# Patient Record
Sex: FEMALE | Race: WHITE | NOT HISPANIC OR LATINO | Employment: OTHER | URBAN - METROPOLITAN AREA
[De-identification: names, ages, dates, MRNs, and addresses within clinical notes are randomized per-mention and may not be internally consistent; named-entity substitution may affect disease eponyms.]

---

## 2018-08-27 PROBLEM — N20.0 NEPHROLITHIASIS: Status: ACTIVE | Noted: 2018-08-27

## 2018-08-28 ENCOUNTER — ANESTHESIA EVENT (OUTPATIENT)
Dept: SURGERY | Facility: OTHER | Age: 48
DRG: 694 | End: 2018-08-28
Payer: MEDICAID

## 2018-08-28 ENCOUNTER — ANESTHESIA (OUTPATIENT)
Dept: SURGERY | Facility: OTHER | Age: 48
DRG: 694 | End: 2018-08-28
Payer: MEDICAID

## 2018-08-28 ENCOUNTER — HOSPITAL ENCOUNTER (INPATIENT)
Facility: OTHER | Age: 48
LOS: 2 days | Discharge: HOME | DRG: 694 | End: 2018-08-30
Attending: HOSPITALIST | Admitting: HOSPITALIST
Payer: MEDICAID

## 2018-08-28 DIAGNOSIS — N20.0 NEPHROLITHIASIS: ICD-10-CM

## 2018-08-28 DIAGNOSIS — N17.9 ACUTE KIDNEY INJURY: ICD-10-CM

## 2018-08-28 DIAGNOSIS — N13.2 URETERAL STONE WITH HYDRONEPHROSIS: Primary | ICD-10-CM

## 2018-08-28 PROBLEM — R82.998 URINE WBC INCREASED: Status: ACTIVE | Noted: 2018-08-28

## 2018-08-28 LAB
ANION GAP SERPL CALC-SCNC: 9 MMOL/L
BASOPHILS # BLD AUTO: 0.02 K/UL
BASOPHILS NFR BLD: 0.1 %
BUN SERPL-MCNC: 22 MG/DL
CALCIUM SERPL-MCNC: 9 MG/DL
CHLORIDE SERPL-SCNC: 106 MMOL/L
CO2 SERPL-SCNC: 25 MMOL/L
CREAT SERPL-MCNC: 1.9 MG/DL
DIFFERENTIAL METHOD: ABNORMAL
EOSINOPHIL # BLD AUTO: 0 K/UL
EOSINOPHIL NFR BLD: 0 %
ERYTHROCYTE [DISTWIDTH] IN BLOOD BY AUTOMATED COUNT: 15.2 %
EST. GFR  (AFRICAN AMERICAN): 36 ML/MIN/1.73 M^2
EST. GFR  (NON AFRICAN AMERICAN): 31 ML/MIN/1.73 M^2
ESTIMATED AVG GLUCOSE: 180 MG/DL
GLUCOSE SERPL-MCNC: 296 MG/DL
HBA1C MFR BLD HPLC: 7.9 %
HCT VFR BLD AUTO: 33.3 %
HGB BLD-MCNC: 10.4 G/DL
LYMPHOCYTES # BLD AUTO: 1 K/UL
LYMPHOCYTES NFR BLD: 5.4 %
MCH RBC QN AUTO: 25.9 PG
MCHC RBC AUTO-ENTMCNC: 31.2 G/DL
MCV RBC AUTO: 83 FL
MONOCYTES # BLD AUTO: 0.7 K/UL
MONOCYTES NFR BLD: 3.9 %
NEUTROPHILS # BLD AUTO: 16.3 K/UL
NEUTROPHILS NFR BLD: 90.3 %
PLATELET # BLD AUTO: 277 K/UL
PMV BLD AUTO: 10.5 FL
POCT GLUCOSE: 209 MG/DL (ref 70–110)
POCT GLUCOSE: 264 MG/DL (ref 70–110)
POCT GLUCOSE: 337 MG/DL (ref 70–110)
POTASSIUM SERPL-SCNC: 3.9 MMOL/L
RBC # BLD AUTO: 4.02 M/UL
SODIUM SERPL-SCNC: 140 MMOL/L
WBC # BLD AUTO: 18.07 K/UL

## 2018-08-28 PROCEDURE — 85025 COMPLETE CBC W/AUTO DIFF WBC: CPT

## 2018-08-28 PROCEDURE — 36415 COLL VENOUS BLD VENIPUNCTURE: CPT

## 2018-08-28 PROCEDURE — 36000707: Performed by: UROLOGY

## 2018-08-28 PROCEDURE — 0T768DZ DILATION OF RIGHT URETER WITH INTRALUMINAL DEVICE, VIA NATURAL OR ARTIFICIAL OPENING ENDOSCOPIC: ICD-10-PCS | Performed by: UROLOGY

## 2018-08-28 PROCEDURE — 63600175 PHARM REV CODE 636 W HCPCS: Performed by: HOSPITALIST

## 2018-08-28 PROCEDURE — 71000033 HC RECOVERY, INTIAL HOUR: Performed by: UROLOGY

## 2018-08-28 PROCEDURE — 25000003 PHARM REV CODE 250: Performed by: NURSE PRACTITIONER

## 2018-08-28 PROCEDURE — 94799 UNLISTED PULMONARY SVC/PX: CPT

## 2018-08-28 PROCEDURE — 87040 BLOOD CULTURE FOR BACTERIA: CPT | Mod: 59

## 2018-08-28 PROCEDURE — 25000003 PHARM REV CODE 250: Performed by: ANESTHESIOLOGY

## 2018-08-28 PROCEDURE — 36000706: Performed by: UROLOGY

## 2018-08-28 PROCEDURE — 25000003 PHARM REV CODE 250: Performed by: SPECIALIST

## 2018-08-28 PROCEDURE — 11000001 HC ACUTE MED/SURG PRIVATE ROOM

## 2018-08-28 PROCEDURE — 63600175 PHARM REV CODE 636 W HCPCS: Performed by: PHYSICIAN ASSISTANT

## 2018-08-28 PROCEDURE — 63600175 PHARM REV CODE 636 W HCPCS: Performed by: NURSE ANESTHETIST, CERTIFIED REGISTERED

## 2018-08-28 PROCEDURE — 94761 N-INVAS EAR/PLS OXIMETRY MLT: CPT

## 2018-08-28 PROCEDURE — 37000009 HC ANESTHESIA EA ADD 15 MINS: Performed by: UROLOGY

## 2018-08-28 PROCEDURE — C1769 GUIDE WIRE: HCPCS | Performed by: UROLOGY

## 2018-08-28 PROCEDURE — 83036 HEMOGLOBIN GLYCOSYLATED A1C: CPT

## 2018-08-28 PROCEDURE — 52332 CYSTOSCOPY AND TREATMENT: CPT | Mod: RT,,, | Performed by: UROLOGY

## 2018-08-28 PROCEDURE — 99223 1ST HOSP IP/OBS HIGH 75: CPT | Mod: ,,, | Performed by: HOSPITALIST

## 2018-08-28 PROCEDURE — 87086 URINE CULTURE/COLONY COUNT: CPT

## 2018-08-28 PROCEDURE — 25000003 PHARM REV CODE 250: Performed by: UROLOGY

## 2018-08-28 PROCEDURE — 76000 FLUOROSCOPY <1 HR PHYS/QHP: CPT | Mod: 26,59,, | Performed by: UROLOGY

## 2018-08-28 PROCEDURE — 25000003 PHARM REV CODE 250: Performed by: PHYSICIAN ASSISTANT

## 2018-08-28 PROCEDURE — 37000008 HC ANESTHESIA 1ST 15 MINUTES: Performed by: UROLOGY

## 2018-08-28 PROCEDURE — 25000003 PHARM REV CODE 250: Performed by: NURSE ANESTHETIST, CERTIFIED REGISTERED

## 2018-08-28 PROCEDURE — 99204 OFFICE O/P NEW MOD 45 MIN: CPT | Mod: 25,,, | Performed by: NURSE PRACTITIONER

## 2018-08-28 PROCEDURE — 80048 BASIC METABOLIC PNL TOTAL CA: CPT

## 2018-08-28 PROCEDURE — 71000039 HC RECOVERY, EACH ADD'L HOUR: Performed by: UROLOGY

## 2018-08-28 PROCEDURE — 25500020 PHARM REV CODE 255: Performed by: UROLOGY

## 2018-08-28 PROCEDURE — C2617 STENT, NON-COR, TEM W/O DEL: HCPCS | Performed by: UROLOGY

## 2018-08-28 DEVICE — STENT URETERAL UNIV 6FR 26CM: Type: IMPLANTABLE DEVICE | Site: URETER | Status: FUNCTIONAL

## 2018-08-28 RX ORDER — ROCURONIUM BROMIDE 10 MG/ML
INJECTION, SOLUTION INTRAVENOUS
Status: DISCONTINUED | OUTPATIENT
Start: 2018-08-28 | End: 2018-08-28

## 2018-08-28 RX ORDER — CIPROFLOXACIN 2 MG/ML
400 INJECTION, SOLUTION INTRAVENOUS
Status: DISCONTINUED | OUTPATIENT
Start: 2018-08-28 | End: 2018-08-30

## 2018-08-28 RX ORDER — GLUCAGON 1 MG
1 KIT INJECTION
Status: DISCONTINUED | OUTPATIENT
Start: 2018-08-28 | End: 2018-08-30 | Stop reason: HOSPADM

## 2018-08-28 RX ORDER — IBUPROFEN 200 MG
24 TABLET ORAL
Status: DISCONTINUED | OUTPATIENT
Start: 2018-08-28 | End: 2018-08-30 | Stop reason: HOSPADM

## 2018-08-28 RX ORDER — PHENYLEPHRINE HYDROCHLORIDE 10 MG/ML
INJECTION INTRAVENOUS
Status: DISCONTINUED | OUTPATIENT
Start: 2018-08-28 | End: 2018-08-28

## 2018-08-28 RX ORDER — ONDANSETRON 8 MG/1
8 TABLET, ORALLY DISINTEGRATING ORAL EVERY 8 HOURS PRN
Status: DISCONTINUED | OUTPATIENT
Start: 2018-08-28 | End: 2018-08-29

## 2018-08-28 RX ORDER — LABETALOL HYDROCHLORIDE 5 MG/ML
10 INJECTION, SOLUTION INTRAVENOUS
Status: COMPLETED | OUTPATIENT
Start: 2018-08-28 | End: 2018-08-28

## 2018-08-28 RX ORDER — LORATADINE 10 MG/1
10 TABLET ORAL DAILY
Status: ON HOLD | COMMUNITY
Start: 2018-05-25 | End: 2018-08-30 | Stop reason: HOSPADM

## 2018-08-28 RX ORDER — INSULIN ASPART 100 [IU]/ML
10 INJECTION, SOLUTION INTRAVENOUS; SUBCUTANEOUS
Status: DISCONTINUED | OUTPATIENT
Start: 2018-08-29 | End: 2018-08-30 | Stop reason: HOSPADM

## 2018-08-28 RX ORDER — ONDANSETRON 2 MG/ML
4 INJECTION INTRAMUSCULAR; INTRAVENOUS DAILY PRN
Status: DISCONTINUED | OUTPATIENT
Start: 2018-08-28 | End: 2018-08-29

## 2018-08-28 RX ORDER — FENTANYL CITRATE 50 UG/ML
25 INJECTION, SOLUTION INTRAMUSCULAR; INTRAVENOUS EVERY 5 MIN PRN
Status: DISCONTINUED | OUTPATIENT
Start: 2018-08-28 | End: 2018-08-29

## 2018-08-28 RX ORDER — DEXAMETHASONE SODIUM PHOSPHATE 4 MG/ML
INJECTION, SOLUTION INTRA-ARTICULAR; INTRALESIONAL; INTRAMUSCULAR; INTRAVENOUS; SOFT TISSUE
Status: DISCONTINUED | OUTPATIENT
Start: 2018-08-28 | End: 2018-08-28

## 2018-08-28 RX ORDER — PROPOFOL 10 MG/ML
VIAL (ML) INTRAVENOUS
Status: DISCONTINUED | OUTPATIENT
Start: 2018-08-28 | End: 2018-08-28

## 2018-08-28 RX ORDER — HEPARIN SODIUM 5000 [USP'U]/ML
5000 INJECTION, SOLUTION INTRAVENOUS; SUBCUTANEOUS EVERY 8 HOURS
Status: DISCONTINUED | OUTPATIENT
Start: 2018-08-28 | End: 2018-08-30 | Stop reason: HOSPADM

## 2018-08-28 RX ORDER — SODIUM CHLORIDE 9 MG/ML
INJECTION, SOLUTION INTRAVENOUS CONTINUOUS
Status: DISCONTINUED | OUTPATIENT
Start: 2018-08-28 | End: 2018-08-30

## 2018-08-28 RX ORDER — IBUPROFEN 200 MG
16 TABLET ORAL
Status: DISCONTINUED | OUTPATIENT
Start: 2018-08-28 | End: 2018-08-30 | Stop reason: HOSPADM

## 2018-08-28 RX ORDER — ONDANSETRON 2 MG/ML
INJECTION INTRAMUSCULAR; INTRAVENOUS
Status: DISCONTINUED | OUTPATIENT
Start: 2018-08-28 | End: 2018-08-28

## 2018-08-28 RX ORDER — GLYCOPYRROLATE 0.2 MG/ML
INJECTION INTRAMUSCULAR; INTRAVENOUS
Status: DISCONTINUED | OUTPATIENT
Start: 2018-08-28 | End: 2018-08-28

## 2018-08-28 RX ORDER — SODIUM CHLORIDE, SODIUM LACTATE, POTASSIUM CHLORIDE, CALCIUM CHLORIDE 600; 310; 30; 20 MG/100ML; MG/100ML; MG/100ML; MG/100ML
INJECTION, SOLUTION INTRAVENOUS CONTINUOUS PRN
Status: DISCONTINUED | OUTPATIENT
Start: 2018-08-28 | End: 2018-08-28

## 2018-08-28 RX ORDER — FLUTICASONE PROPIONATE 50 MCG
1 SPRAY, SUSPENSION (ML) NASAL DAILY
Status: ON HOLD | COMMUNITY
Start: 2018-08-22 | End: 2018-08-30 | Stop reason: HOSPADM

## 2018-08-28 RX ORDER — GABAPENTIN 300 MG/1
300 CAPSULE ORAL 3 TIMES DAILY PRN
Status: ON HOLD | COMMUNITY
Start: 2018-08-22 | End: 2018-08-30 | Stop reason: HOSPADM

## 2018-08-28 RX ORDER — HYDROMORPHONE HYDROCHLORIDE 1 MG/ML
0.5 INJECTION, SOLUTION INTRAMUSCULAR; INTRAVENOUS; SUBCUTANEOUS EVERY 4 HOURS PRN
Status: DISCONTINUED | OUTPATIENT
Start: 2018-08-28 | End: 2018-08-30

## 2018-08-28 RX ORDER — ACETAMINOPHEN 325 MG/1
650 TABLET ORAL EVERY 4 HOURS PRN
Status: DISCONTINUED | OUTPATIENT
Start: 2018-08-28 | End: 2018-08-30

## 2018-08-28 RX ORDER — HYDROMORPHONE HYDROCHLORIDE 1 MG/ML
0.5 INJECTION, SOLUTION INTRAMUSCULAR; INTRAVENOUS; SUBCUTANEOUS ONCE
Status: COMPLETED | OUTPATIENT
Start: 2018-08-28 | End: 2018-08-28

## 2018-08-28 RX ORDER — SODIUM CHLORIDE 0.9 % (FLUSH) 0.9 %
5 SYRINGE (ML) INJECTION
Status: DISCONTINUED | OUTPATIENT
Start: 2018-08-28 | End: 2018-08-29

## 2018-08-28 RX ORDER — INSULIN GLARGINE 100 [IU]/ML
60 INJECTION, SOLUTION SUBCUTANEOUS NIGHTLY
COMMUNITY
Start: 2018-08-14 | End: 2020-11-03

## 2018-08-28 RX ORDER — SUCCINYLCHOLINE CHLORIDE 20 MG/ML
INJECTION INTRAMUSCULAR; INTRAVENOUS
Status: DISCONTINUED | OUTPATIENT
Start: 2018-08-28 | End: 2018-08-28

## 2018-08-28 RX ORDER — INSULIN LISPRO 100 [IU]/ML
10 INJECTION, SOLUTION INTRAVENOUS; SUBCUTANEOUS
Status: ON HOLD | COMMUNITY
Start: 2018-08-22 | End: 2022-05-18

## 2018-08-28 RX ORDER — ATROPA BELLADONNA AND OPIUM 16.2; 6 MG/1; MG/1
SUPPOSITORY RECTAL
Status: DISCONTINUED | OUTPATIENT
Start: 2018-08-28 | End: 2018-08-28 | Stop reason: HOSPADM

## 2018-08-28 RX ORDER — OXYCODONE HYDROCHLORIDE 5 MG/1
5 TABLET ORAL
Status: DISCONTINUED | OUTPATIENT
Start: 2018-08-28 | End: 2018-08-29 | Stop reason: HOSPADM

## 2018-08-28 RX ORDER — INSULIN ASPART 100 [IU]/ML
0-5 INJECTION, SOLUTION INTRAVENOUS; SUBCUTANEOUS
Status: DISCONTINUED | OUTPATIENT
Start: 2018-08-28 | End: 2018-08-30 | Stop reason: HOSPADM

## 2018-08-28 RX ORDER — SODIUM CHLORIDE 0.9 % (FLUSH) 0.9 %
3 SYRINGE (ML) INJECTION
Status: DISCONTINUED | OUTPATIENT
Start: 2018-08-28 | End: 2018-08-29

## 2018-08-28 RX ORDER — DIPHENHYDRAMINE HYDROCHLORIDE 50 MG/ML
25 INJECTION INTRAMUSCULAR; INTRAVENOUS EVERY 6 HOURS PRN
Status: DISCONTINUED | OUTPATIENT
Start: 2018-08-28 | End: 2018-08-30

## 2018-08-28 RX ORDER — OMEPRAZOLE 20 MG/1
20 CAPSULE, DELAYED RELEASE ORAL DAILY
Status: ON HOLD | COMMUNITY
Start: 2018-06-01 | End: 2018-08-29 | Stop reason: CLARIF

## 2018-08-28 RX ORDER — MEPERIDINE HYDROCHLORIDE 50 MG/ML
12.5 INJECTION INTRAMUSCULAR; INTRAVENOUS; SUBCUTANEOUS ONCE AS NEEDED
Status: ACTIVE | OUTPATIENT
Start: 2018-08-28 | End: 2018-08-28

## 2018-08-28 RX ORDER — LIDOCAINE HCL/PF 100 MG/5ML
SYRINGE (ML) INTRAVENOUS
Status: DISCONTINUED | OUTPATIENT
Start: 2018-08-28 | End: 2018-08-28

## 2018-08-28 RX ORDER — LOSARTAN POTASSIUM AND HYDROCHLOROTHIAZIDE 25; 100 MG/1; MG/1
1 TABLET ORAL DAILY
Status: ON HOLD | COMMUNITY
Start: 2018-05-25 | End: 2018-08-30 | Stop reason: HOSPADM

## 2018-08-28 RX ORDER — HYDROMORPHONE HYDROCHLORIDE 2 MG/ML
0.4 INJECTION, SOLUTION INTRAMUSCULAR; INTRAVENOUS; SUBCUTANEOUS EVERY 5 MIN PRN
Status: DISCONTINUED | OUTPATIENT
Start: 2018-08-28 | End: 2018-08-29 | Stop reason: HOSPADM

## 2018-08-28 RX ORDER — HYDRALAZINE HYDROCHLORIDE 20 MG/ML
10 INJECTION INTRAMUSCULAR; INTRAVENOUS EVERY 6 HOURS PRN
Status: DISCONTINUED | OUTPATIENT
Start: 2018-08-28 | End: 2018-08-29

## 2018-08-28 RX ORDER — HYDRALAZINE HYDROCHLORIDE 50 MG/1
50 TABLET, FILM COATED ORAL 3 TIMES DAILY
Status: ON HOLD | COMMUNITY
Start: 2018-05-25 | End: 2022-05-18

## 2018-08-28 RX ORDER — MIDAZOLAM HYDROCHLORIDE 1 MG/ML
INJECTION INTRAMUSCULAR; INTRAVENOUS
Status: DISCONTINUED | OUTPATIENT
Start: 2018-08-28 | End: 2018-08-28

## 2018-08-28 RX ORDER — FENTANYL CITRATE 50 UG/ML
INJECTION, SOLUTION INTRAMUSCULAR; INTRAVENOUS
Status: DISCONTINUED | OUTPATIENT
Start: 2018-08-28 | End: 2018-08-28

## 2018-08-28 RX ORDER — OXYBUTYNIN CHLORIDE 5 MG/1
5 TABLET ORAL 3 TIMES DAILY PRN
Status: DISCONTINUED | OUTPATIENT
Start: 2018-08-28 | End: 2018-08-30 | Stop reason: HOSPADM

## 2018-08-28 RX ADMIN — LABETALOL HYDROCHLORIDE 10 MG: 5 INJECTION, SOLUTION INTRAVENOUS at 03:08

## 2018-08-28 RX ADMIN — LIDOCAINE HYDROCHLORIDE 60 MG: 20 INJECTION, SOLUTION INTRAVENOUS at 01:08

## 2018-08-28 RX ADMIN — HYDRALAZINE HYDROCHLORIDE 10 MG: 20 INJECTION INTRAMUSCULAR; INTRAVENOUS at 04:08

## 2018-08-28 RX ADMIN — ROCURONIUM BROMIDE 10 MG: 10 INJECTION, SOLUTION INTRAVENOUS at 01:08

## 2018-08-28 RX ADMIN — SODIUM CHLORIDE: 0.9 INJECTION, SOLUTION INTRAVENOUS at 04:08

## 2018-08-28 RX ADMIN — PHENYLEPHRINE HYDROCHLORIDE 200 MCG: 10 INJECTION INTRAVENOUS at 02:08

## 2018-08-28 RX ADMIN — CIPROFLOXACIN 400 MG: 2 INJECTION, SOLUTION INTRAVENOUS at 12:08

## 2018-08-28 RX ADMIN — PROPOFOL 200 MG: 10 INJECTION, EMULSION INTRAVENOUS at 01:08

## 2018-08-28 RX ADMIN — INSULIN ASPART 4 UNITS: 100 INJECTION, SOLUTION INTRAVENOUS; SUBCUTANEOUS at 07:08

## 2018-08-28 RX ADMIN — HEPARIN SODIUM 5000 UNITS: 5000 INJECTION, SOLUTION INTRAVENOUS; SUBCUTANEOUS at 10:08

## 2018-08-28 RX ADMIN — MIDAZOLAM HYDROCHLORIDE 2 MG: 1 INJECTION, SOLUTION INTRAMUSCULAR; INTRAVENOUS at 01:08

## 2018-08-28 RX ADMIN — ONDANSETRON 4 MG: 2 INJECTION INTRAMUSCULAR; INTRAVENOUS at 02:08

## 2018-08-28 RX ADMIN — GLYCOPYRROLATE 0.2 MG: 0.2 INJECTION, SOLUTION INTRAMUSCULAR; INTRAVENOUS at 02:08

## 2018-08-28 RX ADMIN — CARBOXYMETHYLCELLULOSE SODIUM 2 DROP: 2.5 SOLUTION/ DROPS OPHTHALMIC at 01:08

## 2018-08-28 RX ADMIN — OXYBUTYNIN CHLORIDE 5 MG: 5 TABLET ORAL at 06:08

## 2018-08-28 RX ADMIN — HEPARIN SODIUM 5000 UNITS: 5000 INJECTION, SOLUTION INTRAVENOUS; SUBCUTANEOUS at 05:08

## 2018-08-28 RX ADMIN — DEXAMETHASONE SODIUM PHOSPHATE 8 MG: 4 INJECTION, SOLUTION INTRAMUSCULAR; INTRAVENOUS at 02:08

## 2018-08-28 RX ADMIN — HYDROMORPHONE HYDROCHLORIDE 0.5 MG: 1 INJECTION, SOLUTION INTRAMUSCULAR; INTRAVENOUS; SUBCUTANEOUS at 07:08

## 2018-08-28 RX ADMIN — SODIUM CHLORIDE, SODIUM LACTATE, POTASSIUM CHLORIDE, AND CALCIUM CHLORIDE: 600; 310; 30; 20 INJECTION, SOLUTION INTRAVENOUS at 01:08

## 2018-08-28 RX ADMIN — ONDANSETRON 8 MG: 8 TABLET, ORALLY DISINTEGRATING ORAL at 05:08

## 2018-08-28 RX ADMIN — INSULIN DETEMIR 40 UNITS: 100 INJECTION, SOLUTION SUBCUTANEOUS at 09:08

## 2018-08-28 RX ADMIN — PROMETHAZINE HYDROCHLORIDE 25 MG: 25 INJECTION INTRAMUSCULAR; INTRAVENOUS at 07:08

## 2018-08-28 RX ADMIN — FENTANYL CITRATE 100 MCG: 50 INJECTION, SOLUTION INTRAMUSCULAR; INTRAVENOUS at 01:08

## 2018-08-28 RX ADMIN — SUCCINYLCHOLINE CHLORIDE 140 MG: 20 INJECTION, SOLUTION INTRAMUSCULAR; INTRAVENOUS at 01:08

## 2018-08-28 RX ADMIN — CIPROFLOXACIN 400 MG: 2 INJECTION, SOLUTION INTRAVENOUS at 10:08

## 2018-08-28 NOTE — ANESTHESIA PREPROCEDURE EVALUATION
08/28/2018  Georgina Arias is a 47 y.o., female.    Anesthesia Evaluation    I have reviewed the Patient Summary Reports.    I have reviewed the Nursing Notes.   I have reviewed the Medications.     Review of Systems  Anesthesia Hx:  No problems with previous Anesthesia    Social:  Former Smoker, Social Alcohol Use    Hematology/Oncology:     Oncology Normal     EENT/Dental:EENT/Dental Normal   Cardiovascular:   Exercise tolerance: good Hypertension, well controlled PVD hyperlipidemia    Pulmonary:  Pulmonary Normal    Renal/:   renal calculi    Hepatic/GI:   PUD,    Musculoskeletal:  Musculoskeletal Normal    Neurological:  Neurology Normal    Endocrine:   Diabetes, poorly controlled, type 2    Dermatological:  Skin Normal    Psych:  Psychiatric Normal           Physical Exam  General:  Morbid Obesity    Airway/Jaw/Neck:  Airway Findings: Mouth Opening: Normal Tongue: Normal  General Airway Assessment: Adult, Average  Mallampati: II  TM Distance: 4 - 6 cm  Jaw/Neck Findings:  Neck ROM: Extension Decreased, Mild      Dental:  Dental Findings: upper partial dentures        Mental Status:  Mental Status Findings:  Cooperative, Alert and Oriented         Anesthesia Plan  Type of Anesthesia, risks & benefits discussed:  Anesthesia Type:  general  Patient's Preference:   Intra-op Monitoring Plan: standard ASA monitors  Intra-op Monitoring Plan Comments:   Post Op Pain Control Plan: per primary service following discharge from PACU  Post Op Pain Control Plan Comments:   Induction:    Beta Blocker:         Informed Consent: Patient understands risks and agrees with Anesthesia plan.  Questions answered. Anesthesia consent signed with patient.  ASA Score: 3  emergent   Day of Surgery Review of History & Physical:    H&P update referred to the surgeon.         Ready For Surgery From Anesthesia Perspective.

## 2018-08-28 NOTE — PLAN OF CARE
10am- LMSW attempted to met with patient at the bedside. Patient is on the phone and request LMSW return later.     LMSW will return to the bedside.

## 2018-08-28 NOTE — H&P
"[facility] Hospital Medicine  History & Physical    Patient Name: Georgina Arias  MRN: [id number]  Admission Date: 8/28/2018  Attending Physician: Braulio Heredia MD   Primary Care Provider: Primary Doctor No         Patient information was obtained from patient, past medical records and ER records.     Subjective:     Principal Problem:Nephrolithiasis    Chief Complaint: No chief complaint on file.       HPI: Ms. Georgina Arias is a 47 y.o. female, who per ED note " 47 y.o. female who presents to the ED with complaint of sharp abdominal pain onset yesterday.  Pt has epigastric pain radiating upwards and constant lower flank pain with palpation. Pt went to a restaurant yesterday and two hours after eating, started vomiting. Pt tried taking Tylenol, but still experiences intermittent vomiting, nausea, and abdominal pain. Pt has not eaten at all today and has trouble drinking water. Pt has a past medical history of Allergy, Diabetes mellitus, type 2, Hyperlipidemia, Hypertension, PAD (peripheral artery disease), and PUD (peptic ulcer disease). Pt has a past surgical history that includes Peripheral arterial stent graft (Right) and Cholecystectomy." She was evaluated at [place] ED and found to have nephrolithiasis. In addition, a UA had 7 WBC, but 20 squams. She was administered a dose of IV antibiotics, and transferred to [facility] for Urology consultation and further management.     Past Medical History:   Diagnosis Date    Allergy     Diabetes mellitus, type 2     Hyperlipidemia     Hypertension     PAD (peripheral artery disease)     PUD (peptic ulcer disease)        Past Surgical History:   Procedure Laterality Date    CHOLECYSTECTOMY      PERIPHERAL ARTERIAL STENT GRAFT Right        Review of patient's allergies indicates:   Allergen Reactions    Naproxen Anaphylaxis    Hydrocod-cpm-pe-acetaminophen      Rash, vomiting    Pcn [penicillins] Hives    Sweetleaf (stevia " "zoran) Rash     nutrasweet or any other artifical sweetners       Current Facility-Administered Medications on File Prior to Encounter   Medication    [COMPLETED] levoFLOXacin 500 mg/100 mL IVPB 500 mg    [COMPLETED] morphine injection 4 mg    [COMPLETED] morphine injection 4 mg    [COMPLETED] ondansetron injection 4 mg    [COMPLETED] ondansetron injection 4 mg    [COMPLETED] sodium chloride 0.9% bolus 1,000 mL     Current Outpatient Medications on File Prior to Encounter   Medication Sig    amlodipine (NORVASC) 10 MG tablet Take 1 tablet (10 mg total) by mouth once daily.    ascorbic acid (VITAMIN C) 500 MG tablet Take 500 mg by mouth once daily.    aspirin (ECOTRIN) 81 MG EC tablet Take 1 tablet (81 mg total) by mouth once daily.    atorvastatin (LIPITOR) 80 MG tablet Take 1 tablet (80 mg total) by mouth once daily.    atropine sulfate, PF, 1 % Drop Apply to eye.    cetirizine (ZYRTEC) 10 MG tablet Take 10 mg by mouth once daily.    citalopram (CELEXA) 20 MG tablet     clopidogrel (PLAVIX) 75 mg tablet Take 1 tablet (75 mg total) by mouth once daily.    dextromethorphan-guaifenesin  mg/5 ml (ROBITUSSIN-DM)  mg/5 mL liquid Take 10 mLs by mouth 4 (four) times daily as needed (cough).    insulin needles, disposable, (ULTRA-THIN II INS PEN NEEDLES) 29 x 1/2 " Ndle 1 each by Misc.(Non-Drug; Combo Route) route 2 (two) times daily.    insulin needles, disposable, 29 gauge Ndle 1 each by Misc.(Non-Drug; Combo Route) route once daily.    insulin NPH (NOVOLIN N) 100 unit/mL injection Inject 50 Units into the skin 2 (two) times daily before meals.    insulin regular 100 unit/mL Inj injection Inject 20 Units into the skin 3 (three) times daily before meals.    insulin syringe-needle U-100 1 mL 29 x 1/2" Syrg     lisinopril-hydrochlorothiazide (PRINZIDE,ZESTORETIC) 20-25 mg Tab Take 1 tablet by mouth once daily.    metformin (GLUCOPHAGE) 1000 MG tablet Take 1 tablet (1,000 mg total) by " mouth 2 (two) times daily with meals.    moxifloxacin (VIGAMOX) 0.5 % ophthalmic solution 1 drop 3 (three) times daily.    omeprazole (PRILOSEC) 40 MG capsule Take 40 mg by mouth once daily.    spironolactone (ALDACTONE) 50 MG tablet Take 1 tablet (50 mg total) by mouth once daily.    trazodone (DESYREL) 50 MG tablet Take 1 tablet (50 mg total) by mouth nightly as needed for Insomnia.     Family History     Problem Relation (Age of Onset)    Cancer Maternal Grandmother    Diabetes Mother, Father    Heart disease Father (37), Maternal Grandmother    Hypertension Mother, Father, Brother        Tobacco Use    Smoking status: Former Smoker     Last attempt to quit: 2002     Years since quittin.0   Substance and Sexual Activity    Alcohol use: Yes     Comment: occ    Drug use: No    Sexual activity: Not on file     Review of Systems   Constitutional: Negative for activity change, appetite change, chills, diaphoresis and fever.   Respiratory: Negative for cough, shortness of breath and wheezing.    Cardiovascular: Negative for chest pain and palpitations.   Gastrointestinal: Positive for nausea and vomiting. Negative for abdominal pain, constipation and diarrhea.   Genitourinary: Positive for flank pain. Negative for decreased urine volume, difficulty urinating, dysuria, frequency, hematuria, pelvic pain and urgency.   Musculoskeletal: Negative for back pain, myalgias, neck pain and neck stiffness.   Skin: Negative for color change, pallor, rash and wound.   Neurological: Negative for dizziness, weakness, light-headedness and headaches.   Psychiatric/Behavioral: Negative for confusion and decreased concentration.     Objective:     Vital Signs (Most Recent):  Temp: 98.7 °F (37.1 °C) (18)  Pulse: 87 (18)  Resp: 18 (18)  BP: (!) 196/80 (18 0423)  SpO2: 97 % (18) Vital Signs (24h Range):  Temp:  [98.7 °F (37.1 °C)-99.2 °F (37.3 °C)] 98.7 °F (37.1 °C)  Pulse:   [87-98] 87  Resp:  [14-18] 18  SpO2:  [81 %-100 %] 97 %  BP: (172-236)/() 196/80     Weight: 124.9 kg (275 lb 5.7 oz)  Body mass index is 43.13 kg/m².    Physical Exam   Constitutional: She is oriented to person, place, and time. She appears well-developed and well-nourished. No distress.   Obese female.    HENT:   Head: Normocephalic and atraumatic.   Eyes: Conjunctivae and EOM are normal. Pupils are equal, round, and reactive to light. No scleral icterus.   Neck: Normal range of motion. Neck supple. No tracheal deviation present.   Cardiovascular: Normal rate, regular rhythm, normal heart sounds and intact distal pulses. Exam reveals no gallop and no friction rub.   No murmur heard.  Pulmonary/Chest: Effort normal and breath sounds normal. No stridor. No respiratory distress. She has no wheezes. She has no rales.   Abdominal: Soft. Bowel sounds are normal. She exhibits no distension. There is no tenderness. There is no guarding.   + CVA tenderness R>L   Neurological: She is alert and oriented to person, place, and time.   Skin: Skin is warm and dry. No rash noted. She is not diaphoretic. No erythema.   Psychiatric: She has a normal mood and affect. Her behavior is normal. Judgment and thought content normal.   Nursing note and vitals reviewed.        CRANIAL NERVES     CN III, IV, VI   Pupils are equal, round, and reactive to light.  Extraocular motions are normal.        Significant Labs:   BMP:   Recent Labs   Lab  08/27/18 1914   GLU  232*   NA  140   K  3.4*   CL  102   CO2  29   BUN  22*   CREATININE  1.8*   CALCIUM  8.5*     CBC:   Recent Labs   Lab  08/27/18 1914   WBC  19.60*   HGB  11.2*   HCT  34.8*   PLT  280     CMP:   Recent Labs   Lab  08/27/18 1914   NA  140   K  3.4*   CL  102   CO2  29   GLU  232*   BUN  22*   CREATININE  1.8*   CALCIUM  8.5*   PROT  7.2   ALBUMIN  3.0*   BILITOT  0.5   ALKPHOS  82   AST  21   ALT  15   ANIONGAP  9   EGFRNONAA  33.0*     Urine Culture: No results for  input(s): LABURIN in the last 48 hours.  Urine Studies:   Recent Labs   Lab  08/27/18   1930   COLORU  Yellow   APPEARANCEUA  Clear   PHUR  6.0   SPECGRAV  1.020   PROTEINUA  3+*   GLUCUA  1+*   KETONESU  Negative   BILIRUBINUA  Negative   OCCULTUA  3+*   NITRITE  Negative   UROBILINOGEN  Negative   LEUKOCYTESUR  Negative   RBCUA  14*   WBCUA  7*   BACTERIA  Moderate*   SQUAMEPITHEL  20   HYALINECASTS  1     All pertinent labs within the past 24 hours have been reviewed.    Significant Imaging: I have reviewed all pertinent imaging results/findings within the past 24 hours.             Assessment/Plan:     * Nephrolithiasis    - per ED MD report, CT from [facility] noted two 5 mm prox. Ureter renal stones   - ED MD did d/w Dr. Perez (Uroliogy) who will consult on the patient   - patient to be NPO s/p midnight   - PRN pain meds   - PRN nausea meds         Urine WBC increased    - treated with single dose of levaquin at [facility] ED   - continue treatment with vancomycin   - patient is allergic to PCN   - associated with nausea & vomiting         Hypertensive urgency    - most recent blood pressure reading BP: (!) 196/80   - hydralazine PRN for SBP > 170  - reassess BP ~45 minutes after hydralazine administration  - will continue to monitor & order other anti-hypertensive meds PRN            Type 2 diabetes mellitus with hyperglycemia, with long-term current use of insulin    - last A1C:   Lab Results   Component Value Date    HGBA1C 9.9 (H) 03/24/2015    - A1C pending for AM   - hold oral antidiabetic meds   - Diabetic diet when OK for PO; NPO for now   - SSI with accuchekcs P5lbhce            Hyperlipidemia    - continue statin when OK for PO          PAD (peripheral artery disease)    - continue statin & ASA when ok for PO            VTE Risk Mitigation (From admission, onward)        Ordered     heparin (porcine) injection 5,000 Units  Every 8 hours      08/28/18 0410     IP VTE HIGH RISK PATIENT  Once      08/28/18 0410      Place sequential compression device  Until discontinued      08/28/18 0410     Place GUSTAVO hose  Until discontinued      08/28/18 0410             Chastity Leo PA-C  Department of Hospital Medicine   [facility]

## 2018-08-28 NOTE — PLAN OF CARE
"7:47 AM   MD Giovanna notified of elevated SBP, No PRN pain Med available.  Orders RCVD and Implemented.  [facility] at bedside will re-assess BP.    BP (!) 198/85   Pulse 92   Temp 98.7 °F (37.1 °C)   Resp 18   Ht 5' 7" (1.702 m)   Wt 124.9 kg (275 lb 5.7 oz)   SpO2 95%   Breastfeeding? No   BMI 43.13 kg/m²   O2 at 88% on RA, NC 2 L provided.    1:38 PM  Report called to Diana in [facility]. Patient enroute. to OR    5:17 PM  In bed   Received to room from PACU, RN. HTN on O2/2L/NC & afebrile. AOx4 / Ox4, still slightly sedated--arousable to voice. Oriented to room, POC, & all equipment. Surgical dressing CDI w/ Fraser catheter secure draining bloody urine. Bowel sounds active, clear liquid diet initiated. Fall precautions implemented & call light in reach. Resting comfortably in low bed, in NAD.     6:47 PM  CO bladder MD johanna notified/ Orders RCVD and Implemented.  ACHSc SS coverage 2000 ADA inityiated    "

## 2018-08-28 NOTE — SUBJECTIVE & OBJECTIVE
"Past Medical History:   Diagnosis Date    Allergy     Diabetes mellitus, type 2     Hyperlipidemia     Hypertension     PAD (peripheral artery disease)     PUD (peptic ulcer disease)        Past Surgical History:   Procedure Laterality Date    CHOLECYSTECTOMY      PERIPHERAL ARTERIAL STENT GRAFT Right        Review of patient's allergies indicates:   Allergen Reactions    Naproxen Anaphylaxis    Hydrocod-cpm-pe-acetaminophen      Rash, vomiting    Pcn [penicillins] Hives    Sweetleaf (stevia rebaudiana) Rash     nutrasweet or any other artifical sweetners       Current Facility-Administered Medications on File Prior to Encounter   Medication    [COMPLETED] levoFLOXacin 500 mg/100 mL IVPB 500 mg    [COMPLETED] morphine injection 4 mg    [COMPLETED] morphine injection 4 mg    [COMPLETED] ondansetron injection 4 mg    [COMPLETED] ondansetron injection 4 mg    [COMPLETED] sodium chloride 0.9% bolus 1,000 mL     Current Outpatient Medications on File Prior to Encounter   Medication Sig    amlodipine (NORVASC) 10 MG tablet Take 1 tablet (10 mg total) by mouth once daily.    ascorbic acid (VITAMIN C) 500 MG tablet Take 500 mg by mouth once daily.    aspirin (ECOTRIN) 81 MG EC tablet Take 1 tablet (81 mg total) by mouth once daily.    atorvastatin (LIPITOR) 80 MG tablet Take 1 tablet (80 mg total) by mouth once daily.    atropine sulfate, PF, 1 % Drop Apply to eye.    cetirizine (ZYRTEC) 10 MG tablet Take 10 mg by mouth once daily.    citalopram (CELEXA) 20 MG tablet     clopidogrel (PLAVIX) 75 mg tablet Take 1 tablet (75 mg total) by mouth once daily.    dextromethorphan-guaifenesin  mg/5 ml (ROBITUSSIN-DM)  mg/5 mL liquid Take 10 mLs by mouth 4 (four) times daily as needed (cough).    insulin needles, disposable, (ULTRA-THIN II INS PEN NEEDLES) 29 x 1/2 " Ndle 1 each by Misc.(Non-Drug; Combo Route) route 2 (two) times daily.    insulin needles, disposable, 29 gauge Ndle 1 each by " "Misc.(Non-Drug; Combo Route) route once daily.    insulin NPH (NOVOLIN N) 100 unit/mL injection Inject 50 Units into the skin 2 (two) times daily before meals.    insulin regular 100 unit/mL Inj injection Inject 20 Units into the skin 3 (three) times daily before meals.    insulin syringe-needle U-100 1 mL 29 x 1/2" Syrg     lisinopril-hydrochlorothiazide (PRINZIDE,ZESTORETIC) 20-25 mg Tab Take 1 tablet by mouth once daily.    metformin (GLUCOPHAGE) 1000 MG tablet Take 1 tablet (1,000 mg total) by mouth 2 (two) times daily with meals.    moxifloxacin (VIGAMOX) 0.5 % ophthalmic solution 1 drop 3 (three) times daily.    omeprazole (PRILOSEC) 40 MG capsule Take 40 mg by mouth once daily.    spironolactone (ALDACTONE) 50 MG tablet Take 1 tablet (50 mg total) by mouth once daily.    trazodone (DESYREL) 50 MG tablet Take 1 tablet (50 mg total) by mouth nightly as needed for Insomnia.     Family History     Problem Relation (Age of Onset)    Cancer Maternal Grandmother    Diabetes Mother, Father    Heart disease Father (37), Maternal Grandmother    Hypertension Mother, Father, Brother        Tobacco Use    Smoking status: Former Smoker     Last attempt to quit: 2002     Years since quittin.0   Substance and Sexual Activity    Alcohol use: Yes     Comment: occ    Drug use: No    Sexual activity: Not on file     Review of Systems   Constitutional: Negative for activity change, appetite change, chills, diaphoresis and fever.   Respiratory: Negative for cough, shortness of breath and wheezing.    Cardiovascular: Negative for chest pain and palpitations.   Gastrointestinal: Positive for nausea and vomiting. Negative for abdominal pain, constipation and diarrhea.   Genitourinary: Positive for flank pain. Negative for decreased urine volume, difficulty urinating, dysuria, frequency, hematuria, pelvic pain and urgency.   Musculoskeletal: Negative for back pain, myalgias, neck pain and neck stiffness.   Skin: " Negative for color change, pallor, rash and wound.   Neurological: Negative for dizziness, weakness, light-headedness and headaches.   Psychiatric/Behavioral: Negative for confusion and decreased concentration.     Objective:     Vital Signs (Most Recent):  Temp: 98.7 °F (37.1 °C) (08/28/18 0344)  Pulse: 87 (08/28/18 0344)  Resp: 18 (08/28/18 0344)  BP: (!) 196/80 (08/28/18 0423)  SpO2: 97 % (08/28/18 0344) Vital Signs (24h Range):  Temp:  [98.7 °F (37.1 °C)-99.2 °F (37.3 °C)] 98.7 °F (37.1 °C)  Pulse:  [87-98] 87  Resp:  [14-18] 18  SpO2:  [81 %-100 %] 97 %  BP: (172-236)/() 196/80     Weight: 124.9 kg (275 lb 5.7 oz)  Body mass index is 43.13 kg/m².    Physical Exam   Constitutional: She is oriented to person, place, and time. She appears well-developed and well-nourished. No distress.   Obese female.    HENT:   Head: Normocephalic and atraumatic.   Eyes: Conjunctivae and EOM are normal. Pupils are equal, round, and reactive to light. No scleral icterus.   Neck: Normal range of motion. Neck supple. No tracheal deviation present.   Cardiovascular: Normal rate, regular rhythm, normal heart sounds and intact distal pulses. Exam reveals no gallop and no friction rub.   No murmur heard.  Pulmonary/Chest: Effort normal and breath sounds normal. No stridor. No respiratory distress. She has no wheezes. She has no rales.   Abdominal: Soft. Bowel sounds are normal. She exhibits no distension. There is no tenderness. There is no guarding.   + CVA tenderness R>L   Neurological: She is alert and oriented to person, place, and time.   Skin: Skin is warm and dry. No rash noted. She is not diaphoretic. No erythema.   Psychiatric: She has a normal mood and affect. Her behavior is normal. Judgment and thought content normal.   Nursing note and vitals reviewed.        CRANIAL NERVES     CN III, IV, VI   Pupils are equal, round, and reactive to light.  Extraocular motions are normal.        Significant Labs:   BMP:   Recent  Labs   Lab  08/27/18 1914   GLU  232*   NA  140   K  3.4*   CL  102   CO2  29   BUN  22*   CREATININE  1.8*   CALCIUM  8.5*     CBC:   Recent Labs   Lab  08/27/18 1914   WBC  19.60*   HGB  11.2*   HCT  34.8*   PLT  280     CMP:   Recent Labs   Lab  08/27/18 1914   NA  140   K  3.4*   CL  102   CO2  29   GLU  232*   BUN  22*   CREATININE  1.8*   CALCIUM  8.5*   PROT  7.2   ALBUMIN  3.0*   BILITOT  0.5   ALKPHOS  82   AST  21   ALT  15   ANIONGAP  9   EGFRNONAA  33.0*     Urine Culture: No results for input(s): LABURIN in the last 48 hours.  Urine Studies:   Recent Labs   Lab  08/27/18 1930   COLORU  Yellow   APPEARANCEUA  Clear   PHUR  6.0   SPECGRAV  1.020   PROTEINUA  3+*   GLUCUA  1+*   KETONESU  Negative   BILIRUBINUA  Negative   OCCULTUA  3+*   NITRITE  Negative   UROBILINOGEN  Negative   LEUKOCYTESUR  Negative   RBCUA  14*   WBCUA  7*   BACTERIA  Moderate*   SQUAMEPITHEL  20   HYALINECASTS  1     All pertinent labs within the past 24 hours have been reviewed.    Significant Imaging: I have reviewed all pertinent imaging results/findings within the past 24 hours.

## 2018-08-28 NOTE — ASSESSMENT & PLAN NOTE
- per ED MD report, CT from [facility] noted two 5 mm prox. Ureter renal stones   - ED MD did d/w Dr. Perez (Uroliogy) who will consult on the patient   - patient to be NPO s/p midnight   - PRN pain meds   - PRN nausea meds

## 2018-08-28 NOTE — SUBJECTIVE & OBJECTIVE
Past Medical History:   Diagnosis Date    Allergy     Diabetes mellitus, type 2     Hyperlipidemia     Hypertension     PAD (peripheral artery disease)     PUD (peptic ulcer disease)        Past Surgical History:   Procedure Laterality Date    CHOLECYSTECTOMY      PERIPHERAL ARTERIAL STENT GRAFT Right        Review of patient's allergies indicates:   Allergen Reactions    Naproxen Anaphylaxis    Hydrocod-cpm-pe-acetaminophen      Rash, vomiting    Pcn [penicillins] Hives    Sweetleaf (stevia rebaudiana) Rash     nutrasweet or any other artifical sweetners       Family History     Problem Relation (Age of Onset)    Cancer Maternal Grandmother    Diabetes Mother, Father    Heart disease Father (37), Maternal Grandmother    Hypertension Mother, Father, Brother          Tobacco Use    Smoking status: Former Smoker     Last attempt to quit: 2002     Years since quittin.0   Substance and Sexual Activity    Alcohol use: Yes     Comment: occ    Drug use: No    Sexual activity: Not on file       Review of Systems   Constitutional: Negative for chills, diaphoresis and fever.   Respiratory: Negative for chest tightness and shortness of breath.    Cardiovascular: Negative for chest pain and palpitations.   Gastrointestinal: Positive for abdominal pain, nausea and vomiting. Negative for abdominal distention and constipation.   Genitourinary: Positive for flank pain. Negative for difficulty urinating, dysuria, frequency, hematuria and urgency.       Objective:     Temp:  [98.7 °F (37.1 °C)-99.2 °F (37.3 °C)] 98.7 °F (37.1 °C)  Pulse:  [87-98] 92  Resp:  [14-18] 18  SpO2:  [81 %-100 %] 95 %  BP: (172-236)/() 198/85     Body mass index is 43.13 kg/m².            Drains          None          Physical Exam   Constitutional: She is oriented to person, place, and time. She appears well-developed and well-nourished. She appears ill.   Cardiovascular: Normal rate, regular rhythm and normal heart sounds.     Pulmonary/Chest: Effort normal and breath sounds normal.   Abdominal: Soft. Bowel sounds are normal. There is tenderness in the right upper quadrant and right lower quadrant. There is CVA tenderness (right ).   Neurological: She is alert and oriented to person, place, and time.   Skin: Skin is warm and dry.     Psychiatric: She has a normal mood and affect. Her behavior is normal.       Significant Labs:    BMP:  Recent Labs   Lab  08/27/18 1914 08/28/18   0540   NA  140  140   K  3.4*  3.9   CL  102  106   CO2  29  25   BUN  22*  22*   CREATININE  1.8*  1.9*   CALCIUM  8.5*  9.0       CBC:  Recent Labs   Lab  08/27/18 1914 08/28/18   0540   WBC  19.60*  18.07*   HGB  11.2*  10.4*   HCT  34.8*  33.3*   PLT  280  277       CMP:   Recent Labs   Lab  08/27/18 1914 08/28/18   0540   GLU  232*  296*   NA  140  140   K  3.4*  3.9   CL  102  106   CO2  29  25   BUN  22*  22*   CREATININE  1.8*  1.9*   CALCIUM  8.5*  9.0     Urine Culture: No results for input(s): LABURIN in the last 168 hours.  Urine Studies:   Recent Labs   Lab  08/27/18   1930   COLORU  Yellow   APPEARANCEUA  Clear   PHUR  6.0   SPECGRAV  1.020   PROTEINUA  3+*   GLUCUA  1+*   KETONESU  Negative   BILIRUBINUA  Negative   OCCULTUA  3+*   NITRITE  Negative   UROBILINOGEN  Negative   LEUKOCYTESUR  Negative   RBCUA  14*   WBCUA  7*   BACTERIA  Moderate*   SQUAMEPITHEL  20   HYALINECASTS  1       Significant Imaging:  CT: I have reviewed all results within the past 24 hours and my personal findings are:  2 proximal right ureteral stones (5 mm each), with associated hydro

## 2018-08-28 NOTE — OP NOTE
Operative Note       Surgery Date: 8/28/2018     Surgeon: Mikhail Perez MD    Assistant Surgeon: None    Pre-op Diagnosis:  Nephrolithiasis [N20.0]    Post-op Diagnosis: Post-Op Diagnosis Codes:     * Nephrolithiasis [N20.0]    Procedures:  Procedure(s) (LRB):  CYSTO WITH RETROGRADE PYELOGRAM AND URETERAL STENT INSERTION (Right)    Anesthesia: General    Indications for Procedure:  The patient is a 47 y.o. year old female with obstructing right ureteral stone, intractable nausea and vomiting, elevated Cr and WBC.  She presents today for surgical treatment with ureteral stent placement.  The full risks and benefits of the procedure have been explained and detail and she wishes to proceed.    Procedure Description:  The patient was brought to the operative suite and placed under General anesthesia. She was placed in lithotomy position and prepped and draped in usual sterile fashion.  Time out was performed prior to starting the procedure.    Cystoscopy was performed using a 22 Iranian rigid cystoscope.  The right ureteral orifice was identified and cannulated with a Motion guidewire under fluoroscopic guidance.  The wire easily passed the stone and was seen to advance into the renal pelvis .      The guidewire was then exchanged for a 6x26 double-J ureteral stent under direct vision and fluoroscopic guidance.  Good coils were confirmed in both the renal pelvis and bladder.  The string was removed from the stent prior to placement.  A 16 Iranian randall catheter was placed and connected to drainage bag.  The bladder was drained and the patient was awoken and transferred to PACU in stable condition.       Complications: No    Estimated Blood Loss: 0cc         Specimens Removed:   Specimen (12h ago, onward)    None          Implants:   Implant Name Type Inv. Item Serial No.  Lot No. LRB No. Used   STENT URETERAL UNIV 6FR 26CM - [id number]  STENT URETERAL UNIV 6FR 26CM  [employer] [id number] Right 1               Disposition: PACU - hemodynamically stable.           Condition: Good    Attestation:  I performed the procedure.

## 2018-08-28 NOTE — ASSESSMENT & PLAN NOTE
- treated with single dose of levaquin at [facility] ED   - continue treatment with vancomycin   - patient is allergic to PCN   - associated with nausea & vomiting

## 2018-08-28 NOTE — HPI
"Ms. Georgina Arias is a 47 y.o. female, who per ED note " 47 y.o. female who presents to the ED with complaint of sharp abdominal pain onset yesterday.  Pt has epigastric pain radiating upwards and constant lower flank pain with palpation. Pt went to a restaurant yesterday and two hours after eating, started vomiting. Pt tried taking Tylenol, but still experiences intermittent vomiting, nausea, and abdominal pain. Pt has not eaten at all today and has trouble drinking water. Pt has a past medical history of Allergy, Diabetes mellitus, type 2, Hyperlipidemia, Hypertension, PAD (peripheral artery disease), and PUD (peptic ulcer disease). Pt has a past surgical history that includes Peripheral arterial stent graft (Right) and Cholecystectomy." She was evaluated at [facility] ED and found to have nephrolithiasis. In addition, a UA had 7 WBC, but 20 squams. She was administered a dose of IV antibiotics, and transferred to [facility] for Urology consultation and further management.   "

## 2018-08-28 NOTE — ASSESSMENT & PLAN NOTE
- last A1C:   Lab Results   Component Value Date    HGBA1C 9.9 (H) 03/24/2015    - A1C pending for AM   - hold oral antidiabetic meds   - Diabetic diet when OK for PO; NPO for now   - SSI with accuchekcs [id number]

## 2018-08-28 NOTE — ASSESSMENT & PLAN NOTE
- most recent blood pressure reading BP: (!) 196/80   - hydralazine PRN for SBP > 170  - reassess BP ~45 minutes after hydralazine administration  - will continue to monitor & order other anti-hypertensive meds PRN

## 2018-08-28 NOTE — NURSING
Contacted Ahmet JAMIL of pt. Sustained elevated blood pressure after giving IV hydralazine , awaiting response

## 2018-08-28 NOTE — CONSULTS
[facility]  Urology  Consult Note    Patient Name: Georgina Arias  MRN: [id number]  Admission Date: 8/28/2018  Hospital Length of Stay: 0   Code Status: Full Code   Attending Provider: Braulio Heredia MD   Consulting Provider: Iris Murrieta NP  Primary Care Physician: Primary Doctor No  Principal Problem:Nephrolithiasis    Inpatient consult to Urology  Consult performed by: Iris Murrieta NP  Consult ordered by: Chastity Leo PA-C  Reason for consult: ureteral stone with hydro  Assessment/Recommendations: JEROME (acute kidney injury)  --Creatinine 1.9 (0.9 baseline)  --Likely 2/2 vomiting/dehydration     Ureteral stone with hydronephrosis  --Two 5 mm stones in the proximal right ureteral with associated hydro  --Discussed treatment options  --Will proceed with cysto/stent placement with Dr. Perez this afternoon. Explained that she would require a second procedure in approximately 1-2 weeks for definitive stone treatment. Answered all questions. Consent signed.   --NPO  --Follow urine culture   --Antibiotics per primary     Urine WBC increased  --Antibiotics per primary team  --Follow culture  --Will receive cipro pre operatively               Subjective:     HPI:  Ms. Arias is a 47 y.o. female who presented to the ED at [facility] yesterday with right flank/abdominal pain. States that the pain began abruptly on Sunday. Associated symptoms included severe N/V. CT was done revealing two 5 mm, right proximal ureteral stones. WBC was elevated at 18. BUN/Creatinine elevated at 22/1.9. UA appeared contaminated but was negative for nitrites. She was transferred to [facility] for Urology consult.    She continues to report severe right flank pain with radiation to the abdomen as well as N/V. Describes the pain as aching/stabbing and severe 10/10. Unable to tolerate fluids. Denies dysuria, frequency, urgency, hematuria and fever/chills. Denies a history of nephrolithiasis and recurrent  UTIs. She received rocephin at [facility] and is currently on vanc.           Past Medical History:   Diagnosis Date    Allergy     Diabetes mellitus, type 2     Hyperlipidemia     Hypertension     PAD (peripheral artery disease)     PUD (peptic ulcer disease)        Past Surgical History:   Procedure Laterality Date    CHOLECYSTECTOMY      PERIPHERAL ARTERIAL STENT GRAFT Right        Review of patient's allergies indicates:   Allergen Reactions    Naproxen Anaphylaxis    Hydrocod-cpm-pe-acetaminophen      Rash, vomiting    Pcn [penicillins] Hives    Sweetleaf (stevia rebaudiana) Rash     nutrasweet or any other artifical sweetners       Family History     Problem Relation (Age of Onset)    Cancer Maternal Grandmother    Diabetes Mother, Father    Heart disease Father (37), Maternal Grandmother    Hypertension Mother, Father, Brother          Tobacco Use    Smoking status: Former Smoker     Last attempt to quit: 2002     Years since quittin.0   Substance and Sexual Activity    Alcohol use: Yes     Comment: occ    Drug use: No    Sexual activity: Not on file       Review of Systems   Constitutional: Negative for chills, diaphoresis and fever.   Respiratory: Negative for chest tightness and shortness of breath.    Cardiovascular: Negative for chest pain and palpitations.   Gastrointestinal: Positive for abdominal pain, nausea and vomiting. Negative for abdominal distention and constipation.   Genitourinary: Positive for flank pain. Negative for difficulty urinating, dysuria, frequency, hematuria and urgency.       Objective:     Temp:  [98.7 °F (37.1 °C)-99.2 °F (37.3 °C)] 98.7 °F (37.1 °C)  Pulse:  [87-98] 92  Resp:  [14-18] 18  SpO2:  [81 %-100 %] 95 %  BP: (172-236)/() 198/85     Body mass index is 43.13 kg/m².            Drains          None          Physical Exam   Constitutional: She is oriented to person, place, and time. She appears well-developed and well-nourished. She appears ill.    Cardiovascular: Normal rate, regular rhythm and normal heart sounds.    Pulmonary/Chest: Effort normal and breath sounds normal.   Abdominal: Soft. Bowel sounds are normal. There is tenderness in the right upper quadrant and right lower quadrant. There is CVA tenderness (right ).   Neurological: She is alert and oriented to person, place, and time.   Skin: Skin is warm and dry.     Psychiatric: She has a normal mood and affect. Her behavior is normal.       Significant Labs:    BMP:  Recent Labs   Lab  08/27/18 1914 08/28/18   0540   NA  140  140   K  3.4*  3.9   CL  102  106   CO2  29  25   BUN  22*  22*   CREATININE  1.8*  1.9*   CALCIUM  8.5*  9.0       CBC:  Recent Labs   Lab  08/27/18 1914 08/28/18   0540   WBC  19.60*  18.07*   HGB  11.2*  10.4*   HCT  34.8*  33.3*   PLT  280  277       CMP:   Recent Labs   Lab  08/27/18 1914 08/28/18   0540   GLU  232*  296*   NA  140  140   K  3.4*  3.9   CL  102  106   CO2  29  25   BUN  22*  22*   CREATININE  1.8*  1.9*   CALCIUM  8.5*  9.0     Urine Culture: No results for input(s): LABURIN in the last 168 hours.  Urine Studies:   Recent Labs   Lab  08/27/18 1930   COLORU  Yellow   APPEARANCEUA  Clear   PHUR  6.0   SPECGRAV  1.020   PROTEINUA  3+*   GLUCUA  1+*   KETONESU  Negative   BILIRUBINUA  Negative   OCCULTUA  3+*   NITRITE  Negative   UROBILINOGEN  Negative   LEUKOCYTESUR  Negative   RBCUA  14*   WBCUA  7*   BACTERIA  Moderate*   SQUAMEPITHEL  20   HYALINECASTS  1       Significant Imaging:  CT: I have reviewed all results within the past 24 hours and my personal findings are:  2 proximal right ureteral stones (5 mm each), with associated hydro                    Assessment and Plan:     JEROME (acute kidney injury)    --Creatinine 1.9 (0.9 baseline)  --Likely 2/2 vomiting/dehydration         Ureteral stone with hydronephrosis    --Two 5 mm stones in the proximal right ureteral with associated hydro  --Discussed treatment options  --Will proceed with  cysto/stent placement with Dr. Perez this afternoon. Explained that she would require a second procedure in approximately 1-2 weeks for definitive stone treatment. Answered all questions. Consent signed.   --NPO  --Follow urine culture   --Antibiotics per primary         Urine WBC increased    --Antibiotics per primary team  --Follow culture  --Will receive cipro pre operatively             VTE Risk Mitigation (From admission, onward)        Ordered     heparin (porcine) injection 5,000 Units  Every 8 hours      08/28/18 0410     IP VTE HIGH RISK PATIENT  Once      08/28/18 0410     Place sequential compression device  Until discontinued      08/28/18 0410     Place GUSTAVO hose  Until discontinued      08/28/18 0410          Thank you for your consult. I will follow-up with patient. Please contact us if you have any additional questions.    Iris Murrieta NP  Urology  [facility]

## 2018-08-28 NOTE — HPI
Ms. Arias is a 47 y.o. female who presented to the ED at [facility] yesterday with right flank/abdominal pain. States that the pain began abruptly on Sunday. Associated symptoms included severe N/V. CT was done revealing two 5 mm, right proximal ureteral stones. WBC was elevated at 18. BUN/Creatinine elevated at 22/1.9. UA appeared contaminated but was negative for nitrites. She was transferred to [facility] for Urology consult.    She continues to report severe right flank pain with radiation to the abdomen as well as N/V. Describes the pain as aching/stabbing and severe 10/10. Unable to tolerate fluids. Denies dysuria, frequency, urgency, hematuria and fever/chills. Denies a history of nephrolithiasis and recurrent UTIs. She received rocephin at [facility] and is currently on vanc.

## 2018-08-28 NOTE — ANESTHESIA POSTPROCEDURE EVALUATION
"Anesthesia Post Evaluation    Patient: Georgina Arias    Procedure(s) Performed: Procedure(s) (LRB):  CYSTO WITH RETROGRADE PYELOGRAM AND URETERAL STENT INSERTION (Right)    Final Anesthesia Type: general  Patient location during evaluation: PACU  Patient participation: Yes- Able to Participate  Level of consciousness: awake and alert  Post-procedure vital signs: reviewed and stable  Pain management: adequate  Airway patency: patent    Anesthetic complications: no      Cardiovascular status: hemodynamically stable  Respiratory status: unassisted  Hydration status: euvolemic  Follow-up not needed.        Visit Vitals  BP (!) 175/81   Pulse 84   Temp 36.6 °C (97.8 °F) (Oral)   Resp 18   Ht 5' 7" (1.702 m)   Wt 124.9 kg (275 lb 5.7 oz)   SpO2 95%   Breastfeeding? No   BMI 43.13 kg/m²       Pain/Jasmin Score: Pain Assessment Performed: Yes (8/28/2018  4:00 PM)  Presence of Pain: denies (8/28/2018  4:00 PM)  Pain Rating Prior to Med Admin: 8 (8/28/2018  7:35 AM)  Jasmin Score: 9 (8/28/2018  4:00 PM)  Modified Jasmin Score: 19 (8/28/2018  4:00 PM)        "

## 2018-08-28 NOTE — ASSESSMENT & PLAN NOTE
--Two 5 mm stones in the proximal right ureteral with associated hydro  --Discussed treatment options  --Will proceed with cysto/stent placement with Dr. Perez this afternoon. Explained that she would require a second procedure in approximately 1-2 weeks for definitive stone treatment. Answered all questions. Consent signed.   --NPO  --Follow urine culture   --Antibiotics per primary

## 2018-08-28 NOTE — TRANSFER OF CARE
"Anesthesia Transfer of Care Note    Patient: Georgina Arias    Procedure(s) Performed: Procedure(s) (LRB):  CYSTO WITH RETROGRADE PYELOGRAM AND URETERAL STENT INSERTION (Right)    Patient location: PACU    Anesthesia Type: general    Transport from OR: Transported from OR on 2-3 L/min O2 by NC with adequate spontaneous ventilation    Post pain: adequate analgesia    Post assessment: no apparent anesthetic complications    Post vital signs: stable    Level of consciousness: responds to stimulation    Nausea/Vomiting: no nausea/vomiting    Complications: none    Transfer of care protocol was followed      Last vitals:   Visit Vitals  BP (!) 193/91 (BP Location: Left arm, Patient Position: Lying)   Pulse 104   Temp 36.7 °C (98.1 °F) (Oral)   Resp 18   Ht 5' 7" (1.702 m)   Wt 124.9 kg (275 lb 5.7 oz)   SpO2 95%   Breastfeeding? No   BMI 43.13 kg/m²     "

## 2018-08-28 NOTE — BRIEF OP NOTE
[facility]  Brief Operative Note     SUMMARY     Surgery Date: 8/28/2018     Surgeon(s) and Role:     * Bubba Perez MD - Primary    Assisting Surgeon: None    Pre-op Diagnosis:  Nephrolithiasis [N20.0]    Post-op Diagnosis:  Post-Op Diagnosis Codes:     * Nephrolithiasis [N20.0]    Procedure(s) (LRB):  CYSTO WITH RETROGRADE PYELOGRAM AND URETERAL STENT INSERTION (Right)    Anesthesia: General    Description of the findings of the procedure: 6x26 stent placed on right, purulent urine noted after placement. 16 Bermudian randall placed.    Estimated Blood Loss: 0cc         Specimens:   Specimen (12h ago, onward)    None

## 2018-08-29 LAB
ANION GAP SERPL CALC-SCNC: 11 MMOL/L
BACTERIA UR CULT: NORMAL
BACTERIA UR CULT: NORMAL
BASOPHILS # BLD AUTO: 0 K/UL
BASOPHILS NFR BLD: 0 %
BUN SERPL-MCNC: 28 MG/DL
CALCIUM SERPL-MCNC: 8 MG/DL
CHLORIDE SERPL-SCNC: 104 MMOL/L
CO2 SERPL-SCNC: 24 MMOL/L
CREAT SERPL-MCNC: 2.1 MG/DL
DIFFERENTIAL METHOD: ABNORMAL
EOSINOPHIL # BLD AUTO: 0 K/UL
EOSINOPHIL NFR BLD: 0 %
ERYTHROCYTE [DISTWIDTH] IN BLOOD BY AUTOMATED COUNT: 15.4 %
EST. GFR  (AFRICAN AMERICAN): 32 ML/MIN/1.73 M^2
EST. GFR  (NON AFRICAN AMERICAN): 27 ML/MIN/1.73 M^2
GLUCOSE SERPL-MCNC: 282 MG/DL
HCT VFR BLD AUTO: 29.2 %
HGB BLD-MCNC: 8.9 G/DL
LYMPHOCYTES # BLD AUTO: 1.1 K/UL
LYMPHOCYTES NFR BLD: 8.3 %
MAGNESIUM SERPL-MCNC: 1.7 MG/DL
MCH RBC QN AUTO: 25.4 PG
MCHC RBC AUTO-ENTMCNC: 30.5 G/DL
MCV RBC AUTO: 83 FL
MONOCYTES # BLD AUTO: 0.6 K/UL
MONOCYTES NFR BLD: 5.1 %
NEUTROPHILS # BLD AUTO: 11 K/UL
NEUTROPHILS NFR BLD: 86.4 %
PHOSPHATE SERPL-MCNC: 3.6 MG/DL
PLATELET # BLD AUTO: 262 K/UL
PMV BLD AUTO: 10.4 FL
POCT GLUCOSE: 152 MG/DL (ref 70–110)
POCT GLUCOSE: 165 MG/DL (ref 70–110)
POCT GLUCOSE: 177 MG/DL (ref 70–110)
POCT GLUCOSE: 231 MG/DL (ref 70–110)
POCT GLUCOSE: 367 MG/DL (ref 70–110)
POTASSIUM SERPL-SCNC: 3.9 MMOL/L
RBC # BLD AUTO: 3.5 M/UL
SODIUM SERPL-SCNC: 139 MMOL/L
WBC # BLD AUTO: 12.67 K/UL

## 2018-08-29 PROCEDURE — 84100 ASSAY OF PHOSPHORUS: CPT

## 2018-08-29 PROCEDURE — 99232 SBSQ HOSP IP/OBS MODERATE 35: CPT | Mod: ,,, | Performed by: UROLOGY

## 2018-08-29 PROCEDURE — 85025 COMPLETE CBC W/AUTO DIFF WBC: CPT

## 2018-08-29 PROCEDURE — 63600175 PHARM REV CODE 636 W HCPCS: Performed by: HOSPITALIST

## 2018-08-29 PROCEDURE — 83735 ASSAY OF MAGNESIUM: CPT

## 2018-08-29 PROCEDURE — 63600175 PHARM REV CODE 636 W HCPCS: Performed by: PHYSICIAN ASSISTANT

## 2018-08-29 PROCEDURE — 11000001 HC ACUTE MED/SURG PRIVATE ROOM

## 2018-08-29 PROCEDURE — 99233 SBSQ HOSP IP/OBS HIGH 50: CPT | Mod: ,,, | Performed by: HOSPITALIST

## 2018-08-29 PROCEDURE — 94761 N-INVAS EAR/PLS OXIMETRY MLT: CPT

## 2018-08-29 PROCEDURE — 80048 BASIC METABOLIC PNL TOTAL CA: CPT

## 2018-08-29 PROCEDURE — 36415 COLL VENOUS BLD VENIPUNCTURE: CPT

## 2018-08-29 PROCEDURE — 94799 UNLISTED PULMONARY SVC/PX: CPT

## 2018-08-29 PROCEDURE — 25000003 PHARM REV CODE 250: Performed by: HOSPITALIST

## 2018-08-29 RX ORDER — ASPIRIN 81 MG/1
81 TABLET ORAL DAILY
Status: DISCONTINUED | OUTPATIENT
Start: 2018-08-29 | End: 2018-08-30 | Stop reason: HOSPADM

## 2018-08-29 RX ORDER — ONDANSETRON 2 MG/ML
4 INJECTION INTRAMUSCULAR; INTRAVENOUS EVERY 6 HOURS PRN
Status: DISCONTINUED | OUTPATIENT
Start: 2018-08-29 | End: 2018-08-30 | Stop reason: HOSPADM

## 2018-08-29 RX ORDER — AMLODIPINE BESYLATE 5 MG/1
10 TABLET ORAL DAILY
Status: DISCONTINUED | OUTPATIENT
Start: 2018-08-29 | End: 2018-08-30 | Stop reason: HOSPADM

## 2018-08-29 RX ORDER — HYDRALAZINE HYDROCHLORIDE 25 MG/1
25 TABLET, FILM COATED ORAL EVERY 8 HOURS
Status: DISCONTINUED | OUTPATIENT
Start: 2018-08-29 | End: 2018-08-29

## 2018-08-29 RX ORDER — HYDRALAZINE HYDROCHLORIDE 25 MG/1
50 TABLET, FILM COATED ORAL EVERY 8 HOURS
Status: DISCONTINUED | OUTPATIENT
Start: 2018-08-29 | End: 2018-08-30 | Stop reason: HOSPADM

## 2018-08-29 RX ORDER — ATORVASTATIN CALCIUM 20 MG/1
80 TABLET, FILM COATED ORAL DAILY
Status: DISCONTINUED | OUTPATIENT
Start: 2018-08-30 | End: 2018-08-30 | Stop reason: HOSPADM

## 2018-08-29 RX ADMIN — CIPROFLOXACIN 400 MG: 2 INJECTION, SOLUTION INTRAVENOUS at 09:08

## 2018-08-29 RX ADMIN — HEPARIN SODIUM 5000 UNITS: 5000 INJECTION, SOLUTION INTRAVENOUS; SUBCUTANEOUS at 09:08

## 2018-08-29 RX ADMIN — INSULIN ASPART 10 UNITS: 100 INJECTION, SOLUTION INTRAVENOUS; SUBCUTANEOUS at 05:08

## 2018-08-29 RX ADMIN — HEPARIN SODIUM 5000 UNITS: 5000 INJECTION, SOLUTION INTRAVENOUS; SUBCUTANEOUS at 03:08

## 2018-08-29 RX ADMIN — INSULIN ASPART 10 UNITS: 100 INJECTION, SOLUTION INTRAVENOUS; SUBCUTANEOUS at 07:08

## 2018-08-29 RX ADMIN — HEPARIN SODIUM 5000 UNITS: 5000 INJECTION, SOLUTION INTRAVENOUS; SUBCUTANEOUS at 05:08

## 2018-08-29 RX ADMIN — INSULIN ASPART 10 UNITS: 100 INJECTION, SOLUTION INTRAVENOUS; SUBCUTANEOUS at 12:08

## 2018-08-29 RX ADMIN — HYDRALAZINE HYDROCHLORIDE 50 MG: 25 TABLET, FILM COATED ORAL at 09:08

## 2018-08-29 RX ADMIN — AMLODIPINE BESYLATE 10 MG: 5 TABLET ORAL at 05:08

## 2018-08-29 RX ADMIN — INSULIN DETEMIR 40 UNITS: 100 INJECTION, SOLUTION SUBCUTANEOUS at 09:08

## 2018-08-29 RX ADMIN — ASPIRIN 81 MG: 81 TABLET, COATED ORAL at 05:08

## 2018-08-29 RX ADMIN — INSULIN ASPART 2 UNITS: 100 INJECTION, SOLUTION INTRAVENOUS; SUBCUTANEOUS at 07:08

## 2018-08-29 RX ADMIN — CIPROFLOXACIN 400 MG: 2 INJECTION, SOLUTION INTRAVENOUS at 11:08

## 2018-08-29 NOTE — HOSPITAL COURSE
Patient is a 47-year-old woman with a history of diabetes mellitus type 2, hypertension, dyslipidemia, peripheral artery disease who was transferred from [facility] for treatment of kidney stones in her right kidney associated hydronephrosis and acute kidney injury.  Patient underwent cystoscopy with right ureteral stent placement performed by Dr. Mikhail Lora on 8/28/2018 with marked improvement in her right sided flank pain.  Fraser catheter removed on 8/29/2018 by urology and patient has voided well after Fraser catheter removal.  Serum creatinine peaked at 2.1 mg/dL and has trended downwards.  Serum creatinine today 1.5 mg/dL.  I have recommended the patient continue hold her diuretics, lisinopril, and also metformin pending repeat laboratory testing to confirm normalization of her kidney function.    Patient also treated with antibiotics for suspected infection as she had a marked leukocytosis with a positive urinalysis.  Unfortunately patient did not have urine culture performed prior to starting antibiotics.  Urine culture obtained was not diagnostic.  Patient however had a good response with intravenous ciprofloxacin with resolution of her leukocytosis.  Patient discharged home to complete a course of oral ciprofloxacin.    Patient advised to follow up both with urology (Dr. Bubba Perez) to arrange for removal of her ureteral stent and also with her primary care provider Dr. Alonso Ling at [facility] for ongoing management of her hypertension, diabetes, and to ensure complete resolution of her acute kidney injury.

## 2018-08-29 NOTE — PHARMACY MED REC
"Admission Medication Reconciliation - Pharmacy Consult Note    The home medication history was taken by Frieda Latham CPhT.  Based on information gathered and subsequent review by the clinical pharmacist, the items below may need attention.     You may go to "Admission" then "Reconcile Home Medications" tabs to review and/or act upon these items.     NOTED: diet recently changed from NPO to diabetic diet; JEROME with increasing Scr (avoiding nephrotoxins); elevated BP    Potentially problematic discrepancies with current MAR  o Patient IS taking the following which was not ordered upon admit  o Amlodipine 10 mg PO daily  o Hydralazine 50 mg PO TID    Potential issues to be addressed PRIOR TO DISCHARGE  o Questionable adherence and compliance as antihypertensive medications have NOT been picked up since end of May with no documentation of transferred prescriptions (hypertensive urgency as noted in H&P); however, insulin glargine and Humalog have been consistently picked up.    Medications Prior to Admission   Medication Sig Dispense Refill Last Dose    amlodipine (NORVASC) 10 MG tablet Take 1 tablet (10 mg total) by mouth once daily. 90 tablet 3 8/27/2018 at 0900    ascorbic acid (VITAMIN C) 500 MG tablet Take 500 mg by mouth once daily.   Taking    aspirin (ECOTRIN) 81 MG EC tablet Take 1 tablet (81 mg total) by mouth once daily.   8/27/2018 at 0900    atorvastatin (LIPITOR) 80 MG tablet Take 1 tablet (80 mg total) by mouth once daily. 90 tablet 3 8/27/2018 at 0900    BASAGLJESUS DOMINGUEZPEN U-100 INSULIN 100 unit/mL (3 mL) InPn pen Inject 60 Units into the skin every evening.       cetirizine (ZYRTEC) 10 MG tablet Take 10 mg by mouth once daily.   Taking    citalopram (CELEXA) 20 MG tablet Take 20 mg by mouth once daily.    Taking    fluticasone (FLONASE) 50 mcg/actuation nasal spray 1 spray by Each Nare route once daily.       gabapentin (NEURONTIN) 300 MG capsule Take 300 mg by mouth 3 (three) times daily as " "needed.        HUMALOG U-100 INSULIN 100 unit/mL injection Inject 20 Units into the skin 3 (three) times daily before meals.       hydrALAZINE (APRESOLINE) 50 MG tablet Take 50 mg by mouth 3 (three) times daily.       loratadine (CLARITIN) 10 mg tablet Take 10 mg by mouth once daily.       losartan-hydrochlorothiazide 100-25 mg (HYZAAR) 100-25 mg per tablet Take 1 tablet by mouth once daily.       metformin (GLUCOPHAGE) 1000 MG tablet Take 1 tablet (1,000 mg total) by mouth 2 (two) times daily with meals. 180 tablet 3 Taking    omeprazole (PRILOSEC) 40 MG capsule Take 40 mg by mouth once daily.   Taking    spironolactone (ALDACTONE) 50 MG tablet Take 1 tablet (50 mg total) by mouth once daily. 90 tablet 11 Taking    trazodone (DESYREL) 50 MG tablet Take 1 tablet (50 mg total) by mouth nightly as needed for Insomnia. 30 tablet 3 Taking    dextromethorphan-guaifenesin  mg/5 ml (ROBITUSSIN-DM)  mg/5 mL liquid Take 10 mLs by mouth 4 (four) times daily as needed (cough). 120 mL 0     insulin needles, disposable, (ULTRA-THIN II INS PEN NEEDLES) 29 x 1/2 " Ndle 1 each by Misc.(Non-Drug; Combo Route) route 2 (two) times daily. 100 each 11 Taking    insulin needles, disposable, 29 gauge Ndle 1 each by Misc.(Non-Drug; Combo Route) route once daily. 100 each 11 Taking    insulin syringe-needle U-100 1 mL 29 x 1/2" Syrg    Taking       Please address this information as you see fit.  Feel free to contact us if you have any questions or require assistance.    Kapil Robles, Pharm.D., BCPS  [phone]                .  .          "

## 2018-08-29 NOTE — ASSESSMENT & PLAN NOTE
Patient underwent cystoscopy with right ureteral stent placement performed by Dr. Mikhail Lora on 8/28/2018 with marked improvement in her right sided flank pain.  Fraser catheter removed this morning by urology and patient has voided well after Fraser catheter removal.  Leukocytosis improving.  Blood and urine cultures pending.  Continue empiric antibiotic treatment with ciprofloxacin for now.

## 2018-08-29 NOTE — ASSESSMENT & PLAN NOTE
Likely combination of obstruction and also acute infection.  Will continue intravenous fluids and restart blood pressure medications to bring her blood pressure under better control.  Also possible patient had some progression of her chronic kidney disease over the years since we have last obtained and last baseline of 0.9 mg/dL on 7/30/2014.  We will see if we can track down more recent laboratory studies.

## 2018-08-29 NOTE — PROGRESS NOTES
"[facility]  Hospital Medicine  Progress Note    Patient Name: Georgina Arias  MRN: [id number]  Patient Class: IP- Inpatient   Admission Date: 8/28/2018  Length of Stay: 1 days  Attending Physician: Braulio Heredia MD  Primary Care Provider: Alonso Ling MD        Subjective:     Principal Problem:Ureteral stone with hydronephrosis    HPI:  Ms. Georgina Arias is a 47 y.o. female, who per ED note " 47 y.o. female who presents to the ED with complaint of sharp abdominal pain onset yesterday.  Pt has epigastric pain radiating upwards and constant lower flank pain with palpation. Pt went to a restaurant yesterday and two hours after eating, started vomiting. Pt tried taking Tylenol, but still experiences intermittent vomiting, nausea, and abdominal pain. Pt has not eaten at all today and has trouble drinking water. Pt has a past medical history of Allergy, Diabetes mellitus, type 2, Hyperlipidemia, Hypertension, PAD (peripheral artery disease), and PUD (peptic ulcer disease). Pt has a past surgical history that includes Peripheral arterial stent graft (Right) and Cholecystectomy." She was evaluated at [facility] ED and found to have nephrolithiasis. In addition, a UA had 7 WBC, but 20 squams. She was administered a dose of IV antibiotics, and transferred to [facility] for Urology consultation and further management.     Hospital Course:  Patient is a 47-year-old woman with a history of diabetes mellitus type 2, hypertension, dyslipidemia, peripheral artery disease who was transferred from [facility] for treatment of kidney stones in her right kidney with associated hydronephrosis and acute kidney injury.  Patient underwent cystoscopy with right ureteral stent placement performed by Dr. Mikhail Lora on 8/28/2018 with marked improvement in her right sided flank pain.  Fraser catheter removed this morning by urology and patient has voided well after Fraser catheter removal.  Serum " creatinine remains above baseline but stable without emergent indication for hemodialysis.    Interval History:  No acute events overnight.  Pain markedly improved.    Review of Systems   Constitutional: Negative for chills and fever.   Respiratory: Negative for shortness of breath and wheezing.    Cardiovascular: Negative for chest pain.   Gastrointestinal: Negative for abdominal distention, abdominal pain, constipation, diarrhea, nausea and vomiting.   Genitourinary: Negative for dysuria and frequency.   Musculoskeletal: Negative for arthralgias and myalgias.   Neurological: Negative for light-headedness.   Psychiatric/Behavioral: Negative for agitation and confusion.     Objective:     Vital Signs (Most Recent):  Temp: 98.4 °F (36.9 °C) (08/29/18 1505)  Pulse: 74 (08/29/18 1505)  Resp: 20 (08/29/18 1505)  BP: (!) 152/68 (08/29/18 1505)  SpO2: 98 % (08/29/18 1505) Vital Signs (24h Range):  Temp:  [97.2 °F (36.2 °C)-98.4 °F (36.9 °C)] 98.4 °F (36.9 °C)  Pulse:  [74-85] 74  Resp:  [16-20] 20  SpO2:  [92 %-98 %] 98 %  BP: (151-193)/(66-84) 152/68     Weight: 124.9 kg (275 lb 5.7 oz)  Body mass index is 43.13 kg/m².    Intake/Output Summary (Last 24 hours) at 8/29/2018 1633  Last data filed at 8/29/2018 1500  Gross per 24 hour   Intake 500 ml   Output 1200 ml   Net -700 ml      Physical Exam   Constitutional: She is oriented to person, place, and time. She appears well-developed and well-nourished. No distress.   HENT:   Head: Atraumatic.   Eyes: Conjunctivae are normal.   Neck: Neck supple.   Cardiovascular: Normal rate, regular rhythm and normal heart sounds.   No murmur heard.  Pulmonary/Chest: Effort normal and breath sounds normal. She has no wheezes.   Abdominal: Soft. Bowel sounds are normal. She exhibits no distension. There is no tenderness.   Musculoskeletal: Normal range of motion. She exhibits no edema or deformity.   Neurological: She is alert and oriented to person, place, and time.       Significant  Labs: All pertinent labs within the past 24 hours have been reviewed.    Significant Imaging: I have reviewed all pertinent imaging results/findings within the past 24 hours.    Assessment/Plan:      * Ureteral stone with hydronephrosis    Patient underwent cystoscopy with right ureteral stent placement performed by Dr. Mikhail Lora on 8/28/2018 with marked improvement in her right sided flank pain.  Fraser catheter removed this morning by urology and patient has voided well after Fraser catheter removal.  Leukocytosis improving.  Blood and urine cultures pending.  Continue empiric antibiotic treatment with ciprofloxacin for now.        Acute kidney injury    Likely combination of obstruction and also acute infection.  Will continue intravenous fluids and restart blood pressure medications to bring her blood pressure under better control.  Also possible patient had some progression of her chronic kidney disease over the years since we have last obtained and last baseline of 0.9 mg/dL on 7/30/2014.  We will see if we can track down more recent laboratory studies.        Essential hypertension    Will restart home blood pressure medication of amlodipine 10 mg oral daily and also hydralazine 50 mg oral three times per day.  Will hold diuretics and ACE inhibitor in the setting of acute kidney injury.  Will monitor blood pressure and adjust blood pressure regimen further as needed.        Type 2 diabetes mellitus with hyperglycemia, with long-term current use of insulin    Poorly controlled based on hemoglobin A1c 7.9 percent.  Patient with improving glycemic control with current regimen.  Will continue for now and adjust further as needed to maintain reasonable glycemic control.        Hyperlipidemia    Restart statin therapy.        Peripheral vascular disease    Stable.  Restart aspirin therapy.          VTE Risk Mitigation (From admission, onward)        Ordered     heparin (porcine) injection 5,000 Units  Every 8  hours      08/28/18 0410     IP VTE HIGH RISK PATIENT  Once      08/28/18 0410     Place sequential compression device  Until discontinued      08/28/18 0410     Place GUSTAVO hose  Until discontinued      08/28/18 0410              Braulio Heredia MD  Department of Hospital Medicine   [facility]

## 2018-08-29 NOTE — SUBJECTIVE & OBJECTIVE
Interval History:  No acute events overnight.  Pain markedly improved.    Review of Systems   Constitutional: Negative for chills and fever.   Respiratory: Negative for shortness of breath and wheezing.    Cardiovascular: Negative for chest pain.   Gastrointestinal: Negative for abdominal distention, abdominal pain, constipation, diarrhea, nausea and vomiting.   Genitourinary: Negative for dysuria and frequency.   Musculoskeletal: Negative for arthralgias and myalgias.   Neurological: Negative for light-headedness.   Psychiatric/Behavioral: Negative for agitation and confusion.     Objective:     Vital Signs (Most Recent):  Temp: 98.4 °F (36.9 °C) (08/29/18 1505)  Pulse: 74 (08/29/18 1505)  Resp: 20 (08/29/18 1505)  BP: (!) 152/68 (08/29/18 1505)  SpO2: 98 % (08/29/18 1505) Vital Signs (24h Range):  Temp:  [97.2 °F (36.2 °C)-98.4 °F (36.9 °C)] 98.4 °F (36.9 °C)  Pulse:  [74-85] 74  Resp:  [16-20] 20  SpO2:  [92 %-98 %] 98 %  BP: (151-193)/(66-84) 152/68     Weight: 124.9 kg (275 lb 5.7 oz)  Body mass index is 43.13 kg/m².    Intake/Output Summary (Last 24 hours) at 8/29/2018 1633  Last data filed at 8/29/2018 1500  Gross per 24 hour   Intake 500 ml   Output 1200 ml   Net -700 ml      Physical Exam   Constitutional: She is oriented to person, place, and time. She appears well-developed and well-nourished. No distress.   HENT:   Head: Atraumatic.   Eyes: Conjunctivae are normal.   Neck: Neck supple.   Cardiovascular: Normal rate, regular rhythm and normal heart sounds.   No murmur heard.  Pulmonary/Chest: Effort normal and breath sounds normal. She has no wheezes.   Abdominal: Soft. Bowel sounds are normal. She exhibits no distension. There is no tenderness.   Musculoskeletal: Normal range of motion. She exhibits no edema or deformity.   Neurological: She is alert and oriented to person, place, and time.       Significant Labs: All pertinent labs within the past 24 hours have been reviewed.    Significant Imaging: I  have reviewed all pertinent imaging results/findings within the past 24 hours.

## 2018-08-29 NOTE — NURSING
Pt voided 150 mL, 0 mL residual on bladder scan, up ad jaya, denies pain,+ BS, BG well controlled, BP elevated when pt agitated, 159/68 at 1505, pt tolerating diet, voiding adequately and spontaneously, resting comfortably with eyes closed, call light within reach, able to make needs known, no needs at this time.

## 2018-08-29 NOTE — PLAN OF CARE
LMSW met with the patient at the bedside.     Patient is alert and oriented with no communication barriers.     Prior to admission patient was independent and lives with her mother. Patient denies the use of HH or DME.      Patients PCP is correct on the face sheet. Patient choice pharmacy is [facility] in [place].      Patient denies a history of mental illness.     Patients family will transport her home at discharge.     No CM needs identified at this time.      CM team will continue to follow.      08/29/18 5720   Discharge Assessment   Assessment Type Discharge Planning Assessment   Confirmed/corrected address and phone number on facesheet? Yes   Assessment information obtained from? Patient   Communicated expected length of stay with patient/caregiver no   Prior to hospitilization cognitive status: Alert/Oriented   Prior to hospitalization functional status: Independent   Current cognitive status: Alert/Oriented   Current Functional Status: Independent   Lives With parent(s)   Able to Return to Prior Arrangements yes   Is patient able to care for self after discharge? Yes   Patient's perception of discharge disposition home or selfcare   Readmission Within The Last 30 Days no previous admission in last 30 days   Patient currently being followed by outpatient case management? No   Patient currently receives any other outside agency services? No   Equipment Currently Used at Home none   Do you have any problems affording any of your prescribed medications? No   Is the patient taking medications as prescribed? yes   Does the patient have transportation home? Yes   Transportation Available family or friend will provide   Does the patient receive services at the [facility]? No   Discharge Plan A Home   Patient/Family In Agreement With Plan yes

## 2018-08-29 NOTE — PROGRESS NOTES
[facility]  Urology  Progress Note    Patient Name: Georgina Arias  MRN: [id number]  Admission Date: 8/28/2018  Hospital Length of Stay: 1 days  Code Status: Full Code   Attending Provider: Braulio Heredia MD   Primary Care Physician: Alonso Ling MD    Subjective:     HPI:  Ms. Arias is a 47 y.o. female who presented to the ED at [facility] yesterday with right flank/abdominal pain. States that the pain began abruptly on Sunday. Associated symptoms included severe N/V. CT was done revealing two 5 mm, right proximal ureteral stones. WBC was elevated at 18. BUN/Creatinine elevated at 22/1.9. UA appeared contaminated but was negative for nitrites. She was transferred to [facility] for Urology consult.    She continues to report severe right flank pain with radiation to the abdomen as well as N/V. Describes the pain as aching/stabbing and severe 10/10. Unable to tolerate fluids. Denies dysuria, frequency, urgency, hematuria and fever/chills. Denies a history of nephrolithiasis and recurrent UTIs. She received rocephin at [facility] and is currently on vanc.           Interval History: Reports urgency/spasms related to catheter. Asking for randall to be removed.     Review of Systems   Constitutional: Negative for appetite change, chills and fever.   HENT: Negative for congestion, sore throat and trouble swallowing.    Eyes: Negative for pain and itching.   Respiratory: Negative for cough and shortness of breath.    Cardiovascular: Negative for chest pain, palpitations and leg swelling.   Gastrointestinal: Negative for abdominal distention, abdominal pain, constipation, diarrhea, nausea and vomiting.   Genitourinary: Positive for urgency. Negative for difficulty urinating, dysuria, flank pain, hematuria and nocturia.   Musculoskeletal: Negative for back pain, neck pain and neck stiffness.   Skin: Negative for rash and wound.   Neurological: Negative for dizziness and seizures.   Hematological:  Negative for adenopathy. Does not bruise/bleed easily.   Psychiatric/Behavioral: Negative for confusion. The patient is not nervous/anxious.    All other systems reviewed and are negative.    Objective:     Temp:  [97.2 °F (36.2 °C)-98.2 °F (36.8 °C)] 97.2 °F (36.2 °C)  Pulse:  [] 80  Resp:  [16-20] 18  SpO2:  [92 %-98 %] 97 %  BP: (151-193)/(66-91) 190/84     Body mass index is 43.13 kg/m².            Drains     Drain                 Urethral Catheter 08/28/18 1420 Non-latex;Straight-tip 16 Fr. less than 1 day                Physical Exam   Vitals reviewed.  Constitutional: She is oriented to person, place, and time. She appears well-developed and well-nourished. No distress.   HENT:   Head: Normocephalic and atraumatic.   Neck: Normal range of motion.   Cardiovascular: Normal rate and regular rhythm.    Pulmonary/Chest: Effort normal and breath sounds normal. No respiratory distress.   Abdominal: Soft. She exhibits no distension and no mass. There is no tenderness. There is no rebound and no guarding.   Genitourinary:   Genitourinary Comments: Clear yellow/dejan urine   Musculoskeletal: Normal range of motion.   Neurological: She is alert and oriented to person, place, and time.   Skin: Skin is warm and dry. No rash noted. She is not diaphoretic. No erythema.     Psychiatric: She has a normal mood and affect. Her behavior is normal.       Significant Labs:    BMP:  Recent Labs   Lab  08/27/18 1914 08/28/18   0540  08/29/18   0445   NA  140  140  139   K  3.4*  3.9  3.9   CL  102  106  104   CO2  29  25  24   BUN  22*  22*  28*   CREATININE  1.8*  1.9*  2.1*   CALCIUM  8.5*  9.0  8.0*       CBC:   Recent Labs   Lab  08/27/18 1914 08/28/18   0540  08/29/18   0445   WBC  19.60*  18.07*  12.67   HGB  11.2*  10.4*  8.9*   HCT  34.8*  33.3*  29.2*   PLT  280  277  262       Blood Culture:   Recent Labs   Lab  08/28/18   1034  08/28/18   1042   LABBLOO  No Growth to date  No Growth to date     Urine Culture:  No results for input(s): LABURIN in the last 168 hours.  Urine Studies:   Recent Labs   Lab  08/27/18   1930   COLORU  Yellow   APPEARANCEUA  Clear   PHUR  6.0   SPECGRAV  1.020   PROTEINUA  3+*   GLUCUA  1+*   KETONESU  Negative   BILIRUBINUA  Negative   OCCULTUA  3+*   NITRITE  Negative   UROBILINOGEN  Negative   LEUKOCYTESUR  Negative   RBCUA  14*   WBCUA  7*   BACTERIA  Moderate*   SQUAMEPITHEL  20   HYALINECASTS  1       Significant Imaging:  All pertinent imaging results/findings from the past 24 hours have been reviewed.                  Assessment/Plan:     JEROME (acute kidney injury)    --Remains elevated  --Suspect 2/2 vomiting/dehydration         Ureteral stone with hydronephrosis    --Two 5 mm stones in the proximal right ureteral with associated hydro  --Discussed treatment options  --s/p cysto/stent placement with Dr. Perez 8/28/18. She will require a second procedure in approximately 1-2 weeks for definitive stone treatment.   --Follow urine culture   --Antibiotics per primary   --Okay to remove catheter        Urine WBC increased    --Antibiotics per primary team  --Follow culture            VTE Risk Mitigation (From admission, onward)        Ordered     heparin (porcine) injection 5,000 Units  Every 8 hours      08/28/18 0410     IP VTE HIGH RISK PATIENT  Once      08/28/18 0410     Place sequential compression device  Until discontinued      08/28/18 0410     Place GUSTAVO hose  Until discontinued      08/28/18 0410          Maryann Hernandez MD  Urology  [facility]

## 2018-08-29 NOTE — SUBJECTIVE & OBJECTIVE
Interval History: Reports urgency/spasms related to catheter. Asking for randall to be removed.     Review of Systems   Constitutional: Negative for appetite change, chills and fever.   HENT: Negative for congestion, sore throat and trouble swallowing.    Eyes: Negative for pain and itching.   Respiratory: Negative for cough and shortness of breath.    Cardiovascular: Negative for chest pain, palpitations and leg swelling.   Gastrointestinal: Negative for abdominal distention, abdominal pain, constipation, diarrhea, nausea and vomiting.   Genitourinary: Positive for urgency. Negative for difficulty urinating, dysuria, flank pain, hematuria and nocturia.   Musculoskeletal: Negative for back pain, neck pain and neck stiffness.   Skin: Negative for rash and wound.   Neurological: Negative for dizziness and seizures.   Hematological: Negative for adenopathy. Does not bruise/bleed easily.   Psychiatric/Behavioral: Negative for confusion. The patient is not nervous/anxious.    All other systems reviewed and are negative.    Objective:     Temp:  [97.2 °F (36.2 °C)-98.2 °F (36.8 °C)] 97.2 °F (36.2 °C)  Pulse:  [] 80  Resp:  [16-20] 18  SpO2:  [92 %-98 %] 97 %  BP: (151-193)/(66-91) 190/84     Body mass index is 43.13 kg/m².            Drains     Drain                 Urethral Catheter 08/28/18 1420 Non-latex;Straight-tip 16 Fr. less than 1 day                Physical Exam   Vitals reviewed.  Constitutional: She is oriented to person, place, and time. She appears well-developed and well-nourished. No distress.   HENT:   Head: Normocephalic and atraumatic.   Neck: Normal range of motion.   Cardiovascular: Normal rate and regular rhythm.    Pulmonary/Chest: Effort normal and breath sounds normal. No respiratory distress.   Abdominal: Soft. She exhibits no distension and no mass. There is no tenderness. There is no rebound and no guarding.   Genitourinary:   Genitourinary Comments: Clear yellow/dejan urine    Musculoskeletal: Normal range of motion.   Neurological: She is alert and oriented to person, place, and time.   Skin: Skin is warm and dry. No rash noted. She is not diaphoretic. No erythema.     Psychiatric: She has a normal mood and affect. Her behavior is normal.       Significant Labs:    BMP:  Recent Labs   Lab  08/27/18 1914 08/28/18   0540  08/29/18   0445   NA  140  140  139   K  3.4*  3.9  3.9   CL  102  106  104   CO2  29  25  24   BUN  22*  22*  28*   CREATININE  1.8*  1.9*  2.1*   CALCIUM  8.5*  9.0  8.0*       CBC:   Recent Labs   Lab  08/27/18 1914 08/28/18   0540  08/29/18   0445   WBC  19.60*  18.07*  12.67   HGB  11.2*  10.4*  8.9*   HCT  34.8*  33.3*  29.2*   PLT  280  277  262       Blood Culture:   Recent Labs   Lab  08/28/18   1034  08/28/18   1042   LABBLOO  No Growth to date  No Growth to date     Urine Culture: No results for input(s): LABURIN in the last 168 hours.  Urine Studies:   Recent Labs   Lab  08/27/18   1930   COLORU  Yellow   APPEARANCEUA  Clear   PHUR  6.0   SPECGRAV  1.020   PROTEINUA  3+*   GLUCUA  1+*   KETONESU  Negative   BILIRUBINUA  Negative   OCCULTUA  3+*   NITRITE  Negative   UROBILINOGEN  Negative   LEUKOCYTESUR  Negative   RBCUA  14*   WBCUA  7*   BACTERIA  Moderate*   SQUAMEPITHEL  20   HYALINECASTS  1       Significant Imaging:  All pertinent imaging results/findings from the past 24 hours have been reviewed.

## 2018-08-29 NOTE — ASSESSMENT & PLAN NOTE
Poorly controlled based on hemoglobin A1c 7.9 percent.  Patient with improving glycemic control with current regimen.  Will continue for now and adjust further as needed to maintain reasonable glycemic control.

## 2018-08-29 NOTE — ASSESSMENT & PLAN NOTE
Will restart home blood pressure medication of amlodipine 10 mg oral daily and also hydralazine 50 mg oral three times per day.  Will hold diuretics and ACE inhibitor in the setting of acute kidney injury.  Will monitor blood pressure and adjust blood pressure regimen further as needed.

## 2018-08-29 NOTE — ASSESSMENT & PLAN NOTE
Will restart home blood pressure medication of amlodipine 10 mg oral daily and also hydralazine 25 mg oral three times per day.  Will hold diuretics and ACE inhibitor in the setting of acute kidney injury.  Will monitor blood pressure and adjust blood pressure regimen further as needed.

## 2018-08-30 VITALS
SYSTOLIC BLOOD PRESSURE: 177 MMHG | BODY MASS INDEX: 43.22 KG/M2 | DIASTOLIC BLOOD PRESSURE: 74 MMHG | HEIGHT: 67 IN | OXYGEN SATURATION: 94 % | TEMPERATURE: 97 F | RESPIRATION RATE: 16 BRPM | HEART RATE: 89 BPM | WEIGHT: 275.38 LBS

## 2018-08-30 LAB
ANION GAP SERPL CALC-SCNC: 11 MMOL/L
BASOPHILS # BLD AUTO: 0.02 K/UL
BASOPHILS NFR BLD: 0.2 %
BUN SERPL-MCNC: 28 MG/DL
CALCIUM SERPL-MCNC: 8.2 MG/DL
CHLORIDE SERPL-SCNC: 106 MMOL/L
CO2 SERPL-SCNC: 24 MMOL/L
CREAT SERPL-MCNC: 1.5 MG/DL
DIFFERENTIAL METHOD: ABNORMAL
EOSINOPHIL # BLD AUTO: 0.2 K/UL
EOSINOPHIL NFR BLD: 1.9 %
ERYTHROCYTE [DISTWIDTH] IN BLOOD BY AUTOMATED COUNT: 15.5 %
EST. GFR  (AFRICAN AMERICAN): 47 ML/MIN/1.73 M^2
EST. GFR  (NON AFRICAN AMERICAN): 41 ML/MIN/1.73 M^2
GLUCOSE SERPL-MCNC: 120 MG/DL
HCT VFR BLD AUTO: 28.9 %
HGB BLD-MCNC: 8.8 G/DL
LYMPHOCYTES # BLD AUTO: 2 K/UL
LYMPHOCYTES NFR BLD: 19.1 %
MAGNESIUM SERPL-MCNC: 1.6 MG/DL
MCH RBC QN AUTO: 25.6 PG
MCHC RBC AUTO-ENTMCNC: 30.4 G/DL
MCV RBC AUTO: 84 FL
MONOCYTES # BLD AUTO: 0.6 K/UL
MONOCYTES NFR BLD: 5.5 %
NEUTROPHILS # BLD AUTO: 7.7 K/UL
NEUTROPHILS NFR BLD: 73 %
PHOSPHATE SERPL-MCNC: 3.1 MG/DL
PLATELET # BLD AUTO: 266 K/UL
PMV BLD AUTO: 10.6 FL
POCT GLUCOSE: 184 MG/DL (ref 70–110)
POCT GLUCOSE: 98 MG/DL (ref 70–110)
POTASSIUM SERPL-SCNC: 3.7 MMOL/L
RBC # BLD AUTO: 3.44 M/UL
SODIUM SERPL-SCNC: 141 MMOL/L
WBC # BLD AUTO: 10.58 K/UL

## 2018-08-30 PROCEDURE — 84100 ASSAY OF PHOSPHORUS: CPT

## 2018-08-30 PROCEDURE — 85025 COMPLETE CBC W/AUTO DIFF WBC: CPT

## 2018-08-30 PROCEDURE — 25000003 PHARM REV CODE 250: Performed by: HOSPITALIST

## 2018-08-30 PROCEDURE — 99239 HOSP IP/OBS DSCHRG MGMT >30: CPT | Mod: ,,, | Performed by: HOSPITALIST

## 2018-08-30 PROCEDURE — 80048 BASIC METABOLIC PNL TOTAL CA: CPT

## 2018-08-30 PROCEDURE — 94761 N-INVAS EAR/PLS OXIMETRY MLT: CPT

## 2018-08-30 PROCEDURE — 99232 SBSQ HOSP IP/OBS MODERATE 35: CPT | Mod: ,,, | Performed by: NURSE PRACTITIONER

## 2018-08-30 PROCEDURE — 36415 COLL VENOUS BLD VENIPUNCTURE: CPT

## 2018-08-30 PROCEDURE — 63600175 PHARM REV CODE 636 W HCPCS: Performed by: PHYSICIAN ASSISTANT

## 2018-08-30 PROCEDURE — 94799 UNLISTED PULMONARY SVC/PX: CPT

## 2018-08-30 PROCEDURE — 83735 ASSAY OF MAGNESIUM: CPT

## 2018-08-30 RX ORDER — ACETAMINOPHEN 500 MG
1000 TABLET ORAL EVERY 8 HOURS PRN
Refills: 0 | COMMUNITY
Start: 2018-08-30 | End: 2019-12-20 | Stop reason: SDUPTHER

## 2018-08-30 RX ORDER — OXYCODONE HYDROCHLORIDE 5 MG/1
5 TABLET ORAL EVERY 8 HOURS PRN
Qty: 21 TABLET | Refills: 0 | Status: SHIPPED | OUTPATIENT
Start: 2018-08-30 | End: 2019-12-20 | Stop reason: CLARIF

## 2018-08-30 RX ORDER — ACETAMINOPHEN 500 MG
1000 TABLET ORAL EVERY 8 HOURS PRN
Status: DISCONTINUED | OUTPATIENT
Start: 2018-08-30 | End: 2018-08-30 | Stop reason: HOSPADM

## 2018-08-30 RX ORDER — CIPROFLOXACIN 500 MG/1
500 TABLET ORAL EVERY 12 HOURS
Status: DISCONTINUED | OUTPATIENT
Start: 2018-08-30 | End: 2018-08-30 | Stop reason: HOSPADM

## 2018-08-30 RX ORDER — CIPROFLOXACIN 500 MG/1
500 TABLET ORAL EVERY 12 HOURS
Qty: 16 TABLET | Refills: 0 | Status: SHIPPED | OUTPATIENT
Start: 2018-08-30 | End: 2018-09-07

## 2018-08-30 RX ORDER — OXYCODONE HYDROCHLORIDE 5 MG/1
5 TABLET ORAL EVERY 6 HOURS PRN
Status: DISCONTINUED | OUTPATIENT
Start: 2018-08-30 | End: 2018-08-30 | Stop reason: HOSPADM

## 2018-08-30 RX ADMIN — HYDRALAZINE HYDROCHLORIDE 50 MG: 25 TABLET, FILM COATED ORAL at 05:08

## 2018-08-30 RX ADMIN — CIPROFLOXACIN HYDROCHLORIDE 500 MG: 500 TABLET, FILM COATED ORAL at 09:08

## 2018-08-30 RX ADMIN — AMLODIPINE BESYLATE 10 MG: 5 TABLET ORAL at 08:08

## 2018-08-30 RX ADMIN — HEPARIN SODIUM 5000 UNITS: 5000 INJECTION, SOLUTION INTRAVENOUS; SUBCUTANEOUS at 05:08

## 2018-08-30 RX ADMIN — ASPIRIN 81 MG: 81 TABLET, COATED ORAL at 08:08

## 2018-08-30 RX ADMIN — ATORVASTATIN CALCIUM 80 MG: 20 TABLET, FILM COATED ORAL at 08:08

## 2018-08-30 RX ADMIN — INSULIN ASPART 10 UNITS: 100 INJECTION, SOLUTION INTRAVENOUS; SUBCUTANEOUS at 08:08

## 2018-08-30 NOTE — SUBJECTIVE & OBJECTIVE
Interval History:   Doing great, eager for D/C  Pain has resolved  Denies stent discomfort   Voiding without difficulty  Creatinine improving, now 1.5     Review of Systems   Constitutional: Negative for chills and fever.   Respiratory: Negative for chest tightness and shortness of breath.    Cardiovascular: Negative for chest pain and palpitations.   Gastrointestinal: Negative for abdominal distention, abdominal pain and nausea.   Genitourinary: Negative for difficulty urinating, dysuria, flank pain, frequency, hematuria and urgency.     Objective:     Temp:  [97.2 °F (36.2 °C)-98.8 °F (37.1 °C)] 97.2 °F (36.2 °C)  Pulse:  [74-93] 89  Resp:  [16-20] 16  SpO2:  [94 %-99 %] 94 %  BP: (152-193)/(68-84) 177/74     Body mass index is 43.13 kg/m².            Drains          None          Physical Exam   Constitutional: She is oriented to person, place, and time. She appears well-developed and well-nourished.   HENT:   Head: Normocephalic and atraumatic.   Cardiovascular: Normal rate, regular rhythm and normal heart sounds.    Pulmonary/Chest: Effort normal and breath sounds normal.   Abdominal: Soft. Bowel sounds are normal. She exhibits no distension. There is no tenderness. There is no CVA tenderness.   Musculoskeletal: Normal range of motion.   Neurological: She is alert and oriented to person, place, and time.   Skin: Skin is warm and dry.     Psychiatric: She has a normal mood and affect. Her behavior is normal.       Significant Labs:    BMP:  Recent Labs   Lab  08/28/18   0540  08/29/18   0445  08/30/18   0442   NA  140  139  141   K  3.9  3.9  3.7   CL  106  104  106   CO2  25  24  24   BUN  22*  28*  28*   CREATININE  1.9*  2.1*  1.5*   CALCIUM  9.0  8.0*  8.2*       CBC:   Recent Labs   Lab  08/28/18   0540  08/29/18   0445  08/30/18   0442   WBC  18.07*  12.67  10.58   HGB  10.4*  8.9*  8.8*   HCT  33.3*  29.2*  28.9*   PLT  277  262  266       Urine Culture:   Recent Labs   Lab  08/28/18   1202   LABURIN   Multiple organisms isolated. None in predominance.  Repeat if  clinically necessary.     Urine Studies:   Recent Labs   Lab  08/27/18   1930   COLORU  Yellow   APPEARANCEUA  Clear   PHUR  6.0   SPECGRAV  1.020   PROTEINUA  3+*   GLUCUA  1+*   KETONESU  Negative   BILIRUBINUA  Negative   OCCULTUA  3+*   NITRITE  Negative   UROBILINOGEN  Negative   LEUKOCYTESUR  Negative   RBCUA  14*   WBCUA  7*   BACTERIA  Moderate*   SQUAMEPITHEL  20   HYALINECASTS  1       Significant Imaging:  All pertinent imaging results/findings from the past 24 hours have been reviewed.

## 2018-08-30 NOTE — PHYSICIAN QUERY
PT Name: Georgina Arias  MR #: [id number]  Physician Query Form - CKD Clarification     CDS: Beverly Snyder RN     Contact information:  [email or web address]    Phone: [phone]  This form is a permanent document in the medical record.     Query Date: August 30, 2018    By submitting this query, we are merely seeking further clarification of documentation. Please utilize your independent clinical judgment when addressing the question(s) below.    The Medical record contains the following:     Indicators   Supporting Clinical Findings   Location in Medical Record    X CKD or Chronic Kidney (Renal) Failure / Disease chronic kidney disease   Hosp Med PN 8/29/18    X BUN/Creatinine                          GFR  BUN= 22 --> 28   Crt= 1.9 --> 1.5   eGFR= 31 --> 41  Lab 8/28/18 --> 8/30/18   Lab 8/28/18 --> 8/30/18   Lab 8/28/18 --> 8/30/18    Dehydration      X Nausea / Vomiting Pt went to a restaurant yesterday and two hours after eating, started vomiting. Pt tried taking Tylenol, but still experiences intermittent vomiting, nausea, and abdominal pain. Pt has not eaten at all today and has trouble drinking water      H&P 8/28/18    Dialysis / CRRT      Medication      Treatment      X Other Chronic Conditions  Diabetes mellitus, type 2, Hyperlipidemia, Hypertension, PAD (peripheral artery disease),       H&P 8/28/18    X Other per ED MD report, CT from [facility] noted two 5 mm prox. Ureter renal stones    Acute kidney injury   Likely combination of obstruction and also acute infection.  Will continue intravenous fluids and restart blood pressure medications to bring her blood pressure under better control.      Also possible patient had some progression of her chronic kidney disease over the years since we have last obtained and last baseline of 0.9 mg/dL on 7/30/2014.  We will see if we can track down more recent laboratory studies.        H&P 8/28/18        Hosp Med PN 8/29/18                 Hosp Med PN 8/29/18      Provider, please further specify the stage of CKD.      Baseline serum creatinine 0.98 mg/dL on July 2017 per primary care provider.  Therefore I suspect increase serum creatinine due to acute kidney injury and that patient without prior history of significant chronic kidney disease.    [  ] Chronic Kidney Disease (CKD) (please specify stage* below)       [employer] Definitions  Stage Description  eGFR (mL/min)   [  ]     III Moderately reduced kidney function 30-59   [  ]     IV Severely reduced kidney function 15-29     [  ] Other (please specify): ________________________  [  ] Clinically Undetermined    Please document in your progress notes daily for the duration of treatment until resolved and include in your discharge summary.

## 2018-08-30 NOTE — ASSESSMENT & PLAN NOTE
--Two 5 mm stones in the proximal right ureteral with associated hydro  --s/p cysto/stent placement with Dr. Perez 8/28/18. She will require a second procedure in approximately 1-2 weeks for definitive stone treatment.   --Urine culture with multiple organisms non in predominance   --Antibiotics per primary

## 2018-08-30 NOTE — PLAN OF CARE
Problem: Patient Care Overview  Goal: Plan of Care Review  Outcome: Ongoing (interventions implemented as appropriate)  Patient on RA. Sats 96%. IS done. Pt. in no distress, will continue to monitor.

## 2018-08-30 NOTE — NURSING
Patient is s/p cysto and right uretal stent placement.  Patients VSS other than HTN.  Patient complains of no pain.  Patient will have stone removal outpatient in 1-2 weeks.  Patient has UTI and is on ABX.  Purposeful rounding completed, bed in low position and call light in reach.

## 2018-08-30 NOTE — NURSING
VSS, voiding adequately and spontaneously, tolerating diet, BM x1, up ad jaya, denies pain, removed IV, reviewed discharge instructions; pt verbalized understanding.

## 2018-08-30 NOTE — DISCHARGE SUMMARY
"[facility]  Hospital Medicine  Discharge Summary      Patient Name: Georgina Arias  MRN: [id number]  Admission Date: 8/28/2018  Hospital Length of Stay: 2 days  Discharge Date and Time:  08/30/2018  Attending Physician: Braulio Heredia MD   Discharging Provider: Braulio Heredia MD  Primary Care Provider: Alonso Ling MD      HPI:   Ms. Georgina Arias is a 47 y.o. female, who per ED note " 47 y.o. female who presents to the ED with complaint of sharp abdominal pain onset yesterday.  Pt has epigastric pain radiating upwards and constant lower flank pain with palpation. Pt went to a restaurant yesterday and two hours after eating, started vomiting. Pt tried taking Tylenol, but still experiences intermittent vomiting, nausea, and abdominal pain. Pt has not eaten at all today and has trouble drinking water. Pt has a past medical history of Allergy, Diabetes mellitus, type 2, Hyperlipidemia, Hypertension, PAD (peripheral artery disease), and PUD (peptic ulcer disease). Pt has a past surgical history that includes Peripheral arterial stent graft (Right) and Cholecystectomy." She was evaluated at [facility] ED and found to have nephrolithiasis. In addition, a UA had 7 WBC, but 20 squams. She was administered a dose of IV antibiotics, and transferred to [facility] for Urology consultation and further management.     Procedure(s) (LRB):  CYSTOSCOPY, WITH URETERAL STENT INSERTION (ADD ON ) (Right)      Hospital Course:   Patient is a 47-year-old woman with a history of diabetes mellitus type 2, hypertension, dyslipidemia, peripheral artery disease who was transferred from [facility] for treatment of kidney stones in her right kidney associated hydronephrosis and acute kidney injury.  Patient underwent cystoscopy with right ureteral stent placement performed by Dr. Mikhail Lora on 8/28/2018 with marked improvement in her right sided flank pain.  Fraser catheter removed on 8/29/2018 by " urology and patient has voided well after Fraser catheter removal.  Serum creatinine peaked at 2.1 mg/dL and has trended downwards.  Serum creatinine today 1.5 mg/dL.  I have recommended the patient continue hold her diuretics, lisinopril, and also metformin pending repeat laboratory testing to confirm normalization of her kidney function.    Patient also treated with antibiotics for suspected infection as she had a marked leukocytosis with a positive urinalysis.  Unfortunately patient did not have urine culture performed prior to starting antibiotics.  Urine culture obtained was not diagnostic.  Patient however had a good response with intravenous ciprofloxacin with resolution of her leukocytosis.  Patient discharged home to complete a course of oral ciprofloxacin.    Patient advised to follow up both with urology (Dr. Bubba Perez) to arrange for removal of her ureteral stent and also with her primary care provider Dr. Alonso Ling at [facility] for ongoing management of her hypertension, diabetes, and to ensure complete resolution of her acute kidney injury.     Consults:   Consults (From admission, onward)        Status Ordering Provider     Inpatient consult to Urology  Once     Provider:  Bubba Perez MD    Completed BLAIRE DYER          Final Active Diagnoses:    Diagnosis Date Noted POA    PRINCIPAL PROBLEM:  Ureteral stone with hydronephrosis [N13.2] 08/28/2018 Yes    Acute kidney injury [N17.9] 08/28/2018 Yes    Essential hypertension [I10]  Yes    Type 2 diabetes mellitus with hyperglycemia, with long-term current use of insulin [E11.65, Z79.4]  Not Applicable    Hyperlipidemia [E78.5]  Yes    Peripheral vascular disease [I73.9]  Yes      Problems Resolved During this Admission:       Discharged Condition: Stable    Disposition: Home or Self Care    Follow Up:  Follow-up Information     Schedule an appointment as soon as possible for a visit with Alonso Ling MD.     Specialty:  Internal Medicine  Why:  Management of hypertension and diabetes following recent episode of infected obstructive kidney stone with acute kidney injury.  Contact information:  [address] [facility] [address]  [phone]             Bubba Perez MD. Schedule an appointment as soon as possible for a visit in 1 week.    Specialty:  Urology  Why:  Arrange for removal of ureteral stent  Contact information:  [address] [facility] [address]  [facility] [address]  [phone]                 Patient Instructions:      Diet diabetic   Order Comments: Low salt diet.     Notify your health care provider if you experience any of the following:  temperature >100.4     Notify your health care provider if you experience any of the following:  persistent nausea and vomiting or diarrhea     Notify your health care provider if you experience any of the following:  severe uncontrolled pain     Notify your health care provider if you experience any of the following:  difficulty breathing or increased cough     Notify your health care provider if you experience any of the following:  severe persistent headache     Notify your health care provider if you experience any of the following:  worsening rash     Notify your health care provider if you experience any of the following:  persistent dizziness, light-headedness, or visual disturbances     Notify your health care provider if you experience any of the following:  increased confusion or weakness     Activity as tolerated         Medications:  Reconciled Home Medications:      Medication List      START taking these medications    acetaminophen 500 MG tablet  Commonly known as:  TYLENOL  Take 2 tablets (1,000 mg total) by mouth every 8 (eight) hours as needed.     ciprofloxacin HCl 500 MG tablet  Commonly known as:  CIPRO  Take 1 tablet (500 mg total) by mouth every 12 (twelve) hours. for 8 days     ondansetron 4 MG Tbdl  Commonly known as:  ZOFRAN-ODT  Dissolve  " 1 tablet (4 mg total) by mouth every 6 to 8 hours as needed.     oxyCODONE 5 MG immediate release tablet  Commonly known as:  ROXICODONE  Take 1 tablet (5 mg total) by mouth every 8 (eight) hours as needed (Pain not controlled by acetaminophen.).        CONTINUE taking these medications    amLODIPine 10 MG tablet  Commonly known as:  NORVASC  Take 1 tablet (10 mg total) by mouth once daily.     aspirin 81 MG EC tablet  Commonly known as:  ECOTRIN  Take 1 tablet (81 mg total) by mouth once daily.     atorvastatin 80 MG tablet  Commonly known as:  LIPITOR  Take 1 tablet (80 mg total) by mouth once daily.     BASAGLAR KWIKPEN U-100 INSULIN 100 unit/mL (3 mL) Inpn pen  Generic drug:  insulin glargine  Inject 60 Units into the skin every evening.     HumaLOG U-100 Insulin 100 unit/mL injection  Generic drug:  insulin lispro  Inject 20 Units into the skin 3 (three) times daily before meals.     hydrALAZINE 50 MG tablet  Commonly known as:  APRESOLINE  Take 50 mg by mouth 3 (three) times daily.     traZODone 50 MG tablet  Commonly known as:  DESYREL  Take 1 tablet (50 mg total) by mouth nightly as needed for Insomnia.        STOP taking these medications    ascorbic acid (vitamin C) 500 MG tablet  Commonly known as:  VITAMIN C     cetirizine 10 MG tablet  Commonly known as:  ZYRTEC     citalopram 20 MG tablet  Commonly known as:  CELEXA     dextromethorphan-guaifenesin  mg/5 ml  mg/5 mL liquid  Commonly known as:  ROBITUSSIN-DM     fluticasone 50 mcg/actuation nasal spray  Commonly known as:  FLONASE     gabapentin 300 MG capsule  Commonly known as:  NEURONTIN     insulin syringe-needle U-100 1 mL 29 gauge x 1/2" Syrg     loratadine 10 mg tablet  Commonly known as:  CLARITIN     losartan-hydrochlorothiazide 100-25 mg 100-25 mg per tablet  Commonly known as:  HYZAAR     metFORMIN 1000 MG tablet  Commonly known as:  GLUCOPHAGE     omeprazole 40 MG capsule  Commonly known as:  PRILOSEC     pen needle, diabetic 29 " "gauge Ndle     pen needle, diabetic 29 gauge x 1/2" Ndle  Commonly known as:  ULTRA-THIN II INS PEN NEEDLES     spironolactone 50 MG tablet  Commonly known as:  ALDACTONE            Indwelling Lines/Drains at time of discharge:   Lines/Drains/Airways          None          Time spent on the discharge of patient: 35 minutes  Patient was seen and examined on the date of discharge and determined to be suitable for discharge.         Braulio Heredia MD  Department of Hospital Medicine  [facility]  "

## 2018-08-30 NOTE — DISCHARGE INSTRUCTIONS
Ciprofloxacin extended-release tablets  What is this medicine?  CIPROFLOXACIN (sip angie FLOX a sin) is a quinolone antibiotic. It is used to treat certain kinds of bacterial infections. It will not work for colds, flu, or other viral infections.  How should I use this medicine?  Take this medicine by mouth with a full glass of water. Follow the directions on the prescription label. Do not split, crush, or chew the tablet. Take your medicine at regular intervals. Do not take your medicine more often than directed. Take all of your medicine as directed even if you think your are better. Do not skip doses or stop your medicine early.  You can take this medicine with food or on an empty stomach. It can be taken with a meal that contains dairy or calcium, but do not take it alone with a dairy product, like milk or yogurt, or calcium-fortified juice.  A special MedGuide will be given to you by the pharmacist with each prescription and refill. Be sure to read this information carefully each time.  Talk to your pediatrician regarding the use of this medicine in children. Special care may be needed.  What side effects may I notice from receiving this medicine?  Side effects that you should report to your doctor or health care professional as soon as possible:  · allergic reactions like skin rash or hives, swelling of the face, lips, or tongue  · anxious  · confusion  · depressed mood  · diarrhea  · fast, irregular heartbeat  · hallucination, loss of contact with reality  · joint, muscle, or tendon pain or swelling  · pain, tingling, numbness in the hands or feet  · suicidal thoughts or other mood changes  · sunburn  · unusually weak or tired  Side effects that usually do not require medical attention (report to your doctor or health care professional if they continue or are bothersome):  · dry mouth  · headache  · nausea  · trouble sleeping  What may interact with this medicine?  Do not take this medicine with any of the  following medications:  · cisapride  · droperidol  · terfenadine  · tizanidine  This medicine may also interact with the following medications:  · antacids  · birth control pills  · caffeine  · cyclosporin  · didanosine (ddI) buffered tablets or powder  · medicines for diabetes  · medicines for inflammation like ibuprofen, naproxen  · methotrexate  · multivitamins  · omeprazole  · phenytoin  · probenecid  · sucralfate  · theophylline  · warfarin  What if I miss a dose?  If you miss a dose, take it as soon as you can. If it is almost time for your next dose, take only that dose. Do not take double or extra doses. Do not take more than one dose in a day.  Where should I keep my medicine?  Keep out of the reach of children.  Store at room temperature between 15 to 30 degrees C (59 to 86 degrees F). Keep container tightly closed. Throw away any unused medicine after the expiration date.  What should I tell my health care provider before I take this medicine?  They need to know if you have any of these conditions:  · bone problems  · cerebral disease  · history of low potassium levels in the blood  · irregular heartbeat  · joint problems  · kidney disease  · myasthenia gravis  · seizures  · tendon problems  · tingling of the fingers or toes, or other nerve disorder  · an unusual or allergic reaction to ciprofloxacin, other antibiotics or medicines, foods, dyes, or preservatives  · pregnant or trying to get pregnant  · breast-feeding  What should I watch for while using this medicine?  Tell your doctor or health care professional if your symptoms do not improve.  Do not treat diarrhea with over the counter products. Contact your doctor if you have diarrhea that lasts more than 2 days or if it is severe and watery.  You may get drowsy or dizzy. Do not drive, use machinery, or do anything that needs mental alertness until you know how this medicine affects you. Do not stand or sit up quickly, especially if you are an older  patient. This reduces the risk of dizzy or fainting spells.  This medicine can make you more sensitive to the sun. Keep out of the sun. If you cannot avoid being in the sun, wear protective clothing and use sunscreen. Do not use sun lamps or tanning beds/booths.  Avoid antacids, aluminum, calcium, iron, magnesium, and zinc products for 6 hours before and 2 hours after taking a dose of this medicine.  NOTE:This sheet is a summary. It may not cover all possible information. If you have questions about this medicine, talk to your doctor, pharmacist, or health care provider. Copyright© 2017 [employer]        Acetaminophen tablets or caplets  What is this medicine?  ACETAMINOPHEN (a set a TELMA cheryle fen) is a pain reliever. It is used to treat mild pain and fever.  How should I use this medicine?  Take this medicine by mouth with a glass of water. Follow the directions on the package or prescription label. Take your medicine at regular intervals. Do not take your medicine more often than directed.  Talk to your pediatrician regarding the use of this medicine in children. While this drug may be prescribed for children as young as 6 years of age for selected conditions, precautions do apply.  What side effects may I notice from receiving this medicine?  Side effects that you should report to your doctor or health care professional as soon as possible:  · allergic reactions like skin rash, itching or hives, swelling of the face, lips, or tongue  · breathing problems  · fever or sore throat  · redness, blistering, peeling or loosening of the skin, including inside the mouth  · trouble passing urine or change in the amount of urine  · unusual bleeding or bruising  · unusually weak or tired  · yellowing of the eyes or skin  Side effects that usually do not require medical attention (report to your doctor or health care professional if they continue or are bothersome):  · headache  · nausea, stomach upset  What may interact  with this medicine?  · alcohol  · imatinib  · isoniazid  · other medicines with acetaminophen  What if I miss a dose?  If you miss a dose, take it as soon as you can. If it is almost time for your next dose, take only that dose. Do not take double or extra doses.  Where should I keep my medicine?  Keep out of reach of children.  Store at room temperature between 20 and 25 degrees C (68 and 77 degrees F). Protect from moisture and heat. Throw away any unused medicine after the expiration date.  What should I tell my health care provider before I take this medicine?  They need to know if you have any of these conditions:  · if you often drink alcohol  · liver disease  · an unusual or allergic reaction to acetaminophen, other medicines, foods, dyes, or preservatives  · pregnant or trying to get pregnant  · breast-feeding  What should I watch for while using this medicine?  Tell your doctor or health care professional if the pain lasts more than 10 days (5 days for children), if it gets worse, or if there is a new or different kind of pain. Also, check with your doctor if a fever lasts for more than 3 days.  Do not take other medicines that contain acetaminophen with this medicine. Always read labels carefully. If you have questions, ask your doctor or pharmacist.  If you take too much acetaminophen get medical help right away. Too much acetaminophen can be very dangerous and cause liver damage. Even if you do not have symptoms, it is important to get help right away.  NOTE:This sheet is a summary. It may not cover all possible information. If you have questions about this medicine, talk to your doctor, pharmacist, or health care provider. Copyright© 2017 [employer]        Oxycodone tablets or capsules  What is this medicine?  OXYCODONE (ox i KOE done) is a pain reliever. It is used to treat moderate to severe pain.  How should I use this medicine?  Take this medicine by mouth with a glass of water. Follow the  directions on the prescription label. You can take it with or without food. If it upsets your stomach, take it with food. Take your medicine at regular intervals. Do not take it more often than directed. Do not stop taking except on your doctor's advice.  Some brands of this medicine, like Oxecta, have special instructions. Ask your doctor or pharmacist if these directions are for you: Do not cut, crush or chew this medicine. Swallow only one tablet at a time. Do not wet, soak, or lick the tablet before you take it.  A special MedGuide will be given to you by the pharmacist with each prescription and refill. Be sure to read this information carefully each time.  Talk to your pediatrician regarding the use of this medicine in children. Special care may be needed.  What side effects may I notice from receiving this medicine?  Side effects that you should report to your doctor or health care professional as soon as possible:  · allergic reactions like skin rash, itching or hives, swelling of the face, lips, or tongue  · breathing problems  · confusion  · signs and symptoms of low blood pressure like dizziness; feeling faint or lightheaded, falls; unusually weak or tired  · trouble passing urine or change in the amount of urine  · trouble swallowing  Side effects that usually do not require medical attention (report to your doctor or health care professional if they continue or are bothersome):  · constipation  · dry mouth  · nausea, vomiting  · tiredness  What may interact with this medicine?  This medicine may interact with the following medications:  · alcohol  · antihistamines for allergy, cough and cold  · antiviral medicines for HIV or AIDS  · atropine  · certain antibiotics like clarithromycin, erythromycin, linezolid, rifampin  · certain medicines for anxiety or sleep  · certain medicines for bladder problems like oxybutynin, tolterodine  · certain medicines for depression like amitriptyline, fluoxetine,  sertraline  · certain medicines for fungal infections like ketoconazole, itraconazole, voriconazole  · certain medicines for migraine headache like almotriptan, eletriptan, frovatriptan, naratriptan, rizatriptan, sumatriptan, zolmitriptan  · certain medicines for nausea or vomiting like dolasetron, ondansetron, palonosetron  · certain medicines for Parkinson's disease like benztropine, trihexyphenidyl  · certain medicines for seizures like phenobarbital, phenytoin, primidone  · certain medicines for stomach problems like dicyclomine, hyoscyamine  · certain medicines for travel sickness like scopolamine  · diuretics  · general anesthetics like halothane, isoflurane, methoxyflurane, propofol  · ipratropium  · local anesthetics like lidocaine, pramoxine, tetracaine  · MAOIs like Carbex, Eldepryl, Marplan, Nardil, and Parnate  · medicines that relax muscles for surgery  · methylene blue  · nilotinib  · other narcotic medicines for pain or cough  · phenothiazines like chlorpromazine, mesoridazine, prochlorperazine, thioridazine  What if I miss a dose?  If you miss a dose, take it as soon as you can. If it is almost time for your next dose, take only that dose. Do not take double or extra doses.  Where should I keep my medicine?  Keep out of the reach of children. This medicine can be abused. Keep your medicine in a safe place to protect it from theft. Do not share this medicine with anyone. Selling or giving away this medicine is dangerous and against the law.  Store at room temperature between 15 and 30 degrees C (59 and 86 degrees F). Protect from light. Keep container tightly closed.  This medicine may cause accidental overdose and death if it is taken by other adults, children, or pets. Flush any unused medicine down the toilet to reduce the chance of harm. Do not use the medicine after the expiration date.  What should I tell my health care provider before I take this medicine?  They need to know if you have any of  these conditions:  · Reggie's disease  · brain tumor  · head injury  · heart disease  · history of drug or alcohol abuse problem  · if you often drink alcohol  · kidney disease  · liver disease  · lung or breathing disease, like asthma  · mental illness  · pancreatic disease  · seizures  · thyroid disease  · an unusual or allergic reaction to oxycodone, codeine, hydrocodone, morphine, other medicines, foods, dyes, or preservatives  · pregnant or trying to get pregnant  · breast-feeding  What should I watch for while using this medicine?  Tell your doctor or health care professional if your pain does not go away, if it gets worse, or if you have new or a different type of pain. You may develop tolerance to the medicine. Tolerance means that you will need a higher dose of the medicine for pain relief. Tolerance is normal and is expected if you take this medicine for a long time.  Do not suddenly stop taking your medicine because you may develop a severe reaction. Your body becomes used to the medicine. This does NOT mean you are addicted. Addiction is a behavior related to getting and using a drug for a non-medical reason. If you have pain, you have a medical reason to take pain medicine. Your doctor will tell you how much medicine to take. If your doctor wants you to stop the medicine, the dose will be slowly lowered over time to avoid any side effects.  There are different types of narcotic medicines (opiates). If you take more than one type at the same time or if you are taking another medicine that also causes drowsiness, you may have more side effects. Give your health care provider a list of all medicines you use. Your doctor will tell you how much medicine to take. Do not take more medicine than directed. Call emergency for help if you have problems breathing or unusual sleepiness.  You may get drowsy or dizzy. Do not drive, use machinery, or do anything that needs mental alertness until you know how the  medicine affects you. Do not stand or sit up quickly, especially if you are an older patient. This reduces the risk of dizzy or fainting spells. Alcohol may interfere with the effect of this medicine. Avoid alcoholic drinks.  This medicine will cause constipation. Try to have a bowel movement at least every 2 to 3 days. If you do not have a bowel movement for 3 days, call your doctor or health care professional.  Your mouth may get dry. Chewing sugarless gum or sucking hard candy, and drinking plenty of water may help. Contact your doctor if the problem does not go away or is severe.  NOTE:This sheet is a summary. It may not cover all possible information. If you have questions about this medicine, talk to your doctor, pharmacist, or health care provider. Copyright© 2017 [employer]

## 2018-08-30 NOTE — PROGRESS NOTES
[facility]  Urology  Progress Note    Patient Name: Georgina Arias  MRN: [id number]  Admission Date: 8/28/2018  Hospital Length of Stay: 2 days  Code Status: Full Code   Attending Provider: Braulio Heredia MD   Primary Care Physician: Alonso Ling MD    Subjective:     HPI:  Ms. Arias is a 47 y.o. female who presented to the ED at [facility] yesterday with right flank/abdominal pain. States that the pain began abruptly on Sunday. Associated symptoms included severe N/V. CT was done revealing two 5 mm, right proximal ureteral stones. WBC was elevated at 18. BUN/Creatinine elevated at 22/1.9. UA appeared contaminated but was negative for nitrites. She was transferred to [facility] for Urology consult.    She continues to report severe right flank pain with radiation to the abdomen as well as N/V. Describes the pain as aching/stabbing and severe 10/10. Unable to tolerate fluids. Denies dysuria, frequency, urgency, hematuria and fever/chills. Denies a history of nephrolithiasis and recurrent UTIs. She received rocephin at [facility] and is currently on vanc.           Interval History:   Doing great, eager for D/C  Pain has resolved  Denies stent discomfort   Voiding without difficulty  Creatinine improving, now 1.5     Review of Systems   Constitutional: Negative for chills and fever.   Respiratory: Negative for chest tightness and shortness of breath.    Cardiovascular: Negative for chest pain and palpitations.   Gastrointestinal: Negative for abdominal distention, abdominal pain and nausea.   Genitourinary: Negative for difficulty urinating, dysuria, flank pain, frequency, hematuria and urgency.     Objective:     Temp:  [97.2 °F (36.2 °C)-98.8 °F (37.1 °C)] 97.2 °F (36.2 °C)  Pulse:  [74-93] 89  Resp:  [16-20] 16  SpO2:  [94 %-99 %] 94 %  BP: (152-193)/(68-84) 177/74     Body mass index is 43.13 kg/m².            Drains          None          Physical Exam   Constitutional: She is oriented  to person, place, and time. She appears well-developed and well-nourished.   HENT:   Head: Normocephalic and atraumatic.   Cardiovascular: Normal rate, regular rhythm and normal heart sounds.    Pulmonary/Chest: Effort normal and breath sounds normal.   Abdominal: Soft. Bowel sounds are normal. She exhibits no distension. There is no tenderness. There is no CVA tenderness.   Musculoskeletal: Normal range of motion.   Neurological: She is alert and oriented to person, place, and time.   Skin: Skin is warm and dry.     Psychiatric: She has a normal mood and affect. Her behavior is normal.       Significant Labs:    BMP:  Recent Labs   Lab  08/28/18   0540  08/29/18   0445  08/30/18   0442   NA  140  139  141   K  3.9  3.9  3.7   CL  106  104  106   CO2  25  24  24   BUN  22*  28*  28*   CREATININE  1.9*  2.1*  1.5*   CALCIUM  9.0  8.0*  8.2*       CBC:   Recent Labs   Lab  08/28/18   0540  08/29/18 0445  08/30/18   0442   WBC  18.07*  12.67  10.58   HGB  10.4*  8.9*  8.8*   HCT  33.3*  29.2*  28.9*   PLT  277  262  266       Urine Culture:   Recent Labs   Lab  08/28/18   1202   LABURIN  Multiple organisms isolated. None in predominance.  Repeat if  clinically necessary.     Urine Studies:   Recent Labs   Lab  08/27/18   1930   COLORU  Yellow   APPEARANCEUA  Clear   PHUR  6.0   SPECGRAV  1.020   PROTEINUA  3+*   GLUCUA  1+*   KETONESU  Negative   BILIRUBINUA  Negative   OCCULTUA  3+*   NITRITE  Negative   UROBILINOGEN  Negative   LEUKOCYTESUR  Negative   RBCUA  14*   WBCUA  7*   BACTERIA  Moderate*   SQUAMEPITHEL  20   HYALINECASTS  1       Significant Imaging:  All pertinent imaging results/findings from the past 24 hours have been reviewed.                  Assessment/Plan:     * Ureteral stone with hydronephrosis    --Two 5 mm stones in the proximal right ureteral with associated hydro  --s/p cysto/stent placement with Dr. Perez 8/28/18. She will require a second procedure in approximately 1-2 weeks for  definitive stone treatment.   --Urine culture with multiple organisms non in predominance   --Antibiotics per primary         Acute kidney injury    --Slowly improving, now 1.5   --Suspect 2/2 vomiting/dehydration             VTE Risk Mitigation (From admission, onward)        Ordered     heparin (porcine) injection 5,000 Units  Every 8 hours      08/28/18 0410     IP VTE HIGH RISK PATIENT  Once      08/28/18 0410     Place sequential compression device  Until discontinued      08/28/18 0410     Place GUSTAVO hose  Until discontinued      08/28/18 0410        I will sign off. Please call with any questions or concerns. Pt prefers to follow up at [facility], rather than [facility].     Iris Murrieta NP  Urology  [facility]

## 2018-08-31 ENCOUNTER — TELEPHONE (OUTPATIENT)
Dept: UROLOGY | Facility: CLINIC | Age: 48
End: 2018-08-31

## 2018-08-31 ENCOUNTER — PATIENT OUTREACH (OUTPATIENT)
Dept: ADMINISTRATIVE | Facility: CLINIC | Age: 48
End: 2018-08-31

## 2018-08-31 NOTE — TELEPHONE ENCOUNTER
"----- Message from Kristine Banuelos RN sent at 8/31/2018 11:35 AM CDT -----  Contact: Kristine Banuelos RN  Patient reached out for a discharge follow up call and requesting a call back from physician office. She stated," she had indigestion and feels the symptoms maybe due to the antibiotic Cipro prescribed at discharge." Patient states would like to speak with someone regarding changing antibiotic medication. Please contact patient and advise. Thanks in advance for your assistance.  "

## 2018-08-31 NOTE — TELEPHONE ENCOUNTER
Spoke with pt she stated that when taking the rx cipro it gives her heartburn. Per pt nothing is helping, and is requesting something different. Please advise.

## 2018-09-02 LAB
BACTERIA BLD CULT: NORMAL
BACTERIA BLD CULT: NORMAL

## 2018-09-04 ENCOUNTER — TELEPHONE (OUTPATIENT)
Dept: UROLOGY | Facility: CLINIC | Age: 48
End: 2018-09-04

## 2018-09-04 RX ORDER — NITROFURANTOIN 25; 75 MG/1; MG/1
100 CAPSULE ORAL 2 TIMES DAILY
Qty: 20 CAPSULE | Refills: 0 | Status: SHIPPED | OUTPATIENT
Start: 2018-09-04 | End: 2018-09-14

## 2018-09-04 NOTE — TELEPHONE ENCOUNTER
----- Message from Shyla Juarez sent at 9/4/2018  9:33 AM CDT -----  Contact: Self            Name of Who is Calling: Self      What is the request in detail: Pt was seen in the ED on 08/28 with Dr. Perez and had a stent placed. Pt was advised to follow up in the clinic. Attempt made, next available is 12/10.      Can the clinic reply by [facility]: N      What Number to Call Back if not in [facility]: [phone]

## 2018-09-04 NOTE — TELEPHONE ENCOUNTER
Attempt to contact pt both numbers provided as to 1 was not a working number. A voice message was left on the other number informing pt that another abx was sent to her pharmacy and will try to give her a call to get her schedule for a follow up visit.

## 2018-09-05 NOTE — TELEPHONE ENCOUNTER
----- Message from Mayra Eyad sent at 9/4/2018  4:53 PM CDT -----  Contact: Pt   Name of Who is Calling: HZAEL SERRATO [[id number]]      What is the request in detail: Patient states her RX nitrofurantoin, macrocrystal-monohydrate, (MACROBID) 100 MG capsule need to be called in to [facility] at [address], [place] [address] [phone]. Please contact to further discuss and advise       Can the clinic reply by [facility]: No       What [facility]: [phone]

## 2018-09-06 ENCOUNTER — OFFICE VISIT (OUTPATIENT)
Dept: UROLOGY | Facility: CLINIC | Age: 48
End: 2018-09-06
Attending: UROLOGY
Payer: MEDICAID

## 2018-09-06 VITALS
BODY MASS INDEX: 43.16 KG/M2 | WEIGHT: 275 LBS | SYSTOLIC BLOOD PRESSURE: 197 MMHG | HEIGHT: 67 IN | HEART RATE: 112 BPM | DIASTOLIC BLOOD PRESSURE: 84 MMHG

## 2018-09-06 DIAGNOSIS — N20.0 NEPHROLITHIASIS: Primary | ICD-10-CM

## 2018-09-06 PROCEDURE — 99214 OFFICE O/P EST MOD 30 MIN: CPT | Mod: S$GLB,,, | Performed by: UROLOGY

## 2018-09-06 PROCEDURE — 87086 URINE CULTURE/COLONY COUNT: CPT

## 2018-09-06 RX ORDER — CIPROFLOXACIN 2 MG/ML
400 INJECTION, SOLUTION INTRAVENOUS
Status: CANCELLED | OUTPATIENT
Start: 2018-09-06

## 2018-09-06 NOTE — PROGRESS NOTES
"Subjective:      Georgina Arias is a 47 y.o. female who returns today regarding her nephrolithiasis.    She is s/p right ureteral stent placement 8/28/18 for ureteral stone with possible infection. Discharged 8/30/18. Urine culture was negative (obtained after abx) and she completed course of cipro.    Here today to schedule URS.    The following portions of the patient's history were reviewed and updated as appropriate: allergies, current medications, past family history, past medical history, past social history, past surgical history and problem list.    Review of Systems  A comprehensive multipoint review of systems was negative except as otherwise stated in the HPI.     Objective:   Vitals: BP (!) 197/84 (BP Location: Left arm, Patient Position: Sitting, BP Method: Large (Automatic))   Pulse (!) 112   Ht 5' 7" (1.702 m)   Wt 124.7 kg (275 lb)   BMI 43.07 kg/m²     Physical Exam   General: alert and oriented, no acute distress  Respiratory: Symmetric expansion, non-labored breathing  Neuro: no gross deficits  Psych: normal judgment and insight, normal mood/affect and non-anxious    Lab Review     Lab Results   Component Value Date    WBC 10.58 08/30/2018    HGB 8.8 (L) 08/30/2018    HCT 28.9 (L) 08/30/2018    MCV 84 08/30/2018     08/30/2018     Lab Results   Component Value Date    CREATININE 1.5 (H) 08/30/2018    BUN 28 (H) 08/30/2018       Imaging (all images personally reviewed; agree with report below)  Results for orders placed during the hospital encounter of 08/27/18   CT Abdomen Pelvis  Without Contrast    Narrative EXAMINATION:  CT ABDOMEN PELVIS WITHOUT CONTRAST    CLINICAL HISTORY:  Abdominal distension;    TECHNIQUE:  Low dose axial images, sagittal and coronal reformations were obtained from the lung bases to the pubic synthesis with out IV contrast    COMPARISON:  None.    FINDINGS:  The lungs are clear.  The heart is normal in size.  Note is made of mild coronary artery " calcifications.  There are no pericardial effusions.    The adrenal glands,  spleen and pancreas are normal.  The liver is normal.  The stomach appears unremarkable.  The gallbladder has been surgically removed.    There is moderate right hydronephrosis secondary to two 5 mm  calculi seen in the proximal ureter.  Right perinephric stranding is present.  There is a punctate residual calculus seen in the hilum of the right kidney.  The left kidney demonstrates mild perinephric stranding.    The GI tract is normal.  The appendix was seen and appeared unremarkable.    The anterior abdominal wall demonstrates a small periumbilical hernia which contains fat.    The abdominal aorta is mild arteriosclerotic changes..  There are multiple slightly enlarged para-aortic lymph nodes.    PELVIS:    No pelvic mass, adenopathy, or free fluid.  The uterus and adnexa appears normal.  The urinary bladder was within normal limits.    BOWEL/MESENTERY:    No evidence of bowel obstruction or inflammation.    BONES:  No acute osseous abnormality and no suspicious lytic or blastic lesion.      Impression 1. Moderate right hydronephrosis secondary to two 5 mm calculi seen in the proximal right ureter.  There is perinephric stranding present suggesting possible pyelonephritis.  2. Calcific arteriosclerosis.  3. Prominent retroperitoneal lymph nodes which may be reactive.  The above results were immediately discussed with the emergency room physician by Dr. Jason Meigs.    [facility] concurrence.      Electronically signed by: Eusebia Griffin MD  Date:    08/28/2018  Time:    08:51        Assessment and Plan:   1. Nephrolithiasis  -- Recommend URS for stones - wishes to proceed  -- Full risks and benefits of surgery discussed in detail, including but not limited to bleeding, infection, damage to bladder or kidney, need for further procedures, and complications related to anesthesia.  Informed consent signed and placed in chart.   -- Right  URS/LL/BSE next Tuesday

## 2018-09-07 NOTE — PRE ADMISSION SCREENING
Pre-op instructions given,verbalized understanding   No pertinent family history in first degree relatives

## 2018-09-09 LAB
BACTERIA UR CULT: NORMAL
BACTERIA UR CULT: NORMAL

## 2018-09-11 ENCOUNTER — HOSPITAL ENCOUNTER (OUTPATIENT)
Facility: OTHER | Age: 48
Discharge: HOME | End: 2018-09-11
Attending: UROLOGY | Admitting: UROLOGY
Payer: MEDICAID

## 2018-09-11 ENCOUNTER — ANESTHESIA EVENT (OUTPATIENT)
Dept: SURGERY | Facility: OTHER | Age: 48
End: 2018-09-11
Payer: MEDICAID

## 2018-09-11 ENCOUNTER — ANESTHESIA (OUTPATIENT)
Dept: SURGERY | Facility: OTHER | Age: 48
End: 2018-09-11
Payer: MEDICAID

## 2018-09-11 VITALS
DIASTOLIC BLOOD PRESSURE: 81 MMHG | RESPIRATION RATE: 16 BRPM | HEART RATE: 80 BPM | SYSTOLIC BLOOD PRESSURE: 161 MMHG | OXYGEN SATURATION: 96 % | WEIGHT: 275 LBS | BODY MASS INDEX: 43.16 KG/M2 | TEMPERATURE: 98 F | HEIGHT: 67 IN

## 2018-09-11 DIAGNOSIS — N20.0 NEPHROLITHIASIS: ICD-10-CM

## 2018-09-11 LAB — POCT GLUCOSE: 173 MG/DL (ref 70–110)

## 2018-09-11 PROCEDURE — 71000015 HC POSTOP RECOV 1ST HR: Performed by: UROLOGY

## 2018-09-11 PROCEDURE — 25000003 PHARM REV CODE 250: Performed by: ANESTHESIOLOGY

## 2018-09-11 PROCEDURE — 63600175 PHARM REV CODE 636 W HCPCS: Performed by: NURSE ANESTHETIST, CERTIFIED REGISTERED

## 2018-09-11 PROCEDURE — C1769 GUIDE WIRE: HCPCS | Performed by: UROLOGY

## 2018-09-11 PROCEDURE — 52356 CYSTO/URETERO W/LITHOTRIPSY: CPT | Mod: RT,,, | Performed by: UROLOGY

## 2018-09-11 PROCEDURE — 25000003 PHARM REV CODE 250: Performed by: NURSE ANESTHETIST, CERTIFIED REGISTERED

## 2018-09-11 PROCEDURE — 36000706: Performed by: UROLOGY

## 2018-09-11 PROCEDURE — 37000009 HC ANESTHESIA EA ADD 15 MINS: Performed by: UROLOGY

## 2018-09-11 PROCEDURE — 63600175 PHARM REV CODE 636 W HCPCS: Performed by: UROLOGY

## 2018-09-11 PROCEDURE — 76000 FLUOROSCOPY <1 HR PHYS/QHP: CPT | Mod: 26,59,, | Performed by: UROLOGY

## 2018-09-11 PROCEDURE — C2617 STENT, NON-COR, TEM W/O DEL: HCPCS | Performed by: UROLOGY

## 2018-09-11 PROCEDURE — 37000008 HC ANESTHESIA 1ST 15 MINUTES: Performed by: UROLOGY

## 2018-09-11 PROCEDURE — 00862 ANES XTRPRTL LWR ABD RNL PX: CPT | Performed by: UROLOGY

## 2018-09-11 PROCEDURE — 71000033 HC RECOVERY, INTIAL HOUR: Performed by: UROLOGY

## 2018-09-11 PROCEDURE — 82962 GLUCOSE BLOOD TEST: CPT | Performed by: UROLOGY

## 2018-09-11 PROCEDURE — 36000707: Performed by: UROLOGY

## 2018-09-11 DEVICE — STENT URETERAL UNIV 6FR 26CM: Type: IMPLANTABLE DEVICE | Site: URETER | Status: FUNCTIONAL

## 2018-09-11 RX ORDER — ROCURONIUM BROMIDE 10 MG/ML
INJECTION, SOLUTION INTRAVENOUS
Status: DISCONTINUED | OUTPATIENT
Start: 2018-09-11 | End: 2018-09-11

## 2018-09-11 RX ORDER — MEPERIDINE HYDROCHLORIDE 50 MG/ML
12.5 INJECTION INTRAMUSCULAR; INTRAVENOUS; SUBCUTANEOUS ONCE AS NEEDED
Status: DISCONTINUED | OUTPATIENT
Start: 2018-09-11 | End: 2018-09-11 | Stop reason: HOSPADM

## 2018-09-11 RX ORDER — CIPROFLOXACIN 2 MG/ML
400 INJECTION, SOLUTION INTRAVENOUS
Status: COMPLETED | OUTPATIENT
Start: 2018-09-11 | End: 2018-09-11

## 2018-09-11 RX ORDER — ONDANSETRON 2 MG/ML
4 INJECTION INTRAMUSCULAR; INTRAVENOUS EVERY 12 HOURS PRN
Status: DISCONTINUED | OUTPATIENT
Start: 2018-09-11 | End: 2018-09-11 | Stop reason: HOSPADM

## 2018-09-11 RX ORDER — DEXAMETHASONE SODIUM PHOSPHATE 4 MG/ML
INJECTION, SOLUTION INTRA-ARTICULAR; INTRALESIONAL; INTRAMUSCULAR; INTRAVENOUS; SOFT TISSUE
Status: DISCONTINUED | OUTPATIENT
Start: 2018-09-11 | End: 2018-09-11

## 2018-09-11 RX ORDER — LIDOCAINE HCL/PF 100 MG/5ML
SYRINGE (ML) INTRAVENOUS
Status: DISCONTINUED | OUTPATIENT
Start: 2018-09-11 | End: 2018-09-11

## 2018-09-11 RX ORDER — FENTANYL CITRATE 50 UG/ML
25 INJECTION, SOLUTION INTRAMUSCULAR; INTRAVENOUS EVERY 5 MIN PRN
Status: DISCONTINUED | OUTPATIENT
Start: 2018-09-11 | End: 2018-09-11 | Stop reason: HOSPADM

## 2018-09-11 RX ORDER — GLYCOPYRROLATE 0.2 MG/ML
INJECTION INTRAMUSCULAR; INTRAVENOUS
Status: DISCONTINUED | OUTPATIENT
Start: 2018-09-11 | End: 2018-09-11

## 2018-09-11 RX ORDER — SCOLOPAMINE TRANSDERMAL SYSTEM 1 MG/1
PATCH, EXTENDED RELEASE TRANSDERMAL
Status: DISCONTINUED | OUTPATIENT
Start: 2018-09-11 | End: 2018-09-11

## 2018-09-11 RX ORDER — SODIUM CHLORIDE, SODIUM LACTATE, POTASSIUM CHLORIDE, CALCIUM CHLORIDE 600; 310; 30; 20 MG/100ML; MG/100ML; MG/100ML; MG/100ML
INJECTION, SOLUTION INTRAVENOUS CONTINUOUS PRN
Status: DISCONTINUED | OUTPATIENT
Start: 2018-09-11 | End: 2018-09-11

## 2018-09-11 RX ORDER — SODIUM CHLORIDE 0.9 % (FLUSH) 0.9 %
3 SYRINGE (ML) INJECTION
Status: DISCONTINUED | OUTPATIENT
Start: 2018-09-11 | End: 2018-09-11 | Stop reason: HOSPADM

## 2018-09-11 RX ORDER — ONDANSETRON 2 MG/ML
4 INJECTION INTRAMUSCULAR; INTRAVENOUS DAILY PRN
Status: DISCONTINUED | OUTPATIENT
Start: 2018-09-11 | End: 2018-09-11 | Stop reason: HOSPADM

## 2018-09-11 RX ORDER — EPHEDRINE SULFATE 50 MG/ML
INJECTION, SOLUTION INTRAVENOUS
Status: DISCONTINUED | OUTPATIENT
Start: 2018-09-11 | End: 2018-09-11

## 2018-09-11 RX ORDER — ONDANSETRON 2 MG/ML
INJECTION INTRAMUSCULAR; INTRAVENOUS
Status: DISCONTINUED | OUTPATIENT
Start: 2018-09-11 | End: 2018-09-11

## 2018-09-11 RX ORDER — OXYCODONE HYDROCHLORIDE 5 MG/1
5 TABLET ORAL
Status: DISCONTINUED | OUTPATIENT
Start: 2018-09-11 | End: 2018-09-11 | Stop reason: HOSPADM

## 2018-09-11 RX ORDER — METOPROLOL TARTRATE 25 MG/1
25 TABLET, FILM COATED ORAL 2 TIMES DAILY
COMMUNITY
End: 2018-10-16

## 2018-09-11 RX ORDER — ACETAMINOPHEN 10 MG/ML
INJECTION, SOLUTION INTRAVENOUS
Status: DISCONTINUED | OUTPATIENT
Start: 2018-09-11 | End: 2018-09-11

## 2018-09-11 RX ORDER — PROPOFOL 10 MG/ML
VIAL (ML) INTRAVENOUS
Status: DISCONTINUED | OUTPATIENT
Start: 2018-09-11 | End: 2018-09-11

## 2018-09-11 RX ORDER — FENTANYL CITRATE 50 UG/ML
INJECTION, SOLUTION INTRAMUSCULAR; INTRAVENOUS
Status: DISCONTINUED | OUTPATIENT
Start: 2018-09-11 | End: 2018-09-11

## 2018-09-11 RX ADMIN — LIDOCAINE HYDROCHLORIDE 50 MG: 20 INJECTION, SOLUTION INTRAVENOUS at 11:09

## 2018-09-11 RX ADMIN — EPHEDRINE SULFATE 15 MG: 50 INJECTION INTRAMUSCULAR; INTRAVENOUS; SUBCUTANEOUS at 11:09

## 2018-09-11 RX ADMIN — ACETAMINOPHEN 1000 MG: 10 INJECTION, SOLUTION INTRAVENOUS at 11:09

## 2018-09-11 RX ADMIN — SUGAMMADEX 499 MG: 100 INJECTION, SOLUTION INTRAVENOUS at 11:09

## 2018-09-11 RX ADMIN — PROPOFOL 200 MG: 10 INJECTION, EMULSION INTRAVENOUS at 11:09

## 2018-09-11 RX ADMIN — GLYCOPYRROLATE 0.4 MG: 0.2 INJECTION, SOLUTION INTRAMUSCULAR; INTRAVENOUS at 11:09

## 2018-09-11 RX ADMIN — SCOPALAMINE 1 PATCH: 1 PATCH, EXTENDED RELEASE TRANSDERMAL at 11:09

## 2018-09-11 RX ADMIN — CIPROFLOXACIN 400 MG: 2 INJECTION, SOLUTION INTRAVENOUS at 11:09

## 2018-09-11 RX ADMIN — CARBOXYMETHYLCELLULOSE SODIUM 2 DROP: 2.5 SOLUTION/ DROPS OPHTHALMIC at 11:09

## 2018-09-11 RX ADMIN — ROCURONIUM BROMIDE 35 MG: 10 INJECTION, SOLUTION INTRAVENOUS at 11:09

## 2018-09-11 RX ADMIN — DEXAMETHASONE SODIUM PHOSPHATE 8 MG: 4 INJECTION, SOLUTION INTRAMUSCULAR; INTRAVENOUS at 11:09

## 2018-09-11 RX ADMIN — SODIUM CHLORIDE, SODIUM LACTATE, POTASSIUM CHLORIDE, AND CALCIUM CHLORIDE: 600; 310; 30; 20 INJECTION, SOLUTION INTRAVENOUS at 10:09

## 2018-09-11 RX ADMIN — FENTANYL CITRATE 100 MCG: 50 INJECTION, SOLUTION INTRAMUSCULAR; INTRAVENOUS at 11:09

## 2018-09-11 RX ADMIN — OXYCODONE HYDROCHLORIDE 5 MG: 5 TABLET ORAL at 12:09

## 2018-09-11 RX ADMIN — ONDANSETRON 4 MG: 2 INJECTION INTRAMUSCULAR; INTRAVENOUS at 11:09

## 2018-09-11 NOTE — DISCHARGE INSTRUCTIONS
Patient may remove stent at home by pulling string on Monday.  Call office with any questions or concerns.     Treating Kidney Stones: Ureteroscopic Stone Removal       Your recovery  This is an outpatient or overnight procedure. For a few days after surgery, you may feel some pain when you urinate. Or you may need to urinate more often, or have bloody urine. You may have a ureteral stent. This is a soft tube that prevents blockage from swelling after the procedure. The stent is removed when the swelling goes down, often within days. Follow up as instructed to check for any new stones.    When to call your healthcare provider  Call your healthcare provider right away if:  · You have sudden pain or flank pain  · You have a fever over 100.4°F (38°C)  · You have nausea that lasts for days  · You have heavy bleeding when you urinate  · You have heavy bleeding through your drainage tube  · You have swelling or redness around your incision         Discharge Instructions: After Your Surgery  Youve just had surgery. During surgery, you were given medicine called anesthesia to keep you relaxed and free of pain. After surgery, you may have some pain or nausea. This is common. Here are some tips for feeling better and getting well after surgery.       Stay on schedule with your medicine.     Going home  Your healthcare provider will show you how to take care of yourself when you go home. He or she will also answer your questions. Have an adult family member or friend drive you home. For the first 24 hours after your surgery:    · Do not drive or use heavy equipment.  · Do not make important decisions or sign legal papers.  · Do not drink alcohol.  · Have someone stay with you, if needed. He or she can watch for problems and help keep you safe.    Be sure to go to all follow-up visits with your healthcare provider. And rest after your surgery for as long as your healthcare provider tells you to.    Coping with pain  If you  have pain after surgery, pain medicine will help you feel better. Take it as told, before pain becomes severe. Also, ask your healthcare provider or pharmacist about other ways to control pain. This might be with heat, ice, or relaxation. And follow any other instructions your surgeon or nurse gives you.    Tips for taking pain medicine  To get the best relief possible, remember these points:    · Pain medicines can upset your stomach. Taking them with a little food may help.  · Most pain relievers taken by mouth need at least 20 to 30 minutes to start to work.  · Taking medicine on a schedule can help you remember to take it. Try to time your medicine so that you can take it before starting an activity. This might be before you get dressed, go for a walk, or sit down for dinner.  · Constipation is a common side effect of pain medicines. Call your healthcare provider before taking any medicines such as laxatives or stool softeners to help ease constipation. Also ask if you should skip any foods. Drinking lots of fluids and eating foods such as fruits and vegetables that are high in fiber can also help. Remember, do not take laxatives unless your surgeon has prescribed them.  · Drinking alcohol and taking pain medicine can cause dizziness and slow your breathing. It can even be deadly. Do not drink alcohol while taking pain medicine.  · Pain medicine can make you react more slowly to things. Do not drive or run machinery while taking pain medicine.    Your healthcare provider may tell you to take acetaminophen to help ease your pain. Ask him or her how much you are supposed to take each day. Acetaminophen or other pain relievers may interact with your prescription medicines or other over-the-counter (OTC) medicines. Some prescription medicines have acetaminophen and other ingredients. Using both prescription and OTC acetaminophen for pain can cause you to overdose. Read the labels on your OTC medicines with care.  This will help you to clearly know the list of ingredients, how much to take, and any warnings. It may also help you not take too much acetaminophen. If you have questions or do not understand the information, ask your pharmacist or healthcare provider to explain it to you before you take the OTC medicine.    Managing nausea  Some people have an upset stomach after surgery. This is often because of anesthesia, pain, or pain medicine, or the stress of surgery. These tips will help you handle nausea and eat healthy foods as you get better. If you were on a special food plan before surgery, ask your healthcare provider if you should follow it while you get better. These tips may help:    · Do not push yourself to eat. Your body will tell you when to eat and how much.  · Start off with clear liquids and soup. They are easier to digest.  · Next try semi-solid foods, such as mashed potatoes, applesauce, and gelatin, as you feel ready.  · Slowly move to solid foods. Dont eat fatty, rich, or spicy foods at first.  · Do not force yourself to have 3 large meals a day. Instead eat smaller amounts more often.  · Take pain medicines with a small amount of solid food, such as crackers or toast, to avoid nausea.     Call your surgeon if  · You still have pain an hour after taking medicine. The medicine may not be strong enough.  · You feel too sleepy, dizzy, or groggy. The medicine may be too strong.  · You have side effects like nausea, vomiting, or skin changes, such as rash, itching, or hives.       If you have obstructive sleep apnea  You were given anesthesia medicine during surgery to keep you comfortable and free of pain. After surgery, you may have more apnea spells because of this medicine and other medicines you were given. The spells may last longer than usual.   At home:    · Keep using the continuous positive airway pressure (CPAP) device when you sleep. Unless your healthcare provider tells you not to, use it when  you sleep, day or night. CPAP is a common device used to treat obstructive sleep apnea.  · Talk with your provider before taking any pain medicine, muscle relaxants, or sedatives. Your provider will tell you about the possible dangers of taking these medicines.    Date Last Reviewed: 12/1/2016  © 5383-6172 [employer]. [address] [place] [address], [place] [address]. All rights reserved. This information is not intended as a substitute for professional medical care. Always follow your healthcare professional's instructions.    PLEASE FOLLOW ANY OTHER INSTRUCTIONS PROVIDED TO YOU BY DR. ABDALLA!

## 2018-09-11 NOTE — INTERVAL H&P NOTE
The patient has been examined and the H&P has been reviewed:    I concur with the findings and no changes have occurred since H&P was written.    Anesthesia/Surgery risks, benefits and alternative options discussed and understood by patient/family.          Active Hospital Problems    Diagnosis  POA    Nephrolithiasis [N20.0]  Yes      Resolved Hospital Problems   No resolved problems to display.

## 2018-09-11 NOTE — ANESTHESIA PREPROCEDURE EVALUATION
09/11/2018  Georgina Arias is a 47 y.o., female.    Pre-op Assessment    I have reviewed the Patient Summary Reports.     I have reviewed the Nursing Notes.   I have reviewed the Medications.     Review of Systems  Anesthesia Hx:  No problems with previous Anesthesia    Social:  Former Smoker, Social Alcohol Use    Hematology/Oncology:     Oncology Normal     EENT/Dental:EENT/Dental Normal   Cardiovascular:   Exercise tolerance: good Hypertension, well controlled PVD hyperlipidemia    Pulmonary:  Pulmonary Normal    Renal/:   renal calculi    Hepatic/GI:   PUD,    Musculoskeletal:  Musculoskeletal Normal    Neurological:  Neurology Normal    Endocrine:   Diabetes, poorly controlled, type 2    Dermatological:  Skin Normal    Psych:  Psychiatric Normal           Physical Exam  General:  Morbid Obesity    Airway/Jaw/Neck:  Airway Findings: Mouth Opening: Normal Tongue: Normal  General Airway Assessment: Adult, Average  Mallampati: II  TM Distance: 4 - 6 cm  Jaw/Neck Findings:  Neck ROM: Extension Decreased, Mild      Dental:  Dental Findings: upper partial dentures        Mental Status:  Mental Status Findings:  Cooperative, Alert and Oriented         Anesthesia Plan  Type of Anesthesia, risks & benefits discussed:  Anesthesia Type:  general  Patient's Preference:   Intra-op Monitoring Plan: standard ASA monitors  Intra-op Monitoring Plan Comments:   Post Op Pain Control Plan: per primary service following discharge from PACU  Post Op Pain Control Plan Comments:   Induction:    Beta Blocker:         Informed Consent: Patient understands risks and agrees with Anesthesia plan.  Questions answered. Anesthesia consent signed with patient.  ASA Score: 3     Day of Surgery Review of History & Physical:    H&P update referred to the surgeon.         Ready For Surgery From Anesthesia Perspective.

## 2018-09-11 NOTE — OP NOTE
Operative Note       Surgery Date: 9/11/2018     Surgeon: Mikhail Perez MD    Assistant Surgeon: None    Pre-op Diagnosis:  Nephrolithiasis [N20.0]    Post-op Diagnosis: Post-Op Diagnosis Codes:     * Nephrolithiasis [N20.0]    Procedures:  Procedure(s) (LRB):  EXTRACTION-STONE-URETEROSCOPY (Right)    Anesthesia: General    Indications for Procedure:  The patient is a 47 y.o. year old female with right ureteral stone. She presented initially with associated infection and had stent placed 2 weeks ago.  She presents today for surgical treatment with urteroscopy.  The full risks and benefits of the procedure have been explained and detail and she wishes to proceed.    Procedure Description:  The patient was brought to the operative suite and placed under General anesthesia. She was placed in lithotomy position and prepped and draped in usual sterile fashion.  Time out was performed prior to starting the procedure.     film was obtained, which confirmed the location of the stone in the right kidney.  Rigid cystoscopy was performed without abnormality noted in the urethra or bladder.  The previously placed stent in the right ureteral orifice was identified. It was grasped and the distal tip extracted through the meatus. It was then cannulated with Motion wire was advanced into the renal pelvis under fluoroscopic guidance without difficulty.  The stent was removed.    The wire was then exchanged for the flexible ureteroscope under fluoroscopic guidance.  The entire collecting system was examined and only a single stone was identified in an upper pole calyx. Laser lithotripsy was then performed using the Holmium laser and the stone was fragmented into multiple pieces. This was done is popcorn/dusting fashion. The entire renal pelvis was then inspected and all calices were noted to be void of any large stone fragments.     Under fluoroscopic guidance, the guidewire was then exchanged for a 6x26 double-J ureteral stent.  Good coils were confirmed within the renal pelvis and the bladder using fluoroscopy and cytoscopy. The string was not removed from the stent prior to placement. The bladder was then emptied and the cystoscope removed. The patient was then awakened and transferred to PACU in stable condition. She will remove the stent at home Monday and follow-up with me in 1 month.       Complications: No    Estimated Blood Loss: 0cc         Specimens Removed:   Specimen (12h ago, onward)    None          Implants:   Implant Name Type Inv. Item Serial No.  Lot No. LRB No. Used   STENT URETERAL UNIV 6FR 26CM - [id number]  STENT URETERAL UNIV 6FR 26CM  [email or web address] [id number]              Disposition: PACU - hemodynamically stable.           Condition: Good    Attestation:  I performed the procedure.

## 2018-09-11 NOTE — BRIEF OP NOTE
[facility]  Brief Operative Note     SUMMARY     Surgery Date: 9/11/2018     Surgeon(s) and Role:     * Bubba Perez MD - Primary    Assisting Surgeon: None    Pre-op Diagnosis:  Nephrolithiasis [N20.0]    Post-op Diagnosis:  Post-Op Diagnosis Codes:     * Nephrolithiasis [N20.0]    Procedure(s) (LRB):  EXTRACTION-STONE-URETEROSCOPY (Right)    Anesthesia: General    Description of the findings of the procedure: 5mm stone in upper pole calyx. Fragmented in dusting fashion with laser. 6x26 JJ stent replaced with string.    Estimated Blood Loss: 0cc         Specimens:   Specimen (12h ago, onward)    None          Discharge Note    SUMMARY     Admit Date: 9/11/2018    Discharge Date and Time: 9/11/2018    Hospital Course: Patient was admitted for elective outpatient procedure, which was uncomplicated and well tolerated.      Final Diagnosis: Post-Op Diagnosis Codes:     * Nephrolithiasis [N20.0]    Disposition: Home or Self Care    Follow Up/Patient Instructions:     Medications:  Reconciled Home Medications:   Current Discharge Medication List      CONTINUE these medications which have NOT CHANGED    Details   acetaminophen (TYLENOL) 500 MG tablet Take 2 tablets (1,000 mg total) by mouth every 8 (eight) hours as needed.  Refills: 0      amlodipine (NORVASC) 10 MG tablet Take 1 tablet (10 mg total) by mouth once daily.  Qty: 90 tablet, Refills: 3    Associated Diagnoses: Hypertension      aspirin (ECOTRIN) 81 MG EC tablet Take 1 tablet (81 mg total) by mouth once daily.    Associated Diagnoses: PAD (peripheral artery disease)      atorvastatin (LIPITOR) 80 MG tablet Take 1 tablet (80 mg total) by mouth once daily.  Qty: 90 tablet, Refills: 3    Associated Diagnoses: Diabetes mellitus, type 2; PAD (peripheral artery disease); Hyperlipidemia      BASAGLAR KWIKPEN U-100 INSULIN 100 unit/mL (3 mL) InPn pen Inject 60 Units into the skin every evening.      HUMALOG U-100 INSULIN 100 unit/mL injection Inject  20 Units into the skin 3 (three) times daily before meals.      hydrALAZINE (APRESOLINE) 50 MG tablet Take 50 mg by mouth 3 (three) times daily.      metoprolol tartrate (LOPRESSOR) 25 MG tablet Take 25 mg by mouth 2 (two) times daily.      ondansetron (ZOFRAN-ODT) 4 MG TbDL Dissolve  1 tablet (4 mg total) by mouth every 6 to 8 hours as needed.  Qty: 28 tablet, Refills: 0      oxyCODONE (ROXICODONE) 5 MG immediate release tablet Take 1 tablet (5 mg total) by mouth every 8 (eight) hours as needed (Pain not controlled by acetaminophen.).  Qty: 21 tablet, Refills: 0      trazodone (DESYREL) 50 MG tablet Take 1 tablet (50 mg total) by mouth nightly as needed for Insomnia.  Qty: 30 tablet, Refills: 3    Associated Diagnoses: Insomnia      nitrofurantoin, macrocrystal-monohydrate, (MACROBID) 100 MG capsule Take 1 capsule (100 mg total) by mouth 2 (two) times daily. for 10 days  Qty: 20 capsule, Refills: 0           Discharge Procedure Orders   Diet general

## 2018-09-11 NOTE — PLAN OF CARE
Georginareina Arias has met all discharge criteria from Phase II. Vital Signs are stable, ambulating  without difficulty. Discharge instructions given, patient verbalized understanding. Discharged from facility via wheelchair in stable condition.

## 2018-09-11 NOTE — ANESTHESIA POSTPROCEDURE EVALUATION
"Anesthesia Post Evaluation    Patient: Georgina Arias    Procedure(s) Performed: Procedure(s) (LRB):  EXTRACTION-STONE-URETEROSCOPY (Right)    Final Anesthesia Type: general  Patient location during evaluation: PACU  Patient participation: Yes- Able to Participate  Level of consciousness: awake and alert  Post-procedure vital signs: reviewed and stable  Pain management: adequate  Airway patency: patent  PONV status at discharge: No PONV  Anesthetic complications: no      Cardiovascular status: blood pressure returned to baseline  Respiratory status: unassisted, spontaneous ventilation and room air  Hydration status: euvolemic  Follow-up not needed.        Visit Vitals  BP (!) 167/80 (BP Location: Right arm, Patient Position: Sitting)   Pulse 83   Temp 36.5 °C (97.7 °F) (Oral)   Resp 16   Ht 5' 7" (1.702 m)   Wt 124.7 kg (275 lb)   LMP 08/07/2018 (Exact Date)   SpO2 95%   Breastfeeding? No   BMI 43.07 kg/m²       Pain/Jasmin Score: Pain Assessment Performed: Yes (9/11/2018  1:05 PM)  Presence of Pain: denies (9/11/2018  1:05 PM)  Pain Rating Prior to Med Admin: 5 (9/11/2018 12:12 PM)  Pain Rating Post Med Admin: 4 (9/11/2018 12:25 PM)  Jasmin Score: 10 (9/11/2018  1:05 PM)        "

## 2018-09-11 NOTE — TRANSFER OF CARE
"Anesthesia Transfer of Care Note    Patient: Georgina Arias    Procedure(s) Performed: Procedure(s) (LRB):  EXTRACTION-STONE-URETEROSCOPY (Right)    Patient location: PACU    Anesthesia Type: general    Transport from OR: Transported from OR on 6-10 L/min O2 by face mask with adequate spontaneous ventilation    Post pain: adequate analgesia    Post assessment: no apparent anesthetic complications and tolerated procedure well    Post vital signs: stable    Level of consciousness: awake and alert    Nausea/Vomiting: no nausea/vomiting    Complications: none    Transfer of care protocol was followed      Last vitals:   Visit Vitals  BP (!) 157/71 (BP Location: Left arm, Patient Position: Sitting)   Pulse 76   Temp 36.6 °C (97.9 °F) (Oral)   Resp 20   Ht 5' 7" (1.702 m)   Wt 124.7 kg (275 lb)   LMP 08/07/2018 (Exact Date)   SpO2 100%   Breastfeeding? No   BMI 43.07 kg/m²     "

## 2018-09-13 ENCOUNTER — TELEPHONE (OUTPATIENT)
Dept: UROLOGY | Facility: CLINIC | Age: 48
End: 2018-09-13

## 2018-09-13 DIAGNOSIS — N20.0 NEPHROLITHIASIS: Primary | ICD-10-CM

## 2018-09-13 NOTE — TELEPHONE ENCOUNTER
I spoke to pt and scheduled appt in one month oer your op note.  Did you want KUB and/or US? There were no orders.

## 2018-09-13 NOTE — TELEPHONE ENCOUNTER
----- Message from Mayra Eyad sent at 9/13/2018 10:51 AM CDT -----  Contact: Pt   Name of Who is Calling: HAZEL SERRATO [[id number]]      What is the request in detail: Patient would like a callback from staff to schedule her post-op appointment. Please contact to further discuss and advise      Can the clinic reply by [facility]: Yes       What Number to Call Back if not in [facility]: [phone]

## 2018-10-16 ENCOUNTER — OFFICE VISIT (OUTPATIENT)
Dept: UROLOGY | Facility: CLINIC | Age: 48
End: 2018-10-16
Attending: UROLOGY
Payer: MEDICAID

## 2018-10-16 VITALS
HEIGHT: 67 IN | DIASTOLIC BLOOD PRESSURE: 74 MMHG | HEART RATE: 95 BPM | SYSTOLIC BLOOD PRESSURE: 170 MMHG | WEIGHT: 274.94 LBS | BODY MASS INDEX: 43.15 KG/M2

## 2018-10-16 DIAGNOSIS — N20.0 NEPHROLITHIASIS: Primary | ICD-10-CM

## 2018-10-16 PROCEDURE — 81002 URINALYSIS NONAUTO W/O SCOPE: CPT | Mod: S$GLB,,, | Performed by: UROLOGY

## 2018-10-16 PROCEDURE — 99213 OFFICE O/P EST LOW 20 MIN: CPT | Mod: 25,S$GLB,, | Performed by: UROLOGY

## 2018-10-16 NOTE — PROGRESS NOTES
"Subjective:      Georgina Arias is a 47 y.o. female who returns today regarding her nephrolithiasis.    She is s/p right ureteral stent placement 8/28/18 and URS/LL on 9/11/18. Removed stent at home.     Doing well today without c/o.    The following portions of the patient's history were reviewed and updated as appropriate: allergies, current medications, past family history, past medical history, past social history, past surgical history and problem list.    Review of Systems  A comprehensive multipoint review of systems was negative except as otherwise stated in the HPI.     Objective:   Vitals: BP (!) 170/74 (BP Location: Left arm, Patient Position: Sitting, BP Method: Large (Automatic))   Pulse 95   Ht 5' 7" (1.702 m)   Wt 124.7 kg (274 lb 14.6 oz)   BMI 43.06 kg/m²     Physical Exam   General: alert and oriented, no acute distress  Respiratory: Symmetric expansion, non-labored breathing  Neuro: no gross deficits  Psych: normal judgment and insight, normal mood/affect and non-anxious    Lab Review     Lab Results   Component Value Date    WBC 10.58 08/30/2018    HGB 8.8 (L) 08/30/2018    HCT 28.9 (L) 08/30/2018    MCV 84 08/30/2018     08/30/2018     Lab Results   Component Value Date    CREATININE 1.5 (H) 08/30/2018    BUN 28 (H) 08/30/2018       Imaging (all images personally reviewed; agree with report below)  Results for orders placed during the hospital encounter of 10/12/18   US Retroperitoneal Complete (Kidney and    Narrative EXAMINATION:  US RETROPERITONEAL COMPLETE    CLINICAL HISTORY:  Calculus of kidney    TECHNIQUE:  Ultrasound of the kidneys and urinary bladder was performed including color flow and Doppler evaluation of the kidneys.    COMPARISON:  None.    FINDINGS:  Right kidney: The right kidney measures 11.1 x 4.4 x 6.0 cm. No cortical thinning. No loss of corticomedullary distinction.  No mass. No renal stone. No hydronephrosis.    Left kidney: The left kidney measures 10.9 " x 4.7 x 6.3 cm. No cortical thinning. No loss of corticomedullary distinction.  No mass. No renal stone. No hydronephrosis.    The bladder is partially distended at the time of scanning and has an unremarkable appearance.      Impression No significant abnormality.      Electronically signed by: Eusebia Griffin MD  Date:    10/12/2018  Time:    09:37         Assessment and Plan:   1. Nephrolithiasis  -- Doing well s/p URS w/ normal US  -- FU PRN

## 2018-10-18 LAB
BILIRUB SERPL-MCNC: NORMAL MG/DL
BLOOD URINE, POC: NORMAL
COLOR, POC UA: YELLOW
GLUCOSE UR QL STRIP: 100
KETONES UR QL STRIP: NORMAL
LEUKOCYTE ESTERASE URINE, POC: NORMAL
NITRITE, POC UA: NORMAL
PH, POC UA: 5
PROTEIN, POC: 100
SPECIFIC GRAVITY, POC UA: 1
UROBILINOGEN, POC UA: NORMAL

## 2019-01-25 DIAGNOSIS — M54.50 LUMBAGO: ICD-10-CM

## 2019-01-25 DIAGNOSIS — M51.36 DEGENERATION OF LUMBAR INTERVERTEBRAL DISC: Primary | ICD-10-CM

## 2020-10-26 ENCOUNTER — TELEPHONE (OUTPATIENT)
Dept: PULMONOLOGY | Facility: CLINIC | Age: 50
End: 2020-10-26

## 2020-10-26 NOTE — TELEPHONE ENCOUNTER
Spoke with patient. Discussed making an appointment for her to be seen in relation to getting pulmonary clearance for bariatric surgery. Appointment has been made with provider. No further issues discussed.

## 2020-10-26 NOTE — TELEPHONE ENCOUNTER
----- Message from Diana Mauricio MA sent at 10/26/2020  2:29 PM CDT -----  Contact: self    ----- Message -----  From: Anderson Orr  Sent: 10/26/2020   2:21 PM CDT  To: [facility] Staff    Pt returning call concerning setting up appt     Please Call    Contact  [phone]

## 2020-11-03 ENCOUNTER — OFFICE VISIT (OUTPATIENT)
Dept: PULMONOLOGY | Facility: CLINIC | Age: 50
End: 2020-11-03
Payer: MEDICAID

## 2020-11-03 VITALS
SYSTOLIC BLOOD PRESSURE: 114 MMHG | HEIGHT: 67 IN | RESPIRATION RATE: 18 BRPM | DIASTOLIC BLOOD PRESSURE: 58 MMHG | BODY MASS INDEX: 42.27 KG/M2 | WEIGHT: 269.31 LBS | TEMPERATURE: 97 F | HEART RATE: 71 BPM | OXYGEN SATURATION: 98 %

## 2020-11-03 DIAGNOSIS — R06.09 DYSPNEA ON EXERTION: Primary | ICD-10-CM

## 2020-11-03 DIAGNOSIS — Z01.818 PRE-OPERATIVE EXAMINATION: ICD-10-CM

## 2020-11-03 PROCEDURE — 99999 PR PBB SHADOW E&M-EST. PATIENT-LVL V: ICD-10-PCS | Mod: PBBFAC,,, | Performed by: NURSE PRACTITIONER

## 2020-11-03 PROCEDURE — 99204 OFFICE O/P NEW MOD 45 MIN: CPT | Mod: S$PBB,,, | Performed by: NURSE PRACTITIONER

## 2020-11-03 PROCEDURE — 99999 PR PBB SHADOW E&M-EST. PATIENT-LVL V: CPT | Mod: PBBFAC,,, | Performed by: NURSE PRACTITIONER

## 2020-11-03 PROCEDURE — 99204 PR OFFICE/OUTPT VISIT, NEW, LEVL IV, 45-59 MIN: ICD-10-PCS | Mod: S$PBB,,, | Performed by: NURSE PRACTITIONER

## 2020-11-03 PROCEDURE — 99215 OFFICE O/P EST HI 40 MIN: CPT | Mod: PBBFAC,PN | Performed by: NURSE PRACTITIONER

## 2020-11-03 RX ORDER — CALCIUM CITRATE/VITAMIN D3 200MG-6.25
TABLET ORAL
COMMUNITY
Start: 2020-10-20 | End: 2024-02-21

## 2020-11-03 RX ORDER — MEDICAL SUPPLY, MISCELLANEOUS
MISCELLANEOUS MISCELLANEOUS
COMMUNITY
Start: 2019-11-20

## 2020-11-03 RX ORDER — FLUTICASONE PROPIONATE 50 MCG
SPRAY, SUSPENSION (ML) NASAL
COMMUNITY
Start: 2020-09-30

## 2020-11-03 RX ORDER — INSULIN GLARGINE 100 [IU]/ML
50 INJECTION, SOLUTION SUBCUTANEOUS NIGHTLY
Status: ON HOLD | COMMUNITY
End: 2022-05-22 | Stop reason: SDUPTHER

## 2020-11-03 RX ORDER — GABAPENTIN 100 MG/1
300 CAPSULE ORAL
Status: ON HOLD | COMMUNITY
End: 2022-05-18 | Stop reason: DRUGHIGH

## 2020-11-03 NOTE — PROGRESS NOTES
"  Subjective:       Patient ID: Georgina Arias is a 49 y.o. female.    Chief Complaint: Clearance for bariatric surgery  HPI:   Georgina Arias is a 49 y.o. female who presents with evaluation for pulmonary "clearance" for bariatric surgery.     Was seeing someone at [facility] for cardiology and vascular issues.  She is in the process of being "cleared" for bariatric surgery  No lung problems in the past  Did come here a year or two ago feeling "miserable"  Was told she had the starting of pneumonia and was treated but did not have to stay in the hospital  Has asthmatic bronchitis as a child  Started smoking at age 17, smoked up to 2 packs and quit in 2006.  Would get "winded" with walking, specifically due to back to hip pain  Has lost 42 lbs so far  Will have the surgery at [facility], unsure of surgeon.    Taking lantus instead of basaglar  [facility] will be helping obtain a sleep study as there is some concern that she has LAUREANO  Had some hypoxia with "nodding off"  Any type of pain med or anesthesia makes her very nauseated.  Does better with scopolamine patch  No problems with intubation or anesthesia in the past in the past    Review of Systems   Constitutional: Negative for chills, activity change, fatigue and night sweats.   HENT: Negative for postnasal drip, rhinorrhea, trouble swallowing and congestion.    Eyes: Negative for itching.   Respiratory: Positive for snoring and dyspnea on extertion. Negative for cough, hemoptysis, sputum production, choking, chest tightness, shortness of breath, wheezing and use of rescue inhaler.    Cardiovascular: Negative for chest pain and palpitations.   Genitourinary: Negative for difficulty urinating.   Endocrine: Negative for cold intolerance and heat intolerance.    Musculoskeletal: Positive for arthralgias and back pain.   Skin: Negative for rash.   Gastrointestinal: Negative for nausea, vomiting and acid reflux.   Neurological: Negative for dizziness and " light-headedness.   Hematological: Negative for adenopathy.   Psychiatric/Behavioral: Positive for sleep disturbance (due to pain).         Social History     Tobacco Use    Smoking status: Former Smoker     Quit date: 2006     Years since quittin.2   Substance Use Topics    Alcohol use: Yes     Comment: occ       Review of patient's allergies indicates:   Allergen Reactions    Naproxen Anaphylaxis and Swelling     Hives (skin)^swelling    Hydrocodone Nausea And Vomiting and Rash    Penicillins Hives     Blisters (skin)^    Hydrocodone-acetaminophen Nausea And Vomiting     Rash (skin)^, Vomiting^       Past Medical History:   Diagnosis Date    Allergy     Diabetes mellitus, type 2     Hyperlipidemia     Hypertension     PAD (peripheral artery disease)     PONV (postoperative nausea and vomiting)     PUD (peptic ulcer disease)      Past Surgical History:   Procedure Laterality Date    CHOLECYSTECTOMY      CYSTOSCOPY W/ URETERAL STENT PLACEMENT Right 2018    Procedure: CYSTOSCOPY, WITH URETERAL STENT INSERTION (ADD ON );  Surgeon: Bubba Perez MD;  Location: [facility];  Service: Urology;  Laterality: Right;  (ADD ON )    PERIPHERAL ARTERIAL STENT GRAFT Right     URETEROSCOPIC REMOVAL OF URETERIC CALCULUS Right 2018    Procedure: EXTRACTION-STONE-URETEROSCOPY;  Surgeon: Bubba Perez MD;  Location: [facility];  Service: Urology;  Laterality: Right;     Current Outpatient Medications on File Prior to Visit   Medication Sig    acetaminophen (TYLENOL) 500 MG tablet Take 2 tablets (1,000 mg total) by mouth every 8 (eight) hours as needed.    amlodipine (NORVASC) 10 MG tablet Take 1 tablet (10 mg total) by mouth once daily.    aspirin (ECOTRIN) 81 MG EC tablet Take 1 tablet (81 mg total) by mouth once daily. (Patient taking differently: Take 81 mg by mouth once daily. Stopped )    atorvastatin (LIPITOR) 80 MG tablet Take 1 tablet (80 mg total) by mouth once daily.     BASAGLAR KWIKPEN U-100 INSULIN 100 unit/mL (3 mL) InPn pen Inject 60 Units into the skin every evening.    BYDUREON 2 mg/0.65 mL PnIj INJECT AS DIRECTED ONCE a WEEK    carvedilol (COREG) 12.5 MG tablet Take 12.5 mg by mouth 2 (two) times daily with meals.    fluticasone propionate (FLONASE) 50 mcg/actuation nasal spray Fluticasone Propionate 50 MCG/ACT Nasal Suspension QTY: 1 bottle Days: 30 Refills: 5  Written: 09/30/20 Patient Instructions: inhale 2 sprays in each nostril once daily    gabapentin (NEURONTIN) 100 MG capsule Take 300 mg by mouth.    gabapentin (NEURONTIN) 300 MG capsule Take 300 mg by mouth 3 (three) times daily.    HUMALOG U-100 INSULIN 100 unit/mL injection Inject 20 Units into the skin 3 (three) times daily before meals.    hydrALAZINE (APRESOLINE) 50 MG tablet Take 50 mg by mouth 3 (three) times daily.    lidocaine (LIDODERM) 5 % Place 1 patch onto the skin once daily. Remove & Discard patch within 12 hours or as directed by MD    lisinopril (PRINIVIL,ZESTRIL) 2.5 MG tablet Take 2.5 mg by mouth once daily.    loratadine (CLARITIN) 10 mg tablet Take 10 mg by mouth once daily.    miscellaneous medical supply (C-TUB) Misc Hearing amplification (BICROS preferred)    traMADoL (ULTRAM) 50 mg tablet Take 1 tablet (50 mg total) by mouth every 6 (six) hours as needed.    TRUE METRIX GLUCOSE TEST STRIP Strp USE TO test THREE TIMES DAILY    metFORMIN (GLUCOPHAGE) 1000 MG tablet Take 1,000 mg by mouth 2 (two) times daily with meals.    omeprazole (PRILOSEC) 20 MG capsule Take 20 mg by mouth once daily.    triamcinolone acetonide 0.1% (KENALOG) 0.1 % cream Apply topically 2 (two) times daily. Avoid application to face. for 10 days     No current facility-administered medications on file prior to visit.        Objective:      Vitals:    11/03/20 0915   BP: (!) 114/58   Pulse: 71   Resp: 18   Temp: 96.7 °F (35.9 °C)     Physical Exam   Constitutional: She is oriented to person, place, and time.  She appears well-developed and well-nourished. No distress. She is obese.   HENT:   Head: Normocephalic.   Neck: Normal range of motion. Neck supple.   Cardiovascular: Normal rate and regular rhythm.   No murmur heard.  Pulmonary/Chest: Normal expansion, symmetric chest wall expansion, effort normal and breath sounds normal. No respiratory distress. She has no decreased breath sounds. She has no wheezes. She has no rhonchi. She has no rales.   Abdominal: Soft. She exhibits no distension. There is no hepatosplenomegaly. There is no abdominal tenderness.   Musculoskeletal: Normal range of motion.         General: No edema.   Lymphadenopathy:     She has no cervical adenopathy.     She has no axillary adenopathy.   Neurological: She is alert and oriented to person, place, and time. Gait normal.   Skin: Skin is warm and dry. She is not diaphoretic. No cyanosis or erythema. Nails show no clubbing.   Psychiatric: She has a normal mood and affect.   Nursing note and vitals reviewed.    Personal Diagnostic Review    Pulmonary Function Tests 11/3/2020   SpO2 98   Height 67   Weight 4308.67   BMI (Calculated) 42.2   Some recent data might be hidden         Assessment:     Problem List Items Addressed This Visit        Other    Pre-operative examination    Overview     Patient requires clearance for bariatric surgery.  Will obtain PFTs, walk and chest imaging.  Explained to patient that recommendations are made for post-operative prevention of complications based on her test results.  Since this procedure is elective the decision ultimately rests with her and her surgeon. That said, losing weight would likely greatly improve her overall health.          Current Assessment & Plan     Await results. Also request that pt provide us with information about her surgeon etc.  She is unsure who will be completing her [rovcedure or of a tenetive date.            Other Visit Diagnoses     Dyspnea on exertion    -  Primary    Relevant  Orders    Complete PFT with bronchodilator    Six Minute Walk Test to qualify for Home Oxygen    X-Ray Chest PA And Lateral

## 2020-11-03 NOTE — LETTER
November 5, 2020      Alonso Ling MD  [address] [facility] [address]           [facility] - Pulmonology [facility] [address]  [address], [facility] [address]  [facility] [address]  Phone: [phone]  Fax: [phone]          Patient: Georgina Arias   MR Number: [id number]   YOB: 1970   Date of Visit: 11/3/2020       Dear Dr. Alonso Ling:    Thank you for referring Georgina Arias to me for evaluation. Attached you will find relevant portions of my assessment and plan of care.    If you have questions, please do not hesitate to call me. I look forward to following Georgina Arias along with you.    Sincerely,    Jahaira Santos, DNP    Enclosure  CC:  No Recipients    If you would like to receive this communication electronically, please contact [email or web address] or [phone] to request more information on [facility] access.    For providers and/or their staff who would like to refer a patient to [facility], please contact us through our one-stop-shop provider referral line, [facility], at [phone].    If you feel you have received this communication in error or would no longer like to receive these types of communications, please e-mail [email or web address]

## 2020-11-03 NOTE — PATIENT INSTRUCTIONS
We will get pulmonary function tests on you to assess your lung function    We will get a walking test to make sure your oxygen does not drop when you are active    We will a chest xray    Based on our findings we will make recommendations from a pulmonary perspective for your upcoming bariatric surgery

## 2020-11-05 PROBLEM — Z01.818 PRE-OPERATIVE EXAMINATION: Status: ACTIVE | Noted: 2020-11-05

## 2020-11-05 NOTE — ASSESSMENT & PLAN NOTE
Await results. Also request that pt provide us with information about her surgeon etc.  She is unsure who will be completing her [rovcedure or of a tenetive date.

## 2020-11-13 ENCOUNTER — PATIENT MESSAGE (OUTPATIENT)
Dept: PULMONOLOGY | Facility: CLINIC | Age: 50
End: 2020-11-13

## 2020-11-30 ENCOUNTER — PATIENT MESSAGE (OUTPATIENT)
Dept: PULMONOLOGY | Facility: CLINIC | Age: 50
End: 2020-11-30

## 2021-02-22 DIAGNOSIS — M25.511 RIGHT SHOULDER PAIN: Primary | ICD-10-CM

## 2021-04-26 ENCOUNTER — PATIENT MESSAGE (OUTPATIENT)
Dept: RESEARCH | Facility: HOSPITAL | Age: 51
End: 2021-04-26

## 2022-03-02 DIAGNOSIS — T82.898A PROBLEM WITH DIALYSIS ACCESS, INITIAL ENCOUNTER: Primary | ICD-10-CM

## 2022-05-18 PROBLEM — J18.9 PNEUMONIA: Status: ACTIVE | Noted: 2022-05-18

## 2022-05-18 PROBLEM — E87.20 METABOLIC ACIDOSIS: Status: ACTIVE | Noted: 2022-05-18

## 2022-05-18 PROBLEM — I16.1 HYPERTENSIVE EMERGENCY: Status: ACTIVE | Noted: 2022-05-18

## 2022-05-18 PROBLEM — R79.89 ELEVATED TROPONIN: Status: ACTIVE | Noted: 2022-05-18

## 2022-05-18 PROBLEM — U07.1 PNEUMONIA DUE TO COVID-19 VIRUS: Status: ACTIVE | Noted: 2022-05-18

## 2022-05-18 PROBLEM — N18.9 ACUTE KIDNEY INJURY SUPERIMPOSED ON CKD: Status: ACTIVE | Noted: 2018-08-28

## 2022-05-18 PROBLEM — E87.20 LACTIC ACIDOSIS: Status: ACTIVE | Noted: 2022-05-18

## 2022-05-18 PROBLEM — I50.9 CHF (CONGESTIVE HEART FAILURE): Status: ACTIVE | Noted: 2022-05-18

## 2022-05-18 PROBLEM — J96.02 ACUTE RESPIRATORY FAILURE WITH HYPOXIA AND HYPERCAPNIA: Status: ACTIVE | Noted: 2022-05-18

## 2022-05-18 PROBLEM — J96.01 ACUTE RESPIRATORY FAILURE WITH HYPOXIA AND HYPERCAPNIA: Status: ACTIVE | Noted: 2022-05-18

## 2022-05-18 PROBLEM — J12.82 PNEUMONIA DUE TO COVID-19 VIRUS: Status: ACTIVE | Noted: 2022-05-18

## 2022-05-18 PROBLEM — E87.5 HYPERKALEMIA: Status: ACTIVE | Noted: 2022-05-18

## 2022-05-22 DIAGNOSIS — U07.1 COVID-19 VIRUS DETECTED: ICD-10-CM

## 2022-05-24 ENCOUNTER — PATIENT MESSAGE (OUTPATIENT)
Dept: ADMINISTRATIVE | Facility: CLINIC | Age: 52
End: 2022-05-24
Payer: MEDICAID

## 2022-05-24 ENCOUNTER — PATIENT OUTREACH (OUTPATIENT)
Dept: ADMINISTRATIVE | Facility: CLINIC | Age: 52
End: 2022-05-24
Payer: MEDICAID

## 2022-05-24 NOTE — PROGRESS NOTES
C3 nurse spoke with Georgina Arias for a TCC post hospital discharge follow up call. The patient had a HOSFU appointment with Alonso Ling MD on 5/23/2022 - telemed.  PCP not within [place].

## 2022-08-25 ENCOUNTER — OFFICE VISIT (OUTPATIENT)
Dept: PODIATRY | Facility: CLINIC | Age: 52
End: 2022-08-25
Payer: MEDICARE

## 2022-08-25 VITALS
DIASTOLIC BLOOD PRESSURE: 61 MMHG | BODY MASS INDEX: 40.88 KG/M2 | SYSTOLIC BLOOD PRESSURE: 134 MMHG | WEIGHT: 261 LBS | HEART RATE: 90 BPM

## 2022-08-25 DIAGNOSIS — L84 CORN OR CALLUS: ICD-10-CM

## 2022-08-25 DIAGNOSIS — R25.2 FOOT CRAMPS: ICD-10-CM

## 2022-08-25 DIAGNOSIS — E11.42 DM TYPE 2 WITH DIABETIC PERIPHERAL NEUROPATHY: Primary | ICD-10-CM

## 2022-08-25 DIAGNOSIS — Z86.718 HISTORY OF DEEP VEIN THROMBOSIS (DVT) OF LOWER EXTREMITY: ICD-10-CM

## 2022-08-25 DIAGNOSIS — I73.9 PAD (PERIPHERAL ARTERY DISEASE): ICD-10-CM

## 2022-08-25 DIAGNOSIS — B35.1 ONYCHOMYCOSIS: ICD-10-CM

## 2022-08-25 DIAGNOSIS — E66.01 CLASS 3 SEVERE OBESITY DUE TO EXCESS CALORIES WITH SERIOUS COMORBIDITY AND BODY MASS INDEX (BMI) OF 40.0 TO 44.9 IN ADULT: ICD-10-CM

## 2022-08-25 DIAGNOSIS — Z76.89 ESTABLISHING CARE WITH NEW DOCTOR, ENCOUNTER FOR: ICD-10-CM

## 2022-08-25 PROCEDURE — 1159F PR MEDICATION LIST DOCUMENTED IN MEDICAL RECORD: ICD-10-PCS | Mod: CPTII,S$GLB,, | Performed by: PODIATRIST

## 2022-08-25 PROCEDURE — 99999 PR PBB SHADOW E&M-EST. PATIENT-LVL III: ICD-10-PCS | Mod: PBBFAC,,, | Performed by: PODIATRIST

## 2022-08-25 PROCEDURE — 3008F PR BODY MASS INDEX (BMI) DOCUMENTED: ICD-10-PCS | Mod: CPTII,S$GLB,, | Performed by: PODIATRIST

## 2022-08-25 PROCEDURE — 3078F DIAST BP <80 MM HG: CPT | Mod: CPTII,S$GLB,, | Performed by: PODIATRIST

## 2022-08-25 PROCEDURE — 3008F BODY MASS INDEX DOCD: CPT | Mod: CPTII,S$GLB,, | Performed by: PODIATRIST

## 2022-08-25 PROCEDURE — 1159F MED LIST DOCD IN RCRD: CPT | Mod: CPTII,S$GLB,, | Performed by: PODIATRIST

## 2022-08-25 PROCEDURE — 11721 DEBRIDE NAIL 6 OR MORE: CPT | Mod: Q9,PBBFAC,PN | Performed by: PODIATRIST

## 2022-08-25 PROCEDURE — 99213 OFFICE O/P EST LOW 20 MIN: CPT | Mod: PBBFAC,PN | Performed by: PODIATRIST

## 2022-08-25 PROCEDURE — 11056: ICD-10-PCS | Mod: Q9,S$GLB,, | Performed by: PODIATRIST

## 2022-08-25 PROCEDURE — 11721 PR DEBRIDEMENT OF NAILS, 6 OR MORE: ICD-10-PCS | Mod: 59,Q9,S$GLB, | Performed by: PODIATRIST

## 2022-08-25 PROCEDURE — 11721 DEBRIDE NAIL 6 OR MORE: CPT | Mod: 59,Q9,S$GLB, | Performed by: PODIATRIST

## 2022-08-25 PROCEDURE — 3075F PR MOST RECENT SYSTOLIC BLOOD PRESS GE 130-139MM HG: ICD-10-PCS | Mod: CPTII,S$GLB,, | Performed by: PODIATRIST

## 2022-08-25 PROCEDURE — 3075F SYST BP GE 130 - 139MM HG: CPT | Mod: CPTII,S$GLB,, | Performed by: PODIATRIST

## 2022-08-25 PROCEDURE — 99204 OFFICE O/P NEW MOD 45 MIN: CPT | Mod: 25,S$GLB,, | Performed by: PODIATRIST

## 2022-08-25 PROCEDURE — 99999 PR PBB SHADOW E&M-EST. PATIENT-LVL III: CPT | Mod: PBBFAC,,, | Performed by: PODIATRIST

## 2022-08-25 PROCEDURE — 11056 PARNG/CUTG B9 HYPRKR LES 2-4: CPT | Mod: Q9,PBBFAC,PN | Performed by: PODIATRIST

## 2022-08-25 PROCEDURE — 99204 PR OFFICE/OUTPT VISIT, NEW, LEVL IV, 45-59 MIN: ICD-10-PCS | Mod: 25,S$GLB,, | Performed by: PODIATRIST

## 2022-08-25 PROCEDURE — 3078F PR MOST RECENT DIASTOLIC BLOOD PRESSURE < 80 MM HG: ICD-10-PCS | Mod: CPTII,S$GLB,, | Performed by: PODIATRIST

## 2022-08-25 NOTE — PROGRESS NOTES
Subjective:      Patient ID: Georgina Arias is a 51 y.o. female.    Chief Complaint: Diabetic Foot Exam (Last visit Dr Ling 7/15/22)    Georgina is a 51 y.o. female who presents new  to the clinic for evaluation and treatment of high risk feet. Mother, Georgina Collado, is a patient in this clinic - referred.      Georgina has a past medical history of Hyperlipidemia, Hypertension, Peptic ulcer disease, Peripheral artery disease, Stage IV CKD, and Type II diabetes mellitus. Dx DM 1995. Neuropathy after about 10 yrs. Was seeing DPM in [facility] but has had transportation difficulties (no car) so tried to get closer to home. Last visit was before pandemic. Had PAD problem Nov 2006, DVT RLE & stent placement both time around March/April 2008. On dialysis 3 wks.ago.    The patient's chief complaint is to reestablish DM foot care & check calluses. Occasional cramps in feet (arch) but usually starts in back of knee extending down - none recently. Tried to clip own toe nails & cut toe. States has 'twin' toes.    PCP: Alonso Ling MD  [address] [facility]  Date Last Seen by PCP: 7/15/22    Current shoe gear: Athletic shoe such as New balance but has slides (not for walking); slippers in house like a Crocs    No recent known A1c  Hemoglobin A1C   Date Value Ref Range Status   08/28/2018 7.9 (H) 4.0 - 5.6 % Final     Comment:     ADA Screening Guidelines:  5.7-6.4%  Consistent with prediabetes  >or=6.5%  Consistent with diabetes  High levels of fetal hemoglobin interfere with the HbA1C  assay. Heterozygous hemoglobin variants (HbS, HgC, etc)do  not significantly interfere with this assay.   However, presence of multiple variants may affect accuracy.     03/24/2015 9.9 (H) 4.5 - 6.2 % Final   10/30/2014 8.6 (H) 4.5 - 6.2 % Final      Objective:       Review of Systems   Constitution: Negative for malaise/fatigue.   Cardiovascular: Negative for leg swelling. ?claudication  Skin: Positive for nail  changes, discoloration and dry skin.   Musculoskeletal: Positive for arthritis. Negative for joint pain, joint swelling and myalgias.   Neurological: Positive for numbness and paresthesias (tingling UE & LE, & loss of  hands x 10+ yrs;) and sensory change. Negative for loss of balance.       Physical Exam   Constitutional: She is oriented to person, place, and time. She appears well-developed and well-nourished. No distress. Morbidly obese.  Cardiovascular: Mild edema, non-pitting B/L ankle   Pulses:       Dorsalis pedis pulses are 2+ B/L  Musculoskeletal: Normal ROM. She exhibits no tenderness including along MLA B/L (area of occasional foot cramps).        Equinus B/L ankles with < 90 deg foot to leg noted with knees extended.     MS strength of extrinsics to foot and ankle B/L + 5/5  to resistance w/ no reproduction of pain in any direction.     Passive ROM of ankle and pedal joints is painless & w/out crepitation B/L.  Neuro: Epicritic sensation grossly diminished B/L including Sharp/dull sensation & Light touch absent.  Skin: Skin is warm, dry and intact. No abrasion, no bruising, no laceration, no lesion, and no rash noted. No erythema. Nails show no clubbing.   Hyperkeratotic tissue noted to R sub 1st and 3rd met.heads.    Toenails 1st, 2nd, 3rd, 4th, 5th  B/L are hypertrophic, thickened, dystrophic, discolored,  with crumbly subungual debris.  Tender to distal nail plate pressure, without periungual skin abnormality of each.  Superficial 'abrasions' where patient cut nails too close to digital tuft including B/L hallux, 3rd R & 5th L, w/ NSI    Psychiatric: She has a normal mood and affect. Her behavior is normal.    Vitals reviewed.    Assessment:      Encounter Diagnoses   Name Primary?    DM type 2 with diabetic peripheral neuropathy Yes    History of deep vein thrombosis (DVT) of lower extremity     PAD (peripheral artery disease)     Establishing care with new doctor, encounter for     [place] or  callus     Foot cramps     Class 3 severe obesity due to excess calories with serious comorbidity and body mass index (BMI) of 40.0 to 44.9 in adult     Onychomycosis        Problem List Items Addressed This Visit          Endocrine    Class 3 severe obesity due to excess calories with serious comorbidity and body mass index (BMI) of 40.0 to 44.9 in adult    Current Assessment & Plan     BMI 40.88kg/m2 - comorbidities include DM, HTN, CHF, CKD, PVD, CHF. Follow w/ PCP.          Other Visit Diagnoses       DM type 2 with diabetic peripheral neuropathy    -  Primary    Relevant Orders    Debridement of nails & calluses    History of deep vein thrombosis (DVT) of lower extremity        PAD (peripheral artery disease)        Establishing care with new doctor, encounter for        Corn or callus        Relevant Orders    Debridement of nails & calluses    Foot cramps        Onychomycosis        Relevant Orders    Debridement of nails & calluses           Plan:       Georgina was seen today for diabetic foot exam.    Diagnoses and all orders for this visit:    DM type 2 with diabetic peripheral neuropathy  -     Debridement of nails & calluses    History of deep vein thrombosis (DVT) of lower extremity    PAD (peripheral artery disease)    Establishing care with new doctor, encounter for    Corn or callus  -     Debridement of nails & calluses    Foot cramps    Class 3 severe obesity due to excess calories with serious comorbidity and body mass index (BMI) of 40.0 to 44.9 in adult    Onychomycosis  -     Debridement of nails & calluses    I counseled the patient on her conditions, their implications and medical management.    - Shoe inspection. Diabetic Foot Education. Patient reminded of the importance of good nutrition and blood sugar control to help prevent podiatric complications of diabetes. Patient instructed on proper foot hygeine. We discussed wearing proper shoe gear, daily foot inspections, never walking without  protective shoe gear, annual DM foot exam, sooner prn.      - With patient's permission, nails were aggressively reduced and debrided x 10 to their soft tissue attachment mechanically , removing all offending nail and debris. Patient relates relief following the procedure.     Gelstrap dispensed for MLA support B/L.

## 2022-09-08 PROBLEM — E66.813 CLASS 3 SEVERE OBESITY DUE TO EXCESS CALORIES WITH SERIOUS COMORBIDITY AND BODY MASS INDEX (BMI) OF 40.0 TO 44.9 IN ADULT: Status: ACTIVE | Noted: 2022-09-08

## 2022-09-08 PROBLEM — E66.01 CLASS 3 SEVERE OBESITY DUE TO EXCESS CALORIES WITH SERIOUS COMORBIDITY AND BODY MASS INDEX (BMI) OF 40.0 TO 44.9 IN ADULT: Status: ACTIVE | Noted: 2022-09-08

## 2022-09-08 NOTE — PROCEDURES
Debridement of nails & calluses    Date/Time: 8/25/2022 1:00 PM  Performed by: Aleida Flannery DPM  Authorized by: Aleida Flannery DPM     Hyperkeratotic Skin Lesions?: Yes    Number of trimmed lesions:  2  Location(s):  Right 1st Metatarsal Head and Right 3rd Metatarsal Head    Nail Care Type:  Debride  Location(s): All  (Left 1st Toe, Left 3rd Toe, Left 2nd Toe, Left 4th Toe, Left 5th Toe, Right 1st Toe, Right 2nd Toe, Right 3rd Toe, Right 4th Toe and Right 5th Toe)  Patient tolerance:  Patient tolerated the procedure well with no immediate complications

## 2023-07-13 ENCOUNTER — TELEPHONE (OUTPATIENT)
Dept: PODIATRY | Facility: CLINIC | Age: 53
End: 2023-07-13
Payer: MEDICARE

## 2023-07-13 ENCOUNTER — OFFICE VISIT (OUTPATIENT)
Dept: PODIATRY | Facility: CLINIC | Age: 53
End: 2023-07-13
Payer: MEDICARE

## 2023-07-13 VITALS
DIASTOLIC BLOOD PRESSURE: 71 MMHG | HEIGHT: 67 IN | BODY MASS INDEX: 38.3 KG/M2 | SYSTOLIC BLOOD PRESSURE: 133 MMHG | HEART RATE: 84 BPM | WEIGHT: 244.06 LBS

## 2023-07-13 DIAGNOSIS — S90.822A BLISTER OF LEFT FOOT, INITIAL ENCOUNTER: ICD-10-CM

## 2023-07-13 DIAGNOSIS — L60.2 ONYCHOGRYPOSIS OF TOENAIL: ICD-10-CM

## 2023-07-13 DIAGNOSIS — E11.42 DM TYPE 2 WITH DIABETIC PERIPHERAL NEUROPATHY: ICD-10-CM

## 2023-07-13 DIAGNOSIS — I73.9 PVD (PERIPHERAL VASCULAR DISEASE): ICD-10-CM

## 2023-07-13 DIAGNOSIS — B35.1 ONYCHOMYCOSIS: ICD-10-CM

## 2023-07-13 DIAGNOSIS — L84 CALLUS OF FOOT: Primary | ICD-10-CM

## 2023-07-13 DIAGNOSIS — L81.9 DISCOLORATION OF SKIN OF FOOT: ICD-10-CM

## 2023-07-13 PROCEDURE — 99999 PR PBB SHADOW E&M-EST. PATIENT-LVL III: CPT | Mod: PBBFAC,,, | Performed by: PODIATRIST

## 2023-07-13 PROCEDURE — 99214 PR OFFICE/OUTPT VISIT, EST, LEVL IV, 30-39 MIN: ICD-10-PCS | Mod: 25,S$GLB,, | Performed by: PODIATRIST

## 2023-07-13 PROCEDURE — 11721 DEBRIDE NAIL 6 OR MORE: CPT | Mod: 59,Q9,S$GLB, | Performed by: PODIATRIST

## 2023-07-13 PROCEDURE — 3078F DIAST BP <80 MM HG: CPT | Mod: CPTII,S$GLB,, | Performed by: PODIATRIST

## 2023-07-13 PROCEDURE — 1159F PR MEDICATION LIST DOCUMENTED IN MEDICAL RECORD: ICD-10-PCS | Mod: CPTII,S$GLB,, | Performed by: PODIATRIST

## 2023-07-13 PROCEDURE — 3078F PR MOST RECENT DIASTOLIC BLOOD PRESSURE < 80 MM HG: ICD-10-PCS | Mod: CPTII,S$GLB,, | Performed by: PODIATRIST

## 2023-07-13 PROCEDURE — 1159F MED LIST DOCD IN RCRD: CPT | Mod: CPTII,S$GLB,, | Performed by: PODIATRIST

## 2023-07-13 PROCEDURE — 10140 PR DRAINAGE OF HEMATOMA/FLUID: ICD-10-PCS | Mod: S$GLB,,, | Performed by: PODIATRIST

## 2023-07-13 PROCEDURE — 3008F PR BODY MASS INDEX (BMI) DOCUMENTED: ICD-10-PCS | Mod: CPTII,S$GLB,, | Performed by: PODIATRIST

## 2023-07-13 PROCEDURE — 99214 OFFICE O/P EST MOD 30 MIN: CPT | Mod: 25,S$GLB,, | Performed by: PODIATRIST

## 2023-07-13 PROCEDURE — 99999 PR PBB SHADOW E&M-EST. PATIENT-LVL III: ICD-10-PCS | Mod: PBBFAC,,, | Performed by: PODIATRIST

## 2023-07-13 PROCEDURE — 3008F BODY MASS INDEX DOCD: CPT | Mod: CPTII,S$GLB,, | Performed by: PODIATRIST

## 2023-07-13 PROCEDURE — 3075F SYST BP GE 130 - 139MM HG: CPT | Mod: CPTII,S$GLB,, | Performed by: PODIATRIST

## 2023-07-13 PROCEDURE — 3075F PR MOST RECENT SYSTOLIC BLOOD PRESS GE 130-139MM HG: ICD-10-PCS | Mod: CPTII,S$GLB,, | Performed by: PODIATRIST

## 2023-07-13 PROCEDURE — 11721 PR DEBRIDEMENT OF NAILS, 6 OR MORE: ICD-10-PCS | Mod: 59,Q9,S$GLB, | Performed by: PODIATRIST

## 2023-07-13 PROCEDURE — 11056 PARNG/CUTG B9 HYPRKR LES 2-4: CPT | Mod: 59,Q9,S$GLB, | Performed by: PODIATRIST

## 2023-07-13 PROCEDURE — 10140 I&D HMTMA SEROMA/FLUID COLLJ: CPT | Mod: S$GLB,,, | Performed by: PODIATRIST

## 2023-07-13 PROCEDURE — 11056 ROUTINE FOOT CARE: ICD-10-PCS | Mod: 59,Q9,S$GLB, | Performed by: PODIATRIST

## 2023-07-13 NOTE — PROGRESS NOTES
Subjective:      Patient ID: Georgina Arias is a 52 y.o. female.    Chief Complaint: No chief complaint on file.    Georgina is a 52 y.o. female who presented new to this clinic almost a year ago. [facility] is applying for SSI.  Noticed blister 2 days ago L great toe. Thinks discoloration is starting a blister R foot also. Tried to break in a pair of shoes. Has calluses always. Now on dialysis - getting ready for home HD. Has fistula in place.    PCP: Alonso Ling MD  [address] [facility]  Date Last Seen by PCP: 6/12/23    Current shoe gear: Athletic stretch shoe     Georgina has a past medical history of Hyperlipidemia, Hypertension, Peptic ulcer disease, Peripheral artery disease, Stage IV CKD, and Type II diabetes mellitus.   Dx DM 1995. Neuropathy after about 10 yrs.   PAD pNov 2006, DVT RLE & stent placement both time around March/April 2008.  On dialysis.  Waiting on bariatric preop so that can get BMI down as candidate for kidney transplant.  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, with other ophthalmic complication    Ureteral stone with hydronephrosis    Acute kidney injury superimposed on CKD    Nephrolithiasis    Pre-operative examination    Acute respiratory failure with hypoxia and hypercapnia    Elevated troponin    Pneumonia due to COVID-19 virus    Hypertensive emergency    Lactic acidosis    Hyperkalemia    Metabolic acidosis    CHF (congestive heart failure)    Class 3 severe obesity due to excess calories with serious comorbidity and body mass index (BMI) of 40.0 to 44.9 in adult     States was in clinic 7.4% this am, from 7.8%  4/2023 - 8/4%  Hemoglobin A1C   Date Value Ref Range Status   08/28/2018 7.9 (H) 4.0 - 5.6 % Final     Comment:     ADA Screening  Guidelines:  5.7-6.4%  Consistent with prediabetes  >or=6.5%  Consistent with diabetes  High levels of fetal hemoglobin interfere with the HbA1C  assay. Heterozygous hemoglobin variants (HbS, HgC, etc)do  not significantly interfere with this assay.   However, presence of multiple variants may affect accuracy.     03/24/2015 9.9 (H) 4.5 - 6.2 % Final   10/30/2014 8.6 (H) 4.5 - 6.2 % Final      Objective:       Physical Exam   Constitutional: She is oriented to person, place, and time. She appears well-developed and well-nourished. No distress.  Morbidly obese.  Cardiovascular: Mild edema, non-pitting B/L ankle        Dorsalis pedis pulses are 2+ B/L  Musculoskeletal: Normal ROM. She exhibits no tenderness including along MLA B/L (occasional foot cramps). Equinus B/L ankles w/< 90 deg foot to leg noted when knees extended. MS strength of extrinsics to foot and ankle B/L + 5/5  to resistance w/ no reproduction of pain in any direction.  Passive ROM of ankle and pedal joints is painless & w/out crepitation B/L.  Neuro: Epicritic sensation grossly diminished B/L including sharp/dull sensation; light touch absent. Numbness and paresthesias (tingling UE & LE, & loss of  hands x 10+ yrs.) and sensory change.   Skin: Skin is warm, dry and intact. No abrasion, no bruising, no laceration, no lesion, and no rash noted. No erythema.   IPK R sub 1st and 3rd met.heads. Adjacent deeper skin injury consistent w/ a blister extending distal medial 1st met.head - no active drainage & skin underlying is intact & pink. Light pink viscous fluid.  Discoloration sub 1st met.head R w/out hyperkeratosis nor blister.  Toenails 1st, 3rd, 5th  B/L R>L are thickened, dystrophic, discolored,  w/ crumbly subungual debris. Tender to distal nail plate pressure, without periungual skin abnormality of each. 2nd & 4th B/L hypertrophic  w/out mycotic appearance. Distal subungual L hallux blister w/ scant purulence.  Psychiatric: She has a normal  mood and affect. Her behavior is normal.    Assessment:      Encounter Diagnoses   Name Primary?    DM type 2 with diabetic peripheral neuropathy     Callus of foot Yes    Blister of left foot, initial encounter     Onychomycosis     Onychogryposis of toenail     PVD (peripheral vascular disease)     Discoloration of skin of foot        Problem List Items Addressed This Visit    None  Visit Diagnoses       Callus of foot    -  Primary    Relevant Orders    Routine Foot Care (Completed)    DM type 2 with diabetic peripheral neuropathy        Relevant Orders    Routine Foot Care (Completed)    Blister of left foot, initial encounter        Relevant Orders    Incision and Drainage blister L distal medial 1st met.head & subungual hallux (Completed)    Onychomycosis        Relevant Orders    Routine Foot Care (Completed)    Onychogryposis of toenail        Relevant Orders    Routine Foot Care (Completed)    PVD (peripheral vascular disease)        Discoloration of skin of foot               Plan:       Diagnoses and all orders for this visit:    Callus of foot  -     Routine Foot Care    DM type 2 with diabetic peripheral neuropathy  -     Routine Foot Care    Blister of left foot, initial encounter  -     Incision and Drainage blister L distal medial 1st met.head & subungual hallux    Onychomycosis  -     Routine Foot Care    Onychogryposis of toenail  -     Routine Foot Care    PVD (peripheral vascular disease)    Discoloration of skin of foot    I counseled the patient on her conditions, their implications and medical management.    - Shoe inspection. Diabetic Foot Education. Patient reminded of the importance of good nutrition and blood sugar control to help prevent podiatric complications of diabetes. Patient instructed on proper foot hygeine. We discussed wearing proper shoe gear, daily foot inspections, never walking without protective shoe gear, annual DM foot exam, sooner prn.      - With patient's permission,  nails were aggressively reduced and debrided x 10 to their soft tissue attachment mechanically , removing all offending nail and debris. Patient relates relief following the procedure.     IPK debrided plantar 1st & 3rd met.head L w/ a sterile disposable #10 blade.    Blisters decompressed, debrided/ deroofed w/ sterile # 15 blade & tissue nippers, L 1st met.head distal medially & distal toe.    Continue gelstrap dispensed for MLA support B/L.

## 2023-07-13 NOTE — TELEPHONE ENCOUNTER
----- Message from Shala Alvarez sent at 7/13/2023  2:34 PM CDT -----  Regarding: appt request  Contact: pt  Pt requesting call back RE: would like to schedule appt, offered pt appt for 7/26. Pt declined she stated she has a blister and callous and need to be seen much sooner    Confirmed contact below:  Contact Name:Georgina Arias  Phone Number: [phone]

## 2023-07-21 NOTE — PROCEDURES
Routine Foot Care    Date/Time: 7/13/2023 4:00 PM    Performed by: Aleida Flannery DPM  Authorized by: Aleida Flannery DPM    Consent Done?:  Yes (Verbal)  Hyperkeratotic Skin Lesions?: Yes    Number of trimmed lesions:  2  Location(s):  Left 1st Metatarsal Head and Left 3rd Metatarsal Head    Nail Care Type:  Debride  Location(s): All  (Left 1st Toe, Left 3rd Toe, Left 2nd Toe, Left 4th Toe, Left 5th Toe, Right 1st Toe, Right 2nd Toe, Right 3rd Toe, Right 4th Toe and Right 5th Toe)  Incision and Drainage blister L distal medial 1st met.head & subungual hallux    Date/Time: 7/13/2023 4:00 PM    Performed by: Aleida Flannery DPM  Authorized by: Aleida Flannery DPM    Consent Done?:  Yes (Verbal)    Type:  Bulla  Body area:  Lower extremity  Location details:  Left foot  Complexity:  Simple  Drainage:  Viscous  Drainage amount:  Scant  Patient tolerance:  Patient tolerated the procedure well with no immediate complications

## 2023-07-31 ENCOUNTER — TELEPHONE (OUTPATIENT)
Dept: PODIATRY | Facility: CLINIC | Age: 53
End: 2023-07-31
Payer: MEDICARE

## 2023-07-31 NOTE — TELEPHONE ENCOUNTER
Gave patient appt for 8/1/2023 at 2:30 pm.    ----- Message from Liliya Castano sent at 7/31/2023  1:52 PM CDT -----  Regarding: appt  Contact: Pt @ [phone]  Pt feel the need to be seen today or tomorrow. [facility] to [facility] and have a referral in system. Pt has S90.822A (ICD-10-CM) - Blister of left foot, initial encounter. Pt is asking for a call back to get appt in 1-2 days please.

## 2023-08-01 ENCOUNTER — OFFICE VISIT (OUTPATIENT)
Dept: PODIATRY | Facility: CLINIC | Age: 53
End: 2023-08-01
Payer: MEDICARE

## 2023-08-01 VITALS
BODY MASS INDEX: 38.29 KG/M2 | HEIGHT: 67 IN | SYSTOLIC BLOOD PRESSURE: 129 MMHG | WEIGHT: 243.94 LBS | DIASTOLIC BLOOD PRESSURE: 59 MMHG | HEART RATE: 78 BPM

## 2023-08-01 DIAGNOSIS — L97.524 TOE ULCER, LEFT, WITH NECROSIS OF BONE: ICD-10-CM

## 2023-08-01 DIAGNOSIS — L60.0 INGROWN TOENAIL OF LEFT FOOT WITH INFECTION: ICD-10-CM

## 2023-08-01 DIAGNOSIS — L03.116 CELLULITIS OF LEFT FOOT: Primary | ICD-10-CM

## 2023-08-01 DIAGNOSIS — B35.1 ONYCHOMYCOSIS: ICD-10-CM

## 2023-08-01 DIAGNOSIS — E11.42 DM TYPE 2 WITH DIABETIC PERIPHERAL NEUROPATHY: ICD-10-CM

## 2023-08-01 PROCEDURE — 3008F PR BODY MASS INDEX (BMI) DOCUMENTED: ICD-10-PCS | Mod: CPTII,S$GLB,, | Performed by: PODIATRIST

## 2023-08-01 PROCEDURE — 3074F PR MOST RECENT SYSTOLIC BLOOD PRESSURE < 130 MM HG: ICD-10-PCS | Mod: CPTII,S$GLB,, | Performed by: PODIATRIST

## 2023-08-01 PROCEDURE — 3078F PR MOST RECENT DIASTOLIC BLOOD PRESSURE < 80 MM HG: ICD-10-PCS | Mod: CPTII,S$GLB,, | Performed by: PODIATRIST

## 2023-08-01 PROCEDURE — 99999 PR PBB SHADOW E&M-EST. PATIENT-LVL V: ICD-10-PCS | Mod: PBBFAC,,, | Performed by: PODIATRIST

## 2023-08-01 PROCEDURE — 99214 PR OFFICE/OUTPT VISIT, EST, LEVL IV, 30-39 MIN: ICD-10-PCS | Mod: S$GLB,,, | Performed by: PODIATRIST

## 2023-08-01 PROCEDURE — 3008F BODY MASS INDEX DOCD: CPT | Mod: CPTII,S$GLB,, | Performed by: PODIATRIST

## 2023-08-01 PROCEDURE — 99999 PR PBB SHADOW E&M-EST. PATIENT-LVL V: CPT | Mod: PBBFAC,,, | Performed by: PODIATRIST

## 2023-08-01 PROCEDURE — 3074F SYST BP LT 130 MM HG: CPT | Mod: CPTII,S$GLB,, | Performed by: PODIATRIST

## 2023-08-01 PROCEDURE — 1159F MED LIST DOCD IN RCRD: CPT | Mod: CPTII,S$GLB,, | Performed by: PODIATRIST

## 2023-08-01 PROCEDURE — 1159F PR MEDICATION LIST DOCUMENTED IN MEDICAL RECORD: ICD-10-PCS | Mod: CPTII,S$GLB,, | Performed by: PODIATRIST

## 2023-08-01 PROCEDURE — 99214 OFFICE O/P EST MOD 30 MIN: CPT | Mod: S$GLB,,, | Performed by: PODIATRIST

## 2023-08-01 PROCEDURE — 3078F DIAST BP <80 MM HG: CPT | Mod: CPTII,S$GLB,, | Performed by: PODIATRIST

## 2023-08-01 RX ORDER — TIRZEPATIDE 5 MG/.5ML
INJECTION, SOLUTION SUBCUTANEOUS
COMMUNITY
Start: 2023-07-12 | End: 2023-11-22 | Stop reason: DRUGHIGH

## 2023-08-01 RX ORDER — PEN NEEDLE, DIABETIC 31 GX5/16"
NEEDLE, DISPOSABLE MISCELLANEOUS
COMMUNITY
Start: 2023-02-15

## 2023-08-01 RX ORDER — GLUCOSAM/CHON-MSM1/C/MANG/BOSW 500-416.6
TABLET ORAL
COMMUNITY
Start: 2023-05-01

## 2023-08-01 RX ORDER — SODIUM CHLORIDE 0.9 % (FLUSH) 0.9 %
10 SYRINGE (ML) INJECTION
Status: DISCONTINUED | OUTPATIENT
Start: 2023-08-03 | End: 2023-09-15 | Stop reason: SDUPTHER

## 2023-08-01 RX ORDER — LEVOCETIRIZINE DIHYDROCHLORIDE 5 MG/1
5 TABLET, FILM COATED ORAL
COMMUNITY
Start: 2023-07-12

## 2023-08-01 RX ORDER — SEVELAMER CARBONATE 800 MG/1
TABLET, FILM COATED ORAL
COMMUNITY
Start: 2023-07-11

## 2023-08-01 RX ORDER — NIFEDIPINE 30 MG/1
30 TABLET, EXTENDED RELEASE ORAL
COMMUNITY
Start: 2023-07-12 | End: 2023-12-04

## 2023-08-01 RX ORDER — CIPROFLOXACIN 2 MG/ML
400 INJECTION, SOLUTION INTRAVENOUS
Status: CANCELLED | OUTPATIENT
Start: 2023-08-03

## 2023-08-01 NOTE — PROGRESS NOTES
Subjective:      Patient ID: Georgina Arias is a 52 y.o. female.    Chief Complaint: Cellulitis (Left foot)    Patient is here for ED f/u of cellulitis 7/30/23. Had L foot & ankle pain intermittently x 4 days before presentation, as well as an ulcer distal hallux w/ purulence. Placed on Cleocin for 10 days & Bactroban. Xrays taken. Using a bandaid. Still having pain medial ankle but redness in improved. Pain level 8/10 L medial ankle > hallux since Sun.  Patient states noticed   Last in this clinic 7/13/23 w/ blister L great toe; stated was trying to break in a new pair of shoes. Also calluses plantar 1st & 3rd met.head R. Calluses were debrided along w/ adjacent 1st met.head blister. Decompressed along w/ superficial blister distal L hallux. Nails were also reduced. Gelstrap was dispensed for arch support.  7/13/23  Georgina is a 52 y.o. female who presented new to this clinic almost a year ago. States is applying for SSI.  Noticed blister 2 days ago L great toe. Thinks discoloration is starting a blister R foot also. Tried to break in a pair of shoes. Has calluses always. Now on dialysis - getting ready for home HD. Has fistula in place.    PCP: Alonso Ling MD  [address] [place] [facility]  Date Last Seen by PCP: 6/12/23    Current shoe gear: Athletic stretch shoe    Scheduled tomorrow for procedure related to her home dialysis.   Georgina has a past medical history of Hyperlipidemia, Hypertension, Peptic ulcer disease, Peripheral artery disease, Stage IV CKD, and Type II diabetes mellitus.   Dx DM 1995. Neuropathy after about 10 yrs.   PAD Nov 2006, DVT RLE & stent placement both time around March/April 2008.  On dialysis.  Waiting on bariatric preop so that can get BMI down as candidate for kidney transplant.  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, with other ophthalmic complication    Ureteral stone with hydronephrosis    Acute kidney injury superimposed on CKD    Nephrolithiasis    Pre-operative examination    Acute respiratory failure with hypoxia and hypercapnia    Elevated troponin    Pneumonia due to COVID-19 virus    Hypertensive emergency    Lactic acidosis    Hyperkalemia    Metabolic acidosis    CHF (congestive heart failure)    Class 3 severe obesity due to excess calories with serious comorbidity and body mass index (BMI) of 40.0 to 44.9 in adult      Contains abnormal data POCT glucose  Order: [id number]  Status: Final result     Visible to patient: Yes (not seen)     Next appt: None     0 Result Notes             Component Ref Range & Units 2 d ago  (7/30/23) 1 mo ago  (6/14/23) 3 mo ago  (4/26/23) 3 mo ago  (4/26/23) 3 mo ago  (4/25/23) 3 mo ago  (4/25/23) 3 mo ago  (4/25/23)   POCT Glucose 70 - 110 mg/dL 161 High   174 High  R  157 High  R  113 High  R  92 R  152 High  R  87 R    Resulting Agency  [facility]              Specimen Collected: 07/30/23 15:34 Last Resulted: 07/30/23 15:34           States in clinic 7.4% last visit, from 7.8%;  4/2023 - 8/4%  Hemoglobin A1C   Date Value Ref Range Status   08/28/2018 7.9 (H) 4.0 - 5.6 % Final     Comment:     ADA Screening Guidelines:  5.7-6.4%  Consistent with prediabetes  >or=6.5%  Consistent with diabetes  High levels of fetal hemoglobin interfere with the HbA1C  assay. Heterozygous hemoglobin variants (HbS, HgC, etc)do  not significantly interfere with this assay.   However, presence of multiple variants may affect accuracy.     03/24/2015 9.9 (H) 4.5 - 6.2 % Final   10/30/2014 8.6 (H) 4.5 - 6.2 % Final      Objective:       X-Ray Ankle Complete Left  Narrative: EXAMINATION:  XR ANKLE COMPLETE 3 VIEW LEFT    CLINICAL  HISTORY:  Pain in left ankle and joints of left foot    TECHNIQUE:  AP, lateral and oblique views of the left ankle were performed.    COMPARISON:  None    FINDINGS:  No acute fracture, subluxation or dislocation.  No mass or foreign body.  No osseous destruction.  Soft tissues are within normal limits.  Impression: No acute radiographic abnormality.    Electronically signed by: Orlando Mckenzie  Date:    07/30/2023  Time:    16:11  X-Ray Foot Complete Left  Narrative: EXAMINATION:  XR FOOT COMPLETE 3 VIEW LEFT    CLINICAL HISTORY:  .  Pain in left foot    TECHNIQUE:  AP, lateral and oblique views of the left foot were performed.    COMPARISON:  None    FINDINGS:  Four views are submitted.    No acute fracture, subluxation or dislocation.  No mass or foreign body.  No significant arthropathy.  Impression: No acute radiographic abnormality.    Electronically signed by: Orlando Mckenzie  Date:    07/30/2023  Time:    16:10   I independently reviewed the Xray images. Suspicious for distal tuft hallux lytic changes.    Physical Exam   Constitutional: She is oriented to person, place, & time. She appears well-developed & well-nourished. No distress.  Morbidly obese.  Cardiovascular:   Mild edema, non-pitting R; Edema RLE especially lower leg , ankle & foot medially.        Dorsalis pedis pulses are 2+ B/L  Musculoskeletal:   Normal ROM. She exhibits no tenderness including along MLA B/L (occasional foot cramps). Equinus B/L ankles w/< 90 deg foot to leg noted when knees extended.   MS strength of extrinsics to foot and ankle B/L + 5/5  to resistance w/ no reproduction of pain in any direction.    Passive ROM of ankle & pedal joints is painless & w/out crepitation B/L.  Neuro:   Epicritic sensation grossly diminished B/L including sharp/dull sensation; light touch absent. Numbness & paresthesias (tingling UE & LE, & loss of  hands x 10+ yrs.) & sensory change.   Skin:   Skin is warm & dry. No abrasion, no bruising, no  laceration, & no rash noted. R medial midfoot increasing to great toe; erythema.   IPK R sub 1st & 3rd met.heads. No remaining blister medial 1st met.head.  Toenail L 1st thickened, dystrophic, discolored, cryptotic w/ crumbly subungual debris. Distal digital tuft halluc w/ fibrinous ulcer; w/ debridement, dorsal aspect probes down to distal tuft of distal phalanx.   Psychiatric: She has a normal mood and affect. Her behavior is normal.    Assessment:      Encounter Diagnoses   Name Primary?    Cellulitis of left foot Yes    Toe ulcer, left, with necrosis of bone     DM type 2 with diabetic peripheral neuropathy     Ingrown toenail of left foot with infection     Onychomycosis        Problem List Items Addressed This Visit    None  Visit Diagnoses       Cellulitis of left foot    -  Primary    Toe ulcer, left, with necrosis of bone        Relevant Orders    Diet NPO    POCT glucose    Case Request Operating Room: DEBRIDEMENT, FOOT, REMOVAL, NAIL, DIGIT great toe (Completed)    DM type 2 with diabetic peripheral neuropathy        Ingrown toenail of left foot with infection        Relevant Orders    Diet NPO    POCT glucose    Case Request Operating Room: DEBRIDEMENT, FOOT, REMOVAL, NAIL, DIGIT great toe (Completed)    Onychomycosis              Plan:       Georgina was seen today for cellulitis.    Diagnoses and all orders for this visit:    Cellulitis of left foot    Toe ulcer, left, with necrosis of bone  -     Vital signs, per unit routine ; Standing  -     Diet NPO; Standing  -     POCT glucose; Standing  -     Case Request Operating Room: DEBRIDEMENT, FOOT, REMOVAL, NAIL, DIGIT great toe  -     Place in Outpatient; Standing    DM type 2 with diabetic peripheral neuropathy    Ingrown toenail of left foot with infection  -     Vital signs, per unit routine ; Standing  -     Diet NPO; Standing  -     POCT glucose; Standing  -     Case Request Operating Room: DEBRIDEMENT, FOOT, REMOVAL, NAIL, DIGIT great toe  -      Place in Outpatient; Standing    Onychomycosis    Other orders  -     sodium chloride 0.9% flush 10 mL  -     ciprofloxacin (CIPRO)400mg/200ml D5W IVPB 400 mg    I counseled the patient on her conditions, their implications and medical management.    - Shoe inspection. Diabetic Foot Education. Patient reminded of the importance of good nutrition and blood sugar control to help prevent podiatric complications of diabetes. Patient instructed on proper foot hygeine. We discussed wearing proper shoe gear, daily foot inspections, never walking without protective shoe gear, annual DM foot exam, sooner prn.      - Distal ulcer partially debrided; dressing applied - to be kept CDI until surgery Thurs.    Nature of the procedure & anesthesia were discussed, including possible risks & complications. All questions asked were answered. There are no apparent contraindications to proposed procedure pending final anesthesia clearance. Consent form will be reviewed reviewed & signed. Procedure to include avulsion of L hallux nail plate & excisional debridement of distal tuft L hallux/ ulcer under local w/ MAC.        A total of 33 mins.was spent on chart review, patient visit & documentation.

## 2023-08-03 ENCOUNTER — TELEPHONE (OUTPATIENT)
Dept: PODIATRY | Facility: CLINIC | Age: 53
End: 2023-08-03
Payer: MEDICARE

## 2023-08-03 RX ORDER — GABAPENTIN 100 MG/1
100 CAPSULE ORAL 3 TIMES DAILY
Qty: 63 CAPSULE | Refills: 0 | Status: SHIPPED | OUTPATIENT
Start: 2023-08-03 | End: 2023-08-24

## 2023-08-03 NOTE — TELEPHONE ENCOUNTER
----- Message from Yu Hearn sent at 8/3/2023  1:32 PM CDT -----  Regarding: [facility]  Contact: Kristal ext [phone]  Kristal jimenez/[facility] is requesting a call back, would like to know if pt will receive post op pain prescription, please call @ ext [phone]

## 2023-08-07 ENCOUNTER — TELEPHONE (OUTPATIENT)
Dept: PODIATRY | Facility: CLINIC | Age: 53
End: 2023-08-07
Payer: MEDICARE

## 2023-08-07 DIAGNOSIS — A49.01 MSSA (METHICILLIN SUSCEPTIBLE STAPHYLOCOCCUS AUREUS): Primary | ICD-10-CM

## 2023-08-07 RX ORDER — DOXYCYCLINE 100 MG/1
100 CAPSULE ORAL 2 TIMES DAILY
Qty: 20 CAPSULE | Refills: 0 | Status: SHIPPED | OUTPATIENT
Start: 2023-08-07 | End: 2023-08-22 | Stop reason: SDUPTHER

## 2023-08-07 NOTE — TELEPHONE ENCOUNTER
----- Message from Amie Gladys sent at 8/7/2023  1:32 PM CDT -----  Regarding: Call back requested  Contact: [phone]  Hi, pt called to ask for the nurse to give her a call back. Pt says she is having drainage issues and pain. Pls call the pt at [phone] to spk with the pt.

## 2023-08-07 NOTE — TELEPHONE ENCOUNTER
Spoke with patient. Pt advise to keep wrap on until see Dr Flannery. Pt verbalized understanding.

## 2023-08-08 ENCOUNTER — TELEPHONE (OUTPATIENT)
Dept: PODIATRY | Facility: CLINIC | Age: 53
End: 2023-08-08
Payer: MEDICARE

## 2023-08-08 NOTE — TELEPHONE ENCOUNTER
Attempted to call patient. Pt  and then she hung up. Unable to give message about new antibiotic.

## 2023-08-08 NOTE — PROGRESS NOTES
Contains abnormal data Aerobic culture  Order: [id number]  Status: Final result     Visible to patient: Yes (not seen)     Next appt: 08/16/2023 at 01:45 PM in Podiatry (Aleida Flannery DPM)     Specimen Information: Toe, Left Foot; Bone   0 Result Notes      Component 4 d ago   Aerobic Bacterial Culture  Abnormal   STAPHYLOCOCCUS AUREUS   Many     Resulting Agency [facility]        Susceptibility     Staphylococcus aureus     CULTURE, AEROBIC  (SPECIFY SOURCE)     Clindamycin <=0.5 mcg/mL Resistant     Erythromycin >4 mcg/mL Resistant     Oxacillin <=0.25 mcg/mL Sensitive     Penicillin 2 mcg/mL Resistant     Tetracycline <=4 mcg/mL Sensitive     Trimeth/Sulfa <=0.5/9.5 m... Sensitive               Linear View         Specimen Collected: 08/03/23 12:27 Last Resulted: 08/07/23 08:55

## 2023-08-08 NOTE — TELEPHONE ENCOUNTER
----- Message from Cristian Ren sent at 8/8/2023  8:39 AM CDT -----  Regarding: Missed call  Contact: [phone]  Pt states someone called but phone went out. Please call back as soon as possible

## 2023-08-11 ENCOUNTER — OFFICE VISIT (OUTPATIENT)
Dept: PODIATRY | Facility: CLINIC | Age: 53
End: 2023-08-11
Payer: MEDICARE

## 2023-08-11 VITALS
WEIGHT: 240.06 LBS | HEIGHT: 67 IN | HEART RATE: 89 BPM | BODY MASS INDEX: 37.68 KG/M2 | DIASTOLIC BLOOD PRESSURE: 66 MMHG | SYSTOLIC BLOOD PRESSURE: 173 MMHG

## 2023-08-11 DIAGNOSIS — E11.42 DM TYPE 2 WITH DIABETIC PERIPHERAL NEUROPATHY: Primary | ICD-10-CM

## 2023-08-11 DIAGNOSIS — Z09 POSTOPERATIVE FOLLOW-UP: ICD-10-CM

## 2023-08-11 DIAGNOSIS — L97.524 TOE ULCER, LEFT, WITH NECROSIS OF BONE: ICD-10-CM

## 2023-08-11 DIAGNOSIS — I73.9 PVD (PERIPHERAL VASCULAR DISEASE): ICD-10-CM

## 2023-08-11 DIAGNOSIS — L97.524: ICD-10-CM

## 2023-08-11 PROCEDURE — 3008F PR BODY MASS INDEX (BMI) DOCUMENTED: ICD-10-PCS | Mod: CPTII,S$GLB,, | Performed by: PODIATRIST

## 2023-08-11 PROCEDURE — 3077F PR MOST RECENT SYSTOLIC BLOOD PRESSURE >= 140 MM HG: ICD-10-PCS | Mod: CPTII,S$GLB,, | Performed by: PODIATRIST

## 2023-08-11 PROCEDURE — 99024 PR POST-OP FOLLOW-UP VISIT: ICD-10-PCS | Mod: S$GLB,,, | Performed by: PODIATRIST

## 2023-08-11 PROCEDURE — 99999 PR PBB SHADOW E&M-EST. PATIENT-LVL V: ICD-10-PCS | Mod: PBBFAC,,, | Performed by: PODIATRIST

## 2023-08-11 PROCEDURE — 3078F DIAST BP <80 MM HG: CPT | Mod: CPTII,S$GLB,, | Performed by: PODIATRIST

## 2023-08-11 PROCEDURE — 3077F SYST BP >= 140 MM HG: CPT | Mod: CPTII,S$GLB,, | Performed by: PODIATRIST

## 2023-08-11 PROCEDURE — 99999 PR PBB SHADOW E&M-EST. PATIENT-LVL V: CPT | Mod: PBBFAC,,, | Performed by: PODIATRIST

## 2023-08-11 PROCEDURE — 99024 POSTOP FOLLOW-UP VISIT: CPT | Mod: S$GLB,,, | Performed by: PODIATRIST

## 2023-08-11 PROCEDURE — 3078F PR MOST RECENT DIASTOLIC BLOOD PRESSURE < 80 MM HG: ICD-10-PCS | Mod: CPTII,S$GLB,, | Performed by: PODIATRIST

## 2023-08-11 PROCEDURE — 3008F BODY MASS INDEX DOCD: CPT | Mod: CPTII,S$GLB,, | Performed by: PODIATRIST

## 2023-08-11 RX ORDER — CIPROFLOXACIN 2 MG/ML
400 INJECTION, SOLUTION INTRAVENOUS
Status: CANCELLED | OUTPATIENT
Start: 2023-08-14

## 2023-08-11 RX ORDER — SODIUM CHLORIDE 0.9 % (FLUSH) 0.9 %
10 SYRINGE (ML) INJECTION
Status: DISCONTINUED | OUTPATIENT
Start: 2023-08-14 | End: 2023-09-15 | Stop reason: SDUPTHER

## 2023-08-11 NOTE — PROGRESS NOTES
Subjective:      Patient ID: Georgina Arias is a 52 y.o. female.    Chief Complaint: Follow-up    Patient is PO excision ulcer distal toe & nail avulsion L hallux. DOS 8/3/23. POD 8 days. Having significant pain despite Gabapentin 300mg tid (added to her normal dose of 100mg tid). Culture results 8/7/22 w/ MSSA - Abx sent in but patient not able to be reached by phone initially. Was finally notified 8/8/23. Also, advised to come in today as an add on d/t drainage & pain.  States pain level is 8/10 but also dealing w/ home dialysis procedure R arm.  8/1/23  Patient is here for ED f/u of cellulitis 7/30/23. Had L foot & ankle pain intermittently x 4 days before presentation, as well as an ulcer distal hallux w/ purulence. Placed on Cleocin for 10 days & Bactroban. Xrays taken. Using a bandaid. Still having pain medial ankle but redness in improved. Pain level 8/10 L medial ankle > hallux since Sun.  Last in this clinic 7/13/23 w/ blister L great toe; stated was trying to break in a new pair of shoes. Also calluses plantar 1st & 3rd met.head R. Calluses were debrided along w/ adjacent 1st met.head blister. Decompressed along w/ superficial blister distal L hallux. Nails were also reduced. Gelstrap was dispensed for arch support.  7/13/23  Georgina is a 52 y.o. female who presented new to this clinic almost a year ago. States is applying for SSI.  Noticed blister 2 days ago L great toe. Thinks discoloration is starting a blister R foot also. Tried to break in a pair of shoes. Has calluses always. Now on dialysis - getting ready for home HD. Has fistula in place.    PCP: Alonso Ling MD  [address] [facility]  Date Last Seen by PCP: 6/12/23    Started home dialysis.   Georgina has a past medical history of Hyperlipidemia, Hypertension, Peptic ulcer disease, Peripheral artery disease, PONV (postoperative nausea and vomiting), Stage IV CKD, and Type II diabetes mellitus.   Dx DM 1995. Neuropathy  after about 10 yrs.   PAD Nov 2006, DVT RLE & stent placement both times; most recent around March/ April 2008.  Waiting on bariatric preop so that can get BMI down as candidate for kidney transplant.  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, with other ophthalmic complication    Ureteral stone with hydronephrosis    Acute kidney injury superimposed on CKD    Nephrolithiasis    Pre-operative examination    Acute respiratory failure with hypoxia and hypercapnia    Elevated troponin    Pneumonia due to COVID-19 virus    Hypertensive emergency    Lactic acidosis    Hyperkalemia    Metabolic acidosis    CHF (congestive heart failure)    Class 3 severe obesity due to excess calories with serious comorbidity and body mass index (BMI) of 40.0 to 44.9 in adult      Contains abnormal data POCT glucose  Order: [id number]  Status: Final result     Visible to patient: Yes (not seen)     Next appt: None     0 Result Notes             Component Ref Range & Units 2 d ago  (7/30/23) 1 mo ago  (6/14/23) 3 mo ago  (4/26/23) 3 mo ago  (4/26/23) 3 mo ago  (4/25/23) 3 mo ago  (4/25/23) 3 mo ago  (4/25/23)   POCT Glucose 70 - 110 mg/dL 161 High   174 High  R  157 High  R  113 High  R  92 R  152 High  R  87 R    Resulting Agency  [facility]              Specimen Collected: 07/30/23 15:34 Last Resulted: 07/30/23 15:34           States in clinic 7.4% last visit, from 7.8%;  4/2023 - 8/4%  Hemoglobin A1C   Date Value Ref Range Status   08/28/2018 7.9 (H) 4.0 - 5.6 % Final     Comment:     ADA Screening Guidelines:  5.7-6.4%  Consistent with prediabetes  >or=6.5%  Consistent with diabetes  High levels of fetal hemoglobin interfere  with the HbA1C  assay. Heterozygous hemoglobin variants (HbS, HgC, etc)do  not significantly interfere with this assay.   However, presence of multiple variants may affect accuracy.     03/24/2015 9.9 (H) 4.5 - 6.2 % Final   10/30/2014 8.6 (H) 4.5 - 6.2 % Final      Objective:       X-Ray Toe 2 or More Views Left  Narrative: EXAMINATION:  XR TOE 2 OR MORE VIEWS LEFT    CLINICAL HISTORY:  postop;    TECHNIQUE:  Three views of the left toes were performed    COMPARISON:  None.    FINDINGS:  The patient is status post amputation of the distal aspect of the distal phalanx of the great toe.  Surgical changes are identified.  The remainder of the bones are intact.  There is no evidence for dislocation.  No bony erosions are identified.  No radiopaque soft tissue foreign bodies are evident.  Impression: Postsurgical changes related to resection of the distal aspect of the distal phalanx of the left great toe.    Electronically signed by: Gio Snyder MD  Date:    08/03/2023  Time:    13:20  I independently reviewed the Xray images. As expected clean resection distal tuft L hallux.     X-Ray Toe 2 or More Views Left  Narrative: EXAMINATION:  XR TOE 2 OR MORE VIEWS LEFT    CLINICAL HISTORY:  postop;    TECHNIQUE:  Three views of the left toes were performed    COMPARISON:  None.    FINDINGS:  The patient is status post amputation of the distal aspect of the distal phalanx of the great toe.  Surgical changes are identified.  The remainder of the bones are intact.  There is no evidence for dislocation.  No bony erosions are identified.  No radiopaque soft tissue foreign bodies are evident.  Impression: Postsurgical changes related to resection of the distal aspect of the distal phalanx of the left great toe.    Electronically signed by: Gio Snyder MD  Date:    08/03/2023  Time:    13:20   I independently reviewed the Xray images. Suspicious for distal tuft hallux lytic changes.    -LE arterial ultrasound 10/17/22  significant for 50-70% stenosis R CFA, atherosclerotic plaques & monophasic flow noted throughout BLE.  -prior [facility] recods document R SFA stent in 2009 & angio in 2013 w/ percutaneous transluminal angioplasty of R common iliac artery.   -previously on plavix   -followed by cardiology     Physical Exam   Constitutional: She is oriented to person, place, & time. She appears well-developed & well-nourished. Morbidly obese.  Cardiovascular:   Mild edema, non-pitting R; Edema RLE especially lower leg, ankle & foot medially.        Dorsalis pedis pulses are 2+ B/L  Musculoskeletal:   Normal ROM. She exhibits no tenderness including along MLA B/L (occasional foot cramps). Equinus B/L ankles w/< 90 deg foot to leg noted when knees extended.   MS strength of extrinsics to foot and ankle B/L + 5/5  to resistance w/ no reproduction of pain in any direction.    Passive ROM of ankle & pedal joints is painless & w/out crepitation B/L.  Neuro:  Epicritic sensation grossly diminished B/L including sharp/dull sensation; light touch absent. Numbness & paresthesias (tingling UE & LE, & loss of  hands x 10+ yrs.) & sensory change.   Skin:   Skin is warm & dry. No abrasion, no bruising, no laceration, & no rash noted. PO distal digital tuft hallux w/ disruption of closure & fibrinous, macerated wound. Exposed distal phalanx. No purulence, no exudate, no malodor. Nail bed intact & dry. Plantar digital tuft cyanotic but soft.   Psychiatric: She has a normal mood & affect. Her behavior is normal.    Assessment:      Encounter Diagnoses   Name Primary?    DM type 2 with diabetic peripheral neuropathy Yes    Postoperative follow-up     Toe ulcer, left, with necrosis of bone     Ischemic toe ulcer, left, with necrosis of bone     PVD (peripheral vascular disease)        Problem List Items Addressed This Visit    None  Visit Diagnoses       DM type 2 with diabetic peripheral neuropathy    -  Primary    Postoperative follow-up        Toe  ulcer, left, with necrosis of bone        Relevant Orders    Diet NPO    POCT glucose    Case Request Operating Room: AMPUTATION, TOE hallux IPJ (Completed)    Ischemic toe ulcer, left, with necrosis of bone        Relevant Orders    Ambulatory referral/consult to Interventional Cardiology    PVD (peripheral vascular disease)              Plan:       Georgina was seen today for follow-up.    Diagnoses and all orders for this visit:    DM type 2 with diabetic peripheral neuropathy    Postoperative follow-up    Toe ulcer, left, with necrosis of bone  -     Vital signs, per unit routine ; Standing  -     Diet NPO; Standing  -     POCT glucose; Standing  -     Case Request Operating Room: AMPUTATION, TOE hallux IPJ  -     Place in Outpatient; Standing  -     POCT glucose  -     Diet NPO    Ischemic toe ulcer, left, with necrosis of bone  -     Ambulatory referral/consult to Interventional Cardiology; Future    PVD (peripheral vascular disease)    Other orders  -     sodium chloride 0.9% flush 10 mL  -     ciprofloxacin (CIPRO)400mg/200ml D5W IVPB 400 mg    I counseled the patient on her conditions, their implications and medical management.    - Distal wound cleaned & partially debrided; dressing applied including wet-dry Betadine - to be kept CDI until surgery Mon.    Nature of the procedure & anesthesia were discussed, including possible risks & complications. All questions asked were answered. There are no apparent contraindications to proposed procedure pending final anesthesia clearance. Consent form reviewed reviewed & signed. Procedure to include amputation of hallux to IPJ, under local w/ MAC.    Patient need to return to cardiology for further intervention (previously, as above RLE stenting only).        A total of 27 mins.was spent on chart review, patient visit & documentation.

## 2023-08-14 ENCOUNTER — TELEPHONE (OUTPATIENT)
Dept: PODIATRY | Facility: CLINIC | Age: 53
End: 2023-08-14
Payer: MEDICARE

## 2023-08-14 ENCOUNTER — NURSE TRIAGE (OUTPATIENT)
Dept: ADMINISTRATIVE | Facility: CLINIC | Age: 53
End: 2023-08-14
Payer: MEDICARE

## 2023-08-14 DIAGNOSIS — Z09 POSTOPERATIVE FOLLOW-UP: Primary | ICD-10-CM

## 2023-08-14 RX ORDER — TRAMADOL HYDROCHLORIDE 50 MG/1
50 TABLET ORAL EVERY 8 HOURS PRN
Qty: 20 TABLET | Refills: 0 | Status: ON HOLD | OUTPATIENT
Start: 2023-08-14 | End: 2023-09-15

## 2023-08-14 NOTE — TELEPHONE ENCOUNTER
"Post op reports cough with blood clots today. Advised, per protocol and verbalizes understanding.    Reason for Disposition   [1] Caller has NON-URGENT question AND [2] triager unable to answer question   History of prior "blood clot" in leg or lungs (i.e., deep vein thrombosis, pulmonary embolism)    Additional Information   Negative: Sounds like a life-threatening emergency to the triager   Negative: [1] Widespread rash AND [2] bright red, sunburn-like   Negative: [1] SEVERE headache AND [2] after spinal (epidural) anesthesia   Negative: [1] Vomiting AND [2] persists > 4 hours   Negative: [1] Vomiting AND [2] abdomen looks much more swollen than usual   Negative: [1] Drinking very little AND [2] dehydration suspected (e.g., no urine > 12 hours, very dry mouth, very lightheaded)   Negative: Patient sounds very sick or weak to the triager   Negative: Sounds like a serious complication to the triager   Negative: Fever > 100.4 F (38.0 C)   Negative: [1] SEVERE post-op pain (e.g., excruciating, pain scale 8-10) AND [2] not controlled with pain medications   Negative: [1] Caller has URGENT question AND [2] triager unable to answer question   Negative: [1] Headache AND [2] after spinal (epidural) anesthesia AND [3] not severe   Negative: Fever present > 3 days (72 hours)   Negative: SEVERE difficulty breathing (e.g., struggling for each breath, speaks in single words)   Negative: [1] Chest pain AND [2] difficulty breathing   Negative: Bluish (or gray) lips or face now   Negative: Passed out (i.e., lost consciousness, collapsed and was not responding)   Negative: Shock suspected (e.g., cold/pale/clammy skin, too weak to stand, low BP, rapid pulse)   Negative: Difficult to awaken or acting confused (e.g., disoriented, slurred speech)   Negative: Recent chest injury (i.e., past 24 hours)   Negative: [1] Coughed up blood AND [2] large amount (Such as: "a half cup of blood")   Negative: Sounds like a life-threatening " emergency to the triager   Negative: Unclear to triager if the patient is coughing up blood or vomiting blood   Negative: [1] MODERATE difficulty breathing (e.g., speaks in phrases, SOB even at rest, pulse 100-120) AND [2] still present when not coughing   Negative: Chest pain    Protocols used: Post-Op Symptoms and [id number], Coughing Up Blood-A-AH

## 2023-08-14 NOTE — PROGRESS NOTES
Subjective:      Patient ID: Georgina Arias is a 52 y.o. female.    Chief Complaint: poet op    Patient is PO I&D L great toe & long flexor tendon L foot, as well as amp.HIPJ d/t ischemic ulcer w/ suspected abscess/ osteomyelitis; DOS 8/14/23. Also had requested injection for painful medial ankle/TP tendon L - while under anesthesia for surgery. On Doxycycline 100mg bid pending C&S aerobic & anaerobic from intraop culture.  Less pain than previous PO.  Is also PO excision ulcer L hallux w/ avulsion of hallux nail plate & resection distal tuft of hallux; DOS 08/03/2023.  Patient had called later DOS for c/o cough w/ 'blood clots' - was advised to call PCP or UC. Had US LE veins B/L 8/14/23 d/t leg swelling & h/o DVT. Just dryness from the O2 likely caused it as it does w/allergies that cause nosebleeds but wanted to make sure.    Specimen to Pathology, Surgery Other  Order: [id number]  Status: Final result     Visible to patient: Yes (not seen)     Next appt: 09/05/2023 at 04:00 PM in Podiatry (Aleida Flannery DPM)     0 Result Notes      Component 8 d ago   Final Pathologic Diagnosis RELIAPATH DIAGNOSIS:     LEFT BIG TOE, AMPUTATION:   - Benign skin showing epidermal necrosis and necrotic soft tissue.   - Underlying benign bone showing acute osteomyelitis.   - Skin and soft tissue margin showing focal acute inflammation.   - Bone resection margin showing acute osteomyelitis.     Aida Bond M.D.     Report attached.     Performing site:   [address] [address]     &quot;Disclaimer:  This case diagnosis was rendered completely by the outside consultation pathologist and the case is electronically signed by an [facility] pathologist listed below solely to release the report into the medical record.&quot;    Comment: Interp By Carlos Tucker MD, Signed on 08/22/2023 at 15:04   Disclaimer Unless the case is a 'gross only' or additional testing only, the final diagnosis for each  specimen is based on a microscopic examination of appropriate tissue sections.   Resulting Agency [facility]      Given results indicating possible remaining osteomyelitis, as w/ x-rays, will have patient on antibiotics for 6 weeks.    Culture, Anaerobic  Order: [id number]  Status: Final result     Visible to patient: Yes (not seen)     Next appt: 08/22/2023 at 01:00 PM in Podiatry (Aleida Flannery DPM)     Specimen Information: Toe, Left Foot; Wound   0 Result Notes      Component 7 d ago   Anaerobic Culture  Abnormal   PEPTONIPHILUS SPECIES   Moderate   Further identified as peptoniphilus coxii     Anaerobic Culture  Abnormal   PREVOTELLA BERGENSIS   Many     Anaerobic Culture  Abnormal   PREVOTELLA (B.) MELANINOGENICA   Many     Resulting Agency [facility]              Narrative  Performed by: [facility]  Big toe      Specimen Collected: 08/14/23 12:57 Last Resulted: 08/21/23 12:22           X-Ray Toe 2 or More Views Left  Narrative: EXAMINATION:  XR TOE 2 OR MORE VIEWS LEFT    CLINICAL HISTORY:  Acquired absence of left great toe    TECHNIQUE:  XR TOE 2 OR MORE VIEWS LEFT    COMPARISON:  08/03/2023    FINDINGS:  Interval amputation of the distal phalanx of the 1st digit with overlying bandage material and probable postoperative soft tissue gas and soft tissue swelling.  The oblique views demonstrate some radiolucency and attenuation of the cortex of the proximal phalanx making it difficult to exclude underlying osteomyelitis.  The remainder of the exam shows no acute findings.  Impression: See above    Electronically signed by: Jerry Banks Jr  Date:    08/21/2023  Time:    07:49   I independently reviewed the Xray images.    US Lower Extremity Veins Bilateral  Narrative: EXAMINATION:  US LOWER EXTREMITY VEINS BILATERAL    CLINICAL HISTORY:  Other specified soft tissue disorders    TECHNIQUE:  Duplex and color flow Doppler and dynamic compression was performed of the bilateral lower extremity veins was  performed.    COMPARISON:  None    FINDINGS:  Right thigh veins: The common femoral, femoral, popliteal, upper greater saphenous, and deep femoral veins are patent and free of thrombus. The veins are normally compressible and have normal phasic flow and augmentation response.    Right calf veins: The visualized calf veins are patent.    Left thigh veins: The common femoral, femoral, popliteal, upper greater saphenous, and deep femoral veins are patent and free of thrombus. The veins are normally compressible and have normal phasic flow and augmentation response.    Left calf veins: The visualized calf veins are patent.    Miscellaneous: None  Impression: No evidence of deep venous thrombosis in either lower extremity.    Electronically signed by: Jamshid Coleman  Date:    08/14/2023    Aerobic culture  Order: [id number]  Status: Final result     Visible to patient: Yes (not seen)     Next appt: 08/21/2023 at 07:30 AM in Radiology ([facility])     Specimen Information: Toe, Left Foot; Wound   0 Result Notes      Component 4 d ago   Aerobic Bacterial Culture No growth    Resulting Agency [facility]              Narrative  Performed by: [facility]  Big toe      Specimen Collected: 08/14/23 12:57 Last Resulted: 08/18/23 10:03           08/11/2023  Patient is PO excision ulcer distal toe & nail avulsion L hallux. DOS 8/3/23. POD 8 days. Having significant pain despite Gabapentin 300mg tid (added to her normal dose of 100mg tid). Culture results 8/7/22 w/ MSSA - Abx sent in but patient not able to be reached by phone initially. Was finally notified 8/8/23. Also, advised to come in today as an add on d/t drainage & pain.  States pain level is 8/10 but also dealing w/ home dialysis procedure R arm.    Placed on Doxycycline 100mg bid.   Contains abnormal data Aerobic culture  Order: [id number]  Status: Final result     Visible to patient: Yes (not seen)     Next appt: 08/21/2023 at 07:30 AM in Radiology ([facility])     Specimen Information:  Toe, Left Foot; Bone   0 Result Notes     important suggestion  Newer results are available. Click to view them now.         Component 2 wk ago   Aerobic Bacterial Culture  Abnormal   STAPHYLOCOCCUS AUREUS   Many     Resulting Agency [facility]        Susceptibility     Staphylococcus aureus     CULTURE, AEROBIC  (SPECIFY SOURCE)     Clindamycin <=0.5 mcg/mL Resistant     Erythromycin >4 mcg/mL Resistant     Oxacillin <=0.25 mcg/mL Sensitive     Penicillin 2 mcg/mL Resistant     Tetracycline <=4 mcg/mL Sensitive     Trimeth/Sulfa <=0.5/9.5 m... Sensitive               Linear View         Specimen Collected: 08/03/23 12:27 Last Resulted: 08/07/23 08:55           Contains abnormal data Culture, Anaerobe  Order: [id number]  Status: Final result     Visible to patient: Yes (seen)     Next appt: 08/21/2023 at 07:30 AM in Radiology ([facility])     Specimen Information: Toe, Left Foot; Bone   0 Result Notes     important suggestion  Newer results are available. Click to view them now.         Component 2 wk ago   Anaerobic Culture  Abnormal   PEPTONIPHILUS SPECIES   Many   further identified as Peptoniphilus coxii     Anaerobic Culture  Abnormal   PORPHYROMONAS SOMERAE   Moderate     Anaerobic Culture  Abnormal   PREVOTELLA BERGENSIS   Moderate     Resulting Agency [facility]              Specimen Collected: 08/03/23 12:27 Last Resulted: 08/07/23 12:27           8/1/23  Patient is here for ED f/u of cellulitis 7/30/23. Had L foot & ankle pain intermittently x 4 days before presentation, as well as an ulcer distal hallux w/ purulence. Placed on Cleocin for 10 days & Bactroban. Xrays taken. Using a bandaid. Still having pain medial ankle but redness in improved. Pain level 8/10 L medial ankle > hallux since Sun.  Last in this clinic 7/13/23 w/ blister L great toe; stated was trying to break in a new pair of shoes. Also calluses plantar 1st & 3rd met.head R. Calluses were debrided along w/ adjacent 1st met.head blister. Decompressed along  w/ superficial blister distal L hallux. Nails were also reduced. Gelstrap was dispensed for arch support.  7/13/23  Georgina is a 52 y.o. female who presented new to this clinic almost a year ago. [facility] is applying for SSI.  Noticed blister 2 days ago L great toe. Thinks discoloration is starting a blister R foot also. Tried to break in a pair of shoes. Has calluses always. Now on dialysis - getting ready for home HD. Has fistula in place.    PCP: Alonso Ling MD  [address] [facility]  Date Last Seen by PCP: 6/12/23    Started home dialysis - doing well w/ nursing assist through this Friday, after which she will be able to manage on her own.   Georgina has a past medical history of Hyperlipidemia, Hypertension, Peptic ulcer disease, Peripheral artery disease, PONV (postoperative nausea and vomiting), Stage IV CKD, and Type II diabetes mellitus.   Dx DM 1995. Neuropathy after about 10 yrs.   PAD Nov 2006, DVT RLE & stent placement both times; most recent around March/ April 2008.  Waiting on bariatric preop to get BMI down as candidate for kidney transplant.  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, with other ophthalmic complication    Ureteral stone with hydronephrosis    Acute kidney injury superimposed on CKD    Nephrolithiasis    Pre-operative examination    Acute respiratory failure with hypoxia and hypercapnia    Elevated troponin    Pneumonia due to COVID-19 virus    Hypertensive emergency    Lactic acidosis    Hyperkalemia    Metabolic acidosis    CHF (congestive heart failure)    Class 3 severe obesity due to excess calories with serious comorbidity and body mass index (BMI) of 40.0 to 44.9 in adult     States in clinic 7.4% last visit, from 7.8%;  4/2023 - 8.4%  Hemoglobin A1C    Date Value Ref Range Status   08/28/2018 7.9 (H) 4.0 - 5.6 % Final     Comment:     ADA Screening Guidelines:  5.7-6.4%  Consistent with prediabetes  >or=6.5%  Consistent with diabetes  High levels of fetal hemoglobin interfere with the HbA1C  assay. Heterozygous hemoglobin variants (HbS, HgC, etc)do  not significantly interfere with this assay.   However, presence of multiple variants may affect accuracy.     03/24/2015 9.9 (H) 4.5 - 6.2 % Final   10/30/2014 8.6 (H) 4.5 - 6.2 % Final      Objective:       X-Ray Toe 2 or More Views Left  Narrative: EXAMINATION:  XR TOE 2 OR MORE VIEWS LEFT    CLINICAL HISTORY:  postop;    TECHNIQUE:  Three views of the left toes were performed    COMPARISON:  None.    FINDINGS:  The patient is status post amputation of the distal aspect of the distal phalanx of the great toe.  Surgical changes are identified.  The remainder of the bones are intact.  There is no evidence for dislocation.  No bony erosions are identified.  No radiopaque soft tissue foreign bodies are evident.  Impression: Postsurgical changes related to resection of the distal aspect of the distal phalanx of the left great toe.    Electronically signed by: Gio Snyder MD  Date:    08/03/2023  Time:    13:20  I independently reviewed the Xray images. As expected clean resection distal tuft L hallux.     X-Ray Foot Complete Left  Order: [id number]  Status: Final result     Visible to patient: Yes (seen)     Next appt: 08/22/2023 at 01:00 PM in Podiatry (Aleida Flannery DPM)     Dx: Left foot pain     0 Result Notes  Details  Reading Physician Reading Date Result Priority   Orlando Lewis MD  [phone] 7/30/2023 STAT     Narrative & Impression  EXAMINATION:  XR FOOT COMPLETE 3 VIEW LEFT     CLINICAL HISTORY:  .  Pain in left foot     TECHNIQUE:  AP, lateral and oblique views of the left foot were performed.     COMPARISON:  None     FINDINGS:  Four views are submitted.     No acute fracture, subluxation or dislocation.   No mass or foreign body.  No significant arthropathy.     Impression:     No acute radiographic abnormality.       Electronically signed by: Orlando Mckenzie  Date:                                            07/30/2023  Time:                                           16:10   I independently reviewed the Xray images. Suspicious for distal tuft hallux lytic changes.    -LE arterial ultrasound 10/17/22 significant for 50-70% stenosis R CFA, atherosclerotic plaques & monophasic flow noted throughout BLE.  -prior [facility] recods document R SFA stent in 2009 & angio in 2013 w/ percutaneous transluminal angioplasty of R common iliac artery.   -previously on plavix   -followed by cardiology      Physical Exam   Constitutional: She is oriented to person, place, and time. She appears obese. She is cooperative. No distress.   Cardiovascular:   Pulses:       Dorsalis pedis pulses are 2+ on the right side and 2+ on the left side.   Mild non-pitting edema RLE especially lower leg, ankle & foot medially > L.      Musculoskeletal:      Right lower leg: Edema present.      Left lower leg: Edema present.   Neurological: She is alert and oriented to person, place, and time. She displays no weakness. A sensory deficit is present. Gait abnormal. Abnormal coordination: Surgical shoe L.  Epicritic sensation grossly diminished B/L including sharp/dull sensation; light touch absent.     Numbness & paresthesias (tingling UE & LE, & loss of  hands x 10+ yrs.) & sensory change. No clearly identified trigger or source; no hyperemia.   Skin: Skin is warm and dry. Capillary refill takes less than 2 seconds. No bruising noted. There is erythema (improved; localized to 1st MPJ distally L).   PO distal hallux L - resolution of erythema to the dorsal foot with only remaining localized to the 1st MP joint distally.  Sutures intact across the amputation site but distal necrosis of skin flap.  No active drainage nor purulence noted; no malodor. No TTP d/t  neuropathy.   Psychiatric: Her behavior is normal. Affect normal. Her mood appears anxious.   Vitals reviewed.     Assessment:      Encounter Diagnoses   Name Primary?    Postoperative follow-up     Status post amputation of great toe, left     Osteomyelitis of great toe of left foot     Cellulitis of left foot     Abscess of tendon of left foot     Posterior tibial tendinitis, left     Chronic pain of left ankle Yes    Gangrene of toe of left foot     MSSA (methicillin susceptible Staphylococcus aureus)     DM type 2 with diabetic peripheral neuropathy        Problem List Items Addressed This Visit    None  Visit Diagnoses       Chronic pain of left ankle    -  Primary    Postoperative follow-up        Status post amputation of great toe, left        Osteomyelitis of great toe of left foot        Relevant Medications    sodium chloride 0.9% flush 10 mL (Start on 8/25/2023 12:00 AM)    doxycycline (VIBRAMYCIN) 100 MG Cap    Other Relevant Orders    Diet NPO    POCT glucose    Case Request Operating Room: AMPUTATION, TOE hallux, possible 1st met.head (Completed)    Cellulitis of left foot        Abscess of tendon of left foot        Relevant Medications    sodium chloride 0.9% flush 10 mL (Start on 8/25/2023 12:00 AM)    doxycycline (VIBRAMYCIN) 100 MG Cap    Other Relevant Orders    Diet NPO    POCT glucose    Case Request Operating Room: AMPUTATION, TOE hallux, possible 1st met.head (Completed)    Posterior tibial tendinitis, left        Gangrene of toe of left foot        Relevant Orders    Diet NPO    POCT glucose    Case Request Operating Room: AMPUTATION, TOE hallux, possible 1st met.head (Completed)    MSSA (methicillin susceptible Staphylococcus aureus)        Relevant Medications    doxycycline (VIBRAMYCIN) 100 MG Cap    DM type 2 with diabetic peripheral neuropathy              Plan:       Georgina was seen today for poet op.    Diagnoses and all orders for this visit:    Chronic pain of left  ankle    Postoperative follow-up    Status post amputation of great toe, left    Osteomyelitis of great toe of left foot  -     Diet NPO; Standing  -     sodium chloride 0.9% flush 10 mL  -     POCT glucose; Standing  -     Case Request Operating Room: AMPUTATION, TOE hallux, possible 1st met.head  -     Diet NPO  -     doxycycline (VIBRAMYCIN) 100 MG Cap; Take 1 capsule (100 mg total) by mouth 2 (two) times daily.    Cellulitis of left foot    Abscess of tendon of left foot  -     Diet NPO; Standing  -     sodium chloride 0.9% flush 10 mL  -     POCT glucose; Standing  -     Case Request Operating Room: AMPUTATION, TOE hallux, possible 1st met.head  -     Diet NPO  -     doxycycline (VIBRAMYCIN) 100 MG Cap; Take 1 capsule (100 mg total) by mouth 2 (two) times daily.    Posterior tibial tendinitis, left    Gangrene of toe of left foot  -     Vital signs, per unit routine ; Standing  -     Diet NPO; Standing  -     POCT glucose; Standing  -     Case Request Operating Room: AMPUTATION, TOE hallux, possible 1st met.head  -     Place in Outpatient; Standing  -     Diet NPO    MSSA (methicillin susceptible Staphylococcus aureus)  -     doxycycline (VIBRAMYCIN) 100 MG Cap; Take 1 capsule (100 mg total) by mouth 2 (two) times daily.    DM type 2 with diabetic peripheral neuropathy    Rx referral to cardiology for further intervention LLE (previously, as above RLE stenting only) written @ last visit in this clinic 8/11/23 - scheduled [facility] w/ Sabina Hagen MD 9/11/23.    Refill doxycycline 100 mg b.i.d. for total of 6 weeks, given osteo.& suspected retained osteo.proximal phalanx L.    1st ray dressing change including ChloraPrep followed by wet-to-dry Betadine gauze dressing which patient is to keep CDI.  Continue ambulation WBAT in surgical shoe.    Discuss treatment options considering concern for remaining osteomyelitis proximal phalanx as well as gangrene distal amputation stump.    Recommend return to OR for  completion amp.@ 1st MP joint and/ or including partial 1st met. W/ repeat I & D L medial column.  Still pending possible peripheral vascular intervention to enhance healing (as above)    Once healed, Rx DM shoes w/ accommodative inserts B/L & prosthetic filler for L 1st ray.    Will incorporate reinforced arches for PT tendinitis in the above-mentioned diabetic shoe inserts.        A total of 43 mins.was spent on chart review, patient visit & documentation.

## 2023-08-14 NOTE — TELEPHONE ENCOUNTER
----- Message from Cristian Ren sent at 8/14/2023  4:05 PM CDT -----  Regarding: Out of stock medication/ alternantive  Contact: [phone]  [facility] pharmacy states they do not have rx tramadol-acetaminophen 37.5-325 mg (ULTRACET) 37.5-325 mg Tab in stock and wants to know if it can be switched to the regular tramadol. Please call to discuss    [facility] [place] [address] [id number]  Phone: [phone] Fax: [phone]

## 2023-08-14 NOTE — TELEPHONE ENCOUNTER
Spoke with patient. She was informed the pharmacy has been called and informed the Rx is being changed from the Ultracet to Ultram. Pharmacist confirmed the new order was received and the previous order has been canceled. Patient verbalized understanding. No further issue discussed.

## 2023-08-14 NOTE — TELEPHONE ENCOUNTER
----- Message from Teressa Dill sent at 8/14/2023  4:10 PM CDT -----  Regarding: Request RX Option  Patient states the Pharmacy is out of stock on the  tramadol-acetaminophen 37.5-325 mg (ULTRACET) 37.5-325 mg Tab. The pharmacy does have the regular tramadol ,  Pt wants to know if you can prescribe something else because she is in pain. Thanks

## 2023-08-22 ENCOUNTER — OFFICE VISIT (OUTPATIENT)
Dept: PODIATRY | Facility: CLINIC | Age: 53
End: 2023-08-22
Payer: MEDICARE

## 2023-08-22 VITALS
WEIGHT: 231.81 LBS | SYSTOLIC BLOOD PRESSURE: 157 MMHG | HEART RATE: 83 BPM | BODY MASS INDEX: 36.38 KG/M2 | DIASTOLIC BLOOD PRESSURE: 69 MMHG | HEIGHT: 67 IN

## 2023-08-22 DIAGNOSIS — M25.572 CHRONIC PAIN OF LEFT ANKLE: Primary | ICD-10-CM

## 2023-08-22 DIAGNOSIS — L03.116 CELLULITIS OF LEFT FOOT: ICD-10-CM

## 2023-08-22 DIAGNOSIS — M65.072: ICD-10-CM

## 2023-08-22 DIAGNOSIS — Z09 POSTOPERATIVE FOLLOW-UP: ICD-10-CM

## 2023-08-22 DIAGNOSIS — G89.29 CHRONIC PAIN OF LEFT ANKLE: Primary | ICD-10-CM

## 2023-08-22 DIAGNOSIS — M76.822 POSTERIOR TIBIAL TENDINITIS, LEFT: ICD-10-CM

## 2023-08-22 DIAGNOSIS — Z89.412 STATUS POST AMPUTATION OF GREAT TOE, LEFT: ICD-10-CM

## 2023-08-22 DIAGNOSIS — M86.9 OSTEOMYELITIS OF GREAT TOE OF LEFT FOOT: ICD-10-CM

## 2023-08-22 DIAGNOSIS — E11.42 DM TYPE 2 WITH DIABETIC PERIPHERAL NEUROPATHY: ICD-10-CM

## 2023-08-22 DIAGNOSIS — I96 GANGRENE OF TOE OF LEFT FOOT: ICD-10-CM

## 2023-08-22 DIAGNOSIS — A49.01 MSSA (METHICILLIN SUSCEPTIBLE STAPHYLOCOCCUS AUREUS): ICD-10-CM

## 2023-08-22 PROCEDURE — 99999 PR PBB SHADOW E&M-EST. PATIENT-LVL V: CPT | Mod: PBBFAC,,, | Performed by: PODIATRIST

## 2023-08-22 PROCEDURE — 3008F BODY MASS INDEX DOCD: CPT | Mod: CPTII,S$GLB,, | Performed by: PODIATRIST

## 2023-08-22 PROCEDURE — 1159F PR MEDICATION LIST DOCUMENTED IN MEDICAL RECORD: ICD-10-PCS | Mod: CPTII,S$GLB,, | Performed by: PODIATRIST

## 2023-08-22 PROCEDURE — 3078F PR MOST RECENT DIASTOLIC BLOOD PRESSURE < 80 MM HG: ICD-10-PCS | Mod: CPTII,S$GLB,, | Performed by: PODIATRIST

## 2023-08-22 PROCEDURE — 1159F MED LIST DOCD IN RCRD: CPT | Mod: CPTII,S$GLB,, | Performed by: PODIATRIST

## 2023-08-22 PROCEDURE — 99024 PR POST-OP FOLLOW-UP VISIT: ICD-10-PCS | Mod: S$GLB,,, | Performed by: PODIATRIST

## 2023-08-22 PROCEDURE — 99024 POSTOP FOLLOW-UP VISIT: CPT | Mod: S$GLB,,, | Performed by: PODIATRIST

## 2023-08-22 PROCEDURE — 99213 PR OFFICE/OUTPT VISIT, EST, LEVL III, 20-29 MIN: ICD-10-PCS | Mod: 24,S$GLB,, | Performed by: PODIATRIST

## 2023-08-22 PROCEDURE — 3077F SYST BP >= 140 MM HG: CPT | Mod: CPTII,S$GLB,, | Performed by: PODIATRIST

## 2023-08-22 PROCEDURE — 3078F DIAST BP <80 MM HG: CPT | Mod: CPTII,S$GLB,, | Performed by: PODIATRIST

## 2023-08-22 PROCEDURE — 3008F PR BODY MASS INDEX (BMI) DOCUMENTED: ICD-10-PCS | Mod: CPTII,S$GLB,, | Performed by: PODIATRIST

## 2023-08-22 PROCEDURE — 99999 PR PBB SHADOW E&M-EST. PATIENT-LVL V: ICD-10-PCS | Mod: PBBFAC,,, | Performed by: PODIATRIST

## 2023-08-22 PROCEDURE — 99213 OFFICE O/P EST LOW 20 MIN: CPT | Mod: 24,S$GLB,, | Performed by: PODIATRIST

## 2023-08-22 PROCEDURE — 3077F PR MOST RECENT SYSTOLIC BLOOD PRESSURE >= 140 MM HG: ICD-10-PCS | Mod: CPTII,S$GLB,, | Performed by: PODIATRIST

## 2023-08-22 RX ORDER — SODIUM CHLORIDE 0.9 % (FLUSH) 0.9 %
10 SYRINGE (ML) INJECTION
Status: SHIPPED | OUTPATIENT
Start: 2023-08-25

## 2023-08-22 RX ORDER — DOXYCYCLINE 100 MG/1
100 CAPSULE ORAL 2 TIMES DAILY
Qty: 84 CAPSULE | Refills: 0 | Status: SHIPPED | OUTPATIENT
Start: 2023-08-22 | End: 2023-10-03

## 2023-09-01 DIAGNOSIS — Z09 POSTOPERATIVE FOLLOW-UP: ICD-10-CM

## 2023-09-01 RX ORDER — TRAMADOL HYDROCHLORIDE 50 MG/1
50 TABLET ORAL EVERY 8 HOURS PRN
Qty: 20 TABLET | Refills: 0 | Status: CANCELLED | OUTPATIENT
Start: 2023-09-01

## 2023-09-03 NOTE — TELEPHONE ENCOUNTER
As I tell all my patients preop & have told , I do not prescribe narcotic pain meds.after 1st wk.postop. Also, she has signed pain contract.  That is why she was provided an increase to her Gabapentin dose for 3 wks. If she would like this can be refilled OR she can check w/ her PCP.  I doubt pain is postop given nature of procedure. Either neuropathic OR, more likely,  vascular & she is due to be seen 9/11. Hopefully, that will help.

## 2023-09-05 ENCOUNTER — OFFICE VISIT (OUTPATIENT)
Dept: PODIATRY | Facility: CLINIC | Age: 53
End: 2023-09-05
Payer: MEDICARE

## 2023-09-05 ENCOUNTER — PATIENT MESSAGE (OUTPATIENT)
Dept: PEDIATRICS | Facility: CLINIC | Age: 53
End: 2023-09-05
Payer: MEDICARE

## 2023-09-05 VITALS
WEIGHT: 240 LBS | SYSTOLIC BLOOD PRESSURE: 134 MMHG | HEIGHT: 67 IN | BODY MASS INDEX: 37.67 KG/M2 | HEART RATE: 92 BPM | DIASTOLIC BLOOD PRESSURE: 72 MMHG

## 2023-09-05 DIAGNOSIS — Z89.412 STATUS POST AMPUTATION OF GREAT TOE, LEFT: ICD-10-CM

## 2023-09-05 DIAGNOSIS — I73.9 PAD (PERIPHERAL ARTERY DISEASE): Primary | ICD-10-CM

## 2023-09-05 DIAGNOSIS — I73.9 PVD (PERIPHERAL VASCULAR DISEASE): ICD-10-CM

## 2023-09-05 DIAGNOSIS — T87.89 NON-HEALING AMPUTATION SITE: ICD-10-CM

## 2023-09-05 PROCEDURE — 99024 PR POST-OP FOLLOW-UP VISIT: ICD-10-PCS | Mod: S$GLB,,, | Performed by: PODIATRIST

## 2023-09-05 PROCEDURE — 3008F BODY MASS INDEX DOCD: CPT | Mod: CPTII,S$GLB,, | Performed by: PODIATRIST

## 2023-09-05 PROCEDURE — 3051F PR MOST RECENT HEMOGLOBIN A1C LEVEL 7.0 - < 8.0%: ICD-10-PCS | Mod: CPTII,S$GLB,, | Performed by: PODIATRIST

## 2023-09-05 PROCEDURE — 99024 POSTOP FOLLOW-UP VISIT: CPT | Mod: S$GLB,,, | Performed by: PODIATRIST

## 2023-09-05 PROCEDURE — 3008F PR BODY MASS INDEX (BMI) DOCUMENTED: ICD-10-PCS | Mod: CPTII,S$GLB,, | Performed by: PODIATRIST

## 2023-09-05 PROCEDURE — 3075F PR MOST RECENT SYSTOLIC BLOOD PRESS GE 130-139MM HG: ICD-10-PCS | Mod: CPTII,S$GLB,, | Performed by: PODIATRIST

## 2023-09-05 PROCEDURE — 3051F HG A1C>EQUAL 7.0%<8.0%: CPT | Mod: CPTII,S$GLB,, | Performed by: PODIATRIST

## 2023-09-05 PROCEDURE — 3078F DIAST BP <80 MM HG: CPT | Mod: CPTII,S$GLB,, | Performed by: PODIATRIST

## 2023-09-05 PROCEDURE — 3075F SYST BP GE 130 - 139MM HG: CPT | Mod: CPTII,S$GLB,, | Performed by: PODIATRIST

## 2023-09-05 PROCEDURE — 3078F PR MOST RECENT DIASTOLIC BLOOD PRESSURE < 80 MM HG: ICD-10-PCS | Mod: CPTII,S$GLB,, | Performed by: PODIATRIST

## 2023-09-05 PROCEDURE — 99999 PR PBB SHADOW E&M-EST. PATIENT-LVL IV: CPT | Mod: PBBFAC,,, | Performed by: PODIATRIST

## 2023-09-05 PROCEDURE — 99999 PR PBB SHADOW E&M-EST. PATIENT-LVL IV: ICD-10-PCS | Mod: PBBFAC,,, | Performed by: PODIATRIST

## 2023-09-06 ENCOUNTER — TELEPHONE (OUTPATIENT)
Dept: PODIATRY | Facility: CLINIC | Age: 53
End: 2023-09-06
Payer: MEDICARE

## 2023-09-06 NOTE — TELEPHONE ENCOUNTER
Spoke with patient. Stated she saw Dr Armendariz today to discuss circulation issues to her legs. She was informed by that office that she would need to be seen by a provider to potentially schedule her for stents as he did not perform that procedure. She ws informed this message will be forwarded to he provider to review and place a referral to vascular surgery if appropriate. Verbalized understanding. No further issues discussed.

## 2023-09-06 NOTE — TELEPHONE ENCOUNTER
----- Message from Shala Alvarez sent at 9/6/2023  1:05 PM CDT -----  Contact: pt  Pt requesting call back RE:  pt would like to update office on her vas visit       Confirmed contact below:  Contact Name:Georgina Arias  Phone Number: [phone]

## 2023-09-07 ENCOUNTER — TELEPHONE (OUTPATIENT)
Dept: VASCULAR SURGERY | Facility: CLINIC | Age: 53
End: 2023-09-07
Payer: MEDICARE

## 2023-09-07 DIAGNOSIS — I73.9 PERIPHERAL VASCULAR DISEASE: Primary | ICD-10-CM

## 2023-09-07 NOTE — TELEPHONE ENCOUNTER
Received call from pt to reschedule appt with Dr. Maya for a sooner date. States she has a non-healing foot amputation and was seen by Dr. Armendariz at [facility] who states pt needs vascular intervention that he cannot perform and that pt needed to be seen by vascular surgeon at [facility]. States she needs a sooner appt because she is concerned that she may lose her foot if she waits until 9/18/23 to be seen. Appt rescheduled for sooner date, pt confirmed.

## 2023-09-11 ENCOUNTER — HOSPITAL ENCOUNTER (INPATIENT)
Facility: HOSPITAL | Age: 53
LOS: 8 days | Discharge: HOME HEALTH | DRG: 907 | End: 2023-09-19
Attending: SURGERY | Admitting: SURGERY
Payer: MEDICARE

## 2023-09-11 ENCOUNTER — HOSPITAL ENCOUNTER (OUTPATIENT)
Dept: VASCULAR SURGERY | Facility: CLINIC | Age: 53
Discharge: HOME | End: 2023-09-11
Attending: SURGERY
Payer: MEDICARE

## 2023-09-11 ENCOUNTER — INITIAL CONSULT (OUTPATIENT)
Dept: VASCULAR SURGERY | Facility: CLINIC | Age: 53
End: 2023-09-11
Payer: MEDICARE

## 2023-09-11 VITALS
BODY MASS INDEX: 37.72 KG/M2 | DIASTOLIC BLOOD PRESSURE: 99 MMHG | TEMPERATURE: 98 F | HEIGHT: 67 IN | WEIGHT: 240.31 LBS | SYSTOLIC BLOOD PRESSURE: 237 MMHG | HEART RATE: 89 BPM

## 2023-09-11 DIAGNOSIS — E11.65 TYPE 2 DIABETES MELLITUS WITH HYPERGLYCEMIA, WITH LONG-TERM CURRENT USE OF INSULIN: ICD-10-CM

## 2023-09-11 DIAGNOSIS — Z95.828 S/P FEMORAL-FEMORAL BYPASS SURGERY: ICD-10-CM

## 2023-09-11 DIAGNOSIS — S91.302S OPEN WOUND OF LEFT FOOT, SEQUELA: ICD-10-CM

## 2023-09-11 DIAGNOSIS — N18.6 ESRD (END STAGE RENAL DISEASE): ICD-10-CM

## 2023-09-11 DIAGNOSIS — I73.9 PERIPHERAL VASCULAR DISEASE: ICD-10-CM

## 2023-09-11 DIAGNOSIS — E66.01 CLASS 2 SEVERE OBESITY DUE TO EXCESS CALORIES WITH SERIOUS COMORBIDITY AND BODY MASS INDEX (BMI) OF 37.0 TO 37.9 IN ADULT: ICD-10-CM

## 2023-09-11 DIAGNOSIS — S91.302D OPEN WOUND OF LEFT FOOT, SUBSEQUENT ENCOUNTER: ICD-10-CM

## 2023-09-11 DIAGNOSIS — E78.5 HYPERLIPIDEMIA, UNSPECIFIED HYPERLIPIDEMIA TYPE: ICD-10-CM

## 2023-09-11 DIAGNOSIS — S81.802A NON-HEALING WOUND OF LEFT LOWER EXTREMITY: ICD-10-CM

## 2023-09-11 DIAGNOSIS — S91.302A OPEN WOUND OF LEFT FOOT: Primary | ICD-10-CM

## 2023-09-11 DIAGNOSIS — I95.9 HYPOTENSION: ICD-10-CM

## 2023-09-11 DIAGNOSIS — Z79.4 TYPE 2 DIABETES MELLITUS WITH HYPERGLYCEMIA, WITH LONG-TERM CURRENT USE OF INSULIN: ICD-10-CM

## 2023-09-11 DIAGNOSIS — S91.302A WOUND OF LEFT FOOT: Primary | ICD-10-CM

## 2023-09-11 DIAGNOSIS — I73.9 PERIPHERAL VASCULAR DISEASE: Primary | ICD-10-CM

## 2023-09-11 DIAGNOSIS — Z01.818 PRE-OP EXAM: ICD-10-CM

## 2023-09-11 LAB
ALBUMIN SERPL BCP-MCNC: 2.8 G/DL (ref 3.5–5.2)
ALP SERPL-CCNC: 116 U/L (ref 55–135)
ALT SERPL W/O P-5'-P-CCNC: 16 U/L (ref 10–44)
ANION GAP SERPL CALC-SCNC: 18 MMOL/L (ref 8–16)
AST SERPL-CCNC: 19 U/L (ref 10–40)
BASOPHILS # BLD AUTO: 0.04 K/UL (ref 0–0.2)
BASOPHILS NFR BLD: 0.3 % (ref 0–1.9)
BILIRUB SERPL-MCNC: 0.5 MG/DL (ref 0.1–1)
BUN SERPL-MCNC: 51 MG/DL (ref 6–20)
CALCIUM SERPL-MCNC: 8.7 MG/DL (ref 8.7–10.5)
CHLORIDE SERPL-SCNC: 105 MMOL/L (ref 95–110)
CO2 SERPL-SCNC: 18 MMOL/L (ref 23–29)
CREAT SERPL-MCNC: 4.1 MG/DL (ref 0.5–1.4)
DIFFERENTIAL METHOD: ABNORMAL
EOSINOPHIL # BLD AUTO: 0.1 K/UL (ref 0–0.5)
EOSINOPHIL NFR BLD: 1 % (ref 0–8)
ERYTHROCYTE [DISTWIDTH] IN BLOOD BY AUTOMATED COUNT: 16.3 % (ref 11.5–14.5)
EST. GFR  (NO RACE VARIABLE): 12.5 ML/MIN/1.73 M^2
ESTIMATED AVG GLUCOSE: 160 MG/DL (ref 68–131)
GLUCOSE SERPL-MCNC: 213 MG/DL (ref 70–110)
HAV IGM SERPL QL IA: NORMAL
HBA1C MFR BLD: 7.2 % (ref 4–5.6)
HBV CORE IGM SERPL QL IA: NORMAL
HBV CORE IGM SERPL QL IA: NORMAL
HBV SURFACE AG SERPL QL IA: NORMAL
HBV SURFACE AG SERPL QL IA: NORMAL
HCT VFR BLD AUTO: 32.5 % (ref 37–48.5)
HCV AB SERPL QL IA: NORMAL
HGB BLD-MCNC: 10.9 G/DL (ref 12–16)
IMM GRANULOCYTES # BLD AUTO: 0.07 K/UL (ref 0–0.04)
IMM GRANULOCYTES NFR BLD AUTO: 0.5 % (ref 0–0.5)
LYMPHOCYTES # BLD AUTO: 1.2 K/UL (ref 1–4.8)
LYMPHOCYTES NFR BLD: 8.2 % (ref 18–48)
MCH RBC QN AUTO: 32.1 PG (ref 27–31)
MCHC RBC AUTO-ENTMCNC: 33.5 G/DL (ref 32–36)
MCV RBC AUTO: 96 FL (ref 82–98)
MONOCYTES # BLD AUTO: 0.8 K/UL (ref 0.3–1)
MONOCYTES NFR BLD: 5.1 % (ref 4–15)
NEUTROPHILS # BLD AUTO: 12.4 K/UL (ref 1.8–7.7)
NEUTROPHILS NFR BLD: 84.9 % (ref 38–73)
NRBC BLD-RTO: 0 /100 WBC
PLATELET # BLD AUTO: 203 K/UL (ref 150–450)
PMV BLD AUTO: 10.6 FL (ref 9.2–12.9)
POCT GLUCOSE: 187 MG/DL (ref 70–110)
POCT GLUCOSE: 287 MG/DL (ref 70–110)
POTASSIUM SERPL-SCNC: 4.6 MMOL/L (ref 3.5–5.1)
PROT SERPL-MCNC: 6.6 G/DL (ref 6–8.4)
RBC # BLD AUTO: 3.4 M/UL (ref 4–5.4)
SODIUM SERPL-SCNC: 141 MMOL/L (ref 136–145)
WBC # BLD AUTO: 14.61 K/UL (ref 3.9–12.7)

## 2023-09-11 PROCEDURE — 25000003 PHARM REV CODE 250: Performed by: SURGERY

## 2023-09-11 PROCEDURE — 83036 HEMOGLOBIN GLYCOSYLATED A1C: CPT

## 2023-09-11 PROCEDURE — 80074 ACUTE HEPATITIS PANEL: CPT | Performed by: STUDENT IN AN ORGANIZED HEALTH CARE EDUCATION/TRAINING PROGRAM

## 2023-09-11 PROCEDURE — 25000003 PHARM REV CODE 250

## 2023-09-11 PROCEDURE — 11000001 HC ACUTE MED/SURG PRIVATE ROOM

## 2023-09-11 PROCEDURE — 94761 N-INVAS EAR/PLS OXIMETRY MLT: CPT

## 2023-09-11 PROCEDURE — 97597 DBRDMT OPN WND 1ST 20 CM/<: CPT | Mod: S$GLB,,, | Performed by: SURGERY

## 2023-09-11 PROCEDURE — 99205 OFFICE O/P NEW HI 60 MIN: CPT | Mod: 25,S$GLB,, | Performed by: SURGERY

## 2023-09-11 PROCEDURE — 85025 COMPLETE CBC W/AUTO DIFF WBC: CPT

## 2023-09-11 PROCEDURE — 63600175 PHARM REV CODE 636 W HCPCS

## 2023-09-11 PROCEDURE — 63600175 PHARM REV CODE 636 W HCPCS: Performed by: SURGERY

## 2023-09-11 PROCEDURE — 99999 PR PBB SHADOW E&M-EST. PATIENT-LVL IV: ICD-10-PCS | Mod: PBBFAC,,, | Performed by: SURGERY

## 2023-09-11 PROCEDURE — 93923 UPR/LXTR ART STDY 3+ LVLS: CPT | Mod: S$GLB,,, | Performed by: SURGERY

## 2023-09-11 PROCEDURE — 80053 COMPREHEN METABOLIC PANEL: CPT

## 2023-09-11 PROCEDURE — 99205 PR OFFICE/OUTPT VISIT, NEW, LEVL V, 60-74 MIN: ICD-10-PCS | Mod: 25,S$GLB,, | Performed by: SURGERY

## 2023-09-11 PROCEDURE — 93923 PR NON-INVASIVE PHYSIOLOGIC STUDY EXTREMITY 3 LEVELS: ICD-10-PCS | Mod: S$GLB,,, | Performed by: SURGERY

## 2023-09-11 PROCEDURE — 99999 PR PBB SHADOW E&M-EST. PATIENT-LVL IV: CPT | Mod: PBBFAC,,, | Performed by: SURGERY

## 2023-09-11 PROCEDURE — 97597 PR DEBRIDEMENT OPEN WOUND 20 SQ CM<: ICD-10-PCS | Mod: S$GLB,,, | Performed by: SURGERY

## 2023-09-11 RX ORDER — GLUCAGON 1 MG
1 KIT INJECTION
Status: DISCONTINUED | OUTPATIENT
Start: 2023-09-11 | End: 2023-09-16

## 2023-09-11 RX ORDER — IBUPROFEN 200 MG
24 TABLET ORAL
Status: DISCONTINUED | OUTPATIENT
Start: 2023-09-11 | End: 2023-09-16

## 2023-09-11 RX ORDER — MUPIROCIN 20 MG/G
OINTMENT TOPICAL 2 TIMES DAILY
Status: DISPENSED | OUTPATIENT
Start: 2023-09-12 | End: 2023-09-16

## 2023-09-11 RX ORDER — LABETALOL HCL 20 MG/4 ML
20 SYRINGE (ML) INTRAVENOUS EVERY 6 HOURS PRN
Status: DISCONTINUED | OUTPATIENT
Start: 2023-09-11 | End: 2023-09-19 | Stop reason: HOSPADM

## 2023-09-11 RX ORDER — OXYCODONE HYDROCHLORIDE 10 MG/1
10 TABLET ORAL EVERY 6 HOURS PRN
Status: DISCONTINUED | OUTPATIENT
Start: 2023-09-11 | End: 2023-09-14

## 2023-09-11 RX ORDER — IBUPROFEN 200 MG
16 TABLET ORAL
Status: DISCONTINUED | OUTPATIENT
Start: 2023-09-11 | End: 2023-09-16

## 2023-09-11 RX ORDER — OXYCODONE HYDROCHLORIDE 5 MG/1
5 TABLET ORAL EVERY 6 HOURS PRN
Status: DISCONTINUED | OUTPATIENT
Start: 2023-09-11 | End: 2023-09-14

## 2023-09-11 RX ORDER — CLONIDINE HYDROCHLORIDE 0.1 MG/1
0.1 TABLET ORAL ONCE
Status: COMPLETED | OUTPATIENT
Start: 2023-09-11 | End: 2023-09-11

## 2023-09-11 RX ORDER — HYDRALAZINE HYDROCHLORIDE 20 MG/ML
10 INJECTION INTRAMUSCULAR; INTRAVENOUS EVERY 6 HOURS PRN
Status: DISCONTINUED | OUTPATIENT
Start: 2023-09-11 | End: 2023-09-19 | Stop reason: HOSPADM

## 2023-09-11 RX ORDER — DIPHENHYDRAMINE HCL 25 MG
25 CAPSULE ORAL EVERY 6 HOURS PRN
Status: DISCONTINUED | OUTPATIENT
Start: 2023-09-11 | End: 2023-09-19 | Stop reason: HOSPADM

## 2023-09-11 RX ORDER — ATORVASTATIN CALCIUM 40 MG/1
80 TABLET, FILM COATED ORAL DAILY
Status: DISCONTINUED | OUTPATIENT
Start: 2023-09-12 | End: 2023-09-19 | Stop reason: HOSPADM

## 2023-09-11 RX ORDER — SODIUM CHLORIDE 0.9 % (FLUSH) 0.9 %
10 SYRINGE (ML) INJECTION
Status: DISCONTINUED | OUTPATIENT
Start: 2023-09-11 | End: 2023-09-19 | Stop reason: HOSPADM

## 2023-09-11 RX ORDER — INSULIN ASPART 100 [IU]/ML
0-10 INJECTION, SOLUTION INTRAVENOUS; SUBCUTANEOUS
Status: DISCONTINUED | OUTPATIENT
Start: 2023-09-11 | End: 2023-09-14

## 2023-09-11 RX ORDER — TALC
6 POWDER (GRAM) TOPICAL NIGHTLY PRN
Status: DISCONTINUED | OUTPATIENT
Start: 2023-09-11 | End: 2023-09-11

## 2023-09-11 RX ORDER — SODIUM CHLORIDE 9 MG/ML
INJECTION, SOLUTION INTRAVENOUS ONCE
Status: COMPLETED | OUTPATIENT
Start: 2023-09-12 | End: 2023-09-12

## 2023-09-11 RX ORDER — HEPARIN SODIUM 1000 [USP'U]/ML
1000 INJECTION, SOLUTION INTRAVENOUS; SUBCUTANEOUS
Status: ACTIVE | OUTPATIENT
Start: 2023-09-12 | End: 2023-09-12

## 2023-09-11 RX ORDER — NIFEDIPINE 30 MG/1
30 TABLET, EXTENDED RELEASE ORAL DAILY
Status: DISCONTINUED | OUTPATIENT
Start: 2023-09-12 | End: 2023-09-15

## 2023-09-11 RX ORDER — LIDOCAINE HYDROCHLORIDE 10 MG/ML
1 INJECTION, SOLUTION EPIDURAL; INFILTRATION; INTRACAUDAL; PERINEURAL ONCE AS NEEDED
Status: DISCONTINUED | OUTPATIENT
Start: 2023-09-11 | End: 2023-09-19 | Stop reason: HOSPADM

## 2023-09-11 RX ORDER — GABAPENTIN 300 MG/1
300 CAPSULE ORAL 3 TIMES DAILY
Status: DISCONTINUED | OUTPATIENT
Start: 2023-09-11 | End: 2023-09-12

## 2023-09-11 RX ORDER — ACETAMINOPHEN 500 MG
1000 TABLET ORAL EVERY 8 HOURS
Status: DISCONTINUED | OUTPATIENT
Start: 2023-09-11 | End: 2023-09-19 | Stop reason: HOSPADM

## 2023-09-11 RX ORDER — ONDANSETRON 8 MG/1
8 TABLET, ORALLY DISINTEGRATING ORAL EVERY 8 HOURS PRN
Status: DISCONTINUED | OUTPATIENT
Start: 2023-09-11 | End: 2023-09-13

## 2023-09-11 RX ADMIN — ACETAMINOPHEN 1000 MG: 500 TABLET ORAL at 06:09

## 2023-09-11 RX ADMIN — GABAPENTIN 300 MG: 300 CAPSULE ORAL at 09:09

## 2023-09-11 RX ADMIN — VANCOMYCIN HYDROCHLORIDE 2000 MG: 500 INJECTION, POWDER, LYOPHILIZED, FOR SOLUTION INTRAVENOUS at 07:09

## 2023-09-11 RX ADMIN — PIPERACILLIN SODIUM AND TAZOBACTAM SODIUM 4.5 G: 4; .5 INJECTION, POWDER, FOR SOLUTION INTRAVENOUS at 11:09

## 2023-09-11 RX ADMIN — OXYCODONE HYDROCHLORIDE 10 MG: 10 TABLET ORAL at 06:09

## 2023-09-11 RX ADMIN — CLONIDINE HYDROCHLORIDE 0.1 MG: 0.1 TABLET ORAL at 07:09

## 2023-09-11 RX ADMIN — MUPIROCIN: 20 OINTMENT TOPICAL at 11:09

## 2023-09-11 RX ADMIN — INSULIN ASPART 3 UNITS: 100 INJECTION, SOLUTION INTRAVENOUS; SUBCUTANEOUS at 09:09

## 2023-09-11 NOTE — PLAN OF CARE
Pt arrived to room  via wheelchair Pt rates pain as tolerable . Pt denies nausea. Pt denies numbness and tinglng. . Pt dressing clean , dry and intact . Pt oriented to room  And call system . Will continue to monitor .

## 2023-09-11 NOTE — PROGRESS NOTES
VASCULAR SURGERY NOTE    Patient ID: Georgina Arias is a 52 y.o. female.    I. HISTORY     Chief Complaint: Non-healing L toe amputation    HPI: Georgina Arias is a 52 y.o. female who is here today for new patient initial appointment. She has undergone 3 surgeries on her L foot in August and most recently underwent an amputation of the L hallux at the [facility] on 8/25/23 for reported gangrene and osteomyelitis. The incision site is not healing appropriately and she was referred to vascular surgery for possible intervention.     She has reportedly had several angiograms with stents placed in her RLE in 2006, 2007 and 2015. She denies having any problems with her LLE until this foot wound. She says it started as a blister at the end of July and progressively worsened. Reports a history of claudication symptoms on occasion but usually her arthritis limits her ability to walk long distances.     She is currently on doxycycline. She does not take ASA/plavix/eliquis.   She has a history of DVTs. She is on HD T/Th/S.     Past Medical History:   Diagnosis Date    Hyperlipidemia     Hypertension     Peptic ulcer disease     Peripheral artery disease     PONV (postoperative nausea and vomiting)     Stage IV CKD     Type II diabetes mellitus         Past Surgical History:   Procedure Laterality Date    AV FISTULA PLACEMENT Right     CHOLECYSTECTOMY      CYSTOSCOPY W/ URETERAL STENT PLACEMENT Right 08/28/2018    Procedure: CYSTOSCOPY, WITH URETERAL STENT INSERTION (ADD ON );  Surgeon: Bubba Perez MD;  Location: [facility] OR;  Service: Urology;  Laterality: Right;  (ADD ON )    DEBRIDEMENT OF FOOT Left 08/03/2023    Procedure: DEBRIDEMENT, FOOT;  Surgeon: Aleida Flannery DPM;  Location: [facility] OR;  Service: Podiatry;  Laterality: Left;    Excisional debridement of ulcer distal great toe left foot w/ partial resection distal phalanx Left 08/03/2023    I&D L 1st ray w/ amputation L hallux IPJ Left 08/14/2023    Injection L PT  tendon sheath Left 2023    Nail avulsion left hallux Left 2023    PERIPHERAL ARTERIAL STENT GRAFT Right     REMOVAL OF NAIL OF DIGIT Left 2023    Procedure: REMOVAL, NAIL, DIGIT great toe;  Surgeon: Aleida Flannery DPM;  Location: [facility] OR;  Service: Podiatry;  Laterality: Left;    TOE AMPUTATION Left 2023    Procedure: AMPUTATION, TOE hallux IPJ;  Surgeon: Aleida Flannery DPM;  Location: [facility] OR;  Service: Podiatry;  Laterality: Left;    TOE AMPUTATION Left 2023    great toe    TOE AMPUTATION Left 2023    Procedure: AMPUTATION, TOE hallux, possible 1st met.head;  Surgeon: Aleida Flannery DPM;  Location: [facility] OR;  Service: Podiatry;  Laterality: Left;    URETEROSCOPIC REMOVAL OF URETERIC CALCULUS Right 2018    Procedure: EXTRACTION-STONE-URETEROSCOPY;  Surgeon: Bubba Perez MD;  Location: [facility] OR;  Service: Urology;  Laterality: Right;       Social History     Tobacco Use   Smoking Status Former    Current packs/day: 0.00    Types: Cigarettes    Quit date: 2006    Years since quittin.1   Smokeless Tobacco Never        Review of Systems   Constitutional: Negative for chills and fever.   Skin:  Positive for color change and poor wound healing.   Gastrointestinal:  Negative for nausea and vomiting.   All other systems reviewed and are negative.        II. PHYSICAL EXAM     Physical Exam  Constitutional:       Appearance: Normal appearance.   Cardiovascular:      Rate and Rhythm: Normal rate.      Comments: No palpable pedal pulses  Pulmonary:      Effort: Pulmonary effort is normal.   Skin:     Comments: Foul smelling purulent output at incision site of L foot with sutures in place   Neurological:      Mental Status: She is alert.     Left Foot Wound debridement:  Procedure note   No anesthesia required-neuropathy  Location-outpatient clinic 5 floor      - 3x5cm incision site of L foot was debrided in clinic  - 3 sutures removed   - Necrotic tissue debrided sharply ...5x3  cm total area   - Probed to bone  Wound packed w aquacel silver     III. ASSESSMENT & PLAN (MEDICAL DECISION MAKING)     Imaging Results:  CAMILA 9/11/23:  R L   0.69 0.46       Assessment/Diagnosis and Plan:  52 y.o. female who is here with a non-healing L foot wound s/p L great toe amputation.    - Admit to inpatient for IV abx  - Plan for angiogram 9/13  - Will consult podiatry for possible debridement of L foot wound

## 2023-09-11 NOTE — PROGRESS NOTES
Nurses Note -- 4 Eyes      9/11/2023   6:53 PM      Skin assessed during: Admit      [] No Altered Skin Integrity Present    []Prevention Measures Documented      [x] Yes- Altered Skin Integrity Present or Discovered   [] LDA Added if Not in Epic (Describe Wound)   [] New Altered Skin Integrity was Present on Admit and Documented in LDA   [] Wound Image Taken    **Patient here for non-healing left foot wound present on admit. Podiatry consulted to care for this wound.    Wound Care Consulted? No    Attending Nurse:   Marissa Cee LPN    Second RN/Staff Member:  Valerie Reddy RN

## 2023-09-11 NOTE — PROGRESS NOTES
Subjective:      Patient ID: Georgina Arias is a 52 y.o. female.    Chief Complaint: Post-op Evaluation    Patient is here again PO L hallux w/ amp.@[facility] as well as I&D 1st ray L, d/t infection flexor tendon. DOS 8/25/23. Previous I&D L great toe/ [facility] w/ [facility] level amp, DOS 8/14/23. Initial ulcer excisio w/ debridement distal tuft hallux, DOS 8/11/23.  Still pending vascular consult tomorrow (outside provider). Having ischemic pain. Pain level 9/10. On Gabapentin - will need to d/w nephro as to wherther she can increase doseage from 300mg tid prn.   8/22/23  Patient is PO I&D L great toe & long flexor tendon L foot, as well as amp.[facility] d/t ischemic ulcer w/ suspected abscess/ osteomyelitis; DOS 8/14/23. Also had requested injection for painful medial ankle/TP tendon L - while under anesthesia for surgery. On Doxycycline 100mg bid pending C&S aerobic & anaerobic from intraop culture.  Less pain than previous PO.  Is also PO excision ulcer L hallux w/ avulsion of hallux nail plate & resection distal tuft of hallux; DOS 08/03/2023.  Patient had called later DOS for c/o cough w/ 'blood clots' - was advised to call PCP or UC. Had US LE veins B/L 8/14/23 d/t leg swelling & h/o DVT. Just dryness from the O2 likely caused it as it does w/allergies that cause nosebleeds but wanted to make sure.    Specimen to Pathology, Surgery Other  Order: [id number]  Status: Final result     Visible to patient: Yes (not seen)     Next appt: 09/05/2023 at 04:00 PM in Podiatry (Aleida Flannery DPM)     0 Result Notes      Component 8 d ago   Final Pathologic Diagnosis RELIAPATH DIAGNOSIS:     LEFT BIG TOE, AMPUTATION:   - Benign skin showing epidermal necrosis and necrotic soft tissue.   - Underlying benign bone showing acute osteomyelitis.   - Skin and soft tissue margin showing focal acute inflammation.   - Bone resection margin showing acute osteomyelitis.     Aida Bond M.D.     Report attached.     Performing site:   [facility]    [address] [place] [address]     &quot;Disclaimer:  This case diagnosis was rendered completely by the outside consultation pathologist and the case is electronically signed by an [facility] pathologist listed below solely to release the report into the medical record.&quot;    Comment: Interp By Carlos Tucker MD, Signed on 08/22/2023 at 15:04   Disclaimer Unless the case is a 'gross only' or additional testing only, the final diagnosis for each specimen is based on a microscopic examination of appropriate tissue sections.   Resulting Agency [facility]      Given results indicating possible remaining osteomyelitis, as w/ x-rays, will have patient on antibiotics for 6 weeks.    Culture, Anaerobic  Order: [id number]  Status: Final result     Visible to patient: Yes (not seen)     Next appt: 08/22/2023 at 01:00 PM in Podiatry (Aleida Flannery DPM)     Specimen Information: Toe, Left Foot; Wound   0 Result Notes      Component 7 d ago   Anaerobic Culture  Abnormal   PEPTONIPHILUS SPECIES   Moderate   Further identified as peptoniphilus coxii     Anaerobic Culture  Abnormal   PREVOTELLA BERGENSIS   Many     Anaerobic Culture  Abnormal   PREVOTELLA (B.) MELANINOGENICA   Many     Resulting Agency [facility]              Narrative  Performed by: [facility]  Big toe      Specimen Collected: 08/14/23 12:57 Last Resulted: 08/21/23 12:22           X-Ray Foot Complete Left  Narrative: EXAMINATION:  XR FOOT COMPLETE 3 VIEW LEFT    CLINICAL HISTORY:  L foot wound;.    TECHNIQUE:  AP, lateral and oblique views of the left foot were performed.    COMPARISON:  08/25/2023.    FINDINGS:  There are postop changes of  left great toe amputation with resection at the metatarsophalangeal joint.  There is soft tissue edema, ulceration, and gas of the great toe amputation stump.  There is osteopenia and erosive change of the medial aspect of the great toe metatarsal head and neck, compatible with acute osteomyelitis.  Remaining osseous  structures are intact.  There are vascular calcifications.  Impression: Acute osteomyelitis of the great toe metatarsal head and neck.    Electronically signed by: Jamshid Coleman  Date:    09/11/2023  Time:    17:42   I independently reviewed the Xray images. Amp.@[facility] w/ no sign of bone changes to met.head.    US Lower Extremity Veins Bilateral  Narrative: EXAMINATION:  US LOWER EXTREMITY VEINS BILATERAL    CLINICAL HISTORY:  Other specified soft tissue disorders    TECHNIQUE:  Duplex and color flow Doppler and dynamic compression was performed of the bilateral lower extremity veins was performed.    COMPARISON:  None    FINDINGS:  Right thigh veins: The common femoral, femoral, popliteal, upper greater saphenous, and deep femoral veins are patent and free of thrombus. The veins are normally compressible and have normal phasic flow and augmentation response.    Right calf veins: The visualized calf veins are patent.    Left thigh veins: The common femoral, femoral, popliteal, upper greater saphenous, and deep femoral veins are patent and free of thrombus. The veins are normally compressible and have normal phasic flow and augmentation response.    Left calf veins: The visualized calf veins are patent.    Miscellaneous: None  Impression: No evidence of deep venous thrombosis in either lower extremity.    Electronically signed by: Jamshid Coleman  Date:    08/14/2023    Aerobic culture  Order: [id number]  Status: Final result     Visible to patient: Yes (not seen)     Next appt: 08/21/2023 at 07:30 AM in Radiology ([facility] XR1)     Specimen Information: Toe, Left Foot; Wound   0 Result Notes      Component 4 d ago   Aerobic Bacterial Culture No growth    Resulting Agency [facility]              Narrative  Performed by: [facility]  Big toe      Specimen Collected: 08/14/23 12:57 Last Resulted: 08/18/23 10:03           08/11/2023  Patient is PO excision ulcer distal toe & nail avulsion L hallux. DOS 8/3/23. POD 8 days. Having  significant pain despite Gabapentin 300mg tid (added to her normal dose of 100mg tid). Culture results 8/7/22 w/ MSSA - Abx sent in but patient not able to be reached by phone initially. Was finally notified 8/8/23. Also, advised to come in today as an add on d/t drainage & pain.  States pain level is 8/10 but also dealing w/ home dialysis procedure R arm.    Placed on Doxycycline 100mg bid.   Contains abnormal data Aerobic culture  Order: [id number]  Status: Final result     Visible to patient: Yes (not seen)     Next appt: 08/21/2023 at 07:30 AM in Radiology ([facility] XR1)     Specimen Information: Toe, Left Foot; Bone   0 Result Notes     important suggestion  Newer results are available. Click to view them now.         Component 2 wk ago   Aerobic Bacterial Culture  Abnormal   STAPHYLOCOCCUS AUREUS   Many     Resulting Agency [facility]        Susceptibility     Staphylococcus aureus     CULTURE, AEROBIC  (SPECIFY SOURCE)     Clindamycin <=0.5 mcg/mL Resistant     Erythromycin >4 mcg/mL Resistant     Oxacillin <=0.25 mcg/mL Sensitive     Penicillin 2 mcg/mL Resistant     Tetracycline <=4 mcg/mL Sensitive     Trimeth/Sulfa <=0.5/9.5 m... Sensitive               Linear View         Specimen Collected: 08/03/23 12:27 Last Resulted: 08/07/23 08:55           Contains abnormal data Culture, Anaerobe  Order: [id number]  Status: Final result     Visible to patient: Yes (seen)     Next appt: 08/21/2023 at 07:30 AM in Radiology (\[facility] XR1)     Specimen Information: Toe, Left Foot; Bone   0 Result Notes     important suggestion  Newer results are available. Click to view them now.         Component 2 wk ago   Anaerobic Culture  Abnormal   PEPTONIPHILUS SPECIES   Many   further identified as Peptoniphilus coxii     Anaerobic Culture  Abnormal   PORPHYROMONAS SOMERAE   Moderate     Anaerobic Culture  Abnormal   PREVOTELLA BERGENSIS   Moderate     Resulting Agency [facility]              Specimen Collected: 08/03/23 12:27 Last Resulted:  08/07/23 12:27           8/1/23  Patient is here for ED f/u of cellulitis 7/30/23. Had L foot & ankle pain intermittently x 4 days before presentation, as well as an ulcer distal hallux w/ purulence. Placed on Cleocin for 10 days & Bactroban. Xrays taken. Using a bandaid. Still having pain medial ankle but redness in improved. Pain level 8/10 L medial ankle > hallux since Sun.  Last in this clinic 7/13/23 w/ blister L great toe; stated was trying to break in a new pair of shoes. Also calluses plantar 1st & 3rd met.head R. Calluses were debrided along w/ adjacent 1st met.head blister. Decompressed along w/ superficial blister distal L hallux. Nails were also reduced. Gelstrap was dispensed for arch support.  7/13/23  Georgina is a 52 y.o. female who presented new to this clinic almost a year ago. States is applying for SSI.  Noticed blister 2 days ago L great toe. Thinks discoloration is starting a blister R foot also. Tried to break in a pair of shoes. Has calluses always. Now on dialysis - getting ready for home HD. Has fistula in place.    PCP: Alonso Ling MD  [address] [facility]  Date Last Seen by PCP: 6/12/23    Started home dialysis - doing well w/ nursing assist through this Friday, after which she will be able to manage on her own.   Georgina has a past medical history of Hyperlipidemia, Hypertension, Peptic ulcer disease, Peripheral artery disease, PONV (postoperative nausea and vomiting), Stage IV CKD, and Type II diabetes mellitus.   Dx DM 1995. Neuropathy after about 10 yrs.   PAD Nov 2006, DVT RLE & stent placement both times; most recent around March/ April 2008.  Waiting on bariatric preop to get BMI down as candidate for kidney transplant.  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, with other ophthalmic complication    Ureteral stone with hydronephrosis    Acute kidney injury superimposed on CKD    Nephrolithiasis    Pre-operative examination    Acute respiratory failure with hypoxia and hypercapnia    Elevated troponin    Pneumonia due to COVID-19 virus    Hypertensive emergency    Lactic acidosis    Hyperkalemia    Metabolic acidosis    CHF (congestive heart failure)    Class 3 severe obesity due to excess calories with serious comorbidity and body mass index (BMI) of 40.0 to 44.9 in adult     States in clinic 7.4% last visit, from 7.8%;  4/2023 - 8.4%  Hemoglobin A1C   Date Value Ref Range Status   09/11/2023 7.2 (H) 4.0 - 5.6 % Final     Comment:     ADA Screening Guidelines:  5.7-6.4%  Consistent with prediabetes  >or=6.5%  Consistent with diabetes    High levels of fetal hemoglobin interfere with the HbA1C  assay. Heterozygous hemoglobin variants (HbS, HgC, etc)do  not significantly interfere with this assay.   However, presence of multiple variants may affect accuracy.     08/28/2018 7.9 (H) 4.0 - 5.6 % Final     Comment:     ADA Screening Guidelines:  5.7-6.4%  Consistent with prediabetes  >or=6.5%  Consistent with diabetes  High levels of fetal hemoglobin interfere with the HbA1C  assay. Heterozygous hemoglobin variants (HbS, HgC, etc)do  not significantly interfere with this assay.   However, presence of multiple variants may affect accuracy.     03/24/2015 9.9 (H) 4.5 - 6.2 % Final      Objective:       X-Ray Toe 2 or More Views Left  Narrative: EXAMINATION:  XR TOE 2 OR MORE VIEWS LEFT    CLINICAL HISTORY:  postop;    TECHNIQUE:  Three views of the left toes were performed    COMPARISON:  None.    FINDINGS:  The patient is status post amputation of the distal aspect of the distal phalanx of the great toe.  Surgical changes are identified.  The remainder of the bones are intact.  There is no evidence for dislocation.  No bony erosions are identified.  No radiopaque soft  tissue foreign bodies are evident.  Impression: Postsurgical changes related to resection of the distal aspect of the distal phalanx of the left great toe.    Electronically signed by: Gio Snyder MD  Date:    08/03/2023  Time:    13:20  I independently reviewed the Xray images. As expected clean resection distal tuft L hallux.     X-Ray Foot Complete Left  Order: [id number]  Status: Final result     Visible to patient: Yes (seen)     Next appt: 08/22/2023 at 01:00 PM in Podiatry (Aleida Flannery DPM)     Dx: Left foot pain     0 Result Notes  Details  Reading Physician Reading Date Result Priority   Orlando Lewis MD  [phone] 7/30/2023 STAT     Narrative & Impression  EXAMINATION:  XR FOOT COMPLETE 3 VIEW LEFT     CLINICAL HISTORY:  .  Pain in left foot     TECHNIQUE:  AP, lateral and oblique views of the left foot were performed.     COMPARISON:  None     FINDINGS:  Four views are submitted.     No acute fracture, subluxation or dislocation.  No mass or foreign body.  No significant arthropathy.     Impression:     No acute radiographic abnormality.       Electronically signed by: Orlando Lewis  Date:                                            07/30/2023  Time:                                           16:10   I independently reviewed the Xray images. Suspicious for distal tuft hallux lytic changes.    -LE arterial ultrasound 10/17/22 significant for 50-70% stenosis R CFA, atherosclerotic plaques & monophasic flow noted throughout BLE.  -prior [facility] recods document R SFA stent in 2009 & angio in 2013 w/ percutaneous transluminal angioplasty of R common iliac artery.   -previously on plavix   -followed by cardiology    Physical Exam   Constitutional: She is oriented to person, place, and time. She appears well-developed and obese. She is cooperative.   Cardiovascular:   Pulses:       Dorsalis pedis pulses are 2+ on the right side and 1+ on the left side.   Non-pitting edema RLE > L.      Musculoskeletal:       Right lower leg: Edema present.      Left lower leg: Edema present.   Neurological: She is alert and oriented to person, place, and time. She displays no weakness. A sensory deficit is present. Gait (surgical shoe L) abnormal.   Epicritic sensation grossly diminished B/L including sharp/dull sensation; light touch absent.     Numbness & paresthesias (tingling UE & LE, & loss of  hands x 10+ yrs.) & sensory change. No clearly identified trigger or source; no hyperemia.   Skin: Skin is warm and dry. Capillary refill takes less than 2 seconds. No bruising noted. No erythema (improved distal 1st met.L).   PO distal hallux L - resolution of erythema to the dorsal foot localized to dorsal flap 1st met.heady.  Sutures intact across the amputation site w/ packing intact; removal of packing w/out evidence of purulence nor malodor.  No necrosis of distal skin flap but dusky skin edges. No active drainage noted.    Psychiatric: Her behavior is normal. Affect normal. Her mood appears anxious.   Vitals reviewed.     Assessment:      Encounter Diagnoses   Name Primary?    PAD (peripheral artery disease) Yes    Non-healing amputation site     Status post amputation of great toe, left     PVD (peripheral vascular disease)        Problem List Items Addressed This Visit    None  Visit Diagnoses       PAD (peripheral artery disease)    -  Primary    Relevant Orders    Ambulatory referral/consult to Vascular Surgery    Non-healing amputation site        Relevant Orders    Ambulatory referral/consult to Vascular Surgery    Status post amputation of great toe, left        PVD (peripheral vascular disease)              Plan:       Georgina was seen today for post-op evaluation.    Diagnoses and all orders for this visit:    PAD (peripheral artery disease)  -     Ambulatory referral/consult to Vascular Surgery; Future    Non-healing amputation site  -     Ambulatory referral/consult to Vascular Surgery; Future    Status post amputation  of great toe, left    PVD (peripheral vascular disease)    Rx referral to cardiology for further intervention LLE (previously, as above RLE stenting only) written @ last visit in this clinic 8/11/23 - scheduled [facility] w/ Sabina Hagen MD 9/11/23.    Packing pulled.   Wound cleaned w/ Chloraprep.  Steristrips applied to area of removed packing. Nitrobid paste applied proximal to flap.  Betadine gauze dressing applied which patient is to keep CDI.  Continue ambulation WBAT in surgical shoe.    Still pending peripheral vascular consult & intervention.    F/u 1 wk., sooner prn.        A total of 20 mins.was spent on chart review, patient visit & documentation.        Patient called back 9/6/23 re:update on her visit w/ cardiolovascular. Stated she saw Dr Armendariz @ [facility] today to discuss circulation issues to her legs. She was informed by that office that she would need to be seen by a provider to potentially schedule her for stents as he did not perform that procedure. Requesting referral to peripheral vascular surgery. Scheduled for 9/18/23 w/ Dr. Maya.  Patient called 9/7/23 requesting sooner appt.d/t concern for risk of further loss if postponed. Moved to 9/11/23.

## 2023-09-12 ENCOUNTER — ANESTHESIA EVENT (OUTPATIENT)
Dept: SURGERY | Facility: HOSPITAL | Age: 53
DRG: 907 | End: 2023-09-12
Payer: MEDICARE

## 2023-09-12 PROBLEM — E66.01 SEVERE OBESITY (BMI >= 40): Status: ACTIVE | Noted: 2023-09-12

## 2023-09-12 PROBLEM — N18.6 ESRD (END STAGE RENAL DISEASE): Status: ACTIVE | Noted: 2023-09-12

## 2023-09-12 LAB
ALBUMIN SERPL BCP-MCNC: 2.5 G/DL (ref 3.5–5.2)
ALP SERPL-CCNC: 99 U/L (ref 55–135)
ALT SERPL W/O P-5'-P-CCNC: 13 U/L (ref 10–44)
ANION GAP SERPL CALC-SCNC: 17 MMOL/L (ref 8–16)
AST SERPL-CCNC: 25 U/L (ref 10–40)
BASOPHILS # BLD AUTO: 0.03 K/UL (ref 0–0.2)
BASOPHILS NFR BLD: 0.3 % (ref 0–1.9)
BILIRUB SERPL-MCNC: 0.4 MG/DL (ref 0.1–1)
BUN SERPL-MCNC: 53 MG/DL (ref 6–20)
CALCIUM SERPL-MCNC: 9.1 MG/DL (ref 8.7–10.5)
CHLORIDE SERPL-SCNC: 103 MMOL/L (ref 95–110)
CO2 SERPL-SCNC: 19 MMOL/L (ref 23–29)
CREAT SERPL-MCNC: 4.2 MG/DL (ref 0.5–1.4)
DIFFERENTIAL METHOD: ABNORMAL
EOSINOPHIL # BLD AUTO: 0.2 K/UL (ref 0–0.5)
EOSINOPHIL NFR BLD: 1.7 % (ref 0–8)
ERYTHROCYTE [DISTWIDTH] IN BLOOD BY AUTOMATED COUNT: 16.5 % (ref 11.5–14.5)
EST. GFR  (NO RACE VARIABLE): 12.1 ML/MIN/1.73 M^2
GLUCOSE SERPL-MCNC: 165 MG/DL (ref 70–110)
GRAM STN SPEC: NORMAL
GRAM STN SPEC: NORMAL
HCT VFR BLD AUTO: 31.7 % (ref 37–48.5)
HGB BLD-MCNC: 9.9 G/DL (ref 12–16)
IMM GRANULOCYTES # BLD AUTO: 0.03 K/UL (ref 0–0.04)
IMM GRANULOCYTES NFR BLD AUTO: 0.3 % (ref 0–0.5)
LYMPHOCYTES # BLD AUTO: 1.6 K/UL (ref 1–4.8)
LYMPHOCYTES NFR BLD: 16.6 % (ref 18–48)
MAGNESIUM SERPL-MCNC: 2.1 MG/DL (ref 1.6–2.6)
MCH RBC QN AUTO: 31.5 PG (ref 27–31)
MCHC RBC AUTO-ENTMCNC: 31.2 G/DL (ref 32–36)
MCV RBC AUTO: 101 FL (ref 82–98)
MONOCYTES # BLD AUTO: 0.4 K/UL (ref 0.3–1)
MONOCYTES NFR BLD: 4.6 % (ref 4–15)
NEUTROPHILS # BLD AUTO: 7.2 K/UL (ref 1.8–7.7)
NEUTROPHILS NFR BLD: 76.5 % (ref 38–73)
NRBC BLD-RTO: 0 /100 WBC
PHOSPHATE SERPL-MCNC: 4.8 MG/DL (ref 2.7–4.5)
PLATELET # BLD AUTO: 196 K/UL (ref 150–450)
PMV BLD AUTO: 10.3 FL (ref 9.2–12.9)
POCT GLUCOSE: 133 MG/DL (ref 70–110)
POCT GLUCOSE: 156 MG/DL (ref 70–110)
POCT GLUCOSE: 171 MG/DL (ref 70–110)
POCT GLUCOSE: 184 MG/DL (ref 70–110)
POTASSIUM SERPL-SCNC: 4.3 MMOL/L (ref 3.5–5.1)
PROT SERPL-MCNC: 6.8 G/DL (ref 6–8.4)
RBC # BLD AUTO: 3.14 M/UL (ref 4–5.4)
SODIUM SERPL-SCNC: 139 MMOL/L (ref 136–145)
WBC # BLD AUTO: 9.41 K/UL (ref 3.9–12.7)

## 2023-09-12 PROCEDURE — 25000003 PHARM REV CODE 250: Performed by: STUDENT IN AN ORGANIZED HEALTH CARE EDUCATION/TRAINING PROGRAM

## 2023-09-12 PROCEDURE — 63600175 PHARM REV CODE 636 W HCPCS: Performed by: STUDENT IN AN ORGANIZED HEALTH CARE EDUCATION/TRAINING PROGRAM

## 2023-09-12 PROCEDURE — 80053 COMPREHEN METABOLIC PANEL: CPT

## 2023-09-12 PROCEDURE — 85025 COMPLETE CBC W/AUTO DIFF WBC: CPT

## 2023-09-12 PROCEDURE — 84100 ASSAY OF PHOSPHORUS: CPT

## 2023-09-12 PROCEDURE — 87015 SPECIMEN INFECT AGNT CONCNTJ: CPT

## 2023-09-12 PROCEDURE — 80202 ASSAY OF VANCOMYCIN: CPT | Performed by: SURGERY

## 2023-09-12 PROCEDURE — 87116 MYCOBACTERIA CULTURE: CPT

## 2023-09-12 PROCEDURE — 36415 COLL VENOUS BLD VENIPUNCTURE: CPT | Performed by: SURGERY

## 2023-09-12 PROCEDURE — 99024 PR POST-OP FOLLOW-UP VISIT: ICD-10-PCS | Mod: ,,, | Performed by: PODIATRIST

## 2023-09-12 PROCEDURE — 83735 ASSAY OF MAGNESIUM: CPT

## 2023-09-12 PROCEDURE — 87102 FUNGUS ISOLATION CULTURE: CPT

## 2023-09-12 PROCEDURE — 25000003 PHARM REV CODE 250

## 2023-09-12 PROCEDURE — 63600175 PHARM REV CODE 636 W HCPCS

## 2023-09-12 PROCEDURE — 87076 CULTURE ANAEROBE IDENT EACH: CPT

## 2023-09-12 PROCEDURE — 80100016 HC MAINTENANCE HEMODIALYSIS

## 2023-09-12 PROCEDURE — 87070 CULTURE OTHR SPECIMN AEROBIC: CPT

## 2023-09-12 PROCEDURE — 87206 SMEAR FLUORESCENT/ACID STAI: CPT

## 2023-09-12 PROCEDURE — 99024 POSTOP FOLLOW-UP VISIT: CPT | Mod: ,,, | Performed by: PODIATRIST

## 2023-09-12 PROCEDURE — 36415 COLL VENOUS BLD VENIPUNCTURE: CPT

## 2023-09-12 PROCEDURE — 11000001 HC ACUTE MED/SURG PRIVATE ROOM

## 2023-09-12 PROCEDURE — 87205 SMEAR GRAM STAIN: CPT

## 2023-09-12 PROCEDURE — 87075 CULTR BACTERIA EXCEPT BLOOD: CPT

## 2023-09-12 PROCEDURE — 25500020 PHARM REV CODE 255: Performed by: SURGERY

## 2023-09-12 PROCEDURE — 99222 PR INITIAL HOSPITAL CARE,LEVL II: ICD-10-PCS | Mod: GC,,, | Performed by: STUDENT IN AN ORGANIZED HEALTH CARE EDUCATION/TRAINING PROGRAM

## 2023-09-12 PROCEDURE — 99222 1ST HOSP IP/OBS MODERATE 55: CPT | Mod: GC,,, | Performed by: STUDENT IN AN ORGANIZED HEALTH CARE EDUCATION/TRAINING PROGRAM

## 2023-09-12 RX ORDER — HEPARIN SODIUM 1000 [USP'U]/ML
3000 INJECTION, SOLUTION INTRAVENOUS; SUBCUTANEOUS
Status: DISCONTINUED | OUTPATIENT
Start: 2023-09-12 | End: 2023-09-19 | Stop reason: HOSPADM

## 2023-09-12 RX ORDER — GABAPENTIN 300 MG/1
300 CAPSULE ORAL 2 TIMES DAILY
Status: DISCONTINUED | OUTPATIENT
Start: 2023-09-12 | End: 2023-09-15

## 2023-09-12 RX ADMIN — HEPARIN SODIUM 3000 UNITS: 1000 INJECTION, SOLUTION INTRAVENOUS; SUBCUTANEOUS at 08:09

## 2023-09-12 RX ADMIN — IOHEXOL 125 ML: 350 INJECTION, SOLUTION INTRAVENOUS at 04:09

## 2023-09-12 RX ADMIN — INSULIN ASPART 1 UNITS: 100 INJECTION, SOLUTION INTRAVENOUS; SUBCUTANEOUS at 08:09

## 2023-09-12 RX ADMIN — GABAPENTIN 300 MG: 300 CAPSULE ORAL at 08:09

## 2023-09-12 RX ADMIN — INSULIN ASPART 2 UNITS: 100 INJECTION, SOLUTION INTRAVENOUS; SUBCUTANEOUS at 09:09

## 2023-09-12 RX ADMIN — OXYCODONE HYDROCHLORIDE 10 MG: 10 TABLET ORAL at 01:09

## 2023-09-12 RX ADMIN — ACETAMINOPHEN 1000 MG: 500 TABLET ORAL at 01:09

## 2023-09-12 RX ADMIN — MUPIROCIN: 20 OINTMENT TOPICAL at 08:09

## 2023-09-12 RX ADMIN — ACETAMINOPHEN 1000 MG: 500 TABLET ORAL at 06:09

## 2023-09-12 RX ADMIN — PIPERACILLIN SODIUM AND TAZOBACTAM SODIUM 4.5 G: 4; .5 INJECTION, POWDER, FOR SOLUTION INTRAVENOUS at 08:09

## 2023-09-12 RX ADMIN — SODIUM CHLORIDE: 9 INJECTION, SOLUTION INTRAVENOUS at 08:09

## 2023-09-12 RX ADMIN — OXYCODONE HYDROCHLORIDE 10 MG: 10 TABLET ORAL at 08:09

## 2023-09-12 RX ADMIN — ACETAMINOPHEN 1000 MG: 500 TABLET ORAL at 09:09

## 2023-09-12 NOTE — SUBJECTIVE & OBJECTIVE
Medications:  Continuous Infusions:  Scheduled Meds:   sodium chloride 0.9%   Intravenous Once    acetaminophen  1,000 mg Oral Q8H    atorvastatin  80 mg Oral Daily    gabapentin  300 mg Oral TID    mupirocin   Nasal BID    NIFEdipine  30 mg Oral Daily    piperacillin-tazobactam (Zosyn) IV (PEDS and ADULTS) (extended infusion is not appropriate)  4.5 g Intravenous Q12H     PRN Meds:dextrose 10%, dextrose 10%, diphenhydrAMINE, glucagon (human recombinant), glucose, glucose, heparin (porcine), hydrALAZINE, insulin aspart U-100, labetalol, LIDOcaine (PF) 10 mg/ml (1%), ondansetron, oxyCODONE, oxyCODONE, sodium chloride 0.9%, sodium chloride 0.9%, Pharmacy to dose Vancomycin consult **AND** vancomycin - pharmacy to dose     Objective:     Vital Signs (Most Recent):  Temp: 96.3 °F (35.7 °C) (09/12/23 0437)  Pulse: 80 (09/12/23 0437)  Resp: 16 (09/12/23 0437)  BP: (!) 180/79 (09/12/23 0437)  SpO2: 98 % (09/12/23 0437) Vital Signs (24h Range):  Temp:  [96.3 °F (35.7 °C)-97.9 °F (36.6 °C)] 96.3 °F (35.7 °C)  Pulse:  [] 80  Resp:  [16-18] 16  SpO2:  [93 %-100 %] 98 %  BP: (147-237)/() 180/79          Physical Exam  Constitutional:       Appearance: Normal appearance.   HENT:      Head: Normocephalic and atraumatic.      Mouth/Throat:      Mouth: Mucous membranes are moist.   Eyes:      Extraocular Movements: Extraocular movements intact.   Cardiovascular:      Rate and Rhythm: Normal rate.      Comments: Bilateral femorals not palpable  L biphasic AT  L monophasic DP  L monophasic PT  Pulmonary:      Effort: Pulmonary effort is normal.   Abdominal:      Palpations: Abdomen is soft.   Musculoskeletal:         General: Normal range of motion.   Skin:     General: Skin is warm and dry.      Comments: Foul smelling purulent output at incision site of L foot with sutures in place    Neurological:      Mental Status: She is alert and oriented to person, place, and time.   Psychiatric:         Mood and Affect: Mood  normal.         Behavior: Behavior normal.          Significant Labs:  CBC:   Recent Labs   Lab 09/12/23  0304   WBC 9.41   RBC 3.14*   HGB 9.9*   HCT 31.7*      *   MCH 31.5*   MCHC 31.2*     CMP:   Recent Labs   Lab 09/12/23  0304   *   CALCIUM 9.1   ALBUMIN 2.5*   PROT 6.8      K 4.3   CO2 19*      BUN 53*   CREATININE 4.2*   ALKPHOS 99   ALT 13   AST 25   BILITOT 0.4       Significant Diagnostics:  I have reviewed all pertinent imaging results/findings within the past 24 hours.

## 2023-09-12 NOTE — PLAN OF CARE
Patient rested well, no complaints of pain during the night, pt remains free of injury, encouraged to call for assistance when oob, patient verbalized understanding, limited ivf infused, limb alert band placed of av fistula arm, blood sugars ranged from 156-287, correction dose given as written, will continue to monitor.      Problem: Adult Inpatient Plan of Care  Goal: Plan of Care Review  Outcome: Ongoing, Progressing     Problem: Diabetes Comorbidity  Goal: Blood Glucose Level Within Targeted Range  Outcome: Ongoing, Progressing     Problem: Fluid and Electrolyte Imbalance (Acute Kidney Injury/Impairment)  Goal: Fluid and Electrolyte Balance  Outcome: Ongoing, Progressing     Problem: Fall Injury Risk  Goal: Absence of Fall and Fall-Related Injury  Outcome: Ongoing, Progressing

## 2023-09-12 NOTE — ASSESSMENT & PLAN NOTE
Georgina Arias is a 52 y.o. female with ESRD, HTN, PAD, DM2, who is here today for non-healing L foot wound s/p L great toe amputation on 8/25/23.    - Admit to vascular surgery  - Nephrology consulted for HD. Appreciate assistance  - Podiatry consulted. Appreciate recs.  - Start IV abx  - prn pain meds  - Plan for angio later this week

## 2023-09-12 NOTE — SUBJECTIVE & OBJECTIVE
"Facility-Administered Medications Prior to Admission   Medication Dose Route Frequency Provider Last Rate Last Admin    sodium chloride 0.9% flush 10 mL  10 mL Intravenous PRN Lo, Aleida G, DPM        sodium chloride 0.9% flush 10 mL  10 mL Intravenous PRN Lo, Aleida G, DPM        sodium chloride 0.9% flush 10 mL  10 mL Intravenous PRN Lo, Aleida G, DPM         Medications Prior to Admission   Medication Sig Dispense Refill Last Dose    acetaminophen (TYLENOL) 500 MG tablet Take 2 tablets (1,000 mg total) by mouth every 8 (eight) hours as needed. 60 tablet 0     aspirin (ECOTRIN) 81 MG EC tablet Take 1 tablet (81 mg total) by mouth once daily.       atorvastatin (LIPITOR) 80 MG tablet Take 1 tablet (80 mg total) by mouth once daily. 90 tablet 3     carvediloL (COREG) 25 MG tablet Take 1 tablet (25 mg total) by mouth 2 (two) times daily with meals. 180 tablet 3     COMFORT EZ PEN NEEDLES 32 gauge x 5/32" Ndle SMARTSIG:injection Daily       doxycycline (VIBRAMYCIN) 100 MG Cap Take 1 capsule (100 mg total) by mouth 2 (two) times daily. 84 capsule 0     fluticasone propionate (FLONASE) 50 mcg/actuation nasal spray Fluticasone Propionate 50 MCG/ACT Nasal Suspension QTY: 1 bottle Days: 30 Refills: 5  Written: 20 Patient Instructions: inhale 2 sprays in each nostril once daily       gabapentin (NEURONTIN) 300 MG capsule Take 1 capsule (300 mg total) by mouth 3 (three) times daily. 63 capsule 1     insulin (LANTUS SOLOSTAR U-100 INSULIN) glargine 100 units/mL (3mL) SubQ pen Inject 40 Units into the skin every evening. (Patient taking differently: Inject 50 Units into the skin every evening.)  0     levocetirizine (XYZAL) 5 MG tablet Take 5 mg by mouth.       miscellaneous medical supply (C-TUB) Misc Hearing amplification (BICROS preferred)       MOUNJARO 5 mg/0.5 mL PnIj SMARTSI Milligram(s) SUB-Q Once a Week       NIFEdipine (PROCARDIA-XL) 30 MG (OSM) 24 hr tablet Take 30 mg by mouth.       sevelamer carbonate " (RENVELA) 800 mg Tab Take by mouth.       traMADoL (ULTRAM) 50 mg tablet Take 1 tablet (50 mg total) by mouth every 8 (eight) hours as needed for Pain. 20 tablet 0     TRUE METRIX GLUCOSE TEST STRIP Strp USE TO test THREE TIMES DAILY       TRUEPLUS LANCETS 30 gauge Misc           Review of patient's allergies indicates:   Allergen Reactions    Naproxen Anaphylaxis and Swelling     Hives (skin)^swelling  Hives (skin)^swelling  Hives (skin)^swelling    Sulfamethoxazole-trimethoprim Other (See Comments)     Caused JEROME    Hydrocodone Nausea And Vomiting and Rash    Penicillins Hives     Blisters (skin)^    Adhesive Rash    Hydrocodone-acetaminophen Nausea And Vomiting     Rash (skin)^, Vomiting^         Past Medical History:   Diagnosis Date    Hyperlipidemia     Hypertension     Peptic ulcer disease     Peripheral artery disease     PONV (postoperative nausea and vomiting)     Stage IV CKD     Type II diabetes mellitus      Past Surgical History:   Procedure Laterality Date    AV FISTULA PLACEMENT Right     CHOLECYSTECTOMY      CYSTOSCOPY W/ URETERAL STENT PLACEMENT Right 08/28/2018    Procedure: CYSTOSCOPY, WITH URETERAL STENT INSERTION (ADD ON );  Surgeon: Bubba Perez MD;  Location: [facility] OR;  Service: Urology;  Laterality: Right;  (ADD ON )    DEBRIDEMENT OF FOOT Left 08/03/2023    Procedure: DEBRIDEMENT, FOOT;  Surgeon: Aleida Flannery DPM;  Location: [facility] OR;  Service: Podiatry;  Laterality: Left;    Excisional debridement of ulcer distal great toe left foot w/ partial resection distal phalanx Left 08/03/2023    I&D L 1st ray w/ amputation L hallux IPJ Left 08/14/2023    Injection L PT tendon sheath Left 08/14/2023    Nail avulsion left hallux Left 08/03/2023    PERIPHERAL ARTERIAL STENT GRAFT Right     REMOVAL OF NAIL OF DIGIT Left 08/03/2023    Procedure: REMOVAL, NAIL, DIGIT great toe;  Surgeon: Aleida Flannery DPM;  Location: [facility] OR;  Service: Podiatry;  Laterality: Left;    TOE AMPUTATION Left 08/14/2023     Procedure: AMPUTATION, TOE hallux IPJ;  Surgeon: Aleida Flannery DPM;  Location: [facility] OR;  Service: Podiatry;  Laterality: Left;    TOE AMPUTATION Left 2023    great toe    TOE AMPUTATION Left 2023    Procedure: AMPUTATION, TOE hallux, possible 1st met.head;  Surgeon: Aleida Flannery DPM;  Location: [facility] OR;  Service: Podiatry;  Laterality: Left;    URETEROSCOPIC REMOVAL OF URETERIC CALCULUS Right 2018    Procedure: EXTRACTION-STONE-URETEROSCOPY;  Surgeon: Bubba Perez MD;  Location: [facility] OR;  Service: Urology;  Laterality: Right;     Family History       Problem Relation (Age of Onset)    Cancer Maternal Grandmother    Diabetes Mother, Father    Heart disease Father (37), Maternal Grandmother    Hypertension Mother, Father, Brother          Tobacco Use    Smoking status: Former     Current packs/day: 0.00     Types: Cigarettes     Quit date: 2006     Years since quittin.1    Smokeless tobacco: Never   Substance and Sexual Activity    Alcohol use: Not Currently     Comment: occ    Drug use: No    Sexual activity: Not Currently     Review of Systems   Constitutional:  Negative for chills, fatigue and fever.   Gastrointestinal:  Negative for nausea and vomiting.   Skin:  Positive for color change and wound.   All other systems reviewed and are negative.    Objective:     Vital Signs (Most Recent):  Temp: 97.3 °F (36.3 °C) (23)  Pulse: 106 (23)  Resp: 18 (23)  BP: (!) 151/72 (23)  SpO2: 100 % (23) Vital Signs (24h Range):  Temp:  [96.6 °F (35.9 °C)-97.9 °F (36.6 °C)] 97.3 °F (36.3 °C)  Pulse:  [] 106  Resp:  [17-18] 18  SpO2:  [99 %-100 %] 100 %  BP: (151-237)/() 151/72     Weight: 110 kg (242 lb 8.1 oz)  Body mass index is 37.98 kg/m².      Physical Exam  Constitutional:       Appearance: Normal appearance.   Eyes:      Extraocular Movements: Extraocular movements intact.   Cardiovascular:      Rate and Rhythm: Normal rate.       Comments: Pedal pulses not palpable  Pulmonary:      Effort: Pulmonary effort is normal.   Skin:     Comments: Foul smelling purulent output at incision site of L foot with sutures in place    Neurological:      Mental Status: She is alert and oriented to person, place, and time.          Significant Labs:  BMP:   Recent Labs   Lab 09/11/23  1754   *      K 4.6      CO2 18*   BUN 51*   CREATININE 4.1*   CALCIUM 8.7     CBC:   Recent Labs   Lab 09/11/23  1754   WBC 14.61*   RBC 3.40*   HGB 10.9*   HCT 32.5*      MCV 96   MCH 32.1*   MCHC 33.5     CMP:   Recent Labs   Lab 09/11/23  1754   *   CALCIUM 8.7   ALBUMIN 2.8*   PROT 6.6      K 4.6   CO2 18*      BUN 51*   CREATININE 4.1*   ALKPHOS 116   ALT 16   AST 19   BILITOT 0.5       Significant Diagnostics:  I have reviewed all pertinent imaging results/findings within the past 24 hours.

## 2023-09-12 NOTE — SUBJECTIVE & OBJECTIVE
Scheduled Meds:   acetaminophen  1,000 mg Oral Q8H    atorvastatin  80 mg Oral Daily    gabapentin  300 mg Oral TID    mupirocin   Nasal BID    NIFEdipine  30 mg Oral Daily    piperacillin-tazobactam (Zosyn) IV (PEDS and ADULTS) (extended infusion is not appropriate)  4.5 g Intravenous Q12H     Continuous Infusions:  PRN Meds:dextrose 10%, dextrose 10%, diphenhydrAMINE, glucagon (human recombinant), glucose, glucose, heparin (porcine), heparin (porcine), hydrALAZINE, insulin aspart U-100, labetalol, LIDOcaine (PF) 10 mg/ml (1%), ondansetron, oxyCODONE, oxyCODONE, sodium chloride 0.9%, sodium chloride 0.9%, Pharmacy to dose Vancomycin consult **AND** vancomycin - pharmacy to dose    Review of patient's allergies indicates:   Allergen Reactions    Naproxen Anaphylaxis and Swelling     Hives (skin)^swelling  Hives (skin)^swelling  Hives (skin)^swelling    Sulfamethoxazole-trimethoprim Other (See Comments)     Caused JEROME    Hydrocodone Nausea And Vomiting and Rash    Penicillins Hives     Blisters (skin)^    Adhesive Rash    Hydrocodone-acetaminophen Nausea And Vomiting     Rash (skin)^, Vomiting^          Past Medical History:   Diagnosis Date    Hyperlipidemia     Hypertension     Peptic ulcer disease     Peripheral artery disease     PONV (postoperative nausea and vomiting)     Stage IV CKD     Type II diabetes mellitus      Past Surgical History:   Procedure Laterality Date    AV FISTULA PLACEMENT Right     CHOLECYSTECTOMY      CYSTOSCOPY W/ URETERAL STENT PLACEMENT Right 08/28/2018    Procedure: CYSTOSCOPY, WITH URETERAL STENT INSERTION (ADD ON );  Surgeon: Bubba Perez MD;  Location: [facility] OR;  Service: Urology;  Laterality: Right;  (ADD ON )    DEBRIDEMENT OF FOOT Left 08/03/2023    Procedure: DEBRIDEMENT, FOOT;  Surgeon: Aleida Flannery DPM;  Location: [facility] OR;  Service: Podiatry;  Laterality: Left;    Excisional debridement of ulcer distal great toe left foot w/ partial resection distal phalanx Left  2023    I&D L 1st ray w/ amputation L hallux IPJ Left 2023    Injection L PT tendon sheath Left 2023    Nail avulsion left hallux Left 2023    PERIPHERAL ARTERIAL STENT GRAFT Right     REMOVAL OF NAIL OF DIGIT Left 2023    Procedure: REMOVAL, NAIL, DIGIT great toe;  Surgeon: Aleida Flannery DPM;  Location: [facility] OR;  Service: Podiatry;  Laterality: Left;    TOE AMPUTATION Left 2023    Procedure: AMPUTATION, TOE hallux IPJ;  Surgeon: Aleida Flannery DPM;  Location: [facility] OR;  Service: Podiatry;  Laterality: Left;    TOE AMPUTATION Left 2023    great toe    TOE AMPUTATION Left 2023    Procedure: AMPUTATION, TOE hallux, possible 1st met.head;  Surgeon: Aleida Flannery DPM;  Location: [facility] OR;  Service: Podiatry;  Laterality: Left;    URETEROSCOPIC REMOVAL OF URETERIC CALCULUS Right 2018    Procedure: EXTRACTION-STONE-URETEROSCOPY;  Surgeon: Bubba Perez MD;  Location: [facility] [place] OR;  Service: Urology;  Laterality: Right;       Family History       Problem Relation (Age of Onset)    Cancer Maternal Grandmother    Diabetes Mother, Father    Heart disease Father (37), Maternal Grandmother    Hypertension Mother, Father, Brother          Tobacco Use    Smoking status: Former     Current packs/day: 0.00     Types: Cigarettes     Quit date: 2006     Years since quittin.1    Smokeless tobacco: Never   Substance and Sexual Activity    Alcohol use: Not Currently     Comment: occ    Drug use: No    Sexual activity: Not Currently     Review of Systems   Constitutional:  Negative for chills and fever.   Gastrointestinal:  Negative for nausea and vomiting.   Musculoskeletal:  Positive for gait problem. Negative for arthralgias and joint swelling.   Skin:  Positive for color change and wound.   Psychiatric/Behavioral:  Negative for agitation, behavioral problems and confusion.      Objective:     Vital Signs (Most Recent):  Temp: 98.3 °F (36.8 °C) (23 0749)  Pulse: 85  "(09/12/23 1215)  Resp: 16 (09/12/23 0437)  BP: (!) 140/68 (09/12/23 1215)  SpO2: 98 % (09/12/23 0437) Vital Signs (24h Range):  Temp:  [96.3 °F (35.7 °C)-98.3 °F (36.8 °C)] 98.3 °F (36.8 °C)  Pulse:  [] 85  Resp:  [16-18] 16  SpO2:  [93 %-100 %] 98 %  BP: (140-237)/() 140/68     Weight: 110 kg (242 lb 8.1 oz)  Body mass index is 37.98 kg/m².    Foot Exam    General  Orientation: alert and oriented to person, place, and time       Right Foot/Ankle     Inspection and Palpation  Skin Exam: callus;     Neurovascular  Dorsalis pedis: absent  Posterior tibial: absent  Saphenous nerve sensation: diminished  Tibial nerve sensation: diminished  Superficial peroneal nerve sensation: diminished  Deep peroneal nerve sensation: diminished  Sural nerve sensation: diminished    Comments  Right foot:  Callus noted sub 1st MTPJ.     Left Foot/Ankle      Inspection and Palpation  Skin Exam: drainage, cellulitis, abnormal color, ulcer and erythema;     Neurovascular  Dorsalis pedis: absent  Posterior tibial: absent  Saphenous nerve sensation: diminished  Tibial nerve sensation: diminished  Superficial peroneal nerve sensation: diminished  Deep peroneal nerve sensation: diminished  Sural nerve sensation: diminished    Comments  Left foot:  Wound noted to left great toe amputation site.  Metatarsal head exposed..  Probe to bone positive.  Purulent drainage expressed.  Malodor noted.  Nontender to palpation..  No crepitus noted.  Mild fluctuance noted.  Wound has necrotic dusky tissue noted Diffuse erythema localized to left great toe amputation site.  Swelling noted to left foot.                Laboratory:  A1C:   Recent Labs   Lab 09/11/23  1754   HGBA1C 7.2*     Blood Cultures: No results for input(s): "LABBLOO" in the last 48 hours.  CBC:   Recent Labs   Lab 09/12/23  0304   WBC 9.41   RBC 3.14*   HGB 9.9*   HCT 31.7*      *   MCH 31.5*   MCHC 31.2*     CMP:   Recent Labs   Lab 09/12/23  0304   * " "  CALCIUM 9.1   ALBUMIN 2.5*   PROT 6.8      K 4.3   CO2 19*      BUN 53*   CREATININE 4.2*   ALKPHOS 99   ALT 13   AST 25   BILITOT 0.4     Coagulation: No results for input(s): "PT", "INR", "APTT" in the last 168 hours.  CRP: No results for input(s): "CRP" in the last 168 hours.  ESR: No results for input(s): "SEDRATE" in the last 168 hours.  Prealbumin: No results for input(s): "PREALBUMIN" in the last 48 hours.  Wound Cultures:   Recent Labs   Lab 08/03/23  1227 08/14/23  1257   LABAERO STAPHYLOCOCCUS AUREUS  Many  * No growth     Microbiology Results (last 7 days)       Procedure Component Value Units Date/Time    Aerobic culture [[id number]] Collected: 09/12/23 1158    Order Status: Sent Specimen: Wound from Toe, Left Foot Updated: 09/12/23 1221    Culture, Anaerobic [[id number]] Collected: 09/12/23 1158    Order Status: Sent Specimen: Wound from Toe, Left Foot Updated: 09/12/23 1220    Fungus culture [[id number]] Collected: 09/12/23 1158    Order Status: Sent Specimen: Wound from Toe, Left Foot Updated: 09/12/23 1220    AFB Culture & Smear [[id number]] Collected: 09/12/23 1158    Order Status: Sent Specimen: Wound from Toe, Left Foot Updated: 09/12/23 1220    Gram stain [[id number]] Collected: 09/12/23 1158    Order Status: Sent Specimen: Wound from Toe, Left Foot Updated: 09/12/23 1219          Specimen (24h ago, onward)      None              "

## 2023-09-12 NOTE — ASSESSMENT & PLAN NOTE
Georgina Arias is a 52 y.o. female with ESRD, HTN, PAD, DM2, who is here today for non-healing L foot wound s/p L great toe amputation on 8/25/23.    - Admit to vascular surgery  -Angio scheduled for 9/13  - Nephrology consulted for HD  - Podiatry consulted. Appreciate recs.       -Possible debridement tomorrow in OR  - Start IV abx  - prn pain meds  - CTA with runoff ordered to eval leg vessels, patient does not have palpable femorals   -boots ordered to protect feet

## 2023-09-12 NOTE — CONSULTS
[facility]  Podiatry  Consult Note    Patient Name: Georgina Arias  MRN: [id number]  Admission Date: 9/11/2023  Hospital Length of Stay: 1 days  Attending Physician: Maxx Maya MD  Primary Care Provider: Alonso Ling MD     Inpatient consult to Podiatry  Consult performed by: Ewa Kimball MD  Consult ordered by: Marta Schultz MD  Reason for consult: Left foot wound        Subjective:     History of Present Illness:  52 y.o. female with ESRD, HTN, PAD, DM2, who is admitted for vascualr workup and left great toe amputation wound. Podiatry was consulted for left great toe amputation wound. Patient had left great toe amputation performed by Dr. Flannery on 8/25/2023. Patient says her left great toe had a blister and eventually her left great toe became infected which lead to her toe amputation. Since the surgery patient's wound has not healed and has worsened. Patient admits to noticing exposed bone and drainage to amputation site. Patient admits to usually ambualting in post op shoe since surgery. Patient denies getting her left foot wet since her amputation. Patient denies any current foot pain. Patient denies any current fever chills nausea or vomiting. Patient is stable at time of this visit. Patient has no other pedal complaints at this time.       Scheduled Meds:   acetaminophen  1,000 mg Oral Q8H    atorvastatin  80 mg Oral Daily    gabapentin  300 mg Oral TID    mupirocin   Nasal BID    NIFEdipine  30 mg Oral Daily    piperacillin-tazobactam (Zosyn) IV (PEDS and ADULTS) (extended infusion is not appropriate)  4.5 g Intravenous Q12H     Continuous Infusions:  PRN Meds:dextrose 10%, dextrose 10%, diphenhydrAMINE, glucagon (human recombinant), glucose, glucose, heparin (porcine), heparin (porcine), hydrALAZINE, insulin aspart U-100, labetalol, LIDOcaine (PF) 10 mg/ml (1%), ondansetron, oxyCODONE, oxyCODONE, sodium chloride 0.9%, sodium chloride 0.9%, Pharmacy to dose Vancomycin consult  **AND** vancomycin - pharmacy to dose    Review of patient's allergies indicates:   Allergen Reactions    Naproxen Anaphylaxis and Swelling     Hives (skin)^swelling  Hives (skin)^swelling  Hives (skin)^swelling    Sulfamethoxazole-trimethoprim Other (See Comments)     Caused JEROME    Hydrocodone Nausea And Vomiting and Rash    Penicillins Hives     Blisters (skin)^    Adhesive Rash    Hydrocodone-acetaminophen Nausea And Vomiting     Rash (skin)^, Vomiting^          Past Medical History:   Diagnosis Date    Hyperlipidemia     Hypertension     Peptic ulcer disease     Peripheral artery disease     PONV (postoperative nausea and vomiting)     Stage IV CKD     Type II diabetes mellitus      Past Surgical History:   Procedure Laterality Date    AV FISTULA PLACEMENT Right     CHOLECYSTECTOMY      CYSTOSCOPY W/ URETERAL STENT PLACEMENT Right 08/28/2018    Procedure: CYSTOSCOPY, WITH URETERAL STENT INSERTION (ADD ON );  Surgeon: Bubba Perez MD;  Location: [facility] OR;  Service: Urology;  Laterality: Right;  (ADD ON )    DEBRIDEMENT OF FOOT Left 08/03/2023    Procedure: DEBRIDEMENT, FOOT;  Surgeon: Aleida Flannery DPM;  Location: [facility] OR;  Service: Podiatry;  Laterality: Left;    Excisional debridement of ulcer distal great toe left foot w/ partial resection distal phalanx Left 08/03/2023    I&D L 1st ray w/ amputation L hallux IPJ Left 08/14/2023    Injection L PT tendon sheath Left 08/14/2023    Nail avulsion left hallux Left 08/03/2023    PERIPHERAL ARTERIAL STENT GRAFT Right     REMOVAL OF NAIL OF DIGIT Left 08/03/2023    Procedure: REMOVAL, NAIL, DIGIT great toe;  Surgeon: Aleida Flannery DPM;  Location: [facility] OR;  Service: Podiatry;  Laterality: Left;    TOE AMPUTATION Left 08/14/2023    Procedure: AMPUTATION, TOE hallux IPJ;  Surgeon: Aleida Flannery DPM;  Location: [facility] OR;  Service: Podiatry;  Laterality: Left;    TOE AMPUTATION Left 08/25/2023    great toe    TOE AMPUTATION Left 8/25/2023     Procedure: AMPUTATION, TOE hallux, possible 1st met.head;  Surgeon: Aleida Flannery DPM;  Location: [facility] OR;  Service: Podiatry;  Laterality: Left;    URETEROSCOPIC REMOVAL OF URETERIC CALCULUS Right 2018    Procedure: EXTRACTION-STONE-URETEROSCOPY;  Surgeon: Bubba Perez MD;  Location: [facility] OR;  Service: Urology;  Laterality: Right;       Family History       Problem Relation (Age of Onset)    Cancer Maternal Grandmother    Diabetes Mother, Father    Heart disease Father (37), Maternal Grandmother    Hypertension Mother, Father, Brother          Tobacco Use    Smoking status: Former     Current packs/day: 0.00     Types: Cigarettes     Quit date: 2006     Years since quittin.1    Smokeless tobacco: Never   Substance and Sexual Activity    Alcohol use: Not Currently     Comment: occ    Drug use: No    Sexual activity: Not Currently     Review of Systems   Constitutional:  Negative for chills and fever.   Gastrointestinal:  Negative for nausea and vomiting.   Musculoskeletal:  Positive for gait problem. Negative for arthralgias and joint swelling.   Skin:  Positive for color change and wound.   Psychiatric/Behavioral:  Negative for agitation, behavioral problems and confusion.      Objective:     Vital Signs (Most Recent):  Temp: 98.3 °F (36.8 °C) (23 0749)  Pulse: 85 (23 1215)  Resp: 16 (23 0437)  BP: (!) 140/68 (23 1215)  SpO2: 98 % (23 0437) Vital Signs (24h Range):  Temp:  [96.3 °F (35.7 °C)-98.3 °F (36.8 °C)] 98.3 °F (36.8 °C)  Pulse:  [] 85  Resp:  [16-18] 16  SpO2:  [93 %-100 %] 98 %  BP: (140-237)/() 140/68     Weight: 110 kg (242 lb 8.1 oz)  Body mass index is 37.98 kg/m².    Foot Exam    General  Orientation: alert and oriented to person, place, and time       Right Foot/Ankle     Inspection and Palpation  Skin Exam: callus;     Neurovascular  Dorsalis pedis: absent  Posterior tibial: absent  Saphenous nerve sensation: diminished  Tibial  "nerve sensation: diminished  Superficial peroneal nerve sensation: diminished  Deep peroneal nerve sensation: diminished  Sural nerve sensation: diminished    Comments  Right foot:  Callus noted sub 1st MTPJ.     Left Foot/Ankle      Inspection and Palpation  Skin Exam: drainage, cellulitis, abnormal color, ulcer and erythema;     Neurovascular  Dorsalis pedis: absent  Posterior tibial: absent  Saphenous nerve sensation: diminished  Tibial nerve sensation: diminished  Superficial peroneal nerve sensation: diminished  Deep peroneal nerve sensation: diminished  Sural nerve sensation: diminished    Comments  Left foot:  Wound noted to left great toe amputation site.  Metatarsal head exposed..  Probe to bone positive.  Purulent drainage expressed.  Malodor noted.  Nontender to palpation..  No crepitus noted.  Mild fluctuance noted.  Wound has necrotic dusky tissue noted Diffuse erythema localized to left great toe amputation site.  Swelling noted to left foot.                Laboratory:  A1C:   Recent Labs   Lab 09/11/23  1754   HGBA1C 7.2*     Blood Cultures: No results for input(s): "LABBLOO" in the last 48 hours.  CBC:   Recent Labs   Lab 09/12/23  0304   WBC 9.41   RBC 3.14*   HGB 9.9*   HCT 31.7*      *   MCH 31.5*   MCHC 31.2*     CMP:   Recent Labs   Lab 09/12/23  0304   *   CALCIUM 9.1   ALBUMIN 2.5*   PROT 6.8      K 4.3   CO2 19*      BUN 53*   CREATININE 4.2*   ALKPHOS 99   ALT 13   AST 25   BILITOT 0.4     Coagulation: No results for input(s): "PT", "INR", "APTT" in the last 168 hours.  CRP: No results for input(s): "CRP" in the last 168 hours.  ESR: No results for input(s): "SEDRATE" in the last 168 hours.  Prealbumin: No results for input(s): "PREALBUMIN" in the last 48 hours.  Wound Cultures:   Recent Labs   Lab 08/03/23  1227 08/14/23  1257   LABAERO STAPHYLOCOCCUS AUREUS  Many  * No growth     Microbiology Results (last 7 days)       Procedure Component Value Units " Date/Time    Aerobic culture [[id number]] Collected: 09/12/23 1158    Order Status: Sent Specimen: Wound from Toe, Left Foot Updated: 09/12/23 1221    Culture, Anaerobic [[id number]] Collected: 09/12/23 1158    Order Status: Sent Specimen: Wound from Toe, Left Foot Updated: 09/12/23 1220    Fungus culture [[id number]] Collected: 09/12/23 1158    Order Status: Sent Specimen: Wound from Toe, Left Foot Updated: 09/12/23 1220    AFB Culture & Smear [[id number]] Collected: 09/12/23 1158    Order Status: Sent Specimen: Wound from Toe, Left Foot Updated: 09/12/23 1220    Gram stain [[id number]] Collected: 09/12/23 1158    Order Status: Sent Specimen: Wound from Toe, Left Foot Updated: 09/12/23 1219          Specimen (24h ago, onward)      None                Assessment/Plan:     Orthopedic  * Open wound of left foot  IDSA moderate foot infection left foot.  Status post left great toe amputation.  Patient now has left 1st metatarsal head exposed and probes to bone.  Purulent drainage expressed today. WBC downtrending from 14.61 to 9.41 today.  X-ray has OM like changes noted to the left foot 1st metatarsal head and neck  - patient will likely need left great toe metatarsal head resection and another left foot washout.  Patient verbalized understanding  - vascular surgery following.  They are planning on performing angiogram and possible debridement tomorrow.  Appreciate recommendations  - vascular studies pending  - left foot MRI pending  - wound cultures taken today, pending  - Antibiotic plan per ID  - recommend patient use heel offloading boots while lying in bed  - patient okay to weightbear as tolerated bilaterally in postop shoe to heel touch  - podiatry will follow        Thank you for your consult. I will follow-up with patient. Please contact us if you have any additional questions.    Ewa Kimball DPM PGY-2  Podiatric Medicine & Surgery  [facility]  Secure Chat Preferred  Mobile: [phone]  Pager:  [phone]

## 2023-09-12 NOTE — PROGRESS NOTES
[facility]  Vascular Surgery  Progress Note    Patient Name: Georgina Arias  MRN: [id number]  Admission Date: 9/11/2023  Primary Care Provider: Alonso Ling MD    Subjective:     Interval History: NAEON, patient scheduled for angio tomorrow.  LLE dressing changed on rounds, podiatry following.     Post-Op Info:  Procedure(s) (LRB):  AORTOGRAM, WITH SERIALOGRAPHY (Left)           Medications:  Continuous Infusions:  Scheduled Meds:   sodium chloride 0.9%   Intravenous Once    acetaminophen  1,000 mg Oral Q8H    atorvastatin  80 mg Oral Daily    gabapentin  300 mg Oral TID    mupirocin   Nasal BID    NIFEdipine  30 mg Oral Daily    piperacillin-tazobactam (Zosyn) IV (PEDS and ADULTS) (extended infusion is not appropriate)  4.5 g Intravenous Q12H     PRN Meds:dextrose 10%, dextrose 10%, diphenhydrAMINE, glucagon (human recombinant), glucose, glucose, heparin (porcine), hydrALAZINE, insulin aspart U-100, labetalol, LIDOcaine (PF) 10 mg/ml (1%), ondansetron, oxyCODONE, oxyCODONE, sodium chloride 0.9%, sodium chloride 0.9%, Pharmacy to dose Vancomycin consult **AND** vancomycin - pharmacy to dose     Objective:     Vital Signs (Most Recent):  Temp: 96.3 °F (35.7 °C) (09/12/23 0437)  Pulse: 80 (09/12/23 0437)  Resp: 16 (09/12/23 0437)  BP: (!) 180/79 (09/12/23 0437)  SpO2: 98 % (09/12/23 0437) Vital Signs (24h Range):  Temp:  [96.3 °F (35.7 °C)-97.9 °F (36.6 °C)] 96.3 °F (35.7 °C)  Pulse:  [] 80  Resp:  [16-18] 16  SpO2:  [93 %-100 %] 98 %  BP: (147-237)/() 180/79          Physical Exam  Constitutional:       Appearance: Normal appearance.   HENT:      Head: Normocephalic and atraumatic.      Mouth/Throat:      Mouth: Mucous membranes are moist.   Eyes:      Extraocular Movements: Extraocular movements intact.   Cardiovascular:      Rate and Rhythm: Normal rate.      Comments: Bilateral femorals not palpable  L biphasic AT  L monophasic DP  L monophasic PT  Pulmonary:      Effort:  Pulmonary effort is normal.   Abdominal:      Palpations: Abdomen is soft.   Musculoskeletal:         General: Normal range of motion.   Skin:     General: Skin is warm and dry.      Comments: Foul smelling purulent output at incision site of L foot with sutures in place    Neurological:      Mental Status: She is alert and oriented to person, place, and time.   Psychiatric:         Mood and Affect: Mood normal.         Behavior: Behavior normal.          Significant Labs:  CBC:   Recent Labs   Lab 09/12/23  0304   WBC 9.41   RBC 3.14*   HGB 9.9*   HCT 31.7*      *   MCH 31.5*   MCHC 31.2*     CMP:   Recent Labs   Lab 09/12/23  0304   *   CALCIUM 9.1   ALBUMIN 2.5*   PROT 6.8      K 4.3   CO2 19*      BUN 53*   CREATININE 4.2*   ALKPHOS 99   ALT 13   AST 25   BILITOT 0.4       Significant Diagnostics:  I have reviewed all pertinent imaging results/findings within the past 24 hours.    Assessment/Plan:     Open wound of left foot  Georgina Arias is a 52 y.o. female with ESRD, HTN, PAD, DM2, who is here today for non-healing L foot wound s/p L great toe amputation on 8/25/23.    - Admit to vascular surgery  -Angio scheduled for 9/13  - Nephrology consulted for HD  - Podiatry consulted. Appreciate recs.       -Possible debridement tomorrow in OR  - Start IV abx  - prn pain meds  - CTA with runoff ordered to eval leg vessels, patient does not have palpable femorals   -boots ordered to protect feet         Kellie Mauricio NP  Vascular Surgery  [facility]

## 2023-09-12 NOTE — H&P
[facility]  Vascular Surgery  History and Physical     Patient Name: Georgina Arias  MRN: [id number]  Admission Date: 9/11/2023  Code Status: Full Code   Attending Physician: Maxx Maya MD  Primary Care Physician: Alonso Ling MD    Subjective:     Chief Complaint/Reason for Admission: Non-healing L foot wound    HPI:  Georgina Arias is a 52 y.o. female with ESRD, HTN, PAD, DM2, who is here today for new patient initial appointment. She has undergone 3 surgeries on her L foot in August 2023. Most recently she underwent an amputation of the L hallux at the [facility] on 8/25/23 for reported gangrene and osteomyelitis. The incision site is not healing appropriately and she was referred to vascular surgery for possible intervention.      She has reportedly had several angiograms with stents placed in her RLE in 2006, 2007 and 2015. She denies having any problems or intervention on her LLE previously. She says she developed a small blister on her L foot at the end of July that progressively worsened and ultimately got infected requiring an I&D before eventual amputation. She does not change her wound dressing every day d/t pain and says she has not been followed by wound care.    She is currently on doxycycline. She does not take ASA/plavix/eliquis.   She has a history of DVTs. She is on HD T/Th/S.     CAMILA 9/11/23: R 0.69 L 0.46        Facility-Administered Medications Prior to Admission   Medication Dose Route Frequency Provider Last Rate Last Admin    sodium chloride 0.9% flush 10 mL  10 mL Intravenous PRN Lo, Aleida G, DPM        sodium chloride 0.9% flush 10 mL  10 mL Intravenous PRN Lo, Aleida G, DPM        sodium chloride 0.9% flush 10 mL  10 mL Intravenous PRN Lo, Aleida G, DPM         Medications Prior to Admission   Medication Sig Dispense Refill Last Dose    acetaminophen (TYLENOL) 500 MG tablet Take 2 tablets (1,000 mg total) by mouth every 8 (eight) hours as needed. 60 tablet 0      "aspirin (ECOTRIN) 81 MG EC tablet Take 1 tablet (81 mg total) by mouth once daily.       atorvastatin (LIPITOR) 80 MG tablet Take 1 tablet (80 mg total) by mouth once daily. 90 tablet 3     carvediloL (COREG) 25 MG tablet Take 1 tablet (25 mg total) by mouth 2 (two) times daily with meals. 180 tablet 3     COMFORT EZ PEN NEEDLES 32 gauge x 5/32" Ndle SMARTSIG:injection Daily       doxycycline (VIBRAMYCIN) 100 MG Cap Take 1 capsule (100 mg total) by mouth 2 (two) times daily. 84 capsule 0     fluticasone propionate (FLONASE) 50 mcg/actuation nasal spray Fluticasone Propionate 50 MCG/ACT Nasal Suspension QTY: 1 bottle Days: 30 Refills: 5  Written: 20 Patient Instructions: inhale 2 sprays in each nostril once daily       gabapentin (NEURONTIN) 300 MG capsule Take 1 capsule (300 mg total) by mouth 3 (three) times daily. 63 capsule 1     insulin (LANTUS SOLOSTAR U-100 INSULIN) glargine 100 units/mL (3mL) SubQ pen Inject 40 Units into the skin every evening. (Patient taking differently: Inject 50 Units into the skin every evening.)  0     levocetirizine (XYZAL) 5 MG tablet Take 5 mg by mouth.       miscellaneous medical supply (C-TUB) Misc Hearing amplification (BICROS preferred)       MOUNJARO 5 mg/0.5 mL PnIj SMARTSI Milligram(s) SUB-Q Once a Week       NIFEdipine (PROCARDIA-XL) 30 MG (OSM) 24 hr tablet Take 30 mg by mouth.       sevelamer carbonate (RENVELA) 800 mg Tab Take by mouth.       traMADoL (ULTRAM) 50 mg tablet Take 1 tablet (50 mg total) by mouth every 8 (eight) hours as needed for Pain. 20 tablet 0     TRUE METRIX GLUCOSE TEST STRIP Strp USE TO test THREE TIMES DAILY       TRUEPLUS LANCETS 30 gauge Misc           Review of patient's allergies indicates:   Allergen Reactions    Naproxen Anaphylaxis and Swelling     Hives (skin)^swelling  Hives (skin)^swelling  Hives (skin)^swelling    Sulfamethoxazole-trimethoprim Other (See Comments)     Caused JEROME    Hydrocodone Nausea And " Vomiting and Rash    Penicillins Hives     Blisters (skin)^    Adhesive Rash    Hydrocodone-acetaminophen Nausea And Vomiting     Rash (skin)^, Vomiting^         Past Medical History:   Diagnosis Date    Hyperlipidemia     Hypertension     Peptic ulcer disease     Peripheral artery disease     PONV (postoperative nausea and vomiting)     Stage IV CKD     Type II diabetes mellitus      Past Surgical History:   Procedure Laterality Date    AV FISTULA PLACEMENT Right     CHOLECYSTECTOMY      CYSTOSCOPY W/ URETERAL STENT PLACEMENT Right 08/28/2018    Procedure: CYSTOSCOPY, WITH URETERAL STENT INSERTION (ADD ON );  Surgeon: Bubba Perez MD;  Location: [facility];  Service: Urology;  Laterality: Right;  (ADD ON )    DEBRIDEMENT OF FOOT Left 08/03/2023    Procedure: DEBRIDEMENT, FOOT;  Surgeon: Aleida Flannery DPM;  Location: [facility] OR;  Service: Podiatry;  Laterality: Left;    Excisional debridement of ulcer distal great toe left foot w/ partial resection distal phalanx Left 08/03/2023    I&D L 1st ray w/ amputation L hallux IPJ Left 08/14/2023    Injection L PT tendon sheath Left 08/14/2023    Nail avulsion left hallux Left 08/03/2023    PERIPHERAL ARTERIAL STENT GRAFT Right     REMOVAL OF NAIL OF DIGIT Left 08/03/2023    Procedure: REMOVAL, NAIL, DIGIT great toe;  Surgeon: Aleida Flannery DPM;  Location: [facility] OR;  Service: Podiatry;  Laterality: Left;    TOE AMPUTATION Left 08/14/2023    Procedure: AMPUTATION, TOE hallux IPJ;  Surgeon: Aleida Flannery DPM;  Location: [facility] OR;  Service: Podiatry;  Laterality: Left;    TOE AMPUTATION Left 08/25/2023    great toe    TOE AMPUTATION Left 8/25/2023    Procedure: AMPUTATION, TOE hallux, possible 1st met.head;  Surgeon: Aleida Flannery DPM;  Location: [facility];  Service: Podiatry;  Laterality: Left;    URETEROSCOPIC REMOVAL OF URETERIC CALCULUS Right 09/11/2018    Procedure: EXTRACTION-STONE-URETEROSCOPY;  Surgeon: Bubba Perez MD;  Location: [facility];   Service: Urology;  Laterality: Right;     Family History       Problem Relation (Age of Onset)    Cancer Maternal Grandmother    Diabetes Mother, Father    Heart disease Father (37), Maternal Grandmother    Hypertension Mother, Father, Brother          Tobacco Use    Smoking status: Former     Current packs/day: 0.00     Types: Cigarettes     Quit date: 2006     Years since quittin.1    Smokeless tobacco: Never   Substance and Sexual Activity    Alcohol use: Not Currently     Comment: occ    Drug use: No    Sexual activity: Not Currently     Review of Systems   Constitutional:  Negative for chills, fatigue and fever.   Gastrointestinal:  Negative for nausea and vomiting.   Skin:  Positive for color change and wound.   All other systems reviewed and are negative.    Objective:     Vital Signs (Most Recent):  Temp: 97.3 °F (36.3 °C) (23)  Pulse: 106 (23)  Resp: 18 (23)  BP: (!) 151/72 (23)  SpO2: 100 % (23) Vital Signs (24h Range):  Temp:  [96.6 °F (35.9 °C)-97.9 °F (36.6 °C)] 97.3 °F (36.3 °C)  Pulse:  [] 106  Resp:  [17-18] 18  SpO2:  [99 %-100 %] 100 %  BP: (151-237)/() 151/72     Weight: 110 kg (242 lb 8.1 oz)  Body mass index is 37.98 kg/m².      Physical Exam  Constitutional:       Appearance: Normal appearance.   Eyes:      Extraocular Movements: Extraocular movements intact.   Cardiovascular:      Rate and Rhythm: Normal rate.      Comments: Pedal pulses not palpable  Pulmonary:      Effort: Pulmonary effort is normal.   Skin:     Comments: Foul smelling purulent output at incision site of L foot with sutures in place    Neurological:      Mental Status: She is alert and oriented to person, place, and time.          Significant Labs:  BMP:   Recent Labs   Lab 23  1754   *      K 4.6      CO2 18*   BUN 51*   CREATININE 4.1*   CALCIUM 8.7     CBC:   Recent Labs   Lab 23  1754   WBC 14.61*   RBC 3.40*    HGB 10.9*   HCT 32.5*      MCV 96   MCH 32.1*   MCHC 33.5     CMP:   Recent Labs   Lab 09/11/23  1754   *   CALCIUM 8.7   ALBUMIN 2.8*   PROT 6.6      K 4.6   CO2 18*      BUN 51*   CREATININE 4.1*   ALKPHOS 116   ALT 16   AST 19   BILITOT 0.5       Significant Diagnostics:  I have reviewed all pertinent imaging results/findings within the past 24 hours.    Assessment and Plan:     Open wound of left foot  Georgina Arias is a 52 y.o. female with ESRD, HTN, PAD, DM2, who is here today for non-healing L foot wound s/p L great toe amputation on 8/25/23.    - Admit to vascular surgery  - Nephrology consulted for HD. Appreciate assistance  - Podiatry consulted. Appreciate recs.  - Start IV abx  - prn pain meds  - Plan for angio later this week        Marta Schultz MD  Vascular Surgery  [facility]

## 2023-09-12 NOTE — ASSESSMENT & PLAN NOTE
Outpatient HD Information:  -Dialysis modality: Hemodialysis  -Outpatient HD unit: [facility] (Valerio Bautista MD)  -HD TX days: Tuesday/Thursday/Saturday, duration of treatment: 3-4 hrs  -Last HD TX prior to hospital admission: 09/09/2023  -Dialysis access: RUE AVF   -Residual urine: + RRF  -EDW: 109 kg    Plan/Recommendations:  - iHD today for metabolic clearance and volume management, plan to continue with outpatient schedule every TTS while inpatient  - recommend resuming home dose Renvela 800 mg TIDWM  - renal diet/tube feeds when not NPO   - strict I/O's and daily weights  - daily renal function panels and magnesium levels  - renally all dose medications to eGFR  - avoid gadolinium, fleets, phos-based laxatives, NSAIDs, etc.

## 2023-09-12 NOTE — PROGRESS NOTES
Patient arrived for dialysis; weighed standing. Patient is a TTS chronic dialysis patient. Patient states she needs heparin prior to hd. Dr. Velázquez informed, new order noted. Patient eating breakfast while awaiting heparin. (0840) Heparin given. Patient with buttonholes and sticks self using blunt needles. Venous stick unsuccessful. Sharp 15 g needle used to access venous. [place] secured. Maintenance HD initiated as ordered. Lines secured. VS stable.

## 2023-09-12 NOTE — CONSULTS
[facility]  Nephrology  Consult Note    Patient Name: Georgina Arias  MRN: [id number]  Admission Date: 9/11/2023  Hospital Length of Stay: 1 days  Attending Provider: Maxx Maya MD   Primary Care Physician: Alonso Ling MD  Principal Problem:Open wound of left foot    Inpatient consult to Nephrology  Consult performed by: Alireza Velázquez MD  Consult ordered by: Mrata Schultz MD        Subjective:     HPI: Ms Arias is an obese (BMI ~38) 52-year-old woman with HLD, PAD with multiple prior stents, hypertension, peptic ulcer disease, type 2 diabetes mellitus on insulin (most recent hemoglobin A1c 7.2%), central obesity hypoventilation syndrome, chronic left foot wound s/p x3 surgeries now in August/last month (i.e. I&D, left hallux amputation, etc.) and ESRD secondary to diabetic nephropathy (biopsy proven) on iHD every Tuesday, Thursday & Saturday via RUE AVF who was admitted to [facility] on 9/11 for aforementioned left lower extremity wound. Tentative plans for angiography while inpatient per Vascular Surgery. Podiatry has also been consulted for assistance. Nephrology has been consulted for inpatient RRT needs.    Outpatient HD Information:  -Dialysis modality: Hemodialysis  -Outpatient HD unit: [facility] (Valerio Bautista MD)  -HD TX days: Tuesday/Thursday/Saturday, duration of treatment: 3-4 hrs  -Last HD TX prior to hospital admission: 09/09/2023  -Dialysis access: RUE AVF   -Residual urine: + RRF  -EDW: 109 kg      Past Medical History:   Diagnosis Date    Hyperlipidemia     Hypertension     Peptic ulcer disease     Peripheral artery disease     PONV (postoperative nausea and vomiting)     Stage IV CKD     Type II diabetes mellitus        Past Surgical History:   Procedure Laterality Date    AV FISTULA PLACEMENT Right     CHOLECYSTECTOMY      CYSTOSCOPY W/ URETERAL STENT PLACEMENT Right 08/28/2018    Procedure: CYSTOSCOPY, WITH URETERAL STENT INSERTION (ADD ON );   Surgeon: Bubba Perez MD;  Location: [facility];  Service: Urology;  Laterality: Right;  (ADD ON )    DEBRIDEMENT OF FOOT Left 08/03/2023    Procedure: DEBRIDEMENT, FOOT;  Surgeon: Aleida Flannery DPM;  Location: [facility];  Service: Podiatry;  Laterality: Left;    Excisional debridement of ulcer distal great toe left foot w/ partial resection distal phalanx Left 08/03/2023    I&D L 1st ray w/ amputation L hallux IPJ Left 08/14/2023    Injection L PT tendon sheath Left 08/14/2023    Nail avulsion left hallux Left 08/03/2023    PERIPHERAL ARTERIAL STENT GRAFT Right     REMOVAL OF NAIL OF DIGIT Left 08/03/2023    Procedure: REMOVAL, NAIL, DIGIT great toe;  Surgeon: Aleida Flannery DPM;  Location: [facility];  Service: Podiatry;  Laterality: Left;    TOE AMPUTATION Left 08/14/2023    Procedure: AMPUTATION, TOE hallux IPJ;  Surgeon: Aleida Flannery DPM;  Location: [facility];  Service: Podiatry;  Laterality: Left;    TOE AMPUTATION Left 08/25/2023    great toe    TOE AMPUTATION Left 8/25/2023    Procedure: AMPUTATION, TOE hallux, possible 1st met.head;  Surgeon: Aleida Flannery DPM;  Location: [facility];  Service: Podiatry;  Laterality: Left;    URETEROSCOPIC REMOVAL OF URETERIC CALCULUS Right 09/11/2018    Procedure: EXTRACTION-STONE-URETEROSCOPY;  Surgeon: Bubba Perez MD;  Location: [facility];  Service: Urology;  Laterality: Right;       Review of patient's allergies indicates:   Allergen Reactions    Naproxen Anaphylaxis and Swelling     Hives (skin)^swelling  Hives (skin)^swelling  Hives (skin)^swelling    Sulfamethoxazole-trimethoprim Other (See Comments)     Caused JEROME    Hydrocodone Nausea And Vomiting and Rash    Penicillins Hives     Blisters (skin)^    Adhesive Rash    Hydrocodone-acetaminophen Nausea And Vomiting     Rash (skin)^, Vomiting^       Current Facility-Administered Medications   Medication Frequency    0.9%  NaCl infusion Once    acetaminophen tablet 1,000 mg Q8H    atorvastatin tablet 80 mg  Daily    dextrose 10% bolus 125 mL 125 mL PRN    dextrose 10% bolus 250 mL 250 mL PRN    diphenhydrAMINE capsule 25 mg Q6H PRN    gabapentin capsule 300 mg TID    glucagon (human recombinant) injection 1 mg PRN    glucose chewable tablet 16 g PRN    glucose chewable tablet 24 g PRN    heparin (porcine) injection 1,000 Units PRN    hydrALAZINE injection 10 mg Q6H PRN    insulin aspart U-100 pen 0-10 Units QID (AC + HS) PRN    labetalol 20 mg/4 mL (5 mg/mL) IV syring Q6H PRN    LIDOcaine (PF) 10 mg/ml (1%) injection 10 mg Once PRN    mupirocin 2 % ointment BID    NIFEdipine 24 hr tablet 30 mg Daily    ondansetron disintegrating tablet 8 mg Q8H PRN    oxyCODONE immediate release tablet 5 mg Q6H PRN    oxyCODONE immediate release tablet Tab 10 mg Q6H PRN    piperacillin-tazobactam (ZOSYN) 4.5 g in dextrose 5 % in water (D5W) 100 mL IVPB (MB+) Q12H    sodium chloride 0.9% bolus 250 mL 250 mL PRN    sodium chloride 0.9% flush 10 mL PRN    vancomycin - pharmacy to dose pharmacy to manage frequency     Family History       Problem Relation (Age of Onset)    Cancer Maternal Grandmother    Diabetes Mother, Father    Heart disease Father (37), Maternal Grandmother    Hypertension Mother, Father, Brother          Tobacco Use    Smoking status: Former     Current packs/day: 0.00     Types: Cigarettes     Quit date: 2006     Years since quittin.1    Smokeless tobacco: Never   Substance and Sexual Activity    Alcohol use: Not Currently     Comment: occ    Drug use: No    Sexual activity: Not Currently     Review of Systems   Constitutional:  Negative for activity change, appetite change, chills, diaphoresis, fatigue and fever.   HENT:  Negative for congestion, rhinorrhea, sinus pain and trouble swallowing.    Eyes:  Negative for pain, redness and visual disturbance.   Respiratory:  Negative for cough, shortness of breath and wheezing.    Cardiovascular:  Negative for chest pain, palpitations and  leg swelling.   Gastrointestinal:  Negative for abdominal distention, abdominal pain, blood in stool, constipation, diarrhea, nausea and vomiting.   Genitourinary:  Negative for decreased urine volume, difficulty urinating, dysuria, frequency, hematuria and urgency.        Reports she still makes urine.   Musculoskeletal:  Positive for gait problem and myalgias (left foot). Negative for back pain and neck stiffness.   Skin:  Positive for color change and wound.   Neurological:  Negative for dizziness, tremors, syncope, weakness, light-headedness and headaches.   Psychiatric/Behavioral:  Negative for confusion and sleep disturbance.      Objective:     Vital Signs (Most Recent):  Temp: 96.3 °F (35.7 °C) (09/12/23 0437)  Pulse: 80 (09/12/23 0437)  Resp: 16 (09/12/23 0437)  BP: (!) 180/79 (09/12/23 0437)  SpO2: 98 % (09/12/23 0437) Vital Signs (24h Range):  Temp:  [96.3 °F (35.7 °C)-97.9 °F (36.6 °C)] 96.3 °F (35.7 °C)  Pulse:  [] 80  Resp:  [16-18] 16  SpO2:  [93 %-100 %] 98 %  BP: (147-237)/() 180/79     Weight: 110 kg (242 lb 8.1 oz) (09/11/23 1716)  Body mass index is 37.98 kg/m².  Body surface area is 2.28 meters squared.    No intake/output data recorded.     Physical Exam  Vitals and nursing note reviewed.   Constitutional:       General: She is not in acute distress.     Appearance: Normal appearance. She is obese. She is not ill-appearing or diaphoretic.   HENT:      Head: Normocephalic and atraumatic.      Right Ear: External ear normal.      Left Ear: External ear normal.      Nose: Nose normal.      Mouth/Throat:      Mouth: Mucous membranes are moist.      Pharynx: Oropharynx is clear. No oropharyngeal exudate or posterior oropharyngeal erythema.   Eyes:      General: No scleral icterus.        Right eye: No discharge.         Left eye: No discharge.      Extraocular Movements: Extraocular movements intact.      Conjunctiva/sclera: Conjunctivae normal.   Cardiovascular:      Rate and Rhythm:  Normal rate.      Heart sounds: Murmur heard.      Systolic murmur is present with a grade of 1/6.      No friction rub. No gallop.      Arteriovenous access: Left arteriovenous access is present.     Comments: AVF to right upper extremity with palpable thrill and audible bruit.  Pulmonary:      Effort: Pulmonary effort is normal. No respiratory distress.      Breath sounds: No wheezing, rhonchi or rales.   Abdominal:      General: Bowel sounds are normal. There is no distension.      Palpations: Abdomen is soft.      Tenderness: There is no abdominal tenderness.   Musculoskeletal:      Cervical back: Neck supple.      Right lower leg: No edema.      Left lower leg: Edema present.   Skin:     Findings: Lesion present.      Comments: Incision site of left foot appears to have dehiscence. Malodorous odor noted. See images below.   Neurological:      General: No focal deficit present.      Mental Status: She is alert. Mental status is at baseline.      Cranial Nerves: No cranial nerve deficit.      Motor: No weakness.   Psychiatric:         Mood and Affect: Mood normal.         Behavior: Behavior normal.       Left foot wound on 09/12/2023 and 09/11/2023:             Significant Labs:  BMP:   Recent Labs   Lab 09/12/23  0304   *      K 4.3      CO2 19*   BUN 53*   CREATININE 4.2*   CALCIUM 9.1   MG 2.1     CBC:   Recent Labs   Lab 09/12/23  0304   WBC 9.41   RBC 3.14*   HGB 9.9*   HCT 31.7*      *   MCH 31.5*   MCHC 31.2*     CMP:   Recent Labs   Lab 09/12/23  0304   *   CALCIUM 9.1   ALBUMIN 2.5*   PROT 6.8      K 4.3   CO2 19*      BUN 53*   CREATININE 4.2*   ALKPHOS 99   ALT 13   AST 25   BILITOT 0.4     LFTs:   Recent Labs   Lab 09/12/23  0304   ALT 13   AST 25   ALKPHOS 99   BILITOT 0.4   PROT 6.8   ALBUMIN 2.5*     Microbiology Results (last 7 days)       ** No results found for the last 168 hours. **          Specimen (24h ago, onward)      None           Significant Imaging:  I have reviewed all imagining in the last 24 hours.    Assessment/Plan:     Cardiac/Vascular  Essential hypertension  - management per primary team  - currently on nifedipine 30 mg daily    Renal/  ESRD (end stage renal disease)  Outpatient HD Information:  -Dialysis modality: Hemodialysis  -Outpatient HD unit: [facility] (Valerio Bautista MD)  -HD TX days: Tuesday/Thursday/Saturday, duration of treatment: 3-4 hrs  -Last HD TX prior to hospital admission: 09/09/2023  -Dialysis access: RUE AVF   -Residual urine: + RRF  -EDW: 109 kg    Plan/Recommendations:  - iHD today for metabolic clearance and volume management, plan to continue with outpatient schedule every TTS while inpatient  - recommend resuming home dose Renvela 800 mg TIDWM  - renal diet/tube feeds when not NPO   - strict I/O's and daily weights  - daily renal function panels and magnesium levels  - renally all dose medications to eGFR  - avoid gadolinium, fleets, phos-based laxatives, NSAIDs, etc.    Orthopedic  * Open wound of left foot  - Vascular Surgery and Podiatry consulted and following  - tentative plans for left great toe metatarsal head resection with washout/debridement in addition to angiogram  - on broad spectrum antibiotics with vancomycin and Zosyn per primary team      Thank you for your consult. I will follow-up with patient. Please contact us if you have any additional questions.    Alireza Velázquez MD  Nephrology  [facility] - Surgery

## 2023-09-12 NOTE — HPI
Ms Arias is an obese (BMI ~38) 52-year-old woman with HLD, PAD with multiple prior stents, hypertension, peptic ulcer disease, type 2 diabetes mellitus on insulin (most recent hemoglobin A1c 7.2%), central obesity hypoventilation syndrome, chronic left foot wound s/p x3 surgeries now in August/last month (i.e. I&D, left hallux amputation, etc.) and ESRD secondary to diabetic nephropathy (biopsy proven) on iHD every Tuesday, Thursday & Saturday via RUE AVF who [place] [facility] [place] for aforementioned left lower extremity wound. Tentative plans for angiography while inpatient per Vascular Surgery. Podiatry has also been consulted for assistance. Nephrology has been consulted for inpatient RRT needs.    Outpatient HD Information:  -Dialysis modality: Hemodialysis  -Outpatient HD unit: [facility] (Valerio Bautista MD)  -HD TX days: Tuesday/Thursday/Saturday, duration of treatment: 3-4 hrs  -Last HD TX prior to hospital admission: 09/09/2023  -Dialysis access: RUE AVF   -Residual urine: + RRF  -EDW: 109 kg

## 2023-09-12 NOTE — PLAN OF CARE
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  Home Phone: [phone]  Relation: Mother    Discharge Plan A: Home with family  Discharge Plan B: Home with family, Home Health      [facility] [place] - [address]  [address] [place] [address]  Phone: [phone] Fax: [phone]    [place] [id number]  [place] - [address]  [address] [facility]  [place] [address]  Phone: [phone] Fax: [phone]      Initial Assessment (most recent)       Adult Discharge Assessment - 09/12/23 1537          Discharge Assessment    Assessment Type Discharge Planning Assessment     Confirmed/corrected address, phone number and insurance Yes     Confirmed Demographics Correct on Facesheet     Source of Information patient     Communicated INDIO with patient/caregiver Yes     People in Home parent(s);sibling(s)     Do you expect to return to your current living situation? Yes     Do you have help at home or someone to help you manage your care at home? Yes     Prior to hospitilization cognitive status: Alert/Oriented     Current cognitive status: Alert/Oriented     Home Layout Able to live on 1st floor     Equipment Currently Used at Home walker, rolling;glucometer     Do you currently have service(s) that help you manage your care at home? No     Do you take prescription medications? Yes     Do you have prescription  coverage? Yes     Do you have any problems affording any of your prescribed medications? No     Is the patient taking medications as prescribed? yes     How do you get to doctors appointments? car, drives self     Are you on dialysis? Yes     Dialysis Name and Scheduled days [facility] Tu, Th, Sat     Do you take coumadin? No     DME Needed Upon Discharge  none     Discharge Plan discussed with: Patient     Transition of Care Barriers None     Discharge Plan A Home with family     Discharge Plan B Home with family;Home Health        Physical Activity    On average, how many days per week do you engage in moderate to strenuous exercise (like a brisk walk)? 0 days     On average, how many minutes do you engage in exercise at this level? 0 min        Financial Resource Strain    How hard is it for you to pay for the very basics like food, housing, medical care, and heating? Not hard at all        Housing Stability    In the last 12 months, was there a time when you were not able to pay the mortgage or rent on time? No     In the last 12 months, was there a time when you did not have a steady place to sleep or slept in a shelter (including now)? No        Transportation Needs    In the past 12 months, has lack of transportation kept you from medical appointments or from getting medications? No     In the past 12 months, has lack of transportation kept you from meetings, work, or from getting things needed for daily living? No        Food Insecurity    Within the past 12 months, you worried that your food would run out before you got the money to buy more. Never true     Within the past 12 months, the food you bought just didn't last and you didn't have money to get more. Never true        Stress    Do you feel stress - tense, restless, nervous, or anxious, or unable to sleep at night because your mind is troubled all the time - these days? Only a little        Social Connections    In a typical week, how many times  do you talk on the phone with family, friends, or neighbors? Twice a week     How often do you get together with friends or relatives? Twice a week     How often do you attend Rastafari or Caodaism services? 1 to 4 times per year     Do you belong to any clubs or organizations such as Rastafari groups, unions, fraternal or athletic groups, or school groups? No     Are you , , , , never , or living with a partner? Never         Alcohol Use    Q1: How often do you have a drink containing alcohol? Never     Q3: How often do you have six or more drinks on one occasion? Never                   Patient lives with her mother and adult brother in a single story home with one entry step. Patient does not feel she will need any post acute services upon hospital discharge. Patient family will provide transportation home.

## 2023-09-12 NOTE — ASSESSMENT & PLAN NOTE
IDSA moderate foot infection left foot.  Status post left great toe amputation.  Patient now has left 1st metatarsal head exposed and probes to bone.  Purulent drainage expressed today. WBC downtrending from 14.61 to 9.41 today.  X-ray has OM like changes noted to the left foot 1st metatarsal head and neck  - patient will likely need left great toe metatarsal head resection and another left foot washout.  Patient verbalized understanding  - vascular surgery following.  They are planning on performing angiogram and possible debridement tomorrow.  Appreciate recommendations  - vascular studies pending  - left foot MRI pending  - wound cultures taken today, pending  - Antibiotic plan per ID  - recommend patient use heel offloading boots while lying in bed  - patient okay to weightbear as tolerated bilaterally in postop shoe to heel touch  - podiatry will follow

## 2023-09-12 NOTE — HPI
52 y.o. female with ESRD, HTN, PAD, DM2, who is admitted for vascualr workup and left great toe amputation wound. Podiatry was consulted for left great toe amputation wound. Patient had left great toe amputation performed by Dr. Flannery on 8/25/2023. Patient says her left great toe had a blister and eventually her left great toe became infected which lead to her toe amputation. Since the surgery patient's wound has not healed and has worsened. Patient admits to noticing exposed bone and drainage to amputation site. Patient admits to usually ambualting in post op shoe since surgery. Patient denies getting her left foot wet since her amputation. Patient denies any current foot pain. Patient denies any current fever chills nausea or vomiting. Patient is stable at time of this visit. Patient has no other pedal complaints at this time.

## 2023-09-12 NOTE — PROGRESS NOTES
Pharmacokinetic Initial Assessment: IV Vancomycin    Assessment/Plan:    Initiate intravenous vancomycin with loading dose of 2000 mg once with subsequent doses when random concentrations are less than 20 mcg/mL.  Desired empiric serum trough concentration is 15 to 20 mcg/mL.  Pharmacy will continue to follow and monitor vancomycin.      Please contact pharmacy at extension [phone] with any questions regarding this assessment.     Thank you for the consult,   Negrito Allen       Patient brief summary:  Georgina Arias is a 52 y.o. female initiated on antimicrobial therapy with IV Vancomycin for treatment of suspected bone/joint infection.    Drug Allergies:   Review of patient's allergies indicates:   Allergen Reactions    Naproxen Anaphylaxis and Swelling     Hives (skin)^swelling  Hives (skin)^swelling  Hives (skin)^swelling    Sulfamethoxazole-trimethoprim Other (See Comments)     Caused JEROME    Hydrocodone Nausea And Vomiting and Rash    Penicillins Hives     Blisters (skin)^    Adhesive Rash    Hydrocodone-acetaminophen Nausea And Vomiting     Rash (skin)^, Vomiting^         Actual Body Weight:   110 kg    Renal Function:   Estimated Creatinine Clearance: 20.5 mL/min (A) (based on SCr of 4.1 mg/dL (H)).,     Dialysis Method (if applicable):  Confirm schedule in the AM. Dialysis thus far scheduled for  09/12/23.    CBC (last 72 hours):  Recent Labs   Lab Result Units 09/11/23  1754   WBC K/uL 14.61*   Hemoglobin g/dL 10.9*   Hemoglobin A1C % 7.2*   Hematocrit % 32.5*   Platelets K/uL 203   Gran % % 84.9*   Lymph % % 8.2*   Mono % % 5.1   Eosinophil % % 1.0   Basophil % % 0.3   Differential Method  Automated       Metabolic Panel (last 72 hours):  Recent Labs   Lab Result Units 09/11/23  1754   Sodium mmol/L 141   Potassium mmol/L 4.6   Chloride mmol/L 105   CO2 mmol/L 18*   Glucose mg/dL 213*   BUN mg/dL 51*   Creatinine mg/dL 4.1*   Albumin g/dL 2.8*   Total Bilirubin mg/dL 0.5   Alkaline Phosphatase U/L 116  "  AST U/L 19   ALT U/L 16       Drug levels (last 3 results):  No results for input(s): "VANCOMYCINRA", "VANCORANDOM", "VANCOMYCINPE", "VANCOPEAK", "VANCOMYCINTR", "VANCOTROUGH" in the last 72 hours.    Microbiologic Results:  Microbiology Results (last 7 days)       ** No results found for the last 168 hours. **            "

## 2023-09-12 NOTE — PLAN OF CARE
I have reviewed the chart of Georgina Arias and collaborated with Maxx Maya MD in the care of the patient who is hospitalized for the following:    Active Hospital Problems    Diagnosis    *Open wound of left foot    ESRD (end stage renal disease)    Severe obesity (BMI >= 40)    Hyperlipidemia    Peripheral vascular disease     Overview:   dx update      Essential hypertension    Type II diabetes mellitus     Overview:   dx update            I have reviewed the Georgina Arias with the multidisciplinary team during discharge huddle.       Dennise Geronimo PA-C  [facility] Based [facility]

## 2023-09-12 NOTE — ANESTHESIA PREPROCEDURE EVALUATION
[facility]  Anesthesia Pre-Operative Evaluation         Patient Name: Georgina Arias  YOB: 1970  MRN: [id number]    SUBJECTIVE:     Pre-operative evaluation for Procedure(s) (LRB):  AORTOGRAM, WITH SERIALOGRAPHY (Left)     09/12/2023    Georgina Arias is a 52 y.o. female w/ a significant PMHx of ESRD on HD, T2DM on insulin and mounjaro (A1c 7.2), PVD s/p stents to RLE, DVT of R leg, HTN, HLD, PUD, obesity (BMI 38), and three recent left foot surgeries in August. Most recently uderwent amputation of the L hallux at the [facility] on 8/25/23 for reported gangrene and osteomyelitis. She is currently admitted to vascular surgery for non-healing left foot wound.     Last HD on 9/12.    Of note, patient has a h/o PONV.    Patient now presents for the above procedure(s).    TTE (5/18/22):  · The left ventricle is normal in size with mild concentric hypertrophy and normal systolic function.  · Mild right ventricular enlargement with normal right ventricular systolic function.  · The estimated ejection fraction is 55%.  · Normal left ventricular diastolic function.  · Trivial posterior pericardial effusion. Effusion is fluid.  · Mild left atrial enlargement.  · Elevated central venous pressure (15 mmHg).      LDA:        Peripheral IV - Single Lumen 09/11/23 1915 20 G;1 3/4 in Left Forearm (Active)   Site Assessment Dry;Intact 09/11/23 1953   Extremity Assessment Distal to IV No abnormal discoloration;No redness;No swelling 09/11/23 1922   Line Status Infusing 09/11/23 1953   Dressing Status Dry;Intact 09/11/23 1953   Dressing Intervention Integrity maintained 09/11/23 1953   Site Change Due 09/15/23 09/11/23 1922   Number of days: 0            Hemodialysis AV Fistula  Right forearm (Active)   Needle Size Buttonhole 09/12/23 0881   Site Assessment Dry;Intact;No redness;No swelling;Clean  09/12/23 0856   Patency Present;Thrill;Bruit 09/12/23 0856   Status Accessed 09/12/23 0856   Flows Good 09/12/23 0856   Site Condition No complications 09/12/23 1000   Number of days:        Prev airway:   Method of Intubation: Conklin, Rapid Sequence Induction; Inserted by: CRNA; Airway Device: Endotracheal Tube; Mask Ventilation: Easy; Intubated: Postinduction; Blade: Other (see comments) (McG#3); Airway Device Size: 7.0; Style: Cuffed; Cuff Inflation: Minimal occlusive pressure; Inflation Amount: 4; Placement Verified By: Auscultation, Capnometry; Grade: Grade I; Complicating Factors: Obesity, Short neck; Intubation Findings: Positive EtCO2, Bilateral breath sounds, Atraumatic/Condition of teeth unchanged;  Depth of Insertion: 20; Securment: Teeth; Complications: None    Drips: None documented.      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, with other ophthalmic complication    Ureteral stone with hydronephrosis    Acute kidney injury superimposed on CKD    Nephrolithiasis    Pre-operative examination    Acute respiratory failure with hypoxia and hypercapnia    Elevated troponin    Pneumonia due to COVID-19 virus    Hypertensive emergency    Lactic acidosis    Hyperkalemia    Metabolic acidosis    CHF (congestive heart failure)    Class 3 severe obesity due to excess calories with serious comorbidity and body mass index (BMI) of 40.0 to 44.9 in adult    Open wound of left foot    ESRD (end stage renal disease)    Severe obesity (BMI >= 40)       Review of patient's allergies indicates:   Allergen Reactions    Naproxen Anaphylaxis and Swelling     Hives (skin)^swelling  Hives (skin)^swelling  Hives (skin)^swelling    Sulfamethoxazole-trimethoprim Other (See Comments)     Caused JEROME     "Hydrocodone Nausea And Vomiting and Rash    Penicillins Hives     Blisters (skin)^    Adhesive Rash    Hydrocodone-acetaminophen Nausea And Vomiting     Rash (skin)^, Vomiting^         Current Inpatient Medications:   sodium chloride 0.9%   Intravenous Once    acetaminophen  1,000 mg Oral Q8H    atorvastatin  80 mg Oral Daily    gabapentin  300 mg Oral TID    mupirocin   Nasal BID    NIFEdipine  30 mg Oral Daily    piperacillin-tazobactam (Zosyn) IV (PEDS and ADULTS) (extended infusion is not appropriate)  4.5 g Intravenous Q12H       No current facility-administered medications on file prior to encounter.     Current Outpatient Medications on File Prior to Encounter   Medication Sig Dispense Refill    acetaminophen (TYLENOL) 500 MG tablet Take 2 tablets (1,000 mg total) by mouth every 8 (eight) hours as needed. 60 tablet 0    aspirin (ECOTRIN) 81 MG EC tablet Take 1 tablet (81 mg total) by mouth once daily.      atorvastatin (LIPITOR) 80 MG tablet Take 1 tablet (80 mg total) by mouth once daily. 90 tablet 3    carvediloL (COREG) 25 MG tablet Take 1 tablet (25 mg total) by mouth 2 (two) times daily with meals. 180 tablet 3    COMFORT EZ PEN NEEDLES 32 gauge x 5/32" Ndle SMARTSIG:injection Daily      doxycycline (VIBRAMYCIN) 100 MG Cap Take 1 capsule (100 mg total) by mouth 2 (two) times daily. 84 capsule 0    fluticasone propionate (FLONASE) 50 mcg/actuation nasal spray Fluticasone Propionate 50 MCG/ACT Nasal Suspension QTY: 1 bottle Days: 30 Refills: 5  Written: 09/30/20 Patient Instructions: inhale 2 sprays in each nostril once daily      gabapentin (NEURONTIN) 300 MG capsule Take 1 capsule (300 mg total) by mouth 3 (three) times daily. 63 capsule 1    insulin (LANTUS SOLOSTAR U-100 INSULIN) glargine 100 units/mL (3mL) SubQ pen Inject 40 Units into the skin every evening. (Patient taking differently: Inject 50 Units into the skin every evening.)  0    levocetirizine (XYZAL) 5 MG tablet Take 5 " mg by mouth.      miscellaneous medical supply (C-TUB) Misc Hearing amplification (BICROS preferred)      MOUNJARO 5 mg/0.5 mL PnIj SMARTSI Milligram(s) SUB-Q Once a Week      NIFEdipine (PROCARDIA-XL) 30 MG (OSM) 24 hr tablet Take 30 mg by mouth.      sevelamer carbonate (RENVELA) 800 mg Tab Take by mouth.      traMADoL (ULTRAM) 50 mg tablet Take 1 tablet (50 mg total) by mouth every 8 (eight) hours as needed for Pain. 20 tablet 0    TRUE METRIX GLUCOSE TEST STRIP Strp USE TO test THREE TIMES DAILY      TRUEPLUS LANCETS 30 gauge [facility]          Past Surgical History:   Procedure Laterality Date    AV FISTULA PLACEMENT Right     CHOLECYSTECTOMY      CYSTOSCOPY W/ URETERAL STENT PLACEMENT Right 2018    Procedure: CYSTOSCOPY, WITH URETERAL STENT INSERTION (ADD ON );  Surgeon: Bubba Perez MD;  Location: [facility] OR;  Service: Urology;  Laterality: Right;  (ADD ON )    DEBRIDEMENT OF FOOT Left 2023    Procedure: DEBRIDEMENT, FOOT;  Surgeon: Aleida Flannery DPM;  Location: [facility] OR;  Service: Podiatry;  Laterality: Left;    Excisional debridement of ulcer distal great toe left foot w/ partial resection distal phalanx Left 2023    I&D L 1st ray w/ amputation L hallux IPJ Left 2023    Injection L PT tendon sheath Left 2023    Nail avulsion left hallux Left 2023    PERIPHERAL ARTERIAL STENT GRAFT Right     REMOVAL OF NAIL OF DIGIT Left 2023    Procedure: REMOVAL, NAIL, DIGIT great toe;  Surgeon: Aleida Flannery DPM;  Location: [facility] OR;  Service: Podiatry;  Laterality: Left;    TOE AMPUTATION Left 2023    Procedure: AMPUTATION, TOE hallux IPJ;  Surgeon: Aleida Flannery DPM;  Location: [facility] OR;  Service: Podiatry;  Laterality: Left;    TOE AMPUTATION Left 2023    great toe    TOE AMPUTATION Left 2023    Procedure: AMPUTATION, TOE hallux, possible 1st met.head;  Surgeon: Aleida Flannery DPM;  Location: [facility] OR;  Service: Podiatry;  Laterality: Left;     URETEROSCOPIC REMOVAL OF URETERIC CALCULUS Right 2018    Procedure: EXTRACTION-STONE-URETEROSCOPY;  Surgeon: Bubba Perez MD;  Location: [facility];  Service: Urology;  Laterality: Right;       Social History     Socioeconomic History    Marital status: Single   Occupational History     Employer: [employer]   Tobacco Use    Smoking status: Former     Current packs/day: 0.00     Types: Cigarettes     Quit date: 2006     Years since quittin.1    Smokeless tobacco: Never   Substance and Sexual Activity    Alcohol use: Not Currently     Comment: occ    Drug use: No    Sexual activity: Not Currently   Social History Narrative    Lives with mom.    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    Some photography.    Close with niece 2 and god-child.    occ exericse       OBJECTIVE:     Vital Signs Range (Last 24H):  Temp:  [35.7 °C (96.3 °F)-36.8 °C (98.3 °F)]   Pulse:  []   Resp:  [16-18]   BP: (142-237)/()   SpO2:  [93 %-100 %]       Significant Labs:  Lab Results   Component Value Date    WBC 9.41 2023    HGB 9.9 (L) 2023    HCT 31.7 (L) 2023     2023    CHOL 160 2014    TRIG 426 (H) 2014    HDL 43 2014    ALT 13 2023    AST 25 2023     2023    K 4.3 2023     2023    CREATININE 4.2 (H) 2023    BUN 53 (H) 2023    CO2 19 (L) 2023    INR 1.0 2023    HGBA1C 7.2 (H) 2023       Diagnostic Studies: No relevant studies.    EKG:   Results for orders placed or performed during the hospital encounter of 23   EKG 12-lead    Collection Time: 23  7:07 PM    Narrative    Test Reason : Z86.718,    Vent. Rate : 084 BPM     Atrial Rate : 084 BPM     P-R Int : 152 ms          QRS Dur : 086 ms      QT Int : 396 ms       P-R-T Axes : 044 020 051 degrees     QTc Int : 467 ms    Normal sinus rhythm  Low voltage QRS  Otherwise normal ECG  When  compared with ECG of 17-MAY-2022 22:39,  rate is slower and QRS voltage is lower in the lateral precordial leads  Confirmed by Bubba Barragan MD ([id number]) on 8/15/2023 5:53:58 PM    Referred By: [email or web address]   SELF           Confirmed By:Bubba Barragan MD       2D ECHO:  TTE:  Results for orders placed or performed during the hospital encounter of 05/17/22   Echo   Result Value Ref Range    STJ 2.16 cm    Ao peak ashley 1.62 m/s    IVS 1.33 (A) 0.6 - 1.1 cm    LA size 4.20 cm    LVIDd 4.60 3.5 - 6.0 cm    LVIDs 3.27 2.1 - 4.0 cm    LVOT diameter 1.73 cm    Posterior Wall 1.38 (A) 0.6 - 1.1 cm    MV Peak A Ashley 0.84 m/s    E wave deceleration time 163.70 msec    MV Peak E Ashley 1.12 m/s    RVDD 2.99 cm    Ao root annulus 2.32 cm    AORTIC VALVE CUSP SEPERATION 1.49 cm    PV PEAK VELOCITY 1.21 cm/s    MV stenosis pressure 1/2 time 47.47 ms    LV Diastolic Volume 97.36 mL    LV Systolic Volume 43.05 mL    LVOT peak ashley 0.87 m/s    TDI LATERAL 0.09 m/s    TDI SEPTAL 0.08 m/s    Mr max ashley 0.04 m/s    LV LATERAL E/E' RATIO 12.44 m/s    LV SEPTAL E/E' RATIO 14.00 m/s    FS 29 %    LV mass 244.61 g    Left Ventricle Relative Wall Thickness 0.60 cm    AV Velocity Ratio 0.54     MV valve area p 1/2 method 4.63 cm2    E/A ratio 1.33     Mean e' 0.09 m/s    LVOT area 2.3 cm2    AV peak gradient 10 mmHg    E/E' ratio 13.18 m/s    LV Systolic Volume Index 18.6 mL/m2    LV Diastolic Volume Index 42.15 mL/m2    LV Mass Index 106 g/m2    BSA 2.44 m2    Right Atrial Pressure (from IVC) 15 mmHg    EF 55 %    Narrative    · The left ventricle is normal in size with mild concentric hypertrophy   and normal systolic function.  · Mild right ventricular enlargement with normal right ventricular   systolic function.  · The estimated ejection fraction is 55%.  · Normal left ventricular diastolic function.  · Trivial posterior pericardial effusion. Effusion is fluid.  · Mild left atrial enlargement.  · Elevated central venous pressure (15 mmHg).           UDAY:  No results found for this or any previous visit.    ASSESSMENT/PLAN:           Pre-op Assessment    I have reviewed the Patient Summary Reports.     I have reviewed the Nursing Notes. I have reviewed the NPO Status.   I have reviewed the Medications.     Review of Systems  Anesthesia Hx:  No problems with previous Anesthesia  History of prior surgery of interest to airway management or planning: Denies Family Hx of Anesthesia complications.  Personal Hx of Anesthesia complications, Post-Operative Nausea/Vomiting   Social:  No Alcohol Use, Former Smoker    Hematology/Oncology:         -- Denies Anemia:   Cardiovascular:   Hypertension Denies CAD.     Denies CHF. PVD hyperlipidemia    Pulmonary:   Denies COPD.  Denies Asthma.    Renal/:   Chronic Renal Disease, ESRD, Dialysis    Hepatic/GI:   PUD, Denies GERD.    Musculoskeletal:   Denies Arthritis.     Neurological:   Denies CVA. Denies Seizures.    Endocrine:   Diabetes, well controlled, type 2, using insulin  Obesity / BMI > 30  Psych:   Denies Psychiatric History.          Physical Exam  General: Well nourished, Cooperative, Alert and Oriented    Airway:  Mallampati: II   Mouth Opening: Normal  TM Distance: Normal  Tongue: Normal  Neck ROM: Normal ROM    Dental:  Intact  Loose tooth marked on diagram is a removal implant  Chest/Lungs:  Clear to auscultation    Heart:  Rate: Normal  Rhythm: Regular Rhythm        Anesthesia Plan  Type of Anesthesia, risks & benefits discussed:    Anesthesia Type: MAC, Gen ETT, Gen Supraglottic Airway, Gen Natural Airway  Intra-op Monitoring Plan: Standard ASA Monitors  Post Op Pain Control Plan: multimodal analgesia  Induction:  IV and rapid sequence  Airway Plan: Direct and Video, Post-Induction  ASA Score: 3  Day of Surgery Review of History & Physical: H&P Update referred to the surgeon/provider.    Ready For Surgery From Anesthesia Perspective.     .

## 2023-09-12 NOTE — ASSESSMENT & PLAN NOTE
- Vascular Surgery and Podiatry consulted and following  - tentative plans for left great toe metatarsal head resection with washout/debridement in addition to angiogram  - on broad spectrum antibiotics with vancomycin and Zosyn per primary team

## 2023-09-12 NOTE — SUBJECTIVE & OBJECTIVE
Past Medical History:   Diagnosis Date    Hyperlipidemia     Hypertension     Peptic ulcer disease     Peripheral artery disease     PONV (postoperative nausea and vomiting)     Stage IV CKD     Type II diabetes mellitus        Past Surgical History:   Procedure Laterality Date    AV FISTULA PLACEMENT Right     CHOLECYSTECTOMY      CYSTOSCOPY W/ URETERAL STENT PLACEMENT Right 08/28/2018    Procedure: CYSTOSCOPY, WITH URETERAL STENT INSERTION (ADD ON );  Surgeon: Bubba Perez MD;  Location: [facility];  Service: Urology;  Laterality: Right;  (ADD ON )    DEBRIDEMENT OF FOOT Left 08/03/2023    Procedure: DEBRIDEMENT, FOOT;  Surgeon: Aleida Flannery DPM;  Location: [facility] OR;  Service: Podiatry;  Laterality: Left;    Excisional debridement of ulcer distal great toe left foot w/ partial resection distal phalanx Left 08/03/2023    I&D L 1st ray w/ amputation L hallux IPJ Left 08/14/2023    Injection L PT tendon sheath Left 08/14/2023    Nail avulsion left hallux Left 08/03/2023    PERIPHERAL ARTERIAL STENT GRAFT Right     REMOVAL OF NAIL OF DIGIT Left 08/03/2023    Procedure: REMOVAL, NAIL, DIGIT great toe;  Surgeon: Aleida Flannery DPM;  Location: [facility] OR;  Service: Podiatry;  Laterality: Left;    TOE AMPUTATION Left 08/14/2023    Procedure: AMPUTATION, TOE hallux IPJ;  Surgeon: Aleida Flannery DPM;  Location: [facility] OR;  Service: Podiatry;  Laterality: Left;    TOE AMPUTATION Left 08/25/2023    great toe    TOE AMPUTATION Left 8/25/2023    Procedure: AMPUTATION, TOE hallux, possible 1st met.head;  Surgeon: Aleida Flannery DPM;  Location: [facility] OR;  Service: Podiatry;  Laterality: Left;    URETEROSCOPIC REMOVAL OF URETERIC CALCULUS Right 09/11/2018    Procedure: EXTRACTION-STONE-URETEROSCOPY;  Surgeon: Bubba Perez MD;  Location: [facility] OR;  Service: Urology;  Laterality: Right;       Review of patient's allergies indicates:   Allergen Reactions    Naproxen Anaphylaxis and Swelling     Hives (skin)^swelling  Hives  (skin)^swelling  Hives (skin)^swelling    Sulfamethoxazole-trimethoprim Other (See Comments)     Caused JEROME    Hydrocodone Nausea And Vomiting and Rash    Penicillins Hives     Blisters (skin)^    Adhesive Rash    Hydrocodone-acetaminophen Nausea And Vomiting     Rash (skin)^, Vomiting^       Current Facility-Administered Medications   Medication Frequency    0.9%  NaCl infusion Once    acetaminophen tablet 1,000 mg Q8H    atorvastatin tablet 80 mg Daily    dextrose 10% bolus 125 mL 125 mL PRN    dextrose 10% bolus 250 mL 250 mL PRN    diphenhydrAMINE capsule 25 mg Q6H PRN    gabapentin capsule 300 mg TID    glucagon (human recombinant) injection 1 mg PRN    glucose chewable tablet 16 g PRN    glucose chewable tablet 24 g PRN    heparin (porcine) injection 1,000 Units PRN    hydrALAZINE injection 10 mg Q6H PRN    insulin aspart U-100 pen 0-10 Units QID (AC + HS) PRN    labetalol 20 mg/4 mL (5 mg/mL) IV syring Q6H PRN    LIDOcaine (PF) 10 mg/ml (1%) injection 10 mg Once PRN    mupirocin 2 % ointment BID    NIFEdipine 24 hr tablet 30 mg Daily    ondansetron disintegrating tablet 8 mg Q8H PRN    oxyCODONE immediate release tablet 5 mg Q6H PRN    oxyCODONE immediate release tablet Tab 10 mg Q6H PRN    piperacillin-tazobactam (ZOSYN) 4.5 g in dextrose 5 % in water (D5W) 100 mL IVPB (MB+) Q12H    sodium chloride 0.9% bolus 250 mL 250 mL PRN    sodium chloride 0.9% flush 10 mL PRN    vancomycin - pharmacy to dose pharmacy to manage frequency     Family History       Problem Relation (Age of Onset)    Cancer Maternal Grandmother    Diabetes Mother, Father    Heart disease Father (37), Maternal Grandmother    Hypertension Mother, Father, Brother          Tobacco Use    Smoking status: Former     Current packs/day: 0.00     Types: Cigarettes     Quit date: 2006     Years since quittin.1    Smokeless tobacco: Never   Substance and Sexual Activity    Alcohol use: Not Currently     Comment: occ    Drug use: No     Sexual activity: Not Currently     Review of Systems   Constitutional:  Negative for activity change, appetite change, chills, diaphoresis, fatigue and fever.   HENT:  Negative for congestion, rhinorrhea, sinus pain and trouble swallowing.    Eyes:  Negative for pain, redness and visual disturbance.   Respiratory:  Negative for cough, shortness of breath and wheezing.    Cardiovascular:  Negative for chest pain, palpitations and leg swelling.   Gastrointestinal:  Negative for abdominal distention, abdominal pain, blood in stool, constipation, diarrhea, nausea and vomiting.   Genitourinary:  Negative for decreased urine volume, difficulty urinating, dysuria, frequency, hematuria and urgency.        Reports she still makes urine.   Musculoskeletal:  Positive for gait problem and myalgias (left foot). Negative for back pain and neck stiffness.   Skin:  Positive for color change and wound.   Neurological:  Negative for dizziness, tremors, syncope, weakness, light-headedness and headaches.   Psychiatric/Behavioral:  Negative for confusion and sleep disturbance.      Objective:     Vital Signs (Most Recent):  Temp: 96.3 °F (35.7 °C) (09/12/23 0437)  Pulse: 80 (09/12/23 0437)  Resp: 16 (09/12/23 0437)  BP: (!) 180/79 (09/12/23 0437)  SpO2: 98 % (09/12/23 0437) Vital Signs (24h Range):  Temp:  [96.3 °F (35.7 °C)-97.9 °F (36.6 °C)] 96.3 °F (35.7 °C)  Pulse:  [] 80  Resp:  [16-18] 16  SpO2:  [93 %-100 %] 98 %  BP: (147-237)/() 180/79     Weight: 110 kg (242 lb 8.1 oz) (09/11/23 1716)  Body mass index is 37.98 kg/m².  Body surface area is 2.28 meters squared.    No intake/output data recorded.     Physical Exam  Vitals and nursing note reviewed.   Constitutional:       General: She is not in acute distress.     Appearance: Normal appearance. She is obese. She is not ill-appearing or diaphoretic.   HENT:      Head: Normocephalic and atraumatic.      Right Ear: External ear normal.      Left Ear: External ear normal.       Nose: Nose normal.      Mouth/Throat:      Mouth: Mucous membranes are moist.      Pharynx: Oropharynx is clear. No oropharyngeal exudate or posterior oropharyngeal erythema.   Eyes:      General: No scleral icterus.        Right eye: No discharge.         Left eye: No discharge.      Extraocular Movements: Extraocular movements intact.      Conjunctiva/sclera: Conjunctivae normal.   Cardiovascular:      Rate and Rhythm: Normal rate.      Heart sounds: Murmur heard.      Systolic murmur is present with a grade of 1/6.      No friction rub. No gallop.      Arteriovenous access: Left arteriovenous access is present.     Comments: AVF to right upper extremity with palpable thrill and audible bruit.  Pulmonary:      Effort: Pulmonary effort is normal. No respiratory distress.      Breath sounds: No wheezing, rhonchi or rales.   Abdominal:      General: Bowel sounds are normal. There is no distension.      Palpations: Abdomen is soft.      Tenderness: There is no abdominal tenderness.   Musculoskeletal:      Cervical back: Neck supple.      Right lower leg: No edema.      Left lower leg: Edema present.   Skin:     Findings: Lesion present.      Comments: Incision site of left foot appears to have dehiscence. Malodorous odor noted. See images below.   Neurological:      General: No focal deficit present.      Mental Status: She is alert. Mental status is at baseline.      Cranial Nerves: No cranial nerve deficit.      Motor: No weakness.   Psychiatric:         Mood and Affect: Mood normal.         Behavior: Behavior normal.       Left foot wound on 09/12/2023 and 09/11/2023:             Significant Labs:  BMP:   Recent Labs   Lab 09/12/23 0304   *      K 4.3      CO2 19*   BUN 53*   CREATININE 4.2*   CALCIUM 9.1   MG 2.1     CBC:   Recent Labs   Lab 09/12/23 0304   WBC 9.41   RBC 3.14*   HGB 9.9*   HCT 31.7*      *   MCH 31.5*   MCHC 31.2*     CMP:   Recent Labs   Lab 09/12/23 0304    *   CALCIUM 9.1   ALBUMIN 2.5*   PROT 6.8      K 4.3   CO2 19*      BUN 53*   CREATININE 4.2*   ALKPHOS 99   ALT 13   AST 25   BILITOT 0.4     LFTs:   Recent Labs   Lab 09/12/23  0304   ALT 13   AST 25   ALKPHOS 99   BILITOT 0.4   PROT 6.8   ALBUMIN 2.5*     Microbiology Results (last 7 days)       ** No results found for the last 168 hours. **          Specimen (24h ago, onward)      None          Significant Imaging:  I have reviewed all imagining in the last 24 hours.

## 2023-09-12 NOTE — PLAN OF CARE
3.5 hour treatment complete. 2363 ml removed. Patient tolerated well. Patient rinsed back. Needles pulled. Pressure held X 5 minutes. Hemostasis achieved. Pressure dressings applied. + thrill and bruit noted. No complaints. VS stable. Accu check done. No insulin required. Patient left via wheelchair with lunch tray to return to room.

## 2023-09-12 NOTE — HPI
Georgina Arias is a 52 y.o. female with ESRD, HTN, PAD, DM2, who is here today for new patient initial appointment. She has undergone 3 surgeries on her L foot in August 2023. Most recently she underwent an [facility] L [facility]23 for reported gangrene and osteomyelitis. The incision site is not healing appropriately and she was referred to vascular surgery for possible intervention.      She has reportedly had several angiograms with stents placed in her RLE in 2006, 2007 and 2015. She denies having any problems or intervention on her LLE previously. She says she developed a small blister on her L foot at the end of July that progressively worsened and ultimately got infected requiring an I&D before eventual amputation. She does not change her wound dressing every day d/t pain and says she has not been followed by wound care.    She is currently on doxycycline. She does not take ASA/plavix/eliquis.   She has a history of DVTs. She is on HD T/Th/S.     CAMILA 9/11/23: R 0.69 L 0.46

## 2023-09-13 ENCOUNTER — ANESTHESIA (OUTPATIENT)
Dept: SURGERY | Facility: HOSPITAL | Age: 53
DRG: 907 | End: 2023-09-13
Payer: MEDICARE

## 2023-09-13 ENCOUNTER — ANESTHESIA EVENT (OUTPATIENT)
Dept: SURGERY | Facility: HOSPITAL | Age: 53
DRG: 907 | End: 2023-09-13
Payer: MEDICARE

## 2023-09-13 PROBLEM — Z01.818 PRE-OP EXAM: Status: ACTIVE | Noted: 2023-09-13

## 2023-09-13 LAB
ABO + RH BLD: NORMAL
ALBUMIN SERPL BCP-MCNC: 2.4 G/DL (ref 3.5–5.2)
ALP SERPL-CCNC: 98 U/L (ref 55–135)
ALT SERPL W/O P-5'-P-CCNC: 19 U/L (ref 10–44)
ANION GAP SERPL CALC-SCNC: 13 MMOL/L (ref 8–16)
AST SERPL-CCNC: 22 U/L (ref 10–40)
BASOPHILS # BLD AUTO: 0.04 K/UL (ref 0–0.2)
BASOPHILS NFR BLD: 0.6 % (ref 0–1.9)
BILIRUB SERPL-MCNC: 0.4 MG/DL (ref 0.1–1)
BLD GP AB SCN CELLS X3 SERPL QL: NORMAL
BUN SERPL-MCNC: 35 MG/DL (ref 6–20)
CALCIUM SERPL-MCNC: 8.6 MG/DL (ref 8.7–10.5)
CHLORIDE SERPL-SCNC: 99 MMOL/L (ref 95–110)
CO2 SERPL-SCNC: 25 MMOL/L (ref 23–29)
CREAT SERPL-MCNC: 4 MG/DL (ref 0.5–1.4)
DIFFERENTIAL METHOD: ABNORMAL
EOSINOPHIL # BLD AUTO: 0.1 K/UL (ref 0–0.5)
EOSINOPHIL NFR BLD: 0.7 % (ref 0–8)
ERYTHROCYTE [DISTWIDTH] IN BLOOD BY AUTOMATED COUNT: 16.3 % (ref 11.5–14.5)
EST. GFR  (NO RACE VARIABLE): 12.8 ML/MIN/1.73 M^2
GLUCOSE SERPL-MCNC: 230 MG/DL (ref 70–110)
HCT VFR BLD AUTO: 33.2 % (ref 37–48.5)
HGB BLD-MCNC: 10.3 G/DL (ref 12–16)
IMM GRANULOCYTES # BLD AUTO: 0.02 K/UL (ref 0–0.04)
IMM GRANULOCYTES NFR BLD AUTO: 0.3 % (ref 0–0.5)
LYMPHOCYTES # BLD AUTO: 1 K/UL (ref 1–4.8)
LYMPHOCYTES NFR BLD: 13.5 % (ref 18–48)
MCH RBC QN AUTO: 31.3 PG (ref 27–31)
MCHC RBC AUTO-ENTMCNC: 31 G/DL (ref 32–36)
MCV RBC AUTO: 101 FL (ref 82–98)
MONOCYTES # BLD AUTO: 0.4 K/UL (ref 0.3–1)
MONOCYTES NFR BLD: 5.6 % (ref 4–15)
NEUTROPHILS # BLD AUTO: 5.6 K/UL (ref 1.8–7.7)
NEUTROPHILS NFR BLD: 79.3 % (ref 38–73)
NRBC BLD-RTO: 0 /100 WBC
PLATELET # BLD AUTO: 187 K/UL (ref 150–450)
PMV BLD AUTO: 10.2 FL (ref 9.2–12.9)
POCT GLUCOSE: 112 MG/DL (ref 70–110)
POCT GLUCOSE: 145 MG/DL (ref 70–110)
POCT GLUCOSE: 166 MG/DL (ref 70–110)
POCT GLUCOSE: 205 MG/DL (ref 70–110)
POCT GLUCOSE: 206 MG/DL (ref 70–110)
POCT GLUCOSE: 206 MG/DL (ref 70–110)
POTASSIUM SERPL-SCNC: 4.2 MMOL/L (ref 3.5–5.1)
PROT SERPL-MCNC: 6.6 G/DL (ref 6–8.4)
RBC # BLD AUTO: 3.29 M/UL (ref 4–5.4)
SODIUM SERPL-SCNC: 137 MMOL/L (ref 136–145)
VANCOMYCIN SERPL-MCNC: 17 UG/ML
WBC # BLD AUTO: 7.09 K/UL (ref 3.9–12.7)

## 2023-09-13 PROCEDURE — 25500020 PHARM REV CODE 255: Performed by: SURGERY

## 2023-09-13 PROCEDURE — C1769 GUIDE WIRE: HCPCS | Performed by: SURGERY

## 2023-09-13 PROCEDURE — 75710 PR  ANGIO EXTREMITY UNILAT: ICD-10-PCS | Mod: 26,,, | Performed by: SURGERY

## 2023-09-13 PROCEDURE — 63600175 PHARM REV CODE 636 W HCPCS

## 2023-09-13 PROCEDURE — 71000039 HC RECOVERY, EACH ADD'L HOUR: Performed by: SURGERY

## 2023-09-13 PROCEDURE — 63600175 PHARM REV CODE 636 W HCPCS: Performed by: SURGERY

## 2023-09-13 PROCEDURE — 71000016 HC POSTOP RECOV ADDL HR: Performed by: SURGERY

## 2023-09-13 PROCEDURE — 93970 EXTREMITY STUDY: CPT | Performed by: SURGERY

## 2023-09-13 PROCEDURE — 85025 COMPLETE CBC W/AUTO DIFF WBC: CPT

## 2023-09-13 PROCEDURE — 75710 ARTERY X-RAYS ARM/LEG: CPT | Mod: 26,,, | Performed by: SURGERY

## 2023-09-13 PROCEDURE — 63600175 PHARM REV CODE 636 W HCPCS: Performed by: STUDENT IN AN ORGANIZED HEALTH CARE EDUCATION/TRAINING PROGRAM

## 2023-09-13 PROCEDURE — 25000003 PHARM REV CODE 250

## 2023-09-13 PROCEDURE — 86920 COMPATIBILITY TEST SPIN: CPT | Performed by: STUDENT IN AN ORGANIZED HEALTH CARE EDUCATION/TRAINING PROGRAM

## 2023-09-13 PROCEDURE — 36200 PR PLACE CATH AORTA: ICD-10-PCS | Mod: ,,, | Performed by: SURGERY

## 2023-09-13 PROCEDURE — 36415 COLL VENOUS BLD VENIPUNCTURE: CPT

## 2023-09-13 PROCEDURE — D9220A PRA ANESTHESIA: ICD-10-PCS | Mod: ,,, | Performed by: ANESTHESIOLOGY

## 2023-09-13 PROCEDURE — 86850 RBC ANTIBODY SCREEN: CPT

## 2023-09-13 PROCEDURE — 37000009 HC ANESTHESIA EA ADD 15 MINS: Performed by: SURGERY

## 2023-09-13 PROCEDURE — 80053 COMPREHEN METABOLIC PANEL: CPT

## 2023-09-13 PROCEDURE — C1894 INTRO/SHEATH, NON-LASER: HCPCS | Performed by: SURGERY

## 2023-09-13 PROCEDURE — 11000001 HC ACUTE MED/SURG PRIVATE ROOM

## 2023-09-13 PROCEDURE — 37000008 HC ANESTHESIA 1ST 15 MINUTES: Performed by: SURGERY

## 2023-09-13 PROCEDURE — 99232 PR SUBSEQUENT HOSPITAL CARE,LEVL II: ICD-10-PCS | Mod: ,,, | Performed by: INTERNAL MEDICINE

## 2023-09-13 PROCEDURE — D9220A PRA ANESTHESIA: Mod: ,,, | Performed by: ANESTHESIOLOGY

## 2023-09-13 PROCEDURE — 25000003 PHARM REV CODE 250: Performed by: STUDENT IN AN ORGANIZED HEALTH CARE EDUCATION/TRAINING PROGRAM

## 2023-09-13 PROCEDURE — 25000003 PHARM REV CODE 250: Performed by: SURGERY

## 2023-09-13 PROCEDURE — 99232 SBSQ HOSP IP/OBS MODERATE 35: CPT | Mod: ,,, | Performed by: INTERNAL MEDICINE

## 2023-09-13 PROCEDURE — 71000015 HC POSTOP RECOV 1ST HR: Performed by: SURGERY

## 2023-09-13 PROCEDURE — 27201423 OPTIME MED/SURG SUP & DEVICES STERILE SUPPLY: Performed by: SURGERY

## 2023-09-13 PROCEDURE — 82962 GLUCOSE BLOOD TEST: CPT | Performed by: SURGERY

## 2023-09-13 PROCEDURE — 36000706: Performed by: SURGERY

## 2023-09-13 PROCEDURE — 71000033 HC RECOVERY, INTIAL HOUR: Performed by: SURGERY

## 2023-09-13 PROCEDURE — 94761 N-INVAS EAR/PLS OXIMETRY MLT: CPT

## 2023-09-13 PROCEDURE — 36200 PLACE CATHETER IN AORTA: CPT | Mod: ,,, | Performed by: SURGERY

## 2023-09-13 PROCEDURE — 36415 COLL VENOUS BLD VENIPUNCTURE: CPT | Performed by: SURGERY

## 2023-09-13 PROCEDURE — 36000707: Performed by: SURGERY

## 2023-09-13 RX ORDER — FENTANYL CITRATE 50 UG/ML
25 INJECTION, SOLUTION INTRAMUSCULAR; INTRAVENOUS EVERY 5 MIN PRN
Status: DISCONTINUED | OUTPATIENT
Start: 2023-09-13 | End: 2023-09-13 | Stop reason: HOSPADM

## 2023-09-13 RX ORDER — VERAPAMIL HYDROCHLORIDE 2.5 MG/ML
INJECTION, SOLUTION INTRAVENOUS
Status: DISCONTINUED | OUTPATIENT
Start: 2023-09-13 | End: 2023-09-13 | Stop reason: HOSPADM

## 2023-09-13 RX ORDER — MIDAZOLAM HYDROCHLORIDE 1 MG/ML
INJECTION, SOLUTION INTRAMUSCULAR; INTRAVENOUS
Status: DISCONTINUED | OUTPATIENT
Start: 2023-09-13 | End: 2023-09-13

## 2023-09-13 RX ORDER — SODIUM CHLORIDE 9 MG/ML
INJECTION, SOLUTION INTRAVENOUS ONCE
Status: COMPLETED | OUTPATIENT
Start: 2023-09-14 | End: 2023-09-18

## 2023-09-13 RX ORDER — HEPARIN SODIUM 1000 [USP'U]/ML
INJECTION, SOLUTION INTRAVENOUS; SUBCUTANEOUS
Status: DISCONTINUED | OUTPATIENT
Start: 2023-09-13 | End: 2023-09-13 | Stop reason: HOSPADM

## 2023-09-13 RX ORDER — NITROGLYCERIN 5 MG/ML
INJECTION, SOLUTION INTRAVENOUS
Status: DISCONTINUED | OUTPATIENT
Start: 2023-09-13 | End: 2023-09-13 | Stop reason: HOSPADM

## 2023-09-13 RX ORDER — HEPARIN SODIUM 1000 [USP'U]/ML
1000 INJECTION, SOLUTION INTRAVENOUS; SUBCUTANEOUS
Status: DISPENSED | OUTPATIENT
Start: 2023-09-14 | End: 2023-09-14

## 2023-09-13 RX ORDER — ONDANSETRON 2 MG/ML
4 INJECTION INTRAMUSCULAR; INTRAVENOUS EVERY 8 HOURS PRN
Status: DISCONTINUED | OUTPATIENT
Start: 2023-09-13 | End: 2023-09-14

## 2023-09-13 RX ORDER — LIDOCAINE HYDROCHLORIDE 10 MG/ML
INJECTION, SOLUTION EPIDURAL; INFILTRATION; INTRACAUDAL; PERINEURAL
Status: DISCONTINUED | OUTPATIENT
Start: 2023-09-13 | End: 2023-09-13 | Stop reason: HOSPADM

## 2023-09-13 RX ORDER — ONDANSETRON 2 MG/ML
8 INJECTION INTRAMUSCULAR; INTRAVENOUS EVERY 6 HOURS PRN
Status: DISCONTINUED | OUTPATIENT
Start: 2023-09-13 | End: 2023-09-13

## 2023-09-13 RX ORDER — SODIUM CHLORIDE 0.9 % (FLUSH) 0.9 %
10 SYRINGE (ML) INJECTION
Status: DISCONTINUED | OUTPATIENT
Start: 2023-09-13 | End: 2023-09-13 | Stop reason: HOSPADM

## 2023-09-13 RX ORDER — IODIXANOL 320 MG/ML
INJECTION, SOLUTION INTRAVASCULAR
Status: DISCONTINUED | OUTPATIENT
Start: 2023-09-13 | End: 2023-09-13 | Stop reason: HOSPADM

## 2023-09-13 RX ORDER — HALOPERIDOL 5 MG/ML
0.5 INJECTION INTRAMUSCULAR EVERY 10 MIN PRN
Status: DISCONTINUED | OUTPATIENT
Start: 2023-09-13 | End: 2023-09-13 | Stop reason: HOSPADM

## 2023-09-13 RX ORDER — DEXMEDETOMIDINE HYDROCHLORIDE 100 UG/ML
INJECTION, SOLUTION INTRAVENOUS
Status: DISCONTINUED | OUTPATIENT
Start: 2023-09-13 | End: 2023-09-13

## 2023-09-13 RX ORDER — SCOLOPAMINE TRANSDERMAL SYSTEM 1 MG/1
1 PATCH, EXTENDED RELEASE TRANSDERMAL
Status: DISPENSED | OUTPATIENT
Start: 2023-09-13 | End: 2023-09-13

## 2023-09-13 RX ORDER — ONDANSETRON 2 MG/ML
INJECTION INTRAMUSCULAR; INTRAVENOUS
Status: DISCONTINUED | OUTPATIENT
Start: 2023-09-13 | End: 2023-09-13

## 2023-09-13 RX ORDER — HYDROCODONE BITARTRATE AND ACETAMINOPHEN 500; 5 MG/1; MG/1
TABLET ORAL
Status: DISCONTINUED | OUTPATIENT
Start: 2023-09-13 | End: 2023-09-15

## 2023-09-13 RX ORDER — PHENYLEPHRINE HCL IN 0.9% NACL 1 MG/10 ML
SYRINGE (ML) INTRAVENOUS
Status: DISCONTINUED | OUTPATIENT
Start: 2023-09-13 | End: 2023-09-13

## 2023-09-13 RX ADMIN — ONDANSETRON 4 MG: 2 INJECTION INTRAMUSCULAR; INTRAVENOUS at 06:09

## 2023-09-13 RX ADMIN — Medication 100 MCG: at 08:09

## 2023-09-13 RX ADMIN — INSULIN ASPART 4 UNITS: 100 INJECTION, SOLUTION INTRAVENOUS; SUBCUTANEOUS at 06:09

## 2023-09-13 RX ADMIN — DEXMEDETOMIDINE 8 MCG: 100 INJECTION, SOLUTION, CONCENTRATE INTRAVENOUS at 08:09

## 2023-09-13 RX ADMIN — NIFEDIPINE 30 MG: 30 TABLET, FILM COATED, EXTENDED RELEASE ORAL at 12:09

## 2023-09-13 RX ADMIN — MUPIROCIN: 20 OINTMENT TOPICAL at 12:09

## 2023-09-13 RX ADMIN — VANCOMYCIN HYDROCHLORIDE 750 MG: 750 INJECTION, POWDER, LYOPHILIZED, FOR SOLUTION INTRAVENOUS at 04:09

## 2023-09-13 RX ADMIN — PIPERACILLIN SODIUM AND TAZOBACTAM SODIUM 4.5 G: 4; .5 INJECTION, POWDER, FOR SOLUTION INTRAVENOUS at 09:09

## 2023-09-13 RX ADMIN — GABAPENTIN 300 MG: 300 CAPSULE ORAL at 12:09

## 2023-09-13 RX ADMIN — MIDAZOLAM HYDROCHLORIDE 2 MG: 1 INJECTION, SOLUTION INTRAMUSCULAR; INTRAVENOUS at 07:09

## 2023-09-13 RX ADMIN — ACETAMINOPHEN 1000 MG: 500 TABLET ORAL at 06:09

## 2023-09-13 RX ADMIN — ATORVASTATIN CALCIUM 80 MG: 40 TABLET, FILM COATED ORAL at 12:09

## 2023-09-13 RX ADMIN — ACETAMINOPHEN 1000 MG: 500 TABLET ORAL at 01:09

## 2023-09-13 RX ADMIN — OXYCODONE HYDROCHLORIDE 10 MG: 10 TABLET ORAL at 12:09

## 2023-09-13 RX ADMIN — ONDANSETRON 8 MG: 2 INJECTION INTRAMUSCULAR; INTRAVENOUS at 06:09

## 2023-09-13 RX ADMIN — DEXMEDETOMIDINE 4 MCG: 100 INJECTION, SOLUTION, CONCENTRATE INTRAVENOUS at 08:09

## 2023-09-13 RX ADMIN — SODIUM CHLORIDE: 0.9 INJECTION, SOLUTION INTRAVENOUS at 07:09

## 2023-09-13 RX ADMIN — INSULIN ASPART 2 UNITS: 100 INJECTION, SOLUTION INTRAVENOUS; SUBCUTANEOUS at 09:09

## 2023-09-13 RX ADMIN — ONDANSETRON 8 MG: 2 INJECTION INTRAMUSCULAR; INTRAVENOUS at 12:09

## 2023-09-13 RX ADMIN — ACETAMINOPHEN 1000 MG: 500 TABLET ORAL at 09:09

## 2023-09-13 RX ADMIN — GABAPENTIN 300 MG: 300 CAPSULE ORAL at 09:09

## 2023-09-13 RX ADMIN — MUPIROCIN: 20 OINTMENT TOPICAL at 09:09

## 2023-09-13 RX ADMIN — ONDANSETRON 4 MG: 2 INJECTION INTRAMUSCULAR; INTRAVENOUS at 09:09

## 2023-09-13 NOTE — ASSESSMENT & PLAN NOTE
- Vascular Surgery and Podiatry consulted and following  - plans bypass on 9/14 per Vascular Surgery  - tentative plans for left great toe metatarsal head resection with washout/debridement per Podiatry at some point during this admission following bypass  - on broad spectrum antibiotics with vancomycin and Zosyn per primary team, foot cultures in process

## 2023-09-13 NOTE — ANESTHESIA PREPROCEDURE EVALUATION
[facility]  Anesthesia Pre-Operative Evaluation         Patient Name: Georgina Arias  YOB: 1970  MRN: [id number]    SUBJECTIVE:     Pre-operative evaluation for Procedure(s) (LRB):  CREATION, BYPASS, ARTERIAL, AXILLARY TO BILATERAL FEMORAL (Left)     09/13/2023    Georgina Arias is a 52 y.o. female w/ a significant PMHx of of ESRD on HD, T2DM on insulin and mounjaro (A1c 7.2), PVD s/p stents to RLE, DVT of R leg, HTN, HLD, PUD, obesity (BMI 38), and three recent left foot surgeries in August. Most recently uderwent amputation of the L hallux at the [facility] on 8/25/23 for reported gangrene and osteomyelitis. She is currently admitted to vascular surgery for non-healing left foot wound. Now s/p angiogram of LLE on 9/13/23.     Last HD on 9/12.     Of note, patient has a h/o PONV.    Patient now presents for the above procedure(s).    Paper consent completed and in patient's chart.    LDA:        Peripheral IV - Single Lumen 09/11/23 1915 20 G;1 3/4 in Left Forearm (Active)   Site Assessment Clean;Dry;Intact 09/13/23 1230   Extremity Assessment Distal to IV No abnormal discoloration 09/13/23 1230   Line Status Saline locked 09/13/23 1230   Dressing Status Clean;Dry;Intact 09/13/23 1230   Dressing Intervention Integrity maintained 09/13/23 1230   Site Change Due 09/15/23 09/11/23 1922   Reason Not Rotated Not due 09/12/23 2025   Number of days: 1            Hemodialysis AV Fistula  Right forearm (Active)   Needle Size Buttonhole 09/12/23 0856   Site Assessment Clean;Dry;Intact 09/13/23 1230   Patency Thrill;Bruit 09/13/23 1230   Status Deaccessed 09/12/23 1226   Flows Good 09/12/23 0856   Dressing Intervention Integrity maintained 09/13/23 1230   Dressing Status Clean;Dry;Intact 09/13/23 0734   Site Condition No complications 09/13/23 1230   Dressing Gauze 09/13/23 1230   Number of days:         Prev airway:   Method of Intubation: Conklin, Rapid Sequence Induction; Inserted by: CRNA; Airway Device: Endotracheal Tube; Mask Ventilation: Easy; Intubated: Postinduction; Blade: Other (see comments) (McG#3); Airway Device Size: 7.0; Style: Cuffed; Cuff Inflation: Minimal occlusive pressure; Inflation Amount: 4; Placement Verified By: Auscultation, Capnometry; Grade: Grade I; Complicating Factors: Obesity, Short neck; Intubation Findings: Positive EtCO2, Bilateral breath sounds, Atraumatic/Condition of teeth unchanged;  Depth of Insertion: 20; Securment: Teeth; Complications: None    Drips: None documented.      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, with other ophthalmic complication    Ureteral stone with hydronephrosis    Acute kidney injury superimposed on CKD    Nephrolithiasis    Pre-operative examination    Acute respiratory failure with hypoxia and hypercapnia    Elevated troponin    Pneumonia due to COVID-19 virus    Hypertensive emergency    Lactic acidosis    Hyperkalemia    Metabolic acidosis    CHF (congestive heart failure)    Class 3 severe obesity due to excess calories with serious comorbidity and body mass index (BMI) of 40.0 to 44.9 in adult    Open wound of left foot    ESRD (end stage renal disease)    Severe obesity (BMI >= 40)    Pre-op exam       Review of patient's allergies indicates:   Allergen Reactions    Naproxen Anaphylaxis and Swelling     Hives (skin)^swelling  Hives (skin)^swelling  Hives (skin)^swelling    Sulfamethoxazole-trimethoprim Other (See Comments)     Caused JEROME    Hydrocodone Nausea And Vomiting and Rash    Penicillins Hives     Blisters (skin)^    Adhesive Rash    Hydrocodone-acetaminophen Nausea And Vomiting     Rash  (skin)^, Vomiting^         Current Inpatient Medications:      Current Facility-Administered Medications on File Prior to Encounter   Medication Dose Route Frequency Provider Last Rate Last Admin    acetaminophen tablet 1,000 mg  1,000 mg Oral Q8H Marta Schultz MD   1,000 mg at 09/13/23 1346    atorvastatin tablet 80 mg  80 mg Oral Daily Marta Schultz MD   80 mg at 09/13/23 1230    dextrose 10% bolus 125 mL 125 mL  12.5 g Intravenous PRN Marta Schultz MD        dextrose 10% bolus 250 mL 250 mL  25 g Intravenous PRN Marta Schultz MD        diphenhydrAMINE capsule 25 mg  25 mg Oral Q6H PRN Yoselin Yi MD        gabapentin capsule 300 mg  300 mg Oral BID Kellie Mauricio, NP   300 mg at 09/13/23 1229    glucagon (human recombinant) injection 1 mg  1 mg Intramuscular PRN Marta Schultz MD        glucose chewable tablet 16 g  16 g Oral PRN Marta Schultz MD        glucose chewable tablet 24 g  24 g Oral PRN Marta Schultz MD        heparin (porcine) injection 3,000 Units  3,000 Units Intravenous PRN Alireza Velázquez MD   3,000 Units at 09/12/23 0831    hydrALAZINE injection 10 mg  10 mg Intravenous Q6H PRN Marta Schultz MD        insulin aspart U-100 pen 0-10 Units  0-10 Units Subcutaneous QID (AC + HS) PRN Marta Schultz MD   4 Units at 09/13/23 0617    labetalol 20 mg/4 mL (5 mg/mL) IV syring  20 mg Intravenous Q6H PRN Melissa Solares MD        LIDOcaine (PF) 10 mg/ml (1%) injection 10 mg  1 mL Intradermal Once PRN Marta Schultz MD        mupirocin 2 % ointment   Nasal BID Alireza Velázquez MD   Given at 09/13/23 1229    NIFEdipine 24 hr tablet 30 mg  30 mg Oral Daily Marta Schultz MD   30 mg at 09/13/23 1230    ondansetron injection 8 mg  8 mg Intravenous Q6H PRN Alireza Cheng MD   8 mg at 09/13/23 0606    oxyCODONE immediate release tablet 5 mg  5 mg Oral Q6H PRN Marta Schultz MD        oxyCODONE immediate release tablet Tab 10 mg  10 mg Oral Q6H PRN Antoine,  "MD Marta   10 mg at 09/13/23 1230    piperacillin-tazobactam (ZOSYN) 4.5 g in dextrose 5 % in water (D5W) 100 mL IVPB (MB+)  4.5 g Intravenous Q12H Yoselin Yi MD   Stopped at 09/13/23 0031    scopolamine 1.3-1.5 mg (1 mg over 3 days) 1 patch  1 patch Transdermal Once Pre-Op Arelis Riddle MD        sodium chloride 0.9% bolus 250 mL 250 mL  250 mL Intravenous PRN Alireza Velázquez MD        sodium chloride 0.9% flush 10 mL  10 mL Intravenous PRN Marta Schultz MD        vancomycin - pharmacy to dose   Intravenous pharmacy to manage frequency Yoselin Yi MD         Current Outpatient Medications on File Prior to Encounter   Medication Sig Dispense Refill    acetaminophen (TYLENOL) 500 MG tablet Take 2 tablets (1,000 mg total) by mouth every 8 (eight) hours as needed. 60 tablet 0    aspirin (ECOTRIN) 81 MG EC tablet Take 1 tablet (81 mg total) by mouth once daily.      atorvastatin (LIPITOR) 80 MG tablet Take 1 tablet (80 mg total) by mouth once daily. 90 tablet 3    carvediloL (COREG) 25 MG tablet Take 1 tablet (25 mg total) by mouth 2 (two) times daily with meals. 180 tablet 3    COMFORT EZ PEN NEEDLES 32 gauge x 5/32" Ndle SMARTSIG:injection Daily      doxycycline (VIBRAMYCIN) 100 MG Cap Take 1 capsule (100 mg total) by mouth 2 (two) times daily. 84 capsule 0    fluticasone propionate (FLONASE) 50 mcg/actuation nasal spray Fluticasone Propionate 50 MCG/ACT Nasal Suspension QTY: 1 bottle Days: 30 Refills: 5  Written: 09/30/20 Patient Instructions: inhale 2 sprays in each nostril once daily      gabapentin (NEURONTIN) 300 MG capsule Take 1 capsule (300 mg total) by mouth 3 (three) times daily. 63 capsule 1    insulin (LANTUS SOLOSTAR U-100 INSULIN) glargine 100 units/mL (3mL) SubQ pen Inject 40 Units into the skin every evening. (Patient taking differently: Inject 50 Units into the skin every evening.)  0    levocetirizine (XYZAL) 5 MG tablet Take 5 mg by mouth.      miscellaneous " medical supply (C-TUB) Misc Hearing amplification (BICROS preferred)      MOUNJARO 5 mg/0.5 mL PnIj SMARTSI Milligram(s) SUB-Q Once a Week      NIFEdipine (PROCARDIA-XL) 30 MG (OSM) 24 hr tablet Take 30 mg by mouth.      sevelamer carbonate (RENVELA) 800 mg Tab Take by mouth.      traMADoL (ULTRAM) 50 mg tablet Take 1 tablet (50 mg total) by mouth every 8 (eight) hours as needed for Pain. 20 tablet 0    TRUE METRIX GLUCOSE TEST STRIP Strp USE TO test THREE TIMES DAILY      TRUEPLUS LANCETS 30 gauge [facility]          Past Surgical History:   Procedure Laterality Date    AV FISTULA PLACEMENT Right     CHOLECYSTECTOMY      CYSTOSCOPY W/ URETERAL STENT PLACEMENT Right 2018    Procedure: CYSTOSCOPY, WITH URETERAL STENT INSERTION (ADD ON );  Surgeon: Bubba Perez MD;  Location: [facility] OR;  Service: Urology;  Laterality: Right;  (ADD ON )    DEBRIDEMENT OF FOOT Left 2023    Procedure: DEBRIDEMENT, FOOT;  Surgeon: Aleida Flannery DPM;  Location: [facility] OR;  Service: Podiatry;  Laterality: Left;    Excisional debridement of ulcer distal great toe left foot w/ partial resection distal phalanx Left 2023    I&D L 1st ray w/ amputation L hallux IPJ Left 2023    Injection L PT tendon sheath Left 2023    Nail avulsion left hallux Left 2023    PERIPHERAL ARTERIAL STENT GRAFT Right     REMOVAL OF NAIL OF DIGIT Left 2023    Procedure: REMOVAL, NAIL, DIGIT great toe;  Surgeon: Aleida Flannery DPM;  Location: [facility] OR;  Service: Podiatry;  Laterality: Left;    TOE AMPUTATION Left 2023    Procedure: AMPUTATION, TOE hallux IPJ;  Surgeon: Aleida Flannery DPM;  Location: [facility] OR;  Service: Podiatry;  Laterality: Left;    TOE AMPUTATION Left 2023    great toe    TOE AMPUTATION Left 2023    Procedure: AMPUTATION, TOE hallux, possible 1st met.head;  Surgeon: Aleida Flannery DPM;  Location: [facility] OR;  Service: Podiatry;  Laterality: Left;    URETEROSCOPIC REMOVAL OF URETERIC  CALCULUS Right 2018    Procedure: EXTRACTION-STONE-URETEROSCOPY;  Surgeon: Bubba Perez MD;  Location: [facility];  Service: Urology;  Laterality: Right;       Social History     Socioeconomic History    Marital status: Single   Occupational History     Employer: [employer]   Tobacco Use    Smoking status: Former     Current packs/day: 0.00     Types: Cigarettes     Quit date: 2006     Years since quittin.1    Smokeless tobacco: Never   Substance and Sexual Activity    Alcohol use: Not Currently     Comment: occ    Drug use: No    Sexual activity: Not Currently   Social History Narrative    Lives with mom.    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    Some photography.    Close with niece 2 and god-child.    occ exericse     Social Determinants of Health     Financial Resource Strain: Low Risk  (2023)    Overall Financial Resource Strain (CARDIA)     Difficulty of Paying Living Expenses: Not hard at all   Food Insecurity: No Food Insecurity (2023)    Hunger Vital Sign     Worried About Running Out of Food in the Last Year: Never true     Ran Out of Food in the Last Year: Never true   Transportation Needs: No Transportation Needs (2023)    PRAPARE - Transportation     Lack of Transportation (Medical): No     Lack of Transportation (Non-Medical): No   Physical Activity: Inactive (2023)    Exercise Vital Sign     Days of Exercise per Week: 0 days     Minutes of Exercise per Session: 0 min   Stress: No Stress Concern Present (2023)    [employer] [place] [employer] - Occupational Stress Questionnaire     Feeling of Stress : Only a little   Social Connections: Moderately Isolated (2023)    Social Connection and Isolation Panel [NHANES]     Frequency of Communication with Friends and Family: Twice a week     Frequency of Social Gatherings with Friends and Family: Twice a week     Attends Gnosticism Services: 1 to  4 times per year     Active Member of Clubs or Organizations: No     Marital Status: Never    Housing Stability: Unknown (9/12/2023)    Housing Stability Vital Sign     Unable to Pay for Housing in the Last Year: No     Unstable Housing in the Last Year: No       OBJECTIVE:     Vital Signs Range (Last 24H):  Temp:  [36.1 °C (97 °F)-37.4 °C (99.4 °F)]   Pulse:  [68-95]   Resp:  [10-20]   BP: ()/(40-81)   SpO2:  [90 %-100 %]       Significant Labs:  Lab Results   Component Value Date    WBC 7.09 09/13/2023    HGB 10.3 (L) 09/13/2023    HCT 33.2 (L) 09/13/2023     09/13/2023    CHOL 160 07/30/2014    TRIG 426 (H) 07/30/2014    HDL 43 07/30/2014    ALT 19 09/13/2023    AST 22 09/13/2023     09/13/2023    K 4.2 09/13/2023    CL 99 09/13/2023    CREATININE 4.0 (H) 09/13/2023    BUN 35 (H) 09/13/2023    CO2 25 09/13/2023    INR 1.0 08/14/2023    HGBA1C 7.2 (H) 09/11/2023       Diagnostic Studies: No relevant studies.    EKG:   Results for orders placed or performed during the hospital encounter of 08/14/23   EKG 12-lead    Collection Time: 08/14/23  7:07 PM    Narrative    Test Reason : Z86.718,    Vent. Rate : 084 BPM     Atrial Rate : 084 BPM     P-R Int : 152 ms          QRS Dur : 086 ms      QT Int : 396 ms       P-R-T Axes : 044 020 051 degrees     QTc Int : 467 ms    Normal sinus rhythm  Low voltage QRS  Otherwise normal ECG  When compared with ECG of 17-MAY-2022 22:39,  rate is slower and QRS voltage is lower in the lateral precordial leads  Confirmed by Bubba Barragan MD ([id number]) on 8/15/2023 5:53:58 PM    Referred By: [email or web address]   SELF           Confirmed By:Bubba Barragan MD       2D ECHO:  TTE:  Results for orders placed or performed during the hospital encounter of 05/17/22   Echo   Result Value Ref Range    STJ 2.16 cm    Ao peak ashley 1.62 m/s    IVS 1.33 (A) 0.6 - 1.1 cm    LA size 4.20 cm    LVIDd 4.60 3.5 - 6.0 cm    LVIDs 3.27 2.1 - 4.0 cm    LVOT diameter 1.73 cm    Posterior  Wall 1.38 (A) 0.6 - 1.1 cm    MV Peak A Blake 0.84 m/s    E wave deceleration time 163.70 msec    MV Peak E Blake 1.12 m/s    RVDD 2.99 cm    Ao root annulus 2.32 cm    AORTIC VALVE CUSP SEPERATION 1.49 cm    PV PEAK VELOCITY 1.21 cm/s    MV stenosis pressure 1/2 time 47.47 ms    LV Diastolic Volume 97.36 mL    LV Systolic Volume 43.05 mL    LVOT peak blake 0.87 m/s    TDI LATERAL 0.09 m/s    TDI SEPTAL 0.08 m/s    Mr max blake 0.04 m/s    LV LATERAL E/E' RATIO 12.44 m/s    LV SEPTAL E/E' RATIO 14.00 m/s    FS 29 %    LV mass 244.61 g    Left Ventricle Relative Wall Thickness 0.60 cm    AV Velocity Ratio 0.54     MV valve area p 1/2 method 4.63 cm2    E/A ratio 1.33     Mean e' 0.09 m/s    LVOT area 2.3 cm2    AV peak gradient 10 mmHg    E/E' ratio 13.18 m/s    LV Systolic Volume Index 18.6 mL/m2    LV Diastolic Volume Index 42.15 mL/m2    LV Mass Index 106 g/m2    BSA 2.44 m2    Right Atrial Pressure (from IVC) 15 mmHg    EF 55 %    Narrative    · The left ventricle is normal in size with mild concentric hypertrophy   and normal systolic function.  · Mild right ventricular enlargement with normal right ventricular   systolic function.  · The estimated ejection fraction is 55%.  · Normal left ventricular diastolic function.  · Trivial posterior pericardial effusion. Effusion is fluid.  · Mild left atrial enlargement.  · Elevated central venous pressure (15 mmHg).          UDAY:  No results found for this or any previous visit.    ASSESSMENT/PLAN:           Pre-op Assessment          Review of Systems      Physical Exam  General: Well nourished, Cooperative, Alert and Oriented    Airway:  Mallampati: II   Mouth Opening: Normal  TM Distance: Normal  Tongue: Normal  Neck ROM: Normal ROM    Dental:  Intact        Anesthesia Plan  Type of Anesthesia, risks & benefits discussed:    Anesthesia Type: Gen ETT  Intra-op Monitoring Plan: Standard ASA Monitors and Art Line  Post Op Pain Control Plan: multimodal analgesia and IV/PO  Opioids PRN  Induction:  IV and rapid sequence  Airway Plan: Direct and Video, Post-Induction  Informed Consent: Informed consent signed with the Patient and all parties understand the risks and agree with anesthesia plan.  All questions answered.   ASA Score: 3  Day of Surgery Review of History & Physical: H&P Update referred to the surgeon/provider.    Ready For Surgery From Anesthesia Perspective.     .

## 2023-09-13 NOTE — PLAN OF CARE
Problem: Adult Inpatient Plan of Care  Goal: Plan of Care Review  Outcome: Ongoing, Progressing  Goal: Patient-Specific Goal (Individualized)  Outcome: Ongoing, Progressing  Goal: Absence of Hospital-Acquired Illness or Injury  Outcome: Ongoing, Progressing  Goal: Optimal Comfort and Wellbeing  Outcome: Ongoing, Progressing  Goal: Readiness for Transition of Care  Outcome: Ongoing, Progressing     Problem: Diabetes Comorbidity  Goal: Blood Glucose Level Within Targeted Range  Outcome: Ongoing, Progressing     Problem: Fluid and Electrolyte Imbalance (Acute Kidney Injury/Impairment)  Goal: Fluid and Electrolyte Balance  Outcome: Ongoing, Progressing     Problem: Oral Intake Inadequate (Acute Kidney Injury/Impairment)  Goal: Optimal Nutrition Intake  Outcome: Ongoing, Progressing     Problem: Renal Function Impairment (Acute Kidney Injury/Impairment)  Goal: Effective Renal Function  Outcome: Ongoing, Progressing     Problem: Device-Related Complication Risk (Hemodialysis)  Goal: Safe, Effective Therapy Delivery  Outcome: Ongoing, Progressing     Problem: Hemodynamic Instability (Hemodialysis)  Goal: Effective Tissue Perfusion  Outcome: Ongoing, Progressing     Problem: Infection (Hemodialysis)  Goal: Absence of Infection Signs and Symptoms  Outcome: Ongoing, Progressing     Problem: Fall Injury Risk  Goal: Absence of Fall and Fall-Related Injury  Outcome: Ongoing, Progressing     Problem: Adjustment to Illness (Chronic Kidney Disease)  Goal: Optimal Coping with Chronic Illness  Outcome: Ongoing, Progressing     Problem: Electrolyte Imbalance (Chronic Kidney Disease)  Goal: Electrolyte Balance  Outcome: Ongoing, Progressing     Problem: Fluid Volume Excess (Chronic Kidney Disease)  Goal: Fluid Balance  Outcome: Ongoing, Progressing     Problem: Functional Decline (Chronic Kidney Disease)  Goal: Optimal Functional Ability  Outcome: Ongoing, Progressing     Problem: Hematologic Alteration (Chronic Kidney  Disease)  Goal: Absence of Anemia Signs and Symptoms  Outcome: Ongoing, Progressing     Problem: Oral Intake Inadequate (Chronic Kidney Disease)  Goal: Optimal Oral Intake  Outcome: Ongoing, Progressing     Problem: Pain (Chronic Kidney Disease)  Goal: Acceptable Pain Control  Outcome: Ongoing, Progressing     Problem: Renal Function Impairment (Chronic Kidney Disease)  Goal: Minimize Renal Failure Effects  Outcome: Ongoing, Progressing

## 2023-09-13 NOTE — ANESTHESIA POSTPROCEDURE EVALUATION
Anesthesia Post Evaluation    Patient: Georgina Arias    Procedure(s) Performed: Procedure(s) (LRB):  AORTOGRAM, WITH EXTREMITY RUNOFF (Left)  ANGIOGRAM, LOWER ARTERIAL, UNILATERAL (Left)    Final Anesthesia Type: MAC      Patient location during evaluation: PACU  Patient participation: Yes- Able to Participate  Level of consciousness: awake and alert  Post-procedure vital signs: reviewed and stable  Pain management: adequate  Airway patency: patent    PONV status at discharge: No PONV  Anesthetic complications: no      Cardiovascular status: blood pressure returned to baseline  Respiratory status: unassisted  Hydration status: euvolemic  Follow-up not needed.          Vitals Value Taken Time   /70 09/13/23 1200   Temp 36.7 °C (98.1 °F) 09/13/23 1200   Pulse 69 09/13/23 1200   Resp 16 09/13/23 1230   SpO2 96 % 09/13/23 1200         Event Time   Out of Recovery 09/13/2023 10:21:00         Pain/Jasmin Score: Pain Rating Prior to Med Admin: 7 (9/13/2023 12:30 PM)  Jasmin Score: 10 (9/13/2023 10:21 AM)

## 2023-09-13 NOTE — PROGRESS NOTES
[facility] - Surgery  Nephrology  Progress Note    Patient Name: Georgina Arias  MRN: [id number]  Admission Date: 9/11/2023  Hospital Length of Stay: 2 days  Attending Provider: Maxx Maya MD   Primary Care Physician: Alonso Ling MD  Principal Problem:Open wound of left foot    Subjective:     HPI: Ms Arias is an obese (BMI ~38) 52-year-old woman with HLD, PAD with multiple prior stents, hypertension, peptic ulcer disease, type 2 diabetes mellitus on insulin (most recent hemoglobin A1c 7.2%), central obesity hypoventilation syndrome, chronic left foot wound s/p x3 surgeries now in August/last month (i.e. I&D, left hallux amputation, etc.) and ESRD secondary to diabetic nephropathy (biopsy proven) on iHD every Tuesday, Thursday & Saturday via RUE AVF who was admitted to [facility] on 9/11 for aforementioned left lower extremity wound. Tentative plans for angiography while inpatient per Vascular Surgery. Podiatry has also been consulted for assistance. Nephrology has been consulted for inpatient RRT needs.    Outpatient HD Information:  -Dialysis modality: Hemodialysis  -Outpatient HD unit: [facility] (Valerio Bautista MD)  -HD TX days: Tuesday/Thursday/Saturday, duration of treatment: 3-4 hrs  -Last HD TX prior to hospital admission: 09/09/2023  -Dialysis access: RUE AVF   -Residual urine: + RRF  -EDW: 109 kg      Interval History: Patient seen and examined following return from her angiogram. Vascular Surgery with plans for bypass surgery tomorrow. She underwent iHD yesterday with UF of ~2.36 liters. Afebrile with pulse ranging from 90-70s bpm. Systolic blood pressures ranging from 190-150s mmHg. She is saturating +90% on room air. Plans for iHD tomorrow (due to HD nurse shortage unlikely to receive bedside iHD overnight).    Review of patient's allergies indicates:   Allergen Reactions    Naproxen Anaphylaxis and Swelling     Hives (skin)^swelling  Hives (skin)^swelling  Hives (skin)^swelling     Sulfamethoxazole-trimethoprim Other (See Comments)     Caused JEROME    Hydrocodone Nausea And Vomiting and Rash    Penicillins Hives     Blisters (skin)^    Adhesive Rash    Hydrocodone-acetaminophen Nausea And Vomiting     Rash (skin)^, Vomiting^       Current Facility-Administered Medications   Medication Frequency    acetaminophen tablet 1,000 mg Q8H    atorvastatin tablet 80 mg Daily    dextrose 10% bolus 125 mL 125 mL PRN    dextrose 10% bolus 250 mL 250 mL PRN    diphenhydrAMINE capsule 25 mg Q6H PRN    gabapentin capsule 300 mg BID    glucagon (human recombinant) injection 1 mg PRN    glucose chewable tablet 16 g PRN    glucose chewable tablet 24 g PRN    heparin (porcine) injection 3,000 Units PRN    heparin (porcine) injection PRN    hydrALAZINE injection 10 mg Q6H PRN    insulin aspart U-100 pen 0-10 Units QID (AC + HS) PRN    labetalol 20 mg/4 mL (5 mg/mL) IV syring Q6H PRN    LIDOcaine (PF) 10 mg/ml (1%) injection 10 mg Once PRN    mupirocin 2 % ointment BID    NIFEdipine 24 hr tablet 30 mg Daily    nitroGLYCERIN injection PRN    ondansetron injection 8 mg Q6H PRN    oxyCODONE immediate release tablet 5 mg Q6H PRN    oxyCODONE immediate release tablet Tab 10 mg Q6H PRN    piperacillin-tazobactam (ZOSYN) 4.5 g in dextrose 5 % in water (D5W) 100 mL IVPB (MB+) Q12H    scopolamine 1.3-1.5 mg (1 mg over 3 days) 1 patch Once Pre-Op    sodium chloride 0.9% bolus 250 mL 250 mL PRN    sodium chloride 0.9% flush 10 mL PRN    vancomycin - pharmacy to dose pharmacy to manage frequency    verapamiL injection PRN     Facility-Administered Medications Ordered in Other Encounters   Medication Frequency    dexmedeTOMIDine injection PRN    midazolam injection PRN    phenylephrine HCl in 0.9% NaCl 1 mg/10 mL (100 mcg/mL) PRN    sodium chloride 0.9% infusion Continuous PRN       Objective:     Vital Signs (Most Recent):  Temp: 97.9 °F (36.6 °C) (09/13/23 0715)  Pulse: 78 (09/13/23 0715)  Resp: 18 (09/13/23 0715)  BP: (!)  157/65 (09/13/23 0715)  SpO2: 98 % (09/13/23 0715) Vital Signs (24h Range):  Temp:  [97 °F (36.1 °C)-99.4 °F (37.4 °C)] 97.9 °F (36.6 °C)  Pulse:  [75-95] 78  Resp:  [14-20] 18  SpO2:  [90 %-99 %] 98 %  BP: (140-195)/(62-81) 157/65     Weight: 110 kg (242 lb 8.1 oz) (09/11/23 1716)  Body mass index is 37.98 kg/m².  Body surface area is 2.28 meters squared.    I/O last 3 completed shifts:  In: 1530 [P.O.:480; Other:350; IV Piggyback:700]  Out: 3013 [Other:3013]     Physical Exam  Vitals and nursing note reviewed.   Constitutional:       General: She is not in acute distress.     Appearance: Normal appearance. She is obese. She is not ill-appearing or diaphoretic.   HENT:      Head: Normocephalic and atraumatic.      Right Ear: External ear normal.      Left Ear: External ear normal.      Nose: Nose normal.      Mouth/Throat:      Mouth: Mucous membranes are moist.      Pharynx: Oropharynx is clear. No oropharyngeal exudate or posterior oropharyngeal erythema.   Eyes:      General: No scleral icterus.        Right eye: No discharge.         Left eye: No discharge.      Extraocular Movements: Extraocular movements intact.      Conjunctiva/sclera: Conjunctivae normal.   Cardiovascular:      Rate and Rhythm: Normal rate.      Heart sounds: Murmur heard.      Systolic murmur is present with a grade of 1/6.      No friction rub. No gallop.      Arteriovenous access: Left arteriovenous access is present.     Comments: AVF to right upper extremity with palpable thrill and audible bruit.  Pulmonary:      Effort: Pulmonary effort is normal. No respiratory distress.      Breath sounds: No wheezing, rhonchi or rales.   Abdominal:      General: Bowel sounds are normal. There is no distension.      Palpations: Abdomen is soft.      Tenderness: There is no abdominal tenderness.      Comments: Obese/protuberant abdomen.   Musculoskeletal:      Cervical back: Neck supple.      Right lower leg: No edema.      Left lower leg: Edema  present.   Skin:     Findings: Lesion present.      Comments: Left foot currently dressed/bandaged. See images below.   Neurological:      General: No focal deficit present.      Mental Status: She is alert. Mental status is at baseline.      Cranial Nerves: No cranial nerve deficit.      Motor: No weakness.   Psychiatric:         Mood and Affect: Mood normal.         Behavior: Behavior normal.          Significant Labs:  BMP:   Recent Labs   Lab 09/12/23  0304 09/13/23  0442   * 230*    137   K 4.3 4.2    99   CO2 19* 25   BUN 53* 35*   CREATININE 4.2* 4.0*   CALCIUM 9.1 8.6*   MG 2.1  --      CBC:   Recent Labs   Lab 09/13/23 0442   WBC 7.09   RBC 3.29*   HGB 10.3*   HCT 33.2*      *   MCH 31.3*   MCHC 31.0*     CMP:   Recent Labs   Lab 09/13/23 0442   *   CALCIUM 8.6*   ALBUMIN 2.4*   PROT 6.6      K 4.2   CO2 25   CL 99   BUN 35*   CREATININE 4.0*   ALKPHOS 98   ALT 19   AST 22   BILITOT 0.4     LFTs:   Recent Labs   Lab 09/13/23  0442   ALT 19   AST 22   ALKPHOS 98   BILITOT 0.4   PROT 6.6   ALBUMIN 2.4*     Microbiology Results (last 7 days)       Procedure Component Value Units Date/Time    Culture, Anaerobic [[id number]] Collected: 09/12/23 1158    Order Status: Completed Specimen: Wound from Toe, Left Foot Updated: 09/13/23 0755     Anaerobic Culture Culture in progress    Gram stain [[id number]] Collected: 09/12/23 1158    Order Status: Completed Specimen: Wound from Toe, Left Foot Updated: 09/12/23 1456     Gram Stain Result Moderate WBC's      Rare Gram positive cocci    Aerobic culture [[id number]] Collected: 09/12/23 1158    Order Status: Sent Specimen: Wound from Toe, Left Foot Updated: 09/12/23 1221    Fungus culture [[id number]] Collected: 09/12/23 1158    Order Status: Sent Specimen: Wound from Toe, Left Foot Updated: 09/12/23 1220    AFB Culture & Smear [[id number]] Collected: 09/12/23 1158    Order Status: Sent Specimen: Wound from Toe, Left Foot  Updated: 09/12/23 1220          Specimen (24h ago, onward)      None          Significant Imaging:  I have reviewed all imagining in the last 24 hours.    Assessment/Plan:     Cardiac/Vascular  Essential hypertension  - management per primary team  - currently on nifedipine 30 mg daily    Renal/  ESRD (end stage renal disease)  Outpatient HD Information:  -Dialysis modality: Hemodialysis  -Outpatient HD unit: [facility] (Valerio Bautista MD)  -HD TX days: Tuesday/Thursday/Saturday, duration of treatment: 3-4 hrs  -Last HD TX prior to hospital admission: 09/09/2023  -Dialysis access: RUE AVF   -Residual urine: + RRF  -EDW: 109 kg    Plan/Recommendations:  - plan for iHD sometime tomorrow, unlikely to be able to receive iHD overnight at bedside secondary to HD nurse shortage and likely plan for iHD following her femoral bypass later tomorrow   - recommend resuming home dose Renvela 800 mg TIDWM  - renal diet/tube feeds when not NPO   - strict I/O's and daily weights  - daily renal function panels and magnesium levels  - renally all dose medications to eGFR  - avoid gadolinium, fleets, phos-based laxatives, NSAIDs, etc.    Orthopedic  * Open wound of left foot  - Vascular Surgery and Podiatry consulted and following  - plans bypass on 9/14 per Vascular Surgery  - tentative plans for left great toe metatarsal head resection with washout/debridement per Podiatry at some point during this admission following bypass  - on broad spectrum antibiotics with vancomycin and Zosyn per primary team, foot cultures in process       Thank you for your consult. I will follow-up with patient. Please contact us if you have any additional questions.    Alireza Velázquez MD  Nephrology  [facility] - Surgery    ATTENDING PHYSICIAN ATTESTATION  I have personally verified the history and examined the patient. I thoroughly reviewed the demographic, clinical, laboratorial and imaging information available in medical records. I agree  with the assessment and recommendations provided by the subspecialty resident who was under my supervision.

## 2023-09-13 NOTE — PROGRESS NOTES
Pharmacokinetic Assessment Follow Up: IV Vancomycin    Vancomycin serum concentration assessment(s):    The random level was drawn ~24 hrs post dose and >6 hrs post iHD. It can be used to guide therapy at this time. The measurement is within the desired definitive target range of 10 to 20 mcg/mL.    Vancomycin Regimen Plan:    Patient with ESRD on iHD (TThSat at home). Nephrology following Last iHD session was 9/12 and ended ~1330. Will continue to pulse dose according to iHD plans. Per notes, plan is to stay on home schedule.     Will give vancomycin 750 mg x1.  Re-dose when the random level is less than 20 mcg/mL, next level to be drawn with am labs on 9/14 (pre iHD level) or sooner if clinically indicated.     Drug levels (last 3 results):  Recent Labs   Lab Result Units 09/12/23  2336   Vancomycin, Random ug/mL 17.0       Pharmacy will continue to follow and monitor vancomycin.    Please contact pharmacy at extension [phone] for questions regarding this assessment.    Thank you for the consult,   Virginia Parrish       Patient brief summary:  Georgina Arias is a 52 y.o. female initiated on antimicrobial therapy with IV Vancomycin for treatment of skin & soft tissue infection    The patient's current regimen is pulse dosing    Drug Allergies:   Review of patient's allergies indicates:   Allergen Reactions    Naproxen Anaphylaxis and Swelling     Hives (skin)^swelling  Hives (skin)^swelling  Hives (skin)^swelling    Sulfamethoxazole-trimethoprim Other (See Comments)     Caused JEROME    Hydrocodone Nausea And Vomiting and Rash    Penicillins Hives     Blisters (skin)^    Adhesive Rash    Hydrocodone-acetaminophen Nausea And Vomiting     Rash (skin)^, Vomiting^         Actual Body Weight:   110 kg    Renal Function:   Estimated Creatinine Clearance: 20 mL/min (A) (based on SCr of 4.2 mg/dL (H)).,     Dialysis Method (if applicable):  intermittent HD    CBC (last 72 hours):  Recent Labs   Lab Result Units  09/11/23  1754 09/12/23  0304   WBC K/uL 14.61* 9.41   Hemoglobin g/dL 10.9* 9.9*   Hemoglobin A1C % 7.2*  --    Hematocrit % 32.5* 31.7*   Platelets K/uL 203 196   Gran % % 84.9* 76.5*   Lymph % % 8.2* 16.6*   Mono % % 5.1 4.6   Eosinophil % % 1.0 1.7   Basophil % % 0.3 0.3   Differential Method  Automated Automated       Metabolic Panel (last 72 hours):  Recent Labs   Lab Result Units 09/11/23  1754 09/12/23  0304   Sodium mmol/L 141 139   Potassium mmol/L 4.6 4.3   Chloride mmol/L 105 103   CO2 mmol/L 18* 19*   Glucose mg/dL 213* 165*   BUN mg/dL 51* 53*   Creatinine mg/dL 4.1* 4.2*   Albumin g/dL 2.8* 2.5*   Total Bilirubin mg/dL 0.5 0.4   Alkaline Phosphatase U/L 116 99   AST U/L 19 25   ALT U/L 16 13   Magnesium mg/dL  --  2.1   Phosphorus mg/dL  --  4.8*       Vancomycin Administrations:  vancomycin given in the last 96 hours                     vancomycin 2 g in dextrose 5 % 500 mL IVPB (mg) 2,000 mg New Bag 09/11/23 1924                    Microbiologic Results:  Microbiology Results (last 7 days)       Procedure Component Value Units Date/Time    Gram stain [[id number]] Collected: 09/12/23 1158    Order Status: Completed Specimen: Wound from Toe, Left Foot Updated: 09/12/23 1456     Gram Stain Result Moderate WBC's      Rare Gram positive cocci    Aerobic culture [[id number]] Collected: 09/12/23 1158    Order Status: Sent Specimen: Wound from Toe, Left Foot Updated: 09/12/23 1221    Culture, Anaerobic [[id number]] Collected: 09/12/23 1158    Order Status: Sent Specimen: Wound from Toe, Left Foot Updated: 09/12/23 1220    Fungus culture [[id number]] Collected: 09/12/23 1158    Order Status: Sent Specimen: Wound from Toe, Left Foot Updated: 09/12/23 1220    AFB Culture & Smear [[id number]] Collected: 09/12/23 1158    Order Status: Sent Specimen: Wound from Toe, Left Foot Updated: 09/12/23 5951

## 2023-09-13 NOTE — NURSING TRANSFER
Nursing Transfer Note      9/13/2023   10:58 AM      Reason patient is being transferred: Post procedure    Transfer To: 540    Transfer via bed    Transported by PCT    Order for Tele Monitor? No    Any special needs or follow-up needed: Routine    Patient belongings transferred with patient: No    Chart send with patient: Yes    Notified: N/A    Patient reassessed at: 09/13/2023 @ 1027

## 2023-09-13 NOTE — TRANSFER OF CARE
"Anesthesia Transfer of Care Note    Patient: Georgina Arias    Procedure(s) Performed: Procedure(s) (LRB):  AORTOGRAM, WITH EXTREMITY RUNOFF (Left)  ANGIOGRAM, LOWER ARTERIAL, UNILATERAL (Left)    Patient location: PACU    Anesthesia Type: MAC    Transport from OR: Transported from OR on 6-10 L/min O2 by face mask with adequate spontaneous ventilation    Post pain: adequate analgesia    Post assessment: no apparent anesthetic complications and tolerated procedure well    Post vital signs: stable    Level of consciousness: awake, alert, oriented and responds to stimulation    Nausea/Vomiting: no nausea/vomiting    Complications: none    Transfer of care protocol was followed      Last vitals:   Visit Vitals  BP (!) 157/65 (BP Location: Left arm, Patient Position: Lying)   Pulse 78   Temp 36.6 °C (97.9 °F) (Temporal)   Resp 18   Ht 5' 7" (1.702 m)   Wt 110 kg (242 lb 8.1 oz)   LMP 09/11/2020 (Approximate)   SpO2 98%   BMI 37.98 kg/m²     "

## 2023-09-13 NOTE — ASSESSMENT & PLAN NOTE
Outpatient HD Information:  -Dialysis modality: Hemodialysis  -Outpatient HD unit: [facility] (Valerio Bautista MD)  -HD TX days: Tuesday/Thursday/Saturday, duration of treatment: 3-4 hrs  -Last HD TX prior to hospital admission: 09/09/2023  -Dialysis access: RUE AVF   -Residual urine: + RRF  -EDW: 109 kg    Plan/Recommendations:  - plan for iHD sometime tomorrow, unlikely to be able to receive iHD overnight at bedside secondary to HD nurse shortage and likely plan for iHD following her femoral bypass later tomorrow   - recommend resuming home dose Renvela 800 mg TIDWM  - renal diet/tube feeds when not NPO   - strict I/O's and daily weights  - daily renal function panels and magnesium levels  - renally all dose medications to eGFR  - avoid gadolinium, fleets, phos-based laxatives, NSAIDs, etc.

## 2023-09-13 NOTE — PROGRESS NOTES
09/13/23 0959   Vital Signs   BP (!) 78/40   MAP (mmHg) 58   BP Location Right leg     Anesthesia notified of patient BP. Patient asymptomatic. AAOx3. Patient laid flat. Cuff on right leg. Repeat BP 83/44.    10:05 - Anesthesia Resident at bedside. Cuffed place back to [facility] repeat /71.

## 2023-09-13 NOTE — PLAN OF CARE
Chart reviewed. Preop nursing care completed per orders. Safe surgery checklist complete aside from surgery consent and site darnell. Pt arrived to [facility] with underwear. Notified pt's primary who stated someone from the floor will come collect underwear from [facility] preop desk. Call bell within reach. Instructed pt to call for assistance.

## 2023-09-13 NOTE — NURSING TRANSFER
Nursing Transfer Note      9/13/2023   11:35 PM      Reason patient is being transferred: post anesthesia    Transfer From: PACU to 540    Transfer via bed    Transported by transport    Transfer Vital Signs:  Blood Pressure:160/71  Heart Rate:71  O2:96  Respirations:12      Order for Tele Monitor? No    Any special needs or follow-up needed: routine      Chart send with patient: Yes

## 2023-09-13 NOTE — SUBJECTIVE & OBJECTIVE
Interval History: Patient seen and examined following return from her angiogram. Vascular Surgery with plans for bypass surgery tomorrow. She underwent iHD yesterday with UF of ~2.36 liters. Afebrile with pulse ranging from 90-70s bpm. Systolic blood pressures ranging from 190-150s mmHg. She is saturating +90% on room air. Plans for iHD tomorrow (due to HD nurse shortage unlikely to receive bedside iHD overnight).    Review of patient's allergies indicates:   Allergen Reactions    Naproxen Anaphylaxis and Swelling     Hives (skin)^swelling  Hives (skin)^swelling  Hives (skin)^swelling    Sulfamethoxazole-trimethoprim Other (See Comments)     Caused JEROME    Hydrocodone Nausea And Vomiting and Rash    Penicillins Hives     Blisters (skin)^    Adhesive Rash    Hydrocodone-acetaminophen Nausea And Vomiting     Rash (skin)^, Vomiting^       Current Facility-Administered Medications   Medication Frequency    acetaminophen tablet 1,000 mg Q8H    atorvastatin tablet 80 mg Daily    dextrose 10% bolus 125 mL 125 mL PRN    dextrose 10% bolus 250 mL 250 mL PRN    diphenhydrAMINE capsule 25 mg Q6H PRN    gabapentin capsule 300 mg BID    glucagon (human recombinant) injection 1 mg PRN    glucose chewable tablet 16 g PRN    glucose chewable tablet 24 g PRN    heparin (porcine) injection 3,000 Units PRN    heparin (porcine) injection PRN    hydrALAZINE injection 10 mg Q6H PRN    insulin aspart U-100 pen 0-10 Units QID (AC + HS) PRN    labetalol 20 mg/4 mL (5 mg/mL) IV syring Q6H PRN    LIDOcaine (PF) 10 mg/ml (1%) injection 10 mg Once PRN    mupirocin 2 % ointment BID    NIFEdipine 24 hr tablet 30 mg Daily    nitroGLYCERIN injection PRN    ondansetron injection 8 mg Q6H PRN    oxyCODONE immediate release tablet 5 mg Q6H PRN    oxyCODONE immediate release tablet Tab 10 mg Q6H PRN    piperacillin-tazobactam (ZOSYN) 4.5 g in dextrose 5 % in water (D5W) 100 mL IVPB (MB+) Q12H    scopolamine 1.3-1.5 mg (1 mg over 3 days) 1 patch Once  Pre-Op    sodium chloride 0.9% bolus 250 mL 250 mL PRN    sodium chloride 0.9% flush 10 mL PRN    vancomycin - pharmacy to dose pharmacy to manage frequency    verapamiL injection PRN     Facility-Administered Medications Ordered in Other Encounters   Medication Frequency    dexmedeTOMIDine injection PRN    midazolam injection PRN    phenylephrine HCl in 0.9% NaCl 1 mg/10 mL (100 mcg/mL) PRN    sodium chloride 0.9% infusion Continuous PRN       Objective:     Vital Signs (Most Recent):  Temp: 97.9 °F (36.6 °C) (09/13/23 0715)  Pulse: 78 (09/13/23 0715)  Resp: 18 (09/13/23 0715)  BP: (!) 157/65 (09/13/23 0715)  SpO2: 98 % (09/13/23 0715) Vital Signs (24h Range):  Temp:  [97 °F (36.1 °C)-99.4 °F (37.4 °C)] 97.9 °F (36.6 °C)  Pulse:  [75-95] 78  Resp:  [14-20] 18  SpO2:  [90 %-99 %] 98 %  BP: (140-195)/(62-81) 157/65     Weight: 110 kg (242 lb 8.1 oz) (09/11/23 1716)  Body mass index is 37.98 kg/m².  Body surface area is 2.28 meters squared.    I/O last 3 completed shifts:  In: 1530 [P.O.:480; Other:350; IV Piggyback:700]  Out: 3013 [Other:3013]     Physical Exam  Vitals and nursing note reviewed.   Constitutional:       General: She is not in acute distress.     Appearance: Normal appearance. She is obese. She is not ill-appearing or diaphoretic.   HENT:      Head: Normocephalic and atraumatic.      Right Ear: External ear normal.      Left Ear: External ear normal.      Nose: Nose normal.      Mouth/Throat:      Mouth: Mucous membranes are moist.      Pharynx: Oropharynx is clear. No oropharyngeal exudate or posterior oropharyngeal erythema.   Eyes:      General: No scleral icterus.        Right eye: No discharge.         Left eye: No discharge.      Extraocular Movements: Extraocular movements intact.      Conjunctiva/sclera: Conjunctivae normal.   Cardiovascular:      Rate and Rhythm: Normal rate.      Heart sounds: Murmur heard.      Systolic murmur is present with a grade of 1/6.      No friction rub. No gallop.       Arteriovenous access: Left arteriovenous access is present.     Comments: AVF to right upper extremity with palpable thrill and audible bruit.  Pulmonary:      Effort: Pulmonary effort is normal. No respiratory distress.      Breath sounds: No wheezing, rhonchi or rales.   Abdominal:      General: Bowel sounds are normal. There is no distension.      Palpations: Abdomen is soft.      Tenderness: There is no abdominal tenderness.      Comments: Obese/protuberant abdomen.   Musculoskeletal:      Cervical back: Neck supple.      Right lower leg: No edema.      Left lower leg: Edema present.   Skin:     Findings: Lesion present.      Comments: Left foot currently dressed/bandaged. See images below.   Neurological:      General: No focal deficit present.      Mental Status: She is alert. Mental status is at baseline.      Cranial Nerves: No cranial nerve deficit.      Motor: No weakness.   Psychiatric:         Mood and Affect: Mood normal.         Behavior: Behavior normal.          Significant Labs:  BMP:   Recent Labs   Lab 09/12/23  0304 09/13/23  0442   * 230*    137   K 4.3 4.2    99   CO2 19* 25   BUN 53* 35*   CREATININE 4.2* 4.0*   CALCIUM 9.1 8.6*   MG 2.1  --      CBC:   Recent Labs   Lab 09/13/23  0442   WBC 7.09   RBC 3.29*   HGB 10.3*   HCT 33.2*      *   MCH 31.3*   MCHC 31.0*     CMP:   Recent Labs   Lab 09/13/23  0442   *   CALCIUM 8.6*   ALBUMIN 2.4*   PROT 6.6      K 4.2   CO2 25   CL 99   BUN 35*   CREATININE 4.0*   ALKPHOS 98   ALT 19   AST 22   BILITOT 0.4     LFTs:   Recent Labs   Lab 09/13/23  0442   ALT 19   AST 22   ALKPHOS 98   BILITOT 0.4   PROT 6.6   ALBUMIN 2.4*     Microbiology Results (last 7 days)       Procedure Component Value Units Date/Time    Culture, Anaerobic [[id number]] Collected: 09/12/23 4403    Order Status: Completed Specimen: Wound from Toe, Left Foot Updated: 09/13/23 7000     Anaerobic Culture Culture in progress    Gram  stain [[id number]] Collected: 09/12/23 1158    Order Status: Completed Specimen: Wound from Toe, Left Foot Updated: 09/12/23 1456     Gram Stain Result Moderate WBC's      Rare Gram positive cocci    Aerobic culture [[id number]] Collected: 09/12/23 1158    Order Status: Sent Specimen: Wound from Toe, Left Foot Updated: 09/12/23 1221    Fungus culture [[id number]] Collected: 09/12/23 1158    Order Status: Sent Specimen: Wound from Toe, Left Foot Updated: 09/12/23 1220    AFB Culture & Smear [[id number]] Collected: 09/12/23 1158    Order Status: Sent Specimen: Wound from Toe, Left Foot Updated: 09/12/23 1220          Specimen (24h ago, onward)      None          Significant Imaging:  I have reviewed all imagining in the last 24 hours.

## 2023-09-13 NOTE — OP NOTE
Date: 09/13/2023    Surgeon:  Maxx Maya MD    Assistant: Ray Loyd MD - Fellow    Pre-op Diagnosis:  Nonhealing left foot wound    Post-op Diagnosis: Same    Procedures:   1. Ultrasound-guided access to the left radial artery.   2. Left lower extremity angiogram with selection of aorta  3. Left arch aortogram and subclavian arteriogram  4. Radial band closure of left radial artery    Anesthesia: Local MAC    EBL: Minimal    Findings:   Occluded distal aorta and bilateral iliac arteries.  Short-segment occluded SFA with patent above the popliteal.  Three-vessel runoff to foot    Complications:  None; patient tolerated the procedure well.    Indications:   This is a 52-year-old female with history of end-stage renal disease on dialysis who had undergone a left 1st toe amputation due to nonhealing blister.  She was found to have worsening foot wound.  She had nonpalpable pulses for which a CTA was obtained which was notable for distal aortoiliac disease.  Given her foot wounds, she required establishment of in-line flow for which a diagnostic angiogram was offered for preoperative bypass planning.  The risks benefits and alternatives of the procedure were discussed with the patient who wished to proceed with the surgery and gave written consent    Operative Procedure:  After an informed consent was obtained the patient was brought to the operating room and placed in the supine position. Both the patient and procedure were confirmed and identified during timeout process. The patient received perioperative antibiotics. Given her aortoiliac occlusive disease and right arm fistula, access was chosen to be at the left radial artery for diagnostic purposes.  The patient's left arm was prepped and draped in usual sterile fashion. Using ultrasound guidance, the left radial artery vessel patency was confirmed. Then direct ultrasound guidance the left radial was entered with a 21-G needle, Mandril wire and 4-Fr  micropuncture needle.  This was exchanged up to a 5 Portuguese sheath over an 035 stiff angled glidewire.  Then under fluoroscopic guidance, the stiff angled glide wire was placed into the aorta over an angled glide catheter.  A brief angiogram confirmed positioning within the aorta.  Next over the wire a pigtail catheter was brought into the aorta.     The initial aortogram was performed which showed:      Aorto-iliac vessels: Distal aortic and proximal by iliac total occlusive disease with pelvic collateralization to the common.   Left Common femoral, profunda femoral arteries:  Patent  Left Superficial femoral artery:  Distal occlusion of SFA with reconstitution to patent above knee popliteal  Left Popliteal artery:  Patent  Tibials:  Three-vessel runoff    Given the significant aortoiliac as well as SFA disease, this was unable to be treated endovascularly.  The patient required a bypass procedure.    The wire, sheath, and catheter were then removed from the patient's left radial artery.  A radial band was applied.    The patient tolerated the procedure well and was taken to the post anesthesia recovery unit in good condition.    All needle, sponge and instrument counts were correct at the end of the case.  Dr. Maya was present for the entire case.

## 2023-09-13 NOTE — BRIEF OP NOTE
[facility] - [facility]  Brief Operative Note    SUMMARY     Surgery Date: 9/13/2023     Surgeon(s) and Role:     * Maxx Maya MD - Primary     * Ray Loyd MD - Fellow    Assisting Surgeon: None    Pre-op Diagnosis:  Wound of left foot [S91.302A]    Post-op Diagnosis:  Post-Op Diagnosis Codes:     * Wound of left foot [S91.302A]    Procedure(s) (LRB):  AORTOGRAM, WITH EXTREMITY RUNOFF (Left)  ANGIOGRAM, LOWER ARTERIAL, UNILATERAL (Left)    Anesthesia: Local MAC    Operative Findings: Left Lower Extremity Angiogram via Left Brachial Access.   Occluded distal aorta, occluded bilateral iliacs with distal reconstitution, occluded left SFA with above knee popliteal reconstitution, AT and PT runoff    Estimated Blood Loss: 5cc         Specimens:   Specimen (24h ago, onward)      None            [id number]

## 2023-09-14 ENCOUNTER — ANESTHESIA (OUTPATIENT)
Dept: SURGERY | Facility: HOSPITAL | Age: 53
DRG: 907 | End: 2023-09-14
Payer: MEDICARE

## 2023-09-14 LAB
ALBUMIN SERPL BCP-MCNC: 1.9 G/DL (ref 3.5–5.2)
ALBUMIN SERPL BCP-MCNC: 2.5 G/DL (ref 3.5–5.2)
ALP SERPL-CCNC: 109 U/L (ref 55–135)
ALP SERPL-CCNC: 80 U/L (ref 55–135)
ALT SERPL W/O P-5'-P-CCNC: 37 U/L (ref 10–44)
ALT SERPL W/O P-5'-P-CCNC: 42 U/L (ref 10–44)
ANION GAP SERPL CALC-SCNC: 15 MMOL/L (ref 8–16)
ANION GAP SERPL CALC-SCNC: 15 MMOL/L (ref 8–16)
APTT PPP: 21.9 SEC (ref 21–32)
AST SERPL-CCNC: 44 U/L (ref 10–40)
AST SERPL-CCNC: 50 U/L (ref 10–40)
BACTERIA SPEC AEROBE CULT: NORMAL
BASOPHILS # BLD AUTO: 0.06 K/UL (ref 0–0.2)
BASOPHILS NFR BLD: 0.9 % (ref 0–1.9)
BILIRUB SERPL-MCNC: 0.3 MG/DL (ref 0.1–1)
BILIRUB SERPL-MCNC: 0.4 MG/DL (ref 0.1–1)
BUN SERPL-MCNC: 40 MG/DL (ref 6–20)
BUN SERPL-MCNC: 41 MG/DL (ref 6–20)
CALCIUM SERPL-MCNC: 7.4 MG/DL (ref 8.7–10.5)
CALCIUM SERPL-MCNC: 9 MG/DL (ref 8.7–10.5)
CHLORIDE SERPL-SCNC: 104 MMOL/L (ref 95–110)
CHLORIDE SERPL-SCNC: 99 MMOL/L (ref 95–110)
CO2 SERPL-SCNC: 16 MMOL/L (ref 23–29)
CO2 SERPL-SCNC: 24 MMOL/L (ref 23–29)
CREAT SERPL-MCNC: 4.9 MG/DL (ref 0.5–1.4)
CREAT SERPL-MCNC: 5.2 MG/DL (ref 0.5–1.4)
DIFFERENTIAL METHOD: ABNORMAL
EOSINOPHIL # BLD AUTO: 0.2 K/UL (ref 0–0.5)
EOSINOPHIL NFR BLD: 3 % (ref 0–8)
ERYTHROCYTE [DISTWIDTH] IN BLOOD BY AUTOMATED COUNT: 16.4 % (ref 11.5–14.5)
EST. GFR  (NO RACE VARIABLE): 10.1 ML/MIN/1.73 M^2
EST. GFR  (NO RACE VARIABLE): 9.4 ML/MIN/1.73 M^2
GLUCOSE SERPL-MCNC: 152 MG/DL (ref 70–110)
GLUCOSE SERPL-MCNC: 156 MG/DL (ref 70–110)
GLUCOSE SERPL-MCNC: 174 MG/DL (ref 70–110)
GLUCOSE SERPL-MCNC: 175 MG/DL (ref 70–110)
GLUCOSE SERPL-MCNC: 194 MG/DL (ref 70–110)
GLUCOSE SERPL-MCNC: 392 MG/DL (ref 70–110)
HCO3 UR-SCNC: 21.4 MMOL/L (ref 24–28)
HCO3 UR-SCNC: 21.4 MMOL/L (ref 24–28)
HCO3 UR-SCNC: 21.5 MMOL/L (ref 24–28)
HCO3 UR-SCNC: 22.7 MMOL/L (ref 24–28)
HCT VFR BLD AUTO: 30.1 % (ref 37–48.5)
HCT VFR BLD CALC: 25 %PCV (ref 36–54)
HCT VFR BLD CALC: 25 %PCV (ref 36–54)
HCT VFR BLD CALC: 27 %PCV (ref 36–54)
HCT VFR BLD CALC: 27 %PCV (ref 36–54)
HGB BLD-MCNC: 9.8 G/DL (ref 12–16)
IMM GRANULOCYTES # BLD AUTO: 0.02 K/UL (ref 0–0.04)
IMM GRANULOCYTES NFR BLD AUTO: 0.3 % (ref 0–0.5)
INR PPP: 1 (ref 0.8–1.2)
LACTATE SERPL-SCNC: 3.4 MMOL/L (ref 0.5–2.2)
LYMPHOCYTES # BLD AUTO: 1.5 K/UL (ref 1–4.8)
LYMPHOCYTES NFR BLD: 22.2 % (ref 18–48)
MAGNESIUM SERPL-MCNC: 2.2 MG/DL (ref 1.6–2.6)
MCH RBC QN AUTO: 31.5 PG (ref 27–31)
MCHC RBC AUTO-ENTMCNC: 32.6 G/DL (ref 32–36)
MCV RBC AUTO: 97 FL (ref 82–98)
MONOCYTES # BLD AUTO: 0.6 K/UL (ref 0.3–1)
MONOCYTES NFR BLD: 8.2 % (ref 4–15)
NEUTROPHILS # BLD AUTO: 4.4 K/UL (ref 1.8–7.7)
NEUTROPHILS NFR BLD: 65.4 % (ref 38–73)
NRBC BLD-RTO: 0 /100 WBC
PCO2 BLDA: 32 MMHG (ref 35–45)
PCO2 BLDA: 32.5 MMHG (ref 35–45)
PCO2 BLDA: 34.5 MMHG (ref 35–45)
PCO2 BLDA: 35.1 MMHG (ref 35–45)
PH SMN: 7.4 [PH] (ref 7.35–7.45)
PH SMN: 7.42 [PH] (ref 7.35–7.45)
PH SMN: 7.43 [PH] (ref 7.35–7.45)
PH SMN: 7.43 [PH] (ref 7.35–7.45)
PHOSPHATE SERPL-MCNC: 7.8 MG/DL (ref 2.7–4.5)
PLATELET # BLD AUTO: 224 K/UL (ref 150–450)
PMV BLD AUTO: 10.1 FL (ref 9.2–12.9)
PO2 BLDA: 125 MMHG (ref 80–100)
PO2 BLDA: 136 MMHG (ref 80–100)
PO2 BLDA: 145 MMHG (ref 80–100)
PO2 BLDA: 232 MMHG (ref 80–100)
POC ACTIVATED CLOTTING TIME K: 131 SEC (ref 74–137)
POC ACTIVATED CLOTTING TIME K: 239 SEC (ref 74–137)
POC ACTIVATED CLOTTING TIME K: 245 SEC (ref 74–137)
POC ACTIVATED CLOTTING TIME K: 251 SEC (ref 74–137)
POC ACTIVATED CLOTTING TIME K: 269 SEC (ref 74–137)
POC ACTIVATED CLOTTING TIME K: 269 SEC (ref 74–137)
POC BE: -2 MMOL/L
POC BE: -3 MMOL/L
POC IONIZED CALCIUM: 1.05 MMOL/L (ref 1.06–1.42)
POC IONIZED CALCIUM: 1.07 MMOL/L (ref 1.06–1.42)
POC IONIZED CALCIUM: 1.07 MMOL/L (ref 1.06–1.42)
POC IONIZED CALCIUM: 1.11 MMOL/L (ref 1.06–1.42)
POC SATURATED O2: 100 % (ref 95–100)
POC SATURATED O2: 99 % (ref 95–100)
POC TCO2: 22 MMOL/L (ref 23–27)
POC TCO2: 24 MMOL/L (ref 23–27)
POCT GLUCOSE: 157 MG/DL (ref 70–110)
POCT GLUCOSE: 179 MG/DL (ref 70–110)
POCT GLUCOSE: 315 MG/DL (ref 70–110)
POCT GLUCOSE: 331 MG/DL (ref 70–110)
POCT GLUCOSE: 380 MG/DL (ref 70–110)
POTASSIUM BLD-SCNC: 3.6 MMOL/L (ref 3.5–5.1)
POTASSIUM BLD-SCNC: 3.8 MMOL/L (ref 3.5–5.1)
POTASSIUM BLD-SCNC: 4 MMOL/L (ref 3.5–5.1)
POTASSIUM BLD-SCNC: 4.3 MMOL/L (ref 3.5–5.1)
POTASSIUM SERPL-SCNC: 3.9 MMOL/L (ref 3.5–5.1)
POTASSIUM SERPL-SCNC: 5.2 MMOL/L (ref 3.5–5.1)
PROT SERPL-MCNC: 4.9 G/DL (ref 6–8.4)
PROT SERPL-MCNC: 6.5 G/DL (ref 6–8.4)
PROTHROMBIN TIME: 11 SEC (ref 9–12.5)
RBC # BLD AUTO: 3.11 M/UL (ref 4–5.4)
SAMPLE: ABNORMAL
SAMPLE: NORMAL
SODIUM BLD-SCNC: 136 MMOL/L (ref 136–145)
SODIUM BLD-SCNC: 137 MMOL/L (ref 136–145)
SODIUM BLD-SCNC: 137 MMOL/L (ref 136–145)
SODIUM BLD-SCNC: 138 MMOL/L (ref 136–145)
SODIUM SERPL-SCNC: 135 MMOL/L (ref 136–145)
SODIUM SERPL-SCNC: 138 MMOL/L (ref 136–145)
TROPONIN I SERPL DL<=0.01 NG/ML-MCNC: 0.08 NG/ML (ref 0–0.03)
VANCOMYCIN SERPL-MCNC: 22.1 UG/ML
WBC # BLD AUTO: 6.7 K/UL (ref 3.9–12.7)

## 2023-09-14 PROCEDURE — 87070 CULTURE OTHR SPECIMN AEROBIC: CPT | Performed by: PODIATRIST

## 2023-09-14 PROCEDURE — 27800903 OPTIME MED/SURG SUP & DEVICES OTHER IMPLANTS: Performed by: SURGERY

## 2023-09-14 PROCEDURE — 37000008 HC ANESTHESIA 1ST 15 MINUTES: Performed by: SURGERY

## 2023-09-14 PROCEDURE — 63600175 PHARM REV CODE 636 W HCPCS: Performed by: SURGERY

## 2023-09-14 PROCEDURE — C1768 GRAFT, VASCULAR: HCPCS | Performed by: SURGERY

## 2023-09-14 PROCEDURE — 88305 TISSUE EXAM BY PATHOLOGIST: CPT | Performed by: PATHOLOGY

## 2023-09-14 PROCEDURE — 63600175 PHARM REV CODE 636 W HCPCS: Performed by: STUDENT IN AN ORGANIZED HEALTH CARE EDUCATION/TRAINING PROGRAM

## 2023-09-14 PROCEDURE — 36620 INSERTION CATHETER ARTERY: CPT | Mod: 59,,, | Performed by: ANESTHESIOLOGY

## 2023-09-14 PROCEDURE — 14040 TIS TRNFR F/C/C/M/N/A/G/H/F: CPT | Mod: 58,,, | Performed by: PODIATRIST

## 2023-09-14 PROCEDURE — 14040 PR ADJ TISS XFER HEAD,FAC,HAND <10 SQCM: ICD-10-PCS | Mod: 58,,, | Performed by: PODIATRIST

## 2023-09-14 PROCEDURE — 87206 SMEAR FLUORESCENT/ACID STAI: CPT | Performed by: PODIATRIST

## 2023-09-14 PROCEDURE — 25000003 PHARM REV CODE 250

## 2023-09-14 PROCEDURE — 80053 COMPREHEN METABOLIC PANEL: CPT

## 2023-09-14 PROCEDURE — 88311 PR  DECALCIFY TISSUE: ICD-10-PCS | Mod: 26,,, | Performed by: PATHOLOGY

## 2023-09-14 PROCEDURE — 25000003 PHARM REV CODE 250: Performed by: STUDENT IN AN ORGANIZED HEALTH CARE EDUCATION/TRAINING PROGRAM

## 2023-09-14 PROCEDURE — 27201037 HC PRESSURE MONITORING SET UP

## 2023-09-14 PROCEDURE — 36620 ARTERIAL: ICD-10-PCS | Mod: 59,,, | Performed by: ANESTHESIOLOGY

## 2023-09-14 PROCEDURE — C1887 CATHETER, GUIDING: HCPCS | Performed by: SURGERY

## 2023-09-14 PROCEDURE — 63600175 PHARM REV CODE 636 W HCPCS: Performed by: NURSE ANESTHETIST, CERTIFIED REGISTERED

## 2023-09-14 PROCEDURE — 87075 CULTR BACTERIA EXCEPT BLOOD: CPT | Performed by: PODIATRIST

## 2023-09-14 PROCEDURE — C1725 CATH, TRANSLUMIN NON-LASER: HCPCS | Performed by: SURGERY

## 2023-09-14 PROCEDURE — 99291 PR CRITICAL CARE, E/M 30-74 MINUTES: ICD-10-PCS | Mod: 25,GC,ICN, | Performed by: INTERNAL MEDICINE

## 2023-09-14 PROCEDURE — 36556 INSERT NON-TUNNEL CV CATH: CPT | Mod: 59,,, | Performed by: ANESTHESIOLOGY

## 2023-09-14 PROCEDURE — 83605 ASSAY OF LACTIC ACID: CPT | Performed by: SURGERY

## 2023-09-14 PROCEDURE — 88305 TISSUE EXAM BY PATHOLOGIST: ICD-10-PCS | Mod: 26,,, | Performed by: PATHOLOGY

## 2023-09-14 PROCEDURE — 87116 MYCOBACTERIA CULTURE: CPT | Performed by: PODIATRIST

## 2023-09-14 PROCEDURE — 85025 COMPLETE CBC W/AUTO DIFF WBC: CPT | Mod: 91 | Performed by: SURGERY

## 2023-09-14 PROCEDURE — 25000003 PHARM REV CODE 250: Performed by: NURSE ANESTHETIST, CERTIFIED REGISTERED

## 2023-09-14 PROCEDURE — C1876 STENT, NON-COA/NON-COV W/DEL: HCPCS | Performed by: SURGERY

## 2023-09-14 PROCEDURE — 99291 CRITICAL CARE FIRST HOUR: CPT | Mod: 25,GC,ICN, | Performed by: INTERNAL MEDICINE

## 2023-09-14 PROCEDURE — 37000009 HC ANESTHESIA EA ADD 15 MINS: Performed by: SURGERY

## 2023-09-14 PROCEDURE — 36000709 HC OR TIME LEV III EA ADD 15 MIN: Performed by: SURGERY

## 2023-09-14 PROCEDURE — 85610 PROTHROMBIN TIME: CPT | Performed by: SURGERY

## 2023-09-14 PROCEDURE — C1894 INTRO/SHEATH, NON-LASER: HCPCS | Performed by: SURGERY

## 2023-09-14 PROCEDURE — D9220A PRA ANESTHESIA: Mod: CRNA,,, | Performed by: NURSE ANESTHETIST, CERTIFIED REGISTERED

## 2023-09-14 PROCEDURE — 35654 PR BYPASS GRAFT OTHR,AXILL-FEM-FEM: ICD-10-PCS | Mod: 22,LT,, | Performed by: SURGERY

## 2023-09-14 PROCEDURE — 36556 CENTRAL LINE: ICD-10-PCS | Mod: 59,,, | Performed by: ANESTHESIOLOGY

## 2023-09-14 PROCEDURE — 83735 ASSAY OF MAGNESIUM: CPT | Performed by: SURGERY

## 2023-09-14 PROCEDURE — 71000033 HC RECOVERY, INTIAL HOUR: Performed by: SURGERY

## 2023-09-14 PROCEDURE — 84484 ASSAY OF TROPONIN QUANT: CPT | Performed by: SURGERY

## 2023-09-14 PROCEDURE — 25000003 PHARM REV CODE 250: Performed by: SURGERY

## 2023-09-14 PROCEDURE — 87102 FUNGUS ISOLATION CULTURE: CPT | Performed by: PODIATRIST

## 2023-09-14 PROCEDURE — 80202 ASSAY OF VANCOMYCIN: CPT | Performed by: SURGERY

## 2023-09-14 PROCEDURE — 82962 GLUCOSE BLOOD TEST: CPT | Performed by: SURGERY

## 2023-09-14 PROCEDURE — 71000039 HC RECOVERY, EACH ADD'L HOUR: Performed by: SURGERY

## 2023-09-14 PROCEDURE — 87205 SMEAR GRAM STAIN: CPT | Performed by: PODIATRIST

## 2023-09-14 PROCEDURE — 36000708 HC OR TIME LEV III 1ST 15 MIN: Performed by: SURGERY

## 2023-09-14 PROCEDURE — 27201423 OPTIME MED/SURG SUP & DEVICES STERILE SUPPLY: Performed by: SURGERY

## 2023-09-14 PROCEDURE — 25500020 PHARM REV CODE 255: Performed by: SURGERY

## 2023-09-14 PROCEDURE — 28122 PR PART REMV OTHR TARSAL/METATARSAL: ICD-10-PCS | Mod: 58,LT,, | Performed by: PODIATRIST

## 2023-09-14 PROCEDURE — 85025 COMPLETE CBC W/AUTO DIFF WBC: CPT

## 2023-09-14 PROCEDURE — D9220A PRA ANESTHESIA: ICD-10-PCS | Mod: ANES,,, | Performed by: ANESTHESIOLOGY

## 2023-09-14 PROCEDURE — 84100 ASSAY OF PHOSPHORUS: CPT | Performed by: SURGERY

## 2023-09-14 PROCEDURE — D9220A PRA ANESTHESIA: Mod: ANES,,, | Performed by: ANESTHESIOLOGY

## 2023-09-14 PROCEDURE — 85730 THROMBOPLASTIN TIME PARTIAL: CPT | Performed by: SURGERY

## 2023-09-14 PROCEDURE — 35654 BPG AXILLARY-FEMORAL-FEMORAL: CPT | Mod: 22,LT,, | Performed by: SURGERY

## 2023-09-14 PROCEDURE — 88305 TISSUE EXAM BY PATHOLOGIST: CPT | Mod: 26,,, | Performed by: PATHOLOGY

## 2023-09-14 PROCEDURE — 28122 PARTIAL REMOVAL OF FOOT BONE: CPT | Mod: 58,LT,, | Performed by: PODIATRIST

## 2023-09-14 PROCEDURE — 80053 COMPREHEN METABOLIC PANEL: CPT | Mod: 91 | Performed by: SURGERY

## 2023-09-14 PROCEDURE — C1769 GUIDE WIRE: HCPCS | Performed by: SURGERY

## 2023-09-14 PROCEDURE — D9220A PRA ANESTHESIA: ICD-10-PCS | Mod: CRNA,,, | Performed by: NURSE ANESTHETIST, CERTIFIED REGISTERED

## 2023-09-14 PROCEDURE — 20000000 HC ICU ROOM

## 2023-09-14 PROCEDURE — 94761 N-INVAS EAR/PLS OXIMETRY MLT: CPT

## 2023-09-14 PROCEDURE — 88311 DECALCIFY TISSUE: CPT | Mod: 26,,, | Performed by: PATHOLOGY

## 2023-09-14 DEVICE — IMPLANTABLE DEVICE: Type: IMPLANTABLE DEVICE | Site: GROIN | Status: FUNCTIONAL

## 2023-09-14 DEVICE — GRAFT VASC AXLLBFMRL 8X8MM: Type: IMPLANTABLE DEVICE | Site: GROIN | Status: FUNCTIONAL

## 2023-09-14 RX ORDER — DEXAMETHASONE SODIUM PHOSPHATE 4 MG/ML
INJECTION, SOLUTION INTRA-ARTICULAR; INTRALESIONAL; INTRAMUSCULAR; INTRAVENOUS; SOFT TISSUE
Status: DISCONTINUED | OUTPATIENT
Start: 2023-09-14 | End: 2023-09-14

## 2023-09-14 RX ORDER — IODIXANOL 320 MG/ML
INJECTION, SOLUTION INTRAVASCULAR
Status: DISCONTINUED | OUTPATIENT
Start: 2023-09-14 | End: 2023-09-14

## 2023-09-14 RX ORDER — HEPARIN SODIUM 1000 [USP'U]/ML
INJECTION, SOLUTION INTRAVENOUS; SUBCUTANEOUS
Status: DISCONTINUED | OUTPATIENT
Start: 2023-09-14 | End: 2023-09-14

## 2023-09-14 RX ORDER — PROPOFOL 10 MG/ML
VIAL (ML) INTRAVENOUS
Status: DISCONTINUED | OUTPATIENT
Start: 2023-09-14 | End: 2023-09-14

## 2023-09-14 RX ORDER — HYDROCODONE BITARTRATE AND ACETAMINOPHEN 500; 5 MG/1; MG/1
TABLET ORAL
Status: DISCONTINUED | OUTPATIENT
Start: 2023-09-15 | End: 2023-09-17

## 2023-09-14 RX ORDER — FENTANYL CITRATE 50 UG/ML
25 INJECTION, SOLUTION INTRAMUSCULAR; INTRAVENOUS EVERY 5 MIN PRN
Status: DISCONTINUED | OUTPATIENT
Start: 2023-09-14 | End: 2023-09-14 | Stop reason: HOSPADM

## 2023-09-14 RX ORDER — HALOPERIDOL 5 MG/ML
0.5 INJECTION INTRAMUSCULAR EVERY 10 MIN PRN
Status: COMPLETED | OUTPATIENT
Start: 2023-09-14 | End: 2023-09-14

## 2023-09-14 RX ORDER — DEXMEDETOMIDINE HYDROCHLORIDE 100 UG/ML
INJECTION, SOLUTION INTRAVENOUS
Status: DISCONTINUED | OUTPATIENT
Start: 2023-09-14 | End: 2023-09-14

## 2023-09-14 RX ORDER — ONDANSETRON 2 MG/ML
4 INJECTION INTRAMUSCULAR; INTRAVENOUS EVERY 4 HOURS PRN
Status: DISCONTINUED | OUTPATIENT
Start: 2023-09-15 | End: 2023-09-19 | Stop reason: HOSPADM

## 2023-09-14 RX ORDER — OXYCODONE HYDROCHLORIDE 10 MG/1
10 TABLET ORAL EVERY 4 HOURS PRN
Status: DISCONTINUED | OUTPATIENT
Start: 2023-09-14 | End: 2023-09-15

## 2023-09-14 RX ORDER — PROTAMINE SULFATE 10 MG/ML
INJECTION, SOLUTION INTRAVENOUS
Status: DISCONTINUED | OUTPATIENT
Start: 2023-09-14 | End: 2023-09-14

## 2023-09-14 RX ORDER — ONDANSETRON 8 MG/1
8 TABLET, ORALLY DISINTEGRATING ORAL EVERY 8 HOURS PRN
Status: DISCONTINUED | OUTPATIENT
Start: 2023-09-14 | End: 2023-09-14

## 2023-09-14 RX ORDER — NOREPINEPHRINE BITARTRATE/D5W 4MG/250ML
0-3 PLASTIC BAG, INJECTION (ML) INTRAVENOUS CONTINUOUS
Status: DISCONTINUED | OUTPATIENT
Start: 2023-09-14 | End: 2023-09-14

## 2023-09-14 RX ORDER — ROCURONIUM BROMIDE 10 MG/ML
INJECTION, SOLUTION INTRAVENOUS
Status: DISCONTINUED | OUTPATIENT
Start: 2023-09-14 | End: 2023-09-14

## 2023-09-14 RX ORDER — MUPIROCIN 20 MG/G
OINTMENT TOPICAL 2 TIMES DAILY
Status: DISCONTINUED | OUTPATIENT
Start: 2023-09-14 | End: 2023-09-14

## 2023-09-14 RX ORDER — FENTANYL CITRATE 50 UG/ML
INJECTION, SOLUTION INTRAMUSCULAR; INTRAVENOUS
Status: DISCONTINUED | OUTPATIENT
Start: 2023-09-14 | End: 2023-09-14

## 2023-09-14 RX ORDER — SODIUM CHLORIDE 0.9 % (FLUSH) 0.9 %
10 SYRINGE (ML) INJECTION
Status: DISCONTINUED | OUTPATIENT
Start: 2023-09-14 | End: 2023-09-14 | Stop reason: HOSPADM

## 2023-09-14 RX ORDER — HYDROMORPHONE HYDROCHLORIDE 1 MG/ML
0.5 INJECTION, SOLUTION INTRAMUSCULAR; INTRAVENOUS; SUBCUTANEOUS
Status: DISCONTINUED | OUTPATIENT
Start: 2023-09-14 | End: 2023-09-15

## 2023-09-14 RX ORDER — MIDAZOLAM HYDROCHLORIDE 1 MG/ML
INJECTION, SOLUTION INTRAMUSCULAR; INTRAVENOUS
Status: DISCONTINUED | OUTPATIENT
Start: 2023-09-14 | End: 2023-09-14

## 2023-09-14 RX ORDER — PHENYLEPHRINE HCL IN 0.9% NACL 20MG/250ML
0-5 PLASTIC BAG, INJECTION (ML) INTRAVENOUS CONTINUOUS
Status: DISCONTINUED | OUTPATIENT
Start: 2023-09-14 | End: 2023-09-14

## 2023-09-14 RX ORDER — ONDANSETRON 2 MG/ML
INJECTION INTRAMUSCULAR; INTRAVENOUS
Status: DISCONTINUED | OUTPATIENT
Start: 2023-09-14 | End: 2023-09-14

## 2023-09-14 RX ORDER — LIDOCAINE HYDROCHLORIDE 20 MG/ML
INJECTION INTRAVENOUS
Status: DISCONTINUED | OUTPATIENT
Start: 2023-09-14 | End: 2023-09-14

## 2023-09-14 RX ORDER — ASPIRIN 325 MG
325 TABLET, DELAYED RELEASE (ENTERIC COATED) ORAL DAILY
Status: DISCONTINUED | OUTPATIENT
Start: 2023-09-15 | End: 2023-09-15

## 2023-09-14 RX ORDER — PHENYLEPHRINE HYDROCHLORIDE 10 MG/ML
INJECTION INTRAVENOUS
Status: DISCONTINUED | OUTPATIENT
Start: 2023-09-14 | End: 2023-09-14

## 2023-09-14 RX ORDER — VASOPRESSIN 20 [USP'U]/ML
INJECTION, SOLUTION INTRAMUSCULAR; SUBCUTANEOUS
Status: DISCONTINUED | OUTPATIENT
Start: 2023-09-14 | End: 2023-09-14

## 2023-09-14 RX ORDER — OXYCODONE HYDROCHLORIDE 5 MG/1
5 TABLET ORAL EVERY 4 HOURS PRN
Status: DISCONTINUED | OUTPATIENT
Start: 2023-09-14 | End: 2023-09-15

## 2023-09-14 RX ORDER — KETAMINE HCL IN 0.9 % NACL 50 MG/5 ML
SYRINGE (ML) INTRAVENOUS
Status: DISCONTINUED | OUTPATIENT
Start: 2023-09-14 | End: 2023-09-14

## 2023-09-14 RX ORDER — NOREPINEPHRINE BITARTRATE/D5W 4MG/250ML
0-3 PLASTIC BAG, INJECTION (ML) INTRAVENOUS CONTINUOUS
Status: DISCONTINUED | OUTPATIENT
Start: 2023-09-14 | End: 2023-09-16

## 2023-09-14 RX ORDER — INSULIN ASPART 100 [IU]/ML
0-15 INJECTION, SOLUTION INTRAVENOUS; SUBCUTANEOUS
Status: DISCONTINUED | OUTPATIENT
Start: 2023-09-14 | End: 2023-09-14

## 2023-09-14 RX ADMIN — FENTANYL CITRATE 25 MCG: 50 INJECTION, SOLUTION INTRAMUSCULAR; INTRAVENOUS at 01:09

## 2023-09-14 RX ADMIN — Medication 15 MG: at 12:09

## 2023-09-14 RX ADMIN — PIPERACILLIN SODIUM,TAZOBACTAM SODIUM 4.5 G: 3; .375 INJECTION, POWDER, FOR SOLUTION INTRAVENOUS at 08:09

## 2023-09-14 RX ADMIN — ROCURONIUM BROMIDE 20 MG: 10 INJECTION INTRAVENOUS at 10:09

## 2023-09-14 RX ADMIN — SODIUM CHLORIDE, POTASSIUM CHLORIDE, SODIUM LACTATE AND CALCIUM CHLORIDE 1000 ML: 600; 310; 30; 20 INJECTION, SOLUTION INTRAVENOUS at 08:09

## 2023-09-14 RX ADMIN — PHENYLEPHRINE HYDROCHLORIDE 100 MCG: 10 INJECTION INTRAVENOUS at 07:09

## 2023-09-14 RX ADMIN — INSULIN ASPART 4 UNITS: 100 INJECTION, SOLUTION INTRAVENOUS; SUBCUTANEOUS at 08:09

## 2023-09-14 RX ADMIN — HEPARIN SODIUM 1000 UNITS: 1000 INJECTION, SOLUTION INTRAVENOUS; SUBCUTANEOUS at 12:09

## 2023-09-14 RX ADMIN — FENTANYL CITRATE 50 MCG: 50 INJECTION, SOLUTION INTRAMUSCULAR; INTRAVENOUS at 10:09

## 2023-09-14 RX ADMIN — INSULIN ASPART 2 UNITS: 100 INJECTION, SOLUTION INTRAVENOUS; SUBCUTANEOUS at 05:09

## 2023-09-14 RX ADMIN — PHENYLEPHRINE HYDROCHLORIDE 100 MCG: 10 INJECTION INTRAVENOUS at 08:09

## 2023-09-14 RX ADMIN — ACETAMINOPHEN 1000 MG: 500 TABLET ORAL at 05:09

## 2023-09-14 RX ADMIN — HALOPERIDOL LACTATE 0.5 MG: 5 INJECTION, SOLUTION INTRAMUSCULAR at 08:09

## 2023-09-14 RX ADMIN — PROPOFOL 120 MG: 10 INJECTION, EMULSION INTRAVENOUS at 07:09

## 2023-09-14 RX ADMIN — VASOPRESSIN 1 UNITS: 20 INJECTION INTRAVENOUS at 05:09

## 2023-09-14 RX ADMIN — Medication 20 MG: at 08:09

## 2023-09-14 RX ADMIN — SUGAMMADEX 200 MG: 100 INJECTION, SOLUTION INTRAVENOUS at 05:09

## 2023-09-14 RX ADMIN — FENTANYL CITRATE 25 MCG: 50 INJECTION, SOLUTION INTRAMUSCULAR; INTRAVENOUS at 05:09

## 2023-09-14 RX ADMIN — FENTANYL CITRATE 25 MCG: 50 INJECTION INTRAMUSCULAR; INTRAVENOUS at 08:09

## 2023-09-14 RX ADMIN — HEPARIN SODIUM 2000 UNITS: 1000 INJECTION, SOLUTION INTRAVENOUS; SUBCUTANEOUS at 11:09

## 2023-09-14 RX ADMIN — FENTANYL CITRATE 25 MCG: 50 INJECTION, SOLUTION INTRAMUSCULAR; INTRAVENOUS at 08:09

## 2023-09-14 RX ADMIN — PHENYLEPHRINE HYDROCHLORIDE 50 MCG: 10 INJECTION INTRAVENOUS at 04:09

## 2023-09-14 RX ADMIN — PROTAMINE SULFATE 50 MG: 10 INJECTION, SOLUTION INTRAVENOUS at 01:09

## 2023-09-14 RX ADMIN — NOREPINEPHRINE BITARTRATE 0.02 MCG/KG/MIN: 4 INJECTION, SOLUTION INTRAVENOUS at 09:09

## 2023-09-14 RX ADMIN — SODIUM CHLORIDE, SODIUM GLUCONATE, SODIUM ACETATE, POTASSIUM CHLORIDE, MAGNESIUM CHLORIDE, SODIUM PHOSPHATE, DIBASIC, AND POTASSIUM PHOSPHATE: .53; .5; .37; .037; .03; .012; .00082 INJECTION, SOLUTION INTRAVENOUS at 08:09

## 2023-09-14 RX ADMIN — MIDAZOLAM HYDROCHLORIDE 2 MG: 1 INJECTION, SOLUTION INTRAMUSCULAR; INTRAVENOUS at 07:09

## 2023-09-14 RX ADMIN — ONDANSETRON 4 MG: 2 INJECTION INTRAMUSCULAR; INTRAVENOUS at 04:09

## 2023-09-14 RX ADMIN — PHENYLEPHRINE HYDROCHLORIDE 50 MCG: 10 INJECTION INTRAVENOUS at 02:09

## 2023-09-14 RX ADMIN — PHENYLEPHRINE HYDROCHLORIDE 0.2 MCG/KG/MIN: 10 INJECTION INTRAVENOUS at 06:09

## 2023-09-14 RX ADMIN — FENTANYL CITRATE 25 MCG: 50 INJECTION INTRAMUSCULAR; INTRAVENOUS at 09:09

## 2023-09-14 RX ADMIN — ROCURONIUM BROMIDE 20 MG: 10 INJECTION INTRAVENOUS at 08:09

## 2023-09-14 RX ADMIN — Medication 15 MG: at 02:09

## 2023-09-14 RX ADMIN — LIDOCAINE HYDROCHLORIDE 80 MG: 20 INJECTION INTRAVENOUS at 07:09

## 2023-09-14 RX ADMIN — PIPERACILLIN SODIUM AND TAZOBACTAM SODIUM 4.5 G: 4; .5 INJECTION, POWDER, FOR SOLUTION INTRAVENOUS at 08:09

## 2023-09-14 RX ADMIN — ONDANSETRON 4 MG: 2 INJECTION INTRAMUSCULAR; INTRAVENOUS at 06:09

## 2023-09-14 RX ADMIN — DEXMEDETOMIDINE 8 MCG: 100 INJECTION, SOLUTION, CONCENTRATE INTRAVENOUS at 05:09

## 2023-09-14 RX ADMIN — ROCURONIUM BROMIDE 20 MG: 10 INJECTION INTRAVENOUS at 09:09

## 2023-09-14 RX ADMIN — SODIUM CHLORIDE: 0.9 INJECTION, SOLUTION INTRAVENOUS at 07:09

## 2023-09-14 RX ADMIN — INSULIN ASPART 5 UNITS: 100 INJECTION, SOLUTION INTRAVENOUS; SUBCUTANEOUS at 10:09

## 2023-09-14 RX ADMIN — FENTANYL CITRATE 50 MCG: 50 INJECTION, SOLUTION INTRAMUSCULAR; INTRAVENOUS at 12:09

## 2023-09-14 RX ADMIN — FENTANYL CITRATE 50 MCG: 50 INJECTION, SOLUTION INTRAMUSCULAR; INTRAVENOUS at 07:09

## 2023-09-14 RX ADMIN — ROCURONIUM BROMIDE 20 MG: 10 INJECTION INTRAVENOUS at 01:09

## 2023-09-14 RX ADMIN — VASOPRESSIN 2 UNITS: 20 INJECTION INTRAVENOUS at 06:09

## 2023-09-14 RX ADMIN — SODIUM CHLORIDE 1000 ML: 9 INJECTION, SOLUTION INTRAVENOUS at 07:09

## 2023-09-14 RX ADMIN — DEXAMETHASONE SODIUM PHOSPHATE 4 MG: 4 INJECTION, SOLUTION INTRAMUSCULAR; INTRAVENOUS at 08:09

## 2023-09-14 RX ADMIN — ROCURONIUM BROMIDE 20 MG: 10 INJECTION INTRAVENOUS at 11:09

## 2023-09-14 RX ADMIN — PROTAMINE SULFATE 30 MG: 10 INJECTION, SOLUTION INTRAVENOUS at 01:09

## 2023-09-14 RX ADMIN — ROCURONIUM BROMIDE 50 MG: 10 INJECTION INTRAVENOUS at 07:09

## 2023-09-14 RX ADMIN — FENTANYL CITRATE 25 MCG: 50 INJECTION, SOLUTION INTRAMUSCULAR; INTRAVENOUS at 02:09

## 2023-09-14 RX ADMIN — HEPARIN SODIUM 10000 UNITS: 1000 INJECTION, SOLUTION INTRAVENOUS; SUBCUTANEOUS at 11:09

## 2023-09-14 RX ADMIN — HALOPERIDOL LACTATE 0.5 MG: 5 INJECTION, SOLUTION INTRAMUSCULAR at 07:09

## 2023-09-14 RX ADMIN — SODIUM CHLORIDE 0.3 MCG/KG/MIN: 9 INJECTION, SOLUTION INTRAVENOUS at 08:09

## 2023-09-14 NOTE — ASSESSMENT & PLAN NOTE
Hx of hypertension on home Nifedipine, will hold in immediate post operative setting     - -160  - PRN Cardene

## 2023-09-14 NOTE — PLAN OF CARE
"Nephrology Care Update    Intake/Output Summary (Last 24 hours) at 9/14/2023 0825  Last data filed at 9/13/2023 0925  Gross per 24 hour   Intake 100 ml   Output --   Net 100 ml     Vitals:    09/13/23 2113 09/14/23 0016 09/14/23 0524 09/14/23 0654   BP: (!) 151/67 (!) 145/70 (!) 144/6 (!) 94/54   BP Location: Left arm Left arm Left arm Right leg   Patient Position: Lying Lying Lying    Pulse: 79 71 76 76   Resp: 18 20 20 18   Temp: 96.5 °F (35.8 °C) 97.3 °F (36.3 °C) 97.6 °F (36.4 °C) 97.9 °F (36.6 °C)   TempSrc: Oral Oral Oral Oral   SpO2: 95% (!) 94% 97% 98%   Weight:    110 kg (242 lb 8.1 oz)   Height:    5' 7" (1.702 m)     Recent Labs   Lab 09/12/23  0304 09/13/23  0442 09/14/23  0602    137 138   K 4.3 4.2 3.9    99 99   CO2 19* 25 24   BUN 53* 35* 40*   CREATININE 4.2* 4.0* 4.9*   CALCIUM 9.1 8.6* 9.0   PHOS 4.8*  --   --      A1c - 09/11/2023 7.2     Patient unable to be seen this morning as in OR for her femoral bypass per Vascular Surgery. Laboratory studies reviewed and iHD orders placed with plan for iHD following return from OR (likely bedside overnight). To obtain RFP following surgery. Please call nephrology as needed. We will continue to follow.    Alireza Velázquez MD  Nephrology  "

## 2023-09-14 NOTE — ANESTHESIA PROCEDURE NOTES
Intubation    Date/Time: 9/14/2023 7:24 AM    Performed by: Iris Lucas  Authorized by: Marco Gomez III, MD    Intubation:     Induction:  Intravenous    Intubated:  Postinduction    Mask Ventilation:  Easy mask    Attempts:  1    Attempted By:  Student    Method of Intubation:  Video laryngoscopy    Blade:  Conklin 3    Laryngeal View Grade: Grade I - full view of cords      Difficult Airway Encountered?: No      Complications:  None    Airway Device:  Oral endotracheal tube    Airway Device Size:  7.0    Style/Cuff Inflation:  Cuffed (inflated to minimal occlusive pressure)    Tube secured:  22    Secured at:  The lips    Placement Verified By:  Capnometry and Fiber optic visualization    Complicating Factors:  None    Findings Post-Intubation:  BS equal bilateral and atraumatic/condition of teeth unchanged

## 2023-09-14 NOTE — ASSESSMENT & PLAN NOTE
Hx of ESRD due to diabetic nephropathy on iHD every Tuesday, Thursday & Saturday via VIRIDIANA RADER, Bun/Creat 40/4.9 preop     -- Nephrology consulted  -- Strict I&O's and daily weights   -- Renally dose medications  -- Avoid nephrotoxic agents   -- Daily Renal panel and Mg  -- To received iHD post procedure 9/14/23

## 2023-09-14 NOTE — HPI
Ms. Arias is a 53 yo female with a past medical hx of PONV, ESRD on HD, T2DM on insulin and mounjaro (A1c 7.2), PVD s/p stents to RLE, DVT of R leg, HTN, HLD, PUD, obesity (BMI 38), and three recent left foot surgeries in August. Most recently uderwent amputation of the L hallux at the [facility] on 8/25/23 for reported gangrene and osteomyelitis. She is currently admitted to vascular surgery for non-healing left foot wound. Pt presents to the SICU s/p Left ax to bi-femoral artery bypass, stent to SFA, and Transmetatarsal amputation of the left foot and washout with Dr. Maya and Dr. Valles on 9/14/23. Will admit to SICU for further hemodynamic monitoring and q1h neurovascular checks. Got 1L bolus in PACU with only minimal improvement in BP. She arrives to the SICU on 0.06 levo.

## 2023-09-14 NOTE — ASSESSMENT & PLAN NOTE
  Neuro/Psych:     - Sedation: none    - Pain:    - Scheduled Tylenol 1g q8h   - Oxy PRN/Dilaudid PRN             Cardiac:     - S/P Ax fem bypass and stent to SFA with Dr. Maya on     - BP Goal: 100-160    - Pressors: levo 0.06, wean as able    - Rhythm: NSR    - Statin: Atorvastatin 40 mg QD      Pulmonary:     - Goal SpO2 >92%    - Supplemental O2- Wean as tolerated     - ABG PRN      Renal:    - Trend BUN/Cr     - Maintain Fraser, record strict Is/Os    - Nephrology consulted for dialysis needs given ESRD    Recent Labs   Lab 09/12/23 0304 09/13/23 0442 09/14/23  0602   BUN 53* 35* 40*   CREATININE 4.2* 4.0* 4.9*         FEN / GI:     - Daily CMP, PRN K/Mag/Phos per protocol     - Replace electrolytes as needed    - Nutrition: Diabetic diet     - Bowel Regimen: Miralax, docusate      ID:     - Afebrile    - WBC stable    - Abx: zosyn    Recent Labs   Lab 09/12/23 0304 09/13/23 0442 09/14/23  0602   WBC 9.41 7.09 6.70         Heme/Onc:     - Hgb 9.9 pre-operatively post:     - CBC daily    - ASA 81 mg daily    - Will resume plavix when appropriate    Recent Labs   Lab 09/12/23 0304 09/13/23 0442 09/14/23  0602   HGB 9.9* 10.3* 9.8*    187 224         Endocrine:     - CCM Goal BG <180    - HgbA1c: 7.2    - SSI    - POCT glucose 4xs daily    - Hypoglycemia protocol in place       PPx:   Feeding: Diabetic Diet   Analgesia/Sedation: multimodal  Thromboembolic Prevention: held given concern for bleeding  HOB >30: Yes  Stress Ulcer: Pepcid   Glucose Control: Yes, insulin management CCM goal      Lines/Drains/Airway:   Left radial arterial line   RIJ trialjoshua Changey   Left chest ELLE            Dispo/Code Status/Palliative:     - Continue SICU Care    - Full Code

## 2023-09-14 NOTE — BRIEF OP NOTE
[facility] - [facility] ([facility])  Brief Operative Note    SUMMARY     Surgery Date: 9/14/2023     Surgeon(s) and Role:  Panel 1:     * Maxx Maya MD - Primary     * Marta Schultz MD - Resident - Assisting     * Adrian Canada MD - Fellow     * Ray Loyd MD - Fellow  Panel 2:     * Jesus Valles, DPM - Primary     * Ewa Kimball DPM - Resident - Assisting       Anderson Herman DPM - Resident Assisting         Pre-op Diagnosis:  Non-healing wound of left lower extremity [S81.802A]    Post-op Diagnosis:  Post-Op Diagnosis Codes:     * Non-healing wound of left lower extremity [S81.802A]    Procedure(s) (LRB):  CREATION, BYPASS, ARTERIAL, AXILLARY TO BILATERAL FEMORAL (Left)  ANGIOGRAM, LOWER EXTREMITY with balloon angioplasty ultraverse 5mm x 60mm (Left)  STENT, SUPERFICIAL FEMORAL ARTERY (Left)  AMPUTATION, FOOT, TRANSMETATARSAL partial 1st ray amputation (Left)  INCISION AND DRAINAGE, FOOT (Left)    Anesthesia: General    Operative Findings: This was a joint performed procedure with vascular surgery. Podiatry performed partial 1st ray resection after vascular surgery completed bypass surgery. Good bleeding noted to left foot throuh amputation. Clean margin taken from left 1st metatarsal. 1l of saline irrigation via pulse lavage. Closure achieved with 3-0 vicryl and staples.    Estimated Blood Loss: 200ml             Specimens:   Specimen (24h ago, onward)       Start     Ordered    09/14/23 1653  Specimen to Pathology, Surgery Other (podiatry)  Once        Comments: Pre-op Diagnosis: Non-healing wound of left lower extremity [S81.802A]Procedure(s):CREATION, BYPASS, ARTERIAL, AXILLARY TO BILATERAL FEMORALANGIOGRAM, LOWER EXTREMITY with balloon angioplasty ultraverse 5mm x 60mmSTENT, SUPERFICIAL FEMORAL ARTERYAMPUTATION, FOOT, TRANSMETATARSALINCISION AND DRAINAGE, FOOT Number of specimens: 2Name of specimens: 1. Left 1st metatarsal head- permanent2. Left 1st metatarsal clean margins-perm      References:    Click here for ordering Quick Tip   Question Answer Comment   Procedure Type: Other podiatry   Specimen Class: Routine/Screening    Which provider would you like to cc? CATHERINE HUNT    Release to patient Immediate        09/14/23 1653    09/14/23 1635  Specimen to Pathology, Surgery Other (podiatry)  Once        Comments: Pre-op Diagnosis: Non-healing wound of left lower extremity [S81.802A]Procedure(s):CREATION, BYPASS, ARTERIAL, AXILLARY TO BILATERAL FEMORALANGIOGRAM, LOWER EXTREMITY with balloon angioplasty ultraverse 5mm x 60mmSTENT, SUPERFICIAL FEMORAL ARTERYAMPUTATION, FOOT, TRANSMETATARSALINCISION AND DRAINAGE, FOOT Number of specimens: 1Name of specimens: 1. Left 1st metatarsal head-perm     References:    Click here for ordering Quick Tip   Question Answer Comment   Procedure Type: Other podiatry   Specimen Class: Routine/Screening    Which provider would you like to cc? CATHERINE HUNT    Release to patient Immediate        09/14/23 1635                  Ewa Kimball DPM PGY-2  Podiatric Medicine & Surgery  [facility]  Secure Chat Preferred  Mobile: [phone]  Pager: [phone]

## 2023-09-14 NOTE — PROGRESS NOTES
Chart reviewed. Preop nursing care completed per orders. Safe surgery checklist complete. No family at bedside and belongings left in room. Waiting for Updated H&P prior to surgery. Call bell within reach. Pt instructed to call for assistance. ASHLEY

## 2023-09-14 NOTE — ANESTHESIA PROCEDURE NOTES
Central Line    Diagnosis: PVD  Patient location during procedure: done in OR  Timeout: 9/14/2023 7:35 AM  Procedure end time: 9/14/2023 7:50 AM    Staffing  Authorizing Provider: Marco Gomez III, MD  Performing Provider: Marco Gomez III, MD    Staffing  Performed: anesthesiologist   Anesthesiologist: Marco Gomez III, MD  Performed by: Marco Gomez III, MD  Authorized by: Marco Gomez III, MD    Anesthesiologist was present at the time of the procedure.  Preanesthetic Checklist  Completed: patient identified, IV checked, site marked, risks and benefits discussed, surgical consent, monitors and equipment checked, pre-op evaluation, timeout performed and anesthesia consent given  Indication   Indication: vascular access, med administration     Anesthesia   general anesthesia    Central Line   Skin Prep: skin prepped with ChloraPrep, skin prep agent completely dried prior to procedure  Sterile Barriers Followed: Yes    All five maximal barriers used- gloves, gown, cap, mask, and large sterile sheet    hand hygiene performed prior to central venous catheter insertion  Location: right internal jugular.   Catheter type: triple lumen  Catheter Size: 12 Fr  Ultrasound: vascular probe with ultrasound   Vessel Caliber: large, patent, compressibility normal  Needle advanced into vessel with real time Ultrasound guidance.  Guidewire confirmed in vessel.  sterile gel and probe cover used in ultrasound-guided central venous catheter insertion   Manometry: Venous cannualation confirmed by visual estimation of blood vessel pressure using manometry.  Insertion Attempts: 1   Securement:line sutured, chlorhexidine patch, sterile dressing applied and blood return through all ports    Post-Procedure        Guidewire Guidewire removed intact. Guidewire removed intact, verified with nurse.

## 2023-09-14 NOTE — ASSESSMENT & PLAN NOTE
Hx of type 2 DM. Last A1C 7.2. Maintained on Mounjaro and insulin.    -- Critical Care BG goal <180  -- SSI   -- Hypoglycemia Protocol

## 2023-09-14 NOTE — BRIEF OP NOTE
[facility] - [facility] ([facility])  Brief Operative Note    SUMMARY     Surgery Date: 9/14/2023     Surgeon(s) and Role:  Panel 1:     * Maxx Maya MD - Primary     * Marta Schultz MD - Resident - Assisting     * Adrian Canada MD - Fellow     * Ray Loyd MD - Fellow  Panel 2:     * Jesus Valles DPM - Primary     * Ewa Kimball MD - Resident - Assisting        Pre-op Diagnosis:  Non-healing wound of left lower extremity [S81.802A]    Post-op Diagnosis:  Post-Op Diagnosis Codes:     * Non-healing wound of left lower extremity [S81.802A]      Procedures:  Left ax to bi-femoral artery bypass (8mm PTFE)  Left lower extremity angiogram  PTA/Stent SFA (5x100 Lifestent, post dilated to 5mm)    Anesthesia: General    Operative Findings: good back bleeding from each CFA, multiphasic signal left leg. Three vessel runoff to foot.    Estimated Blood Loss: 200cc    MODIFIER: A 22 modifier is appropriate in this case given the extensive preoperative planning and intraoperative technical demand, physical and mental effort, all of which exceed 200% that of other comparable cases. The patient was morbidly obese and each exposure was extraordinarily difficult and each anastomosis was equally challenging.           Specimens:   Specimen (24h ago, onward)       Start     Ordered    09/14/23 1635  Specimen to Pathology, Surgery Other (podiatry)  Once        Comments: Pre-op Diagnosis: Non-healing wound of left lower extremity [S81.802A]Procedure(s):CREATION, BYPASS, ARTERIAL, AXILLARY TO BILATERAL FEMORALANGIOGRAM, LOWER EXTREMITY with balloon angioplasty ultraverse 5mm x 60mmSTENT, SUPERFICIAL FEMORAL ARTERYAMPUTATION, FOOT, TRANSMETATARSALINCISION AND DRAINAGE, FOOT Number of specimens: 1Name of specimens: 1. Left 1st metatarsal head-perm     References:    Click here for ordering Quick Tip   Question Answer Comment   Procedure Type: Other podiatry   Specimen Class: Routine/Screening    Which provider would you like  to cc? CATHERINE HUNT.    Release to patient Immediate        09/14/23 1635                    [id number]

## 2023-09-14 NOTE — ASSESSMENT & PLAN NOTE
Hx of non-healing L foot wound s/p L great toe amputation on 8/25/23. Now s/p Transmetatarsal amputation and washout of the left foot on 9/14/23    - q1h neurovascular checks   - continue Zosyn  - follow up on cx of biopsy from left foot

## 2023-09-14 NOTE — ANESTHESIA PROCEDURE NOTES
Arterial    Diagnosis: PVD    Patient location during procedure: done in OR  Procedure start time: 9/14/2023 7:15 AM  Timeout: 9/14/2023 7:10 AM  Procedure end time: 9/14/2023 7:20 AM    Staffing  Authorizing Provider: Marco Gomez III, MD  Performing Provider: Marco Gomez III, MD    Staffing  Performed by: Marco Gomez III, MD  Authorized by: Marco Gomez III, MD    Anesthesiologist was present at the time of the procedure.    Preanesthetic Checklist  Completed: patient identified, IV checked, site marked, risks and benefits discussed, surgical consent, monitors and equipment checked, pre-op evaluation, timeout performed and anesthesia consent givenArterial  Skin Prep: chlorhexidine gluconate  Orientation: left  Location: radial    Catheter Size: 20 G  Catheter placement by Ultrasound guidance. Heme positive aspiration all ports.   Vessel Caliber: medium, patent, compressibility poor  Needle advanced into vessel with real time Ultrasound guidance.Insertion Attempts: 1  Assessment  Dressing: secured with tape and tegaderm  Patient: Tolerated well

## 2023-09-14 NOTE — TRANSFER OF CARE
"Anesthesia Transfer of Care Note    Patient: Georgina Arias    Procedure(s) Performed: Procedure(s) (LRB):  CREATION, BYPASS, ARTERIAL, AXILLARY TO BILATERAL FEMORAL (Left)  ANGIOGRAM, LOWER EXTREMITY with balloon angioplasty ultraverse 5mm x 60mm (Left)  STENT, SUPERFICIAL FEMORAL ARTERY (Left)  AMPUTATION, FOOT, TRANSMETATARSAL partial 1st ray amputation (Left)  INCISION AND DRAINAGE, FOOT (Left)    Patient location: PACU    Anesthesia Type: general    Transport from OR: Transported from OR on 6-10 L/min O2 by face mask with adequate spontaneous ventilation    Post pain: adequate analgesia    Post assessment: no apparent anesthetic complications    Post vital signs: stable    Level of consciousness: awake    Nausea/Vomiting: nausea and vomiting    Complications: none    Transfer of care protocol was followedComments: Transported on 0.05 mcg/kg/min Phenylephrine gtt.      Last vitals:   Visit Vitals  /62   Pulse 76   Temp 36.6 °C (97.9 °F) (Oral)   Resp 18   Ht 5' 7" (1.702 m)   Wt 110 kg (242 lb 8.1 oz)   LMP 09/11/2020 (Approximate)   SpO2 98%   Breastfeeding No   BMI 37.98 kg/m²     "

## 2023-09-15 PROBLEM — Z95.828 S/P FEMORAL-FEMORAL BYPASS SURGERY: Status: ACTIVE | Noted: 2023-09-15

## 2023-09-15 LAB
ALBUMIN SERPL BCP-MCNC: 2 G/DL (ref 3.5–5.2)
ALBUMIN SERPL BCP-MCNC: 2 G/DL (ref 3.5–5.2)
ALBUMIN SERPL BCP-MCNC: 2.1 G/DL (ref 3.5–5.2)
ALLENS TEST: ABNORMAL
ALP SERPL-CCNC: 76 U/L (ref 55–135)
ALT SERPL W/O P-5'-P-CCNC: 46 U/L (ref 10–44)
ANION GAP SERPL CALC-SCNC: 17 MMOL/L (ref 8–16)
ANION GAP SERPL CALC-SCNC: 17 MMOL/L (ref 8–16)
ANION GAP SERPL CALC-SCNC: 7 MMOL/L (ref 8–16)
ANISOCYTOSIS BLD QL SMEAR: SLIGHT
APTT PPP: 25.4 SEC (ref 21–32)
ASCENDING AORTA: 2.95 CM
AST SERPL-CCNC: 57 U/L (ref 10–40)
AV INDEX (PROSTH): 0.63
AV MEAN GRADIENT: 6 MMHG
AV PEAK GRADIENT: 10 MMHG
AV VALVE AREA BY VELOCITY RATIO: 1.84 CM²
AV VALVE AREA: 1.92 CM²
AV VELOCITY RATIO: 0.6
BASOPHILS # BLD AUTO: 0.04 K/UL (ref 0–0.2)
BASOPHILS # BLD AUTO: 0.05 K/UL (ref 0–0.2)
BASOPHILS # BLD AUTO: 0.06 K/UL (ref 0–0.2)
BASOPHILS # BLD AUTO: ABNORMAL K/UL (ref 0–0.2)
BASOPHILS NFR BLD: 0 % (ref 0–1.9)
BASOPHILS NFR BLD: 0.2 % (ref 0–1.9)
BILIRUB SERPL-MCNC: 0.3 MG/DL (ref 0.1–1)
BLD PROD TYP BPU: NORMAL
BLOOD UNIT EXPIRATION DATE: NORMAL
BLOOD UNIT TYPE CODE: 7300
BLOOD UNIT TYPE: NORMAL
BSA FOR ECHO PROCEDURE: 2.28 M2
BUN SERPL-MCNC: 31 MG/DL (ref 6–20)
BUN SERPL-MCNC: 31 MG/DL (ref 6–20)
BUN SERPL-MCNC: 9 MG/DL (ref 6–20)
CA-I BLDV-SCNC: 1.02 MMOL/L (ref 1.06–1.42)
CALCIUM SERPL-MCNC: 7.4 MG/DL (ref 8.7–10.5)
CHLORIDE SERPL-SCNC: 101 MMOL/L (ref 95–110)
CHLORIDE SERPL-SCNC: 101 MMOL/L (ref 95–110)
CHLORIDE SERPL-SCNC: 98 MMOL/L (ref 95–110)
CO2 SERPL-SCNC: 17 MMOL/L (ref 23–29)
CO2 SERPL-SCNC: 17 MMOL/L (ref 23–29)
CO2 SERPL-SCNC: 26 MMOL/L (ref 23–29)
CODING SYSTEM: NORMAL
CREAT SERPL-MCNC: 1.5 MG/DL (ref 0.5–1.4)
CREAT SERPL-MCNC: 3.8 MG/DL (ref 0.5–1.4)
CREAT SERPL-MCNC: 3.8 MG/DL (ref 0.5–1.4)
CROSSMATCH INTERPRETATION: NORMAL
CV ECHO LV RWT: 0.61 CM
DIFFERENTIAL METHOD: ABNORMAL
DISPENSE STATUS: NORMAL
DOP CALC AO PEAK VEL: 1.59 M/S
DOP CALC AO VTI: 25.99 CM
DOP CALC LVOT AREA: 3 CM2
DOP CALC LVOT DIAMETER: 1.97 CM
DOP CALC LVOT PEAK VEL: 0.96 M/S
DOP CALC LVOT STROKE VOLUME: 50.02 CM3
DOP CALCLVOT PEAK VEL VTI: 16.42 CM
E WAVE DECELERATION TIME: 194.77 MSEC
E/A RATIO: 0.68
E/E' RATIO: 14.22 M/S
ECHO LV POSTERIOR WALL: 1.2 CM (ref 0.6–1.1)
EJECTION FRACTION: 55 %
EOSINOPHIL # BLD AUTO: 0 K/UL (ref 0–0.5)
EOSINOPHIL # BLD AUTO: ABNORMAL K/UL (ref 0–0.5)
EOSINOPHIL NFR BLD: 0 % (ref 0–8)
ERYTHROCYTE [DISTWIDTH] IN BLOOD BY AUTOMATED COUNT: 15.9 % (ref 11.5–14.5)
ERYTHROCYTE [DISTWIDTH] IN BLOOD BY AUTOMATED COUNT: 15.9 % (ref 11.5–14.5)
ERYTHROCYTE [DISTWIDTH] IN BLOOD BY AUTOMATED COUNT: 16.1 % (ref 11.5–14.5)
ERYTHROCYTE [DISTWIDTH] IN BLOOD BY AUTOMATED COUNT: 17 % (ref 11.5–14.5)
EST. GFR  (NO RACE VARIABLE): 13.7 ML/MIN/1.73 M^2
EST. GFR  (NO RACE VARIABLE): 13.7 ML/MIN/1.73 M^2
EST. GFR  (NO RACE VARIABLE): 41.7 ML/MIN/1.73 M^2
FRACTIONAL SHORTENING: 32 % (ref 28–44)
GIANT PLATELETS BLD QL SMEAR: PRESENT
GLUCOSE SERPL-MCNC: 121 MG/DL (ref 70–110)
GLUCOSE SERPL-MCNC: 263 MG/DL (ref 70–110)
GLUCOSE SERPL-MCNC: 263 MG/DL (ref 70–110)
GRAM STN SPEC: NORMAL
HCO3 UR-SCNC: 22.3 MMOL/L (ref 24–28)
HCT VFR BLD AUTO: 21.3 % (ref 37–48.5)
HCT VFR BLD AUTO: 22.2 % (ref 37–48.5)
HCT VFR BLD AUTO: 24.2 % (ref 37–48.5)
HCT VFR BLD AUTO: 26.3 % (ref 37–48.5)
HGB BLD-MCNC: 6.9 G/DL (ref 12–16)
HGB BLD-MCNC: 7.1 G/DL (ref 12–16)
HGB BLD-MCNC: 7.4 G/DL (ref 12–16)
HGB BLD-MCNC: 8.6 G/DL (ref 12–16)
IMM GRANULOCYTES # BLD AUTO: 0.13 K/UL (ref 0–0.04)
IMM GRANULOCYTES # BLD AUTO: 0.14 K/UL (ref 0–0.04)
IMM GRANULOCYTES # BLD AUTO: 0.23 K/UL (ref 0–0.04)
IMM GRANULOCYTES # BLD AUTO: ABNORMAL K/UL (ref 0–0.04)
IMM GRANULOCYTES NFR BLD AUTO: 0.6 % (ref 0–0.5)
IMM GRANULOCYTES NFR BLD AUTO: 0.7 % (ref 0–0.5)
IMM GRANULOCYTES NFR BLD AUTO: 0.9 % (ref 0–0.5)
IMM GRANULOCYTES NFR BLD AUTO: ABNORMAL % (ref 0–0.5)
INTERVENTRICULAR SEPTUM: 1.17 CM (ref 0.6–1.1)
LA MAJOR: 4.06 CM
LA MINOR: 3.68 CM
LA WIDTH: 2.94 CM
LACTATE SERPL-SCNC: 0.7 MMOL/L (ref 0.5–2.2)
LACTATE SERPL-SCNC: 0.9 MMOL/L (ref 0.5–2.2)
LACTATE SERPL-SCNC: 1 MMOL/L (ref 0.5–2.2)
LEFT ATRIUM SIZE: 2.52 CM
LEFT ATRIUM VOLUME INDEX MOD: 11.7 ML/M2
LEFT ATRIUM VOLUME INDEX: 11.1 ML/M2
LEFT ATRIUM VOLUME MOD: 25.6 CM3
LEFT ATRIUM VOLUME: 24.31 CM3
LEFT INTERNAL DIMENSION IN SYSTOLE: 2.69 CM (ref 2.1–4)
LEFT VENTRICLE DIASTOLIC VOLUME INDEX: 30.79 ML/M2
LEFT VENTRICLE DIASTOLIC VOLUME: 67.43 ML
LEFT VENTRICLE MASS INDEX: 72 G/M2
LEFT VENTRICLE SYSTOLIC VOLUME INDEX: 12.2 ML/M2
LEFT VENTRICLE SYSTOLIC VOLUME: 26.7 ML
LEFT VENTRICULAR INTERNAL DIMENSION IN DIASTOLE: 3.94 CM (ref 3.5–6)
LEFT VENTRICULAR MASS: 158.74 G
LV LATERAL E/E' RATIO: 12.8 M/S
LV SEPTAL E/E' RATIO: 16 M/S
LYMPHOCYTES # BLD AUTO: 1.3 K/UL (ref 1–4.8)
LYMPHOCYTES # BLD AUTO: 1.9 K/UL (ref 1–4.8)
LYMPHOCYTES # BLD AUTO: 1.9 K/UL (ref 1–4.8)
LYMPHOCYTES # BLD AUTO: ABNORMAL K/UL (ref 1–4.8)
LYMPHOCYTES NFR BLD: 10 % (ref 18–48)
LYMPHOCYTES NFR BLD: 5.3 % (ref 18–48)
LYMPHOCYTES NFR BLD: 7 % (ref 18–48)
LYMPHOCYTES NFR BLD: 7.5 % (ref 18–48)
MAGNESIUM SERPL-MCNC: 1.7 MG/DL (ref 1.6–2.6)
MAGNESIUM SERPL-MCNC: 1.8 MG/DL (ref 1.6–2.6)
MCH RBC QN AUTO: 30.7 PG (ref 27–31)
MCH RBC QN AUTO: 31.5 PG (ref 27–31)
MCH RBC QN AUTO: 31.6 PG (ref 27–31)
MCH RBC QN AUTO: 31.7 PG (ref 27–31)
MCHC RBC AUTO-ENTMCNC: 30.6 G/DL (ref 32–36)
MCHC RBC AUTO-ENTMCNC: 31.1 G/DL (ref 32–36)
MCHC RBC AUTO-ENTMCNC: 32.7 G/DL (ref 32–36)
MCHC RBC AUTO-ENTMCNC: 33.3 G/DL (ref 32–36)
MCV RBC AUTO: 100 FL (ref 82–98)
MCV RBC AUTO: 102 FL (ref 82–98)
MCV RBC AUTO: 95 FL (ref 82–98)
MCV RBC AUTO: 96 FL (ref 82–98)
MONOCYTES # BLD AUTO: 1.4 K/UL (ref 0.3–1)
MONOCYTES # BLD AUTO: 1.4 K/UL (ref 0.3–1)
MONOCYTES # BLD AUTO: 1.6 K/UL (ref 0.3–1)
MONOCYTES # BLD AUTO: ABNORMAL K/UL (ref 0.3–1)
MONOCYTES NFR BLD: 4 % (ref 4–15)
MONOCYTES NFR BLD: 5.5 % (ref 4–15)
MONOCYTES NFR BLD: 5.6 % (ref 4–15)
MONOCYTES NFR BLD: 8.1 % (ref 4–15)
MV PEAK A VEL: 0.94 M/S
MV PEAK E VEL: 0.64 M/S
MV STENOSIS PRESSURE HALF TIME: 56.48 MS
MV VALVE AREA P 1/2 METHOD: 3.9 CM2
NEUTROPHILS # BLD AUTO: 15.6 K/UL (ref 1.8–7.7)
NEUTROPHILS # BLD AUTO: 21.3 K/UL (ref 1.8–7.7)
NEUTROPHILS # BLD AUTO: 21.4 K/UL (ref 1.8–7.7)
NEUTROPHILS NFR BLD: 81 % (ref 38–73)
NEUTROPHILS NFR BLD: 81 % (ref 38–73)
NEUTROPHILS NFR BLD: 85.9 % (ref 38–73)
NEUTROPHILS NFR BLD: 88.3 % (ref 38–73)
NEUTS BAND NFR BLD MANUAL: 8 %
NRBC BLD-RTO: 0 /100 WBC
NUM UNITS TRANS PACKED RBC: NORMAL
PCO2 BLDA: 44.8 MMHG (ref 35–45)
PH SMN: 7.31 [PH] (ref 7.35–7.45)
PHOSPHATE SERPL-MCNC: 2.2 MG/DL (ref 2.7–4.5)
PHOSPHATE SERPL-MCNC: 4.8 MG/DL (ref 2.7–4.5)
PLATELET # BLD AUTO: 167 K/UL (ref 150–450)
PLATELET # BLD AUTO: 221 K/UL (ref 150–450)
PLATELET # BLD AUTO: 268 K/UL (ref 150–450)
PLATELET # BLD AUTO: 295 K/UL (ref 150–450)
PLATELET BLD QL SMEAR: ABNORMAL
PMV BLD AUTO: 10.1 FL (ref 9.2–12.9)
PMV BLD AUTO: 10.3 FL (ref 9.2–12.9)
PMV BLD AUTO: 10.4 FL (ref 9.2–12.9)
PMV BLD AUTO: 9.9 FL (ref 9.2–12.9)
PO2 BLDA: 28 MMHG (ref 40–60)
POC BE: -4 MMOL/L
POC SATURATED O2: 46 % (ref 95–100)
POC TCO2: 24 MMOL/L (ref 24–29)
POCT GLUCOSE: 125 MG/DL (ref 70–110)
POCT GLUCOSE: 132 MG/DL (ref 70–110)
POCT GLUCOSE: 160 MG/DL (ref 70–110)
POCT GLUCOSE: 183 MG/DL (ref 70–110)
POCT GLUCOSE: 190 MG/DL (ref 70–110)
POCT GLUCOSE: 191 MG/DL (ref 70–110)
POCT GLUCOSE: 191 MG/DL (ref 70–110)
POCT GLUCOSE: 219 MG/DL (ref 70–110)
POCT GLUCOSE: 233 MG/DL (ref 70–110)
POCT GLUCOSE: 234 MG/DL (ref 70–110)
POCT GLUCOSE: 240 MG/DL (ref 70–110)
POCT GLUCOSE: 243 MG/DL (ref 70–110)
POCT GLUCOSE: 247 MG/DL (ref 70–110)
POCT GLUCOSE: 254 MG/DL (ref 70–110)
POCT GLUCOSE: 281 MG/DL (ref 70–110)
POCT GLUCOSE: 287 MG/DL (ref 70–110)
POCT GLUCOSE: 292 MG/DL (ref 70–110)
POCT GLUCOSE: 298 MG/DL (ref 70–110)
POCT GLUCOSE: 374 MG/DL (ref 70–110)
POIKILOCYTOSIS BLD QL SMEAR: SLIGHT
POTASSIUM SERPL-SCNC: 3.8 MMOL/L (ref 3.5–5.1)
POTASSIUM SERPL-SCNC: 3.8 MMOL/L (ref 3.5–5.1)
POTASSIUM SERPL-SCNC: 3.9 MMOL/L (ref 3.5–5.1)
PROT SERPL-MCNC: 4.8 G/DL (ref 6–8.4)
RA PRESSURE ESTIMATED: 3 MMHG
RBC # BLD AUTO: 2.18 M/UL (ref 4–5.4)
RBC # BLD AUTO: 2.25 M/UL (ref 4–5.4)
RBC # BLD AUTO: 2.41 M/UL (ref 4–5.4)
RBC # BLD AUTO: 2.73 M/UL (ref 4–5.4)
SAMPLE: ABNORMAL
SCHISTOCYTES BLD QL SMEAR: ABNORMAL
SINUS: 2.99 CM
SITE: ABNORMAL
SODIUM SERPL-SCNC: 131 MMOL/L (ref 136–145)
SODIUM SERPL-SCNC: 135 MMOL/L (ref 136–145)
SODIUM SERPL-SCNC: 135 MMOL/L (ref 136–145)
SPHEROCYTES BLD QL SMEAR: ABNORMAL
STJ: 2.79 CM
TDI LATERAL: 0.05 M/S
TDI SEPTAL: 0.04 M/S
TDI: 0.05 M/S
VANCOMYCIN SERPL-MCNC: 12.1 UG/ML
WBC # BLD AUTO: 19.23 K/UL (ref 3.9–12.7)
WBC # BLD AUTO: 24.11 K/UL (ref 3.9–12.7)
WBC # BLD AUTO: 24.17 K/UL (ref 3.9–12.7)
WBC # BLD AUTO: 24.94 K/UL (ref 3.9–12.7)
Z-SCORE OF LEFT VENTRICULAR DIMENSION IN END DIASTOLE: -6.3
Z-SCORE OF LEFT VENTRICULAR DIMENSION IN END SYSTOLE: -4.06

## 2023-09-15 PROCEDURE — P9016 RBC LEUKOCYTES REDUCED: HCPCS | Performed by: STUDENT IN AN ORGANIZED HEALTH CARE EDUCATION/TRAINING PROGRAM

## 2023-09-15 PROCEDURE — 99223 PR INITIAL HOSPITAL CARE,LEVL III: ICD-10-PCS | Mod: ,,, | Performed by: NURSE PRACTITIONER

## 2023-09-15 PROCEDURE — 20000000 HC ICU ROOM

## 2023-09-15 PROCEDURE — 25000003 PHARM REV CODE 250: Performed by: NURSE PRACTITIONER

## 2023-09-15 PROCEDURE — 25000003 PHARM REV CODE 250: Performed by: SURGERY

## 2023-09-15 PROCEDURE — 83735 ASSAY OF MAGNESIUM: CPT | Mod: 91 | Performed by: NURSE PRACTITIONER

## 2023-09-15 PROCEDURE — 36415 COLL VENOUS BLD VENIPUNCTURE: CPT | Performed by: SURGERY

## 2023-09-15 PROCEDURE — 99024 POSTOP FOLLOW-UP VISIT: CPT | Mod: ,,, | Performed by: PODIATRIST

## 2023-09-15 PROCEDURE — 80069 RENAL FUNCTION PANEL: CPT | Mod: 91 | Performed by: NURSE PRACTITIONER

## 2023-09-15 PROCEDURE — 25500020 PHARM REV CODE 255: Performed by: INTERNAL MEDICINE

## 2023-09-15 PROCEDURE — 80202 ASSAY OF VANCOMYCIN: CPT | Performed by: SURGERY

## 2023-09-15 PROCEDURE — 27000221 HC OXYGEN, UP TO 24 HOURS

## 2023-09-15 PROCEDURE — 99223 1ST HOSP IP/OBS HIGH 75: CPT | Mod: ,,, | Performed by: NURSE PRACTITIONER

## 2023-09-15 PROCEDURE — 25000003 PHARM REV CODE 250: Performed by: STUDENT IN AN ORGANIZED HEALTH CARE EDUCATION/TRAINING PROGRAM

## 2023-09-15 PROCEDURE — 36430 TRANSFUSION BLD/BLD COMPNT: CPT

## 2023-09-15 PROCEDURE — 90945 DIALYSIS ONE EVALUATION: CPT

## 2023-09-15 PROCEDURE — 63600175 PHARM REV CODE 636 W HCPCS

## 2023-09-15 PROCEDURE — 93010 ELECTROCARDIOGRAM REPORT: CPT | Mod: ,,, | Performed by: INTERNAL MEDICINE

## 2023-09-15 PROCEDURE — 63600175 PHARM REV CODE 636 W HCPCS: Performed by: STUDENT IN AN ORGANIZED HEALTH CARE EDUCATION/TRAINING PROGRAM

## 2023-09-15 PROCEDURE — 85025 COMPLETE CBC W/AUTO DIFF WBC: CPT | Mod: 91 | Performed by: SURGERY

## 2023-09-15 PROCEDURE — 93010 EKG 12-LEAD: ICD-10-PCS | Mod: ,,, | Performed by: INTERNAL MEDICINE

## 2023-09-15 PROCEDURE — 82330 ASSAY OF CALCIUM: CPT | Performed by: STUDENT IN AN ORGANIZED HEALTH CARE EDUCATION/TRAINING PROGRAM

## 2023-09-15 PROCEDURE — 99291 CRITICAL CARE FIRST HOUR: CPT | Mod: GC,ICN,, | Performed by: INTERNAL MEDICINE

## 2023-09-15 PROCEDURE — 85730 THROMBOPLASTIN TIME PARTIAL: CPT | Performed by: STUDENT IN AN ORGANIZED HEALTH CARE EDUCATION/TRAINING PROGRAM

## 2023-09-15 PROCEDURE — 83605 ASSAY OF LACTIC ACID: CPT | Mod: 91 | Performed by: INTERNAL MEDICINE

## 2023-09-15 PROCEDURE — 80069 RENAL FUNCTION PANEL: CPT | Performed by: STUDENT IN AN ORGANIZED HEALTH CARE EDUCATION/TRAINING PROGRAM

## 2023-09-15 PROCEDURE — 99291 PR CRITICAL CARE, E/M 30-74 MINUTES: ICD-10-PCS | Mod: GC,ICN,, | Performed by: INTERNAL MEDICINE

## 2023-09-15 PROCEDURE — 80100008 HC CRRT DAILY MAINTENANCE

## 2023-09-15 PROCEDURE — 87040 BLOOD CULTURE FOR BACTERIA: CPT

## 2023-09-15 PROCEDURE — 63600175 PHARM REV CODE 636 W HCPCS: Performed by: INTERNAL MEDICINE

## 2023-09-15 PROCEDURE — 94761 N-INVAS EAR/PLS OXIMETRY MLT: CPT

## 2023-09-15 PROCEDURE — 80053 COMPREHEN METABOLIC PANEL: CPT | Performed by: STUDENT IN AN ORGANIZED HEALTH CARE EDUCATION/TRAINING PROGRAM

## 2023-09-15 PROCEDURE — 25000003 PHARM REV CODE 250

## 2023-09-15 PROCEDURE — 99233 PR SUBSEQUENT HOSPITAL CARE,LEVL III: ICD-10-PCS | Mod: FS,,, | Performed by: INTERNAL MEDICINE

## 2023-09-15 PROCEDURE — 63600175 PHARM REV CODE 636 W HCPCS: Performed by: SURGERY

## 2023-09-15 PROCEDURE — 99233 SBSQ HOSP IP/OBS HIGH 50: CPT | Mod: FS,,, | Performed by: INTERNAL MEDICINE

## 2023-09-15 PROCEDURE — 83735 ASSAY OF MAGNESIUM: CPT | Performed by: STUDENT IN AN ORGANIZED HEALTH CARE EDUCATION/TRAINING PROGRAM

## 2023-09-15 PROCEDURE — 99024 PR POST-OP FOLLOW-UP VISIT: ICD-10-PCS | Mod: ,,, | Performed by: PODIATRIST

## 2023-09-15 PROCEDURE — 85025 COMPLETE CBC W/AUTO DIFF WBC: CPT | Performed by: STUDENT IN AN ORGANIZED HEALTH CARE EDUCATION/TRAINING PROGRAM

## 2023-09-15 PROCEDURE — 93005 ELECTROCARDIOGRAM TRACING: CPT

## 2023-09-15 PROCEDURE — 99900035 HC TECH TIME PER 15 MIN (STAT)

## 2023-09-15 RX ORDER — OXYCODONE HYDROCHLORIDE 5 MG/1
5 TABLET ORAL EVERY 4 HOURS PRN
Status: DISCONTINUED | OUTPATIENT
Start: 2023-09-15 | End: 2023-09-19 | Stop reason: HOSPADM

## 2023-09-15 RX ORDER — CLOPIDOGREL BISULFATE 75 MG/1
75 TABLET ORAL DAILY
Status: DISCONTINUED | OUTPATIENT
Start: 2023-09-16 | End: 2023-09-19 | Stop reason: HOSPADM

## 2023-09-15 RX ORDER — HEPARIN SODIUM 5000 [USP'U]/ML
5000 INJECTION, SOLUTION INTRAVENOUS; SUBCUTANEOUS EVERY 8 HOURS
Status: DISCONTINUED | OUTPATIENT
Start: 2023-09-15 | End: 2023-09-19 | Stop reason: HOSPADM

## 2023-09-15 RX ORDER — MAGNESIUM SULFATE HEPTAHYDRATE 40 MG/ML
2 INJECTION, SOLUTION INTRAVENOUS
Status: DISPENSED | OUTPATIENT
Start: 2023-09-15 | End: 2023-09-16

## 2023-09-15 RX ORDER — MAGNESIUM SULFATE HEPTAHYDRATE 40 MG/ML
2 INJECTION, SOLUTION INTRAVENOUS
Status: DISCONTINUED | OUTPATIENT
Start: 2023-09-15 | End: 2023-09-15

## 2023-09-15 RX ORDER — LIDOCAINE HYDROCHLORIDE 10 MG/ML
5 INJECTION, SOLUTION EPIDURAL; INFILTRATION; INTRACAUDAL; PERINEURAL ONCE
Status: CANCELLED | OUTPATIENT
Start: 2023-09-15 | End: 2023-09-15

## 2023-09-15 RX ORDER — ASPIRIN 81 MG/1
81 TABLET ORAL DAILY
Status: DISCONTINUED | OUTPATIENT
Start: 2023-09-16 | End: 2023-09-19 | Stop reason: HOSPADM

## 2023-09-15 RX ORDER — HYDROCODONE BITARTRATE AND ACETAMINOPHEN 500; 5 MG/1; MG/1
TABLET ORAL CONTINUOUS
Status: ACTIVE | OUTPATIENT
Start: 2023-09-15 | End: 2023-09-16

## 2023-09-15 RX ORDER — HEPARIN SODIUM 10000 [USP'U]/100ML
200 INJECTION, SOLUTION INTRAVENOUS CONTINUOUS
Status: DISCONTINUED | OUTPATIENT
Start: 2023-09-15 | End: 2023-09-17

## 2023-09-15 RX ORDER — HYDROCODONE BITARTRATE AND ACETAMINOPHEN 500; 5 MG/1; MG/1
TABLET ORAL
Status: DISCONTINUED | OUTPATIENT
Start: 2023-09-15 | End: 2023-09-17

## 2023-09-15 RX ORDER — HYDROCODONE BITARTRATE AND ACETAMINOPHEN 500; 5 MG/1; MG/1
TABLET ORAL CONTINUOUS
Status: DISCONTINUED | OUTPATIENT
Start: 2023-09-15 | End: 2023-09-15

## 2023-09-15 RX ORDER — LIDOCAINE HYDROCHLORIDE 10 MG/ML
10 INJECTION INFILTRATION; PERINEURAL ONCE
Status: COMPLETED | OUTPATIENT
Start: 2023-09-15 | End: 2023-09-15

## 2023-09-15 RX ADMIN — ASPIRIN 325 MG: 325 TABLET, COATED ORAL at 08:09

## 2023-09-15 RX ADMIN — ONDANSETRON 4 MG: 2 INJECTION INTRAMUSCULAR; INTRAVENOUS at 02:09

## 2023-09-15 RX ADMIN — HUMAN ALBUMIN MICROSPHERES AND PERFLUTREN 0.66 MG: 10; .22 INJECTION, SOLUTION INTRAVENOUS at 08:09

## 2023-09-15 RX ADMIN — MUPIROCIN: 20 OINTMENT TOPICAL at 09:09

## 2023-09-15 RX ADMIN — HEPARIN SODIUM 5000 UNITS: 5000 INJECTION INTRAVENOUS; SUBCUTANEOUS at 10:09

## 2023-09-15 RX ADMIN — LIDOCAINE HYDROCHLORIDE 10 ML: 10 INJECTION, SOLUTION EPIDURAL; INFILTRATION; INTRACAUDAL at 12:09

## 2023-09-15 RX ADMIN — HYDROCORTISONE SODIUM SUCCINATE 100 MG: 100 INJECTION, POWDER, FOR SOLUTION INTRAMUSCULAR; INTRAVENOUS at 09:09

## 2023-09-15 RX ADMIN — INSULIN HUMAN 3 UNITS/HR: 1 INJECTION, SOLUTION INTRAVENOUS at 12:09

## 2023-09-15 RX ADMIN — ATORVASTATIN CALCIUM 80 MG: 40 TABLET, FILM COATED ORAL at 08:09

## 2023-09-15 RX ADMIN — HYDROCORTISONE SODIUM SUCCINATE 100 MG: 100 INJECTION, POWDER, FOR SOLUTION INTRAMUSCULAR; INTRAVENOUS at 10:09

## 2023-09-15 RX ADMIN — VANCOMYCIN HYDROCHLORIDE 1000 MG: 1 INJECTION, POWDER, LYOPHILIZED, FOR SOLUTION INTRAVENOUS at 03:09

## 2023-09-15 RX ADMIN — VASOPRESSIN 0.04 UNITS/MIN: 20 INJECTION INTRAVENOUS at 09:09

## 2023-09-15 RX ADMIN — HYDROCORTISONE SODIUM SUCCINATE 100 MG: 100 INJECTION, POWDER, FOR SOLUTION INTRAMUSCULAR; INTRAVENOUS at 02:09

## 2023-09-15 RX ADMIN — PIPERACILLIN SODIUM AND TAZOBACTAM SODIUM 4.5 G: 4; .5 INJECTION, POWDER, FOR SOLUTION INTRAVENOUS at 09:09

## 2023-09-15 RX ADMIN — SODIUM CHLORIDE: 9 INJECTION, SOLUTION INTRAVENOUS at 01:09

## 2023-09-15 RX ADMIN — HEPARIN SODIUM AND DEXTROSE 200 UNITS/HR: 10000; 5 INJECTION INTRAVENOUS at 05:09

## 2023-09-15 RX ADMIN — NOREPINEPHRINE BITARTRATE 0.02 MCG/KG/MIN: 4 INJECTION, SOLUTION INTRAVENOUS at 03:09

## 2023-09-15 RX ADMIN — NOREPINEPHRINE BITARTRATE 0.04 MCG/KG/MIN: 4 INJECTION, SOLUTION INTRAVENOUS at 11:09

## 2023-09-15 RX ADMIN — SODIUM CHLORIDE: 9 INJECTION, SOLUTION INTRAVENOUS at 11:09

## 2023-09-15 RX ADMIN — PIPERACILLIN SODIUM AND TAZOBACTAM SODIUM 4.5 G: 4; .5 INJECTION, POWDER, FOR SOLUTION INTRAVENOUS at 05:09

## 2023-09-15 RX ADMIN — HEPARIN SODIUM 5000 UNITS: 5000 INJECTION INTRAVENOUS; SUBCUTANEOUS at 02:09

## 2023-09-15 RX ADMIN — ONDANSETRON 4 MG: 2 INJECTION INTRAMUSCULAR; INTRAVENOUS at 09:09

## 2023-09-15 RX ADMIN — CALCIUM CHLORIDE 1 G: 100 INJECTION, SOLUTION INTRAVENOUS at 04:09

## 2023-09-15 RX ADMIN — INSULIN HUMAN 9.7 UNITS/HR: 1 INJECTION, SOLUTION INTRAVENOUS at 08:09

## 2023-09-15 RX ADMIN — VASOPRESSIN 0.04 UNITS/MIN: 20 INJECTION INTRAVENOUS at 02:09

## 2023-09-15 NOTE — PROGRESS NOTES
SICU resident at bedside to assess groin. No new orders at  this time. MD will reassess in 30 minutes. Continue to monitor site for now.

## 2023-09-15 NOTE — SUBJECTIVE & OBJECTIVE
Interval History: S/p femoral bypass. Transferred to ICU overnight following OR. Initiated SLED for hyperkalemia and acidosis. Clotted x 2 overnight. Requiring levo .02 and vaso .04. On 3L NC.     Review of patient's allergies indicates:   Allergen Reactions    Naproxen Anaphylaxis and Swelling     Hives (skin)^swelling  Hives (skin)^swelling  Hives (skin)^swelling    Sulfamethoxazole-trimethoprim Other (See Comments)     Caused JEROME    Hydrocodone Nausea And Vomiting and Rash    Penicillins Hives     Blisters (skin)^    Adhesive Rash    Hydrocodone-acetaminophen Nausea And Vomiting     Rash (skin)^, Vomiting^       Current Facility-Administered Medications   Medication Frequency    0.9%  NaCl infusion (CRRT USE ONLY) Continuous    0.9%  NaCl infusion (for blood administration) Q24H PRN    0.9%  NaCl infusion Once    acetaminophen tablet 1,000 mg Q8H    aspirin EC tablet 325 mg Daily    atorvastatin tablet 80 mg Daily    dextrose 10% bolus 125 mL 125 mL PRN    dextrose 10% bolus 250 mL 250 mL PRN    diphenhydrAMINE capsule 25 mg Q6H PRN    glucagon (human recombinant) injection 1 mg PRN    glucose chewable tablet 16 g PRN    glucose chewable tablet 24 g PRN    heparin (porcine) injection 3,000 Units PRN    heparin (porcine) injection 5,000 Units Q8H    hydrALAZINE injection 10 mg Q6H PRN    hydrocortisone sodium succinate injection 100 mg Q8H    insulin regular in 0.9 % NaCl 100 unit/100 mL (1 unit/mL) infusion Continuous    labetalol 20 mg/4 mL (5 mg/mL) IV syring Q6H PRN    LIDOcaine (PF) 10 mg/ml (1%) injection 10 mg Once PRN    magnesium sulfate 2g in water 50mL IVPB (premix) PRN    mupirocin 2 % ointment BID    NORepinephrine 4 mg in dextrose 5% 250 mL infusion (premix) Continuous    ondansetron injection 4 mg Q4H PRN    oxyCODONE immediate release tablet 5 mg Q4H PRN    piperacillin-tazobactam (ZOSYN) 4.5 g in dextrose 5 % in water (D5W) 100 mL IVPB (MB+) Q8H    sodium chloride 0.9% bolus 250 mL 250 mL PRN     sodium chloride 0.9% flush 10 mL PRN    sodium phosphate 20.01 mmol in dextrose 5 % (D5W) 250 mL IVPB PRN    sodium phosphate 30 mmol in dextrose 5 % (D5W) 250 mL IVPB PRN    sodium phosphate 39.99 mmol in dextrose 5 % (D5W) 250 mL IVPB PRN    vancomycin (VANCOCIN) 1,000 mg in dextrose 5 % (D5W) 250 mL IVPB (Vial-Mate) Once    vancomycin - pharmacy to dose pharmacy to manage frequency    vasopressin (PITRESSIN) 0.2 Units/mL in dextrose 5 % (D5W) 100 mL infusion Continuous       Objective:     Vital Signs (Most Recent):  Temp: 97 °F (36.1 °C) (09/15/23 1100)  Pulse: 78 (09/15/23 1200)  Resp: 11 (09/15/23 1200)  BP: (!) 111/55 (09/15/23 1245)  SpO2: 100 % (09/15/23 1200) Vital Signs (24h Range):  Temp:  [96.2 °F (35.7 °C)-97.2 °F (36.2 °C)] 97 °F (36.1 °C)  Pulse:  [] 78  Resp:  [6-42] 11  SpO2:  [85 %-100 %] 100 %  BP: ()/(28-73) 111/55  Arterial Line BP: ()/(41-57) 108/48     Weight: 109.8 kg (242 lb) (09/15/23 0845)  Body mass index is 37.9 kg/m².  Body surface area is 2.28 meters squared.    I/O last 3 completed shifts:  In: 5442.8 [I.V.:1007.7; Blood:350; IV Piggyback:4085]  Out: 999 [Urine:405; Drains:120; Other:474]     Physical Exam  Vitals and nursing note reviewed.   Constitutional:       General: She is not in acute distress.     Appearance: She is ill-appearing.   HENT:      Head: Normocephalic and atraumatic.   Cardiovascular:      Rate and Rhythm: Normal rate.      Comments: 2+ L radial pulse  B/l biphasic dp/pt signals  Pulmonary:      Effort: Pulmonary effort is normal.   Abdominal:      Palpations: Abdomen is soft.   Musculoskeletal:         General: Normal range of motion.   Skin:     General: Skin is warm and dry.      Comments: L foot s/p debridement with podiatry. Wrapped in ace  B/l groin incisions covered by prevenas  L upper chest incision covered with gauze and tegaderm. ELLE drain in place          Significant Labs:  CBC:   Recent Labs   Lab 09/15/23  0445   WBC 24.94*   RBC  2.73*   HGB 8.6*   HCT 26.3*      MCV 96   MCH 31.5*   MCHC 32.7     CMP:   Recent Labs   Lab 09/15/23  0312   *  263*   CALCIUM 7.4*  7.4*   ALBUMIN 2.0*  2.0*   PROT 4.8*   *  135*   K 3.8  3.8   CO2 17*  17*     101   BUN 31*  31*   CREATININE 3.8*  3.8*   ALKPHOS 76   ALT 46*   AST 57*   BILITOT 0.3     All labs within the past 24 hours have been reviewed.

## 2023-09-15 NOTE — SUBJECTIVE & OBJECTIVE
Medications:  Continuous Infusions:   sodium chloride 0.9% Stopped (09/15/23 0404)    insulin regular 1 units/mL infusion orderable (DKA) 1.5 Units/hr (09/15/23 0800)    NORepinephrine bitartrate-D5W 0.08 mcg/kg/min (09/15/23 0800)    vasopressin 0.04 Units/min (09/15/23 0800)     Scheduled Meds:   sodium chloride 0.9%   Intravenous Once    acetaminophen  1,000 mg Oral Q8H    aspirin  325 mg Oral Daily    atorvastatin  80 mg Oral Daily    heparin (porcine)  5,000 Units Subcutaneous Q8H    hydrocortisone sodium succinate  100 mg Intravenous Q8H    mupirocin   Nasal BID    piperacillin-tazobactam (Zosyn) IV (PEDS and ADULTS) (extended infusion is not appropriate)  4.5 g Intravenous Q8H     PRN Meds:0.9%  NaCl infusion (for blood administration), dextrose 10%, dextrose 10%, diphenhydrAMINE, glucagon (human recombinant), glucose, glucose, heparin (porcine), hydrALAZINE, HYDROmorphone, labetalol, LIDOcaine (PF) 10 mg/ml (1%), magnesium sulfate IVPB, ondansetron, oxyCODONE, oxyCODONE, sodium chloride 0.9%, sodium chloride 0.9%, sodium phosphate 20.01 mmol in dextrose 5 % (D5W) 250 mL IVPB, sodium phosphate 30 mmol in dextrose 5 % (D5W) 250 mL IVPB, sodium phosphate 39.99 mmol in dextrose 5 % (D5W) 250 mL IVPB, Pharmacy to dose Vancomycin consult **AND** vancomycin - pharmacy to dose     Objective:     Vital Signs (Most Recent):  Temp: 96.9 °F (36.1 °C) (09/15/23 0700)  Pulse: 83 (09/15/23 0800)  Resp: 12 (09/15/23 0800)  BP: (!) 89/48 (09/15/23 0800)  SpO2: 100 % (09/15/23 0800) Vital Signs (24h Range):  Temp:  [96.2 °F (35.7 °C)-97.2 °F (36.2 °C)] 96.9 °F (36.1 °C)  Pulse:  [] 83  Resp:  [9-42] 12  SpO2:  [85 %-100 %] 100 %  BP: ()/(28-73) 89/48  Arterial Line BP: ()/(41-57) 108/48     Date 09/15/23 0700 - 09/16/23 0659   Shift 7019-9733 9810-7008 6698-1211 24 Hour Total   INTAKE   I.V.(mL/kg) 84.4(0.8)   84.4(0.8)   Shift Total(mL/kg) 84.4(0.8)   84.4(0.8)   OUTPUT   Shift Total(mL/kg)       Weight  (kg) 110 110 110 110        Physical Exam  Constitutional:       General: She is not in acute distress.     Appearance: She is ill-appearing.   HENT:      Head: Normocephalic and atraumatic.   Cardiovascular:      Rate and Rhythm: Normal rate.      Comments: 2+ L radial pulse  B/l biphasic dp/pt signals  Pulmonary:      Effort: Pulmonary effort is normal.   Abdominal:      Palpations: Abdomen is soft.   Musculoskeletal:         General: Normal range of motion.   Skin:     General: Skin is warm and dry.      Comments: L foot s/p debridement with podiatry. Wrapped in ace  B/l groin incisions covered by prevenas  L upper chest incision covered with gauze and tegaderm. ELLE drain in place          Significant Labs:  CBC:   Recent Labs   Lab 09/15/23  0445   WBC 24.94*   RBC 2.73*   HGB 8.6*   HCT 26.3*      MCV 96   MCH 31.5*   MCHC 32.7       CMP:   Recent Labs   Lab 09/15/23  0312   *  263*   CALCIUM 7.4*  7.4*   ALBUMIN 2.0*  2.0*   PROT 4.8*   *  135*   K 3.8  3.8   CO2 17*  17*     101   BUN 31*  31*   CREATININE 3.8*  3.8*   ALKPHOS 76   ALT 46*   AST 57*   BILITOT 0.3         Significant Diagnostics:  I have reviewed all pertinent imaging results/findings within the past 24 hours.

## 2023-09-15 NOTE — CARE UPDATE
SICU Staff Addendum  Please see resident note from 9/15 for full details of care plan. I have reviewed and concur with the MARK/resident's history, physical, assessment, and plan.  I have personally interviewed and examined the patient at bedside.  See below for any additional findings.    Reason for admission:  Open wound of left foot  Present on Admission:   Open wound of left foot   Hyperlipidemia   Peripheral vascular disease   Essential hypertension   ESRD (end stage renal disease)   Type II diabetes mellitus   Severe obesity (BMI >= 40)      Goals for Today:   - POD 1 s/p left ax to bifemoral artery bypass   - sledd overnight, nephrology following   - giving 1 unit of PRBC, briefly had improvement in pressor requirements   - somnolent this morning, no reports of pain. Tylenol q8. Dilauded prn. Will hold gabapentin today   - 6L nasal cannula overnight weaned down to 3   - systolic goal 100-160  - TTE   - lactate 3.4; will get lactate q6. On vanc and zosyn. Discern from primary if they think they have source control. If they think they have source control then will place end date on abx, otherwise if they dont think so and it seems like she will require long term antibiotic therapy will consider getting ID's imput.   -  If pt remains in 2 pressor shock will add on hydrocort 100mg q8h.   - dopplerable tibials bilaterally   - PT /OT when appropriate   - NPO given emesis overnight and somnolence. Will do bedside and if fails then SLP     45  minutes of critical care time was spent personally by me on the following activities: development of treatment plan with patient or surrogate and bedside caregivers, discussions with consultants, evaluation of patient's response to treatment, examination of patient, ordering and performing treatments and interventions, ordering and review of laboratory studies, ordering and review of radiographic studies, pulse oximetry, re-evaluation of patient's condition.  This critical care  time did not overlap with that of any other provider or involve time for any procedures.    Ita Carrera MD  Anesthesia Critical Care  [facility] [phone]

## 2023-09-15 NOTE — PROGRESS NOTES
2230- Pt taken off unit for transfer from PACU to SICU  Bed #03023.  All safety measures maintained. VS stable.

## 2023-09-15 NOTE — NURSING
Pt dialysis clotted off. Cristy WONG and Jeet MCINTOSH Rn at bedside to help re-infuse. MD notified. Dialysis RN notified. Care ongoing

## 2023-09-15 NOTE — PROGRESS NOTES
Pt having high tmp and large clots in venous chamber. Blood returned. Pt ran for 5 hours before clotting and previous tx only lasted 2 hours before clotting. See flow sheet.

## 2023-09-15 NOTE — PT/OT/SLP PROGRESS
Occupational Therapy      Patient Name:  Georgina Arias   MRN:  [id number]    Patient not seen today secondary to not medically appropriate as pt on CRRT with pressors on board and not following commands  . Will follow-up 9/17.    9/15/2023

## 2023-09-15 NOTE — ASSESSMENT & PLAN NOTE
Georgina Arias is a 52 y.o. female with ESRD, HTN, PAD, DM2, who is here today for non-healing L foot wound s/p L great toe amputation on 8/25/23. S/p axillary, bifem bypass 9/14/23.    - ASA/SQH  - Nephrology consulted for HD  - Podiatry following. S/p L foot debridement 9/14/23  - TTE LVEF 50-55%  - Continue IV abx  - prn pain meds, nausea meds  - float heels  - Keep NPO  - Maintain bed rest  - Trend labs  - q1h neurovascular checks  - Wean pressors as able

## 2023-09-15 NOTE — ASSESSMENT & PLAN NOTE
Outpatient HD Information:  -Dialysis modality: Hemodialysis  -Outpatient HD unit: [facility] (Valerio Bautista MD)  -HD TX days: Tuesday/Thursday/Saturday, duration of treatment: 3-4 hrs  -Last HD TX prior to hospital admission: 09/09/2023  -Dialysis access: RUE AVF   -Residual urine: + RRF  -EDW: 109 kg    Plan/Recommendations:    -Plan for 10 hour SLED, then 14 hour SCUF, will increase BFR to help with clotting, if patient continues to clot, plan for pre filter heparin   -hold phos binders while on SLED   - renal diet/tube feeds when not NPO   - strict I/O's and daily weights  - daily renal function panels and magnesium levels  - renally all dose medications to eGFR  - avoid gadolinium, fleets, phos-based laxatives, NSAIDs, etc.

## 2023-09-15 NOTE — CARE UPDATE
0030: Called by SICU team managing patient with aid in a brachial A-line secondary to failure of patient's left radial A-line. Patient with RUE AVF and POD #0 from left axilla to bi-femoral bypass. Given her vascular history, I think a brachial A-line has more risks than benefits in this patient. Recommended attempting to salvage the left radial by placing a new catheter over a guidewire. If no success, recommend using BP cuff until primary vascular team can evaluate.    Please call anesthesia with any further questions.    Manju Roth MD  Anesthesia PGY4, CA3

## 2023-09-15 NOTE — ASSESSMENT & PLAN NOTE
BG goal 140 - 180     Continue IV insulin infusion protocol  Requires intensive BG monitoring while on protocol (q hourly)    ** Please call Endocrine for any BG related issues **    ** Please notify Endocrine for any change and/or advance in diet**    Discharge planning: ALEJO

## 2023-09-15 NOTE — PROGRESS NOTES
[facility] - Surgical Intensive Care  Nephrology  Progress Note    Patient Name: Georgina Arias  MRN: [id number]  Admission Date: 9/11/2023  Hospital Length of Stay: 4 days  Attending Provider: Maxx Maya MD   Primary Care Physician: Alonso Ling MD  Principal Problem:S/P femoral-femoral bypass surgery    Subjective:     Interval History: S/p femoral bypass. Transferred to ICU overnight following OR. Initiated SLED for hyperkalemia and acidosis. Clotted x 2 overnight. Requiring levo .02 and vaso .04. On 3L NC.     Review of patient's allergies indicates:   Allergen Reactions    Naproxen Anaphylaxis and Swelling     Hives (skin)^swelling  Hives (skin)^swelling  Hives (skin)^swelling    Sulfamethoxazole-trimethoprim Other (See Comments)     Caused JEROME    Hydrocodone Nausea And Vomiting and Rash    Penicillins Hives     Blisters (skin)^    Adhesive Rash    Hydrocodone-acetaminophen Nausea And Vomiting     Rash (skin)^, Vomiting^       Current Facility-Administered Medications   Medication Frequency    0.9%  NaCl infusion (CRRT USE ONLY) Continuous    0.9%  NaCl infusion (for blood administration) Q24H PRN    0.9%  NaCl infusion Once    acetaminophen tablet 1,000 mg Q8H    aspirin EC tablet 325 mg Daily    atorvastatin tablet 80 mg Daily    dextrose 10% bolus 125 mL 125 mL PRN    dextrose 10% bolus 250 mL 250 mL PRN    diphenhydrAMINE capsule 25 mg Q6H PRN    glucagon (human recombinant) injection 1 mg PRN    glucose chewable tablet 16 g PRN    glucose chewable tablet 24 g PRN    heparin (porcine) injection 3,000 Units PRN    heparin (porcine) injection 5,000 Units Q8H    hydrALAZINE injection 10 mg Q6H PRN    hydrocortisone sodium succinate injection 100 mg Q8H    insulin regular in 0.9 % NaCl 100 unit/100 mL (1 unit/mL) infusion Continuous    labetalol 20 mg/4 mL (5 mg/mL) IV syring Q6H PRN    LIDOcaine (PF) 10 mg/ml (1%) injection 10 mg Once PRN    magnesium sulfate 2g in water  50mL IVPB (premix) PRN    mupirocin 2 % ointment BID    NORepinephrine 4 mg in dextrose 5% 250 mL infusion (premix) Continuous    ondansetron injection 4 mg Q4H PRN    oxyCODONE immediate release tablet 5 mg Q4H PRN    piperacillin-tazobactam (ZOSYN) 4.5 g in dextrose 5 % in water (D5W) 100 mL IVPB (MB+) Q8H    sodium chloride 0.9% bolus 250 mL 250 mL PRN    sodium chloride 0.9% flush 10 mL PRN    sodium phosphate 20.01 mmol in dextrose 5 % (D5W) 250 mL IVPB PRN    sodium phosphate 30 mmol in dextrose 5 % (D5W) 250 mL IVPB PRN    sodium phosphate 39.99 mmol in dextrose 5 % (D5W) 250 mL IVPB PRN    vancomycin (VANCOCIN) 1,000 mg in dextrose 5 % (D5W) 250 mL IVPB (Vial-Mate) Once    vancomycin - pharmacy to dose pharmacy to manage frequency    vasopressin (PITRESSIN) 0.2 Units/mL in dextrose 5 % (D5W) 100 mL infusion Continuous       Objective:     Vital Signs (Most Recent):  Temp: 97 °F (36.1 °C) (09/15/23 1100)  Pulse: 78 (09/15/23 1200)  Resp: 11 (09/15/23 1200)  BP: (!) 111/55 (09/15/23 1245)  SpO2: 100 % (09/15/23 1200) Vital Signs (24h Range):  Temp:  [96.2 °F (35.7 °C)-97.2 °F (36.2 °C)] 97 °F (36.1 °C)  Pulse:  [] 78  Resp:  [6-42] 11  SpO2:  [85 %-100 %] 100 %  BP: ()/(28-73) 111/55  Arterial Line BP: ()/(41-57) 108/48     Weight: 109.8 kg (242 lb) (09/15/23 0845)  Body mass index is 37.9 kg/m².  Body surface area is 2.28 meters squared.    I/O last 3 completed shifts:  In: 5442.8 [I.V.:1007.7; Blood:350; IV Piggyback:4085]  Out: 999 [Urine:405; Drains:120; Other:474]     Physical Exam  Vitals and nursing note reviewed.   Constitutional:       General: She is not in acute distress.     Appearance: She is ill-appearing.   HENT:      Head: Normocephalic and atraumatic.   Cardiovascular:      Rate and Rhythm: Normal rate.      Comments: 2+ L radial pulse  B/l biphasic dp/pt signals  Pulmonary:      Effort: Pulmonary effort is normal.   Abdominal:      Palpations: Abdomen is soft.    Musculoskeletal:         General: Normal range of motion.   Skin:     General: Skin is warm and dry.      Comments: L foot s/p debridement with podiatry. Wrapped in ace  B/l groin incisions covered by prevenas  L upper chest incision covered with gauze and tegaderm. ELLE drain in place          Significant Labs:  CBC:   Recent Labs   Lab 09/15/23  0445   WBC 24.94*   RBC 2.73*   HGB 8.6*   HCT 26.3*      MCV 96   MCH 31.5*   MCHC 32.7     CMP:   Recent Labs   Lab 09/15/23  0312   *  263*   CALCIUM 7.4*  7.4*   ALBUMIN 2.0*  2.0*   PROT 4.8*   *  135*   K 3.8  3.8   CO2 17*  17*     101   BUN 31*  31*   CREATININE 3.8*  3.8*   ALKPHOS 76   ALT 46*   AST 57*   BILITOT 0.3     All labs within the past 24 hours have been reviewed.       Assessment/Plan:     Renal/  ESRD (end stage renal disease)  Outpatient HD Information:  -Dialysis modality: Hemodialysis  -Outpatient HD unit: [facility] (Valerio Bautista MD)  -HD TX days: Tuesday/Thursday/Saturday, duration of treatment: 3-4 hrs  -Last HD TX prior to hospital admission: 09/09/2023  -Dialysis access: RUE AVF   -Residual urine: + RRF  -EDW: 109 kg    Plan/Recommendations:    -Plan for 10 hour SLED, then 14 hour SCUF, will increase BFR to help with clotting, if patient continues to clot, plan for pre filter heparin   -hold phos binders while on SLED   - renal diet/tube feeds when not NPO   - strict I/O's and daily weights  - daily renal function panels and magnesium levels  - renally all dose medications to eGFR  - avoid gadolinium, fleets, phos-based laxatives, NSAIDs, etc.    Orthopedic  Open wound of left foot  - Vascular Surgery and Podiatry consulted and following  -  bypass on 9/14 per Vascular Surgery  - left great toe metatarsal head resection with washout/debridement per Podiatry at some point during this admission following bypass  - on broad spectrum antibiotics with vancomycin and Zosyn per primary team, foot  cultures in process         Thank you for your consult. I will follow-up with patient. Please contact us if you have any additional questions.    Kimberli Delacruz DNP  Nephrology  [facility] - Surgical Intensive Care

## 2023-09-15 NOTE — HPI
Reason for Consult: Management of T2DM, Hyperglycemia     Surgical Procedure and Date:  9/14/23  CREATION, BYPASS, ARTERIAL, AXILLARY TO BILATERAL FEMORAL (Left)  ANGIOGRAM, LOWER EXTREMITY with balloon angioplasty ultraverse 5mm x 60mm (Left)  STENT, SUPERFICIAL FEMORAL ARTERY (Left)  AMPUTATION, FOOT, TRANSMETATARSAL partial 1st ray amputation (Left)  INCISION AND DRAINAGE, FOOT (Left)    Lab Results   Component Value Date    HGBA1C 7.2 (H) 09/11/2023       Diabetes diagnosis year: 1993    Home Diabetes Medications:  Lantus 45 units q HS, Mounjaro 5 mg once weekly     How often checking glucose at home?  2x daily     BG readings on regimen:  mostly low to mid 100s  Hypoglycemia on the regimen?  No  Missed doses on regimen?  No    Diabetes Complications include:     Hyperglycemia, Diabetic nephropathy  , Diabetic chronic kidney disease     , and Amputation status    Complicating diabetes co morbidities:   HTN, HLD, Obesity, ESRD       HPI:   Patient is a 52 y.o. female with a diagnosis of PONV, ESRD on HD, T2DM on insulin and mounjaro (A1c 7.2), PVD s/p stents to RLE, DVT of R leg, HTN, HLD, PUD, obesity (BMI 38), and three recent left foot surgeries in August. Most recently uderwent amputation of the L hallux at the [facility] on 8/25/23 for reported gangrene and osteomyelitis. She is currently admitted to vascular surgery for non-healing left foot wound. Pt presents to the SICU s/p Left ax to bi-femoral artery bypass, stent to SFA, and Transmetatarsal amputation of the left foot and washout with Dr. Maya and Dr. Valles on 9/14/23. Will admit to SICU for further hemodynamic monitoring and q1h neurovascular checks. Endocrinology consulted for management of T2DM.

## 2023-09-15 NOTE — PROGRESS NOTES
09/15/23 0400 09/15/23 0530   Treatment   Treatment Type SLED SLED   Treatment Status Clotting;Blood lost Restart;Daily equipment check   Dialysis Machine Number  --  [id number]   Dialyzer Time (hours) 2.13 0   BVP (Liters) 18.4 L 0 L   Solutions Labeled and Current   --  Yes   Access  --  Right;IJ;Temporary Cath   Catheter Dressing Intact   --  Yes   Alarms Engaged  --  Yes   CRRT Comments ICU RN rinsed back pt; unable to rinseback all blood SLED restarted; daily check and maintenance performed

## 2023-09-15 NOTE — CONSULTS
[facility] - Surgical Intensive Care  Endocrinology  Diabetes Consult Note    Consult Requested by: Maxx Maya MD   Reason for admit: S/P femoral-femoral bypass surgery    HISTORY OF PRESENT ILLNESS:  Reason for Consult: Management of T2DM, Hyperglycemia     Surgical Procedure and Date:  9/14/23  CREATION, BYPASS, ARTERIAL, AXILLARY TO BILATERAL FEMORAL (Left)  ANGIOGRAM, LOWER EXTREMITY with balloon angioplasty ultraverse 5mm x 60mm (Left)  STENT, SUPERFICIAL FEMORAL ARTERY (Left)  AMPUTATION, FOOT, TRANSMETATARSAL partial 1st ray amputation (Left)  INCISION AND DRAINAGE, FOOT (Left)    Lab Results   Component Value Date    HGBA1C 7.2 (H) 09/11/2023       Diabetes diagnosis year: 1993    Home Diabetes Medications:  Lantus 45 units q HS, Mounjaro 5 mg once weekly     How often checking glucose at home?  2x daily     BG readings on regimen:  mostly low to mid 100s  Hypoglycemia on the regimen?  No  Missed doses on regimen?  No    Diabetes Complications include:     Hyperglycemia, Diabetic nephropathy  , Diabetic chronic kidney disease     , and Amputation status    Complicating diabetes co morbidities:   HTN, HLD, Obesity, ESRD       HPI:   Patient is a 52 y.o. female with a diagnosis of PONV, ESRD on HD, T2DM on insulin and mounjaro (A1c 7.2), PVD s/p stents to RLE, DVT of R leg, HTN, HLD, PUD, obesity (BMI 38), and three recent left foot surgeries in August. Most recently uderwent amputation of the L hallux at the [facility] on 8/25/23 for reported gangrene and osteomyelitis. She is currently admitted to vascular surgery for non-healing left foot wound. Pt presents to the SICU s/p Left ax to bi-femoral artery bypass, stent to SFA, and Transmetatarsal amputation of the left foot and washout with Dr. Maya and Dr. Valles on 9/14/23. Will admit to SICU for further hemodynamic monitoring and q1h neurovascular checks. Endocrinology consulted for management of T2DM.            Interval HPI:   Overnight events: POD 1. Remains  "in SICU. BG at or above goal ranges on IV intensive insulin protocol with infusion rates ranging from 1.5-4 u/hr. Receiving hydrocortisone 100 mg q 8 hrs. Diet NPO    Eating:   NPO  Nausea: No  Hypoglycemia and intervention: No  Fever: No  TPN and/or TF: No  If yes, type of TF/TPN and rate: n/a    BP (!) 89/48   Pulse 80   Temp 96.9 °F (36.1 °C) (Axillary)   Resp 12   Ht 5' 7" (1.702 m)   Wt 109.8 kg (242 lb)   LMP 09/11/2020 (Approximate)   SpO2 100%   Breastfeeding No   BMI 37.90 kg/m²     Labs Reviewed and Include    Recent Labs   Lab 09/15/23  0312   *  263*   CALCIUM 7.4*  7.4*   ALBUMIN 2.0*  2.0*   PROT 4.8*   *  135*   K 3.8  3.8   CO2 17*  17*     101   BUN 31*  31*   CREATININE 3.8*  3.8*   ALKPHOS 76   ALT 46*   AST 57*   BILITOT 0.3     Lab Results   Component Value Date    WBC 24.94 (H) 09/15/2023    HGB 8.6 (L) 09/15/2023    HCT 26.3 (L) 09/15/2023    MCV 96 09/15/2023     09/15/2023     No results for input(s): "TSH", "FREET4" in the last 168 hours.  Lab Results   Component Value Date    HGBA1C 7.2 (H) 09/11/2023       Nutritional status:   Body mass index is 37.9 kg/m².  Lab Results   Component Value Date    ALBUMIN 2.0 (L) 09/15/2023    ALBUMIN 2.0 (L) 09/15/2023    ALBUMIN 1.9 (L) 09/14/2023     No results found for: "PREALBUMIN"    Estimated Creatinine Clearance: 22.1 mL/min (A) (based on SCr of 3.8 mg/dL (H)).    Accu-Checks  Recent Labs     09/14/23  2238 09/14/23  2354 09/15/23  0056 09/15/23  0234 09/15/23  0304 09/15/23  0432 09/15/23  0510 09/15/23  0553 09/15/23  0734 09/15/23  0904   POCTGLUCOSE 380* 331* 374* 298* 281* 292* 287* 243* 190* 191*       Current Medications and/or Treatments Impacting Glycemic Control  Immunotherapy:    Immunosuppressants       None          Steroids:   Hormones (From admission, onward)      Start     Stop Route Frequency Ordered    09/15/23 0826  hydrocortisone sodium succinate injection 100 mg         -- IV Every 8 " "hours 09/15/23 0835    09/15/23 0045  vasopressin (PITRESSIN) 0.2 Units/mL in dextrose 5 % (D5W) 100 mL infusion         -- IV Continuous 09/14/23 2338          Pressors:    Autonomic Drugs (From admission, onward)      Start     Stop Route Frequency Ordered    09/14/23 2130  NORepinephrine 4 mg in dextrose 5% 250 mL infusion (premix)        Question Answer Comment   Begin at (in mcg/kg/min): 0.02    Titrate by: (in mcg/kg/min) 0.02    Titrate interval: (in minutes) 5    Titrate to maintain: (MAP or SBP) SBP    Greater than: (in mmHg) 100    Maximum dose: (in mcg/kg/min) 3        -- IV Continuous 09/14/23 2026          Hyperglycemia/Diabetes Medications:   Antihyperglycemics (From admission, onward)      Start     Stop Route Frequency Ordered    09/15/23 0045  insulin regular in 0.9 % NaCl 100 unit/100 mL (1 unit/mL) infusion        Question:  Enter initial dose from Infusion Protocol Chart (Units/hr):  Answer:  3    -- IV Continuous 09/14/23 2338            Labs Reviewed and Include   Recent Labs   Lab 09/15/23  0312   *  263*   CALCIUM 7.4*  7.4*   ALBUMIN 2.0*  2.0*   PROT 4.8*   *  135*   K 3.8  3.8   CO2 17*  17*     101   BUN 31*  31*   CREATININE 3.8*  3.8*   ALKPHOS 76   ALT 46*   AST 57*   BILITOT 0.3     Lab Results   Component Value Date    WBC 24.94 (H) 09/15/2023    HGB 8.6 (L) 09/15/2023    HCT 26.3 (L) 09/15/2023    MCV 96 09/15/2023     09/15/2023     No results for input(s): "TSH", "FREET4" in the last 168 hours.  Lab Results   Component Value Date    HGBA1C 7.2 (H) 09/11/2023       Nutritional status:   Body mass index is 37.9 kg/m².  Lab Results   Component Value Date    ALBUMIN 2.0 (L) 09/15/2023    ALBUMIN 2.0 (L) 09/15/2023    ALBUMIN 1.9 (L) 09/14/2023     No results found for: "PREALBUMIN"    Estimated Creatinine Clearance: 22.1 mL/min (A) (based on SCr of 3.8 mg/dL (H)).    Accu-Checks  Recent Labs     09/14/23  2354 09/15/23  0056 09/15/23  0234 " 09/15/23  0304 09/15/23  0432 09/15/23  0510 09/15/23  0553 09/15/23  0734 09/15/23  0904 09/15/23  1001   POCTGLUCOSE 331* 374* 298* 281* 292* 287* 243* 190* 191* 191*        ASSESSMENT and PLAN    Cardiac/Vascular  * S/P femoral-femoral bypass surgery  Managed per primary team  Optimize BG control        Hyperlipidemia  May increase insulin resistance.         Endocrine  Severe obesity (BMI >= 40)  Body mass index is 37.9 kg/m².  May increase insulin resistance.         Type II diabetes mellitus  BG goal 140 - 180     Continue IV insulin infusion protocol  Requires intensive BG monitoring while on protocol (q hourly)    ** Please call Endocrine for any BG related issues **    ** Please notify Endocrine for any change and/or advance in diet**    Discharge planning: TBD            Plan discussed with patient at bedside.     Nikole Webster NP  Endocrinology  [facility] - Surgical Intensive Care

## 2023-09-15 NOTE — PROGRESS NOTES
09/15/23 0145   Treatment   Treatment Type SLED   Treatment Status New start   Dialysis Machine Number [id number]   Dialyzer Time (hours) 0   BVP (Liters) 0 L   Solutions Labeled and Current  Yes   Access Right;IJ;Temporary Cath   Catheter Dressing Intact  Yes   Alarms Engaged Yes   CRRT Comments CRRT started per MD orders   $ CRRT Charges   $ CRRT Charges Initial Setup   Prescription   Time (Hours) Continuous   Dialysate K + (mEq/L) 3   Dialysate CA + (mEq/L) 2.25   Dialysate HCO3 - (Bicarb) (mEq/L) Other (Comment)  (33)   Dialysate Na + (mEq/L) 140   Cartridge Type Other  (r300)   Dialysate Flow Rate (mL/min) 200   UF Goal Rate 350 mL/hr   CRRT Hourly Documentation   Blood Flow (mL/min) 150   UF Rate 200 cc/hr   Arterial Pressure (mmHg) -100 mmHg   Venous Pressure (mmHg) 60 mmHg   Effluent Pressure (EP) (mmHg) 20 mmHg   Balance Chamber  0   Total UF (Hourly Cleared) (mL) 0

## 2023-09-15 NOTE — H&P
[facility]  Critical Care - Surgery  History & Physical    Patient Name: Georgina Arias  MRN: [id number]  Admission Date: 9/11/2023  Code Status: Full Code  Attending Physician: Maxx Maya MD   Primary Care Provider: Alonso Ling MD   Principal Problem: Open wound of left foot    Subjective:     HPI:  Ms. Arias is a 51 yo female with a past medical hx of PONV, ESRD on HD, T2DM on insulin and mounjaro (A1c 7.2), PVD s/p stents to RLE, DVT of R leg, HTN, HLD, PUD, obesity (BMI 38), and three recent left foot surgeries in August. Most recently uderwent amputation of the L hallux at the [facility] on 8/25/23 for reported gangrene and osteomyelitis. She is currently admitted to vascular surgery for non-healing left foot wound. Pt presents to the SICU s/p Left ax to bi-femoral artery bypass, stent to SFA, and Transmetatarsal amputation of the left foot and washout with Dr. Maya and Dr. Valles on 9/14/23. Will admit to SICU for further hemodynamic monitoring and q1h neurovascular checks. Got 1L bolus in PACU with only minimal improvement in BP. She arrives to the SICU on 0.06 levo.      Hospital/ICU Course:  No notes on file    Follow-up For: Procedure(s) (LRB):  CREATION, BYPASS, ARTERIAL, AXILLARY TO BILATERAL FEMORAL (Left)  ANGIOGRAM, LOWER EXTREMITY with balloon angioplasty ultraverse 5mm x 60mm (Left)  STENT, SUPERFICIAL FEMORAL ARTERY (Left)  AMPUTATION, FOOT, TRANSMETATARSAL partial 1st ray amputation (Left)  INCISION AND DRAINAGE, FOOT (Left)    Post-Operative Day: Day of Surgery     Past Medical History:   Diagnosis Date    Hyperlipidemia     Hypertension     Peptic ulcer disease     Peripheral artery disease     PONV (postoperative nausea and vomiting)     Stage IV CKD     Type II diabetes mellitus        Past Surgical History:   Procedure Laterality Date    ANGIOGRAM, LOWER ARTERIAL, UNILATERAL Left 9/13/2023    Procedure: ANGIOGRAM, LOWER ARTERIAL, UNILATERAL;   Surgeon: aMxx Maya MD;  Location: [facility] OR 2ND FLR;  Service: Vascular;  Laterality: Left;    AORTOGRAPHY WITH EXTREMITY RUNOFF Left 9/13/2023    Procedure: AORTOGRAM, WITH EXTREMITY RUNOFF;  Surgeon: Maxx Maya MD;  Location: [facility] OR 2ND FLR;  Service: Vascular;  Laterality: Left;  8.1 min  663.63 mGy  176.39 Gy.cm  178ml Dye     AV FISTULA PLACEMENT Right     CHOLECYSTECTOMY      CYSTOSCOPY W/ URETERAL STENT PLACEMENT Right 08/28/2018    Procedure: CYSTOSCOPY, WITH URETERAL STENT INSERTION (ADD ON );  Surgeon: Bubba Perez MD;  Location: [facility];  Service: Urology;  Laterality: Right;  (ADD ON )    DEBRIDEMENT OF FOOT Left 08/03/2023    Procedure: DEBRIDEMENT, FOOT;  Surgeon: lAeida Flannery DPM;  Location: [facility];  Service: Podiatry;  Laterality: Left;    Excisional debridement of ulcer distal great toe left foot w/ partial resection distal phalanx Left 08/03/2023    I&D L 1st ray w/ amputation L hallux IPJ Left 08/14/2023    Injection L PT tendon sheath Left 08/14/2023    Nail avulsion left hallux Left 08/03/2023    PERIPHERAL ARTERIAL STENT GRAFT Right     REMOVAL OF NAIL OF DIGIT Left 08/03/2023    Procedure: REMOVAL, NAIL, DIGIT great toe;  Surgeon: Aleida Flannery DPM;  Location: [facility];  Service: Podiatry;  Laterality: Left;    TOE AMPUTATION Left 08/14/2023    Procedure: AMPUTATION, TOE hallux IPJ;  Surgeon: Aleida Flannery DPM;  Location: [facility] OR;  Service: Podiatry;  Laterality: Left;    TOE AMPUTATION Left 08/25/2023    great toe    TOE AMPUTATION Left 8/25/2023    Procedure: AMPUTATION, TOE hallux, possible 1st met.head;  Surgeon: Aleida Flannery DPM;  Location: [facility] OR;  Service: Podiatry;  Laterality: Left;    URETEROSCOPIC REMOVAL OF URETERIC CALCULUS Right 09/11/2018    Procedure: EXTRACTION-STONE-URETEROSCOPY;  Surgeon: Bubba Perez MD;  Location: [facility];  Service: Urology;  Laterality: Right;       Review of patient's allergies indicates:   Allergen Reactions     Naproxen Anaphylaxis and Swelling     Hives (skin)^swelling  Hives (skin)^swelling  Hives (skin)^swelling    Sulfamethoxazole-trimethoprim Other (See Comments)     Caused JEROME    Hydrocodone Nausea And Vomiting and Rash    Penicillins Hives     Blisters (skin)^    Adhesive Rash    Hydrocodone-acetaminophen Nausea And Vomiting     Rash (skin)^, Vomiting^         Family History       Problem Relation (Age of Onset)    Cancer Maternal Grandmother    Diabetes Mother, Father    Heart disease Father (37), Maternal Grandmother    Hypertension Mother, Father, Brother          Tobacco Use    Smoking status: Former     Current packs/day: 0.00     Types: Cigarettes     Quit date: 2006     Years since quittin.1    Smokeless tobacco: Never   Substance and Sexual Activity    Alcohol use: Not Currently     Comment: occ    Drug use: No    Sexual activity: Not Currently      Review of Systems   All other systems reviewed and are negative.    Objective:     Vital Signs (Most Recent):  Temp: 97 °F (36.1 °C) (23)  Pulse: 98 (23)  Resp: 18 (23)  BP: (!) 63/28 (23)  SpO2: 95 % (23) Vital Signs (24h Range):  Temp:  [97 °F (36.1 °C)-97.9 °F (36.6 °C)] 97 °F (36.1 °C)  Pulse:  [71-99] 98  Resp:  [12-28] 18  SpO2:  [92 %-100 %] 95 %  BP: ()/(6-70) 63/28  Arterial Line BP: ()/(41-57) 100/48     Weight: 110 kg (242 lb 8.1 oz)  Body mass index is 37.98 kg/m².      Intake/Output Summary (Last 24 hours) at 2023  [place] filed at [address]  Gross per 24 hour   Intake 2062.48 ml   Output 435 ml   Net 1627.48 ml          Physical Exam  Constitutional:       General: She is not in acute distress.     Appearance: Normal appearance.   HENT:      Head: Normocephalic and atraumatic.      Mouth/Throat:      Mouth: Mucous membranes are moist.   Eyes:      Extraocular Movements: Extraocular movements intact.      Conjunctiva/sclera: Conjunctivae normal.    Cardiovascular:      Rate and Rhythm: Normal rate and regular rhythm.   Pulmonary:      Effort: Pulmonary effort is normal. No respiratory distress.   Abdominal:      General: There is no distension.      Palpations: Abdomen is soft.   Genitourinary:     Comments: Bilateral groins with Prevena wound vacs in place, soft with no evidence of hematoma  Musculoskeletal:      Comments: S/p left 1st metatarsal amp with dressing in place c/d/I  ELLE drain in place at left upper chest with incision c/d/I   Skin:     General: Skin is warm and dry.   Neurological:      General: No focal deficit present.      Mental Status: She is alert and oriented to person, place, and time. Mental status is at baseline.            Vents:       Lines/Drains/Airways       Central Venous Catheter Line  Duration             Percutaneous Central Line Insertion/Assessment - Triple Lumen  09/14/23 0832 Internal Jugular Right <1 day              Drain  Duration                  Closed/Suction Drain 09/14/23 1435 Tube - 1 Left;Superior Chest Bulb 15 Fr. <1 day         Urethral Catheter 09/14/23 0806 Non-latex 16 Fr. <1 day              Arterial Line  Duration             Arterial Line 09/14/23 0715 Left Radial <1 day              Peripheral Intravenous Line  Duration                  Hemodialysis AV Fistula  Right forearm -- days         Peripheral IV - Single Lumen 09/11/23 1915 20 G;1 3/4 in Left Forearm 3 days                    Significant Labs:    CBC/Anemia Profile:  Recent Labs   Lab 09/13/23 0442 09/14/23  0602 09/14/23  0748 09/14/23  1046 09/14/23  1210 09/14/23  1405   WBC 7.09 6.70  --   --   --   --    HGB 10.3* 9.8*  --   --   --   --    HCT 33.2* 30.1*   < > 25* 27* 27*    224  --   --   --   --    * 97  --   --   --   --    RDW 16.3* 16.4*  --   --   --   --     < > = values in this interval not displayed.        Chemistries:  Recent Labs   Lab 09/13/23  0442 09/14/23  0602    138   K 4.2 3.9   CL 99 99   CO2 25  24   BUN 35* 40*   CREATININE 4.0* 4.9*   CALCIUM 8.6* 9.0   ALBUMIN 2.4* 2.5*   PROT 6.6 6.5   BILITOT 0.4 0.3   ALKPHOS 98 109   ALT 19 37   AST 22 44*       ABGs:   Recent Labs   Lab 09/14/23  1405   PH 7.401   PCO2 34.5*   HCO3 21.5*   POCSATURATED 99   BE -3       Significant Imaging: I have reviewed all pertinent imaging results/findings within the past 24 hours.    Assessment/Plan:     Cardiac/Vascular  Essential hypertension  Hx of hypertension on home Nifedipine, will hold in immediate post operative setting     - -160  - PRN Cardene     Hyperlipidemia  Hx of Hyperlipidemia    - Continue Statin    Peripheral vascular disease    Neuro/Psych:     - Sedation: none    - Pain:    - Scheduled Tylenol 1g q8h   - Oxy PRN/Dilaudid PRN             Cardiac:     - S/P Ax fem bypass and stent to SFA with Dr. Maya on     - BP Goal: 100-160    - Pressors: levo 0.06, wean as able    - Rhythm: NSR    - Statin: Atorvastatin 40 mg QD      Pulmonary:     - Goal SpO2 >92%    - Supplemental O2- Wean as tolerated     - ABG PRN      Renal:    - Trend BUN/Cr     - Maintain Fraser, record strict Is/Os    - Nephrology consulted for dialysis needs given ESRD    Recent Labs   Lab 09/12/23 0304 09/13/23  0442 09/14/23  0602   BUN 53* 35* 40*   CREATININE 4.2* 4.0* 4.9*         FEN / GI:     - Daily CMP, PRN K/Mag/Phos per protocol     - Replace electrolytes as needed    - Nutrition: Diabetic diet     - Bowel Regimen: Miralax, docusate      ID:     - Afebrile    - WBC stable    - Abx: zosyn    Recent Labs   Lab 09/12/23  0304 09/13/23  0442 09/14/23  0602   WBC 9.41 7.09 6.70         Heme/Onc:     - Hgb 9.9 pre-operatively post:     - CBC daily    - ASA 81 mg daily    - Will resume plavix when appropriate    Recent Labs   Lab 09/12/23  0304 09/13/23  0442 09/14/23  0602   HGB 9.9* 10.3* 9.8*    187 224         Endocrine:     - CCM Goal BG <180    - HgbA1c: 7.2    - SSI    - POCT glucose 4xs daily    - Hypoglycemia  protocol in place       PPx:   Feeding: Diabetic Diet   Analgesia/Sedation: multimodal  Thromboembolic Prevention: held given concern for bleeding  HOB >30: Yes  Stress Ulcer: Pepcid   Glucose Control: Yes, insulin management CCM goal      Lines/Drains/Airway:   Left radial arterial line   RIJ trialysis   Fraser   Left chest ELLE            Dispo/Code Status/Palliative:     - Continue SICU Care    - Full Code      Renal/  ESRD (end stage renal disease)  Hx of ESRD due to diabetic nephropathy on iHD every Tuesday, Thursday & Saturday via RUE AVF, Bun/Creat 40/4.9 preop     -- Nephrology consulted  -- Strict I&O's and daily weights   -- Renally dose medications  -- Avoid nephrotoxic agents   -- Daily Renal panel and Mg  -- To received dialysis post procedure 9/14/23    Endocrine  Type II diabetes mellitus  Hx of type 2 DM. Last A1C 7.2. Maintained on Mounjaro and insulin.    -- Critical Care BG goal <180  -- SSI   -- Hypoglycemia Protocol     Orthopedic  * Open wound of left foot  Hx of non-healing L foot wound s/p L great toe amputation on 8/25/23. Now s/p Transmetatarsal amputation and washout of the left foot on 9/14/23    - q1h neurovascular checks   - continue Zosyn  - follow up on cx of biopsy from left foot     Mariposa Polanco MD  Critical Care - Surgery  [facility]

## 2023-09-15 NOTE — ASSESSMENT & PLAN NOTE
  Neuro/Psych:     - Sedation: None    - Pain:    - Scheduled Tylenol 1g q8h   - Oxy 5 PRN             Cardiac:     - S/P Ax fem bypass and stent to SFA with Dr. Maya on 9/14/23    - BP Goal: 100-160    - Pressors: levo 0.08, Vaso 0.04. Wean as tolerated     - Rhythm: NSR    - Statin: Atorvastatin 80 mg QD      Pulmonary:     - Goal SpO2 >92%    - Supplemental O2 - Wean as tolerated     - ABG PRN      Renal:    - Trend BUN/Cr     - Maintain Fraser, record strict Is/Os    - Nephrology consulted for dialysis needs given ESRD    Recent Labs   Lab 09/14/23  0602 09/14/23  2238 09/15/23  0312   BUN 40* 41* 31*  31*   CREATININE 4.9* 5.2* 3.8*  3.8*         FEN / GI:     - Daily CMP, PRN K/Mag/Phos per protocol     - Replace electrolytes as needed    - Nutrition: NPO     - Bowel Regimen: Miralax, docusate      ID:     - Afebrile    - WBC stable: 24.94 (24.17)     - Abx: Zosyn and vanc    Recent Labs   Lab 09/14/23  2238 09/15/23  0312 09/15/23  0445   WBC 24.11* 24.17* 24.94*         Heme/Onc:     - Hgb 9.9 pre-operatively: 8.6 today    - CBC daily    -  mg daily    - Will resume plavix when appropriate    Recent Labs   Lab 09/14/23  2238 09/15/23  0312 09/15/23  0445   HGB 7.4* 6.9* 8.6*    268 221   APTT 21.9  --   --    INR 1.0  --   --          Endocrine:     - CCM Goal BG <180    - HgbA1c: 7.2    - SSI    - POCT glucose 4xs daily    - Hypoglycemia protocol in place       PPx:   Feeding: NPO  Analgesia/Sedation: multimodal / None  Thromboembolic Prevention: Heparin subq  HOB >30: Yes  Stress Ulcer: Pepcid   Glucose Control: Yes, insulin management CCM goal      Lines/Drains/Airway:   Left radial arterial line   RIJ trialysis   Fraser   Left chest ELLE            Dispo/Code Status/Palliative:     - Continue SICU Care    - Full Code

## 2023-09-15 NOTE — PLAN OF CARE
[facility]  Discharge Reassessment    Primary Care Provider: Alonso Ling MD    Expected Discharge Date: 9/18/2023    Reassessment (most recent)       Discharge Reassessment - 09/15/23 0833          Discharge Reassessment    Assessment Type Discharge Planning Reassessment     Communicated INDIO with patient/caregiver Date not available/Unable to determine     Discharge Plan A Home     Discharge Plan B Home Health     DME Needed Upon Discharge  other (see comments)   TBD    Transition of Care Barriers None     Why the patient remains in the hospital Requires continued medical care                     Johanna Boone LMSW  Case Management [facility]

## 2023-09-15 NOTE — PROGRESS NOTES
Vascular team at bedside. Patient currently on 0.5 mcg/kg/min of Rashid. Orders to give 1 L NS bolus now.

## 2023-09-15 NOTE — PROGRESS NOTES
[facility]  Vascular Surgery  Progress Note    Patient Name: Georgina Arias  MRN: [id number]  Admission Date: 9/11/2023  Primary Care Provider: Alonso Ling MD    Subjective:     Interval History: Will keep NPO d/t episodes of emesis. Required increased pressors after initiating dialysis. Received 1u of pRBC this am for hgb 6.9 with appropriate increase to 8.6.     Post-Op Info:  Procedure(s) (LRB):  CREATION, BYPASS, ARTERIAL, AXILLARY TO BILATERAL FEMORAL (Left)  ANGIOGRAM, LOWER EXTREMITY with balloon angioplasty ultraverse 5mm x 60mm (Left)  STENT, SUPERFICIAL FEMORAL ARTERY (Left)  AMPUTATION, FOOT, TRANSMETATARSAL partial 1st ray amputation (Left)  INCISION AND DRAINAGE, FOOT (Left)   1 Day Post-Op       Medications:  Continuous Infusions:   sodium chloride 0.9% Stopped (09/15/23 0404)    insulin regular 1 units/mL infusion orderable (DKA) 1.5 Units/hr (09/15/23 0800)    NORepinephrine bitartrate-D5W 0.08 mcg/kg/min (09/15/23 0800)    vasopressin 0.04 Units/min (09/15/23 0800)     Scheduled Meds:   sodium chloride 0.9%   Intravenous Once    acetaminophen  1,000 mg Oral Q8H    aspirin  325 mg Oral Daily    atorvastatin  80 mg Oral Daily    heparin (porcine)  5,000 Units Subcutaneous Q8H    hydrocortisone sodium succinate  100 mg Intravenous Q8H    mupirocin   Nasal BID    piperacillin-tazobactam (Zosyn) IV (PEDS and ADULTS) (extended infusion is not appropriate)  4.5 g Intravenous Q8H     PRN Meds:0.9%  NaCl infusion (for blood administration), dextrose 10%, dextrose 10%, diphenhydrAMINE, glucagon (human recombinant), glucose, glucose, heparin (porcine), hydrALAZINE, HYDROmorphone, labetalol, LIDOcaine (PF) 10 mg/ml (1%), magnesium sulfate IVPB, ondansetron, oxyCODONE, oxyCODONE, sodium chloride 0.9%, sodium chloride 0.9%, sodium phosphate 20.01 mmol in dextrose 5 % (D5W) 250 mL IVPB, sodium phosphate 30 mmol in dextrose 5 % (D5W) 250 mL IVPB, sodium phosphate 39.99 mmol in  dextrose 5 % (D5W) 250 mL IVPB, Pharmacy to dose Vancomycin consult **AND** vancomycin - pharmacy to dose     Objective:     Vital Signs (Most Recent):  Temp: 96.9 °F (36.1 °C) (09/15/23 0700)  Pulse: 83 (09/15/23 0800)  Resp: 12 (09/15/23 0800)  BP: (!) 89/48 (09/15/23 0800)  SpO2: 100 % (09/15/23 0800) Vital Signs (24h Range):  Temp:  [96.2 °F (35.7 °C)-97.2 °F (36.2 °C)] 96.9 °F (36.1 °C)  Pulse:  [] 83  Resp:  [9-42] 12  SpO2:  [85 %-100 %] 100 %  BP: ()/(28-73) 89/48  Arterial Line BP: ()/(41-57) 108/48     Date 09/15/23 0700 - 09/16/23 0659   Shift 6578-1773 2363-3688 2640-6584 24 Hour Total   INTAKE   I.V.(mL/kg) 84.4(0.8)   84.4(0.8)   Shift Total(mL/kg) 84.4(0.8)   84.4(0.8)   OUTPUT   Shift Total(mL/kg)       Weight (kg) 110 110 110 110       Physical Exam  Constitutional:       General: She is not in acute distress.     Appearance: She is ill-appearing.   HENT:      Head: Normocephalic and atraumatic.   Cardiovascular:      Rate and Rhythm: Normal rate.      Comments: 2+ L radial pulse  B/l biphasic dp/pt signals  Pulmonary:      Effort: Pulmonary effort is normal.   Abdominal:      Palpations: Abdomen is soft.   Musculoskeletal:         General: Normal range of motion.   Skin:     General: Skin is warm and dry.      Comments: L foot s/p debridement with podiatry. Wrapped in ace  B/l groin incisions covered by prevenas  L upper chest incision covered with gauze and tegaderm. ELLE drain in place          Significant Labs:  CBC:   Recent Labs   Lab 09/15/23  0445   WBC 24.94*   RBC 2.73*   HGB 8.6*   HCT 26.3*      MCV 96   MCH 31.5*   MCHC 32.7       CMP:   Recent Labs   Lab 09/15/23  0312   *  263*   CALCIUM 7.4*  7.4*   ALBUMIN 2.0*  2.0*   PROT 4.8*   *  135*   K 3.8  3.8   CO2 17*  17*     101   BUN 31*  31*   CREATININE 3.8*  3.8*   ALKPHOS 76   ALT 46*   AST 57*   BILITOT 0.3         Significant Diagnostics:  I have reviewed all pertinent imaging  results/findings within the past 24 hours.    Assessment/Plan:     Open wound of left foot  Georgina Arias is a 52 y.o. female with ESRD, HTN, PAD, DM2, who is here today for non-healing L foot wound s/p L great toe amputation on 8/25/23. S/p axillary, bifem bypass 9/14/23.    - ASA/SQH  - Nephrology consulted for HD  - Podiatry following. S/p L foot debridement 9/14/23  - TTE LVEF 50-55%  - Continue to monitor BP through cuff. Do not need to replace a-line  - Relax SBP goals to 90  - Wean pressors as able  - Continue IV abx  - prn pain meds, nausea meds  - float heels  - Keep NPO  - Maintain bed rest  - Trend labs  - q1h neurovascular checks      Marta Schultz MD  Vascular Surgery  [facility]

## 2023-09-15 NOTE — ASSESSMENT & PLAN NOTE
- Vascular Surgery and Podiatry consulted and following  -  bypass on 9/14 per Vascular Surgery  - left great toe metatarsal head resection with washout/debridement per Podiatry at some point during this admission following bypass  - on broad spectrum antibiotics with vancomycin and Zosyn per primary team, foot cultures in process

## 2023-09-15 NOTE — PROGRESS NOTES
R leg BP cuff reading 70s/40s-50. A line reading 90s/50s-40s. Giselle re zeroed, leveled and has a good waveform. Unable to take BP on upper extremities per vascular team. Dr. Ирина jimenez/ vascular notified. Md stated to titrate pressors using a-line as long has the waveform is appropriate.

## 2023-09-15 NOTE — OP NOTE
[facility] - [facility] Int[facility]  Operative Note      Date of Procedure: 9/14/2023     Procedure: Procedure(s) (LRB):  CREATION, BYPASS, ARTERIAL, AXILLARY TO BILATERAL FEMORAL (Left)  ANGIOGRAM, LOWER EXTREMITY with balloon angioplasty ultraverse 5mm x 60mm (Left)  STENT, SUPERFICIAL FEMORAL ARTERY (Left)  AMPUTATION, FOOT, TRANSMETATARSAL partial 1st ray amputation (Left)  INCISION AND DRAINAGE, FOOT (Left)   Skin rearrangement/flap advancement left foot 10 sq cm  Surgeon(s) and Role:  Panel 1:     * Maxx Maya MD - Primary     * Marta Schultz MD - Resident - Assisting     * Adrian Canada MD - Fellow     * Ray Loyd MD - Fellow  Panel 2:     * Jesus Valles DPM - Primary     * Ewa Kimball DPM - Resident - 1 Assisting        Anderson Herman DPM - resident- 2 assisting         Pre-Operative Diagnosis: Non-healing wound of left lower extremity [S81.802A]    Post-Operative Diagnosis: Post-Op Diagnosis Codes:     * Non-healing wound of left lower extremity [S81.802A]    Anesthesia: General      Description of Technical Procedures:   This was a joint procedure with the vascular surgery team, podiatry performed a left partial ray amputation after bypass was coompleted. The left lower limb was prepped and draped in a sterile manner.     Attention was directed to the left 1st ray where an ellipse incision was made at a level of the metatarsal such that this incision allowed adequate coverage of the remaining metatarsal bone for closure while still allowing for resection of the infected bone using a #10 blade, forceps, and towel clamp. This incision was deepened to the level of the bone and utilizing sharp dissection, the distal aspect of the ray was removed after a sagittal saw was used to perform the osteotomy of the bone. It was clear after resection of the bone that the remaining bone left intact was viable and appeared to have no signs of osteomyelitis or other infection. Utilizing a  rongeur, #10 blade, and forceps the sesamoid complex and all devitalized soft tissue was removed from the area. a rongeur was used to smooth the remaining surfaces so as to leave no sharp edges at the end of the bone. The infected bone that had been amputated was sent for culture and pathology. Hemostasis was achieved with electrocautery via Bovie tip. The wound was pulse lavaged with 1L of saline solution. Outer gloves were then removed.  A proximal margin was taken from the remaining left 1st metatarsal using sterile Rongeurs.   Skin rearrangement/flap advancement performed to allow appropriate closure of wound/10 sq cm.  The surgical site was closed using 3-0 Vicryl and staples.  The surgical site was dressed with soaked in Betadine followed by 4 x 4 gauze ABD pad cast padding and Ace wraps.      Estimated Blood Loss (EBL): 200cc           Implants:   Implant Name Type Inv. Item Serial No.  Lot No. LRB No. Used Action   GRAFT VASC AXLLBFMRL [id number] - [id number]  GRAFT VASC AXLLBFMRL 8X8MM [id number] [employer]  Bilateral 1 Implanted   STENT LIFESTENT [id number] 80CM - [id number] stent peripheral bms - self exp STENT LIFESTENT [id number] 80CM  [employer] [id number] Left 1 Implanted       Specimens:   Specimen (24h ago, onward)       Start     Ordered    09/14/23 1653  Specimen to Pathology, Surgery Other (podiatry)  Once        Comments: Pre-op Diagnosis: Non-healing wound of left lower extremity [S81.802A]Procedure(s):CREATION, BYPASS, ARTERIAL, AXILLARY TO BILATERAL FEMORALANGIOGRAM, LOWER EXTREMITY with balloon angioplasty ultraverse 5mm x 60mmSTENT, SUPERFICIAL FEMORAL ARTERYAMPUTATION, FOOT, TRANSMETATARSALINCISION AND DRAINAGE, FOOT Number of specimens: 2Name of specimens: 1. Left 1st metatarsal head- permanent2. Left 1st metatarsal clean margins-perm     References:    Click here for ordering Quick Tip   Question Answer Comment   Procedure Type: Other podiatry   Specimen Class: Routine/Screening     Which provider would you like to cc? CATHERINE HUNT    Release to patient Immediate        09/14/23 1653    09/14/23 1635  Specimen to Pathology, Surgery Other (podiatry)  Once        Comments: Pre-op Diagnosis: Non-healing wound of left lower extremity [S81.802A]Procedure(s):CREATION, BYPASS, ARTERIAL, AXILLARY TO BILATERAL FEMORALANGIOGRAM, LOWER EXTREMITY with balloon angioplasty ultraverse 5mm x 60mmSTENT, SUPERFICIAL FEMORAL ARTERYAMPUTATION, FOOT, TRANSMETATARSALINCISION AND DRAINAGE, FOOT Number of specimens: 1Name of specimens: 1. Left 1st metatarsal head-perm     References:    Click here for ordering Quick Tip   Question Answer Comment   Procedure Type: Other podiatry   Specimen Class: Routine/Screening    Which provider would you like to cc? CATHERINE HUNT    Release to patient Immediate        09/14/23 1635                            Condition: Good    Disposition: PACU - hemodynamically stable.    Attestation: I was present and scrubbed for the entire procedure.      I have reviewed and concur with the resident's history, physical, assessment, and plan.  I have personally interviewed and examined the patient at bedside.  See below addendum for my evaluation and additional findings.

## 2023-09-15 NOTE — NURSING
Pt requiring increase pressor support after dialysis start. MD notified. Vaso started. Around 0230, pt became extremely restless and fidgety. Pt was readjusted in the bed when she began to vomit. Charge nurse, float nurse, and MD called to bedside. Orders received, care ongoing.

## 2023-09-15 NOTE — SUBJECTIVE & OBJECTIVE
"Interval HPI:   Overnight events: POD 1. Remains in SICU. BG at or above goal ranges on IV intensive insulin protocol with infusion rates ranging from 1.5-4 u/hr. Receiving hydrocortisone 100 mg q 8 hrs. Diet NPO    Eating:   NPO  Nausea: No  Hypoglycemia and intervention: No  Fever: No  TPN and/or TF: No  If yes, type of TF/TPN and rate: n/a    BP (!) 89/48   Pulse 80   Temp 96.9 °F (36.1 °C) (Axillary)   Resp 12   Ht 5' 7" (1.702 m)   Wt 109.8 kg (242 lb)   LMP 09/11/2020 (Approximate)   SpO2 100%   Breastfeeding No   BMI 37.90 kg/m²     Labs Reviewed and Include    Recent Labs   Lab 09/15/23  0312   *  263*   CALCIUM 7.4*  7.4*   ALBUMIN 2.0*  2.0*   PROT 4.8*   *  135*   K 3.8  3.8   CO2 17*  17*     101   BUN 31*  31*   CREATININE 3.8*  3.8*   ALKPHOS 76   ALT 46*   AST 57*   BILITOT 0.3     Lab Results   Component Value Date    WBC 24.94 (H) 09/15/2023    HGB 8.6 (L) 09/15/2023    HCT 26.3 (L) 09/15/2023    MCV 96 09/15/2023     09/15/2023     No results for input(s): "TSH", "FREET4" in the last 168 hours.  Lab Results   Component Value Date    HGBA1C 7.2 (H) 09/11/2023       Nutritional status:   Body mass index is 37.9 kg/m².  Lab Results   Component Value Date    ALBUMIN 2.0 (L) 09/15/2023    ALBUMIN 2.0 (L) 09/15/2023    ALBUMIN 1.9 (L) 09/14/2023     No results found for: "PREALBUMIN"    Estimated Creatinine Clearance: 22.1 mL/min (A) (based on SCr of 3.8 mg/dL (H)).    Accu-Checks  Recent Labs     09/14/23  2238 09/14/23  2354 09/15/23  0056 09/15/23  0234 09/15/23  0304 09/15/23  0432 09/15/23  0510 09/15/23  0553 09/15/23  0734 09/15/23  0904   POCTGLUCOSE 380* 331* 374* 298* 281* 292* 287* 243* 190* 191*       Current Medications and/or Treatments Impacting Glycemic Control  Immunotherapy:    Immunosuppressants       None          Steroids:   Hormones (From admission, onward)      Start     Stop Route Frequency Ordered    09/15/23 0845  hydrocortisone sodium " succinate injection 100 mg         -- IV Every 8 hours 09/15/23 0835    09/15/23 0045  vasopressin (PITRESSIN) 0.2 Units/mL in dextrose 5 % (D5W) 100 mL infusion         -- IV Continuous 09/14/23 2338          Pressors:    Autonomic Drugs (From admission, onward)      Start     Stop Route Frequency Ordered    09/14/23 2130  NORepinephrine 4 mg in dextrose 5% 250 mL infusion (premix)        Question Answer Comment   Begin at (in mcg/kg/min): 0.02    Titrate by: (in mcg/kg/min) 0.02    Titrate interval: (in minutes) 5    Titrate to maintain: (MAP or SBP) SBP    Greater than: (in mmHg) 100    Maximum dose: (in mcg/kg/min) 3        -- IV Continuous 09/14/23 2026          Hyperglycemia/Diabetes Medications:   Antihyperglycemics (From admission, onward)      Start     Stop Route Frequency Ordered    09/15/23 0045  insulin regular in 0.9 % NaCl 100 unit/100 mL (1 unit/mL) infusion        Question:  Enter initial dose from Infusion Protocol Chart (Units/hr):  Answer:  3    -- IV Continuous 09/14/23 6359           Health Care Proxy (HCP)

## 2023-09-15 NOTE — PROGRESS NOTES
[facility] - [facility] Intensive Care  Critical Care - Surgery  Progress Note    Patient Name: Georgina Arias  MRN: [id number]  Admission Date: 9/11/2023  Hospital Length of Stay: 4 days  Code Status: Full Code  Attending Provider: Maxx Maya MD  Primary Care Provider: Alonso Ling MD   Principal Problem: S/P femoral-femoral bypass surgery    Subjective:     Hospital/ICU Course:  No notes on file    Interval History/Significant Events: No acute events overnight. When the patient got to the unit she had an episode of emesis. She received one unit of blood which temporarily improved pressor requirements. On 6L NC overnight, weaned down to 3L. Neurovascular checks normal with dopperable Pts bilaterally.     Follow-up For: Procedure(s) (LRB):  CREATION, BYPASS, ARTERIAL, AXILLARY TO BILATERAL FEMORAL (Left)  ANGIOGRAM, LOWER EXTREMITY with balloon angioplasty ultraverse 5mm x 60mm (Left)  STENT, SUPERFICIAL FEMORAL ARTERY (Left)  AMPUTATION, FOOT, TRANSMETATARSAL partial 1st ray amputation (Left)  INCISION AND DRAINAGE, FOOT (Left)    Post-Operative Day: 1 Day Post-Op    Objective:     Vital Signs (Most Recent):  Temp: 97 °F (36.1 °C) (09/15/23 0413)  Pulse: 103 (09/15/23 0645)  Resp: (!) 25 (09/15/23 0645)  BP: (!) 106/57 (09/15/23 0645)  SpO2: 98 % (09/15/23 0645) Vital Signs (24h Range):  Temp:  [96.2 °F (35.7 °C)-97.2 °F (36.2 °C)] 97 °F (36.1 °C)  Pulse:  [] 103  Resp:  [9-42] 25  SpO2:  [85 %-100 %] 98 %  BP: ()/(28-73) 106/57  Arterial Line BP: ()/(41-57) 108/48     Weight: 110 kg (242 lb 8.1 oz)  Body mass index is 37.98 kg/m².      Intake/Output Summary (Last 24 hours) at 9/15/2023 0720  Last data filed at 9/15/2023 0650  Gross per 24 hour   Intake 5442.75 ml   Output 999 ml   Net 4443.75 ml          Physical Exam  Vitals and nursing note reviewed.   Constitutional:       Appearance: Normal appearance.   HENT:      Head: Normocephalic and atraumatic.   Eyes:      Extraocular  Movements: Extraocular movements intact.      Conjunctiva/sclera: Conjunctivae normal.   Cardiovascular:      Rate and Rhythm: Regular rhythm. Tachycardia present.      Comments: Dopplerable posterior tibial signals   Pulmonary:      Effort: Pulmonary effort is normal.   Abdominal:      General: Abdomen is flat.      Palpations: Abdomen is soft.   Genitourinary:     Comments:  Bilateral groins with Prevena wound vacs in place, soft with no evidence of hematoma  Musculoskeletal:      Cervical back: Normal range of motion.      Comments:  S/p left 1st metatarsal amp with dressing in place c/d/I  ELLE drain in place at left upper chest with incision c/d/I    Skin:     General: Skin is warm.   Neurological:      Mental Status: She is alert.   Psychiatric:         Mood and Affect: Mood normal.            Vents:       Lines/Drains/Airways       Central Venous Catheter Line  Duration             Trialysis (Dialysis) Catheter 09/14/23 1600 right internal jugular <1 day              Drain  Duration                  Closed/Suction Drain 09/14/23 1435 Tube - 1 Left;Superior Chest Bulb 15 Fr. <1 day         Urethral Catheter 09/14/23 0806 Non-latex 16 Fr. <1 day              Peripheral Intravenous Line  Duration                  Hemodialysis AV Fistula  Right forearm -- days         Peripheral IV - Single Lumen 09/11/23 1915 20 G;1 3/4 in Left Forearm 3 days                    Significant Labs:    CBC/Anemia Profile:  Recent Labs   Lab 09/14/23  2238 09/15/23  0312 09/15/23  0445   WBC 24.11* 24.17* 24.94*   HGB 7.4* 6.9* 8.6*   HCT 24.2* 22.2* 26.3*    268 221   * 102* 96   RDW 15.9* 16.1* 15.9*        Chemistries:  Recent Labs   Lab 09/14/23  0602 09/14/23  2238 09/15/23  0312    135* 135*  135*   K 3.9 5.2* 3.8  3.8   CL 99 104 101  101   CO2 24 16* 17*  17*   BUN 40* 41* 31*  31*   CREATININE 4.9* 5.2* 3.8*  3.8*   CALCIUM 9.0 7.4* 7.4*  7.4*   ALBUMIN 2.5* 1.9* 2.0*  2.0*   PROT 6.5 4.9* 4.8*    BILITOT 0.3 0.4 0.3   ALKPHOS 109 80 76   ALT 37 42 46*   AST 44* 50* 57*   MG  --  2.2 1.8   PHOS  --  7.8* 4.8*         Significant Imaging:  I have reviewed all pertinent imaging results/findings within the past 24 hours.    Assessment/Plan:     Cardiac/Vascular  Peripheral vascular disease    Neuro/Psych:     - Sedation: None    - Pain:    - Scheduled Tylenol 1g q8h   - Oxy 5 PRN             Cardiac:     - S/P Ax fem bypass and stent to SFA with Dr. Maya on 9/14/23    - BP Goal: 100-160    - Pressors: levo 0.08, Vaso 0.04. Wean as tolerated     - Rhythm: NSR    - Statin: Atorvastatin 80 mg QD      Pulmonary:     - Goal SpO2 >92%    - Supplemental O2 - Wean as tolerated     - ABG PRN      Renal:    - Trend BUN/Cr     - Maintain Fraser, record strict Is/Os    - Nephrology consulted for dialysis needs given ESRD    Recent Labs   Lab 09/14/23  0602 09/14/23  2238 09/15/23  0312   BUN 40* 41* 31*  31*   CREATININE 4.9* 5.2* 3.8*  3.8*         FEN / GI:     - Daily CMP, PRN K/Mag/Phos per protocol     - Replace electrolytes as needed    - Nutrition: NPO     - Bowel Regimen: Miralax, docusate      ID:     - Afebrile    - WBC stable: 24.94 (24.17)     - Abx: Zosyn and vanc    Recent Labs   Lab 09/14/23  2238 09/15/23  0312 09/15/23  0445   WBC 24.11* 24.17* 24.94*         Heme/Onc:     - Hgb 9.9 pre-operatively: 8.6 today    - CBC daily    -  mg daily    - Will resume plavix when appropriate    Recent Labs   Lab 09/14/23  2238 09/15/23  0312 09/15/23  0445   HGB 7.4* 6.9* 8.6*    268 221   APTT 21.9  --   --    INR 1.0  --   --          Endocrine:     - CCM Goal BG <180    - HgbA1c: 7.2    - SSI    - POCT glucose 4xs daily    - Hypoglycemia protocol in place       PPx:   Feeding: NPO  Analgesia/Sedation: multimodal / None  Thromboembolic Prevention: Heparin subq  HOB >30: Yes  Stress Ulcer: Pepcid   Glucose Control: Yes, insulin management CCM goal      Lines/Drains/Airway:   Left radial  arterial line   RIJ trialysis   Fraser   Left chest ELLE            Dispo/Code Status/Palliative:     - Continue SICU Care    - Full Code             Critical care was time spent personally by me on the following activities: development of treatment plan with patient or surrogate and bedside caregivers, discussions with consultants, evaluation of patient's response to treatment, examination of patient, ordering and performing treatments and interventions, ordering and review of laboratory studies, ordering and review of radiographic studies, pulse oximetry, re-evaluation of patient's condition.  This critical care time did not overlap with that of any other provider or involve time for any procedures.     Chantale Beltran MD  Critical Care - Surgery  [facility] - [facility]

## 2023-09-15 NOTE — PROGRESS NOTES
Pharmacokinetic Assessment Follow Up: IV Vancomycin    Vancomycin serum concentration assessment/plan:  Random level resulted as 12.1 mcg/mL; Goal 10-20 mcg/mL, Sepsis  ESRD on HD outpatient. Per nephrology, plan for SLED x 10 hours, followed by SCUF.   Re-dose Vancomycin 1000 mg IV x 1; continue to dose by level when the random is less than 20 mcg/mL  Next level to be drawn on 9/16/23 with AM labs    Drug levels (last 3 results):  Recent Labs   Lab Result Units 09/12/23  2336 09/14/23  0602 09/15/23  0314   Vancomycin, Random ug/mL 17.0 22.1 12.1       Pharmacy will continue to follow and monitor vancomycin.    Please contact pharmacy at extension [phone] for questions regarding this assessment.    Thank you for the consult,   Jossy Maldonado       Patient brief summary:  Georgina Arias is a 52 y.o. female initiated on antimicrobial therapy with IV Vancomycin for treatment of sepsis    The patient's current regimen is pulse dosing.     Drug Allergies:   Review of patient's allergies indicates:   Allergen Reactions    Naproxen Anaphylaxis and Swelling     Hives (skin)^swelling  Hives (skin)^swelling  Hives (skin)^swelling    Sulfamethoxazole-trimethoprim Other (See Comments)     Caused JEROME    Hydrocodone Nausea And Vomiting and Rash    Penicillins Hives     Blisters (skin)^    Adhesive Rash    Hydrocodone-acetaminophen Nausea And Vomiting     Rash (skin)^, Vomiting^         Actual Body Weight:   109.8  Dosed based on adjusted weight of 80.9 kg     Renal Function:   Estimated Creatinine Clearance: 22.1 mL/min (A) (based on SCr of 3.8 mg/dL (H)).,     Dialysis Method (if applicable):  SLED followed by SCUF    CBC (last 72 hours):  Recent Labs   Lab Result Units 09/13/23  0442 09/14/23  0602 09/14/23  2238 09/15/23  0312 09/15/23  0445   WBC K/uL 7.09 6.70 24.11* 24.17* 24.94*   Hemoglobin g/dL 10.3* 9.8* 7.4* 6.9* 8.6*   Hematocrit % 33.2* 30.1* 24.2* 22.2* 26.3*   Platelets K/uL 187 224 295 268 221   Gran % %  79.3* 65.4 88.3* 81.0* 85.9*   Lymph % % 13.5* 22.2 5.3* 7.0* 7.5*   Mono % % 5.6 8.2 5.6 4.0 5.5   Eosinophil % % 0.7 3.0 0.0 0.0 0.0   Basophil % % 0.6 0.9 0.2 0.0 0.2   Differential Method  Automated Automated Automated Automated Automated       Metabolic Panel (last 72 hours):  Recent Labs   Lab Result Units 09/13/23  0442 09/14/23  0602 09/14/23 2238 09/15/23  0312   Sodium mmol/L 137 138 135* 135*  135*   Potassium mmol/L 4.2 3.9 5.2* 3.8  3.8   Chloride mmol/L 99 99 104 101  101   CO2 mmol/L 25 24 16* 17*  17*   Glucose mg/dL 230* 156* 392* 263*  263*   BUN mg/dL 35* 40* 41* 31*  31*   Creatinine mg/dL 4.0* 4.9* 5.2* 3.8*  3.8*   Albumin g/dL 2.4* 2.5* 1.9* 2.0*  2.0*   Total Bilirubin mg/dL 0.4 0.3 0.4 0.3   Alkaline Phosphatase U/L 98 109 80 76   AST U/L 22 44* 50* 57*   ALT U/L 19 37 42 46*   Magnesium mg/dL  --   --  2.2 1.8   Phosphorus mg/dL  --   --  7.8* 4.8*       Vancomycin Administrations:  vancomycin given in the last 96 hours                     vancomycin 750 mg in dextrose 5 % (D5W) 250 mL IVPB (Vial-Mate) (mg) 750 mg New Bag 09/13/23 0412    vancomycin 2 g in dextrose 5 % 500 mL IVPB (mg) 2,000 mg New Bag 09/11/23 1924                    Microbiologic Results:  Microbiology Results (last 7 days)       Procedure Component Value Units Date/Time    Blood culture [[id number]]     Order Status: Sent Specimen: Blood     Blood culture [[id number]]     Order Status: Sent Specimen: Blood     AFB Culture & Smear [[id number]] Collected: 09/14/23 0294    Order Status: Completed Specimen: Biopsy from Foot, Left Updated: 09/15/23 1221     AFB CULTURE STAIN No acid fast bacilli seen.    Narrative:      1. Left 1st metatarsal clean margins-permanent    Gram stain [[id number]] Collected: 09/14/23 1654    Order Status: Completed Specimen: Biopsy from Foot, Left Updated: 09/15/23 0208     Gram Stain Result No WBC's, epithelial cells or organisms seen    Narrative:      1. Left 1st metatarsal clean  margins-permanent    Culture, Anaerobe [[id number]] Collected: 09/14/23 1654    Order Status: Sent Specimen: Biopsy from Foot, Left Updated: 09/14/23 1717    Aerobic culture [[id number]] Collected: 09/14/23 1654    Order Status: Sent Specimen: Biopsy from Foot, Left Updated: 09/14/23 1717    Fungus culture [[id number]] Collected: 09/14/23 1654    Order Status: Sent Specimen: Biopsy from Foot, Left Updated: 09/14/23 1717    Culture, Anaerobic [[id number]] Collected: 09/12/23 1158    Order Status: Completed Specimen: Wound from Toe, Left Foot Updated: 09/14/23 1302     Anaerobic Culture Culture in progress    Aerobic culture [[id number]] Collected: 09/12/23 1158    Order Status: Completed Specimen: Wound from Toe, Left Foot Updated: 09/14/23 1036     Aerobic Bacterial Culture Skin meño,  no predominant organism    AFB Culture & Smear [[id number]] Collected: 09/12/23 1158    Order Status: Completed Specimen: Wound from Toe, Left Foot Updated: 09/13/23 2127     AFB Culture & Smear Culture in progress     AFB CULTURE STAIN No acid fast bacilli seen.    Gram stain [[id number]] Collected: 09/12/23 1158    Order Status: Completed Specimen: Wound from Toe, Left Foot Updated: 09/12/23 1456     Gram Stain Result Moderate WBC's      Rare Gram positive cocci    Fungus culture [[id number]] Collected: 09/12/23 1158    Order Status: Sent Specimen: Wound from Toe, Left Foot Updated: 09/12/23 1220

## 2023-09-15 NOTE — PT/OT/SLP PROGRESS
Physical Therapy  Consult    Patient Name:  Georgina Arias   MRN:  [id number]  Admitting Diagnosis:  S/P femoral-femoral bypass surgery   Recent Surgery: Procedure(s) (LRB):  CREATION, BYPASS, ARTERIAL, AXILLARY TO BILATERAL FEMORAL (Left)  ANGIOGRAM, LOWER EXTREMITY with balloon angioplasty ultraverse 5mm x 60mm (Left)  STENT, SUPERFICIAL FEMORAL ARTERY (Left)  AMPUTATION, FOOT, TRANSMETATARSAL partial 1st ray amputation (Left)  INCISION AND DRAINAGE, FOOT (Left) 1 Day Post-Op  Admit Date: 9/11/2023  Length of Stay: 4 days    Physical Therapy orders received and acknowledged. Patient not seen today due to Nurse/ MARK hold. CRRT + pressor support.  PT to attempt when Georgina Arias is medically appropriate for progressive mobility.    Virginia Moise, PT, DPT  9/15/2023  Pager: [phone]

## 2023-09-15 NOTE — SUBJECTIVE & OBJECTIVE
Follow-up For: Procedure(s) (LRB):  CREATION, BYPASS, ARTERIAL, AXILLARY TO BILATERAL FEMORAL (Left)  ANGIOGRAM, LOWER EXTREMITY with balloon angioplasty ultraverse 5mm x 60mm (Left)  STENT, SUPERFICIAL FEMORAL ARTERY (Left)  AMPUTATION, FOOT, TRANSMETATARSAL partial 1st ray amputation (Left)  INCISION AND DRAINAGE, FOOT (Left)    Post-Operative Day: Day of Surgery     Past Medical History:   Diagnosis Date    Hyperlipidemia     Hypertension     Peptic ulcer disease     Peripheral artery disease     PONV (postoperative nausea and vomiting)     Stage IV CKD     Type II diabetes mellitus        Past Surgical History:   Procedure Laterality Date    ANGIOGRAM, LOWER ARTERIAL, UNILATERAL Left 9/13/2023    Procedure: ANGIOGRAM, LOWER ARTERIAL, UNILATERAL;  Surgeon: aMxx Maya MD;  Location: [facility] OR [address] [place] [address];  Service: Vascular;  Laterality: Left;    AORTOGRAPHY WITH EXTREMITY RUNOFF Left 9/13/2023    Procedure: AORTOGRAM, WITH EXTREMITY RUNOFF;  Surgeon: Maxx Maya MD;  Location: [facility] OR [address] [place] [address];  Service: Vascular;  Laterality: Left;  8.1 min  663.63 mGy  176.39 Gy.cm  178ml Dye     AV FISTULA PLACEMENT Right     CHOLECYSTECTOMY      CYSTOSCOPY W/ URETERAL STENT PLACEMENT Right 08/28/2018    Procedure: CYSTOSCOPY, WITH URETERAL STENT INSERTION (ADD ON );  Surgeon: Bubba Perez MD;  Location: [facility] OR;  Service: Urology;  Laterality: Right;  (ADD ON )    DEBRIDEMENT OF FOOT Left 08/03/2023    Procedure: DEBRIDEMENT, FOOT;  Surgeon: Aleida Flannery DPM;  Location: [facility] OR;  Service: Podiatry;  Laterality: Left;    Excisional debridement of ulcer distal great toe left foot w/ partial resection distal phalanx Left 08/03/2023    I&D L 1st ray w/ amputation L hallux IPJ Left 08/14/2023    Injection L PT tendon sheath Left 08/14/2023    Nail avulsion left hallux Left 08/03/2023    PERIPHERAL ARTERIAL STENT GRAFT Right     REMOVAL OF NAIL OF DIGIT Left 08/03/2023    Procedure: REMOVAL, NAIL, DIGIT great toe;   Surgeon: Aleida Flannery DPM;  Location: [facility] OR;  Service: Podiatry;  Laterality: Left;    TOE AMPUTATION Left 2023    Procedure: AMPUTATION, TOE hallux IPJ;  Surgeon: Aleida Flannery DPM;  Location: [facility] OR;  Service: Podiatry;  Laterality: Left;    TOE AMPUTATION Left 2023    great toe    TOE AMPUTATION Left 2023    Procedure: AMPUTATION, TOE hallux, possible 1st met.head;  Surgeon: Aleida Flannery DPM;  Location: [facility];  Service: Podiatry;  Laterality: Left;    URETEROSCOPIC REMOVAL OF URETERIC CALCULUS Right 2018    Procedure: EXTRACTION-STONE-URETEROSCOPY;  Surgeon: Bubba Perez MD;  Location: [facility];  Service: Urology;  Laterality: Right;       Review of patient's allergies indicates:   Allergen Reactions    Naproxen Anaphylaxis and Swelling     Hives (skin)^swelling  Hives (skin)^swelling  Hives (skin)^swelling    Sulfamethoxazole-trimethoprim Other (See Comments)     Caused JEROME    Hydrocodone Nausea And Vomiting and Rash    Penicillins Hives     Blisters (skin)^    Adhesive Rash    Hydrocodone-acetaminophen Nausea And Vomiting     Rash (skin)^, Vomiting^         Family History       Problem Relation (Age of Onset)    Cancer Maternal Grandmother    Diabetes Mother, Father    Heart disease Father (37), Maternal Grandmother    Hypertension Mother, Father, Brother          Tobacco Use    Smoking status: Former     Current packs/day: 0.00     Types: Cigarettes     Quit date: 2006     Years since quittin.1    Smokeless tobacco: Never   Substance and Sexual Activity    Alcohol use: Not Currently     Comment: occ    Drug use: No    Sexual activity: Not Currently      Review of Systems   All other systems reviewed and are negative.    Objective:     Vital Signs (Most Recent):  Temp: 97 °F (36.1 °C) (23)  Pulse: 98 (23)  Resp: 18 (23)  BP: (!) 63/28 (23)  SpO2: 95 % (23) Vital Signs (24h Range):  Temp:  [97 °F (36.1 °C)-97.9 °F  (36.6 °C)] 97 °F (36.1 °C)  Pulse:  [71-99] 98  Resp:  [12-28] 18  SpO2:  [92 %-100 %] 95 %  BP: ()/(6-70) 63/28  Arterial Line BP: ()/(41-57) 100/48     Weight: 110 kg (242 lb 8.1 oz)  Body mass index is 37.98 kg/m².      Intake/Output Summary (Last 24 hours) at 9/14/2023 2203  Last data filed at 9/14/2023 2134  Gross per 24 hour   Intake 2062.48 ml   Output 435 ml   Net 1627.48 ml          Physical Exam  Constitutional:       General: She is not in acute distress.     Appearance: Normal appearance.   HENT:      Head: Normocephalic and atraumatic.      Mouth/Throat:      Mouth: Mucous membranes are moist.   Eyes:      Extraocular Movements: Extraocular movements intact.      Conjunctiva/sclera: Conjunctivae normal.   Cardiovascular:      Rate and Rhythm: Normal rate and regular rhythm.   Pulmonary:      Effort: Pulmonary effort is normal. No respiratory distress.   Abdominal:      General: There is no distension.      Palpations: Abdomen is soft.   Genitourinary:     Comments: Bilateral groins with Prevena wound vacs in place, soft with no evidence of hematoma  Musculoskeletal:      Comments: S/p left 1st metatarsal amp with dressing in place c/d/I  ELLE drain in place at left upper chest with incision c/d/I   Skin:     General: Skin is warm and dry.   Neurological:      General: No focal deficit present.      Mental Status: She is alert and oriented to person, place, and time. Mental status is at baseline.            Vents:       Lines/Drains/Airways       Central Venous Catheter Line  Duration             Percutaneous Central Line Insertion/Assessment - Triple Lumen  09/14/23 0832 Internal Jugular Right <1 day              Drain  Duration                  Closed/Suction Drain 09/14/23 1435 Tube - 1 Left;Superior Chest Bulb 15 Fr. <1 day         Urethral Catheter 09/14/23 0806 Non-latex 16 Fr. <1 day              Arterial Line  Duration             Arterial Line 09/14/23 0715 Left Radial <1 day               Peripheral Intravenous Line  Duration                  Hemodialysis AV Fistula  Right forearm -- days         Peripheral IV - Single Lumen 09/11/23 1915 20 G;1 3/4 in Left Forearm 3 days                    Significant Labs:    CBC/Anemia Profile:  Recent Labs   Lab 09/13/23  0442 09/14/23  0602 09/14/23  0748 09/14/23  1046 09/14/23  1210 09/14/23  1405   WBC 7.09 6.70  --   --   --   --    HGB 10.3* 9.8*  --   --   --   --    HCT 33.2* 30.1*   < > 25* 27* 27*    224  --   --   --   --    * 97  --   --   --   --    RDW 16.3* 16.4*  --   --   --   --     < > = values in this interval not displayed.        Chemistries:  Recent Labs   Lab 09/13/23 0442 09/14/23  0602    138   K 4.2 3.9   CL 99 99   CO2 25 24   BUN 35* 40*   CREATININE 4.0* 4.9*   CALCIUM 8.6* 9.0   ALBUMIN 2.4* 2.5*   PROT 6.6 6.5   BILITOT 0.4 0.3   ALKPHOS 98 109   ALT 19 37   AST 22 44*       ABGs:   Recent Labs   Lab 09/14/23  1405   PH 7.401   PCO2 34.5*   HCO3 21.5*   POCSATURATED 99   BE -3       Significant Imaging: I have reviewed all pertinent imaging results/findings within the past 24 hours.

## 2023-09-15 NOTE — PROGRESS NOTES
Pharmacist Renal Dose Adjustment Note    Georgina Arias is a 52 y.o. female being treated with the medication Zosyn     Patient Data:    Vital Signs (Most Recent):  Temp: 96.9 °F (36.1 °C) (09/15/23 0700)  Pulse: 80 (09/15/23 0845)  Resp: 12 (09/15/23 0800)  BP: (!) 89/48 (09/15/23 0845)  SpO2: 100 % (09/15/23 0800) Vital Signs (72h Range):  Temp:  [96.2 °F (35.7 °C)-99.4 °F (37.4 °C)]   Pulse:  []   Resp:  [9-42]   BP: ()/(6-81)   SpO2:  [85 %-100 %]   Arterial Line BP: ()/(41-57)      Recent Labs   Lab 09/14/23  0602 09/14/23 2238 09/15/23  0312   CREATININE 4.9* 5.2* 3.8*  3.8*     Serum creatinine: 3.8 mg/dL (H) 09/15/23 0312  Estimated creatinine clearance: 22.1 mL/min (A)    Zosyn 4.5 g Q12H will be changed to Zosyn 4.5 g Q8H     Jossy Maldonado, SarahD   [phone]

## 2023-09-15 NOTE — SUBJECTIVE & OBJECTIVE
Interval History/Significant Events: No acute events overnight. When the patient got to the unit she had an episode of emesis. She received one unit of blood which temporarily improved pressor requirements. On 6L NC overnight, weaned down to 3L. Neurovascular checks normal with dopperable Pts bilaterally.     Follow-up For: Procedure(s) (LRB):  CREATION, BYPASS, ARTERIAL, AXILLARY TO BILATERAL FEMORAL (Left)  ANGIOGRAM, LOWER EXTREMITY with balloon angioplasty ultraverse 5mm x 60mm (Left)  STENT, SUPERFICIAL FEMORAL ARTERY (Left)  AMPUTATION, FOOT, TRANSMETATARSAL partial 1st ray amputation (Left)  INCISION AND DRAINAGE, FOOT (Left)    Post-Operative Day: 1 Day Post-Op    Objective:     Vital Signs (Most Recent):  Temp: 97 °F (36.1 °C) (09/15/23 0413)  Pulse: 103 (09/15/23 0645)  Resp: (!) 25 (09/15/23 0645)  BP: (!) 106/57 (09/15/23 0645)  SpO2: 98 % (09/15/23 0645) Vital Signs (24h Range):  Temp:  [96.2 °F (35.7 °C)-97.2 °F (36.2 °C)] 97 °F (36.1 °C)  Pulse:  [] 103  Resp:  [9-42] 25  SpO2:  [85 %-100 %] 98 %  BP: ()/(28-73) 106/57  Arterial Line BP: ()/(41-57) 108/48     Weight: 110 kg (242 lb 8.1 oz)  Body mass index is 37.98 kg/m².      Intake/Output Summary (Last 24 hours) at 9/15/2023 0720  Last data filed at 9/15/2023 0650  Gross per 24 hour   Intake 5442.75 ml   Output 999 ml   Net 4443.75 ml          Physical Exam  Vitals and nursing note reviewed.   Constitutional:       Appearance: Normal appearance.   HENT:      Head: Normocephalic and atraumatic.   Eyes:      Extraocular Movements: Extraocular movements intact.      Conjunctiva/sclera: Conjunctivae normal.   Cardiovascular:      Rate and Rhythm: Regular rhythm. Tachycardia present.      Comments: Dopplerable posterior tibial signals   Pulmonary:      Effort: Pulmonary effort is normal.   Abdominal:      General: Abdomen is flat.      Palpations: Abdomen is soft.   Genitourinary:     Comments:  Bilateral groins with Prevena wound vacs  in place, soft with no evidence of hematoma  Musculoskeletal:      Cervical back: Normal range of motion.      Comments:  S/p left 1st metatarsal amp with dressing in place c/d/I  ELLE drain in place at left upper chest with incision c/d/I    Skin:     General: Skin is warm.   Neurological:      Mental Status: She is alert.   Psychiatric:         Mood and Affect: Mood normal.            Vents:       Lines/Drains/Airways       Central Venous Catheter Line  Duration             Trialysis (Dialysis) Catheter 09/14/23 1600 right internal jugular <1 day              Drain  Duration                  Closed/Suction Drain 09/14/23 1435 Tube - 1 Left;Superior Chest Bulb 15 Fr. <1 day         Urethral Catheter 09/14/23 0806 Non-latex 16 Fr. <1 day              Peripheral Intravenous Line  Duration                  Hemodialysis AV Fistula  Right forearm -- days         Peripheral IV - Single Lumen 09/11/23 1915 20 G;1 3/4 in Left Forearm 3 days                    Significant Labs:    CBC/Anemia Profile:  Recent Labs   Lab 09/14/23  2238 09/15/23  0312 09/15/23  0445   WBC 24.11* 24.17* 24.94*   HGB 7.4* 6.9* 8.6*   HCT 24.2* 22.2* 26.3*    268 221   * 102* 96   RDW 15.9* 16.1* 15.9*        Chemistries:  Recent Labs   Lab 09/14/23  0602 09/14/23  2238 09/15/23  0312    135* 135*  135*   K 3.9 5.2* 3.8  3.8   CL 99 104 101  101   CO2 24 16* 17*  17*   BUN 40* 41* 31*  31*   CREATININE 4.9* 5.2* 3.8*  3.8*   CALCIUM 9.0 7.4* 7.4*  7.4*   ALBUMIN 2.5* 1.9* 2.0*  2.0*   PROT 6.5 4.9* 4.8*   BILITOT 0.3 0.4 0.3   ALKPHOS 109 80 76   ALT 37 42 46*   AST 44* 50* 57*   MG  --  2.2 1.8   PHOS  --  7.8* 4.8*         Significant Imaging:  I have reviewed all pertinent imaging results/findings within the past 24 hours.

## 2023-09-15 NOTE — PROGRESS NOTES
Patient post procedure transferred to SICU. Is on levophed. Trialysis line placed in OR. CMP with CO2 16, K5.2. Lactic at ~3.     Will plan for sled tonight for metabolic clearance and volume removal as tolerated. Orders placed, HD nurse made aware

## 2023-09-15 NOTE — PROGRESS NOTES
Pharmacokinetic Assessment Follow Up: IV Vancomycin    Vancomycin serum concentration assessment(s):    The random level was drawn correctly and can be used to guide therapy at this time. The measurement is above the desired definitive target range of 15 to 20 mcg/mL.    Vancomycin Regimen Plan:    Re-dose when the random level is less than 20 mcg/mL, next level to be drawn at 0500 on 9/15    Drug levels (last 3 results):  Recent Labs   Lab Result Units 09/12/23  2336 09/14/23  0602   Vancomycin, Random ug/mL 17.0 22.1       Pharmacy will continue to follow and monitor vancomycin.    Please contact pharmacy at extension [phone] for questions regarding this assessment.    Thank you for the consult,   Shweta Oliveira       Patient brief summary:  Georgina Arias is a 52 y.o. female initiated on antimicrobial therapy with IV Vancomycin for treatment of bone/joint infection    The patient's current regimen is pulse dosing.     Drug Allergies:   Review of patient's allergies indicates:   Allergen Reactions    Naproxen Anaphylaxis and Swelling     Hives (skin)^swelling  Hives (skin)^swelling  Hives (skin)^swelling    Sulfamethoxazole-trimethoprim Other (See Comments)     Caused JEROME    Hydrocodone Nausea And Vomiting and Rash    Penicillins Hives     Blisters (skin)^    Adhesive Rash    Hydrocodone-acetaminophen Nausea And Vomiting     Rash (skin)^, Vomiting^         Actual Body Weight:   110 kg     Renal Function:   Estimated Creatinine Clearance: 17.2 mL/min (A) (based on SCr of 4.9 mg/dL (H)).,     Dialysis Method (if applicable):  intermittent HD at home     CBC (last 72 hours):  Recent Labs   Lab Result Units 09/12/23  0304 09/13/23  0442 09/14/23  0602 09/14/23  2238   WBC K/uL 9.41 7.09 6.70 24.11*   Hemoglobin g/dL 9.9* 10.3* 9.8* 7.4*   Hematocrit % 31.7* 33.2* 30.1* 24.2*   Platelets K/uL 196 187 224 295   Gran % % 76.5* 79.3* 65.4  --    Lymph % % 16.6* 13.5* 22.2  --    Mono % % 4.6 5.6 8.2  --    Eosinophil %  % 1.7 0.7 3.0  --    Basophil % % 0.3 0.6 0.9  --    Differential Method  Automated Automated Automated  --        Metabolic Panel (last 72 hours):  Recent Labs   Lab Result Units 09/12/23  0304 09/13/23  0442 09/14/23  0602   Sodium mmol/L 139 137 138   Potassium mmol/L 4.3 4.2 3.9   Chloride mmol/L 103 99 99   CO2 mmol/L 19* 25 24   Glucose mg/dL 165* 230* 156*   BUN mg/dL 53* 35* 40*   Creatinine mg/dL 4.2* 4.0* 4.9*   Albumin g/dL 2.5* 2.4* 2.5*   Total Bilirubin mg/dL 0.4 0.4 0.3   Alkaline Phosphatase U/L 99 98 109   AST U/L 25 22 44*   ALT U/L 13 19 37   Magnesium mg/dL 2.1  --   --    Phosphorus mg/dL 4.8*  --   --        Vancomycin Administrations:  vancomycin given in the last 96 hours                     vancomycin 750 mg in dextrose 5 % (D5W) 250 mL IVPB (Vial-Mate) (mg) 750 mg New Bag 09/13/23 0412    vancomycin 2 g in dextrose 5 % 500 mL IVPB (mg) 2,000 mg New Bag 09/11/23 1924

## 2023-09-15 NOTE — CARE UPDATE
Starting heparin prefilter    Nursing notified, confirmed with pharmacist      Celestine Oliveira MD  PGY-4 Nephrology

## 2023-09-16 PROBLEM — E66.812 CLASS 2 SEVERE OBESITY DUE TO EXCESS CALORIES WITH SERIOUS COMORBIDITY AND BODY MASS INDEX (BMI) OF 37.0 TO 37.9 IN ADULT: Status: ACTIVE | Noted: 2022-09-08

## 2023-09-16 LAB
ALBUMIN SERPL BCP-MCNC: 2 G/DL (ref 3.5–5.2)
ALBUMIN SERPL BCP-MCNC: 2 G/DL (ref 3.5–5.2)
ALBUMIN SERPL BCP-MCNC: 2.1 G/DL (ref 3.5–5.2)
ALP SERPL-CCNC: 59 U/L (ref 55–135)
ALT SERPL W/O P-5'-P-CCNC: 37 U/L (ref 10–44)
ANION GAP SERPL CALC-SCNC: 11 MMOL/L (ref 8–16)
ANION GAP SERPL CALC-SCNC: 11 MMOL/L (ref 8–16)
ANION GAP SERPL CALC-SCNC: 14 MMOL/L (ref 8–16)
APTT PPP: 26.7 SEC (ref 21–32)
AST SERPL-CCNC: 35 U/L (ref 10–40)
BASOPHILS # BLD AUTO: 0.05 K/UL (ref 0–0.2)
BASOPHILS # BLD AUTO: 0.05 K/UL (ref 0–0.2)
BASOPHILS NFR BLD: 0.2 % (ref 0–1.9)
BASOPHILS NFR BLD: 0.3 % (ref 0–1.9)
BILIRUB SERPL-MCNC: 0.5 MG/DL (ref 0.1–1)
BUN SERPL-MCNC: 15 MG/DL (ref 6–20)
BUN SERPL-MCNC: 8 MG/DL (ref 6–20)
BUN SERPL-MCNC: 8 MG/DL (ref 6–20)
CALCIUM SERPL-MCNC: 7 MG/DL (ref 8.7–10.5)
CALCIUM SERPL-MCNC: 7 MG/DL (ref 8.7–10.5)
CALCIUM SERPL-MCNC: 7.4 MG/DL (ref 8.7–10.5)
CHLORIDE SERPL-SCNC: 100 MMOL/L (ref 95–110)
CHLORIDE SERPL-SCNC: 96 MMOL/L (ref 95–110)
CHLORIDE SERPL-SCNC: 96 MMOL/L (ref 95–110)
CO2 SERPL-SCNC: 18 MMOL/L (ref 23–29)
CO2 SERPL-SCNC: 23 MMOL/L (ref 23–29)
CO2 SERPL-SCNC: 23 MMOL/L (ref 23–29)
CREAT SERPL-MCNC: 1.5 MG/DL (ref 0.5–1.4)
CREAT SERPL-MCNC: 1.5 MG/DL (ref 0.5–1.4)
CREAT SERPL-MCNC: 2.6 MG/DL (ref 0.5–1.4)
DIFFERENTIAL METHOD: ABNORMAL
DIFFERENTIAL METHOD: ABNORMAL
EOSINOPHIL # BLD AUTO: 0 K/UL (ref 0–0.5)
EOSINOPHIL # BLD AUTO: 0 K/UL (ref 0–0.5)
EOSINOPHIL NFR BLD: 0 % (ref 0–8)
EOSINOPHIL NFR BLD: 0 % (ref 0–8)
ERYTHROCYTE [DISTWIDTH] IN BLOOD BY AUTOMATED COUNT: 17.4 % (ref 11.5–14.5)
ERYTHROCYTE [DISTWIDTH] IN BLOOD BY AUTOMATED COUNT: 17.7 % (ref 11.5–14.5)
EST. GFR  (NO RACE VARIABLE): 21.5 ML/MIN/1.73 M^2
EST. GFR  (NO RACE VARIABLE): 41.7 ML/MIN/1.73 M^2
EST. GFR  (NO RACE VARIABLE): 41.7 ML/MIN/1.73 M^2
GLUCOSE SERPL-MCNC: 170 MG/DL (ref 70–110)
GLUCOSE SERPL-MCNC: 223 MG/DL (ref 70–110)
GLUCOSE SERPL-MCNC: 223 MG/DL (ref 70–110)
HCT VFR BLD AUTO: 23.8 % (ref 37–48.5)
HCT VFR BLD AUTO: 25.4 % (ref 37–48.5)
HGB BLD-MCNC: 7.9 G/DL (ref 12–16)
HGB BLD-MCNC: 8.6 G/DL (ref 12–16)
IMM GRANULOCYTES # BLD AUTO: 0.13 K/UL (ref 0–0.04)
IMM GRANULOCYTES # BLD AUTO: 0.13 K/UL (ref 0–0.04)
IMM GRANULOCYTES NFR BLD AUTO: 0.6 % (ref 0–0.5)
IMM GRANULOCYTES NFR BLD AUTO: 0.7 % (ref 0–0.5)
LACTATE SERPL-SCNC: 0.7 MMOL/L (ref 0.5–2.2)
LYMPHOCYTES # BLD AUTO: 1.5 K/UL (ref 1–4.8)
LYMPHOCYTES # BLD AUTO: 1.7 K/UL (ref 1–4.8)
LYMPHOCYTES NFR BLD: 7.2 % (ref 18–48)
LYMPHOCYTES NFR BLD: 9.2 % (ref 18–48)
MAGNESIUM SERPL-MCNC: 1.8 MG/DL (ref 1.6–2.6)
MCH RBC QN AUTO: 30.3 PG (ref 27–31)
MCH RBC QN AUTO: 30.6 PG (ref 27–31)
MCHC RBC AUTO-ENTMCNC: 33.2 G/DL (ref 32–36)
MCHC RBC AUTO-ENTMCNC: 33.9 G/DL (ref 32–36)
MCV RBC AUTO: 90 FL (ref 82–98)
MCV RBC AUTO: 91 FL (ref 82–98)
MONOCYTES # BLD AUTO: 1 K/UL (ref 0.3–1)
MONOCYTES # BLD AUTO: 1.1 K/UL (ref 0.3–1)
MONOCYTES NFR BLD: 5 % (ref 4–15)
MONOCYTES NFR BLD: 5.7 % (ref 4–15)
NEUTROPHILS # BLD AUTO: 15.6 K/UL (ref 1.8–7.7)
NEUTROPHILS # BLD AUTO: 17.6 K/UL (ref 1.8–7.7)
NEUTROPHILS NFR BLD: 84.1 % (ref 38–73)
NEUTROPHILS NFR BLD: 87 % (ref 38–73)
NRBC BLD-RTO: 0 /100 WBC
NRBC BLD-RTO: 0 /100 WBC
PHOSPHATE SERPL-MCNC: 4.5 MG/DL (ref 2.7–4.5)
PHOSPHATE SERPL-MCNC: 4.9 MG/DL (ref 2.7–4.5)
PHOSPHATE SERPL-MCNC: 8 MG/DL (ref 2.7–4.5)
PLATELET # BLD AUTO: 121 K/UL (ref 150–450)
PLATELET # BLD AUTO: 152 K/UL (ref 150–450)
PMV BLD AUTO: 10.7 FL (ref 9.2–12.9)
PMV BLD AUTO: 10.9 FL (ref 9.2–12.9)
POCT GLUCOSE: 128 MG/DL (ref 70–110)
POCT GLUCOSE: 148 MG/DL (ref 70–110)
POCT GLUCOSE: 151 MG/DL (ref 70–110)
POCT GLUCOSE: 151 MG/DL (ref 70–110)
POCT GLUCOSE: 153 MG/DL (ref 70–110)
POCT GLUCOSE: 156 MG/DL (ref 70–110)
POCT GLUCOSE: 156 MG/DL (ref 70–110)
POCT GLUCOSE: 161 MG/DL (ref 70–110)
POCT GLUCOSE: 173 MG/DL (ref 70–110)
POCT GLUCOSE: 177 MG/DL (ref 70–110)
POCT GLUCOSE: 179 MG/DL (ref 70–110)
POCT GLUCOSE: 180 MG/DL (ref 70–110)
POCT GLUCOSE: 192 MG/DL (ref 70–110)
POCT GLUCOSE: 196 MG/DL (ref 70–110)
POCT GLUCOSE: 198 MG/DL (ref 70–110)
POCT GLUCOSE: 207 MG/DL (ref 70–110)
POCT GLUCOSE: 232 MG/DL (ref 70–110)
POTASSIUM SERPL-SCNC: 3.8 MMOL/L (ref 3.5–5.1)
POTASSIUM SERPL-SCNC: 3.9 MMOL/L (ref 3.5–5.1)
POTASSIUM SERPL-SCNC: 3.9 MMOL/L (ref 3.5–5.1)
PROT SERPL-MCNC: 4.8 G/DL (ref 6–8.4)
RBC # BLD AUTO: 2.61 M/UL (ref 4–5.4)
RBC # BLD AUTO: 2.81 M/UL (ref 4–5.4)
SODIUM SERPL-SCNC: 130 MMOL/L (ref 136–145)
SODIUM SERPL-SCNC: 130 MMOL/L (ref 136–145)
SODIUM SERPL-SCNC: 132 MMOL/L (ref 136–145)
VANCOMYCIN SERPL-MCNC: 12.2 UG/ML
WBC # BLD AUTO: 18.49 K/UL (ref 3.9–12.7)
WBC # BLD AUTO: 20.31 K/UL (ref 3.9–12.7)

## 2023-09-16 PROCEDURE — 63600175 PHARM REV CODE 636 W HCPCS: Performed by: SURGERY

## 2023-09-16 PROCEDURE — 99232 PR SUBSEQUENT HOSPITAL CARE,LEVL II: ICD-10-PCS | Mod: ,,, | Performed by: NURSE PRACTITIONER

## 2023-09-16 PROCEDURE — 80202 ASSAY OF VANCOMYCIN: CPT | Performed by: SURGERY

## 2023-09-16 PROCEDURE — 25000003 PHARM REV CODE 250: Performed by: NURSE PRACTITIONER

## 2023-09-16 PROCEDURE — 99232 PR SUBSEQUENT HOSPITAL CARE,LEVL II: ICD-10-PCS | Mod: ,,, | Performed by: INTERNAL MEDICINE

## 2023-09-16 PROCEDURE — 80069 RENAL FUNCTION PANEL: CPT | Performed by: NURSE PRACTITIONER

## 2023-09-16 PROCEDURE — 99291 PR CRITICAL CARE, E/M 30-74 MINUTES: ICD-10-PCS | Mod: GC,ICN,, | Performed by: INTERNAL MEDICINE

## 2023-09-16 PROCEDURE — 99291 CRITICAL CARE FIRST HOUR: CPT | Mod: GC,ICN,, | Performed by: INTERNAL MEDICINE

## 2023-09-16 PROCEDURE — 25000003 PHARM REV CODE 250: Performed by: INTERNAL MEDICINE

## 2023-09-16 PROCEDURE — 63600175 PHARM REV CODE 636 W HCPCS: Performed by: NURSE PRACTITIONER

## 2023-09-16 PROCEDURE — 83735 ASSAY OF MAGNESIUM: CPT | Performed by: NURSE PRACTITIONER

## 2023-09-16 PROCEDURE — 25000003 PHARM REV CODE 250

## 2023-09-16 PROCEDURE — 25000003 PHARM REV CODE 250: Performed by: SURGERY

## 2023-09-16 PROCEDURE — 25000003 PHARM REV CODE 250: Performed by: STUDENT IN AN ORGANIZED HEALTH CARE EDUCATION/TRAINING PROGRAM

## 2023-09-16 PROCEDURE — 20000000 HC ICU ROOM

## 2023-09-16 PROCEDURE — 80100008 HC CRRT DAILY MAINTENANCE

## 2023-09-16 PROCEDURE — 90945 DIALYSIS ONE EVALUATION: CPT

## 2023-09-16 PROCEDURE — 63600175 PHARM REV CODE 636 W HCPCS: Performed by: INTERNAL MEDICINE

## 2023-09-16 PROCEDURE — 63600175 PHARM REV CODE 636 W HCPCS

## 2023-09-16 PROCEDURE — 80053 COMPREHEN METABOLIC PANEL: CPT | Performed by: STUDENT IN AN ORGANIZED HEALTH CARE EDUCATION/TRAINING PROGRAM

## 2023-09-16 PROCEDURE — 94761 N-INVAS EAR/PLS OXIMETRY MLT: CPT

## 2023-09-16 PROCEDURE — 85025 COMPLETE CBC W/AUTO DIFF WBC: CPT | Mod: 91 | Performed by: STUDENT IN AN ORGANIZED HEALTH CARE EDUCATION/TRAINING PROGRAM

## 2023-09-16 PROCEDURE — 27000207 HC ISOLATION

## 2023-09-16 PROCEDURE — 85730 THROMBOPLASTIN TIME PARTIAL: CPT | Performed by: STUDENT IN AN ORGANIZED HEALTH CARE EDUCATION/TRAINING PROGRAM

## 2023-09-16 PROCEDURE — 99232 SBSQ HOSP IP/OBS MODERATE 35: CPT | Mod: ,,, | Performed by: NURSE PRACTITIONER

## 2023-09-16 PROCEDURE — 83605 ASSAY OF LACTIC ACID: CPT | Performed by: INTERNAL MEDICINE

## 2023-09-16 PROCEDURE — 85025 COMPLETE CBC W/AUTO DIFF WBC: CPT | Performed by: SURGERY

## 2023-09-16 PROCEDURE — 63600175 PHARM REV CODE 636 W HCPCS: Performed by: STUDENT IN AN ORGANIZED HEALTH CARE EDUCATION/TRAINING PROGRAM

## 2023-09-16 PROCEDURE — 99232 SBSQ HOSP IP/OBS MODERATE 35: CPT | Mod: ,,, | Performed by: INTERNAL MEDICINE

## 2023-09-16 PROCEDURE — 84100 ASSAY OF PHOSPHORUS: CPT | Performed by: SURGERY

## 2023-09-16 RX ORDER — MIDODRINE HYDROCHLORIDE 5 MG/1
10 TABLET ORAL DAILY PRN
Status: DISCONTINUED | OUTPATIENT
Start: 2023-09-16 | End: 2023-09-19 | Stop reason: HOSPADM

## 2023-09-16 RX ORDER — PANTOPRAZOLE SODIUM 40 MG/1
40 TABLET, DELAYED RELEASE ORAL 2 TIMES DAILY PRN
Status: DISCONTINUED | OUTPATIENT
Start: 2023-09-16 | End: 2023-09-19 | Stop reason: HOSPADM

## 2023-09-16 RX ORDER — LOPERAMIDE HYDROCHLORIDE 2 MG/1
2 CAPSULE ORAL 4 TIMES DAILY PRN
Status: CANCELLED | OUTPATIENT
Start: 2023-09-16

## 2023-09-16 RX ORDER — IBUPROFEN 200 MG
24 TABLET ORAL
Status: DISCONTINUED | OUTPATIENT
Start: 2023-09-16 | End: 2023-09-19 | Stop reason: HOSPADM

## 2023-09-16 RX ORDER — GLUCAGON 1 MG
1 KIT INJECTION
Status: DISCONTINUED | OUTPATIENT
Start: 2023-09-16 | End: 2023-09-19 | Stop reason: HOSPADM

## 2023-09-16 RX ORDER — CALCIUM CARBONATE 200(500)MG
500 TABLET,CHEWABLE ORAL ONCE
Status: COMPLETED | OUTPATIENT
Start: 2023-09-16 | End: 2023-09-16

## 2023-09-16 RX ORDER — IBUPROFEN 200 MG
16 TABLET ORAL
Status: DISCONTINUED | OUTPATIENT
Start: 2023-09-16 | End: 2023-09-19 | Stop reason: HOSPADM

## 2023-09-16 RX ORDER — LOPERAMIDE HYDROCHLORIDE 2 MG/1
2 CAPSULE ORAL ONCE
Status: COMPLETED | OUTPATIENT
Start: 2023-09-16 | End: 2023-09-16

## 2023-09-16 RX ORDER — INSULIN ASPART 100 [IU]/ML
0-10 INJECTION, SOLUTION INTRAVENOUS; SUBCUTANEOUS
Status: DISCONTINUED | OUTPATIENT
Start: 2023-09-16 | End: 2023-09-19 | Stop reason: HOSPADM

## 2023-09-16 RX ORDER — LOPERAMIDE HYDROCHLORIDE 2 MG/1
2 CAPSULE ORAL ONCE
Status: DISCONTINUED | OUTPATIENT
Start: 2023-09-16 | End: 2023-09-16

## 2023-09-16 RX ADMIN — MUPIROCIN: 20 OINTMENT TOPICAL at 08:09

## 2023-09-16 RX ADMIN — ANTACID TABLETS 500 MG: 500 TABLET, CHEWABLE ORAL at 09:09

## 2023-09-16 RX ADMIN — CLOPIDOGREL BISULFATE 75 MG: 75 TABLET ORAL at 08:09

## 2023-09-16 RX ADMIN — INSULIN DETEMIR 20 UNITS: 100 INJECTION, SOLUTION SUBCUTANEOUS at 10:09

## 2023-09-16 RX ADMIN — HEPARIN SODIUM 5000 UNITS: 5000 INJECTION INTRAVENOUS; SUBCUTANEOUS at 05:09

## 2023-09-16 RX ADMIN — PIPERACILLIN SODIUM AND TAZOBACTAM SODIUM 4.5 G: 4; .5 INJECTION, POWDER, FOR SOLUTION INTRAVENOUS at 12:09

## 2023-09-16 RX ADMIN — SODIUM CHLORIDE: 9 INJECTION, SOLUTION INTRAVENOUS at 04:09

## 2023-09-16 RX ADMIN — ONDANSETRON 4 MG: 2 INJECTION INTRAMUSCULAR; INTRAVENOUS at 07:09

## 2023-09-16 RX ADMIN — SODIUM PHOSPHATE, MONOBASIC, MONOHYDRATE AND SODIUM PHOSPHATE, DIBASIC, ANHYDROUS 30 MMOL: 142; 276 INJECTION, SOLUTION INTRAVENOUS at 02:09

## 2023-09-16 RX ADMIN — HEPARIN SODIUM 5000 UNITS: 5000 INJECTION INTRAVENOUS; SUBCUTANEOUS at 09:09

## 2023-09-16 RX ADMIN — ACETAMINOPHEN 1000 MG: 500 TABLET ORAL at 02:09

## 2023-09-16 RX ADMIN — HYDROCORTISONE SODIUM SUCCINATE 100 MG: 100 INJECTION, POWDER, FOR SOLUTION INTRAMUSCULAR; INTRAVENOUS at 05:09

## 2023-09-16 RX ADMIN — HEPARIN SODIUM 5000 UNITS: 5000 INJECTION INTRAVENOUS; SUBCUTANEOUS at 01:09

## 2023-09-16 RX ADMIN — INSULIN ASPART 2 UNITS: 100 INJECTION, SOLUTION INTRAVENOUS; SUBCUTANEOUS at 05:09

## 2023-09-16 RX ADMIN — TRAZODONE HYDROCHLORIDE 25 MG: 50 TABLET ORAL at 09:09

## 2023-09-16 RX ADMIN — PIPERACILLIN SODIUM AND TAZOBACTAM SODIUM 4.5 G: 4; .5 INJECTION, POWDER, FOR SOLUTION INTRAVENOUS at 05:09

## 2023-09-16 RX ADMIN — INSULIN HUMAN 1.8 UNITS/HR: 1 INJECTION, SOLUTION INTRAVENOUS at 01:09

## 2023-09-16 RX ADMIN — ASPIRIN 81 MG: 81 TABLET, COATED ORAL at 08:09

## 2023-09-16 RX ADMIN — MAGNESIUM SULFATE HEPTAHYDRATE 2 G: 40 INJECTION, SOLUTION INTRAVENOUS at 05:09

## 2023-09-16 RX ADMIN — ATORVASTATIN CALCIUM 80 MG: 40 TABLET, FILM COATED ORAL at 08:09

## 2023-09-16 RX ADMIN — LOPERAMIDE HYDROCHLORIDE 2 MG: 2 CAPSULE ORAL at 02:09

## 2023-09-16 RX ADMIN — OXYCODONE HYDROCHLORIDE 5 MG: 5 TABLET ORAL at 08:09

## 2023-09-16 RX ADMIN — MAGNESIUM SULFATE HEPTAHYDRATE 2 G: 40 INJECTION, SOLUTION INTRAVENOUS at 12:09

## 2023-09-16 NOTE — PROGRESS NOTES
Pharmacist Renal Dose Adjustment Note    Georgina Arias is a 52 y.o. female being treated with the medication Zosyn    Patient Data:    Vital Signs (Most Recent):  Temp: 97.9 °F (36.6 °C) (09/16/23 0302)  Pulse: 102 (09/16/23 0645)  Resp: 11 (09/16/23 0645)  BP: (!) 114/58 (09/16/23 0645)  SpO2: 99 % (09/16/23 0645) Vital Signs (72h Range):  Temp:  [96.2 °F (35.7 °C)-98.4 °F (36.9 °C)]   Pulse:  []   Resp:  [6-42]   BP: ()/(6-74)   SpO2:  [85 %-100 %]   Arterial Line BP: ()/(41-57)      Recent Labs   Lab 09/15/23  0312 09/15/23  2254 09/16/23 0417   CREATININE 3.8*  3.8* 1.5* 1.5*  1.5*     Serum creatinine: 1.5 mg/dL (H) 09/16/23 0417  Estimated creatinine clearance: 56 mL/min (A)  Currently on SCUF    Medication: Zosyn 4.5 g IV Q8h will be changed to Zosyn 4.5 g IV Q12h.    Pharmacist's Name: Leelee Hudson, PharmD  Pharmacist's Extension: [phone]

## 2023-09-16 NOTE — ASSESSMENT & PLAN NOTE
IDSA moderate foot infection left foot.  Status post left great toe amputation.  Patient now has left 1st metatarsal head exposed and probes to bone.  Purulent drainage expressed today. WBC downtrending from 14.61 to 9.41 today.  X-ray has OM like changes noted to the left foot 1st metatarsal head and neck    - 1 day s/p revascularization with Vascular Surgery and L first ray partial amputation with Podiatry  - vascular surgery following.    - Post op Xray: Postoperative changes of transmetatarsal resection of the 1st metatarsal.  Irregularity of the 1st metatarsal remnant on the frontal view which could be related to operative changes.  Overlapping skin staples along the medial aspect of the foot.  Subcutaneous emphysema and soft tissue edema potentially related to recent surgery or infection.  Bones are otherwise similar when compared with recent prior exam.  - Antibiotic plan per ID  - recommend patient use heel offloading boots while lying in bed  - patient okay to weightbear as tolerated bilaterally in postop shoe to heel touch  - podiatry will follow    Discharge instructions:  - patient to follow-up in outpatient podiatry clinic, Podiatry will schedule  - HH to do dressing changes as follows 3x a week:  Adaptic soaked in Betadine followed by 4 x 4 gauze followed by cast padding Kerlix and Ace wraps to left 1st ray amputation site  - antibiotic plan per ID  - patient okay to weightbear as tolerated to heel touch to left foot in postop shoe

## 2023-09-16 NOTE — ASSESSMENT & PLAN NOTE
  Neuro/Psych:     - Sedation: Trazodone 25 qhs    - Pain:    - Scheduled Tylenol 1g q8h   - Oxy 5 PRN             Cardiac:     - S/P Ax fem bypass and stent to SFA with Dr. Maya on 9/14/23    - BP Goal: 100-160    - Pressors: None currently, midodrine 10 prn with dialysis (give 30 minutes prior to dialysis)    - Rhythm: NSR    - Statin: Atorvastatin 80 mg QD      Pulmonary:     - Goal SpO2 >92%    - ABG PRN      Renal:    - Trend BUN/Cr     - Maintain Fraser, record strict Is/Os    - Nephrology consulted for dialysis needs given ESRD    Recent Labs   Lab 09/15/23  0312 09/15/23  2254 09/16/23  0417   BUN 31*  31* 9 8  8   CREATININE 3.8*  3.8* 1.5* 1.5*  1.5*         FEN / GI:     - Daily CMP, PRN K/Mag/Phos per protocol     - Replace electrolytes as needed    - Nutrition: CLD    - Bowel Regimen: Miralax, docusate      ID:     - Afebrile    - WBC stable: down trending    - Abx: Zosyn and vanc to end 9/17    - DC stress dose steroids    Recent Labs   Lab 09/15/23  1935 09/16/23  0215 09/16/23  0417   WBC 19.23* 20.31* 18.49*         Heme/Onc:     - Hgb goal > 7    - CBC daily    - ASA 81    - Plavix    Recent Labs   Lab 09/14/23  2238 09/15/23  0312 09/15/23  1744 09/15/23  1935 09/16/23  0215 09/16/23  0417   HGB 7.4*   < >  --  7.1* 8.6* 7.9*      < >  --  167 152 121*   APTT 21.9  --  25.4  --   --  26.7   INR 1.0  --   --   --   --   --     < > = values in this interval not displayed.         Endocrine:     - CCM Goal BG <180    - HgbA1c: 7.2    - SSI    - POCT glucose 4xs daily    - Hypoglycemia protocol in place       PPx:   Feeding: CLD  Analgesia/Sedation: multimodal / None  Thromboembolic Prevention: Heparin subq  HOB >30: Yes  Stress Ulcer: Pepcid   Glucose Control: Yes, insulin management CCM goal      Lines/Drains/Airway:      RIJ trialysis   Fraser   Left chest ELLE            Dispo/Code Status/Palliative:     - Continue SICU Care    - Full Code

## 2023-09-16 NOTE — CARE UPDATE
SICU Staff Addendum  Please see resident note from 9/16 for full details of todays plan. I have reviewed and concur with the MARK/resident's history, physical, assessment, and plan.  I have personally interviewed and examined the patient at bedside.  See below for any additional findings.    Reason for admission:  S/P femoral-femoral bypass surgery  Present on Admission:   Open wound of left foot   Hyperlipidemia   Peripheral vascular disease   Essential hypertension   ESRD (end stage renal disease)   Type II diabetes mellitus   Severe obesity (BMI >= 40)      Goals for Today:   - POD 2 s/p left ax to bifemoral artery bypass   - got poor sleep overnight. Trazadone qhs 25mg. Implusive, pulling at lines.   - off pressors this morning after being given 1 unit of PRBC. Drop in hemoglobin 2/2 to clotting off CRRT and not being given back the volume.   - continued on vanc/ zosyn. NGTD on culture data. Now that off pressors touch base with primary regarding dc of abx. Started on stress dose steroids yesterday. Will DC today.   - minimal o2 requirements to no supplemental oxygen needed   - nephrology following for renal replacement therapy. Discuss HD potentially for tomorrow now that shes off pressors  - advance her diet to clears assuming she passes bedside swallow   - cont dapt  - heparin for DVT Ppx     40  minutes of critical care time was spent personally by me on the following activities: development of treatment plan with patient or surrogate and bedside caregivers, discussions with consultants, evaluation of patient's response to treatment, examination of patient, ordering and performing treatments and interventions, ordering and review of laboratory studies, ordering and review of radiographic studies, pulse oximetry, re-evaluation of patient's condition.  This critical care time did not overlap with that of any other provider or involve time for any procedures.    Ita Carrera MD  Anesthesia Critical  Care  Spectra [phone]

## 2023-09-16 NOTE — ASSESSMENT & PLAN NOTE
Georgina Arias is a 52 y.o. female with ESRD, HTN, PAD, DM2, who is here today for non-healing L foot wound s/p L great toe amputation on 8/25/23. S/p axillary, bifem bypass 9/14/23.    - ASA/hep gtt/plavix  - Nephrology consulted for HD  - Podiatry following. S/p L foot debridement 9/14/23  - TTE LVEF 50-55%  - Continue IV abx  - prn pain meds, nausea meds  - float heels  - Advance to CLD  - ok for OOB  - PT/OT  - Trend labs  - Monitor drain output  - q1h neurovascular checks

## 2023-09-16 NOTE — PROGRESS NOTES
[facility]  Vascular Surgery  Progress Note    Patient Name: Georgina Arias  MRN: [id number]  Admission Date: 9/11/2023  Primary Care Provider: Alonso Ling MD    Subjective:     Interval History: Patient doing better this am. More alert on exam. Off pressors. Pedal signal present and palpable L radial pulse.     Post-Op Info:  Procedure(s) (LRB):  CREATION, BYPASS, ARTERIAL, AXILLARY TO BILATERAL FEMORAL (Left)  ANGIOGRAM, LOWER EXTREMITY with balloon angioplasty ultraverse 5mm x 60mm (Left)  STENT, SUPERFICIAL FEMORAL ARTERY (Left)  AMPUTATION, FOOT, TRANSMETATARSAL partial 1st ray amputation (Left)  INCISION AND DRAINAGE, FOOT (Left)   2 Days Post-Op       Medications:  Continuous Infusions:   sodium chloride 0.9% 200 mL/hr at 09/16/23 0646    heparin (porcine) in 5 % dex 200 Units/hr (09/16/23 0646)    insulin regular 1 units/mL infusion orderable (DKA) 3.15 Units/hr (09/16/23 0646)     Scheduled Meds:   sodium chloride 0.9%   Intravenous Once    acetaminophen  1,000 mg Oral Q8H    aspirin  81 mg Oral Daily    atorvastatin  80 mg Oral Daily    clopidogreL  75 mg Oral Daily    heparin (porcine)  5,000 Units Subcutaneous Q8H    mupirocin   Nasal BID    piperacillin-tazobactam (Zosyn) IV (PEDS and ADULTS) (extended infusion is not appropriate)  4.5 g Intravenous Q12H    traZODone  25 mg Oral QHS     PRN Meds:0.9%  NaCl infusion (for blood administration), 0.9%  NaCl infusion (for blood administration), dextrose 10%, dextrose 10%, diphenhydrAMINE, glucagon (human recombinant), glucose, glucose, heparin (porcine), hydrALAZINE, labetalol, LIDOcaine (PF) 10 mg/ml (1%), magnesium sulfate IVPB, midodrine, ondansetron, oxyCODONE, sodium chloride 0.9%, sodium chloride 0.9%, sodium phosphate 20.01 mmol in dextrose 5 % (D5W) 250 mL IVPB, sodium phosphate 30 mmol in dextrose 5 % (D5W) 250 mL IVPB, sodium phosphate 39.99 mmol in dextrose 5 % (D5W) 250 mL IVPB, Pharmacy to dose  Vancomycin consult **AND** vancomycin - pharmacy to dose     Objective:     Vital Signs (Most Recent):  Temp: 98.2 °F (36.8 °C) (09/16/23 0705)  Pulse: 100 (09/16/23 1030)  Resp: 16 (09/16/23 1030)  BP: (!) 119/58 (09/16/23 1030)  SpO2: 95 % (09/16/23 1030) Vital Signs (24h Range):  Temp:  [97 °F (36.1 °C)-98.4 °F (36.9 °C)] 98.2 °F (36.8 °C)  Pulse:  [] 100  Resp:  [8-27] 16  SpO2:  [89 %-100 %] 95 %  BP: ()/(41-69) 119/58     Date 09/16/23 0700 - 09/17/23 0659   Shift 1491-1775 9636-6619 5450-3733 24 Hour Total   INTAKE   Shift Total(mL/kg)       OUTPUT   Other 1346   1346   Shift Total(mL/kg) 1346(12.3)   1346(12.3)   Weight (kg) 109.8 109.8 109.8 109.8         Physical Exam  Constitutional:       General: She is not in acute distress.  HENT:      Head: Normocephalic and atraumatic.   Cardiovascular:      Rate and Rhythm: Normal rate.      Comments: 2+ L radial pulse  B/l biphasic dp/pt signals  Pulmonary:      Effort: Pulmonary effort is normal.   Abdominal:      Palpations: Abdomen is soft.   Musculoskeletal:         General: Normal range of motion.   Skin:     General: Skin is warm and dry.      Comments: L foot s/p debridement with podiatry. Wrapped in ace  B/l groin incisions covered by prevenas  L upper chest incision covered with gauze and tegaderm. ELLE drain in place with SS output          Significant Labs:  CBC:   Recent Labs   Lab 09/16/23 0417   WBC 18.49*   RBC 2.61*   HGB 7.9*   HCT 23.8*   *   MCV 91   MCH 30.3   MCHC 33.2       CMP:   Recent Labs   Lab 09/16/23 0417   *  223*   CALCIUM 7.0*  7.0*   ALBUMIN 2.0*  2.0*   PROT 4.8*   *  130*   K 3.9  3.9   CO2 23  23   CL 96  96   BUN 8  8   CREATININE 1.5*  1.5*   ALKPHOS 59   ALT 37   AST 35   BILITOT 0.5         Significant Diagnostics:  I have reviewed all pertinent imaging results/findings within the past 24 hours.    Assessment/Plan:     Open wound of left foot  Georgina Arias is a 52 y.o. female  with ESRD, HTN, PAD, DM2, who is here today for non-healing L foot wound s/p L great toe amputation on 8/25/23. S/p axillary, bifem bypass 9/14/23.    - ASA/hep gtt/plavix  - Nephrology consulted for HD  - Podiatry following. S/p L foot debridement 9/14/23  - TTE LVEF 50-55%  - Continue IV abx  - prn pain meds, nausea meds  - float heels  - Advance to CLD  - ok for OOB once switched to HD  - Trend labs  - Monitor drain output  - q1h neurovascular checks      Marta Schultz MD  Vascular Surgery  [facility]

## 2023-09-16 NOTE — NURSING
"      SICU PLAN OF CARE NOTE    Dx: S/P femoral-femoral bypass surgery    Vital Signs: BP (!) 99/50   Pulse (!) 112   Temp 98.4 °F (36.9 °C) (Oral)   Resp 19   Ht 5' 7" (1.702 m)   Wt 109.8 kg (242 lb)   LMP 09/11/2020 (Approximate)   SpO2 100%   Breastfeeding No   BMI 37.90 kg/m²     SHIFT EVENTS:  VSS / No acute events this shift.     Neuro: Follows Commands, Moves All Extremities, and Confused  Pt oriented to self and place only     Respiratory: Nasal Cannula 6L NC     Cardiac: labile blood pressure thoughout shift. On & off vasopressin  Currently at 0.1 mch/kg/min of levophed & vasopressin restarted at 0.04 units/min. MD at bedside.     Doppler bilateral DP & PT pulses. Pulses checked with on coming  Diet: NPO    Gtts: Insulin, Norepinephrine, and Vasopressin     Urine Output: Urinary Catheter  10 ml/shift. Pt is on CRRT. Pt required crrt to be restarted twice during shift due to clotting. CRRT restarted again at 1800 with heparin started for system clotting. UF rate started and currently at 200 an hour     Drains:     ELLE Drain, total output 50 cc / shift      Pt is on CRRT. Pt required crrt to be restarted twice during shift due to clotting. CRRT restarted again at 1800 with heparin started for system clotting. UF rate started and currently at 200 an hour     Restraints   bilateral soft wrist restraints. Pt began pulling at lines and trying to get out of bed.     Wound Vac to bilateral grounds.      Labs/Accuchecks: insulin gtt. Q1h accut check. Titrated per nomogram. See MAR for full details .    SKIN NOTE:    Skin precautions maintained including:  Sacrum and heels with foam dressing in place for pressure protection. Frequent weight shift encouraged / assistance provided Q2 hr to prevent further breakdown. Bed plugged in and mattress inflated; continuous rotational turning bed feature / [employer] bed utilized. Adhesive use limited. Heels elevated off bed. Positioned off wounds. Pressure " points protected and positioning supports utilized.  Skin-to-device areas padded. Skin-to-skin areas padded    POC reviewed with patient and family; questions and concerns addressed. See flow sheets for full assessment details.

## 2023-09-16 NOTE — PROGRESS NOTES
09/16/23 0230   Treatment   Treatment Type SCUF   Treatment Status Daily equipment check;Order change   Dialysis Machine Number [id number]   Dialyzer Time (hours) 8.33   BVP (Liters) 95.8 L   Solutions Labeled and Current  Yes   Access Temporary Cath;Right   Catheter Dressing Intact  Yes   Alarms Engaged Yes   CRRT Comments daily check   $ CRRT Charges   $ CRRT Daily Assessment Complete   $ CRRT Daily Maintenance Complete     Daily check completed. Orders and machine settings verified.

## 2023-09-16 NOTE — SUBJECTIVE & OBJECTIVE
Interval History/Significant Events: While on CRRT became hypotenzive, required increased pressors. Additionally, H/H at that time was low and received unit prbc, after prbc admin came off pressors. Currently not on supplemental O2. Will pull at lines despite being AXOx4.     Follow-up For: Procedure(s) (LRB):  CREATION, BYPASS, ARTERIAL, AXILLARY TO BILATERAL FEMORAL (Left)  ANGIOGRAM, LOWER EXTREMITY with balloon angioplasty ultraverse 5mm x 60mm (Left)  STENT, SUPERFICIAL FEMORAL ARTERY (Left)  AMPUTATION, FOOT, TRANSMETATARSAL partial 1st ray amputation (Left)  INCISION AND DRAINAGE, FOOT (Left)    Post-Operative Day: 2 Days Post-Op    Objective:     Vital Signs (Most Recent):  Temp: 97.9 °F (36.6 °C) (09/16/23 0302)  Pulse: 102 (09/16/23 0645)  Resp: 11 (09/16/23 0645)  BP: (!) 114/58 (09/16/23 0645)  SpO2: 99 % (09/16/23 0645) Vital Signs (24h Range):  Temp:  [97 °F (36.1 °C)-98.4 °F (36.9 °C)] 97.9 °F (36.6 °C)  Pulse:  [] 102  Resp:  [6-27] 11  SpO2:  [93 %-100 %] 99 %  BP: ()/(41-71) 114/58     Weight: 109.8 kg (242 lb)  Body mass index is 37.9 kg/m².      Intake/Output Summary (Last 24 hours) at 9/16/2023 0712  Last data filed at 9/16/2023 0646  Gross per 24 hour   Intake 4788.76 ml   Output 4556 ml   Net 232.76 ml          Physical Exam  Vitals and nursing note reviewed.   Constitutional:       General: She is not in acute distress.     Appearance: Normal appearance. She is obese.   HENT:      Head: Normocephalic and atraumatic.   Eyes:      Extraocular Movements: Extraocular movements intact.      Conjunctiva/sclera: Conjunctivae normal.   Cardiovascular:      Rate and Rhythm: Regular rhythm. Tachycardia present.      Comments: Dopplerable posterior tibial signals   Pulmonary:      Effort: Pulmonary effort is normal. No respiratory distress.   Abdominal:      General: There is no distension.      Palpations: Abdomen is soft.      Tenderness: There is no abdominal tenderness.    Genitourinary:     Comments:  Bilateral groins with Prevena wound vacs in place, soft with no evidence of hematoma  Musculoskeletal:      Cervical back: Normal range of motion.      Comments:  S/p left 1st metatarsal amp with dressing in place c/d/I  ELLE drain in place at left upper chest with incision c/d/I    Skin:     General: Skin is warm and dry.      Coloration: Skin is not jaundiced.   Neurological:      Mental Status: She is alert and oriented to person, place, and time.   Psychiatric:         Mood and Affect: Mood normal.            Vents:       Lines/Drains/Airways       Central Venous Catheter Line  Duration             Trialysis (Dialysis) Catheter 09/14/23 1600 right internal jugular 1 day              Drain  Duration                  Closed/Suction Drain 09/14/23 1435 Tube - 1 Left;Superior Chest Bulb 15 Fr. 1 day         Urethral Catheter 09/14/23 0806 Non-latex 16 Fr. 1 day              Peripheral Intravenous Line  Duration                  Hemodialysis AV Fistula  Right forearm -- days         Peripheral IV - Single Lumen 09/15/23 1900 18 G Anterior;Left;Proximal Forearm <1 day                    Significant Labs:    CBC/Anemia Profile:  Recent Labs   Lab 09/15/23  1935 09/16/23  0215 09/16/23  0417   WBC 19.23* 20.31* 18.49*   HGB 7.1* 8.6* 7.9*   HCT 21.3* 25.4* 23.8*    152 121*   MCV 95 90 91   RDW 17.0* 17.4* 17.7*        Chemistries:  Recent Labs   Lab 09/14/23  2238 09/15/23  0312 09/15/23  2254 09/16/23  0417 09/16/23  0546   * 135*  135* 131* 130*  130*  --    K 5.2* 3.8  3.8 3.9 3.9  3.9  --     101  101 98 96  96  --    CO2 16* 17*  17* 26 23 23  --    BUN 41* 31*  31* 9 8  8  --    CREATININE 5.2* 3.8*  3.8* 1.5* 1.5*  1.5*  --    CALCIUM 7.4* 7.4*  7.4* 7.4* 7.0*  7.0*  --    ALBUMIN 1.9* 2.0*  2.0* 2.1* 2.0*  2.0*  --    PROT 4.9* 4.8*  --  4.8*  --    BILITOT 0.4 0.3  --  0.5  --    ALKPHOS 80 76  --  59  --    ALT 42 46*  --  37  --    AST 50* 57*   --  35  --    MG 2.2 1.8 1.7 1.8  --    PHOS 7.8* 4.8* 2.2* 8.0* 4.5       CMP:   Recent Labs   Lab 09/14/23  2238 09/15/23  0312 09/15/23  2254 09/16/23  0417   * 135*  135* 131* 130*  130*   K 5.2* 3.8  3.8 3.9 3.9  3.9    101  101 98 96  96   CO2 16* 17*  17* 26 23 23   * 263*  263* 121* 223*  223*   BUN 41* 31*  31* 9 8  8   CREATININE 5.2* 3.8*  3.8* 1.5* 1.5*  1.5*   CALCIUM 7.4* 7.4*  7.4* 7.4* 7.0*  7.0*   PROT 4.9* 4.8*  --  4.8*   ALBUMIN 1.9* 2.0*  2.0* 2.1* 2.0*  2.0*   BILITOT 0.4 0.3  --  0.5   ALKPHOS 80 76  --  59   AST 50* 57*  --  35   ALT 42 46*  --  37   ANIONGAP 15 17*  17* 7* 11  11     All pertinent labs within the past 24 hours have been reviewed.    Significant Imaging:  I have reviewed all pertinent imaging results/findings within the past 24 hours.

## 2023-09-16 NOTE — PROGRESS NOTES
[facility]  Podiatry  Progress Note    Patient Name: Georgina Arias  MRN: [id number]  Admission Date: 9/11/2023  Hospital Length of Stay: 4 days  Attending Physician: Maxx Maya MD  Primary Care Provider: Alonso Ling MD     Subjective:     Interval History: NAEON. WBC is elevated at 24.94. Patient is 1 day s/p L 1st ray resection. She is doing well. Patient was laying in bed when seen bedside today. Denies f/c/n/v. Denies and other pedal complaints.     Follow-up For: Procedure(s) (LRB):  CREATION, BYPASS, ARTERIAL, AXILLARY TO BILATERAL FEMORAL (Left)  ANGIOGRAM, LOWER EXTREMITY with balloon angioplasty ultraverse 5mm x 60mm (Left)  STENT, SUPERFICIAL FEMORAL ARTERY (Left)  AMPUTATION, FOOT, TRANSMETATARSAL partial 1st ray amputation (Left)  INCISION AND DRAINAGE, FOOT (Left)    Post-Operative Day: 1 Day Post-Op    Scheduled Meds:   sodium chloride 0.9%   Intravenous Once    acetaminophen  1,000 mg Oral Q8H    [START ON 9/16/2023] aspirin  81 mg Oral Daily    atorvastatin  80 mg Oral Daily    [START ON 9/16/2023] clopidogreL  75 mg Oral Daily    heparin (porcine)  5,000 Units Subcutaneous Q8H    hydrocortisone sodium succinate  100 mg Intravenous Q8H    mupirocin   Nasal BID    piperacillin-tazobactam (Zosyn) IV (PEDS and ADULTS) (extended infusion is not appropriate)  4.5 g Intravenous Q8H     Continuous Infusions:   sodium chloride 0.9% 200 mL/hr at 09/15/23 1900    heparin (porcine) in 5 % dex 200 Units/hr (09/15/23 1900)    insulin regular 1 units/mL infusion orderable (DKA) 9.7 Units/hr (09/15/23 1900)    NORepinephrine bitartrate-D5W 0.08 mcg/kg/min (09/15/23 1900)    vasopressin Stopped (09/15/23 1329)     PRN Meds:0.9%  NaCl infusion (for blood administration), dextrose 10%, dextrose 10%, diphenhydrAMINE, glucagon (human recombinant), glucose, glucose, heparin (porcine), hydrALAZINE, labetalol, LIDOcaine (PF) 10 mg/ml (1%), magnesium sulfate IVPB, ondansetron,  oxyCODONE, sodium chloride 0.9%, sodium chloride 0.9%, sodium phosphate 20.01 mmol in dextrose 5 % (D5W) 250 mL IVPB, sodium phosphate 30 mmol in dextrose 5 % (D5W) 250 mL IVPB, sodium phosphate 39.99 mmol in dextrose 5 % (D5W) 250 mL IVPB, Pharmacy to dose Vancomycin consult **AND** vancomycin - pharmacy to dose    Review of Systems   Constitutional:  Negative for chills and fever.   Gastrointestinal:  Negative for nausea and vomiting.   Musculoskeletal:  Positive for gait problem. Negative for arthralgias and joint swelling.   Skin:  Positive for color change and wound.   Psychiatric/Behavioral:  Negative for agitation, behavioral problems and confusion.      Objective:     Vital Signs (Most Recent):  Temp: 98.4 °F (36.9 °C) (09/15/23 1500)  Pulse: (!) 111 (09/15/23 1809)  Resp: (!) 25 (09/15/23 1809)  BP: (!) 99/50 (09/15/23 1830)  SpO2: 98 % (09/15/23 1809) Vital Signs (24h Range):  Temp:  [96.2 °F (35.7 °C)-98.4 °F (36.9 °C)] 98.4 °F (36.9 °C)  Pulse:  [] 111  Resp:  [6-42] 25  SpO2:  [85 %-100 %] 98 %  BP: ()/(28-73) 99/50  Arterial Line BP: ()/(41-57) 108/48     Weight: 109.8 kg (242 lb)  Body mass index is 37.9 kg/m².    Foot Exam     General  Orientation: alert and oriented to person, place, and time     Right Foot/Ankle      Inspection and Palpation  Skin Exam: callus;      Neurovascular  Dorsalis pedis: absent  Posterior tibial: absent  Saphenous nerve sensation: diminished  Tibial nerve sensation: diminished  Superficial peroneal nerve sensation: diminished  Deep peroneal nerve sensation: diminished  Sural nerve sensation: diminished     Comments  Right foot:  Callus noted sub 1st MTPJ.      Left Foot/Ankle       Inspection and Palpation  Skin Exam: drainage, cellulitis, abnormal color, ulcer and erythema;      Neurovascular  Dorsalis pedis: absent  Posterior tibial: absent  Saphenous nerve sensation: diminished  Tibial nerve sensation: diminished  Superficial peroneal nerve sensation:  diminished  Deep peroneal nerve sensation: diminished  Sural nerve sensation: diminished     Comments  Left foot:  1 day s/p left first partial ray amputation.  Incision site closed with staples.  No dehiscence or drainage from the incision site.  Mild pain to palpation of the surgical site.  No maceration noted.  Mild erythema noted at the distal portion of the incision, which is normal postop.          Laboratory:  CBC:   Recent Labs   Lab 09/15/23  0445   WBC 24.94*   RBC 2.73*   HGB 8.6*   HCT 26.3*      MCV 96   MCH 31.5*   MCHC 32.7     CMP:   Recent Labs   Lab 09/15/23  0312   *  263*   CALCIUM 7.4*  7.4*   ALBUMIN 2.0*  2.0*   PROT 4.8*   *  135*   K 3.8  3.8   CO2 17*  17*     101   BUN 31*  31*   CREATININE 3.8*  3.8*   ALKPHOS 76   ALT 46*   AST 57*   BILITOT 0.3     Microbiology Results (last 7 days)       Procedure Component Value Units Date/Time    Blood culture [[id number]] Collected: 09/15/23 1520    Order Status: Sent Specimen: Blood from Peripheral, Antecubital, Right Updated: 09/15/23 1530    Blood culture [[id number]]     Order Status: Sent Specimen: Blood     AFB Culture & Smear [[id number]] Collected: 09/14/23 1654    Order Status: Completed Specimen: Biopsy from Foot, Left Updated: 09/15/23 1221     AFB CULTURE STAIN No acid fast bacilli seen.    Narrative:      1. Left 1st metatarsal clean margins-permanent    Gram stain [[id number]] Collected: 09/14/23 1654    Order Status: Completed Specimen: Biopsy from Foot, Left Updated: 09/15/23 0208     Gram Stain Result No WBC's, epithelial cells or organisms seen    Narrative:      1. Left 1st metatarsal clean margins-permanent    Culture, Anaerobe [[id number]] Collected: 09/14/23 1654    Order Status: Sent Specimen: Biopsy from Foot, Left Updated: 09/14/23 1717    Aerobic culture [[id number]] Collected: 09/14/23 1654    Order Status: Sent Specimen: Biopsy from Foot, Left Updated: 09/14/23 1717    Fungus culture  [[id number]] Collected: 09/14/23 1654    Order Status: Sent Specimen: Biopsy from Foot, Left Updated: 09/14/23 1717    Culture, Anaerobic [[id number]] Collected: 09/12/23 1158    Order Status: Completed Specimen: Wound from Toe, Left Foot Updated: 09/14/23 1302     Anaerobic Culture Culture in progress    Aerobic culture [[id number]] Collected: 09/12/23 1158    Order Status: Completed Specimen: Wound from Toe, Left Foot Updated: 09/14/23 1036     Aerobic Bacterial Culture Skin meño,  no predominant organism    AFB Culture & Smear [[id number]] Collected: 09/12/23 1158    Order Status: Completed Specimen: Wound from Toe, Left Foot Updated: 09/13/23 2127     AFB Culture & Smear Culture in progress     AFB CULTURE STAIN No acid fast bacilli seen.    Gram stain [[id number]] Collected: 09/12/23 1158    Order Status: Completed Specimen: Wound from Toe, Left Foot Updated: 09/12/23 1456     Gram Stain Result Moderate WBC's      Rare Gram positive cocci    Fungus culture [[id number]] Collected: 09/12/23 1158    Order Status: Sent Specimen: Wound from Toe, Left Foot Updated: 09/12/23 1220          Diagnostic Results:  I have reviewed all pertinent imaging results/findings within the past 24 hours.    Assessment/Plan:     Orthopedic  Open wound of left foot  IDSA moderate foot infection left foot.  Status post left great toe amputation.  Patient now has left 1st metatarsal head exposed and probes to bone.  Purulent drainage expressed today. WBC downtrending from 14.61 to 9.41 today.  X-ray has OM like changes noted to the left foot 1st metatarsal head and neck    - 1 day s/p revascularization with Vascular Surgery and L first ray partial amputation with Podiatry  - vascular surgery following.    - Post op Xray: Postoperative changes of transmetatarsal resection of the 1st metatarsal.  Irregularity of the 1st metatarsal remnant on the frontal view which could be related to operative changes.  Overlapping skin staples along the  medial aspect of the foot.  Subcutaneous emphysema and soft tissue edema potentially related to recent surgery or infection.  Bones are otherwise similar when compared with recent prior exam.  - Antibiotic plan per ID  - recommend patient use heel offloading boots while lying in bed  - patient okay to weightbear as tolerated bilaterally in postop shoe to heel touch  - podiatry will follow    Discharge instructions:  - patient to follow-up in outpatient podiatry clinic, Podiatry will schedule  - HH to do dressing changes as follows 3x a week:  Adaptic soaked in Betadine followed by 4 x 4 gauze followed by cast padding Kerlix and Ace wraps to left 1st ray amputation site  - antibiotic plan per ID  - patient okay to weightbear as tolerated to heel touch to left foot in postop shoe      I have reviewed and concur with the resident's history, physical, assessment, and plan.  I have personally interviewed and examined the patient at bedside.  See below addendum for my evaluation and additional findings.    Anderson Herman DPM  Podiatry  [facility]

## 2023-09-16 NOTE — SUBJECTIVE & OBJECTIVE
Subjective:     Interval History: NAEON. WBC is elevated at 24.94. Patient is 1 day s/p L 1st ray resection. She is doing well. Patient was laying in bed when seen bedside today. Denies f/c/n/v. Denies and other pedal complaints.     Follow-up For: Procedure(s) (LRB):  CREATION, BYPASS, ARTERIAL, AXILLARY TO BILATERAL FEMORAL (Left)  ANGIOGRAM, LOWER EXTREMITY with balloon angioplasty ultraverse 5mm x 60mm (Left)  STENT, SUPERFICIAL FEMORAL ARTERY (Left)  AMPUTATION, FOOT, TRANSMETATARSAL partial 1st ray amputation (Left)  INCISION AND DRAINAGE, FOOT (Left)    Post-Operative Day: 1 Day Post-Op    Scheduled Meds:   sodium chloride 0.9%   Intravenous Once    acetaminophen  1,000 mg Oral Q8H    [START ON 9/16/2023] aspirin  81 mg Oral Daily    atorvastatin  80 mg Oral Daily    [START ON 9/16/2023] clopidogreL  75 mg Oral Daily    heparin (porcine)  5,000 Units Subcutaneous Q8H    hydrocortisone sodium succinate  100 mg Intravenous Q8H    mupirocin   Nasal BID    piperacillin-tazobactam (Zosyn) IV (PEDS and ADULTS) (extended infusion is not appropriate)  4.5 g Intravenous Q8H     Continuous Infusions:   sodium chloride 0.9% 200 mL/hr at 09/15/23 1900    heparin (porcine) in 5 % dex 200 Units/hr (09/15/23 1900)    insulin regular 1 units/mL infusion orderable (DKA) 9.7 Units/hr (09/15/23 1900)    NORepinephrine bitartrate-D5W 0.08 mcg/kg/min (09/15/23 1900)    vasopressin Stopped (09/15/23 1329)     PRN Meds:0.9%  NaCl infusion (for blood administration), dextrose 10%, dextrose 10%, diphenhydrAMINE, glucagon (human recombinant), glucose, glucose, heparin (porcine), hydrALAZINE, labetalol, LIDOcaine (PF) 10 mg/ml (1%), magnesium sulfate IVPB, ondansetron, oxyCODONE, sodium chloride 0.9%, sodium chloride 0.9%, sodium phosphate 20.01 mmol in dextrose 5 % (D5W) 250 mL IVPB, sodium phosphate 30 mmol in dextrose 5 % (D5W) 250 mL IVPB, sodium phosphate 39.99 mmol in dextrose 5 % (D5W) 250 mL IVPB, Pharmacy to dose Vancomycin  consult **AND** vancomycin - pharmacy to dose    Review of Systems   Constitutional:  Negative for chills and fever.   Gastrointestinal:  Negative for nausea and vomiting.   Musculoskeletal:  Positive for gait problem. Negative for arthralgias and joint swelling.   Skin:  Positive for color change and wound.   Psychiatric/Behavioral:  Negative for agitation, behavioral problems and confusion.      Objective:     Vital Signs (Most Recent):  Temp: 98.4 °F (36.9 °C) (09/15/23 1500)  Pulse: (!) 111 (09/15/23 1809)  Resp: (!) 25 (09/15/23 1809)  BP: (!) 99/50 (09/15/23 1830)  SpO2: 98 % (09/15/23 1809) Vital Signs (24h Range):  Temp:  [96.2 °F (35.7 °C)-98.4 °F (36.9 °C)] 98.4 °F (36.9 °C)  Pulse:  [] 111  Resp:  [6-42] 25  SpO2:  [85 %-100 %] 98 %  BP: ()/(28-73) 99/50  Arterial Line BP: ()/(41-57) 108/48     Weight: 109.8 kg (242 lb)  Body mass index is 37.9 kg/m².    Foot Exam     General  Orientation: alert and oriented to person, place, and time     Right Foot/Ankle      Inspection and Palpation  Skin Exam: callus;      Neurovascular  Dorsalis pedis: absent  Posterior tibial: absent  Saphenous nerve sensation: diminished  Tibial nerve sensation: diminished  Superficial peroneal nerve sensation: diminished  Deep peroneal nerve sensation: diminished  Sural nerve sensation: diminished     Comments  Right foot:  Callus noted sub 1st MTPJ.      Left Foot/Ankle       Inspection and Palpation  Skin Exam: drainage, cellulitis, abnormal color, ulcer and erythema;      Neurovascular  Dorsalis pedis: absent  Posterior tibial: absent  Saphenous nerve sensation: diminished  Tibial nerve sensation: diminished  Superficial peroneal nerve sensation: diminished  Deep peroneal nerve sensation: diminished  Sural nerve sensation: diminished     Comments  Left foot:  1 day s/p left first partial ray amputation.  Incision site closed with staples.  No dehiscence or drainage from the incision site.  Mild pain to  palpation of the surgical site.  No maceration noted.  Mild erythema noted at the distal portion of the incision, which is normal postop.          Laboratory:  CBC:   Recent Labs   Lab 09/15/23  0445   WBC 24.94*   RBC 2.73*   HGB 8.6*   HCT 26.3*      MCV 96   MCH 31.5*   MCHC 32.7     CMP:   Recent Labs   Lab 09/15/23  0312   *  263*   CALCIUM 7.4*  7.4*   ALBUMIN 2.0*  2.0*   PROT 4.8*   *  135*   K 3.8  3.8   CO2 17*  17*     101   BUN 31*  31*   CREATININE 3.8*  3.8*   ALKPHOS 76   ALT 46*   AST 57*   BILITOT 0.3     Microbiology Results (last 7 days)       Procedure Component Value Units Date/Time    Blood culture [[id number]] Collected: 09/15/23 1520    Order Status: Sent Specimen: Blood from Peripheral, Antecubital, Right Updated: 09/15/23 1530    Blood culture [[id number]]     Order Status: Sent Specimen: Blood     AFB Culture & Smear [[id number]] Collected: 09/14/23 1654    Order Status: Completed Specimen: Biopsy from Foot, Left Updated: 09/15/23 1221     AFB CULTURE STAIN No acid fast bacilli seen.    Narrative:      1. Left 1st metatarsal clean margins-permanent    Gram stain [[id number]] Collected: 09/14/23 1654    Order Status: Completed Specimen: Biopsy from Foot, Left Updated: 09/15/23 0208     Gram Stain Result No WBC's, epithelial cells or organisms seen    Narrative:      1. Left 1st metatarsal clean margins-permanent    Culture, Anaerobe [[id number]] Collected: 09/14/23 1654    Order Status: Sent Specimen: Biopsy from Foot, Left Updated: 09/14/23 1717    Aerobic culture [[id number]] Collected: 09/14/23 1654    Order Status: Sent Specimen: Biopsy from Foot, Left Updated: 09/14/23 1717    Fungus culture [[id number]] Collected: 09/14/23 1654    Order Status: Sent Specimen: Biopsy from Foot, Left Updated: 09/14/23 1717    Culture, Anaerobic [[id number]] Collected: 09/12/23 1158    Order Status: Completed Specimen: Wound from Toe, Left Foot Updated: 09/14/23 8136      Anaerobic Culture Culture in progress    Aerobic culture [[id number]] Collected: 09/12/23 1158    Order Status: Completed Specimen: Wound from Toe, Left Foot Updated: 09/14/23 1036     Aerobic Bacterial Culture Skin meño,  no predominant organism    AFB Culture & Smear [[id number]] Collected: 09/12/23 1158    Order Status: Completed Specimen: Wound from Toe, Left Foot Updated: 09/13/23 2127     AFB Culture & Smear Culture in progress     AFB CULTURE STAIN No acid fast bacilli seen.    Gram stain [[id number]] Collected: 09/12/23 1158    Order Status: Completed Specimen: Wound from Toe, Left Foot Updated: 09/12/23 1456     Gram Stain Result Moderate WBC's      Rare Gram positive cocci    Fungus culture [[id number]] Collected: 09/12/23 1158    Order Status: Sent Specimen: Wound from Toe, Left Foot Updated: 09/12/23 1220          Diagnostic Results:  I have reviewed all pertinent imaging results/findings within the past 24 hours.

## 2023-09-16 NOTE — PROGRESS NOTES
[facility] - Surgical Intensive Care  Endocrinology  Progress Note    Admit Date: 9/11/2023     Reason for Consult: Management of T2DM, Hyperglycemia     Surgical Procedure and Date:  9/14/23  CREATION, BYPASS, ARTERIAL, AXILLARY TO BILATERAL FEMORAL (Left)  ANGIOGRAM, LOWER EXTREMITY with balloon angioplasty ultraverse 5mm x 60mm (Left)  STENT, SUPERFICIAL FEMORAL ARTERY (Left)  AMPUTATION, FOOT, TRANSMETATARSAL partial 1st ray amputation (Left)  INCISION AND DRAINAGE, FOOT (Left)    Lab Results   Component Value Date    HGBA1C 7.2 (H) 09/11/2023       Diabetes diagnosis year: 1993    Home Diabetes Medications:  Lantus 45 units q HS, Mounjaro 5 mg once weekly     How often checking glucose at home?  2x daily     BG readings on regimen:  mostly low to mid 100s  Hypoglycemia on the regimen?  No  Missed doses on regimen?  No    Diabetes Complications include:     Hyperglycemia, Diabetic nephropathy  , Diabetic chronic kidney disease     , and Amputation status    Complicating diabetes co morbidities:   HTN, HLD, Obesity, ESRD       HPI:   Patient is a 52 y.o. female with a diagnosis of PONV, ESRD on HD, T2DM on insulin and mounjaro (A1c 7.2), PVD s/p stents to RLE, DVT of R leg, HTN, HLD, PUD, obesity (BMI 38), and three recent left foot surgeries in August. Most recently uderwent amputation of the L hallux at the [facility] on 8/25/23 for reported gangrene and osteomyelitis. She is currently admitted to vascular surgery for non-healing left foot wound. Pt presents to the SICU s/p Left ax to bi-femoral artery bypass, stent to SFA, and Transmetatarsal amputation of the left foot and washout with Dr. Maya and Dr. Valles on 9/14/23. Will admit to SICU for further hemodynamic monitoring and q1h neurovascular checks. Endocrinology consulted for management of T2DM.            Interval HPI:   Overnight events: POD 2. Remains in SICU. BG within goal ranges on IV intensive insulin protocol with infusion rates ranging from 1-3 u/hr.   "CRRT. Diet clear liquid    Eating:    clear liquids  Nausea: No  Hypoglycemia and intervention: No  Fever: No  TPN and/or TF: No  If yes, type of TF/TPN and rate: n/a    BP (!) 123/56   Pulse 97   Temp 98.2 °F (36.8 °C) (Axillary)   Resp 15   Ht 5' 7" (1.702 m)   Wt 109.8 kg (242 lb)   LMP 09/11/2020 (Approximate)   SpO2 (!) 91%   Breastfeeding No   BMI 37.90 kg/m²     Labs Reviewed and Include    Recent Labs   Lab 09/16/23 0417   *  223*   CALCIUM 7.0*  7.0*   ALBUMIN 2.0*  2.0*   PROT 4.8*   *  130*   K 3.9  3.9   CO2 23  23   CL 96  96   BUN 8  8   CREATININE 1.5*  1.5*   ALKPHOS 59   ALT 37   AST 35   BILITOT 0.5     Lab Results   Component Value Date    WBC 18.49 (H) 09/16/2023    HGB 7.9 (L) 09/16/2023    HCT 23.8 (L) 09/16/2023    MCV 91 09/16/2023     (L) 09/16/2023     No results for input(s): "TSH", "FREET4" in the last 168 hours.  Lab Results   Component Value Date    HGBA1C 7.2 (H) 09/11/2023       Nutritional status:   Body mass index is 37.9 kg/m².  Lab Results   Component Value Date    ALBUMIN 2.0 (L) 09/16/2023    ALBUMIN 2.0 (L) 09/16/2023    ALBUMIN 2.1 (L) 09/15/2023     No results found for: "PREALBUMIN"    Estimated Creatinine Clearance: 56 mL/min (A) (based on SCr of 1.5 mg/dL (H)).    Accu-Checks  Recent Labs     09/15/23  2318 09/16/23  0000 09/16/23  0105 09/16/23  0159 09/16/23  0305 09/16/23  0415 09/16/23  0456 09/16/23  0539 09/16/23  0749 09/16/23  0830   POCTGLUCOSE 132* 128* 156* 151* 180* 232* 207* 198* 153* 156*       Current Medications and/or Treatments Impacting Glycemic Control  Immunotherapy:    Immunosuppressants       None          Steroids:   Hormones (From admission, onward)      Start     Stop Route Frequency Ordered    09/15/23 0845  hydrocortisone sodium succinate injection 100 mg         -- IV Every 8 hours 09/15/23 0835    09/15/23 0045  vasopressin (PITRESSIN) 0.2 Units/mL in dextrose 5 % (D5W) 100 mL infusion         -- IV " Continuous 09/14/23 2338          Pressors:    Autonomic Drugs (From admission, onward)      Start     Stop Route Frequency Ordered    09/14/23 2130  NORepinephrine 4 mg in dextrose 5% 250 mL infusion (premix)        Question Answer Comment   Begin at (in mcg/kg/min): 0.02    Titrate by: (in mcg/kg/min) 0.02    Titrate interval: (in minutes) 5    Titrate to maintain: (MAP or SBP) SBP    Greater than: (in mmHg) 100    Maximum dose: (in mcg/kg/min) 3        -- IV Continuous 09/14/23 2026          Hyperglycemia/Diabetes Medications:   Antihyperglycemics (From admission, onward)      Start     Stop Route Frequency Ordered    09/15/23 0045  insulin regular in 0.9 % NaCl 100 unit/100 mL (1 unit/mL) infusion        Question:  Enter initial dose from Infusion Protocol Chart (Units/hr):  Answer:  3    -- IV Continuous 09/14/23 2338            ASSESSMENT and PLAN    Cardiac/Vascular  * S/P femoral-femoral bypass surgery  Managed per primary team  Optimize BG control        Hyperlipidemia  May increase insulin resistance.         Endocrine  Severe obesity (BMI >= 40)  Body mass index is 37.9 kg/m².  May increase insulin resistance.         Type II diabetes mellitus  BG goal 140 - 180     Discontinue IV insulin infusion protocol  Start Transition IV insulin infusion at 1.8 u/hr with stepdown parameters (0.8 u/kg dosing) Reduced current insulin infusion rate by 20%   Moderate Dose Correction Scale  BG monitoring q 4 hrs     ** Please call Endocrine for any BG related issues **    ** Please notify Endocrine for any change and/or advance in diet**    Discharge planning: TBRADHA Webster NP  Endocrinology  [facility] - Surgical Intensive Care

## 2023-09-16 NOTE — PROGRESS NOTES
[facility] - Surgical Intensive Care  Critical Care - Surgery  Progress Note    Patient Name: Georgina Arias  MRN: [id number]  Admission Date: 9/11/2023  Hospital Length of Stay: 5 days  Code Status: Full Code  Attending Provider: Maxx Maya MD  Primary Care Provider: Alonso Ling MD   Principal Problem: S/P femoral-femoral bypass surgery    Subjective:     Hospital/ICU Course:  No notes on file    Interval History/Significant Events: While on CRRT became hypotenzive, required increased pressors. Additionally, H/H at that time was low and received unit prbc, after prbc admin came off pressors. Currently not on supplemental O2. Will pull at lines despite being AXOx4.     Follow-up For: Procedure(s) (LRB):  CREATION, BYPASS, ARTERIAL, AXILLARY TO BILATERAL FEMORAL (Left)  ANGIOGRAM, LOWER EXTREMITY with balloon angioplasty ultraverse 5mm x 60mm (Left)  STENT, SUPERFICIAL FEMORAL ARTERY (Left)  AMPUTATION, FOOT, TRANSMETATARSAL partial 1st ray amputation (Left)  INCISION AND DRAINAGE, FOOT (Left)    Post-Operative Day: 2 Days Post-Op    Objective:     Vital Signs (Most Recent):  Temp: 97.9 °F (36.6 °C) (09/16/23 0302)  Pulse: 102 (09/16/23 0645)  Resp: 11 (09/16/23 0645)  BP: (!) 114/58 (09/16/23 0645)  SpO2: 99 % (09/16/23 0645) Vital Signs (24h Range):  Temp:  [97 °F (36.1 °C)-98.4 °F (36.9 °C)] 97.9 °F (36.6 °C)  Pulse:  [] 102  Resp:  [6-27] 11  SpO2:  [93 %-100 %] 99 %  BP: ()/(41-71) 114/58     Weight: 109.8 kg (242 lb)  Body mass index is 37.9 kg/m².      Intake/Output Summary (Last 24 hours) at 9/16/2023 0712  Last data filed at 9/16/2023 0646  Gross per 24 hour   Intake 4788.76 ml   Output 4556 ml   Net 232.76 ml          Physical Exam  Vitals and nursing note reviewed.   Constitutional:       General: She is not in acute distress.     Appearance: Normal appearance. She is obese.   HENT:      Head: Normocephalic and atraumatic.   Eyes:      Extraocular Movements: Extraocular movements  intact.      Conjunctiva/sclera: Conjunctivae normal.   Cardiovascular:      Rate and Rhythm: Regular rhythm. Tachycardia present.      Comments: Dopplerable posterior tibial signals   Pulmonary:      Effort: Pulmonary effort is normal. No respiratory distress.   Abdominal:      General: There is no distension.      Palpations: Abdomen is soft.      Tenderness: There is no abdominal tenderness.   Genitourinary:     Comments:  Bilateral groins with Prevena wound vacs in place, soft with no evidence of hematoma  Musculoskeletal:      Cervical back: Normal range of motion.      Comments:  S/p left 1st metatarsal amp with dressing in place c/d/I  ELLE drain in place at left upper chest with incision c/d/I    Skin:     General: Skin is warm and dry.      Coloration: Skin is not jaundiced.   Neurological:      Mental Status: She is alert and oriented to person, place, and time.   Psychiatric:         Mood and Affect: Mood normal.            Vents:       Lines/Drains/Airways       Central Venous Catheter Line  Duration             Trialysis (Dialysis) Catheter 09/14/23 1600 right internal jugular 1 day              Drain  Duration                  Closed/Suction Drain 09/14/23 1435 Tube - 1 Left;Superior Chest Bulb 15 Fr. 1 day         Urethral Catheter 09/14/23 0806 Non-latex 16 Fr. 1 day              Peripheral Intravenous Line  Duration                  Hemodialysis AV Fistula  Right forearm -- days         Peripheral IV - Single Lumen 09/15/23 1900 18 G Anterior;Left;Proximal Forearm <1 day                    Significant Labs:    CBC/Anemia Profile:  Recent Labs   Lab 09/15/23  1935 09/16/23  0215 09/16/23  0417   WBC 19.23* 20.31* 18.49*   HGB 7.1* 8.6* 7.9*   HCT 21.3* 25.4* 23.8*    152 121*   MCV 95 90 91   RDW 17.0* 17.4* 17.7*        Chemistries:  Recent Labs   Lab 09/14/23  2238 09/15/23  0312 09/15/23  2254 09/16/23  0417 09/16/23  0546   * 135*  135* 131* 130*  130*  --    K 5.2* 3.8  3.8 3.9 3.9   3.9  --     101  101 98 96  96  --    CO2 16* 17*  17* 26 23  23  --    BUN 41* 31*  31* 9 8  8  --    CREATININE 5.2* 3.8*  3.8* 1.5* 1.5*  1.5*  --    CALCIUM 7.4* 7.4*  7.4* 7.4* 7.0*  7.0*  --    ALBUMIN 1.9* 2.0*  2.0* 2.1* 2.0*  2.0*  --    PROT 4.9* 4.8*  --  4.8*  --    BILITOT 0.4 0.3  --  0.5  --    ALKPHOS 80 76  --  59  --    ALT 42 46*  --  37  --    AST 50* 57*  --  35  --    MG 2.2 1.8 1.7 1.8  --    PHOS 7.8* 4.8* 2.2* 8.0* 4.5       CMP:   Recent Labs   Lab 09/14/23  2238 09/15/23  0312 09/15/23  2254 09/16/23  0417   * 135*  135* 131* 130*  130*   K 5.2* 3.8  3.8 3.9 3.9  3.9    101  101 98 96  96   CO2 16* 17*  17* 26 23 23   * 263*  263* 121* 223*  223*   BUN 41* 31*  31* 9 8  8   CREATININE 5.2* 3.8*  3.8* 1.5* 1.5*  1.5*   CALCIUM 7.4* 7.4*  7.4* 7.4* 7.0*  7.0*   PROT 4.9* 4.8*  --  4.8*   ALBUMIN 1.9* 2.0*  2.0* 2.1* 2.0*  2.0*   BILITOT 0.4 0.3  --  0.5   ALKPHOS 80 76  --  59   AST 50* 57*  --  35   ALT 42 46*  --  37   ANIONGAP 15 17*  17* 7* 11  11     All pertinent labs within the past 24 hours have been reviewed.    Significant Imaging:  I have reviewed all pertinent imaging results/findings within the past 24 hours.    Assessment/Plan:     Cardiac/Vascular  Peripheral vascular disease    Neuro/Psych:     - Sedation: Trazodone 25 qhs    - Pain:    - Scheduled Tylenol 1g q8h   - Oxy 5 PRN             Cardiac:     - S/P Ax fem bypass and stent to SFA with Dr. Maya on 9/14/23    - BP Goal: 100-160    - Pressors: None currently, midodrine 10 prn with dialysis (give 30 minutes prior to dialysis)    - Rhythm: NSR    - Statin: Atorvastatin 80 mg QD      Pulmonary:     - Goal SpO2 >92%    - ABG PRN      Renal:    - Trend BUN/Cr     - Maintain Fraser, record strict Is/Os    - Nephrology consulted for dialysis needs given ESRD    Recent Labs   Lab 09/15/23  0312 09/15/23  2254 09/16/23  0417   BUN 31*  31* 9 8  8   CREATININE 3.8*   3.8* 1.5* 1.5*  1.5*         FEN / GI:     - Daily CMP, PRN K/Mag/Phos per protocol     - Replace electrolytes as needed    - Nutrition: CLD    - Bowel Regimen: Miralax, docusate      ID:     - Afebrile    - WBC stable: down trending    - Abx: Zosyn and vanc to end 9/17    - DC stress dose steroids    Recent Labs   Lab 09/15/23  1935 09/16/23  0215 09/16/23  0417   WBC 19.23* 20.31* 18.49*         Heme/Onc:     - Hgb goal > 7    - CBC daily    - ASA 81    - Plavix    Recent Labs   Lab 09/14/23  2238 09/15/23  0312 09/15/23  1744 09/15/23  1935 09/16/23  0215 09/16/23  0417   HGB 7.4*   < >  --  7.1* 8.6* 7.9*      < >  --  167 152 121*   APTT 21.9  --  25.4  --   --  26.7   INR 1.0  --   --   --   --   --     < > = values in this interval not displayed.         Endocrine:     - CCM Goal BG <180    - HgbA1c: 7.2    - SSI    - POCT glucose 4xs daily    - Hypoglycemia protocol in place       PPx:   Feeding: CLD  Analgesia/Sedation: multimodal / None  Thromboembolic Prevention: Heparin subq  HOB >30: Yes  Stress Ulcer: Pepcid   Glucose Control: Yes, insulin management CCM goal      Lines/Drains/Airway:      RIJ trialysis   Fraser   Left chest ELLE            Dispo/Code Status/Palliative:     - Continue SICU Care    - Full Code           Critical care was time spent personally by me on the following activities: development of treatment plan with patient or surrogate and bedside caregivers, discussions with consultants, evaluation of patient's response to treatment, examination of patient, ordering and performing treatments and interventions, ordering and review of laboratory studies, ordering and review of radiographic studies, pulse oximetry, re-evaluation of patient's condition.  This critical care time did not overlap with that of any other provider or involve time for any procedures.     Martin Rodriguez, DO  Critical Care - Surgery  [facility] - Surgical Intensive Care

## 2023-09-16 NOTE — CONSULTS
VANCOMYCIN DOSING BY PHARMACY DISCONTINUATION NOTE    Vancomycin serum concentration assessment(s):    The random level was drawn correctly and can be used to guide therapy at this time. The measurement is within the desired definitive target range of 10 to 20 mcg/mL for surgical prophylaxis  SCUF ended per nephrology request and will start HD tomorrow 9/17/23  Plan to keep antibiotics for another 24 hours then discontinue     Vancomycin Regimen Plan:    No further Vancomycin needed as random level within target range  Plan discussed with team  Pharmacy will sign off. Please reach out if you have any questions    Drug levels (last 3 results):  Recent Labs   Lab Result Units 09/14/23  0602 09/15/23  0314 09/16/23  0417   Vancomycin, Random ug/mL 22.1 12.1 12.2       Thank you for the consult,   Leelee Hudson, PharmD  Extension - [phone]

## 2023-09-16 NOTE — PT/OT/SLP PROGRESS
"Occupational Therapy      Patient Name:  Georgina Arias   MRN:  [id number]    Patient not seen today secondary to per podiatry note, pt able to "weightbear as tolerated to heel touch to left foot in postop shoe." No shoe available at b/s. Will send secure chat to MD and follow up 9/17 9/16/2023  "

## 2023-09-16 NOTE — PROGRESS NOTES
SICU PLAN OF CARE NOTE    Dx: S/P femoral-femoral bypass surgery    Goals of Care: Remain off of pressors to facilitate HD trials per morning rounding provider    Vital Signs (last 12 hours):   Temp:  [97.9 °F (36.6 °C)-98.2 °F (36.8 °C)]   Pulse:  []   Resp:  [10-27]   BP: ()/(51-65)   SpO2:  [93 %-100 %]      Neuro: AAO x4, Follows Commands, and Moves All Extremities    Cardiac: Mildly tachycardic, Sinus no ectopy    Respiratory: Room Air    Gtts: Insulin and Heparin    Urine Output: Urinary Catheter see flow sheet    CRRT: Pt changed during shift from SLED to SCUF. Dialysis nurse states will switch back to SLED at 1600    Drains: 1 ELEL drain to L Superior Chest expressing thin sereosang fluid. See flow sheet for output    Diet: NPO    Skin: No signs of breakdown. Weight shifting. Assistance provided.    Shift Events: Pt presented receiving 0.1 Levo; Vaso started at 0.04; Provider notified. Pressors weaned to off while transfusing 1 unit PRBC. Pressors fully off at 0215.     Pt upper extremities restrained with mitts and/or soft wrist restraints 2/2 pt pulling at trialysis line, despite multiple failed attempts of breaks in restraints and redirection.

## 2023-09-16 NOTE — SUBJECTIVE & OBJECTIVE
"Interval HPI:   Overnight events: POD 2. Remains in SICU. BG within goal ranges on IV intensive insulin protocol with infusion rates ranging from 1-3 u/hr.  CRRT. Diet clear liquid    Eating:    clear liquids  Nausea: No  Hypoglycemia and intervention: No  Fever: No  TPN and/or TF: No  If yes, type of TF/TPN and rate: n/a    BP (!) 123/56   Pulse 97   Temp 98.2 °F (36.8 °C) (Axillary)   Resp 15   Ht 5' 7" (1.702 m)   Wt 109.8 kg (242 lb)   LMP 09/11/2020 (Approximate)   SpO2 (!) 91%   Breastfeeding No   BMI 37.90 kg/m²     Labs Reviewed and Include    Recent Labs   Lab 09/16/23  0417   *  223*   CALCIUM 7.0*  7.0*   ALBUMIN 2.0*  2.0*   PROT 4.8*   *  130*   K 3.9  3.9   CO2 23  23   CL 96  96   BUN 8  8   CREATININE 1.5*  1.5*   ALKPHOS 59   ALT 37   AST 35   BILITOT 0.5     Lab Results   Component Value Date    WBC 18.49 (H) 09/16/2023    HGB 7.9 (L) 09/16/2023    HCT 23.8 (L) 09/16/2023    MCV 91 09/16/2023     (L) 09/16/2023     No results for input(s): "TSH", "FREET4" in the last 168 hours.  Lab Results   Component Value Date    HGBA1C 7.2 (H) 09/11/2023       Nutritional status:   Body mass index is 37.9 kg/m².  Lab Results   Component Value Date    ALBUMIN 2.0 (L) 09/16/2023    ALBUMIN 2.0 (L) 09/16/2023    ALBUMIN 2.1 (L) 09/15/2023     No results found for: "PREALBUMIN"    Estimated Creatinine Clearance: 56 mL/min (A) (based on SCr of 1.5 mg/dL (H)).    Accu-Checks  Recent Labs     09/15/23  2318 09/16/23  0000 09/16/23  0105 09/16/23  0159 09/16/23  0305 09/16/23  0415 09/16/23  0456 09/16/23  0539 09/16/23  0749 09/16/23  0830   POCTGLUCOSE 132* 128* 156* 151* 180* 232* 207* 198* 153* 156*       Current Medications and/or Treatments Impacting Glycemic Control  Immunotherapy:    Immunosuppressants       None          Steroids:   Hormones (From admission, onward)      Start     Stop Route Frequency Ordered    09/15/23 0845  hydrocortisone sodium succinate injection 100 " mg         -- IV Every 8 hours 09/15/23 0835    09/15/23 0045  vasopressin (PITRESSIN) 0.2 Units/mL in dextrose 5 % (D5W) 100 mL infusion         -- IV Continuous 09/14/23 2338          Pressors:    Autonomic Drugs (From admission, onward)      Start     Stop Route Frequency Ordered    09/14/23 2130  NORepinephrine 4 mg in dextrose 5% 250 mL infusion (premix)        Question Answer Comment   Begin at (in mcg/kg/min): 0.02    Titrate by: (in mcg/kg/min) 0.02    Titrate interval: (in minutes) 5    Titrate to maintain: (MAP or SBP) SBP    Greater than: (in mmHg) 100    Maximum dose: (in mcg/kg/min) 3        -- IV Continuous 09/14/23 2026          Hyperglycemia/Diabetes Medications:   Antihyperglycemics (From admission, onward)      Start     Stop Route Frequency Ordered    09/15/23 0045  insulin regular in 0.9 % NaCl 100 unit/100 mL (1 unit/mL) infusion        Question:  Enter initial dose from Infusion Protocol Chart (Units/hr):  Answer:  3    -- IV Continuous 09/14/23 0733

## 2023-09-16 NOTE — SUBJECTIVE & OBJECTIVE
Medications:  Continuous Infusions:   sodium chloride 0.9% 200 mL/hr at 09/16/23 0646    heparin (porcine) in 5 % dex 200 Units/hr (09/16/23 0646)    insulin regular 1 units/mL infusion orderable (DKA) 3.15 Units/hr (09/16/23 0646)     Scheduled Meds:   sodium chloride 0.9%   Intravenous Once    acetaminophen  1,000 mg Oral Q8H    aspirin  81 mg Oral Daily    atorvastatin  80 mg Oral Daily    clopidogreL  75 mg Oral Daily    heparin (porcine)  5,000 Units Subcutaneous Q8H    mupirocin   Nasal BID    piperacillin-tazobactam (Zosyn) IV (PEDS and ADULTS) (extended infusion is not appropriate)  4.5 g Intravenous Q12H    traZODone  25 mg Oral QHS     PRN Meds:0.9%  NaCl infusion (for blood administration), 0.9%  NaCl infusion (for blood administration), dextrose 10%, dextrose 10%, diphenhydrAMINE, glucagon (human recombinant), glucose, glucose, heparin (porcine), hydrALAZINE, labetalol, LIDOcaine (PF) 10 mg/ml (1%), magnesium sulfate IVPB, midodrine, ondansetron, oxyCODONE, sodium chloride 0.9%, sodium chloride 0.9%, sodium phosphate 20.01 mmol in dextrose 5 % (D5W) 250 mL IVPB, sodium phosphate 30 mmol in dextrose 5 % (D5W) 250 mL IVPB, sodium phosphate 39.99 mmol in dextrose 5 % (D5W) 250 mL IVPB, Pharmacy to dose Vancomycin consult **AND** vancomycin - pharmacy to dose     Objective:     Vital Signs (Most Recent):  Temp: 98.2 °F (36.8 °C) (09/16/23 0705)  Pulse: 100 (09/16/23 1030)  Resp: 16 (09/16/23 1030)  BP: (!) 119/58 (09/16/23 1030)  SpO2: 95 % (09/16/23 1030) Vital Signs (24h Range):  Temp:  [97 °F (36.1 °C)-98.4 °F (36.9 °C)] 98.2 °F (36.8 °C)  Pulse:  [] 100  Resp:  [8-27] 16  SpO2:  [89 %-100 %] 95 %  BP: ()/(41-69) 119/58     Date 09/16/23 0700 - 09/17/23 0659   Shift 0992-4339 8280-7562 9929-4603 24 Hour Total   INTAKE   Shift Total(mL/kg)       OUTPUT   Other 1346   1346   Shift Total(mL/kg) 1346(12.3)   1346(12.3)   Weight (kg) 109.8 109.8 109.8 109.8          Physical Exam  Constitutional:        General: She is not in acute distress.  HENT:      Head: Normocephalic and atraumatic.   Cardiovascular:      Rate and Rhythm: Normal rate.      Comments: 2+ L radial pulse  B/l biphasic dp/pt signals  Pulmonary:      Effort: Pulmonary effort is normal.   Abdominal:      Palpations: Abdomen is soft.   Musculoskeletal:         General: Normal range of motion.   Skin:     General: Skin is warm and dry.      Comments: L foot s/p debridement with podiatry. Wrapped in ace  B/l groin incisions covered by prevenas  L upper chest incision covered with gauze and tegaderm. ELLE drain in place with SS output          Significant Labs:  CBC:   Recent Labs   Lab 09/16/23 0417   WBC 18.49*   RBC 2.61*   HGB 7.9*   HCT 23.8*   *   MCV 91   MCH 30.3   MCHC 33.2       CMP:   Recent Labs   Lab 09/16/23 0417   *  223*   CALCIUM 7.0*  7.0*   ALBUMIN 2.0*  2.0*   PROT 4.8*   *  130*   K 3.9  3.9   CO2 23  23   CL 96  96   BUN 8  8   CREATININE 1.5*  1.5*   ALKPHOS 59   ALT 37   AST 35   BILITOT 0.5         Significant Diagnostics:  I have reviewed all pertinent imaging results/findings within the past 24 hours.

## 2023-09-17 LAB
ABO + RH BLD: NORMAL
ALBUMIN SERPL BCP-MCNC: 2.1 G/DL (ref 3.5–5.2)
ALP SERPL-CCNC: 67 U/L (ref 55–135)
ALT SERPL W/O P-5'-P-CCNC: 26 U/L (ref 10–44)
ANION GAP SERPL CALC-SCNC: 13 MMOL/L (ref 8–16)
APTT PPP: 24.5 SEC (ref 21–32)
AST SERPL-CCNC: 19 U/L (ref 10–40)
BASOPHILS # BLD AUTO: 0.07 K/UL (ref 0–0.2)
BASOPHILS NFR BLD: 0.4 % (ref 0–1.9)
BILIRUB SERPL-MCNC: 0.4 MG/DL (ref 0.1–1)
BLD GP AB SCN CELLS X3 SERPL QL: NORMAL
BLD PROD TYP BPU: NORMAL
BLOOD UNIT EXPIRATION DATE: NORMAL
BLOOD UNIT TYPE CODE: 1700
BLOOD UNIT TYPE CODE: 5100
BLOOD UNIT TYPE CODE: 7300
BLOOD UNIT TYPE: NORMAL
BUN SERPL-MCNC: 21 MG/DL (ref 6–20)
CALCIUM SERPL-MCNC: 7.9 MG/DL (ref 8.7–10.5)
CHLORIDE SERPL-SCNC: 96 MMOL/L (ref 95–110)
CO2 SERPL-SCNC: 19 MMOL/L (ref 23–29)
CODING SYSTEM: NORMAL
CREAT SERPL-MCNC: 3.2 MG/DL (ref 0.5–1.4)
CROSSMATCH INTERPRETATION: NORMAL
DIFFERENTIAL METHOD: ABNORMAL
DISPENSE STATUS: NORMAL
EOSINOPHIL # BLD AUTO: 0 K/UL (ref 0–0.5)
EOSINOPHIL NFR BLD: 0.2 % (ref 0–8)
ERYTHROCYTE [DISTWIDTH] IN BLOOD BY AUTOMATED COUNT: 17.8 % (ref 11.5–14.5)
EST. GFR  (NO RACE VARIABLE): 16.8 ML/MIN/1.73 M^2
GLUCOSE SERPL-MCNC: 209 MG/DL (ref 70–110)
HCT VFR BLD AUTO: 20.3 % (ref 37–48.5)
HGB BLD-MCNC: 7 G/DL (ref 12–16)
IMM GRANULOCYTES # BLD AUTO: 0.14 K/UL (ref 0–0.04)
IMM GRANULOCYTES NFR BLD AUTO: 0.8 % (ref 0–0.5)
LYMPHOCYTES # BLD AUTO: 1.4 K/UL (ref 1–4.8)
LYMPHOCYTES NFR BLD: 7.9 % (ref 18–48)
MAGNESIUM SERPL-MCNC: 2.4 MG/DL (ref 1.6–2.6)
MCH RBC QN AUTO: 30.7 PG (ref 27–31)
MCHC RBC AUTO-ENTMCNC: 34.5 G/DL (ref 32–36)
MCV RBC AUTO: 89 FL (ref 82–98)
MONOCYTES # BLD AUTO: 1 K/UL (ref 0.3–1)
MONOCYTES NFR BLD: 5.5 % (ref 4–15)
NEUTROPHILS # BLD AUTO: 15.3 K/UL (ref 1.8–7.7)
NEUTROPHILS NFR BLD: 85.2 % (ref 38–73)
NRBC BLD-RTO: 1 /100 WBC
NUM UNITS TRANS PACKED RBC: NORMAL
PHOSPHATE SERPL-MCNC: 5.5 MG/DL (ref 2.7–4.5)
PLATELET # BLD AUTO: 142 K/UL (ref 150–450)
PMV BLD AUTO: 10.3 FL (ref 9.2–12.9)
POCT GLUCOSE: 141 MG/DL (ref 70–110)
POCT GLUCOSE: 150 MG/DL (ref 70–110)
POCT GLUCOSE: 199 MG/DL (ref 70–110)
POCT GLUCOSE: 216 MG/DL (ref 70–110)
POTASSIUM SERPL-SCNC: 3.9 MMOL/L (ref 3.5–5.1)
PROT SERPL-MCNC: 4.9 G/DL (ref 6–8.4)
RBC # BLD AUTO: 2.28 M/UL (ref 4–5.4)
SODIUM SERPL-SCNC: 128 MMOL/L (ref 136–145)
SPECIMEN OUTDATE: NORMAL
TRANS ERYTHROCYTES VOL PATIENT: NORMAL ML
TRANS ERYTHROCYTES VOL PATIENT: NORMAL ML
WBC # BLD AUTO: 17.98 K/UL (ref 3.9–12.7)

## 2023-09-17 PROCEDURE — 25000003 PHARM REV CODE 250: Performed by: INTERNAL MEDICINE

## 2023-09-17 PROCEDURE — 84100 ASSAY OF PHOSPHORUS: CPT | Performed by: SURGERY

## 2023-09-17 PROCEDURE — P9021 RED BLOOD CELLS UNIT: HCPCS

## 2023-09-17 PROCEDURE — 25000003 PHARM REV CODE 250

## 2023-09-17 PROCEDURE — 99232 SBSQ HOSP IP/OBS MODERATE 35: CPT | Mod: ,,, | Performed by: INTERNAL MEDICINE

## 2023-09-17 PROCEDURE — 63600175 PHARM REV CODE 636 W HCPCS: Performed by: INTERNAL MEDICINE

## 2023-09-17 PROCEDURE — 97116 GAIT TRAINING THERAPY: CPT

## 2023-09-17 PROCEDURE — 25000003 PHARM REV CODE 250: Performed by: STUDENT IN AN ORGANIZED HEALTH CARE EDUCATION/TRAINING PROGRAM

## 2023-09-17 PROCEDURE — 99232 PR SUBSEQUENT HOSPITAL CARE,LEVL II: ICD-10-PCS | Mod: ,,, | Performed by: INTERNAL MEDICINE

## 2023-09-17 PROCEDURE — 97165 OT EVAL LOW COMPLEX 30 MIN: CPT

## 2023-09-17 PROCEDURE — 86850 RBC ANTIBODY SCREEN: CPT

## 2023-09-17 PROCEDURE — 97530 THERAPEUTIC ACTIVITIES: CPT

## 2023-09-17 PROCEDURE — 99233 PR SUBSEQUENT HOSPITAL CARE,LEVL III: ICD-10-PCS | Mod: GC,ICN,, | Performed by: INTERNAL MEDICINE

## 2023-09-17 PROCEDURE — 27000207 HC ISOLATION

## 2023-09-17 PROCEDURE — 85025 COMPLETE CBC W/AUTO DIFF WBC: CPT | Performed by: STUDENT IN AN ORGANIZED HEALTH CARE EDUCATION/TRAINING PROGRAM

## 2023-09-17 PROCEDURE — 80053 COMPREHEN METABOLIC PANEL: CPT | Performed by: STUDENT IN AN ORGANIZED HEALTH CARE EDUCATION/TRAINING PROGRAM

## 2023-09-17 PROCEDURE — 85730 THROMBOPLASTIN TIME PARTIAL: CPT | Performed by: STUDENT IN AN ORGANIZED HEALTH CARE EDUCATION/TRAINING PROGRAM

## 2023-09-17 PROCEDURE — 86920 COMPATIBILITY TEST SPIN: CPT

## 2023-09-17 PROCEDURE — 97161 PT EVAL LOW COMPLEX 20 MIN: CPT

## 2023-09-17 PROCEDURE — 99232 PR SUBSEQUENT HOSPITAL CARE,LEVL II: ICD-10-PCS | Mod: ,,, | Performed by: NURSE PRACTITIONER

## 2023-09-17 PROCEDURE — 99233 SBSQ HOSP IP/OBS HIGH 50: CPT | Mod: GC,ICN,, | Performed by: INTERNAL MEDICINE

## 2023-09-17 PROCEDURE — 63600175 PHARM REV CODE 636 W HCPCS: Performed by: NURSE PRACTITIONER

## 2023-09-17 PROCEDURE — 36430 TRANSFUSION BLD/BLD COMPNT: CPT

## 2023-09-17 PROCEDURE — 83735 ASSAY OF MAGNESIUM: CPT | Performed by: SURGERY

## 2023-09-17 PROCEDURE — 20600001 HC STEP DOWN PRIVATE ROOM

## 2023-09-17 PROCEDURE — 99232 SBSQ HOSP IP/OBS MODERATE 35: CPT | Mod: ,,, | Performed by: NURSE PRACTITIONER

## 2023-09-17 RX ORDER — SODIUM CHLORIDE 9 MG/ML
INJECTION, SOLUTION INTRAVENOUS
Status: CANCELLED | OUTPATIENT
Start: 2023-09-17

## 2023-09-17 RX ORDER — ALUMINUM HYDROXIDE, MAGNESIUM HYDROXIDE, AND SIMETHICONE 2400; 240; 2400 MG/30ML; MG/30ML; MG/30ML
30 SUSPENSION ORAL EVERY 6 HOURS PRN
Status: DISCONTINUED | OUTPATIENT
Start: 2023-09-17 | End: 2023-09-19 | Stop reason: HOSPADM

## 2023-09-17 RX ORDER — HYDROCODONE BITARTRATE AND ACETAMINOPHEN 500; 5 MG/1; MG/1
TABLET ORAL
Status: DISCONTINUED | OUTPATIENT
Start: 2023-09-17 | End: 2023-09-19 | Stop reason: HOSPADM

## 2023-09-17 RX ORDER — CALCIUM CARBONATE 200(500)MG
500 TABLET,CHEWABLE ORAL 3 TIMES DAILY PRN
Status: DISCONTINUED | OUTPATIENT
Start: 2023-09-17 | End: 2023-09-19 | Stop reason: HOSPADM

## 2023-09-17 RX ORDER — SODIUM CHLORIDE 9 MG/ML
INJECTION, SOLUTION INTRAVENOUS ONCE
Status: CANCELLED | OUTPATIENT
Start: 2023-09-17 | End: 2023-09-17

## 2023-09-17 RX ORDER — CARVEDILOL 3.12 MG/1
3.12 TABLET ORAL 2 TIMES DAILY
Status: DISCONTINUED | OUTPATIENT
Start: 2023-09-17 | End: 2023-09-19 | Stop reason: HOSPADM

## 2023-09-17 RX ORDER — MUPIROCIN 20 MG/G
OINTMENT TOPICAL 2 TIMES DAILY
Status: DISCONTINUED | OUTPATIENT
Start: 2023-09-17 | End: 2023-09-19 | Stop reason: HOSPADM

## 2023-09-17 RX ORDER — INSULIN ASPART 100 [IU]/ML
5 INJECTION, SOLUTION INTRAVENOUS; SUBCUTANEOUS
Status: DISCONTINUED | OUTPATIENT
Start: 2023-09-17 | End: 2023-09-19 | Stop reason: HOSPADM

## 2023-09-17 RX ADMIN — ATORVASTATIN CALCIUM 80 MG: 40 TABLET, FILM COATED ORAL at 08:09

## 2023-09-17 RX ADMIN — INSULIN ASPART 2 UNITS: 100 INJECTION, SOLUTION INTRAVENOUS; SUBCUTANEOUS at 10:09

## 2023-09-17 RX ADMIN — PIPERACILLIN SODIUM AND TAZOBACTAM SODIUM 4.5 G: 4; .5 INJECTION, POWDER, FOR SOLUTION INTRAVENOUS at 03:09

## 2023-09-17 RX ADMIN — MUPIROCIN: 20 OINTMENT TOPICAL at 10:09

## 2023-09-17 RX ADMIN — ANTACID TABLETS 500 MG: 500 TABLET, CHEWABLE ORAL at 02:09

## 2023-09-17 RX ADMIN — INSULIN ASPART 2 UNITS: 100 INJECTION, SOLUTION INTRAVENOUS; SUBCUTANEOUS at 08:09

## 2023-09-17 RX ADMIN — HEPARIN SODIUM 5000 UNITS: 5000 INJECTION INTRAVENOUS; SUBCUTANEOUS at 10:09

## 2023-09-17 RX ADMIN — INSULIN ASPART 5 UNITS: 100 INJECTION, SOLUTION INTRAVENOUS; SUBCUTANEOUS at 04:09

## 2023-09-17 RX ADMIN — HEPARIN SODIUM 5000 UNITS: 5000 INJECTION INTRAVENOUS; SUBCUTANEOUS at 06:09

## 2023-09-17 RX ADMIN — ASPIRIN 81 MG: 81 TABLET, COATED ORAL at 08:09

## 2023-09-17 RX ADMIN — CARVEDILOL 3.12 MG: 3.12 TABLET, FILM COATED ORAL at 10:09

## 2023-09-17 RX ADMIN — PANTOPRAZOLE SODIUM 40 MG: 40 TABLET, DELAYED RELEASE ORAL at 08:09

## 2023-09-17 RX ADMIN — TRAZODONE HYDROCHLORIDE 25 MG: 50 TABLET ORAL at 10:09

## 2023-09-17 RX ADMIN — INSULIN DETEMIR 20 UNITS: 100 INJECTION, SOLUTION SUBCUTANEOUS at 08:09

## 2023-09-17 RX ADMIN — MUPIROCIN: 20 OINTMENT TOPICAL at 12:09

## 2023-09-17 RX ADMIN — CARVEDILOL 3.12 MG: 3.12 TABLET, FILM COATED ORAL at 01:09

## 2023-09-17 RX ADMIN — INSULIN ASPART 4 UNITS: 100 INJECTION, SOLUTION INTRAVENOUS; SUBCUTANEOUS at 03:09

## 2023-09-17 RX ADMIN — INSULIN ASPART 4 UNITS: 100 INJECTION, SOLUTION INTRAVENOUS; SUBCUTANEOUS at 12:09

## 2023-09-17 RX ADMIN — HEPARIN SODIUM 5000 UNITS: 5000 INJECTION INTRAVENOUS; SUBCUTANEOUS at 03:09

## 2023-09-17 RX ADMIN — INSULIN DETEMIR 20 UNITS: 100 INJECTION, SOLUTION SUBCUTANEOUS at 10:09

## 2023-09-17 NOTE — ASSESSMENT & PLAN NOTE
Outpatient HD Information:  -Dialysis modality: Hemodialysis  -Outpatient HD unit: [facility] (Valerio Bautista MD)  -HD TX days: Tuesday/Thursday/Saturday, duration of treatment: 3-4 hrs  -Last HD TX prior to hospital admission: 09/09/2023  -Dialysis access: RUE AVF   -Residual urine: + RRF  -EDW: 109 kg    Plan/Recommendations:  - plan iHD today UF as tolerate for volume removal and metabolic clearance. Stop UF if unable to tolerate it  - renal diet/tube feeds when not NPO   - strict I/O's and daily weights  - daily renal function panels and magnesium levels  - renally all dose medications to eGFR  - avoid gadolinium, fleets, phos-based laxatives, NSAIDs, etc.

## 2023-09-17 NOTE — ASSESSMENT & PLAN NOTE
  Neuro/Psych:     - Sedation: Trazodone 25 qhs    - Pain:    - Scheduled Tylenol 1g q8h   - Oxy 5 PRN             Cardiac:     - S/P Ax fem bypass and stent to SFA with Dr. Maya on 9/14/23    - BP Goal: 100-160    - Pressors: None currently, midodrine 10 prn with dialysis (give 30 minutes prior to dialysis)    - Rhythm: NSR    - Statin: Atorvastatin 80 mg QD      Pulmonary:     - Goal SpO2 >92%    - ABG PRN      Renal:    - Trend BUN/Cr     - Maintain [place], record strict Is/Os    - Nephrology following for HD: today or tomorrow     Recent Labs   Lab 09/16/23  0417 09/16/23  1455 09/17/23  0337   BUN 8  8 15 21*   CREATININE 1.5*  1.5* 2.6* 3.2*         FEN / GI:     - Daily CMP, PRN K/Mag/Phos per protocol     - Replace electrolytes as needed    - Nutrition: Renal Diet     - Bowel Regimen: Miralax, docusate      ID:     - Afebrile    - WBC stable: Down trending    - Abx: Zosyn and vanc to end 9/17    - No growth to date     Recent Labs   Lab 09/16/23  0215 09/16/23  0417 09/17/23  0337   WBC 20.31* 18.49* 17.98*         Heme/Onc:     - Hgb goal > 7    - CBC daily    - ASA 81    - Plavix    Recent Labs   Lab 09/14/23  2238 09/15/23  0312 09/15/23  1744 09/15/23  1935 09/16/23  0215 09/16/23  0417 09/17/23  0337   HGB 7.4*   < >  --    < > 8.6* 7.9* 7.0*      < >  --    < > 152 121* 142*   APTT 21.9  --  25.4  --   --  26.7 24.5   INR 1.0  --   --   --   --   --   --     < > = values in this interval not displayed.         Endocrine:     - CCM Goal BG <180    - HgbA1c: 7.2    - SSI    - POCT glucose 4xs daily    - Hypoglycemia protocol in place       PPx:   Feeding: Renal Diet   Analgesia/Sedation: multimodal / None  Thromboembolic Prevention: Heparin subq  HOB >30: Yes  Stress Ulcer: Pepcid   Glucose Control: Yes, insulin management CCM goal      Lines/Drains/Airway:      RIJ trialysis   Fraser   Left chest ELLE            Dispo/Code Status/Palliative:     - Continue SICU Care    - Full Code

## 2023-09-17 NOTE — PROGRESS NOTES
Nephrology Progress Note      Consult Requested By: Maxx Maya MD  Reason for Consult: ESRD    History of Present Illness:  ESRD pt admitted for vascular surgery bypass.    Off pressors this am.    Past Medical History:   Diagnosis Date    Hyperlipidemia     Hypertension     Peptic ulcer disease     Peripheral artery disease     PONV (postoperative nausea and vomiting)     Stage IV CKD     Type II diabetes mellitus          Current Facility-Administered Medications:     0.9%  NaCl infusion (for blood administration), , Intravenous, Q24H PRN, Mariposa Polanco MD    0.9%  NaCl infusion (for blood administration), , Intravenous, Q24H PRN, Shaggy De Souza MD    0.9%  NaCl infusion, , Intravenous, Once, Adrian Canada MD    acetaminophen tablet 1,000 mg, 1,000 mg, Oral, Q8H, Adrian Canada MD, 1,000 mg at 09/16/23 1443    aspirin EC tablet 81 mg, 81 mg, Oral, Daily, Ray Loyd MD, 81 mg at 09/16/23 0828    atorvastatin tablet 80 mg, 80 mg, Oral, Daily, Adrian Canada MD, 80 mg at 09/16/23 0828    calcium carbonate 200 mg calcium (500 mg) chewable tablet 500 mg, 500 mg, Oral, Once, Talita Cohen MD    clopidogreL tablet 75 mg, 75 mg, Oral, Daily, Ray Loyd MD, 75 mg at 09/16/23 0828    dextrose 10% bolus 125 mL 125 mL, 12.5 g, Intravenous, PRN, Robles, Valerie, DO    dextrose 10% bolus 125 mL 125 mL, 12.5 g, Intravenous, PRN, Darin, Nikole A., NP    dextrose 10% bolus 250 mL 250 mL, 25 g, Intravenous, PRN, Robles, Valerie, DO    dextrose 10% bolus 250 mL 250 mL, 25 g, Intravenous, PRN, Sanxi, Nikole A., NP    diphenhydrAMINE capsule 25 mg, 25 mg, Oral, Q6H PRN, Adrian Canada MD    glucagon (human recombinant) injection 1 mg, 1 mg, Intramuscular, PRN, Sanxi, Nikole A., NP    glucose chewable tablet 16 g, 16 g, Oral, PRN, Nikole Webster, NP    glucose chewable tablet 24 g, 24 g, Oral, PRN, Nikole Webster, NP    heparin (porcine) injection 3,000 Units, 3,000 Units,  Intravenous, PRN, Adrian Canada MD, 3,000 Units at 09/12/23 0831    heparin (porcine) injection 5,000 Units, 5,000 Units, Subcutaneous, Q8H, Ita Carrera MD, 5,000 Units at 09/16/23 1315    heparin 25,000 units in dextrose 5% 250 mL (100 units/mL) infusion (heparin infusion - NO NOMOGRAM), 200 Units/hr, Intravenous, Continuous, Stopped at 09/16/23 1154 **AND** [COMPLETED] APTT, , , STAT **AND** APTT, , , Daily **AND** APTT, , , PRN, Celestine Oliveira MD    hydrALAZINE injection 10 mg, 10 mg, Intravenous, Q6H PRN, Adrian Canada MD    insulin aspart U-100 pen 0-10 Units, 0-10 Units, Subcutaneous, PRN, Nikole Webster NP, 2 Units at 09/16/23 1706    insulin detemir U-100 (Levemir) pen 20 Units, 20 Units, Subcutaneous, BID, Nikole Webster, NP    labetalol 20 mg/4 mL (5 mg/mL) IV syring, 20 mg, Intravenous, Q6H PRN, Adrian Canada MD    LIDOcaine (PF) 10 mg/ml (1%) injection 10 mg, 1 mL, Intradermal, Once PRN, Adrian Canada MD    midodrine tablet 10 mg, 10 mg, Oral, Daily PRN, Ita Carrera MD    ondansetron injection 4 mg, 4 mg, Intravenous, Q4H PRN, Mariposa Polanco MD, 4 mg at 09/16/23 1941    oxyCODONE immediate release tablet 5 mg, 5 mg, Oral, Q4H PRN, Chantale Beltran MD, 5 mg at 09/16/23 0833    pantoprazole EC tablet 40 mg, 40 mg, Oral, BID PRN, Talita Cohen MD    piperacillin-tazobactam (ZOSYN) 4.5 g in dextrose 5 % in water (D5W) 100 mL IVPB (MB+), 4.5 g, Intravenous, Q12H, Ita Carrera MD, Last Rate: 25 mL/hr at 09/16/23 2000, Rate Verify at 09/16/23 2000    sodium chloride 0.9% bolus 250 mL 250 mL, 250 mL, Intravenous, PRN, Adrian Canada MD    sodium chloride 0.9% flush 10 mL, 10 mL, Intravenous, PRN, Adrian Canada MD    trazodone split tablet 25 mg, 25 mg, Oral, QHS, Ita Carrera MD  Review of patient's allergies indicates:   Allergen Reactions    Naproxen Anaphylaxis and Swelling     Hives (skin)^swelling  Hives  (skin)^swelling  Hives (skin)^swelling    Sulfamethoxazole-trimethoprim Other (See Comments)     Caused JEROME    Hydrocodone Nausea And Vomiting and Rash    Penicillins Hives     Blisters (skin)^    Adhesive Rash    Hydrocodone-acetaminophen Nausea And Vomiting     Rash (skin)^, Vomiting^          Past Surgical History:   Procedure Laterality Date    ANGIOGRAM, LOWER ARTERIAL, UNILATERAL Left 9/13/2023    Procedure: ANGIOGRAM, LOWER ARTERIAL, UNILATERAL;  Surgeon: Maxx Maya MD;  Location: [address];  Service: Vascular;  Laterality: Left;    ANGIOGRAPHY OF LOWER EXTREMITY Left 9/14/2023    Procedure: ANGIOGRAM, LOWER EXTREMITY with balloon angioplasty ultraverse 5mm x 60mm;  Surgeon: Maxx Maya MD;  Location: [facility] OR [address] [place] [address];  Service: Vascular;  Laterality: Left;  ultraverse 5mm x 60mm  fluoro: 12.41  contrast: 44ml  mGy: 65.57  Gycm2: 23.50    AORTOGRAPHY WITH EXTREMITY RUNOFF Left 9/13/2023    Procedure: AORTOGRAM, WITH EXTREMITY RUNOFF;  Surgeon: Maxx Maya MD;  Location: [address];  Service: Vascular;  Laterality: Left;  8.1 min  663.63 mGy  176.39 Gy.cm  178ml Dye     AV FISTULA PLACEMENT Right     CHOLECYSTECTOMY      CREATION, BYPASS, ARTERIAL, AXILLARY TO BILATERAL FEMORAL Left 9/14/2023    Procedure: CREATION, BYPASS, ARTERIAL, AXILLARY TO BILATERAL FEMORAL;  Surgeon: Maxx Maya MD;  Location: [address];  Service: Vascular;  Laterality: Left;  Left Axillary to Bilateral Femoral Bypass, Left Femoral to Above Knee Popliteal Bypass; SLIDER BED    CYSTOSCOPY W/ URETERAL STENT PLACEMENT Right 08/28/2018    Procedure: CYSTOSCOPY, WITH URETERAL STENT INSERTION (ADD ON );  Surgeon: Bubba Perez MD;  Location: [facility] OR;  Service: Urology;  Laterality: Right;  (ADD ON )    DEBRIDEMENT OF FOOT Left 08/03/2023    Procedure: DEBRIDEMENT, FOOT;  Surgeon: Aleida Flannery DPM;  Location: [facility] OR;  Service: Podiatry;  Laterality: Left;    Excisional debridement of ulcer distal  great toe left foot w/ partial resection distal phalanx Left 08/03/2023    FOOT AMPUTATION THROUGH METATARSAL Left 9/14/2023    Procedure: AMPUTATION, FOOT, TRANSMETATARSAL partial 1st ray amputation;  Surgeon: Jesus Valles DPM;  [facility];  Service: Podiatry;  Laterality: Left;  partial ray    I&D L 1st ray w/ amputation L hallux IPJ Left 08/14/2023    INCISION AND DRAINAGE FOOT Left 9/14/2023    Procedure: INCISION AND DRAINAGE, FOOT;  Surgeon: Jesus Valles DPM;  [facility];  Service: Podiatry;  Laterality: Left;    Injection L PT tendon sheath Left 08/14/2023    Nail avulsion left hallux Left 08/03/2023    PERIPHERAL ARTERIAL STENT GRAFT Right     REMOVAL OF NAIL OF DIGIT Left 08/03/2023    Procedure: REMOVAL, NAIL, DIGIT great toe;  Surgeon: Aleida Flannery DPM;  Location: [facility] OR;  Service: Podiatry;  Laterality: Left;    STENT, SUPERFICIAL FEMORAL ARTERY Left 9/14/2023    Procedure: STENT, SUPERFICIAL FEMORAL ARTERY;  Surgeon: Maxx Maya MD;  [facility];  Service: Vascular;  Laterality: Left;  5 x 100 mm    TOE AMPUTATION Left 08/14/2023    Procedure: AMPUTATION, TOE hallux IPJ;  Surgeon: Aleida Flannery DPM;  Location: [facility];  Service: Podiatry;  Laterality: Left;    TOE AMPUTATION Left 08/25/2023    great toe    TOE AMPUTATION Left 8/25/2023    Procedure: AMPUTATION, TOE hallux, possible 1st met.head;  Surgeon: Aleida Flannery DPM;  Location: [facility];  Service: Podiatry;  Laterality: Left;    URETEROSCOPIC REMOVAL OF URETERIC CALCULUS Right 09/11/2018    Procedure: EXTRACTION-STONE-URETEROSCOPY;  Surgeon: Bubba Perez MD;  Location: [facility];  Service: Urology;  Laterality: Right;     Family History   Problem Relation Age of Onset    Hypertension Mother     Diabetes Mother     Diabetes Father     Hypertension Father     Heart disease Father 37        cabg    Hypertension Brother     Cancer Maternal Grandmother         br ca    Heart disease  "Maternal Grandmother      Social History     Tobacco Use    Smoking status: Former     Current packs/day: 0.00     Types: Cigarettes     Quit date: 2006     Years since quittin.1    Smokeless tobacco: Never   Substance Use Topics    Alcohol use: Not Currently     Comment: occ    Drug use: No       ROS    Vitals:    23   BP: 130/60   Pulse: 104   Resp: 18   Temp:        PHYSICAL EXAMINATION:  General: no distress, well nourished  Lungs: Bilateral moderate air entry  Cardiovascular: S1, S2 normal,  Abdomen: soft, non-tender non-distented;       LABORATORY DATA:  Lab Results   Component Value Date    CREATININE 2.6 (H) 2023       No results found for: "UTPCR"    Lab Results   Component Value Date     (L) 2023    K 3.8 2023    CO2 18 (L) 2023       Lab Results   Component Value Date    CALCIUM 7.4 (L) 2023    CAION 1.02 (L) 09/15/2023    PHOS 4.9 (H) 2023       Lab Results   Component Value Date    HGB 7.9 (L) 2023        Lab Results   Component Value Date    HGBA1C 7.2 (H) 2023       Lab Results   Component Value Date    LDLCALC Invalid, Trig>400.0 2014         IMAGING STUDIES  Kidney US: Reviewed  Echocardiogram: Reviewed    IMPRESSION / RECOMMENDATIONS:     ESRD     - Off pressors, will discontinue SLED.  - Hemodialysis either tomorrow or day after based on hemodynamics and the need for dialysis.      "

## 2023-09-17 NOTE — PT/OT/SLP EVAL
Physical Therapy Co-Evaluation with OT and Treatment     Patient Name:  Georgina Arias  MRN: [id number]    Admit Date: 9/11/2023  Admitting Diagnosis:  S/P femoral-femoral bypass surgery  Length of Stay: 6 days  Recent Surgery: Procedure(s) (LRB):  CREATION, BYPASS, ARTERIAL, AXILLARY TO BILATERAL FEMORAL (Left)  ANGIOGRAM, LOWER EXTREMITY with balloon angioplasty ultraverse 5mm x 60mm (Left)  STENT, SUPERFICIAL FEMORAL ARTERY (Left)  AMPUTATION, FOOT, TRANSMETATARSAL partial 1st ray amputation (Left)  INCISION AND DRAINAGE, FOOT (Left) 3 Days Post-Op    Recommendations:     Discharge Recommendations: Other  Equipment recommendations: none  Barriers to discharge: None Identified     Assessment:     Georgina Arias is a 52 y.o. female admitted to [facility] on 9/11/2023 with medical diagnosis of S/P femoral-femoral bypass surgery. Pt presents with weakness, impaired endurance, impaired functional mobility, gait instability, impaired balance, decreased coordination, orthopedic precautions, impaired skin, pain. These deficits effect their roles and responsibilities in which they were able to complete prior to admit.     Pt is agreeable to therapy evaluation and session. Rosalind at bedside; donned on pt and educated pt on WB through heel. PTA, pt was Mod(I) with ambulation, using RW as needed when she felt unsteady on feet. Pt able to sit eob, stand and take lateral steps. Pt returned to bed per RN request as she is stepping down to the floor. Will continue to progress pt as tolerated.    Georgina Arias would benefit from acute PT intervention to improve quality of life, focus on recovery of impairments, provide patient/caregiver education, reduce fall risk, and maximize (I) and safety with functional mobility. Once medically stable, recommending pt discharge to Other (HHPT).      Rehab Prognosis: Good    Plan:     During this hospitalization, patient to be seen 3 x/week to address the identified rehab impairments via  gait training, therapeutic activities, therapeutic exercises, neuromuscular re-education and progress towards stated goals.     Plan of Care Expires:  10/17/23  Plan of Care reviewed with: patient    This plan of care has been discussed with the patient/caregiver, who was included in its development and is in agreement with the identified goals and treatment plan.     Subjective     Communicated with RN prior to session.  Patient found supine upon PT entry to room, agreeable to evaluation. Pt alone during session.    Chief Complaint:bilateral groin pain d/t wound vacs placement    Patient/Family Comments/goals: none stated    Pain/Comfort:  Pain Rating 1: 0/10  Location - Side 1: Bilateral  Location 1: groin  Pain Addressed 1: Reposition, Distraction, Cessation of Activity  Pain Rating Post-Intervention 1: 8/10    Patients cultural, spiritual, Sikhism conflicts given the current situation: None identified     Patient History: information obtained from pt     Living Environment: Pt lives with mom and brother in single level home  with threshold KRISTINE.   Prior Level of Function: modified (I) for mobility with RW as needed  DME owned: rolling walker  Drives: yes  Works: no  Support Available/Caregiver Assistance:  mom and brother    Objective:   OT present for coeval due to pt's multiple medical comorbidities and functional/cognition deficits requiring two skilled therapists to appropriately progress pt's musculoskeletal strength, neuromuscular control, and endurance while taking into consideration medical acuity and pt safety.    Patient found with: central line, wound vac, ELLE drain, randall catheter, telemetry, pulse ox (continuous), blood pressure cuff    Recent Surgery: Procedure(s) (LRB):  CREATION, BYPASS, ARTERIAL, AXILLARY TO BILATERAL FEMORAL (Left)  ANGIOGRAM, LOWER EXTREMITY with balloon angioplasty ultraverse 5mm x 60mm (Left)  STENT, SUPERFICIAL FEMORAL ARTERY (Left)  AMPUTATION, FOOT, TRANSMETATARSAL  partial 1st ray amputation (Left)  INCISION AND DRAINAGE, FOOT (Left) 3 Days Post-Op  General Precautions: Standard, fall, special contact   Orthopedic Precautions: (WB to heel L foot with darco)   Braces:     Oxygen Device: room air      Exams:    Cognition:  Alert  Command following: Follows multistep verbal commands  Communication: clear/fluent    Sensation:   Light touch sensation: Pt reported generalized numbness/tingling of krista feet d/t peripheral neuropathy    Gross Motor Coordination: No deficits noted during functional mobility tasks     Edema/Skin Integrity: None noted; Visible skin intact    Postural examination/scapula alignment:  no deficits noted    Lower Extremity Range of Motion:  Right Lower Extremity: WFL  Left Lower Extremity: WFL    Lower Extremity Strength:  Right Lower Extremity: WFL  Left Lower Extremity: WFL    Functional Mobility:    Bed Mobility:  Supine > Sit with maximal assistance x2  Sit > Supine with maximal assistance x2    Transfers:   Sit <> Stand Transfer: Minimal Assistancex2 from eob with no AD              Gait:  Distance: 6 lateral steps L  Assistance level: Minimal Assistancex2  Assistive Device: none  Gait Assessment: decreased step length , decreased calvin, decreased gait speed, and antalgic gait pattern    Balance:  Dynamic Sitting: FAIR+: Maintains balance through MINIMAL excursions of active trunk motion  Standing:  Static: POOR+: Needs MINIMAL assist to maintain   Dynamic: POOR+: Needs MIN (minimal ) assist during gait    Outcome Measure: AM-PAC 6 CLICK MOBILITY  Total Score:16     Patient/Caregiver Education:     Therapist educated pt/caregiver regarding:   PT POC and goals for therapy   Safety with mobility and fall risk   Instruction on use of call button and importance of calling nursing staff for assistance with mobility   Time provided for therapeutic counseling and discussion of current health disposition. All questions answered to satisfaction, within scope of  PT practice     Patient/caregiver able to verbalize understanding and expressed no further questions this visit; will follow-up with pt/caregiver during current admit for additional questions/concerns within scope of practice.     White board updated.     Patient left supine with all lines intact, call button in reach, and RN present.    GOALS:   Multidisciplinary Problems       Physical Therapy Goals          Problem: Physical Therapy    Goal Priority Disciplines Outcome Goal Variances Interventions   Physical Therapy Goal     PT, PT/OT Ongoing, Progressing     Description: Goals to be met by: 10/1/23     Patient will increase functional independence with mobility by performin. Supine to sit with MInimal Assistance  2. Sit to supine with MInimal Assistance  3. Sit to stand transfer with Stand-by Assistance  4. Bed to chair transfer with Stand-by Assistance using LRAD  5. Gait  x 200 feet with Stand-by Assistance using LRAD.                            History:     Past Medical History:   Diagnosis Date    Hyperlipidemia     Hypertension     Peptic ulcer disease     Peripheral artery disease     PONV (postoperative nausea and vomiting)     Stage IV CKD     Type II diabetes mellitus        Past Surgical History:   Procedure Laterality Date    ANGIOGRAM, LOWER ARTERIAL, UNILATERAL Left 2023    Procedure: ANGIOGRAM, LOWER ARTERIAL, UNILATERAL;  Surgeon: Maxx Maya MD;  Location: [address];  Service: Vascular;  Laterality: Left;    ANGIOGRAPHY OF LOWER EXTREMITY Left 2023    Procedure: ANGIOGRAM, LOWER EXTREMITY with balloon angioplasty ultraverse 5mm x 60mm;  Surgeon: Maxx Maya MD;  Location: [address];  Service: Vascular;  Laterality: Left;  ultraverse 5mm x 60mm  fluoro: 12.41  contrast: 44ml  mGy: 65.57  Gycm2: 23.50    AORTOGRAPHY WITH EXTREMITY RUNOFF Left 2023    Procedure: AORTOGRAM, WITH EXTREMITY RUNOFF;  Surgeon: Maxx Maya MD;  Location: [address];   Service: Vascular;  Laterality: Left;  8.1 min  663.63 mGy  176.39 Gy.cm  178ml Dye     AV FISTULA PLACEMENT Right     CHOLECYSTECTOMY      CREATION, BYPASS, ARTERIAL, AXILLARY TO BILATERAL FEMORAL Left 9/14/2023    Procedure: CREATION, BYPASS, ARTERIAL, AXILLARY TO BILATERAL FEMORAL;  Surgeon: Maxx Maya MD;  Location: [facility] OR 2ND FLR;  Service: Vascular;  Laterality: Left;  Left Axillary to Bilateral Femoral Bypass, Left Femoral to Above Knee Popliteal Bypass; SLIDER BED    CYSTOSCOPY W/ URETERAL STENT PLACEMENT Right 08/28/2018    Procedure: CYSTOSCOPY, WITH URETERAL STENT INSERTION (ADD ON );  Surgeon: Bubba Perez MD;  Location: [facility] OR;  Service: Urology;  Laterality: Right;  (ADD ON )    DEBRIDEMENT OF FOOT Left 08/03/2023    Procedure: DEBRIDEMENT, FOOT;  Surgeon: Aleida Flannery DPM;  Location: [facility] OR;  Service: Podiatry;  Laterality: Left;    Excisional debridement of ulcer distal great toe left foot w/ partial resection distal phalanx Left 08/03/2023    FOOT AMPUTATION THROUGH METATARSAL Left 9/14/2023    Procedure: AMPUTATION, FOOT, TRANSMETATARSAL partial 1st ray amputation;  Surgeon: Jesus Valles DPM;  Location: [facility] OR 2ND FLR;  Service: Podiatry;  Laterality: Left;  partial ray    I&D L 1st ray w/ amputation L hallux IPJ Left 08/14/2023    INCISION AND DRAINAGE FOOT Left 9/14/2023    Procedure: INCISION AND DRAINAGE, FOOT;  Surgeon: Jesus Valles DPM;  Location: [facility] OR 2ND FLR;  Service: Podiatry;  Laterality: Left;    Injection L PT tendon sheath Left 08/14/2023    Nail avulsion left hallux Left 08/03/2023    PERIPHERAL ARTERIAL STENT GRAFT Right     REMOVAL OF NAIL OF DIGIT Left 08/03/2023    Procedure: REMOVAL, NAIL, DIGIT great toe;  Surgeon: Aleida Flannery DPM;  Location: [facility] OR;  Service: Podiatry;  Laterality: Left;    STENT, SUPERFICIAL FEMORAL ARTERY Left 9/14/2023    Procedure: STENT, SUPERFICIAL FEMORAL ARTERY;  Surgeon: Maxx Maya MD;  Location: [facility] OR 2ND FLR;   Service: Vascular;  Laterality: Left;  5 x 100 mm    TOE AMPUTATION Left 08/14/2023    Procedure: AMPUTATION, TOE hallux IPJ;  Surgeon: Aleida Flannery DPM;  Location: [facility];  Service: Podiatry;  Laterality: Left;    TOE AMPUTATION Left 08/25/2023    great toe    TOE AMPUTATION Left 8/25/2023    Procedure: AMPUTATION, TOE hallux, possible 1st met.head;  Surgeon: Aleida Flannery DPM;  Location: [facility];  Service: Podiatry;  Laterality: Left;    URETEROSCOPIC REMOVAL OF URETERIC CALCULUS Right 09/11/2018    Procedure: EXTRACTION-STONE-URETEROSCOPY;  Surgeon: Bubba Perez MD;  Location: [facility];  Service: Urology;  Laterality: Right;       Time Tracking:     PT Received On: 09/17/23  PT Start Time: 1102     PT Stop Time: 1122  PT Total Time (min): 20 min     Billable Minutes: Evaluation 10 and Gait Training 10    09/17/2023

## 2023-09-17 NOTE — PROGRESS NOTES
Patient transferred to [facility] at 1245 this nurse resumed care of patient at this time from nurse JUDITH Landry. Patient oriented to room, unit policies, incentive spirometer given and instructions and feedback provided, patient tolerated well. 2 wound vacs present, this nurse reached out to JUDITH Landry to inquire about missing chargers. Gris states she will bring them back soon. Fraser present and emptied, ELLE drain emptied. Patient denies pain at this time, prophylactic enteric precautions placed per ICU RN states patient needs a CDIFF sample sent. Blood transfusion running, patient tolerating well. Educated patient on how to call nurse if needed, ice and water pitcher as well as lotion, tooth brush, mouth wash provided. Patient has no further questions or needs  at this time.

## 2023-09-17 NOTE — NURSING
SICU PLAN OF CARE NOTE    Dx: S/P femoral-femoral bypass surgery    Goals of Care: HD trials.    Vital Signs (last 12 hours):   Temp:  [98.4 °F (36.9 °C)]   Pulse:  [101-110]   Resp:  [14-25]   BP: (120-163)/(58-72)   SpO2:  [98 %-100 %]      Neuro: AAO x4    Cardiac: No vasopressors required overnight    Respiratory: Room Air    Urine Output: HD pt with urinary catheter; Oliguric see flow sheet for output. Last CRRT during day shift 09/16. Most recent nephrology note states possible HD trials this day 09/18.      Drains: 1 ELLE drain to the left upper chest. See flow sheet for drainage which is increasingly becoming more serous. Prevenas to bilateral groin for incision management. Functioning properly. No signs of drainage.    Diet: Renal diet ordered. Emesis x 2; Zofran given. Pt also complained heartburn, belching relieved with tums tablets.

## 2023-09-17 NOTE — PT/OT/SLP EVAL
Occupational Therapy   Co-Evaluation    Name: Georgina Arias  MRN: [id number]  Admitting Diagnosis: S/P femoral-femoral bypass surgery  Recent Surgery: Procedure(s) (LRB):  CREATION, BYPASS, ARTERIAL, AXILLARY TO BILATERAL FEMORAL (Left)  ANGIOGRAM, LOWER EXTREMITY with balloon angioplasty ultraverse 5mm x 60mm (Left)  STENT, SUPERFICIAL FEMORAL ARTERY (Left)  AMPUTATION, FOOT, TRANSMETATARSAL partial 1st ray amputation (Left)  INCISION AND DRAINAGE, FOOT (Left) 3 Days Post-Op    Recommendations:     Discharge Recommendations: other (see comments) (HH OT/PT)  Discharge Equipment Recommendations:   (TBD closer to d/c)  Barriers to discharge:  None    Assessment:     Georgina Arias is a 52 y.o. female with a medical diagnosis of S/P femoral-femoral bypass surgery.  She presents agreeable to evaluation, but limited by significant increase in pain in B groin. Performance deficits affecting function: impaired functional mobility, impaired cardiopulmonary response to activity, pain, gait instability, impaired endurance, decreased lower extremity function, impaired self care skills, impaired balance. Pt benefits from co-treatment with PT to accommodate pt's activity tolerance and progression with therapy.      Rehab Prognosis: Good; patient would benefit from acute skilled OT services to address these deficits and reach maximum level of function.       Plan:     Patient to be seen 3 x/week to address the above listed problems via self-care/home management, therapeutic activities, therapeutic exercises  Plan of Care Expires: 10/17/23  Plan of Care Reviewed with: patient    Subjective     Chief Complaint: increasing pain  Patient/Family Comments/goals: to get better and go home    Occupational Profile:  Living Environment: Pt lives with mom and brother in single level home  with threshold KRISTINE.   Prior Level of Function: modified (I) for mobility with RW as needed  DME owned: rolling walker  Drives: yes  Works: no;  attends HD Tu, Th, Sat  Support Available/Caregiver Assistance:  mom and brother        Pain/Comfort:  Pain Rating 1: 0/10 (at rest)  Location - Side 1: Bilateral  Location - Orientation 1: generalized  Location 1: groin  Pain Addressed 1: Reposition, Distraction, Cessation of Activity  Pain Rating Post-Intervention 1: 8/10    Patients cultural, spiritual, Taoist conflicts given the current situation: no    Objective:     Communicated with: RN prior to session.  Patient found supine with blood pressure cuff, pulse ox (continuous), telemetry, wound vac, central line, randall catheter, ELLE drain upon OT entry to room.    General Precautions: Standard, fall, special contact  Orthopedic Precautions:    Braces:    Respiratory Status: Room air    Occupational Performance:    Bed Mobility:    Patient completed Scooting/Bridging with maximal assistance  Patient completed Supine to Sit with maximal assistance and 2 persons  Patient completed Sit to Supine with maximal assistance and 2 persons    Functional Mobility/Transfers:  Patient completed Sit <> Stand Transfer with minimum assistance and of 2 persons  with  hand-held assist   Functional Mobility: Min A x 2 with B HHA for sidesteps along EOB    Activities of Daily Living:  Feeding:  supervision    Grooming: stand by assistance    Upper Body Dressing: contact guard assistance    Lower Body Dressing: maximal assistance      Cognitive/Visual Perceptual:  Oriented to: Person, Place, Time and Situation  Follows Commands/attention: Follows multistep  commands  Communication: clear/fluent  Memory:  No Deficits noted  Safety awareness/insight to disability: intact  Coping skills/emotional control: Appropriate to situation    Physical Exam:  Postural examination/scapula alignment:    -       No postural abnormalities identified  Sensation:    -       Intact  Upper Extremity Range of Motion:     -       Right Upper Extremity: WFL  -       Left Upper Extremity: WFL  Upper  Extremity Strength:    -       Right Upper Extremity: WFL  -       Left Upper Extremity: WFL   Strength:    -       Right Upper Extremity: WFL  -       Left Upper Extremity: WFL  Fine Motor Coordination:    -       Intact  Gross motor coordination:   WFL      AMPAC 6 Click ADL:  AMPAC Total Score: 15    Treatment & Education:  Pt ed on OT POC  Pt ed on wb'g precaution  Pt ed on OOBTC (returned supine 2* pt t/f'g off unit)    Patient left supine with all lines intact, call button in reach, and RN notified    GOALS:   Multidisciplinary Problems       Occupational Therapy Goals          Problem: Occupational Therapy    Goal Priority Disciplines Outcome Interventions   Occupational Therapy Goal     OT, PT/OT Ongoing, Progressing    Description: Goals to be met by: 10/1/23     Patient will increase functional independence with ADLs by performing:      Feeding with [place].  UE Dressing with Set-up Assistance.  LE Dressing with Stand-by Assistance.  Grooming while standing at sink with Stand-by Assistance.  Toileting from bedside commode with Stand-by Assistance for hygiene and clothing management.   Toilet transfer to bedside commode with Stand-by Assistance.                       History:     Past Medical History:   Diagnosis Date    Hyperlipidemia     Hypertension     Peptic ulcer disease     Peripheral artery disease     PONV (postoperative nausea and vomiting)     Stage IV CKD     Type II diabetes mellitus          Past Surgical History:   Procedure Laterality Date    ANGIOGRAM, LOWER ARTERIAL, UNILATERAL Left 9/13/2023    Procedure: ANGIOGRAM, LOWER ARTERIAL, UNILATERAL;  Surgeon: Maxx Maya MD;  Location: [address];  Service: Vascular;  Laterality: Left;    ANGIOGRAPHY OF LOWER EXTREMITY Left 9/14/2023    Procedure: ANGIOGRAM, LOWER EXTREMITY with balloon angioplasty ultraverse 5mm x 60mm;  Surgeon: Maxx Maya MD;  Location: [address];  Service: Vascular;  Laterality: Left;   ultraverse 5mm x 60mm  fluoro: 12.41  contrast: 44ml  mGy: 65.57  Gycm2: 23.50    AORTOGRAPHY WITH EXTREMITY RUNOFF Left 9/13/2023    Procedure: AORTOGRAM, WITH EXTREMITY RUNOFF;  Surgeon: Maxx Maya MD;  [facility];  Service: Vascular;  Laterality: Left;  8.1 min  663.63 mGy  176.39 Gy.cm  178ml Dye     AV FISTULA PLACEMENT Right     CHOLECYSTECTOMY      CREATION, BYPASS, ARTERIAL, AXILLARY TO BILATERAL FEMORAL Left 9/14/2023    Procedure: CREATION, BYPASS, ARTERIAL, AXILLARY TO BILATERAL FEMORAL;  Surgeon: Maxx Maya MD;  [facility];  Service: Vascular;  Laterality: Left;  Left Axillary to Bilateral Femoral Bypass, Left Femoral to Above Knee Popliteal Bypass; SLIDER BED    CYSTOSCOPY W/ URETERAL STENT PLACEMENT Right 08/28/2018    Procedure: CYSTOSCOPY, WITH URETERAL STENT INSERTION (ADD ON );  Surgeon: Bubba Perez MD;  Location: [facility] OR;  Service: Urology;  Laterality: Right;  (ADD ON )    DEBRIDEMENT OF FOOT Left 08/03/2023    Procedure: DEBRIDEMENT, FOOT;  Surgeon: Aleida Flannery DPM;  Location: [facility] OR;  Service: Podiatry;  Laterality: Left;    Excisional debridement of ulcer distal great toe left foot w/ partial resection distal phalanx Left 08/03/2023    FOOT AMPUTATION THROUGH METATARSAL Left 9/14/2023    Procedure: AMPUTATION, FOOT, TRANSMETATARSAL partial 1st ray amputation;  Surgeon: Jesus Valles DPM;  [facility];  Service: Podiatry;  Laterality: Left;  partial ray    I&D L 1st ray w/ amputation L hallux IPJ Left 08/14/2023    INCISION AND DRAINAGE FOOT Left 9/14/2023    Procedure: INCISION AND DRAINAGE, FOOT;  Surgeon: Jesus Valles DPM;  [facility];  Service: Podiatry;  Laterality: Left;    Injection L PT tendon sheath Left 08/14/2023    Nail avulsion left hallux Left 08/03/2023    PERIPHERAL ARTERIAL STENT GRAFT Right     REMOVAL OF NAIL OF DIGIT Left 08/03/2023    Procedure: REMOVAL, NAIL, DIGIT great toe;   Surgeon: Aleida Flannery DPM;  Location: [facility] OR;  Service: Podiatry;  Laterality: Left;    STENT, SUPERFICIAL FEMORAL ARTERY Left 9/14/2023    Procedure: STENT, SUPERFICIAL FEMORAL ARTERY;  Surgeon: Maxx Maya MD;  [address];  Service: Vascular;  Laterality: Left;  5 x 100 mm    TOE AMPUTATION Left 08/14/2023    Procedure: AMPUTATION, TOE hallux IPJ;  Surgeon: Aleida Flannery DPM;  Location: [facility] OR;  Service: Podiatry;  Laterality: Left;    TOE AMPUTATION Left 08/25/2023    great toe    TOE AMPUTATION Left 8/25/2023    Procedure: AMPUTATION, TOE hallux, possible 1st met.head;  Surgeon: Aleida Flannery DPM;  Location: [facility];  Service: Podiatry;  Laterality: Left;    URETEROSCOPIC REMOVAL OF URETERIC CALCULUS Right 09/11/2018    Procedure: EXTRACTION-STONE-URETEROSCOPY;  Surgeon: Bubba Perez MD;  Location: [facility];  Service: Urology;  Laterality: Right;       Time Tracking:     OT Date of Treatment: 09/17/23  OT Start Time: 1103  OT Stop Time: 1124  OT Total Time (min): 21 min    Billable Minutes:Evaluation 10  Therapeutic Activity 10    9/17/2023

## 2023-09-17 NOTE — PLAN OF CARE
Problem: Occupational Therapy  Goal: Occupational Therapy Goal  Description: Goals to be met by: 10/1/23     Patient will increase functional independence with ADLs by performing:      Feeding with [place].  UE Dressing with Set-up Assistance.  LE Dressing with Stand-by Assistance.  Grooming while standing at sink with Stand-by Assistance.  Toileting from bedside commode with Stand-by Assistance for hygiene and clothing management.   Toilet transfer to bedside commode with Stand-by Assistance.  Outcome: Ongoing, Progressing

## 2023-09-17 NOTE — SUBJECTIVE & OBJECTIVE
Medications:  Continuous Infusions:   heparin (porcine) in 5 % dex Stopped (09/16/23 1154)     Scheduled Meds:   sodium chloride 0.9%   Intravenous Once    acetaminophen  1,000 mg Oral Q8H    aspirin  81 mg Oral Daily    atorvastatin  80 mg Oral Daily    carvediloL  3.125 mg Oral BID    clopidogreL  75 mg Oral Daily    heparin (porcine)  5,000 Units Subcutaneous Q8H    insulin detemir U-100  20 Units Subcutaneous BID    mupirocin   Nasal BID    piperacillin-tazobactam (Zosyn) IV (PEDS and ADULTS) (extended infusion is not appropriate)  4.5 g Intravenous Q12H    traZODone  25 mg Oral QHS     PRN Meds:0.9%  NaCl infusion (for blood administration), aluminum & magnesium hydroxide-simethicone, calcium carbonate, dextrose 10%, dextrose 10%, dextrose 10%, dextrose 10%, diphenhydrAMINE, glucagon (human recombinant), glucose, glucose, heparin (porcine), hydrALAZINE, insulin aspart U-100, labetalol, LIDOcaine (PF) 10 mg/ml (1%), midodrine, ondansetron, oxyCODONE, pantoprazole, sodium chloride 0.9%, sodium chloride 0.9%     Objective:     Vital Signs (Most Recent):  Temp: 98.5 °F (36.9 °C) (09/17/23 1221)  Pulse: 87 (09/17/23 1221)  Resp: 18 (09/17/23 1221)  BP: (!) 141/64 (09/17/23 1221)  SpO2: 99 % (09/17/23 1221) Vital Signs (24h Range):  Temp:  [98.2 °F (36.8 °C)-98.7 °F (37.1 °C)] 98.5 °F (36.9 °C)  Pulse:  [] 87  Resp:  [4-28] 18  SpO2:  [80 %-100 %] 99 %  BP: (106-163)/(55-72) 141/64            Physical Exam  Constitutional:       General: She is not in acute distress.  HENT:      Head: Normocephalic and atraumatic.   Cardiovascular:      Rate and Rhythm: Normal rate.      Comments: 2+ L radial pulse  B/l biphasic dp/pt signals  Pulmonary:      Effort: Pulmonary effort is normal.   Abdominal:      Palpations: Abdomen is soft.   Musculoskeletal:         General: Normal range of motion.   Skin:     General: Skin is warm and dry.      Comments: L foot s/p debridement with podiatry. Wrapped in ace  B/l groin  incisions covered by prevenas  L upper chest incision covered with gauze and tegaderm. ELLE drain in place with SS output          Significant Labs:  CBC:   Recent Labs   Lab 09/17/23  0337   WBC 17.98*   RBC 2.28*   HGB 7.0*   HCT 20.3*   *   MCV 89   MCH 30.7   MCHC 34.5       CMP:   Recent Labs   Lab 09/17/23 0337   *   CALCIUM 7.9*   ALBUMIN 2.1*   PROT 4.9*   *   K 3.9   CO2 19*   CL 96   BUN 21*   CREATININE 3.2*   ALKPHOS 67   ALT 26   AST 19   BILITOT 0.4         Significant Diagnostics:  I have reviewed all pertinent imaging results/findings within the past 24 hours.

## 2023-09-17 NOTE — PLAN OF CARE
PT evaluation complete - see note for details. POC and goals established.    Problem: Physical Therapy  Goal: Physical Therapy Goal  Description: Goals to be met by: 10/1/23     Patient will increase functional independence with mobility by performin. Supine to sit with MInimal Assistance  2. Sit to supine with MInimal Assistance  3. Sit to stand transfer with Stand-by Assistance  4. Bed to chair transfer with Stand-by Assistance using LRAD  5. Gait  x 200 feet with Stand-by Assistance using LRAD.     Outcome: Ongoing, Progressing     2023

## 2023-09-17 NOTE — SUBJECTIVE & OBJECTIVE
"Interval HPI:   Overnight events: POD 3. Remains in SICU. BG at or slightly above goal ranges on current SQ insulin regimen. Creatinine 3.2. Diet renal Standard Tray    Eatin%  Nausea: No  Hypoglycemia and intervention: No  Fever: No  TPN and/or TF: No  If yes, type of TF/TPN and rate: n/a    BP (!) 161/70 (BP Location: Left arm, Patient Position: Lying)   Pulse 101   Temp 98.4 °F (36.9 °C) (Oral)   Resp (!) 21   Ht 5' 7" (1.702 m)   Wt 109.8 kg (242 lb)   LMP 2020 (Approximate)   SpO2 100%   Breastfeeding No   BMI 37.90 kg/m²     Labs Reviewed and Include    Recent Labs   Lab 23  0337   *   CALCIUM 7.9*   ALBUMIN 2.1*   PROT 4.9*   *   K 3.9   CO2 19*   CL 96   BUN 21*   CREATININE 3.2*   ALKPHOS 67   ALT 26   AST 19   BILITOT 0.4     Lab Results   Component Value Date    WBC 17.98 (H) 2023    HGB 7.0 (L) 2023    HCT 20.3 (L) 2023    MCV 89 2023     (L) 2023     No results for input(s): "TSH", "FREET4" in the last 168 hours.  Lab Results   Component Value Date    HGBA1C 7.2 (H) 2023       Nutritional status:   Body mass index is 37.9 kg/m².  Lab Results   Component Value Date    ALBUMIN 2.1 (L) 2023    ALBUMIN 2.1 (L) 2023    ALBUMIN 2.0 (L) 2023    ALBUMIN 2.0 (L) 2023     No results found for: "PREALBUMIN"    Estimated Creatinine Clearance: 26.3 mL/min (A) (based on SCr of 3.2 mg/dL (H)).    Accu-Checks  Recent Labs     23  0922 23  1027 23  1120 23  1201 23  1313 23  1705 23  2033 23  2225 23  0334 23  0853   POCTGLUCOSE 161* 192* 179* 177* 173* 151* 148* 196* 216* 199*       Current Medications and/or Treatments Impacting Glycemic Control  Immunotherapy:    Immunosuppressants       None          Steroids:   Hormones (From admission, onward)      None          Pressors:    Autonomic Drugs (From admission, onward)      Start     Stop Route " Frequency Ordered    09/16/23 1018  midodrine tablet 10 mg         -- Oral Daily PRN 09/16/23 0918          Hyperglycemia/Diabetes Medications:   Antihyperglycemics (From admission, onward)      Start     Stop Route Frequency Ordered    09/16/23 2100  insulin detemir U-100 (Levemir) pen 20 Units         -- SubQ 2 times daily 09/16/23 1932    09/16/23 1315  insulin regular in 0.9 % NaCl 100 unit/100 mL (1 unit/mL) infusion        Question:  Enter initial dose (Units/hr):  Answer:  1.8    09/16/23 2100 IV Continuous 09/16/23 1202    09/16/23 1301  insulin aspart U-100 pen 0-10 Units         -- SubQ As needed (PRN) 09/16/23 1202

## 2023-09-17 NOTE — OP NOTE
[facility] ([facility])  Operative Note     SUMMARY      Surgery Date: 9/14/2023      Surgeon(s) and Role:  Panel 1:     * Maxx Maya MD - Primary     * Marta Schultz MD - Resident - Assisting     * Adrian Canada MD - Fellow     * Ray Loyd MD - Fellow  Panel 2:     * Jesus Valles DPM - Primary     * Ewa Kimball MD - Resident - Assisting           Pre-op Diagnosis:  Non-healing wound of left lower extremity [S81.802A]     Post-op Diagnosis:  Post-Op Diagnosis Codes:     * Non-healing wound of left lower extremity [S81.802A]        Procedures:  Left ax to bi-femoral artery bypass (8mm PTFE)  Left lower extremity angiogram  PTA/Stent SFA (5x100 Lifestent, post dilated to 5mm)     Anesthesia: General     Operative Findings: good back bleeding from each CFA, multiphasic signal left leg. Three vessel runoff to foot.     Estimated Blood Loss: 200cc     MODIFIER: A 22 modifier is appropriate in this case given the extensive preoperative planning and intraoperative technical demand, physical and mental effort, all of which exceed 200% that of other comparable cases. The patient was morbidly obese and each exposure was extraordinarily difficult and each anastomosis was equally challenging.            Specimens:   Specimen (24h ago, onward)          Start     Ordered     09/14/23 1635   Specimen to Pathology, Surgery Other (podiatry)  Once        Comments: Pre-op Diagnosis: Non-healing wound of left lower extremity [S81.802A]Procedure(s):CREATION, BYPASS, ARTERIAL, AXILLARY TO BILATERAL FEMORALANGIOGRAM, LOWER EXTREMITY with balloon angioplasty ultraverse 5mm x 60mmSTENT, SUPERFICIAL FEMORAL ARTERYAMPUTATION, FOOT, TRANSMETATARSALINCISION AND DRAINAGE, FOOT Number of specimens: 1Name of specimens: 1. Left 1st metatarsal head-perm      References:    Click here for ordering Quick Tip   Question Answer Comment   Procedure Type: Other podiatry   Specimen Class: Routine/Screening     Which provider  would you like to cc? CATHERINE HUNT     Release to patient Immediate         09/14/23 1635                          [id number]    Indications:  52 year old woman with history of peripheral artery disease found to have a distal aortic occlusion and left foot nonhealing 1st toe amputation site with evidence of infection.  The patient is a end-stage renal disease and was felt that she would not be a good candidate for an open abdominal inflow so the patient was consented for an axillary bifemoral bypass.  She also had a occluded superficial femoral artery lesion seen on angiogram performed just prior to this so the patient was also consented for an antegrade angiogram and possible intervention.    Procedure details:  After obtaining signed consent for the above procedure, the patient was brought to the operating theater and placed in supine position.  General endotracheal anesthesia was initiated, preoperative antibiotics were administered.  The left axilla bilateral groins and left leg were prepped and draped in the usual sterile fashion.  A surgical time-out was performed confirming patient identity, laterality, and procedure.  We started by making an incision 1 fingerbreadth below the left clavicle with a 15 blade scalpel this was carried down through the subcutaneous tissues with electrocautery.  A bridging veins were clipped and divided.  The fascia overlying the pectoralis major was incised, the fibers of the pect major were .  In the clavipectoral fascia was divided.  The pec minor was divided with electrocautery.  The axillary vein was identified 1st, this was exposed circumferentially and retracted caudad.  The axillary artery was then identified and exposed proximal and distal.  Vessel loops were placed for later use.  The thoraco acromion was ligated with silk sutures and divided.  The attention was turned to the left common femoral artery.  A longitudinal incision was made over the left common  femoral artery and was deepened with electrocautery.  Bridging vessels and lymphatics were divided with silk sutures.  The common femoral artery was identified with the Doppler as it did not have a place, this was exposed at the level of the ligament and just proximal to the bifurcation.  This was noted to be relatively soft in nature.  A superficial femoral and the femoral profunda were exposed circumferentially and vessel loops placed for later use.  Next, we turned our attention to the right common femoral artery.  This was exposed in a similar fashion.  Each of these exposures was prolonged in nature given the large amount of adiposity at each of the sites contained.  Required numerous retractors and 2 surgeons at all times.  A 8 mm externally supported PTFE axillobifemoral bifurcated graft was then tunneled between the left axillary exposure site in the left common femoral artery exposure site in a subcutaneous plane and subsequently between 2 groin incisions in a subcutaneous plane.  The patient was systemically anticoagulated with 83954 units of IV heparin, an ACT was checked 3 minutes layer to ensure ACT level greater than 250 seconds.  This was checked every 30 minutes and the patient rebolused with heparin to sustain this goal.  The axillary artery was controlled with vascular clamps, an arteriotomy was made with 11 blade scalpel and extended with Wolf scissors.  Stay suture was placed the superior flap.  A end-to-side anastomosis was created using 5 0 [place] CV 5 suture.  Next, performed the anastomosis in the right common femoral artery.  The graft was cut to size ensuring no tension along the course.  The anastomosis was performed with a [place] CV 5 suture.  The common femoral and superficial femoral and profunda were each allowed to bleed prior to finishing the anastomosis and it was then flushed with a heparinized saline.  The same process was then carried out in the left common femoral artery after  cutting the graft to size.  Into the anastomosis was checked for hemostasis.  There was 1 area of bleeding in the right common femoral artery at the toe of the anastomosis which was made hemostatic with a single 6 0 Prolene suture.  Distal signals were checked in each of the fistula common femoral arteries and superficial femoral arteries noted to be low resistance continuous flow.  Next, we accessed the left graft limb with a micropuncture needle followed by 0.018 in wire and a microcatheter.  An angiogram was performed left lower extremity which demonstrated the distal superficial femoral artery occlusion and reconstitution approximately 9 cm distal.  A 0.035 in glidewire was advanced through the catheter and with the assistance of a Seeker catheter, the lesion was able to be crossed.  Microcatheter was exchanged fo in 2 segments followed byr a 7 Italian sheath.  The lesion was then treated with a 5 x 60 mm angioplasty balloon  Stent placement with a 5 x100 mm LifeStent.  Completion angiogram demonstrated residual stenosis within the stent which was then resolved with 5 mm angioplasty.  The sheath was removed and access site a hemostatic with a 5 0 Prolene suture in a U-stitch fashion.  Heparin was reversed with protamine.  Hemostasis was achieved in each of the 3 access sites.  A 15 Italian round Riki drain was placed in the infraclavicular incision.  Each of the incisions was closed with 2-0 Vicryl, 3-0 Vicryl, and 3-0 nylons in multiple layers.  Each of the groins was then dressed with a Prevena negative pressure dressing.     Patient tolerated the procedure well and was transferred to the ICU for recovery.  Instrument and sponge count correct x2.      Dr. Maya was present for the entire procedure.    Adrian Canada  Vascular Surgery Fellow, PGY-7  [phone]

## 2023-09-17 NOTE — PROGRESS NOTES
[facility]  Vascular Surgery  Progress Note    Patient Name: Georgina Arias  MRN: [id number]  Admission Date: 9/11/2023  Primary Care Provider: Alonso Ling MD    Subjective:     Interval History: Patient more alert this am. Doing well with minimal pain. B/l pedal signals present. Encourage oob. Stable for stepdown to floor. Hgb slowly downtrending to 7 this am Will transfuse 1u pRBC.    Post-Op Info:  Procedure(s) (LRB):  CREATION, BYPASS, ARTERIAL, AXILLARY TO BILATERAL FEMORAL (Left)  ANGIOGRAM, LOWER EXTREMITY with balloon angioplasty ultraverse 5mm x 60mm (Left)  STENT, SUPERFICIAL FEMORAL ARTERY (Left)  AMPUTATION, FOOT, TRANSMETATARSAL partial 1st ray amputation (Left)  INCISION AND DRAINAGE, FOOT (Left)   3 Days Post-Op       Medications:  Continuous Infusions:   heparin (porcine) in 5 % dex Stopped (09/16/23 1154)     Scheduled Meds:   sodium chloride 0.9%   Intravenous Once    acetaminophen  1,000 mg Oral Q8H    aspirin  81 mg Oral Daily    atorvastatin  80 mg Oral Daily    carvediloL  3.125 mg Oral BID    clopidogreL  75 mg Oral Daily    heparin (porcine)  5,000 Units Subcutaneous Q8H    insulin detemir U-100  20 Units Subcutaneous BID    mupirocin   Nasal BID    piperacillin-tazobactam (Zosyn) IV (PEDS and ADULTS) (extended infusion is not appropriate)  4.5 g Intravenous Q12H    traZODone  25 mg Oral QHS     PRN Meds:0.9%  NaCl infusion (for blood administration), aluminum & magnesium hydroxide-simethicone, calcium carbonate, dextrose 10%, dextrose 10%, dextrose 10%, dextrose 10%, diphenhydrAMINE, glucagon (human recombinant), glucose, glucose, heparin (porcine), hydrALAZINE, insulin aspart U-100, labetalol, LIDOcaine (PF) 10 mg/ml (1%), midodrine, ondansetron, oxyCODONE, pantoprazole, sodium chloride 0.9%, sodium chloride 0.9%     Objective:     Vital Signs (Most Recent):  Temp: 98.5 °F (36.9 °C) (09/17/23 1221)  Pulse: 87 (09/17/23 1221)  Resp: 18 (09/17/23  1221)  BP: (!) 141/64 (09/17/23 1221)  SpO2: 99 % (09/17/23 1221) Vital Signs (24h Range):  Temp:  [98.2 °F (36.8 °C)-98.7 °F (37.1 °C)] 98.5 °F (36.9 °C)  Pulse:  [] 87  Resp:  [4-28] 18  SpO2:  [80 %-100 %] 99 %  BP: (106-163)/(55-72) 141/64           Physical Exam  Constitutional:       General: She is not in acute distress.  HENT:      Head: Normocephalic and atraumatic.   Cardiovascular:      Rate and Rhythm: Normal rate.      Comments: 2+ L radial pulse  B/l biphasic dp/pt signals  Pulmonary:      Effort: Pulmonary effort is normal.   Abdominal:      Palpations: Abdomen is soft.   Musculoskeletal:         General: Normal range of motion.   Skin:     General: Skin is warm and dry.      Comments: L foot s/p debridement with podiatry. Wrapped in ace  B/l groin incisions covered by prevenas  L upper chest incision covered with gauze and tegaderm. ELLE drain in place with SS output          Significant Labs:  CBC:   Recent Labs   Lab 09/17/23  0337   WBC 17.98*   RBC 2.28*   HGB 7.0*   HCT 20.3*   *   MCV 89   MCH 30.7   MCHC 34.5       CMP:   Recent Labs   Lab 09/17/23  0337   *   CALCIUM 7.9*   ALBUMIN 2.1*   PROT 4.9*   *   K 3.9   CO2 19*   CL 96   BUN 21*   CREATININE 3.2*   ALKPHOS 67   ALT 26   AST 19   BILITOT 0.4         Significant Diagnostics:  I have reviewed all pertinent imaging results/findings within the past 24 hours.    Assessment/Plan:     Open wound of left foot  Georgina Arias is a 52 y.o. female with ESRD, HTN, PAD, DM2, who is here today for non-healing L foot wound s/p L great toe amputation on 8/25/23. S/p axillary, bifem bypass 9/14/23.    - ASA/hep gtt/plavix  - Nephrology consulted for HD  - Podiatry following. S/p L foot debridement 9/14/23  - TTE LVEF 50-55%  - d/c abx  - prn pain meds, nausea meds  - float heels  - Advance to renal diet  - ok for OOB  - PT/OT  - Trend labs  - Monitor drain output  - q4h neurovascular checks  - transfuse 1u pRBC  - ok to  step down          Marta Schultz MD  Vascular Surgery  [facility]

## 2023-09-17 NOTE — PROGRESS NOTES
[facility]  Endocrinology  Progress Note    Admit Date: 9/11/2023     Reason for Consult: Management of T2DM, Hyperglycemia     Surgical Procedure and Date:  9/14/23  CREATION, BYPASS, ARTERIAL, AXILLARY TO BILATERAL FEMORAL (Left)  ANGIOGRAM, LOWER EXTREMITY with balloon angioplasty ultraverse 5mm x 60mm (Left)  STENT, SUPERFICIAL FEMORAL ARTERY (Left)  AMPUTATION, FOOT, TRANSMETATARSAL partial 1st ray amputation (Left)  INCISION AND DRAINAGE, FOOT (Left)    Lab Results   Component Value Date    HGBA1C 7.2 (H) 09/11/2023       Diabetes diagnosis year: 1993    Home Diabetes Medications:  Lantus 45 units q HS, Mounjaro 5 mg once weekly     How often checking glucose at home?  2x daily     BG readings on regimen:  mostly low to mid 100s  Hypoglycemia on the regimen?  No  Missed doses on regimen?  No    Diabetes Complications include:     Hyperglycemia, Diabetic nephropathy  , Diabetic chronic kidney disease     , and Amputation status    Complicating diabetes co morbidities:   HTN, HLD, Obesity, ESRD       HPI:   Patient is a 52 y.o. female with a diagnosis of PONV, ESRD on HD, T2DM on insulin and mounjaro (A1c 7.2), PVD s/p stents to RLE, DVT of R leg, HTN, HLD, PUD, obesity (BMI 38), and three recent left foot surgeries in August. Most recently uderwent amputation of the L hallux at the [facility] on 8/25/23 for reported gangrene and osteomyelitis. She is currently admitted to vascular surgery for non-healing left foot wound. Pt presents to the SICU s/p Left ax to bi-femoral artery bypass, stent to SFA, and Transmetatarsal amputation of the left foot and washout with Dr. Maya and Dr. Valles on 9/14/23. Will admit to SICU for further hemodynamic monitoring and q1h neurovascular checks. Endocrinology consulted for management of T2DM.            Interval HPI:   Overnight events: POD 3. Remains in SICU. BG at or slightly above goal ranges on current SQ insulin regimen. Creatinine 3.2. Diet renal  "Standard Tray    Eatin%  Nausea: No  Hypoglycemia and intervention: No  Fever: No  TPN and/or TF: No  If yes, type of TF/TPN and rate: n/a    BP (!) 161/70 (BP Location: Left arm, Patient Position: Lying)   Pulse 101   Temp 98.4 °F (36.9 °C) (Oral)   Resp (!) 21   Ht 5' 7" (1.702 m)   Wt 109.8 kg (242 lb)   LMP 2020 (Approximate)   SpO2 100%   Breastfeeding No   BMI 37.90 kg/m²     Labs Reviewed and Include    Recent Labs   Lab 23  0337   *   CALCIUM 7.9*   ALBUMIN 2.1*   PROT 4.9*   *   K 3.9   CO2 19*   CL 96   BUN 21*   CREATININE 3.2*   ALKPHOS 67   ALT 26   AST 19   BILITOT 0.4     Lab Results   Component Value Date    WBC 17.98 (H) 2023    HGB 7.0 (L) 2023    HCT 20.3 (L) 2023    MCV 89 2023     (L) 2023     No results for input(s): "TSH", "FREET4" in the last 168 hours.  Lab Results   Component Value Date    HGBA1C 7.2 (H) 2023       Nutritional status:   Body mass index is 37.9 kg/m².  Lab Results   Component Value Date    ALBUMIN 2.1 (L) 2023    ALBUMIN 2.1 (L) 2023    ALBUMIN 2.0 (L) 2023    ALBUMIN 2.0 (L) 2023     No results found for: "PREALBUMIN"    Estimated Creatinine Clearance: 26.3 mL/min (A) (based on SCr of 3.2 mg/dL (H)).    Accu-Checks  Recent Labs     23  0922 23  1027 23  1120 23  1201 23  1313 23  1705 23  2033 23  2225 23  0334 23  0853   POCTGLUCOSE 161* 192* 179* 177* 173* 151* 148* 196* 216* 199*       Current Medications and/or Treatments Impacting Glycemic Control  Immunotherapy:    Immunosuppressants       None          Steroids:   Hormones (From admission, onward)      None          Pressors:    Autonomic Drugs (From admission, onward)      Start     Stop Route Frequency Ordered    23 1018  midodrine tablet 10 mg         -- Oral Daily PRN 23 0918          Hyperglycemia/Diabetes Medications: "   Antihyperglycemics (From admission, onward)      Start     Stop Route Frequency Ordered    09/16/23 2100  insulin detemir U-100 (Levemir) pen 20 Units         -- SubQ 2 times daily 09/16/23 1932 09/16/23 1315  insulin regular in 0.9 % NaCl 100 unit/100 mL (1 unit/mL) infusion        Question:  Enter initial dose (Units/hr):  Answer:  1.8    09/16/23 2100 IV Continuous 09/16/23 1202    09/16/23 1301  insulin aspart U-100 pen 0-10 Units         -- SubQ As needed (PRN) 09/16/23 1202            ASSESSMENT and PLAN    Cardiac/Vascular  * S/P femoral-femoral bypass surgery  Managed per primary team  Optimize BG control        Hyperlipidemia  May increase insulin resistance.         Endocrine  Severe obesity (BMI >= 40)  Body mass index is 37.9 kg/m².  May increase insulin resistance.         Type II diabetes mellitus  BG goal 140 - 180     Discontinue Transition IV insulin infusion at 1.8 u/hr with stepdown parameters (0.8 u/kg dosing)   Start Levemir 20 units BID (equivalent to IV insulin requirements)  Moderate Dose Correction Scale  BG monitoring ac/hs/0200  Will monitor for prandial excursions as appetite improves and initiate prandial insulin coverage     ** Please call Endocrine for any BG related issues **    ** Please notify Endocrine for any change and/or advance in diet**    Discharge planning: ALEJO Webster NP  Endocrinology  [facility]

## 2023-09-17 NOTE — SUBJECTIVE & OBJECTIVE
Interval History:  Hemodynamically stable now. Hemoglobin down to 7.0 .Clotted x 1 overnight.     Review of patient's allergies indicates:   Allergen Reactions    Naproxen Anaphylaxis and Swelling     Hives (skin)^swelling  Hives (skin)^swelling  Hives (skin)^swelling    Sulfamethoxazole-trimethoprim Other (See Comments)     Caused JEROME    Hydrocodone Nausea And Vomiting and Rash    Penicillins Hives     Blisters (skin)^    Adhesive Rash    Hydrocodone-acetaminophen Nausea And Vomiting     Rash (skin)^, Vomiting^       Current Facility-Administered Medications   Medication Frequency    0.9%  NaCl infusion (for blood administration) Q24H PRN    0.9%  NaCl infusion Once    acetaminophen tablet 1,000 mg Q8H    aluminum & magnesium hydroxide-simethicone 400-400-40 mg/5 mL suspension 30 mL Q6H PRN    aspirin EC tablet 81 mg Daily    atorvastatin tablet 80 mg Daily    calcium carbonate 200 mg calcium (500 mg) chewable tablet 500 mg TID PRN    clopidogreL tablet 75 mg Daily    dextrose 10% bolus 125 mL 125 mL PRN    dextrose 10% bolus 125 mL 125 mL PRN    dextrose 10% bolus 250 mL 250 mL PRN    dextrose 10% bolus 250 mL 250 mL PRN    diphenhydrAMINE capsule 25 mg Q6H PRN    glucagon (human recombinant) injection 1 mg PRN    glucose chewable tablet 16 g PRN    glucose chewable tablet 24 g PRN    heparin (porcine) injection 3,000 Units PRN    heparin (porcine) injection 5,000 Units Q8H    heparin 25,000 units in dextrose 5% 250 mL (100 units/mL) infusion (heparin infusion - NO NOMOGRAM) Continuous    hydrALAZINE injection 10 mg Q6H PRN    insulin aspart U-100 pen 0-10 Units PRN    insulin detemir U-100 (Levemir) pen 20 Units BID    labetalol 20 mg/4 mL (5 mg/mL) IV syring Q6H PRN    LIDOcaine (PF) 10 mg/ml (1%) injection 10 mg Once PRN    midodrine tablet 10 mg Daily PRN    ondansetron injection 4 mg Q4H PRN    oxyCODONE immediate release tablet 5 mg Q4H PRN    pantoprazole EC tablet 40 mg BID PRN    piperacillin-tazobactam  (ZOSYN) 4.5 g in dextrose 5 % in water (D5W) 100 mL IVPB (MB+) Q12H    sodium chloride 0.9% bolus 250 mL 250 mL PRN    sodium chloride 0.9% flush 10 mL PRN    trazodone split tablet 25 mg QHS       Objective:     Vital Signs (Most Recent):  Temp: 98.4 °F (36.9 °C) (09/17/23 0301)  Pulse: 101 (09/17/23 0600)  Resp: (!) 21 (09/17/23 0600)  BP: (!) 161/70 (09/17/23 0600)  SpO2: 100 % (09/17/23 0600) Vital Signs (24h Range):  Temp:  [98.2 °F (36.8 °C)-98.4 °F (36.9 °C)] 98.4 °F (36.9 °C)  Pulse:  [] 101  Resp:  [4-29] 21  SpO2:  [80 %-100 %] 100 %  BP: (106-163)/(55-72) 161/70     Weight: 109.8 kg (242 lb) (09/15/23 0845)  Body mass index is 37.9 kg/m².  Body surface area is 2.28 meters squared.    I/O last 3 completed shifts:  In: 4595.9 [I.V.:3674.9; Blood:350; IV Piggyback:571]  Out: 6042 [Urine:80; Drains:138; Other:5749; Stool:75]     Physical Exam  Vitals and nursing note reviewed.   Constitutional:       General: She is not in acute distress.     Appearance: She is ill-appearing.   HENT:      Head: Normocephalic and atraumatic.   Cardiovascular:      Rate and Rhythm: Normal rate.      Comments: 2+ L radial pulse  B/l biphasic dp/pt signals  Pulmonary:      Effort: Pulmonary effort is normal.   Abdominal:      Palpations: Abdomen is soft.   Musculoskeletal:         General: Normal range of motion.   Skin:     General: Skin is warm and dry.      Comments: L foot s/p debridement with podiatry. Wrapped in ace  B/l groin incisions covered by prevenas  L upper chest incision covered with gauze and tegaderm. LELE drain in place          Significant Labs:  CBC:   Recent Labs   Lab 09/17/23 0337   WBC 17.98*   RBC 2.28*   HGB 7.0*   HCT 20.3*   *   MCV 89   MCH 30.7   MCHC 34.5       CMP:   Recent Labs   Lab 09/17/23 0337   *   CALCIUM 7.9*   ALBUMIN 2.1*   PROT 4.9*   *   K 3.9   CO2 19*   CL 96   BUN 21*   CREATININE 3.2*   ALKPHOS 67   ALT 26   AST 19   BILITOT 0.4       All labs within the  past 24 hours have been reviewed.

## 2023-09-17 NOTE — PROGRESS NOTES
[facility]  Nephrology  Progress Note    Patient Name: Georgina Arias  MRN: [id number]  Admission Date: 9/11/2023  Hospital Length of Stay: 6 days  Attending Provider: Maxx Maya MD   Primary Care Physician: Alonso Ling MD  Principal Problem:S/P femoral-femoral bypass surgery    Subjective:     HPI: Ms Arias is an obese (BMI ~38) 52-year-old woman with HLD, PAD with multiple prior stents, hypertension, peptic ulcer disease, type 2 diabetes mellitus on insulin (most recent hemoglobin A1c 7.2%), central obesity hypoventilation syndrome, chronic left foot wound s/p x3 surgeries now in August/last month (i.e. I&D, left hallux amputation, etc.) and ESRD secondary to diabetic nephropathy (biopsy proven) on iHD every Tuesday, Thursday & Saturday via RUE AVF who was admitted to [facility] on 9/11 for aforementioned left lower extremity wound. Tentative plans for angiography while inpatient per Vascular Surgery. Podiatry has also been consulted for assistance. Nephrology has been consulted for inpatient RRT needs.    Outpatient HD Information:  -Dialysis modality: Hemodialysis  -Outpatient HD unit: [facility] (Valerio Bautista MD)  -HD TX days: Tuesday/Thursday/Saturday, duration of treatment: 3-4 hrs  -Last HD TX prior to hospital admission: 09/09/2023  -Dialysis access: RUE AVF   -Residual urine: + RRF  -EDW: 109 kg      Interval History:  Hemodynamically stable now. Hemoglobin down to 7.0 .Clotted x 1 overnight.     Review of patient's allergies indicates:   Allergen Reactions    Naproxen Anaphylaxis and Swelling     Hives (skin)^swelling  Hives (skin)^swelling  Hives (skin)^swelling    Sulfamethoxazole-trimethoprim Other (See Comments)     Caused JEROME    Hydrocodone Nausea And Vomiting and Rash    Penicillins Hives     Blisters (skin)^    Adhesive Rash    Hydrocodone-acetaminophen Nausea And Vomiting     Rash (skin)^, Vomiting^       Current Facility-Administered Medications   Medication  Frequency    0.9%  NaCl infusion (for blood administration) Q24H PRN    0.9%  NaCl infusion Once    acetaminophen tablet 1,000 mg Q8H    aluminum & magnesium hydroxide-simethicone 400-400-40 mg/5 mL suspension 30 mL Q6H PRN    aspirin EC tablet 81 mg Daily    atorvastatin tablet 80 mg Daily    calcium carbonate 200 mg calcium (500 mg) chewable tablet 500 mg TID PRN    clopidogreL tablet 75 mg Daily    dextrose 10% bolus 125 mL 125 mL PRN    dextrose 10% bolus 125 mL 125 mL PRN    dextrose 10% bolus 250 mL 250 mL PRN    dextrose 10% bolus 250 mL 250 mL PRN    diphenhydrAMINE capsule 25 mg Q6H PRN    glucagon (human recombinant) injection 1 mg PRN    glucose chewable tablet 16 g PRN    glucose chewable tablet 24 g PRN    heparin (porcine) injection 3,000 Units PRN    heparin (porcine) injection 5,000 Units Q8H    heparin 25,000 units in dextrose 5% 250 mL (100 units/mL) infusion (heparin infusion - NO NOMOGRAM) Continuous    hydrALAZINE injection 10 mg Q6H PRN    insulin aspart U-100 pen 0-10 Units PRN    insulin detemir U-100 (Levemir) pen 20 Units BID    labetalol 20 mg/4 mL (5 mg/mL) IV syring Q6H PRN    LIDOcaine (PF) 10 mg/ml (1%) injection 10 mg Once PRN    midodrine tablet 10 mg Daily PRN    ondansetron injection 4 mg Q4H PRN    oxyCODONE immediate release tablet 5 mg Q4H PRN    pantoprazole EC tablet 40 mg BID PRN    piperacillin-tazobactam (ZOSYN) 4.5 g in dextrose 5 % in water (D5W) 100 mL IVPB (MB+) Q12H    sodium chloride 0.9% bolus 250 mL 250 mL PRN    sodium chloride 0.9% flush 10 mL PRN    trazodone split tablet 25 mg QHS       Objective:     Vital Signs (Most Recent):  Temp: 98.4 °F (36.9 °C) (09/17/23 0301)  Pulse: 101 (09/17/23 0600)  Resp: (!) 21 (09/17/23 0600)  BP: (!) 161/70 (09/17/23 0600)  SpO2: 100 % (09/17/23 0600) Vital Signs (24h Range):  Temp:  [98.2 °F (36.8 °C)-98.4 °F (36.9 °C)] 98.4 °F (36.9 °C)  Pulse:  [] 101  Resp:  [4-29] 21  SpO2:  [80 %-100  %] 100 %  BP: (106-163)/(55-72) 161/70     Weight: 109.8 kg (242 lb) (09/15/23 0845)  Body mass index is 37.9 kg/m².  Body surface area is 2.28 meters squared.    I/O last 3 completed shifts:  In: 4595.9 [I.V.:3674.9; Blood:350; IV Piggyback:571]  Out: 6042 [Urine:80; Drains:138; Other:5749; Stool:75]    Physical Exam  Vitals and nursing note reviewed.   Constitutional:       General: She is not in acute distress.     Appearance: She is ill-appearing.   HENT:      Head: Normocephalic and atraumatic.   Cardiovascular:      Rate and Rhythm: Normal rate.      Comments: 2+ L radial pulse  B/l biphasic dp/pt signals  Pulmonary:      Effort: Pulmonary effort is normal.   Abdominal:      Palpations: Abdomen is soft.   Musculoskeletal:         General: Normal range of motion.   Skin:     General: Skin is warm and dry.      Comments: L foot s/p debridement with podiatry. Wrapped in ace  B/l groin incisions covered by prevenas  L upper chest incision covered with gauze and tegaderm. ELLE drain in place          Significant Labs:  CBC:   Recent Labs   Lab 09/17/23  0337   WBC 17.98*   RBC 2.28*   HGB 7.0*   HCT 20.3*   *   MCV 89   MCH 30.7   MCHC 34.5       CMP:   Recent Labs   Lab 09/17/23  0337   *   CALCIUM 7.9*   ALBUMIN 2.1*   PROT 4.9*   *   K 3.9   CO2 19*   CL 96   BUN 21*   CREATININE 3.2*   ALKPHOS 67   ALT 26   AST 19   BILITOT 0.4       All labs within the past 24 hours have been reviewed.       Assessment/Plan:     Cardiac/Vascular  Essential hypertension  - management per primary team  - currently on nifedipine 30 mg daily    Renal/  ESRD (end stage renal disease)  Outpatient HD Information:  -Dialysis modality: Hemodialysis  -Outpatient HD unit: [facility] (Valerio Bautista MD)  -HD TX days: Tuesday/Thursday/Saturday, duration of treatment: 3-4 hrs  -Last HD TX prior to hospital admission: 09/09/2023  -Dialysis access: RUE AVF   -Residual urine: + RRF  -EDW: 109  kg    Plan/Recommendations:  - plan iHD today UF as tolerate for volume removal and metabolic clearance. Stop UF if unable to tolerate it  - renal diet/tube feeds when not NPO   - strict I/O's and daily weights  - daily renal function panels and magnesium levels  - renally all dose medications to eGFR  - avoid gadolinium, fleets, phos-based laxatives, NSAIDs, etc.    Orthopedic  Open wound of left foot  - as per primary        Thank you for your consult. I will follow-up with patient. Please contact us if you have any additional questions.    Charity Trejo MD  Nephrology  [facility]

## 2023-09-17 NOTE — SUBJECTIVE & OBJECTIVE
Interval History/Significant Events: No acute events overnight. Hemodynamically stable. On room air. Hemoglobin down to 7.0 but no signs of active bleeding. Patient had one episode of emesis this morning around 0500. AXOx4.     Follow-up For: Procedure(s) (LRB):  CREATION, BYPASS, ARTERIAL, AXILLARY TO BILATERAL FEMORAL (Left)  ANGIOGRAM, LOWER EXTREMITY with balloon angioplasty ultraverse 5mm x 60mm (Left)  STENT, SUPERFICIAL FEMORAL ARTERY (Left)  AMPUTATION, FOOT, TRANSMETATARSAL partial 1st ray amputation (Left)  INCISION AND DRAINAGE, FOOT (Left)    Post-Operative Day: 3 Days Post-Op    Objective:     Vital Signs (Most Recent):  Temp: 98.4 °F (36.9 °C) (09/17/23 0301)  Pulse: 101 (09/17/23 0600)  Resp: (!) 21 (09/17/23 0600)  BP: (!) 161/70 (09/17/23 0600)  SpO2: 100 % (09/17/23 0600) Vital Signs (24h Range):  Temp:  [98.2 °F (36.8 °C)-98.4 °F (36.9 °C)] 98.4 °F (36.9 °C)  Pulse:  [] 101  Resp:  [4-29] 21  SpO2:  [80 %-100 %] 100 %  BP: (106-163)/(55-72) 161/70     Weight: 109.8 kg (242 lb)  Body mass index is 37.9 kg/m².      Intake/Output Summary (Last 24 hours) at 9/17/2023 0808  Last data filed at 9/17/2023 0600  Gross per 24 hour   Intake 1218.49 ml   Output 1459 ml   Net -240.51 ml            Physical Exam  Vitals and nursing note reviewed.   Constitutional:       General: She is not in acute distress.     Appearance: Normal appearance. She is obese.   HENT:      Head: Normocephalic and atraumatic.   Eyes:      Extraocular Movements: Extraocular movements intact.      Conjunctiva/sclera: Conjunctivae normal.   Cardiovascular:      Rate and Rhythm: Regular rhythm. Tachycardia present.      Comments: Dopplerable posterior tibial signals   Pulmonary:      Effort: Pulmonary effort is normal. No respiratory distress.   Abdominal:      General: There is no distension.      Palpations: Abdomen is soft.      Tenderness: There is no abdominal tenderness.   Genitourinary:     Comments:  Bilateral groins with  Prevena wound vacs in place, soft with no evidence of hematoma  Musculoskeletal:      Cervical back: Normal range of motion.      Comments:  S/p left 1st metatarsal amp with dressing in place c/d/I  ELLE drain in place at left upper chest with incision c/d/I    Skin:     General: Skin is warm and dry.      Coloration: Skin is not jaundiced.   Neurological:      Mental Status: She is alert and oriented to person, place, and time.   Psychiatric:         Mood and Affect: Mood normal.            Vents:       Lines/Drains/Airways       Central Venous Catheter Line  Duration             Trialysis (Dialysis) Catheter 09/14/23 1600 right internal jugular 2 days              Drain  Duration                  Urethral Catheter 09/14/23 0806 Non-latex 16 Fr. 3 days         Closed/Suction Drain 09/14/23 1435 Tube - 1 Left;Superior Chest Bulb 15 Fr. 2 days              Peripheral Intravenous Line  Duration                  Hemodialysis AV Fistula  Right forearm -- days         Peripheral IV - Single Lumen 09/15/23 1900 18 G Anterior;Left;Proximal Forearm 1 day                    Significant Labs:    CBC/Anemia Profile:  Recent Labs   Lab 09/16/23  0215 09/16/23  0417 09/17/23  0337   WBC 20.31* 18.49* 17.98*   HGB 8.6* 7.9* 7.0*   HCT 25.4* 23.8* 20.3*    121* 142*   MCV 90 91 89   RDW 17.4* 17.7* 17.8*          Chemistries:  Recent Labs   Lab 09/15/23  2254 09/16/23  0417 09/16/23  0546 09/16/23  1455 09/17/23  0337   * 130*  130*  --  132* 128*   K 3.9 3.9  3.9  --  3.8 3.9   CL 98 96  96  --  100 96   CO2 26 23 23  --  18* 19*   BUN 9 8  8  --  15 21*   CREATININE 1.5* 1.5*  1.5*  --  2.6* 3.2*   CALCIUM 7.4* 7.0*  7.0*  --  7.4* 7.9*   ALBUMIN 2.1* 2.0*  2.0*  --  2.1* 2.1*   PROT  --  4.8*  --   --  4.9*   BILITOT  --  0.5  --   --  0.4   ALKPHOS  --  59  --   --  67   ALT  --  37  --   --  26   AST  --  35  --   --  19   MG 1.7 1.8  --   --  2.4   PHOS 2.2* 8.0* 4.5 4.9* 5.5*         CMP:   Recent Labs    Lab 09/16/23  0417 09/16/23  1455 09/17/23  0337   *  130* 132* 128*   K 3.9  3.9 3.8 3.9   CL 96  96 100 96   CO2 23  23 18* 19*   *  223* 170* 209*   BUN 8  8 15 21*   CREATININE 1.5*  1.5* 2.6* 3.2*   CALCIUM 7.0*  7.0* 7.4* 7.9*   PROT 4.8*  --  4.9*   ALBUMIN 2.0*  2.0* 2.1* 2.1*   BILITOT 0.5  --  0.4   ALKPHOS 59  --  67   AST 35  --  19   ALT 37  --  26   ANIONGAP 11  11 14 13       All pertinent labs within the past 24 hours have been reviewed.    Significant Imaging:  I have reviewed all pertinent imaging results/findings within the past 24 hours.

## 2023-09-17 NOTE — PROGRESS NOTES
[facility]  Critical Care - Surgery  Progress Note    Patient Name: Georgina Arias  MRN: [id number]  Admission Date: 9/11/2023  Hospital Length of Stay: 6 days  Code Status: Full Code  Attending Provider: Maxx Maya MD  Primary Care Provider: Alonso Ling MD   Principal Problem: S/P femoral-femoral bypass surgery    Subjective:     Hospital/ICU Course:  No notes on file    Interval History/Significant Events: No acute events overnight. Hemodynamically stable. On room air. Hemoglobin down to 7.0 but no signs of active bleeding. Patient had one episode of emesis this morning around 0500. The patient states that she is no longer having diarrhea. AXOx4.     Follow-up For: Procedure(s) (LRB):  CREATION, BYPASS, ARTERIAL, AXILLARY TO BILATERAL FEMORAL (Left)  ANGIOGRAM, LOWER EXTREMITY with balloon angioplasty ultraverse 5mm x 60mm (Left)  STENT, SUPERFICIAL FEMORAL ARTERY (Left)  AMPUTATION, FOOT, TRANSMETATARSAL partial 1st ray amputation (Left)  INCISION AND DRAINAGE, FOOT (Left)    Post-Operative Day: 3 Days Post-Op    Objective:     Vital Signs (Most Recent):  Temp: 98.4 °F (36.9 °C) (09/17/23 0301)  Pulse: 101 (09/17/23 0600)  Resp: (!) 21 (09/17/23 0600)  BP: (!) 161/70 (09/17/23 0600)  SpO2: 100 % (09/17/23 0600) Vital Signs (24h Range):  Temp:  [98.2 °F (36.8 °C)-98.4 °F (36.9 °C)] 98.4 °F (36.9 °C)  Pulse:  [] 101  Resp:  [4-29] 21  SpO2:  [80 %-100 %] 100 %  BP: (106-163)/(55-72) 161/70     Weight: 109.8 kg (242 lb)  Body mass index is 37.9 kg/m².      Intake/Output Summary (Last 24 hours) at 9/17/2023 0808  Last data filed at 9/17/2023 0600  Gross per 24 hour   Intake 1218.49 ml   Output 1459 ml   Net -240.51 ml            Physical Exam  Vitals and nursing note reviewed.   Constitutional:       General: She is not in acute distress.     Appearance: Normal appearance. She is obese.   HENT:      Head: Normocephalic and atraumatic.   Eyes:      Extraocular Movements:  Extraocular movements intact.      Conjunctiva/sclera: Conjunctivae normal.   Cardiovascular:      Rate and Rhythm: Regular rhythm. Tachycardia present.      Comments: Dopplerable posterior tibial signals   Pulmonary:      Effort: Pulmonary effort is normal. No respiratory distress.   Abdominal:      General: There is no distension.      Palpations: Abdomen is soft.      Tenderness: There is no abdominal tenderness.   Genitourinary:     Comments:  Bilateral groins with Prevena wound vacs in place, soft with no evidence of hematoma  Musculoskeletal:      Cervical back: Normal range of motion.      Comments:  S/p left 1st metatarsal amp with dressing in place c/d/I  ELLE drain in place at left upper chest with incision c/d/I    Skin:     General: Skin is warm and dry.      Coloration: Skin is not jaundiced.   Neurological:      Mental Status: She is alert and oriented to person, place, and time.   Psychiatric:         Mood and Affect: Mood normal.            Vents:       Lines/Drains/Airways       Central Venous Catheter Line  Duration             Trialysis (Dialysis) Catheter 09/14/23 1600 right internal jugular 2 days              Drain  Duration                  Urethral Catheter 09/14/23 0806 Non-latex 16 Fr. 3 days         Closed/Suction Drain 09/14/23 1435 Tube - 1 Left;Superior Chest Bulb 15 Fr. 2 days              Peripheral Intravenous Line  Duration                  Hemodialysis AV Fistula  Right forearm -- days         Peripheral IV - Single Lumen 09/15/23 1900 18 G Anterior;Left;Proximal Forearm 1 day                    Significant Labs:    CBC/Anemia Profile:  Recent Labs   Lab 09/16/23  0215 09/16/23  0417 09/17/23  0337   WBC 20.31* 18.49* 17.98*   HGB 8.6* 7.9* 7.0*   HCT 25.4* 23.8* 20.3*    121* 142*   MCV 90 91 89   RDW 17.4* 17.7* 17.8*          Chemistries:  Recent Labs   Lab 09/15/23  2254 09/16/23  0417 09/16/23  0546 09/16/23  1455 09/17/23  0337   * 130*  130*  --  132* 128*   K  3.9 3.9  3.9  --  3.8 3.9   CL 98 96  96  --  100 96   CO2 26 23  23  --  18* 19*   BUN 9 8  8  --  15 21*   CREATININE 1.5* 1.5*  1.5*  --  2.6* 3.2*   CALCIUM 7.4* 7.0*  7.0*  --  7.4* 7.9*   ALBUMIN 2.1* 2.0*  2.0*  --  2.1* 2.1*   PROT  --  4.8*  --   --  4.9*   BILITOT  --  0.5  --   --  0.4   ALKPHOS  --  59  --   --  67   ALT  --  37  --   --  26   AST  --  35  --   --  19   MG 1.7 1.8  --   --  2.4   PHOS 2.2* 8.0* 4.5 4.9* 5.5*         CMP:   Recent Labs   Lab 09/16/23  0417 09/16/23  1455 09/17/23  0337   *  130* 132* 128*   K 3.9  3.9 3.8 3.9   CL 96  96 100 96   CO2 23  23 18* 19*   *  223* 170* 209*   BUN 8  8 15 21*   CREATININE 1.5*  1.5* 2.6* 3.2*   CALCIUM 7.0*  7.0* 7.4* 7.9*   PROT 4.8*  --  4.9*   ALBUMIN 2.0*  2.0* 2.1* 2.1*   BILITOT 0.5  --  0.4   ALKPHOS 59  --  67   AST 35  --  19   ALT 37  --  26   ANIONGAP 11  11 14 13       All pertinent labs within the past 24 hours have been reviewed.    Significant Imaging:  I have reviewed all pertinent imaging results/findings within the past 24 hours.    Assessment/Plan:     Cardiac/Vascular  Peripheral vascular disease    Neuro/Psych:     - Sedation: Trazodone 25 qhs    - Pain:    - Scheduled Tylenol 1g q8h   - Oxy 5 PRN             Cardiac:     - S/P Ax fem bypass and stent to SFA with Dr. Maya on 9/14/23    - BP Goal: 100-160    - Pressors: None currently, midodrine 10 prn with dialysis (give 30 minutes prior to dialysis)    - Rhythm: NSR    - Statin: Atorvastatin 80 mg QD      Pulmonary:     - Goal SpO2 >92%    - ABG PRN      Renal:    - Trend BUN/Cr     - Maintain [place], record strict Is/Os    - Nephrology following for HD: today or tomorrow     Recent Labs   Lab 09/16/23  0417 09/16/23  1455 09/17/23  0337   BUN 8  8 15 21*   CREATININE 1.5*  1.5* 2.6* 3.2*         FEN / GI:     - Daily CMP, PRN K/Mag/Phos per protocol     - Replace electrolytes as needed    - Nutrition: Renal Diet     - Bowel Regimen: Miralax,  docusate      ID:     - Afebrile    - WBC stable: Down trending    - Abx: Zosyn and vanc to end 9/17    - No growth to date     Recent Labs   Lab 09/16/23  0215 09/16/23 0417 09/17/23  0337   WBC 20.31* 18.49* 17.98*         Heme/Onc:     - Hgb goal > 7    - CBC daily    - ASA 81    - Plavix    Recent Labs   Lab 09/14/23  2238 09/15/23  0312 09/15/23  1744 09/15/23  1935 09/16/23  0215 09/16/23 0417 09/17/23  0337   HGB 7.4*   < >  --    < > 8.6* 7.9* 7.0*      < >  --    < > 152 121* 142*   APTT 21.9  --  25.4  --   --  26.7 24.5   INR 1.0  --   --   --   --   --   --     < > = values in this interval not displayed.         Endocrine:     - CCM Goal BG <180    - HgbA1c: 7.2    - SSI    - POCT glucose 4xs daily    - Hypoglycemia protocol in place       PPx:   Feeding: Renal Diet   Analgesia/Sedation: multimodal / None  Thromboembolic Prevention: Heparin subq  HOB >30: Yes  Stress Ulcer: Pepcid   Glucose Control: Yes, insulin management CCM goal      Lines/Drains/Airway:      RIJ trialysis   Fraser   Left chest ELLE            Dispo/Code Status/Palliative:     - Continue SICU Care    - Full Code             Critical care was time spent personally by me on the following activities: development of treatment plan with patient or surrogate and bedside caregivers, discussions with consultants, evaluation of patient's response to treatment, examination of patient, ordering and performing treatments and interventions, ordering and review of laboratory studies, ordering and review of radiographic studies, pulse oximetry, re-evaluation of patient's condition.  This critical care time did not overlap with that of any other provider or involve time for any procedures.     Chantale Beltran MD  Critical Care - Surgery  [facility] - [facility]

## 2023-09-17 NOTE — ASSESSMENT & PLAN NOTE
Georgina Arias is a 52 y.o. female with ESRD, HTN, PAD, DM2, who is here today for non-healing L foot wound s/p L great toe amputation on 8/25/23. S/p axillary, bifem bypass 9/14/23.    - ASA/hep gtt/plavix  - Nephrology consulted for HD  - Podiatry following. S/p L foot debridement 9/14/23  - TTE LVEF 50-55%  - d/c abx  - prn pain meds, nausea meds  - float heels  - Advance to renal diet  - ok for OOB  - PT/OT  - Trend labs  - Monitor drain output  - q4h neurovascular checks  - ok to step down

## 2023-09-17 NOTE — ASSESSMENT & PLAN NOTE
BG goal 140 - 180     Discontinue Transition IV insulin infusion at 1.8 u/hr with stepdown parameters (0.8 u/kg dosing)   Start Levemir 20 units BID (equivalent to IV insulin requirements)  Moderate Dose Correction Scale  BG monitoring /hs/0200  Will monitor for prandial excursions as appetite improves and initiate prandial insulin coverage     ** Please call Endocrine for any BG related issues **    ** Please notify Endocrine for any change and/or advance in diet**    Discharge planning: TBD

## 2023-09-18 ENCOUNTER — PATIENT MESSAGE (OUTPATIENT)
Dept: PEDIATRICS | Facility: CLINIC | Age: 53
End: 2023-09-18
Payer: MEDICARE

## 2023-09-18 LAB
ALBUMIN SERPL BCP-MCNC: 2.1 G/DL (ref 3.5–5.2)
ALP SERPL-CCNC: 67 U/L (ref 55–135)
ALT SERPL W/O P-5'-P-CCNC: 20 U/L (ref 10–44)
ANION GAP SERPL CALC-SCNC: 10 MMOL/L (ref 8–16)
AST SERPL-CCNC: 14 U/L (ref 10–40)
BACTERIA SPEC AEROBE CULT: NO GROWTH
BACTERIA SPEC ANAEROBE CULT: ABNORMAL
BACTERIA SPEC ANAEROBE CULT: ABNORMAL
BASOPHILS # BLD AUTO: 0.08 K/UL (ref 0–0.2)
BASOPHILS NFR BLD: 0.5 % (ref 0–1.9)
BILIRUB SERPL-MCNC: 0.6 MG/DL (ref 0.1–1)
BUN SERPL-MCNC: 33 MG/DL (ref 6–20)
CALCIUM SERPL-MCNC: 7.9 MG/DL (ref 8.7–10.5)
CHLORIDE SERPL-SCNC: 100 MMOL/L (ref 95–110)
CO2 SERPL-SCNC: 22 MMOL/L (ref 23–29)
CREAT SERPL-MCNC: 4.7 MG/DL (ref 0.5–1.4)
DIFFERENTIAL METHOD: ABNORMAL
EOSINOPHIL # BLD AUTO: 0.2 K/UL (ref 0–0.5)
EOSINOPHIL NFR BLD: 1.5 % (ref 0–8)
ERYTHROCYTE [DISTWIDTH] IN BLOOD BY AUTOMATED COUNT: 17 % (ref 11.5–14.5)
EST. GFR  (NO RACE VARIABLE): 10.6 ML/MIN/1.73 M^2
GLUCOSE SERPL-MCNC: 132 MG/DL (ref 70–110)
HCT VFR BLD AUTO: 23.4 % (ref 37–48.5)
HGB BLD-MCNC: 7.9 G/DL (ref 12–16)
IMM GRANULOCYTES # BLD AUTO: 0.13 K/UL (ref 0–0.04)
IMM GRANULOCYTES NFR BLD AUTO: 0.8 % (ref 0–0.5)
LYMPHOCYTES # BLD AUTO: 1.8 K/UL (ref 1–4.8)
LYMPHOCYTES NFR BLD: 11.4 % (ref 18–48)
MAGNESIUM SERPL-MCNC: 2.5 MG/DL (ref 1.6–2.6)
MCH RBC QN AUTO: 31.9 PG (ref 27–31)
MCHC RBC AUTO-ENTMCNC: 33.8 G/DL (ref 32–36)
MCV RBC AUTO: 94 FL (ref 82–98)
MONOCYTES # BLD AUTO: 0.8 K/UL (ref 0.3–1)
MONOCYTES NFR BLD: 5.2 % (ref 4–15)
NEUTROPHILS # BLD AUTO: 12.6 K/UL (ref 1.8–7.7)
NEUTROPHILS NFR BLD: 80.6 % (ref 38–73)
NRBC BLD-RTO: 1 /100 WBC
PHOSPHATE SERPL-MCNC: 5.1 MG/DL (ref 2.7–4.5)
PLATELET # BLD AUTO: 149 K/UL (ref 150–450)
PMV BLD AUTO: 10.4 FL (ref 9.2–12.9)
POCT GLUCOSE: 139 MG/DL (ref 70–110)
POCT GLUCOSE: 153 MG/DL (ref 70–110)
POCT GLUCOSE: 158 MG/DL (ref 70–110)
POCT GLUCOSE: 227 MG/DL (ref 70–110)
POTASSIUM SERPL-SCNC: 3.6 MMOL/L (ref 3.5–5.1)
PROT SERPL-MCNC: 5.1 G/DL (ref 6–8.4)
RBC # BLD AUTO: 2.48 M/UL (ref 4–5.4)
SODIUM SERPL-SCNC: 132 MMOL/L (ref 136–145)
WBC # BLD AUTO: 15.68 K/UL (ref 3.9–12.7)

## 2023-09-18 PROCEDURE — 93923 UPR/LXTR ART STDY 3+ LVLS: CPT | Mod: 26,,, | Performed by: SURGERY

## 2023-09-18 PROCEDURE — 25000003 PHARM REV CODE 250

## 2023-09-18 PROCEDURE — 83735 ASSAY OF MAGNESIUM: CPT | Performed by: SURGERY

## 2023-09-18 PROCEDURE — 93923 PR NON-INVASIVE PHYSIOLOGIC STUDY EXTREMITY 3 LEVELS: ICD-10-PCS | Mod: 26,,, | Performed by: SURGERY

## 2023-09-18 PROCEDURE — 85025 COMPLETE CBC W/AUTO DIFF WBC: CPT | Performed by: STUDENT IN AN ORGANIZED HEALTH CARE EDUCATION/TRAINING PROGRAM

## 2023-09-18 PROCEDURE — 20600001 HC STEP DOWN PRIVATE ROOM

## 2023-09-18 PROCEDURE — 25000003 PHARM REV CODE 250: Performed by: STUDENT IN AN ORGANIZED HEALTH CARE EDUCATION/TRAINING PROGRAM

## 2023-09-18 PROCEDURE — 25000003 PHARM REV CODE 250: Performed by: INTERNAL MEDICINE

## 2023-09-18 PROCEDURE — 99232 SBSQ HOSP IP/OBS MODERATE 35: CPT | Mod: ,,, | Performed by: NURSE PRACTITIONER

## 2023-09-18 PROCEDURE — 84100 ASSAY OF PHOSPHORUS: CPT | Performed by: SURGERY

## 2023-09-18 PROCEDURE — 80100014 HC HEMODIALYSIS 1:1

## 2023-09-18 PROCEDURE — 63600175 PHARM REV CODE 636 W HCPCS: Performed by: STUDENT IN AN ORGANIZED HEALTH CARE EDUCATION/TRAINING PROGRAM

## 2023-09-18 PROCEDURE — 80053 COMPREHEN METABOLIC PANEL: CPT | Performed by: STUDENT IN AN ORGANIZED HEALTH CARE EDUCATION/TRAINING PROGRAM

## 2023-09-18 PROCEDURE — 25000003 PHARM REV CODE 250: Performed by: NURSE PRACTITIONER

## 2023-09-18 PROCEDURE — 63600175 PHARM REV CODE 636 W HCPCS: Performed by: INTERNAL MEDICINE

## 2023-09-18 PROCEDURE — 90935 PR HEMODIALYSIS, ONE EVALUATION: ICD-10-PCS | Mod: ,,, | Performed by: INTERNAL MEDICINE

## 2023-09-18 PROCEDURE — 90935 HEMODIALYSIS ONE EVALUATION: CPT | Mod: ,,, | Performed by: INTERNAL MEDICINE

## 2023-09-18 PROCEDURE — 99232 PR SUBSEQUENT HOSPITAL CARE,LEVL II: ICD-10-PCS | Mod: ,,, | Performed by: NURSE PRACTITIONER

## 2023-09-18 RX ORDER — OXYCODONE HYDROCHLORIDE 10 MG/1
10 TABLET ORAL EVERY 6 HOURS PRN
Status: DISCONTINUED | OUTPATIENT
Start: 2023-09-18 | End: 2023-09-19 | Stop reason: HOSPADM

## 2023-09-18 RX ORDER — TALC
6 POWDER (GRAM) TOPICAL NIGHTLY PRN
Status: DISCONTINUED | OUTPATIENT
Start: 2023-09-18 | End: 2023-09-19 | Stop reason: HOSPADM

## 2023-09-18 RX ADMIN — HEPARIN SODIUM 5000 UNITS: 5000 INJECTION INTRAVENOUS; SUBCUTANEOUS at 05:09

## 2023-09-18 RX ADMIN — HEPARIN SODIUM 5000 UNITS: 5000 INJECTION INTRAVENOUS; SUBCUTANEOUS at 02:09

## 2023-09-18 RX ADMIN — SODIUM CHLORIDE: 9 INJECTION, SOLUTION INTRAVENOUS at 08:09

## 2023-09-18 RX ADMIN — ATORVASTATIN CALCIUM 80 MG: 40 TABLET, FILM COATED ORAL at 12:09

## 2023-09-18 RX ADMIN — INSULIN DETEMIR 20 UNITS: 100 INJECTION, SOLUTION SUBCUTANEOUS at 12:09

## 2023-09-18 RX ADMIN — ASPIRIN 81 MG: 81 TABLET, COATED ORAL at 12:09

## 2023-09-18 RX ADMIN — MUPIROCIN: 20 OINTMENT TOPICAL at 09:09

## 2023-09-18 RX ADMIN — OXYCODONE HYDROCHLORIDE 10 MG: 10 TABLET ORAL at 02:09

## 2023-09-18 RX ADMIN — INSULIN DETEMIR 20 UNITS: 100 INJECTION, SOLUTION SUBCUTANEOUS at 10:09

## 2023-09-18 RX ADMIN — MUPIROCIN: 20 OINTMENT TOPICAL at 10:09

## 2023-09-18 RX ADMIN — ACETAMINOPHEN 1000 MG: 500 TABLET ORAL at 10:09

## 2023-09-18 RX ADMIN — INSULIN ASPART 5 UNITS: 100 INJECTION, SOLUTION INTRAVENOUS; SUBCUTANEOUS at 04:09

## 2023-09-18 RX ADMIN — PANTOPRAZOLE SODIUM 40 MG: 40 TABLET, DELAYED RELEASE ORAL at 01:09

## 2023-09-18 RX ADMIN — INSULIN ASPART 5 UNITS: 100 INJECTION, SOLUTION INTRAVENOUS; SUBCUTANEOUS at 12:09

## 2023-09-18 RX ADMIN — ONDANSETRON 4 MG: 2 INJECTION INTRAMUSCULAR; INTRAVENOUS at 02:09

## 2023-09-18 RX ADMIN — CARVEDILOL 3.12 MG: 3.12 TABLET, FILM COATED ORAL at 10:09

## 2023-09-18 RX ADMIN — CARVEDILOL 3.12 MG: 3.12 TABLET, FILM COATED ORAL at 12:09

## 2023-09-18 RX ADMIN — HEPARIN SODIUM 5000 UNITS: 5000 INJECTION INTRAVENOUS; SUBCUTANEOUS at 10:09

## 2023-09-18 RX ADMIN — INSULIN ASPART 2 UNITS: 100 INJECTION, SOLUTION INTRAVENOUS; SUBCUTANEOUS at 04:09

## 2023-09-18 RX ADMIN — OXYCODONE HYDROCHLORIDE 10 MG: 10 TABLET ORAL at 01:09

## 2023-09-18 RX ADMIN — ONDANSETRON 4 MG: 2 INJECTION INTRAMUSCULAR; INTRAVENOUS at 01:09

## 2023-09-18 RX ADMIN — HEPARIN SODIUM 3000 UNITS: 1000 INJECTION, SOLUTION INTRAVENOUS; SUBCUTANEOUS at 07:09

## 2023-09-18 RX ADMIN — CLOPIDOGREL BISULFATE 75 MG: 75 TABLET ORAL at 12:09

## 2023-09-18 RX ADMIN — Medication 6 MG: at 01:09

## 2023-09-18 NOTE — PROGRESS NOTES
[facility] - [facility]  Endocrinology  Progress Note    Admit Date: 2023     Reason for Consult: Management of T2DM, Hyperglycemia     Surgical Procedure and Date:  23  CREATION, BYPASS, ARTERIAL, AXILLARY TO BILATERAL FEMORAL (Left)  ANGIOGRAM, LOWER EXTREMITY with balloon angioplasty ultraverse 5mm x 60mm (Left)  STENT, SUPERFICIAL FEMORAL ARTERY (Left)  AMPUTATION, FOOT, TRANSMETATARSAL partial 1st ray amputation (Left)  INCISION AND DRAINAGE, FOOT (Left)    Lab Results   Component Value Date    HGBA1C 7.2 (H) 2023       Diabetes diagnosis year:     Home Diabetes Medications:  Lantus 45 units q HS, Mounjaro 5 mg once weekly     How often checking glucose at home?  2x daily     BG readings on regimen:  mostly low to mid 100s  Hypoglycemia on the regimen?  No  Missed doses on regimen?  No    Diabetes Complications include:     Hyperglycemia, Diabetic nephropathy  , Diabetic chronic kidney disease     , and Amputation status    Complicating diabetes co morbidities:   HTN, HLD, Obesity, ESRD       HPI:   Patient is a 52 y.o. female with a diagnosis of PONV, ESRD on HD, T2DM on insulin and mounjaro (A1c 7.2), PVD s/p stents to RLE, DVT of R leg, HTN, HLD, PUD, obesity (BMI 38), and three recent left foot surgeries in August. Most recently uderwent amputation of the L hallux at the [facility] on 23 for reported gangrene and osteomyelitis. She is currently admitted to vascular surgery for non-healing left foot wound. Pt presents to the SICU s/p Left ax to bi-femoral artery bypass, stent to SFA, and Transmetatarsal amputation of the left foot and washout with Dr. Maya and Dr. Valles on 23. Will admit to SICU for further hemodynamic monitoring and q1h neurovascular checks. Endocrinology consulted for management of T2DM.            Interval HPI:   Overnight events: POD 4. Remains in [facility]. BG well controlled on current SQ insulin regimen. Creatinine 4.7. Diet renal Standard Tray    Eatin%  Nausea:  "No  Hypoglycemia and intervention: No  Fever: No  TPN and/or TF: No  If yes, type of TF/TPN and rate: n/a    /60 (BP Location: Left arm, Patient Position: Lying)   Pulse 87   Temp 97.9 °F (36.6 °C) (Oral)   Resp 18   Ht 5' 7" (1.702 m)   Wt 109.8 kg (242 lb)   LMP 09/11/2020 (Approximate)   SpO2 98%   Breastfeeding No   BMI 37.90 kg/m²     Labs Reviewed and Include    Recent Labs   Lab 09/18/23  0553   *   CALCIUM 7.9*   ALBUMIN 2.1*   PROT 5.1*   *   K 3.6   CO2 22*      BUN 33*   CREATININE 4.7*   ALKPHOS 67   ALT 20   AST 14   BILITOT 0.6     Lab Results   Component Value Date    WBC 15.68 (H) 09/18/2023    HGB 7.9 (L) 09/18/2023    HCT 23.4 (L) 09/18/2023    MCV 94 09/18/2023     (L) 09/18/2023     No results for input(s): "TSH", "FREET4" in the last 168 hours.  Lab Results   Component Value Date    HGBA1C 7.2 (H) 09/11/2023       Nutritional status:   Body mass index is 37.9 kg/m².  Lab Results   Component Value Date    ALBUMIN 2.1 (L) 09/18/2023    ALBUMIN 2.1 (L) 09/17/2023    ALBUMIN 2.1 (L) 09/16/2023     No results found for: "PREALBUMIN"    Estimated Creatinine Clearance: 17.9 mL/min (A) (based on SCr of 4.7 mg/dL (H)).    Accu-Checks  Recent Labs     09/16/23  1705 09/16/23  2033 09/16/23  2225 09/17/23  0334 09/17/23  0853 09/17/23  1159 09/17/23  1646 09/17/23  2128 09/18/23  0852 09/18/23  1115   POCTGLUCOSE 151* 148* 196* 216* 199* 227* 141* 150* 139* 158*       Current Medications and/or Treatments Impacting Glycemic Control  Immunotherapy:    Immunosuppressants       None          Steroids:   Hormones (From admission, onward)      Start     Stop Route Frequency Ordered    09/18/23 0032  melatonin tablet 6 mg         -- Oral Nightly PRN 09/18/23 0033          Pressors:    Autonomic Drugs (From admission, onward)      Start     Stop Route Frequency Ordered    09/16/23 1018  midodrine tablet 10 mg         -- Oral Daily PRN 09/16/23 0918      "     Hyperglycemia/Diabetes Medications:   Antihyperglycemics (From admission, onward)      Start     Stop Route Frequency Ordered    09/17/23 1700  insulin aspart U-100 pen 5 Units         -- SubQ 3 times daily with meals 09/17/23 1600    09/16/23 2100  insulin detemir U-100 (Levemir) pen 20 Units         -- SubQ 2 times daily 09/16/23 1932    09/16/23 1315  insulin regular in 0.9 % NaCl 100 unit/100 mL (1 unit/mL) infusion        Question:  Enter initial dose (Units/hr):  Answer:  1.8    09/16/23 2100 IV Continuous 09/16/23 1202    09/16/23 1301  insulin aspart U-100 pen 0-10 Units         -- SubQ As needed (PRN) 09/16/23 1202            ASSESSMENT and PLAN    Cardiac/Vascular  * S/P femoral-femoral bypass surgery  Managed per primary team  Optimize BG control        Hyperlipidemia  May increase insulin resistance.         Endocrine  Severe obesity (BMI >= 40)  Body mass index is 37.9 kg/m².  May increase insulin resistance.         Type II diabetes mellitus  BG goal 140 - 180     Levemir 20 units BID   Novolog 5 units TID with meals   Moderate Dose Correction Scale  BG monitoring ac/hs/0200      ** Please call Endocrine for any BG related issues **    ** Please notify Endocrine for any change and/or advance in diet**    Discharge planning: TBD            Nikole Webster, NP  Endocrinology  [facility]

## 2023-09-18 NOTE — PROGRESS NOTES
[facility] - [facility]  Vascular Surgery  Progress Note    Patient Name: Georgina Arias  MRN: [id number]  Admission Date: 9/11/2023  Primary Care Provider: Alonso Ling MD    Subjective:     Interval History: NAEON, right groin prevena removed due to saturation/ soiling.  Signals remain intact.  Pending studies this am.     Post-Op Info:  Procedure(s) (LRB):  CREATION, BYPASS, ARTERIAL, AXILLARY TO BILATERAL FEMORAL (Left)  ANGIOGRAM, LOWER EXTREMITY with balloon angioplasty ultraverse 5mm x 60mm (Left)  STENT, SUPERFICIAL FEMORAL ARTERY (Left)  AMPUTATION, FOOT, TRANSMETATARSAL partial 1st ray amputation (Left)  INCISION AND DRAINAGE, FOOT (Left)   4 Days Post-Op       Medications:  Continuous Infusions:  Scheduled Meds:   sodium chloride 0.9%   Intravenous Once    acetaminophen  1,000 mg Oral Q8H    aspirin  81 mg Oral Daily    atorvastatin  80 mg Oral Daily    carvediloL  3.125 mg Oral BID    clopidogreL  75 mg Oral Daily    heparin (porcine)  5,000 Units Subcutaneous Q8H    insulin aspart U-100  5 Units Subcutaneous TIDWM    insulin detemir U-100  20 Units Subcutaneous BID    mupirocin   Nasal BID     PRN Meds:0.9%  NaCl infusion (for blood administration), aluminum & magnesium hydroxide-simethicone, calcium carbonate, dextrose 10%, dextrose 10%, dextrose 10%, dextrose 10%, diphenhydrAMINE, glucagon (human recombinant), glucose, glucose, heparin (porcine), hydrALAZINE, insulin aspart U-100, labetalol, LIDOcaine (PF) 10 mg/ml (1%), melatonin, midodrine, ondansetron, oxyCODONE, oxyCODONE, pantoprazole, sodium chloride 0.9%, sodium chloride 0.9%     Objective:     Vital Signs (Most Recent):  Temp: 98.7 °F (37.1 °C) (09/18/23 0409)  Pulse: 82 (09/18/23 0448)  Resp: 18 (09/18/23 0409)  BP: (!) 155/68 (09/18/23 0409)  SpO2: 98 % (09/18/23 0409) Vital Signs (24h Range):  Temp:  [97.8 °F (36.6 °C)-98.7 °F (37.1 °C)] 98.7 °F (37.1 °C)  Pulse:  [] 82  Resp:  [17-27] 18  SpO2:  [97 %-100 %] 98 %  BP:  (133-161)/(60-71) 155/68          Physical Exam  Constitutional:       General: She is not in acute distress.  HENT:      Head: Normocephalic and atraumatic.   Cardiovascular:      Rate and Rhythm: Normal rate.      Comments: 2+ L radial pulse  B/l biphasic dp/pt signals  Pulmonary:      Effort: Pulmonary effort is normal.   Abdominal:      Palpations: Abdomen is soft.   Musculoskeletal:         General: Normal range of motion.   Skin:     General: Skin is warm and dry.      Comments: L foot s/p debridement with podiatry. Wrapped in ace  L groin incisions covered by prevenas  L upper chest incision covered with gauze and tegaderm. ELLE drain in place with SS output          Significant Labs:  CBC:   Recent Labs   Lab 09/18/23  0553   WBC 15.68*   RBC 2.48*   HGB 7.9*   HCT 23.4*   *   MCV 94   MCH 31.9*   MCHC 33.8     CMP:   Recent Labs   Lab 09/18/23  0553   *   CALCIUM 7.9*   ALBUMIN 2.1*   PROT 5.1*   *   K 3.6   CO2 22*      BUN 33*   CREATININE 4.7*   ALKPHOS 67   ALT 20   AST 14   BILITOT 0.6       Significant Diagnostics:  I have reviewed all pertinent imaging results/findings within the past 24 hours.    Assessment/Plan:     Open wound of left foot  Georgina Arias is a 52 y.o. female with ESRD, HTN, PAD, DM2, who is here today for non-healing L foot wound s/p L great toe amputation on 8/25/23. S/p axillary, bifem bypass 9/14/23.    - ASA/statin/plavix  - Nephrology consulted for HD  - Podiatry following. S/p L foot debridement 9/14/23  - TTE LVEF 50-55%  - prn pain meds, nausea meds  - float heels  - Advance to renal diet  - ok for OOB  - PT/OT  - Trend labs  - Monitor drain output  - q4h neurovascular checks  - CAMILA and ultrasound of bypass ordered this am   -CDiff pending         Kellie Mauricio NP  Vascular Surgery  [facility]

## 2023-09-18 NOTE — PLAN OF CARE
[facility] Plan of Care Note  Dx S/P Femoral -femoral bypass surgery    Shift Events none    Goals of Care: pain management, I/O's from drains    Neuro: AAO X 4    Vital Signs: VSS    Respiratory: RA    Diet: Renal Diet    Is patient tolerating current diet? yes    GTTS: none    Urine Output/Bowel Movement: Hemodialysis and adequate urine output with randall    Drains/Tubes/Tube Feeds (include total output/shift): ELLE to L chest, WV to left groin, R WV removed by Md this AM    Lines: R AV Fistula, R IJ Trialysis, L 18 g FA      Accuchecks: ACHS and 2 am    Skin: wounds to bottom of R foot, surgical incision to L foot, WV to bilateral groin, L chest incision with ELLE drain    Fall Risk Score: 13    Activity level? Up to chair    Any scheduled procedures? none    Any safety concerns? Fall precautions    Other: Dialysis     Problem: Adult Inpatient Plan of Care  Goal: Plan of Care Review  Outcome: Ongoing, Progressing  Goal: Patient-Specific Goal (Individualized)  Outcome: Ongoing, Progressing  Goal: Absence of Hospital-Acquired Illness or Injury  Outcome: Ongoing, Progressing  Goal: Optimal Comfort and Wellbeing  Outcome: Ongoing, Progressing  Goal: Readiness for Transition of Care  Outcome: Ongoing, Progressing     Problem: Diabetes Comorbidity  Goal: Blood Glucose Level Within Targeted Range  Outcome: Ongoing, Progressing     Problem: Fluid and Electrolyte Imbalance (Acute Kidney Injury/Impairment)  Goal: Fluid and Electrolyte Balance  Outcome: Ongoing, Progressing     Problem: Oral Intake Inadequate (Acute Kidney Injury/Impairment)  Goal: Optimal Nutrition Intake  Outcome: Ongoing, Progressing     Problem: Renal Function Impairment (Acute Kidney Injury/Impairment)  Goal: Effective Renal Function  Outcome: Ongoing, Progressing     Problem: Device-Related Complication Risk (Hemodialysis)  Goal: Safe, Effective Therapy Delivery  Outcome: Ongoing, Progressing     Problem: Hemodynamic Instability (Hemodialysis)  Goal: Effective  Tissue Perfusion  Outcome: Ongoing, Progressing     Problem: Infection (Hemodialysis)  Goal: Absence of Infection Signs and Symptoms  Outcome: Ongoing, Progressing     Problem: Fall Injury Risk  Goal: Absence of Fall and Fall-Related Injury  Outcome: Ongoing, Progressing     Problem: Adjustment to Illness (Chronic Kidney Disease)  Goal: Optimal Coping with Chronic Illness  Outcome: Ongoing, Progressing     Problem: Electrolyte Imbalance (Chronic Kidney Disease)  Goal: Electrolyte Balance  Outcome: Ongoing, Progressing     Problem: Fluid Volume Excess (Chronic Kidney Disease)  Goal: Fluid Balance  Outcome: Ongoing, Progressing     Problem: Functional Decline (Chronic Kidney Disease)  Goal: Optimal Functional Ability  Outcome: Ongoing, Progressing     Problem: Hematologic Alteration (Chronic Kidney Disease)  Goal: Absence of Anemia Signs and Symptoms  Outcome: Ongoing, Progressing     Problem: Oral Intake Inadequate (Chronic Kidney Disease)  Goal: Optimal Oral Intake  Outcome: Ongoing, Progressing     Problem: Pain (Chronic Kidney Disease)  Goal: Acceptable Pain Control  Outcome: Ongoing, Progressing     Problem: Renal Function Impairment (Chronic Kidney Disease)  Goal: Minimize Renal Failure Effects  Outcome: Ongoing, Progressing     Problem: Skin Injury Risk Increased  Goal: Skin Health and Integrity  Outcome: Ongoing, Progressing     Problem: Infection  Goal: Absence of Infection Signs and Symptoms  Outcome: Ongoing, Progressing     Problem: Device-Related Complication Risk (CRRT (Continuous Renal Replacement Therapy))  Goal: Safe, Effective Therapy Delivery  Outcome: Ongoing, Progressing     Problem: Hypothermia (CRRT (Continuous Renal Replacement Therapy))  Goal: Body Temperature Maintained in Desired Range  Outcome: Ongoing, Progressing     Problem: Infection (CRRT (Continuous Renal Replacement Therapy))  Goal: Absence of Infection Signs and Symptoms  Outcome: Ongoing, Progressing     Problem: Restraint,  Nonbehavioral (Nonviolent)  Goal: Absence of Harm or Injury  Outcome: Ongoing, Progressing

## 2023-09-18 NOTE — PHYSICIAN QUERY
PT Name: Georgina Arias  MR #: [id number]    DOCUMENTATION CLARIFICATION     CDS/: Eli Olivares RN               Contact information: [email or web address]    This form is a permanent document in the medical record.     Query Date: September 18, 2023    By submitting this query, we are merely seeking further clarification of documentation. Please utilize your independent clinical judgment when addressing the question(s) below.    The Medical Record contains the following:   Indicator   Supporting Clinical Findings Location in Medical Record   x Ulcer/Injury Left Foot/Ankle    Inspection and Palpation  Skin Exam: drainage, cellulitis, abnormal color, ulcer and erythema;    Comments  Left foot:  Wound noted to left great toe amputation site.  Metatarsal head exposed..  Probe to bone positive.  Purulent drainage expressed.  Malodor noted.  Nontender to palpation..  No crepitus noted.  Mild fluctuance noted.  Wound has necrotic  dusky tissue noted Diffuse erythema localized to left great toe amputation site.  Swelling noted to left foot.   Podiatry Consult 9/12    Wound Care Consult     x Radiology Findings Foot XR:  FINDINGS:  There are postop changes of  left great toe amputation with resection at the metatarsophalangeal joint.  There is soft tissue edema, ulceration, and gas of the great toe amputation stump.  There is osteopenia and erosive change of the medial aspect of the great toe metatarsal head and neck, compatible with acute osteomyelitis.  Remaining osseous structures are intact.  There are vascular calcifications.  Impression:  Acute osteomyelitis of the great toe metatarsal head and neck.    FINDINGS:  Postoperative changes of transmetatarsal resection of the 1st metatarsal.  Irregularity of the 1st metatarsal remnant on the frontal view which could be related to operative changes.  Overlapping skin staples along the medial aspect of the foot.  Subcutaneous emphysema and soft tissue edema  potentially related to recent surgery or infection.  Bones are otherwise similar when compared with recent prior exam.  Impression:  Postoperative changes in the left foot as discussed above.   Foot XR 9/11                      Foot XR 9/14   x Acute/Chronic Illness Ms. Arias is a 51 yo female with a past medical hx of PONV, ESRD on HD, T2DM on insulin and mounjaro (A1c 7.2), PVD s/p stents to RLE, DVT of R leg, HTN, HLD, PUD, obesity (BMI 38), and  three recent left foot surgeries in August  Reason for admission:  S/P femoral-femoral bypass surgery  Present on Admission:   Open wound of left foot   Hyperlipidemia   Peripheral vascular disease   Essential hypertension   ESRD (end stage renal disease)   Type II diabetes mellitus   Severe obesity (BMI >= 40)   H&P 9/14   x Medication/Treatment Procedure:    Procedure(s) (LRB):  CREATION, BYPASS, ARTERIAL, AXILLARY TO BILATERAL FEMORAL (Left)  ANGIOGRAM, LOWER EXTREMITY with balloon angioplasty ultraverse 5mm x 60mm (Left)  STENT, SUPERFICIAL FEMORAL ARTERY (Left)  AMPUTATION, FOOT, TRANSMETATARSAL partial 1st ray amputation (Left)  INCISION AND DRAINAGE, FOOT (Left)   Op Note 9/14    Other       Provider, please provide the integumentary diagnosis related to the documentation of (Left Foot/Ankle):     [   ] Diabetic ulcer, muscle necrosis   [  x ] Diabetic ulcer, bone necrosis   [   ] Diabetic ulcer, other depth (please specify): _____________   [   ] Non-pressure ulcer, muscle necrosis   [   ] Non-pressure ulcer, bone necrosis   [   ] Non-pressure ulcer, other depth (please specify): ___________   [   ] Other Integumentary Diagnosis (please specify): ___________         Please document in your progress notes daily for the duration of treatment, until resolved, and include in your discharge summary.    Form No. [id number]

## 2023-09-18 NOTE — SUBJECTIVE & OBJECTIVE
"Interval HPI:   Overnight events: POD 4. Remains in [facility]. BG well controlled on current SQ insulin regimen. Creatinine 4.7. Diet renal Standard Tray    Eatin%  Nausea: No  Hypoglycemia and intervention: No  Fever: No  TPN and/or TF: No  If yes, type of TF/TPN and rate: n/a    /60 (BP Location: Left arm, Patient Position: Lying)   Pulse 87   Temp 97.9 °F (36.6 °C) (Oral)   Resp 18   Ht 5' 7" (1.702 m)   Wt 109.8 kg (242 lb)   LMP 2020 (Approximate)   SpO2 98%   Breastfeeding No   BMI 37.90 kg/m²     Labs Reviewed and Include    Recent Labs   Lab 23  0553   *   CALCIUM 7.9*   ALBUMIN 2.1*   PROT 5.1*   *   K 3.6   CO2 22*      BUN 33*   CREATININE 4.7*   ALKPHOS 67   ALT 20   AST 14   BILITOT 0.6     Lab Results   Component Value Date    WBC 15.68 (H) 2023    HGB 7.9 (L) 2023    HCT 23.4 (L) 2023    MCV 94 2023     (L) 2023     No results for input(s): "TSH", "FREET4" in the last 168 hours.  Lab Results   Component Value Date    HGBA1C 7.2 (H) 2023       Nutritional status:   Body mass index is 37.9 kg/m².  Lab Results   Component Value Date    ALBUMIN 2.1 (L) 2023    ALBUMIN 2.1 (L) 2023    ALBUMIN 2.1 (L) 2023     No results found for: "PREALBUMIN"    Estimated Creatinine Clearance: 17.9 mL/min (A) (based on SCr of 4.7 mg/dL (H)).    Accu-Checks  Recent Labs     23  1705 23  2033 23  2225 23  0334 23  0853 23  1159 23  1646 23  2128 23  0852 23  1115   POCTGLUCOSE 151* 148* 196* 216* 199* 227* 141* 150* 139* 158*       Current Medications and/or Treatments Impacting Glycemic Control  Immunotherapy:    Immunosuppressants       None          Steroids:   Hormones (From admission, onward)      Start     Stop Route Frequency Ordered    23  melatonin tablet 6 mg         -- Oral Nightly PRN 23          Pressors:    Autonomic " Drugs (From admission, onward)      Start     Stop Route Frequency Ordered    09/16/23 1018  midodrine tablet 10 mg         -- Oral Daily PRN 09/16/23 0918          Hyperglycemia/Diabetes Medications:   Antihyperglycemics (From admission, onward)      Start     Stop Route Frequency Ordered    09/17/23 1700  insulin aspart U-100 pen 5 Units         -- SubQ 3 times daily with meals 09/17/23 1600    09/16/23 2100  insulin detemir U-100 (Levemir) pen 20 Units         -- SubQ 2 times daily 09/16/23 1932    09/16/23 1315  insulin regular in 0.9 % NaCl 100 unit/100 mL (1 unit/mL) infusion        Question:  Enter initial dose (Units/hr):  Answer:  1.8    09/16/23 2100 IV Continuous 09/16/23 1202    09/16/23 1301  insulin aspart U-100 pen 0-10 Units         -- SubQ As needed (PRN) 09/16/23 1202

## 2023-09-18 NOTE — SUBJECTIVE & OBJECTIVE
Interval History: Patient seen and examined this AM while undergoing iHD for which she is currently tolerating well. Stepped down from SICU overnight. Afebrile with pulse in the 80s bpm. Systolic blood pressures ranging from 160-150s mmHg. She is saturating +97% on room air with documented UOP 1.4 liters in the last 24 hours.     Review of patient's allergies indicates:   Allergen Reactions    Naproxen Anaphylaxis and Swelling     Hives (skin)^swelling  Hives (skin)^swelling  Hives (skin)^swelling    Sulfamethoxazole-trimethoprim Other (See Comments)     Caused JEROME    Hydrocodone Nausea And Vomiting and Rash    Penicillins Hives     Blisters (skin)^    Adhesive Rash    Hydrocodone-acetaminophen Nausea And Vomiting     Rash (skin)^, Vomiting^       Current Facility-Administered Medications   Medication Frequency    0.9%  NaCl infusion (for blood administration) Q24H PRN    0.9%  NaCl infusion Once    acetaminophen tablet 1,000 mg Q8H    aluminum & magnesium hydroxide-simethicone 400-400-40 mg/5 mL suspension 30 mL Q6H PRN    aspirin EC tablet 81 mg Daily    atorvastatin tablet 80 mg Daily    calcium carbonate 200 mg calcium (500 mg) chewable tablet 500 mg TID PRN    carvediloL tablet 3.125 mg BID    clopidogreL tablet 75 mg Daily    dextrose 10% bolus 125 mL 125 mL PRN    dextrose 10% bolus 125 mL 125 mL PRN    dextrose 10% bolus 250 mL 250 mL PRN    dextrose 10% bolus 250 mL 250 mL PRN    diphenhydrAMINE capsule 25 mg Q6H PRN    glucagon (human recombinant) injection 1 mg PRN    glucose chewable tablet 16 g PRN    glucose chewable tablet 24 g PRN    heparin (porcine) injection 3,000 Units PRN    heparin (porcine) injection 5,000 Units Q8H    hydrALAZINE injection 10 mg Q6H PRN    insulin aspart U-100 pen 0-10 Units PRN    insulin aspart U-100 pen 5 Units TIDWM    insulin detemir U-100 (Levemir) pen 20 Units BID    labetalol 20 mg/4 mL (5 mg/mL) IV syring Q6H PRN    LIDOcaine (PF) 10 mg/ml (1%) injection 10 mg Once  PRN    melatonin tablet 6 mg Nightly PRN    midodrine tablet 10 mg Daily PRN    mupirocin 2 % ointment BID    ondansetron injection 4 mg Q4H PRN    oxyCODONE immediate release tablet 5 mg Q4H PRN    oxyCODONE immediate release tablet Tab 10 mg Q6H PRN    pantoprazole EC tablet 40 mg BID PRN    sodium chloride 0.9% bolus 250 mL 250 mL PRN    sodium chloride 0.9% flush 10 mL PRN       Objective:     Vital Signs (Most Recent):  Temp: 98.7 °F (37.1 °C) (09/18/23 0409)  Pulse: 89 (09/18/23 0816)  Resp: 18 (09/18/23 0409)  BP: (!) 153/70 (09/18/23 0816)  SpO2: 98 % (09/18/23 0409) Vital Signs (24h Range):  Temp:  [97.8 °F (36.6 °C)-98.7 °F (37.1 °C)] 98.7 °F (37.1 °C)  Pulse:  [] 89  Resp:  [17-27] 18  SpO2:  [97 %-100 %] 98 %  BP: (133-161)/(60-71) 153/70     Weight: 109.8 kg (242 lb) (09/15/23 0845)  Body mass index is 37.9 kg/m².  Body surface area is 2.28 meters squared.    I/O last 3 completed shifts:  In: 450 [I.V.:6.5; Blood:295; IV Piggyback:148.5]  Out: 1560 [Urine:1470; Drains:90]     Physical Exam  Vitals and nursing note reviewed.   Constitutional:       General: She is not in acute distress.     Appearance: Normal appearance. She is obese. She is not ill-appearing or diaphoretic.   HENT:      Head: Normocephalic and atraumatic.      Right Ear: External ear normal.      Left Ear: External ear normal.      Nose: Nose normal.      Mouth/Throat:      Mouth: Mucous membranes are moist.      Pharynx: Oropharynx is clear. No oropharyngeal exudate or posterior oropharyngeal erythema.   Eyes:      General: No scleral icterus.        Right eye: No discharge.         Left eye: No discharge.      Extraocular Movements: Extraocular movements intact.      Conjunctiva/sclera: Conjunctivae normal.   Neck:      Comments: Trialysis catheter to right internal jugular vein.   Cardiovascular:      Rate and Rhythm: Normal rate.      Heart sounds: Murmur heard.      Systolic murmur is present with a grade of 1/6.      No  friction rub. No gallop.      Arteriovenous access: Left arteriovenous access is present.     Comments: AVF to right upper extremity with palpable thrill and audible bruit.  Pulmonary:      Effort: Pulmonary effort is normal. No respiratory distress.      Breath sounds: No wheezing, rhonchi or rales.   Chest:      Comments: Left upper chest incision dressed/bandaged. ELLE drain in place with serosanguineous output.   Abdominal:      General: Bowel sounds are normal. There is no distension.      Palpations: Abdomen is soft.      Tenderness: There is no abdominal tenderness.      Comments: Obese/protuberant abdomen.   Musculoskeletal:      Right lower leg: No edema.      Left lower leg: Edema present.   Skin:     Findings: Lesion present.      Comments: Left foot currently dressed/bandaged. See images below.   Neurological:      General: No focal deficit present.      Mental Status: She is alert. Mental status is at baseline.      Cranial Nerves: No cranial nerve deficit.      Motor: No weakness.   Psychiatric:         Mood and Affect: Mood normal.         Behavior: Behavior normal.          Significant Labs:  BMP:   Recent Labs   Lab 09/18/23  0553   *   *   K 3.6      CO2 22*   BUN 33*   CREATININE 4.7*   CALCIUM 7.9*   MG 2.5       CBC:   Recent Labs   Lab 09/18/23  0553   WBC 15.68*   RBC 2.48*   HGB 7.9*   HCT 23.4*   *   MCV 94   MCH 31.9*   MCHC 33.8       CMP:   Recent Labs   Lab 09/18/23  0553   *   CALCIUM 7.9*   ALBUMIN 2.1*   PROT 5.1*   *   K 3.6   CO2 22*      BUN 33*   CREATININE 4.7*   ALKPHOS 67   ALT 20   AST 14   BILITOT 0.6       LFTs:   Recent Labs   Lab 09/18/23  0553   ALT 20   AST 14   ALKPHOS 67   BILITOT 0.6   PROT 5.1*   ALBUMIN 2.1*       Microbiology Results (last 7 days)       Procedure Component Value Units Date/Time    Blood culture [[id number]] Collected: 09/15/23 2021    Order Status: Completed Specimen: Blood from Peripheral, Antecubital,  Left Updated: 09/17/23 2212     Blood Culture, Routine No Growth to date      No Growth to date      No Growth to date    Blood culture [[id number]] Collected: 09/15/23 1520    Order Status: Completed Specimen: Blood from Peripheral, Antecubital, Right Updated: 09/17/23 1812     Blood Culture, Routine No Growth to date      No Growth to date      No Growth to date    Clostridium difficile EIA [[id number]]     Order Status: Canceled Specimen: Stool     Aerobic culture [[id number]] Collected: 09/14/23 1654    Order Status: Completed Specimen: Biopsy from Foot, Left Updated: 09/16/23 0704     Aerobic Bacterial Culture No growth    Narrative:      1. Left 1st metatarsal clean margins-permanent    Culture, Anaerobe [[id number]] Collected: 09/14/23 1654    Order Status: Completed Specimen: Biopsy from Foot, Left Updated: 09/16/23 0616     Anaerobic Culture Culture in progress    Narrative:      1. Left 1st metatarsal clean margins-permanent    AFB Culture & Smear [[id number]] Collected: 09/14/23 1654    Order Status: Completed Specimen: Biopsy from Foot, Left Updated: 09/15/23 2127     AFB Culture & Smear Culture in progress     AFB CULTURE STAIN No acid fast bacilli seen.    Narrative:      1. Left 1st metatarsal clean margins-permanent    Gram stain [[id number]] Collected: 09/14/23 1654    Order Status: Completed Specimen: Biopsy from Foot, Left Updated: 09/15/23 0208     Gram Stain Result No WBC's, epithelial cells or organisms seen    Narrative:      1. Left 1st metatarsal clean margins-permanent    Fungus culture [[id number]] Collected: 09/14/23 1654    Order Status: Sent Specimen: Biopsy from Foot, Left Updated: 09/14/23 1717    Culture, Anaerobic [[id number]] Collected: 09/12/23 1158    Order Status: Completed Specimen: Wound from Toe, Left Foot Updated: 09/14/23 1302     Anaerobic Culture Culture in progress    Aerobic culture [[id number]] Collected: 09/12/23 1158    Order Status: Completed Specimen: Wound from  Toe, Left Foot Updated: 09/14/23 1036     Aerobic Bacterial Culture Skin meño,  no predominant organism    AFB Culture & Smear [[id number]] Collected: 09/12/23 1158    Order Status: Completed Specimen: Wound from Toe, Left Foot Updated: 09/13/23 2127     AFB Culture & Smear Culture in progress     AFB CULTURE STAIN No acid fast bacilli seen.    Gram stain [[id number]] Collected: 09/12/23 1158    Order Status: Completed Specimen: Wound from Toe, Left Foot Updated: 09/12/23 1456     Gram Stain Result Moderate WBC's      Rare Gram positive cocci    Fungus culture [[id number]] Collected: 09/12/23 1158    Order Status: Sent Specimen: Wound from Toe, Left Foot Updated: 09/12/23 1220          Specimen (24h ago, onward)      None          Significant Imaging:  I have reviewed all imagining in the last 24 hours.

## 2023-09-18 NOTE — PLAN OF CARE
[facility] Plan of Care Note  Dx S/P Femoral -femoral bypass surgery    Shift Events HD AM, wound VAC right side removed by DR at bedside, insulin coverage administered    Goals of Care: pain management, I/O's from drains, PT/ OT coming tomorrow, remove right IJ trialysis cath     Neuro: AAO X 4    Vital Signs: VSS    Respiratory: RA    Diet: Renal Diet    Is patient tolerating current diet? yes    GTTS: none    Urine Output/Bowel Movement: Hemodialysis and adequate urine output with randall    Drains/Tubes/Tube Feeds (include total output/shift): ELLE to L chest, WV to left groin, R WV removed by Md this AM    Lines: R AV Fistula, R IJ Trialysis, L 18 g FA      Accuchecks: ACHS and 2 am    Skin: wounds to bottom of R foot, surgical incision to L foot, WV to bilateral groin, L chest incision with ELLE drain    Fall Risk Score: 13    Activity level? Up to chair    Any scheduled procedures? none    Any safety concerns? Fall precautions    Other: Dialysis     Problem: Adult Inpatient Plan of Care  Goal: Plan of Care Review  Outcome: Ongoing, Progressing  Goal: Patient-Specific Goal (Individualized)  Outcome: Ongoing, Progressing  Goal: Absence of Hospital-Acquired Illness or Injury  Outcome: Ongoing, Progressing  Goal: Optimal Comfort and Wellbeing  Outcome: Ongoing, Progressing  Goal: Readiness for Transition of Care  Outcome: Ongoing, Progressing     Problem: Diabetes Comorbidity  Goal: Blood Glucose Level Within Targeted Range  Outcome: Ongoing, Progressing     Problem: Fluid and Electrolyte Imbalance (Acute Kidney Injury/Impairment)  Goal: Fluid and Electrolyte Balance  Outcome: Ongoing, Progressing     Problem: Oral Intake Inadequate (Acute Kidney Injury/Impairment)  Goal: Optimal Nutrition Intake  Outcome: Ongoing, Progressing     Problem: Renal Function Impairment (Acute Kidney Injury/Impairment)  Goal: Effective Renal Function  Outcome: Ongoing, Progressing     Problem: Device-Related Complication Risk  (Hemodialysis)  Goal: Safe, Effective Therapy Delivery  Outcome: Ongoing, Progressing     Problem: Hemodynamic Instability (Hemodialysis)  Goal: Effective Tissue Perfusion  Outcome: Ongoing, Progressing     Problem: Infection (Hemodialysis)  Goal: Absence of Infection Signs and Symptoms  Outcome: Ongoing, Progressing     Problem: Fall Injury Risk  Goal: Absence of Fall and Fall-Related Injury  Outcome: Ongoing, Progressing     Problem: Adjustment to Illness (Chronic Kidney Disease)  Goal: Optimal Coping with Chronic Illness  Outcome: Ongoing, Progressing     Problem: Electrolyte Imbalance (Chronic Kidney Disease)  Goal: Electrolyte Balance  Outcome: Ongoing, Progressing     Problem: Fluid Volume Excess (Chronic Kidney Disease)  Goal: Fluid Balance  Outcome: Ongoing, Progressing     Problem: Functional Decline (Chronic Kidney Disease)  Goal: Optimal Functional Ability  Outcome: Ongoing, Progressing     Problem: Hematologic Alteration (Chronic Kidney Disease)  Goal: Absence of Anemia Signs and Symptoms  Outcome: Ongoing, Progressing     Problem: Oral Intake Inadequate (Chronic Kidney Disease)  Goal: Optimal Oral Intake  Outcome: Ongoing, Progressing     Problem: Pain (Chronic Kidney Disease)  Goal: Acceptable Pain Control  Outcome: Ongoing, Progressing     Problem: Renal Function Impairment (Chronic Kidney Disease)  Goal: Minimize Renal Failure Effects  Outcome: Ongoing, Progressing     Problem: Skin Injury Risk Increased  Goal: Skin Health and Integrity  Outcome: Ongoing, Progressing     Problem: Infection  Goal: Absence of Infection Signs and Symptoms  Outcome: Ongoing, Progressing     Problem: Device-Related Complication Risk (CRRT (Continuous Renal Replacement Therapy))  Goal: Safe, Effective Therapy Delivery  Outcome: Ongoing, Progressing     Problem: Hypothermia (CRRT (Continuous Renal Replacement Therapy))  Goal: Body Temperature Maintained in Desired Range  Outcome: Ongoing, Progressing     Problem: Infection  (CRRT (Continuous Renal Replacement Therapy))  Goal: Absence of Infection Signs and Symptoms  Outcome: Ongoing, Progressing     Problem: Restraint, Nonbehavioral (Nonviolent)  Goal: Absence of Harm or Injury  Outcome: Ongoing, Progressing

## 2023-09-18 NOTE — NURSING
Dialysis tx started to the right arm av fistula per orders placed by Dr. Elmore:    Order Questions    Question Answer   Antibiotics on HD? No   Duration of Treatment 3 hours   Dialyzer F160   Dialysate Temperature (C) 36.5    mL/min    mL/min   K+ Potassium per Protocol   Ca++ Calcium per Protocol   Na+ Sodium per Protocol   Bicarb Bicarbonate per Protocol   Access AVF   Needle gauge 16 gauge   Location Arm   Laterality Right   Fluid Removal (L) 2L   Fluid Removal Instructions maintain SBP > 90 mmHG   Dialysate Bath Solution Protocol (DO NOT MODIFY ANSWER) [email or web address] Dialysate Bath Solution Algorithm (formatted with date).pdf                Special Contact Precautions maintained

## 2023-09-18 NOTE — PHYSICIAN QUERY
PT Name: Georgina Arias  MR #: [id number]    DOCUMENTATION CLARIFICATION      CDS/: Eli OlivaresRN               Contact information: [email or web address]    This form is a permanent document in the medical record.      Query Date: September 18, 2023    By submitting this query, we are merely seeking further clarification of documentation. Please utilize your independent clinical judgment when addressing the question(s) below.    The Medical Record contains the following:   Indicators  Supporting Clinical Findings Location in Medical Record    Anemia documented     x H&H  09/11/23 17:54 09/12/23 03:04 09/13/23 04:42 09/14/23 06:02 09/14/23 22:38 09/15/23 03:12 09/15/23 04:45   Hemoglobin 10.9 (L) 9.9 (L) 10.3 (L) 9.8 (L) 7.4 (L) 6.9 (L) 8.6 (L)   Hematocrit 32.5 (L) 31.7 (L) 33.2 (L) 30.1 (L) 24.2 (L) 22.2 (L) 26.3 (L)      09/15/23 19:35 09/16/23 02:15 09/16/23 04:17 09/17/23 03:37 09/18/23 05:53   Hemoglobin 7.1 (L) 8.6 (L) 7.9 (L) 7.0 (L) 7.9 (L)   Hematocrit 21.3 (L) 25.4 (L) 23.8 (L) 20.3 (L) 23.4 (L)      Labs 9/11-9/18   x BP                    HR Vital Signs (24h Range):  Pulse:  [71-99] 98  BP: ()/(6-70) 63/28  Arterial Line BP: ()/(41-57) 100/48    Pulse:  [] 103  BP: ()/(28-73) 106/57  Arterial Line BP: ()/(41-57) 108/48    Pulse:  [] 102  BP: ()/(41-71) 114/58    Pulse:  [] 101  BP: (106-163)/(55-72) 161/70    Pulse:  [] 89  BP: (133-161)/(60-71) 153/70 H&P 9/14          CCS PN 9/15        CCS PN 9/16      CCS PN 9/17      Nephro PN 9/18        Bleeding     x Procedure/Surgery Performed/EBL Procedures:    1. Ultrasound-guided access to the left radial artery.  2. Left lower extremity angiogram with selection of aorta  3. Left arch aortogram and subclavian arteriogram  4. Radial band closure of left radial artery  EBL:    Minimal    Procedure(s) (LRB):  CREATION, BYPASS, ARTERIAL, AXILLARY TO BILATERAL FEMORAL (Left)  ANGIOGRAM, LOWER  EXTREMITY with balloon angioplasty ultraverse 5mm x 60mm (Left)  STENT, SUPERFICIAL FEMORAL ARTERY (Left)  AMPUTATION, FOOT, TRANSMETATARSAL partial 1st ray amputation (Left)  INCISION AND DRAINAGE, FOOT (Left)  Estimated Blood Loss (EBL):    200cc   Op Note 9/13                Op Note 9/14   x Transfusion(s) Hgb slowly downtrending to 7 this am Will transfuse 1u pRBC.  - transfuse 1u pRBC   Vascular Surgery PN 9/17   x Acute/Chronic illness Ms. Arias is a 51 yo female with a past medical hx of PONV, ESRD on HD, T2DM on insulin and mounjaro (A1c 7.2), PVD s/p stents to RLE, DVT of R leg, HTN, HLD, PUD, obesity (BMI 38), and  three recent left foot surgeries in August  Reason for admission:  S/P femoral-femoral bypass surgery  Present on Admission:   Open wound of left foot   Hyperlipidemia   Peripheral vascular disease   Essential hypertension   ESRD (end stage renal disease)   Type II diabetes mellitus   Severe obesity (BMI >= 40)   H&P 9/14    Treatments      Other       Provider, please specify diagnosis associated with above clinical findings.   [   ] Acute blood loss anemia      [   ] Acute blood loss anemia expected post-operatively      [  x ] Other Hematological Diagnosis (please specify): _ chronic disease________________                Please document in your progress notes daily for the duration of treatment, until resolved, and include in your discharge summary.    Form No. [id number]

## 2023-09-18 NOTE — PLAN OF CARE
09/18/23 1128   Post-Acute Status   Post-Acute Authorization Home Health   Home Health Status Referrals Sent   Discharge Plan   Discharge Plan A Home Health   Discharge Plan B Home Health     Met with patient to review discharge recommendation of home health and is agreeable to plan    Patient/family provided list of facilities in-network with patient's payor plan. Providers that are owned, operated, or affiliated with [facility] are included on the list.     Notified that referral sent to below listed facilities from in-network list based on proximity to home/family support:   1.  2.  3.  4.  5. (can send more than 5)    Patient/family instructed to identify preference.    Preferred Facility: (if more than 1, listed in order of descending preference)  [facility]    If an additional preferred facility not listed above is identified, additional referral to be sent. If above facilities unable to accept, will send additional referrals to in-network providers.

## 2023-09-18 NOTE — PLAN OF CARE
Problem: Hemodynamic Instability (Hemodialysis)  Goal: Effective Tissue Perfusion  Outcome: Ongoing, Progressing     Dialysis tx ended to right arm AV fistula, hemostasis achieved.  Pt./nurse unable to cannulate access with buttonhole needles, sharp needles used during tx, Dr. Velzáquez made aware during rounds.    Net fluid removed: 2L    Report given to nurse Quach, pt awaiting transport by bed from [facility] to room 1003.

## 2023-09-18 NOTE — ASSESSMENT & PLAN NOTE
Outpatient HD Information:  -Dialysis modality: Hemodialysis  -Outpatient HD unit: [facility] (Valerio Bautista MD)  -HD TX days: Tuesday/Thursday/Saturday, duration of treatment: 3-4 hrs  -Last HD TX prior to hospital admission: 09/09/2023  -Dialysis access: RUE AVF   -Residual urine: + RRF  -EDW: 109 kg    Plan/Recommendations:  - iHD for metabolic clearance and volume management   - renal diet/tube feeds when not NPO   - strict I/O's and daily weights  - daily renal function panels and magnesium levels  - renally all dose medications to eGFR  - avoid gadolinium, fleets, phos-based laxatives, NSAIDs, etc.

## 2023-09-18 NOTE — PROGRESS NOTES
[facility]  Nephrology  Progress Note    Patient Name: Georgina Arias  MRN: [id number]  Admission Date: 9/11/2023  Hospital Length of Stay: 7 days  Attending Provider: Maxx Maya MD   Primary Care Physician: Alonso Ling MD  Principal Problem:S/P femoral-femoral bypass surgery    Subjective:     HPI: Ms Arias is an obese (BMI ~38) 52-year-old woman with HLD, PAD with multiple prior stents, hypertension, peptic ulcer disease, type 2 diabetes mellitus on insulin (most recent hemoglobin A1c 7.2%), central obesity hypoventilation syndrome, chronic left foot wound s/p x3 surgeries now in August/last month (i.e. I&D, left hallux amputation, etc.) and ESRD secondary to diabetic nephropathy (biopsy proven) on iHD every Tuesday, Thursday & Saturday via RUE AVF who [place] [facility] [place] aforementioned [place]. Tentative plans for angiography while inpatient per Vascular Surgery. Podiatry has also been consulted for assistance. Nephrology has been consulted for inpatient RRT needs.    Outpatient HD Information:  -Dialysis modality: Hemodialysis  -Outpatient HD unit: [facility] (Valerio Bautista MD)  -HD TX days: Tuesday/Thursday/Saturday, duration of treatment: 3-4 hrs  -Last HD TX prior to hospital admission: 09/09/2023  -Dialysis access: RUE AVF   -Residual urine: + RRF  -EDW: 109 kg      Interval History: Patient seen and examined this AM while undergoing iHD for which she is currently tolerating well. Stepped down from SICU overnight. Afebrile with pulse in the 80s bpm. Systolic blood pressures ranging from 160-150s mmHg. She is saturating +97% on room air with documented UOP 1.4 liters in the last 24 hours.     Review of patient's allergies indicates:   Allergen Reactions    Naproxen Anaphylaxis and Swelling     Hives (skin)^swelling  Hives (skin)^swelling  Hives (skin)^swelling    Sulfamethoxazole-trimethoprim Other (See Comments)     Caused JEROME    Hydrocodone  Nausea And Vomiting and Rash    Penicillins Hives     Blisters (skin)^    Adhesive Rash    Hydrocodone-acetaminophen Nausea And Vomiting     Rash (skin)^, Vomiting^       Current Facility-Administered Medications   Medication Frequency    0.9%  NaCl infusion (for blood administration) Q24H PRN    0.9%  NaCl infusion Once    acetaminophen tablet 1,000 mg Q8H    aluminum & magnesium hydroxide-simethicone 400-400-40 mg/5 mL suspension 30 mL Q6H PRN    aspirin EC tablet 81 mg Daily    atorvastatin tablet 80 mg Daily    calcium carbonate 200 mg calcium (500 mg) chewable tablet 500 mg TID PRN    carvediloL tablet 3.125 mg BID    clopidogreL tablet 75 mg Daily    dextrose 10% bolus 125 mL 125 mL PRN    dextrose 10% bolus 125 mL 125 mL PRN    dextrose 10% bolus 250 mL 250 mL PRN    dextrose 10% bolus 250 mL 250 mL PRN    diphenhydrAMINE capsule 25 mg Q6H PRN    glucagon (human recombinant) injection 1 mg PRN    glucose chewable tablet 16 g PRN    glucose chewable tablet 24 g PRN    heparin (porcine) injection 3,000 Units PRN    heparin (porcine) injection 5,000 Units Q8H    hydrALAZINE injection 10 mg Q6H PRN    insulin aspart U-100 pen 0-10 Units PRN    insulin aspart U-100 pen 5 Units TIDWM    insulin detemir U-100 (Levemir) pen 20 Units BID    labetalol 20 mg/4 mL (5 mg/mL) IV syring Q6H PRN    LIDOcaine (PF) 10 mg/ml (1%) injection 10 mg Once PRN    melatonin tablet 6 mg Nightly PRN    midodrine tablet 10 mg Daily PRN    mupirocin 2 % ointment BID    ondansetron injection 4 mg Q4H PRN    oxyCODONE immediate release tablet 5 mg Q4H PRN    oxyCODONE immediate release tablet Tab 10 mg Q6H PRN    pantoprazole EC tablet 40 mg BID PRN    sodium chloride 0.9% bolus 250 mL 250 mL PRN    sodium chloride 0.9% flush 10 mL PRN       Objective:     Vital Signs (Most Recent):  Temp: 98.7 °F (37.1 °C) (09/18/23 0409)  Pulse: 89 (09/18/23 0816)  Resp: 18 (09/18/23 0409)  BP: (!) 153/70 (09/18/23 0816)  SpO2: 98 % (09/18/23 1569) Vital  Signs (24h Range):  Temp:  [97.8 °F (36.6 °C)-98.7 °F (37.1 °C)] 98.7 °F (37.1 °C)  Pulse:  [] 89  Resp:  [17-27] 18  SpO2:  [97 %-100 %] 98 %  BP: (133-161)/(60-71) 153/70     Weight: 109.8 kg (242 lb) (09/15/23 0845)  Body mass index is 37.9 kg/m².  Body surface area is 2.28 meters squared.    I/O last 3 completed shifts:  In: 450 [I.V.:6.5; Blood:295; IV Piggyback:148.5]  Out: 1560 [Urine:1470; Drains:90]    Physical Exam  Vitals and nursing note reviewed.   Constitutional:       General: She is not in acute distress.     Appearance: Normal appearance. She is obese. She is not ill-appearing or diaphoretic.   HENT:      Head: Normocephalic and atraumatic.      Right Ear: External ear normal.      Left Ear: External ear normal.      Nose: Nose normal.      Mouth/Throat:      Mouth: Mucous membranes are moist.      Pharynx: Oropharynx is clear. No oropharyngeal exudate or posterior oropharyngeal erythema.   Eyes:      General: No scleral icterus.        Right eye: No discharge.         Left eye: No discharge.      Extraocular Movements: Extraocular movements intact.      Conjunctiva/sclera: Conjunctivae normal.   Neck:      Comments: Trialysis catheter to right internal jugular vein.   Cardiovascular:      Rate and Rhythm: Normal rate.      Heart sounds: Murmur heard.      Systolic murmur is present with a grade of 1/6.      No friction rub. No gallop.      Arteriovenous access: Left arteriovenous access is present.     Comments: AVF to right upper extremity with palpable thrill and audible bruit.  Pulmonary:      Effort: Pulmonary effort is normal. No respiratory distress.      Breath sounds: No wheezing, rhonchi or rales.   Chest:      Comments: Left upper chest incision dressed/bandaged. ELLE drain in place with serosanguineous output.   Abdominal:      General: Bowel sounds are normal. There is no distension.      Palpations: Abdomen is soft.      Tenderness: There is no abdominal tenderness.      Comments:  Obese/protuberant abdomen.   Musculoskeletal:      Right lower leg: No edema.      Left lower leg: Edema present.   Skin:     Findings: Lesion present.      Comments: Left foot currently dressed/bandaged. See images below.   Neurological:      General: No focal deficit present.      Mental Status: She is alert. Mental status is at baseline.      Cranial Nerves: No cranial nerve deficit.      Motor: No weakness.   Psychiatric:         Mood and Affect: Mood normal.         Behavior: Behavior normal.          Significant Labs:  BMP:   Recent Labs   Lab 09/18/23  0553   *   *   K 3.6      CO2 22*   BUN 33*   CREATININE 4.7*   CALCIUM 7.9*   MG 2.5       CBC:   Recent Labs   Lab 09/18/23  0553   WBC 15.68*   RBC 2.48*   HGB 7.9*   HCT 23.4*   *   MCV 94   MCH 31.9*   MCHC 33.8       CMP:   Recent Labs   Lab 09/18/23  0553   *   CALCIUM 7.9*   ALBUMIN 2.1*   PROT 5.1*   *   K 3.6   CO2 22*      BUN 33*   CREATININE 4.7*   ALKPHOS 67   ALT 20   AST 14   BILITOT 0.6       LFTs:   Recent Labs   Lab 09/18/23  0553   ALT 20   AST 14   ALKPHOS 67   BILITOT 0.6   PROT 5.1*   ALBUMIN 2.1*       Microbiology Results (last 7 days)       Procedure Component Value Units Date/Time    Blood culture [[id number]] Collected: 09/15/23 2021    Order Status: Completed Specimen: Blood from Peripheral, Antecubital, Left Updated: 09/17/23 2212     Blood Culture, Routine No Growth to date      No Growth to date      No Growth to date    Blood culture [[id number]] Collected: 09/15/23 1520    Order Status: Completed Specimen: Blood from Peripheral, Antecubital, Right Updated: 09/17/23 1812     Blood Culture, Routine No Growth to date      No Growth to date      No Growth to date    Clostridium difficile EIA [[id number]]     Order Status: Canceled Specimen: Stool     Aerobic culture [[id number]] Collected: 09/14/23 1654    Order Status: Completed Specimen: Biopsy from Foot, Left Updated: 09/16/23 0704      Aerobic Bacterial Culture No growth    Narrative:      1. Left 1st metatarsal clean margins-permanent    Culture, Anaerobe [[id number]] Collected: 09/14/23 1654    Order Status: Completed Specimen: Biopsy from Foot, Left Updated: 09/16/23 0616     Anaerobic Culture Culture in progress    Narrative:      1. Left 1st metatarsal clean margins-permanent    AFB Culture & Smear [[id number]] Collected: 09/14/23 1654    Order Status: Completed Specimen: Biopsy from Foot, Left Updated: 09/15/23 2127     AFB Culture & Smear Culture in progress     AFB CULTURE STAIN No acid fast bacilli seen.    Narrative:      1. Left 1st metatarsal clean margins-permanent    Gram stain [[id number]] Collected: 09/14/23 1654    Order Status: Completed Specimen: Biopsy from Foot, Left Updated: 09/15/23 0208     Gram Stain Result No WBC's, epithelial cells or organisms seen    Narrative:      1. Left 1st metatarsal clean margins-permanent    Fungus culture [[id number]] Collected: 09/14/23 1654    Order Status: Sent Specimen: Biopsy from Foot, Left Updated: 09/14/23 1717    Culture, Anaerobic [[id number]] Collected: 09/12/23 1158    Order Status: Completed Specimen: Wound from Toe, Left Foot Updated: 09/14/23 1302     Anaerobic Culture Culture in progress    Aerobic culture [[id number]] Collected: 09/12/23 1158    Order Status: Completed Specimen: Wound from Toe, Left Foot Updated: 09/14/23 1036     Aerobic Bacterial Culture Skin meño,  no predominant organism    AFB Culture & Smear [[id number]] Collected: 09/12/23 1158    Order Status: Completed Specimen: Wound from Toe, Left Foot Updated: 09/13/23 2127     AFB Culture & Smear Culture in progress     AFB CULTURE STAIN No acid fast bacilli seen.    Gram stain [[id number]] Collected: 09/12/23 1158    Order Status: Completed Specimen: Wound from Toe, Left Foot Updated: 09/12/23 1456     Gram Stain Result Moderate WBC's      Rare Gram positive cocci    Fungus culture [[id number]] Collected:  09/12/23 1158    Order Status: Sent Specimen: Wound from Toe, Left Foot Updated: 09/12/23 1220          Specimen (24h ago, onward)      None          Significant Imaging:  I have reviewed all imagining in the last 24 hours.    Assessment/Plan:     Cardiac/Vascular  * S/P femoral-femoral bypass surgery  - s/p axillary/bifemoral bypass on 9/14 per Vascular Surgery  - management per primary team    Essential hypertension  - management per primary team  - currently on nifedipine 3.125 mg daily    Renal/  ESRD (end stage renal disease)  Outpatient HD Information:  -Dialysis modality: Hemodialysis  -Outpatient HD unit: [facility] (Valerio Bautista MD)  -HD TX days: Tuesday/Thursday/Saturday, duration of treatment: 3-4 hrs  -Last HD TX prior to hospital admission: 09/09/2023  -Dialysis access: RUE AVF   -Residual urine: + RRF  -EDW: 109 kg    Plan/Recommendations:  - iHD for metabolic clearance and volume management   - renal diet/tube feeds when not NPO   - strict I/O's and daily weights  - daily renal function panels and magnesium levels  - renally all dose medications to eGFR  - avoid gadolinium, fleets, phos-based laxatives, NSAIDs, etc.    Orthopedic  Open wound of left foot  - s/p debridement on 9/14 per Podiatry  - management per primary team      Thank you for your consult. I will follow-up with patient. Please contact us if you have any additional questions.    Alireza Velázquez MD  Nephrology  [facility]    ATTENDING PHYSICIAN ATTESTATION  I have personally verified the history and examined the patient. I thoroughly reviewed the demographic, clinical, laboratorial and imaging information available in medical records. I agree with the assessment and recommendations provided by the subspecialty resident who was under my supervision.      HEMODIALYSIS NOTE  Patient evaluated while undergoing hemodialysis indicated for ESRD. Tolerating session with current UFR, no complications.

## 2023-09-18 NOTE — SUBJECTIVE & OBJECTIVE
Medications:  Continuous Infusions:  Scheduled Meds:   sodium chloride 0.9%   Intravenous Once    acetaminophen  1,000 mg Oral Q8H    aspirin  81 mg Oral Daily    atorvastatin  80 mg Oral Daily    carvediloL  3.125 mg Oral BID    clopidogreL  75 mg Oral Daily    heparin (porcine)  5,000 Units Subcutaneous Q8H    insulin aspart U-100  5 Units Subcutaneous TIDWM    insulin detemir U-100  20 Units Subcutaneous BID    mupirocin   Nasal BID     PRN Meds:0.9%  NaCl infusion (for blood administration), aluminum & magnesium hydroxide-simethicone, calcium carbonate, dextrose 10%, dextrose 10%, dextrose 10%, dextrose 10%, diphenhydrAMINE, glucagon (human recombinant), glucose, glucose, heparin (porcine), hydrALAZINE, insulin aspart U-100, labetalol, LIDOcaine (PF) 10 mg/ml (1%), melatonin, midodrine, ondansetron, oxyCODONE, oxyCODONE, pantoprazole, sodium chloride 0.9%, sodium chloride 0.9%     Objective:     Vital Signs (Most Recent):  Temp: 98.7 °F (37.1 °C) (09/18/23 0409)  Pulse: 82 (09/18/23 0448)  Resp: 18 (09/18/23 0409)  BP: (!) 155/68 (09/18/23 0409)  SpO2: 98 % (09/18/23 0409) Vital Signs (24h Range):  Temp:  [97.8 °F (36.6 °C)-98.7 °F (37.1 °C)] 98.7 °F (37.1 °C)  Pulse:  [] 82  Resp:  [17-27] 18  SpO2:  [97 %-100 %] 98 %  BP: (133-161)/(60-71) 155/68          Physical Exam  Constitutional:       General: She is not in acute distress.  HENT:      Head: Normocephalic and atraumatic.   Cardiovascular:      Rate and Rhythm: Normal rate.      Comments: 2+ L radial pulse  B/l biphasic dp/pt signals  Pulmonary:      Effort: Pulmonary effort is normal.   Abdominal:      Palpations: Abdomen is soft.   Musculoskeletal:         General: Normal range of motion.   Skin:     General: Skin is warm and dry.      Comments: L foot s/p debridement with podiatry. Wrapped in ace  L groin incisions covered by prevenas  L upper chest incision covered with gauze and tegaderm. ELLE drain in place with SS output          Significant  Labs:  CBC:   Recent Labs   Lab 09/18/23  0553   WBC 15.68*   RBC 2.48*   HGB 7.9*   HCT 23.4*   *   MCV 94   MCH 31.9*   MCHC 33.8     CMP:   Recent Labs   Lab 09/18/23  0553   *   CALCIUM 7.9*   ALBUMIN 2.1*   PROT 5.1*   *   K 3.6   CO2 22*      BUN 33*   CREATININE 4.7*   ALKPHOS 67   ALT 20   AST 14   BILITOT 0.6       Significant Diagnostics:  I have reviewed all pertinent imaging results/findings within the past 24 hours.

## 2023-09-18 NOTE — ASSESSMENT & PLAN NOTE
Georgina Arias is a 52 y.o. female with ESRD, HTN, PAD, DM2, who is here today for non-healing L foot wound s/p L great toe amputation on 8/25/23. S/p axillary, bifem bypass 9/14/23.    - ASA/statin/plavix  - Nephrology consulted for HD  - Podiatry following. S/p L foot debridement 9/14/23  - TTE LVEF 50-55%  - prn pain meds, nausea meds  - float heels  - Advance to renal diet  - ok for OOB  - PT/OT  - Trend labs  - Monitor drain output  - q4h neurovascular checks  - CAMILA and ultrasound of bypass ordered this am   -CDiff pending

## 2023-09-18 NOTE — PLAN OF CARE
[facility]      HOME HEALTH ORDERS  FACE TO FACE ENCOUNTER    Patient Name: Georgina Arias  YOB: 1970    PCP: Alonso Ling MD   PCP Address: [address] [address]  PCP Phone Number: [phone]  PCP Fax: [phone]    Encounter Date: 9/11/23    Admit to Home Health    Diagnoses:  Active Hospital Problems    Diagnosis  POA    *S/P femoral-femoral bypass surgery [Z95.828]  Not Applicable    Pre-op exam [Z01.818]  Not Applicable    ESRD (end stage renal disease) [N18.6]  Yes    Severe obesity (BMI >= 40) [E66.01]  Yes    Open wound of left foot [S91.302A]  Yes    Class 2 severe obesity due to excess calories with serious comorbidity and body mass index (BMI) of 37.0 to 37.9 in adult [E66.01, Z68.37]  Not Applicable    Hyperlipidemia [E78.5]  Yes    Peripheral vascular disease [I73.9]  Yes     Overview:   dx update      Essential hypertension [I10]  Yes    Type II diabetes mellitus [E11.9]  Yes     Overview:   dx update        Resolved Hospital Problems   No resolved problems to display.       Follow Up Appointments:  Future Appointments   Date Time Provider Department Center   9/26/2023  1:45 PM Aleida Flannery DPM [facility]       Allergies:  Review of patient's allergies indicates:   Allergen Reactions    Naproxen Anaphylaxis and Swelling     Hives (skin)^swelling  Hives (skin)^swelling  Hives (skin)^swelling    Sulfamethoxazole-trimethoprim Other (See Comments)     Caused JEROME    Hydrocodone Nausea And Vomiting and Rash    Penicillins Hives     Blisters (skin)^    Adhesive Rash    Hydrocodone-acetaminophen Nausea And Vomiting     Rash (skin)^, Vomiting^         Medications: Review discharge medications with patient and family and provide education.    Current Facility-Administered Medications   Medication Dose Route Frequency Provider Last Rate Last Admin    0.9%  NaCl infusion (for blood administration)   Intravenous Q24H PRN Marta Schultz MD         [START ON 9/20/2023] 0.9%  NaCl infusion   Intravenous Once Alireza Velázquez MD        acetaminophen tablet 1,000 mg  1,000 mg Oral Q8H Adrian Canada MD   1,000 mg at 09/19/23 0638    aluminum & magnesium hydroxide-simethicone 400-400-40 mg/5 mL suspension 30 mL  30 mL Oral Q6H PRN Vivian Dang MD        aspirin EC tablet 81 mg  81 mg Oral Daily Ray Loyd MD   81 mg at 09/19/23 0833    atorvastatin tablet 80 mg  80 mg Oral Daily Adrian Canada MD   80 mg at 09/19/23 0833    calcium carbonate 200 mg calcium (500 mg) chewable tablet 500 mg  500 mg Oral TID PRN Vivian Dang MD   500 mg at 09/17/23 0242    carvediloL tablet 3.125 mg  3.125 mg Oral BID Ita Carrera MD   3.125 mg at 09/19/23 0833    clopidogreL tablet 75 mg  75 mg Oral Daily Ray Loyd MD   75 mg at 09/19/23 0833    dextrose 10% bolus 125 mL 125 mL  12.5 g Intravenous PRN Valerie Robles,         dextrose 10% bolus 125 mL 125 mL  12.5 g Intravenous PRN Nikole Webster AAngie, NP        dextrose 10% bolus 250 mL 250 mL  25 g Intravenous PRN Valerie Robles,         dextrose 10% bolus 250 mL 250 mL  25 g Intravenous PRN Hank Websterlie A., NP        diphenhydrAMINE capsule 25 mg  25 mg Oral Q6H PRN Adrian Canada MD        glucagon (human recombinant) injection 1 mg  1 mg Intramuscular PRN Hank Websterlie A., NP        glucose chewable tablet 16 g  16 g Oral PRN Nikole Webster AAngie, NP        glucose chewable tablet 24 g  24 g Oral PRN Nikole Webster, NP        [START ON 9/20/2023] heparin (porcine) injection 1,000 Units  1,000 Units Intra-Catheter PRN Alireza Velázquez MD        heparin (porcine) injection 3,000 Units  3,000 Units Intravenous PRN Adrian Canada MD   3,000 Units at 09/18/23 0741    heparin (porcine) injection 5,000 Units  5,000 Units Subcutaneous Q8H Ita Carrera MD   5,000 Units at 09/19/23 1507    hydrALAZINE injection 10 mg  10 mg Intravenous Q6H PRN [place],  Adrian LEÓN MD        insulin aspart U-100 pen 0-10 Units  0-10 Units Subcutaneous PRN Nikole Webster NP   2 Units at 09/19/23 1005    insulin aspart U-100 pen 5 Units  5 Units Subcutaneous TIDWM Nikole Webster NP   5 Units at 09/19/23 1257    insulin detemir U-100 (Levemir) pen 20 Units  20 Units Subcutaneous BID Nikole Webster NP   20 Units at 09/19/23 0833    labetalol 20 mg/4 mL (5 mg/mL) IV syring  20 mg Intravenous Q6H PRN Adrian Canada MD        LIDOcaine (PF) 10 mg/ml (1%) injection 10 mg  1 mL Intradermal Once PRN Adrian Canada MD        melatonin tablet 6 mg  6 mg Oral Nightly PRN Melissa Solares MD   6 mg at 09/18/23 0109    midodrine tablet 10 mg  10 mg Oral Daily PRN Ita Carrera MD        mupirocin 2 % ointment   Nasal BID Lgoan Elmore MD   Given at 09/19/23 0835    ondansetron injection 4 mg  4 mg Intravenous Q4H PRN Mariposa Polanco MD   4 mg at 09/19/23 0840    oxyCODONE immediate release tablet 5 mg  5 mg Oral Q4H PRN Chantale Beltran MD   5 mg at 09/16/23 0833    oxyCODONE immediate release tablet Tab 10 mg  10 mg Oral Q6H PRN Melissa Solares MD   10 mg at 09/19/23 0839    pantoprazole EC tablet 40 mg  40 mg Oral BID PRN Melissa Solares MD   40 mg at 09/18/23 0110    sodium chloride 0.9% bolus 250 mL 250 mL  250 mL Intravenous PRN Adrian Canada MD        [START ON 9/20/2023] sodium chloride 0.9% bolus 250 mL 250 mL  250 mL Intravenous PRN Alireza Velázquez MD        sodium chloride 0.9% flush 10 mL  10 mL Intravenous PRN Adrian Canada MD         Current Discharge Medication List        CONTINUE these medications which have NOT CHANGED    Details   acetaminophen (TYLENOL) 500 MG tablet Take 2 tablets (1,000 mg total) by mouth every 8 (eight) hours as needed.  Qty: 60 tablet, Refills: 0    Associated Diagnoses: Back pain, unspecified back location, unspecified back pain laterality, unspecified chronicity; Pain of left hip joint;  "Arthritis      aspirin (ECOTRIN) 81 MG EC tablet Take 1 tablet (81 mg total) by mouth once daily.    Associated Diagnoses: PAD (peripheral artery disease)      atorvastatin (LIPITOR) 80 MG tablet Take 1 tablet (80 mg total) by mouth once daily.  Qty: 90 tablet, Refills: 3    Associated Diagnoses: Diabetes mellitus, type 2; PAD (peripheral artery disease); Hyperlipidemia      carvediloL (COREG) 25 MG tablet Take 1 tablet (25 mg total) by mouth 2 (two) times daily with meals.  Qty: 180 tablet, Refills: 3    Comments: .      COMFORT EZ PEN NEEDLES 32 gauge x /32" Ndle SMARTSIG:injection Daily      doxycycline (VIBRAMYCIN) 100 MG Cap Take 1 capsule (100 mg total) by mouth 2 (two) times daily.  Qty: 84 capsule, Refills: 0    Associated Diagnoses: MSSA (methicillin susceptible Staphylococcus aureus); Osteomyelitis of great toe of left foot; Abscess of tendon of left foot      fluticasone propionate (FLONASE) 50 mcg/actuation nasal spray Fluticasone Propionate 50 MCG/ACT Nasal Suspension QTY: 1 bottle Days: 30 Refills: 5  Written: 20 Patient Instructions: inhale 2 sprays in each nostril once daily      gabapentin (NEURONTIN) 300 MG capsule Take 1 capsule (300 mg total) by mouth 3 (three) times daily.  Qty: 63 capsule, Refills: 1    Associated Diagnoses: Chronic foot pain, unspecified laterality      insulin (LANTUS SOLOSTAR U-100 INSULIN) glargine 100 units/mL (3mL) SubQ pen Inject 40 Units into the skin every evening.  Refills: 0      levocetirizine (XYZAL) 5 MG tablet Take 5 mg by mouth.      miscellaneous medical supply (C-TUB) Misc Hearing amplification (BICROS preferred)      MOUNJARO 5 mg/0.5 mL PnIj SMARTSI Milligram(s) SUB-Q Once a Week      NIFEdipine (PROCARDIA-XL) 30 MG (OSM) 24 hr tablet Take 30 mg by mouth.      sevelamer carbonate (RENVELA) 800 mg Tab Take by mouth.      TRUE METRIX GLUCOSE TEST STRIP Strp USE TO test THREE TIMES DAILY      TRUEPLUS LANCETS 30 gauge Misc                I have seen " and examined this patient within the last 30 days. My clinical findings that support the need for the home health skilled services and home bound status are the following:no   Weakness/numbness causing balance and gait disturbance due to Surgery making it taxing to leave home.     Diet:   cardiac diet  Renal diet  Labs:      Referrals/ Consults  Physical Therapy to evaluate and treat. Evaluate for home safety and equipment needs; Establish/upgrade home exercise program. Perform / instruct on therapeutic exercises, gait training, transfer training, and Range of Motion.  Occupational Therapy to evaluate and treat. Evaluate home environment for safety and equipment needs. Perform/Instruct on transfers, ADL training, ROM, and therapeutic exercises.    Activities:   activity as tolerated    Nursing:   Agency to admit patient within 24 hours of hospital discharge unless specified on physician order or at patient request    SN to complete comprehensive assessment including routine vital signs. Instruct on disease process and s/s of complications to report to MD. Review/verify medication list sent home with the patient at time of discharge  and instruct patient/caregiver as needed. Frequency may be adjusted depending on start of care date.     Skilled nurse to perform up to 3 visits PRN for symptoms related to diagnosis    Notify MD if SBP > 160 or < 90; DBP > 90 or < 50; HR > 120 or < 50; Temp > 101; O2 < 88%; Other:       Ok to schedule additional visits based on staff availability and patient request on consecutive days within the home health episode.    When multiple disciplines ordered:    Start of Care occurs on Sunday - Wednesday schedule remaining discipline evaluations as ordered on separate consecutive days following the start of care.    Thursday SOC -schedule subsequent evaluations Friday and Monday the following week.     Friday - Saturday SOC - schedule subsequent discipline evaluations on consecutive days  starting Monday of the following week.    For all post-discharge communication and subsequent orders please contact patient's primary care physician. If unable to reach primary care physician or do not receive response within 30 minutes, please contact [phone] for clinical staff order clarification    Miscellaneous       Home Health Aide:  Nursing Three times weekly, Physical Therapy Three times weekly, and Occupational Therapy Three times weekly    Wound Care Orders  yes:  Surgical Wound:  Location: Right groin, Left groin  Left foot    Left foot wound care:  Betadine soaked adaptic, 4x4 gauze, Kerlix, and ACE wrap to Left 1st ray amputation site.     GROIN WOUND CARE:  It is very important that the wound in your groin stays dry and clean because this area collects moisture and bacteria which greatly increases the risk for infection  and wound complications.    EVERY DAY:  - Starting two days after your surgery, you may shower  - Following your shower, pat the wound dry  - Paint the wound with a small amount of betadine  - Cover the wound with 4x4 gauze and tape  - Change the dressing in the same fashion every day, sooner if the gauze gets saturated with fluid    CONTACT US IMMEDIATELY  - Is getting more painful with time not less  - Becomes red  - Has pus coming out  - Falls apart   - Or you are having fevers         I certify that this patient is confined to her home and needs intermittent skilled nursing care, physical therapy, and occupational therapy.

## 2023-09-18 NOTE — ASSESSMENT & PLAN NOTE
BG goal 140 - 180     Levemir 20 units BID   Novolog 5 units TID with meals   Moderate Dose Correction Scale  BG monitoring ac/hs/0200      ** Please call Endocrine for any BG related issues **    ** Please notify Endocrine for any change and/or advance in diet**    Discharge planning: TBD

## 2023-09-18 NOTE — PLAN OF CARE
[facility]    HOME HEALTH ORDERS  FACE TO FACE ENCOUNTER    Patient Name: Georgina Arias  YOB: 1970    PCP: Alonso Ling MD   PCP Address: [address] [address]  PCP Phone Number: [phone]  PCP Fax: [phone]    Encounter Date: 09/18/2023    Admit to Home Health    Diagnoses:  Active Hospital Problems    Diagnosis  POA    *S/P femoral-femoral bypass surgery [Z95.828]  Not Applicable    Pre-op exam [Z01.818]  Not Applicable    ESRD (end stage renal disease) [N18.6]  Yes    Severe obesity (BMI >= 40) [E66.01]  Yes    Open wound of left foot [S91.302A]  Yes    Class 2 severe obesity due to excess calories with serious comorbidity and body mass index (BMI) of 37.0 to 37.9 in adult [E66.01, Z68.37]  Not Applicable    Hyperlipidemia [E78.5]  Yes    Peripheral vascular disease [I73.9]  Yes     Overview:   dx update      Essential hypertension [I10]  Yes    Type II diabetes mellitus [E11.9]  Yes     Overview:   dx update        Resolved Hospital Problems   No resolved problems to display.       Future Appointments   Date Time Provider Department Center   9/26/2023  1:45 PM Aleida Flannery DPM [facility]           I have seen and examined this patient face to face today. My clinical findings that support the need for the home health skilled services and home bound status are the following:  Medical restrictions requiring assistance of another human to leave home due to Post surgery monitoring.    Allergies:  Review of patient's allergies indicates:   Allergen Reactions    Naproxen Anaphylaxis and Swelling     Hives (skin)^swelling  Hives (skin)^swelling  Hives (skin)^swelling    Sulfamethoxazole-trimethoprim Other (See Comments)     Caused JEROME    Hydrocodone Nausea And Vomiting and Rash    Penicillins Hives     Blisters (skin)^    Adhesive Rash    Hydrocodone-acetaminophen Nausea And Vomiting     Rash (skin)^, Vomiting^         Diet: regular  diet    Activities: activity as tolerated    Nursing:   SN to complete comprehensive assessment including routine vital signs. Instruct on disease process and s/s of complications to report to MD. Review/verify medication list sent home with the patient at time of discharge  and instruct patient/caregiver as needed. Frequency may be adjusted depending on start of care date. If patient has enteral feeding tube (NG, PEG, J-tube, G-tube), flush tube before and after feeding and/or medication administration with 20-30 mL of water.    Notify MD if SBP > 160 or < 90; DBP > 90 or < 50; HR > 120 or < 50; Temp > 101; Other:          CONSULTS:    Physical Therapy to evaluate and treat. Evaluate for home safety and equipment needs; Establish/upgrade home exercise program. Perform / instruct on therapeutic exercises, gait training, transfer training, and Range of Motion.  Occupational Therapy to evaluate and treat. Evaluate home environment for safety and equipment needs. Perform/Instruct on transfers, ADL training, ROM, and therapeutic exercises.    MISCELLANEOUS CARE:  N/A    WOUND CARE ORDERS  no      Medications: Review discharge medications with patient and family and provide education.      Current Discharge Medication List        CONTINUE these medications which have NOT CHANGED    Details   acetaminophen (TYLENOL) 500 MG tablet Take 2 tablets (1,000 mg total) by mouth every 8 (eight) hours as needed.  Qty: 60 tablet, Refills: 0    Associated Diagnoses: Back pain, unspecified back location, unspecified back pain laterality, unspecified chronicity; Pain of left hip joint; Arthritis      aspirin (ECOTRIN) 81 MG EC tablet Take 1 tablet (81 mg total) by mouth once daily.    Associated Diagnoses: PAD (peripheral artery disease)      atorvastatin (LIPITOR) 80 MG tablet Take 1 tablet (80 mg total) by mouth once daily.  Qty: 90 tablet, Refills: 3    Associated Diagnoses: Diabetes mellitus, type 2; PAD (peripheral artery  "disease); Hyperlipidemia      carvediloL (COREG) 25 MG tablet Take 1 tablet (25 mg total) by mouth 2 (two) times daily with meals.  Qty: 180 tablet, Refills: 3    Comments: .      COMFORT EZ PEN NEEDLES 32 gauge x /" Ndle SMARTSIG:injection Daily      doxycycline (VIBRAMYCIN) 100 MG Cap Take 1 capsule (100 mg total) by mouth 2 (two) times daily.  Qty: 84 capsule, Refills: 0    Associated Diagnoses: MSSA (methicillin susceptible Staphylococcus aureus); Osteomyelitis of great toe of left foot; Abscess of tendon of left foot      fluticasone propionate (FLONASE) 50 mcg/actuation nasal spray Fluticasone Propionate 50 MCG/ACT Nasal Suspension QTY: 1 bottle Days: 30 Refills: 5  Written: 20 Patient Instructions: inhale 2 sprays in each nostril once daily      gabapentin (NEURONTIN) 300 MG capsule Take 1 capsule (300 mg total) by mouth 3 (three) times daily.  Qty: 63 capsule, Refills: 1    Associated Diagnoses: Chronic foot pain, unspecified laterality      insulin (LANTUS SOLOSTAR U-100 INSULIN) glargine 100 units/mL (3mL) SubQ pen Inject 40 Units into the skin every evening.  Refills: 0      levocetirizine (XYZAL) 5 MG tablet Take 5 mg by mouth.      miscellaneous medical supply (C-TUB) Misc Hearing amplification (BICROS preferred)      MOUNJARO 5 mg/0.5 mL PnIj SMARTSI Milligram(s) SUB-Q Once a Week      NIFEdipine (PROCARDIA-XL) 30 MG (OSM) 24 hr tablet Take 30 mg by mouth.      sevelamer carbonate (RENVELA) 800 mg Tab Take by mouth.      TRUE METRIX GLUCOSE TEST STRIP Strp USE TO test THREE TIMES DAILY      TRUEPLUS LANCETS 30 gauge Misc              I certify that this patient is confined to her home and needs physical therapy and occupational therapy.        "

## 2023-09-19 VITALS
RESPIRATION RATE: 18 BRPM | HEART RATE: 83 BPM | TEMPERATURE: 99 F | WEIGHT: 242 LBS | BODY MASS INDEX: 37.98 KG/M2 | DIASTOLIC BLOOD PRESSURE: 69 MMHG | OXYGEN SATURATION: 99 % | SYSTOLIC BLOOD PRESSURE: 153 MMHG | HEIGHT: 67 IN

## 2023-09-19 LAB
ALBUMIN SERPL BCP-MCNC: 2.1 G/DL (ref 3.5–5.2)
ALP SERPL-CCNC: 74 U/L (ref 55–135)
ALT SERPL W/O P-5'-P-CCNC: 16 U/L (ref 10–44)
ANION GAP SERPL CALC-SCNC: 11 MMOL/L (ref 8–16)
AST SERPL-CCNC: 15 U/L (ref 10–40)
BACTERIA SPEC ANAEROBE CULT: NORMAL
BASOPHILS # BLD AUTO: 0.07 K/UL (ref 0–0.2)
BASOPHILS NFR BLD: 0.5 % (ref 0–1.9)
BILIRUB SERPL-MCNC: 0.5 MG/DL (ref 0.1–1)
BUN SERPL-MCNC: 18 MG/DL (ref 6–20)
CALCIUM SERPL-MCNC: 8 MG/DL (ref 8.7–10.5)
CHLORIDE SERPL-SCNC: 105 MMOL/L (ref 95–110)
CO2 SERPL-SCNC: 22 MMOL/L (ref 23–29)
CREAT SERPL-MCNC: 3.9 MG/DL (ref 0.5–1.4)
DIFFERENTIAL METHOD: ABNORMAL
EOSINOPHIL # BLD AUTO: 0.3 K/UL (ref 0–0.5)
EOSINOPHIL NFR BLD: 2.1 % (ref 0–8)
ERYTHROCYTE [DISTWIDTH] IN BLOOD BY AUTOMATED COUNT: 17.5 % (ref 11.5–14.5)
EST. GFR  (NO RACE VARIABLE): 13.2 ML/MIN/1.73 M^2
GLUCOSE SERPL-MCNC: 93 MG/DL (ref 70–110)
HCT VFR BLD AUTO: 24.6 % (ref 37–48.5)
HGB BLD-MCNC: 7.9 G/DL (ref 12–16)
IMM GRANULOCYTES # BLD AUTO: 0.16 K/UL (ref 0–0.04)
IMM GRANULOCYTES NFR BLD AUTO: 1.2 % (ref 0–0.5)
LYMPHOCYTES # BLD AUTO: 1.7 K/UL (ref 1–4.8)
LYMPHOCYTES NFR BLD: 12.2 % (ref 18–48)
MAGNESIUM SERPL-MCNC: 2.3 MG/DL (ref 1.6–2.6)
MCH RBC QN AUTO: 31.3 PG (ref 27–31)
MCHC RBC AUTO-ENTMCNC: 32.1 G/DL (ref 32–36)
MCV RBC AUTO: 98 FL (ref 82–98)
MONOCYTES # BLD AUTO: 0.8 K/UL (ref 0.3–1)
MONOCYTES NFR BLD: 6 % (ref 4–15)
NEUTROPHILS # BLD AUTO: 10.9 K/UL (ref 1.8–7.7)
NEUTROPHILS NFR BLD: 78 % (ref 38–73)
NRBC BLD-RTO: 1 /100 WBC
PHOSPHATE SERPL-MCNC: 4 MG/DL (ref 2.7–4.5)
PLATELET # BLD AUTO: 186 K/UL (ref 150–450)
PMV BLD AUTO: 10.3 FL (ref 9.2–12.9)
POCT GLUCOSE: 101 MG/DL (ref 70–110)
POCT GLUCOSE: 133 MG/DL (ref 70–110)
POCT GLUCOSE: 176 MG/DL (ref 70–110)
POCT GLUCOSE: 202 MG/DL (ref 70–110)
POTASSIUM SERPL-SCNC: 3.9 MMOL/L (ref 3.5–5.1)
PROT SERPL-MCNC: 5.5 G/DL (ref 6–8.4)
RBC # BLD AUTO: 2.52 M/UL (ref 4–5.4)
SODIUM SERPL-SCNC: 138 MMOL/L (ref 136–145)
WBC # BLD AUTO: 13.91 K/UL (ref 3.9–12.7)

## 2023-09-19 PROCEDURE — 84100 ASSAY OF PHOSPHORUS: CPT | Performed by: SURGERY

## 2023-09-19 PROCEDURE — 25000003 PHARM REV CODE 250: Performed by: INTERNAL MEDICINE

## 2023-09-19 PROCEDURE — 63600175 PHARM REV CODE 636 W HCPCS: Performed by: STUDENT IN AN ORGANIZED HEALTH CARE EDUCATION/TRAINING PROGRAM

## 2023-09-19 PROCEDURE — 97530 THERAPEUTIC ACTIVITIES: CPT

## 2023-09-19 PROCEDURE — 85025 COMPLETE CBC W/AUTO DIFF WBC: CPT | Performed by: STUDENT IN AN ORGANIZED HEALTH CARE EDUCATION/TRAINING PROGRAM

## 2023-09-19 PROCEDURE — 99232 PR SUBSEQUENT HOSPITAL CARE,LEVL II: ICD-10-PCS | Mod: ,,, | Performed by: NURSE PRACTITIONER

## 2023-09-19 PROCEDURE — 63600175 PHARM REV CODE 636 W HCPCS: Performed by: INTERNAL MEDICINE

## 2023-09-19 PROCEDURE — 99231 SBSQ HOSP IP/OBS SF/LOW 25: CPT | Mod: ,,, | Performed by: INTERNAL MEDICINE

## 2023-09-19 PROCEDURE — 99231 PR SUBSEQUENT HOSPITAL CARE,LEVL I: ICD-10-PCS | Mod: ,,, | Performed by: INTERNAL MEDICINE

## 2023-09-19 PROCEDURE — 99232 SBSQ HOSP IP/OBS MODERATE 35: CPT | Mod: ,,, | Performed by: NURSE PRACTITIONER

## 2023-09-19 PROCEDURE — 80053 COMPREHEN METABOLIC PANEL: CPT | Performed by: STUDENT IN AN ORGANIZED HEALTH CARE EDUCATION/TRAINING PROGRAM

## 2023-09-19 PROCEDURE — 97535 SELF CARE MNGMENT TRAINING: CPT

## 2023-09-19 PROCEDURE — 25000003 PHARM REV CODE 250: Performed by: STUDENT IN AN ORGANIZED HEALTH CARE EDUCATION/TRAINING PROGRAM

## 2023-09-19 PROCEDURE — 25000003 PHARM REV CODE 250

## 2023-09-19 PROCEDURE — 97116 GAIT TRAINING THERAPY: CPT

## 2023-09-19 PROCEDURE — 83735 ASSAY OF MAGNESIUM: CPT | Performed by: SURGERY

## 2023-09-19 PROCEDURE — 94761 N-INVAS EAR/PLS OXIMETRY MLT: CPT

## 2023-09-19 PROCEDURE — 36415 COLL VENOUS BLD VENIPUNCTURE: CPT | Performed by: STUDENT IN AN ORGANIZED HEALTH CARE EDUCATION/TRAINING PROGRAM

## 2023-09-19 RX ORDER — AMOXICILLIN AND CLAVULANATE POTASSIUM 875; 125 MG/1; MG/1
1 TABLET, FILM COATED ORAL EVERY 12 HOURS
Qty: 28 TABLET | Refills: 0 | Status: ON HOLD | OUTPATIENT
Start: 2023-09-19 | End: 2023-10-26 | Stop reason: HOSPADM

## 2023-09-19 RX ORDER — OXYCODONE HYDROCHLORIDE 5 MG/1
5 TABLET ORAL EVERY 6 HOURS PRN
Qty: 20 TABLET | Refills: 0 | Status: SHIPPED | OUTPATIENT
Start: 2023-09-19 | End: 2023-12-04

## 2023-09-19 RX ORDER — HEPARIN SODIUM 1000 [USP'U]/ML
1000 INJECTION, SOLUTION INTRAVENOUS; SUBCUTANEOUS
Status: DISCONTINUED | OUTPATIENT
Start: 2023-09-20 | End: 2023-09-19 | Stop reason: HOSPADM

## 2023-09-19 RX ORDER — SODIUM CHLORIDE 9 MG/ML
INJECTION, SOLUTION INTRAVENOUS ONCE
Status: DISCONTINUED | OUTPATIENT
Start: 2023-09-20 | End: 2023-09-19 | Stop reason: HOSPADM

## 2023-09-19 RX ADMIN — OXYCODONE HYDROCHLORIDE 10 MG: 10 TABLET ORAL at 05:09

## 2023-09-19 RX ADMIN — CLOPIDOGREL BISULFATE 75 MG: 75 TABLET ORAL at 08:09

## 2023-09-19 RX ADMIN — OXYCODONE HYDROCHLORIDE 10 MG: 10 TABLET ORAL at 08:09

## 2023-09-19 RX ADMIN — HEPARIN SODIUM 5000 UNITS: 5000 INJECTION INTRAVENOUS; SUBCUTANEOUS at 06:09

## 2023-09-19 RX ADMIN — INSULIN DETEMIR 20 UNITS: 100 INJECTION, SOLUTION SUBCUTANEOUS at 08:09

## 2023-09-19 RX ADMIN — ONDANSETRON 4 MG: 2 INJECTION INTRAMUSCULAR; INTRAVENOUS at 08:09

## 2023-09-19 RX ADMIN — ATORVASTATIN CALCIUM 80 MG: 40 TABLET, FILM COATED ORAL at 08:09

## 2023-09-19 RX ADMIN — INSULIN ASPART 4 UNITS: 100 INJECTION, SOLUTION INTRAVENOUS; SUBCUTANEOUS at 05:09

## 2023-09-19 RX ADMIN — INSULIN ASPART 5 UNITS: 100 INJECTION, SOLUTION INTRAVENOUS; SUBCUTANEOUS at 10:09

## 2023-09-19 RX ADMIN — ACETAMINOPHEN 1000 MG: 500 TABLET ORAL at 06:09

## 2023-09-19 RX ADMIN — INSULIN ASPART 2 UNITS: 100 INJECTION, SOLUTION INTRAVENOUS; SUBCUTANEOUS at 10:09

## 2023-09-19 RX ADMIN — INSULIN ASPART 5 UNITS: 100 INJECTION, SOLUTION INTRAVENOUS; SUBCUTANEOUS at 05:09

## 2023-09-19 RX ADMIN — INSULIN ASPART 5 UNITS: 100 INJECTION, SOLUTION INTRAVENOUS; SUBCUTANEOUS at 12:09

## 2023-09-19 RX ADMIN — CARVEDILOL 3.12 MG: 3.12 TABLET, FILM COATED ORAL at 08:09

## 2023-09-19 RX ADMIN — HEPARIN SODIUM 5000 UNITS: 5000 INJECTION INTRAVENOUS; SUBCUTANEOUS at 03:09

## 2023-09-19 RX ADMIN — ASPIRIN 81 MG: 81 TABLET, COATED ORAL at 08:09

## 2023-09-19 RX ADMIN — MUPIROCIN: 20 OINTMENT TOPICAL at 08:09

## 2023-09-19 NOTE — ASSESSMENT & PLAN NOTE
BG goal 140 - 180     Levemir 20 units BID   Novolog 5 units TID with meals   Moderate Dose Correction Scale  BG monitoring ac/hs/0200      ** Please call Endocrine for any BG related issues **    ** Please notify Endocrine for any change and/or advance in diet**    Discharge planning:   Resume home regimen at discharge     Lab Results   Component Value Date    HGBA1C 7.2 (H) 09/11/2023

## 2023-09-19 NOTE — PLAN OF CARE
Problem: Physical Therapy  Goal: Physical Therapy Goal  Description: Goals to be met by: 10/1/23     Patient will increase functional independence with mobility by performin. Supine to sit with MInimal Assistance-met 23  Revised goal: supine to sit with mod independence-not met  2. Sit to supine with MInimal Assistance-met 23  Sit to supine with mod independence-not met  3. Sit to stand transfer with Stand-by Assistance  4. Bed to chair transfer with Stand-by Assistance using LRAD  5. Gait  x 200 feet with Stand-by Assistance using LRAD.     Outcome: Ongoing, Progressing   Pt's goals revised as appropriate and pt will continue to benefit from skilled PT services to work towards improved functional mobility including: bed mobility, transfers, and gait.   2023

## 2023-09-19 NOTE — PROGRESS NOTES
[facility]  Vascular Surgery  Progress Note    Patient Name: Georgina Arias  MRN: [id number]  Admission Date: 9/11/2023  Primary Care Provider: Alonso Ling MD    Subjective:     Interval History: NAEON- patient ready for discharge today, discussed plan of care.   set up.      Post-Op Info:  Procedure(s) (LRB):  CREATION, BYPASS, ARTERIAL, AXILLARY TO BILATERAL FEMORAL (Left)  ANGIOGRAM, LOWER EXTREMITY with balloon angioplasty ultraverse 5mm x 60mm (Left)  STENT, SUPERFICIAL FEMORAL ARTERY (Left)  AMPUTATION, FOOT, TRANSMETATARSAL partial 1st ray amputation (Left)  INCISION AND DRAINAGE, FOOT (Left)   5 Days Post-Op       Medications:  Continuous Infusions:  Scheduled Meds:   acetaminophen  1,000 mg Oral Q8H    aspirin  81 mg Oral Daily    atorvastatin  80 mg Oral Daily    carvediloL  3.125 mg Oral BID    clopidogreL  75 mg Oral Daily    heparin (porcine)  5,000 Units Subcutaneous Q8H    insulin aspart U-100  5 Units Subcutaneous TIDWM    insulin detemir U-100  20 Units Subcutaneous BID    mupirocin   Nasal BID     PRN Meds:0.9%  NaCl infusion (for blood administration), aluminum & magnesium hydroxide-simethicone, calcium carbonate, dextrose 10%, dextrose 10%, dextrose 10%, dextrose 10%, diphenhydrAMINE, glucagon (human recombinant), glucose, glucose, heparin (porcine), hydrALAZINE, insulin aspart U-100, labetalol, LIDOcaine (PF) 10 mg/ml (1%), melatonin, midodrine, ondansetron, oxyCODONE, oxyCODONE, pantoprazole, sodium chloride 0.9%, sodium chloride 0.9%     Objective:     Vital Signs (Most Recent):  Temp: (!) 9.5 °F (-12.5 °C) (09/19/23 0719)  Pulse: 75 (09/19/23 0719)  Resp: 18 (09/19/23 0719)  BP: (!) 158/70 (09/19/23 0719)  SpO2: 99 % (09/19/23 0719) Vital Signs (24h Range):  Temp:  [9.5 °F (-12.5 °C)-98.6 °F (37 °C)] 9.5 °F (-12.5 °C)  Pulse:  [75-99] 75  Resp:  [15-18] 18  SpO2:  [95 %-99 %] 99 %  BP: ()/(53-70) 158/70          Physical Exam  Constitutional:       General: She is not  in acute distress.  HENT:      Head: Normocephalic and atraumatic.   Cardiovascular:      Rate and Rhythm: Normal rate.      Comments: 2+ L radial pulse  B/l biphasic dp/pt signals  Pulmonary:      Effort: Pulmonary effort is normal.   Abdominal:      Palpations: Abdomen is soft.   Musculoskeletal:         General: Normal range of motion.   Skin:     General: Skin is warm and dry.      Comments: L foot s/p debridement with podiatry  L groin incisions covered w betadine gauze   L upper chest incision covered with gauze and tegaderm          Significant Labs:  CBC:   Recent Labs   Lab 09/18/23  0553   WBC 15.68*   RBC 2.48*   HGB 7.9*   HCT 23.4*   *   MCV 94   MCH 31.9*   MCHC 33.8     CMP:   Recent Labs   Lab 09/19/23  0537   GLU 93   CALCIUM 8.0*   ALBUMIN 2.1*   PROT 5.5*      K 3.9   CO2 22*      BUN 18   CREATININE 3.9*   ALKPHOS 74   ALT 16   AST 15   BILITOT 0.5       Significant Diagnostics:  I have reviewed all pertinent imaging results/findings within the past 24 hours.    Assessment/Plan:     Open wound of left foot  Georgina Arias is a 52 y.o. female with ESRD, HTN, PAD, DM2, who is here today for non-healing L foot wound s/p L great toe amputation on 8/25/23. S/p axillary, bifem bypass 9/14/23.    - ASA/statin/plavix  - Nephrology consulted for HD  - Podiatry following. S/p L foot debridement 9/14/23  - TTE LVEF 50-55%  - prn pain meds, nausea meds  - float heels  - Advance to renal diet  - ok for OOB  - PT/OT  - Trend labs  - Monitor drain output  - q4h neurovascular checks  - CAMILA and ultrasound of bypass ordered   - surgical dressing to L foot taken down this am on rounds, expressed a large amount of thick brown material. Appreciate podiatry input.             Kellie Mauricio, TICO  Vascular Surgery  [facility]

## 2023-09-19 NOTE — PT/OT/SLP PROGRESS
"Physical Therapy Treatment/co treat    Patient Name:  Georgina Arias   MRN:  [id number]    Recommendations:     Discharge Recommendations: other (see comments)  Discharge Equipment Recommendations: shower chair  Barriers to discharge: Inaccessible home 1 [address]    Assessment:     Georgina Arias is a 52 y.o. female admitted with a medical diagnosis of S/P femoral-femoral bypass surgery.  She presents with the following impairments/functional limitations: weakness, impaired functional mobility, decreased lower extremity function, pain . Pt requires no assist for bed mobility, supervision (from commode and bedside chair) to minimal assist (from bed) for transfers, CGA up/down step with B UE rails, and CGA  for gait due to pt c/o dizziness and LE weakness . Pt is motivated to progress with functional mobility.     Rehab Prognosis: Good; patient would benefit from acute skilled PT services to address these deficits and reach maximum level of function.    Recent Surgery: Procedure(s) (LRB):  CREATION, BYPASS, ARTERIAL, AXILLARY TO BILATERAL FEMORAL (Left)  ANGIOGRAM, LOWER EXTREMITY with balloon angioplasty ultraverse 5mm x 60mm (Left)  STENT, SUPERFICIAL FEMORAL ARTERY (Left)  AMPUTATION, FOOT, TRANSMETATARSAL partial 1st ray amputation (Left)  INCISION AND DRAINAGE, FOOT (Left) 5 Days Post-Op    Plan:     During this hospitalization, patient to be seen 3 x/week to address the identified rehab impairments via gait training, therapeutic activities, neuromuscular re-education and progress toward the following goals:    Plan of Care Expires:  10/17/23    Subjective   "I need to go to the bathroom"    Pain/Comfort:  Pain Rating 1: 8/10  Location - Side 1: Left  Location - Orientation 1: generalized  Location 1: foot  Pain Addressed 1: Reposition, Cessation of Activity  Pain Rating Post-Intervention 1: 8/10      Objective:     Communicated with nurse prior to session.  Patient found up in chair with cervical collar, " telemetry upon PT entry to room.     General Precautions: Standard, fall  Orthopedic Precautions:  (WB to L heel in Darco shoe)  Braces:  (Darco shoe)  Respiratory Status: Room air     Functional Mobility:  Bed Mobility:     Supine to Sit: independence  Transfers:     Sit to Stand:  contact guard assistance from bedside commode and commode with grab bar and minimum assistance from bed with rolling walker  Gait: 8ft then 40ft then 40ft with RW with CGA. Pt performed gait with flexed trunk and at slow pace with mild instability. Pt c/o LE weakness and dizziness limiting gait distance.  Stairs:  Pt ascended/descended 1 stair(s) with No Assistive Device with bilateral handrails with Contact Guard Assistance.       AM-PAC 6 CLICK MOBILITY  Turning over in bed (including adjusting bedclothes, sheets and blankets)?: 4  Sitting down on and standing up from a chair with arms (e.g., wheelchair, bedside commode, etc.): 3  Moving from lying on back to sitting on the side of the bed?: 4  Moving to and from a bed to a chair (including a wheelchair)?: 3  Need to walk in hospital room?: 3  Climbing 3-5 steps with a railing?: 3  Basic Mobility Total Score: 20     Treatment & Education:  Pt ambulated to the bathroom and urinated on the commode, pt able to clean herself with set up assist. Pt stood at the sink with SBA and RW support to perform ADLs  Co-treat with OT due to need for skilled hands for safe intervention.   Patient left up in chair with all lines intact, call button in reach, and nurse notified..    GOALS:   Multidisciplinary Problems       Physical Therapy Goals          Problem: Physical Therapy    Goal Priority Disciplines Outcome Goal Variances Interventions   Physical Therapy Goal     PT, PT/OT Ongoing, Progressing     Description: Goals to be met by: 10/1/23     Patient will increase functional independence with mobility by performin. Supine to sit with MInimal Assistance-met 23  Revised goal: supine to  sit with mod independence-not met  2. Sit to supine with MInimal Assistance-met 9/19/23  Sit to supine with mod independence-not met  3. Sit to stand transfer with Stand-by Assistance  4. Bed to chair transfer with Stand-by Assistance using LRAD  5. Gait  x 200 feet with Stand-by Assistance using LRAD.                          Time Tracking:     PT Received On: 09/19/23  PT Start Time: 0757     PT Stop Time: 0823  PT Total Time (min): 26 min     Billable Minutes: Gait Training 26    Treatment Type: Treatment  PT/PTA: PT           09/19/2023

## 2023-09-19 NOTE — PROGRESS NOTES
[facility]  Nephrology  Progress Note    Patient Name: Georgina Arias  MRN: [id number]  Admission Date: 9/11/2023  Hospital Length of Stay: 8 days  Attending Provider: Maxx Maya MD   Primary Care Physician: Alonso Ling MD  Principal Problem:S/P femoral-femoral bypass surgery    Subjective:     HPI: Ms Arias is an obese (BMI ~38) 52-year-old woman with HLD, PAD with multiple prior stents, hypertension, peptic ulcer disease, type 2 diabetes mellitus on insulin (most recent hemoglobin A1c 7.2%), central obesity hypoventilation syndrome, chronic left foot wound s/p x3 surgeries now in August/last month (i.e. I&D, left hallux amputation, etc.) and ESRD secondary to diabetic nephropathy (biopsy proven) on iHD every Tuesday, Thursday & Saturday via RUE AVF who was admitted to [facility] on 9/11 for aforementioned left lower extremity wound. Tentative plans for angiography while inpatient per Vascular Surgery. Podiatry has also been consulted for assistance. Nephrology has been consulted for inpatient RRT needs.    Outpatient HD Information:  -Dialysis modality: Hemodialysis  -Outpatient HD unit: [facility] (Valerio Bautista MD)  -HD TX days: Tuesday/Thursday/Saturday, duration of treatment: 3-4 hrs  -Last HD TX prior to hospital admission: 09/09/2023  -Dialysis access: RUE AVF   -Residual urine: + RRF  -EDW: 109 kg      Interval History: Patient seen and examined this AM. No acute events overnight. Underwent iHD yesterday with 2 liters removed. Afebrile with pulse ranging from 80-70s bpm. Systolic blood pressures ranging from 150-130s mmHg. She is saturating +95% on room air with documented UOP ~500 mL with four unmeasured voids in the last 24 hours. Laboratory studies reviewed. Plans for discharge today per primary team.    Review of patient's allergies indicates:   Allergen Reactions    Naproxen Anaphylaxis and Swelling     Hives (skin)^swelling  Hives (skin)^swelling  Hives (skin)^swelling     Sulfamethoxazole-trimethoprim Other (See Comments)     Caused JEROME    Hydrocodone Nausea And Vomiting and Rash    Penicillins Hives     Blisters (skin)^    Adhesive Rash    Hydrocodone-acetaminophen Nausea And Vomiting     Rash (skin)^, Vomiting^       Current Facility-Administered Medications   Medication Frequency    0.9%  NaCl infusion (for blood administration) Q24H PRN    acetaminophen tablet 1,000 mg Q8H    aluminum & magnesium hydroxide-simethicone 400-400-40 mg/5 mL suspension 30 mL Q6H PRN    aspirin EC tablet 81 mg Daily    atorvastatin tablet 80 mg Daily    calcium carbonate 200 mg calcium (500 mg) chewable tablet 500 mg TID PRN    carvediloL tablet 3.125 mg BID    clopidogreL tablet 75 mg Daily    dextrose 10% bolus 125 mL 125 mL PRN    dextrose 10% bolus 125 mL 125 mL PRN    dextrose 10% bolus 250 mL 250 mL PRN    dextrose 10% bolus 250 mL 250 mL PRN    diphenhydrAMINE capsule 25 mg Q6H PRN    glucagon (human recombinant) injection 1 mg PRN    glucose chewable tablet 16 g PRN    glucose chewable tablet 24 g PRN    heparin (porcine) injection 3,000 Units PRN    heparin (porcine) injection 5,000 Units Q8H    hydrALAZINE injection 10 mg Q6H PRN    insulin aspart U-100 pen 0-10 Units PRN    insulin aspart U-100 pen 5 Units TIDWM    insulin detemir U-100 (Levemir) pen 20 Units BID    labetalol 20 mg/4 mL (5 mg/mL) IV syring Q6H PRN    LIDOcaine (PF) 10 mg/ml (1%) injection 10 mg Once PRN    melatonin tablet 6 mg Nightly PRN    midodrine tablet 10 mg Daily PRN    mupirocin 2 % ointment BID    ondansetron injection 4 mg Q4H PRN    oxyCODONE immediate release tablet 5 mg Q4H PRN    oxyCODONE immediate release tablet Tab 10 mg Q6H PRN    pantoprazole EC tablet 40 mg BID PRN    sodium chloride 0.9% bolus 250 mL 250 mL PRN    sodium chloride 0.9% flush 10 mL PRN       Objective:     Vital Signs (Most Recent):  Temp: (!) 9.5 °F (-12.5 °C) (09/19/23 0719)  Pulse: 75 (09/19/23 0719)  Resp: 18 (09/19/23 0719)  BP: (!)  158/70 (09/19/23 0719)  SpO2: 99 % (09/19/23 0719) Vital Signs (24h Range):  Temp:  [9.5 °F (-12.5 °C)-98.6 °F (37 °C)] 9.5 °F (-12.5 °C)  Pulse:  [75-99] 75  Resp:  [17-18] 18  SpO2:  [95 %-99 %] 99 %  BP: ()/(53-70) 158/70     Weight: 109.8 kg (242 lb) (09/15/23 0845)  Body mass index is 37.9 kg/m².  Body surface area is 2.28 meters squared.    I/O last 3 completed shifts:  In: 800 [I.V.:300; Other:500]  Out: 4370 [Urine:1545; Drains:25; Other:2800]    Physical Exam  Vitals and nursing note reviewed.   Constitutional:       General: She is not in acute distress.     Appearance: Normal appearance. She is obese. She is not ill-appearing or diaphoretic.   HENT:      Head: Normocephalic and atraumatic.      Right Ear: External ear normal.      Left Ear: External ear normal.      Nose: Nose normal.      Mouth/Throat:      Mouth: Mucous membranes are moist.      Pharynx: Oropharynx is clear. No oropharyngeal exudate or posterior oropharyngeal erythema.   Eyes:      General: No scleral icterus.        Right eye: No discharge.         Left eye: No discharge.      Extraocular Movements: Extraocular movements intact.      Conjunctiva/sclera: Conjunctivae normal.   Neck:      Comments: Trialysis catheter to right internal jugular vein.   Cardiovascular:      Rate and Rhythm: Normal rate.      Heart sounds: Murmur heard.      Systolic murmur is present with a grade of 1/6.      No friction rub. No gallop.      Arteriovenous access: Left arteriovenous access is present.     Comments: AVF to right upper extremity with palpable thrill and audible bruit.  Pulmonary:      Effort: Pulmonary effort is normal. No respiratory distress.      Breath sounds: No wheezing, rhonchi or rales.   Chest:      Comments: Left upper chest incision dressed/bandaged.  Abdominal:      General: Bowel sounds are normal. There is no distension.      Palpations: Abdomen is soft.      Tenderness: There is no abdominal tenderness.      Comments:  Obese/protuberant abdomen.   Musculoskeletal:      Right lower leg: No edema.      Left lower leg: Edema present.   Skin:     Findings: Lesion present.      Comments: Left foot currently dressed/bandaged. See images below.   Neurological:      General: No focal deficit present.      Mental Status: She is alert. Mental status is at baseline.      Cranial Nerves: No cranial nerve deficit.      Motor: No weakness.   Psychiatric:         Mood and Affect: Mood normal.         Behavior: Behavior normal.          Significant Labs:  BMP:   Recent Labs   Lab 09/19/23  0537   GLU 93      K 3.9      CO2 22*   BUN 18   CREATININE 3.9*   CALCIUM 8.0*   MG 2.3       CBC:   Recent Labs   Lab 09/19/23  0537   WBC 13.91*   RBC 2.52*   HGB 7.9*   HCT 24.6*      MCV 98   MCH 31.3*   MCHC 32.1       CMP:   Recent Labs   Lab 09/19/23  0537   GLU 93   CALCIUM 8.0*   ALBUMIN 2.1*   PROT 5.5*      K 3.9   CO2 22*      BUN 18   CREATININE 3.9*   ALKPHOS 74   ALT 16   AST 15   BILITOT 0.5       LFTs:   Recent Labs   Lab 09/19/23  0537   ALT 16   AST 15   ALKPHOS 74   BILITOT 0.5   PROT 5.5*   ALBUMIN 2.1*       Microbiology Results (last 7 days)       Procedure Component Value Units Date/Time    Culture, Anaerobe [[id number]] Collected: 09/14/23 1654    Order Status: Completed Specimen: Biopsy from Foot, Left Updated: 09/19/23 0734     Anaerobic Culture No anaerobes isolated    Narrative:      1. Left 1st metatarsal clean margins-permanent    Blood culture [[id number]] Collected: 09/15/23 2021    Order Status: Completed Specimen: Blood from Peripheral, Antecubital, Left Updated: 09/18/23 2212     Blood Culture, Routine No Growth to date      No Growth to date      No Growth to date      No Growth to date    Blood culture [[id number]] Collected: 09/15/23 1520    Order Status: Completed Specimen: Blood from Peripheral, Antecubital, Right Updated: 09/18/23 1812     Blood Culture, Routine No Growth to date      No  Growth to date      No Growth to date      No Growth to date    Culture, Anaerobic [[id number]]  (Abnormal) Collected: 09/12/23 1158    Order Status: Completed Specimen: Wound from Toe, Left Foot Updated: 09/18/23 1302     Anaerobic Culture PORPHYROMONAS SOMERAE  Moderate        PREVOTELLA BERGENSIS  Moderate      Aerobic culture [[id number]] Collected: 09/14/23 1654    Order Status: Completed Specimen: Biopsy from Foot, Left Updated: 09/18/23 0917     Aerobic Bacterial Culture No growth    Narrative:      1. Left 1st metatarsal clean margins-permanent    Clostridium difficile EIA [[id number]]     Order Status: Canceled Specimen: Stool     AFB Culture & Smear [[id number]] Collected: 09/14/23 1654    Order Status: Completed Specimen: Biopsy from Foot, Left Updated: 09/15/23 2127     AFB Culture & Smear Culture in progress     AFB CULTURE STAIN No acid fast bacilli seen.    Narrative:      1. Left 1st metatarsal clean margins-permanent    Gram stain [[id number]] Collected: 09/14/23 1654    Order Status: Completed Specimen: Biopsy from Foot, Left Updated: 09/15/23 0208     Gram Stain Result No WBC's, epithelial cells or organisms seen    Narrative:      1. Left 1st metatarsal clean margins-permanent    Fungus culture [[id number]] Collected: 09/14/23 1654    Order Status: Sent Specimen: Biopsy from Foot, Left Updated: 09/14/23 1717    Aerobic culture [[id number]] Collected: 09/12/23 1158    Order Status: Completed Specimen: Wound from Toe, Left Foot Updated: 09/14/23 1036     Aerobic Bacterial Culture Skin meño,  no predominant organism    AFB Culture & Smear [[id number]] Collected: 09/12/23 1158    Order Status: Completed Specimen: Wound from Toe, Left Foot Updated: 09/13/23 2127     AFB Culture & Smear Culture in progress     AFB CULTURE STAIN No acid fast bacilli seen.    Gram stain [[id number]] Collected: 09/12/23 1158    Order Status: Completed Specimen: Wound from Toe, Left Foot Updated: 09/12/23 1456     Gram  Stain Result Moderate WBC's      Rare Gram positive cocci    Fungus culture [[id number]] Collected: 09/12/23 1158    Order Status: Sent Specimen: Wound from Toe, Left Foot Updated: 09/12/23 1220          Specimen (24h ago, onward)      None          Significant Imaging:  I have reviewed all imagining in the last 24 hours.    Assessment/Plan:     Cardiac/Vascular  * S/P femoral-femoral bypass surgery  - s/p axillary/bifemoral bypass on 9/14 per Vascular Surgery  - management per primary team    Essential hypertension  - management per primary team  - currently on Coreg 3.125 mg daily    Renal/  ESRD (end stage renal disease)  Outpatient HD Information:  -Dialysis modality: Hemodialysis  -Outpatient HD unit: [facility] (Valerio Bautista MD)  -HD TX days: Tuesday/Thursday/Saturday, duration of treatment: 3-4 hrs  -Last HD TX prior to hospital admission: 09/09/2023  -Dialysis access: RUE AVF   -Residual urine: + RRF  -EDW: 109 kg    Plan/Recommendations:  - no RRT today, s/p iHD yesterday with 2 liters removed  - if remains inpatient will plan for iHD tomorrow   - agree with trialysis catheter removal   - renal diet/tube feeds when not NPO   - strict I/O's and daily weights  - daily renal function panels and magnesium levels  - renally all dose medications to eGFR  - avoid gadolinium, fleets, phos-based laxatives, NSAIDs, etc.    Orthopedic  Open wound of left foot  - s/p debridement on 9/14 per Podiatry  - management per primary team      Thank you for your consult. I will follow-up with patient. Please contact us if you have any additional questions.    Alireza Velázquez MD  Nephrology  [facility]    ATTENDING PHYSICIAN ATTESTATION  I have personally verified the history and examined the patient. I thoroughly reviewed the demographic, clinical, laboratorial and imaging information available in medical records. I agree with the assessment and recommendations provided by the subspecialty resident who was  under my supervision.

## 2023-09-19 NOTE — ASSESSMENT & PLAN NOTE
Outpatient HD Information:  -Dialysis modality: Hemodialysis  -Outpatient HD unit: [facility] (Valerio Bautista MD)  -HD TX days: Tuesday/Thursday/Saturday, duration of treatment: 3-4 hrs  -Last HD TX prior to hospital admission: 09/09/2023  -Dialysis access: RUE AVF   -Residual urine: + RRF  -EDW: 109 kg    Plan/Recommendations:  - no RRT today, s/p iHD yesterday with 2 liters removed  - if remains inpatient will plan for iHD tomorrow   - renal diet/tube feeds when not NPO   - strict I/O's and daily weights  - daily renal function panels and magnesium levels  - renally all dose medications to eGFR  - avoid gadolinium, fleets, phos-based laxatives, NSAIDs, etc.

## 2023-09-19 NOTE — SUBJECTIVE & OBJECTIVE
Subjective:     Interval History: NAEON. VSS. WBC continues to be elevated, 13.91 today. Patient is doing well today.  Patient reports being able to walk around the room and down the quiroga in her post-op shoe.  Denies any pain to her L foot.  Denies fevers, chills, nausea, or vomiting.  Denies any other pedal complaints.    Follow-up For: Procedure(s) (LRB):  CREATION, BYPASS, ARTERIAL, AXILLARY TO BILATERAL FEMORAL (Left)  ANGIOGRAM, LOWER EXTREMITY with balloon angioplasty ultraverse 5mm x 60mm (Left)  STENT, SUPERFICIAL FEMORAL ARTERY (Left)  AMPUTATION, FOOT, TRANSMETATARSAL partial 1st ray amputation (Left)  INCISION AND DRAINAGE, FOOT (Left)    Post-Operative Day: 5 Days Post-Op    Scheduled Meds:   [START ON 9/20/2023] sodium chloride 0.9%   Intravenous Once    acetaminophen  1,000 mg Oral Q8H    aspirin  81 mg Oral Daily    atorvastatin  80 mg Oral Daily    carvediloL  3.125 mg Oral BID    clopidogreL  75 mg Oral Daily    heparin (porcine)  5,000 Units Subcutaneous Q8H    insulin aspart U-100  5 Units Subcutaneous TIDWM    insulin detemir U-100  20 Units Subcutaneous BID    mupirocin   Nasal BID     Continuous Infusions:  PRN Meds:0.9%  NaCl infusion (for blood administration), aluminum & magnesium hydroxide-simethicone, calcium carbonate, dextrose 10%, dextrose 10%, dextrose 10%, dextrose 10%, diphenhydrAMINE, glucagon (human recombinant), glucose, glucose, [START ON 9/20/2023] heparin (porcine), heparin (porcine), hydrALAZINE, insulin aspart U-100, labetalol, LIDOcaine (PF) 10 mg/ml (1%), melatonin, midodrine, ondansetron, oxyCODONE, oxyCODONE, pantoprazole, sodium chloride 0.9%, [START ON 9/20/2023] sodium chloride 0.9%, sodium chloride 0.9%    Review of Systems   Constitutional:  Negative for chills and fever.   Gastrointestinal:  Negative for nausea and vomiting.   Musculoskeletal:  Positive for gait problem. Negative for arthralgias and joint swelling.   Skin:  Positive for color change and wound.    Psychiatric/Behavioral:  Negative for agitation, behavioral problems and confusion.      Objective:     Vital Signs (Most Recent):  Temp: (!) 9.5 °F (-12.5 °C) (09/19/23 0719)  Pulse: 80 (09/19/23 1104)  Resp: 18 (09/19/23 0839)  BP: (!) 158/70 (09/19/23 0719)  SpO2: 99 % (09/19/23 0719) Vital Signs (24h Range):  Temp:  [9.5 °F (-12.5 °C)-98.6 °F (37 °C)] 9.5 °F (-12.5 °C)  Pulse:  [75-87] 80  Resp:  [17-18] 18  SpO2:  [95 %-99 %] 99 %  BP: (123-158)/(59-70) 158/70     Weight: 109.8 kg (242 lb)  Body mass index is 37.9 kg/m².    Foot Exam     General  Orientation: alert and oriented to person, place, and time      Right Foot/Ankle   Inspection and Palpation  Skin Exam: callus;      Neurovascular  Dorsalis pedis: absent  Posterior tibial: absent  Saphenous nerve sensation: diminished  Tibial nerve sensation: diminished  Superficial peroneal nerve sensation: diminished  Deep peroneal nerve sensation: diminished  Sural nerve sensation: diminished     Comments  Right foot:  Callus noted sub 1st MTPJ.      Left Foot/Ankle    Inspection and Palpation  Skin Exam:  Incision site with staples intact.  No dehiscence or drainage noted from incision site.     Neurovascular  Dorsalis pedis: absent  Posterior tibial: absent  Saphenous nerve sensation: diminished  Tibial nerve sensation: diminished  Superficial peroneal nerve sensation: diminished  Deep peroneal nerve sensation: diminished  Sural nerve sensation: diminished     Comments  Left foot:  5 day s/p L it that is first partial ray amputation.  Incision site closed with staples.  No dehiscence or drainage from the incision site.  No pain to palpation of the surgical site.  No maceration noted.  No erythema noted at the distal portion of the incision.    Laboratory:  CBC:   Recent Labs   Lab 09/19/23  0537   WBC 13.91*   RBC 2.52*   HGB 7.9*   HCT 24.6*      MCV 98   MCH 31.3*   MCHC 32.1     CMP:   Recent Labs   Lab 09/19/23  0537   GLU 93   CALCIUM 8.0*   ALBUMIN  2.1*   PROT 5.5*      K 3.9   CO2 22*      BUN 18   CREATININE 3.9*   ALKPHOS 74   ALT 16   AST 15   BILITOT 0.5     Wound Cultures:   Recent Labs   Lab 08/03/23  1227 08/14/23  1257 09/12/23  1158 09/14/23  1654   LABAERO STAPHYLOCOCCUS AUREUS  Many  * No growth Skin meño,  no predominant organism No growth     Diagnostic Results:  I have reviewed all pertinent imaging results/findings within the past 24 hours.

## 2023-09-19 NOTE — PROGRESS NOTES
Discharge instructions went over with patient by charge nurse, this nurse spoke with patient and no further questions at this time. PIV removed, tip intact upon removal and 2x2 with tape applied, no s/s of discomfort or pain upon removal. Patient states her brother gets off of work at 1900 and will come pick her up. Telemetry box returned to .

## 2023-09-19 NOTE — PHYSICIAN QUERY
PT Name: Georgina Arias  MR #: [id number]     DOCUMENTATION CLARIFICATION     CDS/: Eli Olivares RN               Contact information: [email or web address]  This form is a permanent document in the medical record.     Query Date: September 19, 2023    By submitting this query, we are merely seeking further clarification of documentation.  Please utilize your independent clinical judgment when addressing the question(s) below.    The Medical Record contains the following   Indicators Supporting Clinical Findings Location in Medical Record   x Heart Failure documented Patient Active Problem List:  CHF   Anesthesia Pre-Operative Evaluation 9/12,9/13,9/14    BNP     x EF/Echo Echo:    Left Ventricle: The left ventricle is normal in size. Normal wall thickness. Normal wall motion. There is low normal systolic function with a visually estimated ejection fraction of 50 - 55%. Ejection fraction by visual approximation is 55%. There is normal diastolic function.    Right Ventricle: Right ventricle was not well visualized due to poor acoustic window. Normal right ventricular cavity size. Wall thickness is normal. Right ventricle wall motion  is normal. Systolic function is normal.    Aortic Valve: There is moderate aortic valve sclerosis. There is annular calcification present.    Mitral Valve: There is moderate mitral annular calcification present.    IVC/SVC: Normal venous pressure at 3 mmHg. Echo 9/15                               x Radiology findings CXR:  FINDINGS:  Multiple cardiac wires overlie the chest.  Right-sided central venous catheter is present with the tip overlying the cavoatrial junction.  Cardiomediastinal silhouette is stable.  Right hemidiaphragm is elevated.  Lung volumes are relatively low, accentuating basilar markings.  No confluent area of consolidation.  No large pleural effusion.  No pneumothorax.  Impression:  No confluent area of consolidation identified on this limited single  view.   CXR 9/15   x Subjective/Objective Respiratory Conditions Respiratory:  Negative for cough, shortness of breath and wheezing. Nephro Consult 9/12    Recent/Current MI      Heart Transplant, LVAD     x Edema, JVD Right lower leg: No edema.     Left lower leg: Edema present   Nephro Consult 9/12    Ascites      Diuretics/Meds      Other Treatment      Other       Heart failure is a clinical diagnosis which includes symptomatic fluid retention, elevated intracardiac pressures, and/or the inability of the heart to deliver adequate blood flow.    Heart Failure with reduced Ejection Fraction (HFrEF) or Systolic Heart Failure (loses ability to contract normally, EF is <40%)    Heart Failure with preserved Ejection Fraction (HFpEF) or Diastolic Heart Failure (stiff ventricles, does not relax properly, EF is >50%)     Heart Failure with Combined Systolic and Diastolic Failure (stiff ventricles, does not relax properly and EF is <50%)    Mid-range or mildly reduced ejection fraction (HFmrEF) is classified as systolic heart failure.  Congestive heart failure with a recovered EF is classified as Diastolic Heart Failure.  Common clues to acute exacerbation:  Rapidly progressive symptoms (w/in 2 weeks of presentation), using IV diuretics, using supplemental O2, pulmonary edema on Xray, new or worsening pleural effusion, +JVD or other signs of volume overload, MI w/in 4 weeks, and/or BNP >500  The clinical guidelines noted are only system guidelines, and do not replace the providers clinical judgment.    Provider, please further specify the heart failure associated with the above clinical findings.    [   ]  Chronic Diastolic Heart Failure (HFpEF) - preexisting and stable     [ x  ]  Chronic Combined Systolic and Diastolic Heart Failure - pre-existing and stable     [   ]  Other (please specify): ___________________________________         Please document in your progress notes daily for the duration of treatment until  resolved and include in your discharge summary.    References:  American Heart Association editorial staff. (2017, May). Ejection Fraction Heart Failure Measurement. American Heart Association. https://www.heart.org/en/health-topics/heart-failure/diagnosing-heart-failure/ejection-fraction-heart-failure-measurement#:~:text=Ejection%20fraction%20(EF)%20is%20a,pushed%20out%20with%20each%20heartbeat  OFE Garza (2020, December 15). Heart failure with preserved ejection fraction: Clinical manifestations and diagnosis. [employer]. https://www.7billionideas.[employer]/contents/heart-failure-with-preserved-ejection-fraction-clinical-manifestations-and-diagnosis.  ICD-10-CM/PCS Coding Clinic Third Quarter ICD-10, Effective with discharges: September 8, 2020 [facility] § Heart failure with mid-range or mildly reduced ejection fraction (2020).  ICD-10-CM/PCS Coding Clinic Third Quarter ICD-10, Effective with discharges: September 8, 2020 [facility] § Heart failure with recovered ejection fraction (2020).  Form No. 60911

## 2023-09-19 NOTE — SUBJECTIVE & OBJECTIVE
"Interval HPI:   Overnight events: POD 5. Remains in [facility]. BG well controlled on current SQ insulin regimen. Creatinine 3.9. Diet renal Standard Tray    Eatin%  Nausea: No  Hypoglycemia and intervention: No  Fever: No  TPN and/or TF: No  If yes, type of TF/TPN and rate: n/a    BP (!) 140/77 (BP Location: Right arm, Patient Position: Lying)   Pulse 80   Temp 98.5 °F (36.9 °C) (Oral)   Resp 18   Ht 5' 7" (1.702 m)   Wt 109.8 kg (242 lb)   LMP 2020 (Approximate)   SpO2 99%   Breastfeeding No   BMI 37.90 kg/m²     Labs Reviewed and Include    Recent Labs   Lab 23  0537   GLU 93   CALCIUM 8.0*   ALBUMIN 2.1*   PROT 5.5*      K 3.9   CO2 22*      BUN 18   CREATININE 3.9*   ALKPHOS 74   ALT 16   AST 15   BILITOT 0.5     Lab Results   Component Value Date    WBC 13.91 (H) 2023    HGB 7.9 (L) 2023    HCT 24.6 (L) 2023    MCV 98 2023     2023     No results for input(s): "TSH", "FREET4" in the last 168 hours.  Lab Results   Component Value Date    HGBA1C 7.2 (H) 2023       Nutritional status:   Body mass index is 37.9 kg/m².  Lab Results   Component Value Date    ALBUMIN 2.1 (L) 2023    ALBUMIN 2.1 (L) 2023    ALBUMIN 2.1 (L) 2023     No results found for: "PREALBUMIN"    Estimated Creatinine Clearance: 21.6 mL/min (A) (based on SCr of 3.9 mg/dL (H)).    Accu-Checks  Recent Labs     23  0853 23  1159 23  1646 23  2128 23  0852 23  1115 23  1532 23  0825 23  1004 23  1248   POCTGLUCOSE 199* 227* 141* 150* 139* 158* 153* 101 176* 133*       Current Medications and/or Treatments Impacting Glycemic Control  Immunotherapy:    Immunosuppressants       None          Steroids:   Hormones (From admission, onward)      Start     Stop Route Frequency Ordered    23  melatonin tablet 6 mg         -- Oral Nightly PRN 23 0033          Pressors:    Autonomic Drugs " (From admission, onward)      Start     Stop Route Frequency Ordered    09/16/23 1018  midodrine tablet 10 mg         -- Oral Daily PRN 09/16/23 0918          Hyperglycemia/Diabetes Medications:   Antihyperglycemics (From admission, onward)      Start     Stop Route Frequency Ordered    09/17/23 1700  insulin aspart U-100 pen 5 Units         -- SubQ 3 times daily with meals 09/17/23 1600    09/16/23 2100  insulin detemir U-100 (Levemir) pen 20 Units         -- SubQ 2 times daily 09/16/23 1932    09/16/23 1315  insulin regular in 0.9 % NaCl 100 unit/100 mL (1 unit/mL) infusion        Question:  Enter initial dose (Units/hr):  Answer:  1.8    09/16/23 2100 IV Continuous 09/16/23 1202    09/16/23 1301  insulin aspart U-100 pen 0-10 Units         -- SubQ As needed (PRN) 09/16/23 1202

## 2023-09-19 NOTE — PLAN OF CARE
[facility] Plan of Care Note  Dx S/P Femoral -femoral bypass surgery    Shift Events HD AM, wound VAC right side removed by DR at bedside, insulin coverage administered, right IJ removed, left groin wound VAC removed, ELLE drain removed     Goals of Care: pain management, I/O's from drains, PT/ OT coming tomorrow  Neuro: AAO X 4    Vital Signs: VSS    Respiratory: RA    Diet: Renal Diet    Is patient tolerating current diet? yes    GTTS: none    Urine Output/Bowel Movement: Hemodialysis and adequate urine output with randall    Drains/Tubes/Tube Feeds (include total output/shift): ELLE to L chest, WV to left groin, R WV removed by Md this AM    Lines: R AV Fistula, R IJ Trialysis, L 18 g FA      Accuchecks: ACHS and 2 am    Skin: wounds to bottom of R foot, surgical incision to L foot, WV to bilateral groin, L chest incision with ELLE drain    Fall Risk Score: 13    Activity level? Up to chair    Any scheduled procedures? none    Any safety concerns? Fall precautions    Other: Dialysis     Problem: Adult Inpatient Plan of Care  Goal: Plan of Care Review  Outcome: Ongoing, Progressing  Goal: Patient-Specific Goal (Individualized)  Outcome: Ongoing, Progressing  Goal: Absence of Hospital-Acquired Illness or Injury  Outcome: Ongoing, Progressing  Goal: Optimal Comfort and Wellbeing  Outcome: Ongoing, Progressing  Goal: Readiness for Transition of Care  Outcome: Ongoing, Progressing     Problem: Diabetes Comorbidity  Goal: Blood Glucose Level Within Targeted Range  Outcome: Ongoing, Progressing     Problem: Fluid and Electrolyte Imbalance (Acute Kidney Injury/Impairment)  Goal: Fluid and Electrolyte Balance  Outcome: Ongoing, Progressing     Problem: Oral Intake Inadequate (Acute Kidney Injury/Impairment)  Goal: Optimal Nutrition Intake  Outcome: Ongoing, Progressing     Problem: Renal Function Impairment (Acute Kidney Injury/Impairment)  Goal: Effective Renal Function  Outcome: Ongoing, Progressing     Problem: Device-Related  Complication Risk (Hemodialysis)  Goal: Safe, Effective Therapy Delivery  Outcome: Ongoing, Progressing     Problem: Hemodynamic Instability (Hemodialysis)  Goal: Effective Tissue Perfusion  Outcome: Ongoing, Progressing     Problem: Infection (Hemodialysis)  Goal: Absence of Infection Signs and Symptoms  Outcome: Ongoing, Progressing     Problem: Fall Injury Risk  Goal: Absence of Fall and Fall-Related Injury  Outcome: Ongoing, Progressing     Problem: Adjustment to Illness (Chronic Kidney Disease)  Goal: Optimal Coping with Chronic Illness  Outcome: Ongoing, Progressing     Problem: Electrolyte Imbalance (Chronic Kidney Disease)  Goal: Electrolyte Balance  Outcome: Ongoing, Progressing     Problem: Fluid Volume Excess (Chronic Kidney Disease)  Goal: Fluid Balance  Outcome: Ongoing, Progressing     Problem: Functional Decline (Chronic Kidney Disease)  Goal: Optimal Functional Ability  Outcome: Ongoing, Progressing     Problem: Hematologic Alteration (Chronic Kidney Disease)  Goal: Absence of Anemia Signs and Symptoms  Outcome: Ongoing, Progressing     Problem: Oral Intake Inadequate (Chronic Kidney Disease)  Goal: Optimal Oral Intake  Outcome: Ongoing, Progressing     Problem: Pain (Chronic Kidney Disease)  Goal: Acceptable Pain Control  Outcome: Ongoing, Progressing     Problem: Renal Function Impairment (Chronic Kidney Disease)  Goal: Minimize Renal Failure Effects  Outcome: Ongoing, Progressing     Problem: Skin Injury Risk Increased  Goal: Skin Health and Integrity  Outcome: Ongoing, Progressing     Problem: Infection  Goal: Absence of Infection Signs and Symptoms  Outcome: Ongoing, Progressing     Problem: Device-Related Complication Risk (CRRT (Continuous Renal Replacement Therapy))  Goal: Safe, Effective Therapy Delivery  Outcome: Ongoing, Progressing     Problem: Hypothermia (CRRT (Continuous Renal Replacement Therapy))  Goal: Body Temperature Maintained in Desired Range  Outcome: Ongoing, Progressing      Problem: Infection (CRRT (Continuous Renal Replacement Therapy))  Goal: Absence of Infection Signs and Symptoms  Outcome: Ongoing, Progressing     Problem: Restraint, Nonbehavioral (Nonviolent)  Goal: Absence of Harm or Injury  Outcome: Ongoing, Progressing

## 2023-09-19 NOTE — PROGRESS NOTES
[facility]  Endocrinology  Progress Note    Admit Date: 2023     Reason for Consult: Management of T2DM, Hyperglycemia     Surgical Procedure and Date:  23  CREATION, BYPASS, ARTERIAL, AXILLARY TO BILATERAL FEMORAL (Left)  ANGIOGRAM, LOWER EXTREMITY with balloon angioplasty ultraverse 5mm x 60mm (Left)  STENT, SUPERFICIAL FEMORAL ARTERY (Left)  AMPUTATION, FOOT, TRANSMETATARSAL partial 1st ray amputation (Left)  INCISION AND DRAINAGE, FOOT (Left)    Lab Results   Component Value Date    HGBA1C 7.2 (H) 2023       Diabetes diagnosis year:     Home Diabetes Medications:  Lantus 45 units q HS, Mounjaro 5 mg once weekly     How often checking glucose at home?  2x daily     BG readings on regimen:  mostly low to mid 100s  Hypoglycemia on the regimen?  No  Missed doses on regimen?  No    Diabetes Complications include:     Hyperglycemia, Diabetic nephropathy  , Diabetic chronic kidney disease     , and Amputation status    Complicating diabetes co morbidities:   HTN, HLD, Obesity, ESRD       HPI:   Patient is a 52 y.o. female with a diagnosis of PONV, ESRD on HD, T2DM on insulin and mounjaro (A1c 7.2), PVD s/p stents to RLE, DVT of R leg, HTN, HLD, PUD, obesity (BMI 38), and three recent left foot surgeries in August. Most recently [facility] for reported gangrene and osteomyelitis. She is currently admitted to vascular surgery for non-healing left foot wound. Pt presents to the SICU s/p Left ax to bi-femoral artery bypass, stent to SFA, and Transmetatarsal amputation of the left foot and washout with Dr. Maya and Dr. Valles on 23. Will admit to SICU for further hemodynamic monitoring and q1h neurovascular checks. Endocrinology consulted for management of T2DM.            Interval HPI:   Overnight events: POD 5. Remains in [facility]. BG well controlled on current SQ insulin regimen. Creatinine 3.9. Diet renal Standard Tray    Eatin%  Nausea:  "No  Hypoglycemia and intervention: No  Fever: No  TPN and/or TF: No  If yes, type of TF/TPN and rate: n/a    BP (!) 140/77 (BP Location: Right arm, Patient Position: Lying)   Pulse 80   Temp 98.5 °F (36.9 °C) (Oral)   Resp 18   Ht 5' 7" (1.702 m)   Wt 109.8 kg (242 lb)   LMP 09/11/2020 (Approximate)   SpO2 99%   Breastfeeding No   BMI 37.90 kg/m²     Labs Reviewed and Include    Recent Labs   Lab 09/19/23  0537   GLU 93   CALCIUM 8.0*   ALBUMIN 2.1*   PROT 5.5*      K 3.9   CO2 22*      BUN 18   CREATININE 3.9*   ALKPHOS 74   ALT 16   AST 15   BILITOT 0.5     Lab Results   Component Value Date    WBC 13.91 (H) 09/19/2023    HGB 7.9 (L) 09/19/2023    HCT 24.6 (L) 09/19/2023    MCV 98 09/19/2023     09/19/2023     No results for input(s): "TSH", "FREET4" in the last 168 hours.  Lab Results   Component Value Date    HGBA1C 7.2 (H) 09/11/2023       Nutritional status:   Body mass index is 37.9 kg/m².  Lab Results   Component Value Date    ALBUMIN 2.1 (L) 09/19/2023    ALBUMIN 2.1 (L) 09/18/2023    ALBUMIN 2.1 (L) 09/17/2023     No results found for: "PREALBUMIN"    Estimated Creatinine Clearance: 21.6 mL/min (A) (based on SCr of 3.9 mg/dL (H)).    Accu-Checks  Recent Labs     09/17/23  0853 09/17/23  1159 09/17/23  1646 09/17/23  2128 09/18/23  0852 09/18/23  1115 09/18/23  1532 09/19/23  0825 09/19/23  1004 09/19/23  1248   POCTGLUCOSE 199* 227* 141* 150* 139* 158* 153* 101 176* 133*       Current Medications and/or Treatments Impacting Glycemic Control  Immunotherapy:    Immunosuppressants       None          Steroids:   Hormones (From admission, onward)      Start     Stop Route Frequency Ordered    09/18/23 0032  melatonin tablet 6 mg         -- Oral Nightly PRN 09/18/23 0033          Pressors:    Autonomic Drugs (From admission, onward)      Start     Stop Route Frequency Ordered    09/16/23 1018  midodrine tablet 10 mg         -- Oral Daily PRN 09/16/23 0918      "     Hyperglycemia/Diabetes Medications:   Antihyperglycemics (From admission, onward)      Start     Stop Route Frequency Ordered    09/17/23 1700  insulin aspart U-100 pen 5 Units         -- SubQ 3 times daily with meals 09/17/23 1600    09/16/23 2100  insulin detemir U-100 (Levemir) pen 20 Units         -- SubQ 2 times daily 09/16/23 1932    09/16/23 1315  insulin regular in 0.9 % NaCl 100 unit/100 mL (1 unit/mL) infusion        Question:  Enter initial dose (Units/hr):  Answer:  1.8    09/16/23 2100 IV Continuous 09/16/23 1202    09/16/23 1301  insulin aspart U-100 pen 0-10 Units         -- SubQ As needed (PRN) 09/16/23 1202            ASSESSMENT and PLAN    Cardiac/Vascular  * S/P femoral-femoral bypass surgery  Managed per primary team  Optimize BG control        Hyperlipidemia  May increase insulin resistance.         Endocrine  Severe obesity (BMI >= 40)  Body mass index is 37.9 kg/m².  May increase insulin resistance.         Type II diabetes mellitus  BG goal 140 - 180     Levemir 20 units BID   Novolog 5 units TID with meals   Moderate Dose Correction Scale  BG monitoring ac/hs/0200      ** Please call Endocrine for any BG related issues **    ** Please notify Endocrine for any change and/or advance in diet**    Discharge planning:   Resume home regimen at discharge     Lab Results   Component Value Date    HGBA1C 7.2 (H) 09/11/2023                 Nikole Webster NP  Endocrinology  [facility]

## 2023-09-19 NOTE — SUBJECTIVE & OBJECTIVE
Interval History: Patient seen and examined this AM. No acute events overnight. Underwent iHD yesterday with 2 liters removed. Afebrile with pulse ranging from 80-70s bpm. Systolic blood pressures ranging from 150-130s mmHg. She is saturating +95% on room air with documented UOP ~500 mL with four unmeasured voids in the last 24 hours. Laboratory studies reviewed. Plans for discharge today per primary team.    Review of patient's allergies indicates:   Allergen Reactions    Naproxen Anaphylaxis and Swelling     Hives (skin)^swelling  Hives (skin)^swelling  Hives (skin)^swelling    Sulfamethoxazole-trimethoprim Other (See Comments)     Caused JEROME    Hydrocodone Nausea And Vomiting and Rash    Penicillins Hives     Blisters (skin)^    Adhesive Rash    Hydrocodone-acetaminophen Nausea And Vomiting     Rash (skin)^, Vomiting^       Current Facility-Administered Medications   Medication Frequency    0.9%  NaCl infusion (for blood administration) Q24H PRN    acetaminophen tablet 1,000 mg Q8H    aluminum & magnesium hydroxide-simethicone 400-400-40 mg/5 mL suspension 30 mL Q6H PRN    aspirin EC tablet 81 mg Daily    atorvastatin tablet 80 mg Daily    calcium carbonate 200 mg calcium (500 mg) chewable tablet 500 mg TID PRN    carvediloL tablet 3.125 mg BID    clopidogreL tablet 75 mg Daily    dextrose 10% bolus 125 mL 125 mL PRN    dextrose 10% bolus 125 mL 125 mL PRN    dextrose 10% bolus 250 mL 250 mL PRN    dextrose 10% bolus 250 mL 250 mL PRN    diphenhydrAMINE capsule 25 mg Q6H PRN    glucagon (human recombinant) injection 1 mg PRN    glucose chewable tablet 16 g PRN    glucose chewable tablet 24 g PRN    heparin (porcine) injection 3,000 Units PRN    heparin (porcine) injection 5,000 Units Q8H    hydrALAZINE injection 10 mg Q6H PRN    insulin aspart U-100 pen 0-10 Units PRN    insulin aspart U-100 pen 5 Units TIDWM    insulin detemir U-100 (Levemir) pen 20 Units BID    labetalol 20 mg/4 mL (5 mg/mL) IV syring Q6H PRN     LIDOcaine (PF) 10 mg/ml (1%) injection 10 mg Once PRN    melatonin tablet 6 mg Nightly PRN    midodrine tablet 10 mg Daily PRN    mupirocin 2 % ointment BID    ondansetron injection 4 mg Q4H PRN    oxyCODONE immediate release tablet 5 mg Q4H PRN    oxyCODONE immediate release tablet Tab 10 mg Q6H PRN    pantoprazole EC tablet 40 mg BID PRN    sodium chloride 0.9% bolus 250 mL 250 mL PRN    sodium chloride 0.9% flush 10 mL PRN       Objective:     Vital Signs (Most Recent):  Temp: (!) 9.5 °F (-12.5 °C) (09/19/23 0719)  Pulse: 75 (09/19/23 0719)  Resp: 18 (09/19/23 0719)  BP: (!) 158/70 (09/19/23 0719)  SpO2: 99 % (09/19/23 0719) Vital Signs (24h Range):  Temp:  [9.5 °F (-12.5 °C)-98.6 °F (37 °C)] 9.5 °F (-12.5 °C)  Pulse:  [75-99] 75  Resp:  [17-18] 18  SpO2:  [95 %-99 %] 99 %  BP: ()/(53-70) 158/70     Weight: 109.8 kg (242 lb) (09/15/23 0845)  Body mass index is 37.9 kg/m².  Body surface area is 2.28 meters squared.    I/O last 3 completed shifts:  In: 800 [I.V.:300; Other:500]  Out: 4370 [Urine:1545; Drains:25; Other:2800]     Physical Exam  Vitals and nursing note reviewed.   Constitutional:       General: She is not in acute distress.     Appearance: Normal appearance. She is obese. She is not ill-appearing or diaphoretic.   HENT:      Head: Normocephalic and atraumatic.      Right Ear: External ear normal.      Left Ear: External ear normal.      Nose: Nose normal.      Mouth/Throat:      Mouth: Mucous membranes are moist.      Pharynx: Oropharynx is clear. No oropharyngeal exudate or posterior oropharyngeal erythema.   Eyes:      General: No scleral icterus.        Right eye: No discharge.         Left eye: No discharge.      Extraocular Movements: Extraocular movements intact.      Conjunctiva/sclera: Conjunctivae normal.   Neck:      Comments: Trialysis catheter to right internal jugular vein.   Cardiovascular:      Rate and Rhythm: Normal rate.      Heart sounds: Murmur heard.      Systolic murmur  is present with a grade of 1/6.      No friction rub. No gallop.      Arteriovenous access: Left arteriovenous access is present.     Comments: AVF to right upper extremity with palpable thrill and audible bruit.  Pulmonary:      Effort: Pulmonary effort is normal. No respiratory distress.      Breath sounds: No wheezing, rhonchi or rales.   Chest:      Comments: Left upper chest incision dressed/bandaged. ELLE drain in place with serosanguineous output.   Abdominal:      General: Bowel sounds are normal. There is no distension.      Palpations: Abdomen is soft.      Tenderness: There is no abdominal tenderness.      Comments: Obese/protuberant abdomen.   Musculoskeletal:      Right lower leg: No edema.      Left lower leg: Edema present.   Skin:     Findings: Lesion present.      Comments: Left foot currently dressed/bandaged. See images below.   Neurological:      General: No focal deficit present.      Mental Status: She is alert. Mental status is at baseline.      Cranial Nerves: No cranial nerve deficit.      Motor: No weakness.   Psychiatric:         Mood and Affect: Mood normal.         Behavior: Behavior normal.          Significant Labs:  BMP:   Recent Labs   Lab 09/19/23  0537   GLU 93      K 3.9      CO2 22*   BUN 18   CREATININE 3.9*   CALCIUM 8.0*   MG 2.3       CBC:   Recent Labs   Lab 09/19/23  0537   WBC 13.91*   RBC 2.52*   HGB 7.9*   HCT 24.6*      MCV 98   MCH 31.3*   MCHC 32.1       CMP:   Recent Labs   Lab 09/19/23  0537   GLU 93   CALCIUM 8.0*   ALBUMIN 2.1*   PROT 5.5*      K 3.9   CO2 22*      BUN 18   CREATININE 3.9*   ALKPHOS 74   ALT 16   AST 15   BILITOT 0.5       LFTs:   Recent Labs   Lab 09/19/23  0537   ALT 16   AST 15   ALKPHOS 74   BILITOT 0.5   PROT 5.5*   ALBUMIN 2.1*       Microbiology Results (last 7 days)       Procedure Component Value Units Date/Time    Culture, Anaerobe [[id number]] Collected: 09/14/23 0472    Order Status: Completed Specimen:  Biopsy from Foot, Left Updated: 09/19/23 0734     Anaerobic Culture No anaerobes isolated    Narrative:      1. Left 1st metatarsal clean margins-permanent    Blood culture [[id number]] Collected: 09/15/23 2021    Order Status: Completed Specimen: Blood from Peripheral, Antecubital, Left Updated: 09/18/23 2212     Blood Culture, Routine No Growth to date      No Growth to date      No Growth to date      No Growth to date    Blood culture [[id number]] Collected: 09/15/23 1520    Order Status: Completed Specimen: Blood from Peripheral, Antecubital, Right Updated: 09/18/23 1812     Blood Culture, Routine No Growth to date      No Growth to date      No Growth to date      No Growth to date    Culture, Anaerobic [[id number]]  (Abnormal) Collected: 09/12/23 1158    Order Status: Completed Specimen: Wound from Toe, Left Foot Updated: 09/18/23 1302     Anaerobic Culture PORPHYROMONAS SOMERAE  Moderate        PREVOTELLA BERGENSIS  Moderate      Aerobic culture [[id number]] Collected: 09/14/23 1654    Order Status: Completed Specimen: Biopsy from Foot, Left Updated: 09/18/23 0917     Aerobic Bacterial Culture No growth    Narrative:      1. Left 1st metatarsal clean margins-permanent    Clostridium difficile EIA [[id number]]     Order Status: Canceled Specimen: Stool     AFB Culture & Smear [[id number]] Collected: 09/14/23 1654    Order Status: Completed Specimen: Biopsy from Foot, Left Updated: 09/15/23 2127     AFB Culture & Smear Culture in progress     AFB CULTURE STAIN No acid fast bacilli seen.    Narrative:      1. Left 1st metatarsal clean margins-permanent    Gram stain [[id number]] Collected: 09/14/23 1654    Order Status: Completed Specimen: Biopsy from Foot, Left Updated: 09/15/23 0208     Gram Stain Result No WBC's, epithelial cells or organisms seen    Narrative:      1. Left 1st metatarsal clean margins-permanent    Fungus culture [[id number]] Collected: 09/14/23 1654    Order Status: Sent Specimen: Biopsy  from Foot, Left Updated: 09/14/23 1717    Aerobic culture [[id number]] Collected: 09/12/23 1158    Order Status: Completed Specimen: Wound from Toe, Left Foot Updated: 09/14/23 1036     Aerobic Bacterial Culture Skin meño,  no predominant organism    AFB Culture & Smear [[id number]] Collected: 09/12/23 1158    Order Status: Completed Specimen: Wound from Toe, Left Foot Updated: 09/13/23 2127     AFB Culture & Smear Culture in progress     AFB CULTURE STAIN No acid fast bacilli seen.    Gram stain [[id number]] Collected: 09/12/23 1158    Order Status: Completed Specimen: Wound from Toe, Left Foot Updated: 09/12/23 1456     Gram Stain Result Moderate WBC's      Rare Gram positive cocci    Fungus culture [[id number]] Collected: 09/12/23 1158    Order Status: Sent Specimen: Wound from Toe, Left Foot Updated: 09/12/23 1220          Specimen (24h ago, onward)      None          Significant Imaging:  I have reviewed all imagining in the last 24 hours.

## 2023-09-19 NOTE — ASSESSMENT & PLAN NOTE
Georgina Arias is a 52 y.o. female with ESRD, HTN, PAD, DM2, who is here today for non-healing L foot wound s/p L great toe amputation on 8/25/23. S/p axillary, bifem bypass 9/14/23.    - ASA/statin/plavix  - Nephrology consulted for HD  - Podiatry following. S/p L foot debridement 9/14/23  - TTE LVEF 50-55%  - prn pain meds, nausea meds  - float heels  - Advance to renal diet  - ok for OOB  - PT/OT  - Trend labs  - Monitor drain output  - q4h neurovascular checks  - CAMILA and ultrasound of bypass ordered   -Appreciate podiatry assessment of left foot that is expressing brown contents

## 2023-09-19 NOTE — SUBJECTIVE & OBJECTIVE
Medications:  Continuous Infusions:  Scheduled Meds:   acetaminophen  1,000 mg Oral Q8H    aspirin  81 mg Oral Daily    atorvastatin  80 mg Oral Daily    carvediloL  3.125 mg Oral BID    clopidogreL  75 mg Oral Daily    heparin (porcine)  5,000 Units Subcutaneous Q8H    insulin aspart U-100  5 Units Subcutaneous TIDWM    insulin detemir U-100  20 Units Subcutaneous BID    mupirocin   Nasal BID     PRN Meds:0.9%  NaCl infusion (for blood administration), aluminum & magnesium hydroxide-simethicone, calcium carbonate, dextrose 10%, dextrose 10%, dextrose 10%, dextrose 10%, diphenhydrAMINE, glucagon (human recombinant), glucose, glucose, heparin (porcine), hydrALAZINE, insulin aspart U-100, labetalol, LIDOcaine (PF) 10 mg/ml (1%), melatonin, midodrine, ondansetron, oxyCODONE, oxyCODONE, pantoprazole, sodium chloride 0.9%, sodium chloride 0.9%     Objective:     Vital Signs (Most Recent):  Temp: (!) 9.5 °F (-12.5 °C) (09/19/23 0719)  Pulse: 75 (09/19/23 0719)  Resp: 18 (09/19/23 0719)  BP: (!) 158/70 (09/19/23 0719)  SpO2: 99 % (09/19/23 0719) Vital Signs (24h Range):  Temp:  [9.5 °F (-12.5 °C)-98.6 °F (37 °C)] 9.5 °F (-12.5 °C)  Pulse:  [75-99] 75  Resp:  [15-18] 18  SpO2:  [95 %-99 %] 99 %  BP: ()/(53-70) 158/70          Physical Exam  Constitutional:       General: She is not in acute distress.  HENT:      Head: Normocephalic and atraumatic.   Cardiovascular:      Rate and Rhythm: Normal rate.      Comments: 2+ L radial pulse  B/l biphasic dp/pt signals  Pulmonary:      Effort: Pulmonary effort is normal.   Abdominal:      Palpations: Abdomen is soft.   Musculoskeletal:         General: Normal range of motion.   Skin:     General: Skin is warm and dry.      Comments: L foot s/p debridement with podiatry  L groin incisions covered w betadine gauze   L upper chest incision covered with gauze and tegaderm          Significant Labs:  CBC:   Recent Labs   Lab 09/18/23  0553   WBC 15.68*   RBC 2.48*   HGB 7.9*   HCT  23.4*   *   MCV 94   MCH 31.9*   MCHC 33.8     CMP:   Recent Labs   Lab 09/19/23  0537   GLU 93   CALCIUM 8.0*   ALBUMIN 2.1*   PROT 5.5*      K 3.9   CO2 22*      BUN 18   CREATININE 3.9*   ALKPHOS 74   ALT 16   AST 15   BILITOT 0.5       Significant Diagnostics:  I have reviewed all pertinent imaging results/findings within the past 24 hours.

## 2023-09-19 NOTE — PROGRESS NOTES
[facility]  Podiatry  Progress Note    Patient Name: Georgina Arias  MRN: [id number]  Admission Date: 9/11/2023  Hospital Length of Stay: 8 days  Attending Physician: Maxx Maya MD  Primary Care Provider: Alonso Ling MD     Subjective:     Interval History: NAEON. VSS. WBC continues to be elevated, 13.91 today. Patient is doing well today.  Patient reports being able to walk around the room and down the quiroga in her post-op shoe.  Denies any pain to her L foot.  Denies fevers, chills, nausea, or vomiting.  Denies any other pedal complaints.    Follow-up For: Procedure(s) (LRB):  CREATION, BYPASS, ARTERIAL, AXILLARY TO BILATERAL FEMORAL (Left)  ANGIOGRAM, LOWER EXTREMITY with balloon angioplasty ultraverse 5mm x 60mm (Left)  STENT, SUPERFICIAL FEMORAL ARTERY (Left)  AMPUTATION, FOOT, TRANSMETATARSAL partial 1st ray amputation (Left)  INCISION AND DRAINAGE, FOOT (Left)    Post-Operative Day: 5 Days Post-Op    Scheduled Meds:   [START ON 9/20/2023] sodium chloride 0.9%   Intravenous Once    acetaminophen  1,000 mg Oral Q8H    aspirin  81 mg Oral Daily    atorvastatin  80 mg Oral Daily    carvediloL  3.125 mg Oral BID    clopidogreL  75 mg Oral Daily    heparin (porcine)  5,000 Units Subcutaneous Q8H    insulin aspart U-100  5 Units Subcutaneous TIDWM    insulin detemir U-100  20 Units Subcutaneous BID    mupirocin   Nasal BID     Continuous Infusions:  PRN Meds:0.9%  NaCl infusion (for blood administration), aluminum & magnesium hydroxide-simethicone, calcium carbonate, dextrose 10%, dextrose 10%, dextrose 10%, dextrose 10%, diphenhydrAMINE, glucagon (human recombinant), glucose, glucose, [START ON 9/20/2023] heparin (porcine), heparin (porcine), hydrALAZINE, insulin aspart U-100, labetalol, LIDOcaine (PF) 10 mg/ml (1%), melatonin, midodrine, ondansetron, oxyCODONE, oxyCODONE, pantoprazole, sodium chloride 0.9%, [START ON 9/20/2023] sodium chloride 0.9%, sodium chloride 0.9%    Review of  Systems   Constitutional:  Negative for chills and fever.   Gastrointestinal:  Negative for nausea and vomiting.   Musculoskeletal:  Positive for gait problem. Negative for arthralgias and joint swelling.   Skin:  Positive for color change and wound.   Psychiatric/Behavioral:  Negative for agitation, behavioral problems and confusion.      Objective:     Vital Signs (Most Recent):  Temp: (!) 9.5 °F (-12.5 °C) (09/19/23 0719)  Pulse: 80 (09/19/23 1104)  Resp: 18 (09/19/23 0839)  BP: (!) 158/70 (09/19/23 0719)  SpO2: 99 % (09/19/23 0719) Vital Signs (24h Range):  Temp:  [9.5 °F (-12.5 °C)-98.6 °F (37 °C)] 9.5 °F (-12.5 °C)  Pulse:  [75-87] 80  Resp:  [17-18] 18  SpO2:  [95 %-99 %] 99 %  BP: (123-158)/(59-70) 158/70     Weight: 109.8 kg (242 lb)  Body mass index is 37.9 kg/m².    Foot Exam     General  Orientation: alert and oriented to person, place, and time      Right Foot/Ankle   Inspection and Palpation  Skin Exam: callus;      Neurovascular  Dorsalis pedis: absent  Posterior tibial: absent  Saphenous nerve sensation: diminished  Tibial nerve sensation: diminished  Superficial peroneal nerve sensation: diminished  Deep peroneal nerve sensation: diminished  Sural nerve sensation: diminished     Comments  Right foot:  Callus noted sub 1st MTPJ.      Left Foot/Ankle    Inspection and Palpation  Skin Exam:  Incision site with staples intact.  No dehiscence or drainage noted from incision site.     Neurovascular  Dorsalis pedis: absent  Posterior tibial: absent  Saphenous nerve sensation: diminished  Tibial nerve sensation: diminished  Superficial peroneal nerve sensation: diminished  Deep peroneal nerve sensation: diminished  Sural nerve sensation: diminished     Comments  Left foot:  5 day s/p L it that is first partial ray amputation.  Incision site closed with staples.  No dehiscence or drainage from the incision site.  No pain to palpation of the surgical site.  No maceration noted.  No erythema noted at the  distal portion of the incision.    Laboratory:  CBC:   Recent Labs   Lab 09/19/23  0537   WBC 13.91*   RBC 2.52*   HGB 7.9*   HCT 24.6*      MCV 98   MCH 31.3*   MCHC 32.1     CMP:   Recent Labs   Lab 09/19/23  0537   GLU 93   CALCIUM 8.0*   ALBUMIN 2.1*   PROT 5.5*      K 3.9   CO2 22*      BUN 18   CREATININE 3.9*   ALKPHOS 74   ALT 16   AST 15   BILITOT 0.5     Wound Cultures:   Recent Labs   Lab 08/03/23  1227 08/14/23  1257 09/12/23  1158 09/14/23  1654   LABAERO STAPHYLOCOCCUS AUREUS  Many  * No growth Skin meño,  no predominant organism No growth     Diagnostic Results:  I have reviewed all pertinent imaging results/findings within the past 24 hours.    Assessment/Plan:     Orthopedic  Open wound of left foot  IDSA moderate foot infection left foot.  Status post left great toe amputation.  Patient now has left 1st metatarsal head exposed and probes to bone.  Purulent drainage expressed today. WBC continues to be elavated at 13.91.  Xray has OM like changes noted to the left foot 1st metatarsal head and neck    - 5 day s/p revascularization with Vascular Surgery and L first ray partial amputation with Podiatry  - Vascular surgery following.    - Post-op Xray: Postoperative changes of transmetatarsal resection of the 1st metatarsal.  Irregularity of the 1st metatarsal remnant on the frontal view which could be related to operative changes.  Overlapping skin staples along the medial aspect of the foot.  Subcutaneous emphysema and soft tissue edema potentially related to recent surgery or infection.  Bones are otherwise similar when compared with recent prior exam.  - Antibiotic plan per ID  - Recommend patient use heel offloading boots while lying in bed  - Patient okay to weightbear as tolerated bilaterally in postop shoe to heel touch  - Podiatry will follow    Discharge instructions:  - Patient to follow-up in outpatient podiatry clinic, Podiatry will schedule  - HH to do dressing changes  as follows 3x a week:  Betadine soaked adaptic, 4x4 gauze, Kerlix, and ACE wrap to L1st ray amputation site  -Antibiotic plan per ID  -Patient okay to weightbear as tolerated to heel touch to left foot in postop shoe      Anderson Herman DPM  Podiatry  [facility]

## 2023-09-19 NOTE — PT/OT/SLP PROGRESS
Occupational Therapy   Treatment    Name: Georgina Arias  MRN: [id number]  Admitting Diagnosis:  S/P femoral-femoral bypass surgery  5 Days Post-Op    Recommendations:     Discharge Recommendations: other (see comments)  Discharge Equipment Recommendations:  shower chair  Barriers to discharge:  None    Assessment:     Georgina Arias is a 52 y.o. female with a medical diagnosis of S/P femoral-femoral bypass surgery. Good progress shown today with pt able to walk 120' with CGA and a RW. Performance deficits affecting function are impaired endurance, impaired self care skills, impaired functional mobility, gait instability, impaired balance, decreased safety awareness, decreased coordination.     Rehab Prognosis:  Good; patient would benefit from acute skilled OT services to address these deficits and reach maximum level of function.       Plan:     Patient to be seen 3 x/week to address the above listed problems via self-care/home management, therapeutic activities, therapeutic exercises  Plan of Care Expires: 10/17/23  Plan of Care Reviewed with: patient    Subjective     Pain/Comfort:  Pain Rating 1: 0/10    Objective:     Communicated with:  v prior to session.  Patient found supine with telemetry, central line, ELLE drain upon OT entry to room.    General Precautions: Standard, fall    Orthopedic Precautions:   Braces:    Respiratory Status: Room air     Occupational Performance:     Bed Mobility:    Patient completed Scooting/Bridging with stand by assistance  Patient completed Supine to Sit with stand by assistance     Functional Mobility/Transfers:  Patient completed Sit <> Stand Transfer with contact guard assistance  with  rolling walker   Patient completed Bed <> Chair Transfer using Step Transfer technique with stand by assistance with rolling walker  Patient completed Toilet Transfer Step Transfer technique with contact guard assistance with  rolling walker and grab bars  Functional Mobility: Pt  walked 120' CGA with a RW.    Activities of Daily Living:  Feeding:  independence   Grooming: stand by assistance standing at the sink.  Lower Body Dressing: stand by assistance for shoes.  Toileting: stand by assistance       [facility] 6 Click ADL: 21    Treatment & Education:  Educated on hand placement for STS.  Discussed OT POC and progress.    Patient left up in chair with all lines intact and call button in reach    GOALS:   Multidisciplinary Problems       Occupational Therapy Goals          Problem: Occupational Therapy    Goal Priority Disciplines Outcome Interventions   Occupational Therapy Goal     OT, PT/OT Ongoing, Progressing    Description: Goals to be met by: 10/1/23     Patient will increase functional independence with ADLs by performing:      Feeding with [place].  UE Dressing with Set-up Assistance.  LE Dressing with Stand-by Assistance. MET 9/19  REVISED to (S).  Grooming while standing at sink with Stand-by Assistance. MET 9/19  REVISED to (S).  Toileting with Stand-by Assistance for hygiene and clothing management. MET 9/19  REVISED to (S).  Toilet transfer with Stand-by Assistance.                       Time Tracking:     OT Date of Treatment: 09/19/23  OT Start Time: 0759  OT Stop Time: 0823  OT Total Time (min): 24 min    Billable Minutes:Self Care/Home Management 10  Therapeutic Activity 14    OT/POLINA: OT          9/19/2023

## 2023-09-19 NOTE — PLAN OF CARE
Problem: Adult Inpatient Plan of Care  Goal: Plan of Care Review  Outcome: Ongoing, Progressing  Goal: Patient-Specific Goal (Individualized)  Outcome: Ongoing, Progressing  Goal: Absence of Hospital-Acquired Illness or Injury  Outcome: Ongoing, Progressing  Goal: Optimal Comfort and Wellbeing  Outcome: Ongoing, Progressing  Goal: Readiness for Transition of Care  Outcome: Ongoing, Progressing     Problem: Diabetes Comorbidity  Goal: Blood Glucose Level Within Targeted Range  Outcome: Ongoing, Progressing     Problem: Fluid and Electrolyte Imbalance (Acute Kidney Injury/Impairment)  Goal: Fluid and Electrolyte Balance  Outcome: Ongoing, Progressing     Problem: Oral Intake Inadequate (Acute Kidney Injury/Impairment)  Goal: Optimal Nutrition Intake  Outcome: Ongoing, Progressing     Problem: Renal Function Impairment (Acute Kidney Injury/Impairment)  Goal: Effective Renal Function  Outcome: Ongoing, Progressing     Problem: Device-Related Complication Risk (Hemodialysis)  Goal: Safe, Effective Therapy Delivery  Outcome: Ongoing, Progressing     Problem: Hemodynamic Instability (Hemodialysis)  Goal: Effective Tissue Perfusion  Outcome: Ongoing, Progressing     Problem: Infection (Hemodialysis)  Goal: Absence of Infection Signs and Symptoms  Outcome: Ongoing, Progressing     Problem: Fall Injury Risk  Goal: Absence of Fall and Fall-Related Injury  Outcome: Ongoing, Progressing     Problem: Adjustment to Illness (Chronic Kidney Disease)  Goal: Optimal Coping with Chronic Illness  Outcome: Ongoing, Progressing     Problem: Electrolyte Imbalance (Chronic Kidney Disease)  Goal: Electrolyte Balance  Outcome: Ongoing, Progressing     Problem: Fluid Volume Excess (Chronic Kidney Disease)  Goal: Fluid Balance  Outcome: Ongoing, Progressing     Problem: Functional Decline (Chronic Kidney Disease)  Goal: Optimal Functional Ability  Outcome: Ongoing, Progressing     Problem: Fluid Volume Excess (Chronic Kidney Disease)  Goal:  Fluid Balance  Outcome: Ongoing, Progressing     Problem: Functional Decline (Chronic Kidney Disease)  Goal: Optimal Functional Ability  Outcome: Ongoing, Progressing     Problem: Hematologic Alteration (Chronic Kidney Disease)  Goal: Absence of Anemia Signs and Symptoms  Outcome: Ongoing, Progressing     Problem: Oral Intake Inadequate (Chronic Kidney Disease)  Goal: Optimal Oral Intake  Outcome: Ongoing, Progressing

## 2023-09-20 ENCOUNTER — TELEPHONE (OUTPATIENT)
Dept: ADMINISTRATIVE | Facility: CLINIC | Age: 53
End: 2023-09-20
Payer: MEDICARE

## 2023-09-20 ENCOUNTER — PATIENT OUTREACH (OUTPATIENT)
Dept: ADMINISTRATIVE | Facility: CLINIC | Age: 53
End: 2023-09-20
Payer: MEDICARE

## 2023-09-20 ENCOUNTER — PES CALL (OUTPATIENT)
Dept: HOME HEALTH SERVICES | Facility: CLINIC | Age: 53
End: 2023-09-20
Payer: MEDICARE

## 2023-09-20 ENCOUNTER — PATIENT MESSAGE (OUTPATIENT)
Dept: PODIATRY | Facility: CLINIC | Age: 53
End: 2023-09-20
Payer: MEDICARE

## 2023-09-20 DIAGNOSIS — Z95.828 S/P FEMORAL-FEMORAL BYPASS SURGERY: Primary | ICD-10-CM

## 2023-09-20 LAB
BACTERIA BLD CULT: NORMAL
BACTERIA BLD CULT: NORMAL

## 2023-09-20 NOTE — PROGRESS NOTES
C3 nurse spoke with Georgina Arias for a TCC post hospital discharge follow up call. The patient reports does not have a scheduled HOSFU appointment. C3 nurse was unable to schedule HOSFU appointment for Non-[facility] PCP. Order placed for home NP referral for hospital follow up visit

## 2023-09-20 NOTE — HOSPITAL COURSE
Georgina Arias was a direct admission from vascular clinic d/t concerns about her L foot wound. She was started on IV abx. She underwent an aortagram with runoff on 9/13/23 and a L axillary to bifem bypass on 9/14/23 during which time podiatry also did a foot debridement. Patient went from the PACU to the ICU post-operatively for closer monitoring and neurovascular checks. She progressed appropriately and was able to be stepped down to the floor. Diet was advanced as tolerated and the patient's pain was controlled on oral pain medications without problem. Ambulating without issue. Voiding without issue with adequate urine output. Incision site is clean, dry, and intact. She was discharged with  PT/OT. Patient will follow up in clinic with Dr. Maya in 2 weeks.       Physical Exam  Constitutional:       General: She is not in acute distress.  HENT:      Head: Normocephalic and atraumatic.   Cardiovascular:      Rate and Rhythm: Normal rate.      Comments: 2+ L radial pulse  B/l biphasic dp/pt signals  Pulmonary:      Effort: Pulmonary effort is normal.   Abdominal:      Palpations: Abdomen is soft.   Musculoskeletal:         General: Normal range of motion.   Skin:     General: Skin is warm and dry.      Comments: L foot s/p debridement with podiatry  L groin incisions covered w betadine gauze   L upper chest incision covered with gauze and tegaderm

## 2023-09-20 NOTE — DISCHARGE SUMMARY
[facility] - [facility]  Vascular Surgery  Discharge Summary      Patient Name: Georgina Arias  MRN: [id number]  Admission Date: 9/11/2023  Hospital Length of Stay: 8 days  Discharge Date and Time: 9/19/23 15:00  Attending Physician: No att. providers found   Discharging Provider: Marta Schultz MD  Primary Care Provider: Alonso Ling MD    HPI:   Georgina Arias is a 52 y.o. female with ESRD, HTN, PAD, DM2, who is here today for new patient initial appointment. She has undergone 3 surgeries on her L foot in August 2023. Most recently she underwent an amputation of the L hallux at the [facility] on 8/25/23 for reported gangrene and osteomyelitis. The incision site is not healing appropriately and she was referred to vascular surgery for possible intervention.      She has reportedly had several angiograms with stents placed in her RLE in 2006, 2007 and 2015. She denies having any problems or intervention on her LLE previously. She says she developed a small blister on her L foot at the end of July that progressively worsened and ultimately got infected requiring an I&D before eventual amputation. She does not change her wound dressing every day d/t pain and says she has not been followed by wound care.    She is currently on doxycycline. She does not take ASA/plavix/eliquis.   She has a history of DVTs. She is on HD T/Th/S.     CAMILA 9/11/23: R 0.69 L 0.46        Procedure(s) (LRB):  CREATION, BYPASS, ARTERIAL, AXILLARY TO BILATERAL FEMORAL (Left)  ANGIOGRAM, LOWER EXTREMITY with balloon angioplasty ultraverse 5mm x 60mm (Left)  STENT, SUPERFICIAL FEMORAL ARTERY (Left)  AMPUTATION, FOOT, TRANSMETATARSAL partial 1st ray amputation (Left)  INCISION AND DRAINAGE, FOOT (Left)     Hospital Course: Georgina Arias was a direct admission from vascular clinic d/t concerns about her L foot wound. She was started on IV abx. She underwent an aortagram with runoff on 9/13/23 and a L axillary to bifem bypass on 9/14/23 during  which time podiatry also did a foot debridement. Patient went from the PACU to the ICU post-operatively for closer monitoring and neurovascular checks. She progressed appropriately and was able to be stepped down to the floor. Diet was advanced as tolerated and the patient's pain was controlled on oral pain medications without problem. Ambulating without issue. Voiding without issue with adequate urine output. Incision site is clean, dry, and intact. She was discharged with  PT/OT. Patient will follow up in clinic with Dr. Maya in 2 weeks.       Physical Exam  Constitutional:       General: She is not in acute distress.  HENT:      Head: Normocephalic and atraumatic.   Cardiovascular:      Rate and Rhythm: Normal rate.      Comments: 2+ L radial pulse  B/l biphasic dp/pt signals  Pulmonary:      Effort: Pulmonary effort is normal.   Abdominal:      Palpations: Abdomen is soft.   Musculoskeletal:         General: Normal range of motion.   Skin:     General: Skin is warm and dry.      Comments: L foot s/p debridement with podiatry  L groin incisions covered w betadine gauze   L upper chest incision covered with gauze and tegaderm       Goals of Care Treatment Preferences:  Code Status: Full Code      Consults:   Consults (From admission, onward)        Status Ordering Provider     Inpatient consult to Endocrinology  Once        Provider:  (Not yet assigned)    Completed ORLANDO WINN     Inpatient consult to Midline team  Once        Provider:  (Not yet assigned)    Completed BEBETO MAYA     Inpatient consult to Podiatry  Once        Provider:  (Not yet assigned)    Completed KELLEN HERMAN     Inpatient consult to Nephrology  Once        Provider:  (Not yet assigned)    Completed KELLEN HERMAN          Significant Diagnostic Studies: N/A    Pending Diagnostic Studies:     Procedure Component Value Units Date/Time    Specimen to Pathology, Surgery Other (podiatry) [[id number]] Collected: 09/14/23 1646     Order Status: Sent Lab Status: In process Updated: 09/15/23 0139    Specimen: Tissue     Specimen to Pathology, Surgery Other (podiatry) [[id number]] Collected: 09/14/23 1635    Order Status: Sent Lab Status: In process Updated: 09/14/23 1635    Specimen: Tissue         Final Active Diagnoses:    Diagnosis Date Noted POA    PRINCIPAL PROBLEM:  S/P femoral-femoral bypass surgery [Z95.828] 09/15/2023 Not Applicable    Pre-op exam [Z01.818] 09/13/2023 Not Applicable    ESRD (end stage renal disease) [N18.6] 09/12/2023 Yes    Severe obesity (BMI >= 40) [E66.01] 09/12/2023 Yes    Open wound of left foot [S91.302A] 09/11/2023 Yes    Class 2 severe obesity due to excess calories with serious comorbidity and body mass index (BMI) of 37.0 to 37.9 in adult [E66.01, Z68.37] 09/08/2022 Not Applicable    Hyperlipidemia [E78.5]  Yes    Peripheral vascular disease [I73.9]  Yes    Essential hypertension [I10]  Yes    Type II diabetes mellitus [E11.9]  Yes      Problems Resolved During this Admission:      Discharged Condition: good    Disposition: Home or Self Care    Follow Up:   Follow-up Information     Maxx Maya MD Follow up in 2 week(s).    Specialty: Vascular Surgery  Contact information:  [address] [facility] [address]  [phone]                         Patient Instructions:      Notify your health care provider if you experience any of the following:  temperature >100.4     Notify your health care provider if you experience any of the following:  persistent nausea and vomiting or diarrhea     Notify your health care provider if you experience any of the following:  severe uncontrolled pain     Notify your health care provider if you experience any of the following:  redness, tenderness, or signs of infection (pain, swelling, redness, odor or green/yellow discharge around incision site)     Notify your health care provider if you experience any of the following:  difficulty breathing or  "increased cough     Notify your health care provider if you experience any of the following:  severe persistent headache     Notify your health care provider if you experience any of the following:  worsening rash     Notify your health care provider if you experience any of the following:  persistent dizziness, light-headedness, or visual disturbances     Notify your health care provider if you experience any of the following:  increased confusion or weakness     Notify your health care provider if you experience any of the following:     Activity as tolerated     Medications:  Reconciled Home Medications:      Medication List      START taking these medications    amoxicillin-clavulanate 875-125mg 875-125 mg per tablet  Commonly known as: AUGMENTIN  Take 1 tablet by mouth every 12 (twelve) hours.     oxyCODONE 5 MG immediate release tablet  Commonly known as: ROXICODONE  Take 1 tablet (5 mg total) by mouth every 6 (six) hours as needed for Pain.        CONTINUE taking these medications    acetaminophen 500 MG tablet  Commonly known as: TYLENOL  Take 2 tablets (1,000 mg total) by mouth every 8 (eight) hours as needed.     aspirin 81 MG EC tablet  Commonly known as: ECOTRIN  Take 1 tablet (81 mg total) by mouth once daily.     atorvastatin 80 MG tablet  Commonly known as: LIPITOR  Take 1 tablet (80 mg total) by mouth once daily.     C-TUB Misc  Generic drug: miscellaneous medical supply  Hearing amplification ([employer] preferred)     carvediloL 25 MG tablet  Commonly known as: COREG  Take 1 tablet (25 mg total) by mouth 2 (two) times daily with meals.     COMFORT EZ PEN NEEDLES 32 gauge x 5/32" Ndle  Generic drug: pen needle, diabetic  SMARTSIG:injection Daily     doxycycline 100 MG Cap  Commonly known as: VIBRAMYCIN  Take 1 capsule (100 mg total) by mouth 2 (two) times daily.     fluticasone propionate 50 mcg/actuation nasal spray  Commonly known as: FLONASE  Fluticasone Propionate 50 MCG/ACT Nasal Suspension QTY: 1 " bottle Days: 30 Refills: 5  Written: 20 Patient Instructions: inhale 2 sprays in each nostril once daily     gabapentin 300 MG capsule  Commonly known as: NEURONTIN  Take 1 capsule (300 mg total) by mouth 3 (three) times daily.     LANTUS SOLOSTAR U-100 INSULIN glargine 100 units/mL SubQ pen  Generic drug: insulin  Inject 40 Units into the skin every evening.     levocetirizine 5 MG tablet  Commonly known as: XYZAL  Take 5 mg by mouth.     MOUNJARO 5 mg/0.5 mL Pnij  Generic drug: tirzepatide  SMARTSI Milligram(s) SUB-Q Once a Week     NIFEdipine 30 MG (OSM) 24 hr tablet  Commonly known as: PROCARDIA-XL  Take 30 mg by mouth.     sevelamer carbonate 800 mg Tab  Commonly known as: RENVELA  Take by mouth.     TRUE METRIX GLUCOSE TEST STRIP Strp  Generic drug: blood sugar diagnostic  USE TO test THREE TIMES DAILY     TRUEPLUS LANCETS 30 gauge Misc  Generic drug: lancets            Marta Schultz MD  Vascular Surgery  [facility]

## 2023-09-21 DIAGNOSIS — I73.9 PERIPHERAL VASCULAR DISEASE: Primary | ICD-10-CM

## 2023-09-22 LAB
FINAL PATHOLOGIC DIAGNOSIS: NORMAL
GROSS: NORMAL
Lab: NORMAL

## 2023-09-22 NOTE — ANESTHESIA POSTPROCEDURE EVALUATION
Anesthesia Post Evaluation    Patient: Georgina Arias    Procedure(s) Performed: Procedure(s) (LRB):  CREATION, BYPASS, ARTERIAL, AXILLARY TO BILATERAL FEMORAL (Left)  ANGIOGRAM, LOWER EXTREMITY with balloon angioplasty ultraverse 5mm x 60mm (Left)  STENT, SUPERFICIAL FEMORAL ARTERY (Left)  AMPUTATION, FOOT, TRANSMETATARSAL partial 1st ray amputation (Left)  INCISION AND DRAINAGE, FOOT (Left)    Final Anesthesia Type: general      Patient location during evaluation: ICU  Patient participation: Yes- Able to Participate  Level of consciousness: lethargic  Post-procedure vital signs: reviewed and stable  Pain management: adequate  Airway patency: patent    PONV status at discharge: No PONV  Anesthetic complications: no      Cardiovascular status: hemodynamically stable and hypotensive (on vasopressors )  Respiratory status: unassisted, spontaneous ventilation and nasal cannula  Hydration status: euvolemic    Comments: Patient had an episode of emesis in the OR while awaiting PACU bed.  Suctioned well,  Continue to monitor.           Vitals Value   /69   Temp 36.9 °C (98.5 °F)   Pulse 83   Resp 18   SpO2 99 %         Event Time   Out of Recovery 09/14/2023 22:43:00         Pain/Jasmin Score: No data recorded

## 2023-09-26 ENCOUNTER — OFFICE VISIT (OUTPATIENT)
Dept: PODIATRY | Facility: CLINIC | Age: 53
End: 2023-09-26
Payer: MEDICARE

## 2023-09-26 VITALS
HEIGHT: 67 IN | SYSTOLIC BLOOD PRESSURE: 108 MMHG | HEART RATE: 90 BPM | BODY MASS INDEX: 38.22 KG/M2 | DIASTOLIC BLOOD PRESSURE: 57 MMHG | WEIGHT: 243.5 LBS

## 2023-09-26 DIAGNOSIS — Z89.432 S/P TRANSMETATARSAL AMPUTATION OF FOOT, LEFT: ICD-10-CM

## 2023-09-26 DIAGNOSIS — T81.32XA DISRUPTION OF INTERNAL SURGICAL WOUND, INITIAL ENCOUNTER: ICD-10-CM

## 2023-09-26 DIAGNOSIS — Z95.828 S/P FEMORAL-FEMORAL BYPASS SURGERY: ICD-10-CM

## 2023-09-26 DIAGNOSIS — T87.89 NON-HEALING AMPUTATION SITE: Primary | ICD-10-CM

## 2023-09-26 DIAGNOSIS — Z89.412 STATUS POST AMPUTATION OF GREAT TOE, LEFT: ICD-10-CM

## 2023-09-26 DIAGNOSIS — E66.01 CLASS 2 SEVERE OBESITY WITH SERIOUS COMORBIDITY AND BODY MASS INDEX (BMI) OF 38.0 TO 38.9 IN ADULT, UNSPECIFIED OBESITY TYPE: ICD-10-CM

## 2023-09-26 DIAGNOSIS — I73.9 PAD (PERIPHERAL ARTERY DISEASE): ICD-10-CM

## 2023-09-26 PROCEDURE — 3074F SYST BP LT 130 MM HG: CPT | Mod: CPTII,S$GLB,, | Performed by: PODIATRIST

## 2023-09-26 PROCEDURE — 99999 PR PBB SHADOW E&M-EST. PATIENT-LVL IV: CPT | Mod: PBBFAC,,, | Performed by: PODIATRIST

## 2023-09-26 PROCEDURE — 99024 PR POST-OP FOLLOW-UP VISIT: ICD-10-PCS | Mod: S$GLB,,, | Performed by: PODIATRIST

## 2023-09-26 PROCEDURE — 3066F PR DOCUMENTATION OF TREATMENT FOR NEPHROPATHY: ICD-10-PCS | Mod: CPTII,S$GLB,, | Performed by: PODIATRIST

## 2023-09-26 PROCEDURE — 3051F PR MOST RECENT HEMOGLOBIN A1C LEVEL 7.0 - < 8.0%: ICD-10-PCS | Mod: CPTII,S$GLB,, | Performed by: PODIATRIST

## 2023-09-26 PROCEDURE — 3078F PR MOST RECENT DIASTOLIC BLOOD PRESSURE < 80 MM HG: ICD-10-PCS | Mod: CPTII,S$GLB,, | Performed by: PODIATRIST

## 2023-09-26 PROCEDURE — 3074F PR MOST RECENT SYSTOLIC BLOOD PRESSURE < 130 MM HG: ICD-10-PCS | Mod: CPTII,S$GLB,, | Performed by: PODIATRIST

## 2023-09-26 PROCEDURE — 99024 POSTOP FOLLOW-UP VISIT: CPT | Mod: S$GLB,,, | Performed by: PODIATRIST

## 2023-09-26 PROCEDURE — 3008F BODY MASS INDEX DOCD: CPT | Mod: CPTII,S$GLB,, | Performed by: PODIATRIST

## 2023-09-26 PROCEDURE — 3051F HG A1C>EQUAL 7.0%<8.0%: CPT | Mod: CPTII,S$GLB,, | Performed by: PODIATRIST

## 2023-09-26 PROCEDURE — 3066F NEPHROPATHY DOC TX: CPT | Mod: CPTII,S$GLB,, | Performed by: PODIATRIST

## 2023-09-26 PROCEDURE — 1159F MED LIST DOCD IN RCRD: CPT | Mod: CPTII,S$GLB,, | Performed by: PODIATRIST

## 2023-09-26 PROCEDURE — 1159F PR MEDICATION LIST DOCUMENTED IN MEDICAL RECORD: ICD-10-PCS | Mod: CPTII,S$GLB,, | Performed by: PODIATRIST

## 2023-09-26 PROCEDURE — 1111F PR DISCHARGE MEDS RECONCILED W/ CURRENT OUTPATIENT MED LIST: ICD-10-PCS | Mod: CPTII,S$GLB,, | Performed by: PODIATRIST

## 2023-09-26 PROCEDURE — 3008F PR BODY MASS INDEX (BMI) DOCUMENTED: ICD-10-PCS | Mod: CPTII,S$GLB,, | Performed by: PODIATRIST

## 2023-09-26 PROCEDURE — 1111F DSCHRG MED/CURRENT MED MERGE: CPT | Mod: CPTII,S$GLB,, | Performed by: PODIATRIST

## 2023-09-26 PROCEDURE — 99999 PR PBB SHADOW E&M-EST. PATIENT-LVL IV: ICD-10-PCS | Mod: PBBFAC,,, | Performed by: PODIATRIST

## 2023-09-26 PROCEDURE — 3078F DIAST BP <80 MM HG: CPT | Mod: CPTII,S$GLB,, | Performed by: PODIATRIST

## 2023-09-26 NOTE — PROGRESS NOTES
Subjective:      Patient ID: Georgina Arias is a 52 y.o. female.    Chief Complaint: Post-op Evaluation    Patient presents today for f/u non-healing amp.L hallux, pending vascular intervention. States she is having home health MWF & OT Tues; HD TTh. Doing & feeling a lot better in that pain is so much better since ischemic pain has resolved. Most pain now is just in B/L groin from bypass.  Patient called 9/7/23 requesting sooner appt.d/t concern for risk of further loss if postponed. Moved to 9/11/23.    Since last visit 9/5/23, was seen for general surgery consult 9/6/23 @ [place]; prescribed Santyl & asked to request from this clinic vascular surgery consult @ [facility], which was placed same day.   Vascular surgery consult was moved up from 9/18/23 to 9/11/23. Wound was debrided & packed w/ Aquacel Ag. Patient admitted to [facility] for IV Abx & scheduled angiogram 9/13.   Seen for podiatry consult 9/12/23 re: non-healing & infected wound w/ possible osteomyelitis.   9/13/23 underwent aortogram w/ extremity run-off, indicating significant aortoiliac as well as SFA disease requiring bypass vs endovascular procedure.   Had LLE angiogram, PTA/Stent SFA, bi-fem bypass L 9/14/23 as well as transmet.amp.1st L w/ I&D foot - staple closure of flap.  DC w/ HH PT/OT 9/19/23.  9/6/23  Patient called back 9/6/23 re:update on her visit w/ cardiolovascular. Stated she saw Dr Armendariz @ [facility] today to discuss circulation issues to her legs. She was informed by that office that she would need to be seen by a provider to potentially schedule her for stents as he did not perform that procedure. Requesting referral to peripheral vascular surgery. Scheduled for 9/18/23 w/ Dr. Maya.  9/5/23  Patient is here again PO L hallux w/ amp.@MPJ as well as I&D 1st ray L, d/t infection flexor tendon. DOS 8/25/23. Previous I&D L great toe/ FHL w/ HIPJ level amp, DOS 8/14/23. Initial ulcer excisio w/ debridement distal tuft hallux, DOS  8/11/23.  Still pending vascular consult tomorrow (outside provider). Having ischemic pain. Pain level 9/10. On Gabapentin - will need to d/w nephro as to wherther she can increase doseage from 300mg tid prn.   8/22/23  Patient is PO I&D L great toe & long flexor tendon L foot, as well as amp.HIPJ d/t ischemic ulcer w/ suspected abscess/ osteomyelitis; DOS 8/14/23. Also had requested injection for painful medial ankle/TP tendon L - while under anesthesia for surgery. On Doxycycline 100mg bid pending C&S aerobic & anaerobic from intraop culture.  Less pain than previous PO.  Is also PO excision ulcer L hallux w/ avulsion of hallux nail plate & resection distal tuft of hallux; DOS 08/03/2023.  Patient had called later DOS for c/o cough w/ 'blood clots' - was advised to call PCP or UC. Had US LE veins B/L 8/14/23 d/t leg swelling & h/o DVT. Just dryness from the O2 likely caused it as it does w/allergies that cause nosebleeds but wanted to make sure.  Specimen to Pathology, Surgery Other  Order: [id number]  Status: Final result     Visible to patient: Yes (not seen)     Next appt: 09/05/2023 at 04:00 PM in Podiatry (Aleida Flannery DPM)     0 Result Notes      Component 8 d ago   [facility]:     LEFT BIG TOE, AMPUTATION:   - Benign skin showing epidermal necrosis and necrotic soft tissue.   - Underlying benign bone showing acute osteomyelitis.   - Skin and soft tissue margin showing focal acute inflammation.   - Bone resection margin showing acute osteomyelitis.     Aida Bond M.D.     Report attached.     Performing site:   [address] [address]     &quot;Disclaimer:  This case diagnosis was rendered completely by the outside consultation pathologist and the case is electronically signed by an [facility]ologist listed below solely to release the report into the medical record.&quot;    Comment: Interp By Carlos Tucker MD, Signed on 08/22/2023  at 15:04   Disclaimer Unless the case is a 'gross only' or additional testing only, the final diagnosis for each specimen is based on a microscopic examination of appropriate tissue sections.   Resulting Agency [facility]      Given results indicating possible remaining osteomyelitis, as w/ x-rays, will have patient on antibiotics for 6 weeks.    Culture, Anaerobic  Order: [id number]  Status: Final result     Visible to patient: Yes (not seen)     Next appt: 08/22/2023 at 01:00 PM in Podiatry (Aleida Flannery DPM)     Specimen Information: Toe, Left Foot; Wound   0 Result Notes      Component 7 d ago   Anaerobic Culture  Abnormal   PEPTONIPHILUS SPECIES   Moderate   Further identified as peptoniphilus coxii     Anaerobic Culture  Abnormal   PREVOTELLA BERGENSIS   Many     Anaerobic Culture  Abnormal   PREVOTELLA (B.) MELANINOGENICA   Many     Resulting Agency [facility]            Narrative  Performed by: [facility]  Big toe      Specimen Collected: 08/14/23 12:57 Last Resulted: 08/21/23 12:22           US Lower Extremity Veins Bilateral  Narrative: EXAMINATION:  US LOWER EXTREMITY VEINS BILATERAL    CLINICAL HISTORY:  Other specified soft tissue disorders    TECHNIQUE:  Duplex and color flow Doppler and dynamic compression was performed of the bilateral lower extremity veins was performed.    COMPARISON:  None    FINDINGS:  Right thigh veins: The common femoral, femoral, popliteal, upper greater saphenous, and deep femoral veins are patent and free of thrombus. The veins are normally compressible and have normal phasic flow and augmentation response.    Right calf veins: The visualized calf veins are patent.    Left thigh veins: The common femoral, femoral, popliteal, upper greater saphenous, and deep femoral veins are patent and free of thrombus. The veins are normally compressible and have normal phasic flow and augmentation response.    Left calf veins: The visualized calf veins are patent.    Miscellaneous: None  Impression: No  evidence of deep venous thrombosis in either lower extremity.    Electronically signed by: Jamshid Coleman  Date:    08/14/2023    Aerobic culture  Order: [id number]  Status: Final result     Visible to patient: Yes (not seen)     Next appt: 08/21/2023 at 07:30 AM in Radiology ([facility] XR[facility])     Specimen Information: Toe, Left Foot; Wound   0 Result Notes      Component 4 d ago   Aerobic Bacterial Culture No growth    Resulting Agency [facility]            Narrative  Performed by: [facility]  Big toe      Specimen Collected: 08/14/23 12:57 Last Resulted: 08/18/23 10:03           08/11/2023  Patient is PO excision ulcer distal toe & nail avulsion L hallux. DOS 8/3/23. POD 8 days. Having significant pain despite Gabapentin 300mg tid (added to her normal dose of 100mg tid). Culture results 8/7/22 w/ MSSA - Abx sent in but patient not able to be reached by phone initially. Was finally notified 8/8/23. Also, advised to come in today as an add on d/t drainage & pain.  States pain level is 8/10 but also dealing w/ home dialysis procedure R arm.    Placed on Doxycycline 100mg bid.   Contains abnormal data Aerobic culture  Order: [id number]  Status: Final result     Visible to patient: Yes (not seen)     Next appt: 08/21/2023 at 07:30 AM in Radiology ([facility] XR[facility])     Specimen Information: Toe, Left Foot; Bone   0 Result Notes     important suggestion  Newer results are available. Click to view them now.         Component 2 wk ago   Aerobic Bacterial Culture  Abnormal   STAPHYLOCOCCUS AUREUS   Many     Resulting Agency [facility]        Susceptibility     Staphylococcus aureus     CULTURE, AEROBIC  (SPECIFY SOURCE)     Clindamycin <=0.5 mcg/mL Resistant     Erythromycin >4 mcg/mL Resistant     Oxacillin <=0.25 mcg/mL Sensitive     Penicillin 2 mcg/mL Resistant     Tetracycline <=4 mcg/mL Sensitive     Trimeth/Sulfa <=0.5/9.5 m... Sensitive               Linear View         Specimen Collected: 08/03/23 12:27 Last Resulted: 08/07/23 08:55            Contains abnormal data Culture, Anaerobe  Order: [id number]  Status: Final result     Visible to patient: Yes (seen)     Next appt: 08/21/2023 at 07:30 AM in Radiology ([facility] XR[facility])     Specimen Information: Toe, Left Foot; Bone   0 Result Notes     important suggestion  Newer results are available. Click to view them now.         Component 2 wk ago   Anaerobic Culture  Abnormal   PEPTONIPHILUS SPECIES   Many   further identified as Peptoniphilus coxii     Anaerobic Culture  Abnormal   PORPHYROMONAS SOMERAE   Moderate     Anaerobic Culture  Abnormal   PREVOTELLA BERGENSIS   Moderate     Resulting Agency [facility]              Specimen Collected: 08/03/23 12:27 Last Resulted: 08/07/23 12:27           8/1/23  Patient is here for ED f/u of cellulitis 7/30/23. Had L foot & ankle pain intermittently x 4 days before presentation, as well as an ulcer distal hallux w/ purulence. Placed on Cleocin for 10 days & Bactroban. Xrays taken. Using a bandaid. Still having pain medial ankle but redness in improved. Pain level 8/10 L medial ankle > hallux since Sun.  Last in this clinic 7/13/23 w/ blister L great toe; stated was trying to break in a new pair of shoes. Also calluses plantar 1st & 3rd met.head R. Calluses were debrided along w/ adjacent 1st met.head blister. Decompressed along w/ superficial blister distal L hallux. Nails were also reduced. Gelstrap was dispensed for arch support.  7/13/23  Georgina is a 52 y.o. female who presented new to this clinic almost a year ago. States is applying for SSI.  Noticed blister 2 days ago L great toe. Thinks discoloration is starting a blister R foot also. Tried to break in a pair of shoes. Has calluses always. Now on dialysis - getting ready for home HD. Has fistula in place.    PCP: Alonso Ling MD  [address] [facility]  Date Last Seen by PCP: 6/12/23    Home dialysis.   Georgina has a past medical history of Hyperlipidemia, Hypertension, Peptic ulcer disease,  Peripheral artery disease, PONV (postoperative nausea and vomiting), Stage IV CKD, and Type II diabetes mellitus.   Dx DM 1995. Neuropathy after about 10 yrs.   PAD Nov 2006, DVT RLE & stent placement both times; most recent around March/ April 2008.  Waiting on bariatric preop to get BMI down as candidate for kidney transplant.  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, with other ophthalmic complication    Ureteral stone with hydronephrosis    Acute kidney injury superimposed on CKD    Nephrolithiasis    Pre-operative examination    Acute respiratory failure with hypoxia and hypercapnia    Elevated troponin    Pneumonia due to COVID-19 virus    Hypertensive emergency    Lactic acidosis    Hyperkalemia    Metabolic acidosis    CHF (congestive heart failure)    Class 2 severe obesity with serious comorbidity and body mass index (BMI) of 38.0 to 38.9 in adult    Open wound of left foot    ESRD (end stage renal disease)    Severe obesity (BMI >= 40)    Pre-op exam    S/P femoral-femoral bypass surgery     States in clinic 7.4% last visit, from 7.8%;  4/2023 - 8.4%  Hemoglobin A1C   Date Value Ref Range Status   09/11/2023 7.2 (H) 4.0 - 5.6 % Final     Comment:     ADA Screening Guidelines:  5.7-6.4%  Consistent with prediabetes  >or=6.5%  Consistent with diabetes    High levels of fetal hemoglobin interfere with the HbA1C  assay. Heterozygous hemoglobin variants (HbS, HgC, etc)do  not significantly interfere with this assay.   However, presence of multiple variants may affect accuracy.     08/28/2018 7.9 (H) 4.0 - 5.6 % Final     Comment:     ADA Screening Guidelines:  5.7-6.4%  Consistent with prediabetes  >or=6.5%  Consistent with diabetes  High levels of fetal hemoglobin interfere with the HbA1C  assay.  Heterozygous hemoglobin variants (HbS, HgC, etc)do  not significantly interfere with this assay.   However, presence of multiple variants may affect accuracy.     03/24/2015 9.9 (H) 4.5 - 6.2 % Final      Objective:       X-Ray Toe 2 or More Views Left  Narrative: EXAMINATION:  XR TOE 2 OR MORE VIEWS LEFT    CLINICAL HISTORY:  postop;    TECHNIQUE:  Three views of the left toes were performed    COMPARISON:  None.    FINDINGS:  The patient is status post amputation of the distal aspect of the distal phalanx of the great toe.  Surgical changes are identified.  The remainder of the bones are intact.  There is no evidence for dislocation.  No bony erosions are identified.  No radiopaque soft tissue foreign bodies are evident.  Impression: Postsurgical changes related to resection of the distal aspect of the distal phalanx of the left great toe.    Electronically signed by: Gio Snyder MD  Date:    08/03/2023  Time:    13:20  I independently reviewed the Xray images. As expected clean resection distal tuft L hallux.     X-Ray Foot Complete Left  Order: [id number]  Status: Final result     Visible to patient: Yes (seen)     Next appt: 08/22/2023 at 01:00 PM in Podiatry (Aleida Flannery DPM)     Dx: Left foot pain     0 Result Notes  Details  Reading Physician Reading Date Result Priority   Orlando Lewis MD  [phone] 7/30/2023 STAT     Narrative & Impression  EXAMINATION:  XR FOOT COMPLETE 3 VIEW LEFT     CLINICAL HISTORY:  .  Pain in left foot     TECHNIQUE:  AP, lateral and oblique views of the left foot were performed.     COMPARISON:  None     FINDINGS:  Four views are submitted.     No acute fracture, subluxation or dislocation.  No mass or foreign body.  No significant arthropathy.     Impression:     No acute radiographic abnormality.       Electronically signed by: Orlando Lewis  Date:                                            07/30/2023  Time:                                           16:10   I independently  reviewed the Xray images. Suspicious for distal tuft hallux lytic changes.    -LE arterial ultrasound 10/17/22 significant for 50-70% stenosis R CFA, atherosclerotic plaques & monophasic flow noted throughout BLE.  -prior [facility] recods document R SFA stent in 2009 & angio in 2013 w/ percutaneous transluminal angioplasty of R common iliac artery.   -previously on plavix   -followed by cardiology    Ambulates w/ a walker, using wheelchair.  In a surgery shoe L.    VAS Ankle Brachial Indices Resting  Order: [id number]  Status: Final result     Visible to patient: Yes (not seen)     Next appt: 10/11/2023 at 02:45 PM in Podiatry (Aleida Falnnery DPM)     0 Result Notes  Details    Narrative    Indication   ========     PAD     Pressure Lower   ============     Rt brachial A syst BP  141 mmHg   Rt low thigh BP    144 mmHg   Rt calf  mmHg   Rt PTA BP  151 mmHg   Rt dors pedis A  mmHg   Rt toe BP  82 mmHg   Right CAMILA ratio use:   post. tibial   Rt CAMILA post tibial (rt post tib A BP / max brach A BP) 1.07   Rt CAMILA (rt dors ped A BP / max brach A BP) 0.86   Rt ankle BP / max brach A BP   1.07   Rt TBI (rt toe BP / max brach A BP)    0.58   Lt low thigh BP    133 mmHg   Lt calf  mmHg   Lt PTA BP  118 mmHg   Lt dors pedis A  mmHg   Lt toe BP  88 mmHg   Left CAMILA ratio use:    post. tibial   Lt CAMILA (lt post tibial A BP / max brach A BP)  0.84   Lt CAMILA dors ped (lt dors ped A BP / max brach A BP)    0.82   Lt ankle BP / max brach A BP   0.84   Lt TBI (lt toe BP / max brach A BP)    0.62     PPG Lower   =========     Rt toe BP - PPG    82 mmHg   Rt toe digit waveform: normal   Lt 4th digit BP    88 mmHg   Lt 4th digit waveform: mildly dampened     Impression   =========     Right Leg: Segmental Pressures suggest no evidence of peripheral arterial occlusive disease. However, PVR waveforms suggest mild   peripheral arterial occlusive disease.   Rt lower digits: Toe PPG waveforms as described above. - TBI of 0.58 with a  Great toe pressure of 82 mmHg is noted.     Left Leg: Segmental Pressures suggest and PVR waveforms suggest mild peripheral arterial occlusive disease.   Lt lower digits: Toe PPG waveforms as described above. - TBI of 0.62 with a fourth toe pressure of 88 mmHg is noted.       DATE OF SERVICE: 09/18/2023                                                        Sonographer: Edith Antonio   Electronically Signed by: ANNA Andrade M.D. at 09/19/2023-09:25           Specimen Collected: 09/18/23 12:00 Last Resulted: 09/19/23 09:26       Order Details     View Encounter     Lab and Collection Details     Routing     Result History     View All Conversations on this Encounter           Linked Documents    View Image      Result Care Coordination      Patient Communication     Add Comments   Add Notifications  Back to Top             VAS Ankle Brachial Indices Resting: Patient Communication     Add Comments   Add Notifications      Narrative      Indication   ========     PAD     Pressure Lower   ============     Rt brachial A syst BP  141 mmHg   Rt low thigh BP    144 mmHg   Rt calf  mmHg   Rt PTA BP  151 mmHg   Rt dors pedis A  mmHg   Rt toe BP  82 mmHg   Right CAMILA ratio use:   post. tibial   Rt CAMILA post tibial (rt post tib A BP / max brach A BP) 1.07   Rt CAMILA (rt dors ped A BP / max brach A BP) 0.86   Rt ankle BP / max brach A BP   1.07   Rt TBI (rt toe BP / max brach A BP)    0.58   Lt low thigh BP    133 mmHg   Lt calf  mmHg   Lt PTA BP  118 mmHg   Lt dors pedis A  mmHg   Lt toe BP  88 mmHg   Left CAMILA ratio use:    post. tibial   Lt CAMILA (lt post tibial A BP / max brach A BP)  0.84   Lt CAMILA dors ped (lt dors ped A BP / max brach A BP)    0.82   Lt ankle BP / max brach A BP   0.84   Lt TBI (lt toe BP / max brach A BP)    0.62     PPG Lower   =========     Rt toe BP - PPG    82 mmHg   Rt toe digit waveform: normal   Lt 4th digit BP    88 mmHg   Lt 4th digit waveform: mildly dampened     Impression    =========     Right Leg: Segmental Pressures suggest no evidence of peripheral arterial occlusive disease. However, PVR waveforms suggest mild   peripheral arterial occlusive disease.   Rt lower digits: Toe PPG waveforms as described above. - TBI of 0.58 with a Great toe pressure of 82 mmHg is noted.     Left Leg: Segmental Pressures suggest and PVR waveforms suggest mild peripheral arterial occlusive disease.   Lt lower digits: Toe PPG waveforms as described above. - TBI of 0.62 with a fourth toe pressure of 88 mmHg is noted.     DATE OF SERVICE: 09/18/2023                                                        Sonographer: Edith Antonio   Electronically Signed by: ANNA Andrade M.D. at 09/19/2023-09:25        9/19/23              X-Ray Foot Complete Left  Order: [id number]  Status: Final result     Visible to patient: Yes (not seen)     Next appt: 10/11/2023 at 02:45 PM in Podiatry (Aleida Flannery DPM)     0 Result Notes  Details  Reading Physician Reading Date Result Priority   Cristobal Hendrickson MD  [phone] 9/14/2023 STAT     Narrative & Impression  EXAMINATION:  XR FOOT COMPLETE 3 VIEW LEFT     CLINICAL HISTORY:  post op xray;     TECHNIQUE:  Three views of the left foot.     COMPARISON:  09/11/2023.     FINDINGS:  Postoperative changes of transmetatarsal resection of the 1st metatarsal.  Irregularity of the 1st metatarsal remnant on the frontal view which could be related to operative changes.  Overlapping skin staples along the medial aspect of the foot.  Subcutaneous emphysema and soft tissue edema potentially related to recent surgery or infection.  Bones are otherwise similar when compared with recent prior exam.     Impression:     Postoperative changes in the left foot as discussed above.       Electronically signed by: Cristobal Hendrickson MD  Date:                                            09/14/2023  Time:                                           22:24     MRI Foot (Forefoot) Left Without  Contrast  Order: [id number]  Status: Final result     Visible to patient: Yes (seen)     Next appt: 10/11/2023 at 02:45 PM in Podiatry (Aleida Flannery DPM)     0 Result Notes  Details  Reading Physician Reading Date Result Priority   Neeraj Lopez MD  [phone]  [phone] 9/12/2023 STAT   Maday Kurtz MD  [phone]  [phone] 9/12/2023      Narrative & Impression  EXAMINATION:  MRI FOOT (FOREFOOT) LEFT WITHOUT CONTRAST; MRI FOOT (MIDFOOT) LEFT WITHOUT CONTRAST     CLINICAL HISTORY:  Foot pain, stress fracture suspected, neg xray;     TECHNIQUE:  Routine multiplanar multisequence images of the left midfoot and forefoot obtained without intravenous contrast.     COMPARISON:  Foot radiograph 09/11/2023, 08/25/2023     FINDINGS:  Bones: Status post amputation of the 1st distal and proximal phalanges.  There is patchy and reticular decreased T1 signal in the 1st sesamoids and metatarsal head which extends to the metatarsal shaft with associated increased stir signal compatible bone marrow edema.  Indistinctness of the cortical margin at the superior and inferior aspects of the metatarsal head on T1 weighted images.     Joints: No large joint effusions.  No displacement.     Soft tissues: There is skin defect along the anterolateral margin of the surgical site with associated edema of the surrounding subcutaneous soft tissues.  Note is made of STIR signal intensity of the majority of the imaged ventral muscles of the foot.  Dorsal subcutaneous edema is also noted.     Synovial thickening of the adjacent tendon suggestive of associated tenosynovitis.  No evidence of is collections to suggest abscess formation.     Impression:     Status post amputation of the 1st distal and proximal phalanges.  Large skin defect along the anterolateral margin of the surgical site with associated bone marrow edema of the underlying 1st metatarsal and sesamoids concerning for osteomyelitis.     Synovial thickening is noted of  the adjacent tendon, suggestive of associated tenosynovitis.     Surrounding generalized muscular and soft tissue edema.  No organized collections to suggest abscess formation, allowing for noncontrast examination.     Electronically signed by resident: Maday Kurtz  Date:                                            09/12/2023  Time:                                           19:11     Electronically signed by: Neeraj Lopez MD  Date:                                            09/12/2023  Time:                                           20:17     CTA Runoff ABD PEL Bilat Lower Ext  Order: [id number]  Status: Final result     Visible to patient: Yes (not seen)     Next appt: 10/11/2023 at 02:45 PM in Podiatry (Aleida Flannery DPM)     0 Result Notes  Details    Reading Physician Reading Date Result Priority   Manpreet Bauer MD  [phone] 9/13/2023 Routine   Lauro Salter MD  [phone] 9/13/2023      Narrative & Impression  EXAMINATION:  CTA RUNOFF ABD PEL BILAT LOWER EXT     CLINICAL HISTORY:  Peripheral arterial disease (PAD), asymptomatic;     TECHNIQUE:  CTA of abdomen, pelvis, and bilateral lower extremities performed with 125 mL Omnipaque 350 intravenous contrast.     COMPARISON:  Ultrasound lower extremity veins bilateral 08/14/2023     FINDINGS:  CHEST:     Lungs/Pleura: Limited evaluation due to respiratory motion.  Scattered patchy heterogeneous airspace opacities at the lung bases without evidence for focal consolidation, pleural effusion, or pneumothorax.     Heart: Normal size. Coronary artery calcification in a multi vessel distribution.  No pericardial effusion.     Thoracic soft tissues: No significant abnormality.     ABDOMEN/PELVIS:     Liver: Enlarged size with smooth contours. No focal abnormality.     Gallbladder: Cholecystectomy     Bile ducts: No intrahepatic or extrahepatic biliary ductal dilatation.     Spleen: Normal size. Unremarkable     Pancreas: No mass. No ductal dilatation. No  peripancreatic fat stranding.     Adrenals: Non masslike thickening of the left adrenal gland.  The right adrenal gland is unremarkable.     Renal/Ureters: Kidneys are atrophic.  3 mm nonobstructive right kidney calculi.  6 mm left nonobstructive renal calculi. 1 cm hypodensity in the left kidney measuring simple fluid attenuation likely simple cyst.  Subcentimeter hypodensity in the right kidney too small to characterize.  No hydronephrosis.  The ureters are normal in course and caliber. The urinary bladder is unremarkable.     Reproductive: Uterus is without significant abnormality. No adnexal mass.     Stomach/Bowel: Stomach is without significant abnormality.  Small bowel is normal caliber without obstruction or inflammation.  Appendix is not visualized however there is no inflammation to suggest appendicitis.  Large bowel is normal caliber without obstruction or inflammation.     Peritoneum: No free fluid. No intraperitoneal free air.     Lymph Nodes: Few prominent periaortic lymph nodes largest measuring 1.1 cm.     Bones: Degenerative changes of the spine.  No acute fractures or osseous destructive lesions.     Soft Tissues: No significant abnormality.     Aorta: There is complete occlusion involving the distal abdominal aorta just proximal to the bifurcation (axial series 3, image 544).  Additionally, there is thrombosis at the bilateral common iliac arteries.  No dissection, penetrating ulcer, or intramural hematoma.  Origins of the celiac artery, superior mesenteric artery, renal arteries, and inferior mesenteric artery appear patent.  Dense calcific atherosclerosis of the visualized splenic artery and superior mesenteric artery.     Right:     Common iliac artery: Stented, with thrombosis of the visualized stent.     External iliac artery: Normal.     Internal iliac artery: Dense calcific and atheromatous atherosclerosis of the visualized internal iliac artery.     Common femoral artery: 20-50%  stenosis.     Superficial femoral artery: 50-80%.  Stent in the distal aspect, which appears patent.  Multiple collateral vessels in the vicinity of the superficial femoral artery.     Deep femoral artery: Less than 20% stenosis     Popliteal artery: 20-50% stenosis.     Anterior tibial artery: 20-50% stenosis     Tibial-peroneal trunk: Greater than 50% stenosis.     Posterior tibial artery: Over 80% stenosis with multifocal areas of severe stenosis versus occlusion.     Peroneal artery: 50-80% stenosis with multifocal areas of severe stenosis versus occlusion.     Left:     Common iliac artery: Occlusion involving the common iliac artery.     External iliac artery: Less than 20% stenosis.     Internal iliac artery: Dense calcific and atheromatous atherosclerosis of the visualized internal iliac artery     Common femoral artery: 20-50% stenosis.     Superficial femoral artery: 50-80% stenosis with multifocal areas of severe stenosis versus occlusion.  Multiple collateral vessels in the vicinity of the superficial femoral artery.     Deep femoral artery: Less than 20% stenosis     Popliteal artery: Less than 20% stenosis.     Anterior tibial artery: 20 50% stenosis.     Tibial-peroneal trunk: 50-80% stenosis, with multifocal areas of severe stenosis versus occlusion.     Posterior tibial artery: 50-80% stenosis, with multifocal areas of severe stenosis versus occlusion.     Peroneal artery: 50-80% stenosis.     Impression:     Complete occlusion involving the distal abdominal aorta just proximal to the bifurcation, with extension into the bilateral common iliac arteries, noting a right common iliac artery stent.     Advanced multifocal peripheral arterial disease as detailed above, with patent right superficial femoral artery stent.  Numerous collateral vessels in the vicinity of the aorta, iliac, and femoral arterial vasculature.     Abnormal 3 vessel runoff involving the bilateral lower extremities, with multifocal  areas of severe stenosis versus occlusion mainly involving the bilateral posterior tibial and peroneal arteries.     Subcentimeter nonobstructing bilateral renal stones.     Coronary artery calcification in a multi vessel distribution.     Non masslike thickening of the left adrenal gland.     Additional findings as above.     COMMUNICATION  This critical result was discovered/received at 09:30.  The critical information above was relayed directly by me by epic secure chat to TICO Mauricio on 09/13/2023 at 09:48.     Electronically signed by resident: Lauro Salter  Date:                                            09/13/2023  Time:                                           07:07     Electronically signed by: Manpreet Bauer  Date:                                            09/13/2023  Time:                                           10:10     X-Ray Foot Complete Left  Order: [id number]  Status: Final result     Visible to patient: Yes (seen)     Next appt: 10/11/2023 at 02:45 PM in Podiatry (Aleida Flannery DPM)     0 Result Notes  Details  Reading Physician Reading Date Result Priority   Jamshid Coleman, DO  [phone] 9/11/2023 STAT     Narrative & Impression  EXAMINATION:  XR FOOT COMPLETE 3 VIEW LEFT     CLINICAL HISTORY:  L foot wound;.     TECHNIQUE:  AP, lateral and oblique views of the left foot were performed.     COMPARISON:  08/25/2023.     FINDINGS:  There are postop changes of  left great toe amputation with resection at the metatarsophalangeal joint.  There is soft tissue edema, ulceration, and gas of the great toe amputation stump.  There is osteopenia and erosive change of the medial aspect of the great toe metatarsal head and neck, compatible with acute osteomyelitis.  Remaining osseous structures are intact.  There are vascular calcifications.     Impression:     Acute osteomyelitis of the great toe metatarsal head and neck.     Electronically signed by: Jamshid Coleman  Date:                                             09/11/2023  Time:                                           17:42     9/26/23 pre-debridement    9/26/23 - post debridement      Physical Exam   Constitutional: She is oriented to person, place, and time. She appears well-developed and morbidly obese. She is cooperative.   Cardiovascular:   Non-pitting edema RLE > L.      Musculoskeletal:         General: Tenderness present. No swelling.      Right lower leg: Edema present.      Left lower leg: Edema present.   Neurological: She is alert and oriented to person, place, and time. She displays no weakness. A sensory deficit is present. Gait (surgical shoe L) abnormal.   Epicritic sensation grossly diminished B/L including sharp/dull sensation; light touch absent.     Numbness & paresthesias (tingling UE & LE, & loss of  hands x 10+ yrs.) & sensory change. No clearly identified trigger or source; no hyperemia.   Skin: Skin is warm and dry. Capillary refill takes less than 2 seconds. No bruising noted. No erythema (resolved).   Staples medial L foot across transmet.amp.1st ray - central to proximal 1/2 incision w/ disruption of wound, No exposed bone. No active drainage nor exudate. No malodor. Macerated plantar flap (see pix). No necrosis of skin flap. Viable skin wound edges w/ light bleeding upon debridement. Wound measures 4.3cm x 1/2 cm across greatest width, 0.3 cm depth proximally.   Psychiatric: Her behavior is normal. Affect normal. Her mood appears not anxious.   Vitals reviewed.     Assessment:      Encounter Diagnoses   Name Primary?    Non-healing amputation site Yes    Status post amputation of great toe, left     S/P femoral-femoral bypass surgery     Class 2 severe obesity with serious comorbidity and body mass index (BMI) of 38.0 to 38.9 in adult, unspecified obesity type     PAD (peripheral artery disease)     Disruption of internal surgical wound, initial encounter     S/P transmetatarsal amputation of foot, left        Problem List  Items Addressed This Visit          Cardiac/Vascular    S/P femoral-femoral bypass surgery       Endocrine    Class 2 severe obesity with serious comorbidity and body mass index (BMI) of 38.0 to 38.9 in adult    Current Assessment & Plan     BMI 38.14kg/m2 - follow w/ PCP          Other Visit Diagnoses       Non-healing amputation site    -  Primary    Status post amputation of great toe, left        PAD (peripheral artery disease)        Disruption of internal surgical wound, initial encounter        Relevant Orders    Wound Debridement L [facility] foot    HOME HEALTH ORDERS    S/P transmetatarsal amputation of foot, left        Relevant Orders    Wound Debridement L [facility] foot    [facility] ORDERS            Plan:       Georgian was seen today for post-op evaluation.    Diagnoses and all orders for this visit:    Non-healing amputation site    Status post amputation of great toe, left    S/P femoral-femoral bypass surgery    Class 2 severe obesity with serious comorbidity and body mass index (BMI) of 38.0 to 38.9 in adult, unspecified obesity type    PAD (peripheral artery disease)    Disruption of internal surgical wound, initial encounter  -     Wound Debridement L [facility] foot  -     HOME HEALTH ORDERS    S/P transmetatarsal amputation of foot, left  -     Wound Debridement L [facility] foot       HOME HEALTH ORDERS    Dressing removed.   Wound cleaned w/ Chloraprep.   Aggressive debridement of non-viable tissue to healing viable wound edges.   Betadine wet-dry gauze dressing applied.    Rx [facility]HS for continued qod wet-dry Betadine dressing changes.    Continue ambulation WBAT in surgical shoe.    F/u wound check 2 wks., sooner prn.        A total of 34 mins.was spent on chart review, patient visit & documentation.

## 2023-09-29 ENCOUNTER — OFFICE VISIT (OUTPATIENT)
Dept: HOME HEALTH SERVICES | Facility: CLINIC | Age: 53
End: 2023-09-29
Payer: MEDICARE

## 2023-09-29 DIAGNOSIS — E66.01 SEVERE OBESITY (BMI >= 40): ICD-10-CM

## 2023-09-29 DIAGNOSIS — Z95.828 S/P FEMORAL-FEMORAL BYPASS SURGERY: ICD-10-CM

## 2023-09-29 DIAGNOSIS — E78.5 HYPERLIPIDEMIA, UNSPECIFIED HYPERLIPIDEMIA TYPE: ICD-10-CM

## 2023-09-29 DIAGNOSIS — I50.9 CONGESTIVE HEART FAILURE, UNSPECIFIED HF CHRONICITY, UNSPECIFIED HEART FAILURE TYPE: ICD-10-CM

## 2023-09-29 DIAGNOSIS — I10 ESSENTIAL HYPERTENSION: ICD-10-CM

## 2023-09-29 DIAGNOSIS — I73.9 PERIPHERAL VASCULAR DISEASE: ICD-10-CM

## 2023-09-29 DIAGNOSIS — N18.6 ESRD (END STAGE RENAL DISEASE): ICD-10-CM

## 2023-09-29 DIAGNOSIS — S91.302S OPEN WOUND OF LEFT FOOT, SEQUELA: ICD-10-CM

## 2023-09-29 DIAGNOSIS — E11.29 TYPE 2 DIABETES MELLITUS WITH OTHER KIDNEY COMPLICATION, UNSPECIFIED WHETHER LONG TERM INSULIN USE: ICD-10-CM

## 2023-09-29 PROCEDURE — 3074F PR MOST RECENT SYSTOLIC BLOOD PRESSURE < 130 MM HG: ICD-10-PCS | Mod: CPTII,S$GLB,, | Performed by: NURSE PRACTITIONER

## 2023-09-29 PROCEDURE — 3078F DIAST BP <80 MM HG: CPT | Mod: CPTII,S$GLB,, | Performed by: NURSE PRACTITIONER

## 2023-09-29 PROCEDURE — 3051F PR MOST RECENT HEMOGLOBIN A1C LEVEL 7.0 - < 8.0%: ICD-10-PCS | Mod: CPTII,S$GLB,, | Performed by: NURSE PRACTITIONER

## 2023-09-29 PROCEDURE — 99497 PR ADVNCD CARE PLAN 30 MIN: ICD-10-PCS | Mod: S$GLB,,, | Performed by: NURSE PRACTITIONER

## 2023-09-29 PROCEDURE — 99495 TRANSJ CARE MGMT MOD F2F 14D: CPT | Mod: S$GLB,,, | Performed by: NURSE PRACTITIONER

## 2023-09-29 PROCEDURE — 3074F SYST BP LT 130 MM HG: CPT | Mod: CPTII,S$GLB,, | Performed by: NURSE PRACTITIONER

## 2023-09-29 PROCEDURE — 3066F NEPHROPATHY DOC TX: CPT | Mod: CPTII,S$GLB,, | Performed by: NURSE PRACTITIONER

## 2023-09-29 PROCEDURE — 3051F HG A1C>EQUAL 7.0%<8.0%: CPT | Mod: CPTII,S$GLB,, | Performed by: NURSE PRACTITIONER

## 2023-09-29 PROCEDURE — 99497 ADVNCD CARE PLAN 30 MIN: CPT | Mod: S$GLB,,, | Performed by: NURSE PRACTITIONER

## 2023-09-29 PROCEDURE — 3066F PR DOCUMENTATION OF TREATMENT FOR NEPHROPATHY: ICD-10-PCS | Mod: CPTII,S$GLB,, | Performed by: NURSE PRACTITIONER

## 2023-09-29 PROCEDURE — 3078F PR MOST RECENT DIASTOLIC BLOOD PRESSURE < 80 MM HG: ICD-10-PCS | Mod: CPTII,S$GLB,, | Performed by: NURSE PRACTITIONER

## 2023-09-29 PROCEDURE — 99495 TCM SERVICES (MODERATE COMPLEXITY): ICD-10-PCS | Mod: S$GLB,,, | Performed by: NURSE PRACTITIONER

## 2023-10-04 NOTE — PROCEDURES
Wound Debridement L medial foot    Date/Time: 9/26/2023 1:45 PM    Performed by: Aleida Flannery DPM  Authorized by: Aleida Flannery DPM      Preparation: Patient was prepped and draped with clean technique      Wound Details:    Location:  Left foot    Location:  Left Midfoot    Type of Debridement:  Excisional       Length (cm):  4.3       Area (sq cm):  2.15       Width (cm):  0.5       Percent Debrided (%):  100       Depth (cm):  0.3       Total Area Debrided (sq cm):  2.15    Depth of debridement:  Subcutaneous tissue    Tissue debrided:  Epidermis, Dermis and Subcutaneous    Devitalized tissue debrided:  Callus, Fibrin and Slough    Instruments:  Nippers, Forceps and Other  Bleeding:  Minimal  Hemostasis Achieved: Yes  Method Used:  Pressure  Patient tolerance:  Patient tolerated the procedure well with no immediate complications  1st Wound Pain Assessment: 0

## 2023-10-11 ENCOUNTER — OFFICE VISIT (OUTPATIENT)
Dept: PODIATRY | Facility: CLINIC | Age: 53
End: 2023-10-11
Payer: MEDICARE

## 2023-10-11 VITALS
HEIGHT: 67 IN | SYSTOLIC BLOOD PRESSURE: 121 MMHG | HEART RATE: 81 BPM | WEIGHT: 247.25 LBS | DIASTOLIC BLOOD PRESSURE: 66 MMHG | BODY MASS INDEX: 38.81 KG/M2

## 2023-10-11 DIAGNOSIS — T81.30XD WOUND DISRUPTION, SUBSEQUENT ENCOUNTER: ICD-10-CM

## 2023-10-11 DIAGNOSIS — Z89.432 S/P TRANSMETATARSAL AMPUTATION OF FOOT, LEFT: ICD-10-CM

## 2023-10-11 DIAGNOSIS — M77.41 METATARSALGIA OF BOTH FEET: ICD-10-CM

## 2023-10-11 DIAGNOSIS — L84 CALLUS OF FOOT: ICD-10-CM

## 2023-10-11 DIAGNOSIS — L89.892 PRESSURE ULCER OF RIGHT FOOT, STAGE 2: ICD-10-CM

## 2023-10-11 DIAGNOSIS — I73.9 PAD (PERIPHERAL ARTERY DISEASE): ICD-10-CM

## 2023-10-11 DIAGNOSIS — E11.42 DM TYPE 2 WITH DIABETIC PERIPHERAL NEUROPATHY: ICD-10-CM

## 2023-10-11 DIAGNOSIS — M77.42 METATARSALGIA OF BOTH FEET: ICD-10-CM

## 2023-10-11 DIAGNOSIS — Z48.02 ENCOUNTER FOR STAPLE REMOVAL: Primary | ICD-10-CM

## 2023-10-11 LAB — FUNGUS SPEC CULT: NORMAL

## 2023-10-11 PROCEDURE — 99999 PR PBB SHADOW E&M-EST. PATIENT-LVL IV: CPT | Mod: PBBFAC,,, | Performed by: PODIATRIST

## 2023-10-11 PROCEDURE — 3074F SYST BP LT 130 MM HG: CPT | Mod: CPTII,S$GLB,, | Performed by: PODIATRIST

## 2023-10-11 PROCEDURE — 3078F DIAST BP <80 MM HG: CPT | Mod: CPTII,S$GLB,, | Performed by: PODIATRIST

## 2023-10-11 PROCEDURE — 99214 OFFICE O/P EST MOD 30 MIN: CPT | Mod: 24,25,S$GLB, | Performed by: PODIATRIST

## 2023-10-11 PROCEDURE — 99214 PR OFFICE/OUTPT VISIT, EST, LEVL IV, 30-39 MIN: ICD-10-PCS | Mod: 24,25,S$GLB, | Performed by: PODIATRIST

## 2023-10-11 PROCEDURE — 97597 DBRDMT OPN WND 1ST 20 CM/<: CPT | Mod: 79,S$GLB,, | Performed by: PODIATRIST

## 2023-10-11 PROCEDURE — 3078F PR MOST RECENT DIASTOLIC BLOOD PRESSURE < 80 MM HG: ICD-10-PCS | Mod: CPTII,S$GLB,, | Performed by: PODIATRIST

## 2023-10-11 PROCEDURE — 3066F PR DOCUMENTATION OF TREATMENT FOR NEPHROPATHY: ICD-10-PCS | Mod: CPTII,S$GLB,, | Performed by: PODIATRIST

## 2023-10-11 PROCEDURE — 3066F NEPHROPATHY DOC TX: CPT | Mod: CPTII,S$GLB,, | Performed by: PODIATRIST

## 2023-10-11 PROCEDURE — 3008F BODY MASS INDEX DOCD: CPT | Mod: CPTII,S$GLB,, | Performed by: PODIATRIST

## 2023-10-11 PROCEDURE — 97597: ICD-10-PCS | Mod: 79,S$GLB,, | Performed by: PODIATRIST

## 2023-10-11 PROCEDURE — 3051F PR MOST RECENT HEMOGLOBIN A1C LEVEL 7.0 - < 8.0%: ICD-10-PCS | Mod: CPTII,S$GLB,, | Performed by: PODIATRIST

## 2023-10-11 PROCEDURE — 3074F PR MOST RECENT SYSTOLIC BLOOD PRESSURE < 130 MM HG: ICD-10-PCS | Mod: CPTII,S$GLB,, | Performed by: PODIATRIST

## 2023-10-11 PROCEDURE — 3008F PR BODY MASS INDEX (BMI) DOCUMENTED: ICD-10-PCS | Mod: CPTII,S$GLB,, | Performed by: PODIATRIST

## 2023-10-11 PROCEDURE — 99999 PR PBB SHADOW E&M-EST. PATIENT-LVL IV: ICD-10-PCS | Mod: PBBFAC,,, | Performed by: PODIATRIST

## 2023-10-11 PROCEDURE — 1111F DSCHRG MED/CURRENT MED MERGE: CPT | Mod: CPTII,S$GLB,, | Performed by: PODIATRIST

## 2023-10-11 PROCEDURE — 1111F PR DISCHARGE MEDS RECONCILED W/ CURRENT OUTPATIENT MED LIST: ICD-10-PCS | Mod: CPTII,S$GLB,, | Performed by: PODIATRIST

## 2023-10-11 PROCEDURE — 3051F HG A1C>EQUAL 7.0%<8.0%: CPT | Mod: CPTII,S$GLB,, | Performed by: PODIATRIST

## 2023-10-12 ENCOUNTER — HOSPITAL ENCOUNTER (OUTPATIENT)
Dept: VASCULAR SURGERY | Facility: CLINIC | Age: 53
Discharge: HOME | End: 2023-10-12
Attending: SURGERY
Payer: MEDICARE

## 2023-10-12 ENCOUNTER — OFFICE VISIT (OUTPATIENT)
Dept: VASCULAR SURGERY | Facility: CLINIC | Age: 53
End: 2023-10-12
Attending: SURGERY
Payer: MEDICARE

## 2023-10-12 VITALS
WEIGHT: 242.5 LBS | DIASTOLIC BLOOD PRESSURE: 60 MMHG | HEIGHT: 67 IN | HEART RATE: 86 BPM | SYSTOLIC BLOOD PRESSURE: 131 MMHG | TEMPERATURE: 99 F | BODY MASS INDEX: 38.06 KG/M2

## 2023-10-12 DIAGNOSIS — I73.9 PERIPHERAL VASCULAR DISEASE: ICD-10-CM

## 2023-10-12 DIAGNOSIS — I73.9 PAD (PERIPHERAL ARTERY DISEASE): ICD-10-CM

## 2023-10-12 DIAGNOSIS — T87.89 NON-HEALING AMPUTATION SITE: ICD-10-CM

## 2023-10-12 PROCEDURE — 99024 POSTOP FOLLOW-UP VISIT: CPT | Mod: S$GLB,,, | Performed by: SURGERY

## 2023-10-12 PROCEDURE — 99999 PR PBB SHADOW E&M-EST. PATIENT-LVL IV: ICD-10-PCS | Mod: PBBFAC,,, | Performed by: SURGERY

## 2023-10-12 PROCEDURE — 3078F PR MOST RECENT DIASTOLIC BLOOD PRESSURE < 80 MM HG: ICD-10-PCS | Mod: CPTII,S$GLB,, | Performed by: SURGERY

## 2023-10-12 PROCEDURE — 93923 PR NON-INVASIVE PHYSIOLOGIC STUDY EXTREMITY 3 LEVELS: ICD-10-PCS | Mod: S$GLB,,, | Performed by: SURGERY

## 2023-10-12 PROCEDURE — 3078F DIAST BP <80 MM HG: CPT | Mod: CPTII,S$GLB,, | Performed by: SURGERY

## 2023-10-12 PROCEDURE — 93926 LOWER EXTREMITY STUDY: CPT | Mod: S$GLB,,, | Performed by: SURGERY

## 2023-10-12 PROCEDURE — 93926 PR DUPLEX LO EXTREM ART UNILAT/LTD: ICD-10-PCS | Mod: S$GLB,,, | Performed by: SURGERY

## 2023-10-12 PROCEDURE — 3075F SYST BP GE 130 - 139MM HG: CPT | Mod: CPTII,S$GLB,, | Performed by: SURGERY

## 2023-10-12 PROCEDURE — 1159F MED LIST DOCD IN RCRD: CPT | Mod: CPTII,S$GLB,, | Performed by: SURGERY

## 2023-10-12 PROCEDURE — 99999 PR PBB SHADOW E&M-EST. PATIENT-LVL IV: CPT | Mod: PBBFAC,,, | Performed by: SURGERY

## 2023-10-12 PROCEDURE — 3051F HG A1C>EQUAL 7.0%<8.0%: CPT | Mod: CPTII,S$GLB,, | Performed by: SURGERY

## 2023-10-12 PROCEDURE — 1159F PR MEDICATION LIST DOCUMENTED IN MEDICAL RECORD: ICD-10-PCS | Mod: CPTII,S$GLB,, | Performed by: SURGERY

## 2023-10-12 PROCEDURE — 3066F NEPHROPATHY DOC TX: CPT | Mod: CPTII,S$GLB,, | Performed by: SURGERY

## 2023-10-12 PROCEDURE — 93923 UPR/LXTR ART STDY 3+ LVLS: CPT | Mod: S$GLB,,, | Performed by: SURGERY

## 2023-10-12 PROCEDURE — 3066F PR DOCUMENTATION OF TREATMENT FOR NEPHROPATHY: ICD-10-PCS | Mod: CPTII,S$GLB,, | Performed by: SURGERY

## 2023-10-12 PROCEDURE — 3075F PR MOST RECENT SYSTOLIC BLOOD PRESS GE 130-139MM HG: ICD-10-PCS | Mod: CPTII,S$GLB,, | Performed by: SURGERY

## 2023-10-12 PROCEDURE — 3051F PR MOST RECENT HEMOGLOBIN A1C LEVEL 7.0 - < 8.0%: ICD-10-PCS | Mod: CPTII,S$GLB,, | Performed by: SURGERY

## 2023-10-12 PROCEDURE — 99024 PR POST-OP FOLLOW-UP VISIT: ICD-10-PCS | Mod: S$GLB,,, | Performed by: SURGERY

## 2023-10-12 RX ORDER — FLUCONAZOLE 200 MG/1
200 TABLET ORAL NIGHTLY
Qty: 10 TABLET | Refills: 0 | Status: ON HOLD | OUTPATIENT
Start: 2023-10-12 | End: 2023-10-26 | Stop reason: HOSPADM

## 2023-10-12 NOTE — PROGRESS NOTES
Patient returns to clinic.  She is approximately 4 weeks status post an axillo femoral-femoral bypass.  Patient has a renal failure patient who also had a gangrene of her toe.  She is morbidly obese.  It has multiple other medical problems.  She returns today and her wounds were examined.  She has Pseudomonas growing in her wounds and what looks like fungus.  It took the staples out of the the right side and there is a small area that is open I probed it does not go down low however the patient is morbidly obese and has a deep wound.  We will pack it with Hydrofera blue.  We will put some protectors this skin is very sensitive and I will start her on Diflucan 400 mg to take after dialysis.  The other side has an area we took out some staples as she is some sutures and we have to pack that wound.  Again the patient is significantly obese and her belly folds over that is why she has these issues.  At this point we will keep a close eye on her I will see her next week in clinic and we will get visiting nurse to see her at home.  The wound care people have graciously agreed to dress the wound and to pack the wound.  All the patient's questions were answered.  If there are any problems she can return to clinic or the ED to see us earlier thank you patient is duplex shows the graft to be widely patent.  We will have the patient return to clinic and see us on Monday just to keep an eye on things closely.

## 2023-10-13 VITALS
DIASTOLIC BLOOD PRESSURE: 77 MMHG | HEART RATE: 72 BPM | RESPIRATION RATE: 20 BRPM | TEMPERATURE: 98 F | OXYGEN SATURATION: 98 % | SYSTOLIC BLOOD PRESSURE: 125 MMHG

## 2023-10-13 NOTE — ASSESSMENT & PLAN NOTE
S/p ax fem bypass and stent to sfa with Dr. Maya on 9/14/23  Continue home health wound care  Keep scheduled follow ups with vascular and podiatry  Continue statin and aspirin

## 2023-10-13 NOTE — ASSESSMENT & PLAN NOTE
----- Message from Angela Machado MD sent at 5/9/2023 10:43 AM CDT -----  Liver and lipase test normal, CBC normal, microscopic blood noted in urine, encourage water intake and recheck UA in 2 weeks     Denies chest pain  Continue statin

## 2023-10-16 ENCOUNTER — OFFICE VISIT (OUTPATIENT)
Dept: VASCULAR SURGERY | Facility: CLINIC | Age: 53
End: 2023-10-16
Payer: MEDICARE

## 2023-10-16 VITALS
HEART RATE: 89 BPM | WEIGHT: 251.31 LBS | BODY MASS INDEX: 39.44 KG/M2 | TEMPERATURE: 98 F | SYSTOLIC BLOOD PRESSURE: 170 MMHG | HEIGHT: 67 IN | DIASTOLIC BLOOD PRESSURE: 72 MMHG

## 2023-10-16 DIAGNOSIS — I73.9 PAD (PERIPHERAL ARTERY DISEASE): Primary | ICD-10-CM

## 2023-10-16 PROCEDURE — 1159F PR MEDICATION LIST DOCUMENTED IN MEDICAL RECORD: ICD-10-PCS | Mod: CPTII,S$GLB,, | Performed by: SURGERY

## 2023-10-16 PROCEDURE — 3078F PR MOST RECENT DIASTOLIC BLOOD PRESSURE < 80 MM HG: ICD-10-PCS | Mod: CPTII,S$GLB,, | Performed by: SURGERY

## 2023-10-16 PROCEDURE — 3077F PR MOST RECENT SYSTOLIC BLOOD PRESSURE >= 140 MM HG: ICD-10-PCS | Mod: CPTII,S$GLB,, | Performed by: SURGERY

## 2023-10-16 PROCEDURE — 3066F NEPHROPATHY DOC TX: CPT | Mod: CPTII,S$GLB,, | Performed by: SURGERY

## 2023-10-16 PROCEDURE — 99024 POSTOP FOLLOW-UP VISIT: CPT | Mod: S$GLB,,, | Performed by: SURGERY

## 2023-10-16 PROCEDURE — 3077F SYST BP >= 140 MM HG: CPT | Mod: CPTII,S$GLB,, | Performed by: SURGERY

## 2023-10-16 PROCEDURE — 3051F PR MOST RECENT HEMOGLOBIN A1C LEVEL 7.0 - < 8.0%: ICD-10-PCS | Mod: CPTII,S$GLB,, | Performed by: SURGERY

## 2023-10-16 PROCEDURE — 3051F HG A1C>EQUAL 7.0%<8.0%: CPT | Mod: CPTII,S$GLB,, | Performed by: SURGERY

## 2023-10-16 PROCEDURE — 99999 PR PBB SHADOW E&M-EST. PATIENT-LVL IV: ICD-10-PCS | Mod: PBBFAC,,, | Performed by: SURGERY

## 2023-10-16 PROCEDURE — 1159F MED LIST DOCD IN RCRD: CPT | Mod: CPTII,S$GLB,, | Performed by: SURGERY

## 2023-10-16 PROCEDURE — 99024 PR POST-OP FOLLOW-UP VISIT: ICD-10-PCS | Mod: S$GLB,,, | Performed by: SURGERY

## 2023-10-16 PROCEDURE — 3066F PR DOCUMENTATION OF TREATMENT FOR NEPHROPATHY: ICD-10-PCS | Mod: CPTII,S$GLB,, | Performed by: SURGERY

## 2023-10-16 PROCEDURE — 99999 PR PBB SHADOW E&M-EST. PATIENT-LVL IV: CPT | Mod: PBBFAC,,, | Performed by: SURGERY

## 2023-10-16 PROCEDURE — 3078F DIAST BP <80 MM HG: CPT | Mod: CPTII,S$GLB,, | Performed by: SURGERY

## 2023-10-16 RX ORDER — CIPROFLOXACIN 500 MG/1
500 TABLET ORAL DAILY
Qty: 10 TABLET | Refills: 0 | Status: ON HOLD | OUTPATIENT
Start: 2023-10-16 | End: 2023-10-26 | Stop reason: HOSPADM

## 2023-10-16 NOTE — PROGRESS NOTES
Patient returns.  The wounds look better.  I have debrided them sharply in clinic here.  They do not seem to be tracking deep.  We will continue packing them and probably within the next few days we will see if we can get a VAC in there and if that will help with healing.  She does have drainage.  We will start her on some Cipro it looks like she has a little bit of cellulitis in her lower abdomen, but that could just be from friction.  All of her questions were answered she seems to be doing better were happy with her result.

## 2023-10-17 ENCOUNTER — TELEPHONE (OUTPATIENT)
Dept: DERMATOLOGY | Facility: CLINIC | Age: 53
End: 2023-10-17
Payer: MEDICARE

## 2023-10-17 ENCOUNTER — PATIENT MESSAGE (OUTPATIENT)
Dept: PODIATRY | Facility: CLINIC | Age: 53
End: 2023-10-17
Payer: MEDICARE

## 2023-10-17 LAB — FUNGUS SPEC CULT: NORMAL

## 2023-10-17 NOTE — TELEPHONE ENCOUNTER
I left a message stating the Dr. Naik's schedule is full.  I advised to log into the portal on November 1 st to book into February.

## 2023-10-19 ENCOUNTER — OFFICE VISIT (OUTPATIENT)
Dept: WOUND CARE | Facility: CLINIC | Age: 53
End: 2023-10-19
Payer: MEDICARE

## 2023-10-19 VITALS
TEMPERATURE: 98 F | HEART RATE: 87 BPM | SYSTOLIC BLOOD PRESSURE: 133 MMHG | WEIGHT: 245.56 LBS | BODY MASS INDEX: 38.54 KG/M2 | DIASTOLIC BLOOD PRESSURE: 60 MMHG | HEIGHT: 67 IN

## 2023-10-19 DIAGNOSIS — S31.109A WOUND OF LEFT GROIN, INITIAL ENCOUNTER: ICD-10-CM

## 2023-10-19 DIAGNOSIS — T81.31XA SURGICAL WOUND DEHISCENCE, INITIAL ENCOUNTER: ICD-10-CM

## 2023-10-19 DIAGNOSIS — Z95.828 S/P FEMORAL-FEMORAL BYPASS SURGERY: ICD-10-CM

## 2023-10-19 DIAGNOSIS — S31.109A WOUND OF RIGHT GROIN, INITIAL ENCOUNTER: Primary | ICD-10-CM

## 2023-10-19 PROCEDURE — 11042 DEBRIDEMENT: ICD-10-PCS | Mod: S$GLB,,,

## 2023-10-19 PROCEDURE — 3008F PR BODY MASS INDEX (BMI) DOCUMENTED: ICD-10-PCS | Mod: CPTII,S$GLB,,

## 2023-10-19 PROCEDURE — 99214 OFFICE O/P EST MOD 30 MIN: CPT | Mod: 25,S$GLB,,

## 2023-10-19 PROCEDURE — 11045 DEBRIDEMENT: ICD-10-PCS | Mod: S$GLB,,,

## 2023-10-19 PROCEDURE — 1111F PR DISCHARGE MEDS RECONCILED W/ CURRENT OUTPATIENT MED LIST: ICD-10-PCS | Mod: CPTII,S$GLB,,

## 2023-10-19 PROCEDURE — 3051F HG A1C>EQUAL 7.0%<8.0%: CPT | Mod: CPTII,S$GLB,,

## 2023-10-19 PROCEDURE — 99024 PR POST-OP FOLLOW-UP VISIT: ICD-10-PCS | Mod: S$GLB,,,

## 2023-10-19 PROCEDURE — 1159F MED LIST DOCD IN RCRD: CPT | Mod: CPTII,S$GLB,,

## 2023-10-19 PROCEDURE — 99024 POSTOP FOLLOW-UP VISIT: CPT | Mod: S$GLB,,,

## 2023-10-19 PROCEDURE — 3078F PR MOST RECENT DIASTOLIC BLOOD PRESSURE < 80 MM HG: ICD-10-PCS | Mod: CPTII,S$GLB,,

## 2023-10-19 PROCEDURE — 1159F PR MEDICATION LIST DOCUMENTED IN MEDICAL RECORD: ICD-10-PCS | Mod: CPTII,S$GLB,,

## 2023-10-19 PROCEDURE — 3075F SYST BP GE 130 - 139MM HG: CPT | Mod: CPTII,S$GLB,,

## 2023-10-19 PROCEDURE — 3075F PR MOST RECENT SYSTOLIC BLOOD PRESS GE 130-139MM HG: ICD-10-PCS | Mod: CPTII,S$GLB,,

## 2023-10-19 PROCEDURE — 3066F PR DOCUMENTATION OF TREATMENT FOR NEPHROPATHY: ICD-10-PCS | Mod: CPTII,S$GLB,,

## 2023-10-19 PROCEDURE — 3078F DIAST BP <80 MM HG: CPT | Mod: CPTII,S$GLB,,

## 2023-10-19 PROCEDURE — 3066F NEPHROPATHY DOC TX: CPT | Mod: CPTII,S$GLB,,

## 2023-10-19 PROCEDURE — 11042 DBRDMT SUBQ TIS 1ST 20SQCM/<: CPT | Mod: S$GLB,,,

## 2023-10-19 PROCEDURE — 99214 PR OFFICE/OUTPT VISIT, EST, LEVL IV, 30-39 MIN: ICD-10-PCS | Mod: 25,S$GLB,,

## 2023-10-19 PROCEDURE — 11045 DBRDMT SUBQ TISS EACH ADDL: CPT | Mod: S$GLB,,,

## 2023-10-19 PROCEDURE — 99999 PR PBB SHADOW E&M-EST. PATIENT-LVL IV: ICD-10-PCS | Mod: PBBFAC,,,

## 2023-10-19 PROCEDURE — 1111F DSCHRG MED/CURRENT MED MERGE: CPT | Mod: CPTII,S$GLB,,

## 2023-10-19 PROCEDURE — 99999 PR PBB SHADOW E&M-EST. PATIENT-LVL IV: CPT | Mod: PBBFAC,,,

## 2023-10-19 PROCEDURE — 3008F BODY MASS INDEX DOCD: CPT | Mod: CPTII,S$GLB,,

## 2023-10-19 PROCEDURE — 3051F PR MOST RECENT HEMOGLOBIN A1C LEVEL 7.0 - < 8.0%: ICD-10-PCS | Mod: CPTII,S$GLB,,

## 2023-10-19 NOTE — PROCEDURES
"Debridement    Date/Time: 10/19/2023 2:30 PM    Performed by: Vicki George PA-C  Authorized by: Vicki George PA-C    Time out: Immediately prior to procedure a "time out" was called to verify the correct patient, procedure, equipment, support staff and site/side marked as required.    Consent Done?:  Yes (Written)  Local anesthesia used?: Yes    Local anesthetic:  Topical anesthetic (Topical 4% Lidocaine Hydrochloride)    Debridement - 1st Wound - General Location: right groin.       Length (cm):  2.2       Area (sq cm):  2.86       Width (cm):  1.3       Percent Debrided (%):  100       Depth (cm):  6       Total Area Debrided (sq cm):  2.86    Depth of debridement:  Subcutaneous tissue    Tissue debrided:  Subcutaneous    Devitalized tissue debrided:  Biofilm, Exudate, Fibrin and Slough    Instruments:  Curette  Bleeding:  Minimal  Hemostasis Achieved: Yes  Method Used:  Pressure    Additional wounds:  1    2nd Wound Details:     Debridement - 2nd Wound - General Location: left groin.    Type of Debridement:  Excisional       Length (cm):  6.2       Area (sq cm):  20.46       Width (cm):  3.3       Percent Debrided (%):  100       Depth (cm):  1.5       Total Area Debrided (sq cm):  20.46    Depth of debridement:  Subcutaneous tissue    Tissue debrided:  Subcutaneous    Devitalized tissue debrided:  Biofilm, Exudate, Fibrin and Slough    Instruments:  Curette  Bleeding:  Minimal  Hemostasis Achieved: Yes    Method Used:  Pressure  Patient tolerance:  Patient tolerated the procedure well with no immediate complications    "

## 2023-10-19 NOTE — PROGRESS NOTES
Subjective:       Patient ID: Georgina Arias is a 52 y.o. female.    Chief Complaint: Wound Consult and Wound Dehiscence    Patient presents for an evaluation of bilateral groin wounds. Pt s/p axillo femoral-femoral bypass with Dr. Maya on 9/14/23. She was found to have wound dehiscence on 10/12/23. Patient was following with vascular surgery for wound care. Her wounds have been packed with aquacel rope, hydrofera blue, and covered with border dressings. Dr. Maya prescribed difucan and ciprofloxacin. Admits compliance. Denies any side effects of medications. Her wounds were debrided in clinic. He ordered a wound vac for the right groin wound- pending insurance approval. Patient has [facility] home health. Denies fever, chills, erythema, warmth, purulent drainage, or pain.        Review of Systems   Constitutional:  Negative for activity change, chills, diaphoresis, fatigue and fever.   Respiratory:  Negative for apnea, chest tightness and shortness of breath.    Cardiovascular:  Negative for chest pain, palpitations and leg swelling.   Musculoskeletal:  Negative for gait problem and joint swelling.   Skin:  Positive for wound. Negative for color change, pallor and rash.   Neurological:  Negative for syncope, weakness and numbness.   Psychiatric/Behavioral:  Negative for agitation. The patient is not nervous/anxious.    All other systems reviewed and are negative.      Objective:      Physical Exam  Vitals reviewed.   Constitutional:       General: She is not in acute distress.     Appearance: Normal appearance. She is obese.   Pulmonary:      Effort: No respiratory distress.   Musculoskeletal:         General: No swelling.   Skin:     General: Skin is warm and dry.      Findings: Wound present. No erythema.          Neurological:      General: No focal deficit present.      Mental Status: She is alert and oriented to person, place, and time.   Psychiatric:         Mood and Affect: Mood normal.         Behavior:  Behavior normal.         Thought Content: Thought content normal.         Judgment: Judgment normal.         Assessment:       1. Wound of right groin, initial encounter    2. Wound of left groin, initial encounter    3. Surgical wound dehiscence, initial encounter    4. S/P femoral-femoral bypass surgery    5. BMI 38.0-38.9,adult           Incision/Site 09/14/23 1203 Left Groin (Active)   09/14/23 1203   Present Prior to Hospital Arrival?:    Side: Left   Location: Groin   Orientation:    Incision Type:    Closure Method:    Additional Comments:    Removal Indication and Assessment:    Wound Outcome:    Removal Indications:    Wound Image   10/19/23 1537   Dressing Appearance Dry;Intact;Clean;Moist drainage 10/19/23 1537   Drainage Amount Large 10/19/23 1537   Drainage Characteristics/Odor Serosanguineous 10/19/23 1537   Red (%), Wound Tissue Color 40 % 10/19/23 1537   Yellow (%), Wound Tissue Color 60 % 10/19/23 1537   Periwound Area Intact;Pink 10/19/23 1537   Wound Length (cm) 6.2 cm 10/19/23 1537   Wound Width (cm) 3.3 cm 10/19/23 1537   Wound Depth (cm) 1.5 cm 10/19/23 1537   Wound Volume (cm^3) 30.69 cm^3 10/19/23 1537   Wound Surface Area (cm^2) 20.46 cm^2 10/19/23 1537   Undermining (depth (cm)/location) 0.5 cm/ 12 o'clock 10/19/23 1537   Care Cleansed with:;Soap and water 10/19/23 1537   Dressing Applied;Calcium alginate;Island/border 10/19/23 1537   Packing packed with;hydrofiber/alginate 10/19/23 1537   Periwound Care Skin barrier film applied 10/19/23 1537            Incision/Site 09/14/23 1230 Right Groin anterior (Active)   09/14/23 1230   Present Prior to Hospital Arrival?:    Side: Right   Location: Groin   Orientation: anterior   Incision Type:    Closure Method:    Additional Comments:    Removal Indication and Assessment:    Wound Outcome:    Removal Indications:    Wound Image   10/19/23 1537   Dressing Appearance Dry;Intact;Clean;Moist drainage 10/19/23 1537   Drainage Amount Large 10/19/23  1537   Drainage Characteristics/Odor Serosanguineous 10/19/23 1537   Red (%), Wound Tissue Color 50 % 10/19/23 1537   Yellow (%), Wound Tissue Color 50 % 10/19/23 1537   Periwound Area Intact;Pink 10/19/23 1537   Wound Length (cm) 2.2 cm 10/19/23 1537   Wound Width (cm) 1.3 cm 10/19/23 1537   Wound Depth (cm) 6 cm 10/19/23 1537   Wound Volume (cm^3) 17.16 cm^3 10/19/23 1537   Wound Surface Area (cm^2) 2.86 cm^2 10/19/23 1537   Undermining (depth (cm)/location) 6 cm/ 5-9 o'clock and 5.5 cm/ 6 o'clock 10/19/23 1537   Care Cleansed with:;Soap and water 10/19/23 1537   Dressing Applied;Calcium alginate;Island/border 10/19/23 1537   Packing packed with;hydrofiber/alginate 10/19/23 1537   Periwound Care Skin barrier film applied 10/19/23 1537       Georgina was seen in the clinic room and placed in the supine position on the treatment table.  The dressings were removed and the area was cleansed with Easi-clense sponges and dried thoroughly. No odor, erythema, or warmth noted. Placed topical 4% Lidocaine Hydrochloride to wound beds for 15 minutes. Sharp debridement with 5 mm curette to remove non-viable tissue. Pt tolerated procedure well. Removed remaining 5 sutures to left groin wound. Pt tolerated well.      Plan of Care: Skin prep to periwounds, packed wounds with aquacel rope, and covered with border dressings.          Plan:       Bilateral groins were dressed as detailed above.  Patient was instructed to not get the dressings wet and to use cast covers for showering.  Should the dressing become wet, she is to remove it, place a moist dressing over the wound, cover with gauze and roll gauze and to secure bandages. She should then notify this office as soon as possible to have a new dressing applied.    Discussed nutrition and the role of protein in wound healing with the patient. Instructed patient to optimize protein for wound healing.     Instructed patient to keep follow ups with podiatry for feet  wounds.    Instructed patient to continue ciprofloxacin and diflucan as prescribed by vascular surgery.    Since patient is coming Monday, will fax home health orders on Monday.     Will utilize wound vac to right groin wound once approved by insurance.    Written and verbal instructions given to patient  RTC Monday- will see with Dr. Francesco George PA-C

## 2023-10-20 NOTE — PROCEDURES
Wound Debridement L medial foot    Date/Time: 10/11/2023 2:45 PM    Performed by: Aleida Flannery DPM  Authorized by: Aleida Flannery DPM    Consent Done?:  Yes (Verbal)    Wound Details:    Location:  Left foot    Location:  Left Midfoot    Type of Debridement:  Excisional       Length (cm):  5       Area (sq cm):  1       Width (cm):  0.2       Percent Debrided (%):  100       Depth (cm):  0.2       Total Area Debrided (sq cm):  1    Depth of debridement:  Epidermis/Dermis    Tissue debrided:  Epidermis and Dermis    Devitalized tissue debrided:  Slough and Callus    Instruments:  Nippers  Bleeding:  Minimal  Hemostasis Achieved: Yes  Method Used:  Pressure    Additional wounds:  1    2nd Wound Details:     Location:  Right foot    Location:  Right 1st Metatarsal Head    Location:  Right 1st Metatarsal Head    Type of Debridement:  Excisional       Length (cm):  3       Area (sq cm):  0.9       Width (cm):  0.3       Percent Debrided (%):  100       Depth (cm):  0       Total Area Debrided (sq cm):  0.9    Depth of debridement:  Epidermis/Dermis    Tissue debrided:  Epidermis and Dermis    Devitalized tissue debrided:  Callus and Necrotic/Eschar    Instruments:  Nippers and Blade  Bleeding:  Minimal  Hemostasis Achieved: Yes    Method Used:  Silver Nitrate  Patient tolerance:  Patient tolerated the procedure well with no immediate complications

## 2023-10-20 NOTE — PROGRESS NOTES
Subjective:      Patient ID: Georgina Arias is a 52 y.o. female.    Chief Complaint: Post-op Evaluation    Patient is here for f/u wound L as well as painful calluses R foot. States pain is from DM neuropathy but indicates plantar lateral foot B/L. Pain level otherwise 2/10 re: wound. Still doing HD TTh.  9/25/23  Patient presents today for f/u non-healing amp.L hallux, pending vascular intervention. States she is having home health MWF & OT Tues; HD TTh. Doing & feeling a lot better in that pain is so much better since ischemic pain has resolved. Most pain now is just in B/L groin from bypass.  Patient called 9/7/23 requesting sooner appt.d/t concern for risk of further loss if postponed. Moved to 9/11/23.    Since last visit 9/5/23, was seen for general surgery consult 9/6/23 @ [place]; prescribed Santyl & asked to request from this clinic vascular surgery consult @ [facility], which was placed same day.   Vascular surgery consult was moved up from 9/18/23 to 9/11/23. Wound was debrided & packed w/ Aquacel Ag. Patient [place] to [facility] [place] Abx & scheduled angiogram 9/13.   Seen for podiatry consult 9/12/23 re: non-healing & infected wound w/ possible osteomyelitis.   9/13/23 underwent aortogram w/ extremity run-off, indicating significant aortoiliac as well as SFA disease requiring bypass vs endovascular procedure.   Had LLE angiogram, PTA/Stent SFA, bi-fem bypass L 9/14/23 as well as transmet.amp.1st L w/ I&D foot - staple closure of flap.  DC w/ HH PT/OT 9/19/23.  9/6/23  Patient called back 9/6/23 re:update on her visit w/ cardiolovascular. Stated she saw Dr Armendariz @ [facility] today to discuss circulation issues to her legs. She was informed by that office that she would need to be seen by a provider to potentially schedule her for stents as he did not perform that procedure. Requesting referral to peripheral vascular surgery. Scheduled for 9/18/23 w/ Dr. Maya.  9/5/23  Patient is here again PO L hallux w/  amp.@MPJ as well as I&D 1st ray L, d/t infection flexor tendon. DOS 8/25/23. Previous I&D L great toe/ FHL w/ HIPJ level amp, DOS 8/14/23. Initial ulcer excisio w/ debridement distal tuft hallux, DOS 8/11/23.  Still pending vascular consult tomorrow (outside provider). Having ischemic pain. Pain level 9/10. On Gabapentin - will need to d/w nephro as to wherther she can increase doseage from 300mg tid prn.   8/22/23  Patient is PO I&D L great toe & long flexor tendon L foot, as well as amp.HIPJ d/t ischemic ulcer w/ suspected abscess/ osteomyelitis; DOS 8/14/23. Also had requested injection for painful medial ankle/TP tendon L - while under anesthesia for surgery. On Doxycycline 100mg bid pending C&S aerobic & anaerobic from intraop culture.  Less pain than previous PO.  Is also PO excision ulcer L hallux w/ avulsion of hallux nail plate & resection distal tuft of hallux; DOS 08/03/2023.  Patient had called later DOS for c/o cough w/ 'blood clots' - was advised to call PCP or UC. Had US LE veins B/L 8/14/23 d/t leg swelling & h/o DVT. Just dryness from the O2 likely caused it as it does w/allergies that cause nosebleeds but wanted to make sure.  Specimen to Pathology, Surgery Other  Order: [id number]  Status: Final result     Visible to patient: Yes (not seen)     Next appt: 09/05/2023 at 04:00 PM in Podiatry (Aleida Flannery DPM)     0 Result Notes      Component 8 d ago   Final Pathologic Diagnosis RELIAPA DIAGNOSIS:     LEFT BIG TOE, AMPUTATION:   - Benign skin showing epidermal necrosis and necrotic soft tissue.   - Underlying benign bone showing acute osteomyelitis.   - Skin and soft tissue margin showing focal acute inflammation.   - Bone resection margin showing acute osteomyelitis.     Aida Bond M.D.     Report attached.     Performing site:   [facility]   [address] [place] [address]     &quot;Disclaimer:  This case diagnosis was rendered completely by the outside consultation pathologist  and the case is electronically signed by an [facility] pathologist listed below solely to release the report into the medical record.&quot;    Comment: Interp By Carlos Tucker MD, Signed on 08/22/2023 at 15:04   Disclaimer Unless the case is a 'gross only' or additional testing only, the final diagnosis for each specimen is based on a microscopic examination of appropriate tissue sections.   Resulting Agency [email or web address]      Given results indicating possible remaining osteomyelitis, as w/ x-rays, will have patient on antibiotics for 6 weeks.    Culture, Anaerobic  Order: [id number]  Status: Final result     Visible to patient: Yes (not seen)     Next appt: 08/22/2023 at 01:00 PM in Podiatry (Aleida Flannery DPM)     Specimen Information: Toe, Left Foot; Wound   0 Result Notes      Component 7 d ago   Anaerobic Culture  Abnormal   PEPTONIPHILUS SPECIES   Moderate   Further identified as peptoniphilus coxii     Anaerobic Culture  Abnormal   PREVOTELLA BERGENSIS   Many     Anaerobic Culture  Abnormal   PREVOTELLA (B.) MELANINOGENICA   Many     Resulting Agency [facility]            Narrative  Performed by: [facility]  Big toe      Specimen Collected: 08/14/23 12:57 Last Resulted: 08/21/23 12:22           US Lower Extremity Veins Bilateral  Narrative: EXAMINATION:  US LOWER EXTREMITY VEINS BILATERAL    CLINICAL HISTORY:  Other specified soft tissue disorders    TECHNIQUE:  Duplex and color flow Doppler and dynamic compression was performed of the bilateral lower extremity veins was performed.    COMPARISON:  None    FINDINGS:  Right thigh veins: The common femoral, femoral, popliteal, upper greater saphenous, and deep femoral veins are patent and free of thrombus. The veins are normally compressible and have normal phasic flow and augmentation response.    Right calf veins: The visualized calf veins are patent.    Left thigh veins: The common femoral, femoral, popliteal, upper greater saphenous, and deep femoral veins are patent and free  of thrombus. The veins are normally compressible and have normal phasic flow and augmentation response.    Left calf veins: The visualized calf veins are patent.    Miscellaneous: None  Impression: No evidence of deep venous thrombosis in either lower extremity.    Electronically signed by: Jamshid Coleman  Date:    08/14/2023    Aerobic culture  Order: [id number]  Status: Final result     Visible to patient: Yes (not seen)     Next appt: 08/21/2023 at 07:30 AM in Radiology ([facility] XR[facility])     Specimen Information: Toe, Left Foot; Wound   0 Result Notes      Component 4 d ago   Aerobic Bacterial Culture No growth    Resulting Agency [facility]            Narrative  Performed by: [facility]  Big toe      Specimen Collected: 08/14/23 12:57 Last Resulted: 08/18/23 10:03           08/11/2023  Patient is PO excision ulcer distal toe & nail avulsion L hallux. DOS 8/3/23. POD 8 days. Having significant pain despite Gabapentin 300mg tid (added to her normal dose of 100mg tid). Culture results 8/7/22 w/ MSS[facility] - Abx sent in but patient not able to be reached by phone initially. Was finally notified 8/8/23. Also, advised to come in today as an add on d/t drainage & pain.  States pain level is 8/10 but also dealing w/ home dialysis procedure R arm.    Placed on Doxycycline 100mg bid.   Contains abnormal data Aerobic culture  Order: [id number]  Status: Final result     Visible to patient: Yes (not seen)     Next appt: 08/21/2023 at 07:30 AM in Radiology ([facility])     Specimen Information: Toe, Left Foot; Bone   0 Result Notes     important suggestion  Newer results are available. Click to view them now.         Component 2 wk ago   Aerobic Bacterial Culture  Abnormal   STAPHYLOCOCCUS AUREUS   Many     Resulting Agency [facility]        Susceptibility     Staphylococcus aureus     CULTURE, AEROBIC  (SPECIFY SOURCE)     Clindamycin <=0.5 mcg/mL Resistant     Erythromycin >4 mcg/mL Resistant     Oxacillin <=0.25 mcg/mL Sensitive     Penicillin 2 mcg/mL  Resistant     Tetracycline <=4 mcg/mL Sensitive     Trimeth/Sulfa <=0.5/9.5 m... Sensitive               Linear View         Specimen Collected: 08/03/23 12:27 Last Resulted: 08/07/23 08:55           Contains abnormal data Culture, Anaerobe  Order: [id number]  Status: Final result     Visible to patient: Yes (seen)     Next appt: 08/21/2023 at 07:30 AM in Radiology ([facility] XR[facility])     Specimen Information: Toe, Left Foot; Bone   0 Result Notes     important suggestion  Newer results are available. Click to view them now.         Component 2 wk ago   Anaerobic Culture  Abnormal   PEPTONIPHILUS SPECIES   Many   further identified as Peptoniphilus coxii     Anaerobic Culture  Abnormal   PORPHYROMONAS SOMERAE   Moderate     Anaerobic Culture  Abnormal   PREVOTELLA BERGENSIS   Moderate     Resulting Agency [facility]              Specimen Collected: 08/03/23 12:27 Last Resulted: 08/07/23 12:27           8/1/23  Patient is here for ED f/u of cellulitis 7/30/23. Had L foot & ankle pain intermittently x 4 days before presentation, as well as an ulcer distal hallux w/ purulence. Placed on Cleocin for 10 days & Bactroban. Xrays taken. Using a bandaid. Still having pain medial ankle but redness in improved. Pain level 8/10 L medial ankle > hallux since Sun.  Last in this clinic 7/13/23 w/ blister L great toe; stated was trying to break in a new pair of shoes. Also calluses plantar 1st & 3rd met.head R. Calluses were debrided along w/ adjacent 1st met.head blister. Decompressed along w/ superficial blister distal L hallux. Nails were also reduced. Gelstrap was dispensed for arch support.  7/13/23  Georgina is a 52 y.o. female who presented new to this clinic almost a year ago. States is applying for SSI.  Noticed blister 2 days ago L great toe. Thinks discoloration is starting a blister R foot also. Tried to break in a pair of shoes. Has calluses always. Now on dialysis - getting ready for home HD. Has fistula in place.    PCP: Alonso ALVARADO  MD Anuradha  [address] [facility]  Date Last Seen by PCP: 6/12/23    Home dialysis.   Georgina has a past medical history of Hyperlipidemia, Hypertension, Peptic ulcer disease, Peripheral artery disease, PONV (postoperative nausea and vomiting), Stage IV CKD, and Type II diabetes mellitus.   Dx DM 1995. Neuropathy after about 10 yrs.   PAD Nov 2006, DVT RLE & stent placement both times; most recent around March/ April 2008.  Waiting on bariatric preop to get BMI down as candidate for kidney transplant.  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, with other ophthalmic complication    Ureteral stone with hydronephrosis    Acute kidney injury superimposed on CKD    Nephrolithiasis    Pre-operative examination    Acute respiratory failure with hypoxia and hypercapnia    Elevated troponin    Pneumonia due to COVID-19 virus    Hypertensive emergency    Lactic acidosis    Hyperkalemia    Metabolic acidosis    CHF (congestive heart failure)    Class 2 severe obesity with serious comorbidity and body mass index (BMI) of 38.0 to 38.9 in adult    Open wound of left foot    ESRD (end stage renal disease)    Severe obesity (BMI >= 40)    Pre-op exam    S/P femoral-femoral bypass surgery     States in clinic 7.4% last visit, from 7.8%;  4/2023 - 8.4%  Hemoglobin A1C   Date Value Ref Range Status   09/11/2023 7.2 (H) 4.0 - 5.6 % Final     Comment:     ADA Screening Guidelines:  5.7-6.4%  Consistent with prediabetes  >or=6.5%  Consistent with diabetes    High levels of fetal hemoglobin interfere with the HbA1C  assay. Heterozygous hemoglobin variants (HbS, HgC, etc)do  not significantly interfere with this assay.   However, presence of multiple variants may affect accuracy.     08/28/2018 7.9 (H) 4.0 - 5.6 %  Final     Comment:     ADA Screening Guidelines:  5.7-6.4%  Consistent with prediabetes  >or=6.5%  Consistent with diabetes  High levels of fetal hemoglobin interfere with the HbA1C  assay. Heterozygous hemoglobin variants (HbS, HgC, etc)do  not significantly interfere with this assay.   However, presence of multiple variants may affect accuracy.     03/24/2015 9.9 (H) 4.5 - 6.2 % Final      Objective:       X-Ray Toe 2 or More Views Left  Narrative: EXAMINATION:  XR TOE 2 OR MORE VIEWS LEFT    CLINICAL HISTORY:  postop;    TECHNIQUE:  Three views of the left toes were performed    COMPARISON:  None.    FINDINGS:  The patient is status post amputation of the distal aspect of the distal phalanx of the great toe.  Surgical changes are identified.  The remainder of the bones are intact.  There is no evidence for dislocation.  No bony erosions are identified.  No radiopaque soft tissue foreign bodies are evident.  Impression: Postsurgical changes related to resection of the distal aspect of the distal phalanx of the left great toe.    Electronically signed by: Gio Snyder MD  Date:    08/03/2023  Time:    13:20  I independently reviewed the Xray images. As expected clean resection distal tuft L hallux.     X-Ray Foot Complete Left  Order: [id number]  Status: Final result     Visible to patient: Yes (seen)     Next appt: 08/22/2023 at 01:00 PM in Podiatry (Aleida Flannery DPM)     Dx: Left foot pain     0 Result Notes  Details  Reading Physician Reading Date Result Priority   Orlando Lewis MD  [phone] 7/30/2023 STAT     Narrative & Impression  EXAMINATION:  XR FOOT COMPLETE 3 VIEW LEFT     CLINICAL HISTORY:  .  Pain in left foot     TECHNIQUE:  AP, lateral and oblique views of the left foot were performed.     COMPARISON:  None     FINDINGS:  Four views are submitted.     No acute fracture, subluxation or dislocation.  No mass or foreign body.  No significant arthropathy.     Impression:     No acute radiographic  abnormality.       Electronically signed by: Orlando Mckenzie  Date:                                            07/30/2023  Time:                                           16:10   I independently reviewed the Xray images. Suspicious for distal tuft hallux lytic changes.    -LE arterial ultrasound 10/17/22 significant for 50-70% stenosis R CFA, atherosclerotic plaques & monophasic flow noted throughout BLE.  -prior [facility] recods document R SFA stent in 2009 & angio in 2013 w/ percutaneous transluminal angioplasty of R common iliac artery.   -previously on plavix   -followed by cardiology    Ambulates w/ a walker, using wheelchair.  In a surgery shoe L.    VAS Ankle Brachial Indices Resting  Order: [id number]  Status: Final result     Visible to patient: Yes (not seen)     Next appt: 10/11/2023 at 02:45 PM in Podiatry (Aleida Flannery DPM)     0 Result Notes  Details    Narrative    Indication   ========     PAD     Pressure Lower   ============     Rt brachial A syst BP  141 mmHg   Rt low thigh BP    144 mmHg   Rt calf  mmHg   Rt PTA BP  151 mmHg   Rt dors pedis A  mmHg   Rt toe BP  82 mmHg   Right CAMILA ratio use:   post. tibial   Rt CAMILA post tibial (rt post tib A BP / max brach A BP) 1.07   Rt CAMILA (rt dors ped A BP / max brach A BP) 0.86   Rt ankle BP / max brach A BP   1.07   Rt TBI (rt toe BP / max brach A BP)    0.58   Lt low thigh BP    133 mmHg   Lt calf  mmHg   Lt PTA BP  118 mmHg   Lt dors pedis A  mmHg   Lt toe BP  88 mmHg   Left CAMILA ratio use:    post. tibial   Lt CAMILA (lt post tibial A BP / max brach A BP)  0.84   Lt CAMILA dors ped (lt dors ped A BP / max brach A BP)    0.82   Lt ankle BP / max brach A BP   0.84   Lt TBI (lt toe BP / max brach A BP)    0.62     PPG Lower   =========     Rt toe BP - PPG    82 mmHg   Rt toe digit waveform: normal   Lt 4th digit BP    88 mmHg   Lt 4th digit waveform: mildly dampened     Impression   =========     Right Leg: Segmental Pressures suggest no evidence of  peripheral arterial occlusive disease. However, PVR waveforms suggest mild   peripheral arterial occlusive disease.   Rt lower digits: Toe PPG waveforms as described above. - TBI of 0.58 with a Great toe pressure of 82 mmHg is noted.     Left Leg: Segmental Pressures suggest and PVR waveforms suggest mild peripheral arterial occlusive disease.   Lt lower digits: Toe PPG waveforms as described above. - TBI of 0.62 with a fourth toe pressure of 88 mmHg is noted.       DATE OF SERVICE: 09/18/2023                                                        Sonographer: Edith Antonio   Electronically Signed by: ANNA Andrade M.D. at 09/19/2023-09:25     VAS Ankle Brachial Indices Resting: Patient Communication     Add Comments   Add Notifications      Narrative  Indication   ========     PAD     Pressure Lower   ============     Rt brachial A syst BP  141 mmHg   Rt low thigh BP    144 mmHg   Rt calf  mmHg   Rt PTA BP  151 mmHg   Rt dors pedis A  mmHg   Rt toe BP  82 mmHg   Right CAMILA ratio use:   post. tibial   Rt CAMILA post tibial (rt post tib A BP / max brach A BP) 1.07   Rt CAMILA (rt dors ped A BP / max brach A BP) 0.86   Rt ankle BP / max brach A BP   1.07   Rt TBI (rt toe BP / max brach A BP)    0.58   Lt low thigh BP    133 mmHg   Lt calf  mmHg   Lt PTA BP  118 mmHg   Lt dors pedis A  mmHg   Lt toe BP  88 mmHg   Left CAMILA ratio use:    post. tibial   Lt CAMILA (lt post tibial A BP / max brach A BP)  0.84   Lt CAMILA dors ped (lt dors ped A BP / max brach A BP)    0.82   Lt ankle BP / max brach A BP   0.84   Lt TBI (lt toe BP / max brach A BP)    0.62     PPG Lower   =========     Rt toe BP - PPG    82 mmHg   Rt toe digit waveform: normal   Lt 4th digit BP    88 mmHg   Lt 4th digit waveform: mildly dampened     Impression   =========     Right Leg: Segmental Pressures suggest no evidence of peripheral arterial occlusive disease. However, PVR waveforms suggest mild   peripheral arterial occlusive disease.   Rt  lower digits: Toe PPG waveforms as described above. - TBI of 0.58 with a Great toe pressure of 82 mmHg is noted.     Left Leg: Segmental Pressures suggest and PVR waveforms suggest mild peripheral arterial occlusive disease.   Lt lower digits: Toe PPG waveforms as described above. - TBI of 0.62 with a fourth toe pressure of 88 mmHg is noted.     DATE OF SERVICE: 09/18/2023                                                        Sonographer: Edith Antonio   Electronically Signed by: ANNA Andrade M.D. at 09/19/2023-09:25        10/11/23              9/19/23              X-Ray Foot Complete Left  Order: [id number]  Status: Final result     Visible to patient: Yes (not seen)     Next appt: 10/11/2023 at 02:45 PM in Podiatry (Aleida Flannery DPM)     0 Result Notes  Details  Reading Physician Reading Date Result Priority   Cristobal Hendrickson MD  [phone] 9/14/2023 STAT     Narrative & Impression  EXAMINATION:  XR FOOT COMPLETE 3 VIEW LEFT     CLINICAL HISTORY:  post op xray;     TECHNIQUE:  Three views of the left foot.     COMPARISON:  09/11/2023.     FINDINGS:  Postoperative changes of transmetatarsal resection of the 1st metatarsal.  Irregularity of the 1st metatarsal remnant on the frontal view which could be related to operative changes.  Overlapping skin staples along the medial aspect of the foot.  Subcutaneous emphysema and soft tissue edema potentially related to recent surgery or infection.  Bones are otherwise similar when compared with recent prior exam.     Impression:     Postoperative changes in the left foot as discussed above.       Electronically signed by: Cristobal Hendrickson MD  Date:                                            09/14/2023  Time:                                           22:24     MRI Foot (Forefoot) Left Without Contrast  Order: [id number]  Status: Final result     Visible to patient: Yes (seen)     Next appt: 10/11/2023 at 02:45 PM in Podiatry (Aleida Flannery DPM)     0 Result  Notes  Details  Reading Physician Reading Date Result Priority   Neeraj Lopez MD  [phone]  [phone] 9/12/2023 STAT   Maday Kurtz MD  [phone] 9/12/2023      Narrative & Impression  EXAMINATION:  MRI FOOT (FOREFOOT) LEFT WITHOUT CONTRAST; MRI FOOT (MIDFOOT) LEFT WITHOUT CONTRAST     CLINICAL HISTORY:  Foot pain, stress fracture suspected, neg xray;     TECHNIQUE:  Routine multiplanar multisequence images of the left midfoot and forefoot obtained without intravenous contrast.     COMPARISON:  Foot radiograph 09/11/2023, 08/25/2023     FINDINGS:  Bones: Status post amputation of the 1st distal and proximal phalanges.  There is patchy and reticular decreased T1 signal in the 1st sesamoids and metatarsal head which extends to the metatarsal shaft with associated increased stir signal compatible bone marrow edema.  Indistinctness of the cortical margin at the superior and inferior aspects of the metatarsal head on T1 weighted images.     Joints: No large joint effusions.  No displacement.     Soft tissues: There is skin defect along the anterolateral margin of the surgical site with associated edema of the surrounding subcutaneous soft tissues.  Note is made of STIR signal intensity of the majority of the imaged ventral muscles of the foot.  Dorsal subcutaneous edema is also noted.     Synovial thickening of the adjacent tendon suggestive of associated tenosynovitis.  No evidence of is collections to suggest abscess formation.     Impression:     Status post amputation of the 1st distal and proximal phalanges.  Large skin defect along the anterolateral margin of the surgical site with associated bone marrow edema of the underlying 1st metatarsal and sesamoids concerning for osteomyelitis.     Synovial thickening is noted of the adjacent tendon, suggestive of associated tenosynovitis.     Surrounding generalized muscular and soft tissue edema.  No organized collections to suggest  abscess formation, allowing for noncontrast examination.     Electronically signed by resident: Maday Kurtz  Date:                                            09/12/2023  Time:                                           19:11     Electronically signed by: Neeraj Lopez MD  Date:                                            09/12/2023  Time:                                           20:17     CTA Runoff ABD PEL Bilat Lower Ext  Order: [id number]  Status: Final result     Visible to patient: Yes (not seen)     Next appt: 10/11/2023 at 02:45 PM in Podiatry (Aleida Flannery DPM)     0 Result Notes  Details    Reading Physician Reading Date Result Priority   Manpreet Bauer MD  [phone] 9/13/2023 Routine   Lauro Salter MD  [phone] 9/13/2023      Narrative & Impression  EXAMINATION:  CTA RUNOFF ABD PEL BILAT LOWER EXT     CLINICAL HISTORY:  Peripheral arterial disease (PAD), asymptomatic;     TECHNIQUE:  CTA of abdomen, pelvis, and bilateral lower extremities performed with 125 mL Omnipaque 350 intravenous contrast.     COMPARISON:  Ultrasound lower extremity veins bilateral 08/14/2023     FINDINGS:  CHEST:     Lungs/Pleura: Limited evaluation due to respiratory motion.  Scattered patchy heterogeneous airspace opacities at the lung bases without evidence for focal consolidation, pleural effusion, or pneumothorax.     Heart: Normal size. Coronary artery calcification in a multi vessel distribution.  No pericardial effusion.     Thoracic soft tissues: No significant abnormality.     ABDOMEN/PELVIS:     Liver: Enlarged size with smooth contours. No focal abnormality.     Gallbladder: Cholecystectomy     Bile ducts: No intrahepatic or extrahepatic biliary ductal dilatation.     Spleen: Normal size. Unremarkable     Pancreas: No mass. No ductal dilatation. No peripancreatic fat stranding.     Adrenals: Non masslike thickening of the left adrenal gland.  The right adrenal gland is unremarkable.     Renal/Ureters:  Kidneys are atrophic.  3 mm nonobstructive right kidney calculi.  6 mm left nonobstructive renal calculi. 1 cm hypodensity in the left kidney measuring simple fluid attenuation likely simple cyst.  Subcentimeter hypodensity in the right kidney too small to characterize.  No hydronephrosis.  The ureters are normal in course and caliber. The urinary bladder is unremarkable.     Reproductive: Uterus is without significant abnormality. No adnexal mass.     Stomach/Bowel: Stomach is without significant abnormality.  Small bowel is normal caliber without obstruction or inflammation.  Appendix is not visualized however there is no inflammation to suggest appendicitis.  Large bowel is normal caliber without obstruction or inflammation.     Peritoneum: No free fluid. No intraperitoneal free air.     Lymph Nodes: Few prominent periaortic lymph nodes largest measuring 1.1 cm.     Bones: Degenerative changes of the spine.  No acute fractures or osseous destructive lesions.     Soft Tissues: No significant abnormality.     Aorta: There is complete occlusion involving the distal abdominal aorta just proximal to the bifurcation (axial series 3, image 544).  Additionally, there is thrombosis at the bilateral common iliac arteries.  No dissection, penetrating ulcer, or intramural hematoma.  Origins of the celiac artery, superior mesenteric artery, renal arteries, and inferior mesenteric artery appear patent.  Dense calcific atherosclerosis of the visualized splenic artery and superior mesenteric artery.     Right:     Common iliac artery: Stented, with thrombosis of the visualized stent.     External iliac artery: Normal.     Internal iliac artery: Dense calcific and atheromatous atherosclerosis of the visualized internal iliac artery.     Common femoral artery: 20-50% stenosis.     Superficial femoral artery: 50-80%.  Stent in the distal aspect, which appears patent.  Multiple collateral vessels in the vicinity of the superficial  femoral artery.     Deep femoral artery: Less than 20% stenosis     Popliteal artery: 20-50% stenosis.     Anterior tibial artery: 20-50% stenosis     Tibial-peroneal trunk: Greater than 50% stenosis.     Posterior tibial artery: Over 80% stenosis with multifocal areas of severe stenosis versus occlusion.     Peroneal artery: 50-80% stenosis with multifocal areas of severe stenosis versus occlusion.     Left:     Common iliac artery: Occlusion involving the common iliac artery.     External iliac artery: Less than 20% stenosis.     Internal iliac artery: Dense calcific and atheromatous atherosclerosis of the visualized internal iliac artery     Common femoral artery: 20-50% stenosis.     Superficial femoral artery: 50-80% stenosis with multifocal areas of severe stenosis versus occlusion.  Multiple collateral vessels in the vicinity of the superficial femoral artery.     Deep femoral artery: Less than 20% stenosis     Popliteal artery: Less than 20% stenosis.     Anterior tibial artery: 20 50% stenosis.     Tibial-peroneal trunk: 50-80% stenosis, with multifocal areas of severe stenosis versus occlusion.     Posterior tibial artery: 50-80% stenosis, with multifocal areas of severe stenosis versus occlusion.     Peroneal artery: 50-80% stenosis.     Impression:     Complete occlusion involving the distal abdominal aorta just proximal to the bifurcation, with extension into the bilateral common iliac arteries, noting a right common iliac artery stent.     Advanced multifocal peripheral arterial disease as detailed above, with patent right superficial femoral artery stent.  Numerous collateral vessels in the vicinity of the aorta, iliac, and femoral arterial vasculature.     Abnormal 3 vessel runoff involving the bilateral lower extremities, with multifocal areas of severe stenosis versus occlusion mainly involving the bilateral posterior tibial and peroneal arteries.     Subcentimeter nonobstructing bilateral renal  stones.     Coronary artery calcification in a multi vessel distribution.     Non masslike thickening of the left adrenal gland.     Additional findings as above.     COMMUNICATION  This critical result was discovered/received at 09:30.  The critical information above was relayed directly by me by epic secure chat to TICO Mauricio on 09/13/2023 at 09:48.     Electronically signed by resident: Lauro Salter  Date:                                            09/13/2023  Time:                                           07:07     Electronically signed by: Manpreet Bauer  Date:                                            09/13/2023  Time:                                           10:10     X-Ray Foot Complete Left  Order: [id number]  Status: Final result     Visible to patient: Yes (seen)     Next appt: 10/11/2023 at 02:45 PM in Podiatry (Aleida Flannery DPM)     0 Result Notes  Details  Reading Physician Reading Date Result Priority   Jamshid Coleman, DO  [phone] 9/11/2023 STAT     Narrative & Impression  EXAMINATION:  XR FOOT COMPLETE 3 VIEW LEFT     CLINICAL HISTORY:  L foot wound;.     TECHNIQUE:  AP, lateral and oblique views of the left foot were performed.     COMPARISON:  08/25/2023.     FINDINGS:  There are postop changes of  left great toe amputation with resection at the metatarsophalangeal joint.  There is soft tissue edema, ulceration, and gas of the great toe amputation stump.  There is osteopenia and erosive change of the medial aspect of the great toe metatarsal head and neck, compatible with acute osteomyelitis.  Remaining osseous structures are intact.  There are vascular calcifications.     Impression:     Acute osteomyelitis of the great toe metatarsal head and neck.     Electronically signed by: Jamshid Coleman  Date:                                            09/11/2023  Time:                                           17:42     9/26/23 pre-debridement    9/26/23 - post debridement      Physical Exam    Constitutional: She is oriented to person, place, and time. Vital signs are normal. She appears well-developed and morbidly obese. She is cooperative.   Cardiovascular:   Non-pitting edema RLE > L.      Musculoskeletal:         General: Tenderness present. No swelling.      Right lower leg: Edema present.      Left lower leg: Edema present.   Neurological: She is alert and oriented to person, place, and time. She displays no weakness. A sensory deficit is present. Gait (surgical shoe L) abnormal.   Epicritic sensation grossly diminished B/L including sharp/dull sensation; light touch absent.     Numbness & paresthesias (tingling UE & LE, & loss of  hands x 10+ yrs.) & sensory change. No clearly identified trigger or source; no hyperemia.   Skin: Skin is warm and dry. Capillary refill takes less than 2 seconds. Lesion noted. No bruising noted. No erythema (resolved).   Staples medial L foot across transmet.amp.1st ray - central to proximal 1/2 incision w/ disruption of wound, No exposed bone. No active drainage nor exudate. No malodor. Macerated plantar flap (see pix). No necrosis of skin flap. Viable skin wound edges w/ light bleeding upon debridement. Wound measures 5 cm x 0.2 cm across greatest width, 0.2 cm depth proximally.    Hyperkeratotic lesion plantar R 1st met.head w/ sublesional dried blood. After debridement, macerated ulcer measures 0.5 cm diameter w/ granuloma; adjacent non-stageable ulcer <0.3 cm diameter.   Psychiatric: Her behavior is normal. Affect normal. Her mood appears anxious.   Vitals reviewed.     Assessment:      Encounter Diagnoses   Name Primary?    Encounter for staple removal Yes    Wound disruption, subsequent encounter     PAD (peripheral artery disease)     S/P transmetatarsal amputation of foot, left     Callus of foot     Metatarsalgia of both feet     DM type 2 with diabetic peripheral neuropathy     Pressure ulcer of right foot, stage 2        Problem List Items Addressed  This Visit    None  Visit Diagnoses       Encounter for staple removal    -  Primary    Wound disruption, subsequent encounter        Relevant Orders    Wound Debridement L medial foot    PAD (peripheral artery disease)        S/P transmetatarsal amputation of foot, left        Callus of foot        Relevant Orders    Wound Debridement L medial foot    Metatarsalgia of both feet        DM type 2 with diabetic peripheral neuropathy        Pressure ulcer of right foot, stage 2        Relevant Orders    Wound Debridement L medial foot            Plan:       Georgina was seen today for post-op evaluation.    Diagnoses and all orders for this visit:    Encounter for staple removal    Wound disruption, subsequent encounter  -     Wound Debridement L medial foot    PAD (peripheral artery disease)    S/P transmetatarsal amputation of foot, left    Callus of foot  -     Wound Debridement L medial foot    Metatarsalgia of both feet    DM type 2 with diabetic peripheral neuropathy    Pressure ulcer of right foot, stage 2  -     Wound Debridement L medial foot    Dressing removed L medial amp.site.   Wound cleaned w/ Chloraprep.   Staples removed.  Aggressive debridement of non-viable tissue to healing viable wound edges.     Plantar R IPK 1st met.head also debrided to include underlying ulcer. Granuloma cauterized w/ silver nitrate.    Betadine wet-dry gauze dressing applied to incorporate both wounds.    Rx HHS for continued qod wet-dry Betadine dressing changes.    Continue ambulation WBAT in surgical shoe.    IPK sub 1st & 3rd met.head R also debrided.    F/u wound check 2 wks., sooner prn.        A total of 35 mins.was spent on chart review, patient visit & documentation.

## 2023-10-23 ENCOUNTER — HOSPITAL ENCOUNTER (INPATIENT)
Facility: HOSPITAL | Age: 53
LOS: 3 days | Discharge: HOME HEALTH | DRG: 919 | End: 2023-10-26
Attending: SURGERY | Admitting: SURGERY
Payer: MEDICARE

## 2023-10-23 ENCOUNTER — OFFICE VISIT (OUTPATIENT)
Dept: WOUND CARE | Facility: CLINIC | Age: 53
End: 2023-10-23
Payer: MEDICARE

## 2023-10-23 VITALS
TEMPERATURE: 98 F | BODY MASS INDEX: 39.35 KG/M2 | SYSTOLIC BLOOD PRESSURE: 168 MMHG | HEART RATE: 82 BPM | WEIGHT: 250.69 LBS | DIASTOLIC BLOOD PRESSURE: 72 MMHG | OXYGEN SATURATION: 100 % | HEIGHT: 67 IN

## 2023-10-23 DIAGNOSIS — T81.30XA WOUND DEHISCENCE: Primary | ICD-10-CM

## 2023-10-23 DIAGNOSIS — Z95.828 S/P FEMORAL-FEMORAL BYPASS SURGERY: ICD-10-CM

## 2023-10-23 DIAGNOSIS — L03.90 WOUND CELLULITIS: ICD-10-CM

## 2023-10-23 DIAGNOSIS — Z51.81 THERAPEUTIC DRUG MONITORING: Primary | ICD-10-CM

## 2023-10-23 DIAGNOSIS — R23.8 SKIN IRRITATION: ICD-10-CM

## 2023-10-23 DIAGNOSIS — S31.109D WOUND OF LEFT GROIN, SUBSEQUENT ENCOUNTER: ICD-10-CM

## 2023-10-23 DIAGNOSIS — S31.109D WOUND OF RIGHT GROIN, SUBSEQUENT ENCOUNTER: Primary | ICD-10-CM

## 2023-10-23 DIAGNOSIS — I73.9 PAD (PERIPHERAL ARTERY DISEASE): ICD-10-CM

## 2023-10-23 DIAGNOSIS — T81.31XA SURGICAL WOUND DEHISCENCE, INITIAL ENCOUNTER: ICD-10-CM

## 2023-10-23 DIAGNOSIS — T81.49XA WOUND INFECTION AFTER SURGERY: ICD-10-CM

## 2023-10-23 LAB
POCT GLUCOSE: 163 MG/DL (ref 70–110)
POCT GLUCOSE: 197 MG/DL (ref 70–110)

## 2023-10-23 PROCEDURE — 63600175 PHARM REV CODE 636 W HCPCS: Performed by: SURGERY

## 2023-10-23 PROCEDURE — 63600175 PHARM REV CODE 636 W HCPCS

## 2023-10-23 PROCEDURE — 25000003 PHARM REV CODE 250: Performed by: SURGERY

## 2023-10-23 PROCEDURE — 3078F DIAST BP <80 MM HG: CPT | Mod: CPTII,S$GLB,,

## 2023-10-23 PROCEDURE — 99999 PR PBB SHADOW E&M-EST. PATIENT-LVL IV: CPT | Mod: PBBFAC,,,

## 2023-10-23 PROCEDURE — 3066F NEPHROPATHY DOC TX: CPT | Mod: CPTII,S$GLB,,

## 2023-10-23 PROCEDURE — 99213 PR OFFICE/OUTPT VISIT, EST, LEVL III, 20-29 MIN: ICD-10-PCS | Mod: S$GLB,,,

## 2023-10-23 PROCEDURE — 3077F PR MOST RECENT SYSTOLIC BLOOD PRESSURE >= 140 MM HG: ICD-10-PCS | Mod: CPTII,S$GLB,,

## 2023-10-23 PROCEDURE — 25000003 PHARM REV CODE 250

## 2023-10-23 PROCEDURE — 99999 PR PBB SHADOW E&M-EST. PATIENT-LVL IV: ICD-10-PCS | Mod: PBBFAC,,,

## 2023-10-23 PROCEDURE — 3051F PR MOST RECENT HEMOGLOBIN A1C LEVEL 7.0 - < 8.0%: ICD-10-PCS | Mod: CPTII,S$GLB,,

## 2023-10-23 PROCEDURE — 3008F BODY MASS INDEX DOCD: CPT | Mod: CPTII,S$GLB,,

## 2023-10-23 PROCEDURE — 3078F PR MOST RECENT DIASTOLIC BLOOD PRESSURE < 80 MM HG: ICD-10-PCS | Mod: CPTII,S$GLB,,

## 2023-10-23 PROCEDURE — 3077F SYST BP >= 140 MM HG: CPT | Mod: CPTII,S$GLB,,

## 2023-10-23 PROCEDURE — 3051F HG A1C>EQUAL 7.0%<8.0%: CPT | Mod: CPTII,S$GLB,,

## 2023-10-23 PROCEDURE — 3066F PR DOCUMENTATION OF TREATMENT FOR NEPHROPATHY: ICD-10-PCS | Mod: CPTII,S$GLB,,

## 2023-10-23 PROCEDURE — 1159F MED LIST DOCD IN RCRD: CPT | Mod: CPTII,S$GLB,,

## 2023-10-23 PROCEDURE — 1159F PR MEDICATION LIST DOCUMENTED IN MEDICAL RECORD: ICD-10-PCS | Mod: CPTII,S$GLB,,

## 2023-10-23 PROCEDURE — 3008F PR BODY MASS INDEX (BMI) DOCUMENTED: ICD-10-PCS | Mod: CPTII,S$GLB,,

## 2023-10-23 PROCEDURE — 20600001 HC STEP DOWN PRIVATE ROOM

## 2023-10-23 PROCEDURE — 99213 OFFICE O/P EST LOW 20 MIN: CPT | Mod: S$GLB,,,

## 2023-10-23 RX ORDER — IBUPROFEN 400 MG/1
400 TABLET ORAL EVERY 6 HOURS PRN
Status: DISCONTINUED | OUTPATIENT
Start: 2023-10-23 | End: 2023-10-26 | Stop reason: HOSPADM

## 2023-10-23 RX ORDER — ACETAMINOPHEN 325 MG/1
650 TABLET ORAL EVERY 8 HOURS PRN
Status: DISCONTINUED | OUTPATIENT
Start: 2023-10-23 | End: 2023-10-23

## 2023-10-23 RX ORDER — ACETAMINOPHEN 325 MG/1
650 TABLET ORAL EVERY 6 HOURS
Status: DISCONTINUED | OUTPATIENT
Start: 2023-10-23 | End: 2023-10-26 | Stop reason: HOSPADM

## 2023-10-23 RX ORDER — GLUCAGON 1 MG
1 KIT INJECTION
Status: DISCONTINUED | OUTPATIENT
Start: 2023-10-23 | End: 2023-10-26 | Stop reason: HOSPADM

## 2023-10-23 RX ORDER — INSULIN ASPART 100 [IU]/ML
0-5 INJECTION, SOLUTION INTRAVENOUS; SUBCUTANEOUS
Status: DISCONTINUED | OUTPATIENT
Start: 2023-10-23 | End: 2023-10-26 | Stop reason: HOSPADM

## 2023-10-23 RX ORDER — ONDANSETRON 8 MG/1
8 TABLET, ORALLY DISINTEGRATING ORAL EVERY 8 HOURS PRN
Status: DISCONTINUED | OUTPATIENT
Start: 2023-10-23 | End: 2023-10-26 | Stop reason: HOSPADM

## 2023-10-23 RX ORDER — IBUPROFEN 200 MG
16 TABLET ORAL
Status: DISCONTINUED | OUTPATIENT
Start: 2023-10-23 | End: 2023-10-26 | Stop reason: HOSPADM

## 2023-10-23 RX ORDER — HYDRALAZINE HYDROCHLORIDE 20 MG/ML
10 INJECTION INTRAMUSCULAR; INTRAVENOUS EVERY 6 HOURS PRN
Status: DISCONTINUED | OUTPATIENT
Start: 2023-10-23 | End: 2023-10-26 | Stop reason: HOSPADM

## 2023-10-23 RX ORDER — TALC
6 POWDER (GRAM) TOPICAL NIGHTLY PRN
Status: DISCONTINUED | OUTPATIENT
Start: 2023-10-23 | End: 2023-10-26 | Stop reason: HOSPADM

## 2023-10-23 RX ORDER — IBUPROFEN 200 MG
24 TABLET ORAL
Status: DISCONTINUED | OUTPATIENT
Start: 2023-10-23 | End: 2023-10-26 | Stop reason: HOSPADM

## 2023-10-23 RX ORDER — LABETALOL HCL 20 MG/4 ML
20 SYRINGE (ML) INTRAVENOUS EVERY 6 HOURS PRN
Status: DISCONTINUED | OUTPATIENT
Start: 2023-10-23 | End: 2023-10-26 | Stop reason: HOSPADM

## 2023-10-23 RX ORDER — LIDOCAINE HYDROCHLORIDE 10 MG/ML
1 INJECTION, SOLUTION EPIDURAL; INFILTRATION; INTRACAUDAL; PERINEURAL ONCE AS NEEDED
Status: DISCONTINUED | OUTPATIENT
Start: 2023-10-23 | End: 2023-10-26 | Stop reason: HOSPADM

## 2023-10-23 RX ORDER — SODIUM CHLORIDE 0.9 % (FLUSH) 0.9 %
10 SYRINGE (ML) INJECTION
Status: DISCONTINUED | OUTPATIENT
Start: 2023-10-23 | End: 2023-10-26 | Stop reason: HOSPADM

## 2023-10-23 RX ADMIN — ACETAMINOPHEN 650 MG: 325 TABLET ORAL at 09:10

## 2023-10-23 RX ADMIN — VANCOMYCIN HYDROCHLORIDE 2000 MG: 500 INJECTION, POWDER, LYOPHILIZED, FOR SOLUTION INTRAVENOUS at 09:10

## 2023-10-23 RX ADMIN — PIPERACILLIN SODIUM AND TAZOBACTAM SODIUM 4.5 G: 4; .5 INJECTION, POWDER, FOR SOLUTION INTRAVENOUS at 11:10

## 2023-10-23 RX ADMIN — LABETALOL HYDROCHLORIDE 20 MG: 5 INJECTION, SOLUTION INTRAVENOUS at 10:10

## 2023-10-23 NOTE — PROGRESS NOTES
"Pharmacokinetic Initial Assessment: IV Vancomycin    Assessment/Plan:    Initiate intravenous vancomycin with loading dose of 2000 mg once with subsequent doses when random concentrations are less than 20 mcg/mL  Desired empiric serum trough concentration is 10 to 20 mcg/mL  Draw vancomycin random level on 10/24 at 1800.  Pharmacy will continue to follow and monitor vancomycin.      Please contact pharmacy at extension [phone] with any questions regarding this assessment.     Thank you for the consult,   Sabina Monk       Patient brief summary:  Georgina Arias is a 52 y.o. female initiated on antimicrobial therapy with IV Vancomycin for treatment of suspected skin & soft tissue infection    Drug Allergies:   Review of patient's allergies indicates:   Allergen Reactions    Naproxen Anaphylaxis and Swelling     Hives (skin)^swelling  Hives (skin)^swelling  Hives (skin)^swelling    Sulfamethoxazole-trimethoprim Other (See Comments)     Caused JEROME    Hydrocodone Nausea And Vomiting and Rash    Penicillins Hives     Blisters (skin)^    Adhesive Rash    Hydrocodone-acetaminophen Nausea And Vomiting     Rash (skin)^, Vomiting^         Actual Body Weight:   113.4 kg    Renal Function:   CrCl cannot be calculated (Patient's most recent lab result is older than the maximum 7 days allowed.).,   Serum Creatinine has been ordered.    Dialysis Method (if applicable):  N/A    CBC (last 72 hours):  No results for input(s): "WHITE BLOOD CELL COUNT", "HEMOGLOBIN", "HEMATOCRIT", "PLATELETS", "GRAN%", "LYMPH%", "MONO%", "EOSINOPHIL%", "BASOPHIL%", "DIFFERENTIAL METHOD" in the last 72 hours.    Metabolic Panel (last 72 hours):  No results for input(s): "SODIUM", "POTASSIUM", "CHLORIDE", "CO2", "GLUCOSE", "BUN BLD", "CREATININE", "ALBUMIN", "BILIRUBIN TOTAL", "ALK PHOS", "AST", "ALT", "MAGNESIUM", "PHOSPHORUS" in the last 72 hours.    Drug levels (last 3 results):  No results for input(s): "VANCOMYCINRA", "VANCORANDOM", " ""VANCOMYCINPE", "VANCOPEAK", "VANCOMYCINTR", "VANCOTROUGH" in the last 72 hours.    Microbiologic Results:  Microbiology Results (last 7 days)       ** No results found for the last 168 hours. **            "

## 2023-10-23 NOTE — PROGRESS NOTES
Subjective:       Patient ID: Georgina Arias is a 52 y.o. female.    Chief Complaint: Wound Check and Wound Dehiscence    Patient presents for a re-evaluation of bilateral groin wounds. Pt s/p axillo femoral-femoral bypass with Dr. Maya on 9/14/23. She was found to have wound dehiscence on 10/12/23. Patient was following with vascular surgery for wound care. Her wounds have been packed with aquacel rope, hydrofera blue, and covered with border dressings. Dr. Maya prescribed difucan and ciprofloxacin. Admits compliance. Denies any side effects of medications. Her wounds were debrided in clinic. He ordered a wound vac for the right groin wound- pending insurance approval. Patient has [facility] home health. She reports an odor to her wounds. Denies fever, chills, erythema, warmth, purulent drainage, or pain.        Review of Systems   Constitutional:  Negative for activity change, chills, diaphoresis, fatigue and fever.   Respiratory:  Negative for apnea, chest tightness and shortness of breath.    Cardiovascular:  Negative for chest pain, palpitations and leg swelling.   Musculoskeletal:  Negative for gait problem and joint swelling.   Skin:  Positive for wound. Negative for color change, pallor and rash.   Neurological:  Negative for syncope, weakness and numbness.   Psychiatric/Behavioral:  Negative for agitation. The patient is not nervous/anxious.    All other systems reviewed and are negative.      Objective:      Physical Exam  Vitals reviewed.   Constitutional:       General: She is not in acute distress.     Appearance: Normal appearance. She is obese.   Pulmonary:      Effort: No respiratory distress.   Musculoskeletal:         General: No swelling.   Skin:     General: Skin is warm and dry.      Findings: Wound present. No erythema.          Neurological:      General: No focal deficit present.      Mental Status: She is alert and oriented to person, place, and time.   Psychiatric:         Mood and  Affect: Mood normal.         Behavior: Behavior normal.         Thought Content: Thought content normal.         Judgment: Judgment normal.         Assessment:       1. Wound of right groin, subsequent encounter    2. Wound of left groin, subsequent encounter    3. Surgical wound dehiscence, initial encounter    4. S/P femoral-femoral bypass surgery    5. BMI 39.0-39.9,adult    6. Wound cellulitis    7. Skin irritation           Incision/Site 09/14/23 1203 Left Groin (Active)   09/14/23 1203   Present Prior to Hospital Arrival?:    Side: Left   Location: Groin   Orientation:    Incision Type:    Closure Method:    Additional Comments:    Removal Indication and Assessment:    Wound Outcome:    Removal Indications:    Wound Image   10/23/23 1413   Dressing Appearance Dry;Intact;Clean;Moist drainage;Saturated 10/23/23 1413   Drainage Amount Copious 10/23/23 1413   Drainage Characteristics/Odor Green;Malodorous 10/23/23 1413   Red (%), Wound Tissue Color 60 % 10/23/23 1413   Yellow (%), Wound Tissue Color 40 % 10/23/23 1413   Periwound Area Intact;[place] 10/23/23 1413   Wound Length (cm) 7 cm 10/23/23 1413   Wound Width (cm) 2.3 cm 10/23/23 1413   Wound Depth (cm) 8 cm 10/23/23 1413   Wound Volume (cm^3) 128.8 cm^3 10/23/23 1413   Wound Surface Area (cm^2) 16.1 cm^2 10/23/23 1413   Care Cleansed with:;Soap and water 10/23/23 1413   Dressing Applied;[place]/border 10/23/23 1413   Packing packed with;strip gauze 10/23/23 1413   Periwound Care Skin barrier film applied 10/23/23 1413            Incision/Site 09/14/23 1230 Right Groin anterior (Active)   09/14/23 1230   Present Prior to Hospital Arrival?:    Side: Right   Location: Groin   Orientation: anterior   Incision Type:    Closure Method:    Additional Comments:    Removal Indication and Assessment:    Wound Outcome:    Removal Indications:    Wound Image   10/23/23 1413   Dressing Appearance Dry;Intact;Clean;Moist drainage;Saturated 10/23/23 1413   Drainage Amount  Copious 10/23/23 1413   Drainage Characteristics/Odor Green;Malodorous 10/23/23 1413   Red (%), Wound Tissue Color 60 % 10/23/23 1413   Yellow (%), Wound Tissue Color 40 % 10/23/23 1413   Periwound Area Intact;[place] 10/23/23 1413   Wound Length (cm) 2.8 cm 10/23/23 1413   Wound Width (cm) 1.8 cm 10/23/23 1413   Wound Depth (cm) 5 cm 10/23/23 1413   Wound Volume (cm^3) 25.2 cm^3 10/23/23 1413   Wound Surface Area (cm^2) 5.04 cm^2 10/23/23 1413   Care Cleansed with:;Soap and water 10/23/23 1413   Dressing Applied;Island/border 10/23/23 1413   Packing packed with;strip gauze 10/23/23 1413   Periwound Care Skin barrier film applied 10/23/23 1413       Georgina was seen in the clinic room and placed in the supine position on the treatment table.  The dressings were removed and the area was cleansed with Easi-clense sponges and dried thoroughly. Odor and green drainage noted. No  erythema or warmth noted. Cleansed wounds with acetic acid (1:1 with water) to treat pseudomonas.      Plan of Care: Skin prep to periwounds, packed wounds with packing strips, and covered with border dressings.          Plan:       Discussed with Dr. Maya- increasing wound measurements and green drainage with odor while being on ciprofloxacin. Recommended direct admit for IV antibiotics and wound vac placement.     Bilateral groins were dressed as detailed above.      Written and verbal instructions given to patient  RTC once discharged from the hospital      Vicki George PA-C

## 2023-10-23 NOTE — PROGRESS NOTES
"Pharmacist Renal Dose Adjustment Note    Georgina Arias is a 52 y.o. female being treated with the medication Piperacillin-tazobactam.    Serum Creatinine has been ordered.    Patient Data:    Vital Signs (Most Recent):    Vital Signs (72h Range):  Temp:  [98.2 °F (36.8 °C)]   Pulse:  [82]   BP: (168)/(72)   SpO2:  [100 %]      No results for input(s): "CREATININE" in the last 168 hours.  Creatinine clearance cannot be calculated (Patient's most recent lab result is older than the maximum 7 days allowed.)    Medication:Piperacillin-tazobactam 4.5 gm q8h will be changed to Piperacillin-tazobactam 4.5 gm q12h    Pharmacist's Name: Sabina Monk  Pharmacist's Extension: [phone]    "

## 2023-10-24 PROBLEM — I50.32 CHRONIC DIASTOLIC CONGESTIVE HEART FAILURE: Status: ACTIVE | Noted: 2022-05-18

## 2023-10-24 PROBLEM — T81.30XA WOUND DEHISCENCE: Status: ACTIVE | Noted: 2023-10-24

## 2023-10-24 LAB
ANION GAP SERPL CALC-SCNC: 12 MMOL/L (ref 8–16)
BASOPHILS # BLD AUTO: 0.05 K/UL (ref 0–0.2)
BASOPHILS NFR BLD: 0.6 % (ref 0–1.9)
BUN SERPL-MCNC: 40 MG/DL (ref 6–20)
CALCIUM SERPL-MCNC: 9.1 MG/DL (ref 8.7–10.5)
CHLORIDE SERPL-SCNC: 102 MMOL/L (ref 95–110)
CO2 SERPL-SCNC: 24 MMOL/L (ref 23–29)
CREAT SERPL-MCNC: 4.6 MG/DL (ref 0.5–1.4)
DIFFERENTIAL METHOD: ABNORMAL
EOSINOPHIL # BLD AUTO: 0.3 K/UL (ref 0–0.5)
EOSINOPHIL NFR BLD: 3.2 % (ref 0–8)
ERYTHROCYTE [DISTWIDTH] IN BLOOD BY AUTOMATED COUNT: 16.7 % (ref 11.5–14.5)
EST. GFR  (NO RACE VARIABLE): 10.9 ML/MIN/1.73 M^2
GLUCOSE SERPL-MCNC: 148 MG/DL (ref 70–110)
HBV SURFACE AG SERPL QL IA: NORMAL
HCT VFR BLD AUTO: 31.1 % (ref 37–48.5)
HGB BLD-MCNC: 9.8 G/DL (ref 12–16)
IMM GRANULOCYTES # BLD AUTO: 0.02 K/UL (ref 0–0.04)
IMM GRANULOCYTES NFR BLD AUTO: 0.2 % (ref 0–0.5)
LYMPHOCYTES # BLD AUTO: 1 K/UL (ref 1–4.8)
LYMPHOCYTES NFR BLD: 11.4 % (ref 18–48)
MCH RBC QN AUTO: 31.5 PG (ref 27–31)
MCHC RBC AUTO-ENTMCNC: 31.5 G/DL (ref 32–36)
MCV RBC AUTO: 100 FL (ref 82–98)
MONOCYTES # BLD AUTO: 0.5 K/UL (ref 0.3–1)
MONOCYTES NFR BLD: 6.4 % (ref 4–15)
NEUTROPHILS # BLD AUTO: 6.7 K/UL (ref 1.8–7.7)
NEUTROPHILS NFR BLD: 78.2 % (ref 38–73)
NRBC BLD-RTO: 0 /100 WBC
PLATELET # BLD AUTO: 176 K/UL (ref 150–450)
PMV BLD AUTO: 10.3 FL (ref 9.2–12.9)
POCT GLUCOSE: 154 MG/DL (ref 70–110)
POCT GLUCOSE: 191 MG/DL (ref 70–110)
POCT GLUCOSE: 209 MG/DL (ref 70–110)
POCT GLUCOSE: 213 MG/DL (ref 70–110)
POTASSIUM SERPL-SCNC: 4 MMOL/L (ref 3.5–5.1)
RBC # BLD AUTO: 3.11 M/UL (ref 4–5.4)
SODIUM SERPL-SCNC: 138 MMOL/L (ref 136–145)
WBC # BLD AUTO: 8.5 K/UL (ref 3.9–12.7)

## 2023-10-24 PROCEDURE — 80048 BASIC METABOLIC PNL TOTAL CA: CPT | Performed by: SURGERY

## 2023-10-24 PROCEDURE — 87340 HEPATITIS B SURFACE AG IA: CPT | Performed by: STUDENT IN AN ORGANIZED HEALTH CARE EDUCATION/TRAINING PROGRAM

## 2023-10-24 PROCEDURE — 99222 1ST HOSP IP/OBS MODERATE 55: CPT | Mod: ,,, | Performed by: NURSE PRACTITIONER

## 2023-10-24 PROCEDURE — 20600001 HC STEP DOWN PRIVATE ROOM

## 2023-10-24 PROCEDURE — 25000003 PHARM REV CODE 250

## 2023-10-24 PROCEDURE — 99222 PR INITIAL HOSPITAL CARE,LEVL II: ICD-10-PCS | Mod: ,,, | Performed by: NURSE PRACTITIONER

## 2023-10-24 PROCEDURE — 80100016 HC MAINTENANCE HEMODIALYSIS

## 2023-10-24 PROCEDURE — 25000003 PHARM REV CODE 250: Performed by: NURSE PRACTITIONER

## 2023-10-24 PROCEDURE — 36415 COLL VENOUS BLD VENIPUNCTURE: CPT | Performed by: STUDENT IN AN ORGANIZED HEALTH CARE EDUCATION/TRAINING PROGRAM

## 2023-10-24 PROCEDURE — 87040 BLOOD CULTURE FOR BACTERIA: CPT | Performed by: STUDENT IN AN ORGANIZED HEALTH CARE EDUCATION/TRAINING PROGRAM

## 2023-10-24 PROCEDURE — 63600175 PHARM REV CODE 636 W HCPCS

## 2023-10-24 PROCEDURE — 85025 COMPLETE CBC W/AUTO DIFF WBC: CPT

## 2023-10-24 PROCEDURE — 36415 COLL VENOUS BLD VENIPUNCTURE: CPT | Performed by: SURGERY

## 2023-10-24 RX ORDER — GABAPENTIN 300 MG/1
300 CAPSULE ORAL 3 TIMES DAILY
Status: DISCONTINUED | OUTPATIENT
Start: 2023-10-24 | End: 2023-10-26 | Stop reason: HOSPADM

## 2023-10-24 RX ORDER — SODIUM CHLORIDE 9 MG/ML
INJECTION, SOLUTION INTRAVENOUS ONCE
Status: COMPLETED | OUTPATIENT
Start: 2023-10-24 | End: 2023-10-24

## 2023-10-24 RX ADMIN — ACETAMINOPHEN 650 MG: 325 TABLET ORAL at 06:10

## 2023-10-24 RX ADMIN — INSULIN ASPART 1 UNITS: 100 INJECTION, SOLUTION INTRAVENOUS; SUBCUTANEOUS at 09:10

## 2023-10-24 RX ADMIN — PIPERACILLIN SODIUM AND TAZOBACTAM SODIUM 4.5 G: 4; .5 INJECTION, POWDER, FOR SOLUTION INTRAVENOUS at 11:10

## 2023-10-24 RX ADMIN — PIPERACILLIN SODIUM AND TAZOBACTAM SODIUM 4.5 G: 4; .5 INJECTION, POWDER, FOR SOLUTION INTRAVENOUS at 10:10

## 2023-10-24 RX ADMIN — ACETAMINOPHEN 650 MG: 325 TABLET ORAL at 05:10

## 2023-10-24 RX ADMIN — GABAPENTIN 300 MG: 300 CAPSULE ORAL at 09:10

## 2023-10-24 RX ADMIN — GABAPENTIN 300 MG: 300 CAPSULE ORAL at 10:10

## 2023-10-24 RX ADMIN — ACETAMINOPHEN 650 MG: 325 TABLET ORAL at 10:10

## 2023-10-24 RX ADMIN — SODIUM CHLORIDE: 9 INJECTION, SOLUTION INTRAVENOUS at 02:10

## 2023-10-24 RX ADMIN — ACETAMINOPHEN 650 MG: 325 TABLET ORAL at 11:10

## 2023-10-24 NOTE — SUBJECTIVE & OBJECTIVE
"  Medications:  Continuous Infusions:  Scheduled Meds:   acetaminophen  650 mg Oral Q6H    piperacillin-tazobactam (Zosyn) IV (PEDS and ADULTS) (extended infusion is not appropriate)  4.5 g Intravenous Q12H     PRN Meds:dextrose 10%, dextrose 10%, glucagon (human recombinant), glucose, glucose, hydrALAZINE, ibuprofen, insulin aspart U-100, labetalol, LIDOcaine (PF) 10 mg/ml (1%), melatonin, ondansetron, sodium chloride 0.9%, Pharmacy to dose Vancomycin consult **AND** vancomycin - pharmacy to dose     Objective:     Vital Signs (Most Recent):  Temp: 98 °F (36.7 °C) (10/24/23 0517)  Pulse: 81 (10/24/23 0517)  Resp: 18 (10/24/23 0517)  BP: (!) 153/72 (10/24/23 0517)  SpO2: (!) 92 % (10/24/23 0517) Vital Signs (24h Range):  Temp:  [98 °F (36.7 °C)-98.2 °F (36.8 °C)] 98 °F (36.7 °C)  Pulse:  [80-90] 81  Resp:  [18] 18  SpO2:  [90 %-100 %] 92 %  BP: (150-179)/(65-83) 153/72          Physical Exam  Constitutional:       Appearance: Normal appearance.   HENT:      Head: Normocephalic.      Nose: Nose normal.   Eyes:      Pupils: Pupils are equal, round, and reactive to light.   Cardiovascular:      Rate and Rhythm: Normal rate.   Pulmonary:      Effort: Pulmonary effort is normal.   Abdominal:      Palpations: Abdomen is soft.   Musculoskeletal:         General: Normal range of motion.      Cervical back: Normal range of motion.   Skin:     General: Skin is warm and dry.      Comments: R groin: dehisced surgical incision with purulent drainage  L groin: two areas of communicating dehisced surgical incision with purulent drainage   Neurological:      General: No focal deficit present.      Mental Status: She is alert and oriented to person, place, and time.          Significant Labs:  CBC: No results for input(s): "WBC", "RBC", "HGB", "HCT", "PLT", "MCV", "MCH", "MCHC" in the last 48 hours.  CMP: No results for input(s): "GLU", "CALCIUM", "ALBUMIN", "PROT", "NA", "K", "CO2", "CL", "BUN", "CREATININE", "ALKPHOS", "ALT", " ""AST", "BILITOT" in the last 48 hours.    Significant Diagnostics:  I have reviewed all pertinent imaging results/findings within the past 24 hours.  "

## 2023-10-24 NOTE — NURSING
Nurses Note -- 4 Eyes      10/23/2023   7:38 PM      Skin assessed during: Admit      [] No Altered Skin Integrity Present    []Prevention Measures Documented      [x] Yes- Altered Skin Integrity Present or Discovered   [x] LDA Added if Not in Epic (Describe Wound)   [x] New Altered Skin Integrity was Present on Admit and Documented in LDA   [] Wound Image Taken    Wound Care Consulted? Yes    Attending Nurse:  Tracie Zhong RN    Second RN/Staff Member:   Janae Pacheco RN

## 2023-10-24 NOTE — PROGRESS NOTES
[facility]  Vascular Surgery  Progress Note    Patient Name: Georgina Arias  MRN: [id number]  Admission Date: 10/23/2023  Primary Care Provider: Alonso Ling MD    Subjective:     Interval History: Dressings changed on rounds this am, will apply wound vac when it arrives to her room.  Patient hesitant about staying in the hospital, MD reviewed plan of care with her and importance of receiving proper treatment.     Post-Op Info:  * No surgery found *           Medications:  Continuous Infusions:  Scheduled Meds:   acetaminophen  650 mg Oral Q6H    piperacillin-tazobactam (Zosyn) IV (PEDS and ADULTS) (extended infusion is not appropriate)  4.5 g Intravenous Q12H     PRN Meds:dextrose 10%, dextrose 10%, glucagon (human recombinant), glucose, glucose, hydrALAZINE, ibuprofen, insulin aspart U-100, labetalol, LIDOcaine (PF) 10 mg/ml (1%), melatonin, ondansetron, sodium chloride 0.9%, Pharmacy to dose Vancomycin consult **AND** vancomycin - pharmacy to dose     Objective:     Vital Signs (Most Recent):  Temp: 98 °F (36.7 °C) (10/24/23 0517)  Pulse: 81 (10/24/23 0517)  Resp: 18 (10/24/23 0517)  BP: (!) 153/72 (10/24/23 0517)  SpO2: (!) 92 % (10/24/23 0517) Vital Signs (24h Range):  Temp:  [98 °F (36.7 °C)-98.2 °F (36.8 °C)] 98 °F (36.7 °C)  Pulse:  [80-90] 81  Resp:  [18] 18  SpO2:  [90 %-100 %] 92 %  BP: (150-179)/(65-83) 153/72          Physical Exam  Constitutional:       Appearance: Normal appearance.   HENT:      Head: Normocephalic.      Nose: Nose normal.   Eyes:      Pupils: Pupils are equal, round, and reactive to light.   Cardiovascular:      Rate and Rhythm: Normal rate.   Pulmonary:      Effort: Pulmonary effort is normal.   Abdominal:      Palpations: Abdomen is soft.   Musculoskeletal:         General: Normal range of motion.      Cervical back: Normal range of motion.   Skin:     General: Skin is warm and dry.      Comments: R groin: dehisced surgical incision with purulent drainage  L  "groin: two areas of communicating dehisced surgical incision with purulent drainage   Neurological:      General: No focal deficit present.      Mental Status: She is alert and oriented to person, place, and time.          Significant Labs:  CBC: No results for input(s): "WBC", "RBC", "HGB", "HCT", "PLT", "MCV", "MCH", "MCHC" in the last 48 hours.  CMP: No results for input(s): "GLU", "CALCIUM", "ALBUMIN", "PROT", "NA", "K", "CO2", "CL", "BUN", "CREATININE", "ALKPHOS", "ALT", "AST", "BILITOT" in the last 48 hours.    Significant Diagnostics:  I have reviewed all pertinent imaging results/findings within the past 24 hours.    Assessment/Plan:     Wound dehiscence  Ms. Arias is a 53 y/o female with extensive pmh including DMII, PVD, HLD, ESRD on HD, CHF and non-healing wounds who was admitted after clinic visit on 10/23.  She had recent ax bi fem bypass with Dr. Maya on 9/14/23 and groin incision sites began dehiscing on 10/12.  She was admitted from wound care clinic for wound vac placement and IV antibiotics.    -Renal/DM diet  -OK for ambulation  -Incentive spirometer  -SCDs  -Vanc & zosyn  -Nephro consulted for HD  -Wound care consulted   -Wound vac to be applied asap when delivered to room, was ordered yesterday   -DM mgmt- sliding scale insulin ordered     Dispo:  Will need HH and wound vac for discharge         Kellie Mauricio NP  Vascular Surgery  [facility]  "

## 2023-10-24 NOTE — PROGRESS NOTES
Pt arrived NEFTALI by wheelchair in NAD, Pt self cannulated her Right AVF with 15 G blunt needles without difficulty. Maintenance HD started.

## 2023-10-24 NOTE — CONSULTS
[facility]  Nephrology  Consult Note    Patient Name: Georgina Arias  MRN: [id number]  Admission Date: 10/23/2023  Hospital Length of Stay: 1 days  Attending Provider: Maxx Maya MD   Primary Care Physician: Alonso Ling MD  Principal Problem:<principal problem not specified>    Inpatient consult to Nephrology  Consult performed by: Sharonda Armstrong, DNP, FNP-C  Consult ordered by: Kellie Mauricio NP  Reason for consult: ESRD        Subjective:     HPI: The patient is a 52 y.o. White Female with multiple co morbidities including DMII, PVD, HLD, ESRD on HD, CHF and non-healing wounds who presents to ED on 10/23/2023 after her general surgery clinic visit on 10/23/23 for wound vacuum placement and IV antibiotics. Of note, she had a recent bilateral femoral bypass with Dr. Maya on 9/14/23 cb incision dehiscing on 10/12. [place] [facility]. Nephrology consulted for management of ESRD and HD treatment.      Outpatient HD Information:  -Dialysis modality: Hemodialysis  -Outpatient HD unit: [facility] (Valerio Bautista MD)  -HD TX days: Tuesday/Thursday/Saturday, duration of treatment: 3-4 hr  -Dialysis access: RUE AVF   -Residual urine: + RRF  -EDW: 109 kg      Past Medical History:   Diagnosis Date    Hyperlipidemia     Hypertension     Peptic ulcer disease     Peripheral artery disease     PONV (postoperative nausea and vomiting)     Stage IV CKD     Type II diabetes mellitus        Past Surgical History:   Procedure Laterality Date    ANGIOGRAM, LOWER ARTERIAL, UNILATERAL Left 9/13/2023    Procedure: ANGIOGRAM, LOWER ARTERIAL, UNILATERAL;  Surgeon: Maxx Maya MD;  Location: [facility] OR [address] [place] [address];  Service: Vascular;  Laterality: Left;    ANGIOGRAPHY OF LOWER EXTREMITY Left 9/14/2023    Procedure: ANGIOGRAM, LOWER EXTREMITY with balloon angioplasty ultraverse 5mm x 60mm;  Surgeon: Maxx Maya MD;   Location: [facility] OR [employer] [facility];  Service: Vascular;  Laterality: Left;  ultraverse 5mm x 60mm  fluoro: 12.41  contrast: 44ml  mGy: 65.57  Gycm2: 23.50    AORTOGRAPHY WITH EXTREMITY RUNOFF Left 9/13/2023    Procedure: AORTOGRAM, WITH EXTREMITY RUNOFF;  Surgeon: Maxx Maya MD;  [facility];  Service: Vascular;  Laterality: Left;  8.1 min  663.63 mGy  176.39 Gy.cm  178ml Dye     AV FISTULA PLACEMENT Right     CHOLECYSTECTOMY      CREATION, BYPASS, ARTERIAL, AXILLARY TO BILATERAL FEMORAL Left 9/14/2023    Procedure: CREATION, BYPASS, ARTERIAL, AXILLARY TO BILATERAL FEMORAL;  Surgeon: Maxx Maya MD;  [facility];  Service: Vascular;  Laterality: Left;  Left Axillary to Bilateral Femoral Bypass, Left Femoral to Above Knee Popliteal Bypass; SLIDER BED    CYSTOSCOPY W/ URETERAL STENT PLACEMENT Right 08/28/2018    Procedure: CYSTOSCOPY, WITH URETERAL STENT INSERTION (ADD ON );  Surgeon: Bubba Perez MD;  Location: [facility];  Service: Urology;  Laterality: Right;  (ADD ON )    DEBRIDEMENT OF FOOT Left 08/03/2023    Procedure: DEBRIDEMENT, FOOT;  Surgeon: Aleida Flannery DPM;  Location: [facility] OR;  Service: Podiatry;  Laterality: Left;    Excisional debridement of ulcer distal great toe left foot w/ partial resection distal phalanx Left 08/03/2023    FOOT AMPUTATION THROUGH METATARSAL Left 9/14/2023    Procedure: AMPUTATION, FOOT, TRANSMETATARSAL partial 1st ray amputation;  Surgeon: Jesus Valles DPM;  [address]  Service: Podiatry;  Laterality: Left;  partial ray    I&D L 1st ray w/ amputation L hallux IPJ Left 08/14/2023    INCISION AND DRAINAGE FOOT Left 9/14/2023    Procedure: INCISION AND DRAINAGE, FOOT;  Surgeon: Jesus Valles DPM;  [address];  Service: Podiatry;  Laterality: Left;    Injection L PT tendon sheath Left 08/14/2023    Nail avulsion left hallux Left 08/03/2023    PERIPHERAL ARTERIAL STENT GRAFT Right     REMOVAL OF  NAIL OF DIGIT Left 08/03/2023    Procedure: REMOVAL, NAIL, DIGIT great toe;  Surgeon: Aleida Flannery DPM;  [facility];  Service: Podiatry;  Laterality: Left;    STENT, SUPERFICIAL FEMORAL ARTERY Left 9/14/2023    Procedure: STENT, SUPERFICIAL FEMORAL ARTERY;  Surgeon: Maxx Maya MD;  [facility];  Service: Vascular;  Laterality: Left;  5 x 100 mm    TOE AMPUTATION Left 08/14/2023    Procedure: AMPUTATION, TOE hallux IPJ;  Surgeon: Aleida Flannery DPM;  Location: [facility] OR;  Service: Podiatry;  Laterality: Left;    TOE AMPUTATION Left 08/25/2023    great toe    TOE AMPUTATION Left 8/25/2023    Procedure: AMPUTATION, TOE hallux, possible 1st met.head;  Surgeon: Aleida Flannery DPM;  Location: [facility] OR;  Service: Podiatry;  Laterality: Left;    URETEROSCOPIC REMOVAL OF URETERIC CALCULUS Right 09/11/2018    Procedure: EXTRACTION-STONE-URETEROSCOPY;  Surgeon: Bubba Perez MD;  [facility];  Service: Urology;  Laterality: Right;       Review of patient's allergies indicates:   Allergen Reactions    Naproxen Anaphylaxis and Swelling     Hives (skin)^swelling  Hives (skin)^swelling  Hives (skin)^swelling    Sulfamethoxazole-trimethoprim Other (See Comments)     Caused JEROME    Hydrocodone Nausea And Vomiting and Rash    Aspartame Hives    Penicillins Hives     Blisters (skin)^    Adhesive Rash    Hydrocodone-acetaminophen Nausea And Vomiting     Rash (skin)^, Vomiting^       Current Facility-Administered Medications   Medication Frequency    0.9%  NaCl infusion Once    acetaminophen tablet 650 mg Q6H    dextrose 10% bolus 125 mL 125 mL PRN    dextrose 10% bolus 250 mL 250 mL PRN    gabapentin capsule 300 mg TID    glucagon (human recombinant) injection 1 mg PRN    glucose chewable tablet 16 g PRN    glucose chewable tablet 24 g PRN    hydrALAZINE injection 10 mg Q6H PRN    ibuprofen tablet 400 mg Q6H PRN    insulin aspart U-100 pen 0-5 Units QID (AC + HS) PRN    labetalol 20  mg/4 mL (5 mg/mL) IV syring Q6H PRN    LIDOcaine (PF) 10 mg/ml (1%) injection 10 mg Once PRN    melatonin tablet 6 mg Nightly PRN    ondansetron disintegrating tablet 8 mg Q8H PRN    piperacillin-tazobactam (ZOSYN) 4.5 g in dextrose 5 % in water (D5W) 100 mL IVPB (MB+) Q12H    sodium chloride 0.9% flush 10 mL PRN    vancomycin - pharmacy to dose pharmacy to manage frequency     Family History       Problem Relation (Age of Onset)    Cancer Maternal Grandmother    Diabetes Mother, Father    Heart disease Father (37), Maternal Grandmother    Hypertension Mother, Father, Brother          Tobacco Use    Smoking status: Former     Current packs/day: 0.00     Types: Cigarettes     Quit date: 2006     Years since quittin.2    Smokeless tobacco: Never   Substance and Sexual Activity    Alcohol use: Not Currently     Comment: occ    Drug use: No    Sexual activity: Not Currently     Review of Systems   Constitutional:  Negative for activity change, appetite change, fatigue and fever.   HENT:  Negative for congestion and hearing loss.    Eyes:  Negative for visual disturbance.   Respiratory:  Negative for cough, chest tightness and shortness of breath.    Cardiovascular:  Negative for chest pain, palpitations and leg swelling.   Gastrointestinal:  Negative for abdominal distention, abdominal pain, blood in stool, constipation, diarrhea, nausea and vomiting.   Genitourinary:  Positive for decreased urine volume. Negative for dysuria and urgency.   Musculoskeletal:  Negative for gait problem.   Skin:  Positive for wound.   Allergic/Immunologic: Negative.    Neurological:  Negative for dizziness, weakness and headaches.   Psychiatric/Behavioral:  Negative for agitation, behavioral problems, confusion and decreased concentration.      Objective:     Vital Signs (Most Recent):  Temp: 98.2 °F (36.8 °C) (10/24/23 0826)  Pulse: 79 (10/24/23 0826)  Resp: 18 (10/24/23 0826)  BP: (!) 145/67 (10/24/23 0826)  SpO2: 95  % (10/24/23 0826) Vital Signs (24h Range):  Temp:  [98 °F (36.7 °C)-98.2 °F (36.8 °C)] 98.2 °F (36.8 °C)  Pulse:  [79-90] 79  Resp:  [18] 18  SpO2:  [90 %-100 %] 95 %  BP: (145-179)/(65-83) 145/67     Weight: 113.4 kg (250 lb) (10/23/23 1715)  Body mass index is 39.16 kg/m².  Body surface area is 2.32 meters squared.    No intake/output data recorded.     Physical Exam  Vitals and nursing note reviewed.   Constitutional:       Appearance: Normal appearance.   HENT:      Head: Normocephalic.      Nose: Nose normal.   Eyes:      General: Scleral icterus present.   Cardiovascular:      Rate and Rhythm: Normal rate.   Pulmonary:      Effort: Pulmonary effort is normal.   Abdominal:      Palpations: Abdomen is soft.   Musculoskeletal:         General: Deformity present. Normal range of motion.      Cervical back: Normal range of motion.      Right lower leg: No edema.      Left lower leg: No edema.   Skin:     General: Skin is warm and dry.      Comments: Bilateral groin surgical incisions    Neurological:      General: No focal deficit present.      Mental Status: She is alert and oriented to person, place, and time.   Psychiatric:         Mood and Affect: Mood normal.         Behavior: Behavior normal.          Significant Labs:  CBC:   Recent Labs   Lab 10/24/23  0703   WBC 8.50   RBC 3.11*   HGB 9.8*   HCT 31.1*      *   MCH 31.5*   MCHC 31.5*     CMP:   Recent Labs   Lab 10/24/23  0807   *   CALCIUM 9.1      K 4.0   CO2 24      BUN 40*   CREATININE 4.6*     All labs within the past 24 hours have been reviewed.      Assessment/Plan:     Renal/  ESRD (end stage renal disease)  52 y.o. White Female ESRD-HD M-W-F presents to ED on 10/23/2023 with worsening wound infection and need for wound vacuum placement and IV antibiotics.   Nephrology consulted for inpatient ESRD-HD management      Outpatient HD Information:  -Dialysis modality: Hemodialysis  -Outpatient HD unit: [facility]  [place] (Valerio Bautista MD)  -HD TX days: Tuesday/Thursday/Saturday, duration of treatment: 3-4 hrs  -Last HD TX prior to hospital admission: 10/21/23  -Dialysis access: RUE AVF   -Residual urine: + RRF  -EDW: 109 kg    Assessment:   - Will provide dialysis today (10/24/2023) with UF - 2.5L as BP tolerates for metabolic clearance and volume management   - Labs reviewed and dialysate to be adjusted to current labs.   - Will obtain OP dialysis records if here longer than 1-2 days   - Continue to monitor intake and output  - Please avoid gadolinium, fleets, phos-based laxatives, NSAIDs  - Dialysis thrice weekly unless more urgent indications arise. Will evaluate RRT requirements Daily.    Anemia of ESRD   Recent Labs   Lab 10/24/23  0703   WBC 8.50   HGB 9.8*   HCT 31.1*        Lab Results   Component Value Date    FESATURATED 24 03/11/2008    FERRITIN 12.6 03/11/2008       - Goal in ESRD is Hgb of 10-11. Hgb 9.8. Near target.   - EPO can be administered and dosed per his OP unit upon discharge.    Mineral Bone Disease in ESRD   Lab Results   Component Value Date    CALCIUM 9.1 10/24/2023    ALBUMIN 2.1 (L) 09/19/2023    CAION 1.02 (L) 09/15/2023    PHOS 4.0 09/19/2023       - F/U PO4, Mg, Calcium. And albumin levels daily.   - Renal diet with protein intake goal 1.5 g/kg/d with 1 L fluid restriction   - Novasource with meals  - Restart home phos binder , phos 4.0    Orthopedic  Wound dehiscence  - defer to primary team         Thank you for your consult. I will follow-up with patient. Please contact us if you have any additional questions.    Sharonda Armstrong, ALANA, FNP-C  Nephrology  [facility]

## 2023-10-24 NOTE — PLAN OF CARE
I have reviewed the chart of Georgina Arias and collaborated with Mxax Maya MD in the care of the patient who is hospitalized for the following:    Active Hospital Problems    Diagnosis    Wound dehiscence     Ms. Arias is a 51 y/o female with extensive pmh including DMII, PVD, HLD, ESRD on HD, CHF and non-healing wounds who was admitted after clinic visit on 10/23.  She had recent ax bi fem bypass with Dr. Maya on 9/14/23 and groin incision sites began dehiscing on 10/12.  She was admitted from wound care clinic for wound vac placement and IV antibiotics.    -Renal/DM diet  -OK for ambulation  -Incentive spirometer  -SCDs  -Vanc & zosyn  -Nephro consulted for HD  -Wound care consulted   -Wound vac to be applied asap when delivered to room, was ordered yesterday   -DM mgmt- sliding scale insulin ordered     Dispo:  Will need HH and wound vac for discharge       ESRD (end stage renal disease)    CHF (congestive heart failure)    Type II diabetes mellitus     Overview:   dx update            I have reviewed the Georgina Arias with the multidisciplinary team during discharge huddle.       Dante Saucedo PA-C  [facility]

## 2023-10-24 NOTE — PLAN OF CARE
10/24/23 1404   Post-Acute Status   Post-Acute Authorization Placement;Home Health   Post-Acute Placement Status Referrals Sent   Discharge Plan   Discharge Plan A Home Health   Discharge Plan B Home Health     Met with patient to review discharge recommendation of home health and is agreeable to plan    Patient/family provided list of facilities in-network with patient's payor plan. Providers that are owned, operated, or affiliated with [facility] are included on the list.     Notified that referral sent to below listed facilities from in-network list based on proximity to home/family support:   1.  2.  3.  4.  5. (can send more than 5)    Patient/family instructed to identify preference.    Preferred Facility: (if more than 1, listed in order of descending preference)  [facility]     If an additional preferred facility not listed above is identified, additional referral to be sent. If above facilities unable to accept, will send additional referrals to in-network providers.

## 2023-10-24 NOTE — PROGRESS NOTES
HD completed x 3.5 Hrs, 2.5 Liters removed, Patient tolerated TX. Well. Needles removed x 2, hemostasis achieved. Patient left NEFTALI by wheelchair in NAD.

## 2023-10-24 NOTE — ASSESSMENT & PLAN NOTE
52 y.o. White Female ESRD-HD M-W-F presents to ED on 10/23/2023 with worsening wound infection and need for wound vacuum placement and IV antibiotics.   Nephrology consulted for inpatient ESRD-HD management      Outpatient HD Information:  -Dialysis modality: Hemodialysis  -Outpatient HD unit: [facility] (Valerio Bautista MD)  -HD TX days: Tuesday/Thursday/Saturday, duration of treatment: 3-4 hrs  -Last HD TX prior to hospital admission: 10/21/23  -Dialysis access: RUE AVF   -Residual urine: + RRF  -EDW: 109 kg    Assessment:   - Will provide dialysis today (10/24/2023) with UF - 2.5L as BP tolerates for metabolic clearance and volume management   - Labs reviewed and dialysate to be adjusted to current labs.   - Will obtain OP dialysis records if here longer than 1-2 days   - Continue to monitor intake and output  - Please avoid gadolinium, fleets, phos-based laxatives, NSAIDs  - Dialysis thrice weekly unless more urgent indications arise. Will evaluate RRT requirements Daily.    Anemia of ESRD   Recent Labs   Lab 10/24/23  0703   WBC 8.50   HGB 9.8*   HCT 31.1*        Lab Results   Component Value Date    FESATURATED 24 03/11/2008    FERRITIN 12.6 03/11/2008       - Goal in ESRD is Hgb of 10-11. Hgb 9.8. Near target.   - EPO can be administered and dosed per his OP unit upon discharge.    Mineral Bone Disease in ESRD   Lab Results   Component Value Date    CALCIUM 9.1 10/24/2023    ALBUMIN 2.1 (L) 09/19/2023    CAION 1.02 (L) 09/15/2023    PHOS 4.0 09/19/2023       - F/U PO4, Mg, Calcium. And albumin levels daily.   - Renal diet with protein intake goal 1.5 g/kg/d with 1 L fluid restriction   - Novasource with meals  - Restart home phos binder , phos 4.0

## 2023-10-24 NOTE — PLAN OF CARE
[facility] [facility]  Initial Discharge Assessment       Primary Care Provider: Alonso Ling MD    Admission Diagnosis: Infection and inflammatory reaction due to other cardiac and vascular devices, implants and grafts, initial encounter [T82.7XXA]  Wound dehiscence [T81.30XA]    Admission Date: 10/23/2023  Expected Discharge Date: 10/25/2023         Payor: HU[employer] MANAGED MEDICARE / Plan: HU[employer] SNP HMO PPO SPECIAL NEEDS / Product Type: Medicare Advantage /     Extended Emergency Contact Information  Primary Emergency Contact: Georgina Collado  Address: unknown   [facility]  Home Phone: [phone]  Relation: Mother    Discharge Plan A: Home with family, Home Health  Discharge Plan B: Home with family, Home Health      [facility] [place] - [address]  [address] [place] [address]  Phone: [phone] Fax: [phone]    [address] [place] - [address]  [address]  [facility] [place] [address]  Phone: [phone] Fax: [phone]      Initial Assessment (most recent)       Adult Discharge Assessment - 10/24/23 1340          Discharge Assessment    Assessment Type Discharge Planning Assessment     Confirmed/corrected address, phone number and insurance Yes     Confirmed Demographics Correct on Facesheet     Source of Information patient     Does patient/caregiver understand observation status Yes     Communicated INDIO with patient/caregiver Yes     People in Home parent(s)     Do you expect to return to your current living situation? Yes     Do you have help at home or someone to help you manage your care at home? Yes     Who are your caregiver(s) and their phone number(s)? Georgina Collado (mother) [phone]     Prior to hospitilization cognitive status: Alert/Oriented     Current cognitive status: Alert/Oriented     Equipment Currently Used at Home walker, rolling;cane, straight     Readmission  within 30 days? No     Patient currently being followed by outpatient case management? No     Do you currently have service(s) that help you manage your care at home? No     Do you take prescription medications? Yes     Do you have prescription coverage? Yes     Do you have any problems affording any of your prescribed medications? No     Is the patient taking medications as prescribed? yes     Who is going to help you get home at discharge? Mother-Georgina     How do you get to doctors appointments? family or friend will provide     Are you on dialysis? No     Do you take coumadin? No     DME Needed Upon Discharge  negative pressure wound therapy device     Discharge Plan A Home with family;Home Health     Discharge Plan B Home with family;Home Health        Physical Activity    On average, how many days per week do you engage in moderate to strenuous exercise (like a brisk walk)? 0 days     On average, how many minutes do you engage in exercise at this level? 0 min        Financial Resource Strain    How hard is it for you to pay for the very basics like food, housing, medical care, and heating? Not hard at all        Housing Stability    In the last 12 months, was there a time when you were not able to pay the mortgage or rent on time? No     In the last 12 months, how many places have you lived? 1     In the last 12 months, was there a time when you did not have a steady place to sleep or slept in a shelter (including now)? No        Transportation Needs    In the past 12 months, has lack of transportation kept you from medical appointments or from getting medications? No     In the past 12 months, has lack of transportation kept you from meetings, work, or from getting things needed for daily living? No        Food Insecurity    Within the past 12 months, you worried that your food would run out before you got the money to buy more. Never true     Within the past 12 months, the food you bought just didn't last  and you didn't have money to get more. Never true        Stress    Do you feel stress - tense, restless, nervous, or anxious, or unable to sleep at night because your mind is troubled all the time - these days? To some extent        Social Connections    In a typical week, how many times do you talk on the phone with family, friends, or neighbors? More than three times a week     How often do you get together with friends or relatives? More than three times a week     How often do you attend Yazidi or Episcopalian services? Never     Do you belong to any clubs or organizations such as Yazidi groups, unions, fraBioptigen or athletic groups, or school groups? No     How often do you attend meetings of the clubs or organizations you belong to? Never     Are you , , , , never , or living with a partner? Never         Alcohol Use    Q1: How often do you have a drink containing alcohol? Monthly or less     Q2: How many drinks containing alcohol do you have on a typical day when you are drinking? 1 or 2     Q3: How often do you have six or more drinks on one occasion? Less than monthly                   Spoke with patient at bedside to complete d/c planning assessment. Patient lives with her mother in a single story home with one step to enter. She is Independent with ADL's and has a cane and walker in home to use as needed. Verified PCP, Pharmacy and health insurance. Dialysis T-TH-S at [facility]. Chair time is 715am. Patient drives herself to treatment. She is expected to d/c home with HH and a wound Vac. Wound vac ordered form sent out earlier by [facility] .  Patient's first choice for HH is [facility] as she has used them in the past. Primary CM/SW updated. Discharge Plan A and Plan B have been determined by review of patient's clinical status, future medical and therapeutic needs, and coverage/benefits for post-acute care in coordination with  multidisciplinary team members.        Shaunna Jon RNCM  Case Management  [facility]  [phone]

## 2023-10-24 NOTE — CONSULTS
[facility]  Wound Care    Patient Name:  Georgina Arias   MRN:  [id number]  Date: 10/24/2023  Diagnosis: Wound dehiscence    History:     Past Medical History:   Diagnosis Date    Hyperlipidemia     Hypertension     Peptic ulcer disease     Peripheral artery disease     PONV (postoperative nausea and vomiting)     Stage IV CKD     Type II diabetes mellitus        Social History     Socioeconomic History    Marital status: Single   Occupational History     Employer: [employer]   Tobacco Use    Smoking status: Former     Current packs/day: 0.00     Types: Cigarettes     Quit date: 2006     Years since quittin.2    Smokeless tobacco: Never   Substance and Sexual Activity    Alcohol use: Not Currently     Comment: occ    Drug use: No    Sexual activity: Not Currently   Social History Narrative    Lives with mom.    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    Some photography.    Close with niece 2 and god-child.    occ exericse     Social Determinants of Health     Financial Resource Strain: Low Risk  (10/24/2023)    Overall Financial Resource Strain (CARDIA)     Difficulty of Paying Living Expenses: Not hard at all   Food Insecurity: No Food Insecurity (10/24/2023)    Hunger Vital Sign     Worried About Running Out of Food in the Last Year: Never true     Ran Out of Food in the Last Year: Never true   Transportation Needs: No Transportation Needs (10/24/2023)    PRAPARE - Transportation     Lack of Transportation (Medical): No     Lack of Transportation (Non-Medical): No   Physical Activity: Inactive (10/24/2023)    Exercise Vital Sign     Days of Exercise per Week: 0 days     Minutes of Exercise per Session: 0 min   Stress: Stress Concern Present (10/24/2023)    [place] [employer] - Occupational Stress Questionnaire     Feeling of Stress : To some extent   Social Connections: Socially Isolated (10/24/2023)    Social Connection and Isolation  Panel [NHANES]     Frequency of Communication with Friends and Family: More than three times a week     Frequency of Social Gatherings with Friends and Family: More than three times a week     Attends Yazdanism Services: Never     Active Member of Clubs or Organizations: No     Attends Club or Organization Meetings: Never     Marital Status: Never    Housing Stability: Low Risk  (10/24/2023)    Housing Stability Vital Sign     Unable to Pay for Housing in the Last Year: No     Number of Places Lived in the Last Year: 1     Unstable Housing in the Last Year: No       Precautions:     Allergies as of 10/23/2023 - Reviewed 10/23/2023   Allergen Reaction Noted    Naproxen Anaphylaxis and Swelling 07/02/2012    Sulfamethoxazole-trimethoprim Other (See Comments) 04/29/2021    Hydrocodone Nausea And Vomiting and Rash 12/20/2019    Aspartame Hives 10/23/2023    Penicillins Hives 07/02/2012    Adhesive Rash 04/13/2023    Hydrocodone-acetaminophen Nausea And Vomiting 07/02/2012       WO Assessment Details/Treatment   Patient seen for wound care consultation.   Reviewed chart for this encounter.   See Flow Sheet for findings.    Warren: 20  Albumin: 2.1  POCT: 154 (9112)    Pt sitting up in bed, agreed to assessment. Pt stated she had surgery in August 2023 and this has never healed. Pt had on ACE wrap, snugly applied kerlix, what appeared to be a 4x4 of drawtex to her periwound/wound area.   Washed pt foot with soap and water, cleansed wound with Vashe, during assessment, pressure applied to plantar and dorsal foot, SS creamy drainage expelled. Flushed with vashe, gently packed Aquacel AG to wound bed and tunnels at 3 and 9 o'clock. Plain mepilex foam x2 applied, secure with cast padding (J-form 426), and loosely applied ACE wrap.     RECOMMENDATIONS:  Podiatry consult  NPWT  No kerlix   Keep clean and dry   Cleanse foot with soap and water, apply water based moisturizer.    Discussed POC with patient and primary  nurse.   See EMR for orders & patient education.    Discussed nutrition and the role of protein in wound healing with the patient. Instructed patient to optimize protein for wound healing.    Nursing to continue care & monitoring.  Nursing to maintain pressure injury prevention interventions.    Orders placed.     10/24/23 1030   WOCN Assessment   WOCN Total Time (mins) 30   Visit Date 10/24/23   Visit Time 1030   Consult Type New   WOCN Speciality Wound   Wound surgical   Intervention assessed;changed;applied;chart review;coordination of care;consult other service;team conference;orders   Teaching on-going        Incision/Site 09/14/23 1742 Left Foot   Date First Assessed/Time First Assessed: 09/14/23 1742   Present Prior to Hospital Arrival?: Yes  Side: Left  Location: Foot   Wound Image     Dressing Appearance Intact;Moist drainage   Drainage Amount Moderate   Drainage Characteristics/Odor Serosanguineous;Creamy;No odor   Appearance Pink;Red;Yellow   Red (%), Wound Tissue Color 90 %   Yellow (%), Wound Tissue Color 10 %   Periwound Area Moist;Macerated;Edematous   Wound Edges Rolled/closed   Wound Length (cm) 0.6 cm   Wound Width (cm) 6.2 cm   Wound Depth (cm) 4 cm   Wound Volume (cm^3) 14.88 cm^3   Wound Surface Area (cm^2) 3.72 cm^2   Undermining (depth (cm)/location) 3.5 @ 9 o'clock / 2.4 @ 3 o'clock   Care Cleansed with:;Antimicrobial agent;Soap and water   Dressing Applied;Silver;Hydrofiber;Foam;Cast padding;Elastic bandage   Packing packed with;hydrofiber/alginate   Packing Inserted  23   Periwound Care Dry periwound area maintained   Off Loading Football dressing   Dressing Change Due 10/25/23

## 2023-10-24 NOTE — PLAN OF CARE
[facility] Plan of Care Note  Diagnosis: Wound dehiscence     Shift Events: Wound care done, HD today    Goals of Care: manage pain, free of s/s of infection, maintain safety    Neuro: A& O x4    Vital Signs: WNL     Respiratory: WNL    Diet: Diabetic diet    Is patient tolerating current diet? Yes, ~75% of meals    Lines: L FA 22g PIV    Accuchecks: FSACHS    Skin: L great toe amputation incision, bilateral groin wounds    Fall Risk Score: 9    Activity level? Independent     Any scheduled procedures? Bedside wound vac placement pending        Problem: Adult Inpatient Plan of Care  Goal: Plan of Care Review  10/24/2023 1715 by Tracie Zhong RN  Outcome: Ongoing, Progressing  10/24/2023 1715 by Tracie Zhong RN  Outcome: Ongoing, Progressing  Goal: Patient-Specific Goal (Individualized)  10/24/2023 1715 by Tracie Zhong RN  Outcome: Ongoing, Progressing  10/24/2023 1715 by Tracie Zhong RN  Outcome: Ongoing, Progressing  Goal: Absence of Hospital-Acquired Illness or Injury  10/24/2023 1715 by Tracie Zhong RN  Outcome: Ongoing, Progressing  10/24/2023 1715 by Tracie Zhong RN  Outcome: Ongoing, Progressing  Goal: Optimal Comfort and Wellbeing  10/24/2023 1715 by Tracie Zhong RN  Outcome: Ongoing, Progressing  10/24/2023 1715 by Tracie Zhong RN  Outcome: Ongoing, Progressing  Goal: Readiness for Transition of Care  10/24/2023 1715 by Tracie Zhong RN  Outcome: Ongoing, Progressing  10/24/2023 1715 by Tracie Zhong RN  Outcome: Ongoing, Progressing     Problem: Diabetes Comorbidity  Goal: Blood Glucose Level Within Targeted Range  10/24/2023 1715 by Tracie Zhong RN  Outcome: Ongoing, Progressing  10/24/2023 1715 by Tracie Zhong RN  Outcome: Ongoing, Progressing     Problem: Fluid and Electrolyte Imbalance (Acute Kidney Injury/Impairment)  Goal: Fluid and Electrolyte Balance  10/24/2023 1715 by Tracie Zhong RN  Outcome: Ongoing, Progressing  10/24/2023 1715 by Tracie Zhong  RN  Outcome: Ongoing, Progressing     Problem: Oral Intake Inadequate (Acute Kidney Injury/Impairment)  Goal: Optimal Nutrition Intake  10/24/2023 1715 by Tracie Zhong RN  Outcome: Ongoing, Progressing  10/24/2023 1715 by Tracie Zhong RN  Outcome: Ongoing, Progressing     Problem: Renal Function Impairment (Acute Kidney Injury/Impairment)  Goal: Effective Renal Function  10/24/2023 1715 by Tracie Zhong RN  Outcome: Ongoing, Progressing  10/24/2023 1715 by Tracie Zhong RN  Outcome: Ongoing, Progressing     Problem: Infection  Goal: Absence of Infection Signs and Symptoms  10/24/2023 1715 by Tracie Zhong RN  Outcome: Ongoing, Progressing  10/24/2023 1715 by Tracie Zhong RN  Outcome: Ongoing, Progressing     Problem: Impaired Wound Healing  Goal: Optimal Wound Healing  10/24/2023 1715 by Tracie Zhong RN  Outcome: Ongoing, Progressing  10/24/2023 1715 by Tracie Zhong RN  Outcome: Ongoing, Progressing     Problem: Device-Related Complication Risk (Hemodialysis)  Goal: Safe, Effective Therapy Delivery  10/24/2023 1715 by Tracie Zhong RN  Outcome: Ongoing, Progressing  10/24/2023 1715 by Tracie Zhong RN  Outcome: Ongoing, Progressing     Problem: Hemodynamic Instability (Hemodialysis)  Goal: Effective Tissue Perfusion  10/24/2023 1715 by Tracie Zhong RN  Outcome: Ongoing, Progressing  10/24/2023 1715 by Tracie Zhong RN  Outcome: Ongoing, Progressing     Problem: Infection (Hemodialysis)  Goal: Absence of Infection Signs and Symptoms  10/24/2023 1715 by Tracie Zhong RN  Outcome: Ongoing, Progressing  10/24/2023 1715 by Tracie Zhong RN  Outcome: Ongoing, Progressing     Problem: Electrolyte Imbalance (Chronic Kidney Disease)  Goal: Electrolyte Balance  10/24/2023 1715 by Tracie Zhong RN  Outcome: Ongoing, Progressing  10/24/2023 1715 by Tracie Zhong RN  Outcome: Ongoing, Progressing     Problem: Fluid Volume Excess (Chronic Kidney Disease)  Goal: Fluid  Balance  10/24/2023 1715 by Tracie Zhong, RN  Outcome: Ongoing, Progressing  10/24/2023 1715 by Tracie Zhong, RN  Outcome: Ongoing, Progressing

## 2023-10-24 NOTE — ASSESSMENT & PLAN NOTE
Ms. Arias is a 53 y/o female with extensive pmh including DMII, PVD, HLD, ESRD on HD, CHF and non-healing wounds who was admitted after clinic visit on 10/23.  She had recent ax bi fem bypass with Dr. Maya on 9/14/23 and groin incision sites began dehiscing on 10/12.  She was admitted from wound care clinic for wound vac placement and IV antibiotics.    -Renal/DM diet  -OK for ambulation  -Incentive spirometer  -SCDs  -Vanc & zosyn  -Nephro consulted for HD  -Wound care consulted   -Wound vac to be applied asap when delivered to room, was ordered yesterday   -DM mgmt- sliding scale insulin ordered     Dispo:  Will need HH and wound vac for discharge

## 2023-10-24 NOTE — PLAN OF CARE
[facility] Plan of Care Note  Diagnosis: s/p femoral bypass surgery    Shift Events: admitted to [facility]    Goals of Care: free of s/s of infection    Neuro: A&O x 4    Vital Signs: hypertensive    Respiratory: WNL    Diet: diabetic diet    Is patient tolerating current diet? yes    Lines: L FA 22g    Accuchecks: FSACHS    Skin: L foot, bilateral groin sites    Activity level? Independent    Any scheduled procedures? Pending wound vac placement              Problem: Adult Inpatient Plan of Care  Goal: Plan of Care Review  Outcome: Ongoing, Progressing  Goal: Patient-Specific Goal (Individualized)  Outcome: Ongoing, Progressing  Goal: Absence of Hospital-Acquired Illness or Injury  Outcome: Ongoing, Progressing  Goal: Optimal Comfort and Wellbeing  Outcome: Ongoing, Progressing  Goal: Readiness for Transition of Care  Outcome: Ongoing, Progressing     Problem: Diabetes Comorbidity  Goal: Blood Glucose Level Within Targeted Range  Outcome: Ongoing, Progressing     Problem: Fluid and Electrolyte Imbalance (Acute Kidney Injury/Impairment)  Goal: Fluid and Electrolyte Balance  Outcome: Ongoing, Progressing     Problem: Oral Intake Inadequate (Acute Kidney Injury/Impairment)  Goal: Optimal Nutrition Intake  Outcome: Ongoing, Progressing     Problem: Renal Function Impairment (Acute Kidney Injury/Impairment)  Goal: Effective Renal Function  Outcome: Ongoing, Progressing     Problem: Infection  Goal: Absence of Infection Signs and Symptoms  Outcome: Ongoing, Progressing     Problem: Impaired Wound Healing  Goal: Optimal Wound Healing  Outcome: Ongoing, Progressing

## 2023-10-24 NOTE — SUBJECTIVE & OBJECTIVE
Past Medical History:   Diagnosis Date    Hyperlipidemia     Hypertension     Peptic ulcer disease     Peripheral artery disease     PONV (postoperative nausea and vomiting)     Stage IV CKD     Type II diabetes mellitus        Past Surgical History:   Procedure Laterality Date    ANGIOGRAM, LOWER ARTERIAL, UNILATERAL Left 9/13/2023    Procedure: ANGIOGRAM, LOWER ARTERIAL, UNILATERAL;  Surgeon: Maxx Maya MD;  Location: [address];  Service: Vascular;  Laterality: Left;    ANGIOGRAPHY OF LOWER EXTREMITY Left 9/14/2023    Procedure: ANGIOGRAM, LOWER EXTREMITY with balloon angioplasty ultraverse 5mm x 60mm;  Surgeon: Maxx Maya MD;  Location: [facility] OR [address] [place] [address];  Service: Vascular;  Laterality: Left;  ultraverse 5mm x 60mm  fluoro: 12.41  contrast: 44ml  mGy: 65.57  Gycm2: 23.50    AORTOGRAPHY WITH EXTREMITY RUNOFF Left 9/13/2023    Procedure: AORTOGRAM, WITH EXTREMITY RUNOFF;  Surgeon: Mxax Maya MD;  Location: [address];  Service: Vascular;  Laterality: Left;  8.1 min  663.63 mGy  176.39 Gy.cm  178ml Dye     AV FISTULA PLACEMENT Right     CHOLECYSTECTOMY      CREATION, BYPASS, ARTERIAL, AXILLARY TO BILATERAL FEMORAL Left 9/14/2023    Procedure: CREATION, BYPASS, ARTERIAL, AXILLARY TO BILATERAL FEMORAL;  Surgeon: Maxx Maya MD;  Location: [address];  Service: Vascular;  Laterality: Left;  Left Axillary to Bilateral Femoral Bypass, Left Femoral to Above Knee Popliteal Bypass; SLIDER BED    CYSTOSCOPY W/ URETERAL STENT PLACEMENT Right 08/28/2018    Procedure: CYSTOSCOPY, WITH URETERAL STENT INSERTION (ADD ON );  Surgeon: Bubba Perez MD;  Location: [facility] OR;  Service: Urology;  Laterality: Right;  (ADD ON )    DEBRIDEMENT OF FOOT Left 08/03/2023    Procedure: DEBRIDEMENT, FOOT;  Surgeon: Aleida Flannery DPM;  Location: [facility] OR;  Service: Podiatry;  Laterality: Left;    Excisional debridement of ulcer distal great toe left foot w/ partial resection distal phalanx Left 08/03/2023     FOOT AMPUTATION THROUGH METATARSAL Left 9/14/2023    Procedure: AMPUTATION, FOOT, TRANSMETATARSAL partial 1st ray amputation;  Surgeon: Jesus Valles DPM;  Location: [facility] OR [facility];  Service: Podiatry;  Laterality: Left;  partial ray    I&D L 1st ray w/ amputation L hallux IPJ Left 08/14/2023    INCISION AND DRAINAGE FOOT Left 9/14/2023    Procedure: INCISION AND DRAINAGE, FOOT;  Surgeon: Jesus Valles DPM;  [address];  Service: Podiatry;  Laterality: Left;    Injection L PT tendon sheath Left 08/14/2023    Nail avulsion left hallux Left 08/03/2023    PERIPHERAL ARTERIAL STENT GRAFT Right     REMOVAL OF NAIL OF DIGIT Left 08/03/2023    Procedure: REMOVAL, NAIL, DIGIT great toe;  Surgeon: Aleida Flannery DPM;  Location: [facility];  Service: Podiatry;  Laterality: Left;    STENT, SUPERFICIAL FEMORAL ARTERY Left 9/14/2023    Procedure: STENT, SUPERFICIAL FEMORAL ARTERY;  Surgeon: Maxx Maya MD;  [address];  Service: Vascular;  Laterality: Left;  5 x 100 mm    TOE AMPUTATION Left 08/14/2023    Procedure: AMPUTATION, TOE hallux IPJ;  Surgeon: Aleida Flannery DPM;  Location: [facility];  Service: Podiatry;  Laterality: Left;    TOE AMPUTATION Left 08/25/2023    great toe    TOE AMPUTATION Left 8/25/2023    Procedure: AMPUTATION, TOE hallux, possible 1st met.head;  Surgeon: Aleida Flannery DPM;  Location: [facility];  Service: Podiatry;  Laterality: Left;    URETEROSCOPIC REMOVAL OF URETERIC CALCULUS Right 09/11/2018    Procedure: EXTRACTION-STONE-URETEROSCOPY;  Surgeon: Bubba Perez MD;  Location: [facility] OR;  Service: Urology;  Laterality: Right;       Review of patient's allergies indicates:   Allergen Reactions    Naproxen Anaphylaxis and Swelling     Hives (skin)^swelling  Hives (skin)^swelling  Hives (skin)^swelling    Sulfamethoxazole-trimethoprim Other (See Comments)     Caused JEROME    Hydrocodone Nausea And Vomiting and Rash    Aspartame Hives    Penicillins Hives     Blisters  (skin)^    Adhesive Rash    Hydrocodone-acetaminophen Nausea And Vomiting     Rash (skin)^, Vomiting^       Current Facility-Administered Medications   Medication Frequency    0.9%  NaCl infusion Once    acetaminophen tablet 650 mg Q6H    dextrose 10% bolus 125 mL 125 mL PRN    dextrose 10% bolus 250 mL 250 mL PRN    gabapentin capsule 300 mg TID    glucagon (human recombinant) injection 1 mg PRN    glucose chewable tablet 16 g PRN    glucose chewable tablet 24 g PRN    hydrALAZINE injection 10 mg Q6H PRN    ibuprofen tablet 400 mg Q6H PRN    insulin aspart U-100 pen 0-5 Units QID (AC + HS) PRN    labetalol 20 mg/4 mL (5 mg/mL) IV syring Q6H PRN    LIDOcaine (PF) 10 mg/ml (1%) injection 10 mg Once PRN    melatonin tablet 6 mg Nightly PRN    ondansetron disintegrating tablet 8 mg Q8H PRN    piperacillin-tazobactam (ZOSYN) 4.5 g in dextrose 5 % in water (D5W) 100 mL IVPB (MB+) Q12H    sodium chloride 0.9% flush 10 mL PRN    vancomycin - pharmacy to dose pharmacy to manage frequency     Family History       Problem Relation (Age of Onset)    Cancer Maternal Grandmother    Diabetes Mother, Father    Heart disease Father (37), Maternal Grandmother    Hypertension Mother, Father, Brother          Tobacco Use    Smoking status: Former     Current packs/day: 0.00     Types: Cigarettes     Quit date: 2006     Years since quittin.2    Smokeless tobacco: Never   Substance and Sexual Activity    Alcohol use: Not Currently     Comment: occ    Drug use: No    Sexual activity: Not Currently     Review of Systems   Constitutional:  Negative for activity change, appetite change, fatigue and fever.   HENT:  Negative for congestion and hearing loss.    Eyes:  Negative for visual disturbance.   Respiratory:  Negative for cough, chest tightness and shortness of breath.    Cardiovascular:  Negative for chest pain, palpitations and leg swelling.   Gastrointestinal:  Negative for abdominal distention, abdominal pain, blood in  stool, constipation, diarrhea, nausea and vomiting.   Genitourinary:  Positive for decreased urine volume. Negative for dysuria and urgency.   Musculoskeletal:  Negative for gait problem.   Skin:  Positive for wound.   Allergic/Immunologic: Negative.    Neurological:  Negative for dizziness, weakness and headaches.   Psychiatric/Behavioral:  Negative for agitation, behavioral problems, confusion and decreased concentration.      Objective:     Vital Signs (Most Recent):  Temp: 98.2 °F (36.8 °C) (10/24/23 0826)  Pulse: 79 (10/24/23 0826)  Resp: 18 (10/24/23 0826)  BP: (!) 145/67 (10/24/23 0826)  SpO2: 95 % (10/24/23 0826) Vital Signs (24h Range):  Temp:  [98 °F (36.7 °C)-98.2 °F (36.8 °C)] 98.2 °F (36.8 °C)  Pulse:  [79-90] 79  Resp:  [18] 18  SpO2:  [90 %-100 %] 95 %  BP: (145-179)/(65-83) 145/67     Weight: 113.4 kg (250 lb) (10/23/23 1715)  Body mass index is 39.16 kg/m².  Body surface area is 2.32 meters squared.    No intake/output data recorded.     Physical Exam  Vitals and nursing note reviewed.   Constitutional:       Appearance: Normal appearance.   HENT:      Head: Normocephalic.      Nose: Nose normal.   Eyes:      General: Scleral icterus present.   Cardiovascular:      Rate and Rhythm: Normal rate.   Pulmonary:      Effort: Pulmonary effort is normal.   Abdominal:      Palpations: Abdomen is soft.   Musculoskeletal:         General: Deformity present. Normal range of motion.      Cervical back: Normal range of motion.      Right lower leg: No edema.      Left lower leg: No edema.   Skin:     General: Skin is warm and dry.      Comments: Bilateral groin surgical incisions    Neurological:      General: No focal deficit present.      Mental Status: She is alert and oriented to person, place, and time.   Psychiatric:         Mood and Affect: Mood normal.         Behavior: Behavior normal.          Significant Labs:  CBC:   Recent Labs   Lab 10/24/23  0703   WBC 8.50   RBC 3.11*   HGB 9.8*   HCT 31.1*   PLT  176   *   MCH 31.5*   MCHC 31.5*     CMP:   Recent Labs   Lab 10/24/23  0807   *   CALCIUM 9.1      K 4.0   CO2 24      BUN 40*   CREATININE 4.6*     All labs within the past 24 hours have been reviewed.

## 2023-10-24 NOTE — HPI
The patient is a 52 y.o. White Female with multiple co morbidities including DMII, PVD, HLD, ESRD on HD, CHF and non-healing wounds who presents to ED on 10/23/2023 after her general surgery clinic visit on 10/23/23 for wound vacuum placement and IV antibiotics. Of note, she had a recent bilateral femoral bypass with Dr. Maya on 9/14/23 cb incision dehiscing on 10/12. The patient is a [facility] dialysis patient at [facility]. Last HD on Saturday. Nephrology consulted for management of ESRD and HD treatment.      Outpatient HD Information:  -Dialysis modality: Hemodialysis  -Outpatient HD unit: [facility] (Valerio Bautista MD)  -HD TX days: Tuesday/Thursday/Saturday, duration of treatment: 3-4 hr  -Dialysis access: RUE AVF   -Residual urine: + RRF  -EDW: 109 kg

## 2023-10-25 LAB
ANION GAP SERPL CALC-SCNC: 14 MMOL/L (ref 8–16)
BASOPHILS # BLD AUTO: 0.06 K/UL (ref 0–0.2)
BASOPHILS NFR BLD: 0.9 % (ref 0–1.9)
BUN SERPL-MCNC: 20 MG/DL (ref 6–20)
CALCIUM SERPL-MCNC: 9.4 MG/DL (ref 8.7–10.5)
CHLORIDE SERPL-SCNC: 106 MMOL/L (ref 95–110)
CO2 SERPL-SCNC: 19 MMOL/L (ref 23–29)
CREAT SERPL-MCNC: 3.5 MG/DL (ref 0.5–1.4)
DIFFERENTIAL METHOD: ABNORMAL
EOSINOPHIL # BLD AUTO: 0.3 K/UL (ref 0–0.5)
EOSINOPHIL NFR BLD: 3.9 % (ref 0–8)
ERYTHROCYTE [DISTWIDTH] IN BLOOD BY AUTOMATED COUNT: 16.9 % (ref 11.5–14.5)
EST. GFR  (NO RACE VARIABLE): 15.1 ML/MIN/1.73 M^2
GLUCOSE SERPL-MCNC: 157 MG/DL (ref 70–110)
HCT VFR BLD AUTO: 33.7 % (ref 37–48.5)
HGB BLD-MCNC: 10.9 G/DL (ref 12–16)
IMM GRANULOCYTES # BLD AUTO: 0.02 K/UL (ref 0–0.04)
IMM GRANULOCYTES NFR BLD AUTO: 0.3 % (ref 0–0.5)
LYMPHOCYTES # BLD AUTO: 0.9 K/UL (ref 1–4.8)
LYMPHOCYTES NFR BLD: 14.1 % (ref 18–48)
MCH RBC QN AUTO: 32.2 PG (ref 27–31)
MCHC RBC AUTO-ENTMCNC: 32.3 G/DL (ref 32–36)
MCV RBC AUTO: 100 FL (ref 82–98)
MONOCYTES # BLD AUTO: 0.5 K/UL (ref 0.3–1)
MONOCYTES NFR BLD: 7.2 % (ref 4–15)
NEUTROPHILS # BLD AUTO: 4.7 K/UL (ref 1.8–7.7)
NEUTROPHILS NFR BLD: 73.6 % (ref 38–73)
NRBC BLD-RTO: 0 /100 WBC
PLATELET # BLD AUTO: 202 K/UL (ref 150–450)
PMV BLD AUTO: 10.5 FL (ref 9.2–12.9)
POCT GLUCOSE: 151 MG/DL (ref 70–110)
POCT GLUCOSE: 158 MG/DL (ref 70–110)
POCT GLUCOSE: 191 MG/DL (ref 70–110)
POTASSIUM SERPL-SCNC: 4.3 MMOL/L (ref 3.5–5.1)
RBC # BLD AUTO: 3.38 M/UL (ref 4–5.4)
SODIUM SERPL-SCNC: 139 MMOL/L (ref 136–145)
VANCOMYCIN SERPL-MCNC: 15.1 UG/ML
WBC # BLD AUTO: 6.4 K/UL (ref 3.9–12.7)

## 2023-10-25 PROCEDURE — 99024 POSTOP FOLLOW-UP VISIT: CPT | Mod: ,,, | Performed by: PODIATRIST

## 2023-10-25 PROCEDURE — 25000003 PHARM REV CODE 250

## 2023-10-25 PROCEDURE — 20600001 HC STEP DOWN PRIVATE ROOM

## 2023-10-25 PROCEDURE — 80202 ASSAY OF VANCOMYCIN: CPT | Performed by: SURGERY

## 2023-10-25 PROCEDURE — 85025 COMPLETE CBC W/AUTO DIFF WBC: CPT | Performed by: SURGERY

## 2023-10-25 PROCEDURE — 94761 N-INVAS EAR/PLS OXIMETRY MLT: CPT

## 2023-10-25 PROCEDURE — 99232 SBSQ HOSP IP/OBS MODERATE 35: CPT | Mod: ,,, | Performed by: NURSE PRACTITIONER

## 2023-10-25 PROCEDURE — 99024 PR POST-OP FOLLOW-UP VISIT: ICD-10-PCS | Mod: ,,, | Performed by: PODIATRIST

## 2023-10-25 PROCEDURE — 80048 BASIC METABOLIC PNL TOTAL CA: CPT | Performed by: SURGERY

## 2023-10-25 PROCEDURE — 63600175 PHARM REV CODE 636 W HCPCS

## 2023-10-25 PROCEDURE — 99232 PR SUBSEQUENT HOSPITAL CARE,LEVL II: ICD-10-PCS | Mod: ,,, | Performed by: NURSE PRACTITIONER

## 2023-10-25 RX ORDER — CARVEDILOL 25 MG/1
25 TABLET ORAL 2 TIMES DAILY WITH MEALS
Status: DISCONTINUED | OUTPATIENT
Start: 2023-10-25 | End: 2023-10-26 | Stop reason: HOSPADM

## 2023-10-25 RX ORDER — SODIUM CHLORIDE 9 MG/ML
INJECTION, SOLUTION INTRAVENOUS ONCE
Status: COMPLETED | OUTPATIENT
Start: 2023-10-26 | End: 2023-10-26

## 2023-10-25 RX ORDER — ASPIRIN 81 MG/1
81 TABLET ORAL DAILY
Status: DISCONTINUED | OUTPATIENT
Start: 2023-10-26 | End: 2023-10-26 | Stop reason: HOSPADM

## 2023-10-25 RX ORDER — ATORVASTATIN CALCIUM 40 MG/1
80 TABLET, FILM COATED ORAL DAILY
Status: DISCONTINUED | OUTPATIENT
Start: 2023-10-26 | End: 2023-10-26 | Stop reason: HOSPADM

## 2023-10-25 RX ADMIN — CARVEDILOL 25 MG: 25 TABLET, FILM COATED ORAL at 08:10

## 2023-10-25 RX ADMIN — ACETAMINOPHEN 650 MG: 325 TABLET ORAL at 11:10

## 2023-10-25 RX ADMIN — GABAPENTIN 300 MG: 300 CAPSULE ORAL at 03:10

## 2023-10-25 RX ADMIN — GABAPENTIN 300 MG: 300 CAPSULE ORAL at 08:10

## 2023-10-25 RX ADMIN — PIPERACILLIN SODIUM AND TAZOBACTAM SODIUM 4.5 G: 4; .5 INJECTION, POWDER, FOR SOLUTION INTRAVENOUS at 11:10

## 2023-10-25 RX ADMIN — ACETAMINOPHEN 650 MG: 325 TABLET ORAL at 05:10

## 2023-10-25 NOTE — PROGRESS NOTES
Pharmacokinetic Assessment Follow Up: IV Vancomycin    Vancomycin serum concentration assessment(s):    ESRD patient receiving iHD, plan for next session on 10/26  The random level was drawn correctly and can be used to guide therapy at this time. The measurement is within the desired definitive target range of 10 to 20 mcg/mL.    Vancomycin Regimen Plan:    Hold Vancomycin today   Re-dose when the random level is less than 20 mcg/mL, next level to be drawn at tomorrow with am labs prior to dialysis     Drug levels (last 3 results):  Recent Labs   Lab Result Units 10/25/23  0738   Vancomycin, Random ug/mL 15.1       Pharmacy will continue to follow and monitor vancomycin.    Please contact pharmacy at extension [phone] for questions regarding this assessment.    Thank you for the consult,   Alphonso Bautista       Patient brief summary:  Georgina Arias is a 52 y.o. female initiated on antimicrobial therapy with IV Vancomycin for treatment of skin & soft tissue infection      Drug Allergies:   Review of patient's allergies indicates:   Allergen Reactions    Naproxen Anaphylaxis and Swelling     Hives (skin)^swelling  Hives (skin)^swelling  Hives (skin)^swelling    Sulfamethoxazole-trimethoprim Other (See Comments)     Caused JEROME    Hydrocodone Nausea And Vomiting and Rash    Aspartame Hives    Penicillins Hives     Blisters (skin)^    Adhesive Rash    Hydrocodone-acetaminophen Nausea And Vomiting     Rash (skin)^, Vomiting^         Actual Body Weight:   113.4 kg    Renal Function:   Estimated Creatinine Clearance: 24.4 mL/min (A) (based on SCr of 3.5 mg/dL (H)).,     Dialysis Method (if applicable):  intermittent HD    CBC (last 72 hours):  Recent Labs   Lab Result Units 10/24/23  0703 10/25/23  0738   WBC K/uL 8.50 6.40   Hemoglobin g/dL 9.8* 10.9*   Hematocrit % 31.1* 33.7*   Platelets K/uL 176 202   Gran % % 78.2* 73.6*   Lymph % % 11.4* 14.1*   Mono % % 6.4 7.2   Eosinophil % % 3.2 3.9   Basophil % % 0.6 0.9    Differential Method  Automated Automated       Metabolic Panel (last 72 hours):  Recent Labs   Lab Result Units 10/24/23  0807 10/25/23  0738   Sodium mmol/L 138 139   Potassium mmol/L 4.0 4.3   Chloride mmol/L 102 106   CO2 mmol/L 24 19*   Glucose mg/dL 148* 157*   BUN mg/dL 40* 20   Creatinine mg/dL 4.6* 3.5*       Vancomycin Administrations:  vancomycin given in the last 96 hours                     vancomycin 2 g in dextrose 5 % 500 mL IVPB (mg) 2,000 mg New Bag 10/23/23 2137                    Microbiologic Results:  Microbiology Results (last 7 days)       Procedure Component Value Units Date/Time    Blood culture [[id number]] Collected: 10/24/23 0703    Order Status: Completed Specimen: Blood Updated: 10/25/23 0812     Blood Culture, Routine No Growth to date      No Growth to date

## 2023-10-25 NOTE — HPI
"Georgina Arias is a 53 yo F  with a PMHx of T2D, PVD, HLD, ESRD on HD, CHF and non-healing wounds who was admitted to the hospital for groin wound dehiscences. Patient was recently addmited and is s/p L stent and bypass revasc with Vascular surgery and L partial 1st ray resection with Dr Valles on 9/15. She was last seen in Podiatry clininc on 10/19 (last Thursday) by Dr Flannery where griselda were removed. Podiatry was consulted for wound dehisence to L partial 1st ray resection surgical site. Patient denies any pain to her L foot. Denies any drainage from her incision site. Patient says "I think the staples were removed too early". She has been compliant with always wearing her Kwaku shoe when walking. She sees Dr Flannery in clinic every 1-2 weeks. Denies f/c/n/v. Denies any other pedal complaints.         "

## 2023-10-25 NOTE — PLAN OF CARE
Sw spoke to pt at bedside. Pt had called her [employer] rep, who was inquiring about the vac info. SW provided info with pt;s consent and explained the time sensitive situation. [facility] rep stated she would attempt to expedite vac.     Marta Escamilla LCSW  Case Management/[facility]  [phone]

## 2023-10-25 NOTE — PROGRESS NOTES
[facility]  Vascular Surgery  Progress Note    Patient Name: Georgina Arias  MRN: [id number]  Admission Date: 10/23/2023  Primary Care Provider: Alonso Ling MD    Subjective:     Interval History: Wound vacs applied this am, MD educated patient on importance of caring for wound vac and general groin care as well as need for antibiotics.  Patient restless and anxious for discharge, working closely with social work to get wound vac delivered.       Post-Op Info:  * No surgery found *           Medications:  Continuous Infusions:  Scheduled Meds:   acetaminophen  650 mg Oral Q6H    gabapentin  300 mg Oral TID    piperacillin-tazobactam (Zosyn) IV (PEDS and ADULTS) (extended infusion is not appropriate)  4.5 g Intravenous Q12H     PRN Meds:dextrose 10%, dextrose 10%, glucagon (human recombinant), glucose, glucose, hydrALAZINE, ibuprofen, insulin aspart U-100, labetalol, LIDOcaine (PF) 10 mg/ml (1%), melatonin, ondansetron, sodium chloride 0.9%, Pharmacy to dose Vancomycin consult **AND** vancomycin - pharmacy to dose     Objective:     Vital Signs (Most Recent):  Temp: 98 °F (36.7 °C) (10/25/23 0820)  Pulse: 78 (10/25/23 0820)  Resp: 19 (10/25/23 0820)  BP: (!) 129/58 (10/25/23 0820)  SpO2: 97 % (10/25/23 0820) Vital Signs (24h Range):  Temp:  [97.7 °F (36.5 °C)-98.5 °F (36.9 °C)] 98 °F (36.7 °C)  Pulse:  [76-89] 78  Resp:  [18-20] 19  SpO2:  [95 %-100 %] 97 %  BP: (129-189)/(58-85) 129/58          Physical Exam  Constitutional:       Appearance: Normal appearance.   HENT:      Head: Normocephalic.      Nose: Nose normal.   Eyes:      Pupils: Pupils are equal, round, and reactive to light.   Cardiovascular:      Rate and Rhythm: Normal rate.   Pulmonary:      Effort: Pulmonary effort is normal.   Abdominal:      Palpations: Abdomen is soft.   Musculoskeletal:         General: Normal range of motion.      Cervical back: Normal range of motion.   Skin:     General: Skin is warm and dry.      Comments:  Bilateral groin wound vacs    Neurological:      General: No focal deficit present.      Mental Status: She is alert and oriented to person, place, and time.          Significant Labs:  CBC:   Recent Labs   Lab 10/25/23  0738   WBC 6.40   RBC 3.38*   HGB 10.9*   HCT 33.7*      *   MCH 32.2*   MCHC 32.3     CMP:   Recent Labs   Lab 10/25/23  0738   *   CALCIUM 9.4      K 4.3   CO2 19*      BUN 20   CREATININE 3.5*       Significant Diagnostics:  I have reviewed all pertinent imaging results/findings within the past 24 hours.    Assessment/Plan:     * Wound dehiscence  Ms. Arias is a 51 y/o female with extensive pmh including DMII, PVD, HLD, ESRD on HD, CHF and non-healing wounds who was admitted after clinic visit on 10/23.  She had recent ax bi fem bypass with Dr. Maya on 9/14/23 and groin incision sites began dehiscing on 10/12.  She was admitted from wound care clinic for wound vac placement and IV antibiotics.    -Renal/DM diet  -OK for ambulation  -Incentive spirometer  -SCDs  -Vanc & zosyn  -Nephro consulted for HD  -Wound care consulted   -Wound vac to be applied asap when delivered to room, was ordered yesterday   -DM mgmt- sliding scale insulin ordered     Dispo:  Will need HH and wound vac for discharge         Kellie Mauricio NP  Vascular Surgery  [facility]

## 2023-10-25 NOTE — ASSESSMENT & PLAN NOTE
Assessment:  Podiatry was consulted for wound dehisence to L partial 1st ray resection surgical site. IDSA non-infected foot. No signs of infections noted on physical exam. WBC WNL    Plan:  - Physical exam findings and treatment plan discussed to length with the patient. Discussed with the patient that there is no urgernt/emergent signs of infection to her foot at the moment. No surgical intervention necessary   - Abx per Primary/ID team  - Vascular surgery is following  - Left foot packed with Aquacel Ag, 4x4 gauze, kirlex, and ACE wrap  - Podiatry will continue to follow    Discharge Recommendations:  1. Patient to follow up in outpatient Podiatry clinic. Podiatry will schedule.  2. Antibiotic plan per Primary/ID  3. HH/SNF to do dressings 3x a week as follows: Pack with Aquacel Ag, 4x4 gauze, kirlex, and ACE wrap  4. Weight bearing as tolerated in Kwaku shoe  5. Keep dressings clean, dry, and intact

## 2023-10-25 NOTE — PLAN OF CARE
[facility]  Discharge Reassessment    Primary Care Provider: Alonso Ling MD    Expected Discharge Date: 10/25/2023    Reassessment (most recent)       Discharge Reassessment - 10/25/23 1609          Discharge Reassessment    Assessment Type Discharge Planning Reassessment     Did the patient's condition or plan change since previous assessment? No     Discharge Plan discussed with: Patient     Communicated INDIO with patient/caregiver Yes     Discharge Plan A Home with family     Discharge Plan B Home with family     Transition of Care Barriers None                     Spoke with I Rep. Authorization for wound vac still in process. She could not give me an eta.     Rep stated we can send patient home with [facility]. Then, they can mail wound vac when available.

## 2023-10-25 NOTE — PROGRESS NOTES
[facility]  Nephrology  Progress Note    Patient Name: Georgina Arias  MRN: [id number]  Admission Date: 10/23/2023  Hospital Length of Stay: 2 days  Attending Provider: Maxx Maya MD   Primary Care Physician: Alonso Ling MD  Principal Problem:Wound dehiscence    Subjective:     Interval History: HD completed on yesterday with 2.5 L removed. No distress on exam. Wound vacuum noted.     Review of patient's allergies indicates:   Allergen Reactions    Naproxen Anaphylaxis and Swelling     Hives (skin)^swelling  Hives (skin)^swelling  Hives (skin)^swelling    Sulfamethoxazole-trimethoprim Other (See Comments)     Caused JEROME    Hydrocodone Nausea And Vomiting and Rash    Aspartame Hives    Penicillins Hives     Blisters (skin)^    Adhesive Rash    Hydrocodone-acetaminophen Nausea And Vomiting     Rash (skin)^, Vomiting^       Current Facility-Administered Medications   Medication Frequency    acetaminophen tablet 650 mg Q6H    dextrose 10% bolus 125 mL 125 mL PRN    dextrose 10% bolus 250 mL 250 mL PRN    gabapentin capsule 300 mg TID    glucagon (human recombinant) injection 1 mg PRN    glucose chewable tablet 16 g PRN    glucose chewable tablet 24 g PRN    hydrALAZINE injection 10 mg Q6H PRN    ibuprofen tablet 400 mg Q6H PRN    insulin aspart U-100 pen 0-5 Units QID (AC + HS) PRN    labetalol 20 mg/4 mL (5 mg/mL) IV syring Q6H PRN    LIDOcaine (PF) 10 mg/ml (1%) injection 10 mg Once PRN    melatonin tablet 6 mg Nightly PRN    ondansetron disintegrating tablet 8 mg Q8H PRN    piperacillin-tazobactam (ZOSYN) 4.5 g in dextrose 5 % in water (D5W) 100 mL IVPB (MB+) Q12H    sodium chloride 0.9% flush 10 mL PRN    vancomycin - pharmacy to dose pharmacy to manage frequency       Objective:     Vital Signs (Most Recent):  Temp: 98 °F (36.7 °C) (10/25/23 0820)  Pulse: 78 (10/25/23 0820)  Resp: 19 (10/25/23 0820)  BP: (!) 129/58 (10/25/23 0820)  SpO2: 97 % (10/25/23 0820) Vital  Signs (24h Range):  Temp:  [97.7 °F (36.5 °C)-98.5 °F (36.9 °C)] 98 °F (36.7 °C)  Pulse:  [76-89] 78  Resp:  [18-20] 19  SpO2:  [95 %-100 %] 97 %  BP: (129-189)/(58-85) 129/58     Weight: 113.4 kg (250 lb) (10/23/23 1715)  Body mass index is 39.16 kg/m².  Body surface area is 2.32 meters squared.    I/O last 3 completed shifts:  In: 425.2 [I.V.:300.6; IV Piggyback:124.6]  Out: 3150 [Other:3150]     Physical Exam  Vitals and nursing note reviewed.   Constitutional:       Appearance: Normal appearance.   HENT:      Head: Normocephalic.      Nose: Nose normal.   Eyes:      General: Scleral icterus present.   Cardiovascular:      Rate and Rhythm: Normal rate.   Pulmonary:      Effort: Pulmonary effort is normal.   Abdominal:      Palpations: Abdomen is soft.   Musculoskeletal:         General: Deformity present. Normal range of motion.      Cervical back: Normal range of motion.      Right lower leg: No edema.      Left lower leg: No edema.   Skin:     General: Skin is warm and dry.      Comments: Bilateral groin surgical incisions    Neurological:      General: No focal deficit present.      Mental Status: She is alert and oriented to person, place, and time.   Psychiatric:         Mood and Affect: Mood normal.         Behavior: Behavior normal.          Significant Labs:  CBC:   Recent Labs   Lab 10/25/23  0738   WBC 6.40   RBC 3.38*   HGB 10.9*   HCT 33.7*      *   MCH 32.2*   MCHC 32.3     CMP:   Recent Labs   Lab 10/25/23  0738   *   CALCIUM 9.4      K 4.3   CO2 19*      BUN 20   CREATININE 3.5*     All labs within the past 24 hours have been reviewed.       Assessment/Plan:     Renal/  ESRD (end stage renal disease)  52 y.o. White Female ESRD-HD M-W-F presents to ED on 10/23/2023 with worsening wound infection and need for wound vacuum placement and IV antibiotics.   Nephrology consulted for inpatient ESRD-HD management      Outpatient HD Information:  -Dialysis modality:  Hemodialysis  -Outpatient HD unit: [facility] (Valerio Bautista MD)  -HD TX days: Tuesday/Thursday/Saturday, duration of treatment: 3-4 hrs  -Last HD TX prior to hospital admission: 10/21/23  -Dialysis access: RUE AVF   -Residual urine: + RRF  -EDW: 109 kg    Assessment:   - No emergent need for further HD. Will plan for HD tomorrow per OP schedule.    - Continue to monitor intake and output  - Please avoid gadolinium, fleets, phos-based laxatives, NSAIDs  - Dialysis thrice weekly unless more urgent indications arise. Will evaluate RRT requirements Daily.    Anemia of ESRD   Recent Labs   Lab 10/24/23  0703 10/25/23  0738   WBC 8.50 6.40   HGB 9.8* 10.9*   HCT 31.1* 33.7*    202     Lab Results   Component Value Date    FESATURATED 24 03/11/2008    FERRITIN 12.6 03/11/2008       - Goal in ESRD is Hgb of 10-11. Hgb 10.9. Near target.   - EPO can be administered and dosed per his OP unit upon discharge.    Mineral Bone Disease in ESRD   Lab Results   Component Value Date    CALCIUM 9.4 10/25/2023    ALBUMIN 2.1 (L) 09/19/2023    CAION 1.02 (L) 09/15/2023    PHOS 4.0 09/19/2023       - F/U PO4, Mg, Calcium. And albumin levels daily.   - Renal diet with protein intake goal 1.5 g/kg/d with 1 L fluid restriction   - Novasource with meals  - Restart home phos binder , phos 4.0    Orthopedic  * Wound dehiscence  - defer to primary team         Thank you for your consult. I will follow-up with patient. Please contact us if you have any additional questions.    Sharonda Armstrong, ALANA, FNP-C  Nephrology  [facility]

## 2023-10-25 NOTE — SUBJECTIVE & OBJECTIVE
Interval History: HD completed on yesterday with 2.5 L removed. No distress on exam. Wound vacuum noted.     Review of patient's allergies indicates:   Allergen Reactions    Naproxen Anaphylaxis and Swelling     Hives (skin)^swelling  Hives (skin)^swelling  Hives (skin)^swelling    Sulfamethoxazole-trimethoprim Other (See Comments)     Caused JEROME    Hydrocodone Nausea And Vomiting and Rash    Aspartame Hives    Penicillins Hives     Blisters (skin)^    Adhesive Rash    Hydrocodone-acetaminophen Nausea And Vomiting     Rash (skin)^, Vomiting^       Current Facility-Administered Medications   Medication Frequency    acetaminophen tablet 650 mg Q6H    dextrose 10% bolus 125 mL 125 mL PRN    dextrose 10% bolus 250 mL 250 mL PRN    gabapentin capsule 300 mg TID    glucagon (human recombinant) injection 1 mg PRN    glucose chewable tablet 16 g PRN    glucose chewable tablet 24 g PRN    hydrALAZINE injection 10 mg Q6H PRN    ibuprofen tablet 400 mg Q6H PRN    insulin aspart U-100 pen 0-5 Units QID (AC + HS) PRN    labetalol 20 mg/4 mL (5 mg/mL) IV syring Q6H PRN    LIDOcaine (PF) 10 mg/ml (1%) injection 10 mg Once PRN    melatonin tablet 6 mg Nightly PRN    ondansetron disintegrating tablet 8 mg Q8H PRN    piperacillin-tazobactam (ZOSYN) 4.5 g in dextrose 5 % in water (D5W) 100 mL IVPB (MB+) Q12H    sodium chloride 0.9% flush 10 mL PRN    vancomycin - pharmacy to dose pharmacy to manage frequency       Objective:     Vital Signs (Most Recent):  Temp: 98 °F (36.7 °C) (10/25/23 0820)  Pulse: 78 (10/25/23 0820)  Resp: 19 (10/25/23 0820)  BP: (!) 129/58 (10/25/23 0820)  SpO2: 97 % (10/25/23 0820) Vital Signs (24h Range):  Temp:  [97.7 °F (36.5 °C)-98.5 °F (36.9 °C)] 98 °F (36.7 °C)  Pulse:  [76-89] 78  Resp:  [18-20] 19  SpO2:  [95 %-100 %] 97 %  BP: (129-189)/(58-85) 129/58     Weight: 113.4 kg (250 lb) (10/23/23 0075)  Body mass index is 39.16 kg/m².  Body surface area is 2.32 meters squared.    I/O last 3 completed  shifts:  In: 425.2 [I.V.:300.6; IV Piggyback:124.6]  Out: 3150 [Other:3150]     Physical Exam  Vitals and nursing note reviewed.   Constitutional:       Appearance: Normal appearance.   HENT:      Head: Normocephalic.      Nose: Nose normal.   Eyes:      General: Scleral icterus present.   Cardiovascular:      Rate and Rhythm: Normal rate.   Pulmonary:      Effort: Pulmonary effort is normal.   Abdominal:      Palpations: Abdomen is soft.   Musculoskeletal:         General: Deformity present. Normal range of motion.      Cervical back: Normal range of motion.      Right lower leg: No edema.      Left lower leg: No edema.   Skin:     General: Skin is warm and dry.      Comments: Bilateral groin surgical incisions    Neurological:      General: No focal deficit present.      Mental Status: She is alert and oriented to person, place, and time.   Psychiatric:         Mood and Affect: Mood normal.         Behavior: Behavior normal.          Significant Labs:  CBC:   Recent Labs   Lab 10/25/23  0738   WBC 6.40   RBC 3.38*   HGB 10.9*   HCT 33.7*      *   MCH 32.2*   MCHC 32.3     CMP:   Recent Labs   Lab 10/25/23  0738   *   CALCIUM 9.4      K 4.3   CO2 19*      BUN 20   CREATININE 3.5*     All labs within the past 24 hours have been reviewed.

## 2023-10-25 NOTE — NURSING
"At 1028: notified  that pt is asking to be discharge, per MD, pt needs to have the home WV delivered to the room before she can go and SW has been working on it to get her one  At 1225: pt called her insurance company and they said there is no form for the WV has been submitted to them. KALI Lozano talked to the pt and pt insurance rep. On the phone; per KALI, team has been trying to obtain the WV for the pt but it is  still in process  At 1705: Pt called her insurance company again on the phone , and her insurance again claimed that they did not receive any form about pt WV. Pt is very agitated and wanted to talk to a " patient advocate", pt said she wants to go home and no one seems to work to get ehr the WV that she needs. Nurse talked to the charge nurse Ciera about the problem, per charge nurse [facility], patient advocate already left and they will be available in the morning to talk to the pt. Nurse paged the on-call for CM/Kali twice but did not get any call back. But according to the note from Cresencio MERINO at 1613, " Spoke with [facility] Rep. Authorization for wound vac still in process. She could not give me an eta. Rep stated we can send patient home with Jarad. Then, they can mail wound vac when available. " Patient informed about Cresencio MERINO's note , pt said she will go home tmr no matter what even if she cannot get the WV tmr.   "

## 2023-10-25 NOTE — SUBJECTIVE & OBJECTIVE
Scheduled Meds:   acetaminophen  650 mg Oral Q6H    gabapentin  300 mg Oral TID    piperacillin-tazobactam (Zosyn) IV (PEDS and ADULTS) (extended infusion is not appropriate)  4.5 g Intravenous Q12H     Continuous Infusions:  PRN Meds:dextrose 10%, dextrose 10%, glucagon (human recombinant), glucose, glucose, hydrALAZINE, ibuprofen, insulin aspart U-100, labetalol, LIDOcaine (PF) 10 mg/ml (1%), melatonin, ondansetron, sodium chloride 0.9%, Pharmacy to dose Vancomycin consult **AND** vancomycin - pharmacy to dose    Review of patient's allergies indicates:   Allergen Reactions    Naproxen Anaphylaxis and Swelling     Hives (skin)^swelling  Hives (skin)^swelling  Hives (skin)^swelling    Sulfamethoxazole-trimethoprim Other (See Comments)     Caused JEROME    Hydrocodone Nausea And Vomiting and Rash    Aspartame Hives    Penicillins Hives     Blisters (skin)^    Adhesive Rash    Hydrocodone-acetaminophen Nausea And Vomiting     Rash (skin)^, Vomiting^          Past Medical History:   Diagnosis Date    Hyperlipidemia     Hypertension     Peptic ulcer disease     Peripheral artery disease     PONV (postoperative nausea and vomiting)     Stage IV CKD     Type II diabetes mellitus      Past Surgical History:   Procedure Laterality Date    ANGIOGRAM, LOWER ARTERIAL, UNILATERAL Left 9/13/2023    Procedure: ANGIOGRAM, LOWER ARTERIAL, UNILATERAL;  Surgeon: Maxx Maya MD;  Location: [address];  Service: Vascular;  Laterality: Left;    ANGIOGRAPHY OF LOWER EXTREMITY Left 9/14/2023    Procedure: ANGIOGRAM, LOWER EXTREMITY with balloon angioplasty ultraverse 5mm x 60mm;  Surgeon: Maxx Maya MD;  Location: [address];  Service: Vascular;  Laterality: Left;  ultraverse 5mm x 60mm  fluoro: 12.41  contrast: 44ml  mGy: 65.57  Gycm2: 23.50    AORTOGRAPHY WITH EXTREMITY RUNOFF Left 9/13/2023    Procedure: AORTOGRAM, WITH EXTREMITY RUNOFF;  Surgeon: Maxx Maya MD;  Location: [address];  Service: Vascular;   Laterality: Left;  8.1 min  663.63 mGy  176.39 Gy.cm  178ml Dye     AV FISTULA PLACEMENT Right     CHOLECYSTECTOMY      CREATION, BYPASS, ARTERIAL, AXILLARY TO BILATERAL FEMORAL Left 9/14/2023    Procedure: CREATION, BYPASS, ARTERIAL, AXILLARY TO BILATERAL FEMORAL;  Surgeon: Maxx Maya MD;  Location: [facility] OR 2ND FLR;  Service: Vascular;  Laterality: Left;  Left Axillary to Bilateral Femoral Bypass, Left Femoral to Above Knee Popliteal Bypass; SLIDER BED    CYSTOSCOPY W/ URETERAL STENT PLACEMENT Right 08/28/2018    Procedure: CYSTOSCOPY, WITH URETERAL STENT INSERTION (ADD ON );  Surgeon: Bubba Perez MD;  Location: [facility] OR;  Service: Urology;  Laterality: Right;  (ADD ON )    DEBRIDEMENT OF FOOT Left 08/03/2023    Procedure: DEBRIDEMENT, FOOT;  Surgeon: Aleida Flannery DPM;  Location: [facility] OR;  Service: Podiatry;  Laterality: Left;    Excisional debridement of ulcer distal great toe left foot w/ partial resection distal phalanx Left 08/03/2023    FOOT AMPUTATION THROUGH METATARSAL Left 9/14/2023    Procedure: AMPUTATION, FOOT, TRANSMETATARSAL partial 1st ray amputation;  Surgeon: Jesus Valles DPM;  Location: [facility] OR 2ND FLR;  Service: Podiatry;  Laterality: Left;  partial ray    I&D L 1st ray w/ amputation L hallux IPJ Left 08/14/2023    INCISION AND DRAINAGE FOOT Left 9/14/2023    Procedure: INCISION AND DRAINAGE, FOOT;  Surgeon: Jesus Valles DPM;  Location: [facility] OR 2ND FLR;  Service: Podiatry;  Laterality: Left;    Injection L PT tendon sheath Left 08/14/2023    Nail avulsion left hallux Left 08/03/2023    PERIPHERAL ARTERIAL STENT GRAFT Right     REMOVAL OF NAIL OF DIGIT Left 08/03/2023    Procedure: REMOVAL, NAIL, DIGIT great toe;  Surgeon: Aleida Flannery DPM;  Location: [facility] OR;  Service: Podiatry;  Laterality: Left;    STENT, SUPERFICIAL FEMORAL ARTERY Left 9/14/2023    Procedure: STENT, SUPERFICIAL FEMORAL ARTERY;  Surgeon: Maxx Maya MD;  Location: [facility] OR 2ND FLR;  Service: Vascular;   Laterality: Left;  5 x 100 mm    TOE AMPUTATION Left 2023    Procedure: AMPUTATION, TOE hallux IPJ;  Surgeon: Aleida Flannery DPM;  Location: [facility] OR;  Service: Podiatry;  Laterality: Left;    TOE AMPUTATION Left 2023    great toe    TOE AMPUTATION Left 2023    Procedure: AMPUTATION, TOE hallux, possible 1st met.head;  Surgeon: Aleida Flannery DPM;  Location: [facility] OR;  Service: Podiatry;  Laterality: Left;    URETEROSCOPIC REMOVAL OF URETERIC CALCULUS Right 2018    Procedure: EXTRACTION-STONE-URETEROSCOPY;  Surgeon: Bubba Perez MD;  Location: [facility] OR;  Service: Urology;  Laterality: Right;       Family History       Problem Relation (Age of Onset)    Cancer Maternal Grandmother    Diabetes Mother, Father    Heart disease Father (37), Maternal Grandmother    Hypertension Mother, Father, Brother          Tobacco Use    Smoking status: Former     Current packs/day: 0.00     Types: Cigarettes     Quit date: 2006     Years since quittin.2    Smokeless tobacco: Never   Substance and Sexual Activity    Alcohol use: Not Currently     Comment: occ    Drug use: No    Sexual activity: Not Currently     Review of Systems   Constitutional:  Negative for chills and fever.   HENT:  Negative for congestion and facial swelling.    Eyes:  Negative for pain and visual disturbance.   Respiratory:  Negative for chest tightness and shortness of breath.    Cardiovascular:  Negative for chest pain and palpitations.   Gastrointestinal:  Negative for abdominal pain and diarrhea.   Endocrine: Negative for cold intolerance and polyuria.   Genitourinary:  Negative for dysuria and urgency.   Musculoskeletal:  Positive for gait problem. Negative for back pain and neck pain.   Skin:  Positive for wound. Negative for color change and rash.        Reports opening to her L foot surgical site   Allergic/Immunologic: Negative for environmental allergies and food allergies.   Neurological:  Negative for dizziness and  weakness.   Hematological:  Negative for adenopathy. Does not bruise/bleed easily.   Psychiatric/Behavioral:  Negative for behavioral problems and sleep disturbance.      Objective:     Vital Signs (Most Recent):  Temp: 98 °F (36.7 °C) (10/25/23 0820)  Pulse: 78 (10/25/23 0820)  Resp: 19 (10/25/23 0820)  BP: (!) 129/58 (10/25/23 0820)  SpO2: 97 % (10/25/23 0820) Vital Signs (24h Range):  Temp:  [97.7 °F (36.5 °C)-98.5 °F (36.9 °C)] 98 °F (36.7 °C)  Pulse:  [76-89] 78  Resp:  [18-20] 19  SpO2:  [95 %-100 %] 97 %  BP: (129-189)/(58-85) 129/58     Weight: 113.4 kg (250 lb)  Body mass index is 39.16 kg/m².    Foot Exam    Left Foot/Ankle      Inspection and Palpation  Ecchymosis: none  Tenderness: none   Swelling: none   Skin Exam: maceration; no drainage     Neurovascular  Dorsalis pedis: 1+  Posterior tibial: 1+  Saphenous nerve sensation: diminished  Tibial nerve sensation: diminished  Superficial peroneal nerve sensation: diminished  Deep peroneal nerve sensation: diminished  Sural nerve sensation: diminished    Comments  L foot: Open incision wound noted to surgical incision site. Negative probe to bone. With probing sufficient soft tissue coverage of metatarsal bone. No necrotic tissue noted in the wound base. Wound base fibro-granular. No pain to palpation. No drainage or malodor noted. Mild macerated and hyperkeratotic periwound.     Laboratory:  CBC:   Recent Labs   Lab 10/25/23  0738   WBC 6.40   RBC 3.38*   HGB 10.9*   HCT 33.7*      *   MCH 32.2*   MCHC 32.3     CMP:   Recent Labs   Lab 10/25/23  0738   *   CALCIUM 9.4      K 4.3   CO2 19*      BUN 20   CREATININE 3.5*     Wound Cultures:   Recent Labs   Lab 08/03/23  1227 08/14/23  1257 09/12/23  1158 09/14/23  1654   LABAERO STAPHYLOCOCCUS AUREUS  Many  * No growth Skin meño,  no predominant organism No growth     Microbiology Results (last 7 days)       Procedure Component Value Units Date/Time    Blood culture  [[id number]] Collected: 10/24/23 0703    Order Status: Completed Specimen: Blood Updated: 10/25/23 0812     Blood Culture, Routine No Growth to date      No Growth to date          Diagnostic Results:  I have reviewed all pertinent imaging results/findings within the past 24 hours.    Clinical Findings:

## 2023-10-25 NOTE — PLAN OF CARE
Problem: Adult Inpatient Plan of Care  Goal: Plan of Care Review  Outcome: Ongoing, Progressing  Goal: Patient-Specific Goal (Individualized)  Outcome: Ongoing, Progressing  Goal: Absence of Hospital-Acquired Illness or Injury  Outcome: Ongoing, Progressing  Goal: Optimal Comfort and Wellbeing  Outcome: Ongoing, Progressing  Goal: Readiness for Transition of Care  Outcome: Ongoing, Progressing     Problem: Diabetes Comorbidity  Goal: Blood Glucose Level Within Targeted Range  Outcome: Ongoing, Progressing     Problem: Fluid and Electrolyte Imbalance (Acute Kidney Injury/Impairment)  Goal: Fluid and Electrolyte Balance  Outcome: Ongoing, Progressing     Problem: Oral Intake Inadequate (Acute Kidney Injury/Impairment)  Goal: Optimal Nutrition Intake  Outcome: Ongoing, Progressing     Problem: Renal Function Impairment (Acute Kidney Injury/Impairment)  Goal: Effective Renal Function  Outcome: Ongoing, Progressing     Problem: Infection  Goal: Absence of Infection Signs and Symptoms  Outcome: Ongoing, Progressing     Problem: Impaired Wound Healing  Goal: Optimal Wound Healing  Outcome: Ongoing, Progressing     Problem: Device-Related Complication Risk (Hemodialysis)  Goal: Safe, Effective Therapy Delivery  Outcome: Ongoing, Progressing     Problem: Hemodynamic Instability (Hemodialysis)  Goal: Effective Tissue Perfusion  Outcome: Ongoing, Progressing     Problem: Infection (Hemodialysis)  Goal: Absence of Infection Signs and Symptoms  Outcome: Ongoing, Progressing     Problem: Electrolyte Imbalance (Chronic Kidney Disease)  Goal: Electrolyte Balance  Outcome: Ongoing, Progressing     Problem: Fluid Volume Excess (Chronic Kidney Disease)  Goal: Fluid Balance  Outcome: Ongoing, Progressing

## 2023-10-25 NOTE — CONSULTS
"[facility]  Podiatry  Consult Note    Patient Name: Georgina Arias  MRN: [id number]  Admission Date: 10/23/2023  Hospital Length of Stay: 2 days  Attending Physician: Maxx Maya MD  Primary Care Provider: Alonso Ling MD     Inpatient consult to Podiatry  Consult performed by: Anderson Herman DPM  Consult ordered by: Dante Saucedo PA-C        Subjective:     History of Present Illness:  Georgina Arias is a 51 yo F  with a PMHx of T2D, PVD, HLD, ESRD on HD, CHF and non-healing wounds who was admitted to the hospital for groin wound dehiscences. Patient was recently addmited and is s/p L stent and bypass revasc with Vascular surgery and L partial 1st ray resection with Dr Valles on 9/15. She was last seen in Podiatry clininc on 10/19 (last Thursday) by Dr Flannery where griselda were removed. Podiatry was consulted for wound dehisence to L partial 1st ray resection surgical site. Patient denies any pain to her L foot. Denies any drainage from her incision site. Patient says "I think the staples were removed too early". She has been compliant with always wearing her Kwaku shoe when walking. She sees Dr Flannery in clinic every 1-2 weeks. Denies f/c/n/v. Denies any other pedal complaints.     Scheduled Meds:   acetaminophen  650 mg Oral Q6H    gabapentin  300 mg Oral TID    piperacillin-tazobactam (Zosyn) IV (PEDS and ADULTS) (extended infusion is not appropriate)  4.5 g Intravenous Q12H     Continuous Infusions:  PRN Meds:dextrose 10%, dextrose 10%, glucagon (human recombinant), glucose, glucose, hydrALAZINE, ibuprofen, insulin aspart U-100, labetalol, LIDOcaine (PF) 10 mg/ml (1%), melatonin, ondansetron, sodium chloride 0.9%, Pharmacy to dose Vancomycin consult **AND** vancomycin - pharmacy to dose    Review of patient's allergies indicates:   Allergen Reactions    Naproxen Anaphylaxis and Swelling     Hives (skin)^swelling  Hives (skin)^swelling  Hives (skin)^swelling    Sulfamethoxazole-trimethoprim " Other (See Comments)     Caused JEROME    Hydrocodone Nausea And Vomiting and Rash    Aspartame Hives    Penicillins Hives     Blisters (skin)^    Adhesive Rash    Hydrocodone-acetaminophen Nausea And Vomiting     Rash (skin)^, Vomiting^          Past Medical History:   Diagnosis Date    Hyperlipidemia     Hypertension     Peptic ulcer disease     Peripheral artery disease     PONV (postoperative nausea and vomiting)     Stage IV CKD     Type II diabetes mellitus      Past Surgical History:   Procedure Laterality Date    ANGIOGRAM, LOWER ARTERIAL, UNILATERAL Left 9/13/2023    Procedure: ANGIOGRAM, LOWER ARTERIAL, UNILATERAL;  Surgeon: Maxx Maya MD;  Location: [address];  Service: Vascular;  Laterality: Left;    ANGIOGRAPHY OF LOWER EXTREMITY Left 9/14/2023    Procedure: ANGIOGRAM, LOWER EXTREMITY with balloon angioplasty ultraverse 5mm x 60mm;  Surgeon: Maxx Maya MD;  Location: [address];  Service: Vascular;  Laterality: Left;  ultraverse 5mm x 60mm  fluoro: 12.41  contrast: 44ml  mGy: 65.57  Gycm2: 23.50    AORTOGRAPHY WITH EXTREMITY RUNOFF Left 9/13/2023    Procedure: AORTOGRAM, WITH EXTREMITY RUNOFF;  Surgeon: Maxx Maya MD;  Location: [address];  Service: Vascular;  Laterality: Left;  8.1 min  663.63 mGy  176.39 Gy.cm  178ml Dye     AV FISTULA PLACEMENT Right     CHOLECYSTECTOMY      CREATION, BYPASS, ARTERIAL, AXILLARY TO BILATERAL FEMORAL Left 9/14/2023    Procedure: CREATION, BYPASS, ARTERIAL, AXILLARY TO BILATERAL FEMORAL;  Surgeon: Maxx Maya MD;  Location: [address];  Service: Vascular;  Laterality: Left;  Left Axillary to Bilateral Femoral Bypass, Left Femoral to Above Knee Popliteal Bypass; SLIDER BED    CYSTOSCOPY W/ URETERAL STENT PLACEMENT Right 08/28/2018    Procedure: CYSTOSCOPY, WITH URETERAL STENT INSERTION (ADD ON );  Surgeon: Bubba Perez MD;  Location: [facility];  Service: Urology;  Laterality: Right;  (ADD ON )    DEBRIDEMENT OF FOOT Left  08/03/2023    Procedure: DEBRIDEMENT, FOOT;  Surgeon: Aleida Flannery DPM;  Location: [facility] OR;  Service: Podiatry;  Laterality: Left;    Excisional debridement of ulcer distal great toe left foot w/ partial resection distal phalanx Left 08/03/2023    FOOT AMPUTATION THROUGH METATARSAL Left 9/14/2023    Procedure: AMPUTATION, FOOT, TRANSMETATARSAL partial 1st ray amputation;  Surgeon: Jesus Valles DPM;  Location: [facility] OR 2ND FLR;  Service: Podiatry;  Laterality: Left;  partial ray    I&D L 1st ray w/ amputation L hallux IPJ Left 08/14/2023    INCISION AND DRAINAGE FOOT Left 9/14/2023    Procedure: INCISION AND DRAINAGE, FOOT;  Surgeon: Jesus Valles DPM;  Location: [facility] OR 2ND FLR;  Service: Podiatry;  Laterality: Left;    Injection L PT tendon sheath Left 08/14/2023    Nail avulsion left hallux Left 08/03/2023    PERIPHERAL ARTERIAL STENT GRAFT Right     REMOVAL OF NAIL OF DIGIT Left 08/03/2023    Procedure: REMOVAL, NAIL, DIGIT great toe;  Surgeon: Aleida Flannery DPM;  Location: [facility] OR;  Service: Podiatry;  Laterality: Left;    STENT, SUPERFICIAL FEMORAL ARTERY Left 9/14/2023    Procedure: STENT, SUPERFICIAL FEMORAL ARTERY;  Surgeon: Maxx Maya MD;  Location: [facility] OR 2ND FLR;  Service: Vascular;  Laterality: Left;  5 x 100 mm    TOE AMPUTATION Left 08/14/2023    Procedure: AMPUTATION, TOE hallux IPJ;  Surgeon: Aleida Flannery DPM;  Location: [facility] OR;  Service: Podiatry;  Laterality: Left;    TOE AMPUTATION Left 08/25/2023    great toe    TOE AMPUTATION Left 8/25/2023    Procedure: AMPUTATION, TOE hallux, possible 1st met.head;  Surgeon: Aleida Flannery DPM;  Location: [facility] OR;  Service: Podiatry;  Laterality: Left;    URETEROSCOPIC REMOVAL OF URETERIC CALCULUS Right 09/11/2018    Procedure: EXTRACTION-STONE-URETEROSCOPY;  Surgeon: Bubba Perez MD;  Location: [facility] OR;  Service: Urology;  Laterality: Right;       Family History       Problem Relation (Age of Onset)    Cancer Maternal Grandmother     Diabetes Mother, Father    Heart disease Father (37), Maternal Grandmother    Hypertension Mother, Father, Brother          Tobacco Use    Smoking status: Former     Current packs/day: 0.00     Types: Cigarettes     Quit date: 2006     Years since quittin.2    Smokeless tobacco: Never   Substance and Sexual Activity    Alcohol use: Not Currently     Comment: occ    Drug use: No    Sexual activity: Not Currently     Review of Systems   Constitutional:  Negative for chills and fever.   HENT:  Negative for congestion and facial swelling.    Eyes:  Negative for pain and visual disturbance.   Respiratory:  Negative for chest tightness and shortness of breath.    Cardiovascular:  Negative for chest pain and palpitations.   Gastrointestinal:  Negative for abdominal pain and diarrhea.   Endocrine: Negative for cold intolerance and polyuria.   Genitourinary:  Negative for dysuria and urgency.   Musculoskeletal:  Positive for gait problem. Negative for back pain and neck pain.   Skin:  Positive for wound. Negative for color change and rash.        Reports opening to her L foot surgical site   Allergic/Immunologic: Negative for environmental allergies and food allergies.   Neurological:  Negative for dizziness and weakness.   Hematological:  Negative for adenopathy. Does not bruise/bleed easily.   Psychiatric/Behavioral:  Negative for behavioral problems and sleep disturbance.      Objective:     Vital Signs (Most Recent):  Temp: 98 °F (36.7 °C) (10/25/23 08)  Pulse: 78 (10/25/23 08)  Resp: 19 (10/25/23 08)  BP: (!) 129/58 (10/25/23 08)  SpO2: 97 % (10/25/23 08) Vital Signs (24h Range):  Temp:  [97.7 °F (36.5 °C)-98.5 °F (36.9 °C)] 98 °F (36.7 °C)  Pulse:  [76-89] 78  Resp:  [18-20] 19  SpO2:  [95 %-100 %] 97 %  BP: (129-189)/(58-85) 129/58     Weight: 113.4 kg (250 lb)  Body mass index is 39.16 kg/m².    Foot Exam    Left Foot/Ankle      Inspection and Palpation  Ecchymosis: none  Tenderness: none    Swelling: none   Skin Exam: maceration; no drainage     Neurovascular  Dorsalis pedis: 1+  Posterior tibial: 1+  Saphenous nerve sensation: diminished  Tibial nerve sensation: diminished  Superficial peroneal nerve sensation: diminished  Deep peroneal nerve sensation: diminished  Sural nerve sensation: diminished    Comments  L foot: Open incision wound noted to surgical incision site. Negative probe to bone. With probing sufficient soft tissue coverage of metatarsal bone. No necrotic tissue noted in the wound base. Wound base fibro-granular. No pain to palpation. No drainage or malodor noted. Mild macerated and hyperkeratotic periwound.     Laboratory:  CBC:   Recent Labs   Lab 10/25/23  0738   WBC 6.40   RBC 3.38*   HGB 10.9*   HCT 33.7*      *   MCH 32.2*   MCHC 32.3     CMP:   Recent Labs   Lab 10/25/23  0738   *   CALCIUM 9.4      K 4.3   CO2 19*      BUN 20   CREATININE 3.5*     Wound Cultures:   Recent Labs   Lab 08/03/23  1227 08/14/23  1257 09/12/23  1158 09/14/23  1654   LABAERO STAPHYLOCOCCUS AUREUS  Many  * No growth Skin meño,  no predominant organism No growth     Microbiology Results (last 7 days)       Procedure Component Value Units Date/Time    Blood culture [[id number]] Collected: 10/24/23 0703    Order Status: Completed Specimen: Blood Updated: 10/25/23 0812     Blood Culture, Routine No Growth to date      No Growth to date          Diagnostic Results:  I have reviewed all pertinent imaging results/findings within the past 24 hours.    Clinical Findings:                Assessment/Plan:     Orthopedic  * Wound dehiscence  Assessment:  Podiatry was consulted for wound dehisence to L partial 1st ray resection surgical site. IDSA non-infected L foot. No signs of infections noted on physical exam. WBC WNL    Plan:  - Physical exam findings and treatment plan discussed to length with the patient. Discussed with the patient that there is no urgernt/emergent signs of  infection to her foot at the moment. No surgical intervention necessary   - Pending Xray L foot  - Abx per Primary/ID team  - Vascular surgery is following  - Left foot packed with Aquacel Ag, 4x4 gauze, kirlex, and ACE wrap  - Advised patient to always ambulate with Kwaku shoe. Patient is okay to weightbear as tolerated to heel touch  - Podiatry will continue to follow    Discharge Recommendations:  1. Patient to follow up in outpatient Podiatry clinic. Podiatry will schedule.  2. Antibiotic plan per Primary/ID  3. HH/SNF to do dressings 3x a week as follows: Pack with Aquacel Ag, 4x4 gauze, kirlex, and ACE wrap  4. Weight bearing as tolerated in Kwaku shoe  5. Keep dressings clean, dry, and intact     Thank you for your consult. I will follow-up with patient. Please contact us if you have any additional questions.    Anderson Herman DPM  Podiatry  [place]

## 2023-10-25 NOTE — PLAN OF CARE
"  Problem: Adult Inpatient Plan of Care  Goal: Plan of Care Review  Outcome: Ongoing, Progressing  Goal: Patient-Specific Goal (Individualized)  Outcome: Ongoing, Progressing  Goal: Absence of Hospital-Acquired Illness or Injury  Outcome: Ongoing, Progressing  Goal: Optimal Comfort and Wellbeing  Outcome: Ongoing, Progressing  Goal: Readiness for Transition of Care  Outcome: Ongoing, Progressing   [facility] Plan of Care Note  Dx Wound dehiscence    Shift Events pt is very eager to be discharge and has been agitated  since she cannot get the home WV to go home today- pt called her insurance on the phone multiple times and SW talked to the pt about her insurance has not approved the WV- see previous note; Podiatrist change the dressing on her foot this morning- Xray obtained;  applied 1 WV to bilateral groin     Goals of Care: discharge home; VS and lab WDL    Neuro: AAOx4    Vital Signs: stable    Respiratory: RA    Diet: 2000 [place]    Is patient tolerating current diet? yes    GTTS: none    Urine Output/Bowel Movement: 3 BMs    Drains/Tubes/Tube Feeds (include total output/shift): WV to bilateral groin at -125mmHg with 0 output    Lines: 1 peripheral IV      Accuchecks:ac/hs    Skin: krista groin incision with WV; left foot with ACE dressing ouside CDI    Fall Risk Score: 3    Activity level? Up ad jaya    Any scheduled procedures? none    Any safety concerns? Fall risk precaution    Other: none"  "

## 2023-10-25 NOTE — ASSESSMENT & PLAN NOTE
52 y.o. White Female ESRD-HD M-W-F presents to ED on 10/23/2023 with worsening wound infection and need for wound vacuum placement and IV antibiotics.   Nephrology consulted for inpatient ESRD-HD management      Outpatient HD Information:  -Dialysis modality: Hemodialysis  -Outpatient HD unit: [facility] (Valerio Bautista MD)  -HD TX days: Tuesday/Thursday/Saturday, duration of treatment: 3-4 hrs  -Last HD TX prior to hospital admission: 10/21/23  -Dialysis access: RUE AVF   -Residual urine: + RRF  -EDW: 109 kg    Assessment:   - No emergent need for further HD. Will plan for HD tomorrow per OP schedule.    - Continue to monitor intake and output  - Please avoid gadolinium, fleets, phos-based laxatives, NSAIDs  - Dialysis thrice weekly unless more urgent indications arise. Will evaluate RRT requirements Daily.    Anemia of ESRD   Recent Labs   Lab 10/24/23  0703 10/25/23  0738   WBC 8.50 6.40   HGB 9.8* 10.9*   HCT 31.1* 33.7*    202     Lab Results   Component Value Date    FESATURATED 24 03/11/2008    FERRITIN 12.6 03/11/2008       - Goal in ESRD is Hgb of 10-11. Hgb 10.9. Near target.   - EPO can be administered and dosed per his OP unit upon discharge.    Mineral Bone Disease in ESRD   Lab Results   Component Value Date    CALCIUM 9.4 10/25/2023    ALBUMIN 2.1 (L) 09/19/2023    CAION 1.02 (L) 09/15/2023    PHOS 4.0 09/19/2023       - F/U PO4, Mg, Calcium. And albumin levels daily.   - Renal diet with protein intake goal 1.5 g/kg/d with 1 L fluid restriction   - Novasource with meals  - Restart home phos binder , phos 4.0

## 2023-10-25 NOTE — SUBJECTIVE & OBJECTIVE
Medications:  Continuous Infusions:  Scheduled Meds:   acetaminophen  650 mg Oral Q6H    gabapentin  300 mg Oral TID    piperacillin-tazobactam (Zosyn) IV (PEDS and ADULTS) (extended infusion is not appropriate)  4.5 g Intravenous Q12H     PRN Meds:dextrose 10%, dextrose 10%, glucagon (human recombinant), glucose, glucose, hydrALAZINE, ibuprofen, insulin aspart U-100, labetalol, LIDOcaine (PF) 10 mg/ml (1%), melatonin, ondansetron, sodium chloride 0.9%, Pharmacy to dose Vancomycin consult **AND** vancomycin - pharmacy to dose     Objective:     Vital Signs (Most Recent):  Temp: 98 °F (36.7 °C) (10/25/23 0820)  Pulse: 78 (10/25/23 0820)  Resp: 19 (10/25/23 0820)  BP: (!) 129/58 (10/25/23 0820)  SpO2: 97 % (10/25/23 0820) Vital Signs (24h Range):  Temp:  [97.7 °F (36.5 °C)-98.5 °F (36.9 °C)] 98 °F (36.7 °C)  Pulse:  [76-89] 78  Resp:  [18-20] 19  SpO2:  [95 %-100 %] 97 %  BP: (129-189)/(58-85) 129/58          Physical Exam  Constitutional:       Appearance: Normal appearance.   HENT:      Head: Normocephalic.      Nose: Nose normal.   Eyes:      Pupils: Pupils are equal, round, and reactive to light.   Cardiovascular:      Rate and Rhythm: Normal rate.   Pulmonary:      Effort: Pulmonary effort is normal.   Abdominal:      Palpations: Abdomen is soft.   Musculoskeletal:         General: Normal range of motion.      Cervical back: Normal range of motion.   Skin:     General: Skin is warm and dry.      Comments: Bilateral groin wound vacs    Neurological:      General: No focal deficit present.      Mental Status: She is alert and oriented to person, place, and time.          Significant Labs:  CBC:   Recent Labs   Lab 10/25/23  0738   WBC 6.40   RBC 3.38*   HGB 10.9*   HCT 33.7*      *   MCH 32.2*   MCHC 32.3     CMP:   Recent Labs   Lab 10/25/23  0738   *   CALCIUM 9.4      K 4.3   CO2 19*      BUN 20   CREATININE 3.5*       Significant Diagnostics:  I have reviewed all pertinent  imaging results/findings within the past 24 hours.

## 2023-10-26 VITALS
RESPIRATION RATE: 18 BRPM | TEMPERATURE: 99 F | HEART RATE: 82 BPM | DIASTOLIC BLOOD PRESSURE: 56 MMHG | WEIGHT: 250 LBS | SYSTOLIC BLOOD PRESSURE: 118 MMHG | BODY MASS INDEX: 39.24 KG/M2 | OXYGEN SATURATION: 99 % | HEIGHT: 67 IN

## 2023-10-26 PROBLEM — T81.49XA WOUND INFECTION AFTER SURGERY: Status: ACTIVE | Noted: 2023-10-26

## 2023-10-26 LAB
ANION GAP SERPL CALC-SCNC: 12 MMOL/L (ref 8–16)
BASOPHILS # BLD AUTO: 0.04 K/UL (ref 0–0.2)
BASOPHILS NFR BLD: 0.6 % (ref 0–1.9)
BUN SERPL-MCNC: 24 MG/DL (ref 6–20)
CALCIUM SERPL-MCNC: 9.3 MG/DL (ref 8.7–10.5)
CHLORIDE SERPL-SCNC: 106 MMOL/L (ref 95–110)
CO2 SERPL-SCNC: 20 MMOL/L (ref 23–29)
CREAT SERPL-MCNC: 4.3 MG/DL (ref 0.5–1.4)
DIFFERENTIAL METHOD: ABNORMAL
EOSINOPHIL # BLD AUTO: 0.3 K/UL (ref 0–0.5)
EOSINOPHIL NFR BLD: 3.8 % (ref 0–8)
ERYTHROCYTE [DISTWIDTH] IN BLOOD BY AUTOMATED COUNT: 16.4 % (ref 11.5–14.5)
EST. GFR  (NO RACE VARIABLE): 11.8 ML/MIN/1.73 M^2
GLUCOSE SERPL-MCNC: 183 MG/DL (ref 70–110)
GRAM STN SPEC: NORMAL
HCT VFR BLD AUTO: 36.3 % (ref 37–48.5)
HGB BLD-MCNC: 11.1 G/DL (ref 12–16)
IMM GRANULOCYTES # BLD AUTO: 0.02 K/UL (ref 0–0.04)
IMM GRANULOCYTES NFR BLD AUTO: 0.3 % (ref 0–0.5)
LYMPHOCYTES # BLD AUTO: 1 K/UL (ref 1–4.8)
LYMPHOCYTES NFR BLD: 13.5 % (ref 18–48)
MCH RBC QN AUTO: 30.9 PG (ref 27–31)
MCHC RBC AUTO-ENTMCNC: 30.6 G/DL (ref 32–36)
MCV RBC AUTO: 101 FL (ref 82–98)
MONOCYTES # BLD AUTO: 0.5 K/UL (ref 0.3–1)
MONOCYTES NFR BLD: 6.5 % (ref 4–15)
NEUTROPHILS # BLD AUTO: 5.3 K/UL (ref 1.8–7.7)
NEUTROPHILS NFR BLD: 75.3 % (ref 38–73)
NRBC BLD-RTO: 0 /100 WBC
PLATELET # BLD AUTO: 213 K/UL (ref 150–450)
PMV BLD AUTO: 10 FL (ref 9.2–12.9)
POCT GLUCOSE: 176 MG/DL (ref 70–110)
POCT GLUCOSE: 293 MG/DL (ref 70–110)
POTASSIUM SERPL-SCNC: 4.1 MMOL/L (ref 3.5–5.1)
RBC # BLD AUTO: 3.59 M/UL (ref 4–5.4)
SODIUM SERPL-SCNC: 138 MMOL/L (ref 136–145)
VANCOMYCIN SERPL-MCNC: 11.7 UG/ML
WBC # BLD AUTO: 7.09 K/UL (ref 3.9–12.7)

## 2023-10-26 PROCEDURE — 25000003 PHARM REV CODE 250

## 2023-10-26 PROCEDURE — 90935 PR HEMODIALYSIS, ONE EVALUATION: ICD-10-PCS | Mod: ,,, | Performed by: INTERNAL MEDICINE

## 2023-10-26 PROCEDURE — 85025 COMPLETE CBC W/AUTO DIFF WBC: CPT

## 2023-10-26 PROCEDURE — 87075 CULTR BACTERIA EXCEPT BLOOD: CPT | Performed by: PHYSICIAN ASSISTANT

## 2023-10-26 PROCEDURE — 80048 BASIC METABOLIC PNL TOTAL CA: CPT

## 2023-10-26 PROCEDURE — 87102 FUNGUS ISOLATION CULTURE: CPT | Performed by: PHYSICIAN ASSISTANT

## 2023-10-26 PROCEDURE — 87206 SMEAR FLUORESCENT/ACID STAI: CPT | Mod: 91 | Performed by: PHYSICIAN ASSISTANT

## 2023-10-26 PROCEDURE — 80202 ASSAY OF VANCOMYCIN: CPT | Performed by: SURGERY

## 2023-10-26 PROCEDURE — 93010 ELECTROCARDIOGRAM REPORT: CPT | Mod: ,,, | Performed by: INTERNAL MEDICINE

## 2023-10-26 PROCEDURE — 87116 MYCOBACTERIA CULTURE: CPT | Mod: 59 | Performed by: PHYSICIAN ASSISTANT

## 2023-10-26 PROCEDURE — 87070 CULTURE OTHR SPECIMN AEROBIC: CPT | Mod: 59 | Performed by: PHYSICIAN ASSISTANT

## 2023-10-26 PROCEDURE — 63600175 PHARM REV CODE 636 W HCPCS: Performed by: STUDENT IN AN ORGANIZED HEALTH CARE EDUCATION/TRAINING PROGRAM

## 2023-10-26 PROCEDURE — 99223 PR INITIAL HOSPITAL CARE,LEVL III: ICD-10-PCS | Mod: ,,, | Performed by: PHYSICIAN ASSISTANT

## 2023-10-26 PROCEDURE — 36415 COLL VENOUS BLD VENIPUNCTURE: CPT | Performed by: SURGERY

## 2023-10-26 PROCEDURE — 87205 SMEAR GRAM STAIN: CPT | Performed by: PHYSICIAN ASSISTANT

## 2023-10-26 PROCEDURE — 80100016 HC MAINTENANCE HEMODIALYSIS

## 2023-10-26 PROCEDURE — 93010 EKG 12-LEAD: ICD-10-PCS | Mod: ,,, | Performed by: INTERNAL MEDICINE

## 2023-10-26 PROCEDURE — 99223 1ST HOSP IP/OBS HIGH 75: CPT | Mod: ,,, | Performed by: PHYSICIAN ASSISTANT

## 2023-10-26 PROCEDURE — 93005 ELECTROCARDIOGRAM TRACING: CPT

## 2023-10-26 PROCEDURE — 90935 HEMODIALYSIS ONE EVALUATION: CPT | Mod: ,,, | Performed by: INTERNAL MEDICINE

## 2023-10-26 PROCEDURE — 25000003 PHARM REV CODE 250: Performed by: NURSE PRACTITIONER

## 2023-10-26 RX ORDER — AMOXICILLIN AND CLAVULANATE POTASSIUM 875; 125 MG/1; MG/1
1 TABLET, FILM COATED ORAL EVERY 12 HOURS
Status: DISCONTINUED | OUTPATIENT
Start: 2023-10-26 | End: 2023-10-26

## 2023-10-26 RX ORDER — CIPROFLOXACIN 500 MG/1
500 TABLET ORAL DAILY
Status: DISCONTINUED | OUTPATIENT
Start: 2023-10-26 | End: 2023-10-26

## 2023-10-26 RX ORDER — FLUCONAZOLE 200 MG/1
200 TABLET ORAL NIGHTLY
Status: DISCONTINUED | OUTPATIENT
Start: 2023-10-26 | End: 2023-10-26

## 2023-10-26 RX ORDER — METRONIDAZOLE 500 MG/1
500 TABLET ORAL 2 TIMES DAILY
Qty: 84 TABLET | Refills: 0 | Status: SHIPPED | OUTPATIENT
Start: 2023-10-26 | End: 2023-12-04

## 2023-10-26 RX ORDER — OXYCODONE HYDROCHLORIDE 5 MG/1
5 TABLET ORAL ONCE
Status: COMPLETED | OUTPATIENT
Start: 2023-10-26 | End: 2023-10-26

## 2023-10-26 RX ORDER — MICONAZOLE NITRATE 2 %
POWDER (GRAM) TOPICAL
Qty: 85 G | Refills: 2 | Status: SHIPPED | OUTPATIENT
Start: 2023-10-26

## 2023-10-26 RX ORDER — HEPARIN SODIUM 1000 [USP'U]/ML
3000 INJECTION, SOLUTION INTRAVENOUS; SUBCUTANEOUS
Status: DISCONTINUED | OUTPATIENT
Start: 2023-10-26 | End: 2023-10-26 | Stop reason: HOSPADM

## 2023-10-26 RX ADMIN — ASPIRIN 81 MG: 81 TABLET, COATED ORAL at 02:10

## 2023-10-26 RX ADMIN — OXYCODONE HYDROCHLORIDE 5 MG: 5 TABLET ORAL at 11:10

## 2023-10-26 RX ADMIN — CARVEDILOL 25 MG: 25 TABLET, FILM COATED ORAL at 02:10

## 2023-10-26 RX ADMIN — ACETAMINOPHEN 650 MG: 325 TABLET ORAL at 10:10

## 2023-10-26 RX ADMIN — GABAPENTIN 300 MG: 300 CAPSULE ORAL at 11:10

## 2023-10-26 RX ADMIN — ATORVASTATIN CALCIUM 80 MG: 40 TABLET, FILM COATED ORAL at 02:10

## 2023-10-26 RX ADMIN — GABAPENTIN 300 MG: 300 CAPSULE ORAL at 04:10

## 2023-10-26 RX ADMIN — SODIUM CHLORIDE: 9 INJECTION, SOLUTION INTRAVENOUS at 08:10

## 2023-10-26 RX ADMIN — HEPARIN SODIUM 3000 UNITS: 1000 INJECTION, SOLUTION INTRAVENOUS; SUBCUTANEOUS at 09:10

## 2023-10-26 NOTE — ASSESSMENT & PLAN NOTE
52 y.o. White Female ESRD-HD M-W-F presents to ED on 10/23/2023 with worsening wound infection and need for wound vacuum placement and IV antibiotics.   Nephrology consulted for inpatient ESRD-HD management      Outpatient HD Information:  -Dialysis modality: Hemodialysis  -Outpatient HD unit: [facility] (Valerio Bautista MD)  -HD TX days: Tuesday/Thursday/Saturday, duration of treatment: 3-4 hrs  -Last HD TX prior to hospital admission: 10/21/23  -Dialysis access: RUE AVF   -Residual urine: + RRF  -EDW: 109 kg    Assessment:   - iHD today for metabolic clearance and volume removal. Goal UF 2-3L as BP tolerates.     - Continue to monitor intake and output  - Please avoid gadolinium, fleets, phos-based laxatives, NSAIDs  - Dialysis thrice weekly unless more urgent indications arise. Will evaluate RRT requirements Daily.    Anemia of ESRD   Recent Labs   Lab 10/24/23  0703 10/25/23  0738 10/26/23  0615   WBC 8.50 6.40 7.09   HGB 9.8* 10.9* 11.1*   HCT 31.1* 33.7* 36.3*    202 213     Lab Results   Component Value Date    FESATURATED 24 03/11/2008    FERRITIN 12.6 03/11/2008       - Goal in ESRD is Hgb of 10-11. Hgb 10.9. Near target.   - EPO can be administered and dosed per his OP unit upon discharge.    Mineral Bone Disease in ESRD   Lab Results   Component Value Date    CALCIUM 9.3 10/26/2023    ALBUMIN 2.1 (L) 09/19/2023    CAION 1.02 (L) 09/15/2023    PHOS 4.0 09/19/2023       - F/U PO4, Mg, Calcium. And albumin levels daily.   - Renal diet with protein intake goal 1.5 g/kg/d with 1 L fluid restriction   - Novasource with meals  - Restart home phos binder , phos 4.0

## 2023-10-26 NOTE — SUBJECTIVE & OBJECTIVE
Interval History:     No acute events overnight. Patient seen on HD, tolerating well.     Review of patient's allergies indicates:   Allergen Reactions    Naproxen Anaphylaxis and Swelling     Hives (skin)^swelling  Hives (skin)^swelling  Hives (skin)^swelling    Sulfamethoxazole-trimethoprim Other (See Comments)     Caused JEROME    Hydrocodone Nausea And Vomiting and Rash    Aspartame Hives    Penicillins Hives     Blisters (skin)^    Adhesive Rash    Hydrocodone-acetaminophen Nausea And Vomiting     Rash (skin)^, Vomiting^       Current Facility-Administered Medications   Medication Frequency    0.9%  NaCl infusion Once    acetaminophen tablet 650 mg Q6H    aspirin EC tablet 81 mg Daily    atorvastatin tablet 80 mg Daily    carvediloL tablet 25 mg BID WM    dextrose 10% bolus 125 mL 125 mL PRN    dextrose 10% bolus 250 mL 250 mL PRN    gabapentin capsule 300 mg TID    glucagon (human recombinant) injection 1 mg PRN    glucose chewable tablet 16 g PRN    glucose chewable tablet 24 g PRN    hydrALAZINE injection 10 mg Q6H PRN    ibuprofen tablet 400 mg Q6H PRN    insulin aspart U-100 pen 0-5 Units QID (AC + HS) PRN    labetalol 20 mg/4 mL (5 mg/mL) IV syring Q6H PRN    LIDOcaine (PF) 10 mg/ml (1%) injection 10 mg Once PRN    melatonin tablet 6 mg Nightly PRN    ondansetron disintegrating tablet 8 mg Q8H PRN    piperacillin-tazobactam (ZOSYN) 4.5 g in dextrose 5 % in water (D5W) 100 mL IVPB (MB+) Q12H    sodium chloride 0.9% flush 10 mL PRN    vancomycin - pharmacy to dose pharmacy to manage frequency       Objective:     Vital Signs (Most Recent):  Temp: 97.5 °F (36.4 °C) (10/26/23 0732)  Pulse: 81 (10/26/23 0732)  Resp: 20 (10/26/23 0732)  BP: (!) 150/66 (10/26/23 0732)  SpO2: 100 % (10/26/23 0732) Vital Signs (24h Range):  Temp:  [97.5 °F (36.4 °C)-98 °F (36.7 °C)] 97.5 °F (36.4 °C)  Pulse:  [74-81] 81  Resp:  [18-20] 20  SpO2:  [95 %-100 %] 100 %  BP: (141-165)/(60-72) 150/66     Weight: 113.4 kg (250 lb) (10/23/23  1715)  Body mass index is 39.16 kg/m².  Body surface area is 2.32 meters squared.    I/O last 3 completed shifts:  In: 930 [P.O.:730; IV Piggyback:200]  Out: 0      Physical Exam  Vitals and nursing note reviewed.   Constitutional:       Appearance: Normal appearance.   HENT:      Head: Normocephalic.      Nose: Nose normal.   Eyes:      General: Scleral icterus present.   Cardiovascular:      Rate and Rhythm: Normal rate.   Pulmonary:      Effort: Pulmonary effort is normal.   Abdominal:      Palpations: Abdomen is soft.   Musculoskeletal:         General: Deformity present. Normal range of motion.      Cervical back: Normal range of motion.      Right lower leg: No edema.      Left lower leg: No edema.   Skin:     General: Skin is warm and dry.      Comments: Bilateral groin surgical incisions    Neurological:      General: No focal deficit present.      Mental Status: She is alert and oriented to person, place, and time.   Psychiatric:         Mood and Affect: Mood normal.         Behavior: Behavior normal.          Significant Labs:  All labs within the past 24 hours have been reviewed.     Significant Imaging:  Labs: Reviewed

## 2023-10-26 NOTE — PLAN OF CARE
[facility]  Discharge Final Note    Primary Care Provider: Alonso Ling MD  Expected Discharge Date: 10/26/2023    Patient medically ready for discharge to home with [facility] home [facility].     Wound Vac has arrived.    Vascular notified to switch out wound vac.     [facility] will see patient tomorrow.       Transportation by family.     Is family/patient aware of discharge: yes     Hospital follow up scheduled: yes       Final Discharge Note (most recent)       Final Note - 10/26/23 1601          Final Note    Assessment Type Final Discharge Note     Anticipated Discharge Disposition Home-Health Care [facility]     Hospital Resources/Appts/Education Provided Provided patient/caregiver with written discharge plan information        Post-Acute Status    Post-Acute Placement Status Patient List Provided                     Important Message from Medicare  Important Message from Medicare regarding Discharge Appeal Rights: Given to patient/caregiver, Explained to patient/caregiver, Signed/date by patient/caregiver   Date IMM was signed: 10/26/23  Time IMM was signed: 1333  Referral Info (most recent)       Referral Info    No documentation.                   No future appointments.  Cresencio Whitehead RN  Case Management  Ext: [phone]  10/26/2023

## 2023-10-26 NOTE — CONSULTS
[facility]  Infectious Disease  Consult Note    Patient Name: Georgina Arias  MRN: [id number]  Admission Date: 10/23/2023  Hospital Length of Stay: 3 days  Attending Physician: Maxx Maya MD  Primary Care Provider: Alonso Ling MD     Isolation Status: No active isolations    Patient information was obtained from patient and ER records.      Consults  Assessment/Plan:     Orthopedic  * Wound dehiscence  53 yo female with DM, PAD, L foot infections s/p ax-bifemoral bypass no with groin wound dehisensce L>R, drainage and malodor.  Treated with Vanc and zosyn as inpt.  Wound on L probes close to graft.  Blood culture NGTD.  No wound cultures done.  Now with wound vac.  STable non septic.    Plan:  Rec BL groin wound cultures BL - appropriated swabs left for cultures and vasc sx has obtained.  Can dc on Vanc and cefepime post HD on HD days x 6 weeks.  Rec flagyl 500mg po bid x 2 weeks until anaerobic cultures result - if no growth then can dc  Weekly labs on Monday as below.  Patient to be dc'd today    Outpatient Antibiotic Therapy Plan:    Please send referral to [facility].    1) Infection: BL groin wounds with dehiscence    2) Discharge Antibiotics:    Intravenous antibiotics:  Vancomycin post HD on HD days - dosing tbd by [facility] - per  pharmacy 750mg IV post hd on hd days is acceptable at this point.  Cefepime 2g IV post HD on HD days    Oral antibiotics:  FLagyl 500mg  Po bid x 2 weeks    3) Therapy Duration:  6 weeks IV abx/2 weeks flagyl    Estimated end date of IV antibiotics:  12/5/23    4) Outpatient Weekly Labs:    Order the following labs to be drawn on Mondays:   CBC  CMP   CRP    Vancomycin trough. Target 15-20    If vancomycin trough is not at target (15-20) prior to discharge, schedule vancomycin trough to be drawn before their fourth outpatient dose.    5) Fax Lab Results to Infectious Diseases Provider: Andrew Davis  [facility]    [facility] Fax Number: [phone]    6) Outpatient Infectious Diseases Follow-up    Follow-up appointment will be arranged by the ID clinic and will be found in the patient's appointments tab.    Prior to discharge, please ensure the patient's follow-up has been scheduled.    If there is still no follow-up scheduled prior to discharge, please send an EPIC message to Ita Austin in Infectious Diseases.                  Thank you for your consult. I will sign off. Please contact us if you have any additional questions.    OMERO Fonseca  Infectious Disease  [facility]    Subjective:     Principal Problem: Wound dehiscence    HPI: 53 yo female Hx ESRD on HD, DM, PAD, L foot infection and amputations s/p axillo Bifemoral-femoral bypass with Dr. Maya on 9/14/23, L foot 1st toe partial amputation and flap. She was found to have wound dehiscence on 10/12/23. Patient was following with vascular surgery for wound care. Her wounds have been packed with aquacel rope, hydrofera blue, and covered with border dressings. Dr. Maya prescribed difucan and ciprofloxacin on last 2 recent visits.  She was seen in wound care clinic on 10/23/23 and there was concern for wound infection as malodor and green drainage noted from wound.  SHe was admitted for IV abx.  She was placed on Vanc and zosyn.  WOund vacs have been placed on wounds. ID consulted for abx recs.    Patient seen and denies systemic sig of infection. She has been afebrile and without leukocytosis.  She has diarrhea from the antibiotics. Per dw vasc sx L groin wound probes deep to near graft but not visualized.  Blood cultures are NGTD.  Wound cultures not done.       Past Medical History:   Diagnosis Date    Hyperlipidemia     Hypertension     Peptic ulcer disease     Peripheral artery disease     PONV (postoperative nausea and vomiting)     Stage IV CKD     Type II diabetes mellitus        Past Surgical History:   Procedure Laterality  Date    ANGIOGRAM, LOWER ARTERIAL, UNILATERAL Left 9/13/2023    Procedure: ANGIOGRAM, LOWER ARTERIAL, UNILATERAL;  Surgeon: Maxx Maya MD;  Location: [address];  Service: Vascular;  Laterality: Left;    ANGIOGRAPHY OF LOWER EXTREMITY Left 9/14/2023    Procedure: ANGIOGRAM, LOWER EXTREMITY with balloon angioplasty ultraverse 5mm x 60mm;  Surgeon: Maxx Maya MD;  [facility];  Service: Vascular;  Laterality: Left;  ultraverse 5mm x 60mm  fluoro: 12.41  contrast: 44ml  mGy: 65.57  Gycm2: 23.50    AORTOGRAPHY WITH EXTREMITY RUNOFF Left 9/13/2023    Procedure: AORTOGRAM, WITH EXTREMITY RUNOFF;  Surgeon: Maxx Maya MD;  Location: [facility] OR [address] [place] [address];  Service: Vascular;  Laterality: Left;  8.1 min  663.63 mGy  176.39 Gy.cm  178ml Dye     AV FISTULA PLACEMENT Right     CHOLECYSTECTOMY      CREATION, BYPASS, ARTERIAL, AXILLARY TO BILATERAL FEMORAL Left 9/14/2023    Procedure: CREATION, BYPASS, ARTERIAL, AXILLARY TO BILATERAL FEMORAL;  Surgeon: Maxx Maya MD;  Location: [address];  Service: Vascular;  Laterality: Left;  Left Axillary to Bilateral Femoral Bypass, Left Femoral to Above Knee Popliteal Bypass; SLIDER BED    CYSTOSCOPY W/ URETERAL STENT PLACEMENT Right 08/28/2018    Procedure: CYSTOSCOPY, WITH URETERAL STENT INSERTION (ADD ON );  Surgeon: Bubba Perez MD;  Location: [facility];  Service: Urology;  Laterality: Right;  (ADD ON )    DEBRIDEMENT OF FOOT Left 08/03/2023    Procedure: DEBRIDEMENT, FOOT;  Surgeon: Aleida Flannery DPM;  Location: [facility] OR;  Service: Podiatry;  Laterality: Left;    Excisional debridement of ulcer distal great toe left foot w/ partial resection distal phalanx Left 08/03/2023    FOOT AMPUTATION THROUGH METATARSAL Left 9/14/2023    Procedure: AMPUTATION, FOOT, TRANSMETATARSAL partial 1st ray amputation;  Surgeon: Jesus Valles DPM;  Location: [facility] OR [address] [place] [address];  Service: Podiatry;  Laterality: Left;  partial ray    I&D L 1st ray w/ amputation L  hallux IPJ Left 08/14/2023    INCISION AND DRAINAGE FOOT Left 9/14/2023    Procedure: INCISION AND DRAINAGE, FOOT;  Surgeon: Jesus Valles DPM;  Location: [facility] OR 2ND FLR;  Service: Podiatry;  Laterality: Left;    Injection L PT tendon sheath Left 08/14/2023    Nail avulsion left hallux Left 08/03/2023    PERIPHERAL ARTERIAL STENT GRAFT Right     REMOVAL OF NAIL OF DIGIT Left 08/03/2023    Procedure: REMOVAL, NAIL, DIGIT great toe;  Surgeon: Aleida Flannery DPM;  Location: [facility] OR;  Service: Podiatry;  Laterality: Left;    STENT, SUPERFICIAL FEMORAL ARTERY Left 9/14/2023    Procedure: STENT, SUPERFICIAL FEMORAL ARTERY;  Surgeon: Maxx Maya MD;  Location: [facility] OR 2ND FLR;  Service: Vascular;  Laterality: Left;  5 x 100 mm    TOE AMPUTATION Left 08/14/2023    Procedure: AMPUTATION, TOE hallux IPJ;  Surgeon: Aleida Flannery DPM;  Location: [facility] OR;  Service: Podiatry;  Laterality: Left;    TOE AMPUTATION Left 08/25/2023    great toe    TOE AMPUTATION Left 8/25/2023    Procedure: AMPUTATION, TOE hallux, possible 1st met.head;  Surgeon: Aleida Flannery DPM;  Location: [facility] OR;  Service: Podiatry;  Laterality: Left;    URETEROSCOPIC REMOVAL OF URETERIC CALCULUS Right 09/11/2018    Procedure: EXTRACTION-STONE-URETEROSCOPY;  Surgeon: Bubba Perez MD;  Location: [facility];  Service: Urology;  Laterality: Right;       Review of patient's allergies indicates:   Allergen Reactions    Naproxen Anaphylaxis and Swelling     Hives (skin)^swelling  Hives (skin)^swelling  Hives (skin)^swelling    Sulfamethoxazole-trimethoprim Other (See Comments)     Caused JEROME    Hydrocodone Nausea And Vomiting and Rash    Aspartame Hives    Penicillins Hives     Blisters (skin)^    Adhesive Rash    Hydrocodone-acetaminophen Nausea And Vomiting     Rash (skin)^, Vomiting^         Medications:  Facility-Administered Medications Prior to Admission   Medication    sodium chloride 0.9% flush 10 mL     Medications Prior to Admission   Medication  "Sig    acetaminophen (TYLENOL) 500 MG tablet Take 2 tablets (1,000 mg total) by mouth every 8 (eight) hours as needed.    aspirin (ECOTRIN) 81 MG EC tablet Take 1 tablet (81 mg total) by mouth once daily.    atorvastatin (LIPITOR) 80 MG tablet Take 1 tablet (80 mg total) by mouth once daily.    carvediloL (COREG) 25 MG tablet Take 1 tablet (25 mg total) by mouth 2 (two) times daily with meals.    COMFORT EZ PEN NEEDLES 32 gauge x 5/32" Ndle SMARTSIG:injection Daily    fluticasone propionate (FLONASE) 50 mcg/actuation nasal spray Fluticasone Propionate 50 MCG/ACT Nasal Suspension QTY: 1 bottle Days: 30 Refills: 5  Written: 20 Patient Instructions: inhale 2 sprays in each nostril once daily    insulin (LANTUS SOLOSTAR U-100 INSULIN) glargine 100 units/mL (3mL) SubQ pen Inject 40 Units into the skin every evening. (Patient taking differently: Inject 50 Units into the skin every evening.)    levocetirizine (XYZAL) 5 MG tablet Take 5 mg by mouth.    miscellaneous medical supply (C-TUB) Misc Hearing amplification (BICROS preferred)    MOUNJARO 5 mg/0.5 mL PnIj SMARTSI Milligram(s) SUB-Q Once a Week    NIFEdipine (PROCARDIA-XL) 30 MG (OSM) 24 hr tablet Take 30 mg by mouth.    oxyCODONE (ROXICODONE) 5 MG immediate release tablet Take 1 tablet (5 mg total) by mouth every 6 (six) hours as needed for Pain.    sevelamer carbonate (RENVELA) 800 mg Tab Take by mouth.    TRUE METRIX GLUCOSE TEST STRIP Strp USE TO test THREE TIMES DAILY    TRUEPLUS LANCETS 30 gauge Misc     [DISCONTINUED] amoxicillin-clavulanate 875-125mg (AUGMENTIN) 875-125 mg per tablet Take 1 tablet by mouth every 12 (twelve) hours. (Patient not taking: Reported on 10/19/2023)    [DISCONTINUED] ciprofloxacin HCl (CIPRO) 500 MG tablet Take 1 tablet (500 mg total) by mouth once daily. for 10 days    [DISCONTINUED] fluconazole (DIFLUCAN) 200 MG Tab Take 1 tablet (200 mg total) by mouth every evening. for 10 doses (Patient not taking: Reported on " 10/23/2023)    [DISCONTINUED] gabapentin (NEURONTIN) 300 MG capsule Take 1 capsule (300 mg total) by mouth 3 (three) times daily.     Antibiotics (From admission, onward)      None          Antifungals (From admission, onward)      None          Antivirals (From admission, onward)      None             Immunization History   Administered Date(s) Administered    Tdap 2014       Family History       Problem Relation (Age of Onset)    Cancer Maternal Grandmother    Diabetes Mother, Father    Heart disease Father (37), Maternal Grandmother    Hypertension Mother, Father, Brother          Social History     Socioeconomic History    Marital status: Single   Occupational History     Employer: [employer]   Tobacco Use    Smoking status: Former     Current packs/day: 0.00     Types: Cigarettes     Quit date: 2006     Years since quittin.2    Smokeless tobacco: Never   Substance and Sexual Activity    Alcohol use: Not Currently     Comment: occ    Drug use: No    Sexual activity: Not Currently   Social History Narrative    Lives with mom.    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    Some photography.    Close with niece 2 and god-child.    occ exericse     Social Determinants of Health     Financial Resource Strain: Low Risk  (10/24/2023)    Overall Financial Resource Strain (CARDIA)     Difficulty of Paying Living Expenses: Not hard at all   Food Insecurity: No Food Insecurity (10/24/2023)    Hunger Vital Sign     Worried About Running Out of Food in the Last Year: Never true     Ran Out of Food in the Last Year: Never true   Transportation Needs: No Transportation Needs (10/24/2023)    PRAPARE - Transportation     Lack of Transportation (Medical): No     Lack of Transportation (Non-Medical): No   Physical Activity: Inactive (10/24/2023)    Exercise Vital Sign     Days of Exercise per Week: 0 days     Minutes of Exercise per Session: 0 min   Stress: Stress Concern Present  (10/24/2023)    [place] [employer] - Occupational Stress Questionnaire     Feeling of Stress : To some extent   Social Connections: Socially Isolated (10/24/2023)    Social Connection and Isolation Panel [NHANES]     Frequency of Communication with Friends and Family: More than three times a week     Frequency of Social Gatherings with Friends and Family: More than three times a week     Attends Anglican Services: Never     Active Member of Clubs or Organizations: No     Attends Club or Organization Meetings: Never     Marital Status: Never    Housing Stability: Low Risk  (10/24/2023)    Housing Stability Vital Sign     Unable to Pay for Housing in the Last Year: No     Number of Places Lived in the Last Year: 1     Unstable Housing in the Last Year: No     Review of Systems  Objective:     Vital Signs (Most Recent):  Temp: 98.6 °F (37 °C) (10/26/23 1555)  Pulse: 82 (10/26/23 1555)  Resp: 18 (10/26/23 1555)  BP: (!) 118/56 (10/26/23 1555)  SpO2: 99 % (10/26/23 1555) Vital Signs (24h Range):  Temp:  [97.4 °F (36.3 °C)-98.6 °F (37 °C)] 98.6 °F (37 °C)  Pulse:  [74-97] 82  Resp:  [18-20] 18  SpO2:  [95 %-100 %] 99 %  BP: (118-165)/(56-98) 118/56     Weight: 113.4 kg (250 lb)  Body mass index is 39.16 kg/m².    Estimated Creatinine Clearance: 19.9 mL/min (A) (based on SCr of 4.3 mg/dL (H)).     Physical Exam  Constitutional:       General: She is not in acute distress.     Appearance: Normal appearance. She is well-developed. She is not ill-appearing, toxic-appearing or diaphoretic.       HENT:      Head: Normocephalic and atraumatic.   Cardiovascular:      Rate and Rhythm: Normal rate and regular rhythm.      Heart sounds: Normal heart sounds. No murmur heard.     No friction rub. No gallop.   Pulmonary:      Effort: Pulmonary effort is normal. No respiratory distress.      Breath sounds: Normal breath sounds. No wheezing or rales.   Abdominal:      General: Bowel sounds are normal. There  is no distension.      Palpations: Abdomen is soft. There is no mass.      Tenderness: There is no abdominal tenderness. There is no guarding or rebound.   Skin:     General: Skin is warm and dry.   Neurological:      Mental Status: She is alert and oriented to person, place, and time.   Psychiatric:         Behavior: Behavior normal.          Significant Labs: Blood Culture:   Recent Labs   Lab 09/15/23  1520 09/15/23  2021 10/24/23  0703   LABBLOO No growth after 5 days. No growth after 5 days. No Growth to date  No Growth to date  No Growth to date     CBC:   Recent Labs   Lab 10/25/23  0738 10/26/23  0615   WBC 6.40 7.09   HGB 10.9* 11.1*   HCT 33.7* 36.3*    213     CMP:   Recent Labs   Lab 10/25/23  0738 10/26/23  0615    138   K 4.3 4.1    106   CO2 19* 20*   * 183*   BUN 20 24*   CREATININE 3.5* 4.3*   CALCIUM 9.4 9.3   ANIONGAP 14 12     Wound Culture:   Recent Labs   Lab 08/03/23  1227 08/14/23  1257 09/12/23  1158 09/14/23  1654   LABAERO STAPHYLOCOCCUS AUREUS  Many  * No growth Skin meño,  no predominant organism No growth     All pertinent labs within the past 24 hours have been reviewed.    Significant Imaging: I have reviewed all pertinent imaging results/findings within the past 24 hours.  X-Ray Foot Complete Left [[id number]] Resulted: 10/25/23 1058   Order Status: Completed Updated: 10/25/23 1101   Narrative:     EXAMINATION:   XR FOOT COMPLETE 3 VIEW LEFT     CLINICAL HISTORY:   updated post-op wound dehisence;     FINDINGS:   There has been amputation of the 1st digit and the distal 1st metatarsal.  There is soft tissue swelling.  No acute fracture dislocation bone destruction or complication seen.       Electronically signed by: Nam Garcia MD   Date: 10/25/2023   Time: 10:58     Imaging History    2023    Date Procedure Name Study Review Link PACS Link Status Accession Number Location   10/25/23 10:46 AM X-Ray Foot Complete Left Study Review  Images Final  [id number] [facility]   10/12/23 12:50 PM VAS US Arterial Leg Left Study Review  Images Final [id number] [facility]   10/12/23 12:50 PM VAS US Ankle Brachial Indices Resting Study Review  Images Final [id number] [facility]   10/24/23 10:11 PM CARDIAC MONITORING STRIPS Study Review  Final

## 2023-10-26 NOTE — PLAN OF CARE
[facility] - [facility]      HOME HEALTH ORDERS  FACE TO FACE ENCOUNTER    Patient Name: Georgina Arias  YOB: 1970    PCP: Alonso Ling MD   [address] [address] [address]  PCP Phone Number: [phone]  PCP Fax: [phone]    Encounter Date: 10/23/23    Admit to Home Health    Diagnoses:  Active Hospital Problems    Diagnosis  POA    *Wound dehiscence [T81.30XA]  Yes     Ms. Arias is a 53 y/o female with extensive pmh including DMII, PVD, HLD, ESRD on HD, CHF and non-healing wounds who was admitted after clinic visit on 10/23.  She had recent ax bi fem bypass with Dr. Maya on 9/14/23 and groin incision sites began dehiscing on 10/12.  She was admitted from wound care clinic for wound vac placement and IV antibiotics.    -Renal/DM diet  -OK for ambulation  -Incentive spirometer  -SCDs  -Vanc & zosyn  -Nephro consulted for HD  -Wound care consulted   -Wound vac to be applied asap when delivered to room, was ordered yesterday   -DM mgmt- sliding scale insulin ordered     Dispo:  Will need HH and wound vac for discharge       ESRD (end stage renal disease) [N18.6]  Yes    Class 2 severe obesity with serious comorbidity and body mass index (BMI) of 38.0 to 38.9 in adult [E66.01, Z68.38]  Not Applicable    Chronic diastolic congestive heart failure [I50.32]  Yes    Type II diabetes mellitus [E11.9]  Yes     Overview:   dx update      Peripheral vascular disease [I73.9]  Yes     Overview:   dx update        Resolved Hospital Problems   No resolved problems to display.       Follow Up Appointments:  No future appointments.    Allergies:  Review of patient's allergies indicates:   Allergen Reactions    Naproxen Anaphylaxis and Swelling     Hives (skin)^swelling  Hives (skin)^swelling  Hives (skin)^swelling    Sulfamethoxazole-trimethoprim Other (See Comments)     Caused JEROME    Hydrocodone Nausea And Vomiting and Rash    Aspartame Hives    Penicillins Hives     Blisters  (skin)^    Adhesive Rash    Hydrocodone-acetaminophen Nausea And Vomiting     Rash (skin)^, Vomiting^         Medications: Review discharge medications with patient and family and provide education.    Current Facility-Administered Medications   Medication Dose Route Frequency Provider Last Rate Last Admin    acetaminophen tablet 650 mg  650 mg Oral Q6H Alireza Cheng MD   650 mg at 10/26/23 1038    aspirin EC tablet 81 mg  81 mg Oral Daily Chino Partida MD   81 mg at 10/26/23 1402    atorvastatin tablet 80 mg  80 mg Oral Daily Chino Partida MD   80 mg at 10/26/23 1402    carvediloL tablet 25 mg  25 mg Oral BID WM Chino Partida MD   25 mg at 10/26/23 1401    dextrose 10% bolus 125 mL 125 mL  12.5 g Intravenous PRN Chino Partida MD        dextrose 10% bolus 250 mL 250 mL  25 g Intravenous PRN Chino Partida MD        gabapentin capsule 300 mg  300 mg Oral TID Kellie Mauricio, NP   300 mg at 10/26/23 1105    glucagon (human recombinant) injection 1 mg  1 mg Intramuscular PRN Chino Partida MD        glucose chewable tablet 16 g  16 g Oral PRN Chino Partida MD        glucose chewable tablet 24 g  24 g Oral PRN Chino Partida MD        heparin (porcine) injection 3,000 Units  3,000 Units Intravenous PRN Kendell Love DO   3,000 Units at 10/26/23 0920    hydrALAZINE injection 10 mg  10 mg Intravenous Q6H PRN Alireza Cheng MD        ibuprofen tablet 400 mg  400 mg Oral Q6H PRN Alireza Cheng MD        insulin aspart U-100 pen 0-5 Units  0-5 Units Subcutaneous QID (AC + HS) PRN Chino Partida MD   1 Units at 10/24/23 2125    labetalol 20 mg/4 mL (5 mg/mL) IV syring  20 mg Intravenous Q6H PRN Alireza Cheng MD   20 mg at 10/23/23 2203    LIDOcaine (PF) 10 mg/ml (1%) injection 10 mg  1 mL Intradermal Once PRN Kellie Mauricio, NP        melatonin tablet 6 mg  6 mg Oral Nightly PRN Mauricio, Kellie O., NP        ondansetron disintegrating tablet 8 mg  8 mg Oral Q8H PRN Kellie Mauricio., NP         "sodium chloride 0.9% flush 10 mL  10 mL Intravenous PRN Kellie Mauricio NP         Current Discharge Medication List        CONTINUE these medications which have NOT CHANGED    Details   acetaminophen (TYLENOL) 500 MG tablet Take 2 tablets (1,000 mg total) by mouth every 8 (eight) hours as needed.  Qty: 60 tablet, Refills: 0    Associated Diagnoses: Back pain, unspecified back location, unspecified back pain laterality, unspecified chronicity; Pain of left hip joint; Arthritis      amoxicillin-clavulanate 875-125mg (AUGMENTIN) 875-125 mg per tablet Take 1 tablet by mouth every 12 (twelve) hours.  Qty: 28 tablet, Refills: 0      aspirin (ECOTRIN) 81 MG EC tablet Take 1 tablet (81 mg total) by mouth once daily.    Associated Diagnoses: PAD (peripheral artery disease)      atorvastatin (LIPITOR) 80 MG tablet Take 1 tablet (80 mg total) by mouth once daily.  Qty: 90 tablet, Refills: 3    Associated Diagnoses: Diabetes mellitus, type 2; PAD (peripheral artery disease); Hyperlipidemia      carvediloL (COREG) 25 MG tablet Take 1 tablet (25 mg total) by mouth 2 (two) times daily with meals.  Qty: 180 tablet, Refills: 3    Comments: .      ciprofloxacin HCl (CIPRO) 500 MG tablet Take 1 tablet (500 mg total) by mouth once daily. for 10 days  Qty: 10 tablet, Refills: 0      COMFORT EZ PEN NEEDLES 32 gauge x 5/32" Ndle SMARTSIG:injection Daily      fluticasone propionate (FLONASE) 50 mcg/actuation nasal spray Fluticasone Propionate 50 MCG/ACT Nasal Suspension QTY: 1 bottle Days: 30 Refills: 5  Written: 09/30/20 Patient Instructions: inhale 2 sprays in each nostril once daily      gabapentin (NEURONTIN) 300 MG capsule Take 1 capsule (300 mg total) by mouth 3 (three) times daily.  Qty: 63 capsule, Refills: 1    Associated Diagnoses: Chronic foot pain, unspecified laterality      insulin (LANTUS SOLOSTAR U-100 INSULIN) glargine 100 units/mL (3mL) SubQ pen Inject 40 Units into the skin every evening.  Refills: 0    "   levocetirizine (XYZAL) 5 MG tablet Take 5 mg by mouth.      miscellaneous medical supply (C-TUB) Misc Hearing amplification (BICROS preferred)      MOUNJARO 5 mg/0.5 mL PnIj SMARTSI Milligram(s) SUB-Q Once a Week      NIFEdipine (PROCARDIA-XL) 30 MG (OSM) 24 hr tablet Take 30 mg by mouth.      oxyCODONE (ROXICODONE) 5 MG immediate release tablet Take 1 tablet (5 mg total) by mouth every 6 (six) hours as needed for Pain.  Qty: 20 tablet, Refills: 0    Comments: Quantity prescribed more than 7 day supply? No      sevelamer carbonate (RENVELA) 800 mg Tab Take by mouth.      TRUE METRIX GLUCOSE TEST STRIP Strp USE TO test THREE TIMES DAILY      TRUEPLUS LANCETS 30 gauge Misc            STOP taking these medications       fluconazole (DIFLUCAN) 200 MG Tab Comments:   Reason for Stopping:                 I have seen and examined this patient within the last 30 days. My clinical findings that support the need for the home health skilled services and home bound status are the following:no   Weakness/numbness causing balance and gait disturbance due to Infection making it taxing to leave home.     Diet:   cardiac diet and renal diet    Labs:      Referrals/ Consults  Physical Therapy to evaluate and treat. Evaluate for home safety and equipment needs; Establish/upgrade home exercise program. Perform / instruct on therapeutic exercises, gait training, transfer training, and Range of Motion.  Occupational Therapy to evaluate and treat. Evaluate home environment for safety and equipment needs. Perform/Instruct on transfers, ADL training, ROM, and therapeutic exercises.    Activities:   activity as tolerated    Nursing:   Agency to admit patient within 24 hours of hospital discharge unless specified on physician order or at patient request    SN to complete comprehensive assessment including routine vital signs. Instruct on disease process and s/s of complications to report to MD. Review/verify medication list sent home  with the patient at time of discharge  and instruct patient/caregiver as needed. Frequency may be adjusted depending on start of care date.     Skilled nurse to perform up to 3 visits PRN for symptoms related to diagnosis    Notify MD if SBP > 160 or < 90; DBP > 90 or < 50; HR > 120 or < 50; Temp > 101; O2 < 88%; Other:         Ok to schedule additional visits based on staff availability and patient request on consecutive days within the home health episode.    When multiple disciplines ordered:    Start of Care occurs on Sunday - Wednesday schedule remaining discipline evaluations as ordered on separate consecutive days following the start of care.    Thursday SOC -schedule subsequent evaluations Friday and Monday the following week.     Friday - Saturday SOC - schedule subsequent discipline evaluations on consecutive days starting Monday of the following week.    For all post-discharge communication and subsequent orders please contact patient's primary care physician. If unable to reach primary care physician or do not receive response within 30 minutes, please contact [phone] for clinical staff order clarification    Miscellaneous     Home Health Aide:  Nursing Three times weekly, Physical Therapy Three times weekly, and Occupational Therapy Three times weekly    Wound Care Orders  yes:  Wound Vac:   Location:  Bilateral groins            Dressing changes every Monday, Wednesday and Friday.  125 mmHg black foam sponge continuous suction         ET Consult    I certify that this patient is confined to her home and needs intermittent skilled nursing care, physical therapy, and occupational therapy.

## 2023-10-26 NOTE — PROGRESS NOTES
[facility] - [facility]  Nephrology  Progress Note    Patient Name: Georgina Arias  MRN: [id number]  Admission Date: 10/23/2023  Hospital Length of Stay: 3 days  Attending Provider: Maxx Maya MD   Primary Care Physician: Alonso Ling MD  Principal Problem:Wound dehiscence    Subjective:     HPI: The patient is a 52 y.o. White Female with multiple co morbidities including DMII, PVD, HLD, ESRD on HD, CHF and non-healing wounds who presents to ED on 10/23/2023 after her general surgery clinic visit on 10/23/23 for wound vacuum placement and IV antibiotics. Of note, she had a recent bilateral femoral bypass with Dr. Maya on 9/14/23 cb incision dehiscing on 10/12. The patient is a [facility] dialysis patient at [facility] location. Last HD on Saturday. Nephrology consulted for management of ESRD and HD treatment.      Outpatient HD Information:  -Dialysis modality: Hemodialysis  -Outpatient HD unit: [facility] (Valerio Bautista MD)  -HD TX days: Tuesday/Thursday/Saturday, duration of treatment: 3-4 hr  -Dialysis access: RUE AVF   -Residual urine: + RRF  -EDW: 109 kg      Interval History:     No acute events overnight. Patient seen on HD, tolerating well.     Review of patient's allergies indicates:   Allergen Reactions    Naproxen Anaphylaxis and Swelling     Hives (skin)^swelling  Hives (skin)^swelling  Hives (skin)^swelling    Sulfamethoxazole-trimethoprim Other (See Comments)     Caused JEROME    Hydrocodone Nausea And Vomiting and Rash    Aspartame Hives    Penicillins Hives     Blisters (skin)^    Adhesive Rash    Hydrocodone-acetaminophen Nausea And Vomiting     Rash (skin)^, Vomiting^       Current Facility-Administered Medications   Medication Frequency    0.9%  NaCl infusion Once    acetaminophen tablet 650 mg Q6H    aspirin EC tablet 81 mg Daily    atorvastatin tablet 80 mg Daily    carvediloL tablet 25 mg BID WM    dextrose 10% bolus 125 mL 125 mL PRN    dextrose 10% bolus 250  mL 250 mL PRN    gabapentin capsule 300 mg TID    glucagon (human recombinant) injection 1 mg PRN    glucose chewable tablet 16 g PRN    glucose chewable tablet 24 g PRN    hydrALAZINE injection 10 mg Q6H PRN    ibuprofen tablet 400 mg Q6H PRN    insulin aspart U-100 pen 0-5 Units QID (AC + HS) PRN    labetalol 20 mg/4 mL (5 mg/mL) IV syring Q6H PRN    LIDOcaine (PF) 10 mg/ml (1%) injection 10 mg Once PRN    melatonin tablet 6 mg Nightly PRN    ondansetron disintegrating tablet 8 mg Q8H PRN    piperacillin-tazobactam (ZOSYN) 4.5 g in dextrose 5 % in water (D5W) 100 mL IVPB (MB+) Q12H    sodium chloride 0.9% flush 10 mL PRN    vancomycin - pharmacy to dose pharmacy to manage frequency       Objective:     Vital Signs (Most Recent):  Temp: 97.5 °F (36.4 °C) (10/26/23 0732)  Pulse: 81 (10/26/23 0732)  Resp: 20 (10/26/23 0732)  BP: (!) 150/66 (10/26/23 0732)  SpO2: 100 % (10/26/23 0732) Vital Signs (24h Range):  Temp:  [97.5 °F (36.4 °C)-98 °F (36.7 °C)] 97.5 °F (36.4 °C)  Pulse:  [74-81] 81  Resp:  [18-20] 20  SpO2:  [95 %-100 %] 100 %  BP: (141-165)/(60-72) 150/66     Weight: 113.4 kg (250 lb) (10/23/23 1715)  Body mass index is 39.16 kg/m².  Body surface area is 2.32 meters squared.    I/O last 3 completed shifts:  In: 930 [P.O.:730; IV Piggyback:200]  Out: 0      Physical Exam  Vitals and nursing note reviewed.   Constitutional:       Appearance: Normal appearance.   HENT:      Head: Normocephalic.      Nose: Nose normal.   Eyes:      General: Scleral icterus present.   Cardiovascular:      Rate and Rhythm: Normal rate.   Pulmonary:      Effort: Pulmonary effort is normal.   Abdominal:      Palpations: Abdomen is soft.   Musculoskeletal:         General: Deformity present. Normal range of motion.      Cervical back: Normal range of motion.      Right lower leg: No edema.      Left lower leg: No edema.   Skin:     General: Skin is warm and dry.      Comments: Bilateral groin surgical incisions     Neurological:      General: No focal deficit present.      Mental Status: She is alert and oriented to person, place, and time.   Psychiatric:         Mood and Affect: Mood normal.         Behavior: Behavior normal.          Significant Labs:  All labs within the past 24 hours have been reviewed.     Significant Imaging:  Labs: Reviewed    Assessment/Plan:     Renal/  ESRD (end stage renal disease)  52 y.o. White Female ESRD-HD M-W-F presents to ED on 10/23/2023 with worsening wound infection and need for wound vacuum placement and IV antibiotics.   Nephrology consulted for inpatient ESRD-HD management      Outpatient HD Information:  -Dialysis modality: Hemodialysis  -Outpatient HD unit: [facility] (Valerio Bautista MD)  -HD TX days: Tuesday/Thursday/Saturday, duration of treatment: 3-4 hrs  -Last HD TX prior to hospital admission: 10/21/23  -Dialysis access: RUE AVF   -Residual urine: + RRF  -EDW: 109 kg    Assessment:   - iHD today for metabolic clearance and volume removal. Goal UF 2-3L as BP tolerates.     - Continue to monitor intake and output  - Please avoid gadolinium, fleets, phos-based laxatives, NSAIDs  - Dialysis thrice weekly unless more urgent indications arise. Will evaluate RRT requirements Daily.    Anemia of ESRD   Recent Labs   Lab 10/24/23  0703 10/25/23  0738 10/26/23  0615   WBC 8.50 6.40 7.09   HGB 9.8* 10.9* 11.1*   HCT 31.1* 33.7* 36.3*    202 213     Lab Results   Component Value Date    FESATURATED 24 03/11/2008    FERRITIN 12.6 03/11/2008       - Goal in ESRD is Hgb of 10-11. Hgb 10.9. Near target.   - EPO can be administered and dosed per his OP unit upon discharge.    Mineral Bone Disease in ESRD   Lab Results   Component Value Date    CALCIUM 9.3 10/26/2023    ALBUMIN 2.1 (L) 09/19/2023    CAION 1.02 (L) 09/15/2023    PHOS 4.0 09/19/2023       - F/U PO4, Mg, Calcium. And albumin levels daily.   - Renal diet with protein intake goal 1.5 g/kg/d with 1 L fluid  restriction   - Novasource with meals  - Restart home phos binder , phos 4.0    Orthopedic  * Wound dehiscence  - defer to primary team         Thank you for your consult. I will follow-up with patient. Please contact us if you have any additional questions.     Case discussed with attending. Attestation to follow.       Kendell Love DO  Nephrology  [facility]

## 2023-10-26 NOTE — PLAN OF CARE
Orthopedic  * Wound dehiscence  Assessment:  Podiatry was consulted for wound dehisence to L partial 1st ray resection surgical site. IDSA non-infected L foot. No signs of infections noted on physical exam. WBC WNL.    Plan:  -Xray of L foot shows no alarming changes from previous xray. Patient surgical site dehisence is stable and recommend following up with Podiatry clinic outpatient, with Dr. Flannery.  - No urgent/emergent signs of infection to her foot at the moment. No surgical intervention necessary   - Xray L foot: s/p 1st partial ray amp, unremarkable  - Abx per Primary/ID team  - Vascular surgery is following  - Podiatry will sign off. Please reach out with any further questions.      Discharge Recommendations:  1. Patient to follow up in outpatient Podiatry clinic with Dr. Flannery  2. Antibiotic plan per Primary/ID  3. HH/SNF to do dressings 3x a week as follows: Pack with Aquacel Ag, 4x4 gauze, kirlex, and ACE wrap  4. Weight bearing as tolerated in Kwaku shoe  5. Keep dressings clean, dry, and intact       Anderson Herman DPM   Podiatric Medicine & Surgery  [facility]  Secure Chat Preferred  Mobile: [phone]  Pager: [phone]

## 2023-10-26 NOTE — SUBJECTIVE & OBJECTIVE
Past Medical History:   Diagnosis Date    Hyperlipidemia     Hypertension     Peptic ulcer disease     Peripheral artery disease     PONV (postoperative nausea and vomiting)     Stage IV CKD     Type II diabetes mellitus        Past Surgical History:   Procedure Laterality Date    ANGIOGRAM, LOWER ARTERIAL, UNILATERAL Left 9/13/2023    Procedure: ANGIOGRAM, LOWER ARTERIAL, UNILATERAL;  Surgeon: Maxx Maya MD;  Location: [address];  Service: Vascular;  Laterality: Left;    ANGIOGRAPHY OF LOWER EXTREMITY Left 9/14/2023    Procedure: ANGIOGRAM, LOWER EXTREMITY with balloon angioplasty ultraverse 5mm x 60mm;  Surgeon: Maxx Maya MD;  Location: [facility] OR [address] [place] [address];  Service: Vascular;  Laterality: Left;  ultraverse 5mm x 60mm  fluoro: 12.41  contrast: 44ml  mGy: 65.57  Gycm2: 23.50    AORTOGRAPHY WITH EXTREMITY RUNOFF Left 9/13/2023    Procedure: AORTOGRAM, WITH EXTREMITY RUNOFF;  Surgeon: Maxx Maya MD;  Location: [address];  Service: Vascular;  Laterality: Left;  8.1 min  663.63 mGy  176.39 Gy.cm  178ml Dye     AV FISTULA PLACEMENT Right     CHOLECYSTECTOMY      CREATION, BYPASS, ARTERIAL, AXILLARY TO BILATERAL FEMORAL Left 9/14/2023    Procedure: CREATION, BYPASS, ARTERIAL, AXILLARY TO BILATERAL FEMORAL;  Surgeon: Maxx Maya MD;  Location: [address];  Service: Vascular;  Laterality: Left;  Left Axillary to Bilateral Femoral Bypass, Left Femoral to Above Knee Popliteal Bypass; SLIDER BED    CYSTOSCOPY W/ URETERAL STENT PLACEMENT Right 08/28/2018    Procedure: CYSTOSCOPY, WITH URETERAL STENT INSERTION (ADD ON );  Surgeon: Bubba Perez MD;  Location: [facility] [place] OR;  Service: Urology;  Laterality: Right;  (ADD ON )    DEBRIDEMENT OF FOOT Left 08/03/2023    Procedure: DEBRIDEMENT, FOOT;  Surgeon: Aleida Flannery DPM;  [place] [facility] [place];  Service: Podiatry;  Laterality: Left;    Excisional debridement of ulcer distal great toe left foot w/ partial resection distal phalanx Left 08/03/2023     FOOT AMPUTATION THROUGH METATARSAL Left 9/14/2023    Procedure: AMPUTATION, FOOT, TRANSMETATARSAL partial 1st ray amputation;  Surgeon: Jesus Valles DPM;  Location: [facility];  Service: Podiatry;  Laterality: Left;  partial ray    I&D L 1st ray w/ amputation L hallux IPJ Left 08/14/2023    INCISION AND DRAINAGE FOOT Left 9/14/2023    Procedure: INCISION AND DRAINAGE, FOOT;  Surgeon: Jesus Valles DPM;  [address];  Service: Podiatry;  Laterality: Left;    Injection L PT tendon sheath Left 08/14/2023    Nail avulsion left hallux Left 08/03/2023    PERIPHERAL ARTERIAL STENT GRAFT Right     REMOVAL OF NAIL OF DIGIT Left 08/03/2023    Procedure: REMOVAL, NAIL, DIGIT great toe;  Surgeon: Aleida Flannery DPM;  Location: [facility];  Service: Podiatry;  Laterality: Left;    STENT, SUPERFICIAL FEMORAL ARTERY Left 9/14/2023    Procedure: STENT, SUPERFICIAL FEMORAL ARTERY;  Surgeon: Maxx Maya MD;  [address];  Service: Vascular;  Laterality: Left;  5 x 100 mm    TOE AMPUTATION Left 08/14/2023    Procedure: AMPUTATION, TOE hallux IPJ;  Surgeon: Aleida Flannery DPM;  Location: [facility];  Service: Podiatry;  Laterality: Left;    TOE AMPUTATION Left 08/25/2023    great toe    TOE AMPUTATION Left 8/25/2023    Procedure: AMPUTATION, TOE hallux, possible 1st met.head;  Surgeon: Aleida Flannery DPM;  Location: [facility];  Service: Podiatry;  Laterality: Left;    URETEROSCOPIC REMOVAL OF URETERIC CALCULUS Right 09/11/2018    Procedure: EXTRACTION-STONE-URETEROSCOPY;  Surgeon: Bubba Perez MD;  Location: [facility] OR;  Service: Urology;  Laterality: Right;       Review of patient's allergies indicates:   Allergen Reactions    Naproxen Anaphylaxis and Swelling     Hives (skin)^swelling  Hives (skin)^swelling  Hives (skin)^swelling    Sulfamethoxazole-trimethoprim Other (See Comments)     Caused JEROME    Hydrocodone Nausea And Vomiting and Rash    Aspartame Hives    Penicillins Hives     Blisters  "(skin)^    Adhesive Rash    Hydrocodone-acetaminophen Nausea And Vomiting     Rash (skin)^, Vomiting^         Medications:  Facility-Administered Medications Prior to Admission   Medication    sodium chloride 0.9% flush 10 mL     Medications Prior to Admission   Medication Sig    acetaminophen (TYLENOL) 500 MG tablet Take 2 tablets (1,000 mg total) by mouth every 8 (eight) hours as needed.    aspirin (ECOTRIN) 81 MG EC tablet Take 1 tablet (81 mg total) by mouth once daily.    atorvastatin (LIPITOR) 80 MG tablet Take 1 tablet (80 mg total) by mouth once daily.    carvediloL (COREG) 25 MG tablet Take 1 tablet (25 mg total) by mouth 2 (two) times daily with meals.    COMFORT EZ PEN NEEDLES 32 gauge x 5/32" Ndle SMARTSIG:injection Daily    fluticasone propionate (FLONASE) 50 mcg/actuation nasal spray Fluticasone Propionate 50 MCG/ACT Nasal Suspension QTY: 1 bottle Days: 30 Refills: 5  Written: 20 Patient Instructions: inhale 2 sprays in each nostril once daily    insulin (LANTUS SOLOSTAR U-100 INSULIN) glargine 100 units/mL (3mL) SubQ pen Inject 40 Units into the skin every evening. (Patient taking differently: Inject 50 Units into the skin every evening.)    levocetirizine (XYZAL) 5 MG tablet Take 5 mg by mouth.    miscellaneous medical supply (C-TUB) Misc Hearing amplification (BICROS preferred)    MOUNJARO 5 mg/0.5 mL PnIj SMARTSI Milligram(s) SUB-Q Once a Week    NIFEdipine (PROCARDIA-XL) 30 MG (OSM) 24 hr tablet Take 30 mg by mouth.    oxyCODONE (ROXICODONE) 5 MG immediate release tablet Take 1 tablet (5 mg total) by mouth every 6 (six) hours as needed for Pain.    sevelamer carbonate (RENVELA) 800 mg Tab Take by mouth.    TRUE METRIX GLUCOSE TEST STRIP Strp USE TO test THREE TIMES DAILY    TRUEPLUS LANCETS 30 gauge Misc     [DISCONTINUED] amoxicillin-clavulanate 875-125mg (AUGMENTIN) 875-125 mg per tablet Take 1 tablet by mouth every 12 (twelve) hours. (Patient not taking: Reported on 10/19/2023)    " [DISCONTINUED] ciprofloxacin HCl (CIPRO) 500 MG tablet Take 1 tablet (500 mg total) by mouth once daily. for 10 days    [DISCONTINUED] fluconazole (DIFLUCAN) 200 MG Tab Take 1 tablet (200 mg total) by mouth every evening. for 10 doses (Patient not taking: Reported on 10/23/2023)    [DISCONTINUED] gabapentin (NEURONTIN) 300 MG capsule Take 1 capsule (300 mg total) by mouth 3 (three) times daily.     Antibiotics (From admission, onward)      None          Antifungals (From admission, onward)      None          Antivirals (From admission, onward)      None             Immunization History   Administered Date(s) Administered    Tdap 2014       Family History       Problem Relation (Age of Onset)    Cancer Maternal Grandmother    Diabetes Mother, Father    Heart disease Father (37), Maternal Grandmother    Hypertension Mother, Father, Brother          Social History     Socioeconomic History    Marital status: Single   Occupational History     Employer: [employer] [place]   Tobacco Use    Smoking status: Former     Current packs/day: 0.00     Types: Cigarettes     Quit date: 2006     Years since quittin.2    Smokeless tobacco: Never   Substance and Sexual Activity    Alcohol use: Not Currently     Comment: occ    Drug use: No    Sexual activity: Not Currently   Social History Narrative    Lives with mom.    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    Some photography.    Close with niece 2 and god-child.    occ exericse     Social Determinants of Health     Financial Resource Strain: Low Risk  (10/24/2023)    Overall Financial Resource Strain (CARDIA)     Difficulty of Paying Living Expenses: Not hard at all   Food Insecurity: No Food Insecurity (10/24/2023)    Hunger Vital Sign     Worried About Running Out of Food in the Last Year: Never true     Ran Out of Food in the Last Year: Never true   Transportation Needs: No Transportation Needs (10/24/2023)    PRAPARE -  Transportation     Lack of Transportation (Medical): No     Lack of Transportation (Non-Medical): No   Physical Activity: Inactive (10/24/2023)    Exercise Vital Sign     Days of Exercise per Week: 0 days     Minutes of Exercise per Session: 0 min   Stress: Stress Concern Present (10/24/2023)    [place] [employer] - Occupational Stress Questionnaire     Feeling of Stress : To some extent   Social Connections: Socially Isolated (10/24/2023)    Social Connection and Isolation Panel [NHANES]     Frequency of Communication with Friends and Family: More than three times a week     Frequency of Social Gatherings with Friends and Family: More than three times a week     Attends Cheondoism Services: Never     Active Member of Clubs or Organizations: No     Attends Club or Organization Meetings: Never     Marital Status: Never    Housing Stability: Low Risk  (10/24/2023)    Housing Stability Vital Sign     Unable to Pay for Housing in the Last Year: No     Number of Places Lived in the Last Year: 1     Unstable Housing in the Last Year: No     Review of Systems  Objective:     Vital Signs (Most Recent):  Temp: 98.6 °F (37 °C) (10/26/23 1555)  Pulse: 82 (10/26/23 1555)  Resp: 18 (10/26/23 1555)  BP: (!) 118/56 (10/26/23 1555)  SpO2: 99 % (10/26/23 1555) Vital Signs (24h Range):  Temp:  [97.4 °F (36.3 °C)-98.6 °F (37 °C)] 98.6 °F (37 °C)  Pulse:  [74-97] 82  Resp:  [18-20] 18  SpO2:  [95 %-100 %] 99 %  BP: (118-165)/(56-98) 118/56     Weight: 113.4 kg (250 lb)  Body mass index is 39.16 kg/m².    Estimated Creatinine Clearance: 19.9 mL/min (A) (based on SCr of 4.3 mg/dL (H)).     Physical Exam  Constitutional:       General: She is not in acute distress.     Appearance: Normal appearance. She is well-developed. She is not ill-appearing, toxic-appearing or diaphoretic.       HENT:      Head: Normocephalic and atraumatic.   Cardiovascular:      Rate and Rhythm: Normal rate and regular rhythm.       Heart sounds: Normal heart sounds. No murmur heard.     No friction rub. No gallop.   Pulmonary:      Effort: Pulmonary effort is normal. No respiratory distress.      Breath sounds: Normal breath sounds. No wheezing or rales.   Abdominal:      General: Bowel sounds are normal. There is no distension.      Palpations: Abdomen is soft. There is no mass.      Tenderness: There is no abdominal tenderness. There is no guarding or rebound.   Skin:     General: Skin is warm and dry.   Neurological:      Mental Status: She is alert and oriented to person, place, and time.   Psychiatric:         Behavior: Behavior normal.          Significant Labs: Blood Culture:   Recent Labs   Lab 09/15/23  1520 09/15/23  2021 10/24/23  0703   LABBLOO No growth after 5 days. No growth after 5 days. No Growth to date  No Growth to date  No Growth to date     CBC:   Recent Labs   Lab 10/25/23  0738 10/26/23  0615   WBC 6.40 7.09   HGB 10.9* 11.1*   HCT 33.7* 36.3*    213     CMP:   Recent Labs   Lab 10/25/23  0738 10/26/23  0615    138   K 4.3 4.1    106   CO2 19* 20*   * 183*   BUN 20 24*   CREATININE 3.5* 4.3*   CALCIUM 9.4 9.3   ANIONGAP 14 12     Wound Culture:   Recent Labs   Lab 08/03/23  1227 08/14/23  1257 09/12/23  1158 09/14/23  1654   LABAERO STAPHYLOCOCCUS AUREUS  Many  * No growth Skin meño,  no predominant organism No growth     All pertinent labs within the past 24 hours have been reviewed.    Significant Imaging: I have reviewed all pertinent imaging results/findings within the past 24 hours.  X-Ray Foot Complete Left [[id number]] Resulted: 10/25/23 1058   Order Status: Completed Updated: 10/25/23 1101   Narrative:     EXAMINATION:   XR FOOT COMPLETE 3 VIEW LEFT     CLINICAL HISTORY:   updated post-op wound dehisence;     FINDINGS:   There has been amputation of the 1st digit and the distal 1st metatarsal.  There is soft tissue swelling.  No acute fracture dislocation bone destruction or  complication seen.       Electronically signed by: Nam Garcia MD   Date: 10/25/2023   Time: 10:58     Imaging History    2023    Date Procedure Name Study Review Link PACS Link Status Accession Number Location   10/25/23 10:46 AM X-Ray Foot Complete Left Study Review  Images Final [id number] [facility]   10/12/23 12:50 PM VAS US Arterial Leg Left Study Review  Images Final [id number] [facility]   10/12/23 12:50 PM VAS US Ankle Brachial Indices Resting Study Review  Images Final [id number] [facility]   10/24/23 10:11 PM CARDIAC MONITORING STRIPS Study Review  Final

## 2023-10-26 NOTE — NURSING
Home WV delivered to the room.  came to change the WV and swap wound culture, culture sent to lab. Discharge instructions given to the pt, no questions at this time

## 2023-10-26 NOTE — NURSING
At 0741, talked to  about pt was very upset and crying in the room, pt said she wants to talk to the CM and patient advocacy about her WV and she wants to go home. She said the doctor told her if she leaves here without the WV, her wound can get worse and it made her depressed  At 0808: pt transferred to Dialysis with the WV in place. VS stable. AAOx4, on RA. Report given to Dialysis nurse Latesha.

## 2023-10-26 NOTE — ASSESSMENT & PLAN NOTE
Ms. Arias is a 51 y/o female with extensive pmh including DMII, PVD, HLD, ESRD on HD, CHF and non-healing wounds who was admitted after clinic visit on 10/23.  She had recent ax bi fem bypass with Dr. Maya on 9/14/23 and groin incision sites began dehiscing on 10/12.  She was admitted from wound care clinic for wound vac placement and IV antibiotics.    -Renal/DM diet  -OK for ambulation  -Incentive spirometer  -SCDs  -Vanc & zosyn --> will transition back to home antibiotics on discharge   -Nephro consulted for HD  -Wound care consulted   -Wound vac to be applied asap when delivered to room, ordered 10/24/23  -DM mgmt- sliding scale insulin ordered     Dispo:  Will need HH and wound vac for discharge

## 2023-10-26 NOTE — PROGRESS NOTES
Dialysis completed. Needles removed from RFA fistula with manual pressure held to sites for 5 minutes each with hemostasis achieved. Gauze dressing applied. Dialyzed for 3.5 hours with fluid removal of 2 liters. Tolerated well with stable vital signs. Returned to her room by wheelchair.

## 2023-10-26 NOTE — HPI
53 yo female Hx ESRD on HD, DM, PAD, L foot infection and amputations s/p axillo Bifemoral-femoral bypass with Dr. Maya on 9/14/23, L foot 1st toe partial amputation and flap. She was found to have wound dehiscence on 10/12/23. Patient was following with vascular surgery for wound care. Her wounds have been packed with aquacel rope, hydrofera blue, and covered with border dressings. Dr. Maya prescribed difucan and ciprofloxacin on last 2 recent visits.  She was seen in wound care clinic on 10/23/23 and there was concern for wound infection as malodor and green drainage noted from wound.  SHe was admitted for IV abx.  She was placed on Vanc and zosyn.  WOund vacs have been placed on wounds. ID consulted for abx recs.    Patient seen and denies systemic sig of infection. She has been afebrile and without leukocytosis.  She has diarrhea from the antibiotics. Per dw vasc sx L groin wound probes deep to near graft but not visualized.  Blood cultures are NGTD.  Wound cultures not done.

## 2023-10-26 NOTE — ASSESSMENT & PLAN NOTE
53 yo female with DM, PAD, L foot infections s/p ax-bifemoral bypass no with groin wound dehisensce L>R, drainage and malodor.  Treated with Vanc and zosyn as inpt.  Wound on L probes close to graft.  Blood culture NGTD.  No wound cultures done.  Now with wound vac.  STable non septic.    Plan:  · Rec BL groin wound cultures BL - appropriated swabs left for cultures and vasc sx has obtained.  · Can dc on Vanc and cefepime post HD on HD days x 6 weeks.  · Rec flagyl 500mg po bid x 2 weeks until anaerobic cultures result - if no growth then can dc  · Weekly labs on Monday as below.  · Patient to be dc'd today    Outpatient Antibiotic Therapy Plan:    Please send referral to [facility].    1) Infection: BL groin wounds with dehiscence    2) Discharge Antibiotics:    Intravenous antibiotics:   Vancomycin post HD on HD days - dosing tbd by [facility]   Cefepime 2g IV post HD on HD days    Oral antibiotics:   FLagyl 500mg  Po bid x 2 weeks    3) Therapy Duration:  6 weeks IV abx/2 weeks flagyl    Estimated end date of IV antibiotics:  12/5/23    4) Outpatient Weekly Labs:    Order the following labs to be drawn on Mondays:    CBC   CMP    CRP     Vancomycin trough. Target 15-20    If vancomycin trough is not at target (15-20) prior to discharge, schedule vancomycin trough to be drawn before their fourth outpatient dose.    5) Fax Lab Results to Infectious Diseases Provider: Andrew Davis PA-C    [facility] Fax Number: [phone]    6) Outpatient Infectious Diseases Follow-up     Follow-up appointment will be arranged by the ID clinic and will be found in the patient's appointments tab.     Prior to discharge, please ensure the patient's follow-up has been scheduled.     If there is still no follow-up scheduled prior to discharge, please send an EPIC message to Ita Asutin in Infectious Diseases.

## 2023-10-26 NOTE — PROGRESS NOTES
[facility]  Vascular Surgery  Progress Note    Patient Name: Georgina Arias  MRN: [id number]  Admission Date: 10/23/2023  Primary Care Provider: Alonso Ling MD    Subjective:     Interval History: Wound vacs in tact.  Patient continually stating she is going to leave AMA since yesterday and states she is leaving today no matter what.  Patient is aware that her insurance approval and delivery of wound vac is the only thing she is waiting for.  Importance of proper wound care and need for vac discussed with patient at length multiple times.    Post-Op Info:  * No surgery found *           Medications:  Continuous Infusions:  Scheduled Meds:   acetaminophen  650 mg Oral Q6H    gabapentin  300 mg Oral TID    piperacillin-tazobactam (Zosyn) IV (PEDS and ADULTS) (extended infusion is not appropriate)  4.5 g Intravenous Q12H     PRN Meds:dextrose 10%, dextrose 10%, glucagon (human recombinant), glucose, glucose, hydrALAZINE, ibuprofen, insulin aspart U-100, labetalol, LIDOcaine (PF) 10 mg/ml (1%), melatonin, ondansetron, sodium chloride 0.9%, Pharmacy to dose Vancomycin consult **AND** vancomycin - pharmacy to dose     Objective:     Vital Signs (Most Recent):  Temp: 98 °F (36.7 °C) (10/25/23 0820)  Pulse: 78 (10/25/23 0820)  Resp: 19 (10/25/23 0820)  BP: (!) 129/58 (10/25/23 0820)  SpO2: 97 % (10/25/23 0820) Vital Signs (24h Range):  Temp:  [97.7 °F (36.5 °C)-98.5 °F (36.9 °C)] 98 °F (36.7 °C)  Pulse:  [76-89] 78  Resp:  [18-20] 19  SpO2:  [95 %-100 %] 97 %  BP: (129-189)/(58-85) 129/58          Physical Exam  Constitutional:       Appearance: Normal appearance.   HENT:      Head: Normocephalic.      Nose: Nose normal.   Eyes:      Pupils: Pupils are equal, round, and reactive to light.   Cardiovascular:      Rate and Rhythm: Normal rate.   Pulmonary:      Effort: Pulmonary effort is normal.   Abdominal:      Palpations: Abdomen is soft.   Musculoskeletal:         General: Normal range of motion.       Cervical back: Normal range of motion.   Skin:     General: Skin is warm and dry.      Comments: Bilateral groin wound vacs    Neurological:      General: No focal deficit present.      Mental Status: She is alert and oriented to person, place, and time.          Significant Labs:  CBC:   Recent Labs   Lab 10/25/23  0738   WBC 6.40   RBC 3.38*   HGB 10.9*   HCT 33.7*      *   MCH 32.2*   MCHC 32.3     CMP:   Recent Labs   Lab 10/25/23  0738   *   CALCIUM 9.4      K 4.3   CO2 19*      BUN 20   CREATININE 3.5*       Significant Diagnostics:  I have reviewed all pertinent imaging results/findings within the past 24 hours.    Assessment/Plan:     * Wound dehiscence  Ms. Arias is a 51 y/o female with extensive pmh including DMII, PVD, HLD, ESRD on HD, CHF and non-healing wounds who was admitted after clinic visit on 10/23.  She had recent ax bi fem bypass with Dr. Maya on 9/14/23 and groin incision sites began dehiscing on 10/12.  She was admitted from wound care clinic for wound vac placement and IV antibiotics.    -Renal/DM diet  -OK for ambulation  -Incentive spirometer  -SCDs  -Vanc & zosyn --> will transition back to home antibiotics on discharge   -Nephro consulted for HD  -Wound care consulted   -Wound vac to be applied asap when delivered to room, ordered 10/24/23  -DM mgmt- sliding scale insulin ordered     Dispo:  Will need HH and wound vac for discharge         Kellie Mauricio NP  Vascular Surgery  [facility]

## 2023-10-26 NOTE — PROGRESS NOTES
VANCOMYCIN DOSING BY PHARMACY DISCONTINUATION NOTE    Georgina Arias is a 52 y.o. female who had been consulted for vancomycin dosing.    The pharmacy consult for vancomycin dosing has been discontinued.     Vancomycin Dosing by Pharmacy Consult will sign-off. Please reconsult if necessary. Thank you for allowing us to participate in this patient's care.       Alphonso Bautista, SarahD  Ext [phone]

## 2023-10-27 ENCOUNTER — TELEPHONE (OUTPATIENT)
Dept: VASCULAR SURGERY | Facility: CLINIC | Age: 53
End: 2023-10-27
Payer: MEDICARE

## 2023-10-27 NOTE — TELEPHONE ENCOUNTER
Contacted Ita in response to message. States wound vac dressing was changed today and will be changed again Monday.----- Message from Liliya Castano sent at 10/27/2023 10:44 AM CDT -----  Regarding: Wound Vac Orders  Contact: Ita @ [phone]  Ita from [facility], is calling to see if pt can wait until Monday to get Wound Vac Dressing changed? Asking for a call back

## 2023-10-27 NOTE — PLAN OF CARE
Fax sent to [facility]. Confirmed with Crissy WONG at [facility] fax was received. They will administer Vancomycin and Cefepime post HD on HD days.

## 2023-10-28 NOTE — PLAN OF CARE
[facility]  Discharge Final Note    Primary Care Provider: Alonso Ling MD  Expected Discharge Date: 10/26/2023    Patient medically ready for discharge to with [facility].    Transportation by family.     Is family/patient aware of discharge: yes     Hospital follow up scheduled: yes       Final Discharge Note (most recent)       Final Note - 10/27/23 2202          Final Note    Assessment Type Final Discharge Note     Anticip[place]position [place] [facility] [place] Resources/Appts/Education Provided Provided patient/caregiver with written discharge plan information                     Important Message from Medicare  Important Message from Medicare regarding Discharge Appeal Rights: Given to patient/caregiver, Explained to patient/caregiver, Signed/date by patient/caregiver   Date IMM was signed: 10/26/23  Time IMM was signed: 1333  Referral Info (most recent)       Referral Info    No documentation.                 Contact Info       Maxx Maya MD   Specialty: Vascular Surgery    [address] [place]  [facility] [address]   Phone: [phone]       Next Steps: Follow up in 2 week(s)          Future Appointments   Date Time Provider Department Center   11/10/2023 11:00 AM Andrew Davis Jr., PA [facility]   12/4/2023 11:00 AM Osiel Porter NP [facility] [place] RN  Case Management  Ext: [phone]  10/27/2023

## 2023-10-29 LAB — BACTERIA BLD CULT: NORMAL

## 2023-10-30 ENCOUNTER — PATIENT OUTREACH (OUTPATIENT)
Dept: ADMINISTRATIVE | Facility: CLINIC | Age: 53
End: 2023-10-30
Payer: MEDICARE

## 2023-10-30 LAB
BACTERIA SPEC AEROBE CULT: NO GROWTH
BACTERIA SPEC AEROBE CULT: NO GROWTH

## 2023-10-30 NOTE — PROGRESS NOTES
C3 nurse spoke with Georgina Arias for a TCC post hospital discharge follow up call. The patient reports does not have a scheduled HOSFU appointment. C3 nurse was unable to schedule HOSFU appointment for [facility]. Patient advised to contact their PCP to schedule a HOSPFU within 5-7 days.

## 2023-10-31 ENCOUNTER — TELEPHONE (OUTPATIENT)
Dept: INFECTIOUS DISEASES | Facility: CLINIC | Age: 53
End: 2023-10-31
Payer: MEDICARE

## 2023-10-31 LAB
ACID FAST MOD KINY STN SPEC: NORMAL
MYCOBACTERIUM SPEC QL CULT: NORMAL

## 2023-10-31 NOTE — TELEPHONE ENCOUNTER
I spoke to sanjuanita with [facility] and she will let me know if they are able to draw the labs I need weekly on patient.

## 2023-10-31 NOTE — TELEPHONE ENCOUNTER
----- Message from Sheba Hernandez MA sent at 10/31/2023  2:14 PM CDT -----  Regarding: FW: fu 2 weeks or when back to see wound care or vasc sx    ----- Message -----  From: Andrew Davis Jr., PA  Sent: 10/31/2023   1:01 PM CDT  To: Sheba Hernandez MA  Subject: RE: fu 2 weeks or when back to see wound car#    Weekly standing cbc, cmp, crp and vanc random - please schedule  ----- Message -----  From: Sheba Hernandez MA  Sent: 10/27/2023   9:03 AM CDT  To: OMERO Self Jr.  Subject: RE: fu 2 weeks or when back to see wound car#    We need pending orders to get weekly labs at [facility]  ----- Message -----  From: Andrew Davis Jr., PA  Sent: 10/26/2023   7:00 PM CDT  To: Sheba Hernandez MA; Ita Austin LPN  Subject: fu 2 weeks or when back to see wound care or#    fu 2 weeks or when back to see wound care or vasc sx  Sheba - not sure they will be able to do her labs at HD - can we arrange another method of getting them - thx              51 yo female with DM, PAD, L foot infections s/p ax-bifemoral bypass no with groin wound dehisensce L>R, drainage and malodor.  Treated with Vanc and zosyn as inpt.  Wound on L probes close to graft.  Blood culture NGTD.  No wound cultures done.  Now with wound vac.  STable non septic.    Plan:  Rec BL groin wound cultures BL - appropriated swabs left for cultures and vasc sx has obtained.  Can dc on Vanc and cefepime post HD on HD days x 6 weeks.  Rec flagyl 500mg po bid x 2 weeks until anaerobic cultures result - if no growth then can dc  Weekly labs on Monday as below.  Patient to be dc'd today    Outpatient Antibiotic Therapy Plan:    Please send referral to [facility].    1) Infection: BL groin wounds with dehiscence    2) Discharge Antibiotics:    Intravenous antibiotics:  Vancomycin post HD on HD days - dosing tbd by [facility] in pharmacy  Cefepime 2g IV post HD on HD days    Oral antibiotics:  FLagyl 500mg   Po bid x 2 weeks    3) Therapy Duration:  6 weeks IV abx/2 weeks flagyl    Estimated end date of IV antibiotics:  12/5/23    4) Outpatient Weekly Labs:    Order the following labs to be drawn on Mondays:   CBC  CMP   CRP    Vancomycin trough. Target 15-20    If vancomycin trough is not at target (15-20) prior to discharge, schedule vancomycin trough to be drawn before their fourth outpatient dose.    5) Fax Lab Results to Infectious Diseases Provider: Andrew Davis PA-C    [facility] Fax Number: [phone]    6) Outpatient Infectious Diseases Follow-up    Follow-up appointment will be arranged by the ID clinic and will be found in the patient's appointments tab.    Prior to discharge, please ensure the patient's follow-up has been scheduled.    If there is still no follow-up scheduled prior to discharge, please send an EPIC message to Ita Austin in Infectious Diseases.

## 2023-11-01 ENCOUNTER — TELEPHONE (OUTPATIENT)
Dept: INFECTIOUS DISEASES | Facility: CLINIC | Age: 53
End: 2023-11-01
Payer: MEDICARE

## 2023-11-01 NOTE — TELEPHONE ENCOUNTER
I spoke with sanjuanita at [facility] and they are unable to get labs on patient.  Patient has [facility] and I spoke with Alexa and they will draw labs once weekly Patient said she is unable to go a separate day to lab.  They will draw Cbc, CMP, Vanc random and CRP.  weekly

## 2023-11-02 LAB
ACID FAST MOD KINY STN SPEC: NORMAL
MYCOBACTERIUM SPEC QL CULT: NORMAL

## 2023-11-03 ENCOUNTER — TELEPHONE (OUTPATIENT)
Dept: PSYCHOLOGY | Facility: CLINIC | Age: 53
End: 2023-11-03
Payer: MEDICARE

## 2023-11-03 ENCOUNTER — PATIENT MESSAGE (OUTPATIENT)
Dept: PODIATRY | Facility: CLINIC | Age: 53
End: 2023-11-03
Payer: MEDICARE

## 2023-11-03 LAB
BACTERIA SPEC ANAEROBE CULT: NORMAL
BACTERIA SPEC ANAEROBE CULT: NORMAL

## 2023-11-03 NOTE — DISCHARGE INSTRUCTIONS
VASCULAR SURGERY DISCHARGE INSTRUCTIONS    Woundcare:  Wound vac care per     Activity:  - Activity is good for you. Try to walk frequently and increase walking distance every day  - No heavy lifting for 2 weeks  - No baths, swimming in pools, lakes, jacuzzi etc. for 2 weeks     Diet:  -Resume your pre-operative home diet    Follow up:  -Follow up for ultrasound and vascular surgery clinic appointment in ~2 weeks    Call Vascular Surgery Office at [phone] if you experience:  -Increased redness, warmth, tenderness, or draining pus from your incision  -Increased pain/swelling at your incision  -Worsening fevers, chills, nausea/vomiting  -Pain, weakness, coldness, or numbness in your legs  -Uncontrolled pain  -Your call will be returned within 24 hours and further instructions will be provided    Go to ER/Urgent Care if you experience:  -Worsening shortness of breath or chest pain  -Sudden severe pain and swelling at your incision site

## 2023-11-03 NOTE — TELEPHONE ENCOUNTER
Patient stated she didn't want to see Dr. Flannery she wanted to see another doctor. No further issues discussed.----- Message from Angélica Babin MA sent at 11/3/2023  2:41 PM CDT -----  Regarding: appointment request  Please help patient with scheduling  left foot pain

## 2023-11-03 NOTE — HOSPITAL COURSE
For details of hospital stay, please refer to daily progress notes. Briefly, this is a 52 y.o. female presented on 10/23 admitted for clinic appointment and was found to have extensively dehisced bilateral groin incisions.  Patient was admitted for wound vac and IV antibiotics.  She was treated for several days while waiting for wound vac to arrive.  She was discharged on PO antibiotics.    On the day of discharge, the patient was ambulating without difficulty, voiding spontaneously, was tolerating a diet without nausea or vomiting, and pain was well controlled on PO pain medications. Discharge instructions were explained to the patient and appropriate follow-up was arranged.

## 2023-11-03 NOTE — DISCHARGE SUMMARY
"[facility]  Vascular Surgery  Discharge Summary      Patient Name: Georgina Arias  MRN: [id number]  Admission Date: 10/23/2023  Hospital Length of Stay: 3 days  Discharge Date and Time: 10/26/2023  6:59 PM  Attending Physician: No att. providers found   Discharging Provider: Jose Benitez NP  Primary Care Provider: Alonso Ling MD    HPI:   No notes on file    * No surgery found *     Hospital Course: For details of hospital stay, please refer to daily progress notes. Briefly, this is a 52 y.o. female presented on 10/23 admitted for clinic appointment and was found to have extensively dehisced bilateral groin incisions.  Patient was admitted for wound vac and IV antibiotics.  She was treated for several days while waiting for wound vac to arrive.  She was discharged on PO antibiotics.    On the day of discharge, the patient was ambulating without difficulty, voiding spontaneously, was tolerating a diet without nausea or vomiting, and pain was well controlled on PO pain medications. Discharge instructions were explained to the patient and appropriate follow-up was arranged.       Goals of Care Treatment Preferences:  Code Status: Full Code      Consults:   Consults (From admission, onward)        Status Ordering Provider     Inpatient consult to Podiatry  Once        Provider:  (Not yet assigned)    Completed [facility]     Inpatient consult to Nephrology  Once        Provider:  (Not yet assigned)    Completed JOSE BENITEZ          Significant Diagnostic Studies: Labs: CMP No results for input(s): "NA", "K", "CL", "CO2", "GLU", "BUN", "CREATININE", "CALCIUM", "PROT", "ALBUMIN", "BILITOT", "ALKPHOS", "AST", "ALT", "ANIONGAP", "ESTGFRAFRICA", "EGFRNONAA" in the last 48 hours. and CBC No results for input(s): "WBC", "HGB", "HCT", "PLT" in the last 48 hours.    Pending Diagnostic Studies:     None        Final Active Diagnoses:    Diagnosis Date Noted POA    PRINCIPAL PROBLEM:  Wound " dehiscence [T81.30XA] 10/24/2023 Yes    Wound infection after surgery [T81.49XA] 10/26/2023 Unknown    ESRD (end stage renal disease) [N18.6] 09/12/2023 Yes    Class 2 severe obesity with serious comorbidity and body mass index (BMI) of 38.0 to 38.9 in adult [E66.01, Z68.38] 09/08/2022 Not Applicable    Chronic diastolic congestive heart failure [I50.32] 05/18/2022 Yes    Type II diabetes mellitus [E11.9]  Yes    Peripheral vascular disease [I73.9]  Yes      Problems Resolved During this Admission:      Discharged Condition: good    [place] [facility] [place] Up:   Follow-up Information     Maxx Maya MD Follow up in 2 week(s).    Specialty: Vascular Surgery  Contact information:  [facility]  [facility] [address]  [phone]                         Patient Instructions:      Diet Adult Regular     Notify your health care provider if you experience any of the following:  temperature >100.4     Notify your health care provider if you experience any of the following:  persistent nausea and vomiting or diarrhea     Notify your health care provider if you experience any of the following:  severe uncontrolled pain     Notify your health care provider if you experience any of the following:  redness, tenderness, or signs of infection (pain, swelling, redness, odor or green/yellow discharge around incision site)     Notify your health care provider if you experience any of the following:  difficulty breathing or increased cough     Notify your health care provider if you experience any of the following:  persistent dizziness, light-headedness, or visual disturbances     Notify your health care provider if you experience any of the following:  increased confusion or weakness     Activity as tolerated     Medications:  Reconciled Home Medications:      Medication List      START taking these medications    metroNIDAZOLE 500 MG tablet  Commonly known as: FLAGYL  Take 1 tablet (500 mg  "total) by mouth 2 (two) times a day.     Remedy Phytoplex AntifungaL 2 % top powder  Generic drug: miconazole NITRATE 2 %  Apply topically as needed for Itching.        CONTINUE taking these medications    acetaminophen 500 MG tablet  Commonly known as: TYLENOL  Take 2 tablets (1,000 mg total) by mouth every 8 (eight) hours as needed.     aspirin 81 MG EC tablet  Commonly known as: ECOTRIN  Take 1 tablet (81 mg total) by mouth once daily.     atorvastatin 80 MG tablet  Commonly known as: LIPITOR  Take 1 tablet (80 mg total) by mouth once daily.     C-TUB Misc  Generic drug: miscellaneous medical supply  Hearing amplification (BICROS preferred)     carvediloL 25 MG tablet  Commonly known as: COREG  Take 1 tablet (25 mg total) by mouth 2 (two) times daily with meals.     COMFORT EZ PEN NEEDLES 32 gauge x 5/32" Ndle  Generic drug: pen needle, diabetic  SMARTSIG:injection Daily     fluticasone propionate 50 mcg/actuation nasal spray  Commonly known as: FLONASE  Fluticasone Propionate 50 MCG/ACT Nasal Suspension QTY: 1 bottle Days: 30 Refills: 5  Written: 20 Patient Instructions: inhale 2 sprays in each nostril once daily     LANTUS SOLOSTAR U-100 INSULIN glargine 100 units/mL SubQ pen  Generic drug: insulin  Inject 40 Units into the skin every evening.     levocetirizine 5 MG tablet  Commonly known as: XYZAL  Take 5 mg by mouth.     MOUNJARO 5 mg/0.5 mL Pnij  Generic drug: tirzepatide  SMARTSI Milligram(s) SUB-Q Once a Week     NIFEdipine 30 MG (OSM) 24 hr tablet  Commonly known as: PROCARDIA-XL  Take 30 mg by mouth.     oxyCODONE 5 MG immediate release tablet  Commonly known as: ROXICODONE  Take 1 tablet (5 mg total) by mouth every 6 (six) hours as needed for Pain.     sevelamer carbonate 800 mg Tab  Commonly known as: RENVELA  Take by mouth.     TRUE METRIX GLUCOSE TEST STRIP Strp  Generic drug: blood sugar diagnostic  USE TO test THREE TIMES DAILY     TRUEPLUS LANCETS 30 gauge Misc  Generic drug: lancets   "      STOP taking these medications    amoxicillin-clavulanate 875-125mg 875-125 mg per tablet  Commonly known as: AUGMENTIN     ciprofloxacin HCl 500 MG tablet  Commonly known as: CIPRO     fluconazole 200 MG Tab  Commonly known as: DIFLUCAN     gabapentin 300 MG capsule  Commonly known as: NEURONTIN            Kellie Mauricio NP  Vascular Surgery  [facility]  [place]

## 2023-11-06 ENCOUNTER — EXTERNAL HOME HEALTH (OUTPATIENT)
Dept: HOME HEALTH SERVICES | Facility: HOSPITAL | Age: 53
End: 2023-11-06
Payer: MEDICARE

## 2023-11-10 ENCOUNTER — OFFICE VISIT (OUTPATIENT)
Dept: INFECTIOUS DISEASES | Facility: CLINIC | Age: 53
End: 2023-11-10
Payer: MEDICARE

## 2023-11-10 VITALS
DIASTOLIC BLOOD PRESSURE: 78 MMHG | HEIGHT: 67 IN | SYSTOLIC BLOOD PRESSURE: 159 MMHG | BODY MASS INDEX: 39.51 KG/M2 | WEIGHT: 251.75 LBS | HEART RATE: 86 BPM

## 2023-11-10 DIAGNOSIS — T81.30XA WOUND DEHISCENCE: ICD-10-CM

## 2023-11-10 DIAGNOSIS — S91.302D OPEN WOUND OF LEFT FOOT, SUBSEQUENT ENCOUNTER: ICD-10-CM

## 2023-11-10 DIAGNOSIS — T81.49XA WOUND INFECTION AFTER SURGERY: Primary | ICD-10-CM

## 2023-11-10 DIAGNOSIS — Z95.828 S/P FEMORAL-FEMORAL BYPASS SURGERY: ICD-10-CM

## 2023-11-10 PROCEDURE — 3051F HG A1C>EQUAL 7.0%<8.0%: CPT | Mod: CPTII,S$GLB,, | Performed by: PHYSICIAN ASSISTANT

## 2023-11-10 PROCEDURE — 3078F PR MOST RECENT DIASTOLIC BLOOD PRESSURE < 80 MM HG: ICD-10-PCS | Mod: CPTII,S$GLB,, | Performed by: PHYSICIAN ASSISTANT

## 2023-11-10 PROCEDURE — 99999 PR PBB SHADOW E&M-EST. PATIENT-LVL III: CPT | Mod: PBBFAC,,, | Performed by: PHYSICIAN ASSISTANT

## 2023-11-10 PROCEDURE — 3077F PR MOST RECENT SYSTOLIC BLOOD PRESSURE >= 140 MM HG: ICD-10-PCS | Mod: CPTII,S$GLB,, | Performed by: PHYSICIAN ASSISTANT

## 2023-11-10 PROCEDURE — 3077F SYST BP >= 140 MM HG: CPT | Mod: CPTII,S$GLB,, | Performed by: PHYSICIAN ASSISTANT

## 2023-11-10 PROCEDURE — 3066F NEPHROPATHY DOC TX: CPT | Mod: CPTII,S$GLB,, | Performed by: PHYSICIAN ASSISTANT

## 2023-11-10 PROCEDURE — 1111F DSCHRG MED/CURRENT MED MERGE: CPT | Mod: CPTII,S$GLB,, | Performed by: PHYSICIAN ASSISTANT

## 2023-11-10 PROCEDURE — 3051F PR MOST RECENT HEMOGLOBIN A1C LEVEL 7.0 - < 8.0%: ICD-10-PCS | Mod: CPTII,S$GLB,, | Performed by: PHYSICIAN ASSISTANT

## 2023-11-10 PROCEDURE — 3008F BODY MASS INDEX DOCD: CPT | Mod: CPTII,S$GLB,, | Performed by: PHYSICIAN ASSISTANT

## 2023-11-10 PROCEDURE — 3008F PR BODY MASS INDEX (BMI) DOCUMENTED: ICD-10-PCS | Mod: CPTII,S$GLB,, | Performed by: PHYSICIAN ASSISTANT

## 2023-11-10 PROCEDURE — 99214 PR OFFICE/OUTPT VISIT, EST, LEVL IV, 30-39 MIN: ICD-10-PCS | Mod: S$GLB,,, | Performed by: PHYSICIAN ASSISTANT

## 2023-11-10 PROCEDURE — 99999 PR PBB SHADOW E&M-EST. PATIENT-LVL III: ICD-10-PCS | Mod: PBBFAC,,, | Performed by: PHYSICIAN ASSISTANT

## 2023-11-10 PROCEDURE — 3078F DIAST BP <80 MM HG: CPT | Mod: CPTII,S$GLB,, | Performed by: PHYSICIAN ASSISTANT

## 2023-11-10 PROCEDURE — 1111F PR DISCHARGE MEDS RECONCILED W/ CURRENT OUTPATIENT MED LIST: ICD-10-PCS | Mod: CPTII,S$GLB,, | Performed by: PHYSICIAN ASSISTANT

## 2023-11-10 PROCEDURE — 99214 OFFICE O/P EST MOD 30 MIN: CPT | Mod: S$GLB,,, | Performed by: PHYSICIAN ASSISTANT

## 2023-11-10 PROCEDURE — 3066F PR DOCUMENTATION OF TREATMENT FOR NEPHROPATHY: ICD-10-PCS | Mod: CPTII,S$GLB,, | Performed by: PHYSICIAN ASSISTANT

## 2023-11-14 ENCOUNTER — OFFICE VISIT (OUTPATIENT)
Dept: PODIATRY | Facility: CLINIC | Age: 53
End: 2023-11-14
Payer: MEDICARE

## 2023-11-14 VITALS
HEART RATE: 90 BPM | SYSTOLIC BLOOD PRESSURE: 175 MMHG | HEIGHT: 67 IN | BODY MASS INDEX: 39.79 KG/M2 | DIASTOLIC BLOOD PRESSURE: 77 MMHG | RESPIRATION RATE: 20 BRPM | WEIGHT: 253.5 LBS

## 2023-11-14 DIAGNOSIS — T87.89 NON-HEALING AMPUTATION SITE: ICD-10-CM

## 2023-11-14 DIAGNOSIS — E11.42 DM TYPE 2 WITH DIABETIC PERIPHERAL NEUROPATHY: ICD-10-CM

## 2023-11-14 DIAGNOSIS — I73.9 PAD (PERIPHERAL ARTERY DISEASE): Primary | ICD-10-CM

## 2023-11-14 PROCEDURE — 99499 NO LOS: ICD-10-PCS | Mod: S$GLB,,, | Performed by: PODIATRIST

## 2023-11-14 PROCEDURE — 99999 PR PBB SHADOW E&M-EST. PATIENT-LVL IV: CPT | Mod: PBBFAC,,, | Performed by: PODIATRIST

## 2023-11-14 PROCEDURE — 99499 UNLISTED E&M SERVICE: CPT | Mod: S$GLB,,, | Performed by: PODIATRIST

## 2023-11-14 PROCEDURE — 99024 POSTOP FOLLOW-UP VISIT: CPT | Mod: S$GLB,,, | Performed by: PODIATRIST

## 2023-11-14 PROCEDURE — 99024 PR POST-OP FOLLOW-UP VISIT: ICD-10-PCS | Mod: S$GLB,,, | Performed by: PODIATRIST

## 2023-11-14 PROCEDURE — 99999 PR PBB SHADOW E&M-EST. PATIENT-LVL IV: ICD-10-PCS | Mod: PBBFAC,,, | Performed by: PODIATRIST

## 2023-11-14 RX ORDER — GABAPENTIN 300 MG/1
300 CAPSULE ORAL 3 TIMES DAILY
COMMUNITY
Start: 2023-11-13 | End: 2024-03-06 | Stop reason: SDUPTHER

## 2023-11-19 NOTE — PROGRESS NOTES
Subjective:      Patient ID: Georgina Arias is a 53 y.o. female.    Chief Complaint:Follow-up      History of Present Illness  53 yo female recently admitted with DM, PAD, L foot infections s/p ax-bifemoral bypass no with groin wound dehisensce L>R, drainage and malodor.  Treated with Vanc and zosyn as inpt.  Wound, per vasc sx, on L probes close to graft.  Blood culture were NGTD.  Wound cultures done prior to DC.  Wound vac placed prior to DC     ID DC Plan:  DC on Vanc and cefepime post HD on HD days x 6 weeks.  Rec flagyl 500mg po bid x 2 weeks until anaerobic cultures result - if no growth then can dc  Weekly labs on Monday as below.    11/10/23: Follows up in ID clinic today and reports doing well.  Says her groin wounds are getting smaller.  Tolerating IV abx post dc.  The patient denies any recent fever, chills, or sweats.        Review of Systems   Constitutional: Negative for chills, fever, malaise/fatigue and night sweats.   Cardiovascular:  Negative for chest pain.   Respiratory:  Negative for cough, hemoptysis, shortness of breath, sputum production and wheezing.    Skin:  Negative for rash and suspicious lesions.        BL wounds   Gastrointestinal:  Negative for abdominal pain, constipation, diarrhea, heartburn, nausea and vomiting.   Genitourinary:  Negative for dysuria and hematuria.     Objective:   Physical Exam  Constitutional:       General: She is not in acute distress.     Appearance: Normal appearance. She is well-developed. She is not diaphoretic.       HENT:      Head: Normocephalic and atraumatic.   Cardiovascular:      Rate and Rhythm: Normal rate and regular rhythm.      Heart sounds: Normal heart sounds. No murmur heard.     No friction rub. No gallop.   Pulmonary:      Effort: Pulmonary effort is normal. No respiratory distress.      Breath sounds: Normal breath sounds. No wheezing or rales.   Abdominal:      General: Bowel sounds are normal. There is no distension.       Palpations: Abdomen is soft. There is no mass.      Tenderness: There is no abdominal tenderness. There is no guarding or rebound.   Musculoskeletal:        Feet:    Skin:     General: Skin is warm and dry.   Neurological:      Mental Status: She is alert and oriented to person, place, and time.   Psychiatric:         Behavior: Behavior normal.           Labs:   Latest Reference Range & Units 11/03/23 15:23   WBC 3.90 - 12.70 K/uL 8.85   RBC 4.00 - 5.40 M/uL 3.50 (L)   Hemoglobin 12.0 - 16.0 g/dL 11.0 (L)   Hematocrit 37.0 - 48.5 % 35.3 (L)   MCV 82 - 98 fL 101 (H)   MCH 27.0 - 31.0 pg 31.4 (H)   MCHC 32.0 - 36.0 g/dL 31.2 (L)   RDW 11.5 - 14.5 % 16.8 (H)   Platelet Count 150 - 450 K/uL 180   MPV 9.2 - 12.9 fL 10.8   Sodium 136 - 145 mmol/L 141   Potassium 3.5 - 5.1 mmol/L 3.4 (L)   Chloride 95 - 110 mmol/L 99   CO2 23 - 29 mmol/L 29   Anion Gap 8 - 16 mmol/L 13   BUN 6 - 20 mg/dL 20   Creatinine 0.5 - 1.4 mg/dL 3.3 (H)   eGFR >60 mL/min/1.73 m^2 16.2 !   Glucose 70 - 110 mg/dL 219 (H)   Calcium 8.7 - 10.5 mg/dL 9.2   ALP 55 - 135 U/L 101   PROTEIN TOTAL 6.0 - 8.4 g/dL 7.0   Albumin 3.5 - 5.2 g/dL 2.9 (L)   BILIRUBIN TOTAL 0.1 - 1.0 mg/dL 0.2   AST 10 - 40 U/L 24   ALT 10 - 44 U/L 22   CRP 0.0 - 8.2 mg/L 6.4   (L): Data is abnormally low  (H): Data is abnormally high  !: Data is abnormal    Microbiology Results (Last 365 Days)    Procedure Component Value Units Date/Time   Gram stain [[id number]] Collected: 10/26/23 9538   Order Status: Completed Specimen: Wound from Groin Updated: 10/26/23 0696    Gram Stain Result Rare WBC's     No organisms seen   Narrative:     L groin   Culture, Anaerobe [[id number]] Collected: 10/26/23 1514   Order Status: Completed Specimen: Wound from Groin Updated: 11/03/23 0713    Anaerobic Culture No anaerobes isolated   Narrative:     L groin   Aerobic culture [[id number]] Collected: 10/26/23 1514   Order Status: Completed Specimen: Wound from Groin Updated: 10/30/23 0834    Aerobic  Bacterial Culture No growth   Narrative:     L groin   Fungus culture [[id number]] Collected: 10/26/23 1514   Order Status: Completed Specimen: Wound from Groin Updated: 11/16/23 1253    Fungus (Mycology) Culture Culture in progress     No fungus isolated after 2 weeks   Narrative:     L groin   AFB Culture & Smear [[id number]] Collected: 10/26/23 1514   Order Status: Completed Specimen: Wound from Groin Updated: 10/27/23 2127    AFB Culture & Smear Culture in progress    AFB CULTURE STAIN No acid fast bacilli seen.   Narrative:     L groin   Gram stain [[id number]] Collected: 10/26/23 1514   Order Status: Completed Specimen: Wound from Groin Updated: 10/26/23 2238    Gram Stain Result Rare WBC's     No organisms seen   Narrative:     R groin   Culture, Anaerobe [[id number]] Collected: 10/26/23 1514   Order Status: Completed Specimen: Wound from Groin Updated: 11/03/23 0713    Anaerobic Culture No anaerobes isolated   Narrative:     R groin   Aerobic culture [[id number]] Collected: 10/26/23 1514   Order Status: Completed Specimen: Wound from Groin Updated: 10/30/23 0838    Aerobic Bacterial Culture No growth   Narrative:     R groin   Fungus culture [[id number]] Collected: 10/26/23 1514   Order Status: Completed Specimen: Wound from Groin Updated: 11/16/23 1253    Fungus (Mycology) Culture Culture in progress     No fungus isolated after 2 weeks   Narrative:     R groin   AFB Culture & Smear [[id number]] Collected: 10/26/23 1514   Order Status: Completed Specimen: Wound from Groin Updated: 10/27/23 2127    AFB Culture & Smear Culture in progress    AFB CULTURE STAIN No acid fast bacilli seen.   Narrative:     R groin   Blood culture [[id number]] Collected: 10/24/23 0703   Order Status: Completed Specimen: Blood Updated: 10/29/23 0812    Blood Culture, Routine No growth after 5 days.   Clostridium difficile EIA [[id number]]    Order Status: Canceled Specimen: Stool    Blood culture [[id number]] Collected: 09/15/23  2021   Order Status: Completed Specimen: Blood from Peripheral, Antecubital, Left Updated: 09/20/23 2212    Blood Culture, Routine No growth after 5 days.   Blood culture [[id number]] Collected: 09/15/23 1520   Order Status: Completed Specimen: Blood from Peripheral, Antecubital, Right Updated: 09/20/23 1812    Blood Culture, Routine No growth after 5 days.   Culture, Anaerobe [[id number]] Collected: 09/14/23 1654   Order Status: Completed Specimen: Biopsy from Foot, Left Updated: 09/19/23 0734    Anaerobic Culture No anaerobes isolated   Narrative:     1. Left 1st metatarsal clean margins-permanent   Aerobic culture [[id number]] Collected: 09/14/23 1654   Order Status: Completed Specimen: Biopsy from Foot, Left Updated: 09/18/23 0917    Aerobic Bacterial Culture No growth   Narrative:     1. Left 1st metatarsal clean margins-permanent   Fungus culture [[id number]] Collected: 09/14/23 1654   Order Status: Completed Specimen: Biopsy from Foot, Left Updated: 10/17/23 0923    Fungus (Mycology) Culture No fungus isolated after 4 weeks   Narrative:     1. Left 1st metatarsal clean margins-permanent   AFB Culture & Smear [[id number]] Collected: 09/14/23 1654   Order Status: Completed Specimen: Biopsy from Foot, Left Updated: 11/02/23 2127    AFB Culture & Smear No growth after 6 weeks.    AFB CULTURE STAIN No acid fast bacilli seen.   Narrative:     1. Left 1st metatarsal clean margins-permanent   Gram stain [[id number]] Collected: 09/14/23 1654   Order Status: Completed Specimen: Biopsy from Foot, Left Updated: 09/15/23 0208    Gram Stain Result No WBC's, epithelial cells or organisms seen   Narrative:     1. Left 1st metatarsal clean margins-permanent   Gram stain [[id number]] Collected: 09/12/23 1158   Order Status: Completed Specimen: Wound from Toe, Left Foot Updated: 09/12/23 1456    Gram Stain Result Moderate WBC's     Rare Gram positive cocci   Aerobic culture [[id number]] Collected: 09/12/23 1158   Order Status:  Completed Specimen: Wound from Toe, Left Foot Updated: 09/14/23 1036    Aerobic Bacterial Culture Skin meño,  no predominant organism   Culture, Anaerobic [[id number]] (Abnormal) Collected: 09/12/23 1158   Order Status: Completed Specimen: Wound from Toe, Left Foot Updated: 09/18/23 1302    Anaerobic Culture PORPHYROMONAS SOMERAE  Moderate   Abnormal      PREVOTELLA BERGENSIS  Moderate   Abnormal    Fungus culture [[id number]] Collected: 09/12/23 1158   Order Status: Completed Specimen: Wound from Toe, Left Foot Updated: 10/11/23 0729    Fungus (Mycology) Culture No fungus isolated after 4 weeks   AFB Culture & Smear [[id number]] Collected: 09/12/23 1158   Order Status: Completed Specimen: Wound from Toe, Left Foot Updated: 10/31/23 2127    AFB Culture & Smear No growth after 6 weeks.    AFB CULTURE STAIN No acid fast bacilli seen.     Assessment:     1. Wound infection after surgery    2. Wound dehiscence    3. Open wound of left foot, subsequent encounter    4. S/P femoral-femoral bypass surgery      54 yo recently seen as inpt with wound dehiscence of BL fem pop bypasses L>R with wound that probed close to but not to graft , per Vasc sx.  Wound cultures obtained and have no growth.  Was on Vanc and cefepime post HD and flagyl until anaerobic cultures finalized - finalized negative.  Tolerating abx and completing 6 weeks vanc and cefepime empirically.  L foot wound deep but no bone encountered and no sign of infection on exam.  CRP normal.  Groin wounds reportedly healing.  Stable and no abx issues.  Plan:   Continue Vanc and cefepime post HD to complete 6 weeks then stop and monitor as wound cultures had no growth to support suppression  Current abx regimen will cover L foot which requires ongoing POD care.  Continue weekly labs   FU at [facility] 12/5/23

## 2023-11-20 ENCOUNTER — OFFICE VISIT (OUTPATIENT)
Dept: VASCULAR SURGERY | Facility: CLINIC | Age: 53
End: 2023-11-20
Payer: MEDICARE

## 2023-11-20 VITALS
DIASTOLIC BLOOD PRESSURE: 63 MMHG | TEMPERATURE: 99 F | BODY MASS INDEX: 38.76 KG/M2 | SYSTOLIC BLOOD PRESSURE: 141 MMHG | HEART RATE: 82 BPM | WEIGHT: 246.94 LBS | HEIGHT: 67 IN

## 2023-11-20 DIAGNOSIS — I73.9 PERIPHERAL VASCULAR DISEASE: Primary | ICD-10-CM

## 2023-11-20 PROCEDURE — 99024 PR POST-OP FOLLOW-UP VISIT: ICD-10-PCS | Mod: S$GLB,,, | Performed by: SURGERY

## 2023-11-20 PROCEDURE — 1159F MED LIST DOCD IN RCRD: CPT | Mod: CPTII,S$GLB,, | Performed by: SURGERY

## 2023-11-20 PROCEDURE — 3051F PR MOST RECENT HEMOGLOBIN A1C LEVEL 7.0 - < 8.0%: ICD-10-PCS | Mod: CPTII,S$GLB,, | Performed by: SURGERY

## 2023-11-20 PROCEDURE — 3077F PR MOST RECENT SYSTOLIC BLOOD PRESSURE >= 140 MM HG: ICD-10-PCS | Mod: CPTII,S$GLB,, | Performed by: SURGERY

## 2023-11-20 PROCEDURE — 3066F NEPHROPATHY DOC TX: CPT | Mod: CPTII,S$GLB,, | Performed by: SURGERY

## 2023-11-20 PROCEDURE — 3078F PR MOST RECENT DIASTOLIC BLOOD PRESSURE < 80 MM HG: ICD-10-PCS | Mod: CPTII,S$GLB,, | Performed by: SURGERY

## 2023-11-20 PROCEDURE — 3078F DIAST BP <80 MM HG: CPT | Mod: CPTII,S$GLB,, | Performed by: SURGERY

## 2023-11-20 PROCEDURE — 1159F PR MEDICATION LIST DOCUMENTED IN MEDICAL RECORD: ICD-10-PCS | Mod: CPTII,S$GLB,, | Performed by: SURGERY

## 2023-11-20 PROCEDURE — 3051F HG A1C>EQUAL 7.0%<8.0%: CPT | Mod: CPTII,S$GLB,, | Performed by: SURGERY

## 2023-11-20 PROCEDURE — 3066F PR DOCUMENTATION OF TREATMENT FOR NEPHROPATHY: ICD-10-PCS | Mod: CPTII,S$GLB,, | Performed by: SURGERY

## 2023-11-20 PROCEDURE — 99999 PR PBB SHADOW E&M-EST. PATIENT-LVL IV: ICD-10-PCS | Mod: PBBFAC,,, | Performed by: SURGERY

## 2023-11-20 PROCEDURE — 99999 PR PBB SHADOW E&M-EST. PATIENT-LVL IV: CPT | Mod: PBBFAC,,, | Performed by: SURGERY

## 2023-11-20 PROCEDURE — 3077F SYST BP >= 140 MM HG: CPT | Mod: CPTII,S$GLB,, | Performed by: SURGERY

## 2023-11-20 PROCEDURE — 99024 POSTOP FOLLOW-UP VISIT: CPT | Mod: S$GLB,,, | Performed by: SURGERY

## 2023-11-20 NOTE — PROGRESS NOTES
Since clinic.  Her wounds in both lower abdomen/thighs look great.  The 1 on the right is about a cm and a half deep by 2 cm in diameter we are going to stop the VAC care and pack it with Hydrofera blue.  She has 2 on the left they are almost flush with the skin just a quarter of a cm deep zeyad and replace it with the additional Hydrofera blue.  She will return to clinic in 3-4 weeks if there is a problem the wound care nurse will call us.  th approximately 1-1/2 cm in diameter.  We will pack it with Hydrofera blue.  The wound care nurse can change it on Friday  
alone

## 2023-11-22 ENCOUNTER — TELEPHONE (OUTPATIENT)
Dept: INFECTIOUS DISEASES | Facility: CLINIC | Age: 53
End: 2023-11-22
Payer: MEDICARE

## 2023-11-24 PROCEDURE — G0179 MD RECERTIFICATION HHA PT: HCPCS | Mod: ,,, | Performed by: PODIATRIST

## 2023-11-27 ENCOUNTER — TELEPHONE (OUTPATIENT)
Dept: PODIATRY | Facility: CLINIC | Age: 53
End: 2023-11-27
Payer: MEDICARE

## 2023-11-27 DIAGNOSIS — T81.30XD WOUND DISRUPTION, SUBSEQUENT ENCOUNTER: Primary | ICD-10-CM

## 2023-11-27 LAB
FUNGUS SPEC CULT: NORMAL
FUNGUS SPEC CULT: NORMAL

## 2023-11-27 NOTE — TELEPHONE ENCOUNTER
----- Message from Alireza Collins sent at 11/27/2023  1:12 PM CST -----  Regarding: orders  Contact: @[phone]  Pt calling needing new orders for wound care sent over [facility] states  ...  pls call and adv@[phone]

## 2023-11-28 ENCOUNTER — TELEPHONE (OUTPATIENT)
Dept: PODIATRY | Facility: CLINIC | Age: 53
End: 2023-11-28
Payer: MEDICARE

## 2023-11-28 NOTE — TELEPHONE ENCOUNTER
Company is [facility]   Fax is [phone]   phone   [phone]     Or you can leave order in this message and I will write as a verbal order.        Thank you!       Home health order route to [facility]

## 2023-11-28 NOTE — TELEPHONE ENCOUNTER
No voicemail is set up I will contact patient using the portal for Home health Agency information .

## 2023-11-29 ENCOUNTER — OFFICE VISIT (OUTPATIENT)
Dept: PODIATRY | Facility: CLINIC | Age: 53
End: 2023-11-29
Payer: MEDICARE

## 2023-11-29 ENCOUNTER — DOCUMENT SCAN (OUTPATIENT)
Dept: HOME HEALTH SERVICES | Facility: HOSPITAL | Age: 53
End: 2023-11-29
Payer: MEDICARE

## 2023-11-29 VITALS
HEIGHT: 67 IN | BODY MASS INDEX: 40.48 KG/M2 | HEART RATE: 78 BPM | DIASTOLIC BLOOD PRESSURE: 79 MMHG | WEIGHT: 257.94 LBS | SYSTOLIC BLOOD PRESSURE: 155 MMHG | RESPIRATION RATE: 18 BRPM

## 2023-11-29 DIAGNOSIS — I73.9 PAD (PERIPHERAL ARTERY DISEASE): Primary | ICD-10-CM

## 2023-11-29 DIAGNOSIS — E11.42 DM TYPE 2 WITH DIABETIC PERIPHERAL NEUROPATHY: ICD-10-CM

## 2023-11-29 DIAGNOSIS — T87.89 NON-HEALING AMPUTATION SITE: ICD-10-CM

## 2023-11-29 PROCEDURE — 99999 PR PBB SHADOW E&M-EST. PATIENT-LVL IV: ICD-10-PCS | Mod: PBBFAC,,, | Performed by: PODIATRIST

## 2023-11-29 PROCEDURE — 99024 PR POST-OP FOLLOW-UP VISIT: ICD-10-PCS | Mod: S$GLB,,, | Performed by: PODIATRIST

## 2023-11-29 PROCEDURE — 99499 NO LOS: ICD-10-PCS | Mod: S$GLB,,, | Performed by: PODIATRIST

## 2023-11-29 PROCEDURE — 99999 PR PBB SHADOW E&M-EST. PATIENT-LVL IV: CPT | Mod: PBBFAC,,, | Performed by: PODIATRIST

## 2023-11-29 PROCEDURE — 99499 UNLISTED E&M SERVICE: CPT | Mod: S$GLB,,, | Performed by: PODIATRIST

## 2023-11-29 PROCEDURE — 99024 POSTOP FOLLOW-UP VISIT: CPT | Mod: S$GLB,,, | Performed by: PODIATRIST

## 2023-12-01 ENCOUNTER — TELEPHONE (OUTPATIENT)
Dept: PODIATRY | Facility: CLINIC | Age: 53
End: 2023-12-01
Payer: MEDICARE

## 2023-12-01 NOTE — TELEPHONE ENCOUNTER
Spoke to pt and rescheduled appt. Original appt:12/13/2023. New Appt:12/14/2023. Pt verbalized understanding.

## 2023-12-03 NOTE — TELEPHONE ENCOUNTER
----- Message from Shala Alvarez sent at 9/1/2023  1:01 PM CDT -----  Regarding: Urgent post op concern  Contact: pt  Pt requesting urgent call back RE: Pt states she is in a lot pain and is out of pain med, pt states she is unable to refill script because it time for a renewal. She would like to speak today      Confirmed contact below:  Contact Name:Georgina Arias  Phone Number: [phone]       Well appearing, awake, alert, oriented to person, place, time/situation and in no apparent distress. normal...

## 2023-12-04 ENCOUNTER — LAB VISIT (OUTPATIENT)
Dept: LAB | Facility: HOSPITAL | Age: 53
End: 2023-12-04
Payer: MEDICARE

## 2023-12-04 ENCOUNTER — OFFICE VISIT (OUTPATIENT)
Dept: INFECTIOUS DISEASES | Facility: CLINIC | Age: 53
End: 2023-12-04
Payer: MEDICARE

## 2023-12-04 VITALS
SYSTOLIC BLOOD PRESSURE: 124 MMHG | HEART RATE: 76 BPM | DIASTOLIC BLOOD PRESSURE: 65 MMHG | TEMPERATURE: 98 F | HEIGHT: 67 IN | WEIGHT: 256.19 LBS | BODY MASS INDEX: 40.21 KG/M2

## 2023-12-04 DIAGNOSIS — T81.49XA WOUND INFECTION AFTER SURGERY: ICD-10-CM

## 2023-12-04 DIAGNOSIS — S91.302D OPEN WOUND OF LEFT FOOT, SUBSEQUENT ENCOUNTER: ICD-10-CM

## 2023-12-04 DIAGNOSIS — T81.30XA WOUND DEHISCENCE: ICD-10-CM

## 2023-12-04 DIAGNOSIS — Z95.828 S/P FEMORAL-FEMORAL BYPASS SURGERY: ICD-10-CM

## 2023-12-04 DIAGNOSIS — R22.2 ABDOMINAL WALL LUMP: Primary | ICD-10-CM

## 2023-12-04 LAB
ALBUMIN SERPL BCP-MCNC: 3.2 G/DL (ref 3.5–5.2)
ALP SERPL-CCNC: 112 U/L (ref 55–135)
ALT SERPL W/O P-5'-P-CCNC: 27 U/L (ref 10–44)
ANION GAP SERPL CALC-SCNC: 15 MMOL/L (ref 8–16)
AST SERPL-CCNC: 20 U/L (ref 10–40)
BASOPHILS # BLD AUTO: 0.08 K/UL (ref 0–0.2)
BASOPHILS NFR BLD: 1 % (ref 0–1.9)
BILIRUB SERPL-MCNC: 0.5 MG/DL (ref 0.1–1)
BUN SERPL-MCNC: 62 MG/DL (ref 6–20)
CALCIUM SERPL-MCNC: 10 MG/DL (ref 8.7–10.5)
CHLORIDE SERPL-SCNC: 96 MMOL/L (ref 95–110)
CO2 SERPL-SCNC: 30 MMOL/L (ref 23–29)
CREAT SERPL-MCNC: 7.2 MG/DL (ref 0.5–1.4)
CRP SERPL-MCNC: 3.8 MG/L (ref 0–8.2)
DIFFERENTIAL METHOD: ABNORMAL
EOSINOPHIL # BLD AUTO: 0.3 K/UL (ref 0–0.5)
EOSINOPHIL NFR BLD: 3.5 % (ref 0–8)
ERYTHROCYTE [DISTWIDTH] IN BLOOD BY AUTOMATED COUNT: 15.5 % (ref 11.5–14.5)
EST. GFR  (NO RACE VARIABLE): 6.3 ML/MIN/1.73 M^2
GLUCOSE SERPL-MCNC: 184 MG/DL (ref 70–110)
HCT VFR BLD AUTO: 40.7 % (ref 37–48.5)
HGB BLD-MCNC: 12.8 G/DL (ref 12–16)
IMM GRANULOCYTES # BLD AUTO: 0.02 K/UL (ref 0–0.04)
IMM GRANULOCYTES NFR BLD AUTO: 0.2 % (ref 0–0.5)
LYMPHOCYTES # BLD AUTO: 1.3 K/UL (ref 1–4.8)
LYMPHOCYTES NFR BLD: 15.5 % (ref 18–48)
MCH RBC QN AUTO: 30.9 PG (ref 27–31)
MCHC RBC AUTO-ENTMCNC: 31.4 G/DL (ref 32–36)
MCV RBC AUTO: 98 FL (ref 82–98)
MONOCYTES # BLD AUTO: 0.5 K/UL (ref 0.3–1)
MONOCYTES NFR BLD: 5.9 % (ref 4–15)
NEUTROPHILS # BLD AUTO: 6 K/UL (ref 1.8–7.7)
NEUTROPHILS NFR BLD: 73.9 % (ref 38–73)
NRBC BLD-RTO: 0 /100 WBC
PLATELET # BLD AUTO: 176 K/UL (ref 150–450)
PMV BLD AUTO: 11.4 FL (ref 9.2–12.9)
POTASSIUM SERPL-SCNC: 5 MMOL/L (ref 3.5–5.1)
PROT SERPL-MCNC: 7.4 G/DL (ref 6–8.4)
RBC # BLD AUTO: 4.14 M/UL (ref 4–5.4)
SODIUM SERPL-SCNC: 141 MMOL/L (ref 136–145)
WBC # BLD AUTO: 8.08 K/UL (ref 3.9–12.7)

## 2023-12-04 PROCEDURE — 3078F DIAST BP <80 MM HG: CPT | Mod: CPTII,S$GLB,, | Performed by: REGISTERED NURSE

## 2023-12-04 PROCEDURE — 3066F PR DOCUMENTATION OF TREATMENT FOR NEPHROPATHY: ICD-10-PCS | Mod: CPTII,S$GLB,, | Performed by: REGISTERED NURSE

## 2023-12-04 PROCEDURE — 3078F PR MOST RECENT DIASTOLIC BLOOD PRESSURE < 80 MM HG: ICD-10-PCS | Mod: CPTII,S$GLB,, | Performed by: REGISTERED NURSE

## 2023-12-04 PROCEDURE — 3066F NEPHROPATHY DOC TX: CPT | Mod: CPTII,S$GLB,, | Performed by: REGISTERED NURSE

## 2023-12-04 PROCEDURE — 36415 COLL VENOUS BLD VENIPUNCTURE: CPT | Performed by: REGISTERED NURSE

## 2023-12-04 PROCEDURE — 85025 COMPLETE CBC W/AUTO DIFF WBC: CPT | Performed by: REGISTERED NURSE

## 2023-12-04 PROCEDURE — 1159F MED LIST DOCD IN RCRD: CPT | Mod: CPTII,S$GLB,, | Performed by: REGISTERED NURSE

## 2023-12-04 PROCEDURE — 3051F PR MOST RECENT HEMOGLOBIN A1C LEVEL 7.0 - < 8.0%: ICD-10-PCS | Mod: CPTII,S$GLB,, | Performed by: REGISTERED NURSE

## 2023-12-04 PROCEDURE — 99214 PR OFFICE/OUTPT VISIT, EST, LEVL IV, 30-39 MIN: ICD-10-PCS | Mod: S$GLB,,, | Performed by: REGISTERED NURSE

## 2023-12-04 PROCEDURE — 3051F HG A1C>EQUAL 7.0%<8.0%: CPT | Mod: CPTII,S$GLB,, | Performed by: REGISTERED NURSE

## 2023-12-04 PROCEDURE — 99214 OFFICE O/P EST MOD 30 MIN: CPT | Mod: S$GLB,,, | Performed by: REGISTERED NURSE

## 2023-12-04 PROCEDURE — 3074F PR MOST RECENT SYSTOLIC BLOOD PRESSURE < 130 MM HG: ICD-10-PCS | Mod: CPTII,S$GLB,, | Performed by: REGISTERED NURSE

## 2023-12-04 PROCEDURE — 99999 PR PBB SHADOW E&M-EST. PATIENT-LVL IV: CPT | Mod: PBBFAC,,, | Performed by: REGISTERED NURSE

## 2023-12-04 PROCEDURE — 86140 C-REACTIVE PROTEIN: CPT | Performed by: REGISTERED NURSE

## 2023-12-04 PROCEDURE — 3074F SYST BP LT 130 MM HG: CPT | Mod: CPTII,S$GLB,, | Performed by: REGISTERED NURSE

## 2023-12-04 PROCEDURE — 3008F BODY MASS INDEX DOCD: CPT | Mod: CPTII,S$GLB,, | Performed by: REGISTERED NURSE

## 2023-12-04 PROCEDURE — 99999 PR PBB SHADOW E&M-EST. PATIENT-LVL IV: ICD-10-PCS | Mod: PBBFAC,,, | Performed by: REGISTERED NURSE

## 2023-12-04 PROCEDURE — 3008F PR BODY MASS INDEX (BMI) DOCUMENTED: ICD-10-PCS | Mod: CPTII,S$GLB,, | Performed by: REGISTERED NURSE

## 2023-12-04 PROCEDURE — 80053 COMPREHEN METABOLIC PANEL: CPT | Performed by: REGISTERED NURSE

## 2023-12-04 PROCEDURE — 1159F PR MEDICATION LIST DOCUMENTED IN MEDICAL RECORD: ICD-10-PCS | Mod: CPTII,S$GLB,, | Performed by: REGISTERED NURSE

## 2023-12-04 NOTE — PROGRESS NOTES
"Subjective     Patient ID: Georgina Arias is a 53 y.o. female.    Chief Complaint: Hospital Follow Up    HPI    Ms Arias is a 54 yo female with PMH of ESRD on HD (TThSat), DM, PAD, left foot infection and amputation s/p axillo bifemoral femoral bypass with Dr. Maya on 9/14/23, as well as left foot 1st toe partial amputation and flap. Was found to have wound dehiscense on 10/12/23. Was being followed per vascular surgery for wound care and wound was being managed until there was cf wound infection with malodor and green discharge from wound. Is no s/p wound vac placement and continued wound care. Was started on empiric therapy of cefepime, vanc following HD on HD days. Initially had flagyl, which was discontinued. There was a plan of 6 weeks, with an end of care of 12/5. Pt will receive last dose on 12/9 dt dialysis schedule.     Bilateral wound cultures obtained remain negative. Remains with bilateral groin wounds, but decreasing in size. With scant amt of drainage on dressing. No mal odor. Pt concerned for "lump" noted in right abdominal wall, near umbilicus. Denies tenderness. Denies abdominal pain, NVD.     With left ankle wound and right plantar wound. Appears to be healing well. Without drainage, redness, swelling, malodor. Tolerating antibiotic therapy. Last labs reviewed on 11/22/23 without leukocytosis. CRP WNL. Platelets 121.           Review of Systems   Constitutional:  Negative for chills, diaphoresis, fatigue and fever.   HENT: Negative.     Eyes: Negative.    Respiratory:  Negative for cough and shortness of breath.    Cardiovascular:  Negative for palpitations and leg swelling.   Gastrointestinal:  Negative for abdominal pain, constipation, diarrhea, nausea and vomiting.   Genitourinary:  Negative for dysuria.   Musculoskeletal:  Negative for arthralgias, back pain and leg pain.   Neurological:  Negative for headaches.   Psychiatric/Behavioral:  Negative for agitation and confusion.         "   Objective     Physical Exam  Vitals reviewed.   Constitutional:       General: She is not in acute distress.     Appearance: Normal appearance. She is not ill-appearing.   HENT:      Head: Normocephalic.      Nose: Nose normal.      Mouth/Throat:      Mouth: Mucous membranes are moist.      Pharynx: Oropharynx is clear.   Eyes:      General:         Right eye: No discharge.         Left eye: No discharge.      Conjunctiva/sclera: Conjunctivae normal.   Cardiovascular:      Rate and Rhythm: Normal rate and regular rhythm.      Pulses: Normal pulses.      Heart sounds: Normal heart sounds. No murmur heard.  Pulmonary:      Effort: Pulmonary effort is normal. No respiratory distress.      Breath sounds: Normal breath sounds.   Abdominal:      General: Abdomen is flat. Bowel sounds are normal. There is no distension.      Palpations: Abdomen is soft. Mass: lump noted to lower abdomen.      Tenderness: There is no abdominal tenderness.   Musculoskeletal:      Cervical back: Normal range of motion.      Right lower leg: No edema.      Left lower leg: No edema.   Skin:     Findings: Lesion present. No erythema or rash.   Neurological:      General: No focal deficit present.      Mental Status: She is alert and oriented to person, place, and time.      Motor: Weakness present.      Gait: Gait abnormal.   Psychiatric:         Mood and Affect: Mood normal.         Behavior: Behavior normal.            Assessment and Plan     1. Abdominal wall lump  -     US Abdomen Limited; Future; Expected date: 12/04/2023    2. Wound infection after surgery  -     CBC Auto Differential; Future; Expected date: 12/04/2023  -     C-reactive protein; Future; Expected date: 12/04/2023  -     Comprehensive Metabolic Panel; Future; Expected date: 12/04/2023    3. Wound dehiscence    -     CBC Auto Differential; Future; Expected date: 12/04/2023  -     C-reactive protein; Future; Expected date: 12/04/2023  -     Comprehensive Metabolic Panel;  Future; Expected date: 12/04/2023    4. Open wound of left foot, subsequent encounter  -     CBC Auto Differential; Future; Expected date: 12/04/2023  -     C-reactive protein; Future; Expected date: 12/04/2023  -     Comprehensive Metabolic Panel; Future; Expected date: 12/04/2023    5. S/P femoral-femoral bypass surgery  -     CBC Auto Differential; Future; Expected date: 12/04/2023  -     C-reactive protein; Future; Expected date: 12/04/2023  -     Comprehensive Metabolic Panel; Future; Expected date: 12/04/2023        -Will obtain abdomen US to further evaluate lump palpated in lower abdomen  -Labs today - CBC, CMP, CRP. Will trend platelets   -Continue wound care 2-3 times weekly.   - Call or seek immediate medical attention for any fevers, chills, sweats, diarrhea, n/v or increase in pain, swelling, drainage from wound.   - Continue antibiotics until last scheduled dose on Dec 9th  - Follow up in 2-3 months, or as needed.            30 minutes of total time was spent on this encounter, which includes face to face time and non-face to face time preparing to see the patient (eg, review of tests), Obtaining and/or reviewing separately obtained history, documenting clinical information in the electronic or other health record, independently interpreting results (not separately reported) and communicating results to the patient/family/caregiver, or care coordination (not separately reported).

## 2023-12-11 ENCOUNTER — OFFICE VISIT (OUTPATIENT)
Dept: VASCULAR SURGERY | Facility: CLINIC | Age: 53
End: 2023-12-11
Payer: MEDICARE

## 2023-12-11 VITALS
HEIGHT: 67 IN | WEIGHT: 257.94 LBS | DIASTOLIC BLOOD PRESSURE: 82 MMHG | TEMPERATURE: 98 F | BODY MASS INDEX: 40.48 KG/M2 | HEART RATE: 84 BPM | SYSTOLIC BLOOD PRESSURE: 178 MMHG

## 2023-12-11 DIAGNOSIS — I73.9 PERIPHERAL VASCULAR DISEASE: Primary | ICD-10-CM

## 2023-12-11 PROCEDURE — 3066F NEPHROPATHY DOC TX: CPT | Mod: CPTII,S$GLB,, | Performed by: SURGERY

## 2023-12-11 PROCEDURE — 3079F DIAST BP 80-89 MM HG: CPT | Mod: CPTII,S$GLB,, | Performed by: SURGERY

## 2023-12-11 PROCEDURE — 3079F PR MOST RECENT DIASTOLIC BLOOD PRESSURE 80-89 MM HG: ICD-10-PCS | Mod: CPTII,S$GLB,, | Performed by: SURGERY

## 2023-12-11 PROCEDURE — 3051F HG A1C>EQUAL 7.0%<8.0%: CPT | Mod: CPTII,S$GLB,, | Performed by: SURGERY

## 2023-12-11 PROCEDURE — 3077F SYST BP >= 140 MM HG: CPT | Mod: CPTII,S$GLB,, | Performed by: SURGERY

## 2023-12-11 PROCEDURE — 99999 PR PBB SHADOW E&M-EST. PATIENT-LVL III: CPT | Mod: PBBFAC,,, | Performed by: SURGERY

## 2023-12-11 PROCEDURE — 99024 PR POST-OP FOLLOW-UP VISIT: ICD-10-PCS | Mod: S$GLB,,, | Performed by: SURGERY

## 2023-12-11 PROCEDURE — 3051F PR MOST RECENT HEMOGLOBIN A1C LEVEL 7.0 - < 8.0%: ICD-10-PCS | Mod: CPTII,S$GLB,, | Performed by: SURGERY

## 2023-12-11 PROCEDURE — 3066F PR DOCUMENTATION OF TREATMENT FOR NEPHROPATHY: ICD-10-PCS | Mod: CPTII,S$GLB,, | Performed by: SURGERY

## 2023-12-11 PROCEDURE — 3077F PR MOST RECENT SYSTOLIC BLOOD PRESSURE >= 140 MM HG: ICD-10-PCS | Mod: CPTII,S$GLB,, | Performed by: SURGERY

## 2023-12-11 PROCEDURE — 99024 POSTOP FOLLOW-UP VISIT: CPT | Mod: S$GLB,,, | Performed by: SURGERY

## 2023-12-11 PROCEDURE — 99999 PR PBB SHADOW E&M-EST. PATIENT-LVL III: ICD-10-PCS | Mod: PBBFAC,,, | Performed by: SURGERY

## 2023-12-11 NOTE — PROGRESS NOTES
Patient returns.  She had a fem-fem with wound infections bilaterally.  Left side is completely healed.  Right side is about 6 mm by a mm deep.  I have opened it up a little bit so I could put the packing in there Hydrofera blue was placed in there.  She looks great.  We will have her return to clinic in 4 weeks earlier if this problem.  She remains on wound care twice a week

## 2023-12-14 ENCOUNTER — OFFICE VISIT (OUTPATIENT)
Dept: PODIATRY | Facility: CLINIC | Age: 53
End: 2023-12-14
Payer: MEDICARE

## 2023-12-14 ENCOUNTER — TELEPHONE (OUTPATIENT)
Dept: INFECTIOUS DISEASES | Facility: HOSPITAL | Age: 53
End: 2023-12-14
Payer: MEDICARE

## 2023-12-14 VITALS
SYSTOLIC BLOOD PRESSURE: 122 MMHG | WEIGHT: 257 LBS | DIASTOLIC BLOOD PRESSURE: 69 MMHG | HEART RATE: 91 BPM | RESPIRATION RATE: 18 BRPM | HEIGHT: 67 IN | BODY MASS INDEX: 40.34 KG/M2

## 2023-12-14 DIAGNOSIS — I73.9 PAD (PERIPHERAL ARTERY DISEASE): Primary | ICD-10-CM

## 2023-12-14 DIAGNOSIS — T87.89 NON-HEALING AMPUTATION SITE: ICD-10-CM

## 2023-12-14 DIAGNOSIS — E11.42 DM TYPE 2 WITH DIABETIC PERIPHERAL NEUROPATHY: ICD-10-CM

## 2023-12-14 LAB
ACID FAST MOD KINY STN SPEC: NORMAL
ACID FAST MOD KINY STN SPEC: NORMAL
MYCOBACTERIUM SPEC QL CULT: NORMAL
MYCOBACTERIUM SPEC QL CULT: NORMAL

## 2023-12-14 PROCEDURE — 99999 PR PBB SHADOW E&M-EST. PATIENT-LVL III: ICD-10-PCS | Mod: PBBFAC,,, | Performed by: PODIATRIST

## 2023-12-14 PROCEDURE — 99999 PR PBB SHADOW E&M-EST. PATIENT-LVL III: CPT | Mod: PBBFAC,,, | Performed by: PODIATRIST

## 2023-12-14 PROCEDURE — 99499 NO LOS: ICD-10-PCS | Mod: S$GLB,,, | Performed by: PODIATRIST

## 2023-12-14 PROCEDURE — 11042 PR DEBRIDEMENT, SKIN, SUB-Q TISSUE,=<20 SQ CM: ICD-10-PCS | Mod: S$GLB,,, | Performed by: PODIATRIST

## 2023-12-14 PROCEDURE — 99499 UNLISTED E&M SERVICE: CPT | Mod: S$GLB,,, | Performed by: PODIATRIST

## 2023-12-14 PROCEDURE — 11042 DBRDMT SUBQ TIS 1ST 20SQCM/<: CPT | Mod: S$GLB,,, | Performed by: PODIATRIST

## 2023-12-14 NOTE — TELEPHONE ENCOUNTER
Ultrasound reviewed, without large fluid collection. No mass. No structure/pseudo aneurysm. Pt followed up with vascular surgery and appears to continue to heal appropriately. Will monitor but no active concerns.

## 2023-12-14 NOTE — PROGRESS NOTES
Subjective:     Patient ID: Georgina Arias is a 53 y.o. female.    Chief Complaint: Wound Care (Post op wound ), Dressing Change (Left foot ), Foot Pain, and Diabetes Mellitus    Georgina is a 53 y.o. female who presents to the clinic for evaluation and treatment of high risk feet. Georgina has a past medical history of Hyperlipidemia, Hypertension, Peptic ulcer disease, Peripheral artery disease, PONV (postoperative nausea and vomiting), Stage IV CKD, and Type II diabetes mellitus. The patient's chief complaint is diabetic foot ulcer, L s/p 1st ray amp performed on 8/25/23 by Dr Flannery. Recently see as inpatient for SSTI.       11.29.23: Patient has been in football dressing, ambulating in Darco shoe x 1 week with out issues     PCP: Alonso Ling MD    Date Last Seen by PCP:   Chief Complaint   Patient presents with    Wound Care     Post op wound     Dressing Change     Left foot     Foot Pain    Diabetes Mellitus     Hemoglobin A1C   Date Value Ref Range Status   09/11/2023 7.2 (H) 4.0 - 5.6 % Final     Comment:     ADA Screening Guidelines:  5.7-6.4%  Consistent with prediabetes  >or=6.5%  Consistent with diabetes    High levels of fetal hemoglobin interfere with the HbA1C  assay. Heterozygous hemoglobin variants (HbS, HgC, etc)do  not significantly interfere with this assay.   However, presence of multiple variants may affect accuracy.     08/28/2018 7.9 (H) 4.0 - 5.6 % Final     Comment:     ADA Screening Guidelines:  5.7-6.4%  Consistent with prediabetes  >or=6.5%  Consistent with diabetes  High levels of fetal hemoglobin interfere with the HbA1C  assay. Heterozygous hemoglobin variants (HbS, HgC, etc)do  not significantly interfere with this assay.   However, presence of multiple variants may affect accuracy.     03/24/2015 9.9 (H) 4.5 - 6.2 % Final       Review of Systems   Constitutional: Negative for chills, decreased appetite and fever.   Cardiovascular:  Negative for leg swelling.  "  Musculoskeletal:  Negative for arthritis, joint pain, joint swelling and myalgias.   Gastrointestinal:  Negative for nausea and vomiting.   Neurological:  Negative for loss of balance, numbness and paresthesias.        Objective:     Vitals:    11/29/23 0908   BP: (!) 155/79   Pulse: 78   Resp: 18   Weight: 117 kg (257 lb 15 oz)   Height: 5' 7" (1.702 m)   PainSc:   8   PainLoc: Foot        Physical Exam  Constitutional:       Appearance: She is well-developed.   Cardiovascular:      Pulses:           Dorsalis pedis pulses are 2+ on the right side and 2+ on the left side.        Posterior tibial pulses are 2+ on the right side and 2+ on the left side.   Musculoskeletal:         General: No tenderness.      Right ankle: Normal.      Left ankle: Normal.      Right foot: No swelling, deformity or crepitus.      Left foot: No swelling, deformity or crepitus.      Comments: Adequate joint range of motion without pain, limitation, nor crepitation Bilateral feet and ankle joints. Muscle strength is 5/5 in all groups bilaterally.         Lymphadenopathy:      Comments: No palpable lymph nodes   Skin:     General: Skin is warm and dry.      Findings: No erythema or rash.      Nails: There is no clubbing.   Neurological:      Mental Status: She is alert and oriented to person, place, and time.   Psychiatric:         Behavior: Behavior normal. Behavior is cooperative.         11.14.23    L sub 1st Met head    L 1st ray amp site    11.29.23          Assessment:      Encounter Diagnoses   Name Primary?    PAD (peripheral artery disease) Yes    DM type 2 with diabetic peripheral neuropathy     Non-healing amputation site      Plan:     Georgina was seen today for wound care, dressing change, foot pain and diabetes mellitus.    Diagnoses and all orders for this visit:    PAD (peripheral artery disease)    DM type 2 with diabetic peripheral neuropathy    Non-healing amputation site      I counseled the patient on her conditions, " their implications and medical management.    Independent review of patients previous clinical notes    Reviewed current medication(s), and lab(s) specific to foot ailment(s) with patient in detail. All questions answered     Debridement: With verbal consent, nonviable tissues on the right foot were debrided beyond sub q utilizing a  sterile No. 3 scalpel and forceps. Minimal bleeding controlled with direct pressure  The patient tolerated this well.     Superficial wound to the right sub 1st metatarsal head area--> nearly healed.  Patient advised to apply Betadine and a foam bandage q.day.    Dressings: Aquacell AG  Offloading:Foam    Follow-up:Patient is to return to the clinic in 1 week  for follow-up but should call [facility] immediately if any signs of infection, such as fever, chills, sweats, increased redness or pain.    Nikole KAYE MA assisted w/ application of football dressing under my direct supervision. Switch to walking boot applied to offload the area. Advised patient that this should be worn on the affected foot at all times when ambulating     Short-term goals include maintaining good offloading and minimizing bioburden, promoting granulation and epithelialization to healing.  Long-term goals include keeping the wound healed by good offloading and medical management under the direction of internist.

## 2023-12-14 NOTE — PROGRESS NOTES
Subjective:     Patient ID: Georgina Arias is a 53 y.o. female.    Chief Complaint: Wound Care (Left foot ) and Dressing Change (Football )    Georgina is a 53 y.o. female who presents to the clinic for evaluation and treatment of high risk feet. Georgina has a past medical history of Hyperlipidemia, Hypertension, Peptic ulcer disease, Peripheral artery disease, PONV (postoperative nausea and vomiting), Stage IV CKD, and Type II diabetes mellitus. The patient's chief complaint is diabetic foot ulcer, L s/p 1st ray amp performed on 8/25/23 by Dr Flannery. Recently see as inpatient for SSTI.       11.29.23: Patient has been in football dressing, ambulating in Darco shoe x 1 week with out issues   12.14.23: Patient has been in football dressing, ambulating in walking boot x 1 week with out issues     PCP: Alonso Ling MD    Date Last Seen by PCP:   Chief Complaint   Patient presents with    Wound Care     Left foot     Dressing Change     Football      Hemoglobin A1C   Date Value Ref Range Status   09/11/2023 7.2 (H) 4.0 - 5.6 % Final     Comment:     ADA Screening Guidelines:  5.7-6.4%  Consistent with prediabetes  >or=6.5%  Consistent with diabetes    High levels of fetal hemoglobin interfere with the HbA1C  assay. Heterozygous hemoglobin variants (HbS, HgC, etc)do  not significantly interfere with this assay.   However, presence of multiple variants may affect accuracy.     08/28/2018 7.9 (H) 4.0 - 5.6 % Final     Comment:     ADA Screening Guidelines:  5.7-6.4%  Consistent with prediabetes  >or=6.5%  Consistent with diabetes  High levels of fetal hemoglobin interfere with the HbA1C  assay. Heterozygous hemoglobin variants (HbS, HgC, etc)do  not significantly interfere with this assay.   However, presence of multiple variants may affect accuracy.     03/24/2015 9.9 (H) 4.5 - 6.2 % Final       Review of Systems   Constitutional: Negative for chills, decreased appetite and fever.   Cardiovascular:   "Negative for leg swelling.   Musculoskeletal:  Negative for arthritis, joint pain, joint swelling and myalgias.   Gastrointestinal:  Negative for nausea and vomiting.   Neurological:  Negative for loss of balance, numbness and paresthesias.        Objective:     Vitals:    12/14/23 1335   BP: 122/69   Pulse: 91   Resp: 18   Weight: 116.6 kg (257 lb)   Height: 5' 7" (1.702 m)   PainSc: 0-No pain        Physical Exam  Constitutional:       Appearance: She is well-developed.   Cardiovascular:      Pulses:           Dorsalis pedis pulses are 2+ on the right side and 2+ on the left side.        Posterior tibial pulses are 2+ on the right side and 2+ on the left side.   Musculoskeletal:         General: No tenderness.      Right ankle: Normal.      Left ankle: Normal.      Right foot: No swelling, deformity or crepitus.      Left foot: No swelling, deformity or crepitus.      Comments: Adequate joint range of motion without pain, limitation, nor crepitation Bilateral feet and ankle joints. Muscle strength is 5/5 in all groups bilaterally.         Lymphadenopathy:      Comments: No palpable lymph nodes   Skin:     General: Skin is warm and dry.      Findings: No erythema or rash.      Nails: There is no clubbing.   Neurological:      Mental Status: She is alert and oriented to person, place, and time.   Psychiatric:         Behavior: Behavior normal. Behavior is cooperative.         11.14.23    L sub 1st Met head    L 1st ray amp site    11.29.23 12.14.23          Assessment:      Encounter Diagnoses   Name Primary?    PAD (peripheral artery disease) Yes    DM type 2 with diabetic peripheral neuropathy     Non-healing amputation site      Plan:     Georgina was seen today for wound care and dressing change.    Diagnoses and all orders for this visit:    PAD (peripheral artery disease)    DM type 2 with diabetic peripheral neuropathy    Non-healing amputation site      I counseled the patient on her conditions, their " implications and medical management.    Independent review of patients previous clinical notes    Reviewed current medication(s), and lab(s) specific to foot ailment(s) with patient in detail. All questions answered     Debridement: With verbal consent, nonviable tissues on the right foot were debrided beyond sub q utilizing a  sterile No. 3 scalpel and forceps. Minimal bleeding controlled with direct pressure  The patient tolerated this well.     Superficial wound to the right sub 1st metatarsal head area--> nearly healed.  Patient advised to apply Betadine and a foam bandage q.day.    Dressings: Nasrin  Offloading:Foam    Follow-up:Patient is to return to the clinic in 1 week  for follow-up but should call [facility] immediately if any signs of infection, such as fever, chills, sweats, increased redness or pain.    Nikole KAYE MA assisted w/ application of football dressing under my direct supervision. Switch to walking boot applied to offload the area. Advised patient that this should be worn on the affected foot at all times when ambulating     Short-term goals include maintaining good offloading and minimizing bioburden, promoting granulation and epithelialization to healing.  Long-term goals include keeping the wound healed by good offloading and medical management under the direction of internist.

## 2023-12-14 NOTE — PROGRESS NOTES
Subjective:     Patient ID: Georgina Arias is a 53 y.o. female.    Chief Complaint: Follow-up (Post op from Dr Alma Delia NAVARRO ), Wound Care, and Dressing Change (Football )    Georgina is a 53 y.o. female who presents to the clinic for evaluation and treatment of high risk feet. Georgina has a past medical history of Hyperlipidemia, Hypertension, Peptic ulcer disease, Peripheral artery disease, PONV (postoperative nausea and vomiting), Stage IV CKD, and Type II diabetes mellitus. The patient's chief complaint is diabetic foot ulcer, L s/p 1st ray amp performed on 8/25/23 by Dr Flannery. Recently see as inpatient for SSTI.     PCP: Alonso Ling MD    Date Last Seen by PCP:   Chief Complaint   Patient presents with    Follow-up     Post op from Dr Alma Delia NAVARRO     Wound Care    Dressing Change     Football      Hemoglobin A1C   Date Value Ref Range Status   09/11/2023 7.2 (H) 4.0 - 5.6 % Final     Comment:     ADA Screening Guidelines:  5.7-6.4%  Consistent with prediabetes  >or=6.5%  Consistent with diabetes    High levels of fetal hemoglobin interfere with the HbA1C  assay. Heterozygous hemoglobin variants (HbS, HgC, etc)do  not significantly interfere with this assay.   However, presence of multiple variants may affect accuracy.     08/28/2018 7.9 (H) 4.0 - 5.6 % Final     Comment:     ADA Screening Guidelines:  5.7-6.4%  Consistent with prediabetes  >or=6.5%  Consistent with diabetes  High levels of fetal hemoglobin interfere with the HbA1C  assay. Heterozygous hemoglobin variants (HbS, HgC, etc)do  not significantly interfere with this assay.   However, presence of multiple variants may affect accuracy.     03/24/2015 9.9 (H) 4.5 - 6.2 % Final       Review of Systems   Constitutional: Negative for chills, decreased appetite and fever.   Cardiovascular:  Negative for leg swelling.   Skin:  Positive for poor wound healing.   Musculoskeletal:  Negative for arthritis, joint pain, joint swelling and myalgias.    Gastrointestinal:  Negative for nausea and vomiting.   Neurological:  Negative for loss of balance, numbness and paresthesias.        Objective:     Physical Exam  Vitals reviewed.   Constitutional:       Appearance: She is well-developed.   Cardiovascular:      Pulses:           Dorsalis pedis pulses are 2+ on the right side and 2+ on the left side.        Posterior tibial pulses are 2+ on the right side and 2+ on the left side.   Musculoskeletal:         General: No tenderness.      Right ankle: Normal.      Left ankle: Normal.      Right foot: No swelling, deformity or crepitus.      Left foot: No swelling, deformity or crepitus.      Comments: Adequate joint range of motion without pain, limitation, nor crepitation Bilateral feet and ankle joints. Muscle strength is 5/5 in all groups bilaterally.         Lymphadenopathy:      Comments: No palpable lymph nodes   Skin:     General: Skin is warm and dry.      Findings: No erythema or rash.      Nails: There is no clubbing.   Neurological:      Mental Status: She is alert and oriented to person, place, and time.   Psychiatric:         Behavior: Behavior normal. Behavior is cooperative.         11.14.23    L sub 1st Met head    L 1st ray amp site  Assessment:      Encounter Diagnoses   Name Primary?    PAD (peripheral artery disease) Yes    DM type 2 with diabetic peripheral neuropathy     Non-healing amputation site      Plan:     Georgina was seen today for follow-up, wound care and dressing change.    Diagnoses and all orders for this visit:    PAD (peripheral artery disease)    DM type 2 with diabetic peripheral neuropathy    Non-healing amputation site      I counseled the patient on her conditions, their implications and medical management.    Independent review of patients previous clinical notes    Reviewed current medication(s), and lab(s) specific to foot ailment(s) with patient in detail. All questions answered     Debridement: With verbal consent,  nonviable tissues on the right foot were debrided beyond sub q utilizing a  sterile No. 3 scalpel and forceps. Minimal bleeding controlled with direct pressure  The patient tolerated this well.     Superficial wound to the right sub 1st metatarsal head area.  Patient advised to apply Betadine and a foam bandage q.day.    Dressings: Aquacell AG  Offloading:Foam    Follow-up:Patient is to return to the clinic in 1 week  for follow-up but should call [facility] immediately if any signs of infection, such as fever, chills, sweats, increased redness or pain.    Nikole KAYE MA assisted w/ application of football dressing under my direct supervision. Darco shoe applied to offload the area. Advised patient that this should be worn on the affected foot at all times when ambulating     Short-term goals include maintaining good offloading and minimizing bioburden, promoting granulation and epithelialization to healing.  Long-term goals include keeping the wound healed by good offloading and medical management under the direction of internist.

## 2023-12-21 ENCOUNTER — DOCUMENT SCAN (OUTPATIENT)
Dept: HOME HEALTH SERVICES | Facility: HOSPITAL | Age: 53
End: 2023-12-21
Payer: MEDICARE

## 2024-01-01 NOTE — PROGRESS NOTES
Patient received in the acute dialysis unit by wheelchair. Maintenance dialysis started per 15 gauge blunt needles x 2 to LFA fistula. Patient self-cannulated with assistance from this RN as needed using aseptic technique.    error

## 2024-01-05 ENCOUNTER — PATIENT MESSAGE (OUTPATIENT)
Dept: PODIATRY | Facility: CLINIC | Age: 54
End: 2024-01-05
Payer: MEDICARE

## 2024-01-17 ENCOUNTER — OFFICE VISIT (OUTPATIENT)
Dept: PODIATRY | Facility: CLINIC | Age: 54
End: 2024-01-17
Payer: MEDICARE

## 2024-01-17 VITALS
WEIGHT: 257.06 LBS | DIASTOLIC BLOOD PRESSURE: 67 MMHG | HEIGHT: 67 IN | HEART RATE: 85 BPM | SYSTOLIC BLOOD PRESSURE: 107 MMHG | BODY MASS INDEX: 40.35 KG/M2

## 2024-01-17 DIAGNOSIS — E11.42 DM TYPE 2 WITH DIABETIC PERIPHERAL NEUROPATHY: ICD-10-CM

## 2024-01-17 DIAGNOSIS — T87.89 NON-HEALING AMPUTATION SITE: ICD-10-CM

## 2024-01-17 DIAGNOSIS — L97.512 FOOT ULCER, RIGHT, WITH FAT LAYER EXPOSED: ICD-10-CM

## 2024-01-17 DIAGNOSIS — I73.9 PAD (PERIPHERAL ARTERY DISEASE): Primary | ICD-10-CM

## 2024-01-17 PROCEDURE — 99999 PR PBB SHADOW E&M-EST. PATIENT-LVL IV: CPT | Mod: PBBFAC,,, | Performed by: PODIATRIST

## 2024-01-17 PROCEDURE — 1159F MED LIST DOCD IN RCRD: CPT | Mod: CPTII,S$GLB,, | Performed by: PODIATRIST

## 2024-01-17 PROCEDURE — 11042 DBRDMT SUBQ TIS 1ST 20SQCM/<: CPT | Mod: S$GLB,,, | Performed by: PODIATRIST

## 2024-01-17 PROCEDURE — 3074F SYST BP LT 130 MM HG: CPT | Mod: CPTII,S$GLB,, | Performed by: PODIATRIST

## 2024-01-17 PROCEDURE — 99213 OFFICE O/P EST LOW 20 MIN: CPT | Mod: 25,S$GLB,, | Performed by: PODIATRIST

## 2024-01-17 PROCEDURE — 3008F BODY MASS INDEX DOCD: CPT | Mod: CPTII,S$GLB,, | Performed by: PODIATRIST

## 2024-01-17 PROCEDURE — 3078F DIAST BP <80 MM HG: CPT | Mod: CPTII,S$GLB,, | Performed by: PODIATRIST

## 2024-01-18 NOTE — PROGRESS NOTES
Subjective:     Patient ID: Georgina Arias is a 53 y.o. female.    Chief Complaint: Follow-up (Wound care bilateral )    Georgina is a 53 y.o. female who presents to the clinic for evaluation and treatment of high risk feet. Georgina has a past medical history of Hyperlipidemia, Hypertension, Peptic ulcer disease, Peripheral artery disease, PONV (postoperative nausea and vomiting), Stage IV CKD, and Type II diabetes mellitus. The patient's chief complaint is diabetic foot ulcer, L s/p 1st ray amp performed on 8/25/23 by Dr Flannery. Recently see as inpatient for SSTI.       11.29.23: Patient has been in football dressing, ambulating in Darco shoe x 1 week with out issues   12.14.23: Patient has been in football dressing, ambulating in walking boot x 1 week with out issues   1.17.24: no issues w/. L foot. Reports drainage from r foot  PCP: Alonso Ling MD    Date Last Seen by PCP:   Chief Complaint   Patient presents with    Follow-up     Wound care bilateral      Hemoglobin A1C   Date Value Ref Range Status   09/11/2023 7.2 (H) 4.0 - 5.6 % Final     Comment:     ADA Screening Guidelines:  5.7-6.4%  Consistent with prediabetes  >or=6.5%  Consistent with diabetes    High levels of fetal hemoglobin interfere with the HbA1C  assay. Heterozygous hemoglobin variants (HbS, HgC, etc)do  not significantly interfere with this assay.   However, presence of multiple variants may affect accuracy.     08/28/2018 7.9 (H) 4.0 - 5.6 % Final     Comment:     ADA Screening Guidelines:  5.7-6.4%  Consistent with prediabetes  >or=6.5%  Consistent with diabetes  High levels of fetal hemoglobin interfere with the HbA1C  assay. Heterozygous hemoglobin variants (HbS, HgC, etc)do  not significantly interfere with this assay.   However, presence of multiple variants may affect accuracy.     03/24/2015 9.9 (H) 4.5 - 6.2 % Final       Review of Systems   Constitutional: Negative for chills, decreased appetite and fever.  "  Cardiovascular:  Negative for leg swelling.   Musculoskeletal:  Negative for arthritis, joint pain, joint swelling and myalgias.   Gastrointestinal:  Negative for nausea and vomiting.   Neurological:  Negative for loss of balance, numbness and paresthesias.        Objective:     Vitals:    01/17/24 0951   BP: 107/67   Pulse: 85   Weight: 116.6 kg (257 lb 0.9 oz)   Height: 5' 7" (1.702 m)   PainSc: 0-No pain        Physical Exam  Vitals reviewed.   Constitutional:       Appearance: She is well-developed.   Cardiovascular:      Pulses:           Dorsalis pedis pulses are 2+ on the right side and 2+ on the left side.        Posterior tibial pulses are 2+ on the right side and 2+ on the left side.   Musculoskeletal:         General: No tenderness.      Right ankle: Normal.      Left ankle: Normal.      Right foot: No swelling, deformity or crepitus.      Left foot: No swelling, deformity or crepitus.      Comments: Adequate joint range of motion without pain, limitation, nor crepitation Bilateral feet and ankle joints. Muscle strength is 5/5 in all groups bilaterally.         Lymphadenopathy:      Comments: No palpable lymph nodes   Skin:     General: Skin is warm and dry.      Findings: No erythema or rash.      Nails: There is no clubbing.   Neurological:      Mental Status: She is alert and oriented to person, place, and time.   Psychiatric:         Behavior: Behavior normal. Behavior is cooperative.         11.14.23    L sub 1st Met head    L 1st ray amp site    11.29.23 12.14.23      1.17.24    1.8x1.0x0.1 cm w/ granular base and christelle wound maceration r 1st MTPJ     Healed amp site L 1st ray     Assessment:      Encounter Diagnoses   Name Primary?    PAD (peripheral artery disease) Yes    DM type 2 with diabetic peripheral neuropathy     Non-healing amputation site     Foot ulcer, right, with fat layer exposed      Plan:     Georgina was seen today for follow-up.    Diagnoses and all orders for this " visit:    PAD (peripheral artery disease)    DM type 2 with diabetic peripheral neuropathy    Non-healing amputation site    Foot ulcer, right, with fat layer exposed      I counseled the patient on her conditions, their implications and medical management.    Independent review of patients previous clinical notes    Reviewed current medication(s), and lab(s) specific to foot ailment(s) with patient in detail. All questions answered     New wound R foot, healed amp site L foot     Debridement: With verbal consent, nonviable tissues on the left foot were debrided beyond sub q utilizing a  sterile No. 3 scalpel and forceps. Minimal bleeding controlled with direct pressure  The patient tolerated this well.     Dressings: ag alginate   Offloading:Foam    Follow-up:Patient is to return to the clinic in 1 week  for follow-up but should call [facility] immediately if any signs of infection, such as fever, chills, sweats, increased redness or pain.    Everton LEÓN LPN assisted w/ application of football dressing under my direct supervision. Switch to walking boot applied to offload the area. Advised patient that this should be worn on the affected foot at all times when ambulating     Short-term goals include maintaining good offloading and minimizing bioburden, promoting granulation and epithelialization to healing.  Long-term goals include keeping the wound healed by good offloading and medical management under the direction of internist.

## 2024-01-22 ENCOUNTER — OFFICE VISIT (OUTPATIENT)
Dept: VASCULAR SURGERY | Facility: CLINIC | Age: 54
End: 2024-01-22
Payer: MEDICARE

## 2024-01-22 VITALS
WEIGHT: 255.75 LBS | DIASTOLIC BLOOD PRESSURE: 63 MMHG | HEART RATE: 75 BPM | SYSTOLIC BLOOD PRESSURE: 138 MMHG | BODY MASS INDEX: 40.14 KG/M2 | HEIGHT: 67 IN | TEMPERATURE: 98 F

## 2024-01-22 DIAGNOSIS — I73.9 PERIPHERAL VASCULAR DISEASE: Primary | ICD-10-CM

## 2024-01-22 PROCEDURE — 3078F DIAST BP <80 MM HG: CPT | Mod: CPTII,S$GLB,, | Performed by: SURGERY

## 2024-01-22 PROCEDURE — 99213 OFFICE O/P EST LOW 20 MIN: CPT | Mod: S$GLB,,, | Performed by: SURGERY

## 2024-01-22 PROCEDURE — 3008F BODY MASS INDEX DOCD: CPT | Mod: CPTII,S$GLB,, | Performed by: SURGERY

## 2024-01-22 PROCEDURE — 3075F SYST BP GE 130 - 139MM HG: CPT | Mod: CPTII,S$GLB,, | Performed by: SURGERY

## 2024-01-22 PROCEDURE — 99999 PR PBB SHADOW E&M-EST. PATIENT-LVL IV: CPT | Mod: PBBFAC,,, | Performed by: SURGERY

## 2024-01-22 PROCEDURE — 1159F MED LIST DOCD IN RCRD: CPT | Mod: CPTII,S$GLB,, | Performed by: SURGERY

## 2024-01-22 NOTE — PROGRESS NOTES
Patient returns.  She had a fem-fem with wound infections bilaterally.  Left side is completely healed.  Right side is about 6 mm by a mm deep.  I have opened it up a little bit so I could put the packing in there Hydrofera blue was placed in there.  She looks great.  We will have her return to clinic in 4 weeks earlier if this problem.  She remains on wound care twice a week.    Interval Update 1/22/24: She returns for 4 week right groin wound check. Right groin would has closed completely and is clean/dry/intact. No erythema, edema, or drainage. She is doing well and ambulating without difficulty. Will follow up in 3 months with bilateral LE US and ABIs.    Talita Cohen MD  [facility]  General Surgery PGY-1

## 2024-01-23 PROCEDURE — G0179 MD RECERTIFICATION HHA PT: HCPCS | Mod: ,,, | Performed by: PODIATRIST

## 2024-01-24 ENCOUNTER — OFFICE VISIT (OUTPATIENT)
Dept: PODIATRY | Facility: CLINIC | Age: 54
End: 2024-01-24
Payer: MEDICARE

## 2024-01-24 VITALS
HEIGHT: 67 IN | WEIGHT: 255 LBS | SYSTOLIC BLOOD PRESSURE: 149 MMHG | DIASTOLIC BLOOD PRESSURE: 68 MMHG | RESPIRATION RATE: 18 BRPM | HEART RATE: 79 BPM | BODY MASS INDEX: 40.02 KG/M2

## 2024-01-24 DIAGNOSIS — I73.9 PAD (PERIPHERAL ARTERY DISEASE): Primary | ICD-10-CM

## 2024-01-24 DIAGNOSIS — L97.512 FOOT ULCER, RIGHT, WITH FAT LAYER EXPOSED: ICD-10-CM

## 2024-01-24 DIAGNOSIS — Z87.2 HEALED ULCER OF LEFT FOOT: ICD-10-CM

## 2024-01-24 PROCEDURE — 99499 UNLISTED E&M SERVICE: CPT | Mod: S$GLB,,, | Performed by: PODIATRIST

## 2024-01-24 PROCEDURE — 11042 DBRDMT SUBQ TIS 1ST 20SQCM/<: CPT | Mod: S$GLB,,, | Performed by: PODIATRIST

## 2024-01-24 PROCEDURE — 99999 PR PBB SHADOW E&M-EST. PATIENT-LVL IV: CPT | Mod: PBBFAC,,, | Performed by: PODIATRIST

## 2024-01-24 RX ORDER — TIRZEPATIDE 10 MG/.5ML
10 INJECTION, SOLUTION SUBCUTANEOUS
COMMUNITY
Start: 2024-01-17

## 2024-01-26 NOTE — PROGRESS NOTES
Subjective:     Patient ID: Georgina Arias is a 53 y.o. female.    Chief Complaint: Wound Care (Bilateral foot ulcers ) and Dressing Change    Georgina is a 53 y.o. female who presents to the clinic for evaluation and treatment of high risk feet. Georgina has a past medical history of Hyperlipidemia, Hypertension, Peptic ulcer disease, Peripheral artery disease, PONV (postoperative nausea and vomiting), Stage IV CKD, and Type II diabetes mellitus. The patient's chief complaint is diabetic foot ulcer, L s/p 1st ray amp performed on 8/25/23 by Dr Flannery. Recently see as inpatient for SSTI.       11.29.23: Patient has been in football dressing, ambulating in Darco shoe x 1 week with out issues   12.14.23: Patient has been in football dressing, ambulating in walking boot x 1 week with out issues   1.17.24: no issues w/. L foot. Reports drainage from r foot  1.24.24 :  Patient has been in football dressing, ambulating in Darco shoe x 1 week with out issues     PCP: Alonso Ling MD    Date Last Seen by PCP:   Chief Complaint   Patient presents with    Wound Care     Bilateral foot ulcers     Dressing Change     Hemoglobin A1C   Date Value Ref Range Status   09/11/2023 7.2 (H) 4.0 - 5.6 % Final     Comment:     ADA Screening Guidelines:  5.7-6.4%  Consistent with prediabetes  >or=6.5%  Consistent with diabetes    High levels of fetal hemoglobin interfere with the HbA1C  assay. Heterozygous hemoglobin variants (HbS, HgC, etc)do  not significantly interfere with this assay.   However, presence of multiple variants may affect accuracy.     08/28/2018 7.9 (H) 4.0 - 5.6 % Final     Comment:     ADA Screening Guidelines:  5.7-6.4%  Consistent with prediabetes  >or=6.5%  Consistent with diabetes  High levels of fetal hemoglobin interfere with the HbA1C  assay. Heterozygous hemoglobin variants (HbS, HgC, etc)do  not significantly interfere with this assay.   However, presence of multiple variants may affect  "accuracy.     03/24/2015 9.9 (H) 4.5 - 6.2 % Final       Review of Systems   Constitutional: Negative for chills, decreased appetite and fever.   Cardiovascular:  Negative for leg swelling.   Musculoskeletal:  Negative for arthritis, joint pain, joint swelling and myalgias.   Gastrointestinal:  Negative for nausea and vomiting.   Neurological:  Negative for loss of balance, numbness and paresthesias.        Objective:     Vitals:    01/24/24 1416   BP: (!) 149/68   Pulse: 79   Resp: 18   Weight: 115.7 kg (255 lb)   Height: 5' 7" (1.702 m)   PainSc: 0-No pain        Physical Exam  Vitals reviewed.   Constitutional:       Appearance: She is well-developed. She is not ill-appearing or diaphoretic.   Cardiovascular:      Pulses:           Dorsalis pedis pulses are 2+ on the right side and 2+ on the left side.        Posterior tibial pulses are 2+ on the right side and 2+ on the left side.   Musculoskeletal:         General: No tenderness.      Right ankle: Normal.      Left ankle: Normal.      Right foot: No swelling, deformity or crepitus.      Left foot: No swelling, deformity or crepitus.      Comments: Adequate joint range of motion without pain, limitation, nor crepitation Bilateral feet and ankle joints. Muscle strength is 5/5 in all groups bilaterally.         Lymphadenopathy:      Comments: No palpable lymph nodes   Skin:     General: Skin is warm and dry.      Coloration: Skin is not jaundiced.      Findings: No erythema or rash.      Nails: There is no clubbing.   Neurological:      Mental Status: She is alert and oriented to person, place, and time.   Psychiatric:         Behavior: Behavior normal. Behavior is cooperative.         11.14.23    L sub 1st Met head    L 1st ray amp site    11.29.23 12.14.23 1.17.24    1.8x1.0x0.1 cm w/ granular base and christelle wound maceration r 1st MTPJ     Healed amp site L 1st ray     1.24.24    1.0x0.8x0.1 cm plantar 1st MPJ R   Healed ulcer L   Assessment:    "   Encounter Diagnoses   Name Primary?    PAD (peripheral artery disease) Yes    Foot ulcer, right, with fat layer exposed     Healed ulcer of left foot      Plan:     Georgina was seen today for wound care and dressing change.    Diagnoses and all orders for this visit:    PAD (peripheral artery disease)  -     SUBSEQUENT HOME HEALTH ORDERS    Foot ulcer, right, with fat layer exposed  -     SUBSEQUENT HOME HEALTH ORDERS    Healed ulcer of left foot      I counseled the patient on her conditions, their implications and medical management.    Independent review of patients previous clinical notes    Reviewed current medication(s), and lab(s) specific to foot ailment(s) with patient in detail. All questions answered     Debridement: With verbal consent, nonviable tissues on the R  foot were debrided beyond sub q utilizing a  sterile No. 3 scalpel and forceps. Minimal bleeding controlled with direct pressure  The patient tolerated this well.     Dressings: dilip   Offloading:Foam    Left foot amp site has healed well.  We will continue security football dressings for 1 additional week.    Follow-up:Patient is to return to the clinic in 1 week  for follow-up but should call [facility] immediately if any signs of infection, such as fever, chills, sweats, increased redness or pain.  Nikole KAYE MA assisted w/ application of football dressing under my direct supervision. Darco shoe applied to offload the area. Advised patient that this should be worn on the affected foot at all times when ambulating     Short-term goals include maintaining good offloading and minimizing bioburden, promoting granulation and epithelialization to healing.  Long-term goals include keeping the wound healed by good offloading and medical management under the direction of internist.

## 2024-01-29 ENCOUNTER — TELEPHONE (OUTPATIENT)
Dept: PODIATRY | Facility: CLINIC | Age: 54
End: 2024-01-29
Payer: MEDICARE

## 2024-01-29 NOTE — TELEPHONE ENCOUNTER
Called Mr Arias who confirmed appointment for Wed 31 at 2:00 pm with Dr Prado, yes I'll be there she said.

## 2024-01-30 ENCOUNTER — EXTERNAL HOME HEALTH (OUTPATIENT)
Dept: HOME HEALTH SERVICES | Facility: HOSPITAL | Age: 54
End: 2024-01-30
Payer: MEDICARE

## 2024-01-31 ENCOUNTER — OFFICE VISIT (OUTPATIENT)
Dept: PODIATRY | Facility: CLINIC | Age: 54
End: 2024-01-31
Payer: MEDICARE

## 2024-01-31 VITALS
HEIGHT: 67 IN | RESPIRATION RATE: 18 BRPM | HEART RATE: 86 BPM | BODY MASS INDEX: 40.02 KG/M2 | DIASTOLIC BLOOD PRESSURE: 73 MMHG | WEIGHT: 255 LBS | SYSTOLIC BLOOD PRESSURE: 137 MMHG

## 2024-01-31 DIAGNOSIS — Z87.2 HEALED ULCER OF LEFT FOOT: ICD-10-CM

## 2024-01-31 DIAGNOSIS — L97.512 FOOT ULCER, RIGHT, WITH FAT LAYER EXPOSED: ICD-10-CM

## 2024-01-31 DIAGNOSIS — I73.9 PAD (PERIPHERAL ARTERY DISEASE): Primary | ICD-10-CM

## 2024-01-31 PROCEDURE — 11042 DBRDMT SUBQ TIS 1ST 20SQCM/<: CPT | Mod: S$GLB,,, | Performed by: PODIATRIST

## 2024-01-31 PROCEDURE — 99999 PR PBB SHADOW E&M-EST. PATIENT-LVL IV: CPT | Mod: PBBFAC,,, | Performed by: PODIATRIST

## 2024-01-31 PROCEDURE — 99499 UNLISTED E&M SERVICE: CPT | Mod: S$GLB,,, | Performed by: PODIATRIST

## 2024-02-02 ENCOUNTER — EXTERNAL HOME HEALTH (OUTPATIENT)
Dept: HOME HEALTH SERVICES | Facility: HOSPITAL | Age: 54
End: 2024-02-02
Payer: MEDICARE

## 2024-02-07 ENCOUNTER — OFFICE VISIT (OUTPATIENT)
Dept: PODIATRY | Facility: CLINIC | Age: 54
End: 2024-02-07
Payer: MEDICARE

## 2024-02-07 VITALS
HEART RATE: 78 BPM | SYSTOLIC BLOOD PRESSURE: 95 MMHG | HEIGHT: 67 IN | DIASTOLIC BLOOD PRESSURE: 56 MMHG | BODY MASS INDEX: 40.02 KG/M2 | RESPIRATION RATE: 18 BRPM | WEIGHT: 255 LBS

## 2024-02-07 DIAGNOSIS — L97.512 FOOT ULCER, RIGHT, WITH FAT LAYER EXPOSED: ICD-10-CM

## 2024-02-07 DIAGNOSIS — Z87.2 HEALED ULCER OF LEFT FOOT: ICD-10-CM

## 2024-02-07 DIAGNOSIS — I73.9 PAD (PERIPHERAL ARTERY DISEASE): Primary | ICD-10-CM

## 2024-02-07 DIAGNOSIS — E11.42 DM TYPE 2 WITH DIABETIC PERIPHERAL NEUROPATHY: ICD-10-CM

## 2024-02-07 PROCEDURE — 99999 PR PBB SHADOW E&M-EST. PATIENT-LVL IV: CPT | Mod: PBBFAC,,, | Performed by: PODIATRIST

## 2024-02-07 PROCEDURE — 99499 UNLISTED E&M SERVICE: CPT | Mod: S$GLB,,, | Performed by: PODIATRIST

## 2024-02-07 PROCEDURE — 11042 DBRDMT SUBQ TIS 1ST 20SQCM/<: CPT | Mod: S$GLB,,, | Performed by: PODIATRIST

## 2024-02-14 ENCOUNTER — OFFICE VISIT (OUTPATIENT)
Dept: PODIATRY | Facility: CLINIC | Age: 54
End: 2024-02-14
Payer: MEDICARE

## 2024-02-14 VITALS
SYSTOLIC BLOOD PRESSURE: 100 MMHG | HEIGHT: 67 IN | WEIGHT: 255.06 LBS | HEART RATE: 78 BPM | DIASTOLIC BLOOD PRESSURE: 46 MMHG | BODY MASS INDEX: 40.03 KG/M2

## 2024-02-14 DIAGNOSIS — I73.9 PAD (PERIPHERAL ARTERY DISEASE): ICD-10-CM

## 2024-02-14 DIAGNOSIS — E11.42 DM TYPE 2 WITH DIABETIC PERIPHERAL NEUROPATHY: ICD-10-CM

## 2024-02-14 DIAGNOSIS — L97.512 FOOT ULCER, RIGHT, WITH FAT LAYER EXPOSED: Primary | ICD-10-CM

## 2024-02-14 PROCEDURE — 99999 PR PBB SHADOW E&M-EST. PATIENT-LVL IV: CPT | Mod: PBBFAC,,, | Performed by: PODIATRIST

## 2024-02-14 PROCEDURE — 11042 DBRDMT SUBQ TIS 1ST 20SQCM/<: CPT | Mod: S$GLB,,, | Performed by: PODIATRIST

## 2024-02-14 RX ORDER — ATORVASTATIN CALCIUM 80 MG/1
1 TABLET, FILM COATED ORAL NIGHTLY
COMMUNITY
Start: 2024-01-08

## 2024-02-14 RX ORDER — PREGABALIN 50 MG/1
50 CAPSULE ORAL
COMMUNITY
Start: 2024-01-15 | End: 2024-04-03

## 2024-02-14 RX ORDER — PREGABALIN 75 MG/1
75 CAPSULE ORAL
COMMUNITY
End: 2024-04-03

## 2024-02-14 RX ORDER — DEXTROSE 4 G
1 TABLET,CHEWABLE ORAL
COMMUNITY
Start: 2023-12-06

## 2024-02-14 RX ORDER — TENAPANOR 31.9 MG/1
30 TABLET, FILM COATED ORAL
COMMUNITY
Start: 2024-02-10 | End: 2025-02-09

## 2024-02-14 NOTE — PROGRESS NOTES
Subjective:     Patient ID: Georgina Arias is a 53 y.o. female.    Chief Complaint: Foot Ulcer (Bilateral foot ulcer, L is healed)    Georgina is a 53 y.o. female who presents to the clinic for evaluation and treatment of high risk feet. Georgina has a past medical history of Hyperlipidemia, Hypertension, Peptic ulcer disease, Peripheral artery disease, PONV (postoperative nausea and vomiting), Stage IV CKD, and Type II diabetes mellitus. The patient's chief complaint is diabetic foot ulcer, L s/p 1st ray amp performed on 8/25/23 by Dr Flannery. Recently see as inpatient for SSTI.       11.29.23: Patient has been in football dressing, ambulating in Darco shoe x 1 week with out issues   12.14.23: Patient has been in football dressing, ambulating in walking boot x 1 week with out issues   1.17.24: no issues w/. L foot. Reports drainage from r foot  1.24.24 :  Patient has been in football dressing, ambulating in Darco shoe x 1 week with out issues     PCP: Alonso Ling MD    Date Last Seen by PCP:   Chief Complaint   Patient presents with    Foot Ulcer     Bilateral foot ulcer, L is healed     Hemoglobin A1C   Date Value Ref Range Status   09/11/2023 7.2 (H) 4.0 - 5.6 % Final     Comment:     ADA Screening Guidelines:  5.7-6.4%  Consistent with prediabetes  >or=6.5%  Consistent with diabetes    High levels of fetal hemoglobin interfere with the HbA1C  assay. Heterozygous hemoglobin variants (HbS, HgC, etc)do  not significantly interfere with this assay.   However, presence of multiple variants may affect accuracy.     08/28/2018 7.9 (H) 4.0 - 5.6 % Final     Comment:     ADA Screening Guidelines:  5.7-6.4%  Consistent with prediabetes  >or=6.5%  Consistent with diabetes  High levels of fetal hemoglobin interfere with the HbA1C  assay. Heterozygous hemoglobin variants (HbS, HgC, etc)do  not significantly interfere with this assay.   However, presence of multiple variants may affect accuracy.     03/24/2015  "9.9 (H) 4.5 - 6.2 % Final       Review of Systems   Constitutional: Negative for chills, decreased appetite and fever.   Cardiovascular:  Negative for leg swelling.   Musculoskeletal:  Negative for arthritis, joint pain, joint swelling and myalgias.   Gastrointestinal:  Negative for nausea and vomiting.   Neurological:  Negative for loss of balance, numbness and paresthesias.        Objective:     Vitals:    02/14/24 1054   BP: (!) 100/46   Pulse: 78   Weight: 115.7 kg (255 lb 1.2 oz)   Height: 5' 7" (1.702 m)   PainSc: 0-No pain        Physical Exam  Vitals reviewed.   Constitutional:       Appearance: She is well-developed. She is not ill-appearing or diaphoretic.   Cardiovascular:      Pulses:           Dorsalis pedis pulses are 2+ on the right side and 2+ on the left side.        Posterior tibial pulses are 2+ on the right side and 2+ on the left side.   Musculoskeletal:         General: No tenderness.      Right ankle: Normal.      Left ankle: Normal.      Right foot: No swelling, deformity or crepitus.      Left foot: No swelling, deformity or crepitus.      Comments: Adequate joint range of motion without pain, limitation, nor crepitation Bilateral feet and ankle joints. Muscle strength is 5/5 in all groups bilaterally.         Lymphadenopathy:      Comments: No palpable lymph nodes   Skin:     General: Skin is warm and dry.      Coloration: Skin is not jaundiced.      Findings: No erythema or rash.      Nails: There is no clubbing.   Neurological:      Mental Status: She is alert and oriented to person, place, and time.   Psychiatric:         Behavior: Behavior normal. Behavior is cooperative.       11.14.23    L sub 1st Met head    L 1st ray amp site    11.29.23 12.14.23 1.17.24    1.8x1.0x0.1 cm w/ granular base and christelle wound maceration r 1st MTPJ     Healed amp site L 1st ray     1.24.24    1.0x0.8x0.1 cm plantar 1st MPJ R       2.14.24  0.5 x 0.2 x 0.1 cm plantar 1st MPJ R   Healed ulcer L "     Assessment:      Encounter Diagnoses   Name Primary?    Healed ulcer of left foot Yes    Foot ulcer, right, with fat layer exposed     PAD (peripheral artery disease)     DM type 2 with diabetic peripheral neuropathy      Plan:     Georgina was seen today for foot ulcer.    Diagnoses and all orders for this visit:    Healed ulcer of left foot    Foot ulcer, right, with fat layer exposed    PAD (peripheral artery disease)    DM type 2 with diabetic peripheral neuropathy      I counseled the patient on her conditions, their implications and medical management.    Independent review of patients previous clinical notes    Reviewed current medication(s), and lab(s) specific to foot ailment(s) with patient in detail. All questions answered     Debridement: With verbal consent, nonviable tissues on the R  foot were debrided beyond sub q utilizing a  sterile No. 3 scalpel and forceps. Minimal bleeding controlled with direct pressure  The patient tolerated this well.     Dressings: dilip   Offloading:Foam    Left foot amp site has healed well.  We will continue security football dressings for 1 additional week.    Follow-up:Patient is to return to the clinic in 1 week  for follow-up but should call [facility] immediately if any signs of infection, such as fever, chills, sweats, increased redness or pain.  Nikole KAYE MA assisted w/ application of football dressing under my direct supervision. Darco shoe applied to offload the area. Advised patient that this should be worn on the affected foot at all times when ambulating     Short-term goals include maintaining good offloading and minimizing bioburden, promoting granulation and epithelialization to healing.  Long-term goals include keeping the wound healed by good offloading and medical management under the direction of internist.      Debridement    Date/Time: 2/14/2024 9:53 PM    Performed by: Mrak Hopkins DPM  Authorized by: Fletcher Dooley DPM    Time out:  "Immediately prior to procedure a "time out" was called to verify the correct patient, procedure, equipment, support staff and site/side marked as required.    Consent Done?:  Yes (Verbal)    Preparation: Patient was prepped and draped with aseptic technique      Wound Details:    Location:  Right foot    Location:  Right 1st Toe    Type of Debridement:  Excisional       Length (cm):  0.5       Area (sq cm):  0.1       Width (cm):  0.2       Percent Debrided (%):  100       Depth (cm):  0.1       Total Area Debrided (sq cm):  0.1    Depth of debridement:  Subcutaneous tissue    Tissue debrided:  Subcutaneous    Devitalized tissue debrided:  Callus, Fibrin, Slough and Biofilm    Instruments:  Curette  Bleeding:  Minimal  Hemostasis Achieved: Yes  Method Used:  Pressure  Patient tolerance:  Patient tolerated the procedure well with no immediate complications          "

## 2024-02-21 ENCOUNTER — OFFICE VISIT (OUTPATIENT)
Dept: PODIATRY | Facility: CLINIC | Age: 54
End: 2024-02-21
Payer: MEDICARE

## 2024-02-21 VITALS
DIASTOLIC BLOOD PRESSURE: 90 MMHG | HEART RATE: 81 BPM | WEIGHT: 255 LBS | SYSTOLIC BLOOD PRESSURE: 171 MMHG | HEIGHT: 67 IN | BODY MASS INDEX: 40.02 KG/M2

## 2024-02-21 DIAGNOSIS — L97.512 FOOT ULCER, RIGHT, WITH FAT LAYER EXPOSED: Primary | ICD-10-CM

## 2024-02-21 PROCEDURE — 99999 PR PBB SHADOW E&M-EST. PATIENT-LVL IV: CPT | Mod: PBBFAC,,, | Performed by: PODIATRIST

## 2024-02-21 PROCEDURE — 3080F DIAST BP >= 90 MM HG: CPT | Mod: CPTII,S$GLB,, | Performed by: PODIATRIST

## 2024-02-21 PROCEDURE — 87077 CULTURE AEROBIC IDENTIFY: CPT | Performed by: PODIATRIST

## 2024-02-21 PROCEDURE — 3077F SYST BP >= 140 MM HG: CPT | Mod: CPTII,S$GLB,, | Performed by: PODIATRIST

## 2024-02-21 PROCEDURE — 87070 CULTURE OTHR SPECIMN AEROBIC: CPT | Performed by: PODIATRIST

## 2024-02-21 PROCEDURE — 99214 OFFICE O/P EST MOD 30 MIN: CPT | Mod: 25,S$GLB,, | Performed by: PODIATRIST

## 2024-02-21 PROCEDURE — 3008F BODY MASS INDEX DOCD: CPT | Mod: CPTII,S$GLB,, | Performed by: PODIATRIST

## 2024-02-21 PROCEDURE — 87075 CULTR BACTERIA EXCEPT BLOOD: CPT | Performed by: PODIATRIST

## 2024-02-21 PROCEDURE — 87186 SC STD MICRODIL/AGAR DIL: CPT | Performed by: PODIATRIST

## 2024-02-21 PROCEDURE — 1159F MED LIST DOCD IN RCRD: CPT | Mod: CPTII,S$GLB,, | Performed by: PODIATRIST

## 2024-02-21 PROCEDURE — 11042 DBRDMT SUBQ TIS 1ST 20SQCM/<: CPT | Mod: S$GLB,,, | Performed by: PODIATRIST

## 2024-02-21 RX ORDER — DOXYCYCLINE HYCLATE 100 MG
100 TABLET ORAL 2 TIMES DAILY
Qty: 20 TABLET | Refills: 0 | Status: SHIPPED | OUTPATIENT
Start: 2024-02-21 | End: 2024-03-06 | Stop reason: SDUPTHER

## 2024-02-21 NOTE — ADDENDUM NOTE
Addended by: YAMILE COPELAND on: 2/21/2024 09:27 AM     Modules accepted: Orders, Level of Service

## 2024-02-24 LAB — BACTERIA SPEC AEROBE CULT: ABNORMAL

## 2024-02-25 LAB — BACTERIA SPEC ANAEROBE CULT: NORMAL

## 2024-02-25 NOTE — PROGRESS NOTES
Subjective:     Patient ID: Georgina Arias is a 53 y.o. female.    Chief Complaint: Wound Care (Pt states that she fell after last visit and has sore on knee along with sore pinky)    Georgina is a 53 y.o. female who presents to the clinic for evaluation and treatment of high risk feet. Georgina has a past medical history of Hyperlipidemia, Hypertension, Peptic ulcer disease, Peripheral artery disease, PONV (postoperative nausea and vomiting), Stage IV CKD, and Type II diabetes mellitus. The patient's chief complaint is diabetic foot ulcer, L s/p 1st ray amp performed on 8/25/23 by Dr Flannery. Recently see as inpatient for SSTI.       11.29.23: Patient has been in football dressing, ambulating in Darco shoe x 1 week with out issues   12.14.23: Patient has been in football dressing, ambulating in walking boot x 1 week with out issues   1.17.24: no issues w/. L foot. Reports drainage from r foot  1.24.24 :  Patient has been in football dressing, ambulating in Darco shoe x 1 week with out issues     2- pt presents today for wound care without any new issues      PCP: Alonso Ling MD    Date Last Seen by PCP:   Chief Complaint   Patient presents with    Wound Care     Pt states that she fell after last visit and has sore on knee along with sore pinky     Hemoglobin A1C   Date Value Ref Range Status   09/11/2023 7.2 (H) 4.0 - 5.6 % Final     Comment:     ADA Screening Guidelines:  5.7-6.4%  Consistent with prediabetes  >or=6.5%  Consistent with diabetes    High levels of fetal hemoglobin interfere with the HbA1C  assay. Heterozygous hemoglobin variants (HbS, HgC, etc)do  not significantly interfere with this assay.   However, presence of multiple variants may affect accuracy.     08/28/2018 7.9 (H) 4.0 - 5.6 % Final     Comment:     ADA Screening Guidelines:  5.7-6.4%  Consistent with prediabetes  >or=6.5%  Consistent with diabetes  High levels of fetal hemoglobin interfere with the HbA1C  assay.  "Heterozygous hemoglobin variants (HbS, HgC, etc)do  not significantly interfere with this assay.   However, presence of multiple variants may affect accuracy.     03/24/2015 9.9 (H) 4.5 - 6.2 % Final       Review of Systems   Constitutional: Negative for chills, decreased appetite and fever.   Cardiovascular:  Negative for leg swelling.   Skin:  Positive for poor wound healing.   Musculoskeletal:  Negative for arthritis, joint pain, joint swelling and myalgias.   Gastrointestinal:  Negative for nausea and vomiting.   Neurological:  Positive for numbness, paresthesias and sensory change. Negative for loss of balance.        Objective:     Vitals:    02/21/24 1046   BP: (!) 171/90   Pulse: 81   Weight: 115.7 kg (255 lb)   Height: 5' 7" (1.702 m)   PainSc:   3   PainLoc: Foot        Physical Exam  Vitals reviewed.   Constitutional:       Appearance: She is well-developed. She is not ill-appearing or diaphoretic.   Cardiovascular:      Pulses:           Dorsalis pedis pulses are 2+ on the right side and 2+ on the left side.        Posterior tibial pulses are 2+ on the right side and 2+ on the left side.   Musculoskeletal:         General: No tenderness.      Right ankle: Normal.      Left ankle: Normal.      Right foot: No swelling, deformity or crepitus.      Left foot: No swelling, deformity or crepitus.      Comments: Adequate joint range of motion without pain, limitation, nor crepitation Bilateral feet and ankle joints. Muscle strength is 5/5 in all groups bilaterally.         Lymphadenopathy:      Comments: No palpable lymph nodes   Skin:     General: Skin is warm and dry.      Coloration: Skin is not jaundiced.      Findings: No erythema or rash.      Nails: There is no clubbing.   Neurological:      Mental Status: She is alert and oriented to person, place, and time.   Psychiatric:         Behavior: Behavior normal. Behavior is cooperative.         11.14.23    L sub 1st Met head    L 1st ray amp " site    11.29.23        12.14.23      1.17.24    1.8x1.0x0.1 cm w/ granular base and christelle wound maceration r 1st MTPJ     Healed amp site L 1st ray     1.24.24    1.0x0.8x0.1 cm plantar 1st MPJ R       plantar 1st MPJ R   0.5 x 0.2 x 0.3 cm     Purulence expressed from wound  Cultured      Assessment:      Encounter Diagnosis   Name Primary?    Foot ulcer, right, with fat layer exposed Yes     Plan:     Georgina was seen today for wound care.    Diagnoses and all orders for this visit:    Foot ulcer, right, with fat layer exposed  -     Aerobic culture  -     Culture, Anaerobic    Other orders  -     doxycycline (VIBRA-TABS) 100 MG tablet; Take 1 tablet (100 mg total) by mouth 2 (two) times daily.      I counseled the patient on her conditions, their implications and medical management.    Independent review of patients previous clinical notes  Culture taken of wound  Rx doxy      Reviewed current medication(s), and lab(s) specific to foot ailment(s) with patient in detail. All questions answered     Debridement: With verbal consent, nonviable tissues on the R  foot were debrided beyond sub q utilizing a  sterile No. 3 scalpel and forceps. Minimal bleeding controlled with direct pressure  The patient tolerated this well.     Dressings: dilip   Offloading:Foam    Left foot amp site has healed well.  We will continue security football dressings for 1 additional week.    Follow-up:Patient is to return to the clinic in 1 week  for follow-up but should call [facility] immediately if any signs of infection, such as fever, chills, sweats, increased redness or pain.  Nikole KAYE MA assisted w/ application of football dressing under my direct supervision. Darco shoe applied to offload the area. Advised patient that this should be worn on the affected foot at all times when ambulating     Short-term goals include maintaining good offloading and minimizing bioburden, promoting granulation and epithelialization to healing.   Long-term goals include keeping the wound healed by good offloading and medical management under the direction of internist.

## 2024-02-26 ENCOUNTER — EXTERNAL HOME HEALTH (OUTPATIENT)
Dept: HOME HEALTH SERVICES | Facility: HOSPITAL | Age: 54
End: 2024-02-26
Payer: MEDICARE

## 2024-02-28 ENCOUNTER — OFFICE VISIT (OUTPATIENT)
Dept: PODIATRY | Facility: CLINIC | Age: 54
End: 2024-02-28
Payer: MEDICARE

## 2024-02-28 VITALS
DIASTOLIC BLOOD PRESSURE: 72 MMHG | WEIGHT: 257 LBS | SYSTOLIC BLOOD PRESSURE: 116 MMHG | HEIGHT: 67 IN | RESPIRATION RATE: 18 BRPM | HEART RATE: 76 BPM | BODY MASS INDEX: 40.34 KG/M2

## 2024-02-28 DIAGNOSIS — L97.522 ULCER OF LEFT FOOT WITH FAT LAYER EXPOSED: Primary | ICD-10-CM

## 2024-02-28 PROCEDURE — 99999 PR PBB SHADOW E&M-EST. PATIENT-LVL IV: CPT | Mod: PBBFAC,,, | Performed by: PODIATRIST

## 2024-02-28 PROCEDURE — 99499 UNLISTED E&M SERVICE: CPT | Mod: S$GLB,,, | Performed by: PODIATRIST

## 2024-02-28 PROCEDURE — 11042 DBRDMT SUBQ TIS 1ST 20SQCM/<: CPT | Mod: S$GLB,,, | Performed by: PODIATRIST

## 2024-02-28 NOTE — PROGRESS NOTES
Subjective:     Patient ID: Georgina Arias is a 53 y.o. female.    Chief Complaint: Wound Care (Bilateral foot ulcers ) and Dressing Change (Football )    Georgina is a 53 y.o. female who presents to the clinic for evaluation and treatment of high risk feet. Georgina has a past medical history of Hyperlipidemia, Hypertension, Peptic ulcer disease, Peripheral artery disease, PONV (postoperative nausea and vomiting), Stage IV CKD, and Type II diabetes mellitus. The patient's chief complaint is diabetic foot ulcer, L s/p 1st ray amp performed on 8/25/23 by Dr Flannery. Recently see as inpatient for SSTI.       11.29.23: Patient has been in football dressing, ambulating in Darco shoe x 1 week with out issues   12.14.23: Patient has been in football dressing, ambulating in walking boot x 1 week with out issues   1.17.24: no issues w/. L foot. Reports drainage from r foot  1.24.24 :  Patient has been in football dressing, ambulating in Darco shoe x 1 week with out issues     2- pt presents today for wound care without any new issues  2- pt presents today, has been taking prescribed abx        PCP: Alonso Ling MD    Date Last Seen by PCP:   Chief Complaint   Patient presents with    Wound Care     Bilateral foot ulcers     Dressing Change     Football      Hemoglobin A1C   Date Value Ref Range Status   09/11/2023 7.2 (H) 4.0 - 5.6 % Final     Comment:     ADA Screening Guidelines:  5.7-6.4%  Consistent with prediabetes  >or=6.5%  Consistent with diabetes    High levels of fetal hemoglobin interfere with the HbA1C  assay. Heterozygous hemoglobin variants (HbS, HgC, etc)do  not significantly interfere with this assay.   However, presence of multiple variants may affect accuracy.     08/28/2018 7.9 (H) 4.0 - 5.6 % Final     Comment:     ADA Screening Guidelines:  5.7-6.4%  Consistent with prediabetes  >or=6.5%  Consistent with diabetes  High levels of fetal hemoglobin interfere with the  "HbA1C  assay. Heterozygous hemoglobin variants (HbS, HgC, etc)do  not significantly interfere with this assay.   However, presence of multiple variants may affect accuracy.     03/24/2015 9.9 (H) 4.5 - 6.2 % Final       Review of Systems   Constitutional: Negative for chills, decreased appetite and fever.   Cardiovascular:  Negative for leg swelling.   Skin:  Positive for poor wound healing.   Musculoskeletal:  Negative for arthritis, joint pain, joint swelling and myalgias.   Gastrointestinal:  Negative for nausea and vomiting.   Neurological:  Positive for numbness, paresthesias and sensory change. Negative for loss of balance.        Objective:     Vitals:    02/28/24 0959   BP: 116/72   Pulse: 76   Resp: 18   Weight: 116.6 kg (257 lb)   Height: 5' 7" (1.702 m)   PainSc: 0-No pain        Physical Exam  Vitals reviewed.   Constitutional:       Appearance: She is well-developed. She is not ill-appearing or diaphoretic.   Cardiovascular:      Pulses:           Dorsalis pedis pulses are 2+ on the right side and 2+ on the left side.        Posterior tibial pulses are 2+ on the right side and 2+ on the left side.   Musculoskeletal:         General: No tenderness.      Right ankle: Normal.      Left ankle: Normal.      Right foot: No swelling, deformity or crepitus.      Left foot: No swelling, deformity or crepitus.      Comments: Adequate joint range of motion without pain, limitation, nor crepitation Bilateral feet and ankle joints. Muscle strength is 5/5 in all groups bilaterally.         Lymphadenopathy:      Comments: No palpable lymph nodes   Skin:     General: Skin is warm and dry.      Coloration: Skin is not jaundiced.      Findings: No erythema or rash.      Nails: There is no clubbing.   Neurological:      Mental Status: She is alert and oriented to person, place, and time.   Psychiatric:         Behavior: Behavior normal. Behavior is cooperative.         11.14.23    L sub 1st Met head    L 1st ray amp " site    11.29.23        12.14.23      1.17.24    1.8x1.0x0.1 cm w/ granular base and christelle wound maceration r 1st MTPJ     Healed amp site L 1st ray     1.24.24    1.0x0.8x0.1 cm plantar 1st MPJ R       Ulceration  Location: left sub 1st met  Measurements: 1.0x 0.5x 0.2cm  Drainage: serous  Wound base: granular  SOI: none        Assessment:      Encounter Diagnosis   Name Primary?    Ulcer of left foot with fat layer exposed Yes     Plan:     Georgina was seen today for wound care and dressing change.    Diagnoses and all orders for this visit:    Ulcer of left foot with fat layer exposed      I counseled the patient on her conditions, their implications and medical management.    Independent review of patients previous clinical notes  Culture results reviewed, doxy covered bacteria, pt advised to complete abx      Reviewed current medication(s), and lab(s) specific to foot ailment(s) with patient in detail. All questions answered     Debridement: With verbal consent, nonviable tissues on the R  foot were debrided beyond sub q utilizing a  sterile No. 3 scalpel and forceps. Minimal bleeding controlled with direct pressure  The patient tolerated this well.     Dressings: dilip   Offloading:Foam    Left foot amp site has healed well.  We will continue security football dressings for 1 additional week.    Follow-up:Patient is to return to the clinic in 1 week  for follow-up but should call [facility] immediately if any signs of infection, such as fever, chills, sweats, increased redness or pain.  Nikole KAYE MA assisted w/ application of football dressing under my direct supervision. Darco shoe applied to offload the area. Advised patient that this should be worn on the affected foot at all times when ambulating     Short-term goals include maintaining good offloading and minimizing bioburden, promoting granulation and epithelialization to healing.  Long-term goals include keeping the wound healed by good offloading and  medical management under the direction of internist.

## 2024-03-06 ENCOUNTER — OFFICE VISIT (OUTPATIENT)
Dept: PODIATRY | Facility: CLINIC | Age: 54
End: 2024-03-06
Payer: MEDICARE

## 2024-03-06 VITALS
RESPIRATION RATE: 18 BRPM | SYSTOLIC BLOOD PRESSURE: 158 MMHG | BODY MASS INDEX: 40.34 KG/M2 | DIASTOLIC BLOOD PRESSURE: 77 MMHG | HEIGHT: 67 IN | HEART RATE: 84 BPM | WEIGHT: 257 LBS

## 2024-03-06 DIAGNOSIS — L97.522 ULCER OF LEFT FOOT WITH FAT LAYER EXPOSED: Primary | ICD-10-CM

## 2024-03-06 PROCEDURE — 11042 DBRDMT SUBQ TIS 1ST 20SQCM/<: CPT | Mod: S$GLB,,, | Performed by: PODIATRIST

## 2024-03-06 PROCEDURE — 99499 UNLISTED E&M SERVICE: CPT | Mod: S$GLB,,, | Performed by: PODIATRIST

## 2024-03-06 PROCEDURE — 99999 PR PBB SHADOW E&M-EST. PATIENT-LVL IV: CPT | Mod: PBBFAC,,, | Performed by: PODIATRIST

## 2024-03-06 RX ORDER — PREGABALIN 50 MG/1
1 CAPSULE ORAL DAILY
COMMUNITY
Start: 2023-12-17 | End: 2024-04-03

## 2024-03-06 NOTE — PROGRESS NOTES
Subjective:     Patient ID: Georgina Arias is a 53 y.o. female.    Chief Complaint: Wound Care (Bilateral foot ulcers ) and Dressing Change (Football )    Georgina is a 53 y.o. female who presents to the clinic for evaluation and treatment of high risk feet. Georgina has a past medical history of Hyperlipidemia, Hypertension, Peptic ulcer disease, Peripheral artery disease, PONV (postoperative nausea and vomiting), Stage IV CKD, and Type II diabetes mellitus. The patient's chief complaint is diabetic foot ulcer, L s/p 1st ray amp performed on 8/25/23 by Dr Flannery. Recently see as inpatient for SSTI.       11.29.23: Patient has been in football dressing, ambulating in Darco shoe x 1 week with out issues   12.14.23: Patient has been in football dressing, ambulating in walking boot x 1 week with out issues   1.17.24: no issues w/. L foot. Reports drainage from r foot  1.24.24 :  Patient has been in football dressing, ambulating in Darco shoe x 1 week with out issues     2- pt presents today for wound care without any new issues  2- pt presents today, has been taking prescribed abx  3/6/2024 pt presents for wound care, states she has completely abx. No new issues.       PCP: Alonso Ling MD    Date Last Seen by PCP:   Chief Complaint   Patient presents with    Wound Care     Bilateral foot ulcers     Dressing Change     Football      Hemoglobin A1C   Date Value Ref Range Status   09/11/2023 7.2 (H) 4.0 - 5.6 % Final     Comment:     ADA Screening Guidelines:  5.7-6.4%  Consistent with prediabetes  >or=6.5%  Consistent with diabetes    High levels of fetal hemoglobin interfere with the HbA1C  assay. Heterozygous hemoglobin variants (HbS, HgC, etc)do  not significantly interfere with this assay.   However, presence of multiple variants may affect accuracy.     08/28/2018 7.9 (H) 4.0 - 5.6 % Final     Comment:     ADA Screening Guidelines:  5.7-6.4%  Consistent with prediabetes  >or=6.5%   "Consistent with diabetes  High levels of fetal hemoglobin interfere with the HbA1C  assay. Heterozygous hemoglobin variants (HbS, HgC, etc)do  not significantly interfere with this assay.   However, presence of multiple variants may affect accuracy.     03/24/2015 9.9 (H) 4.5 - 6.2 % Final       Review of Systems   Constitutional: Negative for chills, decreased appetite and fever.   Cardiovascular:  Negative for leg swelling.   Skin:  Positive for poor wound healing.   Musculoskeletal:  Negative for arthritis, joint pain, joint swelling and myalgias.   Gastrointestinal:  Negative for nausea and vomiting.   Neurological:  Positive for numbness, paresthesias and sensory change. Negative for loss of balance.        Objective:     Vitals:    03/06/24 0937   BP: (!) 158/77   Pulse: 84   Resp: 18   Weight: 116.6 kg (257 lb)   Height: 5' 7" (1.702 m)   PainSc: 0-No pain        Physical Exam  Vitals reviewed.   Constitutional:       Appearance: She is well-developed. She is not ill-appearing or diaphoretic.   Cardiovascular:      Pulses:           Dorsalis pedis pulses are 2+ on the right side and 2+ on the left side.        Posterior tibial pulses are 2+ on the right side and 2+ on the left side.   Musculoskeletal:         General: No tenderness.      Right ankle: Normal.      Left ankle: Normal.      Right foot: No swelling, deformity or crepitus.      Left foot: No swelling, deformity or crepitus.      Comments: Adequate joint range of motion without pain, limitation, nor crepitation Bilateral feet and ankle joints. Muscle strength is 5/5 in all groups bilaterally.         Lymphadenopathy:      Comments: No palpable lymph nodes   Skin:     General: Skin is warm and dry.      Coloration: Skin is not jaundiced.      Findings: No erythema or rash.      Nails: There is no clubbing.   Neurological:      Mental Status: She is alert and oriented to person, place, and time.   Psychiatric:         Behavior: Behavior normal. " Behavior is cooperative.         11.14.23    L sub 1st Met head    L 1st ray amp site    11.29.23 12.14.23      1.17.24    1.8x1.0x0.1 cm w/ granular base and christelle wound maceration r 1st MTPJ     Healed amp site L 1st ray     1.24.24    1.0x0.8x0.1 cm plantar 1st MPJ R       Ulceration  Location: left sub 1st met  Measurements: 0.8x 0.5x 0.2cm  Drainage: serous  Wound base: granular  SOI: none        Assessment:      Encounter Diagnosis   Name Primary?    Ulcer of left foot with fat layer exposed Yes     Plan:     Georgina was seen today for wound care and dressing change.    Diagnoses and all orders for this visit:    Ulcer of left foot with fat layer exposed      I counseled the patient on her conditions, their implications and medical management.    Independent review of patients previous clinical notes      Reviewed current medication(s), and lab(s) specific to foot ailment(s) with patient in detail. All questions answered     Debridement: With verbal consent, nonviable tissues on the R  foot were debrided beyond sub q utilizing a  sterile No. 3 scalpel and forceps. Minimal bleeding controlled with direct pressure  The patient tolerated this well.     Dressings: dilip   Offloading:Foam    Left foot amp site has healed well.  We will continue security football dressings for 1 additional week.    Follow-up:Patient is to return to the clinic in 1 week  for follow-up but should call [facility] immediately if any signs of infection, such as fever, chills, sweats, increased redness or pain.  Nikole KAYE MA assisted w/ application of football dressing under my direct supervision. Darco shoe applied to offload the area. Advised patient that this should be worn on the affected foot at all times when ambulating     Short-term goals include maintaining good offloading and minimizing bioburden, promoting granulation and epithelialization to healing.  Long-term goals include keeping the wound healed by good offloading and  medical management under the direction of internist.

## 2024-03-13 ENCOUNTER — TELEPHONE (OUTPATIENT)
Dept: PODIATRY | Facility: CLINIC | Age: 54
End: 2024-03-13
Payer: MEDICARE

## 2024-03-13 NOTE — TELEPHONE ENCOUNTER
----- Message from Feli Juarez sent at 3/13/2024 11:07 AM CDT -----  Contact: [phone]  Pt is calling to speak with someone in provider office is asking for a return call Pt states she is supposed to have weekly appts and was supposed to get  an appt  for these week but was not schedule pt is upset and states she needs an appt for March the April appt is to late please call pt at  [phone] (home)

## 2024-03-13 NOTE — TELEPHONE ENCOUNTER
Spoke with patient and schedule her with Dr Dooley for wound care in the next two weeks. Patient verbalized understanding of day and time, location of appointment will call back if need as.

## 2024-03-20 ENCOUNTER — OFFICE VISIT (OUTPATIENT)
Dept: PODIATRY | Facility: CLINIC | Age: 54
End: 2024-03-20
Payer: MEDICARE

## 2024-03-20 VITALS
HEART RATE: 88 BPM | HEIGHT: 67 IN | DIASTOLIC BLOOD PRESSURE: 72 MMHG | WEIGHT: 257.06 LBS | BODY MASS INDEX: 40.35 KG/M2 | SYSTOLIC BLOOD PRESSURE: 133 MMHG

## 2024-03-20 DIAGNOSIS — L97.512 FOOT ULCER, RIGHT, WITH FAT LAYER EXPOSED: Primary | ICD-10-CM

## 2024-03-20 PROCEDURE — 99499 UNLISTED E&M SERVICE: CPT | Mod: S$GLB,,, | Performed by: PODIATRIST

## 2024-03-20 PROCEDURE — 99999 PR PBB SHADOW E&M-EST. PATIENT-LVL IV: CPT | Mod: PBBFAC,,, | Performed by: PODIATRIST

## 2024-03-20 RX ORDER — GABAPENTIN 300 MG/1
300 CAPSULE ORAL 3 TIMES DAILY
COMMUNITY

## 2024-03-20 NOTE — PROGRESS NOTES
Subjective:     Patient ID: Georgina Arias is a 53 y.o. female.    Chief Complaint: Diabetes Mellitus (11/3/23 - Alonso Ling MD, PCP) and Wound Care (Wound care 1w)    Georgina is a 53 y.o. female who presents to the clinic for evaluation and treatment of high risk feet. Georgina has a past medical history of Hyperlipidemia, Hypertension, Peptic ulcer disease, Peripheral artery disease, PONV (postoperative nausea and vomiting), Stage IV CKD, and Type II diabetes mellitus. The patient's chief complaint is diabetic foot ulcer, L s/p 1st ray amp performed on 8/25/23 by Dr Flannery. Recently see as inpatient for SSTI.       11.29.23: Patient has been in football dressing, ambulating in Darco shoe x 1 week with out issues   12.14.23: Patient has been in football dressing, ambulating in walking boot x 1 week with out issues   1.17.24: no issues w/. L foot. Reports drainage from r foot  1.24.24 :  Patient has been in football dressing, ambulating in Darco shoe x 1 week with out issues     2- pt presents today for wound care without any new issues  2- pt presents today, has been taking prescribed abx  3/6/2024 pt presents for wound care, states she has completely abx. No new issues.     3/20/2024 wound care, no new issues      PCP: Alonso Ling MD    Date Last Seen by PCP:   Chief Complaint   Patient presents with    Diabetes Mellitus     11/3/23 - Alonso Ling MD, PCP    Wound Care     Wound care 1w     Hemoglobin A1C   Date Value Ref Range Status   09/11/2023 7.2 (H) 4.0 - 5.6 % Final     Comment:     ADA Screening Guidelines:  5.7-6.4%  Consistent with prediabetes  >or=6.5%  Consistent with diabetes    High levels of fetal hemoglobin interfere with the HbA1C  assay. Heterozygous hemoglobin variants (HbS, HgC, etc)do  not significantly interfere with this assay.   However, presence of multiple variants may affect accuracy.     08/28/2018 7.9 (H) 4.0 - 5.6 % Final      "Comment:     ADA Screening Guidelines:  5.7-6.4%  Consistent with prediabetes  >or=6.5%  Consistent with diabetes  High levels of fetal hemoglobin interfere with the HbA1C  assay. Heterozygous hemoglobin variants (HbS, HgC, etc)do  not significantly interfere with this assay.   However, presence of multiple variants may affect accuracy.     03/24/2015 9.9 (H) 4.5 - 6.2 % Final       Review of Systems   Constitutional: Negative for chills, decreased appetite and fever.   Cardiovascular:  Negative for leg swelling.   Skin:  Positive for poor wound healing.   Musculoskeletal:  Negative for arthritis, joint pain, joint swelling and myalgias.   Gastrointestinal:  Negative for nausea and vomiting.   Neurological:  Positive for numbness, paresthesias and sensory change. Negative for loss of balance.        Objective:     Vitals:    03/20/24 0838   BP: 133/72   Pulse: 88   Weight: 116.6 kg (257 lb 0.9 oz)   Height: 5' 7" (1.702 m)   PainSc: 0-No pain   PainLoc: Foot        Physical Exam  Vitals reviewed.   Constitutional:       Appearance: She is well-developed. She is not ill-appearing or diaphoretic.   Cardiovascular:      Pulses:           Dorsalis pedis pulses are 2+ on the right side and 2+ on the left side.        Posterior tibial pulses are 2+ on the right side and 2+ on the left side.   Musculoskeletal:         General: No tenderness.      Right ankle: Normal.      Left ankle: Normal.      Right foot: No swelling, deformity or crepitus.      Left foot: No swelling, deformity or crepitus.      Comments: Adequate joint range of motion without pain, limitation, nor crepitation Bilateral feet and ankle joints. Muscle strength is 5/5 in all groups bilaterally.         Lymphadenopathy:      Comments: No palpable lymph nodes   Skin:     General: Skin is warm and dry.      Coloration: Skin is not jaundiced.      Findings: No erythema or rash.      Nails: There is no clubbing.   Neurological:      Mental Status: She is alert " and oriented to person, place, and time.   Psychiatric:         Behavior: Behavior normal. Behavior is cooperative.         11.14.23    L sub 1st Met head    L 1st ray amp site    11.29.23 12.14.23      1.17.24    1.8x1.0x0.1 cm w/ granular base and christelle wound maceration r 1st MTPJ     Healed amp site L 1st ray     1.24.24    1.0x0.8x0.1 cm plantar 1st MPJ R       Ulceration  Location: left sub 1st met  Measurements: 1.0x 0.5x 0.2cm  Drainage: serous  Wound base: granular  SOI: none        Assessment:      Encounter Diagnosis   Name Primary?    Foot ulcer, right, with fat layer exposed Yes     Plan:     Georgina was seen today for diabetes mellitus and wound care.    Diagnoses and all orders for this visit:    Foot ulcer, right, with fat layer exposed      I counseled the patient on her conditions, their implications and medical management.    Independent review of patients previous clinical notes      Reviewed current medication(s), and lab(s) specific to foot ailment(s) with patient in detail. All questions answered     Debridement: With verbal consent, nonviable tissues on the R  foot were debrided beyond sub q utilizing a  sterile No. 3 scalpel and forceps. Minimal bleeding controlled with direct pressure  The patient tolerated this well.     Dressings: iodosorb  Offloading:Foam    Left foot amp site has healed well.  We will continue security football dressings for 1 additional week.    Follow-up:Patient is to return to the clinic in 1 week  for follow-up but should call [facility] immediately if any signs of infection, such as fever, chills, sweats, increased redness or pain.  Nikole KAYE MA assisted w/ application of football dressing under my direct supervision. Darco shoe applied to offload the area. Advised patient that this should be worn on the affected foot at all times when ambulating     Short-term goals include maintaining good offloading and minimizing bioburden, promoting granulation and  epithelialization to healing.  Long-term goals include keeping the wound healed by good offloading and medical management under the direction of internist.

## 2024-03-25 DIAGNOSIS — L97.522 ULCER OF LEFT FOOT WITH FAT LAYER EXPOSED: Primary | ICD-10-CM

## 2024-03-26 ENCOUNTER — TELEPHONE (OUTPATIENT)
Dept: PODIATRY | Facility: CLINIC | Age: 54
End: 2024-03-26
Payer: MEDICARE

## 2024-03-27 ENCOUNTER — OFFICE VISIT (OUTPATIENT)
Dept: PODIATRY | Facility: CLINIC | Age: 54
End: 2024-03-27
Payer: MEDICARE

## 2024-03-27 VITALS
HEIGHT: 67 IN | RESPIRATION RATE: 18 BRPM | WEIGHT: 255.31 LBS | BODY MASS INDEX: 40.07 KG/M2 | SYSTOLIC BLOOD PRESSURE: 179 MMHG | DIASTOLIC BLOOD PRESSURE: 79 MMHG | HEART RATE: 85 BPM

## 2024-03-27 DIAGNOSIS — E11.42 DM TYPE 2 WITH DIABETIC PERIPHERAL NEUROPATHY: ICD-10-CM

## 2024-03-27 DIAGNOSIS — L97.512 FOOT ULCER, RIGHT, WITH FAT LAYER EXPOSED: Primary | ICD-10-CM

## 2024-03-27 DIAGNOSIS — I73.9 PAD (PERIPHERAL ARTERY DISEASE): ICD-10-CM

## 2024-03-27 PROCEDURE — 11042 DBRDMT SUBQ TIS 1ST 20SQCM/<: CPT | Mod: S$GLB,,, | Performed by: PODIATRIST

## 2024-03-27 PROCEDURE — 99499 UNLISTED E&M SERVICE: CPT | Mod: S$GLB,,, | Performed by: PODIATRIST

## 2024-03-27 PROCEDURE — 99999 PR PBB SHADOW E&M-EST. PATIENT-LVL IV: CPT | Mod: PBBFAC,,, | Performed by: PODIATRIST

## 2024-04-02 ENCOUNTER — TELEPHONE (OUTPATIENT)
Dept: PODIATRY | Facility: CLINIC | Age: 54
End: 2024-04-02
Payer: MEDICARE

## 2024-04-03 ENCOUNTER — OFFICE VISIT (OUTPATIENT)
Dept: PODIATRY | Facility: CLINIC | Age: 54
End: 2024-04-03
Payer: MEDICARE

## 2024-04-03 VITALS
SYSTOLIC BLOOD PRESSURE: 86 MMHG | HEIGHT: 67 IN | HEART RATE: 70 BPM | BODY MASS INDEX: 40.07 KG/M2 | WEIGHT: 255.31 LBS | DIASTOLIC BLOOD PRESSURE: 60 MMHG

## 2024-04-03 DIAGNOSIS — L97.512 FOOT ULCER, RIGHT, WITH FAT LAYER EXPOSED: Primary | ICD-10-CM

## 2024-04-03 PROCEDURE — 99999 PR PBB SHADOW E&M-EST. PATIENT-LVL IV: CPT | Mod: PBBFAC,,, | Performed by: PODIATRIST

## 2024-04-03 PROCEDURE — 99499 UNLISTED E&M SERVICE: CPT | Mod: S$GLB,,, | Performed by: PODIATRIST

## 2024-04-03 PROCEDURE — 11042 DBRDMT SUBQ TIS 1ST 20SQCM/<: CPT | Mod: S$GLB,,, | Performed by: PODIATRIST

## 2024-04-03 NOTE — PROGRESS NOTES
Subjective:     Patient ID: Georgina Arias is a 53 y.o. female.    Chief Complaint: Wound Care (right) and Foot Pain (Left foot pain when putting pressure to walk)    Georgina is a 53 y.o. female who presents to the clinic for evaluation and treatment of high risk feet. Georgina has a past medical history of Hyperlipidemia, Hypertension, Peptic ulcer disease, Peripheral artery disease, PONV (postoperative nausea and vomiting), Stage IV CKD, and Type II diabetes mellitus. The patient's chief complaint is diabetic foot ulcer, right foot.     PCP: Alonso Ling MD    Date Last Seen by PCP:   Chief Complaint   Patient presents with    Wound Care     right    Foot Pain     Left foot pain when putting pressure to walk     Hemoglobin A1C   Date Value Ref Range Status   09/11/2023 7.2 (H) 4.0 - 5.6 % Final     Comment:     ADA Screening Guidelines:  5.7-6.4%  Consistent with prediabetes  >or=6.5%  Consistent with diabetes    High levels of fetal hemoglobin interfere with the HbA1C  assay. Heterozygous hemoglobin variants (HbS, HgC, etc)do  not significantly interfere with this assay.   However, presence of multiple variants may affect accuracy.     08/28/2018 7.9 (H) 4.0 - 5.6 % Final     Comment:     ADA Screening Guidelines:  5.7-6.4%  Consistent with prediabetes  >or=6.5%  Consistent with diabetes  High levels of fetal hemoglobin interfere with the HbA1C  assay. Heterozygous hemoglobin variants (HbS, HgC, etc)do  not significantly interfere with this assay.   However, presence of multiple variants may affect accuracy.     03/24/2015 9.9 (H) 4.5 - 6.2 % Final       Review of Systems   Constitutional: Negative for chills, decreased appetite and fever.   Cardiovascular:  Negative for leg swelling.   Skin:  Positive for color change, dry skin and poor wound healing.   Musculoskeletal:  Negative for arthritis, joint pain, joint swelling and myalgias.   Gastrointestinal:  Negative for nausea and vomiting.  "  Neurological:  Positive for numbness, paresthesias and sensory change. Negative for loss of balance.        Objective:     Vitals:    04/03/24 0714   BP: (!) 86/60   Pulse: 70   Weight: 115.8 kg (255 lb 4.7 oz)   Height: 5' 7" (1.702 m)   PainSc:   2   PainLoc: Foot        Physical Exam  Vitals reviewed.   Constitutional:       Appearance: She is well-developed. She is not ill-appearing or diaphoretic.   Cardiovascular:      Pulses:           Dorsalis pedis pulses are 2+ on the right side and 2+ on the left side.        Posterior tibial pulses are 2+ on the right side and 2+ on the left side.   Musculoskeletal:         General: No tenderness.      Right ankle: Normal.      Left ankle: Normal.      Right foot: No swelling, deformity or crepitus.      Left foot: No swelling, deformity or crepitus.      Comments: Adequate joint range of motion without pain, limitation, nor crepitation Bilateral feet and ankle joints. Muscle strength is 5/5 in all groups bilaterally.         Lymphadenopathy:      Comments: No palpable lymph nodes   Skin:     General: Skin is warm and dry.      Coloration: Skin is not jaundiced.      Findings: No erythema or rash.      Nails: There is no clubbing.   Neurological:      Mental Status: She is alert and oriented to person, place, and time.   Psychiatric:         Behavior: Behavior normal. Behavior is cooperative.         11.14.23    L sub 1st Met head    L 1st ray amp site    11.29.23 12.14.23      1.17.24    1.8x1.0x0.1 cm w/ granular base and christelle wound maceration r 1st MTPJ     Healed amp site L 1st ray     1.24.24    1.0x0.8x0.1 cm plantar 1st MPJ R       Ulceration  Location: left sub 1st met  Measurements: 1.0x 0.7x 0.2cm  Drainage: serous  Wound base: granular  SOI: none        Assessment:      Encounter Diagnosis   Name Primary?    Foot ulcer, right, with fat layer exposed Yes     Plan:     Georgina was seen today for wound care and foot pain.    Diagnoses and all orders for " this visit:    Foot ulcer, right, with fat layer exposed      I counseled the patient on her conditions, their implications and medical management.    Independent review of patients previous clinical notes      Reviewed current medication(s), and lab(s) specific to foot ailment(s) with patient in detail. All questions answered     Debridement: With verbal consent, nonviable tissues on the R  foot were debrided beyond sub q utilizing a  sterile No. 3 scalpel and forceps. Minimal bleeding controlled with direct pressure  The patient tolerated this well.     Dressings: iodosorb  Offloading:Foam    Left foot amp site has healed well.  We will continue security football dressings for 1 additional week.    Follow-up:Patient is to return to the clinic in 1 week  for follow-up but should call [facility] immediately if any signs of infection, such as fever, chills, sweats, increased redness or pain.   MA assisted w/ application of football dressing under my direct supervision. Darco shoe applied to offload the area. Advised patient that this should be worn on the affected foot at all times when ambulating     Short-term goals include maintaining good offloading and minimizing bioburden, promoting granulation and epithelialization to healing.  Long-term goals include keeping the wound healed by good offloading and medical management under the direction of internist.

## 2024-04-08 ENCOUNTER — TELEPHONE (OUTPATIENT)
Dept: PODIATRY | Facility: CLINIC | Age: 54
End: 2024-04-08
Payer: MEDICARE

## 2024-04-08 ENCOUNTER — TELEPHONE (OUTPATIENT)
Dept: PODIATRY | Facility: CLINIC | Age: 54
End: 2024-04-08

## 2024-04-08 NOTE — TELEPHONE ENCOUNTER
Left voice message for patient to call office at [phone] to help with questions and concerns with appointment time.

## 2024-04-08 NOTE — TELEPHONE ENCOUNTER
Pt stated the appt dated 4/10 is supposed to be for 12. Pt stated aint no if ands or buts about it , appt has to be for 12 that day. Pls call to discuss.

## 2024-04-10 ENCOUNTER — OFFICE VISIT (OUTPATIENT)
Dept: PODIATRY | Facility: CLINIC | Age: 54
End: 2024-04-10
Payer: MEDICARE

## 2024-04-10 ENCOUNTER — TELEPHONE (OUTPATIENT)
Dept: PODIATRY | Facility: CLINIC | Age: 54
End: 2024-04-10
Payer: MEDICARE

## 2024-04-10 VITALS
HEART RATE: 86 BPM | SYSTOLIC BLOOD PRESSURE: 154 MMHG | WEIGHT: 255.31 LBS | DIASTOLIC BLOOD PRESSURE: 71 MMHG | BODY MASS INDEX: 40.07 KG/M2 | HEIGHT: 67 IN

## 2024-04-10 DIAGNOSIS — L97.512 ULCER OF RIGHT FOOT WITH FAT LAYER EXPOSED: Primary | ICD-10-CM

## 2024-04-10 PROCEDURE — 99499 UNLISTED E&M SERVICE: CPT | Mod: S$GLB,,, | Performed by: PODIATRIST

## 2024-04-10 PROCEDURE — 11042 DBRDMT SUBQ TIS 1ST 20SQCM/<: CPT | Mod: S$GLB,,, | Performed by: PODIATRIST

## 2024-04-10 PROCEDURE — 99999 PR PBB SHADOW E&M-EST. PATIENT-LVL III: CPT | Mod: PBBFAC,,, | Performed by: PODIATRIST

## 2024-04-10 NOTE — PROGRESS NOTES
Subjective:     Patient ID: Georgina Arias is a 53 y.o. female.    Chief Complaint: Wound Care (Wound care 1w B/L, football on left has helped with pain, HH Nurse stated that she saw tunneling under R great toe)    Georgina is a 53 y.o. female who presents to the clinic for evaluation and treatment of high risk feet. Georgina has a past medical history of Hyperlipidemia, Hypertension, Peptic ulcer disease, Peripheral artery disease, PONV (postoperative nausea and vomiting), Stage IV CKD, and Type II diabetes mellitus. The patient's chief complaint is diabetic foot ulcer, right foot.     PCP: Alonso Ling MD    Date Last Seen by PCP:   Chief Complaint   Patient presents with    Wound Care     Wound care 1w B/L, football on left has helped with pain, HH Nurse stated that she saw tunneling under R great toe     Hemoglobin A1C   Date Value Ref Range Status   09/11/2023 7.2 (H) 4.0 - 5.6 % Final     Comment:     ADA Screening Guidelines:  5.7-6.4%  Consistent with prediabetes  >or=6.5%  Consistent with diabetes    High levels of fetal hemoglobin interfere with the HbA1C  assay. Heterozygous hemoglobin variants (HbS, HgC, etc)do  not significantly interfere with this assay.   However, presence of multiple variants may affect accuracy.     08/28/2018 7.9 (H) 4.0 - 5.6 % Final     Comment:     ADA Screening Guidelines:  5.7-6.4%  Consistent with prediabetes  >or=6.5%  Consistent with diabetes  High levels of fetal hemoglobin interfere with the HbA1C  assay. Heterozygous hemoglobin variants (HbS, HgC, etc)do  not significantly interfere with this assay.   However, presence of multiple variants may affect accuracy.     03/24/2015 9.9 (H) 4.5 - 6.2 % Final       Review of Systems   Constitutional: Negative for chills, decreased appetite and fever.   Cardiovascular:  Negative for leg swelling.   Skin:  Positive for color change, dry skin and poor wound healing.   Musculoskeletal:  Negative for arthritis,  "joint pain, joint swelling and myalgias.   Gastrointestinal:  Negative for nausea and vomiting.   Neurological:  Positive for numbness, paresthesias and sensory change. Negative for loss of balance.        Objective:     Vitals:    04/10/24 1236   BP: (!) 154/71   Pulse: 86   Weight: 115.8 kg (255 lb 4.7 oz)   Height: 5' 7" (1.702 m)   PainSc:   3   PainLoc: Foot        Physical Exam  Vitals reviewed.   Constitutional:       Appearance: She is well-developed. She is not ill-appearing or diaphoretic.   Cardiovascular:      Pulses:           Dorsalis pedis pulses are 2+ on the right side and 2+ on the left side.        Posterior tibial pulses are 2+ on the right side and 2+ on the left side.   Musculoskeletal:         General: No tenderness.      Right ankle: Normal.      Left ankle: Normal.      Right foot: No swelling, deformity or crepitus.      Left foot: No swelling, deformity or crepitus.      Comments: Adequate joint range of motion without pain, limitation, nor crepitation Bilateral feet and ankle joints. Muscle strength is 5/5 in all groups bilaterally.      Amputations of left foot with deformity   Lymphadenopathy:      Comments: No palpable lymph nodes   Skin:     General: Skin is warm and dry.      Coloration: Skin is not jaundiced.      Findings: Wound present. No abscess, erythema or rash.      Nails: There is no clubbing.   Neurological:      Mental Status: She is alert and oriented to person, place, and time.   Psychiatric:         Behavior: Behavior normal. Behavior is cooperative.         11.14.23    L sub 1st Met head    L 1st ray amp site    11.29.23 12.14.23 1.17.24    1.8x1.0x0.1 cm w/ granular base and christelle wound maceration r 1st MTPJ     Healed amp site L 1st ray     1.24.24    1.0x0.8x0.1 cm plantar 1st MPJ R       Ulceration  Location: right sub 1st met  Measurements: 3.5x 1.7x 0.2cm  Drainage: serous  Wound base: granular  SOI: none        Assessment:      Encounter Diagnosis "   Name Primary?    Ulcer of left foot with fat layer exposed Yes     Plan:     Georgina was seen today for wound care.    Diagnoses and all orders for this visit:    Ulcer of left foot with fat layer exposed      I counseled the patient on her conditions, their implications and medical management.    Independent review of patients previous clinical notes      Reviewed current medication(s), and lab(s) specific to foot ailment(s) with patient in detail. All questions answered     Debridement: With verbal consent, nonviable tissues on the R  foot were debrided beyond sub q utilizing a  sterile No. 3 scalpel and forceps. Minimal bleeding controlled with direct pressure  The patient tolerated this well.     Dressings: dilip  Offloading:Foam    Left foot amp site has healed well.  We will continue security football dressings     Follow-up:Patient is to return to the clinic in 1 week  for follow-up but should call [facility] immediately if any signs of infection, such as fever, chills, sweats, increased redness or pain.   MA assisted w/ application of football dressing under my direct supervision. Darco shoe applied to offload the area. Advised patient that this should be worn on the affected foot at all times when ambulating     Short-term goals include maintaining good offloading and minimizing bioburden, promoting granulation and epithelialization to healing.  Long-term goals include keeping the wound healed by good offloading and medical management under the direction of internist.

## 2024-04-24 ENCOUNTER — OFFICE VISIT (OUTPATIENT)
Dept: PODIATRY | Facility: CLINIC | Age: 54
End: 2024-04-24
Payer: MEDICARE

## 2024-04-24 VITALS
BODY MASS INDEX: 39.79 KG/M2 | SYSTOLIC BLOOD PRESSURE: 162 MMHG | HEIGHT: 67 IN | HEART RATE: 69 BPM | WEIGHT: 253.5 LBS | RESPIRATION RATE: 18 BRPM | DIASTOLIC BLOOD PRESSURE: 69 MMHG

## 2024-04-24 DIAGNOSIS — E11.42 DM TYPE 2 WITH DIABETIC PERIPHERAL NEUROPATHY: ICD-10-CM

## 2024-04-24 DIAGNOSIS — L97.512 FOOT ULCER, RIGHT, WITH FAT LAYER EXPOSED: ICD-10-CM

## 2024-04-24 DIAGNOSIS — I73.9 PAD (PERIPHERAL ARTERY DISEASE): Primary | ICD-10-CM

## 2024-04-24 PROCEDURE — 11042 DBRDMT SUBQ TIS 1ST 20SQCM/<: CPT | Mod: S$GLB,,, | Performed by: PODIATRIST

## 2024-04-24 PROCEDURE — 99999 PR PBB SHADOW E&M-EST. PATIENT-LVL IV: CPT | Mod: PBBFAC,,, | Performed by: PODIATRIST

## 2024-04-24 PROCEDURE — 99499 UNLISTED E&M SERVICE: CPT | Mod: S$GLB,,, | Performed by: PODIATRIST

## 2024-04-29 ENCOUNTER — TELEPHONE (OUTPATIENT)
Dept: PODIATRY | Facility: CLINIC | Age: 54
End: 2024-04-29
Payer: MEDICARE

## 2024-04-29 NOTE — PROGRESS NOTES
Subjective:     Patient ID: Georgina Arias is a 53 y.o. female.    Chief Complaint: Wound Care and Dressing Change    Georgina is a 53 y.o. female who presents to the clinic for evaluation and treatment of high risk feet. Georgina has a past medical history of Hyperlipidemia, Hypertension, Peptic ulcer disease, Peripheral artery disease, PONV (postoperative nausea and vomiting), Stage IV CKD, and Type II diabetes mellitus. The patient's chief complaint is diabetic foot ulcer, L s/p 1st ray amp performed on 8/25/23 by Dr Flannery. Recently see as inpatient for SSTI.       11.29.23: Patient has been in football dressing, ambulating in Darco shoe x 1 week with out issues   12.14.23: Patient has been in football dressing, ambulating in walking boot x 1 week with out issues   1.17.24: no issues w/. L foot. Reports drainage from r foot  1.24.24 :  Patient has been in football dressing, ambulating in Darco shoe x 1 week with out issues   1.31.24: Patient has been in football dressing, ambulating in Darco shoe x 1 week with out issues   2.7.24: Patient has been in football dressing, ambulating in Darco shoe x 1 week with out issues     PCP: Alonso Ling MD    Date Last Seen by PCP:   Chief Complaint   Patient presents with    Wound Care    Dressing Change     Hemoglobin A1C   Date Value Ref Range Status   09/11/2023 7.2 (H) 4.0 - 5.6 % Final     Comment:     ADA Screening Guidelines:  5.7-6.4%  Consistent with prediabetes  >or=6.5%  Consistent with diabetes    High levels of fetal hemoglobin interfere with the HbA1C  assay. Heterozygous hemoglobin variants (HbS, HgC, etc)do  not significantly interfere with this assay.   However, presence of multiple variants may affect accuracy.     08/28/2018 7.9 (H) 4.0 - 5.6 % Final     Comment:     ADA Screening Guidelines:  5.7-6.4%  Consistent with prediabetes  >or=6.5%  Consistent with diabetes  High levels of fetal hemoglobin interfere with the HbA1C  assay.  "Heterozygous hemoglobin variants (HbS, HgC, etc)do  not significantly interfere with this assay.   However, presence of multiple variants may affect accuracy.     03/24/2015 9.9 (H) 4.5 - 6.2 % Final       Review of Systems   Constitutional: Negative for chills, decreased appetite and fever.   Cardiovascular:  Negative for leg swelling.   Skin:  Positive for dry skin.   Musculoskeletal:  Negative for arthritis, joint pain, joint swelling and myalgias.   Gastrointestinal:  Negative for nausea and vomiting.   Neurological:  Negative for loss of balance, numbness and paresthesias.        Objective:     Vitals:    02/07/24 1019   BP: (!) 95/56   Pulse: 78   Resp: 18   Weight: 115.7 kg (255 lb)   Height: 5' 7" (1.702 m)   PainSc: 0-No pain        Physical Exam  Vitals reviewed.   Constitutional:       Appearance: She is well-developed. She is not ill-appearing or diaphoretic.   Cardiovascular:      Pulses:           Dorsalis pedis pulses are 2+ on the right side and 2+ on the left side.        Posterior tibial pulses are 2+ on the right side and 2+ on the left side.   Musculoskeletal:         General: No tenderness or signs of injury.      Right ankle: Normal.      Left ankle: Normal.      Right foot: No swelling, deformity or crepitus.      Left foot: No swelling, deformity or crepitus.      Comments: Adequate joint range of motion without pain, limitation, nor crepitation Bilateral feet and ankle joints. Muscle strength is 5/5 in all groups bilaterally.         Lymphadenopathy:      Comments: No palpable lymph nodes   Skin:     General: Skin is warm and dry.      Coloration: Skin is not jaundiced.      Findings: No erythema or rash.      Nails: There is no clubbing.   Neurological:      Mental Status: She is alert and oriented to person, place, and time.   Psychiatric:         Behavior: Behavior normal. Behavior is cooperative.         11.14.23    L sub 1st Met head    L 1st ray amp " site    11.29.23        12.14.23      1.17.24    1.8x1.0x0.1 cm w/ granular base and christelle wound maceration r 1st MTPJ     Healed amp site L 1st ray     1.24.24    1.0x0.8x0.1 cm plantar 1st MPJ R   Healed ulcer L     1.31.24      2.7.24  Assessment:      Encounter Diagnoses   Name Primary?    PAD (peripheral artery disease) Yes    Foot ulcer, right, with fat layer exposed     Healed ulcer of left foot     DM type 2 with diabetic peripheral neuropathy      Plan:     Georgina was seen today for wound care and dressing change.    Diagnoses and all orders for this visit:    PAD (peripheral artery disease)    Foot ulcer, right, with fat layer exposed    Healed ulcer of left foot    DM type 2 with diabetic peripheral neuropathy      I counseled the patient on her conditions, their implications and medical management.    Independent review of patients previous clinical notes    Reviewed current medication(s), and lab(s) specific to foot ailment(s) with patient in detail. All questions answered     Debridement: With verbal consent, nonviable tissues on the R  foot were debrided beyond sub q utilizing a  sterile No. 3 scalpel and forceps. Minimal bleeding controlled with direct pressure  The patient tolerated this well.     Dressings: dilip   Offloading:Foam    Left foot amp site has healed well.  We will continue security football dressings for 1 additional week.    Follow-up:Patient is to return to the clinic in 1 week  for follow-up but should call [facility] immediately if any signs of infection, such as fever, chills, sweats, increased redness or pain.  Nikole KAYE MA assisted w/ application of football dressing under my direct supervision. Darco shoe applied to offload the area. Advised patient that this should be worn on the affected foot at all times when ambulating     Short-term goals include maintaining good offloading and minimizing bioburden, promoting granulation and epithelialization to healing.  Long-term goals  include keeping the wound healed by good offloading and medical management under the direction of internist.

## 2024-04-29 NOTE — PROGRESS NOTES
Subjective:     Patient ID: Georgina Arias is a 53 y.o. female.    Chief Complaint: Wound Care and Dressing Change    Georgina is a 53 y.o. female who presents to the clinic for evaluation and treatment of high risk feet. Georgina has a past medical history of Hyperlipidemia, Hypertension, Peptic ulcer disease, Peripheral artery disease, PONV (postoperative nausea and vomiting), Stage IV CKD, and Type II diabetes mellitus. The patient's chief complaint is diabetic foot ulcer, right foot.       PCP: Alonso Ling MD    Date Last Seen by PCP:   Chief Complaint   Patient presents with    Wound Care    Dressing Change     Hemoglobin A1C   Date Value Ref Range Status   09/11/2023 7.2 (H) 4.0 - 5.6 % Final     Comment:     ADA Screening Guidelines:  5.7-6.4%  Consistent with prediabetes  >or=6.5%  Consistent with diabetes    High levels of fetal hemoglobin interfere with the HbA1C  assay. Heterozygous hemoglobin variants (HbS, HgC, etc)do  not significantly interfere with this assay.   However, presence of multiple variants may affect accuracy.     08/28/2018 7.9 (H) 4.0 - 5.6 % Final     Comment:     ADA Screening Guidelines:  5.7-6.4%  Consistent with prediabetes  >or=6.5%  Consistent with diabetes  High levels of fetal hemoglobin interfere with the HbA1C  assay. Heterozygous hemoglobin variants (HbS, HgC, etc)do  not significantly interfere with this assay.   However, presence of multiple variants may affect accuracy.     03/24/2015 9.9 (H) 4.5 - 6.2 % Final       Review of Systems   Constitutional: Negative for chills, decreased appetite and fever.   Cardiovascular:  Negative for leg swelling.   Skin:  Positive for color change, dry skin and poor wound healing.   Musculoskeletal:  Negative for arthritis, joint pain, joint swelling and myalgias.   Gastrointestinal:  Negative for nausea and vomiting.   Neurological:  Positive for numbness, paresthesias and sensory change. Negative for loss of  "balance.        Objective:     Vitals:    04/24/24 0906   BP: (!) 162/69   Pulse: 69   Resp: 18   Weight: 115 kg (253 lb 8.5 oz)   Height: 5' 7" (1.702 m)   PainSc:   5   PainLoc: Foot        Physical Exam  Vitals reviewed.   Constitutional:       Appearance: She is well-developed. She is not ill-appearing or diaphoretic.   Cardiovascular:      Pulses:           Dorsalis pedis pulses are 2+ on the right side and 2+ on the left side.        Posterior tibial pulses are 2+ on the right side and 2+ on the left side.   Musculoskeletal:         General: No tenderness.      Right ankle: Normal.      Left ankle: Normal.      Right foot: No swelling, deformity or crepitus.      Left foot: No swelling, deformity or crepitus.      Comments: Adequate joint range of motion without pain, limitation, nor crepitation Bilateral feet and ankle joints. Muscle strength is 5/5 in all groups bilaterally.      Amputations of left foot with deformity   Lymphadenopathy:      Comments: No palpable lymph nodes   Skin:     General: Skin is warm and dry.      Coloration: Skin is not jaundiced.      Findings: Wound present. No abscess, erythema or rash.      Nails: There is no clubbing.   Neurological:      Mental Status: She is alert and oriented to person, place, and time.   Psychiatric:         Behavior: Behavior normal. Behavior is cooperative.         11.14.23    L sub 1st Met head    L 1st ray amp site    11.29.23 12.14.23      1.17.24    1.8x1.0x0.1 cm w/ granular base and christelle wound maceration r 1st MTPJ     Healed amp site L 1st ray     1.24.24    1.0x0.8x0.1 cm plantar 1st MPJ R       Ulceration  Location: left sub 1st met  Measurements: 3.5x 1.7x 0.2cm  Drainage: serous  Wound base: granular  SOI: none      0.4x0.3x.1 cm   Ulceration granular wound bed plantar right 1st MTPJ.  Dry intact periwound.    Assessment:      Encounter Diagnoses   Name Primary?    PAD (peripheral artery disease) Yes    DM type 2 with diabetic " peripheral neuropathy     Foot ulcer, right, with fat layer exposed      Plan:     Georgina was seen today for wound care and dressing change.    Diagnoses and all orders for this visit:    PAD (peripheral artery disease)    DM type 2 with diabetic peripheral neuropathy    Foot ulcer, right, with fat layer exposed      I counseled the patient on her conditions, their implications and medical management.    Independent review of patients previous clinical notes      Reviewed current medication(s), and lab(s) specific to foot ailment(s) with patient in detail. All questions answered     Debridement: With verbal consent, nonviable tissues on the R  foot were debrided beyond sub q utilizing a  sterile No. 3 scalpel and forceps. Minimal bleeding controlled with direct pressure  The patient tolerated this well.     Dressings: dilip  Offloading:Foam    Left foot amp site has healed well.  We will continue security football dressings     Follow-up:Patient is to return to the clinic in 1 week  for follow-up but should call [facility] immediately if any signs of infection, such as fever, chills, sweats, increased redness or pain.   MA assisted w/ application of football dressing under my direct supervision. Darco shoe applied to offload the area. Advised patient that this should be worn on the affected foot at all times when ambulating     Short-term goals include maintaining good offloading and minimizing bioburden, promoting granulation and epithelialization to healing.  Long-term goals include keeping the wound healed by good offloading and medical management under the direction of internist.

## 2024-04-29 NOTE — PROGRESS NOTES
Subjective:     Patient ID: Georgina Arias is a 53 y.o. female.    Chief Complaint: Wound Care and Dressing Change    Georgina is a 53 y.o. female who presents to the clinic for evaluation and treatment of high risk feet. Georgina has a past medical history of Hyperlipidemia, Hypertension, Peptic ulcer disease, Peripheral artery disease, PONV (postoperative nausea and vomiting), Stage IV CKD, and Type II diabetes mellitus. The patient's chief complaint is diabetic foot ulcer, L s/p 1st ray amp performed on 8/25/23 by Dr Flannery. Recently see as inpatient for SSTI.       11.29.23: Patient has been in football dressing, ambulating in Darco shoe x 1 week with out issues   12.14.23: Patient has been in football dressing, ambulating in walking boot x 1 week with out issues   1.17.24: no issues w/. L foot. Reports drainage from r foot  1.24.24 :  Patient has been in football dressing, ambulating in Darco shoe x 1 week with out issues   1.31.24: Patient has been in football dressing, ambulating in Darco shoe x 1 week with out issues     PCP: Alonso Ling MD    Date Last Seen by PCP:   Chief Complaint   Patient presents with    Wound Care    Dressing Change     Hemoglobin A1C   Date Value Ref Range Status   09/11/2023 7.2 (H) 4.0 - 5.6 % Final     Comment:     ADA Screening Guidelines:  5.7-6.4%  Consistent with prediabetes  >or=6.5%  Consistent with diabetes    High levels of fetal hemoglobin interfere with the HbA1C  assay. Heterozygous hemoglobin variants (HbS, HgC, etc)do  not significantly interfere with this assay.   However, presence of multiple variants may affect accuracy.     08/28/2018 7.9 (H) 4.0 - 5.6 % Final     Comment:     ADA Screening Guidelines:  5.7-6.4%  Consistent with prediabetes  >or=6.5%  Consistent with diabetes  High levels of fetal hemoglobin interfere with the HbA1C  assay. Heterozygous hemoglobin variants (HbS, HgC, etc)do  not significantly interfere with this assay.   However,  "presence of multiple variants may affect accuracy.     03/24/2015 9.9 (H) 4.5 - 6.2 % Final       Review of Systems   Constitutional: Negative for chills, decreased appetite and fever.   Cardiovascular:  Negative for leg swelling.   Skin:  Positive for dry skin.   Musculoskeletal:  Negative for arthritis, joint pain, joint swelling and myalgias.   Gastrointestinal:  Negative for nausea and vomiting.   Neurological:  Negative for loss of balance, numbness and paresthesias.        Objective:     Vitals:    01/31/24 1423   BP: 137/73   Pulse: 86   Resp: 18   Weight: 115.7 kg (255 lb)   Height: 5' 7" (1.702 m)   PainSc: 0-No pain        Physical Exam  Vitals reviewed.   Constitutional:       Appearance: She is well-developed. She is not ill-appearing or diaphoretic.   Cardiovascular:      Pulses:           Dorsalis pedis pulses are 2+ on the right side and 2+ on the left side.        Posterior tibial pulses are 2+ on the right side and 2+ on the left side.   Musculoskeletal:         General: No tenderness or signs of injury.      Right ankle: Normal.      Left ankle: Normal.      Right foot: No swelling, deformity or crepitus.      Left foot: No swelling, deformity or crepitus.      Comments: Adequate joint range of motion without pain, limitation, nor crepitation Bilateral feet and ankle joints. Muscle strength is 5/5 in all groups bilaterally.         Lymphadenopathy:      Comments: No palpable lymph nodes   Skin:     General: Skin is warm and dry.      Coloration: Skin is not jaundiced.      Findings: No erythema or rash.      Nails: There is no clubbing.   Neurological:      Mental Status: She is alert and oriented to person, place, and time.   Psychiatric:         Behavior: Behavior normal. Behavior is cooperative.         11.14.23    L sub 1st Met head    L 1st ray amp site    11.29.23 12.14.23 1.17.24    1.8x1.0x0.1 cm w/ granular base and christelle wound maceration r 1st MTPJ     Healed amp site L 1st ray "     1.24.24    1.0x0.8x0.1 cm plantar 1st MPJ R   Healed ulcer L     1.31.24    Assessment:      Encounter Diagnoses   Name Primary?    PAD (peripheral artery disease) Yes    Foot ulcer, right, with fat layer exposed     Healed ulcer of left foot      Plan:     Georgina was seen today for wound care and dressing change.    Diagnoses and all orders for this visit:    PAD (peripheral artery disease)    Foot ulcer, right, with fat layer exposed    Healed ulcer of left foot      I counseled the patient on her conditions, their implications and medical management.    Independent review of patients previous clinical notes    Reviewed current medication(s), and lab(s) specific to foot ailment(s) with patient in detail. All questions answered     Debridement: With verbal consent, nonviable tissues on the R  foot were debrided beyond sub q utilizing a  sterile No. 3 scalpel and forceps. Minimal bleeding controlled with direct pressure  The patient tolerated this well.     Dressings: dilip   Offloading:Foam    Left foot amp site has healed well.  We will continue security football dressings for 1 additional week.    Follow-up:Patient is to return to the clinic in 1 week  for follow-up but should call [facility] immediately if any signs of infection, such as fever, chills, sweats, increased redness or pain.  Nikole KAYE MA assisted w/ application of football dressing under my direct supervision. Darco shoe applied to offload the area. Advised patient that this should be worn on the affected foot at all times when ambulating     Short-term goals include maintaining good offloading and minimizing bioburden, promoting granulation and epithelialization to healing.  Long-term goals include keeping the wound healed by good offloading and medical management under the direction of internist.

## 2024-04-29 NOTE — PROGRESS NOTES
Subjective:     Patient ID: Georgina Arias is a 53 y.o. female.    Chief Complaint: Wound Care (Rt foot ) and Dressing Change (Football )    Georgina is a 53 y.o. female who presents to the clinic for evaluation and treatment of high risk feet. Georgina has a past medical history of Hyperlipidemia, Hypertension, Peptic ulcer disease, Peripheral artery disease, PONV (postoperative nausea and vomiting), Stage IV CKD, and Type II diabetes mellitus. The patient's chief complaint is diabetic foot ulcer, L s/p 1st ray amp performed on 8/25/23 by Dr Flannery. Recently see as inpatient for SSTI.       11.29.23: Patient has been in football dressing, ambulating in Darco shoe x 1 week with out issues   12.14.23: Patient has been in football dressing, ambulating in walking boot x 1 week with out issues   1.17.24: no issues w/. L foot. Reports drainage from r foot  1.24.24 :  Patient has been in football dressing, ambulating in Darco shoe x 1 week with out issues     2- pt presents today for wound care without any new issues  2- pt presents today, has been taking prescribed abx  3/6/2024 pt presents for wound care, states she has completely abx. No new issues.     3/20/2024 wound care, no new issues  3.27.24: Patient has been in football dressing, ambulating in Darco shoe x 1 week with out issues       PCP: Alonso Ling MD    Date Last Seen by PCP:   Chief Complaint   Patient presents with    Wound Care     Rt foot     Dressing Change     Football      Hemoglobin A1C   Date Value Ref Range Status   09/11/2023 7.2 (H) 4.0 - 5.6 % Final     Comment:     ADA Screening Guidelines:  5.7-6.4%  Consistent with prediabetes  >or=6.5%  Consistent with diabetes    High levels of fetal hemoglobin interfere with the HbA1C  assay. Heterozygous hemoglobin variants (HbS, HgC, etc)do  not significantly interfere with this assay.   However, presence of multiple variants may affect accuracy.     08/28/2018 7.9 (H) 4.0  "- 5.6 % Final     Comment:     ADA Screening Guidelines:  5.7-6.4%  Consistent with prediabetes  >or=6.5%  Consistent with diabetes  High levels of fetal hemoglobin interfere with the HbA1C  assay. Heterozygous hemoglobin variants (HbS, HgC, etc)do  not significantly interfere with this assay.   However, presence of multiple variants may affect accuracy.     03/24/2015 9.9 (H) 4.5 - 6.2 % Final       Review of Systems   Constitutional: Negative for chills, decreased appetite and fever.   Cardiovascular:  Negative for leg swelling.   Skin:  Positive for poor wound healing.   Musculoskeletal:  Negative for arthritis, joint pain, joint swelling and myalgias.   Gastrointestinal:  Negative for nausea and vomiting.   Neurological:  Positive for numbness, paresthesias and sensory change. Negative for loss of balance.        Objective:     Vitals:    03/27/24 1449   BP: (!) 179/79   Pulse: 85   Resp: 18   Weight: 115.8 kg (255 lb 4.7 oz)   Height: 5' 7" (1.702 m)   PainSc: 0-No pain        Physical Exam  Vitals reviewed.   Constitutional:       Appearance: She is well-developed. She is not ill-appearing or diaphoretic.   Cardiovascular:      Pulses:           Dorsalis pedis pulses are 2+ on the right side and 2+ on the left side.        Posterior tibial pulses are 2+ on the right side and 2+ on the left side.   Musculoskeletal:         General: No tenderness.      Right ankle: Normal.      Left ankle: Normal.      Right foot: No swelling, deformity or crepitus.      Left foot: No swelling, deformity or crepitus.      Comments: Adequate joint range of motion without pain, limitation, nor crepitation Bilateral feet and ankle joints. Muscle strength is 5/5 in all groups bilaterally.         Lymphadenopathy:      Comments: No palpable lymph nodes   Skin:     General: Skin is warm and dry.      Coloration: Skin is not jaundiced.      Findings: No erythema or rash.      Nails: There is no clubbing.   Neurological:      Mental " Status: She is alert and oriented to person, place, and time.   Psychiatric:         Behavior: Behavior normal. Behavior is cooperative.         11.14.23    L sub 1st Met head    L 1st ray amp site    11.29.23        12.14.23      1.17.24    1.8x1.0x0.1 cm w/ granular base and christelle wound maceration r 1st MTPJ     Healed amp site L 1st ray     1.24.24    1.0x0.8x0.1 cm plantar 1st MPJ R       Ulceration  Location: left sub 1st met  Measurements: 1.0x 0.5x 0.2cm  Drainage: serous  Wound base: granular  SOI: none    3.27.24    0.9x0.5x0.1   Cm ulceration noted to the plantar aspect of the right 1st MTPJ joint.  Granular wound bed.  Mild maceration to periwound with hyperkeratosis.  No malodor.  Minimal drainage.    Assessment:      Encounter Diagnoses   Name Primary?    Foot ulcer, right, with fat layer exposed Yes    PAD (peripheral artery disease)     DM type 2 with diabetic peripheral neuropathy      Plan:     Georgina was seen today for wound care and dressing change.    Diagnoses and all orders for this visit:    Foot ulcer, right, with fat layer exposed    PAD (peripheral artery disease)    DM type 2 with diabetic peripheral neuropathy      I counseled the patient on her conditions, their implications and medical management.    Independent review of patients previous clinical notes      Reviewed current medication(s), and lab(s) specific to foot ailment(s) with patient in detail. All questions answered     Debridement: With verbal consent, nonviable tissues on the R  foot were debrided beyond sub q utilizing a  sterile No. 3 scalpel and forceps. Minimal bleeding controlled with direct pressure  The patient tolerated this well.     Dressings: iodosorb  Offloading:Foam    Left foot amp site has healed well.  We will continue security football dressings for 1 additional week.    Follow-up:Patient is to return to the clinic in 1 week  for follow-up but [facility] if any signs of infection, such  as fever, chills, sweats, increased redness or pain.  Nikole KAYE MA assisted w/ application of football dressing under my direct supervision. Darco shoe applied to offload the area. Advised patient that this should be worn on the affected foot at all times when ambulating     Short-term goals include maintaining good offloading and minimizing bioburden, promoting granulation and epithelialization to healing.  Long-term goals include keeping the wound healed by good offloading and medical management under the direction of internist.

## 2024-04-29 NOTE — TELEPHONE ENCOUNTER
----- Message from Mayra Pope sent at 4/29/2024  9:21 AM CDT -----  Regarding: pt advice  Contact: [phone]  Ita from [facility]  is calling to speak with someone in provider office in regards to seeing if there would be a change in wound orders due to the pts foot has gotten worse Ita would like to speak with a nurse in clinic

## 2024-04-29 NOTE — TELEPHONE ENCOUNTER
Called nurse Ita villalobos [facility] who states that the patients wound look bad there is a tunneling of 1.1 and purulent serosanguinous drainage with odor, nurse said she is little concern about the patient and wanted to let the Doctor know.       Spoke with  Mrs. Arias and schedule an appointment for tomorrow as soon as she gets out of dialysis at 1:15 pm for an evaluation of possible infection. Patient verbalized understanding of the situation and will f/u as schedule, pt said thanks for calling i will see you tomorrow.

## 2024-04-30 ENCOUNTER — OFFICE VISIT (OUTPATIENT)
Dept: PODIATRY | Facility: CLINIC | Age: 54
End: 2024-04-30
Payer: MEDICARE

## 2024-04-30 VITALS
DIASTOLIC BLOOD PRESSURE: 73 MMHG | HEART RATE: 73 BPM | RESPIRATION RATE: 19 BRPM | WEIGHT: 253.5 LBS | BODY MASS INDEX: 39.79 KG/M2 | HEIGHT: 67 IN | SYSTOLIC BLOOD PRESSURE: 140 MMHG

## 2024-04-30 DIAGNOSIS — L97.512 FOOT ULCER, RIGHT, WITH FAT LAYER EXPOSED: Primary | ICD-10-CM

## 2024-04-30 DIAGNOSIS — I73.9 PAD (PERIPHERAL ARTERY DISEASE): ICD-10-CM

## 2024-04-30 DIAGNOSIS — E11.42 DM TYPE 2 WITH DIABETIC PERIPHERAL NEUROPATHY: ICD-10-CM

## 2024-04-30 PROCEDURE — 3077F SYST BP >= 140 MM HG: CPT | Mod: CPTII,S$GLB,, | Performed by: PODIATRIST

## 2024-04-30 PROCEDURE — 87075 CULTR BACTERIA EXCEPT BLOOD: CPT | Performed by: PODIATRIST

## 2024-04-30 PROCEDURE — 87186 SC STD MICRODIL/AGAR DIL: CPT | Performed by: PODIATRIST

## 2024-04-30 PROCEDURE — 99999 PR PBB SHADOW E&M-EST. PATIENT-LVL IV: CPT | Mod: PBBFAC,,, | Performed by: PODIATRIST

## 2024-04-30 PROCEDURE — 3008F BODY MASS INDEX DOCD: CPT | Mod: CPTII,S$GLB,, | Performed by: PODIATRIST

## 2024-04-30 PROCEDURE — 87077 CULTURE AEROBIC IDENTIFY: CPT | Performed by: PODIATRIST

## 2024-04-30 PROCEDURE — 11042 DBRDMT SUBQ TIS 1ST 20SQCM/<: CPT | Mod: S$GLB,,, | Performed by: PODIATRIST

## 2024-04-30 PROCEDURE — 87070 CULTURE OTHR SPECIMN AEROBIC: CPT | Performed by: PODIATRIST

## 2024-04-30 PROCEDURE — 99213 OFFICE O/P EST LOW 20 MIN: CPT | Mod: 25,S$GLB,, | Performed by: PODIATRIST

## 2024-04-30 PROCEDURE — 1159F MED LIST DOCD IN RCRD: CPT | Mod: CPTII,S$GLB,, | Performed by: PODIATRIST

## 2024-04-30 PROCEDURE — 3078F DIAST BP <80 MM HG: CPT | Mod: CPTII,S$GLB,, | Performed by: PODIATRIST

## 2024-04-30 NOTE — PROGRESS NOTES
Subjective:     Patient ID: Georgina Arias is a 53 y.o. female.    Chief Complaint: No chief complaint on file.    Georgina is a 53 y.o. female who presents to the clinic for evaluation and treatment of high risk feet. Georgina has a past medical history of Hyperlipidemia, Hypertension, Peptic ulcer disease, Peripheral artery disease, PONV (postoperative nausea and vomiting), Stage IV CKD, and Type II diabetes mellitus. The patient's chief complaint is diabetic foot ulcer, right foot.     4/30/24: Patient has been in football dressing, ambulating in Darco shoe x 1 week .  Home health care reports increased drainage and concern for wound.    PCP: Alonso Ling MD    Date Last Seen by PCP:   No chief complaint on file.    Hemoglobin A1C   Date Value Ref Range Status   09/11/2023 7.2 (H) 4.0 - 5.6 % Final     Comment:     ADA Screening Guidelines:  5.7-6.4%  Consistent with prediabetes  >or=6.5%  Consistent with diabetes    High levels of fetal hemoglobin interfere with the HbA1C  assay. Heterozygous hemoglobin variants (HbS, HgC, etc)do  not significantly interfere with this assay.   However, presence of multiple variants may affect accuracy.     08/28/2018 7.9 (H) 4.0 - 5.6 % Final     Comment:     ADA Screening Guidelines:  5.7-6.4%  Consistent with prediabetes  >or=6.5%  Consistent with diabetes  High levels of fetal hemoglobin interfere with the HbA1C  assay. Heterozygous hemoglobin variants (HbS, HgC, etc)do  not significantly interfere with this assay.   However, presence of multiple variants may affect accuracy.     03/24/2015 9.9 (H) 4.5 - 6.2 % Final       Review of Systems   Constitutional: Negative for chills, decreased appetite and fever.   Cardiovascular:  Negative for leg swelling.   Skin:  Positive for color change, dry skin and poor wound healing.   Musculoskeletal:  Negative for arthritis, joint pain, joint swelling and myalgias.   Gastrointestinal:  Negative for nausea and vomiting.    Neurological:  Positive for numbness, paresthesias and sensory change. Negative for loss of balance.        Objective:     There were no vitals filed for this visit.       Physical Exam  Vitals reviewed.   Constitutional:       Appearance: She is well-developed. She is not ill-appearing or diaphoretic.   Cardiovascular:      Pulses:           Dorsalis pedis pulses are 2+ on the right side and 2+ on the left side.        Posterior tibial pulses are 2+ on the right side and 2+ on the left side.   Musculoskeletal:         General: No tenderness.      Right ankle: Normal.      Left ankle: Normal.      Right foot: No swelling, deformity or crepitus.      Left foot: No swelling, deformity or crepitus.      Comments: Adequate joint range of motion without pain, limitation, nor crepitation Bilateral feet and ankle joints. Muscle strength is 5/5 in all groups bilaterally.      Amputations of left foot with deformity   Lymphadenopathy:      Comments: No palpable lymph nodes   Skin:     General: Skin is warm and dry.      Coloration: Skin is not jaundiced.      Findings: Wound present. No abscess, erythema or rash.      Nails: There is no clubbing.   Neurological:      Mental Status: She is alert and oriented to person, place, and time.   Psychiatric:         Behavior: Behavior normal. Behavior is cooperative.         11.14.23    L sub 1st Met head    L 1st ray amp site    11.29.23 12.14.23      1.17.24    1.8x1.0x0.1 cm w/ granular base and christelle wound maceration r 1st MTPJ     Healed amp site L 1st ray     1.24.24    1.0x0.8x0.1 cm plantar 1st MPJ R       Ulceration  Location: left sub 1st met  Measurements: 3.5x 1.7x 0.2cm  Drainage: serous  Wound base: granular  SOI: none      0.4x0.3x.1 cm   Ulceration granular wound bed plantar right 1st MTPJ.  Dry intact periwound.    4.30.24:     0.5x1.0x0.1 cm granular health base +serosan drainage no deep probe  Assessment:      Encounter Diagnoses   Name Primary?    DM type  2 with diabetic peripheral neuropathy Yes    PAD (peripheral artery disease)     Foot ulcer, right, with fat layer exposed      Plan:     Diagnoses and all orders for this visit:    DM type 2 with diabetic peripheral neuropathy    PAD (peripheral artery disease)    Foot ulcer, right, with fat layer exposed      I counseled the patient on her conditions, their implications and medical management.    Independent review of patients previous clinical notes    Reviewed current medication(s), and lab(s) specific to foot ailment(s) with patient in detail. All questions answered     Aerobic/anaerobic cultures obtained from wound. Prescription written for broad spectrum abx, will monitor results and tailor abx as needed.     Debridement: With verbal consent, nonviable tissues on the R  foot were debrided beyond sub q utilizing a  sterile No. 3 scalpel and forceps. Minimal bleeding controlled with direct pressure  The patient tolerated this well.     Dressings: dilip  Offloading:Foam    Left foot amp site has healed well.  We will continue security football dressings     Follow-up:Patient is to return to the clinic in 1 week  for follow-up but should call [facility] immediately if any signs of infection, such as fever, chills, sweats, increased redness or pain.   MA assisted w/ application of football dressing under my direct supervision. Darco shoe applied to offload the area. Advised patient that this should be worn on the affected foot at all times when ambulating     Short-term goals include maintaining good offloading and minimizing bioburden, promoting granulation and epithelialization to healing.  Long-term goals include keeping the wound healed by good offloading and medical management under the direction of internist.

## 2024-05-01 ENCOUNTER — EXTERNAL HOME HEALTH (OUTPATIENT)
Dept: HOME HEALTH SERVICES | Facility: HOSPITAL | Age: 54
End: 2024-05-01
Payer: MEDICARE

## 2024-05-03 LAB — BACTERIA SPEC AEROBE CULT: ABNORMAL

## 2024-05-06 ENCOUNTER — PATIENT MESSAGE (OUTPATIENT)
Dept: PODIATRY | Facility: CLINIC | Age: 54
End: 2024-05-06
Payer: MEDICARE

## 2024-05-06 LAB — BACTERIA SPEC ANAEROBE CULT: NORMAL

## 2024-05-06 RX ORDER — DOXYCYCLINE 100 MG/1
100 CAPSULE ORAL 2 TIMES DAILY
Qty: 20 CAPSULE | Refills: 0 | Status: SHIPPED | OUTPATIENT
Start: 2024-05-06 | End: 2024-05-16

## 2024-05-08 ENCOUNTER — TELEPHONE (OUTPATIENT)
Dept: PODIATRY | Facility: CLINIC | Age: 54
End: 2024-05-08
Payer: MEDICARE

## 2024-05-08 ENCOUNTER — OFFICE VISIT (OUTPATIENT)
Dept: PODIATRY | Facility: CLINIC | Age: 54
End: 2024-05-08
Payer: MEDICARE

## 2024-05-08 VITALS
HEIGHT: 67 IN | HEART RATE: 81 BPM | WEIGHT: 253.5 LBS | SYSTOLIC BLOOD PRESSURE: 111 MMHG | DIASTOLIC BLOOD PRESSURE: 63 MMHG | BODY MASS INDEX: 39.79 KG/M2

## 2024-05-08 DIAGNOSIS — L97.512 FOOT ULCER, RIGHT, WITH FAT LAYER EXPOSED: ICD-10-CM

## 2024-05-08 DIAGNOSIS — E11.42 DM TYPE 2 WITH DIABETIC PERIPHERAL NEUROPATHY: Primary | ICD-10-CM

## 2024-05-08 PROCEDURE — 99499 UNLISTED E&M SERVICE: CPT | Mod: S$GLB,,, | Performed by: PODIATRIST

## 2024-05-08 PROCEDURE — 99999 PR PBB SHADOW E&M-EST. PATIENT-LVL IV: CPT | Mod: PBBFAC,,, | Performed by: PODIATRIST

## 2024-05-08 PROCEDURE — 11042 DBRDMT SUBQ TIS 1ST 20SQCM/<: CPT | Mod: S$GLB,,, | Performed by: PODIATRIST

## 2024-05-09 NOTE — PROGRESS NOTES
Subjective:     Patient ID: Georgina Arias is a 53 y.o. female.    Chief Complaint: Wound Care (Right foot)    Georgina is a 53 y.o. female who presents to the clinic for evaluation and treatment of high risk feet. Georgina has a past medical history of Hyperlipidemia, Hypertension, Peptic ulcer disease, Peripheral artery disease, PONV (postoperative nausea and vomiting), Stage IV CKD, and Type II diabetes mellitus. The patient's chief complaint is diabetic foot ulcer, right foot.     5/8/24: Patient has been in football dressing, ambulating in Darco shoe x 1 week .  Home health care reports increased drainage and concern for wound.    PCP: Alonso Ling MD    Date Last Seen by PCP:   Chief Complaint   Patient presents with    Wound Care     Right foot     Hemoglobin A1C   Date Value Ref Range Status   09/11/2023 7.2 (H) 4.0 - 5.6 % Final     Comment:     ADA Screening Guidelines:  5.7-6.4%  Consistent with prediabetes  >or=6.5%  Consistent with diabetes    High levels of fetal hemoglobin interfere with the HbA1C  assay. Heterozygous hemoglobin variants (HbS, HgC, etc)do  not significantly interfere with this assay.   However, presence of multiple variants may affect accuracy.     08/28/2018 7.9 (H) 4.0 - 5.6 % Final     Comment:     ADA Screening Guidelines:  5.7-6.4%  Consistent with prediabetes  >or=6.5%  Consistent with diabetes  High levels of fetal hemoglobin interfere with the HbA1C  assay. Heterozygous hemoglobin variants (HbS, HgC, etc)do  not significantly interfere with this assay.   However, presence of multiple variants may affect accuracy.     03/24/2015 9.9 (H) 4.5 - 6.2 % Final       Review of Systems   Constitutional: Negative for chills, decreased appetite and fever.   Cardiovascular:  Negative for leg swelling.   Skin:  Positive for color change, dry skin and poor wound healing.   Musculoskeletal:  Negative for arthritis, joint pain, joint swelling and myalgias.   Gastrointestinal:  " Negative for nausea and vomiting.   Neurological:  Positive for numbness, paresthesias and sensory change. Negative for loss of balance.        Objective:     Vitals:    05/08/24 0941   BP: 111/63   Pulse: 81   Weight: 115 kg (253 lb 8.5 oz)   Height: 5' 7" (1.702 m)   PainSc: 0-No pain          Physical Exam  Vitals reviewed.   Constitutional:       Appearance: She is well-developed. She is not ill-appearing or diaphoretic.   Cardiovascular:      Pulses:           Dorsalis pedis pulses are 2+ on the right side and 2+ on the left side.        Posterior tibial pulses are 2+ on the right side and 2+ on the left side.   Musculoskeletal:         General: No tenderness.      Right ankle: Normal.      Left ankle: Normal.      Right foot: No swelling, deformity or crepitus.      Left foot: No swelling, deformity or crepitus.      Comments: Adequate joint range of motion without pain, limitation, nor crepitation Bilateral feet and ankle joints. Muscle strength is 5/5 in all groups bilaterally.      Amputations of left foot with deformity   Lymphadenopathy:      Comments: No palpable lymph nodes   Skin:     General: Skin is warm and dry.      Coloration: Skin is not jaundiced.      Findings: Wound present. No abscess, erythema or rash.      Nails: There is no clubbing.   Neurological:      Mental Status: She is alert and oriented to person, place, and time.   Psychiatric:         Behavior: Behavior normal. Behavior is cooperative.         11.14.23    L sub 1st Met head    L 1st ray amp site    11.29.23 12.14.23 1.17.24    1.8x1.0x0.1 cm w/ granular base and christelle wound maceration r 1st MTPJ     Healed amp site L 1st ray     1.24.24    1.0x0.8x0.1 cm plantar 1st MPJ R       Ulceration  Location: left sub 1st met  Measurements: 3.5x 1.7x 0.2cm  Drainage: serous  Wound base: granular  SOI: none      0.4x0.3x.1 cm   Ulceration granular wound bed plantar right 1st MTPJ.  Dry intact periwound.    4.30.24: "     0.5x1.0x0.1 cm granular health base +serosan drainage no deep probe    5.8.24      Improved periwound maceration noted.  No deep probe.  Healthy granular wound base.      -----------------------------------------------------------------------------------------------------------------------  MICRO:    Aerobic Bacterial Culture   Date Value Ref Range Status   04/30/2024 (A)  Final    STAPHYLOCOCCUS AUREUS  Many  Skin meño also present          Anaerobic Culture   Date Value Ref Range Status   04/30/2024 No anaerobes isolated  Final     -----------------------------------------------------------------------------------------------------------------------------------------------------------    Assessment:      Encounter Diagnoses   Name Primary?    DM type 2 with diabetic peripheral neuropathy Yes    Foot ulcer, right, with fat layer exposed      Plan:     Georgina was seen today for wound care.    Diagnoses and all orders for this visit:    DM type 2 with diabetic peripheral neuropathy    Foot ulcer, right, with fat layer exposed      I counseled the patient on her conditions, their implications and medical management.    Independent review of patients previous clinical notes    Reviewed current medication(s), and lab(s) specific to foot ailment(s) with patient in detail. All questions answered     Patient was not aware of prescription sent to her pharmacy for antibiotics.  She will  today and begin usage.    Clinically her foot has improved since last week's appointment.  Continue football therapy in addition to oral antibiotics.    Debridement: With verbal consent, nonviable tissues on the R  foot were debrided beyond sub q utilizing a  sterile No. 3 scalpel and forceps. Minimal bleeding controlled with direct pressure  The patient tolerated this well.     Dressings: HB  Offloading:Foam      Follow-up:Patient is to return to the clinic in 1 week  for follow-up but should call [facility] immediately if any  signs of infection, such as fever, chills, sweats, increased redness or pain.     ELIAN MORRIS assisted w/ application of football dressing under my direct supervision. Darco shoe applied to offload the area. Advised patient that this should be worn on the affected foot at all times when ambulating     Short-term goals include maintaining good offloading and minimizing bioburden, promoting granulation and epithelialization to healing.  Long-term goals include keeping the wound healed by good offloading and medical management under the direction of internist.

## 2024-05-13 ENCOUNTER — OFFICE VISIT (OUTPATIENT)
Dept: PODIATRY | Facility: CLINIC | Age: 54
End: 2024-05-13
Payer: MEDICARE

## 2024-05-13 VITALS
HEIGHT: 67 IN | HEART RATE: 62 BPM | BODY MASS INDEX: 37.58 KG/M2 | WEIGHT: 239.44 LBS | SYSTOLIC BLOOD PRESSURE: 124 MMHG | DIASTOLIC BLOOD PRESSURE: 64 MMHG | RESPIRATION RATE: 18 BRPM

## 2024-05-13 DIAGNOSIS — L97.512 FOOT ULCER, RIGHT, WITH FAT LAYER EXPOSED: Primary | ICD-10-CM

## 2024-05-13 DIAGNOSIS — E11.42 DM TYPE 2 WITH DIABETIC PERIPHERAL NEUROPATHY: ICD-10-CM

## 2024-05-13 PROCEDURE — 99499 UNLISTED E&M SERVICE: CPT | Mod: S$GLB,,, | Performed by: PODIATRIST

## 2024-05-13 PROCEDURE — 11042 DBRDMT SUBQ TIS 1ST 20SQCM/<: CPT | Mod: S$GLB,,, | Performed by: PODIATRIST

## 2024-05-13 PROCEDURE — 99999 PR PBB SHADOW E&M-EST. PATIENT-LVL IV: CPT | Mod: PBBFAC,,, | Performed by: PODIATRIST

## 2024-05-13 RX ORDER — OMEPRAZOLE 20 MG/1
1 CAPSULE, DELAYED RELEASE ORAL DAILY
COMMUNITY
Start: 2024-04-16 | End: 2024-07-15

## 2024-05-16 NOTE — PROGRESS NOTES
Subjective:     Patient ID: Georgina Arias is a 53 y.o. female.    Chief Complaint: Wound Care (1 week follow up)    Georgina is a 53 y.o. female who presents to the clinic for evaluation and treatment of high risk feet. Gerogina has a past medical history of Hyperlipidemia, Hypertension, Peptic ulcer disease, Peripheral artery disease, PONV (postoperative nausea and vomiting), Stage IV CKD, and Type II diabetes mellitus. The patient's chief complaint is diabetic foot ulcer, right foot.     5/8/24: Patient has been in football dressing, ambulating in Darco shoe x 1 week .  Home health care reports increased drainage and concern for wound.    PCP: Alonso Ling MD    Date Last Seen by PCP:   Chief Complaint   Patient presents with    Wound Care     1 week follow up     Hemoglobin A1C   Date Value Ref Range Status   09/11/2023 7.2 (H) 4.0 - 5.6 % Final     Comment:     ADA Screening Guidelines:  5.7-6.4%  Consistent with prediabetes  >or=6.5%  Consistent with diabetes    High levels of fetal hemoglobin interfere with the HbA1C  assay. Heterozygous hemoglobin variants (HbS, HgC, etc)do  not significantly interfere with this assay.   However, presence of multiple variants may affect accuracy.     08/28/2018 7.9 (H) 4.0 - 5.6 % Final     Comment:     ADA Screening Guidelines:  5.7-6.4%  Consistent with prediabetes  >or=6.5%  Consistent with diabetes  High levels of fetal hemoglobin interfere with the HbA1C  assay. Heterozygous hemoglobin variants (HbS, HgC, etc)do  not significantly interfere with this assay.   However, presence of multiple variants may affect accuracy.     03/24/2015 9.9 (H) 4.5 - 6.2 % Final       Review of Systems   Constitutional: Negative for chills, decreased appetite and fever.   Cardiovascular:  Negative for leg swelling.   Skin:  Positive for color change, dry skin and poor wound healing.   Musculoskeletal:  Negative for arthritis, joint pain, joint swelling and myalgias.  "  Gastrointestinal:  Negative for nausea and vomiting.   Neurological:  Positive for numbness, paresthesias and sensory change. Negative for loss of balance.        Objective:     Vitals:    05/13/24 1049   BP: 124/64   Pulse: 62   Resp: 18   Weight: 108.6 kg (239 lb 6.7 oz)   Height: 5' 7" (1.702 m)   PainSc: 0-No pain          Physical Exam  Vitals reviewed.   Constitutional:       Appearance: She is well-developed. She is not ill-appearing or diaphoretic.   Cardiovascular:      Pulses:           Dorsalis pedis pulses are 2+ on the right side and 2+ on the left side.        Posterior tibial pulses are 2+ on the right side and 2+ on the left side.   Musculoskeletal:         General: No tenderness.      Right ankle: Normal.      Left ankle: Normal.      Right foot: No swelling, deformity or crepitus.      Left foot: No swelling, deformity or crepitus.      Comments: Adequate joint range of motion without pain, limitation, nor crepitation Bilateral feet and ankle joints. Muscle strength is 5/5 in all groups bilaterally.      Amputations of left foot with deformity   Lymphadenopathy:      Comments: No palpable lymph nodes   Skin:     General: Skin is warm and dry.      Coloration: Skin is not jaundiced.      Findings: Wound present. No abscess, erythema or rash.      Nails: There is no clubbing.      Comments: Ulceration   1.0x 0.9x 0.2cm  Sub q  Granular  No purulence or pus   Neurological:      Mental Status: She is alert and oriented to person, place, and time.   Psychiatric:         Behavior: Behavior normal. Behavior is cooperative.         11.14.23    L sub 1st Met head    L 1st ray amp site    11.29.23 12.14.23 1.17.24    1.8x1.0x0.1 cm w/ granular base and christelle wound maceration r 1st MTPJ     Healed amp site L 1st ray     1.24.24    1.0x0.8x0.1 cm plantar 1st MPJ R       Ulceration  Location: left sub 1st met  Measurements: 3.5x 1.7x 0.2cm  Drainage: serous  Wound base: granular  SOI: " none      0.4x0.3x.1 cm   Ulceration granular wound bed plantar right 1st MTPJ.  Dry intact periwound.    4.30.24:     0.5x1.0x0.1 cm granular health base +serosan drainage no deep probe    5.8.24      Improved periwound maceration noted.  No deep probe.  Healthy granular wound base.      -----------------------------------------------------------------------------------------------------------------------  MICRO:    Aerobic Bacterial Culture   Date Value Ref Range Status   04/30/2024 (A)  Final    STAPHYLOCOCCUS AUREUS  Many  Skin meño also present          Anaerobic Culture   Date Value Ref Range Status   04/30/2024 No anaerobes isolated  Final     -----------------------------------------------------------------------------------------------------------------------------------------------------------    Assessment:      Encounter Diagnoses   Name Primary?    Foot ulcer, right, with fat layer exposed Yes    DM type 2 with diabetic peripheral neuropathy      Plan:     Georgina was seen today for wound care.    Diagnoses and all orders for this visit:    Foot ulcer, right, with fat layer exposed    DM type 2 with diabetic peripheral neuropathy      I counseled the patient on her conditions, their implications and medical management.    Independent review of patients previous clinical notes    Reviewed current medication(s), and lab(s) specific to foot ailment(s) with patient in detail. All questions answered     Patient was not aware of prescription sent to her pharmacy for antibiotics.  She will  today and begin usage.    Clinically her foot has improved since last week's appointment.  Continue football therapy in addition to oral antibiotics.    Debridement: With verbal consent, nonviable tissues on the R  foot were debrided beyond sub q utilizing a  sterile No. 3 scalpel and forceps. Minimal bleeding controlled with direct pressure  The patient tolerated this well.     Dressings:  HB  Offloading:Foam      Follow-up:Patient is to return to the clinic in 1 week  for follow-up but should call [facility] immediately if any signs of infection, such as fever, chills, sweats, increased redness or pain.     ELIAN MORRIS assisted w/ application of football dressing under my direct supervision. Darco shoe applied to offload the area. Advised patient that this should be worn on the affected foot at all times when ambulating     Short-term goals include maintaining good offloading and minimizing bioburden, promoting granulation and epithelialization to healing.  Long-term goals include keeping the wound healed by good offloading and medical management under the direction of internist.

## 2024-05-22 ENCOUNTER — HOSPITAL ENCOUNTER (OUTPATIENT)
Dept: RADIOLOGY | Facility: HOSPITAL | Age: 54
Discharge: HOME | End: 2024-05-22
Attending: PODIATRIST
Payer: MEDICARE

## 2024-05-22 ENCOUNTER — OFFICE VISIT (OUTPATIENT)
Dept: PODIATRY | Facility: CLINIC | Age: 54
End: 2024-05-22
Payer: MEDICARE

## 2024-05-22 VITALS
SYSTOLIC BLOOD PRESSURE: 110 MMHG | WEIGHT: 238.13 LBS | HEIGHT: 67 IN | DIASTOLIC BLOOD PRESSURE: 58 MMHG | BODY MASS INDEX: 37.37 KG/M2 | RESPIRATION RATE: 18 BRPM | HEART RATE: 71 BPM

## 2024-05-22 DIAGNOSIS — M14.679 CHARCOT ARTHROPATHY OF MIDFOOT: Primary | ICD-10-CM

## 2024-05-22 DIAGNOSIS — N18.6 ESRD (END STAGE RENAL DISEASE): ICD-10-CM

## 2024-05-22 DIAGNOSIS — E11.42 DM TYPE 2 WITH DIABETIC PERIPHERAL NEUROPATHY: ICD-10-CM

## 2024-05-22 DIAGNOSIS — I73.9 PAD (PERIPHERAL ARTERY DISEASE): ICD-10-CM

## 2024-05-22 DIAGNOSIS — M14.679 CHARCOT ARTHROPATHY OF MIDFOOT: ICD-10-CM

## 2024-05-22 DIAGNOSIS — L97.512 FOOT ULCER, RIGHT, WITH FAT LAYER EXPOSED: ICD-10-CM

## 2024-05-22 PROCEDURE — G0179 MD RECERTIFICATION HHA PT: HCPCS | Mod: ,,, | Performed by: PODIATRIST

## 2024-05-22 PROCEDURE — 1159F MED LIST DOCD IN RCRD: CPT | Mod: CPTII,S$GLB,, | Performed by: PODIATRIST

## 2024-05-22 PROCEDURE — 73630 X-RAY EXAM OF FOOT: CPT | Mod: TC,LT

## 2024-05-22 PROCEDURE — 3078F DIAST BP <80 MM HG: CPT | Mod: CPTII,S$GLB,, | Performed by: PODIATRIST

## 2024-05-22 PROCEDURE — 3044F HG A1C LEVEL LT 7.0%: CPT | Mod: CPTII,S$GLB,, | Performed by: PODIATRIST

## 2024-05-22 PROCEDURE — 73630 X-RAY EXAM OF FOOT: CPT | Mod: 26,LT,, | Performed by: RADIOLOGY

## 2024-05-22 PROCEDURE — 99214 OFFICE O/P EST MOD 30 MIN: CPT | Mod: S$GLB,,, | Performed by: PODIATRIST

## 2024-05-22 PROCEDURE — 99999 PR PBB SHADOW E&M-EST. PATIENT-LVL V: CPT | Mod: PBBFAC,,, | Performed by: PODIATRIST

## 2024-05-22 PROCEDURE — 3008F BODY MASS INDEX DOCD: CPT | Mod: CPTII,S$GLB,, | Performed by: PODIATRIST

## 2024-05-22 PROCEDURE — 3074F SYST BP LT 130 MM HG: CPT | Mod: CPTII,S$GLB,, | Performed by: PODIATRIST

## 2024-05-22 RX ORDER — OXYCODONE HYDROCHLORIDE 5 MG/1
5 TABLET ORAL EVERY 6 HOURS PRN
COMMUNITY
Start: 2024-04-16

## 2024-05-22 RX ORDER — ONDANSETRON 4 MG/1
4 TABLET, ORALLY DISINTEGRATING ORAL EVERY 6 HOURS
COMMUNITY
Start: 2024-04-16

## 2024-05-22 NOTE — PROGRESS NOTES
Subjective:     Patient ID: Georgina Arias is a 53 y.o. female.    Chief Complaint: Wound Care (Rt foot ulcer ), Foot Pain (Feels like knot in the left foot ), and Dressing Change (Football )    Georgina is a 53 y.o. female who presents to the clinic for evaluation and treatment of high risk feet. Georgina has a past medical history of Hyperlipidemia, Hypertension, Peptic ulcer disease, Peripheral artery disease, PONV (postoperative nausea and vomiting), Stage IV CKD, and Type II diabetes mellitus. The patient's chief complaint is diabetic foot ulcer, right foot.     5/8/24: Patient has been in football dressing, ambulating in Darco shoe x 1 week .  Home health care reports increased drainage and concern for wound.    PCP: Alonso Ling MD    Date Last Seen by PCP:   Chief Complaint   Patient presents with    Wound Care     Rt foot ulcer     Foot Pain     Feels like knot in the left foot     Dressing Change     Football      Hemoglobin A1C   Date Value Ref Range Status   05/14/2024 5.9 4.8 - 5.9 % Final   09/11/2023 7.2 (H) 4.0 - 5.6 % Final     Comment:     ADA Screening Guidelines:  5.7-6.4%  Consistent with prediabetes  >or=6.5%  Consistent with diabetes    High levels of fetal hemoglobin interfere with the HbA1C  assay. Heterozygous hemoglobin variants (HbS, HgC, etc)do  not significantly interfere with this assay.   However, presence of multiple variants may affect accuracy.     08/28/2018 7.9 (H) 4.0 - 5.6 % Final     Comment:     ADA Screening Guidelines:  5.7-6.4%  Consistent with prediabetes  >or=6.5%  Consistent with diabetes  High levels of fetal hemoglobin interfere with the HbA1C  assay. Heterozygous hemoglobin variants (HbS, HgC, etc)do  not significantly interfere with this assay.   However, presence of multiple variants may affect accuracy.     03/24/2015 9.9 (H) 4.5 - 6.2 % Final       Review of Systems   Constitutional: Negative for chills, decreased appetite and fever.  "  Cardiovascular:  Negative for leg swelling.   Skin:  Positive for color change, dry skin and poor wound healing.   Musculoskeletal:  Negative for arthritis, joint pain, joint swelling and myalgias.   Gastrointestinal:  Negative for nausea and vomiting.   Neurological:  Positive for numbness, paresthesias and sensory change. Negative for loss of balance.        Objective:     Vitals:    05/22/24 0857   BP: (!) 110/58   Pulse: 71   Resp: 18   Weight: 108 kg (238 lb 1.6 oz)   Height: 5' 7" (1.702 m)   PainSc:   4   PainLoc: Foot          Physical Exam  Vitals reviewed.   Constitutional:       Appearance: She is well-developed. She is not ill-appearing or diaphoretic.   Cardiovascular:      Pulses:           Dorsalis pedis pulses are 2+ on the right side and 2+ on the left side.        Posterior tibial pulses are 2+ on the right side and 2+ on the left side.   Musculoskeletal:         General: No tenderness.      Right ankle: Normal.      Left ankle: Normal.      Right foot: No swelling, deformity or crepitus.      Left foot: No swelling, deformity or crepitus.      Comments: Adequate joint range of motion without pain, limitation, nor crepitation Bilateral feet and ankle joints. Muscle strength is 5/5 in all groups bilaterally.      Amputations of left foot with deformity   Lymphadenopathy:      Comments: No palpable lymph nodes   Skin:     General: Skin is warm and dry.      Coloration: Skin is not jaundiced.      Findings: Wound present. No abscess, erythema or rash.      Nails: There is no clubbing.      Comments: Ulceration   1.0x 0.9x 0.2cm  Sub q  Granular  No purulence or pus   Neurological:      Mental Status: She is alert and oriented to person, place, and time.   Psychiatric:         Behavior: Behavior normal. Behavior is cooperative.         11.14.23    L sub 1st Met head    L 1st ray amp site    11.29.23 12.14.23      1.17.24    1.8x1.0x0.1 cm w/ granular base and christelle wound maceration r 1st MTPJ "     Healed amp site L 1st ray     1.24.24    1.0x0.8x0.1 cm plantar 1st MPJ R       Ulceration  Location: left sub 1st met  Measurements: 3.5x 1.7x 0.2cm  Drainage: serous  Wound base: granular  SOI: none      0.4x0.3x.1 cm   Ulceration granular wound bed plantar right 1st MTPJ.  Dry intact periwound.    4.30.24:     0.5x1.0x0.1 cm granular health base +serosan drainage no deep probe    5.8.24      Improved periwound maceration noted.  No deep probe.  Healthy granular wound base.    5.22.24    0.3 x 0.2 x 0.1 cm ulceration with a healthy granular base.  Dry periwound observed.  No surrounding erythema nor edema.  No drainage.  No malodor.    L foot            5.22.24 xray: Marked new deformity internal derangement lis Franc joint with lateral displacement subluxation 1st metatarsal tarsal joint, new osteitis change lis raghav joint, navicular cuneiform joint and inversion subluxation lis Franc joint on lateral view.  Additional edema of.  Calcified plaque tibial and digital vessels.  Further in shaft 1st metatarsal adjacent soft tissues, new subacute chronic compression fracture postop change head 2nd metatarsal with foreshortening of 3rd digit.  Disuse osteoporosis and remote changes of 2nd MTP joint.     Impression:     New  Chorcot joint change, further postoperative change 1st metatarsal and healing fracture deformity 3rd metatarsal discussed above.  -----------------------------------------------------------------------------------------------------------------------------------------------------------    Assessment:      Encounter Diagnoses   Name Primary?    Charcot arthropathy of midfoot Yes    DM type 2 with diabetic peripheral neuropathy     PAD (peripheral artery disease)     Foot ulcer, right, with fat layer exposed     ESRD (end stage renal disease)      Plan:     Georgina was seen today for wound care, foot pain and dressing change.    Diagnoses and all orders for this visit:    Charcot arthropathy of  midfoot  -     X-Ray Foot Complete Left; Future  -     AIR CAST WALKER BOOT FOR HOME USE    DM type 2 with diabetic peripheral neuropathy    PAD (peripheral artery disease)    Foot ulcer, right, with fat layer exposed    ESRD (end stage renal disease)      I counseled the patient on her conditions, their implications and medical management.      Independent review of patients previous clinical notes    Reviewed current medication(s), and lab(s) specific to foot ailment(s) with patient in detail. All questions answered     Right foot skin and soft tissue infection has resolved.  Ulceration nearly healed.    Debridement: With verbal consent, nonviable tissues on the R  foot were debrided beyond sub q utilizing a  sterile No. 3 scalpel and forceps. Minimal bleeding controlled with direct pressure  The patient tolerated this well.     Dressings: HB  Offloading:Foam      Follow-up:Patient is to return to the clinic in 1 week  for follow-up but should call [facility] immediately if any signs of infection, such as fever, chills, sweats, increased redness or pain.    Nikole KAYE MA assisted w/ application of football dressing under my direct supervision. Darco shoe applied to offload the area. Advised patient that this should be worn on the affected foot at all times when ambulating     Short-term goals include maintaining good offloading and minimizing bioburden, promoting granulation and epithelialization to healing.  Long-term goals include keeping the wound healed by good offloading and medical management under the direction of internist.      Patient with new onset left foot increased swelling and rocker bottom deformity observed.  This is consistent with worsening of her already present Charcot deformity.  Discussed with patient in detail signs and symptoms as well as treatment options for Charcot neuropathy.  We will start with a radiographs today.  Patient fitted with a walking boot and advised to remain nonweightbearing  as much as possible.  She has a walker that she utilizes.  We will consider placing patient had total contact cast at her next clinical evaluation.

## 2024-05-29 ENCOUNTER — OFFICE VISIT (OUTPATIENT)
Dept: PODIATRY | Facility: CLINIC | Age: 54
End: 2024-05-29
Payer: MEDICARE

## 2024-05-29 VITALS
RESPIRATION RATE: 18 BRPM | SYSTOLIC BLOOD PRESSURE: 148 MMHG | WEIGHT: 238.13 LBS | HEIGHT: 67 IN | DIASTOLIC BLOOD PRESSURE: 67 MMHG | HEART RATE: 75 BPM | BODY MASS INDEX: 37.37 KG/M2

## 2024-05-29 DIAGNOSIS — E11.42 DM TYPE 2 WITH DIABETIC PERIPHERAL NEUROPATHY: Primary | ICD-10-CM

## 2024-05-29 DIAGNOSIS — I73.9 PAD (PERIPHERAL ARTERY DISEASE): ICD-10-CM

## 2024-05-29 DIAGNOSIS — L97.512 FOOT ULCER, RIGHT, WITH FAT LAYER EXPOSED: ICD-10-CM

## 2024-05-29 DIAGNOSIS — N18.6 ESRD (END STAGE RENAL DISEASE): ICD-10-CM

## 2024-05-29 PROCEDURE — 3044F HG A1C LEVEL LT 7.0%: CPT | Mod: CPTII,S$GLB,, | Performed by: PODIATRIST

## 2024-05-29 PROCEDURE — 99999 PR PBB SHADOW E&M-EST. PATIENT-LVL IV: CPT | Mod: PBBFAC,,, | Performed by: PODIATRIST

## 2024-05-29 PROCEDURE — 3008F BODY MASS INDEX DOCD: CPT | Mod: CPTII,S$GLB,, | Performed by: PODIATRIST

## 2024-05-29 PROCEDURE — 3077F SYST BP >= 140 MM HG: CPT | Mod: CPTII,S$GLB,, | Performed by: PODIATRIST

## 2024-05-29 PROCEDURE — 99213 OFFICE O/P EST LOW 20 MIN: CPT | Mod: S$GLB,,, | Performed by: PODIATRIST

## 2024-05-29 PROCEDURE — 3078F DIAST BP <80 MM HG: CPT | Mod: CPTII,S$GLB,, | Performed by: PODIATRIST

## 2024-06-05 ENCOUNTER — OFFICE VISIT (OUTPATIENT)
Dept: PODIATRY | Facility: CLINIC | Age: 54
End: 2024-06-05
Payer: MEDICARE

## 2024-06-05 VITALS
WEIGHT: 238.13 LBS | BODY MASS INDEX: 37.37 KG/M2 | SYSTOLIC BLOOD PRESSURE: 138 MMHG | HEART RATE: 77 BPM | DIASTOLIC BLOOD PRESSURE: 73 MMHG | HEIGHT: 67 IN

## 2024-06-05 DIAGNOSIS — I73.9 PAD (PERIPHERAL ARTERY DISEASE): ICD-10-CM

## 2024-06-05 DIAGNOSIS — M14.679 CHARCOT ARTHROPATHY OF MIDFOOT: ICD-10-CM

## 2024-06-05 DIAGNOSIS — Z87.2 HEALED ULCER OF RIGHT FOOT: ICD-10-CM

## 2024-06-05 DIAGNOSIS — E11.42 DM TYPE 2 WITH DIABETIC PERIPHERAL NEUROPATHY: Primary | ICD-10-CM

## 2024-06-05 PROCEDURE — 99999 PR PBB SHADOW E&M-EST. PATIENT-LVL IV: CPT | Mod: PBBFAC,,, | Performed by: PODIATRIST

## 2024-06-05 PROCEDURE — 3008F BODY MASS INDEX DOCD: CPT | Mod: CPTII,S$GLB,, | Performed by: PODIATRIST

## 2024-06-05 PROCEDURE — 3078F DIAST BP <80 MM HG: CPT | Mod: CPTII,S$GLB,, | Performed by: PODIATRIST

## 2024-06-05 PROCEDURE — 99213 OFFICE O/P EST LOW 20 MIN: CPT | Mod: S$GLB,,, | Performed by: PODIATRIST

## 2024-06-05 PROCEDURE — 3075F SYST BP GE 130 - 139MM HG: CPT | Mod: CPTII,S$GLB,, | Performed by: PODIATRIST

## 2024-06-05 PROCEDURE — 3044F HG A1C LEVEL LT 7.0%: CPT | Mod: CPTII,S$GLB,, | Performed by: PODIATRIST

## 2024-06-10 ENCOUNTER — EXTERNAL HOME HEALTH (OUTPATIENT)
Dept: HOME HEALTH SERVICES | Facility: HOSPITAL | Age: 54
End: 2024-06-10
Payer: MEDICARE

## 2024-06-13 ENCOUNTER — HOSPITAL ENCOUNTER (OUTPATIENT)
Dept: RADIOLOGY | Facility: HOSPITAL | Age: 54
Discharge: HOME | End: 2024-06-13
Attending: PODIATRIST
Payer: MEDICARE

## 2024-06-13 ENCOUNTER — OFFICE VISIT (OUTPATIENT)
Dept: PODIATRY | Facility: CLINIC | Age: 54
End: 2024-06-13
Payer: MEDICARE

## 2024-06-13 VITALS
HEART RATE: 76 BPM | HEIGHT: 67 IN | DIASTOLIC BLOOD PRESSURE: 57 MMHG | SYSTOLIC BLOOD PRESSURE: 109 MMHG | BODY MASS INDEX: 37.29 KG/M2

## 2024-06-13 DIAGNOSIS — M14.679 CHARCOT ARTHROPATHY OF MIDFOOT: Primary | ICD-10-CM

## 2024-06-13 DIAGNOSIS — E11.42 DM TYPE 2 WITH DIABETIC PERIPHERAL NEUROPATHY: ICD-10-CM

## 2024-06-13 DIAGNOSIS — M14.679 CHARCOT ARTHROPATHY OF MIDFOOT: ICD-10-CM

## 2024-06-13 DIAGNOSIS — I73.9 PAD (PERIPHERAL ARTERY DISEASE): ICD-10-CM

## 2024-06-13 DIAGNOSIS — Z87.2 HEALED ULCER OF RIGHT FOOT: ICD-10-CM

## 2024-06-13 PROCEDURE — 99214 OFFICE O/P EST MOD 30 MIN: CPT | Mod: S$GLB,,, | Performed by: PODIATRIST

## 2024-06-13 PROCEDURE — 99999 PR PBB SHADOW E&M-EST. PATIENT-LVL IV: CPT | Mod: PBBFAC,,, | Performed by: PODIATRIST

## 2024-06-13 PROCEDURE — 73630 X-RAY EXAM OF FOOT: CPT | Mod: 26,LT,, | Performed by: RADIOLOGY

## 2024-06-13 PROCEDURE — 1159F MED LIST DOCD IN RCRD: CPT | Mod: CPTII,S$GLB,, | Performed by: PODIATRIST

## 2024-06-13 PROCEDURE — 73630 X-RAY EXAM OF FOOT: CPT | Mod: TC,LT

## 2024-06-13 PROCEDURE — 3078F DIAST BP <80 MM HG: CPT | Mod: CPTII,S$GLB,, | Performed by: PODIATRIST

## 2024-06-13 PROCEDURE — 3044F HG A1C LEVEL LT 7.0%: CPT | Mod: CPTII,S$GLB,, | Performed by: PODIATRIST

## 2024-06-13 PROCEDURE — 3074F SYST BP LT 130 MM HG: CPT | Mod: CPTII,S$GLB,, | Performed by: PODIATRIST

## 2024-06-13 PROCEDURE — 3008F BODY MASS INDEX DOCD: CPT | Mod: CPTII,S$GLB,, | Performed by: PODIATRIST

## 2024-06-13 NOTE — PROGRESS NOTES
Subjective:     Patient ID: Georgina Arias is a 53 y.o. female.    Chief Complaint: Wound Care (Wound care 1w)    Georgina is a 53 y.o. female who presents to the clinic for evaluation and treatment of high risk feet. Georgina has a past medical history of Hyperlipidemia, Hypertension, Peptic ulcer disease, Peripheral artery disease, PONV (postoperative nausea and vomiting), Stage IV CKD, and Type II diabetes mellitus. The patient's chief complaint is diabetic foot ulcer, right foot.         6.13.2424 Patient has been in football dressing, ambulating in Darco shoe R TCC L x 1 week with out issues     PCP: Alonso Ling MD    Date Last Seen by PCP:   Chief Complaint   Patient presents with    Wound Care     Wound care 1w     Hemoglobin A1C   Date Value Ref Range Status   06/10/2024 6.0 (H) 4.8 - 5.9 % Final   05/14/2024 5.9 4.8 - 5.9 % Final   09/11/2023 7.2 (H) 4.0 - 5.6 % Final     Comment:     ADA Screening Guidelines:  5.7-6.4%  Consistent with prediabetes  >or=6.5%  Consistent with diabetes    High levels of fetal hemoglobin interfere with the HbA1C  assay. Heterozygous hemoglobin variants (HbS, HgC, etc)do  not significantly interfere with this assay.   However, presence of multiple variants may affect accuracy.     08/28/2018 7.9 (H) 4.0 - 5.6 % Final     Comment:     ADA Screening Guidelines:  5.7-6.4%  Consistent with prediabetes  >or=6.5%  Consistent with diabetes  High levels of fetal hemoglobin interfere with the HbA1C  assay. Heterozygous hemoglobin variants (HbS, HgC, etc)do  not significantly interfere with this assay.   However, presence of multiple variants may affect accuracy.     03/24/2015 9.9 (H) 4.5 - 6.2 % Final       Review of Systems   Constitutional: Negative for chills, decreased appetite and fever.   Cardiovascular:  Negative for leg swelling.   Skin:  Positive for color change, dry skin and poor wound healing. Negative for flushing.   Musculoskeletal:  Negative for  "arthritis, joint pain, joint swelling and myalgias.   Gastrointestinal:  Negative for nausea and vomiting.   Neurological:  Positive for numbness, paresthesias and sensory change. Negative for loss of balance.        Objective:     Vitals:    06/13/24 0947   BP: (!) 109/57   Pulse: 76   Height: 5' 7" (1.702 m)   PainSc: 0-No pain   PainLoc: Foot          Physical Exam  Vitals reviewed.   Constitutional:       Appearance: She is well-developed. She is not ill-appearing or diaphoretic.   Cardiovascular:      Pulses:           Dorsalis pedis pulses are 2+ on the right side and 2+ on the left side.        Posterior tibial pulses are 2+ on the right side and 2+ on the left side.   Musculoskeletal:         General: No tenderness.      Right ankle: Normal.      Left ankle: Normal.      Right foot: No swelling, deformity or crepitus.      Left foot: No swelling, deformity or crepitus.      Comments: Adequate joint range of motion without pain, limitation, nor crepitation Bilateral feet and ankle joints. Muscle strength is 5/5 in all groups bilaterally.      Amputations of left foot with deformity   Lymphadenopathy:      Comments: No palpable lymph nodes   Skin:     General: Skin is warm and dry.      Coloration: Skin is not jaundiced.      Findings: Wound present. No abscess, erythema or rash.      Nails: There is no clubbing.      Comments: Ulceration   1.0x 0.9x 0.2cm  Sub q  Granular  No purulence or pus   Neurological:      Mental Status: She is alert and oriented to person, place, and time.   Psychiatric:         Behavior: Behavior normal. Behavior is cooperative.         11.14.23    L sub 1st Met head    L 1st ray amp site    11.29.23 12.14.23 1.17.24    1.8x1.0x0.1 cm w/ granular base and christelle wound maceration r 1st MTPJ     Healed amp site L 1st ray     1.24.24    1.0x0.8x0.1 cm plantar 1st MPJ R       Ulceration  Location: left sub 1st met  Measurements: 3.5x 1.7x 0.2cm  Drainage: serous  Wound base: " granular  SOI: none      0.4x0.3x.1 cm   Ulceration granular wound bed plantar right 1st MTPJ.  Dry intact periwound.    4.30.24:     0.5x1.0x0.1 cm granular health base +serosan drainage no deep probe    5.8.24      Improved periwound maceration noted.  No deep probe.  Healthy granular wound base.    5.22.24    0.3 x 0.2 x 0.1 cm ulceration with a healthy granular base.  Dry periwound observed.  No surrounding erythema nor edema.  No drainage.  No malodor.    L foot            5.22.24 xray: Marked new deformity internal derangement lis Franc joint with lateral displacement subluxation 1st metatarsal tarsal joint, new osteitis change lis raghav joint, navicular cuneiform joint and inversion subluxation lis Franc joint on lateral view.  Additional edema of.  Calcified plaque tibial and digital vessels.  Further in shaft 1st metatarsal adjacent soft tissues, new subacute chronic compression fracture postop change head 2nd metatarsal with foreshortening of 3rd digit.  Disuse osteoporosis and remote changes of 2nd MTP joint.     Impression:     New  Chorcot joint change, further postoperative change 1st metatarsal and healing fracture deformity 3rd metatarsal discussed above.  -----------------------------------------------------------------------------------------------------------------------------------------------------------    5.29.24:  Pinpoint ulceration noted to plantar right foot.  Thin friable epithelial tissue is intact.    6.5.24:   Healed plantar right foot ulceration with adequate tensile strength observed.  Mild periwound  hyperkeratosis is observed.    6.13.24  Well healed ulceration to the plantar right foot.    6.13.24 xray   Stable appearance of amputation at the midshaft of the 1st metatarsal.  Deformity of the osseous structures about the mid foot, compatible with Charcot arthropathy.  Dislocation of the Lisfranc joint may be somewhat progressed from prior with increased separation of the base of  the 1st and 2nd metatarsal.  Similar appearance of healing fracture deformity of the head of the 3rd metacarpal with foreshortening of the 3rd digit.  No new fracture identified.  Talus is vertical in orientation with subluxation of the tarsometatarsal joints as best demonstrated on the lateral.     Vascular calcifications noted.  Diffuse soft tissue swelling about the left foot, similar to prior.  No radiopaque foreign body.     Impression:     Dislocation of the Lisfranc joint may be somewhat progressed from prior with increased separation of the base of the 1st and 2nd metatarsal.     Findings compatible with Charcot arthropathy with extensive deformity of the midfoot.    Assessment:      Encounter Diagnoses   Name Primary?    Charcot arthropathy of midfoot Yes    DM type 2 with diabetic peripheral neuropathy     PAD (peripheral artery disease)     Healed ulcer of right foot      Plan:     Georgina was seen today for wound care.    Diagnoses and all orders for this visit:    Charcot arthropathy of midfoot  -     X-Ray Foot Complete Left; Future    DM type 2 with diabetic peripheral neuropathy    PAD (peripheral artery disease)    Healed ulcer of right foot      I counseled the patient on her conditions, their implications and medical management.      Independent review of patients previous clinical notes    Reviewed current medication(s), and lab(s) specific to foot ailment(s) with patient in detail. All questions answered     Wound has healed well with no signs of continued skin breakdown noted   Ok to transition into normal shoe gear at this time. I advised patient to check feet daily for signs of drainage or lesion re-opening   Discussed use of daily foot moisturizer to feet, avoiding the webspace's  Limit showers/bathing to roughly 15 minutes during the 1st few weeks after wound has healed to prevent skin breakdown     Radiographics independently reviewed in detail with patient. Findings  discussed. All  questions in regards to radiographs were answered     Continue Charcot breakdown noted at the midfoot with mild increase in subluxation near the navicular.  Continue treatment course.  Will have total contact cast reapplied today.    Follow-up:Patient is to return to the clinic in 2wk  for follow-up but should call [facility] immediately if any signs of infection, such as fever, chills, sweats, increased redness or pain.    Nikole KAYE MA assisted w/ application of football dressing under my direct supervision. Darco shoe applied to offload the area. Advised patient that this should be worn on the affected foot at all times when ambulating     Short-term goals include maintaining good offloading and minimizing bioburden, promoting granulation and epithelialization to healing.  Long-term goals include keeping the wound healed by good offloading and medical management under the direction of internist.    Charcot:     Total contact cast applied by Renzo the Orthopedics orthotist.      34 minutes of  total care spent in reviewing patients medical records,  direct counseling and coordination of care  '

## 2024-06-13 NOTE — PROGRESS NOTES
Subjective:     Patient ID: Georgina Arias is a 53 y.o. female.    Chief Complaint: Wound Care (Left foot ), Dressing Change (Football ), and Foot Pain    Georgina is a 53 y.o. female who presents to the clinic for evaluation and treatment of high risk feet. Georgina has a past medical history of Hyperlipidemia, Hypertension, Peptic ulcer disease, Peripheral artery disease, PONV (postoperative nausea and vomiting), Stage IV CKD, and Type II diabetes mellitus. The patient's chief complaint is diabetic foot ulcer, right foot.         5.29.24 Patient has been in football dressing, ambulating in Darco shoe R walking boot L x 1 week with out issues     PCP: Alonso Ling MD    Date Last Seen by PCP:   Chief Complaint   Patient presents with    Wound Care     Left foot     Dressing Change     Football     Foot Pain     Hemoglobin A1C   Date Value Ref Range Status   06/10/2024 6.0 (H) 4.8 - 5.9 % Final   05/14/2024 5.9 4.8 - 5.9 % Final   09/11/2023 7.2 (H) 4.0 - 5.6 % Final     Comment:     ADA Screening Guidelines:  5.7-6.4%  Consistent with prediabetes  >or=6.5%  Consistent with diabetes    High levels of fetal hemoglobin interfere with the HbA1C  assay. Heterozygous hemoglobin variants (HbS, HgC, etc)do  not significantly interfere with this assay.   However, presence of multiple variants may affect accuracy.     08/28/2018 7.9 (H) 4.0 - 5.6 % Final     Comment:     ADA Screening Guidelines:  5.7-6.4%  Consistent with prediabetes  >or=6.5%  Consistent with diabetes  High levels of fetal hemoglobin interfere with the HbA1C  assay. Heterozygous hemoglobin variants (HbS, HgC, etc)do  not significantly interfere with this assay.   However, presence of multiple variants may affect accuracy.     03/24/2015 9.9 (H) 4.5 - 6.2 % Final       Review of Systems   Constitutional: Negative for chills, decreased appetite and fever.   Cardiovascular:  Negative for leg swelling.   Skin:  Positive for color change,  "dry skin and poor wound healing.   Musculoskeletal:  Negative for arthritis, joint pain, joint swelling and myalgias.   Gastrointestinal:  Negative for nausea and vomiting.   Neurological:  Positive for numbness, paresthesias and sensory change. Negative for loss of balance.        Objective:     Vitals:    05/29/24 0902   BP: (!) 148/67   Pulse: 75   Resp: 18   Weight: 108 kg (238 lb 1.6 oz)   Height: 5' 7" (1.702 m)   PainSc:   3   PainLoc: Foot          Physical Exam  Vitals reviewed.   Constitutional:       Appearance: She is well-developed. She is not ill-appearing or diaphoretic.   Cardiovascular:      Pulses:           Dorsalis pedis pulses are 2+ on the right side and 2+ on the left side.        Posterior tibial pulses are 2+ on the right side and 2+ on the left side.   Musculoskeletal:         General: No tenderness.      Right ankle: Normal.      Left ankle: Normal.      Right foot: No swelling, deformity or crepitus.      Left foot: No swelling, deformity or crepitus.      Comments: Adequate joint range of motion without pain, limitation, nor crepitation Bilateral feet and ankle joints. Muscle strength is 5/5 in all groups bilaterally.      Amputations of left foot with deformity   Lymphadenopathy:      Comments: No palpable lymph nodes   Skin:     General: Skin is warm and dry.      Coloration: Skin is not jaundiced.      Findings: Wound present. No abscess, erythema or rash.      Nails: There is no clubbing.      Comments: Ulceration   1.0x 0.9x 0.2cm  Sub q  Granular  No purulence or pus   Neurological:      Mental Status: She is alert and oriented to person, place, and time.   Psychiatric:         Behavior: Behavior normal. Behavior is cooperative.         11.14.23    L sub 1st Met head    L 1st ray amp site    11.29.23 12.14.23      1.17.24    1.8x1.0x0.1 cm w/ granular base and christelle wound maceration r 1st MTPJ     Healed amp site L 1st ray     1.24.24    1.0x0.8x0.1 cm plantar 1st MPJ R "       Ulceration  Location: left sub 1st met  Measurements: 3.5x 1.7x 0.2cm  Drainage: serous  Wound base: granular  SOI: none      0.4x0.3x.1 cm   Ulceration granular wound bed plantar right 1st MTPJ.  Dry intact periwound.    4.30.24:     0.5x1.0x0.1 cm granular health base +serosan drainage no deep probe    5.8.24      Improved periwound maceration noted.  No deep probe.  Healthy granular wound base.    5.22.24    0.3 x 0.2 x 0.1 cm ulceration with a healthy granular base.  Dry periwound observed.  No surrounding erythema nor edema.  No drainage.  No malodor.    L foot            5.22.24 xray: Marked new deformity internal derangement lis Franc joint with lateral displacement subluxation 1st metatarsal tarsal joint, new osteitis change lis raghav joint, navicular cuneiform joint and inversion subluxation lis Franc joint on lateral view.  Additional edema of.  Calcified plaque tibial and digital vessels.  Further in shaft 1st metatarsal adjacent soft tissues, new subacute chronic compression fracture postop change head 2nd metatarsal with foreshortening of 3rd digit.  Disuse osteoporosis and remote changes of 2nd MTP joint.     Impression:     New  Chorcot joint change, further postoperative change 1st metatarsal and healing fracture deformity 3rd metatarsal discussed above.  -----------------------------------------------------------------------------------------------------------------------------------------------------------    5.29.24:  Pinpoint ulceration noted to plantar right foot.  Thin friable epithelial tissue is intact.  Assessment:      Encounter Diagnoses   Name Primary?    DM type 2 with diabetic peripheral neuropathy Yes    PAD (peripheral artery disease)     Foot ulcer, right, with fat layer exposed     ESRD (end stage renal disease)      Plan:     Georgina was seen today for wound care, dressing change and foot pain.    Diagnoses and all orders for this visit:    DM type 2 with diabetic peripheral  neuropathy    PAD (peripheral artery disease)    Foot ulcer, right, with fat layer exposed    ESRD (end stage renal disease)      I counseled the patient on her conditions, their implications and medical management.      Independent review of patients previous clinical notes    Reviewed current medication(s), and lab(s) specific to foot ailment(s) with patient in detail. All questions answered     Debridement: area deeply cleansed with saline moistened gauze     Dressings: plain foam  Offloading:Foam      Follow-up:Patient is to return to the clinic in 1 week  for follow-up but should call [facility] immediately if any signs of infection, such as fever, chills, sweats, increased redness or pain.    Nikole KAYE MA assisted w/ application of football dressing under my direct supervision. Darco shoe applied to offload the area. Advised patient that this should be worn on the affected foot at all times when ambulating     Short-term goals include maintaining good offloading and minimizing bioburden, promoting granulation and epithelialization to healing.  Long-term goals include keeping the wound healed by good offloading and medical management under the direction of internist.    Charcot:     Total contact cast applied by Renzo the [facility]opedics orthotist.  Advised patient to minimize weight-bearing as often as possible.  She has a wheelchair.  Advised utilizes as often as possible.  In regards to her right foot ulceration it is nearly healed.  We will continue with the security football for an additional 1-2 weeks prior to patient resuming utilization of normal shoe gear on the right foot.  We will update x-rays of left foot in 1-3 weeks.

## 2024-06-13 NOTE — PROGRESS NOTES
Subjective:     Patient ID: Georgina Arias is a 53 y.o. female.    Chief Complaint: Wound Care (Right foot)    Georgina is a 53 y.o. female who presents to the clinic for evaluation and treatment of high risk feet. Georgina has a past medical history of Hyperlipidemia, Hypertension, Peptic ulcer disease, Peripheral artery disease, PONV (postoperative nausea and vomiting), Stage IV CKD, and Type II diabetes mellitus. The patient's chief complaint is diabetic foot ulcer, right foot.         6.5.2424 Patient has been in football dressing, ambulating in Darco shoe R TCC L x 1 week with out issues     PCP: Alonso Ling MD    Date Last Seen by PCP:   Chief Complaint   Patient presents with    Wound Care     Right foot     Hemoglobin A1C   Date Value Ref Range Status   06/10/2024 6.0 (H) 4.8 - 5.9 % Final   05/14/2024 5.9 4.8 - 5.9 % Final   09/11/2023 7.2 (H) 4.0 - 5.6 % Final     Comment:     ADA Screening Guidelines:  5.7-6.4%  Consistent with prediabetes  >or=6.5%  Consistent with diabetes    High levels of fetal hemoglobin interfere with the HbA1C  assay. Heterozygous hemoglobin variants (HbS, HgC, etc)do  not significantly interfere with this assay.   However, presence of multiple variants may affect accuracy.     08/28/2018 7.9 (H) 4.0 - 5.6 % Final     Comment:     ADA Screening Guidelines:  5.7-6.4%  Consistent with prediabetes  >or=6.5%  Consistent with diabetes  High levels of fetal hemoglobin interfere with the HbA1C  assay. Heterozygous hemoglobin variants (HbS, HgC, etc)do  not significantly interfere with this assay.   However, presence of multiple variants may affect accuracy.     03/24/2015 9.9 (H) 4.5 - 6.2 % Final       Review of Systems   Constitutional: Negative for chills, decreased appetite and fever.   Cardiovascular:  Negative for leg swelling.   Skin:  Positive for color change, dry skin and poor wound healing. Negative for flushing.   Musculoskeletal:  Negative for  "arthritis, joint pain, joint swelling and myalgias.   Gastrointestinal:  Negative for nausea and vomiting.   Neurological:  Positive for numbness, paresthesias and sensory change. Negative for loss of balance.        Objective:     Vitals:    06/05/24 0903   BP: 138/73   Pulse: 77   Weight: 108 kg (238 lb 1.6 oz)   Height: 5' 7" (1.702 m)   PainSc: 0-No pain          Physical Exam  Vitals reviewed.   Constitutional:       Appearance: She is well-developed. She is not ill-appearing or diaphoretic.   Cardiovascular:      Pulses:           Dorsalis pedis pulses are 2+ on the right side and 2+ on the left side.        Posterior tibial pulses are 2+ on the right side and 2+ on the left side.   Musculoskeletal:         General: No tenderness.      Right ankle: Normal.      Left ankle: Normal.      Right foot: No swelling, deformity or crepitus.      Left foot: No swelling, deformity or crepitus.      Comments: Adequate joint range of motion without pain, limitation, nor crepitation Bilateral feet and ankle joints. Muscle strength is 5/5 in all groups bilaterally.      Amputations of left foot with deformity   Lymphadenopathy:      Comments: No palpable lymph nodes   Skin:     General: Skin is warm and dry.      Coloration: Skin is not jaundiced.      Findings: Wound present. No abscess, erythema or rash.      Nails: There is no clubbing.      Comments: Ulceration   1.0x 0.9x 0.2cm  Sub q  Granular  No purulence or pus   Neurological:      Mental Status: She is alert and oriented to person, place, and time.   Psychiatric:         Behavior: Behavior normal. Behavior is cooperative.         11.14.23    L sub 1st Met head    L 1st ray amp site    11.29.23 12.14.23 1.17.24    1.8x1.0x0.1 cm w/ granular base and christelle wound maceration r 1st MTPJ     Healed amp site L 1st ray     1.24.24    1.0x0.8x0.1 cm plantar 1st MPJ R       Ulceration  Location: left sub 1st met  Measurements: 3.5x 1.7x 0.2cm  Drainage: " serous  Wound base: granular  SOI: none      0.4x0.3x.1 cm   Ulceration granular wound bed plantar right 1st MTPJ.  Dry intact periwound.    4.30.24:     0.5x1.0x0.1 cm granular health base +serosan drainage no deep probe    5.8.24      Improved periwound maceration noted.  No deep probe.  Healthy granular wound base.    5.22.24    0.3 x 0.2 x 0.1 cm ulceration with a healthy granular base.  Dry periwound observed.  No surrounding erythema nor edema.  No drainage.  No malodor.    L foot            5.22.24 xray: Marked new deformity internal derangement lis Franc joint with lateral displacement subluxation 1st metatarsal tarsal joint, new osteitis change lis raghav joint, navicular cuneiform joint and inversion subluxation lis Franc joint on lateral view.  Additional edema of.  Calcified plaque tibial and digital vessels.  Further in shaft 1st metatarsal adjacent soft tissues, new subacute chronic compression fracture postop change head 2nd metatarsal with foreshortening of 3rd digit.  Disuse osteoporosis and remote changes of 2nd MTP joint.     Impression:     New  Chorcot joint change, further postoperative change 1st metatarsal and healing fracture deformity 3rd metatarsal discussed above.  -----------------------------------------------------------------------------------------------------------------------------------------------------------    5.29.24:  Pinpoint ulceration noted to plantar right foot.  Thin friable epithelial tissue is intact.    6.5.24:   Healed plantar right foot ulceration with adequate tensile strength observed.  Mild periwound  hyperkeratosis is observed.  Assessment:      Encounter Diagnoses   Name Primary?    DM type 2 with diabetic peripheral neuropathy Yes    PAD (peripheral artery disease)     Healed ulcer of right foot     Charcot arthropathy of midfoot      Plan:     Georgina was seen today for wound care.    Diagnoses and all orders for this visit:    DM type 2 with diabetic  peripheral neuropathy    PAD (peripheral artery disease)    Healed ulcer of right foot    Charcot arthropathy of midfoot      I counseled the patient on her conditions, their implications and medical management.      Independent review of patients previous clinical notes    Reviewed current medication(s), and lab(s) specific to foot ailment(s) with patient in detail. All questions answered     Debridement: area deeply cleansed with saline moistened gauze     Dressings: plain foam  Offloading:Foam    Security football x1 additional week.  Follow-up:Patient is to return to the clinic in 1 week  for follow-up but should call [facility] immediately if any signs of infection, such as fever, chills, sweats, increased redness or pain.    Nikole KAYE MA assisted w/ application of football dressing under my direct supervision. Darco shoe applied to offload the area. Advised patient that this should be worn on the affected foot at all times when ambulating     Short-term goals include maintaining good offloading and minimizing bioburden, promoting granulation and epithelialization to healing.  Long-term goals include keeping the wound healed by good offloading and medical management under the direction of internist.    Charcot:     Total contact cast applied by Renzo the [facility]oped[facility] orthotist.  Left intact.  We will plan to change total contact cast and take updated x-rays of left foot Charcot at the next appointment.

## 2024-06-13 NOTE — PROGRESS NOTES
Subjective:     Patient ID: Georgina Arias is a 53 y.o. female.    Chief Complaint: Wound Care (Wound care 1w)    Georgina is a 53 y.o. female who presents to the clinic for evaluation and treatment of high risk feet. Georgina has a past medical history of Hyperlipidemia, Hypertension, Peptic ulcer disease, Peripheral artery disease, PONV (postoperative nausea and vomiting), Stage IV CKD, and Type II diabetes mellitus. The patient's chief complaint is diabetic foot ulcer, right foot.     5/8/24: Patient has been in football dressing, ambulating in Darco shoe x 1 week .  Home health care reports increased drainage and concern for wound.    PCP: Alonso Ling MD    Date Last Seen by PCP:   Chief Complaint   Patient presents with    Wound Care     Wound care 1w     Hemoglobin A1C   Date Value Ref Range Status   06/10/2024 6.0 (H) 4.8 - 5.9 % Final   05/14/2024 5.9 4.8 - 5.9 % Final   09/11/2023 7.2 (H) 4.0 - 5.6 % Final     Comment:     ADA Screening Guidelines:  5.7-6.4%  Consistent with prediabetes  >or=6.5%  Consistent with diabetes    High levels of fetal hemoglobin interfere with the HbA1C  assay. Heterozygous hemoglobin variants (HbS, HgC, etc)do  not significantly interfere with this assay.   However, presence of multiple variants may affect accuracy.     08/28/2018 7.9 (H) 4.0 - 5.6 % Final     Comment:     ADA Screening Guidelines:  5.7-6.4%  Consistent with prediabetes  >or=6.5%  Consistent with diabetes  High levels of fetal hemoglobin interfere with the HbA1C  assay. Heterozygous hemoglobin variants (HbS, HgC, etc)do  not significantly interfere with this assay.   However, presence of multiple variants may affect accuracy.     03/24/2015 9.9 (H) 4.5 - 6.2 % Final       Review of Systems   Constitutional: Negative for chills, decreased appetite and fever.   Cardiovascular:  Negative for leg swelling.   Skin:  Positive for color change, dry skin and poor wound healing.   Musculoskeletal:   "Negative for arthritis, joint pain, joint swelling and myalgias.   Gastrointestinal:  Negative for nausea and vomiting.   Neurological:  Positive for numbness, paresthesias and sensory change. Negative for loss of balance.        Objective:     Vitals:    06/13/24 0947   BP: (!) 109/57   Pulse: 76   Height: 5' 7" (1.702 m)   PainSc: 0-No pain   PainLoc: Foot          Physical Exam  Vitals reviewed.   Constitutional:       Appearance: She is well-developed. She is not ill-appearing or diaphoretic.   Cardiovascular:      Pulses:           Dorsalis pedis pulses are 2+ on the right side and 2+ on the left side.        Posterior tibial pulses are 2+ on the right side and 2+ on the left side.   Musculoskeletal:         General: No tenderness.      Right ankle: Normal.      Left ankle: Normal.      Right foot: No swelling, deformity or crepitus.      Left foot: No swelling, deformity or crepitus.      Comments: Adequate joint range of motion without pain, limitation, nor crepitation Bilateral feet and ankle joints. Muscle strength is 5/5 in all groups bilaterally.      Amputations of left foot with deformity   Lymphadenopathy:      Comments: No palpable lymph nodes   Skin:     General: Skin is warm and dry.      Coloration: Skin is not jaundiced.      Findings: Wound present. No abscess, erythema or rash.      Nails: There is no clubbing.      Comments: Ulceration   1.0x 0.9x 0.2cm  Sub q  Granular  No purulence or pus   Neurological:      Mental Status: She is alert and oriented to person, place, and time.   Psychiatric:         Behavior: Behavior normal. Behavior is cooperative.         11.14.23    L sub 1st Met head    L 1st ray amp site    11.29.23 12.14.23 1.17.24    1.8x1.0x0.1 cm w/ granular base and christelle wound maceration r 1st MTPJ     Healed amp site L 1st ray     1.24.24    1.0x0.8x0.1 cm plantar 1st MPJ R       Ulceration  Location: left sub 1st met  Measurements: 3.5x 1.7x 0.2cm  Drainage: " serous  Wound base: granular  SOI: none      0.4x0.3x.1 cm   Ulceration granular wound bed plantar right 1st MTPJ.  Dry intact periwound.    4.30.24:     0.5x1.0x0.1 cm granular health base +serosan drainage no deep probe    5.8.24      Improved periwound maceration noted.  No deep probe.  Healthy granular wound base.    5.22.24    0.3 x 0.2 x 0.1 cm ulceration with a healthy granular base.  Dry periwound observed.  No surrounding erythema nor edema.  No drainage.  No malodor.    L foot            5.22.24 xray: Marked new deformity internal derangement lis Franc joint with lateral displacement subluxation 1st metatarsal tarsal joint, new osteitis change lis raghav joint, navicular cuneiform joint and inversion subluxation lis Franc joint on lateral view.  Additional edema of.  Calcified plaque tibial and digital vessels.  Further in shaft 1st metatarsal adjacent soft tissues, new subacute chronic compression fracture postop change head 2nd metatarsal with foreshortening of 3rd digit.  Disuse osteoporosis and remote changes of 2nd MTP joint.     Impression:     New  Chorcot joint change, further postoperative change 1st metatarsal and healing fracture deformity 3rd metatarsal discussed above.    6.13.24 xray: FINDINGS:  Stable appearance of amputation at the midshaft of the 1st metatarsal.  Deformity of the osseous structures about the mid foot, compatible with Charcot arthropathy.  Dislocation of the Lisfranc joint may be somewhat progressed from prior with increased separation of the base of the 1st and 2nd metatarsal.  Similar appearance of healing fracture deformity of the head of the 3rd metacarpal with foreshortening of the 3rd digit.  No new fracture identified.  Talus is vertical in orientation with subluxation of the tarsometatarsal joints as best demonstrated on the lateral.     Vascular calcifications noted.  Diffuse soft tissue swelling about the left foot, similar to prior.  No radiopaque foreign body.      Impression:     Dislocation of the Lisfranc joint may be somewhat progressed from prior with increased separation of the base of the 1st and 2nd metatarsal.     Findings compatible with Charcot arthropathy with extensive deformity of the midfoot.     Stable healing fracture of the head of the 3rd metacarpal.     Stable postoperative change of the 1st metatarsal.  -----------------------------------------------------------------------------------------------------------------------------------------------------------    Assessment:      Encounter Diagnoses   Name Primary?    Charcot arthropathy of midfoot Yes    DM type 2 with diabetic peripheral neuropathy     PAD (peripheral artery disease)     Healed ulcer of right foot      Plan:     Georgina was seen today for wound care.    Diagnoses and all orders for this visit:    Charcot arthropathy of midfoot  -     X-Ray Foot Complete Left; Future    DM type 2 with diabetic peripheral neuropathy    PAD (peripheral artery disease)    Healed ulcer of right foot      I counseled the patient on her conditions, their implications and medical management.      Independent review of patients previous clinical notes    Reviewed current medication(s), and lab(s) specific to foot ailment(s) with patient in detail. All questions answered     ***  Right foot skin and soft tissue infection has resolved.  Ulceration nearly healed.    Debridement: With verbal consent, nonviable tissues on the R  foot were debrided beyond sub q utilizing a  sterile No. 3 scalpel and forceps. Minimal bleeding controlled with direct pressure  The patient tolerated this well.     Dressings: HB  Offloading:Foam      Follow-up:Patient is to return to the clinic in 1 week  for follow-up but [facility] if any signs of infection, such as fever, chills, sweats, increased redness or pain.    Nikole KAYE MA assisted w/ application of football dressing under my direct supervision. Pyreg shoe  applied to offload the area. Advised patient that this should be worn on the affected foot at all times when ambulating     Short-term goals include maintaining good offloading and minimizing bioburden, promoting granulation and epithelialization to healing.  Long-term goals include keeping the wound healed by good offloading and medical management under the direction of internist.      Patient with new onset left foot increased swelling and rocker bottom deformity observed.  This is consistent with worsening of her already present Charcot deformity.  Discussed with patient in detail signs and symptoms as well as treatment options for Charcot neuropathy.  We will start with a radiographs today.  Patient fitted with a walking boot and advised to remain nonweightbearing as much as possible.  She has a walker that she utilizes.  We will consider placing patient had total contact cast at her next clinical evaluation.

## 2024-06-14 ENCOUNTER — PATIENT MESSAGE (OUTPATIENT)
Dept: PODIATRY | Facility: CLINIC | Age: 54
End: 2024-06-14
Payer: MEDICARE

## 2024-06-27 ENCOUNTER — TELEPHONE (OUTPATIENT)
Dept: PODIATRY | Facility: CLINIC | Age: 54
End: 2024-06-27
Payer: MEDICARE

## 2024-06-27 ENCOUNTER — OFFICE VISIT (OUTPATIENT)
Dept: PODIATRY | Facility: CLINIC | Age: 54
End: 2024-06-27
Payer: MEDICARE

## 2024-06-27 VITALS
DIASTOLIC BLOOD PRESSURE: 67 MMHG | HEIGHT: 67 IN | BODY MASS INDEX: 37.29 KG/M2 | HEART RATE: 80 BPM | SYSTOLIC BLOOD PRESSURE: 130 MMHG

## 2024-06-27 DIAGNOSIS — M14.679 CHARCOT ARTHROPATHY OF MIDFOOT: Primary | ICD-10-CM

## 2024-06-27 PROCEDURE — 99999 PR PBB SHADOW E&M-EST. PATIENT-LVL IV: CPT | Mod: PBBFAC,,, | Performed by: PODIATRIST

## 2024-06-27 PROCEDURE — 3075F SYST BP GE 130 - 139MM HG: CPT | Mod: CPTII,S$GLB,, | Performed by: PODIATRIST

## 2024-06-27 PROCEDURE — 1159F MED LIST DOCD IN RCRD: CPT | Mod: CPTII,S$GLB,, | Performed by: PODIATRIST

## 2024-06-27 PROCEDURE — 3044F HG A1C LEVEL LT 7.0%: CPT | Mod: CPTII,S$GLB,, | Performed by: PODIATRIST

## 2024-06-27 PROCEDURE — 3008F BODY MASS INDEX DOCD: CPT | Mod: CPTII,S$GLB,, | Performed by: PODIATRIST

## 2024-06-27 PROCEDURE — 99214 OFFICE O/P EST MOD 30 MIN: CPT | Mod: S$GLB,,, | Performed by: PODIATRIST

## 2024-06-27 PROCEDURE — 3078F DIAST BP <80 MM HG: CPT | Mod: CPTII,S$GLB,, | Performed by: PODIATRIST

## 2024-06-27 NOTE — TELEPHONE ENCOUNTER
Spoke with patient will come in on today to have cast removed. Patient voice understanding to new appointment date and time.

## 2024-06-27 NOTE — TELEPHONE ENCOUNTER
Spoke with patient has cast needs to come off. Staff will call patient back to schedule appointment.

## 2024-06-27 NOTE — PROGRESS NOTES
Subjective:     Patient ID: Georgina Arias is a 53 y.o. female.    Chief Complaint: Follow-up (TCC f/u 2wk)    Georgina is a 53 y.o. female who presents to the clinic for evaluation and treatment of high risk feet. Georgina has a past medical history of Hyperlipidemia, Hypertension, Peptic ulcer disease, Peripheral artery disease, PONV (postoperative nausea and vomiting), Stage IV CKD, and Type II diabetes mellitus. The patient's chief complaint is diabetic foot ulcer, right foot.         6.13.2424 Patient has been in football dressing, ambulating in [employer] shoe R TCC L x 1 week with out issues     6/27/2024  pt presents for TCC, been ambulating without any issues x2 weeks    PCP: Alonso Ling MD    Date Last Seen by PCP:   Chief Complaint   Patient presents with    Follow-up     TCC f/u 2wk     Hemoglobin A1C   Date Value Ref Range Status   06/10/2024 6.0 (H) 4.8 - 5.9 % Final   05/14/2024 5.9 4.8 - 5.9 % Final   09/11/2023 7.2 (H) 4.0 - 5.6 % Final     Comment:     ADA Screening Guidelines:  5.7-6.4%  Consistent with prediabetes  >or=6.5%  Consistent with diabetes    High levels of fetal hemoglobin interfere with the HbA1C  assay. Heterozygous hemoglobin variants (HbS, HgC, etc)do  not significantly interfere with this assay.   However, presence of multiple variants may affect accuracy.     08/28/2018 7.9 (H) 4.0 - 5.6 % Final     Comment:     ADA Screening Guidelines:  5.7-6.4%  Consistent with prediabetes  >or=6.5%  Consistent with diabetes  High levels of fetal hemoglobin interfere with the HbA1C  assay. Heterozygous hemoglobin variants (HbS, HgC, etc)do  not significantly interfere with this assay.   However, presence of multiple variants may affect accuracy.     03/24/2015 9.9 (H) 4.5 - 6.2 % Final       Review of Systems   Constitutional: Negative for chills, decreased appetite and fever.   Cardiovascular:  Negative for leg swelling.   Skin:  Positive for color change, dry skin and poor  "wound healing. Negative for flushing.   Musculoskeletal:  Negative for arthritis, joint pain, joint swelling and myalgias.   Gastrointestinal:  Negative for nausea and vomiting.   Neurological:  Positive for numbness, paresthesias and sensory change. Negative for loss of balance.        Objective:     Vitals:    06/27/24 1042   BP: 130/67   Pulse: 80   Height: 5' 7" (1.702 m)   PainSc: 0-No pain   PainLoc: Foot          Physical Exam  Vitals reviewed.   Constitutional:       Appearance: She is well-developed. She is not ill-appearing or diaphoretic.   Cardiovascular:      Pulses:           Dorsalis pedis pulses are 2+ on the right side and 2+ on the left side.        Posterior tibial pulses are 2+ on the right side and 2+ on the left side.   Musculoskeletal:         General: No tenderness.      Right ankle: Normal.      Left ankle: Normal.      Right foot: No swelling, deformity or crepitus.      Left foot: No swelling, deformity or crepitus.      Comments: Adequate joint range of motion without pain, limitation, nor crepitation Bilateral feet and ankle joints. Muscle strength is 5/5 in all groups bilaterally.      Amputations of left foot with deformity   Lymphadenopathy:      Comments: No palpable lymph nodes   Skin:     General: Skin is warm and dry.      Coloration: Skin is not jaundiced.      Findings: Wound present. No abscess, erythema or rash.      Nails: There is no clubbing.      Comments: healed   Neurological:      Mental Status: She is alert and oriented to person, place, and time.   Psychiatric:         Behavior: Behavior normal. Behavior is cooperative.         11.14.23    L sub 1st Met head    L 1st ray amp site    11.29.23 12.14.23 1.17.24    1.8x1.0x0.1 cm w/ granular base and christelle wound maceration r 1st MTPJ     Healed amp site L 1st ray     1.24.24    1.0x0.8x0.1 cm plantar 1st MPJ R       Ulceration  Location: left sub 1st met  Measurements: 3.5x 1.7x 0.2cm  Drainage: serous  Wound " base: granular  SOI: none      0.4x0.3x.1 cm   Ulceration granular wound bed plantar right 1st MTPJ.  Dry intact periwound.    4.30.24:     0.5x1.0x0.1 cm granular health base +serosan drainage no deep probe    5.8.24      Improved periwound maceration noted.  No deep probe.  Healthy granular wound base.    5.22.24    0.3 x 0.2 x 0.1 cm ulceration with a healthy granular base.  Dry periwound observed.  No surrounding erythema nor edema.  No drainage.  No malodor.    L foot            5.22.24 xray: Marked new deformity internal derangement lis Franc joint with lateral displacement subluxation 1st metatarsal tarsal joint, new osteitis change lis raghav joint, navicular cuneiform joint and inversion subluxation lis Franc joint on lateral view.  Additional edema of.  Calcified plaque tibial and digital vessels.  Further in shaft 1st metatarsal adjacent soft tissues, new subacute chronic compression fracture postop change head 2nd metatarsal with foreshortening of 3rd digit.  Disuse osteoporosis and remote changes of 2nd MTP joint.     Impression:     New  Chorcot joint change, further postoperative change 1st metatarsal and healing fracture deformity 3rd metatarsal discussed above.  -----------------------------------------------------------------------------------------------------------------------------------------------------------    5.29.24:  Pinpoint ulceration noted to plantar right foot.  Thin friable epithelial tissue is intact.    6.5.24:   Healed plantar right foot ulceration with adequate tensile strength observed.  Mild periwound  hyperkeratosis is observed.    6.13.24  Well healed ulceration to the plantar right foot.    6.13.24 xray   Stable appearance of amputation at the midshaft of the 1st metatarsal.  Deformity of the osseous structures about the mid foot, compatible with Charcot arthropathy.  Dislocation of the Lisfranc joint may be somewhat progressed from prior with increased separation of the base  of the 1st and 2nd metatarsal.  Similar appearance of healing fracture deformity of the head of the 3rd metacarpal with foreshortening of the 3rd digit.  No new fracture identified.  Talus is vertical in orientation with subluxation of the tarsometatarsal joints as best demonstrated on the lateral.     Vascular calcifications noted.  Diffuse soft tissue swelling about the left foot, similar to prior.  No radiopaque foreign body.     Impression:     Dislocation of the Lisfranc joint may be somewhat progressed from prior with increased separation of the base of the 1st and 2nd metatarsal.     Findings compatible with Charcot arthropathy with extensive deformity of the midfoot.    Assessment:      Encounter Diagnosis   Name Primary?    Charcot arthropathy of midfoot Yes     Plan:     Georgina was seen today for follow-up.    Diagnoses and all orders for this visit:    Charcot arthropathy of midfoot      I counseled the patient on her conditions, their implications and medical management.      Independent review of patients previous clinical notes    Reviewed current medication(s), and lab(s) specific to foot ailment(s) with patient in detail. All questions answered     Wound has healed well with no signs of continued skin breakdown noted     Discussed use of daily foot moisturizer to feet, avoiding the webspace's  Limit showers/bathing to roughly 15 minutes during the 1st few weeks after wound has healed to prevent skin breakdown       Continue Charcot breakdown noted at the midfoot with mild increase in subluxation near the navicular.  Continue treatment course.  Will have total contact cast reapplied today.    Follow-up:Patient is to return to the clinic in 2wk  for follow-up but should call [facility] immediately if any signs of infection, such as fever, chills, sweats, increased redness or pain.      Short-term goals include maintaining good offloading and minimizing bioburden, promoting granulation and  epithelialization to healing.  Long-term goals include keeping the wound healed by good offloading and medical management under the direction of internist.    Kizzycot:     Total contact cast applied by Renzo the Orthopedics orthotist.      34 minutes of  total care spent in reviewing patients medical records,  direct counseling and coordination of care  '

## 2024-07-01 ENCOUNTER — DOCUMENT SCAN (OUTPATIENT)
Dept: HOME HEALTH SERVICES | Facility: HOSPITAL | Age: 54
End: 2024-07-01
Payer: MEDICARE

## 2024-07-02 DIAGNOSIS — Z12.31 ENCOUNTER FOR SCREENING MAMMOGRAM FOR MALIGNANT NEOPLASM OF BREAST: Primary | ICD-10-CM

## 2024-07-03 ENCOUNTER — HOSPITAL ENCOUNTER (OUTPATIENT)
Dept: RADIOLOGY | Facility: HOSPITAL | Age: 54
Discharge: HOME | End: 2024-07-03
Attending: INTERNAL MEDICINE
Payer: MEDICARE

## 2024-07-03 DIAGNOSIS — Z12.31 ENCOUNTER FOR SCREENING MAMMOGRAM FOR MALIGNANT NEOPLASM OF BREAST: ICD-10-CM

## 2024-07-03 PROCEDURE — 77067 SCR MAMMO BI INCL CAD: CPT | Mod: 26,,, | Performed by: RADIOLOGY

## 2024-07-03 PROCEDURE — 77067 SCR MAMMO BI INCL CAD: CPT | Mod: TC

## 2024-07-03 PROCEDURE — 77063 BREAST TOMOSYNTHESIS BI: CPT | Mod: 26,,, | Performed by: RADIOLOGY

## 2024-07-10 ENCOUNTER — HOSPITAL ENCOUNTER (OUTPATIENT)
Dept: RADIOLOGY | Facility: HOSPITAL | Age: 54
Discharge: HOME | End: 2024-07-10
Attending: PODIATRIST
Payer: MEDICARE

## 2024-07-10 ENCOUNTER — OFFICE VISIT (OUTPATIENT)
Dept: PODIATRY | Facility: CLINIC | Age: 54
End: 2024-07-10
Payer: MEDICARE

## 2024-07-10 ENCOUNTER — CLINICAL SUPPORT (OUTPATIENT)
Dept: ORTHOPEDICS | Facility: CLINIC | Age: 54
End: 2024-07-10
Payer: MEDICARE

## 2024-07-10 VITALS
HEART RATE: 66 BPM | WEIGHT: 238.13 LBS | RESPIRATION RATE: 18 BRPM | DIASTOLIC BLOOD PRESSURE: 77 MMHG | BODY MASS INDEX: 37.37 KG/M2 | SYSTOLIC BLOOD PRESSURE: 158 MMHG | HEIGHT: 67 IN

## 2024-07-10 DIAGNOSIS — M14.679 CHARCOT ARTHROPATHY OF MIDFOOT: ICD-10-CM

## 2024-07-10 DIAGNOSIS — M14.679 CHARCOT ARTHROPATHY OF MIDFOOT: Primary | ICD-10-CM

## 2024-07-10 DIAGNOSIS — S91.302A OPEN WOUND OF LEFT FOOT: Primary | ICD-10-CM

## 2024-07-10 DIAGNOSIS — N18.6 ESRD (END STAGE RENAL DISEASE): ICD-10-CM

## 2024-07-10 DIAGNOSIS — I73.9 PAD (PERIPHERAL ARTERY DISEASE): ICD-10-CM

## 2024-07-10 DIAGNOSIS — E11.42 DM TYPE 2 WITH DIABETIC PERIPHERAL NEUROPATHY: ICD-10-CM

## 2024-07-10 PROCEDURE — 1159F MED LIST DOCD IN RCRD: CPT | Mod: CPTII,S$GLB,, | Performed by: PODIATRIST

## 2024-07-10 PROCEDURE — 3077F SYST BP >= 140 MM HG: CPT | Mod: CPTII,S$GLB,, | Performed by: PODIATRIST

## 2024-07-10 PROCEDURE — 73630 X-RAY EXAM OF FOOT: CPT | Mod: 26,LT,, | Performed by: RADIOLOGY

## 2024-07-10 PROCEDURE — 3044F HG A1C LEVEL LT 7.0%: CPT | Mod: CPTII,S$GLB,, | Performed by: PODIATRIST

## 2024-07-10 PROCEDURE — 73630 X-RAY EXAM OF FOOT: CPT | Mod: TC,LT

## 2024-07-10 PROCEDURE — 3078F DIAST BP <80 MM HG: CPT | Mod: CPTII,S$GLB,, | Performed by: PODIATRIST

## 2024-07-10 PROCEDURE — 99999 PR PBB SHADOW E&M-EST. PATIENT-LVL IV: CPT | Mod: PBBFAC,,, | Performed by: PODIATRIST

## 2024-07-10 PROCEDURE — 3008F BODY MASS INDEX DOCD: CPT | Mod: CPTII,S$GLB,, | Performed by: PODIATRIST

## 2024-07-10 PROCEDURE — 99214 OFFICE O/P EST MOD 30 MIN: CPT | Mod: 25,S$GLB,, | Performed by: PODIATRIST

## 2024-07-17 NOTE — PROGRESS NOTES
Subjective:     Patient ID: Georgina Arias is a 53 y.o. female.    Chief Complaint: charcot's  (Left foot ) and Foot Pain    Georgina is a 53 y.o. female who presents to the clinic for evaluation and treatment of high risk feet. Georgina has a past medical history of Hyperlipidemia, Hypertension, Peptic ulcer disease, Peripheral artery disease, PONV (postoperative nausea and vomiting), Stage IV CKD, and Type II diabetes mellitus. The patient's chief complaint is L charcot     7/10/24:  pt presents for TCC, been ambulating without any issues x2 weeks    PCP: Alonso Ling MD    Date Last Seen by PCP:   Chief Complaint   Patient presents with    charcot's      Left foot     Foot Pain     Hemoglobin A1C   Date Value Ref Range Status   07/02/2024 5.6 4.8 - 5.9 % Final   06/10/2024 6.0 (H) 4.8 - 5.9 % Final   05/14/2024 5.9 4.8 - 5.9 % Final   09/11/2023 7.2 (H) 4.0 - 5.6 % Final     Comment:     ADA Screening Guidelines:  5.7-6.4%  Consistent with prediabetes  >or=6.5%  Consistent with diabetes    High levels of fetal hemoglobin interfere with the HbA1C  assay. Heterozygous hemoglobin variants (HbS, HgC, etc)do  not significantly interfere with this assay.   However, presence of multiple variants may affect accuracy.     08/28/2018 7.9 (H) 4.0 - 5.6 % Final     Comment:     ADA Screening Guidelines:  5.7-6.4%  Consistent with prediabetes  >or=6.5%  Consistent with diabetes  High levels of fetal hemoglobin interfere with the HbA1C  assay. Heterozygous hemoglobin variants (HbS, HgC, etc)do  not significantly interfere with this assay.   However, presence of multiple variants may affect accuracy.     03/24/2015 9.9 (H) 4.5 - 6.2 % Final       Review of Systems   Constitutional: Negative for chills, decreased appetite and fever.   Cardiovascular:  Negative for leg swelling.   Skin:  Positive for color change, dry skin and poor wound healing. Negative for flushing and itching.   Musculoskeletal:  Negative for  "arthritis, joint pain, joint swelling and myalgias.   Gastrointestinal:  Negative for nausea and vomiting.   Neurological:  Positive for numbness, paresthesias and sensory change. Negative for loss of balance.        Objective:     Vitals:    07/10/24 1212   BP: (!) 158/77   Pulse: 66   Resp: 18   Weight: 108 kg (238 lb 1.6 oz)   Height: 5' 7" (1.702 m)   PainSc: 0-No pain          Physical Exam  Vitals reviewed.   Constitutional:       Appearance: She is well-developed. She is not ill-appearing or diaphoretic.   Cardiovascular:      Pulses:           Dorsalis pedis pulses are 2+ on the right side and 2+ on the left side.        Posterior tibial pulses are 2+ on the right side and 2+ on the left side.   Musculoskeletal:         General: No tenderness.      Right ankle: Normal.      Left ankle: Normal.      Right foot: No swelling, deformity or crepitus.      Left foot: No swelling, deformity or crepitus.      Comments: Adequate joint range of motion without pain, limitation, nor crepitation Bilateral feet and ankle joints. Muscle strength is 5/5 in all groups bilaterally.      Amputations of left foot with deformity   Lymphadenopathy:      Comments: No palpable lymph nodes   Skin:     General: Skin is warm and dry.      Coloration: Skin is not jaundiced.      Findings: Wound present. No abscess, erythema or rash.      Nails: There is no clubbing.   Neurological:      Mental Status: She is alert and oriented to person, place, and time.   Psychiatric:         Behavior: Behavior normal. Behavior is cooperative.         L foot            5.22.24 xray: Marked new deformity internal derangement lis Franc joint with lateral displacement subluxation 1st metatarsal tarsal joint, new osteitis change lis raghav joint, navicular cuneiform joint and inversion subluxation lis Franc joint on lateral view.  Additional edema of.  Calcified plaque tibial and digital vessels.  Further in shaft 1st metatarsal adjacent soft tissues, new " subacute chronic compression fracture postop change head 2nd metatarsal with foreshortening of 3rd digit.  Disuse osteoporosis and remote changes of 2nd MTP joint.     Impression:     New  Chorcot joint change, further postoperative change 1st metatarsal and healing fracture deformity 3rd metatarsal discussed above.  -----------------------------------------------------------------------------------------------------------------------------------------------------------    5.29.24:  Pinpoint ulceration noted to plantar right foot.  Thin friable epithelial tissue is intact.    6.5.24:   Healed plantar right foot ulceration with adequate tensile strength observed.  Mild periwound  hyperkeratosis is observed.    6.13.24  Well healed ulceration to the plantar right foot.    6.13.24 xray   Stable appearance of amputation at the midshaft of the 1st metatarsal.  Deformity of the osseous structures about the mid foot, compatible with Charcot arthropathy.  Dislocation of the Lisfranc joint may be somewhat progressed from prior with increased separation of the base of the 1st and 2nd metatarsal.  Similar appearance of healing fracture deformity of the head of the 3rd metacarpal with foreshortening of the 3rd digit.  No new fracture identified.  Talus is vertical in orientation with subluxation of the tarsometatarsal joints as best demonstrated on the lateral.     Vascular calcifications noted.  Diffuse soft tissue swelling about the left foot, similar to prior.  No radiopaque foreign body.     Impression:     Dislocation of the Lisfranc joint may be somewhat progressed from prior with increased separation of the base of the 1st and 2nd metatarsal.     Findings compatible with Charcot arthropathy with extensive deformity of the midfoot.    7.10.24 xray  Impression:     Overall appearance in keeping with neuropathic arthropathy, similar to prior study.  Superimposed infection difficult to exclude.     Assessment:      Encounter  Diagnoses   Name Primary?    Charcot arthropathy of midfoot Yes    DM type 2 with diabetic peripheral neuropathy     PAD (peripheral artery disease)     ESRD (end stage renal disease)      Plan:     Georgina was seen today for charcot's  and foot pain.    Diagnoses and all orders for this visit:    Charcot arthropathy of midfoot  -     X-Ray Foot Complete Left; Future    DM type 2 with diabetic peripheral neuropathy    PAD (peripheral artery disease)    ESRD (end stage renal disease)      I counseled the patient on her conditions, their implications and medical management.      Independent review of patients previous clinical notes    Reviewed current medication(s), and lab(s) specific to foot ailment(s) with patient in detail. All questions answered     Continue treatment course. No new dislocations Will have total contact cast reapplied today. Recommend eval by Dr Allison for possible charcot recon 2/2     Follow-up:Patient is to return to the clinic in 2wk  for follow-up but [facility] if any signs of infection, such as fever, chills, sweats, increased redness or pain.    Total contact cast applied by Renzo the Orthopedics orthotist.      34 minutes of  total care spent in reviewing patients medical records,  direct counseling and coordination of care

## 2024-07-19 ENCOUNTER — PATIENT MESSAGE (OUTPATIENT)
Dept: PODIATRY | Facility: CLINIC | Age: 54
End: 2024-07-19
Payer: MEDICARE

## 2024-07-22 ENCOUNTER — PATIENT MESSAGE (OUTPATIENT)
Dept: PODIATRY | Facility: CLINIC | Age: 54
End: 2024-07-22
Payer: MEDICARE

## 2024-07-24 ENCOUNTER — OFFICE VISIT (OUTPATIENT)
Dept: PODIATRY | Facility: CLINIC | Age: 54
End: 2024-07-24
Payer: MEDICARE

## 2024-07-24 ENCOUNTER — TELEPHONE (OUTPATIENT)
Dept: PODIATRY | Facility: CLINIC | Age: 54
End: 2024-07-24
Payer: MEDICARE

## 2024-07-24 VITALS
HEIGHT: 67 IN | SYSTOLIC BLOOD PRESSURE: 155 MMHG | DIASTOLIC BLOOD PRESSURE: 73 MMHG | BODY MASS INDEX: 37.37 KG/M2 | HEART RATE: 75 BPM | WEIGHT: 238.13 LBS

## 2024-07-24 DIAGNOSIS — L97.512 FOOT ULCER, RIGHT, WITH FAT LAYER EXPOSED: Primary | ICD-10-CM

## 2024-07-24 DIAGNOSIS — M14.679 CHARCOT ARTHROPATHY OF MIDFOOT: ICD-10-CM

## 2024-07-24 PROCEDURE — 29445 APPL RIGID TOT CNTC LEG CAST: CPT | Mod: 59,LT,S$GLB, | Performed by: PODIATRIST

## 2024-07-24 PROCEDURE — 11042 DBRDMT SUBQ TIS 1ST 20SQCM/<: CPT | Mod: S$GLB,,,

## 2024-07-24 PROCEDURE — 99999 PR PBB SHADOW E&M-EST. PATIENT-LVL V: CPT | Mod: PBBFAC,,, | Performed by: PODIATRIST

## 2024-07-24 PROCEDURE — 99499 UNLISTED E&M SERVICE: CPT | Mod: S$GLB,,, | Performed by: PODIATRIST

## 2024-07-24 NOTE — TELEPHONE ENCOUNTER
Spoke with patient to clarify if she needs a 2 week appointment. Patient voice understanding to conversation provider would like for her to see Dr. Allison for a surgery consult and will follow up with her depending on provider consultation.

## 2024-07-24 NOTE — PROGRESS NOTES
Subjective:     Patient ID: Georgina Arias is a 53 y.o. female.    Chief Complaint: Wound Care    Georgina is a 53 y.o. female who presents to the clinic for evaluation and treatment of high risk feet. Georgina has a past medical history of Hyperlipidemia, Hypertension, Peptic ulcer disease, Peripheral artery disease, PONV (postoperative nausea and vomiting), Stage IV CKD, and Type II diabetes mellitus. The patient's chief complaint is L charcot     7/24/24:  Patient presents to clinic for wound check and TCC change.  Patient's TCC clean, dry, and intact.  Patient denies any problems with her TTC.  Denies any L foot pain.  Denies fevers, chills, nausea, vomiting.  Denies any new pedal complaints.    PCP: Alonso Ling MD    Date Last Seen by PCP:   Chief Complaint   Patient presents with    Wound Care     Hemoglobin A1C   Date Value Ref Range Status   07/02/2024 5.6 4.8 - 5.9 % Final   06/10/2024 6.0 (H) 4.8 - 5.9 % Final   05/14/2024 5.9 4.8 - 5.9 % Final   09/11/2023 7.2 (H) 4.0 - 5.6 % Final     Comment:     ADA Screening Guidelines:  5.7-6.4%  Consistent with prediabetes  >or=6.5%  Consistent with diabetes    High levels of fetal hemoglobin interfere with the HbA1C  assay. Heterozygous hemoglobin variants (HbS, HgC, etc)do  not significantly interfere with this assay.   However, presence of multiple variants may affect accuracy.     08/28/2018 7.9 (H) 4.0 - 5.6 % Final     Comment:     ADA Screening Guidelines:  5.7-6.4%  Consistent with prediabetes  >or=6.5%  Consistent with diabetes  High levels of fetal hemoglobin interfere with the HbA1C  assay. Heterozygous hemoglobin variants (HbS, HgC, etc)do  not significantly interfere with this assay.   However, presence of multiple variants may affect accuracy.     03/24/2015 9.9 (H) 4.5 - 6.2 % Final     Review of Systems   Constitutional: Negative for chills, decreased appetite and fever.   Cardiovascular:  Negative for leg swelling.   Skin:  Positive  "for color change, dry skin and poor wound healing. Negative for flushing and itching.   Musculoskeletal:  Negative for arthritis, joint pain, joint swelling and myalgias.   Gastrointestinal:  Negative for nausea and vomiting.   Neurological:  Positive for numbness, paresthesias and sensory change. Negative for loss of balance.     Objective:     Vitals:    07/24/24 0927   BP: (!) 155/73   Pulse: 75   Weight: 108 kg (238 lb 1.6 oz)   Height: 5' 7" (1.702 m)   PainSc: 0-No pain     Physical Exam  Vitals reviewed.   Constitutional:       Appearance: She is well-developed. She is not ill-appearing or diaphoretic.   Cardiovascular:      Pulses:           Dorsalis pedis pulses are 2+ on the right side and 2+ on the left side.        Posterior tibial pulses are 2+ on the right side and 2+ on the left side.   Musculoskeletal:         General: No tenderness.      Right ankle: Normal.      Left ankle: Normal.      Right foot: No swelling, deformity or crepitus.      Left foot: No swelling, deformity or crepitus.      Comments: Crepitus noted to L foot only.  No abnormalities noted to R foot.  Muscle strength is 5/5 in all groups bilaterally.      Amputations of left foot, healed.   Lymphadenopathy:      Comments: No palpable lymph nodes   Skin:     General: Skin is warm and dry.      Coloration: Skin is not jaundiced.      Findings: Wound present. No abscess, erythema or rash.      Nails: There is no clubbing.      Comments: Superficial wound noted to R first sub met head.  Negative probe to bone.  No erythema, edema, crepitus, or fluctuance noted to R foot.   Neurological:      Mental Status: She is alert and oriented to person, place, and time.   Psychiatric:         Behavior: Behavior normal. Behavior is cooperative.       L foot            5.22.24 xray: Marked new deformity internal derangement lis Franc joint with lateral displacement subluxation 1st metatarsal tarsal joint, new osteitis change lis raghav joint, navicular " cuneiform joint and inversion subluxation lis Franc joint on lateral view.  Additional edema of.  Calcified plaque tibial and digital vessels.  Further in shaft 1st metatarsal adjacent soft tissues, new subacute chronic compression fracture postop change head 2nd metatarsal with foreshortening of 3rd digit.  Disuse osteoporosis and remote changes of 2nd MTP joint.     Impression:     New  Chorcot joint change, further postoperative change 1st metatarsal and healing fracture deformity 3rd metatarsal discussed above.  -----------------------------------------------------------------------------------------------------------------------------------------------------------    5.29.24:  Pinpoint ulceration noted to plantar right foot.  Thin friable epithelial tissue is intact.    6.5.24:   Healed plantar right foot ulceration with adequate tensile strength observed.  Mild periwound  hyperkeratosis is observed.    6.13.24  Well healed ulceration to the plantar right foot.    6.13.24 xray   Stable appearance of amputation at the midshaft of the 1st metatarsal.  Deformity of the osseous structures about the mid foot, compatible with Charcot arthropathy.  Dislocation of the Lisfranc joint may be somewhat progressed from prior with increased separation of the base of the 1st and 2nd metatarsal.  Similar appearance of healing fracture deformity of the head of the 3rd metacarpal with foreshortening of the 3rd digit.  No new fracture identified.  Talus is vertical in orientation with subluxation of the tarsometatarsal joints as best demonstrated on the lateral.     Vascular calcifications noted.  Diffuse soft tissue swelling about the left foot, similar to prior.  No radiopaque foreign body.     Impression:     Dislocation of the Lisfranc joint may be somewhat progressed from prior with increased separation of the base of the 1st and 2nd metatarsal.     Findings compatible with Charcot arthropathy with extensive deformity of the  "midfoot.    7.10.24 xray  Impression:     Overall appearance in keeping with neuropathic arthropathy, similar to prior study.  Superimposed infection difficult to exclude.    7.24.24 XR Impression: Overall appearance in keeping with neuropathic arthropathy, similar to prior study.  Superimposed infection difficult to exclude.  R foot    Hyperkeratotic lesion noted under R first sub met head.  Post debridement superficial wound noted.  Negative probe to bone.  No erythema, edema, or drainage.    L foot      No open wounds noted to L foot.    Assessment:      Encounter Diagnoses   Name Primary?    Foot ulcer, right, with fat layer exposed Yes    Charcot arthropathy of midfoot      Plan:     Georgina was seen today for wound care.    Diagnoses and all orders for this visit:    Foot ulcer, right, with fat layer exposed  -     Debridement    Charcot arthropathy of midfoot      I counseled the patient on her conditions, their implications and medical management.    Independent review of patients previous clinical notes    Reviewed current medication(s), and lab(s) specific to foot ailment(s) with patient in detail. All questions answered     Continue treatment course. No new dislocations Will have total contact cast reapplied today. Recommend eval by Dr Allison for possible charcot recon 2/2     Follow-up:Patient is to return to the clinic in 2wk  for follow-up but should call [facility] immediately if any signs of infection, such as fever, chills, sweats, increased redness or pain.    With verbal consent R first sub met head wound debrided down to healthy viable subcutaneous tissue.    TCC applied to LLE without complications    45 minutes of  total care spent in reviewing patients medical records,  direct counseling and coordination of care    Debridement    Date/Time: 7/24/2024 11:01 AM    Performed by: Anderson Herman DPM  Authorized by: Fiona Ward DPM    Time out: Immediately prior to procedure a "time out" " was called to verify the correct patient, procedure, equipment, support staff and site/side marked as required.    Consent Done?:  Yes (Verbal)    Preparation: Patient was prepped and draped with clean technique    Local anesthesia used?: No      Wound Details:    Location:  Right foot    Location:  Right 1st Metatarsal Head    Type of Debridement:  Excisional       Length (cm):  1.2       Area (sq cm):  0.36       Width (cm):  0.3       Percent Debrided (%):  100       Depth (cm):  0.1       Total Area Debrided (sq cm):  0.36    Depth of debridement:  Subcutaneous tissue    Tissue debrided:  Subcutaneous    Devitalized tissue debrided:  Biofilm, Callus and Slough    Instruments:  Blade, Curette and Forceps  Bleeding:  Minimal  Hemostasis Achieved: Yes  Method Used:  Pressure

## 2024-08-01 ENCOUNTER — OFFICE VISIT (OUTPATIENT)
Dept: PODIATRY | Facility: CLINIC | Age: 54
End: 2024-08-01
Payer: MEDICARE

## 2024-08-01 VITALS
HEART RATE: 75 BPM | HEIGHT: 67 IN | SYSTOLIC BLOOD PRESSURE: 155 MMHG | OXYGEN SATURATION: 98 % | BODY MASS INDEX: 37.37 KG/M2 | DIASTOLIC BLOOD PRESSURE: 73 MMHG | WEIGHT: 238.13 LBS

## 2024-08-01 DIAGNOSIS — M14.672 CHARCOT'S JOINT OF LEFT FOOT: ICD-10-CM

## 2024-08-01 DIAGNOSIS — E11.42 TYPE 2 DIABETES MELLITUS WITH PERIPHERAL NEUROPATHY: Primary | ICD-10-CM

## 2024-08-01 DIAGNOSIS — I73.9 PERIPHERAL ARTERIAL DISEASE: ICD-10-CM

## 2024-08-01 PROCEDURE — 99999 PR PBB SHADOW E&M-EST. PATIENT-LVL IV: CPT | Mod: PBBFAC,,, | Performed by: STUDENT IN AN ORGANIZED HEALTH CARE EDUCATION/TRAINING PROGRAM

## 2024-08-01 NOTE — PROGRESS NOTES
Subjective:     Patient    Georgina Arias is a 53 y.o. female.    Problem    Previously followed by Dr Prado for wound care, left foot Charcot. Charcot has progressed over the past couple of months despite total contact casting. Has also previously tried change in shoes, inserts, surgical boots, rest without improvement. Minimal pain in left foot due to neuropathy, had prior partial 1st ray amputation on left. Cannot be off her feet because she does not have help at home or has minimal help. Admits to numbness, tingling.     Primary Care Provider    Primary Care Provider: Alonso Ling MD   Last Seen: Patient does not have a PCP or has not yet seen their PCP     History    History obtained from patient and review of medical records.     Past Medical History:   Diagnosis Date    Hyperlipidemia     Hypertension     Peptic ulcer disease     Peripheral artery disease     PONV (postoperative nausea and vomiting)     Stage IV CKD     Type II diabetes mellitus        Past Surgical History:   Procedure Laterality Date    ANGIOGRAM, LOWER ARTERIAL, UNILATERAL Left 9/13/2023    Procedure: ANGIOGRAM, LOWER ARTERIAL, UNILATERAL;  Surgeon: Maxx Maya MD;  Location: [address];  Service: Vascular;  Laterality: Left;    ANGIOGRAPHY OF LOWER EXTREMITY Left 9/14/2023    Procedure: ANGIOGRAM, LOWER EXTREMITY with balloon angioplasty ultraverse 5mm x 60mm;  Surgeon: Maxx Maya MD;  Location: [facility] OR [address] [place] [address];  Service: Vascular;  Laterality: Left;  ultraverse 5mm x 60mm  fluoro: 12.41  contrast: 44ml  mGy: 65.57  Gycm2: 23.50    AORTOGRAPHY WITH EXTREMITY RUNOFF Left 9/13/2023    Procedure: AORTOGRAM, WITH EXTREMITY RUNOFF;  Surgeon: Maxx Maya MD;  Location: [address];  Service: Vascular;  Laterality: Left;  8.1 min  663.63 mGy  176.39 Gy.cm  178ml Dye     AV FISTULA PLACEMENT Right     CHOLECYSTECTOMY      CREATION, BYPASS, ARTERIAL, AXILLARY TO BILATERAL FEMORAL Left 9/14/2023     Procedure: CREATION, BYPASS, ARTERIAL, AXILLARY TO BILATERAL FEMORAL;  Surgeon: Maxx Maya MD;  Location: [facility] OR 2ND FLR;  Service: Vascular;  Laterality: Left;  Left Axillary to Bilateral Femoral Bypass, Left Femoral to Above Knee Popliteal Bypass; SLIDER BED    CYSTOSCOPY W/ URETERAL STENT PLACEMENT Right 08/28/2018    Procedure: CYSTOSCOPY, WITH URETERAL STENT INSERTION (ADD ON );  Surgeon: Bubba Perez MD;  Location: [facility] OR;  Service: Urology;  Laterality: Right;  (ADD ON )    DEBRIDEMENT OF FOOT Left 08/03/2023    Procedure: DEBRIDEMENT, FOOT;  Surgeon: Aleida Flannery DPM;  Location: [facility] OR;  Service: Podiatry;  Laterality: Left;    Excisional debridement of ulcer distal great toe left foot w/ partial resection distal phalanx Left 08/03/2023    FOOT AMPUTATION THROUGH METATARSAL Left 9/14/2023    Procedure: AMPUTATION, FOOT, TRANSMETATARSAL partial 1st ray amputation;  Surgeon: Jesus Valles DPM;  Location: [facility] OR 2ND FLR;  Service: Podiatry;  Laterality: Left;  partial ray    I&D L 1st ray w/ amputation L hallux IPJ Left 08/14/2023    INCISION AND DRAINAGE FOOT Left 9/14/2023    Procedure: INCISION AND DRAINAGE, FOOT;  Surgeon: Jesus Valles DPM;  [facility];  Service: Podiatry;  Laterality: Left;    Injection L PT tendon sheath Left 08/14/2023    Nail avulsion left hallux Left 08/03/2023    PERIPHERAL ARTERIAL STENT GRAFT Right     REMOVAL OF NAIL OF DIGIT Left 08/03/2023    Procedure: REMOVAL, NAIL, DIGIT great toe;  Surgeon: Aleida Flannery DPM;  Location: [facility] OR;  Service: Podiatry;  Laterality: Left;    STENT, SUPERFICIAL FEMORAL ARTERY Left 9/14/2023    Procedure: STENT, SUPERFICIAL FEMORAL ARTERY;  Surgeon: Maxx Maya MD;  Location: [facility] OR 2ND FLR;  Service: Vascular;  Laterality: Left;  5 x 100 mm    TOE AMPUTATION Left 08/14/2023    Procedure: AMPUTATION, TOE hallux IPJ;  Surgeon: Aleida Flannery DPM;  Location: [facility] OR;  Service: Podiatry;  Laterality: Left;     TOE AMPUTATION Left 2023    great toe    TOE AMPUTATION Left 2023    Procedure: AMPUTATION, TOE hallux, possible 1st met.head;  Surgeon: Aleida Flannery DPM;  Location: [facility] OR;  Service: Podiatry;  Laterality: Left;    URETEROSCOPIC REMOVAL OF URETERIC CALCULUS Right 2018    Procedure: EXTRACTION-STONE-URETEROSCOPY;  Surgeon: Bubba Perez MD;  Location: [facility] OR;  Service: Urology;  Laterality: Right;        Objective:     Vitals  Wt Readings from Last 1 Encounters:   24 108 kg (238 lb 1.6 oz)     Temp Readings from Last 1 Encounters:   24 97.8 °F (36.6 °C) (Oral)     BP Readings from Last 1 Encounters:   24 (!) 155/73     Pulse Readings from Last 1 Encounters:   24 75       Dermatological Exam    Skin:  Pedal hair growth diminished and Pedal skin thin and shiny on right; callus 1st toe  Pedal hair growth diminished and Pedal skin thin and shiny on left; callus plantar midfoot    Nails:  9 nail(s) thickened and 9 nail(s) discolored; left 1st nail/toe absent    Vascular Exam    Arteries:  Posterior tibial artery palpable on right  Dorsalis pedis artery palpable on right  Posterior tibial artery palpable on left  Dorsalis pedis artery palpable on left    Veins:  Superficial veins unremarkable on right  Superficial veins unremarkable on left    Swellin+ pitting on right  2+ pitting on left    Neurological Exam    Hamlin touch test:  2/6 sites sensed, loss of protective sensation     Musculoskeletal Exam    Footwear:  Diabetic on right  Cast on left    Gait Exam:   Ambulatory Status: Ambulatory  Gait: Apropulsive  Assistive Devices: None    Foot Progression Angle:  Normal on right  Normal on left     Right Lower Extremity Additional Findings:  Right foot and ankle function, strength, and range of motion unremarkable except as noted above.     Left Lower Extremity Additional Findings:  Left foot and ankle function, strength, and range of motion unremarkable except as  noted above.    Imaging and Other Tests    Imaging:  Independently reviewed and interpreted imaging, findings are as follows:     07/10/24 left foot X rays: midfoot Charcot deformity with plantar subluxation of navicular and cuboid (progressed from prior films); also with prior 1st ray amputation and widening of 1st-2nd ray interval    10/12/23 VAS CAMILA: left CAMILA 0.85 (likely above healing threshold)    Other Tests: The following A1c results were reviewed.   Hemoglobin A1C   Date Value Ref Range Status   07/02/2024 5.6 4.8 - 5.9 % Final   06/10/2024 6.0 (H) 4.8 - 5.9 % Final   05/14/2024 5.9 4.8 - 5.9 % Final   09/11/2023 7.2 (H) 4.0 - 5.6 % Final     Comment:     ADA Screening Guidelines:  5.7-6.4%  Consistent with prediabetes  >or=6.5%  Consistent with diabetes    High levels of fetal hemoglobin interfere with the HbA1C  assay. Heterozygous hemoglobin variants (HbS, HgC, etc)do  not significantly interfere with this assay.   However, presence of multiple variants may affect accuracy.     08/28/2018 7.9 (H) 4.0 - 5.6 % Final     Comment:     ADA Screening Guidelines:  5.7-6.4%  Consistent with prediabetes  >or=6.5%  Consistent with diabetes  High levels of fetal hemoglobin interfere with the HbA1C  assay. Heterozygous hemoglobin variants (HbS, HgC, etc)do  not significantly interfere with this assay.   However, presence of multiple variants may affect accuracy.     03/24/2015 9.9 (H) 4.5 - 6.2 % Final         Assessment:     Encounter Diagnoses   Name Primary?    Type 2 diabetes mellitus with peripheral neuropathy Yes    Peripheral arterial disease     Charcot's joint of left foot         Plan:     I counseled the patient on her conditions, their implications and medical management.    Diabetic foot with peripheral neuropathy and peripheral arterial disease  -Diabetic foot exam performed.  -Performed shoe inspection and diabetic foot education. Reviewed importance of blood glucose control, proper nutrition, and foot  hygiene to minimize risk of complications of diabetes. Recommended daily foot inspections, daily moisturizer to feet, avoiding sharp instruments to feet, appropriate footwear at all times when ambulating, and following up regularly for routine foot care.   -Diabetic foot risk: 2023 IWGDF high ulcer risk.   -Next foot exam due August 2025.    Left foot Charcot deformity: chronic exacerbated  -Worsening left foot Charcot deformity despite extensive conservative care including weeks of total contact casting.    -Recommended surgical intervention consisting of left (1) Achilles tendon lengthening, (2) Charcot reconstruction with internal fixation and (3) overlying external fixation to allow ambulation during healing process. Patient amenable but would wait until September at the earliest.   -Applied total contact cast to left foot. LLE protected WB in cast.       Return to clinic in 1.5 weeks for cast change, call sooner PRN.

## 2024-08-23 ENCOUNTER — PROCEDURE VISIT (OUTPATIENT)
Dept: PODIATRY | Facility: CLINIC | Age: 54
End: 2024-08-23
Payer: MEDICARE

## 2024-08-23 VITALS
WEIGHT: 238.13 LBS | BODY MASS INDEX: 37.37 KG/M2 | HEIGHT: 67 IN | HEART RATE: 75 BPM | OXYGEN SATURATION: 100 % | SYSTOLIC BLOOD PRESSURE: 155 MMHG | DIASTOLIC BLOOD PRESSURE: 73 MMHG

## 2024-08-23 DIAGNOSIS — M14.672 CHARCOT'S JOINT OF LEFT FOOT: Primary | ICD-10-CM

## 2024-08-23 NOTE — PROGRESS NOTES
Subjective:     Patient    Georgina Arias is a 53 y.o. female.    Problem    08/01/24: Previously followed by Dr Prado for wound care, left foot Charcot. Charcot has progressed over the past couple of months despite total contact casting. Has also previously tried change in shoes, inserts, surgical boots, rest without improvement. Minimal pain in left foot due to neuropathy, had prior partial 1st ray amputation on left. Cannot be off her feet because she does not have help at home or has minimal help. Admits to numbness, tingling.     08/23/24: Returns for cast change, no issues with last cast, did prefer walking bottom rather than cast shoe.     Primary Care Provider    Primary Care Provider: Alonso Ling MD   Last Seen: Patient does not have a PCP or has not yet seen their PCP     History    History obtained from patient and review of medical records.     Past Medical History:   Diagnosis Date    Hyperlipidemia     Hypertension     Peptic ulcer disease     Peripheral artery disease     PONV (postoperative nausea and vomiting)     Stage IV CKD     Type II diabetes mellitus        Past Surgical History:   Procedure Laterality Date    ANGIOGRAM, LOWER ARTERIAL, UNILATERAL Left 9/13/2023    Procedure: ANGIOGRAM, LOWER ARTERIAL, UNILATERAL;  Surgeon: Maxx Maya MD;  Location: [address];  Service: Vascular;  Laterality: Left;    ANGIOGRAPHY OF LOWER EXTREMITY Left 9/14/2023    Procedure: ANGIOGRAM, LOWER EXTREMITY with balloon angioplasty ultraverse 5mm x 60mm;  Surgeon: Maxx Maya MD;  Location: [facility] OR [address] [place] [address];  Service: Vascular;  Laterality: Left;  ultraverse 5mm x 60mm  fluoro: 12.41  contrast: 44ml  mGy: 65.57  Gycm2: 23.50    AORTOGRAPHY WITH EXTREMITY RUNOFF Left 9/13/2023    Procedure: AORTOGRAM, WITH EXTREMITY RUNOFF;  Surgeon: Maxx Maya MD;  Location: [address];  Service: Vascular;  Laterality: Left;  8.1 min  663.63 mGy  176.39 Gy.cm  178ml Dye     AV  FISTULA PLACEMENT Right     CHOLECYSTECTOMY      CREATION, BYPASS, ARTERIAL, AXILLARY TO BILATERAL FEMORAL Left 9/14/2023    Procedure: CREATION, BYPASS, ARTERIAL, AXILLARY TO BILATERAL FEMORAL;  Surgeon: Maxx Maya MD;  [facility];  Service: Vascular;  Laterality: Left;  Left Axillary to Bilateral Femoral Bypass, Left Femoral to Above Knee Popliteal Bypass; SLIDER BED    CYSTOSCOPY W/ URETERAL STENT PLACEMENT Right 08/28/2018    Procedure: CYSTOSCOPY, WITH URETERAL STENT INSERTION (ADD ON );  Surgeon: Bubba Perez MD;  Location: [facility] [place] OR;  Service: Urology;  Laterality: Right;  (ADD ON )    DEBRIDEMENT OF FOOT Left 08/03/2023    Procedure: DEBRIDEMENT, FOOT;  Surgeon: Aleida Flannery DPM;  Location: [facility] OR;  Service: Podiatry;  Laterality: Left;    Excisional debridement of ulcer distal great toe left foot w/ partial resection distal phalanx Left 08/03/2023    FOOT AMPUTATION THROUGH METATARSAL Left 9/14/2023    Procedure: AMPUTATION, FOOT, TRANSMETATARSAL partial 1st ray amputation;  Surgeon: Jesus Valles DPM;  [facility];  Service: Podiatry;  Laterality: Left;  partial ray    I&D L 1st ray w/ amputation L hallux IPJ Left 08/14/2023    INCISION AND DRAINAGE FOOT Left 9/14/2023    Procedure: INCISION AND DRAINAGE, FOOT;  Surgeon: Jesus Valles DPM;  [facility];  Service: Podiatry;  Laterality: Left;    Injection L PT tendon sheath Left 08/14/2023    Nail avulsion left hallux Left 08/03/2023    PERIPHERAL ARTERIAL STENT GRAFT Right     REMOVAL OF NAIL OF DIGIT Left 08/03/2023    Procedure: REMOVAL, NAIL, DIGIT great toe;  Surgeon: Aleida Flannery DPM;  Location: [facility] OR;  Service: Podiatry;  Laterality: Left;    STENT, SUPERFICIAL FEMORAL ARTERY Left 9/14/2023    Procedure: STENT, SUPERFICIAL FEMORAL ARTERY;  Surgeon: Maxx Maya MD;  [facility];  Service: Vascular;  Laterality: Left;  5 x 100 mm    TOE AMPUTATION Left 08/14/2023     Procedure: AMPUTATION, TOE hallux IPJ;  Surgeon: Aleida Flannery DPM;  Location: [facility];  Service: Podiatry;  Laterality: Left;    TOE AMPUTATION Left 2023    great toe    TOE AMPUTATION Left 2023    Procedure: AMPUTATION, TOE hallux, possible 1st met.head;  Surgeon: Aleida Flannery DPM;  Location: [facility];  Service: Podiatry;  Laterality: Left;    URETEROSCOPIC REMOVAL OF URETERIC CALCULUS Right 2018    Procedure: EXTRACTION-STONE-URETEROSCOPY;  Surgeon: Bubba Perez MD;  Location: [facility];  Service: Urology;  Laterality: Right;        Objective:     Vitals  Wt Readings from Last 1 Encounters:   24 108 kg (238 lb 1.6 oz)     Temp Readings from Last 1 Encounters:   24 97.8 °F (36.6 °C) (Oral)     BP Readings from Last 1 Encounters:   24 (!) 155/73     Pulse Readings from Last 1 Encounters:   24 75       Dermatological Exam    Skin:  Pedal hair growth diminished and Pedal skin thin and shiny on right; callus 1st toe  Pedal hair growth diminished and Pedal skin thin and shiny on left; callus plantar midfoot    Nails:  9 nail(s) thickened and 9 nail(s) discolored; left 1st nail/toe absent    Vascular Exam    Arteries:  Posterior tibial artery palpable on right  Dorsalis pedis artery palpable on right  Posterior tibial artery palpable on left  Dorsalis pedis artery palpable on left    Veins:  Superficial veins unremarkable on right  Superficial veins unremarkable on left    Swellin+ pitting on right  2+ pitting on left    Neurological Exam    [place] touch test:  2/6 sites sensed, loss of protective sensation     Musculoskeletal Exam    Footwear:  Diabetic on right  Cast on left    Gait Exam:   Ambulatory Status: Ambulatory  Gait: Apropulsive  Assistive Devices: None    Foot Progression Angle:  Normal on right  Normal on left     Right Lower Extremity Additional Findings:  Right foot and ankle function, strength, and range of motion unremarkable except as noted above.      Left Lower Extremity Additional Findings:  Left foot and ankle function, strength, and range of motion unremarkable except as noted above.    Imaging and Other Tests    Imagin/10/24 left foot X rays: midfoot Charcot deformity with plantar subluxation of navicular and cuboid (progressed from prior films); also with prior 1st ray amputation and widening of 1st-2nd ray interval    10/12/23 VAS CAMILA: left CAMILA 0.85 (likely above healing threshold)    Independently reviewed and interpreted imaging, findings are as follows: N/A    Other Tests: The following A1c results were reviewed.   Hemoglobin A1C   Date Value Ref Range Status   2024 5.3 4.8 - 5.9 % Final   2024 5.6 4.8 - 5.9 % Final   06/10/2024 6.0 (H) 4.8 - 5.9 % Final   2023 7.2 (H) 4.0 - 5.6 % Final     Comment:     ADA Screening Guidelines:  5.7-6.4%  Consistent with prediabetes  >or=6.5%  Consistent with diabetes    High levels of fetal hemoglobin interfere with the HbA1C  assay. Heterozygous hemoglobin variants (HbS, HgC, etc)do  not significantly interfere with this assay.   However, presence of multiple variants may affect accuracy.     2018 7.9 (H) 4.0 - 5.6 % Final     Comment:     ADA Screening Guidelines:  5.7-6.4%  Consistent with prediabetes  >or=6.5%  Consistent with diabetes  High levels of fetal hemoglobin interfere with the HbA1C  assay. Heterozygous hemoglobin variants (HbS, HgC, etc)do  not significantly interfere with this assay.   However, presence of multiple variants may affect accuracy.     2015 9.9 (H) 4.5 - 6.2 % Final         Assessment:     Encounter Diagnosis   Name Primary?    Charcot's joint of left foot Yes        Plan:     I counseled the patient on her conditions, their implications and medical management.    Diabetic foot with peripheral neuropathy and peripheral arterial disease   -Performed shoe inspection and diabetic foot education. Reviewed importance of blood glucose control, proper  nutrition, and foot hygiene to minimize risk of complications of diabetes. Recommended daily foot inspections, daily moisturizer to feet, avoiding sharp instruments to feet, appropriate footwear at all times when ambulating, and following up regularly for routine foot care.   -Diabetic foot risk: 2023 [facility] high ulcer risk.   -Next foot exam due August 2025.    Left foot Charcot deformity: chronic exacerbated  -Worsening left foot Charcot deformity despite extensive conservative care including weeks of total contact casting.    -Recommended surgical intervention consisting of left (1) Achilles tendon lengthening, (2) Charcot reconstruction with internal fixation and (3) overlying external fixation to allow ambulation during healing process. Patient amenable but would wait until September at the earliest.   -Applied total contact cast to left foot. LLE protected WB in cast.       Return to clinic in 1.5 weeks for cast change, call sooner PRN.

## 2024-09-04 ENCOUNTER — PROCEDURE VISIT (OUTPATIENT)
Dept: PODIATRY | Facility: CLINIC | Age: 54
End: 2024-09-04
Payer: MEDICARE

## 2024-09-04 ENCOUNTER — HOSPITAL ENCOUNTER (OUTPATIENT)
Dept: RADIOLOGY | Facility: HOSPITAL | Age: 54
Discharge: HOME | End: 2024-09-04
Attending: STUDENT IN AN ORGANIZED HEALTH CARE EDUCATION/TRAINING PROGRAM
Payer: MEDICARE

## 2024-09-04 VITALS
SYSTOLIC BLOOD PRESSURE: 140 MMHG | BODY MASS INDEX: 37.37 KG/M2 | WEIGHT: 238.13 LBS | DIASTOLIC BLOOD PRESSURE: 75 MMHG | TEMPERATURE: 98 F | HEART RATE: 78 BPM | HEIGHT: 67 IN

## 2024-09-04 DIAGNOSIS — M14.672 CHARCOT'S JOINT OF LEFT FOOT: ICD-10-CM

## 2024-09-04 DIAGNOSIS — M14.672 CHARCOT'S JOINT OF LEFT FOOT: Primary | ICD-10-CM

## 2024-09-04 PROCEDURE — 73630 X-RAY EXAM OF FOOT: CPT | Mod: TC,LT

## 2024-09-04 PROCEDURE — 73630 X-RAY EXAM OF FOOT: CPT | Mod: 26,LT,, | Performed by: RADIOLOGY

## 2024-09-04 PROCEDURE — 99213 OFFICE O/P EST LOW 20 MIN: CPT | Mod: S$GLB,,, | Performed by: STUDENT IN AN ORGANIZED HEALTH CARE EDUCATION/TRAINING PROGRAM

## 2024-09-04 NOTE — PROGRESS NOTES
Subjective:     Patient    Georgina Arias is a 53 y.o. female.    Problem    08/01/24: Previously followed by Dr Prado for wound care, left foot Charcot. Charcot has progressed over the past couple of months despite total contact casting. Has also previously tried change in shoes, inserts, surgical boots, rest without improvement. Minimal pain in left foot due to neuropathy, had prior partial 1st ray amputation on left. Cannot be off her feet because she does not have help at home or has minimal help. Admits to numbness, tingling.     08/23/24: Returns for cast change, no issues with last cast, did prefer walking bottom rather than cast shoe.     09/04/24: Returns for cast change. No new issues. Is OK with surgery but would like to wait until after birthday in November.     Primary Care Provider    Primary Care Provider: Alonso Ling MD   Last Seen: Patient does not have a PCP or has not yet seen their PCP     History    History obtained from patient and review of medical records.     Past Medical History:   Diagnosis Date    Hyperlipidemia     Hypertension     Peptic ulcer disease     Peripheral artery disease     PONV (postoperative nausea and vomiting)     Stage IV CKD     Type II diabetes mellitus        Past Surgical History:   Procedure Laterality Date    ANGIOGRAM, LOWER ARTERIAL, UNILATERAL Left 9/13/2023    Procedure: ANGIOGRAM, LOWER ARTERIAL, UNILATERAL;  Surgeon: Maxx Maya MD;  Location: [address];  Service: Vascular;  Laterality: Left;    ANGIOGRAPHY OF LOWER EXTREMITY Left 9/14/2023    Procedure: ANGIOGRAM, LOWER EXTREMITY with balloon angioplasty ultraverse 5mm x 60mm;  Surgeon: Maxx Maya MD;  Location: [address];  Service: Vascular;  Laterality: Left;  ultraverse 5mm x 60mm  fluoro: 12.41  contrast: 44ml  mGy: 65.57  Gycm2: 23.50    AORTOGRAPHY WITH EXTREMITY RUNOFF Left 9/13/2023    Procedure: AORTOGRAM, WITH EXTREMITY RUNOFF;  Surgeon: Maxx Maya  MD;  Location: [facility] OR 2ND FLR;  Service: Vascular;  Laterality: Left;  8.1 min  663.63 mGy  176.39 Gy.cm  178ml Dye     AV FISTULA PLACEMENT Right     CHOLECYSTECTOMY      CREATION, BYPASS, ARTERIAL, AXILLARY TO BILATERAL FEMORAL Left 9/14/2023    Procedure: CREATION, BYPASS, ARTERIAL, AXILLARY TO BILATERAL FEMORAL;  Surgeon: Maxx Maya MD;  [facility];  Service: Vascular;  Laterality: Left;  Left Axillary to Bilateral Femoral Bypass, Left Femoral to Above Knee Popliteal Bypass; SLIDER BED    CYSTOSCOPY W/ URETERAL STENT PLACEMENT Right 08/28/2018    Procedure: CYSTOSCOPY, WITH URETERAL STENT INSERTION (ADD ON );  Surgeon: Bubba Perez MD;  Location: [facility] OR;  Service: Urology;  Laterality: Right;  (ADD ON )    DEBRIDEMENT OF FOOT Left 08/03/2023    Procedure: DEBRIDEMENT, FOOT;  Surgeon: Aleida Flannery DPM;  Location: [facility];  Service: Podiatry;  Laterality: Left;    Excisional debridement of ulcer distal great toe left foot w/ partial resection distal phalanx Left 08/03/2023    FOOT AMPUTATION THROUGH METATARSAL Left 9/14/2023    Procedure: AMPUTATION, FOOT, TRANSMETATARSAL partial 1st ray amputation;  Surgeon: Jesus Valles DPM;  [facility];  Service: Podiatry;  Laterality: Left;  partial ray    I&D L 1st ray w/ amputation L hallux IPJ Left 08/14/2023    INCISION AND DRAINAGE FOOT Left 9/14/2023    Procedure: INCISION AND DRAINAGE, FOOT;  Surgeon: Jesus Valles DPM;  [facility];  Service: Podiatry;  Laterality: Left;    Injection L PT tendon sheath Left 08/14/2023    Nail avulsion left hallux Left 08/03/2023    PERIPHERAL ARTERIAL STENT GRAFT Right     REMOVAL OF NAIL OF DIGIT Left 08/03/2023    Procedure: REMOVAL, NAIL, DIGIT great toe;  Surgeon: Aleida Flannery DPM;  Location: [facility] OR;  Service: Podiatry;  Laterality: Left;    STENT, SUPERFICIAL FEMORAL ARTERY Left 9/14/2023    Procedure: STENT, SUPERFICIAL FEMORAL ARTERY;  Surgeon: Maxx Maya  MD VARGAS;  Location: [facility] 2ND FLR;  Service: Vascular;  Laterality: Left;  5 x 100 mm    TOE AMPUTATION Left 2023    Procedure: AMPUTATION, TOE hallux IPJ;  Surgeon: Aleida Flannery DPM;  Location: [facility];  Service: Podiatry;  Laterality: Left;    TOE AMPUTATION Left 2023    great toe    TOE AMPUTATION Left 2023    Procedure: AMPUTATION, TOE hallux, possible 1st met.head;  Surgeon: Aleida Flannery DPM;  Location: [facility];  Service: Podiatry;  Laterality: Left;    URETEROSCOPIC REMOVAL OF URETERIC CALCULUS Right 2018    Procedure: EXTRACTION-STONE-URETEROSCOPY;  Surgeon: Bubba Perez MD;  Location: [facility];  Service: Urology;  Laterality: Right;        Objective:     Vitals  Wt Readings from Last 1 Encounters:   24 108 kg (238 lb 1.6 oz)     Temp Readings from Last 1 Encounters:   24 98.2 °F (36.8 °C) (Oral)     BP Readings from Last 1 Encounters:   24 (!) 140/75     Pulse Readings from Last 1 Encounters:   24 78       Dermatological Exam    Skin:  Pedal hair growth diminished and Pedal skin thin and shiny on right; ulcer to fat sub 1st MTPJ  Pedal hair growth diminished and Pedal skin thin and shiny on left; callus plantar midfoot        Nails:  9 nail(s) thickened and 9 nail(s) discolored; left 1st nail/toe absent    Vascular Exam    Arteries:  Posterior tibial artery palpable on right  Dorsalis pedis artery palpable on right  Posterior tibial artery palpable on left  Dorsalis pedis artery palpable on left    Veins:  Superficial veins unremarkable on right  Superficial veins unremarkable on left    [place]wellin+ pitting on right  2+ pitting on left    Neurological Exam    [place] touch test:  2/6 sites sensed, loss of protective sensation     Musculoskeletal Exam    Footwear:  Diabetic on right  Cast on left    Gait Exam:   Ambulatory Status: Ambulatory  Gait: Apropulsive  Assistive Devices: None    Foot Progression Angle:  Normal on right  Normal on left     Right  Lower Extremity Additional Findings:  Right foot and ankle function, strength, and range of motion unremarkable except as noted above.     Left Lower Extremity Additional Findings:  Left foot and ankle function, strength, and range of motion unremarkable except as noted above.    Imaging and Other Tests    Imagin/10/24 left foot X rays: midfoot Charcot deformity with plantar subluxation of navicular and cuboid (progressed from prior films); also with prior 1st ray amputation and widening of 1st-2nd ray interval    10/12/23 VAS CAMILA: left CAMILA 0.85 (likely above healing threshold)    Independently reviewed and interpreted imaging, findings are as follows:     24 left foot X rays: appears to be some consolidation of the Charcot deformity.     Other Tests: The following A1c results were reviewed.   Hemoglobin A1C   Date Value Ref Range Status   2024 5.3 4.8 - 5.9 % Final   2024 5.6 4.8 - 5.9 % Final   06/10/2024 6.0 (H) 4.8 - 5.9 % Final   2023 7.2 (H) 4.0 - 5.6 % Final     Comment:     ADA Screening Guidelines:  5.7-6.4%  Consistent with prediabetes  >or=6.5%  Consistent with diabetes    High levels of fetal hemoglobin interfere with the HbA1C  assay. Heterozygous hemoglobin variants (HbS, HgC, etc)do  not significantly interfere with this assay.   However, presence of multiple variants may affect accuracy.     2018 7.9 (H) 4.0 - 5.6 % Final     Comment:     ADA Screening Guidelines:  5.7-6.4%  Consistent with prediabetes  >or=6.5%  Consistent with diabetes  High levels of fetal hemoglobin interfere with the HbA1C  assay. Heterozygous hemoglobin variants (HbS, HgC, etc)do  not significantly interfere with this assay.   However, presence of multiple variants may affect accuracy.     2015 9.9 (H) 4.5 - 6.2 % Final         Assessment:     Encounter Diagnosis   Name Primary?    Charcot's joint of left foot Yes          Plan:     I counseled the patient on her conditions, their  implications and medical management.    Diabetic foot with peripheral neuropathy and peripheral arterial disease   -Performed shoe inspection and diabetic foot education. Reviewed importance of blood glucose control, proper nutrition, and foot hygiene to minimize risk of complications of diabetes. Recommended daily foot inspections, daily moisturizer to feet, avoiding sharp instruments to feet, appropriate footwear at all times when ambulating, and following up regularly for routine foot care.   -[facility]   -Next foot exam due August 2025.    Left foot Charcot deformity: chronic exacerbated  -X rays ordered, reviewed, interpreted as above: Charcot consolidating.  -Transition from cast into tall surgical boot LLE to be worn at all times when WB. Will continue to monitor with serial X rays. If regressing then will return to cast.   -Planning for surgical correction in Nov/Dec. Discussed all internal fixation with postop NWB vs internal + external fixation (footplate only?) with postop WB.        Return to clinic in 3 weeks with X rays prior, call sooner PRN.

## 2024-09-16 ENCOUNTER — TELEPHONE (OUTPATIENT)
Dept: PODIATRY | Facility: CLINIC | Age: 54
End: 2024-09-16
Payer: MEDICARE

## 2024-09-25 ENCOUNTER — PROCEDURE VISIT (OUTPATIENT)
Dept: PODIATRY | Facility: CLINIC | Age: 54
End: 2024-09-25
Payer: MEDICARE

## 2024-09-25 ENCOUNTER — HOSPITAL ENCOUNTER (OUTPATIENT)
Dept: RADIOLOGY | Facility: HOSPITAL | Age: 54
Discharge: HOME | End: 2024-09-25
Attending: STUDENT IN AN ORGANIZED HEALTH CARE EDUCATION/TRAINING PROGRAM
Payer: MEDICARE

## 2024-09-25 VITALS
BODY MASS INDEX: 37.37 KG/M2 | HEIGHT: 67 IN | DIASTOLIC BLOOD PRESSURE: 71 MMHG | SYSTOLIC BLOOD PRESSURE: 139 MMHG | HEART RATE: 80 BPM | TEMPERATURE: 98 F | WEIGHT: 238.13 LBS

## 2024-09-25 DIAGNOSIS — M14.672 CHARCOT'S JOINT OF LEFT FOOT: ICD-10-CM

## 2024-09-25 DIAGNOSIS — M14.672 CHARCOT'S JOINT OF LEFT FOOT: Primary | ICD-10-CM

## 2024-09-25 DIAGNOSIS — M62.462 GASTROCNEMIUS EQUINUS, LEFT: ICD-10-CM

## 2024-09-25 DIAGNOSIS — L97.512 ULCER OF RIGHT FOOT WITH FAT LAYER EXPOSED: ICD-10-CM

## 2024-09-25 PROCEDURE — 73630 X-RAY EXAM OF FOOT: CPT | Mod: 26,LT,, | Performed by: RADIOLOGY

## 2024-09-25 PROCEDURE — 99215 OFFICE O/P EST HI 40 MIN: CPT | Mod: S$GLB,,, | Performed by: STUDENT IN AN ORGANIZED HEALTH CARE EDUCATION/TRAINING PROGRAM

## 2024-09-25 PROCEDURE — 73630 X-RAY EXAM OF FOOT: CPT | Mod: TC,LT

## 2024-09-25 NOTE — PROGRESS NOTES
Subjective:     Patient    Georgina Arias is a 53 y.o. female.    Problem    08/01/24: Previously followed by Dr Prado for wound care, left foot Charcot. Charcot has progressed over the past couple of months despite total contact casting. Has also previously tried change in shoes, inserts, surgical boots, rest without improvement. Minimal pain in left foot due to neuropathy, had prior partial 1st ray amputation on left. Cannot be off her feet because she does not have help at home or has minimal help. Admits to numbness, tingling.     08/23/24: Returns for cast change, no issues with last cast, did prefer walking bottom rather than cast shoe.     09/04/24: Returns for cast change. No new issues. Is OK with surgery but would like to wait until after birthday in November.     09/25/24: Returns for foot check, has been in tall surgical boot. Occasional lateral foot pain, occasional plantar foot pain/throbbing. No new concerns. OK with proceeding with surgery in Nov.     Primary Care Provider    Primary Care Provider: Alonso Ling MD   Last Seen: Patient does not have a PCP or has not yet seen their PCP     History    History obtained from patient and review of medical records.     Past Medical History:   Diagnosis Date    Hyperlipidemia     Hypertension     Peptic ulcer disease     Peripheral artery disease     PONV (postoperative nausea and vomiting)     Stage IV CKD     Type II diabetes mellitus        Past Surgical History:   Procedure Laterality Date    ANGIOGRAM, LOWER ARTERIAL, UNILATERAL Left 9/13/2023    Procedure: ANGIOGRAM, LOWER ARTERIAL, UNILATERAL;  Surgeon: Maxx Maya MD;  Location: [address];  Service: Vascular;  Laterality: Left;    ANGIOGRAPHY OF LOWER EXTREMITY Left 9/14/2023    Procedure: ANGIOGRAM, LOWER EXTREMITY with balloon angioplasty ultraverse 5mm x 60mm;  Surgeon: Maxx Maya MD;  Location: [facility] OR [address] [place] [address];  Service: Vascular;  Laterality: Left;   ultraverse 5mm x 60mm  fluoro: 12.41  contrast: 44ml  mGy: 65.57  Gycm2: 23.50    AORTOGRAPHY WITH EXTREMITY RUNOFF Left 9/13/2023    Procedure: AORTOGRAM, WITH EXTREMITY RUNOFF;  Surgeon: Maxx Maya MD;  [facility];  Service: Vascular;  Laterality: Left;  8.1 min  663.63 mGy  176.39 Gy.cm  178ml Dye     AV FISTULA PLACEMENT Right     CHOLECYSTECTOMY      CREATION, BYPASS, ARTERIAL, AXILLARY TO BILATERAL FEMORAL Left 9/14/2023    Procedure: CREATION, BYPASS, ARTERIAL, AXILLARY TO BILATERAL FEMORAL;  Surgeon: Maxx Maya MD;  [facility];  Service: Vascular;  Laterality: Left;  Left Axillary to Bilateral Femoral Bypass, Left Femoral to Above Knee Popliteal Bypass; SLIDER BED    CYSTOSCOPY W/ URETERAL STENT PLACEMENT Right 08/28/2018    Procedure: CYSTOSCOPY, WITH URETERAL STENT INSERTION (ADD ON );  Surgeon: Bubba Perez MD;  Location: [facility] OR;  Service: Urology;  Laterality: Right;  (ADD ON )    DEBRIDEMENT OF FOOT Left 08/03/2023    Procedure: DEBRIDEMENT, FOOT;  Surgeon: Aleida Flannery DPM;  [facility] OR;  Service: Podiatry;  Laterality: Left;    Excisional debridement of ulcer distal great toe left foot w/ partial resection distal phalanx Left 08/03/2023    FOOT AMPUTATION THROUGH METATARSAL Left 9/14/2023    Procedure: AMPUTATION, FOOT, TRANSMETATARSAL partial 1st ray amputation;  Surgeon: Jesus Valles DPM;  [facility];  Service: Podiatry;  Laterality: Left;  partial ray    I&D L 1st ray w/ amputation L hallux IPJ Left 08/14/2023    INCISION AND DRAINAGE FOOT Left 9/14/2023    Procedure: INCISION AND DRAINAGE, FOOT;  Surgeon: Jesus Valles DPM;  [facility];  Service: Podiatry;  Laterality: Left;    Injection L PT tendon sheath Left 08/14/2023    Nail avulsion left hallux Left 08/03/2023    PERIPHERAL ARTERIAL STENT GRAFT Right     REMOVAL OF NAIL OF DIGIT Left 08/03/2023    Procedure: REMOVAL, NAIL, DIGIT great toe;   Surgeon: Aleida Flannery DPM;  Location: [facility] OR;  Service: Podiatry;  Laterality: Left;    STENT, SUPERFICIAL FEMORAL ARTERY Left 2023    Procedure: STENT, SUPERFICIAL FEMORAL ARTERY;  Surgeon: Maxx Maya MD;  [facility];  Service: Vascular;  Laterality: Left;  5 x 100 mm    TOE AMPUTATION Left 2023    Procedure: AMPUTATION, TOE hallux IPJ;  Surgeon: Aleida Flannery DPM;  Location: [facility] OR;  Service: Podiatry;  Laterality: Left;    TOE AMPUTATION Left 2023    great toe    TOE AMPUTATION Left 2023    Procedure: AMPUTATION, TOE hallux, possible 1st met.head;  Surgeon: Aleida Flannery DPM;  Location: [facility];  Service: Podiatry;  Laterality: Left;    URETEROSCOPIC REMOVAL OF URETERIC CALCULUS Right 2018    Procedure: EXTRACTION-STONE-URETEROSCOPY;  Surgeon: Bubba Perez MD;  Location: [facility];  Service: Urology;  Laterality: Right;        Objective:     Vitals  Wt Readings from Last 1 Encounters:   24 108 kg (238 lb 1.6 oz)     Temp Readings from Last 1 Encounters:   24 97.6 °F (36.4 °C) (Oral)     BP Readings from Last 1 Encounters:   24 139/71     Pulse Readings from Last 1 Encounters:   24 80       Dermatological Exam    Skin:  Pedal hair growth diminished and Pedal skin thin and shiny on right; ulcer to fat sub 1st MTPJ, wider but more shallow  Pedal hair growth diminished and Pedal skin thin and shiny on left; callus plantar midfoot         Nails:  9 nail(s) thickened and 9 nail(s) discolored; left 1st nail/toe absent    Vascular Exam    Arteries:  Posterior tibial artery palpable on right  Dorsalis pedis artery palpable on right  Posterior tibial artery palpable on left  Dorsalis pedis artery palpable on left    Veins:  Superficial veins unremarkable on right  Superficial veins unremarkable on left    Swellin+ pitting on right  2+ pitting on left    Neurological Exam    [place] touch test:  2/6 sites sensed, loss of protective sensation      Musculoskeletal Exam    Footwear:  Diabetic on right  Cast on left    Gait Exam:   Ambulatory Status: Ambulatory  Gait: Apropulsive  Assistive Devices: None    Foot Progression Angle:  Normal on right  Normal on left     Right Lower Extremity Additional Findings:  Right foot and ankle function, strength, and range of motion unremarkable except as noted above.     Left Lower Extremity Additional Findings:  Rocker bottom Charcot deformity with gastrocnemius equinus  Left foot and ankle function, strength, and range of motion unremarkable except as noted above.    Imaging and Other Tests    Imagin/10/24 left foot X rays: midfoot Charcot deformity with plantar subluxation of navicular and cuboid (progressed from prior films); also with prior 1st ray amputation and widening of 1st-2nd ray interval    10/12/23 VAS CAMILA: left CAMILA 0.85 (likely above healing threshold)    24 left foot X rays: appears to be some consolidation of the Charcot deformity.     Independently reviewed and interpreted imaging, findings are as follows:     24 left foot X rays: further consolidation of Charcot deformity    Other Tests: The following A1c results were reviewed.   Hemoglobin A1C   Date Value Ref Range Status   2024 5.3 4.8 - 5.9 % Final   2024 5.6 4.8 - 5.9 % Final   06/10/2024 6.0 (H) 4.8 - 5.9 % Final   2023 7.2 (H) 4.0 - 5.6 % Final     Comment:     ADA Screening Guidelines:  5.7-6.4%  Consistent with prediabetes  >or=6.5%  Consistent with diabetes    High levels of fetal hemoglobin interfere with the HbA1C  assay. Heterozygous hemoglobin variants (HbS, HgC, etc)do  not significantly interfere with this assay.   However, presence of multiple variants may affect accuracy.     2018 7.9 (H) 4.0 - 5.6 % Final     Comment:     ADA Screening Guidelines:  5.7-6.4%  Consistent with prediabetes  >or=6.5%  Consistent with diabetes  High levels of fetal hemoglobin interfere with the HbA1C  assay.  Heterozygous hemoglobin variants (HbS, HgC, etc)do  not significantly interfere with this assay.   However, presence of multiple variants may affect accuracy.     03/24/2015 9.9 (H) 4.5 - 6.2 % Final         Assessment:     Encounter Diagnoses   Name Primary?    Charcot's joint of left foot Yes    Gastrocnemius equinus, left     Ulcer of right foot with fat layer exposed             Plan:     I counseled the patient on her conditions, their implications and medical management.    Diabetic foot with peripheral neuropathy and peripheral arterial disease   -Performed shoe inspection and diabetic foot education. Reviewed importance of blood glucose control, proper nutrition, and foot hygiene to minimize risk of complications of diabetes. Recommended daily foot inspections, daily moisturizer to feet, avoiding sharp instruments to feet, appropriate footwear at all times when ambulating, and following up regularly for routine foot care.   -Diabetic foot risk: 2023 IWGDF high ulcer risk.   -Next foot exam due August 2025.    Right foot wound: chronic stable  -Dressing changed.   -RLE WBAT in comfortable footwear.     Left foot Charcot deformity, equinus: chronic exacerbated  -X rays ordered, reviewed, interpreted as above: Charcot consolidating.  -Continue tall surgical boot.    Surgical Decision Making  -Discussed further attempts at conservative care versus surgical intervention consisting of proximal medial gastrocnemius recession, Charcot reconstruction with external fixation and internal fixation. Patient declined further conservative care and would like to proceed with surgical intervention.   -Reviewed potential risks, benefits, alternatives. Answered all questions. Risk factors include diabetes. Written consent obtained.   -Discussed anticipated postop course including immediate postop weightbearing status which is  WBAT on ex fix . Not driving foot, no driving restrictions.  Has walker at home.   -Will refer to PCP  for preop clearance.   -Case request submitted for 11/11.         Return to clinic in 2 weeks for skin check, call sooner PRN.

## 2024-10-04 ENCOUNTER — OFFICE VISIT (OUTPATIENT)
Dept: PODIATRY | Facility: CLINIC | Age: 54
End: 2024-10-04
Payer: MEDICARE

## 2024-10-04 VITALS
HEART RATE: 78 BPM | WEIGHT: 238.13 LBS | BODY MASS INDEX: 37.37 KG/M2 | SYSTOLIC BLOOD PRESSURE: 137 MMHG | HEIGHT: 67 IN | TEMPERATURE: 99 F | DIASTOLIC BLOOD PRESSURE: 70 MMHG

## 2024-10-04 DIAGNOSIS — L97.512 ULCER OF RIGHT FOOT WITH FAT LAYER EXPOSED: Primary | ICD-10-CM

## 2024-10-04 PROCEDURE — 99999 PR PBB SHADOW E&M-EST. PATIENT-LVL IV: CPT | Mod: PBBFAC,,, | Performed by: STUDENT IN AN ORGANIZED HEALTH CARE EDUCATION/TRAINING PROGRAM

## 2024-10-04 NOTE — PROCEDURES
"Returns for wound care right foot, no new issues, see prior notes    Wound Debridement    Date/Time: 10/4/2024 8:15 AM    Performed by: Marco Allison DPM  Authorized by: Marco Allison DPM    Time out: Immediately prior to procedure a "time out" was called to verify the correct patient, procedure, equipment, support staff and site/side marked as required.    Consent Done?:  Yes (Verbal)    Wound Details:    Location:  Right foot    Location:  Right 1st Metatarsal Head    Type of Debridement:  Excisional       Length (cm):  1       Area (sq cm):  0.3       Width (cm):  0.3       Percent Debrided (%):  100       Depth (cm):  0.3       Total Area Debrided (sq cm):  0.3    Depth of debridement:  Subcutaneous tissue    Tissue debrided:  Adipose, Dermis, Epidermis and Subcutaneous    Devitalized tissue debrided:  Biofilm and Callus    Instruments:  Blade and Curette  Bleeding:  Minimal  Hemostasis Achieved: Yes  Method Used:  Pressure  Patient tolerance:  Patient tolerated the procedure well with no immediate complications      Dressing applied, RLE protected WB in surgical shoe    RTC in 1.5 weeks for wound care  "

## 2024-10-04 NOTE — PROGRESS NOTES
Subjective:     Patient    Georgina Arias is a 53 y.o. female.    Problem    08/01/24: Previously followed by Dr Prado for wound care, left foot Charcot. Charcot has progressed over the past couple of months despite total contact casting. Has also previously tried change in shoes, inserts, surgical boots, rest without improvement. Minimal pain in left foot due to neuropathy, had prior partial 1st ray amputation on left. Cannot be off her feet because she does not have help at home or has minimal help. Admits to numbness, tingling.     08/23/24: Returns for cast change, no issues with last cast, did prefer walking bottom rather than cast shoe.     09/04/24: Returns for cast change. No new issues. Is OK with surgery but would like to wait until after birthday in November.     09/25/24: Returns for foot check, has been in tall surgical boot. Occasional lateral foot pain, occasional plantar foot pain/throbbing. No new concerns. OK with proceeding with surgery in Nov.     Primary Care Provider    Primary Care Provider: Alonso Ling MD   Last Seen: Patient does not have a PCP or has not yet seen their PCP     History    History obtained from patient and review of medical records.     Past Medical History:   Diagnosis Date    Hyperlipidemia     Hypertension     Peptic ulcer disease     Peripheral artery disease     PONV (postoperative nausea and vomiting)     Stage IV CKD     Type II diabetes mellitus        Past Surgical History:   Procedure Laterality Date    ANGIOGRAM, LOWER ARTERIAL, UNILATERAL Left 9/13/2023    Procedure: ANGIOGRAM, LOWER ARTERIAL, UNILATERAL;  Surgeon: Maxx Maya MD;  Location: [address];  Service: Vascular;  Laterality: Left;    ANGIOGRAPHY OF LOWER EXTREMITY Left 9/14/2023    Procedure: ANGIOGRAM, LOWER EXTREMITY with balloon angioplasty ultraverse 5mm x 60mm;  Surgeon: Maxx Maya MD;  Location: [facility] OR [address] [place] [address];  Service: Vascular;  Laterality: Left;   ultraverse 5mm x 60mm  fluoro: 12.41  contrast: 44ml  mGy: 65.57  Gycm2: 23.50    AORTOGRAPHY WITH EXTREMITY RUNOFF Left 9/13/2023    Procedure: AORTOGRAM, WITH EXTREMITY RUNOFF;  Surgeon: Maxx Maya MD;  [facility];  Service: Vascular;  Laterality: Left;  8.1 min  663.63 mGy  176.39 Gy.cm  178ml Dye     AV FISTULA PLACEMENT Right     CHOLECYSTECTOMY      CREATION, BYPASS, ARTERIAL, AXILLARY TO BILATERAL FEMORAL Left 9/14/2023    Procedure: CREATION, BYPASS, ARTERIAL, AXILLARY TO BILATERAL FEMORAL;  Surgeon: Maxx Maya MD;  [facility];  Service: Vascular;  Laterality: Left;  Left Axillary to Bilateral Femoral Bypass, Left Femoral to Above Knee Popliteal Bypass; SLIDER BED    CYSTOSCOPY W/ URETERAL STENT PLACEMENT Right 08/28/2018    Procedure: CYSTOSCOPY, WITH URETERAL STENT INSERTION (ADD ON );  Surgeon: Bubba Perez MD;  Location: [facility];  Service: Urology;  Laterality: Right;  (ADD ON )    DEBRIDEMENT OF FOOT Left 08/03/2023    Procedure: DEBRIDEMENT, FOOT;  Surgeon: Aleida Flannery DPM;  Location: [facility] OR;  Service: Podiatry;  Laterality: Left;    Excisional debridement of ulcer distal great toe left foot w/ partial resection distal phalanx Left 08/03/2023    FOOT AMPUTATION THROUGH METATARSAL Left 9/14/2023    Procedure: AMPUTATION, FOOT, TRANSMETATARSAL partial 1st ray amputation;  Surgeon: Jesus Valles DPM;  [facility];  Service: Podiatry;  Laterality: Left;  partial ray    I&D L 1st ray w/ amputation L hallux IPJ Left 08/14/2023    INCISION AND DRAINAGE FOOT Left 9/14/2023    Procedure: INCISION AND DRAINAGE, FOOT;  Surgeon: Jesus Valles DPM;  [facility];  Service: Podiatry;  Laterality: Left;    Injection L PT tendon sheath Left 08/14/2023    Nail avulsion left hallux Left 08/03/2023    PERIPHERAL ARTERIAL STENT GRAFT Right     REMOVAL OF NAIL OF DIGIT Left 08/03/2023    Procedure: REMOVAL, NAIL, DIGIT great toe;   Surgeon: Aleida Flannery DPM;  Location: [facility] OR;  Service: Podiatry;  Laterality: Left;    STENT, SUPERFICIAL FEMORAL ARTERY Left 2023    Procedure: STENT, SUPERFICIAL FEMORAL ARTERY;  Surgeon: Maxx Maya MD;  [facility];  Service: Vascular;  Laterality: Left;  5 x 100 mm    TOE AMPUTATION Left 2023    Procedure: AMPUTATION, TOE hallux IPJ;  Surgeon: Aleida Flannery DPM;  Location: [facility] OR;  Service: Podiatry;  Laterality: Left;    TOE AMPUTATION Left 2023    great toe    TOE AMPUTATION Left 2023    Procedure: AMPUTATION, TOE hallux, possible 1st met.head;  Surgeon: Aleida Flannery DPM;  Location: [facility];  Service: Podiatry;  Laterality: Left;    URETEROSCOPIC REMOVAL OF URETERIC CALCULUS Right 2018    Procedure: EXTRACTION-STONE-URETEROSCOPY;  Surgeon: Bubba Perez MD;  [facility]: [facility];  Service: Urology;  Laterality: Right;        Objective:     Vitals  Wt Readings from Last 1 Encounters:   24 108 kg (238 lb 1.6 oz)     Temp Readings from Last 1 Encounters:   24 97.6 °F (36.4 °C) (Oral)     BP Readings from Last 1 Encounters:   24 139/71     Pulse Readings from Last 1 Encounters:   24 80       Dermatological Exam    Skin:  Pedal hair growth diminished and Pedal skin thin and shiny on right; ulcer to fat sub 1st MTPJ, wider but more shallow  Pedal hair growth diminished and Pedal skin thin and shiny on left; callus plantar midfoot         Nails:  9 nail(s) thickened and 9 nail(s) discolored; left 1st nail/toe absent    Vascular Exam    Arteries:  Posterior tibial artery palpable on right  Dorsalis pedis artery palpable on right  Posterior tibial artery palpable on left  Dorsalis pedis artery palpable on left    Veins:  Superficial veins unremarkable on right  Superficial veins unremarkable on left    Swellin+ pitting on right  2+ pitting on left    Neurological Exam    [place] touch test:  2/6 sites sensed, loss of protective sensation      Musculoskeletal Exam    Footwear:  Diabetic on right  Cast on left    Gait Exam:   Ambulatory Status: Ambulatory  Gait: Apropulsive  Assistive Devices: None    Foot Progression Angle:  Normal on right  Normal on left     Right Lower Extremity Additional Findings:  Right foot and ankle function, strength, and range of motion unremarkable except as noted above.     Left Lower Extremity Additional Findings:  Rocker bottom Charcot deformity with gastrocnemius equinus  Left foot and ankle function, strength, and range of motion unremarkable except as noted above.    Imaging and Other Tests    Imagin/10/24 left foot X rays: midfoot Charcot deformity with plantar subluxation of navicular and cuboid (progressed from prior films); also with prior 1st ray amputation and widening of 1st-2nd ray interval    10/12/23 VAS CAMILA: left CAMILA 0.85 (likely above healing threshold)    24 left foot X rays: appears to be some consolidation of the Charcot deformity.     Independently reviewed and interpreted imaging, findings are as follows:     24 left foot X rays: further consolidation of Charcot deformity    Other Tests: The following A1c results were reviewed.   Hemoglobin A1C   Date Value Ref Range Status   2024 5.3 4.8 - 5.9 % Final   2024 5.6 4.8 - 5.9 % Final   06/10/2024 6.0 (H) 4.8 - 5.9 % Final   2023 7.2 (H) 4.0 - 5.6 % Final     Comment:     ADA Screening Guidelines:  5.7-6.4%  Consistent with prediabetes  >or=6.5%  Consistent with diabetes    High levels of fetal hemoglobin interfere with the HbA1C  assay. Heterozygous hemoglobin variants (HbS, HgC, etc)do  not significantly interfere with this assay.   However, presence of multiple variants may affect accuracy.     2018 7.9 (H) 4.0 - 5.6 % Final     Comment:     ADA Screening Guidelines:  5.7-6.4%  Consistent with prediabetes  >or=6.5%  Consistent with diabetes  High levels of fetal hemoglobin interfere with the HbA1C  assay.  Heterozygous hemoglobin variants (HbS, HgC, etc)do  not significantly interfere with this assay.   However, presence of multiple variants may affect accuracy.     03/24/2015 9.9 (H) 4.5 - 6.2 % Final         Assessment:     No diagnosis found.           Plan:     I counseled the patient on her conditions, their implications and medical management.    Diabetic foot with peripheral neuropathy and peripheral arterial disease   -Performed shoe inspection and diabetic foot education. Reviewed importance of blood glucose control, proper nutrition, and foot hygiene to minimize risk of complications of diabetes. Recommended daily foot inspections, daily moisturizer to feet, avoiding sharp instruments to feet, appropriate footwear at all times when ambulating, and following up regularly for routine foot care.   -Diabetic foot risk: 2023 IWF high ulcer risk.   -Next foot exam due August 2025.    Right foot wound: chronic stable  -Dressing changed.   -RLE WBAT in comfortable footwear.     Left foot Charcot deformity, equinus: chronic exacerbated  -X rays ordered, reviewed, interpreted as above: Charcot consolidating.  -Continue tall surgical boot.    Surgical Decision Making  -Discussed further attempts at conservative care versus surgical intervention consisting of proximal medial gastrocnemius recession, Charcot reconstruction with external fixation and internal fixation. Patient declined further conservative care and would like to proceed with surgical intervention.   -Reviewed potential risks, benefits, alternatives. Answered all questions. Risk factors include diabetes. Written consent obtained.   -Discussed anticipated postop course including immediate postop weightbearing status which is  WBAT on ex fix . Not driving foot, no driving restrictions.  Has walker at home.   -Will refer to PCP for preop clearance.   -Case request submitted for 11/11.         Return to clinic in 2 weeks for skin check, call sooner PRN.

## 2024-10-15 ENCOUNTER — OFFICE VISIT (OUTPATIENT)
Dept: PODIATRY | Facility: CLINIC | Age: 54
End: 2024-10-15
Payer: MEDICARE

## 2024-10-15 VITALS
OXYGEN SATURATION: 100 % | SYSTOLIC BLOOD PRESSURE: 137 MMHG | HEART RATE: 78 BPM | BODY MASS INDEX: 37.37 KG/M2 | WEIGHT: 238.13 LBS | DIASTOLIC BLOOD PRESSURE: 70 MMHG | HEIGHT: 67 IN

## 2024-10-15 DIAGNOSIS — M62.462 GASTROCNEMIUS EQUINUS, LEFT: ICD-10-CM

## 2024-10-15 DIAGNOSIS — L97.512 ULCER OF RIGHT FOOT WITH FAT LAYER EXPOSED: ICD-10-CM

## 2024-10-15 DIAGNOSIS — M14.672 CHARCOT'S JOINT OF LEFT FOOT: Primary | ICD-10-CM

## 2024-10-15 PROCEDURE — 1159F MED LIST DOCD IN RCRD: CPT | Mod: CPTII,S$GLB,, | Performed by: STUDENT IN AN ORGANIZED HEALTH CARE EDUCATION/TRAINING PROGRAM

## 2024-10-15 PROCEDURE — 99999 PR PBB SHADOW E&M-EST. PATIENT-LVL IV: CPT | Mod: PBBFAC,,, | Performed by: STUDENT IN AN ORGANIZED HEALTH CARE EDUCATION/TRAINING PROGRAM

## 2024-10-15 PROCEDURE — 3078F DIAST BP <80 MM HG: CPT | Mod: CPTII,S$GLB,, | Performed by: STUDENT IN AN ORGANIZED HEALTH CARE EDUCATION/TRAINING PROGRAM

## 2024-10-15 PROCEDURE — 3075F SYST BP GE 130 - 139MM HG: CPT | Mod: CPTII,S$GLB,, | Performed by: STUDENT IN AN ORGANIZED HEALTH CARE EDUCATION/TRAINING PROGRAM

## 2024-10-15 PROCEDURE — 99213 OFFICE O/P EST LOW 20 MIN: CPT | Mod: S$GLB,,, | Performed by: STUDENT IN AN ORGANIZED HEALTH CARE EDUCATION/TRAINING PROGRAM

## 2024-10-15 PROCEDURE — 3008F BODY MASS INDEX DOCD: CPT | Mod: CPTII,S$GLB,, | Performed by: STUDENT IN AN ORGANIZED HEALTH CARE EDUCATION/TRAINING PROGRAM

## 2024-10-15 PROCEDURE — 3044F HG A1C LEVEL LT 7.0%: CPT | Mod: CPTII,S$GLB,, | Performed by: STUDENT IN AN ORGANIZED HEALTH CARE EDUCATION/TRAINING PROGRAM

## 2024-10-15 NOTE — PROGRESS NOTES
Subjective:     Patient    Georgina Arias is a 53 y.o. female.    Problem    08/01/24: Previously followed by Dr Prado for wound care, left foot Charcot. Charcot has progressed over the past couple of months despite total contact casting. Has also previously tried change in shoes, inserts, surgical boots, rest without improvement. Minimal pain in left foot due to neuropathy, had prior partial 1st ray amputation on left. Cannot be off her feet because she does not have help at home or has minimal help. Admits to numbness, tingling.     08/23/24: Returns for cast change, no issues with last cast, did prefer walking bottom rather than cast shoe.     09/04/24: Returns for cast change. No new issues. Is OK with surgery but would like to wait until after birthday in November.     09/25/24: Returns for foot check, has been in tall surgical boot. Occasional lateral foot pain, occasional plantar foot pain/throbbing. No new concerns. OK with proceeding with surgery in Nov.     10/15/24: Returns for foot check, in tall boot on left. Has been in football dressing on right. Seeing PCP tomorrow.     Primary Care Provider    Primary Care Provider: Alonso Ling MD   Last Seen: Patient does not have a PCP or has not yet seen their PCP     History    History obtained from patient and review of medical records.     Past Medical History:   Diagnosis Date    Hyperlipidemia     Hypertension     Peptic ulcer disease     Peripheral artery disease     PONV (postoperative nausea and vomiting)     Stage IV CKD     Type II diabetes mellitus        Past Surgical History:   Procedure Laterality Date    ANGIOGRAM, LOWER ARTERIAL, UNILATERAL Left 9/13/2023    Procedure: ANGIOGRAM, LOWER ARTERIAL, UNILATERAL;  Surgeon: Maxx Maya MD;  Location: [facility] OR [address] [place] [address];  Service: Vascular;  Laterality: Left;    ANGIOGRAPHY OF LOWER EXTREMITY Left 9/14/2023    Procedure: ANGIOGRAM, LOWER EXTREMITY with balloon angioplasty ultraverse  5mm x 60mm;  Surgeon: Maxx Maya MD;  [facility];  Service: Vascular;  Laterality: Left;  ultraverse 5mm x 60mm  fluoro: 12.41  contrast: 44ml  mGy: 65.57  Gycm2: 23.50    AORTOGRAPHY WITH EXTREMITY RUNOFF Left 9/13/2023    Procedure: AORTOGRAM, WITH EXTREMITY RUNOFF;  Surgeon: Maxx Maya MD;  [facility];  Service: Vascular;  Laterality: Left;  8.1 min  663.63 mGy  176.39 Gy.cm  178ml Dye     AV FISTULA PLACEMENT Right     CHOLECYSTECTOMY      CREATION, BYPASS, ARTERIAL, AXILLARY TO BILATERAL FEMORAL Left 9/14/2023    Procedure: CREATION, BYPASS, ARTERIAL, AXILLARY TO BILATERAL FEMORAL;  Surgeon: Maxx Maya MD;  [facility];  Service: Vascular;  Laterality: Left;  Left Axillary to Bilateral Femoral Bypass, Left Femoral to Above Knee Popliteal Bypass; SLIDER BED    CYSTOSCOPY W/ URETERAL STENT PLACEMENT Right 08/28/2018    Procedure: CYSTOSCOPY, WITH URETERAL STENT INSERTION (ADD ON );  Surgeon: Bubba Perez MD;  Location: [facility];  Service: Urology;  Laterality: Right;  (ADD ON )    DEBRIDEMENT OF FOOT Left 08/03/2023    Procedure: DEBRIDEMENT, FOOT;  Surgeon: Aleida Flannery DPM;  [facility] OR;  Service: Podiatry;  Laterality: Left;    Excisional debridement of ulcer distal great toe left foot w/ partial resection distal phalanx Left 08/03/2023    FOOT AMPUTATION THROUGH METATARSAL Left 9/14/2023    Procedure: AMPUTATION, FOOT, TRANSMETATARSAL partial 1st ray amputation;  Surgeon: Jesus Valles DPM;  [facility];  Service: Podiatry;  Laterality: Left;  partial ray    I&D L 1st ray w/ amputation L hallux IPJ Left 08/14/2023    INCISION AND DRAINAGE FOOT Left 9/14/2023    Procedure: INCISION AND DRAINAGE, FOOT;  Surgeon: Jesus Valles DPM;  [facility];  Service: Podiatry;  Laterality: Left;    Injection L PT tendon sheath Left 08/14/2023    Nail avulsion left hallux Left 08/03/2023    PERIPHERAL ARTERIAL STENT  GRAFT Right     REMOVAL OF NAIL OF DIGIT Left 2023    Procedure: REMOVAL, NAIL, DIGIT great toe;  Surgeon: Aleida Flannery DPM;  Location: [facility] OR;  Service: Podiatry;  Laterality: Left;    STENT, SUPERFICIAL FEMORAL ARTERY Left 2023    Procedure: STENT, SUPERFICIAL FEMORAL ARTERY;  Surgeon: Maxx Maya MD;  Location: [address];  Service: Vascular;  Laterality: Left;  5 x 100 mm    TOE AMPUTATION Left 2023    Procedure: AMPUTATION, TOE hallux IPJ;  Surgeon: Aleida Flannery DPM;  Location: [facility] OR;  Service: Podiatry;  Laterality: Left;    TOE AMPUTATION Left 2023    great toe    TOE AMPUTATION Left 2023    Procedure: AMPUTATION, TOE hallux, possible 1st met.head;  Surgeon: Aleida Flannery DPM;  Location: [facility];  Service: Podiatry;  Laterality: Left;    URETEROSCOPIC REMOVAL OF URETERIC CALCULUS Right 2018    Procedure: EXTRACTION-STONE-URETEROSCOPY;  Surgeon: Bubba Perez MD;  Location: [facility];  Service: Urology;  Laterality: Right;        Objective:     Vitals  Wt Readings from Last 1 Encounters:   10/15/24 108 kg (238 lb 1.6 oz)     Temp Readings from Last 1 Encounters:   10/04/24 98.6 °F (37 °C) (Oral)     BP Readings from Last 1 Encounters:   10/15/24 137/70     Pulse Readings from Last 1 Encounters:   10/15/24 78       Dermatological Exam    Skin:  Pedal hair growth diminished and Pedal skin thin and shiny on right; postulcerative callus  Pedal hair growth diminished and Pedal skin thin and shiny on left; callus plantar midfoot      Nails:  9 nail(s) thickened and 9 nail(s) discolored; left 1st nail/toe absent    Vascular Exam    Arteries:  Posterior tibial artery palpable on right  Dorsalis pedis artery palpable on right  Posterior tibial artery palpable on left  Dorsalis pedis artery palpable on left    Veins:  Superficial veins unremarkable on right  Superficial veins unremarkable on left    Swellin+ pitting on right  2+ pitting on left    Neurological  Exam    [place] touch test:  2/6 sites sensed, loss of protective sensation     Musculoskeletal Exam    Footwear:  Diabetic on right  Cast on left    Gait Exam:   Ambulatory Status: Ambulatory  Gait: Apropulsive  Assistive Devices: None    Foot Progression Angle:  Normal on right  Normal on left     Right Lower Extremity Additional Findings:  Right foot and ankle function, strength, and range of motion unremarkable except as noted above.     Left Lower Extremity Additional Findings:  Rocker bottom Charcot deformity with gastrocnemius equinus  Left foot and ankle function, strength, and range of motion unremarkable except as noted above.    Imaging and Other Tests    Imagin/10/24 left foot X rays: midfoot Charcot deformity with plantar subluxation of navicular and cuboid (progressed from prior films); also with prior 1st ray amputation and widening of 1st-2nd ray interval    10/12/23 VAS CAMILA: left CAMILA 0.85 (likely above healing threshold)    24 left foot X rays: appears to be some consolidation of the Charcot deformity.     24 left foot X rays: further consolidation of Charcot deformity    Independently reviewed and interpreted imaging, findings are as follows: N/A    Other Tests: The following A1c results were reviewed.   Hemoglobin A1C   Date Value Ref Range Status   10/07/2024 6.2 (H) 4.8 - 5.9 % Final   2024 5.3 4.8 - 5.9 % Final   2024 5.6 4.8 - 5.9 % Final   2023 7.2 (H) 4.0 - 5.6 % Final     Comment:     ADA Screening Guidelines:  5.7-6.4%  Consistent with prediabetes  >or=6.5%  Consistent with diabetes    High levels of fetal hemoglobin interfere with the HbA1C  assay. Heterozygous hemoglobin variants (HbS, HgC, etc)do  not significantly interfere with this assay.   However, presence of multiple variants may affect accuracy.     2018 7.9 (H) 4.0 - 5.6 % Final     Comment:     ADA Screening Guidelines:  5.7-6.4%  Consistent with prediabetes  >or=6.5%  Consistent with  diabetes  High levels of fetal hemoglobin interfere with the HbA1C  assay. Heterozygous hemoglobin variants (HbS, HgC, etc)do  not significantly interfere with this assay.   However, presence of multiple variants may affect accuracy.     03/24/2015 9.9 (H) 4.5 - 6.2 % Final         Assessment:     Encounter Diagnoses   Name Primary?    Charcot's joint of left foot Yes    Gastrocnemius equinus, left     Ulcer of right foot with fat layer exposed               Plan:     I counseled the patient on her conditions, their implications and medical management.    Diabetic foot with peripheral neuropathy and peripheral arterial disease   -Performed shoe inspection and diabetic foot education. Reviewed importance of blood glucose control, proper nutrition, and foot hygiene to minimize risk of complications of diabetes. Recommended daily foot inspections, daily moisturizer to feet, avoiding sharp instruments to feet, appropriate footwear at all times when ambulating, and following up regularly for routine foot care.   -Diabetic foot risk: 2023 IWF high ulcer risk.   -Next foot exam due August 2025.    Right foot wound: healed  -Will monitor.     Left foot Charcot deformity, equinus: chronic exacerbated   -Continue tall surgical boot.    -Awaiting surgical intervention 11/11: gastrocnemius recession, residual 1st ray amputation, Charcot reconstruction with internal fixation and possible external fixation vs walking cast.         Return to clinic 1 week postop, call sooner PRN.

## 2024-10-15 NOTE — H&P (VIEW-ONLY)
Subjective:     Patient    Georgina Arias is a 53 y.o. female.    Problem    08/01/24: Previously followed by Dr Prado for wound care, left foot Charcot. Charcot has progressed over the past couple of months despite total contact casting. Has also previously tried change in shoes, inserts, surgical boots, rest without improvement. Minimal pain in left foot due to neuropathy, had prior partial 1st ray amputation on left. Cannot be off her feet because she does not have help at home or has minimal help. Admits to numbness, tingling.     08/23/24: Returns for cast change, no issues with last cast, did prefer walking bottom rather than cast shoe.     09/04/24: Returns for cast change. No new issues. Is OK with surgery but would like to wait until after birthday in November.     09/25/24: Returns for foot check, has been in tall surgical boot. Occasional lateral foot pain, occasional plantar foot pain/throbbing. No new concerns. OK with proceeding with surgery in Nov.     10/15/24: Returns for foot check, in tall boot on left. Has been in football dressing on right. Seeing PCP tomorrow.     Primary Care Provider    Primary Care Provider: Alonso Ling MD   Last Seen: Patient does not have a PCP or has not yet seen their PCP     History    History obtained from patient and review of medical records.     Past Medical History:   Diagnosis Date    Hyperlipidemia     Hypertension     Peptic ulcer disease     Peripheral artery disease     PONV (postoperative nausea and vomiting)     Stage IV CKD     Type II diabetes mellitus        Past Surgical History:   Procedure Laterality Date    ANGIOGRAM, LOWER ARTERIAL, UNILATERAL Left 9/13/2023    Procedure: ANGIOGRAM, LOWER ARTERIAL, UNILATERAL;  Surgeon: Maxx Maya MD;  Location: [facility] OR [address] [place] [address];  Service: Vascular;  Laterality: Left;    ANGIOGRAPHY OF LOWER EXTREMITY Left 9/14/2023    Procedure: ANGIOGRAM, LOWER EXTREMITY with balloon angioplasty ultraverse  5mm x 60mm;  Surgeon: Maxx Maya MD;  [facility];  Service: Vascular;  Laterality: Left;  ultraverse 5mm x 60mm  fluoro: 12.41  contrast: 44ml  mGy: 65.57  Gycm2: 23.50    AORTOGRAPHY WITH EXTREMITY RUNOFF Left 9/13/2023    Procedure: AORTOGRAM, WITH EXTREMITY RUNOFF;  Surgeon: Maxx Maya MD;  [facility];  Service: Vascular;  Laterality: Left;  8.1 min  663.63 mGy  176.39 Gy.cm  178ml Dye     AV FISTULA PLACEMENT Right     CHOLECYSTECTOMY      CREATION, BYPASS, ARTERIAL, AXILLARY TO BILATERAL FEMORAL Left 9/14/2023    Procedure: CREATION, BYPASS, ARTERIAL, AXILLARY TO BILATERAL FEMORAL;  Surgeon: Maxx Maya MD;  [facility];  Service: Vascular;  Laterality: Left;  Left Axillary to Bilateral Femoral Bypass, Left Femoral to Above Knee Popliteal Bypass; SLIDER BED    CYSTOSCOPY W/ URETERAL STENT PLACEMENT Right 08/28/2018    Procedure: CYSTOSCOPY, WITH URETERAL STENT INSERTION (ADD ON );  Surgeon: Bubba Perez MD;  Location: [facility];  Service: Urology;  Laterality: Right;  (ADD ON )    DEBRIDEMENT OF FOOT Left 08/03/2023    Procedure: DEBRIDEMENT, FOOT;  Surgeon: Aleida Flannery DPM;  Location: [facility] OR;  Service: Podiatry;  Laterality: Left;    Excisional debridement of ulcer distal great toe left foot w/ partial resection distal phalanx Left 08/03/2023    FOOT AMPUTATION THROUGH METATARSAL Left 9/14/2023    Procedure: AMPUTATION, FOOT, TRANSMETATARSAL partial 1st ray amputation;  Surgeon: Jesus Valles DPM;  [facility];  Service: Podiatry;  Laterality: Left;  partial ray    I&D L 1st ray w/ amputation L hallux IPJ Left 08/14/2023    INCISION AND DRAINAGE FOOT Left 9/14/2023    Procedure: INCISION AND DRAINAGE, FOOT;  Surgeon: Jesus Valles DPM;  [facility];  Service: Podiatry;  Laterality: Left;    Injection L PT tendon sheath Left 08/14/2023    Nail avulsion left hallux Left 08/03/2023    PERIPHERAL ARTERIAL STENT  GRAFT Right     REMOVAL OF NAIL OF DIGIT Left 2023    Procedure: REMOVAL, NAIL, DIGIT great toe;  Surgeon: Aleida Flannery DPM;  Location: [facility] OR;  Service: Podiatry;  Laterality: Left;    STENT, SUPERFICIAL FEMORAL ARTERY Left 2023    Procedure: STENT, SUPERFICIAL FEMORAL ARTERY;  Surgeon: Maxx Maya MD;  Location: [address];  Service: Vascular;  Laterality: Left;  5 x 100 mm    TOE AMPUTATION Left 2023    Procedure: AMPUTATION, TOE hallux IPJ;  Surgeon: Aleida Flannery DPM;  Location: [facility] OR;  Service: Podiatry;  Laterality: Left;    TOE AMPUTATION Left 2023    great toe    TOE AMPUTATION Left 2023    Procedure: AMPUTATION, TOE hallux, possible 1st met.head;  Surgeon: Aleida Flannery DPM;  Location: [facility];  Service: Podiatry;  Laterality: Left;    URETEROSCOPIC REMOVAL OF URETERIC CALCULUS Right 2018    Procedure: EXTRACTION-STONE-URETEROSCOPY;  Surgeon: Bubba Perez MD;  [facility];  Service: Urology;  Laterality: Right;        Objective:     Vitals  Wt Readings from Last 1 Encounters:   10/15/24 108 kg (238 lb 1.6 oz)     Temp Readings from Last 1 Encounters:   10/04/24 98.6 °F (37 °C) (Oral)     BP Readings from Last 1 Encounters:   10/15/24 137/70     Pulse Readings from Last 1 Encounters:   10/15/24 78       Dermatological Exam    Skin:  Pedal hair growth diminished and Pedal skin thin and shiny on right; postulcerative callus  Pedal hair growth diminished and Pedal skin thin and shiny on left; callus plantar midfoot      Nails:  9 nail(s) thickened and 9 nail(s) discolored; left 1st nail/toe absent    Vascular Exam    Arteries:  Posterior tibial artery palpable on right  Dorsalis pedis artery palpable on right  Posterior tibial artery palpable on left  Dorsalis pedis artery palpable on left    Veins:  Superficial veins unremarkable on right  Superficial veins unremarkable on left    Swellin+ pitting on right  2+ pitting on left    Neurological  Exam    [place] touch test:  2/6 sites sensed, loss of protective sensation     Musculoskeletal Exam    Footwear:  Diabetic on right  Cast on left    Gait Exam:   Ambulatory Status: Ambulatory  Gait: Apropulsive  Assistive Devices: None    Foot Progression Angle:  Normal on right  Normal on left     Right Lower Extremity Additional Findings:  Right foot and ankle function, strength, and range of motion unremarkable except as noted above.     Left Lower Extremity Additional Findings:  Rocker bottom Charcot deformity with gastrocnemius equinus  Left foot and ankle function, strength, and range of motion unremarkable except as noted above.    Imaging and Other Tests    Imagin/10/24 left foot X rays: midfoot Charcot deformity with plantar subluxation of navicular and cuboid (progressed from prior films); also with prior 1st ray amputation and widening of 1st-2nd ray interval    10/12/23 VAS CAMILA: left CAMILA 0.85 (likely above healing threshold)    24 left foot X rays: appears to be some consolidation of the Charcot deformity.     24 left foot X rays: further consolidation of Charcot deformity    Independently reviewed and interpreted imaging, findings are as follows: N/A    Other Tests: The following A1c results were reviewed.   Hemoglobin A1C   Date Value Ref Range Status   10/07/2024 6.2 (H) 4.8 - 5.9 % Final   2024 5.3 4.8 - 5.9 % Final   2024 5.6 4.8 - 5.9 % Final   2023 7.2 (H) 4.0 - 5.6 % Final     Comment:     ADA Screening Guidelines:  5.7-6.4%  Consistent with prediabetes  >or=6.5%  Consistent with diabetes    High levels of fetal hemoglobin interfere with the HbA1C  assay. Heterozygous hemoglobin variants (HbS, HgC, etc)do  not significantly interfere with this assay.   However, presence of multiple variants may affect accuracy.     2018 7.9 (H) 4.0 - 5.6 % Final     Comment:     ADA Screening Guidelines:  5.7-6.4%  Consistent with prediabetes  >or=6.5%  Consistent with  diabetes  High levels of fetal hemoglobin interfere with the HbA1C  assay. Heterozygous hemoglobin variants (HbS, HgC, etc)do  not significantly interfere with this assay.   However, presence of multiple variants may affect accuracy.     03/24/2015 9.9 (H) 4.5 - 6.2 % Final         Assessment:     Encounter Diagnoses   Name Primary?    Charcot's joint of left foot Yes    Gastrocnemius equinus, left     Ulcer of right foot with fat layer exposed               Plan:     I counseled the patient on her conditions, their implications and medical management.    Diabetic foot with peripheral neuropathy and peripheral arterial disease   -Performed shoe inspection and diabetic foot education. Reviewed importance of blood glucose control, proper nutrition, and foot hygiene to minimize risk of complications of diabetes. Recommended daily foot inspections, daily moisturizer to feet, avoiding sharp instruments to feet, appropriate footwear at all times when ambulating, and following up regularly for routine foot care.   -Diabetic foot risk: 2023 IWF high ulcer risk.   -Next foot exam due August 2025.    Right foot wound: healed  -Will monitor.     Left foot Charcot deformity, equinus: chronic exacerbated   -Continue tall surgical boot.    -Awaiting surgical intervention 11/11: gastrocnemius recession, residual 1st ray amputation, Charcot reconstruction with internal fixation and possible external fixation vs walking cast.         Return to clinic 1 week postop, call sooner PRN.

## 2024-10-27 ENCOUNTER — HOSPITAL ENCOUNTER (EMERGENCY)
Facility: HOSPITAL | Age: 54
Discharge: HOME | End: 2024-10-27
Attending: EMERGENCY MEDICINE
Payer: MEDICARE

## 2024-10-27 VITALS
SYSTOLIC BLOOD PRESSURE: 173 MMHG | HEART RATE: 63 BPM | WEIGHT: 238 LBS | DIASTOLIC BLOOD PRESSURE: 75 MMHG | TEMPERATURE: 98 F | OXYGEN SATURATION: 100 % | RESPIRATION RATE: 18 BRPM | BODY MASS INDEX: 37.35 KG/M2 | HEIGHT: 67 IN

## 2024-10-27 DIAGNOSIS — M14.672 CHARCOT JOINT OF LEFT FOOT: Primary | ICD-10-CM

## 2024-10-27 PROCEDURE — 99281 EMR DPT VST MAYX REQ PHY/QHP: CPT

## 2024-10-28 ENCOUNTER — TELEPHONE (OUTPATIENT)
Dept: PODIATRY | Facility: CLINIC | Age: 54
End: 2024-10-28
Payer: MEDICARE

## 2024-10-28 NOTE — ANESTHESIA PAT ROS NOTE
10/28/2024  Georgina Arias is a 53 y.o., female.      Pre-op Assessment    I have reviewed the Patient Summary Reports.     I have reviewed the Nursing Notes.       Review of Systems  Anesthesia Hx:  No problems with previous Anesthesia                Cardiovascular:     Hypertension       CHF   PVD                                Renal/:  Chronic Renal Disease, ESRD                Endocrine:  Diabetes         Obesity / BMI > 30           Anesthesia Assessment: Preoperative EQUATION    Planned Procedure: Procedure(s) (LRB):  RECONSTRUCTION, CHARCOT FOOT, WITH FUSION (Left)  RESECTION, MUSCLE, GASTROCNEMIUS (Left)  *APPLICATION, EXTERNAL FIXATION DEVICE* INACTIVE (Left)  Requested Anesthesia Type:General/Regional  Surgeon: Marco Allison DPM  Service: Podiatry  Known or anticipated Date of Surgery:11/11/2024    Surgeon notes: reviewed    Electronic QUestionnaire Assessment completed via nurse interview with patient.        Triage considerations:     The patient has no apparent active cardiac condition (No unstable coronary Syndrome such as severe unstable angina or recent [<1 month] myocardial infarction, decompensated CHF, severe valvular   disease or significant arrhythmia)    Previous anesthesia records:GETA  04/15/24; 0954 (created via procedure documentation); Direct laryngoscopy; Oral Standard; 7.5 mm; Cuffed; Auscultation, Capnometry, Symmetrical chest wall movement; Cloth tape; 1      Last PCP note: within 1 month , within [facility]   Subspecialty notes: Infectious Disease, Vascular Surgery    Other important co-morbidities: DM2, HLD, HTN, and Obesity      Tests already available:  to be determined             Instructions given. (See in Nurse's note)    Optimization:  Anesthesia Preop Clinic Assessment  Indicated    Medical Opinion Indicated       Sub-specialist consult indicated:   TBD        Plan:    Testing:  CBC and CMP   Pre-anesthesia  visit       Visit focus: to be determined     Consultation:Patient's PCP for re-evaluation     Patient  has previously scheduled Medical Appointment:    Navigation: Tests Scheduled.              Consults scheduled.             Results will be tracked by Preop Clinic.    -pt is considered a moderate risk for a intermediate risk procedure (both). Pt has good functional capacity (>4 METS). She has the clinical risk factors of DM (on insulin, controlled), HTN, ESRD on dialysis, diastolic dysfunction, and PAD s/p bypass surgery. She has no currently active cardiac conditions. Recommend no further workup or delay prior to the procedure. She will also be checking in with cardiology for this purpose upcoming.     Pt reviewed by NOELLE WONG. OK to proceed at [facility].

## 2024-11-04 NOTE — PRE ADMISSION SCREENING
Called to inform pt to hold Mounjaro x 1 week prior to their procedure on 11/11/24. Pt verbalized understanding. Info sent to portal.

## 2024-11-08 ENCOUNTER — TELEPHONE (OUTPATIENT)
Dept: HEMATOLOGY/ONCOLOGY | Facility: CLINIC | Age: 54
End: 2024-11-08
Payer: MEDICARE

## 2024-11-08 ENCOUNTER — PATIENT MESSAGE (OUTPATIENT)
Dept: HEMATOLOGY/ONCOLOGY | Facility: CLINIC | Age: 54
End: 2024-11-08
Payer: MEDICARE

## 2024-11-08 DIAGNOSIS — S91.302S OPEN WOUND OF LEFT FOOT, SEQUELA: Primary | ICD-10-CM

## 2024-11-08 NOTE — PRE-PROCEDURE INSTRUCTIONS
The following was discussed with pt via phone and sent to pt portal. Pt verbalized understanding. Pt to be accompanied by her brother. Pt expressed concern over having nothing to eat after midnight. Anesthesia consulted and reinforced nothing to eat after midnight and clear liquids up until 2 hrs prior to procedure.    Dear Georgina ,    You are scheduled for a procedure with Dr. Allison on 11/11/2024. Your scheduled arrival time is 11:15am.  This arrival time is roughly 2 hours before your anticipated procedure time to allow sufficient time for pre-op.  Please wear comfortable clothes.  This procedure will take place at the [facility] at the corner of [facility] and [facility].  [facility] to [facility].  The address is:    [address] [place] [address].  [place] [id number]    After entering the building, you will proceed to the second floor where you can check in with registration. You should take any medications that you routinely take for blood pressure (other than those listed below), heart medications, thyroid, cholesterol, etc.     If you wear contact lenses, please wear glasses to your procedure.    Your fasting instructions are as follow:  Nothing to eat after midnight the evening before your surgery. Anesthesia encourages you to drink clear liquids up until 2 hours prior to your arrival time to ensure that you are adequately hydrated. You MUST have a responsible adult to bring you home.      The evening before and morning of your procedure, please hold the following medications:  -Aspirin and Aspirin-containing products (Goody's powder, Excedrin)  -NSAIDs (Advil, Ibuprofen, Aleve, Diclofenac)  -Vitamins/Supplements  -Herbal remedies/Teas  -Stimulants (Adderall, Vyvanse, Adipex)  -Diabetic medication (Please bring with you day of procedure)  -Prescription creams/gels/lotions  -CONTINUE HOLDING ASPIRIN  -CONTINUE HOLDING MOUNJARO  -TRADJENTA  -RENVELA  -LANTUS  - TAKE 16 UNITS THE NIGHT BEFORE      -May take Tylenol      The evening before and morning of your procedure, take a shower using antibacterial soap (ex: Hibiclens or Dial antibacterial soap). DO NOT apply deodorant, lotion, cologne, or anything else to the skin. Wear loose, comfortable fitting clothing. Do not wear jewelry or bring any valuables with you. If you wear dentures or contacts, please bring your case with you or leave them at home. Use and bring any inhalers that you may have.    If you have any procedure-specific questions, please call your surgeon's office. Any other questions, don't hesitate to call at [phone].    Thanks,  JUDITH Nunez  Pre-Admit Testing  Anesthesia Dept Count includes the [facility]

## 2024-11-08 NOTE — TELEPHONE ENCOUNTER
Called and spoke to patient to confirm in office appointment on 11/15/24 and to complete the genetic questionnaire.

## 2024-11-10 ENCOUNTER — ANESTHESIA EVENT (OUTPATIENT)
Dept: SURGERY | Facility: HOSPITAL | Age: 54
End: 2024-11-10
Payer: MEDICARE

## 2024-11-11 ENCOUNTER — HOSPITAL ENCOUNTER (OUTPATIENT)
Dept: RADIOLOGY | Facility: HOSPITAL | Age: 54
Discharge: HOME | End: 2024-11-11
Attending: STUDENT IN AN ORGANIZED HEALTH CARE EDUCATION/TRAINING PROGRAM
Payer: MEDICARE

## 2024-11-11 ENCOUNTER — ANESTHESIA (OUTPATIENT)
Dept: SURGERY | Facility: HOSPITAL | Age: 54
End: 2024-11-11
Payer: MEDICARE

## 2024-11-11 ENCOUNTER — HOSPITAL ENCOUNTER (OUTPATIENT)
Facility: HOSPITAL | Age: 54
Discharge: HOME | End: 2024-11-11
Attending: STUDENT IN AN ORGANIZED HEALTH CARE EDUCATION/TRAINING PROGRAM | Admitting: STUDENT IN AN ORGANIZED HEALTH CARE EDUCATION/TRAINING PROGRAM
Payer: MEDICARE

## 2024-11-11 VITALS
OXYGEN SATURATION: 96 % | RESPIRATION RATE: 14 BRPM | SYSTOLIC BLOOD PRESSURE: 154 MMHG | DIASTOLIC BLOOD PRESSURE: 69 MMHG | HEART RATE: 74 BPM | TEMPERATURE: 97 F

## 2024-11-11 DIAGNOSIS — S91.302S OPEN WOUND OF LEFT FOOT, SEQUELA: ICD-10-CM

## 2024-11-11 DIAGNOSIS — M14.672 CHARCOT'S JOINT OF LEFT FOOT: ICD-10-CM

## 2024-11-11 LAB
GLUCOSE SERPL-MCNC: 122 MG/DL (ref 70–110)
HCO3 UR-SCNC: 29.3 MMOL/L (ref 24–28)
HCT VFR BLD CALC: 30 %PCV (ref 36–54)
PCO2 BLDA: 50.8 MMHG (ref 35–45)
PH SMN: 7.37 [PH] (ref 7.35–7.45)
PO2 BLDA: 17 MMHG (ref 40–60)
POC BE: 4 MMOL/L
POC IONIZED CALCIUM: 1.27 MMOL/L (ref 1.06–1.42)
POC SATURATED O2: 23 % (ref 95–100)
POC TCO2: 31 MMOL/L (ref 24–29)
POCT GLUCOSE: 132 MG/DL (ref 70–110)
POCT GLUCOSE: 225 MG/DL (ref 70–110)
POTASSIUM BLD-SCNC: 3.5 MMOL/L (ref 3.5–5.1)
SAMPLE: ABNORMAL
SODIUM BLD-SCNC: 139 MMOL/L (ref 136–145)

## 2024-11-11 PROCEDURE — 28010 INCISION OF TOE TENDON: CPT | Mod: 51,T1,T3, | Performed by: STUDENT IN AN ORGANIZED HEALTH CARE EDUCATION/TRAINING PROGRAM

## 2024-11-11 PROCEDURE — 63600175 PHARM REV CODE 636 W HCPCS: Performed by: STUDENT IN AN ORGANIZED HEALTH CARE EDUCATION/TRAINING PROGRAM

## 2024-11-11 PROCEDURE — 63600175 PHARM REV CODE 636 W HCPCS: Performed by: NURSE ANESTHETIST, CERTIFIED REGISTERED

## 2024-11-11 PROCEDURE — 28805 AMPUTATION THRU METATARSAL: CPT | Mod: LT,,, | Performed by: STUDENT IN AN ORGANIZED HEALTH CARE EDUCATION/TRAINING PROGRAM

## 2024-11-11 PROCEDURE — 37000009 HC ANESTHESIA EA ADD 15 MINS: Performed by: STUDENT IN AN ORGANIZED HEALTH CARE EDUCATION/TRAINING PROGRAM

## 2024-11-11 PROCEDURE — 25000003 PHARM REV CODE 250: Performed by: NURSE ANESTHETIST, CERTIFIED REGISTERED

## 2024-11-11 PROCEDURE — C1769 GUIDE WIRE: HCPCS | Performed by: STUDENT IN AN ORGANIZED HEALTH CARE EDUCATION/TRAINING PROGRAM

## 2024-11-11 PROCEDURE — 94761 N-INVAS EAR/PLS OXIMETRY MLT: CPT

## 2024-11-11 PROCEDURE — 28735 FUSION OF FOOT BONES: CPT | Mod: 51,LT,, | Performed by: STUDENT IN AN ORGANIZED HEALTH CARE EDUCATION/TRAINING PROGRAM

## 2024-11-11 PROCEDURE — 76000 FLUOROSCOPY <1 HR PHYS/QHP: CPT | Mod: TC

## 2024-11-11 PROCEDURE — D9220A PRA ANESTHESIA: Mod: ,,, | Performed by: NURSE ANESTHETIST, CERTIFIED REGISTERED

## 2024-11-11 PROCEDURE — 27201423 OPTIME MED/SURG SUP & DEVICES STERILE SUPPLY: Performed by: STUDENT IN AN ORGANIZED HEALTH CARE EDUCATION/TRAINING PROGRAM

## 2024-11-11 PROCEDURE — 36000708 HC OR TIME LEV III 1ST 15 MIN: Performed by: STUDENT IN AN ORGANIZED HEALTH CARE EDUCATION/TRAINING PROGRAM

## 2024-11-11 PROCEDURE — 63600175 PHARM REV CODE 636 W HCPCS: Mod: JZ,JG | Performed by: STUDENT IN AN ORGANIZED HEALTH CARE EDUCATION/TRAINING PROGRAM

## 2024-11-11 PROCEDURE — 64447 NJX AA&/STRD FEMORAL NRV IMG: CPT | Performed by: STUDENT IN AN ORGANIZED HEALTH CARE EDUCATION/TRAINING PROGRAM

## 2024-11-11 PROCEDURE — 99900035 HC TECH TIME PER 15 MIN (STAT)

## 2024-11-11 PROCEDURE — 71000039 HC RECOVERY, EACH ADD'L HOUR: Performed by: STUDENT IN AN ORGANIZED HEALTH CARE EDUCATION/TRAINING PROGRAM

## 2024-11-11 PROCEDURE — 37000008 HC ANESTHESIA 1ST 15 MINUTES: Performed by: STUDENT IN AN ORGANIZED HEALTH CARE EDUCATION/TRAINING PROGRAM

## 2024-11-11 PROCEDURE — 27685 REVISION OF LOWER LEG TENDON: CPT | Mod: 51,LT,, | Performed by: STUDENT IN AN ORGANIZED HEALTH CARE EDUCATION/TRAINING PROGRAM

## 2024-11-11 PROCEDURE — 64445 NJX AA&/STRD SCIATIC NRV IMG: CPT | Performed by: STUDENT IN AN ORGANIZED HEALTH CARE EDUCATION/TRAINING PROGRAM

## 2024-11-11 PROCEDURE — C1713 ANCHOR/SCREW BN/BN,TIS/BN: HCPCS | Performed by: STUDENT IN AN ORGANIZED HEALTH CARE EDUCATION/TRAINING PROGRAM

## 2024-11-11 PROCEDURE — 82962 GLUCOSE BLOOD TEST: CPT | Performed by: STUDENT IN AN ORGANIZED HEALTH CARE EDUCATION/TRAINING PROGRAM

## 2024-11-11 PROCEDURE — 71000015 HC POSTOP RECOV 1ST HR: Performed by: STUDENT IN AN ORGANIZED HEALTH CARE EDUCATION/TRAINING PROGRAM

## 2024-11-11 PROCEDURE — 36000709 HC OR TIME LEV III EA ADD 15 MIN: Performed by: STUDENT IN AN ORGANIZED HEALTH CARE EDUCATION/TRAINING PROGRAM

## 2024-11-11 PROCEDURE — 71000033 HC RECOVERY, INTIAL HOUR: Performed by: STUDENT IN AN ORGANIZED HEALTH CARE EDUCATION/TRAINING PROGRAM

## 2024-11-11 DEVICE — IMPLANTABLE DEVICE: Type: IMPLANTABLE DEVICE | Site: FOOT | Status: FUNCTIONAL

## 2024-11-11 RX ORDER — PROPOFOL 10 MG/ML
VIAL (ML) INTRAVENOUS
Status: DISCONTINUED | OUTPATIENT
Start: 2024-11-11 | End: 2024-11-11

## 2024-11-11 RX ORDER — CLINDAMYCIN PHOSPHATE 900 MG/50ML
INJECTION, SOLUTION INTRAVENOUS
Status: DISCONTINUED | OUTPATIENT
Start: 2024-11-11 | End: 2024-11-11

## 2024-11-11 RX ORDER — HALOPERIDOL 5 MG/ML
INJECTION INTRAMUSCULAR
Status: DISCONTINUED | OUTPATIENT
Start: 2024-11-11 | End: 2024-11-11

## 2024-11-11 RX ORDER — DEXAMETHASONE SODIUM PHOSPHATE 4 MG/ML
INJECTION, SOLUTION INTRA-ARTICULAR; INTRALESIONAL; INTRAMUSCULAR; INTRAVENOUS; SOFT TISSUE
Status: DISCONTINUED | OUTPATIENT
Start: 2024-11-11 | End: 2024-11-11

## 2024-11-11 RX ORDER — ONDANSETRON HYDROCHLORIDE 2 MG/ML
INJECTION, SOLUTION INTRAVENOUS
Status: DISCONTINUED | OUTPATIENT
Start: 2024-11-11 | End: 2024-11-11

## 2024-11-11 RX ORDER — LIDOCAINE HYDROCHLORIDE 20 MG/ML
INJECTION INTRAVENOUS
Status: DISCONTINUED | OUTPATIENT
Start: 2024-11-11 | End: 2024-11-11

## 2024-11-11 RX ORDER — PROMETHAZINE HYDROCHLORIDE 12.5 MG/1
12.5 TABLET ORAL EVERY 6 HOURS PRN
Qty: 28 TABLET | Refills: 0 | Status: SHIPPED | OUTPATIENT
Start: 2024-11-11

## 2024-11-11 RX ORDER — HYDROCODONE BITARTRATE AND ACETAMINOPHEN 10; 325 MG/1; MG/1
1 TABLET ORAL EVERY 6 HOURS PRN
Qty: 28 TABLET | Refills: 0 | Status: SHIPPED | OUTPATIENT
Start: 2024-11-11

## 2024-11-11 RX ORDER — OXYCODONE HYDROCHLORIDE 5 MG/1
5 TABLET ORAL
Status: DISCONTINUED | OUTPATIENT
Start: 2024-11-11 | End: 2024-11-11 | Stop reason: HOSPADM

## 2024-11-11 RX ORDER — ROCURONIUM BROMIDE 10 MG/ML
INJECTION, SOLUTION INTRAVENOUS
Status: DISCONTINUED | OUTPATIENT
Start: 2024-11-11 | End: 2024-11-11

## 2024-11-11 RX ORDER — MIDAZOLAM HYDROCHLORIDE 1 MG/ML
INJECTION, SOLUTION INTRAMUSCULAR; INTRAVENOUS
Status: DISCONTINUED | OUTPATIENT
Start: 2024-11-11 | End: 2024-11-11

## 2024-11-11 RX ORDER — ONDANSETRON HYDROCHLORIDE 2 MG/ML
4 INJECTION, SOLUTION INTRAVENOUS DAILY PRN
Status: DISCONTINUED | OUTPATIENT
Start: 2024-11-11 | End: 2024-11-11 | Stop reason: HOSPADM

## 2024-11-11 RX ORDER — GLUCAGON 1 MG
1 KIT INJECTION
Status: DISCONTINUED | OUTPATIENT
Start: 2024-11-11 | End: 2024-11-11 | Stop reason: HOSPADM

## 2024-11-11 RX ORDER — PHENYLEPHRINE HYDROCHLORIDE 10 MG/ML
INJECTION INTRAVENOUS
Status: DISCONTINUED | OUTPATIENT
Start: 2024-11-11 | End: 2024-11-11

## 2024-11-11 RX ORDER — SODIUM CHLORIDE 0.9 % (FLUSH) 0.9 %
10 SYRINGE (ML) INJECTION
Status: DISCONTINUED | OUTPATIENT
Start: 2024-11-11 | End: 2024-11-11 | Stop reason: HOSPADM

## 2024-11-11 RX ORDER — SCOLOPAMINE TRANSDERMAL SYSTEM 1 MG/1
PATCH, EXTENDED RELEASE TRANSDERMAL
Status: DISCONTINUED
Start: 2024-11-11 | End: 2024-11-11 | Stop reason: HOSPADM

## 2024-11-11 RX ORDER — FENTANYL CITRATE 50 UG/ML
INJECTION, SOLUTION INTRAMUSCULAR; INTRAVENOUS
Status: DISCONTINUED | OUTPATIENT
Start: 2024-11-11 | End: 2024-11-11

## 2024-11-11 RX ORDER — KETAMINE HCL IN 0.9 % NACL 50 MG/5 ML
SYRINGE (ML) INTRAVENOUS
Status: DISCONTINUED | OUTPATIENT
Start: 2024-11-11 | End: 2024-11-11

## 2024-11-11 RX ORDER — BUPIVACAINE HYDROCHLORIDE 2.5 MG/ML
INJECTION, SOLUTION EPIDURAL; INFILTRATION; INTRACAUDAL
Status: COMPLETED | OUTPATIENT
Start: 2024-11-11 | End: 2024-11-11

## 2024-11-11 RX ORDER — HYDROMORPHONE HYDROCHLORIDE 1 MG/ML
0.2 INJECTION, SOLUTION INTRAMUSCULAR; INTRAVENOUS; SUBCUTANEOUS EVERY 5 MIN PRN
Status: DISCONTINUED | OUTPATIENT
Start: 2024-11-11 | End: 2024-11-11 | Stop reason: HOSPADM

## 2024-11-11 RX ORDER — SODIUM CHLORIDE 0.9 % (FLUSH) 0.9 %
10 SYRINGE (ML) INJECTION DAILY PRN
Status: DISCONTINUED | OUTPATIENT
Start: 2024-11-11 | End: 2024-11-11 | Stop reason: HOSPADM

## 2024-11-11 RX ADMIN — MIDAZOLAM HYDROCHLORIDE 2 MG: 1 INJECTION, SOLUTION INTRAMUSCULAR; INTRAVENOUS at 01:11

## 2024-11-11 RX ADMIN — SUGAMMADEX 300 MG: 100 INJECTION, SOLUTION INTRAVENOUS at 04:11

## 2024-11-11 RX ADMIN — PHENYLEPHRINE HYDROCHLORIDE 100 MCG: 10 INJECTION INTRAVENOUS at 02:11

## 2024-11-11 RX ADMIN — LIDOCAINE HYDROCHLORIDE 100 MG: 20 INJECTION INTRAVENOUS at 01:11

## 2024-11-11 RX ADMIN — FENTANYL CITRATE 100 MCG: 50 INJECTION, SOLUTION INTRAMUSCULAR; INTRAVENOUS at 01:11

## 2024-11-11 RX ADMIN — ROCURONIUM BROMIDE 20 MG: 10 INJECTION INTRAVENOUS at 03:11

## 2024-11-11 RX ADMIN — PROPOFOL 120 MG: 10 INJECTION, EMULSION INTRAVENOUS at 01:11

## 2024-11-11 RX ADMIN — Medication 20 MG: at 03:11

## 2024-11-11 RX ADMIN — ONDANSETRON 4 MG: 2 INJECTION INTRAMUSCULAR; INTRAVENOUS at 06:11

## 2024-11-11 RX ADMIN — CLINDAMYCIN IN 5 PERCENT DEXTROSE 900 MG: 18 INJECTION, SOLUTION INTRAVENOUS at 01:11

## 2024-11-11 RX ADMIN — BUPIVACAINE HYDROCHLORIDE 15 ML: 2.5 INJECTION, SOLUTION EPIDURAL; INFILTRATION; INTRACAUDAL at 01:11

## 2024-11-11 RX ADMIN — DEXAMETHASONE SODIUM PHOSPHATE 4 MG: 4 INJECTION INTRA-ARTICULAR; INTRALESIONAL; INTRAMUSCULAR; INTRAVENOUS; SOFT TISSUE at 01:11

## 2024-11-11 RX ADMIN — BUPIVACAINE HYDROCHLORIDE 25 ML: 2.5 INJECTION, SOLUTION EPIDURAL; INFILTRATION; INTRACAUDAL; PERINEURAL at 01:11

## 2024-11-11 RX ADMIN — SODIUM CHLORIDE: 0.9 INJECTION, SOLUTION INTRAVENOUS at 01:11

## 2024-11-11 RX ADMIN — ROCURONIUM BROMIDE 50 MG: 10 INJECTION INTRAVENOUS at 01:11

## 2024-11-11 RX ADMIN — ONDANSETRON 4 MG: 2 INJECTION INTRAMUSCULAR; INTRAVENOUS at 01:11

## 2024-11-11 RX ADMIN — ROCURONIUM BROMIDE 30 MG: 10 INJECTION INTRAVENOUS at 02:11

## 2024-11-11 RX ADMIN — HALOPERIDOL LACTATE 1 MG: 5 INJECTION, SOLUTION INTRAMUSCULAR at 01:11

## 2024-11-11 RX ADMIN — Medication 30 MG: at 01:11

## 2024-11-11 NOTE — PLAN OF CARE
Pt in preop bay 41, VSS and IV inserted. Pt denies any open wounds on body or the use of any weight loss injections. Pt needs anesthesia consents, procedural consents, an admit order and an H&P, otherwise ready to roll.

## 2024-11-11 NOTE — DISCHARGE INSTRUCTIONS
-Weighbearing as tolerated in total contact cast with use of patient ambulatory assist device  -Pain medication as prescribed, wean off as tolerated.   -Keep dressing clean, dry, intact until followup appointment.   -Use cast cover (can purchase from a pharmacy) to cover surgical dressings while showering. If dressing gets soaked then remove and replace with clean bandages.   -In case of pain: elevate lower limb, loosen outer layers of dressing/bandage.  -In case of bleeding: elevate lower limb, tighten outer layers of dressing or reinforce with another elastic bandage.    -Followup in clinic next week.

## 2024-11-11 NOTE — OP NOTE
[facility] ([facility])  Operative Note      Procedure Date:   11/11/2024     Surgeons:   Surgeons and Role:     * Marco Allison DPM - Primary        RoutJose Martin DPM - First assist     * Bassam Kwan - Second assist     Pre-Operative Diagnosis:   Charcot's joint of left foot [M14.672]  Gastrocnemius equinus, left [M62.462]    Indications:  Charcot deformity left foot despite conservative care with walking boots and casting; surgical stabilization indicated    Consent:  Indications, potential risks, potential benefits, available alternatives reviewed. Patient was given the opportunity to ask questions, all questions answered. Written consent obtained.     Procedure:    Procedure(s) (LRB):  RECONSTRUCTION, CHARCOT FOOT, WITH FUSION (Left)  LENGTHENING, TENDON, ACHILLES (Left)  REVISION, AMPUTATION, TRANSMETATARSAL (Left)   Percutaneous flexor tenotomy of toe single tendon (left) x2  Total contact cast (Left) x1    Post-Operative Diagnosis:   Same as preoperative diagnosis    Surgery:     Anesthesia:   General/Regional    Procedure in Detail:   The patient was transported to the operating room on a stretcher and was transferred to the OR table in supine position. Anesthesia was induced.  A tourniquet was applied to the left thigh. The left lower limb was prepped and draped in a sterile manner. Tourniquet(s) inflated to 250 mmHg.     Using a 15 blade a Sergio style quadruple hemisection tendon lengthening was performed, adequate lengthening of the Achilles was achieved clinically.     Venous tourniquet occurred, tourniquet deflated and left deflated for remainder of case.     Incisions were made along the medial 1st ray and lateral 5th ray. Dissection carried down to bone and the soft tissue envelope was elevated off the entire remainder of the 1st ray as well as the midfoot. The 1st metatarsal and 1st cuneiform were released and removed and the remainder of the abnormal portion of the  navicular was resected with a saw and removed; this completed the 1st ray (transmetatarsal) amputation revision to the level of the midfoot. Using a sagittal saw the plantar eminence of the cuboid was resected and excised.     An osteotomy was performed through the midfoot from 2nd column to 5th column, the osteotomy included a plantarflexory wedge. The forefoot was translated medially on the rearfoot under fluoroscopic guidance. Incisions were made dorsally at the 2nd and 4th MTPJs with dissection carried down into the joints, the joints were released. Guidewires were driven down the 2nd and 4th metatarsals into the talus and calcaneus respectively. Another guidewire was driven from the proximal metaphysis of the 3rd metatarsal dorsally into the calcaneus and a final guidewire was driven from the 5th metatarsal into the talus. Drilled over the guidewires, screws driven over guidewires and then wires removed.     Using a 15 blade from lateral approach percutaneous tenotomies of the long flexor tendons of the 2nd and 4th toes were performed.     At the plantar midfoot a longitudinal elliptical incision was made and then closed down to remove redundant skin.     Sites irrigated with saline. Primarily closed with 3-0 vicryl, 3-0 monocryl, 3-0 nylon, 4-0 nylon suture. Dressed with xeroform, 4x4 gauze, cast padding, ACE wrap. Below knee total contact cast applied.      Complications:  None    Estimated Blood Loss (EBL):   100 mL           Vendors:  Synthes    Implants:   Implant Name Type Inv. Item Serial No.  Lot No. LRB No. Used Action   GUIDEWIRE ORTHOPEDIC 220X1.6MM - [id number]  GUIDEWIRE ORTHOPEDIC 220X1.6MM  [employer]  Left 4 Implanted and Explanted   4.5 x 110 FT Synthes [id number] Screw   [employer]  Left 1 Implanted   5.5 x 110 FT SYNTHES [id number] Screw   [employer]  Left 1 Implanted   [id number] 4.5 x 110 LT Screw   [employer]  Left 1 Implanted   4.5 x 70 ST Synthes screw Screw   [employer]ES  Left 1  Implanted              Condition:   Good    Disposition:   PACU - hemodynamically stable.    Attestation:   I was present and scrubbed for the entire procedure.    Discharge:     OUTCOME:   Patient tolerated treatment/procedure well without complication and is now ready for discharge.    DISPOSITION:   Patient tolerated the procedure well.     DISCHARGE INSTRUCTIONS:

## 2024-11-11 NOTE — ANESTHESIA PROCEDURE NOTES
L Popliteal SS    Patient location during procedure: pre-op   Block not for primary anesthetic.  Reason for block: at surgeon's request and post-op pain management   Post-op Pain Location: left foot pain   Start time: 11/11/2024 1:21 PM  Timeout: 11/11/2024 1:20 PM   End time: 11/11/2024 1:34 PM    Staffing  Authorizing Provider: Tad Reid MD  Performing Provider: Tad Reid MD    Staffing  Performed by: Tad Reid MD  Authorized by: Tad Reid MD    Preanesthetic Checklist  Completed: patient identified, IV checked, site marked, risks and benefits discussed, surgical consent, monitors and equipment checked, pre-op evaluation and timeout performed  Peripheral Block  Patient position: supine  Prep: ChloraPrep  Patient monitoring: heart rate, cardiac monitor, frequent blood pressure checks, continuous pulse ox and continuous capnometry  Block type: popliteal  Laterality: left  Injection technique: single shot  Needle  Needle type: Stimuplex   Needle gauge: 20 G  Needle length: 4 in  Needle localization: ultrasound guidance   -ultrasound image captured on disc.  Assessment  Injection assessment: negative parasthesia, negative aspiration and local visualized surrounding nerve  Paresthesia pain: none  Heart rate change: no  Slow fractionated injection: yes  Pain Tolerance: comfortable throughout block and no complaints  Medications:    Medications: bupivacaine (pf) (MARCAINE) injection 0.25% - Perineural   25 mL - 11/11/2024 1:22:00 PM

## 2024-11-11 NOTE — ANESTHESIA PROCEDURE NOTES
Intubation    Date/Time: 11/11/2024 1:42 PM    Performed by: Linda Junior CRNA  Authorized by: Tad Reid MD    Intubation:     Induction:  Intravenous    Intubated:  Postinduction    Mask Ventilation:  Easy mask    Attempts:  1    Attempted By:  CRNA    Method of Intubation:  Video laryngoscopy    Blade:  Conklin 3    Laryngeal View Grade: Grade I - full view of cords      Difficult Airway Encountered?: No      Complications:  None    Airway Device:  Oral endotracheal tube    Airway Device Size:  7.5    Style/Cuff Inflation:  Cuffed (inflated to minimal occlusive pressure)    Tube secured:  21    Secured at:  The teeth    Placement Verified By:  Capnometry    Complicating Factors:  Small mouth and obesity    Findings Post-Intubation:  BS equal bilateral and atraumatic/condition of teeth unchanged

## 2024-11-11 NOTE — TRANSFER OF CARE
Anesthesia Transfer of Care Note    Patient: Georgina Arias    Procedure(s) Performed: Procedure(s) (LRB):  RECONSTRUCTION, CHARCOT FOOT, WITH FUSION (Left)  LENGTHENING, TENDON, ACHILLES (Left)  REVISION, AMPUTATION, TRANSMETATARSAL (Left)    Patient location: PACU    Anesthesia Type: general    Transport from OR: Transported from OR on 6-10 L/min O2 by face mask with adequate spontaneous ventilation    Post pain: adequate analgesia    Post assessment: no apparent anesthetic complications and tolerated procedure well    Post vital signs: stable    Level of consciousness: responds to stimulation and sedated    Nausea/Vomiting: no nausea/vomiting    Complications: none    Transfer of care protocol was followed      Last vitals: Visit Vitals  BP (!) 152/72 (BP Location: Left arm, Patient Position: Lying)   Pulse 75   Temp 36.3 °C (97.3 °F) (Temporal)   Resp 15   LMP 09/11/2020 (Approximate)   SpO2 100%   Breastfeeding No

## 2024-11-11 NOTE — ANESTHESIA PROCEDURE NOTES
L Adductor canal SS    Patient location during procedure: pre-op   Block not for primary anesthetic.  Reason for block: at surgeon's request and post-op pain management   Post-op Pain Location: left foot pain   Start time: 11/11/2024 1:21 PM  Timeout: 11/11/2024 1:20 PM   End time: 11/11/2024 1:30 PM    Staffing  Authorizing Provider: Tad Reid MD  Performing Provider: Tad Reid MD    Staffing  Performed by: Tad Reid MD  Authorized by: Tad Reid MD    Preanesthetic Checklist  Completed: patient identified, IV checked, site marked, risks and benefits discussed, surgical consent, monitors and equipment checked, pre-op evaluation and timeout performed  Peripheral Block  Patient position: supine  Prep: ChloraPrep  Patient monitoring: heart rate, cardiac monitor, continuous capnometry, continuous pulse ox and frequent blood pressure checks  Block type: adductor canal  Laterality: left  Injection technique: single shot  Needle  Needle type: Stimuplex   Needle gauge: 20 G  Needle length: 4 in  Needle localization: ultrasound guidance   -ultrasound image captured on disc.  Assessment  Injection assessment: negative parasthesia, negative aspiration and local visualized surrounding nerve  Paresthesia pain: none  Heart rate change: no  Slow fractionated injection: yes  Pain Tolerance: comfortable throughout block and no complaints  Medications:    Medications: bupivacaine (pf) (MARCAINE) injection 0.25% - Perineural   15 mL - 11/11/2024 1:25:00 PM

## 2024-11-11 NOTE — ANESTHESIA POSTPROCEDURE EVALUATION
Anesthesia Post Evaluation    Patient: Georgina Arias    Procedure(s) Performed: Procedure(s) (LRB):  RECONSTRUCTION, CHARCOT FOOT, WITH FUSION (Left)  LENGTHENING, TENDON, ACHILLES (Left)  REVISION, AMPUTATION, TRANSMETATARSAL (Left)    Final Anesthesia Type: general      Patient location during evaluation: PACU  Patient participation: Yes- Able to Participate  Level of consciousness: awake and alert, oriented and awake  Post-procedure vital signs: reviewed and stable  Pain management: adequate  Airway patency: patent  LAUREANO mitigation strategies: Multimodal analgesia  PONV status at discharge: No PONV  Anesthetic complications: no      Cardiovascular status: blood pressure returned to baseline and hemodynamically stable  Respiratory status: unassisted and spontaneous ventilation  Hydration status: euvolemic  Follow-up not needed.              Vitals Value Taken Time   /74 11/11/24 1716   Temp  11/11/24 1726   Pulse 74 11/11/24 1725   Resp 18 11/11/24 1725   SpO2 93 % 11/11/24 1725   Vitals shown include unfiled device data.      No case tracking events are documented in the log.      Pain/Jasmin Score: Jasmin Score: 6 (11/11/2024  5:00 PM)

## 2024-11-11 NOTE — DISCHARGE SUMMARY
[facility]  Discharge Note  Short Stay    Procedure(s) (LRB):  RECONSTRUCTION, CHARCOT FOOT, WITH FUSION (Left)  LENGTHENING, TENDON, ACHILLES (Left)  REVISION, AMPUTATION, TRANSMETATARSAL (Left)      OUTCOME: Patient tolerated treatment/procedure well without complication and is now ready for discharge.    DISPOSITION: Home or Self Care    FINAL DIAGNOSIS:  <principal problem not specified>    FOLLOWUP: In clinic    DISCHARGE INSTRUCTIONS:    Discharge Procedure Orders   Diet general      DISCHARGE INSTRUCTIONS:      -Weighbearing as tolerated in total contact cast with use of patient ambulatory assist device  -Pain medication as prescribed, wean off as tolerated.   -Keep dressing clean, dry, intact until followup appointment.   -Use cast cover (can purchase from a pharmacy) to cover surgical dressings while showering. If dressing gets soaked then remove and replace with clean bandages.   -In case of pain: elevate lower limb, loosen outer layers of dressing/bandage.  -In case of bleeding: elevate lower limb, tighten outer layers of dressing or reinforce with another elastic bandage.    -Followup in clinic next week.      TIME SPENT ON DISCHARGE: 20 minutes

## 2024-11-11 NOTE — ANESTHESIA PREPROCEDURE EVALUATION
[facility]  Anesthesia Pre-Operative Evaluation         Patient Name: Georgina Arias  YOB: 1970  MRN: [id number]    SUBJECTIVE:     Pre-operative evaluation for Procedure(s) (LRB):  RECONSTRUCTION, CHARCOT FOOT, WITH FUSION (Left)  RESECTION, MUSCLE, GASTROCNEMIUS (Left)  *APPLICATION, EXTERNAL FIXATION DEVICE* INACTIVE (Left)     11/11/2024    Georgina Arias is a 54 y.o. female w/ a significant PMHx of ESRD on HD, PVD, anemia, T2DM, HTN, chronic dHF.    Last dialysis session was this morning. Patient states they took 1.2 L often and it was a routine dialysis.     Patient now presents for the above procedure(s).    TTE 2023:     Left Ventricle: The left ventricle is normal in size. Normal wall thickness. Normal wall motion. There is low normal systolic function with a visually estimated ejection fraction of 50 - 55%. Ejection fraction by visual approximation is 55%. There is normal diastolic function.    Right Ventricle: Right ventricle was not well visualized due to poor acoustic window. Normal right ventricular cavity size. Wall thickness is normal. Right ventricle wall motion  is normal. Systolic function is normal.    Aortic Valve: There is moderate aortic valve sclerosis. There is annular calcification present.    Mitral Valve: There is moderate mitral annular calcification present.    IVC/SVC: Normal venous pressure at 3 mmHg.    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, with other ophthalmic complication    Ureteral stone with hydronephrosis    Acute kidney injury superimposed on CKD    Nephrolithiasis    Pre-operative examination    Acute respiratory failure with hypoxia and hypercapnia    Elevated troponin    Pneumonia due to COVID-19 virus    Hypertensive  emergency    Lactic acidosis    Hyperkalemia    Metabolic acidosis    Chronic diastolic congestive heart failure    Class 2 severe obesity with serious comorbidity and body mass index (BMI) of 38.0 to 38.9 in adult    Open wound of left foot    ESRD (end stage renal disease)    Severe obesity (BMI >= 40)    Pre-op exam    S/P femoral-femoral bypass surgery    Wound dehiscence    Wound infection after surgery       Review of patient's allergies indicates:   Allergen Reactions    Naproxen Anaphylaxis and Swelling     Hives (skin)^swelling  Hives (skin)^swelling  Hives (skin)^swelling    Sulfamethoxazole-trimethoprim Other (See Comments)     Caused JEROME    Hydrocodone Nausea And Vomiting and Rash    Adhesive tape-silicones     Aspartame Hives    Penicillins Hives     Blisters (skin)^    Sulfamethoxazole Other (See Comments)    Trimethoprim Other (See Comments)    Acetaminophen Rash    Adhesive Rash    Hydrocodone-acetaminophen Nausea And Vomiting     Rash (skin)^, Vomiting^         Current Inpatient Medications:      No current facility-administered medications on file prior to encounter.     Current Outpatient Medications on File Prior to Encounter   Medication Sig Dispense Refill    acetaminophen (TYLENOL) 500 MG tablet Take 2 tablets (1,000 mg total) by mouth every 8 (eight) hours as needed. 60 tablet 0    atorvastatin (LIPITOR) 80 MG tablet Take 1 tablet by mouth every evening.      carvediloL (COREG) 25 MG tablet Take 1 tablet (25 mg total) by mouth 2 (two) times daily with meals. 180 tablet 3    gabapentin (NEURONTIN) 300 MG capsule Take 300 mg by mouth 3 (three) times daily.      insulin (LANTUS SOLOSTAR U-100 INSULIN) glargine 100 units/mL (3mL) SubQ pen Inject 40 Units into the skin every evening. (Patient taking differently: Inject 20 Units into the skin every evening.)  0    levocetirizine (XYZAL) 5 MG tablet Take 5 mg by mouth.      linaGLIPtin (TRADJENTA) 5 mg Tab tablet Take 1 tablet by mouth once daily.    "   sevelamer carbonate (RENVELA) 800 mg Tab Take by mouth.      aspirin (ECOTRIN) 81 MG EC tablet Take 1 tablet (81 mg total) by mouth once daily.      blood sugar diagnostic (TRUE METRIX GLUCOSE TEST STRIP) Strp 3 (three) times daily.      blood-glucose meter Misc 1 each by Other route.      COMFORT EZ PEN NEEDLES 32 gauge x 5/32" Ndle SMARTSIG:injection Daily      fluticasone propionate (FLONASE) 50 mcg/actuation nasal spray Fluticasone Propionate 50 MCG/ACT Nasal Suspension QTY: 1 bottle Days: 30 Refills: 5  Written: 09/30/20 Patient Instructions: inhale 2 sprays in each nostril once daily      miconazole NITRATE 2 % (MICOTIN) 2 % top powder Apply topically as needed for Itching. 85 g 2    miscellaneous medical supply (C-TUB) Misc Hearing amplification (BICROS preferred)      MOUNJARO 7.5 mg/0.5 mL PnIj       ondansetron (ZOFRAN-ODT) 4 MG TbDL Take 4 mg by mouth every 6 (six) hours.      oxyCODONE (ROXICODONE) 5 MG immediate release tablet Take 5 mg by mouth every 6 (six) hours as needed.      tenapanor (XPHOZAH) 30 mg Tab Take 30 mg by mouth.      tirzepatide (MOUNJARO) 10 mg/0.5 mL PnIj Inject 10 mg into the skin.      TRUEPLUS LANCETS 30 gauge [facility]          Past Surgical History:   Procedure Laterality Date    ANGIOGRAM, LOWER ARTERIAL, UNILATERAL Left 9/13/2023    Procedure: ANGIOGRAM, LOWER ARTERIAL, UNILATERAL;  Surgeon: Maxx Maya MD;  Location: [facility] OR [address] [place] [address];  Service: Vascular;  Laterality: Left;    ANGIOGRAPHY OF LOWER EXTREMITY Left 9/14/2023    Procedure: ANGIOGRAM, LOWER EXTREMITY with balloon angioplasty ultraverse 5mm x 60mm;  Surgeon: Maxx Maya MD;  Location: [facility] OR [address] [place] [address];  Service: Vascular;  Laterality: Left;  ultraverse 5mm x 60mm  fluoro: 12.41  contrast: 44ml  mGy: 65.57  Gycm2: 23.50    AORTOGRAPHY WITH EXTREMITY RUNOFF Left 9/13/2023    Procedure: AORTOGRAM, WITH EXTREMITY RUNOFF;  Surgeon: Maxx Maya MD;  Location: [facility] OR [address] [place] [address];  Service: Vascular;  Laterality: " Left;  8.1 min  663.63 mGy  176.39 Gy.cm  178ml Dye     AV FISTULA PLACEMENT Right     CHOLECYSTECTOMY      CREATION, BYPASS, ARTERIAL, AXILLARY TO BILATERAL FEMORAL Left 9/14/2023    Procedure: CREATION, BYPASS, ARTERIAL, AXILLARY TO BILATERAL FEMORAL;  Surgeon: Maxx Maya MD;  Location: [facility] OR 2ND FLR;  Service: Vascular;  Laterality: Left;  Left Axillary to Bilateral Femoral Bypass, Left Femoral to Above Knee Popliteal Bypass; SLIDER BED    CYSTOSCOPY W/ URETERAL STENT PLACEMENT Right 08/28/2018    Procedure: CYSTOSCOPY, WITH URETERAL STENT INSERTION (ADD ON );  Surgeon: Bubba Perez MD;  Location: [facility] [place] OR;  Service: Urology;  Laterality: Right;  (ADD ON )    DEBRIDEMENT OF FOOT Left 08/03/2023    Procedure: DEBRIDEMENT, FOOT;  Surgeon: Aleida Flannery DPM;  Location: [facility] OR;  Service: Podiatry;  Laterality: Left;    Excisional debridement of ulcer distal great toe left foot w/ partial resection distal phalanx Left 08/03/2023    FOOT AMPUTATION THROUGH METATARSAL Left 9/14/2023    Procedure: AMPUTATION, FOOT, TRANSMETATARSAL partial 1st ray amputation;  Surgeon: Jesus Valles DPM;  Location: [facility] OR 2ND FLR;  Service: Podiatry;  Laterality: Left;  partial ray    I&D L 1st ray w/ amputation L hallux IPJ Left 08/14/2023    INCISION AND DRAINAGE FOOT Left 9/14/2023    Procedure: INCISION AND DRAINAGE, FOOT;  Surgeon: Jesus Valles DPM;  [facility];  Service: Podiatry;  Laterality: Left;    Injection L PT tendon sheath Left 08/14/2023    Nail avulsion left hallux Left 08/03/2023    PERIPHERAL ARTERIAL STENT GRAFT Right     REMOVAL OF NAIL OF DIGIT Left 08/03/2023    Procedure: REMOVAL, NAIL, DIGIT great toe;  Surgeon: Aleida Flannery DPM;  Location: [facility] OR;  Service: Podiatry;  Laterality: Left;    STENT, SUPERFICIAL FEMORAL ARTERY Left 9/14/2023    Procedure: STENT, SUPERFICIAL FEMORAL ARTERY;  Surgeon: Maxx Maya MD;  Location: [facility] OR 2ND FLR;  Service: Vascular;   Laterality: Left;  5 x 100 mm    TOE AMPUTATION Left 08/14/2023    Procedure: AMPUTATION, TOE hallux IPJ;  Surgeon: Aleida Flannery DPM;  Location: [facility] OR;  Service: Podiatry;  Laterality: Left;    TOE AMPUTATION Left 08/25/2023    great toe    TOE AMPUTATION Left 8/25/2023    Procedure: AMPUTATION, TOE hallux, possible 1st met.head;  Surgeon: Aleida Flannery DPM;  Location: [facility] OR;  Service: Podiatry;  Laterality: Left;    URETEROSCOPIC REMOVAL OF URETERIC CALCULUS Right 09/11/2018    Procedure: EXTRACTION-STONE-URETEROSCOPY;  Surgeon: Bubba Perez MD;  Location: [facility] OR;  Service: Urology;  Laterality: Right;       OBJECTIVE:     Vital Signs Range (Last 24H):  Temp:  [36.2 °C (97.1 °F)]   Pulse:  [66]   Resp:  [18]   BP: (150)/(66)   SpO2:  [99 %]       Significant Labs:  Lab Results   Component Value Date    WBC 7.78 12/11/2023    HGB 10.7 (L) 10/28/2024    HCT 29.5 (L) 06/25/2024     (L) 12/11/2023    CHOL 160 07/30/2014    TRIG 426 (H) 07/30/2014    HDL 43 07/30/2014    ALT 29 12/11/2023    AST 17 12/11/2023     12/11/2023    K 4.9 12/11/2023    CL 97 12/11/2023    CREATININE 7.3 (H) 12/11/2023    BUN 98 (H) 05/07/2024    CO2 25 12/11/2023    INR 1.0 09/14/2023    HGBA1C 6.2 (H) 10/07/2024       Diagnostic Studies: No relevant studies.    EKG:   Results for orders placed or performed during the hospital encounter of 10/23/23   EKG 12-lead    Collection Time: 10/26/23  2:30 PM    Narrative    Test Reason : I73.9,    Vent. Rate : 084 BPM     Atrial Rate : 084 BPM     P-R Int : 134 ms          QRS Dur : 076 ms      QT Int : 398 ms       P-R-T Axes : 045 037 065 degrees     QTc Int : 470 ms    Normal sinus rhythm  Low voltage QRS  Borderline Abnormal ECG  When compared with ECG of 15-SEP-2023 00:21,  Nonspecific T wave abnormality no longer evident in Lateral leads  Confirmed by James JENSEN MD ([id number]) on 10/26/2023 2:48:27 PM    Referred By: BEBETO VENEGAS           Confirmed By:James JENSEN MD        ASSESSMENT/PLAN:           Pre-op Assessment    I have reviewed the Patient Summary Reports.     I have reviewed the Nursing Notes. I have reviewed the NPO Status.   I have reviewed the Medications.     Review of Systems  Anesthesia Hx:  No problems with previous Anesthesia               Denies Personal Hx of Anesthesia complications.                    Hematology/Oncology:    Oncology Normal    -- Anemia:                                  EENT/Dental:  EENT/Dental Normal           Cardiovascular:     Hypertension       CHF   PVD                                Renal/:  Chronic Renal Disease, ESRD                Hepatic/GI:   PUD,                  Neurological:  Neurology Normal                                      Endocrine:  Diabetes         Obesity / BMI > 30  Dermatological:  Skin Normal    Psych:  Psychiatric Normal                    Physical Exam  General: Cooperative, Alert and Oriented    Airway:  Mallampati: III / II  Mouth Opening: Normal  TM Distance: Normal  Tongue: Normal  Neck ROM: Normal ROM        Anesthesia Plan  Type of Anesthesia, risks & benefits discussed:    Anesthesia Type: Gen ETT, Regional  Intra-op Monitoring Plan: Standard ASA Monitors  Post Op Pain Control Plan: multimodal analgesia, IV/PO Opioids PRN and peripheral nerve block  Induction:  IV  Airway Plan: Video  Informed Consent: Informed consent signed with the Patient and all parties understand the risks and agree with anesthesia plan.  All questions answered.   ASA Score: 3  Day of Surgery Review of History & Physical: H&P Update referred to the surgeon/provider.    Ready For Surgery From Anesthesia Perspective.     .

## 2024-11-11 NOTE — BRIEF OP NOTE
[facility]  Brief Operative Note    Surgery Date: 11/11/2024     Surgeons and Role:     * Marco Allison DPM - Primary     * Jose Martin Edwards DPM - First assist     * Bassam Kwan - Second assist   Assisting Surgeon: None    Pre-op Diagnosis:  Charcot's joint of left foot [M14.672]  Gastrocnemius equinus, left [M62.462]    Post-op Diagnosis:  Post-Op Diagnosis Codes:     * Charcot's joint of left foot [M14.672]     * Gastrocnemius equinus, left [M62.462]    Procedure(s) (LRB):  RECONSTRUCTION, CHARCOT FOOT, WITH FUSION (Left)  LENGTHENING, TENDON, ACHILLES (Left)  REVISION, AMPUTATION, TRANSMETATARSAL (Left)    Anesthesia: General/Regional    Operative Findings: Degenerative changes on midfoot secondary to charcot. Internal fixation placed beam 2nd metatarsal to talus, 3rd and 4th metatarsal to calcaneus. Sergio achilles tendon lengthening. Resection of residual 1st ray. Rectus alignment observed post op.    Estimated Blood Loss: 100 mL         Specimens:   Specimen (24h ago, onward)      None              Discharge Note    OUTCOME: Patient tolerated treatment/procedure well without complication and is now ready for discharge.    DISPOSITION: Home or Self Care    FINAL DIAGNOSIS:  <principal problem not specified>    FOLLOWUP: In clinic    DISCHARGE INSTRUCTIONS:     -Weighbearing as tolerated in total contact cast with use of patient ambulatory assist device  -Pain medication as prescribed, wean off as tolerated.   -Keep dressing clean, dry, intact until followup appointment.   -Use cast cover (can purchase from a pharmacy) to cover surgical dressings while showering. If dressing gets soaked then remove and replace with clean bandages.   -In case of pain: elevate lower limb, loosen outer layers of dressing/bandage.  -In case of bleeding: elevate lower limb, tighten outer layers of dressing or reinforce with another elastic bandage.    -Followup in clinic next week.

## 2024-11-12 NOTE — PLAN OF CARE
Discharge instructions reviewed with pt and family. All v/u of instructions. VSS. IV removed with catheter tip intact. No concerns voiced; all questions answered. Prescriptions picked up from pharmacy by family member. Escorted patient via wheelchair to family member awaiting in private vehicle.

## 2024-11-15 ENCOUNTER — OFFICE VISIT (OUTPATIENT)
Dept: PODIATRY | Facility: CLINIC | Age: 54
End: 2024-11-15
Payer: MEDICARE

## 2024-11-15 ENCOUNTER — TELEPHONE (OUTPATIENT)
Dept: PODIATRY | Facility: CLINIC | Age: 54
End: 2024-11-15
Payer: MEDICARE

## 2024-11-15 VITALS
WEIGHT: 238.13 LBS | BODY MASS INDEX: 37.37 KG/M2 | HEART RATE: 93 BPM | HEIGHT: 67 IN | SYSTOLIC BLOOD PRESSURE: 141 MMHG | DIASTOLIC BLOOD PRESSURE: 64 MMHG

## 2024-11-15 DIAGNOSIS — Z98.890 POST-OPERATIVE STATE: Primary | ICD-10-CM

## 2024-11-15 PROCEDURE — 99999 PR PBB SHADOW E&M-EST. PATIENT-LVL III: CPT | Mod: PBBFAC,,, | Performed by: STUDENT IN AN ORGANIZED HEALTH CARE EDUCATION/TRAINING PROGRAM

## 2024-11-15 NOTE — PROGRESS NOTES
Subjective:     Patient    Georgina Arias is a 54 y.o. female.    Problem    08/01/24: Previously followed by Dr Prado for wound care, left foot Charcot. Charcot has progressed over the past couple of months despite total contact casting. Has also previously tried change in shoes, inserts, surgical boots, rest without improvement. Minimal pain in left foot due to neuropathy, had prior partial 1st ray amputation on left. Cannot be off her feet because she does not have help at home or has minimal help. Admits to numbness, tingling.     08/23/24: Returns for cast change, no issues with last cast, did prefer walking bottom rather than cast shoe.     09/04/24: Returns for cast change. No new issues. Is OK with surgery but would like to wait until after birthday in November.     09/25/24: Returns for foot check, has been in tall surgical boot. Occasional lateral foot pain, occasional plantar foot pain/throbbing. No new concerns. OK with proceeding with surgery in Nov.     10/15/24: Returns for foot check, in tall boot on left. Has been in football dressing on right. Seeing PCP tomorrow.     11/15/24: Returns for urgent postop check; severe left foot and leg pain in calf, not improving and possibly worsening. The pain is migrating and may be nerve pain per patient.     Primary Care Provider    Primary Care Provider: Alonso Ling MD   Last Seen: Patient does not have a PCP or has not yet seen their PCP     History    History obtained from patient and review of medical records.     Past Medical History:   Diagnosis Date    Hyperlipidemia     Hypertension     Peptic ulcer disease     Peripheral artery disease     PONV (postoperative nausea and vomiting)     Stage IV CKD     Type II diabetes mellitus        Past Surgical History:   Procedure Laterality Date    ANGIOGRAM, LOWER ARTERIAL, UNILATERAL Left 09/13/2023    Procedure: ANGIOGRAM, LOWER ARTERIAL, UNILATERAL;  Surgeon: Maxx Maya MD;  [facility];  Service: Vascular;  Laterality: Left;    ANGIOGRAPHY OF LOWER EXTREMITY Left 09/14/2023    Procedure: ANGIOGRAM, LOWER EXTREMITY with balloon angioplasty ultraverse 5mm x 60mm;  Surgeon: Maxx Maya MD;  Location: [address];  Service: Vascular;  Laterality: Left;  ultraverse 5mm x 60mm  fluoro: 12.41  contrast: 44ml  mGy: 65.57  Gycm2: 23.50    AORTOGRAPHY WITH EXTREMITY RUNOFF Left 09/13/2023    Procedure: AORTOGRAM, WITH EXTREMITY RUNOFF;  Surgeon: Maxx Maya MD;  Location: [address];  Service: Vascular;  Laterality: Left;  8.1 min  663.63 mGy  176.39 Gy.cm  178ml Dye     AV FISTULA PLACEMENT Right     CHOLECYSTECTOMY      COLONOSCOPY  11/06/2013    normal    COLONOSCOPY  05/2023    cancelled due to inadequate prep    COLONOSCOPY  06/2023    results unknown    CREATION, BYPASS, ARTERIAL, AXILLARY TO BILATERAL FEMORAL Left 09/14/2023    Procedure: CREATION, BYPASS, ARTERIAL, AXILLARY TO BILATERAL FEMORAL;  Surgeon: Maxx Maya MD;  Location: [address];  Service: Vascular;  Laterality: Left;  Left Axillary to Bilateral Femoral Bypass, Left Femoral to Above Knee Popliteal Bypass; SLIDER BED    CYSTOSCOPY W/ URETERAL STENT PLACEMENT Right 08/28/2018    Procedure: CYSTOSCOPY, WITH URETERAL STENT INSERTION (ADD ON );  Surgeon: Bubba Perez MD;  Location: [facility];  Service: Urology;  Laterality: Right;  (ADD ON )    DEBRIDEMENT OF FOOT Left 08/03/2023    Procedure: DEBRIDEMENT, FOOT;  Surgeon: Aleida Flannery DPM;  Location: [facility] OR;  Service: Podiatry;  Laterality: Left;    Excisional debridement of ulcer distal great toe left foot w/ partial resection distal phalanx Left 08/03/2023    FOOT AMPUTATION THROUGH METATARSAL Left 09/14/2023    Procedure: AMPUTATION, FOOT, TRANSMETATARSAL partial 1st ray amputation;  Surgeon: Jesus Valles DPM;  Location: [address];  Service: Podiatry;  Laterality: Left;  partial ray    I&D L 1st ray w/ amputation L hallux IPJ  Left 08/14/2023    INCISION AND DRAINAGE FOOT Left 09/14/2023    Procedure: INCISION AND DRAINAGE, FOOT;  Surgeon: Jesus Valles DPM;  Location: [facility] OR 2ND FLR;  Service: Podiatry;  Laterality: Left;    Injection L PT tendon sheath Left 08/14/2023    LENGTHENING OF ACHILLES TENDON Left 11/11/2024    Procedure: LENGTHENING, TENDON, ACHILLES;  Surgeon: Marco Allison DPM;  Location: [facility] OR;  Service: Podiatry;  Laterality: Left;    PERIPHERAL ARTERIAL STENT GRAFT Right     RECONSTRUCTION WITH FUSION OF CHARCOT FOOT Left 11/11/2024    Procedure: RECONSTRUCTION, CHARCOT FOOT, WITH FUSION;  Surgeon: Marco Allison DPM;  Location: [facility] OR;  Service: Podiatry;  Laterality: Left;  4 hours; foot tray; mini c arm; saw/drill    REMOVAL OF NAIL OF DIGIT Left 08/03/2023    Procedure: REMOVAL, NAIL, DIGIT great toe;  Surgeon: Aleida Flannery DPM;  Location: [facility] OR;  Service: Podiatry;  Laterality: Left;    REVISION, AMPUTATION, TRANSMETATARSAL Left 11/11/2024    Procedure: REVISION, AMPUTATION, TRANSMETATARSAL;  Surgeon: Marco Allison DPM;  Location: [facility] OR;  Service: Podiatry;  Laterality: Left;    STENT, SUPERFICIAL FEMORAL ARTERY Left 09/14/2023    Procedure: STENT, SUPERFICIAL FEMORAL ARTERY;  Surgeon: Maxx Maya MD;  Location: [facility] OR 2ND FLR;  Service: Vascular;  Laterality: Left;  5 x 100 mm    TOE AMPUTATION Left 08/14/2023    Procedure: AMPUTATION, TOE hallux IPJ;  Surgeon: Aleida Flannery DPM;  Location: [facility] OR;  Service: Podiatry;  Laterality: Left;    TOE AMPUTATION Left 08/25/2023    Procedure: AMPUTATION, TOE hallux, possible 1st met.head;  Surgeon: Aleida Flannery DPM;  Location: [facility] OR;  Service: Podiatry;  Laterality: Left;    URETEROSCOPIC REMOVAL OF URETERIC CALCULUS Right 09/11/2018    Procedure: EXTRACTION-STONE-URETEROSCOPY;  Surgeon: Bubba Perez MD;  Location: [facility] OR;  Service: Urology;  Laterality: Right;        Objective:     Vitals  Wt Readings from Last 1 Encounters:    11/15/24 108 kg (238 lb 1.6 oz)     Temp Readings from Last 1 Encounters:   24 97.3 °F (36.3 °C) (Temporal)     BP Readings from Last 1 Encounters:   11/15/24 (!) 141/64     Pulse Readings from Last 1 Encounters:   11/15/24 93       Dermatological Exam    Skin:  Pedal hair growth diminished and Pedal skin thin and shiny on right; postulcerative callus  Pedal hair growth diminished and Pedal skin thin and shiny on left; incisions OK, sutures intact, bloody drainage and maceration    Nails:  9 nail(s) thickened and 9 nail(s) discolored; left 1st nail/toe absent    Vascular Exam    Arteries:  Posterior tibial artery palpable on right  Dorsalis pedis artery palpable on right  Posterior tibial artery palpable on left  Dorsalis pedis artery palpable on left    Veins:  Superficial veins unremarkable on right  Superficial veins unremarkable on left    Swellin+ pitting on right  2+ pitting on left    Neurological Exam    Babylon touch test:  2/6 sites sensed, loss of protective sensation     Musculoskeletal Exam    Footwear:  Diabetic on right  Cast on left    Gait Exam:   Ambulatory Status: Ambulatory  Gait: Apropulsive  Assistive Devices: None    Foot Progression Angle:  Normal on right  Normal on left     Right Lower Extremity Additional Findings:  Right foot and ankle function, strength, and range of motion unremarkable except as noted above.     Left Lower Extremity Additional Findings:  Rocker bottom Charcot deformity with gastrocnemius equinus  Left foot and ankle function, strength, and range of motion unremarkable except as noted above.    Imaging and Other Tests    Imagin/10/24 left foot X rays: midfoot Charcot deformity with plantar subluxation of navicular and cuboid (progressed from prior films); also with prior 1st ray amputation and widening of 1st-2nd ray interval    10/12/23 VAS CAMILA: left CAMILA 0.85 (likely above healing threshold)    24 left foot X rays: appears to be some  consolidation of the Charcot deformity.     09/25/24 left foot X rays: further consolidation of Charcot deformity    Independently reviewed and interpreted imaging, findings are as follows: N/A    Other Tests: The following A1c results were reviewed.   Hemoglobin A1C   Date Value Ref Range Status   10/07/2024 6.2 (H) 4.8 - 5.9 % Final   08/20/2024 5.3 4.8 - 5.9 % Final   07/02/2024 5.6 4.8 - 5.9 % Final   09/11/2023 7.2 (H) 4.0 - 5.6 % Final     Comment:     ADA Screening Guidelines:  5.7-6.4%  Consistent with prediabetes  >or=6.5%  Consistent with diabetes    High levels of fetal hemoglobin interfere with the HbA1C  assay. Heterozygous hemoglobin variants (HbS, HgC, etc)do  not significantly interfere with this assay.   However, presence of multiple variants may affect accuracy.     08/28/2018 7.9 (H) 4.0 - 5.6 % Final     Comment:     ADA Screening Guidelines:  5.7-6.4%  Consistent with prediabetes  >or=6.5%  Consistent with diabetes  High levels of fetal hemoglobin interfere with the HbA1C  assay. Heterozygous hemoglobin variants (HbS, HgC, etc)do  not significantly interfere with this assay.   However, presence of multiple variants may affect accuracy.     03/24/2015 9.9 (H) 4.5 - 6.2 % Final         Assessment:     Encounter Diagnosis   Name Primary?    Post-operative state Yes                Plan:     I counseled the patient on her conditions, their implications and medical management.    Diabetic foot with peripheral neuropathy and peripheral arterial disease   -Performed shoe inspection and diabetic foot education. Reviewed importance of blood glucose control, proper nutrition, and foot hygiene to minimize risk of complications of diabetes. Recommended daily foot inspections, daily moisturizer to feet, avoiding sharp instruments to feet, appropriate footwear at all times when ambulating, and following up regularly for routine foot care.   -Diabetic foot risk: 2023 IWGDF high ulcer risk.   -Next foot exam due  August 2025.    Right foot wound: healed  -Will monitor.     S/p left foot Charcot reconstruction 11/11   -Cast changed, soft tissues viable, no major concerns.  -Applied new total contact cast LLE.   -LLE WB in cast for transfers or short distances, otherwise NWB in wheelchair for longer distances.   -Pain medication as prescribed.       Return to clinic next week, call sooner PRN.

## 2024-11-15 NOTE — TELEPHONE ENCOUNTER
Patient will be seen in clinic on 11/18/2024 to have cast removed per: Dr. Allison          ----- Message from Marco Allison DPM sent at 11/15/2024  1:01 PM CST -----  Regarding: RE: Medical advice  If she can come in nowish me or a resident can bivalve/cut the cast to relieve pressure from swelling. Let me know    Mark  ----- Message -----  From: Charlene Oscar MA  Sent: 11/15/2024  12:29 PM CST  To: Marco Allison DPM  Subject: Medical advice                                   Good afternoon Dr. Allison, please read the message below from the patient and advise. Thank you.    Message  Received: Today  Evan Perdomo MA [place], [place], [place]  Caller: Unspecified (Today, 11:31 AM)       Previous Messages       ----- Message -----  From: Cat Agee  Sent: 11/15/2024  11:33 AM CST  To: Estefany NIEVES Staff    Type:  Needs Medical Advice    Who Called: Georgina  Symptoms (please be specific): Pain from the SX    How long has patient had these symptoms:  Pain since Tuesday  Case is really tight  Pharmacy name and phone #:    Would the patient rather a call back or a response via [facility]? Call  Best Call Back Number: [phone]  Additional Information:

## 2024-11-19 ENCOUNTER — TELEPHONE (OUTPATIENT)
Dept: HEMATOLOGY/ONCOLOGY | Facility: CLINIC | Age: 54
End: 2024-11-19
Payer: MEDICARE

## 2024-11-20 ENCOUNTER — OFFICE VISIT (OUTPATIENT)
Dept: PODIATRY | Facility: CLINIC | Age: 54
End: 2024-11-20
Payer: MEDICARE

## 2024-11-20 ENCOUNTER — OFFICE VISIT (OUTPATIENT)
Dept: HEMATOLOGY/ONCOLOGY | Facility: CLINIC | Age: 54
End: 2024-11-20
Payer: MEDICARE

## 2024-11-20 ENCOUNTER — PATIENT MESSAGE (OUTPATIENT)
Dept: HEMATOLOGY/ONCOLOGY | Facility: CLINIC | Age: 54
End: 2024-11-20

## 2024-11-20 VITALS
DIASTOLIC BLOOD PRESSURE: 50 MMHG | HEART RATE: 85 BPM | HEIGHT: 67 IN | BODY MASS INDEX: 37.29 KG/M2 | SYSTOLIC BLOOD PRESSURE: 144 MMHG

## 2024-11-20 DIAGNOSIS — Z98.890 POST-OPERATIVE STATE: Primary | ICD-10-CM

## 2024-11-20 DIAGNOSIS — Z71.83 ENCOUNTER FOR NONPROCREATIVE GENETIC COUNSELING: Primary | ICD-10-CM

## 2024-11-20 DIAGNOSIS — Z80.3 FAMILY HISTORY OF BREAST CANCER: ICD-10-CM

## 2024-11-20 DIAGNOSIS — Z91.89 INCREASED RISK OF BREAST CANCER: ICD-10-CM

## 2024-11-20 PROBLEM — M14.672 CHARCOT JOINT OF LEFT FOOT: Status: ACTIVE | Noted: 2024-11-20

## 2024-11-20 PROCEDURE — 99999 PR PBB SHADOW E&M-EST. PATIENT-LVL IV: CPT | Mod: PBBFAC,,, | Performed by: STUDENT IN AN ORGANIZED HEALTH CARE EDUCATION/TRAINING PROGRAM

## 2024-11-20 PROCEDURE — 99999 PR PBB SHADOW E&M-EST. PATIENT-LVL II: CPT | Mod: PBBFAC,,, | Performed by: NURSE PRACTITIONER

## 2024-11-20 NOTE — Clinical Note
Allan Medrano - Can you please contact pt to schedule visit in [facility]?  Referral is in.  Thanks!  Nikita

## 2024-11-20 NOTE — PROGRESS NOTES
"Cancer Genetics  Department of Hematology and Oncology  [facility]    Date of Service:  24  Visit Provider:  Nikita Porras DNP  Collaborating Physician:  Mary Crews MD    Patient ID  Name: Georgina Arias    : 1970    MRN: [id number]      Referring Provider  Alonso Ling MD  [address] [place] [address]    SUBJECTIVE      Chief Complaint: Genetic Evaluation    History of Present Illness (HPI):  Georgina Arias ("Georgina"), 54 y.o., assigned female sex at birth, is new to the [facility] Department of H[facility]ology and Oncology and to me.  She was referred by Dr. Alonso Ling for cancer-genetic risk assessment given her family history of breast cancer.    Focused Medical History  Genetic testing:  No  Cancer:  No  Colon polyp:  Yes     Colonoscopy at age 45 at [place]:  No polyps  Colonoscopy x2 at age 52 at [facility]:  1st with poor prep; 2nd with "a couple of polyps" with "negative" pathology  History of colonoscopy in addition to those abovementioned?  No  Other tumor or pertinent mass/lesion:  No  Pancreatitis:  No  Blood disorder:  No    Focused Surgical History  Reproductive organs:  Intact    Breast Cancer Risk Assessment Questionnaire   Age at menarche:  10  Age at first live childbirth:    Menopausal status:  postmenopausal; menopause at age 49  Hormone replacement therapy use history:  never  Breast biopsy history and findings:  never  Thoracic radiation therapy history:  never    Focused Social History  Former smoker - smoked off and on for 20 years, was up to 2 packs/day toward the end of that, and quit in     ONCOLOGY PEDIGREE  Ancestry:  Ashkenazi Jain:  No  Consanguinity:  No  Hereditary cancer genetic testing in blood relatives:  No  Other than noted, no known close/distant family history of cancer.    ** If this pedigree appears small/illegible on your screen, expand this note window " horizontally. **      Review of Systems  See HPI.    Patient's Distress Score today was 7/10 (scale of 0-10 on which 10=worst).  Patient attributes this to physical health and this morning had frustrating doctor's visit just before this one.  Patient denies experiencing thoughts of harming self or others.  Offered patient a referral to Behavioral Health, and patient declined.   6/10 pain is to foot (recently had surgery for Charcot of left foot).     OBJECTIVE     Patient Active Problem List    Diagnosis Date Noted    Charcot joint of left foot 11/20/2024    Wound infection after surgery 10/26/2023    Wound dehiscence 10/24/2023    S/P femoral-femoral bypass surgery 09/15/2023    Pre-op exam 09/13/2023    ESRD (end stage renal disease) 09/12/2023    Severe obesity (BMI >= 40) 09/12/2023    Open wound of left foot 09/11/2023    Class 2 severe obesity with serious comorbidity and body mass index (BMI) of 38.0 to 38.9 in adult 09/08/2022    Acute respiratory failure with hypoxia and hypercapnia 05/18/2022    Elevated troponin 05/18/2022    Pneumonia due to COVID-19 virus 05/18/2022    Hypertensive emergency 05/18/2022    Lactic acidosis 05/18/2022    Hyperkalemia 05/18/2022    Metabolic acidosis 05/18/2022    Chronic diastolic congestive heart failure 05/18/2022    Pre-operative examination 11/05/2020    Nephrolithiasis 09/11/2018    Ureteral stone with hydronephrosis 08/28/2018    Acute kidney injury superimposed on CKD 08/28/2018    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, with other ophthalmic complication 05/29/2015    Hearing loss 09/17/2014    Type II diabetes mellitus     Peripheral vascular disease     Hyperlipidemia     Essential hypertension     Allergy     PUD (peptic ulcer disease)      Past Medical History:   Diagnosis Date    Hyperlipidemia     Hypertension     Peptic ulcer disease     Peripheral artery disease     PONV (postoperative nausea and vomiting)     Stage IV CKD     Type II diabetes mellitus  "     Physical Exam  Very pleasant patient.  Unaccompanied.  Vitals signs:  Reviewed:  Vitals:    11/20/24 1146   PainSc:   6   PainLoc: Foot     (Pain is rated on scale of 0-10 on which 10=worst.)  Constitutional      Appearance:  Appears well developed and well nourished. No distress.   Pulmonary     Effort:  Normal.  Neurological     Mental Status:  Alert and oriented.     Coordination:  Normal.   Psychiatric         Mood and Affect:  Normal.     Thought Content:  Normal.     Speech:  Normal.     Behavior:  Normal.     Judgment:  Normal.    ASSESSMENT / PLAN      Georgina Arias ("Georgina"), 54 y.o., assigned female sex at birth, is new to the [facility] and to me.  She was referred by Dr. Alonso Ling for cancer-genetic risk assessment given her family history of breast cancer.      From a clinical standpoint, regarding hereditary cancer susceptibility gene testing:  Georgina meets current National Comprehensive Cancer Network (NCCN) criteria for such genetic testing based on her mother's history of triple-negative breast cancer.  Her mother would be the more informative individual to undergo initial such testing in the family.  If Georgnia's mother (also named Georgina) has undergone testing of all appropriate genes, with negative results, such testing would not be indicated for Georgina Arias at this time.    Cancer Genetics:  Germline cancer genetic testing is the testing of genes associated with cancer, known as cancer susceptibility genes.  Just as these genes are inherited from parents--one copy of each gene from each parent--mutations in these genes can be inherited, as well.  A mutation in a cancer susceptibility gene adversely affects the gene's ability to prevent cancer; therefore, carriers of cancer susceptibility gene mutations may be at increased risk for certain cancers.     Causes of Cancer:  Only approximately 5%-10% of cancers are caused by an " inherited cancer susceptibility gene mutation; rather, the majority of cancers are sporadic.  Causes of sporadic cancers may include environmental risk factors, lifestyle risk factors, and non-modifiable risk factors.  It is important to note that members of a family often share not only their genetics but also other risk factors, including environmental and lifestyle risk factors, so cancers can be familial.     Potential Results of Genetic Testing, and Their Implications:  Potential results of genetic testing include positive, negative, and variant of unknown significance (VUS).    Positive:  A positive result indicates the presence of at least one clinically significant gene mutation, and the individual's associated cancer risks vary depending upon the cancer susceptibility gene(s) in which there is/are a mutation(s).  With a positive result, depending upon the specific result and the individual's clinical history, modified risk management may be recommended, including measures for risk reduction and/or surveillance; however, there are not always effective strategies for modified risk management.  There can be reproductive implications with a positive genetic test result.  There can be cancer-treatment implications with a positive genetic test result.  Negative:  A negative result indicates that no clinically significant mutations were identified in the gene(s) tested.  The ability to interpret the meaning of a negative genetic testing result is limited in an individual unaffected with cancer whose affected relatives have not first undergone such testing.  The most informative individual in a family to undergo testing for hereditary cancer syndromes first are those who have personally had cancer.  A negative result in the patient does not indicate that that individual cannot develop cancer, and, in fact, that individual may even be at increased risk for cancer based on shared risk factors with affected relatives.     VUS:  A VUS indicates that there is not presently enough data for the laboratory to make a determination as to whether the genetic variant is clinically significant.  VUSs are not typically acted upon clinically.       Genetic Mutation Inheritance:  When an individual tests positive for a gene mutation, their first-degree relatives each have a 50% chance of carrying the same mutation, and other, more distant blood relatives can also be at risk of carrying the same mutation.      Genetic Discrimination:  Genetic discrimination occurs when individuals are discriminated against on the basis of their genetic information.  The Genetic Information Nondiscrimination Act of 2008 (ALDEN) is [place] federal legislation that provides some protections against use of an individual's genetic information by their health insurer and by their employer.  Title I of ALDEN prohibits most health insurers from utilizing an individual's genetic information to make decisions regarding insurance eligibility or premium charges; this protection does not apply to health insurance obtained through a job with the , and it may not apply to health insurance obtained through the Federal Employees Health Benefits Plan.  Title II of ALDEN prohibits covered entities, in many cases, from requesting or requiring the genetic information of employees and applicants and from using said information to make employment decisions; this protection does not apply to employers with fewer than 15 employees or to the .  ALDEN does not protect individuals from genetic discrimination by any other type of policy or entity, including but not limited to life insurance, disability insurance, long-term care insurance,  benefits, and  Health Services benefits.    Genetic Testing Logistics:  An outside laboratory would perform the testing after a blood sample is collected at [facility].  One can expect this genetic testing to take approximately three  weeks to result.  There is a potential for the patient to incur out-of-pocket costs related to genetic testing.  Post-test genetic counseling can be conducted once the genetic testing results are available.     Plan:  Offered Georgina options of proceeding with hereditary cancer susceptibility gene testing at this time versus delaying/declining such.  Georgina elects to defer genetic testing for hereditary cancer syndromes at this time and was advised to notify me if she desires to proceed in the future; she has my contact information.         ICD-10-CM ICD-9-CM   1. Encounter for nonprocreative genetic counseling  Z71.83 V26.33   2. Family history of breast cancer  Z80.3 V16.3   3. Increased risk of breast cancer  Z91.89 V15.89     1. Encounter for nonprocreative genetic counseling    2. Family history of breast cancer    3. Increased risk of breast cancer  - Ambulatory referral/consult to Breast Surgery; Future           Information to Follow Your Cancer Genetics Visit    Georgina:  Below is some information to follow your cancer genetics visit.  Please read this information and let me know if you have any questions or concerns.    Health Maintenance / Cancer Risks and Risk Management    Only a small percentage (approximately 5%-10%) of cancers are hereditary, meaning caused by an inherited gene mutation; rather, most cancers are sporadic.  Environmental factors, lifestyle factors, and even factors beyond our control play a significant role in the development of many cancers.  As such, an individual can develop cancer even if they don't have an identifiable hereditary predisposition.  Furthermore, even with negative genetic testing, an individual can still be at increased risk for certain cancers, as they may independently have risk factors for those cancers and/or may share risk factors with their family members affected by cancer.  For these reasons, it is strongly recommended that you ensure that your  "healthcare providers are aware of and up-to-date on your personal and family history so that your medical management, including cancer screenings, can be based in part off of this information.      The following cancer screenings are currently recommended for you (please note that these recommendations can change based on updates to clinical guidelines and/or with changes to your medical or family history and are therefore only considered accurate as of the date on which this document was composed; additional and/or alternative screenings may be recommended by your healthcare providers, and recommendations from your healthcare providers directly managing these risks supersede the below recommendations):     The terms "female" and "male" below refer to assigned sex at birth.    Cancer in general:    Attend an annual visit with a primary care provider (PCP) for history review, physical exam, and age-appropriate testing.    Gynecologic cancers (females only):  Attend an annual visit with your gynecology provider, which may include pelvic exam and/or Pap smear/HPV testing.    Breast cancer (females only):  There are models that help us to "calculate" your breast cancer risk.  Your current, estimated risks for developing breast cancer are as follows, per Tyrer-Harryzick 8 model in Epic:    5-year:  4.27% (increased risk)  10-year:  9.08% (does reach the threshold at which risk-reducing medication is recommended unless otherwise contraindicated)  Lifetime:  24.94% (increased risk)    If you have never had breast cancer, undergo ongoing breast cancer risk assessment and breast cancer risk-reduction counseling with your gynecology provider (or you can reach out to me for such) or, if you have one, your breast specialist.   Whether you have breasts or have undergone mastectomies, practice ongoing breast awareness, meaning you are familiar with your breasts, perform breast self-exams at least monthly, and promptly report " changes/concerns to your gynecology provider or breast specialist/oncologist.  Undergo clinical breast exam by a healthcare provider every 12 months or, if your gynecology provider or breast specialist/oncologist recommends, more often.  If you have one or both of your breasts (in other words, you haven't undergone a double mastectomy), undergo annual mammogram.  This can be ordered by your PCP, gynecology provider, or breast specialist/oncologist.  If you have a breast specialist/oncologist who has recommended such, undergo supplemental screenings for breast cancer.    Colorectal cancer:  Determine with your PCP or, if you have one, your gastroenterology (GI) provider whether you are considered to be at average risk versus increased risk for colorectal cancer.  Based on your risk category and corresponding guideline recommendations and, if applicable, the results of your prior colorectal cancer screening, undergo screenings (which may consist of colonoscopy or other test) for colorectal cancer as recommended by your PCP or GI provider.  With regard to family history, please let me know if any of the following applies, as such could change colorectal cancer screening recommendations for you:  If you learn moving forward that any blood relative of yours has been diagnosed with colorectal cancer.  If a first-degree relative (i.e., your parent, sibling, or child) has a colorectal polyp history including any of the following characteristics:  Adenoma with high-grade dysplasia  Adenoma or sessile serrated polyp/adenoma 1 cm or larger in size  Villous or tubulovillous adenoma  Traditional serrated adenoma (TSA)  Sessile serrated lesion/polyp/adenoma with any dysplasia  Cumulatively 10 or more polyps    Prostate cancer (males only):  Determine with your PCP or, if you have one, your urology provider whether you are considered to be at average risk versus increased risk for prostate cancer.  Based on your risk category and  corresponding guideline recommendations and, if applicable, the results of your prior prostate cancer screening, undergo screenings for prostate cancer, which include prostate-specific antigen (PSA) testing with or without digital rectal exam (JAH).    Skin cancer:  Undergo total-body skin examination (TBSE) with a dermatology provider annually or, if recommended by your dermatology provider, more often.    Pancreatic cancer:  If you have been told you are at increased risk of developing pancreatic cancer based on significant family history and/or other risk factors, consult with a pancreas specialist about undergoing annual screening for pancreatic cancer, which often consists of MRI/MR cholangiopancreatography (MRCP) in alternation with annual endoscopic ultrasound (EUS) of the pancreas.    Lung cancer:  If you have a 20-pack-year smoking history or greater, shared patient/provider decision-making regarding low-dose CT scan (LDCT) is recommended starting at age 50.  Please let me know if you do have at least a 20-pack-year smoking history or if you are unsure.     Other cancer:  Undergo screenings/surveillance as recommended by your healthcare providers.    Referrals placed based on the above:  Please notify me if you do not hear from these office(s) within the next 2 weeks to schedule an appointment.  [facility]     If you are not established with a healthcare specialist listed above, please let me know so I can refer you.    Some information regarding general cancer risk reduction:  It is recommended to avoid tobacco use; be physically active; maintain a healthy weight; eat a diet rich in fruits, vegetables, and whole grains and low in saturated/trans fat, red meat, and processed meat; limit alcohol consumption (zero is best); protect against sexually transmitted infections; protect against the sun, and avoid tanning beds; and get regular screenings for cancers as recommended by your  healthcare team.    Regarding Your Relatives    Your relatives also may be at increased risk for certain cancers, depending upon their own personal and family history; therefore, it is important that they, too, ensure that their own healthcare providers are aware of and up-to-date on their personal and family history so that their medical management, including cancer screenings, can be based in part off of this information.      Additionally, it is possible for your relatives to have hereditary cancer susceptibility gene mutations even if/when you have negative genetic testing.      Your relatives should speak with a healthcare provider about their cancer risks and whether genetic counseling and potentially testing may be indicated for them.  Anyone interested in pursuing cancer genetic counseling/testing may contact the [facility] at [phone] to schedule an appointment or may visit [email or web address] to locate a genetic specialist near them.    Follow-up    Please continue to follow up with all healthcare providers as directed.    Moving forward, please update me with any changes to--or new information learned about--your personal or family history and with any relative's genetic testing results.      Please don't hesitate to reach out with any questions or concerns.        Follow-up:  Follow up in about 1 year (around 11/20/2025) for re-discussion regarding genetic testing, if the patient does not reach out sooner to test or with mother's test results.  Follow up sooner as needed/desired, such as if genetic testing is desired.    Questions were encouraged and answered to the patient's satisfaction, and she verbalized understanding of the information and agreement with the plan.   Advised Georgina that pertinent information will be accessible in this visit note for her review.      Approximately 33 minutes were spent face-to-face with the patient.    Total time:  Approximately 51 minutes.  This includes  face to face time and non-face to face time preparing to see the patient (eg, review of tests), obtaining and/or reviewing separately obtained history, documenting clinical information in the electronic or other health record, independently interpreting results and communicating results to the patient/family/caregiver, or care coordinator.       Nikita Porras, DNP, APRN, FNP-BC, AOCNP, CGRA  Nurse Practitioner, Cancer Genetics  Department of Hematology and Oncology  [facility]        CC:  Dr. Alonso Ling         Routed to Cancer Genetics Staff:  - Please place recall for 1 yr.        Portions of the record may have been created with voice-recognition software. Occasional wrong-word or sound-a-like substitutions may have occurred due to the inherent limitations of voice-recognition software. Read the chart carefully and recognize, using context, where substitutions have occurred.

## 2024-11-20 NOTE — PROGRESS NOTES
Subjective:     Patient    Georgina Arias is a 54 y.o. female.    Problem    08/01/24: Previously followed by Dr Prado for wound care, left foot Charcot. Charcot has progressed over the past couple of months despite total contact casting. Has also previously tried change in shoes, inserts, surgical boots, rest without improvement. Minimal pain in left foot due to neuropathy, had prior partial 1st ray amputation on left. Cannot be off her feet because she does not have help at home or has minimal help. Admits to numbness, tingling.     08/23/24: Returns for cast change, no issues with last cast, did prefer walking bottom rather than cast shoe.     09/04/24: Returns for cast change. No new issues. Is OK with surgery but would like to wait until after birthday in November.     09/25/24: Returns for foot check, has been in tall surgical boot. Occasional lateral foot pain, occasional plantar foot pain/throbbing. No new concerns. OK with proceeding with surgery in Nov.     10/15/24: Returns for foot check, in tall boot on left. Has been in football dressing on right. Seeing PCP tomorrow.     11/15/24: Returns for urgent postop check; severe left foot and leg pain in calf, not improving and possibly worsening. The pain is migrating and may be nerve pain per patient.     11/20/24: Returns for cast change. Hgb in the 7s postop, slowly rebounding now. Pain slightly improved. No new concerns.     Primary Care Provider    Primary Care Provider: Alonso Ling MD   Last Seen: Patient does not have a PCP or has not yet seen their PCP     History    History obtained from patient and review of medical records.     Past Medical History:   Diagnosis Date    Hyperlipidemia     Hypertension     Peptic ulcer disease     Peripheral artery disease     PONV (postoperative nausea and vomiting)     Stage IV CKD     Type II diabetes mellitus        Past Surgical History:   Procedure Laterality Date    ANGIOGRAM, LOWER  ARTERIAL, UNILATERAL Left 09/13/2023    Procedure: ANGIOGRAM, LOWER ARTERIAL, UNILATERAL;  Surgeon: Maxx Maya MD;  Location: [facility] OR [address] [place] [address];  Service: Vascular;  Laterality: Left;    ANGIOGRAPHY OF LOWER EXTREMITY Left 09/14/2023    Procedure: ANGIOGRAM, LOWER EXTREMITY with balloon angioplasty ultraverse 5mm x 60mm;  Surgeon: Maxx Maya MD;  [facility];  Service: Vascular;  Laterality: Left;  ultraverse 5mm x 60mm  fluoro: 12.41  contrast: 44ml  mGy: 65.57  Gycm2: 23.50    AORTOGRAPHY WITH EXTREMITY RUNOFF Left 09/13/2023    Procedure: AORTOGRAM, WITH EXTREMITY RUNOFF;  Surgeon: Maxx Maya MD;  Location: [facility] OR [address] [place] [address];  Service: Vascular;  Laterality: Left;  8.1 min  663.63 mGy  176.39 Gy.cm  178ml Dye     AV FISTULA PLACEMENT Right     CHOLECYSTECTOMY      COLONOSCOPY  11/06/2013    normal    COLONOSCOPY  05/2023    cancelled due to inadequate prep    COLONOSCOPY  06/2023    results unknown    CREATION, BYPASS, ARTERIAL, AXILLARY TO BILATERAL FEMORAL Left 09/14/2023    Procedure: CREATION, BYPASS, ARTERIAL, AXILLARY TO BILATERAL FEMORAL;  Surgeon: Maxx Maya MD;  Location: [facility] OR [address] [place] [address];  Service: Vascular;  Laterality: Left;  Left Axillary to Bilateral Femoral Bypass, Left Femoral to Above Knee Popliteal Bypass; SLIDER BED    CYSTOSCOPY W/ URETERAL STENT PLACEMENT Right 08/28/2018    Procedure: CYSTOSCOPY, WITH URETERAL STENT INSERTION (ADD ON );  Surgeon: Bubba Perez MD;  Location: [facility] [place] OR;  Service: Urology;  Laterality: Right;  (ADD ON )    DEBRIDEMENT OF FOOT Left 08/03/2023    Procedure: DEBRIDEMENT, FOOT;  Surgeon: Aleida Flannery DPM;  [facility] OR;  Service: Podiatry;  Laterality: Left;    Excisional debridement of ulcer distal great toe left foot w/ partial resection distal phalanx Left 08/03/2023    FOOT AMPUTATION THROUGH METATARSAL Left 09/14/2023    Procedure: AMPUTATION, FOOT, TRANSMETATARSAL partial 1st ray amputation;  Surgeon: Jesus Valles  EDGAR GRAJEDA;  Location: [facility] OR 2ND FLR;  Service: Podiatry;  Laterality: Left;  partial ray    I&D L 1st ray w/ amputation L hallux IPJ Left 08/14/2023    INCISION AND DRAINAGE FOOT Left 09/14/2023    Procedure: INCISION AND DRAINAGE, FOOT;  Surgeon: Jesus Valles DPM;  Location: [facility] OR 2ND FLR;  Service: Podiatry;  Laterality: Left;    Injection L PT tendon sheath Left 08/14/2023    LENGTHENING OF ACHILLES TENDON Left 11/11/2024    Procedure: LENGTHENING, TENDON, ACHILLES;  Surgeon: Marco Allison DPM;  Location: [facility] OR;  Service: Podiatry;  Laterality: Left;    PERIPHERAL ARTERIAL STENT GRAFT Right     RECONSTRUCTION WITH FUSION OF CHARCOT FOOT Left 11/11/2024    Procedure: RECONSTRUCTION, CHARCOT FOOT, WITH FUSION;  Surgeon: Marco Allison DPM;  Location: [facility] OR;  Service: Podiatry;  Laterality: Left;  4 hours; foot tray; mini c arm; saw/drill    REMOVAL OF NAIL OF DIGIT Left 08/03/2023    Procedure: REMOVAL, NAIL, DIGIT great toe;  Surgeon: Aleida Flannery DPM;  Location: [facility] OR;  Service: Podiatry;  Laterality: Left;    REVISION, AMPUTATION, TRANSMETATARSAL Left 11/11/2024    Procedure: REVISION, AMPUTATION, TRANSMETATARSAL;  Surgeon: Marco Allison DPM;  Location: [facility] OR;  Service: Podiatry;  Laterality: Left;    STENT, SUPERFICIAL FEMORAL ARTERY Left 09/14/2023    Procedure: STENT, SUPERFICIAL FEMORAL ARTERY;  Surgeon: Maxx Maya MD;  Location: [facility] OR 2ND FLR;  Service: Vascular;  Laterality: Left;  5 x 100 mm    TOE AMPUTATION Left 08/14/2023    Procedure: AMPUTATION, TOE hallux IPJ;  Surgeon: Aleida Flannery DPM;  Location: [facility] OR;  Service: Podiatry;  Laterality: Left;    TOE AMPUTATION Left 08/25/2023    Procedure: AMPUTATION, TOE hallux, possible 1st met.head;  Surgeon: Aleida Flannery DPM;  Location: [facility] OR;  Service: Podiatry;  Laterality: Left;    URETEROSCOPIC REMOVAL OF URETERIC CALCULUS Right 09/11/2018    Procedure: EXTRACTION-STONE-URETEROSCOPY;  Surgeon: Bubba Perez MD;   [facility];  Service: Urology;  Laterality: Right;        Objective:     Vitals  Wt Readings from Last 1 Encounters:   11/15/24 108 kg (238 lb 1.6 oz)     Temp Readings from Last 1 Encounters:   24 97.3 °F (36.3 °C) (Temporal)     BP Readings from Last 1 Encounters:   24 (!) 144/50     Pulse Readings from Last 1 Encounters:   24 85       Dermatological Exam    Skin:  Pedal hair growth diminished and Pedal skin thin and shiny on right; postulcerative callus  Pedal hair growth diminished and Pedal skin thin and shiny on left; incisions OK, sutures intact, bloody drainage and maceration    Nails:  9 nail(s) thickened and 9 nail(s) discolored; left 1st nail/toe absent    Vascular Exam    Arteries:  Posterior tibial artery palpable on right  Dorsalis pedis artery palpable on right  Posterior tibial artery palpable on left  Dorsalis pedis artery palpable on left    Veins:  Superficial veins unremarkable on right  Superficial veins unremarkable on left    Swellin+ pitting on right  2+ pitting on left    Neurological Exam    [place] touch test:  2/6 sites sensed, loss of protective sensation     Musculoskeletal Exam    Footwear:  Diabetic on right  Cast on left    Gait Exam:   Ambulatory Status: Ambulatory  Gait: Apropulsive  Assistive Devices: None    Foot Progression Angle:  Normal on right  Normal on left     Right Lower Extremity Additional Findings:  Right foot and ankle function, strength, and range of motion unremarkable except as noted above.     Left Lower Extremity Additional Findings:  Rocker bottom Charcot deformity with gastrocnemius equinus  Left foot and ankle function, strength, and range of motion unremarkable except as noted above.    Imaging and Other Tests    Imagin/10/24 left foot X rays: midfoot Charcot deformity with plantar subluxation of navicular and cuboid (progressed from prior films); also with prior 1st ray amputation and widening of 1st-2nd ray  interval    10/12/23 VAS CAMILA: left CAMILA 0.85 (likely above healing threshold)    09/04/24 left foot X rays: appears to be some consolidation of the Charcot deformity.     09/25/24 left foot X rays: further consolidation of Charcot deformity    Independently reviewed and interpreted imaging, findings are as follows: N/A    Other Tests: The following A1c results were reviewed.   Hemoglobin A1C   Date Value Ref Range Status   10/07/2024 6.2 (H) 4.8 - 5.9 % Final   08/20/2024 5.3 4.8 - 5.9 % Final   07/02/2024 5.6 4.8 - 5.9 % Final   09/11/2023 7.2 (H) 4.0 - 5.6 % Final     Comment:     ADA Screening Guidelines:  5.7-6.4%  Consistent with prediabetes  >or=6.5%  Consistent with diabetes    High levels of fetal hemoglobin interfere with the HbA1C  assay. Heterozygous hemoglobin variants (HbS, HgC, etc)do  not significantly interfere with this assay.   However, presence of multiple variants may affect accuracy.     08/28/2018 7.9 (H) 4.0 - 5.6 % Final     Comment:     ADA Screening Guidelines:  5.7-6.4%  Consistent with prediabetes  >or=6.5%  Consistent with diabetes  High levels of fetal hemoglobin interfere with the HbA1C  assay. Heterozygous hemoglobin variants (HbS, HgC, etc)do  not significantly interfere with this assay.   However, presence of multiple variants may affect accuracy.     03/24/2015 9.9 (H) 4.5 - 6.2 % Final         Assessment:     Encounter Diagnosis   Name Primary?    Post-operative state Yes                  Plan:     I counseled the patient on her conditions, their implications and medical management.    Diabetic foot with peripheral neuropathy and peripheral arterial disease   -Performed shoe inspection and diabetic foot education. Reviewed importance of blood glucose control, proper nutrition, and foot hygiene to minimize risk of complications of diabetes. Recommended daily foot inspections, daily moisturizer to feet, avoiding sharp instruments to feet, appropriate footwear at all times when  ambulating, and following up regularly for routine foot care.   -Diabetic foot risk: 2023 IWGDF high ulcer risk.   -Next foot exam due August 2025.    Right foot wound: healed  -Will monitor.     S/p left foot Charcot reconstruction 11/11   -Cast changed, soft tissues viable, no major concerns.  -Applied new total contact cast LLE.   -LLE WB in cast for transfers or short distances, otherwise NWB in wheelchair for longer distances.   -Pain medication as prescribed.       Return to clinic next week, call sooner PRN.

## 2024-11-29 ENCOUNTER — HOSPITAL ENCOUNTER (EMERGENCY)
Facility: HOSPITAL | Age: 54
Discharge: HOME | End: 2024-11-29
Attending: STUDENT IN AN ORGANIZED HEALTH CARE EDUCATION/TRAINING PROGRAM
Payer: MEDICARE

## 2024-11-29 ENCOUNTER — OFFICE VISIT (OUTPATIENT)
Dept: PODIATRY | Facility: CLINIC | Age: 54
End: 2024-11-29
Payer: MEDICARE

## 2024-11-29 VITALS
HEART RATE: 80 BPM | BODY MASS INDEX: 39.16 KG/M2 | TEMPERATURE: 98 F | OXYGEN SATURATION: 99 % | SYSTOLIC BLOOD PRESSURE: 144 MMHG | WEIGHT: 250 LBS | DIASTOLIC BLOOD PRESSURE: 68 MMHG | RESPIRATION RATE: 21 BRPM

## 2024-11-29 VITALS — DIASTOLIC BLOOD PRESSURE: 94 MMHG | BODY MASS INDEX: 37.29 KG/M2 | HEIGHT: 67 IN | SYSTOLIC BLOOD PRESSURE: 97 MMHG

## 2024-11-29 DIAGNOSIS — Z98.890 POST-OPERATIVE STATE: Primary | ICD-10-CM

## 2024-11-29 DIAGNOSIS — D64.9 ANEMIA, UNSPECIFIED TYPE: Primary | ICD-10-CM

## 2024-11-29 DIAGNOSIS — M14.672 CHARCOT'S JOINT OF LEFT FOOT: ICD-10-CM

## 2024-11-29 DIAGNOSIS — D53.9 MACROCYTIC ANEMIA: ICD-10-CM

## 2024-11-29 DIAGNOSIS — R00.0 TACHYCARDIA: ICD-10-CM

## 2024-11-29 LAB
ABO + RH BLD: NORMAL
ALBUMIN SERPL BCP-MCNC: 2.6 G/DL (ref 3.5–5.2)
ALP SERPL-CCNC: 102 U/L (ref 40–150)
ALT SERPL W/O P-5'-P-CCNC: 14 U/L (ref 10–44)
ANION GAP SERPL CALC-SCNC: 13 MMOL/L (ref 8–16)
AST SERPL-CCNC: 16 U/L (ref 10–40)
BASOPHILS # BLD AUTO: 0.04 K/UL (ref 0–0.2)
BASOPHILS NFR BLD: 0.3 % (ref 0–1.9)
BILIRUB SERPL-MCNC: 0.3 MG/DL (ref 0.1–1)
BLD GP AB SCN CELLS X3 SERPL QL: NORMAL
BUN SERPL-MCNC: 42 MG/DL (ref 6–20)
CALCIUM SERPL-MCNC: 9.7 MG/DL (ref 8.7–10.5)
CHLORIDE SERPL-SCNC: 100 MMOL/L (ref 95–110)
CO2 SERPL-SCNC: 27 MMOL/L (ref 23–29)
CREAT SERPL-MCNC: 3.5 MG/DL (ref 0.5–1.4)
DIFFERENTIAL METHOD BLD: ABNORMAL
EOSINOPHIL # BLD AUTO: 0.2 K/UL (ref 0–0.5)
EOSINOPHIL NFR BLD: 1.4 % (ref 0–8)
ERYTHROCYTE [DISTWIDTH] IN BLOOD BY AUTOMATED COUNT: 13.9 % (ref 11.5–14.5)
EST. GFR  (NO RACE VARIABLE): 14.9 ML/MIN/1.73 M^2
GLUCOSE SERPL-MCNC: 217 MG/DL (ref 70–110)
GRAM STN SPEC: NORMAL
HCT VFR BLD AUTO: 23.6 % (ref 37–48.5)
HGB BLD-MCNC: 7.4 G/DL (ref 12–16)
IMM GRANULOCYTES # BLD AUTO: 0.05 K/UL (ref 0–0.04)
IMM GRANULOCYTES NFR BLD AUTO: 0.4 % (ref 0–0.5)
LYMPHOCYTES # BLD AUTO: 0.9 K/UL (ref 1–4.8)
LYMPHOCYTES NFR BLD: 7.9 % (ref 18–48)
MCH RBC QN AUTO: 31.8 PG (ref 27–31)
MCHC RBC AUTO-ENTMCNC: 31.4 G/DL (ref 32–36)
MCV RBC AUTO: 101 FL (ref 82–98)
MONOCYTES # BLD AUTO: 0.6 K/UL (ref 0.3–1)
MONOCYTES NFR BLD: 5 % (ref 4–15)
NEUTROPHILS # BLD AUTO: 9.8 K/UL (ref 1.8–7.7)
NEUTROPHILS NFR BLD: 85 % (ref 38–73)
NRBC BLD-RTO: 0 /100 WBC
OHS QRS DURATION: 84 MS
OHS QTC CALCULATION: 466 MS
PLATELET # BLD AUTO: 249 K/UL (ref 150–450)
PMV BLD AUTO: 9.5 FL (ref 9.2–12.9)
POTASSIUM SERPL-SCNC: 3.7 MMOL/L (ref 3.5–5.1)
PROT SERPL-MCNC: 7.5 G/DL (ref 6–8.4)
RBC # BLD AUTO: 2.33 M/UL (ref 4–5.4)
SODIUM SERPL-SCNC: 140 MMOL/L (ref 136–145)
SPECIMEN OUTDATE: NORMAL
WBC # BLD AUTO: 11.5 K/UL (ref 3.9–12.7)

## 2024-11-29 PROCEDURE — 87205 SMEAR GRAM STAIN: CPT | Performed by: STUDENT IN AN ORGANIZED HEALTH CARE EDUCATION/TRAINING PROGRAM

## 2024-11-29 PROCEDURE — 85025 COMPLETE CBC W/AUTO DIFF WBC: CPT

## 2024-11-29 PROCEDURE — 93010 ELECTROCARDIOGRAM REPORT: CPT | Mod: ,,, | Performed by: INTERNAL MEDICINE

## 2024-11-29 PROCEDURE — 99999 PR PBB SHADOW E&M-EST. PATIENT-LVL IV: CPT | Mod: PBBFAC,,, | Performed by: STUDENT IN AN ORGANIZED HEALTH CARE EDUCATION/TRAINING PROGRAM

## 2024-11-29 PROCEDURE — 87075 CULTR BACTERIA EXCEPT BLOOD: CPT | Performed by: STUDENT IN AN ORGANIZED HEALTH CARE EDUCATION/TRAINING PROGRAM

## 2024-11-29 PROCEDURE — 87186 SC STD MICRODIL/AGAR DIL: CPT | Performed by: STUDENT IN AN ORGANIZED HEALTH CARE EDUCATION/TRAINING PROGRAM

## 2024-11-29 PROCEDURE — 86850 RBC ANTIBODY SCREEN: CPT

## 2024-11-29 PROCEDURE — 93005 ELECTROCARDIOGRAM TRACING: CPT

## 2024-11-29 PROCEDURE — 87070 CULTURE OTHR SPECIMN AEROBIC: CPT | Performed by: STUDENT IN AN ORGANIZED HEALTH CARE EDUCATION/TRAINING PROGRAM

## 2024-11-29 PROCEDURE — 99284 EMERGENCY DEPT VISIT MOD MDM: CPT | Mod: 25

## 2024-11-29 PROCEDURE — 80053 COMPREHEN METABOLIC PANEL: CPT

## 2024-11-29 RX ORDER — DOXYCYCLINE 100 MG/1
100 CAPSULE ORAL 2 TIMES DAILY
Qty: 20 CAPSULE | Refills: 0 | Status: SHIPPED | OUTPATIENT
Start: 2024-11-29

## 2024-11-29 RX ORDER — HYDROCODONE BITARTRATE AND ACETAMINOPHEN 10; 325 MG/1; MG/1
1 TABLET ORAL EVERY 6 HOURS PRN
Qty: 28 TABLET | Refills: 0 | Status: SHIPPED | OUTPATIENT
Start: 2024-11-29

## 2024-11-29 RX ORDER — PROMETHAZINE HYDROCHLORIDE 12.5 MG/1
12.5 TABLET ORAL EVERY 6 HOURS PRN
Qty: 28 TABLET | Refills: 0 | Status: SHIPPED | OUTPATIENT
Start: 2024-11-29

## 2024-11-29 RX ORDER — LEVOFLOXACIN 750 MG/1
750 TABLET ORAL DAILY
Qty: 10 TABLET | Refills: 0 | Status: SHIPPED | OUTPATIENT
Start: 2024-11-29

## 2024-11-29 NOTE — PROGRESS NOTES
Subjective:     Patient    Georgina Arias is a 54 y.o. female.    Problem    08/01/24: Previously followed by Dr Prado for wound care, left foot Charcot. Charcot has progressed over the past couple of months despite total contact casting. Has also previously tried change in shoes, inserts, surgical boots, rest without improvement. Minimal pain in left foot due to neuropathy, had prior partial 1st ray amputation on left. Cannot be off her feet because she does not have help at home or has minimal help. Admits to numbness, tingling.     08/23/24: Returns for cast change, no issues with last cast, did prefer walking bottom rather than cast shoe.     09/04/24: Returns for cast change. No new issues. Is OK with surgery but would like to wait until after birthday in November.     09/25/24: Returns for foot check, has been in tall surgical boot. Occasional lateral foot pain, occasional plantar foot pain/throbbing. No new concerns. OK with proceeding with surgery in Nov.     10/15/24: Returns for foot check, in tall boot on left. Has been in football dressing on right. Seeing PCP tomorrow.     11/15/24: Returns for urgent postop check; severe left foot and leg pain in calf, not improving and possibly worsening. The pain is migrating and may be nerve pain per patient.     11/20/24: Returns for cast change. Hgb in the 7s postop, slowly rebounding now. Pain slightly improved. No new concerns.     11/29/24: Returns for cast change. Hgb now bouncing around 7 since surgery, may need transfusion today. Left foot has continued to drain significantly, also pain was improving but now recently flared again.     Primary Care Provider    Primary Care Provider: Alonso Ling MD   Last Seen: Patient does not have a PCP or has not yet seen their PCP     History    History obtained from patient and review of medical records.     Past Medical History:   Diagnosis Date    Hyperlipidemia     Hypertension     Peptic ulcer  disease     Peripheral artery disease     PONV (postoperative nausea and vomiting)     Stage IV CKD     Type II diabetes mellitus        Past Surgical History:   Procedure Laterality Date    ANGIOGRAM, LOWER ARTERIAL, UNILATERAL Left 09/13/2023    Procedure: ANGIOGRAM, LOWER ARTERIAL, UNILATERAL;  Surgeon: Maxx Maya MD;  Location: [address];  Service: Vascular;  Laterality: Left;    ANGIOGRAPHY OF LOWER EXTREMITY Left 09/14/2023    Procedure: ANGIOGRAM, LOWER EXTREMITY with balloon angioplasty ultraverse 5mm x 60mm;  Surgeon: Maxx Maya MD;  Location: [address];  Service: Vascular;  Laterality: Left;  ultraverse 5mm x 60mm  fluoro: 12.41  contrast: 44ml  mGy: 65.57  Gycm2: 23.50    AORTOGRAPHY WITH EXTREMITY RUNOFF Left 09/13/2023    Procedure: AORTOGRAM, WITH EXTREMITY RUNOFF;  Surgeon: Maxx Maya MD;  Location: [address];  Service: Vascular;  Laterality: Left;  8.1 min  663.63 mGy  176.39 Gy.cm  178ml Dye     AV FISTULA PLACEMENT Right     CHOLECYSTECTOMY      COLONOSCOPY  11/06/2013    normal    COLONOSCOPY  05/2023    cancelled due to inadequate prep    COLONOSCOPY  06/2023    results unknown    CREATION, BYPASS, ARTERIAL, AXILLARY TO BILATERAL FEMORAL Left 09/14/2023    Procedure: CREATION, BYPASS, ARTERIAL, AXILLARY TO BILATERAL FEMORAL;  Surgeon: Maxx Maya MD;  Location: [address];  Service: Vascular;  Laterality: Left;  Left Axillary to Bilateral Femoral Bypass, Left Femoral to Above Knee Popliteal Bypass; SLIDER BED    CYSTOSCOPY W/ URETERAL STENT PLACEMENT Right 08/28/2018    Procedure: CYSTOSCOPY, WITH URETERAL STENT INSERTION (ADD ON );  Surgeon: Bubba Perez MD;  Location: [facility];  Service: Urology;  Laterality: Right;  (ADD ON )    DEBRIDEMENT OF FOOT Left 08/03/2023    Procedure: DEBRIDEMENT, FOOT;  Surgeon: Aleida Flannery DPM;  Location: [facility] OR;  Service: Podiatry;  Laterality: Left;    Excisional debridement of ulcer distal great toe left foot  w/ partial resection distal phalanx Left 08/03/2023    FOOT AMPUTATION THROUGH METATARSAL Left 09/14/2023    Procedure: AMPUTATION, FOOT, TRANSMETATARSAL partial 1st ray amputation;  Surgeon: Jesus Valles DPM;  Location: [facility] OR 2ND FLR;  Service: Podiatry;  Laterality: Left;  partial ray    I&D L 1st ray w/ amputation L hallux IPJ Left 08/14/2023    INCISION AND DRAINAGE FOOT Left 09/14/2023    Procedure: INCISION AND DRAINAGE, FOOT;  Surgeon: Jesus Valles DPM;  Location: [facility] OR 2ND FLR;  Service: Podiatry;  Laterality: Left;    Injection L PT tendon sheath Left 08/14/2023    LENGTHENING OF ACHILLES TENDON Left 11/11/2024    Procedure: LENGTHENING, TENDON, ACHILLES;  Surgeon: Marco Allison DPM;  Location: [facility] OR;  Service: Podiatry;  Laterality: Left;    PERIPHERAL ARTERIAL STENT GRAFT Right     RECONSTRUCTION WITH FUSION OF CHARCOT FOOT Left 11/11/2024    Procedure: RECONSTRUCTION, CHARCOT FOOT, WITH FUSION;  Surgeon: Marco Allison DPM;  Location: [facility] OR;  Service: Podiatry;  Laterality: Left;  4 hours; foot tray; mini c arm; saw/drill    REMOVAL OF NAIL OF DIGIT Left 08/03/2023    Procedure: REMOVAL, NAIL, DIGIT great toe;  Surgeon: Aleida Flannery DPM;  Location: [facility] OR;  Service: Podiatry;  Laterality: Left;    REVISION, AMPUTATION, TRANSMETATARSAL Left 11/11/2024    Procedure: REVISION, AMPUTATION, TRANSMETATARSAL;  Surgeon: Marco Allison DPM;  Location: [facility] OR;  Service: Podiatry;  Laterality: Left;    STENT, SUPERFICIAL FEMORAL ARTERY Left 09/14/2023    Procedure: STENT, SUPERFICIAL FEMORAL ARTERY;  Surgeon: Maxx Maya MD;  Location: [facility] OR 2ND FLR;  Service: Vascular;  Laterality: Left;  5 x 100 mm    TOE AMPUTATION Left 08/14/2023    Procedure: AMPUTATION, TOE hallux IPJ;  Surgeon: Aleida Flannery DPM;  Location: [facility] OR;  Service: Podiatry;  Laterality: Left;    TOE AMPUTATION Left 08/25/2023    Procedure: AMPUTATION, TOE hallux, possible 1st met.head;  Surgeon: Aleida Flannery  DOUGLAS DPM;  Location: [facility];  Service: Podiatry;  Laterality: Left;    URETEROSCOPIC REMOVAL OF URETERIC CALCULUS Right 2018    Procedure: EXTRACTION-STONE-URETEROSCOPY;  Surgeon: Bubba Perez MD;  Location: [facility];  Service: Urology;  Laterality: Right;        Objective:     Vitals  Wt Readings from Last 1 Encounters:   11/15/24 108 kg (238 lb 1.6 oz)     Temp Readings from Last 1 Encounters:   24 97.3 °F (36.3 °C) (Temporal)     BP Readings from Last 1 Encounters:   24 (!) 97/94     Pulse Readings from Last 1 Encounters:   24 85       Dermatological Exam    Skin:  Pedal hair growth diminished and Pedal skin thin and shiny on right; postulcerative callus  Pedal hair growth diminished and Pedal skin thin and shiny on left; some incisions healed, some dehiscing to fat.     Nails:  9 nail(s) thickened and 9 nail(s) discolored; left 1st nail/toe absent    Vascular Exam    Arteries:  Posterior tibial artery palpable on right  Dorsalis pedis artery palpable on right  Posterior tibial artery palpable on left  Dorsalis pedis artery palpable on left    Veins:  Superficial veins unremarkable on right  Superficial veins unremarkable on left    [place]+ pitting on right  2+ pitting on left    Neurological Exam    [place] touch test:  2/6 sites sensed, loss of protective sensation     Musculoskeletal Exam    Footwear:  Diabetic on right  Cast on left    Gait Exam:   Ambulatory Status: Ambulatory  Gait: Apropulsive  Assistive Devices: None    Foot Progression Angle:  Normal on right  Normal on left     Right Lower Extremity Additional Findings:  Right foot and ankle function, strength, and range of motion unremarkable except as noted above.     Left Lower Extremity Additional Findings:  Foot rectus s/p Charcot reconstruction  Left foot and ankle function, strength, and range of motion unremarkable except as noted above.    Imaging and Other Tests    Imagin/10/24 left foot X rays: midfoot  Charcot deformity with plantar subluxation of navicular and cuboid (progressed from prior films); also with prior 1st ray amputation and widening of 1st-2nd ray interval    10/12/23 VAS CAMILA: left CAMILA 0.85 (likely above healing threshold)    09/04/24 left foot X rays: appears to be some consolidation of the Charcot deformity.     09/25/24 left foot X rays: further consolidation of Charcot deformity    Independently reviewed and interpreted imaging, findings are as follows: N/A    Other Tests: The following A1c results were reviewed.   Hemoglobin A1C   Date Value Ref Range Status   10/07/2024 6.2 (H) 4.8 - 5.9 % Final   08/20/2024 5.3 4.8 - 5.9 % Final   07/02/2024 5.6 4.8 - 5.9 % Final   09/11/2023 7.2 (H) 4.0 - 5.6 % Final     Comment:     ADA Screening Guidelines:  5.7-6.4%  Consistent with prediabetes  >or=6.5%  Consistent with diabetes    High levels of fetal hemoglobin interfere with the HbA1C  assay. Heterozygous hemoglobin variants (HbS, HgC, etc)do  not significantly interfere with this assay.   However, presence of multiple variants may affect accuracy.     08/28/2018 7.9 (H) 4.0 - 5.6 % Final     Comment:     ADA Screening Guidelines:  5.7-6.4%  Consistent with prediabetes  >or=6.5%  Consistent with diabetes  High levels of fetal hemoglobin interfere with the HbA1C  assay. Heterozygous hemoglobin variants (HbS, HgC, etc)do  not significantly interfere with this assay.   However, presence of multiple variants may affect accuracy.     03/24/2015 9.9 (H) 4.5 - 6.2 % Final         Assessment:     Encounter Diagnoses   Name Primary?    Post-operative state Yes    Charcot's joint of left foot             Plan:     I counseled the patient on her conditions, their implications and medical management.    Diabetic foot with peripheral neuropathy and peripheral arterial disease   -Performed shoe inspection and diabetic foot education. Reviewed importance of blood glucose control, proper nutrition, and foot hygiene to  minimize risk of complications of diabetes. Recommended daily foot inspections, daily moisturizer to feet, avoiding sharp instruments to feet, appropriate footwear at all times when ambulating, and following up regularly for routine foot care.   -Diabetic foot risk: 2023 IWGDF high ulcer risk.   -Next foot exam due August 2025.    Right foot wound: healed  -Will monitor.     S/p left foot Charcot reconstruction 11/11   -Cast changed, soft tissues viable, concern for possible early infection.   -Culture collected. Sutures removed.   -Applied new total contact cast LLE.   -LLE WB in cast for transfers or short distances, otherwise NWB in wheelchair for longer distances.   -Pain medication as prescribed.   -Start doxycycline + Levofloxacin until cultures result.       Return to clinic next week with X rays prior, call sooner PRN.

## 2024-11-29 NOTE — ED PROVIDER NOTES
Encounter Date: 11/29/2024       History     Chief Complaint   Patient presents with    Post-op Problem     Recent foot reconstruction on 11/11/24, Hemoglobin dropped from 10.7 to 6.8 over 1 month. Pt is a dialysis pt, M,W,F      54 y.o. female with a PMH of T2 DM, PUD, ESRD on HD MWF (dialyzed this morning with complete session), PUD, HTN, HLD, and Charcot foot s/p left foot reconstruction on 11/11 with podiatry [place] [facility] for evaluation of anemia noted on predialysis labs this morning.  Patient was instructed to come to the ED following dialysis.  She denies any symptoms including fever, chills, cough, dyspnea, lightheadedness, hemoptysis, gingival bleeding, easy bruising, blood in her stool, hematemesis, or any other symptoms of bleeding.  She was seen by Podiatry today and had her left foot recasted.  She denies any complaints associated with her left foot.        The history is provided by the patient and medical records.     Review of patient's allergies indicates:   Allergen Reactions    Hydrocodone-acetaminophen Nausea And Vomiting, Rash and Other (See Comments)     Vicodin- severe rash  Lortab- head-spinning that leads to vomiting      Naproxen Anaphylaxis and Swelling     Hives (skin)^swelling  Hives (skin)^swelling  Hives (skin)^swelling    Sulfamethoxazole-trimethoprim Other (See Comments)     Caused JEROME; heart attack    Hydrocodone Nausea And Vomiting and Rash    Adhesive tape-silicones      blisters    Aspartame Hives    Penicillins Hives     Blisters (skin)^    Sulfamethoxazole Other (See Comments)    Trimethoprim Other (See Comments)    Acetaminophen Rash    Adhesive Rash     Past Medical History:   Diagnosis Date    Hyperlipidemia     Hypertension     Peptic ulcer disease     Peripheral artery disease     PONV (postoperative nausea and vomiting)     Stage IV CKD     Type II diabetes mellitus      Past Surgical History:   Procedure Laterality Date    ANGIOGRAM, LOWER  ARTERIAL, UNILATERAL Left 09/13/2023    Procedure: ANGIOGRAM, LOWER ARTERIAL, UNILATERAL;  Surgeon: Maxx Maya MD;  Location: [facility] OR [address] [place] [address];  Service: Vascular;  Laterality: Left;    ANGIOGRAPHY OF LOWER EXTREMITY Left 09/14/2023    Procedure: ANGIOGRAM, LOWER EXTREMITY with balloon angioplasty ultraverse 5mm x 60mm;  Surgeon: Maxx Maya MD;  [facility];  Service: Vascular;  Laterality: Left;  ultraverse 5mm x 60mm  fluoro: 12.41  contrast: 44ml  mGy: 65.57  Gycm2: 23.50    AORTOGRAPHY WITH EXTREMITY RUNOFF Left 09/13/2023    Procedure: AORTOGRAM, WITH EXTREMITY RUNOFF;  Surgeon: Maxx Maya MD;  Location: [facility] OR [address] [place] [address];  Service: Vascular;  Laterality: Left;  8.1 min  663.63 mGy  176.39 Gy.cm  178ml Dye     AV FISTULA PLACEMENT Right     CHOLECYSTECTOMY      COLONOSCOPY  11/06/2013    normal    COLONOSCOPY  05/2023    cancelled due to inadequate prep    COLONOSCOPY  06/2023    results unknown    CREATION, BYPASS, ARTERIAL, AXILLARY TO BILATERAL FEMORAL Left 09/14/2023    Procedure: CREATION, BYPASS, ARTERIAL, AXILLARY TO BILATERAL FEMORAL;  Surgeon: Maxx Maya MD;  Location: [facility] OR [address] [place] [address];  Service: Vascular;  Laterality: Left;  Left Axillary to Bilateral Femoral Bypass, Left Femoral to Above Knee Popliteal Bypass; SLIDER BED    CYSTOSCOPY W/ URETERAL STENT PLACEMENT Right 08/28/2018    Procedure: CYSTOSCOPY, WITH URETERAL STENT INSERTION (ADD ON );  Surgeon: Bubba Perez MD;  Location: [facility] OR;  Service: Urology;  Laterality: Right;  (ADD ON )    DEBRIDEMENT OF FOOT Left 08/03/2023    Procedure: DEBRIDEMENT, FOOT;  Surgeon: Aleida Flannery DPM;  Location: [facility] OR;  Service: Podiatry;  Laterality: Left;    Excisional debridement of ulcer distal great toe left foot w/ partial resection distal phalanx Left 08/03/2023    FOOT AMPUTATION THROUGH METATARSAL Left 09/14/2023    Procedure: AMPUTATION, FOOT, TRANSMETATARSAL partial 1st ray amputation;  Surgeon: Jesus Valles  EDGAR GRAJEDA;  Location: [facility] OR 2ND FLR;  Service: Podiatry;  Laterality: Left;  partial ray    I&D L 1st ray w/ amputation L hallux IPJ Left 08/14/2023    INCISION AND DRAINAGE FOOT Left 09/14/2023    Procedure: INCISION AND DRAINAGE, FOOT;  Surgeon: Jesus Valles DPM;  Location: [facility] OR 2ND FLR;  Service: Podiatry;  Laterality: Left;    Injection L PT tendon sheath Left 08/14/2023    LENGTHENING OF ACHILLES TENDON Left 11/11/2024    Procedure: LENGTHENING, TENDON, ACHILLES;  Surgeon: Marco Allison DPM;  Location: [facility] OR;  Service: Podiatry;  Laterality: Left;    PERIPHERAL ARTERIAL STENT GRAFT Right     RECONSTRUCTION WITH FUSION OF CHARCOT FOOT Left 11/11/2024    Procedure: RECONSTRUCTION, CHARCOT FOOT, WITH FUSION;  Surgeon: Marco Allison DPM;  Location: [facility] OR;  Service: Podiatry;  Laterality: Left;  4 hours; foot tray; mini c arm; saw/drill    REMOVAL OF NAIL OF DIGIT Left 08/03/2023    Procedure: REMOVAL, NAIL, DIGIT great toe;  Surgeon: Aleida Flannery DPM;  Location: [facility] OR;  Service: Podiatry;  Laterality: Left;    REVISION, AMPUTATION, TRANSMETATARSAL Left 11/11/2024    Procedure: REVISION, AMPUTATION, TRANSMETATARSAL;  Surgeon: Marco Allison DPM;  Location: [facility] OR;  Service: Podiatry;  Laterality: Left;    STENT, SUPERFICIAL FEMORAL ARTERY Left 09/14/2023    Procedure: STENT, SUPERFICIAL FEMORAL ARTERY;  Surgeon: Maxx Maay MD;  Location: [facility] OR 2ND FLR;  Service: Vascular;  Laterality: Left;  5 x 100 mm    TOE AMPUTATION Left 08/14/2023    Procedure: AMPUTATION, TOE hallux IPJ;  Surgeon: Aleida Flannery DPM;  Location: [facility] OR;  Service: Podiatry;  Laterality: Left;    TOE AMPUTATION Left 08/25/2023    Procedure: AMPUTATION, TOE hallux, possible 1st met.head;  Surgeon: Aleida Flannery DPM;  Location: [facility] OR;  Service: Podiatry;  Laterality: Left;    URETEROSCOPIC REMOVAL OF URETERIC CALCULUS Right 09/11/2018    Procedure: EXTRACTION-STONE-URETEROSCOPY;  Surgeon: Bubba Perez MD;   Location: [facility] OR;  Service: Urology;  Laterality: Right;     Family History   Problem Relation Name Age of Onset    Breast cancer Mother Georgina 71        unilat, triple-negative    Uterine cancer Mother Georgina     Hypertension Mother Georgina     Diabetes Mother Georgina     Diabetes Father      Hypertension Father      Heart disease Father  37        cabg    Breast cancer Maternal Grandmother Hayde 75        tx: unilat mastect    Heart disease Maternal Grandmother Hayde     Hypertension Brother Cornelio     Heart attack Maternal Grandfather  44     Social History     Tobacco Use    Smoking status: Former     Current packs/day: 0.00     Types: Cigarettes     Quit date: 2006     Years since quittin.3    Smokeless tobacco: Never    Tobacco comments:     smoked off and on for 20 years, was up to 2 packs/day toward the end of that, and quit in    Substance Use Topics    Alcohol use: Not Currently     Comment: occ    Drug use: No     Review of Systems    Physical Exam     Initial Vitals [24 1303]   BP Pulse Resp Temp SpO2   (!) 142/63 103 20 98.2 °F (36.8 °C) 99 %      MAP       --         Physical Exam    Nursing note and vitals reviewed.  Constitutional: She appears well-developed and well-nourished. No distress.   HENT:   Head: Normocephalic. Mouth/Throat: Oropharynx is clear and moist.   Eyes: Conjunctivae are normal. Pupils are equal, round, and reactive to light. No scleral icterus.   Neck: Neck supple.   Cardiovascular:  Normal rate and regular rhythm.           Pulmonary/Chest: Breath sounds normal. No stridor. No respiratory distress.   Abdominal: Abdomen is soft. She exhibits no distension. There is no abdominal tenderness.   Musculoskeletal:         General: No edema.      Cervical back: Neck supple.      Comments: Left foot with cast in place     Neurological: She is alert. GCS score is 15. GCS eye subscore is 4. GCS verbal subscore is 5. GCS motor subscore is 6.   Skin: Skin is  warm and dry. No rash noted.         ED Course   Procedures  Labs Reviewed   CBC W/ AUTO DIFFERENTIAL - Abnormal       Result Value    WBC 11.50      RBC 2.33 (*)     Hemoglobin 7.4 (*)     Hematocrit 23.6 (*)      (*)     MCH 31.8 (*)     MCHC 31.4 (*)     RDW 13.9      Platelets 249      MPV 9.5      Immature Granulocytes 0.4      Gran # (ANC) 9.8 (*)     Immature Grans (Abs) 0.05 (*)     Lymph # 0.9 (*)     Mono # 0.6      Eos # 0.2      Baso # 0.04      nRBC 0      Gran % 85.0 (*)     Lymph % 7.9 (*)     Mono % 5.0      Eosinophil % 1.4      Basophil % 0.3      Differential Method Automated     COMPREHENSIVE METABOLIC PANEL - Abnormal    Sodium 140      Potassium 3.7      Chloride 100      CO2 27      Glucose 217 (*)     BUN 42 (*)     Creatinine 3.5 (*)     Calcium 9.7      Total Protein 7.5      Albumin 2.6 (*)     Total Bilirubin 0.3      Alkaline Phosphatase 102      AST 16      ALT 14      eGFR 14.9 (*)     Anion Gap 13     TYPE & SCREEN    Group & Rh B POS      Indirect Irvin NEG      Specimen Outdate 12/02/2024 23:59          ECG Results              EKG 12-lead (Final result)        Collection Time Result Time QRS Duration OHS QTC Calculation    11/29/24 14:40:22 11/29/24 14:53:45 84 466                     Final result by Interface, Lab In [facility] (11/29/24 14:53:50)                   Narrative:    Test Reason : R00.0,    Vent. Rate :  85 BPM     Atrial Rate :  85 BPM     P-R Int : 144 ms          QRS Dur :  84 ms      QT Int : 392 ms       P-R-T Axes :  48  35  52 degrees    QTcB Int : 466 ms    Normal sinus rhythm  Normal ECG  When compared with ECG of 26-Oct-2023 14:30,  No significant change was found  Confirmed by Mirza Faith ([id number]) on 11/29/2024 2:53:39 PM    Referred By: AAAREFERRAL SELF           Confirmed By: Mirza Faith                                  Imaging Results    None          Medications - No data to display  Medical Decision Making  54-year-old female presents to the ED for  evaluation of anemia noted on predialysis labs.  She denies any symptoms.    Patient is afebrile, hemodynamically stable, and in no acute distress on arrival.  She was hypertensive on arrival to the ED. She was initially slightly tachycardic at 103 bpm, which improved to 80 in the ED.    CBC with hemoglobin of 7.4.  I suspect patient had anemia on predialysis labs due to hemodilution which improved after fluid removal during dialysis.  She was asymptomatic at this time and does not require a transfusion.  Additionally her MCV is 101 consistent with macrocytic anemia, and her last iron studies were normal so I do not suspect her anemia is due to blood loss.  I discussed all findings, plan for discharge home, follow-up with PCP, and strict return precautions and patient expressed understanding and agreement.    Amount and/or Complexity of Data Reviewed  Labs: ordered. Decision-making details documented in ED Course.    Risk  Decision regarding hospitalization.                                      Clinical Impression:  Final diagnoses:  [R00.0] Tachycardia  [D64.9] Anemia, unspecified type (Primary)  [D53.9] Macrocytic anemia          ED Disposition Condition    Discharge Stable          ED Prescriptions    None       Follow-up Information       Follow up With Specialties Details Why Contact Info    Alonso Ling MD Internal Medicine   [address] [facility] [address]  [phone]      [facility] - Emergency Dept Emergency Medicine   [address] [facility] [address] [phone]             Pearl Latham MD  Resident  11/29/24 1559

## 2024-11-30 LAB
BACTERIA SPEC AEROBE CULT: ABNORMAL
BACTERIA SPEC ANAEROBE CULT: NORMAL

## 2024-11-30 NOTE — ED TRIAGE NOTES
Georgina Arias, a 54 y.o. female presents to the ED w/ complaint of low HH level. States her Nephrologist ordered test since previous test was low. Pt denies symptoms. Hx: Recent foot surgery.    Triage note:  Chief Complaint   Patient presents with    Post-op Problem     Recent foot reconstruction on 11/11/24, Hemoglobin dropped from 10.7 to 6.8 over 1 month. Pt is a dialysis pt, M,W,F      Review of patient's allergies indicates:   Allergen Reactions    Hydrocodone-acetaminophen Nausea And Vomiting, Rash and Other (See Comments)     Vicodin- severe rash  Lortab- head-spinning that leads to vomiting      Naproxen Anaphylaxis and Swelling     Hives (skin)^swelling  Hives (skin)^swelling  Hives (skin)^swelling    Sulfamethoxazole-trimethoprim Other (See Comments)     Caused JEROME; heart attack    Hydrocodone Nausea And Vomiting and Rash    Adhesive tape-silicones      blisters    Aspartame Hives    Penicillins Hives     Blisters (skin)^    Sulfamethoxazole Other (See Comments)    Trimethoprim Other (See Comments)    Acetaminophen Rash    Adhesive Rash     Past Medical History:   Diagnosis Date    Hyperlipidemia     Hypertension     Peptic ulcer disease     Peripheral artery disease     PONV (postoperative nausea and vomiting)     Stage IV CKD     Type II diabetes mellitus      Patient identifiers verified and correct for   LOC: The patient is awake, alert and aware of environment with an appropriate affect, the patient is oriented x 3 and speaking appropriately.   APPEARANCE: Patient appears comfortable and in no acute distress, patient is clean and well groomed.  SKIN: The skin is warm and dry, color consistent with ethnicity, patient has normal skin turgor and moist mucus membranes, skin intact, no breakdown or bruising noted.   MUSCULOSKELETAL: Patient moving all extremities spontaneously, no swelling noted. Left foot in plaster cast.  RESPIRATORY: Airway is open and patent, respirations are spontaneous,  patient has a normal effort and rate, no accessory muscle use noted, pt placed on continuous pulse ox with O2 sats noted at 97% on room air.  CARDIAC: Pt placed on cardiac monitor. Patient has a normal rate and regular rhythm, no edema noted, capillary refill < 3 seconds.   GASTRO: Soft and non tender to palpation, no distention noted, normoactive bowel sounds present in all four quadrants. Pt states bowel movements have been regular.  : Pt denies any pain or frequency with urination.  NEURO: Pt opens eyes spontaneously, behavior appropriate to situation, follows commands, facial expression symmetrical.

## 2024-12-09 ENCOUNTER — HOSPITAL ENCOUNTER (INPATIENT)
Facility: HOSPITAL | Age: 54
LOS: 7 days | Discharge: HOME HEALTH | DRG: 856 | End: 2024-12-16
Attending: STUDENT IN AN ORGANIZED HEALTH CARE EDUCATION/TRAINING PROGRAM | Admitting: STUDENT IN AN ORGANIZED HEALTH CARE EDUCATION/TRAINING PROGRAM
Payer: MEDICARE

## 2024-12-09 ENCOUNTER — TELEPHONE (OUTPATIENT)
Dept: PODIATRY | Facility: CLINIC | Age: 54
End: 2024-12-09
Payer: MEDICARE

## 2024-12-09 DIAGNOSIS — R07.9 CHEST PAIN: ICD-10-CM

## 2024-12-09 DIAGNOSIS — T14.8XXA WOUND DRAINAGE: ICD-10-CM

## 2024-12-09 DIAGNOSIS — T81.40XA POST-OPERATIVE INFECTION: ICD-10-CM

## 2024-12-09 DIAGNOSIS — M86.9 OSTEOMYELITIS OF LEFT FOOT, UNSPECIFIED TYPE: ICD-10-CM

## 2024-12-09 DIAGNOSIS — L03.90 CELLULITIS, UNSPECIFIED CELLULITIS SITE: ICD-10-CM

## 2024-12-09 DIAGNOSIS — N18.6 ESRD (END STAGE RENAL DISEASE): ICD-10-CM

## 2024-12-09 DIAGNOSIS — M86.9 OSTEOMYELITIS, UNSPECIFIED SITE, UNSPECIFIED TYPE: ICD-10-CM

## 2024-12-09 DIAGNOSIS — T14.8XXA WOUND INFECTION: Primary | ICD-10-CM

## 2024-12-09 DIAGNOSIS — L08.9 WOUND INFECTION: Primary | ICD-10-CM

## 2024-12-09 DIAGNOSIS — M86.072 ACUTE HEMATOGENOUS OSTEOMYELITIS OF LEFT FOOT: ICD-10-CM

## 2024-12-09 PROBLEM — M86.172 ACUTE OSTEOMYELITIS OF METATARSAL BONE OF LEFT FOOT: Status: ACTIVE | Noted: 2024-12-09

## 2024-12-09 LAB
ALBUMIN SERPL BCP-MCNC: 2.6 G/DL (ref 3.5–5.2)
ALLENS TEST: NORMAL
ALP SERPL-CCNC: 115 U/L (ref 40–150)
ALT SERPL W/O P-5'-P-CCNC: 14 U/L (ref 10–44)
ANION GAP SERPL CALC-SCNC: 13 MMOL/L (ref 8–16)
AST SERPL-CCNC: 47 U/L (ref 10–40)
BASOPHILS # BLD AUTO: 0.05 K/UL (ref 0–0.2)
BASOPHILS NFR BLD: 0.5 % (ref 0–1.9)
BILIRUB SERPL-MCNC: 0.4 MG/DL (ref 0.1–1)
BUN SERPL-MCNC: 44 MG/DL (ref 6–20)
CALCIUM SERPL-MCNC: 9.4 MG/DL (ref 8.7–10.5)
CHLORIDE SERPL-SCNC: 101 MMOL/L (ref 95–110)
CO2 SERPL-SCNC: 25 MMOL/L (ref 23–29)
CREAT SERPL-MCNC: 3.9 MG/DL (ref 0.5–1.4)
CRP SERPL-MCNC: 53.6 MG/L (ref 0–8.2)
DELSYS: NORMAL
DIFFERENTIAL METHOD BLD: ABNORMAL
EOSINOPHIL # BLD AUTO: 0.2 K/UL (ref 0–0.5)
EOSINOPHIL NFR BLD: 2.2 % (ref 0–8)
ERYTHROCYTE [DISTWIDTH] IN BLOOD BY AUTOMATED COUNT: 14.4 % (ref 11.5–14.5)
EST. GFR  (NO RACE VARIABLE): 13.1 ML/MIN/1.73 M^2
ESTIMATED AVG GLUCOSE: 105 MG/DL (ref 68–131)
GLUCOSE SERPL-MCNC: 138 MG/DL (ref 70–110)
HBA1C MFR BLD: 5.3 % (ref 4–5.6)
HCT VFR BLD AUTO: 28.6 % (ref 37–48.5)
HGB BLD-MCNC: 9 G/DL (ref 12–16)
IMM GRANULOCYTES # BLD AUTO: 0.05 K/UL (ref 0–0.04)
IMM GRANULOCYTES NFR BLD AUTO: 0.5 % (ref 0–0.5)
LDH SERPL L TO P-CCNC: 0.99 MMOL/L (ref 0.5–2.2)
LYMPHOCYTES # BLD AUTO: 1.5 K/UL (ref 1–4.8)
LYMPHOCYTES NFR BLD: 14.4 % (ref 18–48)
MAGNESIUM SERPL-MCNC: 2.1 MG/DL (ref 1.6–2.6)
MCH RBC QN AUTO: 31.6 PG (ref 27–31)
MCHC RBC AUTO-ENTMCNC: 31.5 G/DL (ref 32–36)
MCV RBC AUTO: 100 FL (ref 82–98)
MONOCYTES # BLD AUTO: 0.4 K/UL (ref 0.3–1)
MONOCYTES NFR BLD: 4.2 % (ref 4–15)
NEUTROPHILS # BLD AUTO: 8.3 K/UL (ref 1.8–7.7)
NEUTROPHILS NFR BLD: 78.2 % (ref 38–73)
NRBC BLD-RTO: 0 /100 WBC
PHOSPHATE SERPL-MCNC: 3.1 MG/DL (ref 2.7–4.5)
PLATELET # BLD AUTO: 252 K/UL (ref 150–450)
PMV BLD AUTO: 9.5 FL (ref 9.2–12.9)
POCT GLUCOSE: 178 MG/DL (ref 70–110)
POTASSIUM SERPL-SCNC: 4.6 MMOL/L (ref 3.5–5.1)
PROCALCITONIN SERPL IA-MCNC: 0.13 NG/ML
PROT SERPL-MCNC: 8 G/DL (ref 6–8.4)
RBC # BLD AUTO: 2.85 M/UL (ref 4–5.4)
SAMPLE: NORMAL
SITE: NORMAL
SODIUM SERPL-SCNC: 139 MMOL/L (ref 136–145)
WBC # BLD AUTO: 10.59 K/UL (ref 3.9–12.7)

## 2024-12-09 PROCEDURE — 84145 PROCALCITONIN (PCT): CPT | Performed by: NURSE PRACTITIONER

## 2024-12-09 PROCEDURE — 84100 ASSAY OF PHOSPHORUS: CPT | Performed by: EMERGENCY MEDICINE

## 2024-12-09 PROCEDURE — 63600175 PHARM REV CODE 636 W HCPCS: Performed by: STUDENT IN AN ORGANIZED HEALTH CARE EDUCATION/TRAINING PROGRAM

## 2024-12-09 PROCEDURE — 96374 THER/PROPH/DIAG INJ IV PUSH: CPT

## 2024-12-09 PROCEDURE — 25000003 PHARM REV CODE 250: Performed by: NURSE PRACTITIONER

## 2024-12-09 PROCEDURE — 87040 BLOOD CULTURE FOR BACTERIA: CPT | Mod: 59 | Performed by: STUDENT IN AN ORGANIZED HEALTH CARE EDUCATION/TRAINING PROGRAM

## 2024-12-09 PROCEDURE — 82962 GLUCOSE BLOOD TEST: CPT

## 2024-12-09 PROCEDURE — 85025 COMPLETE CBC W/AUTO DIFF WBC: CPT | Performed by: EMERGENCY MEDICINE

## 2024-12-09 PROCEDURE — 63600175 PHARM REV CODE 636 W HCPCS: Performed by: NURSE PRACTITIONER

## 2024-12-09 PROCEDURE — 99285 EMERGENCY DEPT VISIT HI MDM: CPT | Mod: 25

## 2024-12-09 PROCEDURE — 80053 COMPREHEN METABOLIC PANEL: CPT | Performed by: EMERGENCY MEDICINE

## 2024-12-09 PROCEDURE — 86140 C-REACTIVE PROTEIN: CPT | Performed by: NURSE PRACTITIONER

## 2024-12-09 PROCEDURE — 83735 ASSAY OF MAGNESIUM: CPT | Performed by: EMERGENCY MEDICINE

## 2024-12-09 PROCEDURE — 12000002 HC ACUTE/MED SURGE SEMI-PRIVATE ROOM

## 2024-12-09 PROCEDURE — 83605 ASSAY OF LACTIC ACID: CPT

## 2024-12-09 PROCEDURE — 99900035 HC TECH TIME PER 15 MIN (STAT)

## 2024-12-09 PROCEDURE — 83036 HEMOGLOBIN GLYCOSYLATED A1C: CPT | Performed by: EMERGENCY MEDICINE

## 2024-12-09 RX ORDER — CEFEPIME HYDROCHLORIDE 1 G/1
1 INJECTION, POWDER, FOR SOLUTION INTRAMUSCULAR; INTRAVENOUS
Status: DISCONTINUED | OUTPATIENT
Start: 2024-12-10 | End: 2024-12-16 | Stop reason: HOSPADM

## 2024-12-09 RX ORDER — IBUPROFEN 200 MG
16 TABLET ORAL
Status: DISCONTINUED | OUTPATIENT
Start: 2024-12-09 | End: 2024-12-16 | Stop reason: HOSPADM

## 2024-12-09 RX ORDER — CETIRIZINE HYDROCHLORIDE 5 MG/1
5 TABLET ORAL DAILY
Status: DISCONTINUED | OUTPATIENT
Start: 2024-12-10 | End: 2024-12-16 | Stop reason: HOSPADM

## 2024-12-09 RX ORDER — CARVEDILOL 25 MG/1
25 TABLET ORAL 2 TIMES DAILY WITH MEALS
Status: DISCONTINUED | OUTPATIENT
Start: 2024-12-10 | End: 2024-12-16 | Stop reason: HOSPADM

## 2024-12-09 RX ORDER — GABAPENTIN 100 MG/1
200 CAPSULE ORAL 3 TIMES DAILY
Status: DISCONTINUED | OUTPATIENT
Start: 2024-12-09 | End: 2024-12-16 | Stop reason: HOSPADM

## 2024-12-09 RX ORDER — PROMETHAZINE HYDROCHLORIDE 12.5 MG/1
12.5 TABLET ORAL EVERY 6 HOURS PRN
Status: DISCONTINUED | OUTPATIENT
Start: 2024-12-09 | End: 2024-12-16 | Stop reason: HOSPADM

## 2024-12-09 RX ORDER — CEFEPIME HYDROCHLORIDE 1 G/1
1 INJECTION, POWDER, FOR SOLUTION INTRAMUSCULAR; INTRAVENOUS
Status: COMPLETED | OUTPATIENT
Start: 2024-12-09 | End: 2024-12-09

## 2024-12-09 RX ORDER — SODIUM CHLORIDE 0.9 % (FLUSH) 0.9 %
10 SYRINGE (ML) INJECTION EVERY 12 HOURS PRN
Status: DISCONTINUED | OUTPATIENT
Start: 2024-12-09 | End: 2024-12-16 | Stop reason: HOSPADM

## 2024-12-09 RX ORDER — ONDANSETRON HYDROCHLORIDE 2 MG/ML
4 INJECTION, SOLUTION INTRAVENOUS EVERY 8 HOURS PRN
Status: DISCONTINUED | OUTPATIENT
Start: 2024-12-09 | End: 2024-12-09

## 2024-12-09 RX ORDER — ONDANSETRON HYDROCHLORIDE 2 MG/ML
4 INJECTION, SOLUTION INTRAVENOUS EVERY 8 HOURS PRN
Status: DISCONTINUED | OUTPATIENT
Start: 2024-12-09 | End: 2024-12-16 | Stop reason: HOSPADM

## 2024-12-09 RX ORDER — GLUCAGON 1 MG
1 KIT INJECTION
Status: DISCONTINUED | OUTPATIENT
Start: 2024-12-09 | End: 2024-12-16 | Stop reason: HOSPADM

## 2024-12-09 RX ORDER — IBUPROFEN 200 MG
24 TABLET ORAL
Status: DISCONTINUED | OUTPATIENT
Start: 2024-12-09 | End: 2024-12-16 | Stop reason: HOSPADM

## 2024-12-09 RX ORDER — HYDROCODONE BITARTRATE AND ACETAMINOPHEN 10; 325 MG/1; MG/1
1 TABLET ORAL EVERY 6 HOURS PRN
Status: DISCONTINUED | OUTPATIENT
Start: 2024-12-09 | End: 2024-12-16 | Stop reason: HOSPADM

## 2024-12-09 RX ORDER — NALOXONE HCL 0.4 MG/ML
0.02 VIAL (ML) INJECTION
Status: DISCONTINUED | OUTPATIENT
Start: 2024-12-09 | End: 2024-12-16 | Stop reason: HOSPADM

## 2024-12-09 RX ORDER — HEPARIN SODIUM 5000 [USP'U]/ML
5000 INJECTION, SOLUTION INTRAVENOUS; SUBCUTANEOUS EVERY 8 HOURS
Status: DISCONTINUED | OUTPATIENT
Start: 2024-12-09 | End: 2024-12-16 | Stop reason: HOSPADM

## 2024-12-09 RX ORDER — CARVEDILOL 12.5 MG/1
25 TABLET ORAL 2 TIMES DAILY WITH MEALS
Status: DISCONTINUED | OUTPATIENT
Start: 2024-12-09 | End: 2024-12-09

## 2024-12-09 RX ORDER — SEVELAMER CARBONATE 800 MG/1
800 TABLET, FILM COATED ORAL
Status: DISCONTINUED | OUTPATIENT
Start: 2024-12-10 | End: 2024-12-16 | Stop reason: HOSPADM

## 2024-12-09 RX ORDER — ACETAMINOPHEN 325 MG/1
650 TABLET ORAL EVERY 6 HOURS PRN
Status: DISCONTINUED | OUTPATIENT
Start: 2024-12-09 | End: 2024-12-16 | Stop reason: HOSPADM

## 2024-12-09 RX ORDER — ATORVASTATIN CALCIUM 40 MG/1
80 TABLET, FILM COATED ORAL NIGHTLY
Status: DISCONTINUED | OUTPATIENT
Start: 2024-12-09 | End: 2024-12-16 | Stop reason: HOSPADM

## 2024-12-09 RX ORDER — INSULIN ASPART 100 [IU]/ML
0-5 INJECTION, SOLUTION INTRAVENOUS; SUBCUTANEOUS
Status: DISCONTINUED | OUTPATIENT
Start: 2024-12-09 | End: 2024-12-16 | Stop reason: HOSPADM

## 2024-12-09 RX ORDER — FLUTICASONE PROPIONATE 50 MCG
1 SPRAY, SUSPENSION (ML) NASAL DAILY
Status: DISCONTINUED | OUTPATIENT
Start: 2024-12-10 | End: 2024-12-16 | Stop reason: HOSPADM

## 2024-12-09 RX ORDER — GABAPENTIN 100 MG/1
100 CAPSULE ORAL NIGHTLY
Status: DISCONTINUED | OUTPATIENT
Start: 2024-12-09 | End: 2024-12-16 | Stop reason: HOSPADM

## 2024-12-09 RX ORDER — TALC
6 POWDER (GRAM) TOPICAL NIGHTLY PRN
Status: DISCONTINUED | OUTPATIENT
Start: 2024-12-09 | End: 2024-12-16 | Stop reason: HOSPADM

## 2024-12-09 RX ADMIN — GABAPENTIN 100 MG: 100 CAPSULE ORAL at 08:12

## 2024-12-09 RX ADMIN — HEPARIN SODIUM 5000 UNITS: 5000 INJECTION INTRAVENOUS; SUBCUTANEOUS at 10:12

## 2024-12-09 RX ADMIN — VANCOMYCIN HYDROCHLORIDE 2250 MG: 1.25 INJECTION, POWDER, LYOPHILIZED, FOR SOLUTION INTRAVENOUS at 11:12

## 2024-12-09 RX ADMIN — CEFEPIME 1 G: 1 INJECTION, POWDER, FOR SOLUTION INTRAMUSCULAR; INTRAVENOUS at 03:12

## 2024-12-09 RX ADMIN — GABAPENTIN 200 MG: 100 CAPSULE ORAL at 08:12

## 2024-12-09 RX ADMIN — ATORVASTATIN CALCIUM 80 MG: 40 TABLET, FILM COATED ORAL at 09:12

## 2024-12-09 NOTE — ED TRIAGE NOTES
Georgina Arias, a 54 y.o. female presents to the ED w/ complaint of drainage from left foot cast.    Triage note:  Chief Complaint   Patient presents with    Leg Pain     Left leg pain, surgery last month, leaking from cast report odorous drainage      Review of patient's allergies indicates:   Allergen Reactions    Hydrocodone-acetaminophen Nausea And Vomiting, Rash and Other (See Comments)     Vicodin- severe rash  Lortab- head-spinning that leads to vomiting      Naproxen Anaphylaxis and Swelling     Hives (skin)^swelling  Hives (skin)^swelling  Hives (skin)^swelling    Sulfamethoxazole-trimethoprim Other (See Comments)     Caused JEROME; heart attack    Hydrocodone Nausea And Vomiting and Rash    Adhesive tape-silicones      blisters    Aspartame Hives    Penicillins Hives     Blisters (skin)^    Sulfamethoxazole Other (See Comments)    Trimethoprim Other (See Comments)    Acetaminophen Rash    Adhesive Rash     Past Medical History:   Diagnosis Date    Hyperlipidemia     Hypertension     Peptic ulcer disease     Peripheral artery disease     PONV (postoperative nausea and vomiting)     Stage IV CKD     Type II diabetes mellitus

## 2024-12-09 NOTE — Clinical Note
Diagnosis: Wound drainage [804910]   Future Attending Provider: ONDINA MCKEON [[id number]]   Reason for IP Medical Treatment  (Clinical interventions that can only be accomplished in the IP setting? ) :: wound infection   Plans for Post-Acute care--if anticipated (pick the single best option):: A. No post acute care anticipated at this time

## 2024-12-09 NOTE — FIRST PROVIDER EVALUATION
Medical screening examination initiated.  I have conducted a focused provider triage encounter, findings are as follows:    Brief history of present illness:  55yo F with hx charcot foot s/p reconstruction 11/11, DM, ESRD on HD (completed dialysis this morning), HTN, HLD presenting from HD due to leaking from cast. Last cast change 11/29. Concern for infection vs bleeding.     Vitals:    12/09/24 1234   BP: (!) 142/64   Pulse: 97   Resp: 18   Temp: 98.9 °F (37.2 °C)   TempSrc: Oral   SpO2: 100%   Weight: 113.4 kg (250 lb)       Pertinent physical exam:  cast in place LLE    Brief workup plan:  CBC, CMP    Preliminary workup initiated; this workup will be continued and followed by the physician or advanced practice provider that is assigned to the patient when roomed.

## 2024-12-09 NOTE — TELEPHONE ENCOUNTER
"Spoke with patient in regards to her message sent concerning cast leaking blood, informed her Dr. Allison(Podiatry) is on surgery call and not in office and will forward message to him for further assistance. Patient verbally understood telephone encounter          ----- Message from Amena sent at 12/9/2024  8:33 AM CST -----  Regarding: pt advice  Contact: [phone]  .Name Of Caller: Self     Contact Preference?:[phone]     What is the nature of the call?: in reference to pt cast leaking, needing to know what do , pls call      Additional Notes:  "Thank you for all that you do for our patients"  "

## 2024-12-09 NOTE — ED PROVIDER NOTES
Encounter Date: 12/9/2024       History     Chief Complaint   Patient presents with    Leg Pain     Left leg pain, surgery last month, leaking from cast report odorous drainage      HPI    54-year-old female past medical history diabetes, CKD on HD, hypertension who presents to the ER for evaluation of a wound.    Patient had a significant operation done on her left foot for Charcot complications. This was done by Podiatry recently.  She was sent in today with worsening drainage from the wound from dialysis.  She is on HD Monday/Wednesday/Friday.    Patient has a hard cast on the left foot, and has not seen the wounds for about 2 weeks.    She denies any fevers, chills, syncope, nausea, vomiting, abdominal pain, or any additional trauma.      Review of patient's allergies indicates:   Allergen Reactions    Hydrocodone-acetaminophen Nausea And Vomiting, Rash and Other (See Comments)     Vicodin- severe rash  Lortab- head-spinning that leads to vomiting      Naproxen Anaphylaxis and Swelling     Hives (skin)^swelling  Hives (skin)^swelling  Hives (skin)^swelling    Sulfamethoxazole-trimethoprim Other (See Comments)     Caused JEROME; heart attack    Hydrocodone Nausea And Vomiting and Rash    Adhesive tape-silicones      blisters    Aspartame Hives    Penicillins Hives     Blisters (skin)^    Sulfamethoxazole Other (See Comments)    Trimethoprim Other (See Comments)    Acetaminophen Rash    Adhesive Rash     Past Medical History:   Diagnosis Date    Hyperlipidemia     Hypertension     Peptic ulcer disease     Peripheral artery disease     PONV (postoperative nausea and vomiting)     Stage IV CKD     Type II diabetes mellitus      Past Surgical History:   Procedure Laterality Date    ANGIOGRAM, LOWER ARTERIAL, UNILATERAL Left 09/13/2023    Procedure: ANGIOGRAM, LOWER ARTERIAL, UNILATERAL;  Surgeon: Maxx Maya MD;  Location: [facility] OR [address] [place] [address];  Service: Vascular;  Laterality: Left;    ANGIOGRAPHY OF LOWER EXTREMITY  Left 09/14/2023    Procedure: ANGIOGRAM, LOWER EXTREMITY with balloon angioplasty ultraverse 5mm x 60mm;  Surgeon: Maxx Maya MD;  Location: [facility] OR [address] [place] [address];  Service: Vascular;  Laterality: Left;  ultraverse 5mm x 60mm  fluoro: 12.41  contrast: 44ml  mGy: 65.57  Gycm2: 23.50    AORTOGRAPHY WITH EXTREMITY RUNOFF Left 09/13/2023    Procedure: AORTOGRAM, WITH EXTREMITY RUNOFF;  Surgeon: Maxx Maya MD;  [facility];  Service: Vascular;  Laterality: Left;  8.1 min  663.63 mGy  176.39 Gy.cm  178ml Dye     AV FISTULA PLACEMENT Right     CHOLECYSTECTOMY      COLONOSCOPY  11/06/2013    normal    COLONOSCOPY  05/2023    cancelled due to inadequate prep    COLONOSCOPY  06/2023    results unknown    CREATION, BYPASS, ARTERIAL, AXILLARY TO BILATERAL FEMORAL Left 09/14/2023    Procedure: CREATION, BYPASS, ARTERIAL, AXILLARY TO BILATERAL FEMORAL;  Surgeon: Maxx Maya MD;  Location: [facility] OR [address] [place] [address];  Service: Vascular;  Laterality: Left;  Left Axillary to Bilateral Femoral Bypass, Left Femoral to Above Knee Popliteal Bypass; SLIDER BED    CYSTOSCOPY W/ URETERAL STENT PLACEMENT Right 08/28/2018    Procedure: CYSTOSCOPY, WITH URETERAL STENT INSERTION (ADD ON );  Surgeon: Bubba Perez MD;  Location: [facility];  Service: Urology;  Laterality: Right;  (ADD ON )    DEBRIDEMENT OF FOOT Left 08/03/2023    Procedure: DEBRIDEMENT, FOOT;  Surgeon: Aleida Flannery DPM;  Location: [facility] OR;  Service: Podiatry;  Laterality: Left;    Excisional debridement of ulcer distal great toe left foot w/ partial resection distal phalanx Left 08/03/2023    FOOT AMPUTATION THROUGH METATARSAL Left 09/14/2023    Procedure: AMPUTATION, FOOT, TRANSMETATARSAL partial 1st ray amputation;  Surgeon: Jesus Valles DPM;  Location: [facility] OR [address] [place] [address];  Service: Podiatry;  Laterality: Left;  partial ray    I&D L 1st ray w/ amputation L hallux IPJ Left 08/14/2023    INCISION AND DRAINAGE FOOT Left 09/14/2023    Procedure: INCISION AND  DRAINAGE, FOOT;  Surgeon: Jesus Valles DPM;  Location: [facility] OR 2ND FLR;  Service: Podiatry;  Laterality: Left;    Injection L PT tendon sheath Left 08/14/2023    LENGTHENING OF ACHILLES TENDON Left 11/11/2024    Procedure: LENGTHENING, TENDON, ACHILLES;  Surgeon: Marco Allison DPM;  Location: [facility] OR;  Service: Podiatry;  Laterality: Left;    PERIPHERAL ARTERIAL STENT GRAFT Right     RECONSTRUCTION WITH FUSION OF CHARCOT FOOT Left 11/11/2024    Procedure: RECONSTRUCTION, CHARCOT FOOT, WITH FUSION;  Surgeon: Marco Allison DPM;  Location: [facility] OR;  Service: Podiatry;  Laterality: Left;  4 hours; foot tray; mini c arm; saw/drill    REMOVAL OF NAIL OF DIGIT Left 08/03/2023    Procedure: REMOVAL, NAIL, DIGIT great toe;  Surgeon: Aleida Flannery DPM;  Location: [facility] OR;  Service: Podiatry;  Laterality: Left;    REVISION, AMPUTATION, TRANSMETATARSAL Left 11/11/2024    Procedure: REVISION, AMPUTATION, TRANSMETATARSAL;  Surgeon: Marco Allison DPM;  Location: [facility] OR;  Service: Podiatry;  Laterality: Left;    STENT, SUPERFICIAL FEMORAL ARTERY Left 09/14/2023    Procedure: STENT, SUPERFICIAL FEMORAL ARTERY;  Surgeon: Maxx Maya MD;  Location: [facility] OR 2ND FLR;  Service: Vascular;  Laterality: Left;  5 x 100 mm    TOE AMPUTATION Left 08/14/2023    Procedure: AMPUTATION, TOE hallux IPJ;  Surgeon: Aleida Flannery DPM;  Location: [facility] OR;  Service: Podiatry;  Laterality: Left;    TOE AMPUTATION Left 08/25/2023    Procedure: AMPUTATION, TOE hallux, possible 1st met.head;  Surgeon: Aleida Flannery DPM;  Location: [facility] OR;  Service: Podiatry;  Laterality: Left;    URETEROSCOPIC REMOVAL OF URETERIC CALCULUS Right 09/11/2018    Procedure: EXTRACTION-STONE-URETEROSCOPY;  Surgeon: Bubba Perez MD;  Location: [facility] OR;  Service: Urology;  Laterality: Right;     Family History   Problem Relation Name Age of Onset    Breast cancer Mother Georgina 71        unilat, triple-negative    Uterine cancer Mother Georgina      Hypertension Mother Georgina     Diabetes Mother Georgina     Diabetes Father      Hypertension Father      Heart disease Father  37        cabg    Breast cancer Maternal Grandmother Hayde 75        tx: unilat mastect    Heart disease Maternal Grandmother Hayde     Hypertension Brother Cornelio     Heart attack Maternal Grandfather  44     Social History     Tobacco Use    Smoking status: Former     Current packs/day: 0.00     Types: Cigarettes     Quit date: 2006     Years since quittin.3    Smokeless tobacco: Never    Tobacco comments:     smoked off and on for 20 years, was up to 2 packs/day toward the end of that, and quit in    Substance Use Topics    Alcohol use: Not Currently     Comment: occ    Drug use: No     Review of Systems   Skin:  Positive for wound.       Physical Exam     Initial Vitals [24 1234]   BP Pulse Resp Temp SpO2   (!) 142/64 97 18 98.9 °F (37.2 °C) 100 %      MAP       --         Physical Exam    Nursing note and vitals reviewed.  Constitutional: She appears well-nourished.   HENT:   Head: Atraumatic.   Eyes: EOM are normal.   Cardiovascular:  Normal rate and regular rhythm.           Pulmonary/Chest: Breath sounds normal. No respiratory distress.   Musculoskeletal:         General: No edema. Normal range of motion.     Neurological: She is alert and oriented to person, place, and time.   Skin:   Hard cast on the left foot, there is drainage of black liquid from the bottom; see media for images after cast was removed   Psychiatric: She has a normal mood and affect. Thought content normal.         ED Course   Procedures  Labs Reviewed   CBC W/ AUTO DIFFERENTIAL - Abnormal       Result Value    WBC 10.59      RBC 2.85 (*)     Hemoglobin 9.0 (*)     Hematocrit 28.6 (*)      (*)     MCH 31.6 (*)     MCHC 31.5 (*)     RDW 14.4      Platelets 252      MPV 9.5      Immature Granulocytes 0.5      Gran # (ANC) 8.3 (*)     Immature Grans (Abs) 0.05 (*)     Lymph # 1.5       Mono # 0.4      Eos # 0.2      Baso # 0.05      nRBC 0      Gran % 78.2 (*)     Lymph % 14.4 (*)     Mono % 4.2      Eosinophil % 2.2      Basophil % 0.5      Differential Method Automated     COMPREHENSIVE METABOLIC PANEL - Abnormal    Sodium 139      Potassium 4.6      Chloride 101      CO2 25      Glucose 138 (*)     BUN 44 (*)     Creatinine 3.9 (*)     Calcium 9.4      Total Protein 8.0      Albumin 2.6 (*)     Total Bilirubin 0.4      Alkaline Phosphatase 115      AST 47 (*)     ALT 14      eGFR 13.1 (*)     Anion Gap 13     CULTURE, BLOOD   CULTURE, BLOOD   HEMOGLOBIN A1C   MAGNESIUM   PHOSPHORUS   MAGNESIUM    Magnesium 2.1      Narrative:     ADD ON MAGNESIUM AND PHOS PER BRANDEE LERNER,NP/ORDER# [id number] AND   [id number] @ 18:16   PHOSPHORUS    Phosphorus 3.1      Narrative:     ADD ON MAGNESIUM AND PHOS PER BRANDEE LERNER,NP/ORDER# [id number] AND   [id number] @ 18:16   HEMOGLOBIN A1C    Hemoglobin A1C 5.3      Estimated Avg Glucose 105      Narrative:     ADD ON MAGNESIUM AND PHOS PER BRANDEE LERNER,NP/ORDER# [id number] AND   [id number] @ 18:16   URINALYSIS, REFLEX TO URINE CULTURE   C-REACTIVE PROTEIN   PROCALCITONIN   ISTAT LACTATE    POC Lactate 0.99      Sample VENOUS      Site Other      Allens Test N/A      DelSys Room Air            Imaging Results              X-Ray Ankle Complete Left (Final result)  Result time 12/09/24 17:01:00      Final result by Michael Ervin MD (12/09/24 17:01:00)                   Impression:      1. No acute displaced fracture or dislocation of the ankle.  2. There are surgical changes of the foot, comparison to the previous exam is limited given casting material and artifact at that time.      Electronically signed by: Michael Ervin MD  Date:    12/09/2024  Time:    17:01               Narrative:    EXAMINATION:  XR ANKLE COMPLETE 3 VIEW LEFT    CLINICAL HISTORY:  Other injury of unspecified body region, initial encounter    TECHNIQUE:  AP, lateral and  oblique views of the left ankle were performed.    COMPARISON:  Foot radiograph 11/30/2024    FINDINGS:  Three views left ankle.    The ankle mortise is intact.  No acute displaced fracture or dislocation of the ankle.  There is osteopenia.  There is edema about the ankle.  There are surgical changes of the foot, similar in appearance to the previous examination although on that exam, examination is limited given artifact from casting material at that time.                                        X-Ray Foot Complete Left (Final result)  Result time 12/09/24 17:03:48      Final result by Michael Ervin MD (12/09/24 17:03:48)                   Impression:      This report was flagged in Epic as abnormal.    1. There is been interval development of lucency about the proximal aspect of the 3rd metatarsal about the head of the screw construct.  Findings are concerning for osteomyelitis.  2. No convincing acute displaced fracture or dislocation of the foot noting chronic changes throughout the midfoot and surgical changes, similar in configuration.  3. There is diffuse edema about the foot, new since the prior exam.  Correlation for cellulitis as warranted.      Electronically signed by: Michael Ervin MD  Date:    12/09/2024  Time:    17:03               Narrative:    EXAMINATION:  XR FOOT COMPLETE 3 VIEW LEFT    CLINICAL HISTORY:  .  Other injury of unspecified body region, initial encounter    TECHNIQUE:  AP, lateral and oblique views of the left foot were performed.    COMPARISON:  11/30/2024    FINDINGS:  Three views left foot.    There is diffuse edema about the foot, worsened since comparative exam of 11/30/2024.  Surgical changes are noted of the foot, overall configuration appears stable given differences in positioning.  No convincing acute displaced fracture or dislocation of the foot although comparison with the previous exam is limited given artifact from casting material at that time.  There is chronic  degenerative change/destructive change of the midfoot.    There is been interval development of lucency involving the proximal aspect of the 3rd metatarsal about the screw construct.                                       Medications   atorvastatin tablet 80 mg (has no administration in time range)   fluticasone propionate 50 mcg/actuation nasal spray 50 mcg (has no administration in time range)   gabapentin capsule 200 mg (has no administration in time range)     And   gabapentin capsule 100 mg (has no administration in time range)   cetirizine tablet 5 mg (has no administration in time range)   sevelamer carbonate tablet 800 mg (has no administration in time range)   sodium chloride 0.9% flush 10 mL (has no administration in time range)   naloxone 0.4 mg/mL injection 0.02 mg (has no administration in time range)   glucose chewable tablet 16 g (has no administration in time range)   glucose chewable tablet 24 g (has no administration in time range)   glucagon (human recombinant) injection 1 mg (has no administration in time range)   heparin (porcine) injection 5,000 Units (has no administration in time range)   acetaminophen tablet 650 mg (has no administration in time range)   ondansetron injection 4 mg (has no administration in time range)   insulin aspart U-100 pen 0-5 Units (has no administration in time range)   melatonin tablet 6 mg (has no administration in time range)   dextrose 10% bolus 125 mL 125 mL (has no administration in time range)   dextrose 10% bolus 250 mL 250 mL (has no administration in time range)   carvediloL tablet 25 mg (has no administration in time range)   ceFEPIme injection 1 g (1 g Intravenous Given 12/9/24 1528)     Medical Decision Making  Amount and/or Complexity of Data Reviewed  Labs: ordered. Decision-making details documented in ED Course.  Radiology: ordered and independent interpretation performed. Decision-making details documented in ED Course.    Risk  Prescription drug  management.  Decision regarding hospitalization.               ED Course as of 12/09/24 1844   Mon Dec 09, 2024   1510 Discussed with podiatry on call, they will come see the patient. [DR]      ED Course User Index  [DR] Julio C Martin MD                       54-year-old female presenting with wound drainage.    Vital signs stable in emergency room, physical exam as above.    Sepsis workup started, no leukocytosis, electrolytes are normal, chronic CKD.    Lactate is normal.    Gave cefepime based on prior sensitivities.  X-ray with possible osteo.    Podiatry was consulted and they evaluated the patient in the ED. They removed cast and washed/dressed the wound.  Patient to be admitted to hospitalist, she is agreeable with plan.      Clinical Impression:  Final diagnoses:  [T14.8XXA] Wound drainage  [T14.8XXA, L08.9] Wound infection (Primary)          ED Disposition Condition    Admit Stable                Julio C Martin MD  12/09/24 0024

## 2024-12-10 PROBLEM — M86.9 OSTEOMYELITIS: Status: ACTIVE | Noted: 2024-12-10

## 2024-12-10 PROBLEM — L03.90 CELLULITIS: Status: ACTIVE | Noted: 2024-12-10

## 2024-12-10 PROBLEM — T81.40XA POST-OPERATIVE INFECTION: Status: ACTIVE | Noted: 2024-12-09

## 2024-12-10 LAB
ALBUMIN SERPL BCP-MCNC: 2.3 G/DL (ref 3.5–5.2)
ALP SERPL-CCNC: 94 U/L (ref 40–150)
ALT SERPL W/O P-5'-P-CCNC: 11 U/L (ref 10–44)
ANION GAP SERPL CALC-SCNC: 12 MMOL/L (ref 8–16)
AST SERPL-CCNC: 16 U/L (ref 10–40)
BASOPHILS # BLD AUTO: 0.05 K/UL (ref 0–0.2)
BASOPHILS NFR BLD: 0.7 % (ref 0–1.9)
BILIRUB SERPL-MCNC: 0.4 MG/DL (ref 0.1–1)
BUN SERPL-MCNC: 55 MG/DL (ref 6–20)
CALCIUM SERPL-MCNC: 8.7 MG/DL (ref 8.7–10.5)
CHLORIDE SERPL-SCNC: 104 MMOL/L (ref 95–110)
CHOLEST SERPL-MCNC: 59 MG/DL (ref 120–199)
CHOLEST/HDLC SERPL: 2.5 {RATIO} (ref 2–5)
CO2 SERPL-SCNC: 25 MMOL/L (ref 23–29)
CREAT SERPL-MCNC: 4.4 MG/DL (ref 0.5–1.4)
DIFFERENTIAL METHOD BLD: ABNORMAL
EOSINOPHIL # BLD AUTO: 0.2 K/UL (ref 0–0.5)
EOSINOPHIL NFR BLD: 3.1 % (ref 0–8)
ERYTHROCYTE [DISTWIDTH] IN BLOOD BY AUTOMATED COUNT: 14.1 % (ref 11.5–14.5)
EST. GFR  (NO RACE VARIABLE): 11.3 ML/MIN/1.73 M^2
GLUCOSE SERPL-MCNC: 105 MG/DL (ref 70–110)
HCT VFR BLD AUTO: 26.2 % (ref 37–48.5)
HDLC SERPL-MCNC: 24 MG/DL (ref 40–75)
HDLC SERPL: 40.7 % (ref 20–50)
HGB BLD-MCNC: 8.2 G/DL (ref 12–16)
IMM GRANULOCYTES # BLD AUTO: 0.04 K/UL (ref 0–0.04)
IMM GRANULOCYTES NFR BLD AUTO: 0.5 % (ref 0–0.5)
LDLC SERPL CALC-MCNC: 14 MG/DL (ref 63–159)
LYMPHOCYTES # BLD AUTO: 1.3 K/UL (ref 1–4.8)
LYMPHOCYTES NFR BLD: 16.4 % (ref 18–48)
MAGNESIUM SERPL-MCNC: 2 MG/DL (ref 1.6–2.6)
MCH RBC QN AUTO: 31.4 PG (ref 27–31)
MCHC RBC AUTO-ENTMCNC: 31.3 G/DL (ref 32–36)
MCV RBC AUTO: 100 FL (ref 82–98)
MONOCYTES # BLD AUTO: 0.6 K/UL (ref 0.3–1)
MONOCYTES NFR BLD: 7.6 % (ref 4–15)
NEUTROPHILS # BLD AUTO: 5.5 K/UL (ref 1.8–7.7)
NEUTROPHILS NFR BLD: 71.7 % (ref 38–73)
NONHDLC SERPL-MCNC: 35 MG/DL
NRBC BLD-RTO: 0 /100 WBC
PHOSPHATE SERPL-MCNC: 4.7 MG/DL (ref 2.7–4.5)
PLATELET # BLD AUTO: 223 K/UL (ref 150–450)
PMV BLD AUTO: 9 FL (ref 9.2–12.9)
POCT GLUCOSE: 103 MG/DL (ref 70–110)
POCT GLUCOSE: 109 MG/DL (ref 70–110)
POCT GLUCOSE: 153 MG/DL (ref 70–110)
POCT GLUCOSE: 156 MG/DL (ref 70–110)
POTASSIUM SERPL-SCNC: 4.2 MMOL/L (ref 3.5–5.1)
PROT SERPL-MCNC: 6.5 G/DL (ref 6–8.4)
RBC # BLD AUTO: 2.61 M/UL (ref 4–5.4)
SODIUM SERPL-SCNC: 141 MMOL/L (ref 136–145)
TRIGL SERPL-MCNC: 105 MG/DL (ref 30–150)
WBC # BLD AUTO: 7.68 K/UL (ref 3.9–12.7)

## 2024-12-10 PROCEDURE — 25000003 PHARM REV CODE 250: Performed by: NURSE PRACTITIONER

## 2024-12-10 PROCEDURE — 87075 CULTR BACTERIA EXCEPT BLOOD: CPT

## 2024-12-10 PROCEDURE — 36415 COLL VENOUS BLD VENIPUNCTURE: CPT | Performed by: NURSE PRACTITIONER

## 2024-12-10 PROCEDURE — 80061 LIPID PANEL: CPT | Performed by: NURSE PRACTITIONER

## 2024-12-10 PROCEDURE — 84100 ASSAY OF PHOSPHORUS: CPT | Performed by: NURSE PRACTITIONER

## 2024-12-10 PROCEDURE — 99222 1ST HOSP IP/OBS MODERATE 55: CPT | Mod: ,,, | Performed by: NURSE PRACTITIONER

## 2024-12-10 PROCEDURE — 80053 COMPREHEN METABOLIC PANEL: CPT | Performed by: NURSE PRACTITIONER

## 2024-12-10 PROCEDURE — 99024 POSTOP FOLLOW-UP VISIT: CPT | Mod: ,,, | Performed by: PODIATRIST

## 2024-12-10 PROCEDURE — 85025 COMPLETE CBC W/AUTO DIFF WBC: CPT | Performed by: NURSE PRACTITIONER

## 2024-12-10 PROCEDURE — 63600175 PHARM REV CODE 636 W HCPCS: Performed by: NURSE PRACTITIONER

## 2024-12-10 PROCEDURE — 87205 SMEAR GRAM STAIN: CPT

## 2024-12-10 PROCEDURE — 87102 FUNGUS ISOLATION CULTURE: CPT

## 2024-12-10 PROCEDURE — 11000001 HC ACUTE MED/SURG PRIVATE ROOM

## 2024-12-10 PROCEDURE — 83735 ASSAY OF MAGNESIUM: CPT | Performed by: NURSE PRACTITIONER

## 2024-12-10 PROCEDURE — 87070 CULTURE OTHR SPECIMN AEROBIC: CPT

## 2024-12-10 PROCEDURE — A4216 STERILE WATER/SALINE, 10 ML: HCPCS | Performed by: NURSE PRACTITIONER

## 2024-12-10 RX ORDER — INSULIN GLARGINE 100 [IU]/ML
14 INJECTION, SOLUTION SUBCUTANEOUS NIGHTLY
Status: DISCONTINUED | OUTPATIENT
Start: 2024-12-10 | End: 2024-12-16 | Stop reason: HOSPADM

## 2024-12-10 RX ORDER — INSULIN GLARGINE 100 [IU]/ML
16 INJECTION, SOLUTION SUBCUTANEOUS NIGHTLY
Status: DISCONTINUED | OUTPATIENT
Start: 2024-12-10 | End: 2024-12-10

## 2024-12-10 RX ADMIN — ATORVASTATIN CALCIUM 80 MG: 40 TABLET, FILM COATED ORAL at 09:12

## 2024-12-10 RX ADMIN — CEFEPIME 1 G: 1 INJECTION, POWDER, FOR SOLUTION INTRAMUSCULAR; INTRAVENOUS at 02:12

## 2024-12-10 RX ADMIN — HEPARIN SODIUM 5000 UNITS: 5000 INJECTION INTRAVENOUS; SUBCUTANEOUS at 02:12

## 2024-12-10 RX ADMIN — HEPARIN SODIUM 5000 UNITS: 5000 INJECTION INTRAVENOUS; SUBCUTANEOUS at 09:12

## 2024-12-10 RX ADMIN — GABAPENTIN 200 MG: 100 CAPSULE ORAL at 08:12

## 2024-12-10 RX ADMIN — GABAPENTIN 200 MG: 100 CAPSULE ORAL at 02:12

## 2024-12-10 RX ADMIN — CARVEDILOL 25 MG: 25 TABLET, FILM COATED ORAL at 05:12

## 2024-12-10 RX ADMIN — SEVELAMER CARBONATE 800 MG: 800 TABLET, FILM COATED ORAL at 05:12

## 2024-12-10 RX ADMIN — HYDROCODONE BITARTRATE AND ACETAMINOPHEN 1 TABLET: 10; 325 TABLET ORAL at 08:12

## 2024-12-10 RX ADMIN — CARVEDILOL 25 MG: 25 TABLET, FILM COATED ORAL at 06:12

## 2024-12-10 RX ADMIN — GABAPENTIN 100 MG: 100 CAPSULE ORAL at 09:12

## 2024-12-10 RX ADMIN — INSULIN GLARGINE 14 UNITS: 100 INJECTION, SOLUTION SUBCUTANEOUS at 09:12

## 2024-12-10 RX ADMIN — HEPARIN SODIUM 5000 UNITS: 5000 INJECTION INTRAVENOUS; SUBCUTANEOUS at 06:12

## 2024-12-10 RX ADMIN — Medication 10 ML: at 09:12

## 2024-12-10 RX ADMIN — GABAPENTIN 200 MG: 100 CAPSULE ORAL at 09:12

## 2024-12-10 RX ADMIN — CETIRIZINE HYDROCHLORIDE 5 MG: 5 TABLET, FILM COATED ORAL at 08:12

## 2024-12-10 NOTE — ASSESSMENT & PLAN NOTE
MWF schedule?    Residual renal function?- Yes  Last dialyzed 12/09    - Nephrology consulted  - Continue chronic hemodialysis  - Monitor daily electrolytes and defer dialysis orders to nephrology  - Renally dose medications  - Continue phosphorus binders  - Daily weights/strict I&Os  - 1.5L FR

## 2024-12-10 NOTE — CONSULTS
[facility] ([facility])  Podiatry  Consult Note    Patient Name: Georgina Arias  MRN: [id number]  Admission Date: 12/9/2024  Hospital Length of Stay: 1 days  Attending Physician: Jenae De La Cruz MD  Primary Care Provider: Alonso Ling MD     Inpatient consult to Podiatry  Consult performed by: nOeil Acevedo DPM  Consult ordered by: Julio C Martin MD  Reason for consult: left foot infection        Subjective:     History of Present Illness:  Georgina Arias is a 54 y.o. female with a PMHx of obesity s/p gastric sleeve, PAD, hypertensive heart disease with HFpEF, grade II diastolic dysfunction, DM2, HTN, HLD, ESRD on HD (last dialyzed 12/9), and charcot foot s/p reconstruction 11/11 who presents due to foul smelling drainage from LLE cast. The patient reports he has been going to podiatry weekly after surgery and last had her cast changed 11/29. Pt had been having increased pain to her left foot, swelling, and drainage so wound cxs were obtained during that visit, and she was started on levaquin and doxycycline x10 days. Endorses compliance with abx. The patient reports ongoing pain and swelling and states over the past few days she had dark foul smelling drainage reporting it looked like old blood. HD center was concerned about drainage and sent her after dialysis. She denies fever/chills. She does note 2-3 episodes of diarrhea on both Saturday and Sunday but none today. She denies CP, SOB, cough, abdominal pain, headache, or dysuria. She reports she makes urine about 3-4x/daily.     Podiatry consulted for left foot cast malodor/drainage/fleas.  Patient recently underwent Charcot reconstruction with Dr. Allison.  Then recently had cast placed on her.  Recent cultures grew Klebsiella and Enterobacter.  Was then placed on oral antibiotics by Dr. Allison.  Patient states her left leg and ankle and foot pain are still intense.  Resting comfortably in bed when seen.    Scheduled Meds:    atorvastatin  80 mg Oral QHS    carvediloL  25 mg Oral BID WM    ceFEPime IV (PEDS and ADULTS)  1 g Intravenous Q24H    cetirizine  5 mg Oral Daily    fluticasone propionate  1 spray Each Nostril Daily    gabapentin  200 mg Oral TID    And    gabapentin  100 mg Oral QHS    heparin (porcine)  5,000 Units Subcutaneous Q8H    insulin glargine U-100 (Lantus)  14 Units Subcutaneous QHS    sevelamer carbonate  800 mg Oral TID WM     Continuous Infusions:  PRN Meds:  Current Facility-Administered Medications:     acetaminophen, 650 mg, Oral, Q6H PRN    dextrose 10%, 12.5 g, Intravenous, PRN    dextrose 10%, 25 g, Intravenous, PRN    glucagon (human recombinant), 1 mg, Intramuscular, PRN    glucose, 16 g, Oral, PRN    glucose, 24 g, Oral, PRN    HYDROcodone-acetaminophen, 1 tablet, Oral, Q6H PRN    influenza, 0.5 mL, Intramuscular, Prior to discharge    insulin aspart U-100, 0-5 Units, Subcutaneous, QID (AC + HS) PRN    melatonin, 6 mg, Oral, Nightly PRN    naloxone, 0.02 mg, Intravenous, PRN    ondansetron, 4 mg, Intravenous, Q8H PRN    promethazine, 12.5 mg, Oral, Q6H PRN    sodium chloride 0.9%, 10 mL, Intravenous, Q12H PRN    Pharmacy to dose Vancomycin consult, , , Once **AND** vancomycin - pharmacy to dose, , Intravenous, pharmacy to manage frequency    Review of patient's allergies indicates:   Allergen Reactions    Hydrocodone-acetaminophen Nausea And Vomiting, Rash and Other (See Comments)     Vicodin- severe rash  Lortab- head-spinning that leads to vomiting      Naproxen Anaphylaxis and Swelling     Hives (skin)^swelling  Hives (skin)^swelling  Hives (skin)^swelling    Sulfamethoxazole-trimethoprim Other (See Comments)     Caused JEROME; heart attack    Hydrocodone Nausea And Vomiting and Rash    Adhesive tape-silicones      blisters    Aspartame Hives    Penicillins Hives     Blisters (skin)^    Sulfamethoxazole Other (See Comments)    Trimethoprim Other (See Comments)    Acetaminophen Rash    Adhesive Rash         Past Medical History:   Diagnosis Date    Hyperlipidemia     Hypertension     Peptic ulcer disease     Peripheral artery disease     PONV (postoperative nausea and vomiting)     Stage IV CKD     Type II diabetes mellitus      Past Surgical History:   Procedure Laterality Date    ANGIOGRAM, LOWER ARTERIAL, UNILATERAL Left 09/13/2023    Procedure: ANGIOGRAM, LOWER ARTERIAL, UNILATERAL;  Surgeon: Maxx Maya MD;  Location: [address];  Service: Vascular;  Laterality: Left;    ANGIOGRAPHY OF LOWER EXTREMITY Left 09/14/2023    Procedure: ANGIOGRAM, LOWER EXTREMITY with balloon angioplasty ultraverse 5mm x 60mm;  Surgeon: Maxx Maya MD;  Location: [address];  Service: Vascular;  Laterality: Left;  ultraverse 5mm x 60mm  fluoro: 12.41  contrast: 44ml  mGy: 65.57  Gycm2: 23.50    AORTOGRAPHY WITH EXTREMITY RUNOFF Left 09/13/2023    Procedure: AORTOGRAM, WITH EXTREMITY RUNOFF;  Surgeon: Maxx Maya MD;  Location: [address];  Service: Vascular;  Laterality: Left;  8.1 min  663.63 mGy  176.39 Gy.cm  178ml Dye     AV FISTULA PLACEMENT Right     CHOLECYSTECTOMY      COLONOSCOPY  11/06/2013    normal    COLONOSCOPY  05/2023    cancelled due to inadequate prep    COLONOSCOPY  06/2023    results unknown    CREATION, BYPASS, ARTERIAL, AXILLARY TO BILATERAL FEMORAL Left 09/14/2023    Procedure: CREATION, BYPASS, ARTERIAL, AXILLARY TO BILATERAL FEMORAL;  Surgeon: Maxx Maya MD;  Location: [address];  Service: Vascular;  Laterality: Left;  Left Axillary to Bilateral Femoral Bypass, Left Femoral to Above Knee Popliteal Bypass; SLIDER BED    CYSTOSCOPY W/ URETERAL STENT PLACEMENT Right 08/28/2018    Procedure: CYSTOSCOPY, WITH URETERAL STENT INSERTION (ADD ON );  Surgeon: Bubba Perez MD;  Location: [facility];  Service: Urology;  Laterality: Right;  (ADD ON )    DEBRIDEMENT OF FOOT Left 08/03/2023    Procedure: DEBRIDEMENT, FOOT;  Surgeon: Aleida Flannery DPM;  Location: [facility];   Service: Podiatry;  Laterality: Left;    Excisional debridement of ulcer distal great toe left foot w/ partial resection distal phalanx Left 08/03/2023    FOOT AMPUTATION THROUGH METATARSAL Left 09/14/2023    Procedure: AMPUTATION, FOOT, TRANSMETATARSAL partial 1st ray amputation;  Surgeon: Jesus Valles DPM;  Location: [facility] OR 2ND FLR;  Service: Podiatry;  Laterality: Left;  partial ray    I&D L 1st ray w/ amputation L hallux IPJ Left 08/14/2023    INCISION AND DRAINAGE FOOT Left 09/14/2023    Procedure: INCISION AND DRAINAGE, FOOT;  Surgeon: Jesus Valles DPM;  Location: [facility] OR 2ND FLR;  Service: Podiatry;  Laterality: Left;    Injection L PT tendon sheath Left 08/14/2023    LENGTHENING OF ACHILLES TENDON Left 11/11/2024    Procedure: LENGTHENING, TENDON, ACHILLES;  Surgeon: Marco Allison DPM;  Location: [facility] OR;  Service: Podiatry;  Laterality: Left;    PERIPHERAL ARTERIAL STENT GRAFT Right     RECONSTRUCTION WITH FUSION OF CHARCOT FOOT Left 11/11/2024    Procedure: RECONSTRUCTION, CHARCOT FOOT, WITH FUSION;  Surgeon: Marco Allison DPM;  Location: [facility] OR;  Service: Podiatry;  Laterality: Left;  4 hours; foot tray; mini c arm; saw/drill    REMOVAL OF NAIL OF DIGIT Left 08/03/2023    Procedure: REMOVAL, NAIL, DIGIT great toe;  Surgeon: Aleida Flannery DPM;  Location: [facility] OR;  Service: Podiatry;  Laterality: Left;    REVISION, AMPUTATION, TRANSMETATARSAL Left 11/11/2024    Procedure: REVISION, AMPUTATION, TRANSMETATARSAL;  Surgeon: Marco Allison DPM;  [facility];  Service: Podiatry;  Laterality: Left;    STENT, SUPERFICIAL FEMORAL ARTERY Left 09/14/2023    Procedure: STENT, SUPERFICIAL FEMORAL ARTERY;  Surgeon: Maxx Maya MD;  Location: [facility] OR 2ND FLR;  Service: Vascular;  Laterality: Left;  5 x 100 mm    TOE AMPUTATION Left 08/14/2023    Procedure: AMPUTATION, TOE hallux IPJ;  Surgeon: Aleida Flannery DPM;  Location: [facility] OR;  Service: Podiatry;  Laterality: Left;    TOE  AMPUTATION Left 2023    Procedure: AMPUTATION, TOE hallux, possible 1st met.head;  Surgeon: Aleida Flannery DPM;  Location: [facility] OR;  Service: Podiatry;  Laterality: Left;    URETEROSCOPIC REMOVAL OF URETERIC CALCULUS Right 2018    Procedure: EXTRACTION-STONE-URETEROSCOPY;  Surgeon: Bubba Perez MD;  Location: [facility] OR;  Service: Urology;  Laterality: Right;       Family History       Problem Relation (Age of Onset)    Breast cancer Mother (71), Maternal Grandmother (75)    Diabetes Mother, Father    Heart attack Maternal Grandfather (44)    Heart disease Father (37), Maternal Grandmother    Hypertension Mother, Father, Brother    Uterine cancer Mother          Tobacco Use    Smoking status: Former     Current packs/day: 0.00     Types: Cigarettes     Quit date: 2006     Years since quittin.3    Smokeless tobacco: Never    Tobacco comments:     smoked off and on for 20 years, was up to 2 packs/day toward the end of that, and quit in    Substance and Sexual Activity    Alcohol use: Not Currently     Comment: occ    Drug use: No    Sexual activity: Not Currently     Review of Systems   Constitutional:  Negative for appetite change, chills, diaphoresis, fatigue and fever.   HENT:  Negative for congestion, rhinorrhea and sore throat.    Eyes:  Negative for photophobia and visual disturbance.   Respiratory:  Negative for cough, shortness of breath and wheezing.    Cardiovascular:  Negative for chest pain, palpitations and leg swelling.   Gastrointestinal:  Negative for abdominal distention, abdominal pain, diarrhea, nausea and vomiting.   Genitourinary:  Positive for decreased urine volume (ESRD on HD). Negative for dysuria and hematuria.   Musculoskeletal:  Positive for arthralgias, gait problem, joint swelling and myalgias. Negative for neck pain.   Skin:  Positive for wound. Negative for color change.   Neurological:  Negative for syncope, weakness, light-headedness and headaches.  "  Psychiatric/Behavioral:  Negative for confusion and sleep disturbance. The patient is not nervous/anxious.      Objective:     Vital Signs (Most Recent):  Temp: 98.2 °F (36.8 °C) (12/10/24 1122)  Pulse: 69 (12/10/24 1122)  Resp: 18 (12/10/24 1122)  BP: (!) 146/66 (12/10/24 1122)  SpO2: 98 % (12/10/24 1122) Vital Signs (24h Range):  Temp:  [97.9 °F (36.6 °C)-98.9 °F (37.2 °C)] 98.2 °F (36.8 °C)  Pulse:  [69-97] 69  Resp:  [11-20] 18  SpO2:  [95 %-100 %] 98 %  BP: (109-161)/(56-69) 146/66     Weight: 101.2 kg (223 lb 1.7 oz)  Body mass index is 34.94 kg/m².    Foot Exam    General  Orientation: alert and oriented to person, place, and time       Left Foot/Ankle      Inspection and Palpation  Skin Exam: drainage, cellulitis, skin changes, abnormal color, ulcer and erythema;     Neurovascular  Dorsalis pedis: 1+  Posterior tibial: 1+    Comments  Left lower extremity:  Left foot with diffuse malodor, foul smelling.  1 or 2 fleas noted.  Cast removed.    Plantar midfoot ulcer:  Mixed granular fibrous base.  Hypergranulation tissue noted as well.  Medial forefoot ulcer.  Long and mixed fibrogranular base.  Dorsal foot:  Small draining ulcer with deep probe.                        Laboratory:  A1C:   Recent Labs   Lab 10/07/24  0000 12/02/24  0000 12/09/24  1332   HGBA1C 6.2* 5.8 5.3     CBC:   Recent Labs   Lab 12/10/24  0330   WBC 7.68   RBC 2.61*   HGB 8.2*   HCT 26.2*      *   MCH 31.4*   MCHC 31.3*     CMP:   Recent Labs   Lab 12/10/24  0330      CALCIUM 8.7   ALBUMIN 2.3*   PROT 6.5      K 4.2   CO2 25      BUN 55*   CREATININE 4.4*   ALKPHOS 94   ALT 11   AST 16   BILITOT 0.4     CRP:   Recent Labs   Lab 12/09/24  1837   CRP 53.6*     ESR: No results for input(s): "SEDRATE" in the last 168 hours.  Wound Cultures:   Recent Labs   Lab 11/29/24  0828   LABAERO ENTEROBACTER CLOACAE  Many  *  KLEBSIELLA PNEUMONIAE  Many  Skin meño also present  *     Microbiology Results (last 7 days) "       Procedure Component Value Units Date/Time    Blood culture x two cultures. Draw prior to antibiotics. [[id number]] Collected: 12/09/24 1523    Order Status: Completed Specimen: Blood from Peripheral, Forearm, Left Updated: 12/10/24 0115     Blood Culture, Routine No Growth to date    Narrative:      Aerobic and anaerobic    Blood culture x two cultures. Draw prior to antibiotics. [[id number]] Collected: 12/09/24 1522    Order Status: Completed Specimen: Blood from Peripheral, Antecubital, Left Updated: 12/10/24 0115     Blood Culture, Routine No Growth to date    Narrative:      Aerobic and anaerobic          Specimen (24h ago, onward)      None            Diagnostic Results:  I have reviewed all pertinent imaging results/findings within the past 24 hours.  Assessment/Plan:     ID  * Post-operative infection  Georgina Arias is a 54 y.o. female who recently underwent Charcot neuropathy reconstruction of left lower extremity.  Was then placed into a cast, and now has subsequently developed a left foot infection.  White blood cell count within normal limits, patient resting comfortably in bed.  Vital signs stable.  X-ray and MRI showing osteomyelitis/small fluid collection, clinically correlates with left foot infection.  Imaging likely also yields infection superimposed with Charcot neuropathy.    - cast removed bedside, patient left lower extremity cleansed with hydrogen peroxide, wipes, then gauze.  Patient's left lower extremity then placed in Mauricio compression dressing.  - elevate left lower extremity.  - antibiotics per ID.  - we will discuss with Podiatry team, anticipate possible surgical correction versus bedside procedure.          Thank you for your consult. I will follow-up with patient. Please contact us if you have any additional questions.    Oneil Acevedo DPM  Podiatry  [facility] - Neurosurgery ([facility])

## 2024-12-10 NOTE — PROGRESS NOTES
Pharmacokinetic Initial Assessment & Plan: IV Vancomycin    Initiate IV Vancomycin 2250 mg x once   ESRD on HD- Subsequent doses based on random Vancomycin levels   Obtain a Vancomycin random tomorrow on 12/10 @ 2000   Desired empiric serum trough concentration is 10 to 20 mcg/mL    Pharmacy will continue to follow and monitor vancomycin.    [id number] with any questions regarding this assessment.     Thank you for the consult,   Tyson Gold            Patient brief summary:  Georgina Arias is a 54 y.o. female initiated on antimicrobial therapy with IV Vancomycin for treatment of suspected skin & soft tissue infection    Drug Allergies:   Review of patient's allergies indicates:   Allergen Reactions    Hydrocodone-acetaminophen Nausea And Vomiting, Rash and Other (See Comments)     Vicodin- severe rash  Lortab- head-spinning that leads to vomiting      Naproxen Anaphylaxis and Swelling     Hives (skin)^swelling  Hives (skin)^swelling  Hives (skin)^swelling    Sulfamethoxazole-trimethoprim Other (See Comments)     Caused JEROME; heart attack    Hydrocodone Nausea And Vomiting and Rash    Adhesive tape-silicones      blisters    Aspartame Hives    Penicillins Hives     Blisters (skin)^    Sulfamethoxazole Other (See Comments)    Trimethoprim Other (See Comments)    Acetaminophen Rash    Adhesive Rash       Actual Body Weight:   113.4 kg    Renal Function:   Estimated Creatinine Clearance: 21.4 mL/min (A) (based on SCr of 3.9 mg/dL (H)).,     Dialysis Method (if applicable):  intermittent HD-ESRD on HD (completed dialysis this morning)     CBC (last 72 hours):  Recent Labs   Lab Result Units 12/09/24  1332   WBC K/uL 10.59   Hemoglobin g/dL 9.0*   Hemoglobin A1C % 5.3   Hematocrit % 28.6*   Platelets K/uL 252   Gran % % 78.2*   Lymph % % 14.4*   Mono % % 4.2   Eosinophil % % 2.2   Basophil % % 0.5   Differential Method  Automated       Metabolic Panel (last 72 hours):  Recent Labs   Lab Result Units 12/09/24  1332  "  Sodium mmol/L 139   Potassium mmol/L 4.6   Chloride mmol/L 101   CO2 mmol/L 25   Glucose mg/dL 138*   BUN mg/dL 44*   Creatinine mg/dL 3.9*   Albumin g/dL 2.6*   Total Bilirubin mg/dL 0.4   Alkaline Phosphatase U/L 115   AST U/L 47*   ALT U/L 14   Magnesium mg/dL 2.1   Phosphorus mg/dL 3.1       Drug levels (last 3 results):  No results for input(s): "VANCOMYCINRA", "VANCORANDOM", "VANCOMYCINPE", "VANCOPEAK", "VANCOMYCINTR", "VANCOTROUGH" in the last 72 hours.    Microbiologic Results:  Microbiology Results (last 7 days)       Procedure Component Value Units Date/Time    Blood culture x two cultures. Draw prior to antibiotics. [[id number]] Collected: 12/09/24 1522    Order Status: Sent Specimen: Blood from Peripheral, Antecubital, Left Updated: 12/09/24 1539    Blood culture x two cultures. Draw prior to antibiotics. [[id number]] Collected: 12/09/24 1523    Order Status: Sent Specimen: Blood from Peripheral, Forearm, Left Updated: 12/09/24 1539            "

## 2024-12-10 NOTE — SUBJECTIVE & OBJECTIVE
Scheduled Meds:   atorvastatin  80 mg Oral QHS    carvediloL  25 mg Oral BID WM    ceFEPime IV (PEDS and ADULTS)  1 g Intravenous Q24H    cetirizine  5 mg Oral Daily    fluticasone propionate  1 spray Each Nostril Daily    gabapentin  200 mg Oral TID    And    gabapentin  100 mg Oral QHS    heparin (porcine)  5,000 Units Subcutaneous Q8H    insulin glargine U-100 (Lantus)  14 Units Subcutaneous QHS    sevelamer carbonate  800 mg Oral TID WM     Continuous Infusions:  PRN Meds:  Current Facility-Administered Medications:     acetaminophen, 650 mg, Oral, Q6H PRN    dextrose 10%, 12.5 g, Intravenous, PRN    dextrose 10%, 25 g, Intravenous, PRN    glucagon (human recombinant), 1 mg, Intramuscular, PRN    glucose, 16 g, Oral, PRN    glucose, 24 g, Oral, PRN    HYDROcodone-acetaminophen, 1 tablet, Oral, Q6H PRN    influenza, 0.5 mL, Intramuscular, Prior to discharge    insulin aspart U-100, 0-5 Units, Subcutaneous, QID (AC + HS) PRN    melatonin, 6 mg, Oral, Nightly PRN    naloxone, 0.02 mg, Intravenous, PRN    ondansetron, 4 mg, Intravenous, Q8H PRN    promethazine, 12.5 mg, Oral, Q6H PRN    sodium chloride 0.9%, 10 mL, Intravenous, Q12H PRN    Pharmacy to dose Vancomycin consult, , , Once **AND** vancomycin - pharmacy to dose, , Intravenous, pharmacy to manage frequency    Review of patient's allergies indicates:   Allergen Reactions    Hydrocodone-acetaminophen Nausea And Vomiting, Rash and Other (See Comments)     Vicodin- severe rash  Lortab- head-spinning that leads to vomiting      Naproxen Anaphylaxis and Swelling     Hives (skin)^swelling  Hives (skin)^swelling  Hives (skin)^swelling    Sulfamethoxazole-trimethoprim Other (See Comments)     Caused JEROME; heart attack    Hydrocodone Nausea And Vomiting and Rash    Adhesive tape-silicones      blisters    Aspartame Hives    Penicillins Hives     Blisters (skin)^    Sulfamethoxazole Other (See Comments)    Trimethoprim Other (See Comments)    Acetaminophen Rash     Adhesive Rash        Past Medical History:   Diagnosis Date    Hyperlipidemia     Hypertension     Peptic ulcer disease     Peripheral artery disease     PONV (postoperative nausea and vomiting)     Stage IV CKD     Type II diabetes mellitus      Past Surgical History:   Procedure Laterality Date    ANGIOGRAM, LOWER ARTERIAL, UNILATERAL Left 09/13/2023    Procedure: ANGIOGRAM, LOWER ARTERIAL, UNILATERAL;  Surgeon: Maxx Maya MD;  Location: [address];  Service: Vascular;  Laterality: Left;    ANGIOGRAPHY OF LOWER EXTREMITY Left 09/14/2023    Procedure: ANGIOGRAM, LOWER EXTREMITY with balloon angioplasty ultraverse 5mm x 60mm;  Surgeon: Maxx Maya MD;  Location: [address];  Service: Vascular;  Laterality: Left;  ultraverse 5mm x 60mm  fluoro: 12.41  contrast: 44ml  mGy: 65.57  Gycm2: 23.50    AORTOGRAPHY WITH EXTREMITY RUNOFF Left 09/13/2023    Procedure: AORTOGRAM, WITH EXTREMITY RUNOFF;  Surgeon: Maxx Maya MD;  Location: [address];  Service: Vascular;  Laterality: Left;  8.1 min  663.63 mGy  176.39 Gy.cm  178ml Dye     AV FISTULA PLACEMENT Right     CHOLECYSTECTOMY      COLONOSCOPY  11/06/2013    normal    COLONOSCOPY  05/2023    cancelled due to inadequate prep    COLONOSCOPY  06/2023    results unknown    CREATION, BYPASS, ARTERIAL, AXILLARY TO BILATERAL FEMORAL Left 09/14/2023    Procedure: CREATION, BYPASS, ARTERIAL, AXILLARY TO BILATERAL FEMORAL;  Surgeon: Maxx Maya MD;  Location: [address];  Service: Vascular;  Laterality: Left;  Left Axillary to Bilateral Femoral Bypass, Left Femoral to Above Knee Popliteal Bypass; SLIDER BED    CYSTOSCOPY W/ URETERAL STENT PLACEMENT Right 08/28/2018    Procedure: CYSTOSCOPY, WITH URETERAL STENT INSERTION (ADD ON );  Surgeon: Bubba Perez MD;  Location: [facility];  Service: Urology;  Laterality: Right;  (ADD ON )    DEBRIDEMENT OF FOOT Left 08/03/2023    Procedure: DEBRIDEMENT, FOOT;  Surgeon: Aleida Flannery DPM;  Location:  [facility] OR;  Service: Podiatry;  Laterality: Left;    Excisional debridement of ulcer distal great toe left foot w/ partial resection distal phalanx Left 08/03/2023    FOOT AMPUTATION THROUGH METATARSAL Left 09/14/2023    Procedure: AMPUTATION, FOOT, TRANSMETATARSAL partial 1st ray amputation;  Surgeon: Jesus Valles DPM;  Location: [facility] OR 2ND FLR;  Service: Podiatry;  Laterality: Left;  partial ray    I&D L 1st ray w/ amputation L hallux IPJ Left 08/14/2023    INCISION AND DRAINAGE FOOT Left 09/14/2023    Procedure: INCISION AND DRAINAGE, FOOT;  Surgeon: Jesus Valles DPM;  Location: [facility] OR 2ND FLR;  Service: Podiatry;  Laterality: Left;    Injection L PT tendon sheath Left 08/14/2023    LENGTHENING OF ACHILLES TENDON Left 11/11/2024    Procedure: LENGTHENING, TENDON, ACHILLES;  Surgeon: Marco Allison DPM;  Location: [facility] OR;  Service: Podiatry;  Laterality: Left;    PERIPHERAL ARTERIAL STENT GRAFT Right     RECONSTRUCTION WITH FUSION OF CHARCOT FOOT Left 11/11/2024    Procedure: RECONSTRUCTION, CHARCOT FOOT, WITH FUSION;  Surgeon: Marco Allison DPM;  Location: [facility] OR;  Service: Podiatry;  Laterality: Left;  4 hours; foot tray; mini c arm; saw/drill    REMOVAL OF NAIL OF DIGIT Left 08/03/2023    Procedure: REMOVAL, NAIL, DIGIT great toe;  Surgeon: Aleida Flannery DPM;  Location: [facility] OR;  Service: Podiatry;  Laterality: Left;    REVISION, AMPUTATION, TRANSMETATARSAL Left 11/11/2024    Procedure: REVISION, AMPUTATION, TRANSMETATARSAL;  Surgeon: Marco Allison DPM;  Location: [facility] OR;  Service: Podiatry;  Laterality: Left;    STENT, SUPERFICIAL FEMORAL ARTERY Left 09/14/2023    Procedure: STENT, SUPERFICIAL FEMORAL ARTERY;  Surgeon: Maxx Maya MD;  Location: [facility] OR 2ND FLR;  Service: Vascular;  Laterality: Left;  5 x 100 mm    TOE AMPUTATION Left 08/14/2023    Procedure: AMPUTATION, TOE hallux IPJ;  Surgeon: Aleida Flannery DPM;  Location: [facility] OR;  Service: Podiatry;  Laterality: Left;     TOE AMPUTATION Left 2023    Procedure: AMPUTATION, TOE hallux, possible 1st met.head;  Surgeon: Aleida Flannery DPM;  Location: [facility] OR;  Service: Podiatry;  Laterality: Left;    URETEROSCOPIC REMOVAL OF URETERIC CALCULUS Right 2018    Procedure: EXTRACTION-STONE-URETEROSCOPY;  Surgeon: Bubba Perez MD;  Location: [facility] OR;  Service: Urology;  Laterality: Right;       Family History       Problem Relation (Age of Onset)    Breast cancer Mother (71), Maternal Grandmother (75)    Diabetes Mother, Father    Heart attack Maternal Grandfather (44)    Heart disease Father (37), Maternal Grandmother    Hypertension Mother, Father, Brother    Uterine cancer Mother          Tobacco Use    Smoking status: Former     Current packs/day: 0.00     Types: Cigarettes     Quit date: 2006     Years since quittin.3    Smokeless tobacco: Never    Tobacco comments:     smoked off and on for 20 years, was up to 2 packs/day toward the end of that, and quit in    Substance and Sexual Activity    Alcohol use: Not Currently     Comment: occ    Drug use: No    Sexual activity: Not Currently     Review of Systems   Constitutional:  Negative for appetite change, chills, diaphoresis, fatigue and fever.   HENT:  Negative for congestion, rhinorrhea and sore throat.    Eyes:  Negative for photophobia and visual disturbance.   Respiratory:  Negative for cough, shortness of breath and wheezing.    Cardiovascular:  Negative for chest pain, palpitations and leg swelling.   Gastrointestinal:  Negative for abdominal distention, abdominal pain, diarrhea, nausea and vomiting.   Genitourinary:  Positive for decreased urine volume (ESRD on HD). Negative for dysuria and hematuria.   Musculoskeletal:  Positive for arthralgias, gait problem, joint swelling and myalgias. Negative for neck pain.   Skin:  Positive for wound. Negative for color change.   Neurological:  Negative for syncope, weakness, light-headedness and headaches.  "  Psychiatric/Behavioral:  Negative for confusion and sleep disturbance. The patient is not nervous/anxious.      Objective:     Vital Signs (Most Recent):  Temp: 98.2 °F (36.8 °C) (12/10/24 1122)  Pulse: 69 (12/10/24 1122)  Resp: 18 (12/10/24 1122)  BP: (!) 146/66 (12/10/24 1122)  SpO2: 98 % (12/10/24 1122) Vital Signs (24h Range):  Temp:  [97.9 °F (36.6 °C)-98.9 °F (37.2 °C)] 98.2 °F (36.8 °C)  Pulse:  [69-97] 69  Resp:  [11-20] 18  SpO2:  [95 %-100 %] 98 %  BP: (109-161)/(56-69) 146/66     Weight: 101.2 kg (223 lb 1.7 oz)  Body mass index is 34.94 kg/m².    Foot Exam    General  Orientation: alert and oriented to person, place, and time       Left Foot/Ankle      Inspection and Palpation  Skin Exam: drainage, cellulitis, skin changes, abnormal color, ulcer and erythema;     Neurovascular  Dorsalis pedis: 1+  Posterior tibial: 1+    Comments  Left lower extremity:  Left foot with diffuse malodor, foul smelling.  1 or 2 fleas noted.  Cast removed.    Plantar midfoot ulcer:  Mixed granular fibrous base.  Hypergranulation tissue noted as well.  Medial forefoot ulcer.  Long and mixed fibrogranular base.  Dorsal foot:  Small draining ulcer with deep probe.                        Laboratory:  A1C:   Recent Labs   Lab 10/07/24  0000 12/02/24  0000 12/09/24  1332   HGBA1C 6.2* 5.8 5.3     CBC:   Recent Labs   Lab 12/10/24  0330   WBC 7.68   RBC 2.61*   HGB 8.2*   HCT 26.2*      *   MCH 31.4*   MCHC 31.3*     CMP:   Recent Labs   Lab 12/10/24  0330      CALCIUM 8.7   ALBUMIN 2.3*   PROT 6.5      K 4.2   CO2 25      BUN 55*   CREATININE 4.4*   ALKPHOS 94   ALT 11   AST 16   BILITOT 0.4     CRP:   Recent Labs   Lab 12/09/24  1837   CRP 53.6*     ESR: No results for input(s): "SEDRATE" in the last 168 hours.  Wound Cultures:   Recent Labs   Lab 11/29/24  0828   LABAERO ENTEROBACTER CLOACAE  Many  *  KLEBSIELLA PNEUMONIAE  Many  Skin meño also present  *     Microbiology Results (last 7 days) "       Procedure Component Value Units Date/Time    Blood culture x two cultures. Draw prior to antibiotics. [[id number]] Collected: 12/09/24 1523    Order Status: Completed Specimen: Blood from Peripheral, Forearm, Left Updated: 12/10/24 0115     Blood Culture, Routine No Growth to date    Narrative:      Aerobic and anaerobic    Blood culture x two cultures. Draw prior to antibiotics. [[id number]] Collected: 12/09/24 1522    Order Status: Completed Specimen: Blood from Peripheral, Antecubital, Left Updated: 12/10/24 0115     Blood Culture, Routine No Growth to date    Narrative:      Aerobic and anaerobic          Specimen (24h ago, onward)      None            Diagnostic Results:  I have reviewed all pertinent imaging results/findings within the past 24 hours.

## 2024-12-10 NOTE — ASSESSMENT & PLAN NOTE
Patient has Diastolic (HFpEF) heart failure that is Chronic. On presentation their CHF was well compensated. Most recent BNP and echo results are listed below.  Latest ECHO  Results for orders placed during the hospital encounter of 09/11/23    Echo    Interpretation Summary    Left Ventricle: The left ventricle is normal in size. Normal wall thickness. Normal wall motion. There is low normal systolic function with a visually estimated ejection fraction of 50 - 55%. Ejection fraction by visual approximation is 55%. There is normal diastolic function.    Right Ventricle: Right ventricle was not well visualized due to poor acoustic window. Normal right ventricular cavity size. Wall thickness is normal. Right ventricle wall motion  is normal. Systolic function is normal.    Aortic Valve: There is moderate aortic valve sclerosis. There is annular calcification present.    Mitral Valve: There is moderate mitral annular calcification present.    IVC/SVC: Normal venous pressure at 3 mmHg.    Current Heart Failure Medications  carvediloL tablet 25 mg, 2 times daily with meals, Oral    Plan  - Monitor strict I&Os and daily weights.    - Place on telemetry  - Low sodium diet  - Place on fluid restriction of 1.5 L.   - Cardiology has not been consulted  - The patient's volume status is at their baseline  - Volume management per HD.

## 2024-12-10 NOTE — CARE UPDATE
I have reviewed the chart of Georgina Arias who is hospitalized for the following:    Active Hospital Problems    Diagnosis    *Post-operative infection    Osteomyelitis    Cellulitis    Wound infection after surgery    S/P femoral-femoral bypass surgery    ESRD (end stage renal disease)    Class 2 severe obesity with serious comorbidity and body mass index (BMI) of 38.0 to 38.9 in adult    Chronic diastolic congestive heart failure    Essential hypertension    Hyperlipidemia    Type II diabetes mellitus     Overview:   dx update      Peripheral vascular disease     Overview:   dx update          Barby Calabrese NP  [facility] Based [facility]

## 2024-12-10 NOTE — H&P
[facility]  [facility] Medicine  History & Physical    Patient Name: Georgina Arias  MRN: [id number]  Patient Class: IP- Inpatient  Admission Date: 12/9/2024  Attending Physician: Jenae De La Cruz MD   Primary Care Provider: Alonso Ling MD         Patient information was obtained from patient, past medical records, and ER records.     Subjective:     Principal Problem:Post-operative infection    Chief Complaint:   Chief Complaint   Patient presents with    Leg Pain     Left leg pain, surgery last month, leaking from cast report odorous drainage         HPI: Georgina Arias is a 54 y.o. female with a PMHx of obesity s/p gastric sleeve, PAD, hypertensive heart disease with HFpEF, grade II diastolic dysfunction, DM2, HTN, HLD, ESRD on HD (last dialyzed 12/9), and charcot foot s/p reconstruction 11/11 who presents due to foul smelling drainage from LLE cast. The patient reports he has been going to podiatry weekly after surgery and last had her cast changed 11/29. Pt had been having increased pain to her left foot, swelling, and drainage so wound cxs were obtained during that visit, and she was started on levaquin and doxycycline x10 days. Endorses compliance with abx. The patient reports ongoing pain and swelling and states over the past few days she had dark foul smelling drainage reporting it looked like old blood. HD center was concerned about drainage and sent her after dialysis. She denies fever/chills. She does note 2-3 episodes of diarrhea on both Saturday and Sunday but none today. She denies CP, SOB, cough, abdominal pain, headache, or dysuria. She reports she makes urine about 3-4x/daily.    In the ED, patient mildly tachycardic otherwise VSS, afebrile. Hgb with stable anemia. Cr 3.9 (consistent with ESRD). Glucose 138. Albumin 2.6. AST 47. POC lactate 0.99. Blood cxs in process. UA pending. Xray L ankle with no acute displaced fracture or dislocation of the ankle. There are  surgical changes of the foot, comparison to the previous exam is limited given casting material and artifact at that time. Xray L foot with  interval development of lucency about the proximal aspect of the 3rd metatarsal about the head of the screw construct.  Findings are concerning for osteomyelitis. No convincing acute displaced fracture or dislocation of the foot noting chronic changes throughout the midfoot and surgical changes, similar in configuration. There is diffuse edema about the foot, new since the prior exam. Correlation for cellulitis as warranted. The patient received 1g IV cefepime. Podiatry consulted who removed cast and redressed her foot.    Past Medical History:   Diagnosis Date    Hyperlipidemia     Hypertension     Peptic ulcer disease     Peripheral artery disease     PONV (postoperative nausea and vomiting)     Stage IV CKD     Type II diabetes mellitus        Past Surgical History:   Procedure Laterality Date    ANGIOGRAM, LOWER ARTERIAL, UNILATERAL Left 09/13/2023    Procedure: ANGIOGRAM, LOWER ARTERIAL, UNILATERAL;  Surgeon: Maxx Maya MD;  Location: [address];  Service: Vascular;  Laterality: Left;    ANGIOGRAPHY OF LOWER EXTREMITY Left 09/14/2023    Procedure: ANGIOGRAM, LOWER EXTREMITY with balloon angioplasty ultraverse 5mm x 60mm;  Surgeon: Maxx Maya MD;  Location: [facility] OR [address] [place] [address];  Service: Vascular;  Laterality: Left;  ultraverse 5mm x 60mm  fluoro: 12.41  contrast: 44ml  mGy: 65.57  Gycm2: 23.50    AORTOGRAPHY WITH EXTREMITY RUNOFF Left 09/13/2023    Procedure: AORTOGRAM, WITH EXTREMITY RUNOFF;  Surgeon: Maxx Maya MD;  Location: [address];  Service: Vascular;  Laterality: Left;  8.1 min  663.63 mGy  176.39 Gy.cm  178ml Dye     AV FISTULA PLACEMENT Right     CHOLECYSTECTOMY      COLONOSCOPY  11/06/2013    normal    COLONOSCOPY  05/2023    cancelled due to inadequate prep    COLONOSCOPY  06/2023    results unknown    CREATION, BYPASS, ARTERIAL,  AXILLARY TO BILATERAL FEMORAL Left 09/14/2023    Procedure: CREATION, BYPASS, ARTERIAL, AXILLARY TO BILATERAL FEMORAL;  Surgeon: Maxx Maya MD;  [facility];  Service: Vascular;  Laterality: Left;  Left Axillary to Bilateral Femoral Bypass, Left Femoral to Above Knee Popliteal Bypass; SLIDER BED    CYSTOSCOPY W/ URETERAL STENT PLACEMENT Right 08/28/2018    Procedure: CYSTOSCOPY, WITH URETERAL STENT INSERTION (ADD ON );  Surgeon: Bubba Perez MD;  Location: [facility] OR;  Service: Urology;  Laterality: Right;  (ADD ON )    DEBRIDEMENT OF FOOT Left 08/03/2023    Procedure: DEBRIDEMENT, FOOT;  Surgeon: Aleida Flannery DPM;  Location: [facility] OR;  Service: Podiatry;  Laterality: Left;    Excisional debridement of ulcer distal great toe left foot w/ partial resection distal phalanx Left 08/03/2023    FOOT AMPUTATION THROUGH METATARSAL Left 09/14/2023    Procedure: AMPUTATION, FOOT, TRANSMETATARSAL partial 1st ray amputation;  Surgeon: Jesus Valles DPM;  Location: [facility];  Service: Podiatry;  Laterality: Left;  partial ray    I&D L 1st ray w/ amputation L hallux IPJ Left 08/14/2023    INCISION AND DRAINAGE FOOT Left 09/14/2023    Procedure: INCISION AND DRAINAGE, FOOT;  Surgeon: Jesus Valles DPM;  Location: [facility];  Service: Podiatry;  Laterality: Left;    Injection L PT tendon sheath Left 08/14/2023    LENGTHENING OF ACHILLES TENDON Left 11/11/2024    Procedure: LENGTHENING, TENDON, ACHILLES;  Surgeon: Marco Allison DPM;  Location: [facility] OR;  Service: Podiatry;  Laterality: Left;    PERIPHERAL ARTERIAL STENT GRAFT Right     RECONSTRUCTION WITH FUSION OF CHARCOT FOOT Left 11/11/2024    Procedure: RECONSTRUCTION, CHARCOT FOOT, WITH FUSION;  Surgeon: Marco Allison DPM;  Location: [facility];  Service: Podiatry;  Laterality: Left;  4 hours; foot tray; mini c arm; saw/drill    REMOVAL OF NAIL OF DIGIT Left 08/03/2023    Procedure: REMOVAL, NAIL, DIGIT great toe;  Surgeon: Alma Delia  Aleida COLE DPM;  Location: [facility] OR;  Service: Podiatry;  Laterality: Left;    REVISION, AMPUTATION, TRANSMETATARSAL Left 11/11/2024    Procedure: REVISION, AMPUTATION, TRANSMETATARSAL;  Surgeon: Marco Allison DPM;  Location: [facility] OR;  Service: Podiatry;  Laterality: Left;    STENT, SUPERFICIAL FEMORAL ARTERY Left 09/14/2023    Procedure: STENT, SUPERFICIAL FEMORAL ARTERY;  Surgeon: Maxx Maya MD;  Location: [facility]ND FLR;  Service: Vascular;  Laterality: Left;  5 x 100 mm    TOE AMPUTATION Left 08/14/2023    Procedure: AMPUTATION, TOE hallux IPJ;  Surgeon: Aleida Flannery DPM;  Location: [facility] OR;  Service: Podiatry;  Laterality: Left;    TOE AMPUTATION Left 08/25/2023    Procedure: AMPUTATION, TOE hallux, possible 1st met.head;  Surgeon: Aleida Flannery DPM;  Location: [facility] OR;  Service: Podiatry;  Laterality: Left;    URETEROSCOPIC REMOVAL OF URETERIC CALCULUS Right 09/11/2018    Procedure: EXTRACTION-STONE-URETEROSCOPY;  Surgeon: Bubba Perez MD;  Location: [facility] OR;  Service: Urology;  Laterality: Right;       Review of patient's allergies indicates:   Allergen Reactions    Hydrocodone-acetaminophen Nausea And Vomiting, Rash and Other (See Comments)     Vicodin- severe rash  Lortab- head-spinning that leads to vomiting      Naproxen Anaphylaxis and Swelling     Hives (skin)^swelling  Hives (skin)^swelling  Hives (skin)^swelling    Sulfamethoxazole-trimethoprim Other (See Comments)     Caused JEROME; heart attack    Hydrocodone Nausea And Vomiting and Rash    Adhesive tape-silicones      blisters    Aspartame Hives    Penicillins Hives     Blisters (skin)^    Sulfamethoxazole Other (See Comments)    Trimethoprim Other (See Comments)    Acetaminophen Rash    Adhesive Rash       Current Facility-Administered Medications on File Prior to Encounter   Medication    sodium chloride 0.9% flush 10 mL     Current Outpatient Medications on File Prior to Encounter   Medication Sig    acetaminophen (TYLENOL) 500 MG  "tablet Take 2 tablets (1,000 mg total) by mouth every 8 (eight) hours as needed.    aspirin (ECOTRIN) 81 MG EC tablet Take 1 tablet (81 mg total) by mouth once daily.    atorvastatin (LIPITOR) 80 MG tablet Take 1 tablet by mouth every evening.    blood sugar diagnostic (TRUE METRIX GLUCOSE TEST STRIP) Strp 3 (three) times daily.    blood-glucose meter Misc 1 each by Other route.    carvediloL (COREG) 25 MG tablet Take 1 tablet (25 mg total) by mouth 2 (two) times daily with meals.    COMFORT EZ PEN NEEDLES 32 gauge x 5/32" Ndle SMARTSIG:injection Daily    doxycycline (VIBRAMYCIN) 100 MG Cap Take 1 capsule (100 mg total) by mouth 2 (two) times daily.    fluticasone propionate (FLONASE) 50 mcg/actuation nasal spray Fluticasone Propionate 50 MCG/ACT Nasal Suspension QTY: 1 bottle Days: 30 Refills: 5  Written: 09/30/20 Patient Instructions: inhale 2 sprays in each nostril once daily    gabapentin (NEURONTIN) 300 MG capsule Take 300 mg by mouth 3 (three) times daily.    HYDROcodone-acetaminophen (NORCO)  mg per tablet Take 1 tablet by mouth every 6 (six) hours as needed for Pain.    insulin (LANTUS SOLOSTAR U-100 INSULIN) glargine 100 units/mL (3mL) SubQ pen Inject 40 Units into the skin every evening.    levocetirizine (XYZAL) 5 MG tablet Take 5 mg by mouth.    levoFLOXacin (LEVAQUIN) 750 MG tablet Take 1 tablet (750 mg total) by mouth once daily.    linaGLIPtin (TRADJENTA) 5 mg Tab tablet Take 1 tablet by mouth once daily.    miconazole NITRATE 2 % (MICOTIN) 2 % top powder Apply topically as needed for Itching.    miscellaneous medical supply (C-TUB) Misc Hearing amplification (BICROS preferred)    MOUNJARO 7.5 mg/0.5 mL PnIj     ondansetron (ZOFRAN-ODT) 4 MG TbDL Take 4 mg by mouth every 6 (six) hours.    oxyCODONE (ROXICODONE) 5 MG immediate release tablet Take 5 mg by mouth every 6 (six) hours as needed.    promethazine (PHENERGAN) 12.5 MG Tab Take 1 tablet (12.5 mg total) by mouth every 6 (six) hours as " needed (to use with Norco, known to tolerate).    sevelamer carbonate (RENVELA) 800 mg Tab Take by mouth.    tenapanor (XPHOZAH) 30 mg Tab Take 30 mg by mouth.    tirzepatide (MOUNJARO) 10 mg/0.5 mL PnIj Inject 10 mg into the skin.    TRUEPLUS LANCETS 30 gauge Misc      Family History       Problem Relation (Age of Onset)    Breast cancer Mother (71), Maternal Grandmother (75)    Diabetes Mother, Father    Heart attack Maternal Grandfather (44)    Heart disease Father (37), Maternal Grandmother    Hypertension Mother, Father, Brother    Uterine cancer Mother          Tobacco Use    Smoking status: Former     Current packs/day: 0.00     Types: Cigarettes     Quit date: 2006     Years since quittin.3    Smokeless tobacco: Never    Tobacco comments:     smoked off and on for 20 years, was up to 2 packs/day toward the end of that, and quit in    Substance and Sexual Activity    Alcohol use: Not Currently     Comment: occ    Drug use: No    Sexual activity: Not Currently     Review of Systems   Constitutional:  Negative for appetite change, chills, diaphoresis, fatigue and fever.   HENT:  Negative for congestion, rhinorrhea and sore throat.    Eyes:  Negative for photophobia and visual disturbance.   Respiratory:  Negative for cough, shortness of breath and wheezing.    Cardiovascular:  Negative for chest pain, palpitations and leg swelling.   Gastrointestinal:  Positive for diarrhea. Negative for abdominal distention, abdominal pain, nausea and vomiting.   Genitourinary:  Positive for decreased urine volume (ESRD on HD). Negative for dysuria and hematuria.   Musculoskeletal:  Positive for arthralgias, gait problem, joint swelling and myalgias. Negative for neck pain.   Skin:  Positive for wound. Negative for color change.   Neurological:  Negative for syncope, weakness, light-headedness and headaches.   Psychiatric/Behavioral:  Negative for confusion and sleep disturbance. The patient is not  nervous/anxious.      Objective:     Vital Signs (Most Recent):  Temp: 98.9 °F (37.2 °C) (12/09/24 1234)  Pulse: 69 (12/09/24 1603)  Resp: 16 (12/09/24 1603)  BP: 125/60 (12/09/24 1603)  SpO2: 100 % (12/09/24 1603) Vital Signs (24h Range):  Temp:  [98.9 °F (37.2 °C)] 98.9 °F (37.2 °C)  Pulse:  [69-97] 69  Resp:  [15-18] 16  SpO2:  [96 %-100 %] 100 %  BP: (125-142)/(60-65) 125/60     Weight: 113.4 kg (250 lb)  Body mass index is 39.16 kg/m².     Physical Exam  Vitals and nursing note reviewed.   Constitutional:       General: She is not in acute distress.     Appearance: She is obese. She is not toxic-appearing or diaphoretic.   HENT:      Head: Normocephalic and atraumatic.      Nose: Nose normal.      Mouth/Throat:      Mouth: Mucous membranes are moist.   Eyes:      Pupils: Pupils are equal, round, and reactive to light.   Cardiovascular:      Rate and Rhythm: Normal rate and regular rhythm.      Heart sounds: Murmur heard.      Arteriovenous access: Right arteriovenous access is present.     Comments: +thrill  Pulmonary:      Effort: Pulmonary effort is normal. No respiratory distress.      Breath sounds: No wheezing, rhonchi or rales.   Abdominal:      General: Bowel sounds are normal. There is no distension.      Palpations: Abdomen is soft.      Tenderness: There is no abdominal tenderness. There is no guarding.   Musculoskeletal:      Cervical back: Normal range of motion.      Right lower leg: No edema.      Comments: Soft cast over L foot/ankle; CDI   Skin:     General: Skin is warm and dry.      Capillary Refill: Capillary refill takes 2 to 3 seconds.   Neurological:      General: No focal deficit present.      Mental Status: She is alert and oriented to person, place, and time.      Motor: No weakness.   Psychiatric:         Mood and Affect: Mood normal.         Behavior: Behavior normal.              CRANIAL NERVES     CN III, IV, VI   Pupils are equal, round, and reactive to light.       Significant  Labs: All pertinent labs within the past 24 hours have been reviewed.  CBC:   Recent Labs   Lab 12/09/24  1332   WBC 10.59   HGB 9.0*   HCT 28.6*        CMP:   Recent Labs   Lab 12/09/24  1332      K 4.6      CO2 25   *   BUN 44*   CREATININE 3.9*   CALCIUM 9.4   PROT 8.0   ALBUMIN 2.6*   BILITOT 0.4   ALKPHOS 115   AST 47*   ALT 14   ANIONGAP 13       Significant Imaging: I have reviewed all pertinent imaging results/findings within the past 24 hours.  Imaging Results              X-Ray Ankle Complete Left (Final result)  Result time 12/09/24 17:01:00      Final result by Michael Ervin MD (12/09/24 17:01:00)                   Impression:      1. No acute displaced fracture or dislocation of the ankle.  2. There are surgical changes of the foot, comparison to the previous exam is limited given casting material and artifact at that time.      Electronically signed by: Michael Ervin MD  Date:    12/09/2024  Time:    17:01               Narrative:    EXAMINATION:  XR ANKLE COMPLETE 3 VIEW LEFT    CLINICAL HISTORY:  Other injury of unspecified body region, initial encounter    TECHNIQUE:  AP, lateral and oblique views of the left ankle were performed.    COMPARISON:  Foot radiograph 11/30/2024    FINDINGS:  Three views left ankle.    The ankle mortise is intact.  No acute displaced fracture or dislocation of the ankle.  There is osteopenia.  There is edema about the ankle.  There are surgical changes of the foot, similar in appearance to the previous examination although on that exam, examination is limited given artifact from casting material at that time.                                        X-Ray Foot Complete Left (Final result)  Result time 12/09/24 17:03:48      Final result by Michael Ervin MD (12/09/24 17:03:48)                   Impression:      This report was flagged in Epic as abnormal.    1. There is been interval development of lucency about the proximal aspect of  the 3rd metatarsal about the head of the screw construct.  Findings are concerning for osteomyelitis.  2. No convincing acute displaced fracture or dislocation of the foot noting chronic changes throughout the midfoot and surgical changes, similar in configuration.  3. There is diffuse edema about the foot, new since the prior exam.  Correlation for cellulitis as warranted.      Electronically signed by: Michael Ervin MD  Date:    12/09/2024  Time:    17:03               Narrative:    EXAMINATION:  XR FOOT COMPLETE 3 VIEW LEFT    CLINICAL HISTORY:  .  Other injury of unspecified body region, initial encounter    TECHNIQUE:  AP, lateral and oblique views of the left foot were performed.    COMPARISON:  11/30/2024    FINDINGS:  Three views left foot.    There is diffuse edema about the foot, worsened since comparative exam of 11/30/2024.  Surgical changes are noted of the foot, overall configuration appears stable given differences in positioning.  No convincing acute displaced fracture or dislocation of the foot although comparison with the previous exam is limited given artifact from casting material at that time.  There is chronic degenerative change/destructive change of the midfoot.    There is been interval development of lucency involving the proximal aspect of the 3rd metatarsal about the screw construct.                                      Assessment/Plan:     * Post-operative infection  S/p charcot foot reconstruction to L foot on 11/11 now with increased drainage, left foot pain/swelling  -Afebrile, no leukocytosis.  -Blood cxs in process.  -POC lactate 0.99, procal 0.13  -CRP 53.6  -Xray L foot with There is been interval development of lucency about the proximal aspect of the 3rd metatarsal about the head of the screw construct.  Findings are concerning for osteomyelitis. There is diffuse edema about the foot, new since the prior exam.   -MRI L foot w/o pending.  -Started on cefepime in the ED, will  continue and add vancomycin.  -Podiatry consulted in the ED, awaiting formal recs.  -Consider ID consult if MRI reveals osteomyelitis for abx recs.  -NPO after midnight as a precaution.  -PRN pain control.   -Elevate LLE.  -Fall precautions.  -Reviewed previous cxs from podiatry visit on 11/29:      Component 10 d ago   Aerobic Bacterial Culture      Abnormal   ENTEROBACTER CLOACAE  Many  VC      Aerobic Bacterial Culture  Abnormal   KLEBSIELLA PNEUMONIAE  Many  Skin meño also present    Resulting Agency [facility]        Susceptibility     Enterobacter cloacae Klebsiella pneumoniae     CULTURE, AEROBIC  (SPECIFY SOURCE) CULTURE, AEROBIC  (SPECIFY SOURCE)     Amp/Sulbactam   <=8/4 mcg/mL Sensitive     Cefazolin   <=2 mcg/mL Sensitive     Cefepime <=2 mcg/mL Sensitive <=2 mcg/mL Sensitive     Ceftriaxone   <=1 mcg/mL Sensitive     Ciprofloxacin <=0.25 mcg/mL Sensitive <=0.25 mcg/mL Sensitive     Ertapenem <=0.5 mcg/mL Sensitive <=0.5 mcg/mL Sensitive     Gentamicin <=2 mcg/mL Sensitive <=2 mcg/mL Sensitive     Levofloxacin <=0.5 mcg/mL Sensitive <=0.5 mcg/mL Sensitive     Meropenem <=1 mcg/mL Sensitive <=1 mcg/mL Sensitive     Piperacillin/Tazo   <=8 mcg/mL Sensitive     Tobramycin <=2 mcg/mL Sensitive <=2 mcg/mL Sensitive     Trimeth/Sulfa <=2/38 mcg/mL Sensitive <=2/38 mcg/mL Sensitive           ESRD (end stage renal disease)  MWF schedule?    Residual renal function?- Yes  Last dialyzed 12/09    - Nephrology consulted  - Continue chronic hemodialysis  - Monitor daily electrolytes and defer dialysis orders to nephrology  - Renally dose medications  - Continue phosphorus binders  - Daily weights/strict I&Os  - 1.5L FR    Class 2 severe obesity with serious comorbidity and body mass index (BMI) of 38.0 to 38.9 in adult  Body mass index is 39.16 kg/m². Obesity complicates all aspects of disease management from diagnostic modalities to treatment. Weight loss encouraged and health benefits explained to patient.     Chronic  "diastolic congestive heart failure  Patient has Diastolic (HFpEF) heart failure that is Chronic. On presentation their CHF was well compensated. Most recent BNP and echo results are listed below.  No results for input(s): "BNP" in the last 72 hours.  Latest ECHO  Results for orders placed during the hospital encounter of 09/11/23    Echo    Interpretation Summary    Left Ventricle: The left ventricle is normal in size. Normal wall thickness. Normal wall motion. There is low normal systolic function with a visually estimated ejection fraction of 50 - 55%. Ejection fraction by visual approximation is 55%. There is normal diastolic function.    Right Ventricle: Right ventricle was not well visualized due to poor acoustic window. Normal right ventricular cavity size. Wall thickness is normal. Right ventricle wall motion  is normal. Systolic function is normal.    Aortic Valve: There is moderate aortic valve sclerosis. There is annular calcification present.    Mitral Valve: There is moderate mitral annular calcification present.    IVC/SVC: Normal venous pressure at 3 mmHg.    Current Heart Failure Medications  carvediloL tablet 25 mg, 2 times daily with meals, Oral    Plan  - Monitor strict I&Os and daily weights.    - Place on telemetry  - Low sodium diet  - Place on fluid restriction of 1.5 L.   - Cardiology has not been consulted  - The patient's volume status is at their baseline  - Volume management per HD.    Essential hypertension  Patient's blood pressure range in the last 24 hours was: BP  Min: 125/60  Max: 142/64.The patient's inpatient anti-hypertensive regimen is listed below:  Current Antihypertensives  carvediloL tablet 25 mg, 2 times daily with meals, Oral    Plan  - BP is controlled, no changes needed to their regimen    Hyperlipidemia   Patient is chronically on statin.will continue for now. Monitor clinically. Last LDL was   Lab Results   Component Value Date    LDLCALC Invalid, Trig>400.0 07/30/2014 " "       Peripheral vascular disease  PAD s/p right SFA stent (11/2017), s/p right common iliac stent s/p PTA occluded stents in 2014 (Dr. Nathaniel Coyle at [facility]) S/p ax fem bypass and stent to sfa with Dr. Maya on 9/14/23  -Continue statin, hold ASA pending official podiatry recs.    Type II diabetes mellitus  Patient's FSGs are controlled on current medication regimen.  Last A1c reviewed-   Lab Results   Component Value Date    HGBA1C 5.8 12/02/2024     Most recent fingerstick glucose reviewed- No results for input(s): "POCTGLUCOSE" in the last 24 hours.  Current correctional scale  Low  Maintain anti-hyperglycemic dose as follows-   Antihyperglycemics (From admission, onward)      Start     Stop Route Frequency Ordered    12/10/24 2100  insulin glargine U-100 (Lantus) pen 16 Units         -- SubQ Nightly 12/10/24 0543    12/09/24 1923  insulin aspart U-100 pen 0-5 Units         -- SubQ Before meals & nightly PRN 12/09/24 1824          Hold Oral hypoglycemics while patient is in the hospital.  -Accuchecks AC/HS      VTE Risk Mitigation (From admission, onward)           Ordered     heparin (porcine) injection 5,000 Units  Every 8 hours         12/09/24 1824     IP VTE HIGH RISK PATIENT  Once         12/09/24 1824     Place sequential compression device  Until discontinued         12/09/24 1824                                    Cherise Shultz NP  Department of Hospital Medicine  [facility] - Emergency Dept          "

## 2024-12-10 NOTE — HPI
Georgina Arias is a 54 y.o. female with a PMHx of obesity s/p gastric sleeve, PAD, hypertensive heart disease with HFpEF, grade II diastolic dysfunction, DM2, HTN, HLD, ESRD on HD (last dialyzed 12/9), and charcot foot s/p reconstruction 11/11 who presents due to foul smelling drainage from LLE cast. The patient reports he has been going to podiatry weekly after surgery and last had her cast changed 11/29. Pt had been having increased pain to her left foot, swelling, and drainage so wound cxs were obtained during that visit, and she was started on levaquin and doxycycline x10 days. Endorses compliance with abx. The patient reports ongoing pain and swelling and states over the past few days she had dark foul smelling drainage reporting it looked like old blood. HD center was concerned about drainage and sent her after dialysis. She denies fever/chills. She does note 2-3 episodes of diarrhea on both Saturday and Sunday but none today. She denies CP, SOB, cough, abdominal pain, headache, or dysuria. She reports she makes urine about 3-4x/daily. Nephrology consulted for ESRD.    no

## 2024-12-10 NOTE — ASSESSMENT & PLAN NOTE
"Patient's FSGs are controlled on current medication regimen.  Last A1c reviewed-   Lab Results   Component Value Date    HGBA1C 5.8 12/02/2024     Most recent fingerstick glucose reviewed- No results for input(s): "POCTGLUCOSE" in the last 24 hours.  Current correctional scale  Low  Maintain anti-hyperglycemic dose as follows-   Antihyperglycemics (From admission, onward)      Start     Stop Route Frequency Ordered    12/10/24 2100  insulin glargine U-100 (Lantus) pen 16 Units         -- SubQ Nightly 12/10/24 0543    12/09/24 1923  insulin aspart U-100 pen 0-5 Units         -- SubQ Before meals & nightly PRN 12/09/24 1824          Hold Oral hypoglycemics while patient is in the hospital.  -Accuchecks AC/HS  "

## 2024-12-10 NOTE — ASSESSMENT & PLAN NOTE
Georgina Arias is a 54 y.o. female who recently underwent Charcot neuropathy reconstruction of left lower extremity.  Was then placed into a cast, and now has subsequently developed a left foot infection.  White blood cell count within normal limits, patient resting comfortably in bed.  Vital signs stable.  X-ray and MRI showing osteomyelitis/small fluid collection, clinically correlates with left foot infection.  Imaging likely also yields infection superimposed with Charcot neuropathy.    - cast removed bedside, patient left lower extremity cleansed with hydrogen peroxide, wipes, then gauze.  Patient's left lower extremity then placed in Mauricio compression dressing.  - elevate left lower extremity.  - antibiotics per ID.  - we will discuss with Podiatry team, anticipate possible surgical correction versus bedside procedure.

## 2024-12-10 NOTE — SUBJECTIVE & OBJECTIVE
Past Medical History:   Diagnosis Date    Hyperlipidemia     Hypertension     Peptic ulcer disease     Peripheral artery disease     PONV (postoperative nausea and vomiting)     Stage IV CKD     Type II diabetes mellitus        Past Surgical History:   Procedure Laterality Date    ANGIOGRAM, LOWER ARTERIAL, UNILATERAL Left 09/13/2023    Procedure: ANGIOGRAM, LOWER ARTERIAL, UNILATERAL;  Surgeon: Maxx Maya MD;  Location: [address];  Service: Vascular;  Laterality: Left;    ANGIOGRAPHY OF LOWER EXTREMITY Left 09/14/2023    Procedure: ANGIOGRAM, LOWER EXTREMITY with balloon angioplasty ultraverse 5mm x 60mm;  Surgeon: Maxx Maya MD;  Location: [address];  Service: Vascular;  Laterality: Left;  ultraverse 5mm x 60mm  fluoro: 12.41  contrast: 44ml  mGy: 65.57  Gycm2: 23.50    AORTOGRAPHY WITH EXTREMITY RUNOFF Left 09/13/2023    Procedure: AORTOGRAM, WITH EXTREMITY RUNOFF;  Surgeon: Maxx Maya MD;  Location: [address];  Service: Vascular;  Laterality: Left;  8.1 min  663.63 mGy  176.39 Gy.cm  178ml Dye     AV FISTULA PLACEMENT Right     CHOLECYSTECTOMY      COLONOSCOPY  11/06/2013    normal    COLONOSCOPY  05/2023    cancelled due to inadequate prep    COLONOSCOPY  06/2023    results unknown    CREATION, BYPASS, ARTERIAL, AXILLARY TO BILATERAL FEMORAL Left 09/14/2023    Procedure: CREATION, BYPASS, ARTERIAL, AXILLARY TO BILATERAL FEMORAL;  Surgeon: Maxx Maya MD;  Location: [address];  Service: Vascular;  Laterality: Left;  Left Axillary to Bilateral Femoral Bypass, Left Femoral to Above Knee Popliteal Bypass; SLIDER BED    CYSTOSCOPY W/ URETERAL STENT PLACEMENT Right 08/28/2018    Procedure: CYSTOSCOPY, WITH URETERAL STENT INSERTION (ADD ON );  Surgeon: Bubba Perez MD;  Location: [facility];  Service: Urology;  Laterality: Right;  (ADD ON )    DEBRIDEMENT OF FOOT Left 08/03/2023    Procedure: DEBRIDEMENT, FOOT;  Surgeon: Aleida Flannery DPM;  Location: [facility];  Service:  Podiatry;  Laterality: Left;    Excisional debridement of ulcer distal great toe left foot w/ partial resection distal phalanx Left 08/03/2023    FOOT AMPUTATION THROUGH METATARSAL Left 09/14/2023    Procedure: AMPUTATION, FOOT, TRANSMETATARSAL partial 1st ray amputation;  Surgeon: Jesus Valles DPM;  Location: [facility] OR 2ND FLR;  Service: Podiatry;  Laterality: Left;  partial ray    I&D L 1st ray w/ amputation L hallux IPJ Left 08/14/2023    INCISION AND DRAINAGE FOOT Left 09/14/2023    Procedure: INCISION AND DRAINAGE, FOOT;  Surgeon: Jesus Valles DPM;  Location: [facility] OR 2ND FLR;  Service: Podiatry;  Laterality: Left;    Injection L PT tendon sheath Left 08/14/2023    LENGTHENING OF ACHILLES TENDON Left 11/11/2024    Procedure: LENGTHENING, TENDON, ACHILLES;  Surgeon: Marco Allison DPM;  [facility];  Service: Podiatry;  Laterality: Left;    PERIPHERAL ARTERIAL STENT GRAFT Right     RECONSTRUCTION WITH FUSION OF CHARCOT FOOT Left 11/11/2024    Procedure: RECONSTRUCTION, CHARCOT FOOT, WITH FUSION;  Surgeon: Marco Allison DPM;  [facility];  Service: Podiatry;  Laterality: Left;  4 hours; foot tray; mini c arm; saw/drill    REMOVAL OF NAIL OF DIGIT Left 08/03/2023    Procedure: REMOVAL, NAIL, DIGIT great toe;  Surgeon: Aleida Flannery DPM;  Location: [facility] OR;  Service: Podiatry;  Laterality: Left;    REVISION, AMPUTATION, TRANSMETATARSAL Left 11/11/2024    Procedure: REVISION, AMPUTATION, TRANSMETATARSAL;  Surgeon: Marco Alliosn DPM;  [facility];  Service: Podiatry;  Laterality: Left;    STENT, SUPERFICIAL FEMORAL ARTERY Left 09/14/2023    Procedure: STENT, SUPERFICIAL FEMORAL ARTERY;  Surgeon: Maxx Maya MD;  Location: [facility] OR 2ND FLR;  Service: Vascular;  Laterality: Left;  5 x 100 mm    TOE AMPUTATION Left 08/14/2023    Procedure: AMPUTATION, TOE hallux IPJ;  Surgeon: Aleida Flannery DPM;  Location: [facility] OR;  Service: Podiatry;  Laterality: Left;    TOE AMPUTATION Left  08/25/2023    Procedure: AMPUTATION, TOE hallux, possible 1st met.head;  Surgeon: Aleida Flannery DPM;  Location: [facility] OR;  Service: Podiatry;  Laterality: Left;    URETEROSCOPIC REMOVAL OF URETERIC CALCULUS Right 09/11/2018    Procedure: EXTRACTION-STONE-URETEROSCOPY;  Surgeon: Bubba Perez MD;  Location: [facility] [place] OR;  Service: Urology;  Laterality: Right;       Review of patient's allergies indicates:   Allergen Reactions    Hydrocodone-acetaminophen Nausea And Vomiting, Rash and Other (See Comments)     Vicodin- severe rash  Lortab- head-spinning that leads to vomiting      Naproxen Anaphylaxis and Swelling     Hives (skin)^swelling  Hives (skin)^swelling  Hives (skin)^swelling    Sulfamethoxazole-trimethoprim Other (See Comments)     Caused JEROME; heart attack    Hydrocodone Nausea And Vomiting and Rash    Adhesive tape-silicones      blisters    Aspartame Hives    Penicillins Hives     Blisters (skin)^    Sulfamethoxazole Other (See Comments)    Trimethoprim Other (See Comments)    Acetaminophen Rash    Adhesive Rash     Current Facility-Administered Medications   Medication Frequency    acetaminophen tablet 650 mg Q6H PRN    atorvastatin tablet 80 mg QHS    carvediloL tablet 25 mg BID WM    ceFEPIme injection 1 g Q24H    cetirizine tablet 5 mg Daily    dextrose 10% bolus 125 mL 125 mL PRN    dextrose 10% bolus 250 mL 250 mL PRN    fluticasone propionate 50 mcg/actuation nasal spray 50 mcg Daily    gabapentin capsule 200 mg TID    And    gabapentin capsule 100 mg QHS    glucagon (human recombinant) injection 1 mg PRN    glucose chewable tablet 16 g PRN    glucose chewable tablet 24 g PRN    heparin (porcine) injection 5,000 Units Q8H    HYDROcodone-acetaminophen  mg per tablet 1 tablet Q6H PRN    influenza (Flulaval, Fluzone, Fluarix) 45 mcg/0.5 mL IM vaccine (> or = 6 mo) 0.5 mL Prior to discharge    insulin aspart U-100 pen 0-5 Units QID (AC + HS) PRN    insulin glargine U-100 (Lantus) pen 14 Units QHS     melatonin tablet 6 mg Nightly PRN    naloxone 0.4 mg/mL injection 0.02 mg PRN    ondansetron injection 4 mg Q8H PRN    promethazine tablet 12.5 mg Q6H PRN    sevelamer carbonate tablet 800 mg TID WM    sodium chloride 0.9% flush 10 mL Q12H PRN    vancomycin - pharmacy to dose pharmacy to manage frequency     Family History       Problem Relation (Age of Onset)    Breast cancer Mother (71), Maternal Grandmother (75)    Diabetes Mother, Father    Heart attack Maternal Grandfather (44)    Heart disease Father (37), Maternal Grandmother    Hypertension Mother, Father, Brother    Uterine cancer Mother          Tobacco Use    Smoking status: Former     Current packs/day: 0.00     Types: Cigarettes     Quit date: 2006     Years since quittin.3    Smokeless tobacco: Never    Tobacco comments:     smoked off and on for 20 years, was up to 2 packs/day toward the end of that, and quit in    Substance and Sexual Activity    Alcohol use: Not Currently     Comment: occ    Drug use: No    Sexual activity: Not Currently     Review of Systems   Constitutional:  Negative for appetite change, chills, diaphoresis, fatigue and fever.   HENT:  Negative for congestion, rhinorrhea and sore throat.    Eyes:  Negative for photophobia and visual disturbance.   Respiratory:  Negative for cough, shortness of breath and wheezing.    Cardiovascular:  Negative for chest pain, palpitations and leg swelling.   Gastrointestinal:  Positive for diarrhea. Negative for abdominal distention, abdominal pain, nausea and vomiting.   Genitourinary:  Positive for decreased urine volume (ESRD on HD). Negative for dysuria and hematuria.   Musculoskeletal:  Positive for arthralgias, gait problem, joint swelling and myalgias. Negative for neck pain.   Skin:  Positive for wound. Negative for color change.   Neurological:  Negative for syncope, weakness, light-headedness and headaches.   Psychiatric/Behavioral:  Negative for confusion and sleep  disturbance. The patient is not nervous/anxious.      Objective:     Vital Signs (Most Recent):  Temp: 97.9 °F (36.6 °C) (12/10/24 0728)  Pulse: 71 (12/10/24 0759)  Resp: 18 (12/10/24 0728)  BP: 136/60 (12/10/24 0728)  SpO2: 96 % (12/10/24 0728) Vital Signs (24h Range):  Temp:  [97.9 °F (36.6 °C)-98.9 °F (37.2 °C)] 97.9 °F (36.6 °C)  Pulse:  [69-97] 71  Resp:  [11-20] 18  SpO2:  [95 %-100 %] 96 %  BP: (109-161)/(56-69) 136/60     Weight: 101.2 kg (223 lb 1.7 oz) (12/10/24 0400)  Body mass index is 34.94 kg/m².  Body surface area is 2.19 meters squared.    No intake/output data recorded.     Physical Exam  Vitals and nursing note reviewed.   Constitutional:       General: She is not in acute distress.     Appearance: She is obese. She is not toxic-appearing or diaphoretic.   HENT:      Head: Normocephalic.      Nose: Nose normal.   Eyes:      Conjunctiva/sclera: Conjunctivae normal.   Cardiovascular:      Rate and Rhythm: Normal rate.      Arteriovenous access: Right arteriovenous access is present.     Comments: +thrill  Pulmonary:      Effort: Pulmonary effort is normal. No respiratory distress.      Breath sounds: No wheezing, rhonchi or rales.   Abdominal:      General: There is no distension.   Musculoskeletal:      Cervical back: Normal range of motion.      Right lower leg: No edema.      Comments: Soft cast over L foot/ankle; CDI   Skin:     General: Skin is warm and dry.   Neurological:      Mental Status: She is alert.      Motor: No weakness.   Psychiatric:         Mood and Affect: Mood normal.        Significant Labs:  All labs within the past 24 hours have been reviewed.

## 2024-12-10 NOTE — ASSESSMENT & PLAN NOTE
S/p charcot foot reconstruction to L foot on 11/11 now with increased drainage, left foot pain/swelling  -Afebrile, no leukocytosis.  -Blood cxs in process.  -POC lactate 0.99, procal 0.13  -CRP 53.6  -Xray L foot with There is been interval development of lucency about the proximal aspect of the 3rd metatarsal about the head of the screw construct.  Findings are concerning for osteomyelitis. There is diffuse edema about the foot, new since the prior exam.   -MRI L foot w/o pending.  -Started on cefepime in the ED, will continue and add vancomycin.  -Podiatry consulted in the ED, awaiting formal recs.  -Consider ID consult if MRI reveals osteomyelitis for abx recs.  -NPO after midnight as a precaution.  -PRN pain control.   -Elevate LLE.  -Fall precautions.  -Reviewed previous cxs from podiatry visit on 11/29:      Component 10 d ago   Aerobic Bacterial Culture      Abnormal   ENTEROBACTER CLOACAE  Many  VC      Aerobic Bacterial Culture  Abnormal   KLEBSIELLA PNEUMONIAE  Many  Skin meño also present    Resulting Agency [facility]        Susceptibility     Enterobacter cloacae Klebsiella pneumoniae     CULTURE, AEROBIC  (SPECIFY SOURCE) CULTURE, AEROBIC  (SPECIFY SOURCE)     Amp/Sulbactam   <=8/4 mcg/mL Sensitive     Cefazolin   <=2 mcg/mL Sensitive     Cefepime <=2 mcg/mL Sensitive <=2 mcg/mL Sensitive     Ceftriaxone   <=1 mcg/mL Sensitive     Ciprofloxacin <=0.25 mcg/mL Sensitive <=0.25 mcg/mL Sensitive     Ertapenem <=0.5 mcg/mL Sensitive <=0.5 mcg/mL Sensitive     Gentamicin <=2 mcg/mL Sensitive <=2 mcg/mL Sensitive     Levofloxacin <=0.5 mcg/mL Sensitive <=0.5 mcg/mL Sensitive     Meropenem <=1 mcg/mL Sensitive <=1 mcg/mL Sensitive     Piperacillin/Tazo   <=8 mcg/mL Sensitive     Tobramycin <=2 mcg/mL Sensitive <=2 mcg/mL Sensitive     Trimeth/Sulfa <=2/38 mcg/mL Sensitive <=2/38 mcg/mL Sensitive

## 2024-12-10 NOTE — CONSULTS
"[facility] Neuro[facility] ([facility])  Adult Nutrition  Consult Note    SUMMARY     Recommendations    Continue renal diabetic diet as tolerated.   Jason BID (RD ordered).   RD to monitor and follow up.    Goals: Meet % EEN/EPN by RD follow up.  Nutrition Goal Status: new  Communication of RD Recs: other (comment) (POC)    Assessment and Plan    Nutrition Problem  Increased protein needs    Related to (etiology):   Increased demand for protein 2/2 wound healing     Signs and Symptoms (as evidenced by):   Nonhealing wound (left dorsal, plantar foot)     Interventions/Recommendations (treatment strategy):  Collab of care w other providers  Jason BID     Nutrition Diagnosis Status:   New       Reason for Assessment    Reason For Assessment: consult  Diagnosis: other (see comments) (Post-operative infection)  General Information Comments: Consulted for nonhealing wound/DM. PMHx of obesity s/p gastric sleeve, PAD, hypertensive heart disease with HFpEF, grade II diastolic dysfunction, DM2, HTN, HLD, ESRD on HD (last dialyzed 12/9), and charcot foot s/p reconstruction. Pt reports adequate intake PTA. No N/V/C/D. 27 lbs (10.8% BW) lost in 11 days, potentially due to fluid loss. Pt consumed 100% of today's lunch and dinner. Malnutrition not suspected at this time. Pt agreeable to Jason BID.  Nutrition Discharge Planning: Renal diabetic diet    Nutrition Related Social Determinants of Health: SDOH: None Identified      Nutrition Risk Screen    Nutrition Risk Screen: large or nonhealing wound, burn or pressure injury    Nutrition/Diet History    Food Preferences: No pork or spicy/heavily seasoned foods.  Spiritual, Cultural Beliefs, Bahai Practices, Values that Affect Care: no  Food Allergies: NKFA  Factors Affecting Nutritional Intake: None identified at this time    Anthropometrics    Temp: 98.5 °F (36.9 °C)  Height Method: Stated  Height: 5' 7" (170.2 cm)  Height (inches): 67 in  Weight Method: Bed Scale  Weight: " 101.2 kg (223 lb 1.7 oz)  Weight (lb): 223.11 lb  Ideal Body Weight (IBW), Female: 135 lb  % Ideal Body Weight, Female (lb): 165.27 %  BMI (Calculated): 34.9  BMI Grade: 30 - 34.9- obesity - grade I       Lab/Procedures/Meds    Pertinent Labs Reviewed: reviewed  Pertinent Labs Comments: BUN 55, creatinine 4.4, eGFR 11.3, phosphorus 4.7, albumin 2.3  Pertinent Medications Reviewed: reviewed  Pertinent Medications Comments: Atorvastatin, heparin, insulin, sevelamer        Estimated/Assessed Needs    Weight Used For Calorie Calculations: 61.2 kg (135 lb)  Energy Calorie Requirements (kcal): 2023-3736  Energy Need Method: Kcal/kg (25-35 kcal/kg IBW)  Protein Requirements: 61-92 (1.0-1.5 g/kg ESRD on dialysis, non healing wound)  Weight Used For Protein Calculations: 61.2 kg (135 lb)     Estimated Fluid Requirement Method: RDA Method  RDA Method (mL): 1531  CHO Requirement: 191-267 g (50% of EEN)      Nutrition Prescription Ordered    Current Diet Order: Renal diabetic    Evaluation of Received Nutrient/Fluid Intake    I/O: - 470 net I/O  Comments: LBM 12/9  % Intake of Estimated Energy Needs: 75 - 100 %  % Meal Intake: 75 - 100 %    Nutrition Risk    Level of Risk/Frequency of Follow-up:  (1x/week)       Monitor and Evaluation    Food and Nutrient Intake: food and beverage intake, energy intake  Food and Nutrient Adminstration: diet order  Anthropometric Measurements: body mass index, weight change  Biochemical Data, Medical Tests and Procedures: lipid profile, inflammatory profile, glucose/endocrine profile, gastrointestinal profile, electrolyte and renal panel  Nutrition-Focused Physical Findings: overall appearance       Nutrition Follow-Up    RD Follow-up?: Yes    Brock Carter, Registration Eligible, Provisional LDN

## 2024-12-10 NOTE — ASSESSMENT & PLAN NOTE
Patient is chronically on statin.will continue for now. Monitor clinically. Last LDL was   Lab Results   Component Value Date    LDLCALC Invalid, Trig>400.0 07/30/2014

## 2024-12-10 NOTE — PROGRESS NOTES
Hospital Medicine  Progress note    [place] [facility] [facility] [place] Jenae De La Cruz MD  Admit Date: 12/9/2024  Code status: Full Code    Principal Problem:  Post-operative infection    Interval hx:  Patient anxious to eat    PEx  Temp:  [97.9 °F (36.6 °C)-98.7 °F (37.1 °C)]   Pulse:  [69-81]   Resp:  [11-20]   BP: (109-161)/(56-69)   SpO2:  [95 %-100 %]     Intake/Output Summary (Last 24 hours) at 12/10/2024 1434  Last data filed at 12/10/2024 1246  Gross per 24 hour   Intake 30 ml   Output 1000 ml   Net -970 ml       General Appearance: no acute distress, WD, chronically ill-appearing  Heart: regular rate and rhythm, no heave  Respiratory: Normal respiratory effort, symmetric excursion, bilateral vesicular breath sounds   Abdomen: Soft, non-tender; bowel sounds active  Skin: intact, no rash, no ulcers  Neurologic:  No focal numbness or weakness  Mental status: Alert, oriented x 4, affect appropriate    Recent Labs   Lab 12/09/24  1332 12/10/24  0330   WBC 10.59 7.68   HGB 9.0* 8.2*   HCT 28.6* 26.2*    223     Recent Labs   Lab 12/09/24  1332 12/10/24  0330    141   K 4.6 4.2    104   CO2 25 25   BUN 44* 55*   CREATININE 3.9* 4.4*   * 105   CALCIUM 9.4 8.7   MG 2.1 2.0   PHOS 3.1 4.7*     Recent Labs   Lab 12/09/24  1332 12/10/24  0330   ALKPHOS 115 94   ALT 14 11   AST 47* 16   ALBUMIN 2.6* 2.3*   PROT 8.0 6.5   BILITOT 0.4 0.4        Recent Labs   Lab 12/09/24  2023 12/10/24  0905 12/10/24  1118   POCTGLUCOSE 178* 109 103       Scheduled Meds:   atorvastatin  80 mg Oral QHS    carvediloL  25 mg Oral BID WM    ceFEPime IV (PEDS and ADULTS)  1 g Intravenous Q24H    cetirizine  5 mg Oral Daily    fluticasone propionate  1 spray Each Nostril Daily    gabapentin  200 mg Oral TID    And    gabapentin  100 mg Oral QHS    heparin (porcine)  5,000 Units Subcutaneous Q8H    insulin glargine U-100 (Lantus)  14 Units Subcutaneous QHS    sevelamer carbonate  800 mg Oral TID WM     Continuous Infusions:  As  "Needed:    Current Facility-Administered Medications:     acetaminophen, 650 mg, Oral, Q6H PRN    dextrose 10%, 12.5 g, Intravenous, PRN    dextrose 10%, 25 g, Intravenous, PRN    glucagon (human recombinant), 1 mg, Intramuscular, PRN    glucose, 16 g, Oral, PRN    glucose, 24 g, Oral, PRN    HYDROcodone-acetaminophen, 1 tablet, Oral, Q6H PRN    influenza, 0.5 mL, Intramuscular, Prior to discharge    insulin aspart U-100, 0-5 Units, Subcutaneous, QID (AC + HS) PRN    melatonin, 6 mg, Oral, Nightly PRN    naloxone, 0.02 mg, Intravenous, PRN    ondansetron, 4 mg, Intravenous, Q8H PRN    promethazine, 12.5 mg, Oral, Q6H PRN    sodium chloride 0.9%, 10 mL, Intravenous, Q12H PRN    Pharmacy to dose Vancomycin consult, , , Once **AND** vancomycin - pharmacy to dose, , Intravenous, pharmacy to manage frequency    Assessment and Plan  / Problems managed today    * Post-operative infection  S/p charcot foot reconstruction to L foot on 11/11 now with increased drainage, left foot pain/swelling  -Afebrile, no leukocytosis.  -Blood cxs in process.  -POC lactate 0.99, procal 0.13  -CRP 53.6  -Xray L foot with There is been interval development of lucency about the proximal aspect of the 3rd metatarsal about the head of the screw construct.  Findings are concerning for osteomyelitis. There is diffuse edema about the foot, new since the prior exam.   -MRI L foot w/o pending showed "1. 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  2. 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  3. Ulceration along the plantar and dorsal lateral aspect of the foot with probable communication with the deeper midfoot compartment.  Small fluid collection along the superior aspect of the 4th and 5th metatarsals, concerning for abscess."  -currently on cefepime and vancomycin  -Podiatry consulted in " the ED, awaiting formal recs.  -consult ID  -PRN pain control.   -Elevate LLE.  -Fall precautions.  -Reviewed previous cxs from podiatry visit on 11/29:      Component 10 d ago   Aerobic Bacterial Culture      Abnormal   ENTEROBACTER CLOACAE  Many  VC      Aerobic Bacterial Culture  Abnormal   KLEBSIELLA PNEUMONIAE  Many  Skin meño also present    Resulting Agency [facility]        Susceptibility     Enterobacter cloacae Klebsiella pneumoniae     CULTURE, AEROBIC  (SPECIFY SOURCE) CULTURE, AEROBIC  (SPECIFY SOURCE)     Amp/Sulbactam   <=8/4 mcg/mL Sensitive     Cefazolin   <=2 mcg/mL Sensitive     Cefepime <=2 mcg/mL Sensitive <=2 mcg/mL Sensitive     Ceftriaxone   <=1 mcg/mL Sensitive     Ciprofloxacin <=0.25 mcg/mL Sensitive <=0.25 mcg/mL Sensitive     Ertapenem <=0.5 mcg/mL Sensitive <=0.5 mcg/mL Sensitive     Gentamicin <=2 mcg/mL Sensitive <=2 mcg/mL Sensitive     Levofloxacin <=0.5 mcg/mL Sensitive <=0.5 mcg/mL Sensitive     Meropenem <=1 mcg/mL Sensitive <=1 mcg/mL Sensitive     Piperacillin/Tazo   <=8 mcg/mL Sensitive     Tobramycin <=2 mcg/mL Sensitive <=2 mcg/mL Sensitive     Trimeth/Sulfa <=2/38 mcg/mL Sensitive <=2/38 mcg/mL Sensitive           ESRD (end stage renal disease)  MWF schedule?    Residual renal function?- Yes  Last dialyzed 12/09    - Nephrology consulted  - Continue chronic hemodialysis  - Monitor daily electrolytes and defer dialysis orders to nephrology  - Renally dose medications  - Continue phosphorus binders  - Daily weights/strict I&Os  - 1.5L FR    Class 2 severe obesity with serious comorbidity and body mass index (BMI) of 38.0 to 38.9 in adult  Body mass index is 39.16 kg/m². Obesity complicates all aspects of disease management from diagnostic modalities to treatment. Weight loss encouraged and health benefits explained to patient.     Chronic diastolic congestive heart failure  Patient has Diastolic (HFpEF) heart failure that is Chronic. On presentation their CHF was well compensated.  Most recent BNP and echo results are listed below.  Latest ECHO  Results for orders placed during the hospital encounter of 09/11/23    Echo    Interpretation Summary    Left Ventricle: The left ventricle is normal in size. Normal wall thickness. Normal wall motion. There is low normal systolic function with a visually estimated ejection fraction of 50 - 55%. Ejection fraction by visual approximation is 55%. There is normal diastolic function.    Right Ventricle: Right ventricle was not well visualized due to poor acoustic window. Normal right ventricular cavity size. Wall thickness is normal. Right ventricle wall motion  is normal. Systolic function is normal.    Aortic Valve: There is moderate aortic valve sclerosis. There is annular calcification present.    Mitral Valve: There is moderate mitral annular calcification present.    IVC/SVC: Normal venous pressure at 3 mmHg.    Current Heart Failure Medications  carvediloL tablet 25 mg, 2 times daily with meals, Oral    Plan  - Monitor strict I&Os and daily weights.    - Place on telemetry  - Low sodium diet  - Place on fluid restriction of 1.5 L.   - Cardiology has not been consulted  - The patient's volume status is at their baseline  - Volume management per HD.    Essential hypertension  Patient's blood pressure range in the last 24 hours was: BP  Min: 125/60  Max: 142/64.The patient's inpatient anti-hypertensive regimen is listed below:  Current Antihypertensives  carvediloL tablet 25 mg, 2 times daily with meals, Oral    Plan  - BP is controlled, no changes needed to their regimen    Hyperlipidemia   Patient is chronically on statin.will continue for now. Monitor clinically. Last LDL was   Lab Results   Component Value Date    LDLCALC Invalid, Trig>400.0 07/30/2014        Peripheral vascular disease  PAD s/p right SFA stent (11/2017), s/p right common iliac stent s/p P[place] occluded stents [place] [facility] [place] S/p ax fem bypass and stent to sfa  "with Dr. Maya on 9/14/23  -Continue statin, hold ASA pending official podiatry recs.    Type II diabetes mellitus  Patient's FSGs are controlled on current medication regimen.  Last A1c reviewed-   Lab Results   Component Value Date    HGBA1C 5.8 12/02/2024     Most recent fingerstick glucose reviewed- No results for input(s): "POCTGLUCOSE" in the last 24 hours.  Current correctional scale  Low  Maintain anti-hyperglycemic dose as follows-   Antihyperglycemics (From admission, onward)      Start     Stop Route Frequency Ordered    12/10/24 2100  insulin glargine U-100 (Lantus) pen 16 Units         -- SubQ Nightly 12/10/24 0543    12/09/24 1923  insulin aspart U-100 pen 0-5 Units         -- SubQ Before meals & nightly PRN 12/09/24 1824          Hold Oral hypoglycemics while patient is in the hospital.  -Accuchecks AC/HS      Diet:  regular diet  GI PPx: not needed  DVT PPx:  heparin  Airways: room air  Wounds: none    Goals of Care:  Return to prior functional status     Discharge Planning   INDIO: 12/13/2024   Is the patient medically ready for discharge?:     Reason for patient still in hospital (select all that apply): Patient trending condition and Treatment           Jenae De La Cruz MD       "

## 2024-12-10 NOTE — PLAN OF CARE
Problem: Adult Inpatient Plan of Care  Goal: Plan of Care Review  Outcome: Progressing  Goal: Patient-Specific Goal (Individualized)  Outcome: Progressing  Goal: Absence of Hospital-Acquired Illness or Injury  Outcome: Progressing  Goal: Optimal Comfort and Wellbeing  Outcome: Progressing  Goal: Readiness for Transition of Care  Outcome: Progressing     Problem: Diabetes Comorbidity  Goal: Blood Glucose Level Within Targeted Range  Outcome: Progressing     Problem: Acute Kidney Injury/Impairment  Goal: Fluid and Electrolyte Balance  Outcome: Progressing  Goal: Improved Oral Intake  Outcome: Progressing  Goal: Effective Renal Function  Outcome: Progressing     Problem: Wound  Goal: Optimal Coping  Outcome: Progressing  Goal: Optimal Functional Ability  Outcome: Progressing  Goal: Absence of Infection Signs and Symptoms  Outcome: Progressing  Goal: Improved Oral Intake  Outcome: Progressing  Goal: Optimal Pain Control and Function  Outcome: Progressing  Goal: Skin Health and Integrity  Outcome: Progressing  Goal: Optimal Wound Healing  Outcome: Progressing     Problem: Skin Injury Risk Increased  Goal: Skin Health and Integrity  Outcome: Progressing             POC updated and reviewed with the patient at bedside. Questions regarding POC were encouraged and addressed. VSS, see flowsheets. Tele maintained per provider's order. Patient is AO x 4 at this time. NAEON. Fall, and safety precautions maintained. No signs of injury noted over night. Upon exiting room, patient's bed locked in low position, side rails up x 3, bed alarm set, with call light within reach. Instructed patient to call staff for mobility, verbalized understanding.  No acute signs of distress noted at this time.

## 2024-12-10 NOTE — CONSULTS
[facility] ([facility])  Nephrology  Consult Note    Patient Name: Georgina Arias  MRN: [id number]  Admission Date: 12/9/2024  Hospital Length of Stay: 1 days  Attending Provider: Jenae De La Cruz MD   Primary Care Physician: Alonso Ling MD  Principal Problem:Post-operative infection    Inpatient consult to Nephrology  Consult performed by: Kimberli Delacruz DNP  Consult ordered by: Cherise Shultz NP  Reason for consult: ESRD        Subjective:     HPI: Georgina Arias is a 54 y.o. female with a PMHx of obesity s/p gastric sleeve, PAD, hypertensive heart disease with HFpEF, grade II diastolic dysfunction, DM2, HTN, HLD, ESRD on HD (last dialyzed 12/9), and charcot foot s/p reconstruction 11/11 who presents due to foul smelling drainage from LLE cast. The patient reports he has been going to podiatry weekly after surgery and last had her cast changed 11/29. Pt had been having increased pain to her left foot, swelling, and drainage so wound cxs were obtained during that visit, and she was started on levaquin and doxycycline x10 days. Endorses compliance with abx. The patient reports ongoing pain and swelling and states over the past few days she had dark foul smelling drainage reporting it looked like old blood. HD center was concerned about drainage and sent her after dialysis. She denies fever/chills. She does note 2-3 episodes of diarrhea on both Saturday and Sunday but none today. She denies CP, SOB, cough, abdominal pain, headache, or dysuria. She reports she makes urine about 3-4x/daily. Nephrology consulted for ESRD.     HPI obtained via EMR     Past Medical History:   Diagnosis Date    Hyperlipidemia     Hypertension     Peptic ulcer disease     Peripheral artery disease     PONV (postoperative nausea and vomiting)     Stage IV CKD     Type II diabetes mellitus        Past Surgical History:   Procedure Laterality Date    ANGIOGRAM, LOWER ARTERIAL, UNILATERAL Left 09/13/2023     Procedure: ANGIOGRAM, LOWER ARTERIAL, UNILATERAL;  Surgeon: Maxx Maya MD;  Location: [address];  Service: Vascular;  Laterality: Left;    ANGIOGRAPHY OF LOWER EXTREMITY Left 09/14/2023    Procedure: ANGIOGRAM, LOWER EXTREMITY with balloon angioplasty ultraverse 5mm x 60mm;  Surgeon: Maxx Maya MD;  Location: [address];  Service: Vascular;  Laterality: Left;  ultraverse 5mm x 60mm  fluoro: 12.41  contrast: 44ml  mGy: 65.57  Gycm2: 23.50    AORTOGRAPHY WITH EXTREMITY RUNOFF Left 09/13/2023    Procedure: AORTOGRAM, WITH EXTREMITY RUNOFF;  Surgeon: Maxx Maya MD;  Location: [facility] OR [address] [place] [address];  Service: Vascular;  Laterality: Left;  8.1 min  663.63 mGy  176.39 Gy.cm  178ml Dye     AV FISTULA PLACEMENT Right     CHOLECYSTECTOMY      COLONOSCOPY  11/06/2013    normal    COLONOSCOPY  05/2023    cancelled due to inadequate prep    COLONOSCOPY  06/2023    results unknown    CREATION, BYPASS, ARTERIAL, AXILLARY TO BILATERAL FEMORAL Left 09/14/2023    Procedure: CREATION, BYPASS, ARTERIAL, AXILLARY TO BILATERAL FEMORAL;  Surgeon: Maxx Maya MD;  Location: [address];  Service: Vascular;  Laterality: Left;  Left Axillary to Bilateral Femoral Bypass, Left Femoral to Above Knee Popliteal Bypass; SLIDER BED    CYSTOSCOPY W/ URETERAL STENT PLACEMENT Right 08/28/2018    Procedure: CYSTOSCOPY, WITH URETERAL STENT INSERTION (ADD ON );  Surgeon: Bubba Perez MD;  Location: [facility];  Service: Urology;  Laterality: Right;  (ADD ON )    DEBRIDEMENT OF FOOT Left 08/03/2023    Procedure: DEBRIDEMENT, FOOT;  Surgeon: Aleida Flannery DPM;  Location: [facility] OR;  Service: Podiatry;  Laterality: Left;    Excisional debridement of ulcer distal great toe left foot w/ partial resection distal phalanx Left 08/03/2023    FOOT AMPUTATION THROUGH METATARSAL Left 09/14/2023    Procedure: AMPUTATION, FOOT, TRANSMETATARSAL partial 1st ray amputation;  Surgeon: Jesus Valles DPM;  Location: [address];   Service: Podiatry;  Laterality: Left;  partial ray    I&D L 1st ray w/ amputation L hallux IPJ Left 08/14/2023    INCISION AND DRAINAGE FOOT Left 09/14/2023    Procedure: INCISION AND DRAINAGE, FOOT;  Surgeon: Jesus Valles DPM;  Location: [facility] OR 2ND FLR;  Service: Podiatry;  Laterality: Left;    Injection L PT tendon sheath Left 08/14/2023    LENGTHENING OF ACHILLES TENDON Left 11/11/2024    Procedure: LENGTHENING, TENDON, ACHILLES;  Surgeon: Marco Allison DPM;  Location: [facility] OR;  Service: Podiatry;  Laterality: Left;    PERIPHERAL ARTERIAL STENT GRAFT Right     RECONSTRUCTION WITH FUSION OF CHARCOT FOOT Left 11/11/2024    Procedure: RECONSTRUCTION, CHARCOT FOOT, WITH FUSION;  Surgeon: Marco Allison DPM;  Location: [facility] OR;  Service: Podiatry;  Laterality: Left;  4 hours; foot tray; mini c arm; saw/drill    REMOVAL OF NAIL OF DIGIT Left 08/03/2023    Procedure: REMOVAL, NAIL, DIGIT great toe;  Surgeon: Aleida Flannery DPM;  Location: [facility] OR;  Service: Podiatry;  Laterality: Left;    REVISION, AMPUTATION, TRANSMETATARSAL Left 11/11/2024    Procedure: REVISION, AMPUTATION, TRANSMETATARSAL;  Surgeon: Marco Allison DPM;  Location: [facility] OR;  Service: Podiatry;  Laterality: Left;    STENT, SUPERFICIAL FEMORAL ARTERY Left 09/14/2023    Procedure: STENT, SUPERFICIAL FEMORAL ARTERY;  Surgeon: Maxx Maya MD;  Location: [facility] OR 2ND FLR;  Service: Vascular;  Laterality: Left;  5 x 100 mm    TOE AMPUTATION Left 08/14/2023    Procedure: AMPUTATION, TOE hallux IPJ;  Surgeon: Aleida Flannery DPM;  Location: [facility] OR;  Service: Podiatry;  Laterality: Left;    TOE AMPUTATION Left 08/25/2023    Procedure: AMPUTATION, TOE hallux, possible 1st met.head;  Surgeon: Aleida Flannery DPM;  Location: [facility] OR;  Service: Podiatry;  Laterality: Left;    URETEROSCOPIC REMOVAL OF URETERIC CALCULUS Right 09/11/2018    Procedure: EXTRACTION-STONE-URETEROSCOPY;  Surgeon: Bubba Perez MD;  Location: [facility] OR;  Service:  Urology;  Laterality: Right;       Review of patient's allergies indicates:   Allergen Reactions    Hydrocodone-acetaminophen Nausea And Vomiting, Rash and Other (See Comments)     Vicodin- severe rash  Lortab- head-spinning that leads to vomiting      Naproxen Anaphylaxis and Swelling     Hives (skin)^swelling  Hives (skin)^swelling  Hives (skin)^swelling    Sulfamethoxazole-trimethoprim Other (See Comments)     Caused JEROME; heart attack    Hydrocodone Nausea And Vomiting and Rash    Adhesive tape-silicones      blisters    Aspartame Hives    Penicillins Hives     Blisters (skin)^    Sulfamethoxazole Other (See Comments)    Trimethoprim Other (See Comments)    Acetaminophen Rash    Adhesive Rash     Current Facility-Administered Medications   Medication Frequency    acetaminophen tablet 650 mg Q6H PRN    atorvastatin tablet 80 mg QHS    carvediloL tablet 25 mg BID WM    ceFEPIme injection 1 g Q24H    cetirizine tablet 5 mg Daily    dextrose 10% bolus 125 mL 125 mL PRN    dextrose 10% bolus 250 mL 250 mL PRN    fluticasone propionate 50 mcg/actuation nasal spray 50 mcg Daily    gabapentin capsule 200 mg TID    And    gabapentin capsule 100 mg QHS    glucagon (human recombinant) injection 1 mg PRN    glucose chewable tablet 16 g PRN    glucose chewable tablet 24 g PRN    heparin (porcine) injection 5,000 Units Q8H    HYDROcodone-acetaminophen  mg per tablet 1 tablet Q6H PRN    influenza (Flulaval, Fluzone, Fluarix) 45 mcg/0.5 mL IM vaccine (> or = 6 mo) 0.5 mL Prior to discharge    insulin aspart U-100 pen 0-5 Units QID (AC + HS) PRN    insulin glargine U-100 (Lantus) pen 14 Units QHS    melatonin tablet 6 mg Nightly PRN    naloxone 0.4 mg/mL injection 0.02 mg PRN    ondansetron injection 4 mg Q8H PRN    promethazine tablet 12.5 mg Q6H PRN    sevelamer carbonate tablet 800 mg TID WM    sodium chloride 0.9% flush 10 mL Q12H PRN    vancomycin - pharmacy to dose pharmacy to manage frequency     Family History        Problem Relation (Age of Onset)    Breast cancer Mother (71), Maternal Grandmother (75)    Diabetes Mother, Father    Heart attack Maternal Grandfather (44)    Heart disease Father (37), Maternal Grandmother    Hypertension Mother, Father, Brother    Uterine cancer Mother          Tobacco Use    Smoking status: Former     Current packs/day: 0.00     Types: Cigarettes     Quit date: 2006     Years since quittin.3    Smokeless tobacco: Never    Tobacco comments:     smoked off and on for 20 years, was up to 2 packs/day toward the end of that, and quit in    Substance and Sexual Activity    Alcohol use: Not Currently     Comment: occ    Drug use: No    Sexual activity: Not Currently     Review of Systems   Constitutional:  Negative for appetite change, chills, diaphoresis, fatigue and fever.   HENT:  Negative for congestion, rhinorrhea and sore throat.    Eyes:  Negative for photophobia and visual disturbance.   Respiratory:  Negative for cough, shortness of breath and wheezing.    Cardiovascular:  Negative for chest pain, palpitations and leg swelling.   Gastrointestinal:  Positive for diarrhea. Negative for abdominal distention, abdominal pain, nausea and vomiting.   Genitourinary:  Positive for decreased urine volume (ESRD on HD). Negative for dysuria and hematuria.   Musculoskeletal:  Positive for arthralgias, gait problem, joint swelling and myalgias. Negative for neck pain.   Skin:  Positive for wound. Negative for color change.   Neurological:  Negative for syncope, weakness, light-headedness and headaches.   Psychiatric/Behavioral:  Negative for confusion and sleep disturbance. The patient is not nervous/anxious.      Objective:     Vital Signs (Most Recent):  Temp: 97.9 °F (36.6 °C) (12/10/24 0728)  Pulse: 71 (12/10/24 0759)  Resp: 18 (12/10/24 0728)  BP: 136/60 (12/10/24 0728)  SpO2: 96 % (12/10/24 0728) Vital Signs (24h Range):  Temp:  [97.9 °F (36.6 °C)-98.9 °F (37.2 °C)] 97.9 °F (36.6  °C)  Pulse:  [69-97] 71  Resp:  [11-20] 18  SpO2:  [95 %-100 %] 96 %  BP: (109-161)/(56-69) 136/60     Weight: 101.2 kg (223 lb 1.7 oz) (12/10/24 0400)  Body mass index is 34.94 kg/m².  Body surface area is 2.19 meters squared.    No intake/output data recorded.     Physical Exam  Vitals and nursing note reviewed.   Constitutional:       General: She is not in acute distress.     Appearance: She is obese. She is not toxic-appearing or diaphoretic.   HENT:      Head: Normocephalic.      Nose: Nose normal.   Eyes:      Conjunctiva/sclera: Conjunctivae normal.   Cardiovascular:      Rate and Rhythm: Normal rate.      Arteriovenous access: Right arteriovenous access is present.     Comments: +thrill  Pulmonary:      Effort: Pulmonary effort is normal. No respiratory distress.      Breath sounds: No wheezing, rhonchi or rales.   Abdominal:      General: There is no distension.   Musculoskeletal:      Cervical back: Normal range of motion.      Right lower leg: No edema.      Comments: Soft cast over L foot/ankle; CDI   Skin:     General: Skin is warm and dry.   Neurological:      Mental Status: She is alert.      Motor: No weakness.   Psychiatric:         Mood and Affect: Mood normal.        Significant Labs:  All labs within the past 24 hours have been reviewed.      Assessment/Plan:     Renal/  ESRD (end stage renal disease)  Outpatient HD Information:  -Dialysis modality: Hemodialysis  -Outpatient HD unit: [facility]   -HD TX days: [facility]  3 hours   -Last HD TX prior to hospital admission: 12/9/24  -Dialysis access: R[facility] AVF   -Residual urine: + RRF  -EDW: 99 kg     Plan/Recommendations:     -Resume MWF iHD schedule while IP, next 12/11  -continue sevelamer   -renal diet, if not NPO   -Hgb goal 10-11, epo with HD   -1L fluid restrictions         ID  * Post-operative infection  -management per primary         Thank you for your consult. I will follow-up with patient. Please contact us if you have any additional  questions.    Kimberli Delacruz, ALANA  Nephrology  [facility] - Neurosurgery ([facility])

## 2024-12-10 NOTE — ED NOTES
Assumed care of the patient. Report received from JUDITH Clements. Pt on continuous cardiac monitoring, continuous pulse oximetry, and automatic BP cuff cycling. Pt in hospital gown, side rails up X2, bed low and locked, and call light is placed within reach. 0 family/visitors at bedside at this time. Pt denies any complaints or needs.

## 2024-12-10 NOTE — ASSESSMENT & PLAN NOTE
Patient's blood pressure range in the last 24 hours was: BP  Min: 125/60  Max: 142/64.The patient's inpatient anti-hypertensive regimen is listed below:  Current Antihypertensives  carvediloL tablet 25 mg, 2 times daily with meals, Oral    Plan  - BP is controlled, no changes needed to their regimen

## 2024-12-10 NOTE — ASSESSMENT & PLAN NOTE
"S/p charcot foot reconstruction to L foot on 11/11 now with increased drainage, left foot pain/swelling  -Afebrile, no leukocytosis.  -Blood cxs in process.  -POC lactate 0.99, procal 0.13  -CRP 53.6  -Xray L foot with There is been interval development of lucency about the proximal aspect of the 3rd metatarsal about the head of the screw construct.  Findings are concerning for osteomyelitis. There is diffuse edema about the foot, new since the prior exam.   -MRI L foot w/o pending showed "1. 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  2. 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  3. Ulceration along the plantar and dorsal lateral aspect of the foot with probable communication with the deeper midfoot compartment.  Small fluid collection along the superior aspect of the 4th and 5th metatarsals, concerning for abscess."  -Podiatry consulted   - no surgery indicated. Treat with antibiotics per ID   - bedside bone biopsy 12/12  -consult ID - continue cefepime. Vancomycin stopped   Await culture results   Change to cefepime 2 g IV post HD on HD days for DC. End date 1/20/204  -PRN pain control.   -Elevate LLE.  -Fall precautions.  -Reviewed previous cxs from podiatry visit on 11/29:      Component 10 d ago   Aerobic Bacterial Culture      Abnormal   ENTEROBACTER CLOACAE  Many  VC      Aerobic Bacterial Culture  Abnormal   KLEBSIELLA PNEUMONIAE  Many  Skin meño also present    Resulting Agency [facility]        Susceptibility     Enterobacter cloacae Klebsiella pneumoniae     CULTURE, AEROBIC  (SPECIFY SOURCE) CULTURE, AEROBIC  (SPECIFY SOURCE)     Amp/Sulbactam   <=8/4 mcg/mL Sensitive     Cefazolin   <=2 mcg/mL Sensitive     Cefepime <=2 mcg/mL Sensitive <=2 mcg/mL Sensitive     Ceftriaxone   <=1 mcg/mL Sensitive     Ciprofloxacin <=0.25 mcg/mL Sensitive <=0.25 mcg/mL " Sensitive     Ertapenem <=0.5 mcg/mL Sensitive <=0.5 mcg/mL Sensitive     Gentamicin <=2 mcg/mL Sensitive <=2 mcg/mL Sensitive     Levofloxacin <=0.5 mcg/mL Sensitive <=0.5 mcg/mL Sensitive     Meropenem <=1 mcg/mL Sensitive <=1 mcg/mL Sensitive     Piperacillin/Tazo   <=8 mcg/mL Sensitive     Tobramycin <=2 mcg/mL Sensitive <=2 mcg/mL Sensitive     Trimeth/Sulfa <=2/38 mcg/mL Sensitive <=2/38 mcg/mL Sensitive

## 2024-12-10 NOTE — NURSING
Patient Transferred to NPU Room 908       Upon arrival to the floor, patient greeted and oriented to room. Complete head to toe assessment completed per protocol. VSS, see flowsheet for details. Neuro assessment completed. Primary team notified of patient's transfer to floor. All current and transfer orders reviewed/reconciled per protocol. All emergency equipment set up in patient's room. Fall/seizure precautions initiated per providers orders. 4 Eyes skin assessment performed, see below for details. Reviewed assessment and rounding frequency with patient and family. Questions were encouraged and addressed. Repositioned patient for comfort with bed locked in lowest position, side rails up x 3, bed alarm set, and call light within reach. Instructed patient to call staff for mobility/assistance, verbalized understanding. No acute signs of distress noted at this time.                 Purse, IPhone, and small overnight bag at the bedside.

## 2024-12-10 NOTE — ASSESSMENT & PLAN NOTE
Outpatient HD Information:  -Dialysis modality: Hemodialysis  -Outpatient HD unit: [facility]   -HD TX days: [facility]  3 hours   -Last HD TX prior to hospital admission: 12/9/24  -Dialysis access: RUE AVF   -Residual urine: + RRF  -EDW: 99 kg     Plan/Recommendations:     -Resume MWF iHD schedule while IP, next 12/11  -continue sevelamer   -renal diet, if not NPO   -Hgb goal 10-11, epo with HD   -1L fluid restrictions

## 2024-12-10 NOTE — ASSESSMENT & PLAN NOTE
"Patient has Diastolic (HFpEF) heart failure that is Chronic. On presentation their CHF was well compensated. Most recent BNP and echo results are listed below.  No results for input(s): "BNP" in the last 72 hours.  Latest ECHO  Results for orders placed during the hospital encounter of 09/11/23    Echo    Interpretation Summary    Left Ventricle: The left ventricle is normal in size. Normal wall thickness. Normal wall motion. There is low normal systolic function with a visually estimated ejection fraction of 50 - 55%. Ejection fraction by visual approximation is 55%. There is normal diastolic function.    Right Ventricle: Right ventricle was not well visualized due to poor acoustic window. Normal right ventricular cavity size. Wall thickness is normal. Right ventricle wall motion  is normal. Systolic function is normal.    Aortic Valve: There is moderate aortic valve sclerosis. There is annular calcification present.    Mitral Valve: There is moderate mitral annular calcification present.    IVC/SVC: Normal venous pressure at 3 mmHg.    Current Heart Failure Medications  carvediloL tablet 25 mg, 2 times daily with meals, Oral    Plan  - Monitor strict I&Os and daily weights.    - Place on telemetry  - Low sodium diet  - Place on fluid restriction of 1.5 L.   - Cardiology has not been consulted  - The patient's volume status is at their baseline  - Volume management per HD.  "

## 2024-12-10 NOTE — PLAN OF CARE
Sw presented to pts bedside 2 times to complete initial assessment and pt was asleep both times. Sw to try and complete initial assessment another time.     Nohemi Chakraborty [facility] [place]  Ext. [phone]

## 2024-12-10 NOTE — HPI
Georgina Arias is a 54 y.o. female with a PMHx of obesity s/p gastric sleeve, PAD, hypertensive heart disease with HFpEF, grade II diastolic dysfunction, DM2, HTN, HLD, ESRD on HD (last dialyzed 12/9), and charcot foot s/p reconstruction 11/11 who presents due to foul smelling drainage from LLE cast. The patient reports he has been going to podiatry weekly after surgery and last had her cast changed 11/29. Pt had been having increased pain to her left foot, swelling, and drainage so wound cxs were obtained during that visit, and she was started on levaquin and doxycycline x10 days. Endorses compliance with abx. The patient reports ongoing pain and swelling and states over the past few days she had dark foul smelling drainage reporting it looked like old blood. HD center was concerned about drainage and sent her after dialysis. She denies fever/chills. She does note 2-3 episodes of diarrhea on both Saturday and Sunday but none today. She denies CP, SOB, cough, abdominal pain, headache, or dysuria. She reports she makes urine about 3-4x/daily.     Podiatry consulted for left foot cast malodor/drainage/fleas.  Patient recently underwent Charcot reconstruction with Dr. Allison.  Then recently had cast placed on her.  Recent cultures grew Klebsiella and Enterobacter.  Was then placed on oral antibiotics by Dr. Allison.  Patient states her left leg and ankle and foot pain are still intense.  Resting comfortably in bed when seen.

## 2024-12-10 NOTE — ASSESSMENT & PLAN NOTE
Body mass index is 39.16 kg/m². Obesity complicates all aspects of disease management from diagnostic modalities to treatment. Weight loss encouraged and health benefits explained to patient.

## 2024-12-10 NOTE — SUBJECTIVE & OBJECTIVE
Past Medical History:   Diagnosis Date    Hyperlipidemia     Hypertension     Peptic ulcer disease     Peripheral artery disease     PONV (postoperative nausea and vomiting)     Stage IV CKD     Type II diabetes mellitus        Past Surgical History:   Procedure Laterality Date    ANGIOGRAM, LOWER ARTERIAL, UNILATERAL Left 09/13/2023    Procedure: ANGIOGRAM, LOWER ARTERIAL, UNILATERAL;  Surgeon: Maxx Maya MD;  Location: [address];  Service: Vascular;  Laterality: Left;    ANGIOGRAPHY OF LOWER EXTREMITY Left 09/14/2023    Procedure: ANGIOGRAM, LOWER EXTREMITY with balloon angioplasty ultraverse 5mm x 60mm;  Surgeon: Maxx Maya MD;  Location: [address];  Service: Vascular;  Laterality: Left;  ultraverse 5mm x 60mm  fluoro: 12.41  contrast: 44ml  mGy: 65.57  Gycm2: 23.50    AORTOGRAPHY WITH EXTREMITY RUNOFF Left 09/13/2023    Procedure: AORTOGRAM, WITH EXTREMITY RUNOFF;  Surgeon: Maxx Maya MD;  Location: [address];  Service: Vascular;  Laterality: Left;  8.1 min  663.63 mGy  176.39 Gy.cm  178ml Dye     AV FISTULA PLACEMENT Right     CHOLECYSTECTOMY      COLONOSCOPY  11/06/2013    normal    COLONOSCOPY  05/2023    cancelled due to inadequate prep    COLONOSCOPY  06/2023    results unknown    CREATION, BYPASS, ARTERIAL, AXILLARY TO BILATERAL FEMORAL Left 09/14/2023    Procedure: CREATION, BYPASS, ARTERIAL, AXILLARY TO BILATERAL FEMORAL;  Surgeon: Maxx Maya MD;  Location: [address];  Service: Vascular;  Laterality: Left;  Left Axillary to Bilateral Femoral Bypass, Left Femoral to Above Knee Popliteal Bypass; SLIDER BED    CYSTOSCOPY W/ URETERAL STENT PLACEMENT Right 08/28/2018    Procedure: CYSTOSCOPY, WITH URETERAL STENT INSERTION (ADD ON );  Surgeon: Bubba Perez MD;  Location: [facility];  Service: Urology;  Laterality: Right;  (ADD ON )    DEBRIDEMENT OF FOOT Left 08/03/2023    Procedure: DEBRIDEMENT, FOOT;  Surgeon: Aleida Flannery DPM;  Location: [facility];  Service:  Podiatry;  Laterality: Left;    Excisional debridement of ulcer distal great toe left foot w/ partial resection distal phalanx Left 08/03/2023    FOOT AMPUTATION THROUGH METATARSAL Left 09/14/2023    Procedure: AMPUTATION, FOOT, TRANSMETATARSAL partial 1st ray amputation;  Surgeon: Jesus Valles DPM;  Location: [facility] OR 2ND FLR;  Service: Podiatry;  Laterality: Left;  partial ray    I&D L 1st ray w/ amputation L hallux IPJ Left 08/14/2023    INCISION AND DRAINAGE FOOT Left 09/14/2023    Procedure: INCISION AND DRAINAGE, FOOT;  Surgeon: Jesus Valles DPM;  Location: [facility] OR 2ND FLR;  Service: Podiatry;  Laterality: Left;    Injection L PT tendon sheath Left 08/14/2023    LENGTHENING OF ACHILLES TENDON Left 11/11/2024    Procedure: LENGTHENING, TENDON, ACHILLES;  Surgeon: Marco Allison DPM;  Location: [facility] [place];  Service: Podiatry;  Laterality: Left;    PERIPHERAL ARTERIAL STENT GRAFT Right     RECONSTRUCTION WITH FUSION OF CHARCOT FOOT Left 11/11/2024    Procedure: RECONSTRUCTION, CHARCOT FOOT, WITH FUSION;  Surgeon: Marco Allison DPM;  Location: [facility] OR;  Service: Podiatry;  Laterality: Left;  4 hours; foot tray; mini c arm; saw/drill    REMOVAL OF NAIL OF DIGIT Left 08/03/2023    Procedure: REMOVAL, NAIL, DIGIT great toe;  Surgeon: Aleida Flannery DPM;  Location: [facility] OR;  Service: Podiatry;  Laterality: Left;    REVISION, AMPUTATION, TRANSMETATARSAL Left 11/11/2024    Procedure: REVISION, AMPUTATION, TRANSMETATARSAL;  Surgeon: Marco Allison DPM;  [facility];  Service: Podiatry;  Laterality: Left;    STENT, SUPERFICIAL FEMORAL ARTERY Left 09/14/2023    Procedure: STENT, SUPERFICIAL FEMORAL ARTERY;  Surgeon: Maxx Maya MD;  Location: [facility] OR 2ND FLR;  Service: Vascular;  Laterality: Left;  5 x 100 mm    TOE AMPUTATION Left 08/14/2023    Procedure: AMPUTATION, TOE hallux IPJ;  Surgeon: Aleida Flannery DPM;  Location: [facility] OR;  Service: Podiatry;  Laterality: Left;    TOE AMPUTATION Left  "08/25/2023    Procedure: AMPUTATION, TOE hallux, possible 1st met.head;  Surgeon: Aleida Flannery DPM;  Location: [facility] OR;  Service: Podiatry;  Laterality: Left;    URETEROSCOPIC REMOVAL OF URETERIC CALCULUS Right 09/11/2018    Procedure: EXTRACTION-STONE-URETEROSCOPY;  Surgeon: Bubba Perez MD;  Location: [facility] OR;  Service: Urology;  Laterality: Right;       Review of patient's allergies indicates:   Allergen Reactions    Hydrocodone-acetaminophen Nausea And Vomiting, Rash and Other (See Comments)     Vicodin- severe rash  Lortab- head-spinning that leads to vomiting      Naproxen Anaphylaxis and Swelling     Hives (skin)^swelling  Hives (skin)^swelling  Hives (skin)^swelling    Sulfamethoxazole-trimethoprim Other (See Comments)     Caused JEROME; heart attack    Hydrocodone Nausea And Vomiting and Rash    Adhesive tape-silicones      blisters    Aspartame Hives    Penicillins Hives     Blisters (skin)^    Sulfamethoxazole Other (See Comments)    Trimethoprim Other (See Comments)    Acetaminophen Rash    Adhesive Rash       Current Facility-Administered Medications on File Prior to Encounter   Medication    sodium chloride 0.9% flush 10 mL     Current Outpatient Medications on File Prior to Encounter   Medication Sig    acetaminophen (TYLENOL) 500 MG tablet Take 2 tablets (1,000 mg total) by mouth every 8 (eight) hours as needed.    aspirin (ECOTRIN) 81 MG EC tablet Take 1 tablet (81 mg total) by mouth once daily.    atorvastatin (LIPITOR) 80 MG tablet Take 1 tablet by mouth every evening.    blood sugar diagnostic (TRUE METRIX GLUCOSE TEST STRIP) Strp 3 (three) times daily.    blood-glucose meter Misc 1 each by Other route.    carvediloL (COREG) 25 MG tablet Take 1 tablet (25 mg total) by mouth 2 (two) times daily with meals.    COMFORT EZ PEN NEEDLES 32 gauge x 5/32" Ndle SMARTSIG:injection Daily    doxycycline (VIBRAMYCIN) 100 MG Cap Take 1 capsule (100 mg total) by mouth 2 (two) times daily.    fluticasone " propionate (FLONASE) 50 mcg/actuation nasal spray Fluticasone Propionate 50 MCG/ACT Nasal Suspension QTY: 1 bottle Days: 30 Refills: 5  Written: 09/30/20 Patient Instructions: inhale 2 sprays in each nostril once daily    gabapentin (NEURONTIN) 300 MG capsule Take 300 mg by mouth 3 (three) times daily.    HYDROcodone-acetaminophen (NORCO)  mg per tablet Take 1 tablet by mouth every 6 (six) hours as needed for Pain.    insulin (LANTUS SOLOSTAR U-100 INSULIN) glargine 100 units/mL (3mL) SubQ pen Inject 40 Units into the skin every evening.    levocetirizine (XYZAL) 5 MG tablet Take 5 mg by mouth.    levoFLOXacin (LEVAQUIN) 750 MG tablet Take 1 tablet (750 mg total) by mouth once daily.    linaGLIPtin (TRADJENTA) 5 mg Tab tablet Take 1 tablet by mouth once daily.    miconazole NITRATE 2 % (MICOTIN) 2 % top powder Apply topically as needed for Itching.    miscellaneous medical supply (C-TUB) Misc Hearing amplification (BICROS preferred)    MOUNJARO 7.5 mg/0.5 mL PnIj     ondansetron (ZOFRAN-ODT) 4 MG TbDL Take 4 mg by mouth every 6 (six) hours.    oxyCODONE (ROXICODONE) 5 MG immediate release tablet Take 5 mg by mouth every 6 (six) hours as needed.    promethazine (PHENERGAN) 12.5 MG Tab Take 1 tablet (12.5 mg total) by mouth every 6 (six) hours as needed (to use with Norco, known to tolerate).    sevelamer carbonate (RENVELA) 800 mg Tab Take by mouth.    tenapanor (XPHOZAH) 30 mg Tab Take 30 mg by mouth.    tirzepatide (MOUNJARO) 10 mg/0.5 mL PnIj Inject 10 mg into the skin.    TRUEPLUS LANCETS 30 gauge Misc      Family History       Problem Relation (Age of Onset)    Breast cancer Mother (71), Maternal Grandmother (75)    Diabetes Mother, Father    Heart attack Maternal Grandfather (44)    Heart disease Father (37), Maternal Grandmother    Hypertension Mother, Father, Brother    Uterine cancer Mother          Tobacco Use    Smoking status: Former     Current packs/day: 0.00     Types: Cigarettes     Quit date:  2006     Years since quittin.3    Smokeless tobacco: Never    Tobacco comments:     smoked off and on for 20 years, was up to 2 packs/day toward the end of that, and quit in    Substance and Sexual Activity    Alcohol use: Not Currently     Comment: occ    Drug use: No    Sexual activity: Not Currently     Review of Systems   Constitutional:  Negative for appetite change, chills, diaphoresis, fatigue and fever.   HENT:  Negative for congestion, rhinorrhea and sore throat.    Eyes:  Negative for photophobia and visual disturbance.   Respiratory:  Negative for cough, shortness of breath and wheezing.    Cardiovascular:  Negative for chest pain, palpitations and leg swelling.   Gastrointestinal:  Positive for diarrhea. Negative for abdominal distention, abdominal pain, nausea and vomiting.   Genitourinary:  Positive for decreased urine volume (ESRD on HD). Negative for dysuria and hematuria.   Musculoskeletal:  Positive for arthralgias, gait problem, joint swelling and myalgias. Negative for neck pain.   Skin:  Positive for wound. Negative for color change.   Neurological:  Negative for syncope, weakness, light-headedness and headaches.   Psychiatric/Behavioral:  Negative for confusion and sleep disturbance. The patient is not nervous/anxious.      Objective:     Vital Signs (Most Recent):  Temp: 98.9 °F (37.2 °C) (24 1234)  Pulse: 69 (24 1603)  Resp: 16 (24 1603)  BP: 125/60 (24 1603)  SpO2: 100 % (24 1603) Vital Signs (24h Range):  Temp:  [98.9 °F (37.2 °C)] 98.9 °F (37.2 °C)  Pulse:  [69-97] 69  Resp:  [15-18] 16  SpO2:  [96 %-100 %] 100 %  BP: (125-142)/(60-65) 125/60     Weight: 113.4 kg (250 lb)  Body mass index is 39.16 kg/m².     Physical Exam  Vitals and nursing note reviewed.   Constitutional:       General: She is not in acute distress.     Appearance: She is obese. She is not toxic-appearing or diaphoretic.   HENT:      Head: Normocephalic and atraumatic.       Nose: Nose normal.      Mouth/Throat:      Mouth: Mucous membranes are moist.   Eyes:      Pupils: Pupils are equal, round, and reactive to light.   Cardiovascular:      Rate and Rhythm: Normal rate and regular rhythm.      Heart sounds: Murmur heard.      Arteriovenous access: Right arteriovenous access is present.     Comments: +thrill  Pulmonary:      Effort: Pulmonary effort is normal. No respiratory distress.      Breath sounds: No wheezing, rhonchi or rales.   Abdominal:      General: Bowel sounds are normal. There is no distension.      Palpations: Abdomen is soft.      Tenderness: There is no abdominal tenderness. There is no guarding.   Musculoskeletal:      Cervical back: Normal range of motion.      Right lower leg: No edema.      Comments: Soft cast over L foot/ankle; CDI   Skin:     General: Skin is warm and dry.      Capillary Refill: Capillary refill takes 2 to 3 seconds.   Neurological:      General: No focal deficit present.      Mental Status: She is alert and oriented to person, place, and time.      Motor: No weakness.   Psychiatric:         Mood and Affect: Mood normal.         Behavior: Behavior normal.              CRANIAL NERVES     CN III, IV, VI   Pupils are equal, round, and reactive to light.       Significant Labs: All pertinent labs within the past 24 hours have been reviewed.  CBC:   Recent Labs   Lab 12/09/24  1332   WBC 10.59   HGB 9.0*   HCT 28.6*        CMP:   Recent Labs   Lab 12/09/24  1332      K 4.6      CO2 25   *   BUN 44*   CREATININE 3.9*   CALCIUM 9.4   PROT 8.0   ALBUMIN 2.6*   BILITOT 0.4   ALKPHOS 115   AST 47*   ALT 14   ANIONGAP 13       Significant Imaging: I have reviewed all pertinent imaging results/findings within the past 24 hours.  Imaging Results              X-Ray Ankle Complete Left (Final result)  Result time 12/09/24 17:01:00      Final result by Michael Ervin MD (12/09/24 17:01:00)                   Impression:      1. No  acute displaced fracture or dislocation of the ankle.  2. There are surgical changes of the foot, comparison to the previous exam is limited given casting material and artifact at that time.      Electronically signed by: Michael Ervin MD  Date:    12/09/2024  Time:    17:01               Narrative:    EXAMINATION:  XR ANKLE COMPLETE 3 VIEW LEFT    CLINICAL HISTORY:  Other injury of unspecified body region, initial encounter    TECHNIQUE:  AP, lateral and oblique views of the left ankle were performed.    COMPARISON:  Foot radiograph 11/30/2024    FINDINGS:  Three views left ankle.    The ankle mortise is intact.  No acute displaced fracture or dislocation of the ankle.  There is osteopenia.  There is edema about the ankle.  There are surgical changes of the foot, similar in appearance to the previous examination although on that exam, examination is limited given artifact from casting material at that time.                                        X-Ray Foot Complete Left (Final result)  Result time 12/09/24 17:03:48      Final result by Michael Ervin MD (12/09/24 17:03:48)                   Impression:      This report was flagged in Epic as abnormal.    1. There is been interval development of lucency about the proximal aspect of the 3rd metatarsal about the head of the screw construct.  Findings are concerning for osteomyelitis.  2. No convincing acute displaced fracture or dislocation of the foot noting chronic changes throughout the midfoot and surgical changes, similar in configuration.  3. There is diffuse edema about the foot, new since the prior exam.  Correlation for cellulitis as warranted.      Electronically signed by: Michael Ervin MD  Date:    12/09/2024  Time:    17:03               Narrative:    EXAMINATION:  XR FOOT COMPLETE 3 VIEW LEFT    CLINICAL HISTORY:  .  Other injury of unspecified body region, initial encounter    TECHNIQUE:  AP, lateral and oblique views of the left foot were  performed.    COMPARISON:  11/30/2024    FINDINGS:  Three views left foot.    There is diffuse edema about the foot, worsened since comparative exam of 11/30/2024.  Surgical changes are noted of the foot, overall configuration appears stable given differences in positioning.  No convincing acute displaced fracture or dislocation of the foot although comparison with the previous exam is limited given artifact from casting material at that time.  There is chronic degenerative change/destructive change of the midfoot.    There is been interval development of lucency involving the proximal aspect of the 3rd metatarsal about the screw construct.

## 2024-12-10 NOTE — ASSESSMENT & PLAN NOTE
PAD s/p right SFA stent (11/2017), s/p right common iliac stent s/p PTA occluded stents in 2014 (Dr. Nathaniel Coyle at [facility] [place]) S/p ax fem bypass and stent to sfa with Dr. Maya on 9/14/23  -Continue statin, hold ASA pending official podiatry recs.

## 2024-12-10 NOTE — HPI
Georgina Arias is a 54 y.o. female with a PMHx of obesity s/p gastric sleeve, PAD, hypertensive heart disease with HFpEF, grade II diastolic dysfunction, DM2, HTN, HLD, ESRD on HD (last dialyzed 12/9), and charcot foot s/p reconstruction 11/11 who presents due to foul smelling drainage from LLE cast. The patient reports he has been going to podiatry weekly after surgery and last had her cast changed 11/29. Pt had been having increased pain to her left foot, swelling, and drainage so wound cxs were obtained during that visit, and she was started on levaquin and doxycycline x10 days. Endorses compliance with abx. The patient reports ongoing pain and swelling and states over the past few days she had dark foul smelling drainage reporting it looked like old blood. HD center was concerned about drainage and sent her after dialysis. She denies fever/chills. She does note 2-3 episodes of diarrhea on both Saturday and Sunday but none today. She denies CP, SOB, cough, abdominal pain, headache, or dysuria. She reports she makes urine about 3-4x/daily.    In the ED, patient mildly tachycardic otherwise VSS, afebrile. Hgb with stable anemia. Cr 3.9 (consistent with ESRD). Glucose 138. Albumin 2.6. AST 47. POC lactate 0.99. Blood cxs in process. UA pending. Xray L ankle with no acute displaced fracture or dislocation of the ankle. There are surgical changes of the foot, comparison to the previous exam is limited given casting material and artifact at that time. Xray L foot with  interval development of lucency about the proximal aspect of the 3rd metatarsal about the head of the screw construct.  Findings are concerning for osteomyelitis. No convincing acute displaced fracture or dislocation of the foot noting chronic changes throughout the midfoot and surgical changes, similar in configuration. There is diffuse edema about the foot, new since the prior exam. Correlation for cellulitis as warranted. The patient received 1g  Insight Oriented Therapy. 53 minutes. Bipolar disorder.  PT was seen MP time on Friday, 10/13.  We spoke about her recent setback taking an additional week off to go to the hospital, but it seems she jumped the gun and was actually ok in a day.  She was worried about going into [place]ngel from hypomania, but that didn't happen so she left the hospital and is now feeling better.  We spoke about this week and how to pace herself through weekend and work.     Start time: 10am  End time: 1050am  Chief complaint - Anxiety   Treatment plan - Insight and action consistent with ACT therapy.   Goals - Self-care, insight, coping skills  Prognosis - Average  Progress - Average  Functional status - 70/100  Focused mental status: Patient was oriented to person, place, time and context  Focused mental exam - alert and oriented  Frequency - Every week   IV cefepime. Podiatry consulted who removed cast and redressed her foot.

## 2024-12-11 LAB
ALBUMIN SERPL BCP-MCNC: 2.4 G/DL (ref 3.5–5.2)
ALP SERPL-CCNC: 98 U/L (ref 40–150)
ALT SERPL W/O P-5'-P-CCNC: 8 U/L (ref 10–44)
ANION GAP SERPL CALC-SCNC: 12 MMOL/L (ref 8–16)
AST SERPL-CCNC: 14 U/L (ref 10–40)
BASOPHILS # BLD AUTO: 0.04 K/UL (ref 0–0.2)
BASOPHILS NFR BLD: 0.5 % (ref 0–1.9)
BILIRUB SERPL-MCNC: 0.4 MG/DL (ref 0.1–1)
BUN SERPL-MCNC: 62 MG/DL (ref 6–20)
CALCIUM SERPL-MCNC: 8.9 MG/DL (ref 8.7–10.5)
CHLORIDE SERPL-SCNC: 105 MMOL/L (ref 95–110)
CO2 SERPL-SCNC: 23 MMOL/L (ref 23–29)
CREAT SERPL-MCNC: 4.9 MG/DL (ref 0.5–1.4)
DIFFERENTIAL METHOD BLD: ABNORMAL
EOSINOPHIL # BLD AUTO: 0.3 K/UL (ref 0–0.5)
EOSINOPHIL NFR BLD: 4 % (ref 0–8)
ERYTHROCYTE [DISTWIDTH] IN BLOOD BY AUTOMATED COUNT: 14.1 % (ref 11.5–14.5)
EST. GFR  (NO RACE VARIABLE): 9.9 ML/MIN/1.73 M^2
GLUCOSE SERPL-MCNC: 99 MG/DL (ref 70–110)
GRAM STN SPEC: NORMAL
GRAM STN SPEC: NORMAL
HBV SURFACE AB SER-ACNC: 100.57 MIU/ML
HBV SURFACE AB SER-ACNC: REACTIVE M[IU]/ML
HBV SURFACE AG SERPL QL IA: NORMAL
HCT VFR BLD AUTO: 28.3 % (ref 37–48.5)
HGB BLD-MCNC: 8.8 G/DL (ref 12–16)
IMM GRANULOCYTES # BLD AUTO: 0.04 K/UL (ref 0–0.04)
IMM GRANULOCYTES NFR BLD AUTO: 0.5 % (ref 0–0.5)
LYMPHOCYTES # BLD AUTO: 1.2 K/UL (ref 1–4.8)
LYMPHOCYTES NFR BLD: 15.1 % (ref 18–48)
MAGNESIUM SERPL-MCNC: 2.1 MG/DL (ref 1.6–2.6)
MCH RBC QN AUTO: 31.7 PG (ref 27–31)
MCHC RBC AUTO-ENTMCNC: 31.1 G/DL (ref 32–36)
MCV RBC AUTO: 102 FL (ref 82–98)
MONOCYTES # BLD AUTO: 0.6 K/UL (ref 0.3–1)
MONOCYTES NFR BLD: 7.9 % (ref 4–15)
NEUTROPHILS # BLD AUTO: 5.6 K/UL (ref 1.8–7.7)
NEUTROPHILS NFR BLD: 72 % (ref 38–73)
NRBC BLD-RTO: 0 /100 WBC
PHOSPHATE SERPL-MCNC: 4.9 MG/DL (ref 2.7–4.5)
PLATELET # BLD AUTO: 214 K/UL (ref 150–450)
PMV BLD AUTO: 8.9 FL (ref 9.2–12.9)
POCT GLUCOSE: 111 MG/DL (ref 70–110)
POCT GLUCOSE: 111 MG/DL (ref 70–110)
POCT GLUCOSE: 113 MG/DL (ref 70–110)
POCT GLUCOSE: 166 MG/DL (ref 70–110)
POTASSIUM SERPL-SCNC: 4.2 MMOL/L (ref 3.5–5.1)
PROT SERPL-MCNC: 6.7 G/DL (ref 6–8.4)
RBC # BLD AUTO: 2.78 M/UL (ref 4–5.4)
SODIUM SERPL-SCNC: 140 MMOL/L (ref 136–145)
VANCOMYCIN SERPL-MCNC: 25.7 UG/ML
WBC # BLD AUTO: 7.75 K/UL (ref 3.9–12.7)

## 2024-12-11 PROCEDURE — 25000003 PHARM REV CODE 250: Performed by: NURSE PRACTITIONER

## 2024-12-11 PROCEDURE — 99024 POSTOP FOLLOW-UP VISIT: CPT | Mod: ,,, | Performed by: PODIATRIST

## 2024-12-11 PROCEDURE — 11000001 HC ACUTE MED/SURG PRIVATE ROOM

## 2024-12-11 PROCEDURE — 80100016 HC MAINTENANCE HEMODIALYSIS

## 2024-12-11 PROCEDURE — 63600175 PHARM REV CODE 636 W HCPCS: Mod: JZ,JG | Performed by: NURSE PRACTITIONER

## 2024-12-11 PROCEDURE — 99223 1ST HOSP IP/OBS HIGH 75: CPT | Mod: ,,, | Performed by: PHYSICIAN ASSISTANT

## 2024-12-11 PROCEDURE — A4216 STERILE WATER/SALINE, 10 ML: HCPCS | Performed by: NURSE PRACTITIONER

## 2024-12-11 PROCEDURE — 87340 HEPATITIS B SURFACE AG IA: CPT | Performed by: NURSE PRACTITIONER

## 2024-12-11 PROCEDURE — 36415 COLL VENOUS BLD VENIPUNCTURE: CPT | Performed by: INTERNAL MEDICINE

## 2024-12-11 PROCEDURE — 80202 ASSAY OF VANCOMYCIN: CPT | Performed by: INTERNAL MEDICINE

## 2024-12-11 PROCEDURE — 83735 ASSAY OF MAGNESIUM: CPT | Performed by: NURSE PRACTITIONER

## 2024-12-11 PROCEDURE — 63600175 PHARM REV CODE 636 W HCPCS: Performed by: NURSE PRACTITIONER

## 2024-12-11 PROCEDURE — 86706 HEP B SURFACE ANTIBODY: CPT | Mod: 91 | Performed by: NURSE PRACTITIONER

## 2024-12-11 PROCEDURE — 90935 HEMODIALYSIS ONE EVALUATION: CPT | Mod: ,,, | Performed by: NURSE PRACTITIONER

## 2024-12-11 PROCEDURE — 5A1D70Z PERFORMANCE OF URINARY FILTRATION, INTERMITTENT, LESS THAN 6 HOURS PER DAY: ICD-10-PCS | Performed by: INTERNAL MEDICINE

## 2024-12-11 PROCEDURE — 84100 ASSAY OF PHOSPHORUS: CPT | Performed by: NURSE PRACTITIONER

## 2024-12-11 PROCEDURE — 85025 COMPLETE CBC W/AUTO DIFF WBC: CPT | Performed by: NURSE PRACTITIONER

## 2024-12-11 PROCEDURE — 80053 COMPREHEN METABOLIC PANEL: CPT | Performed by: NURSE PRACTITIONER

## 2024-12-11 RX ORDER — SODIUM CHLORIDE 9 MG/ML
INJECTION, SOLUTION INTRAVENOUS ONCE
Status: COMPLETED | OUTPATIENT
Start: 2024-12-11 | End: 2024-12-11

## 2024-12-11 RX ORDER — MUPIROCIN 20 MG/G
OINTMENT TOPICAL 2 TIMES DAILY
Status: COMPLETED | OUTPATIENT
Start: 2024-12-11 | End: 2024-12-15

## 2024-12-11 RX ADMIN — CETIRIZINE HYDROCHLORIDE 5 MG: 5 TABLET, FILM COATED ORAL at 08:12

## 2024-12-11 RX ADMIN — SEVELAMER CARBONATE 800 MG: 800 TABLET, FILM COATED ORAL at 08:12

## 2024-12-11 RX ADMIN — MUPIROCIN: 20 OINTMENT TOPICAL at 08:12

## 2024-12-11 RX ADMIN — HYDROCODONE BITARTRATE AND ACETAMINOPHEN 1 TABLET: 10; 325 TABLET ORAL at 09:12

## 2024-12-11 RX ADMIN — GABAPENTIN 200 MG: 100 CAPSULE ORAL at 02:12

## 2024-12-11 RX ADMIN — INSULIN GLARGINE 14 UNITS: 100 INJECTION, SOLUTION SUBCUTANEOUS at 09:12

## 2024-12-11 RX ADMIN — HEPARIN SODIUM 5000 UNITS: 5000 INJECTION INTRAVENOUS; SUBCUTANEOUS at 09:12

## 2024-12-11 RX ADMIN — CEFEPIME 1 G: 1 INJECTION, POWDER, FOR SOLUTION INTRAMUSCULAR; INTRAVENOUS at 02:12

## 2024-12-11 RX ADMIN — GABAPENTIN 100 MG: 100 CAPSULE ORAL at 09:12

## 2024-12-11 RX ADMIN — MUPIROCIN: 20 OINTMENT TOPICAL at 09:12

## 2024-12-11 RX ADMIN — SODIUM CHLORIDE: 9 INJECTION, SOLUTION INTRAVENOUS at 09:12

## 2024-12-11 RX ADMIN — Medication 10 ML: at 09:12

## 2024-12-11 RX ADMIN — HEPARIN SODIUM 5000 UNITS: 5000 INJECTION INTRAVENOUS; SUBCUTANEOUS at 05:12

## 2024-12-11 RX ADMIN — GABAPENTIN 200 MG: 100 CAPSULE ORAL at 08:12

## 2024-12-11 RX ADMIN — EPOETIN ALFA-EPBX 5000 UNITS: 10000 INJECTION, SOLUTION INTRAVENOUS; SUBCUTANEOUS at 12:12

## 2024-12-11 RX ADMIN — ATORVASTATIN CALCIUM 80 MG: 40 TABLET, FILM COATED ORAL at 09:12

## 2024-12-11 RX ADMIN — CARVEDILOL 25 MG: 25 TABLET, FILM COATED ORAL at 05:12

## 2024-12-11 RX ADMIN — HEPARIN SODIUM 5000 UNITS: 5000 INJECTION INTRAVENOUS; SUBCUTANEOUS at 02:12

## 2024-12-11 RX ADMIN — CARVEDILOL 25 MG: 25 TABLET, FILM COATED ORAL at 08:12

## 2024-12-11 RX ADMIN — GABAPENTIN 200 MG: 100 CAPSULE ORAL at 09:12

## 2024-12-11 RX ADMIN — SEVELAMER CARBONATE 800 MG: 800 TABLET, FILM COATED ORAL at 05:12

## 2024-12-11 NOTE — PROGRESS NOTES
0902 Patient arrived to [facility] via bed, [id number].    0931 HD treatment started via R forearm fistula buttonhole ; site looks clean and dry and no complication noted; patient self cannulated accessed with good blood flow, VSS and recorded, NADN.

## 2024-12-11 NOTE — HPI
Georgina Arias is a 54 y.o. female with a PMHx of obesity s/p gastric sleeve, PAD, HTN, HF, grade II diastolic dysfunction, DM2, HLD, ESRD on HD and charcot foot s/p reconstruction 11/11/24 who presents due to foul smelling drainage from LLE cast.  Followed up with podiatry weekly after surgery. Pt had been having increased pain to her left foot, swelling, and drainage so wound cxs were obtained during that visit, and she was started on levaquin and doxycycline x 10 days. The patient reports ongoing pain and swelling and states over the past few days prior to admission she had dark foul smelling drainage reporting it looked like old blood. HD center was concerned about drainage and sent her after dialysis.  Now admitted.      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 Ulceration along the plantar and dorsal lateral aspect of the foot with probable communication with the deeper midfoot compartment.  Small fluid collection along the superior aspect of the 4th and 5th metatarsals, concerning for abscess. Podiatry following and has removed cast and obtained cultures.  Id consult for antibiotic recs.      Patient afebrile and without leukocytosis.  Patient on cefepime. Blood cultures NGTD.  Left foot wound and bone cultures NGTD.  Prior wound cultures show Enterobacter cloaca and Klebsiella pneumoniae which are both pansensitive.

## 2024-12-11 NOTE — PROGRESS NOTES
[facility] HEMODIALYSIS NOTE     Patient currently on hemodialysis for removal of uremic toxins and volume.     Patient seen and evaluated on hemodialysis, tolerating treatment, see HD flowsheet for vitals and assessments.    Labs have been reviewed and the dialysate bath has been adjusted.       Assessment/Plan:    -Patient seen on HD, tolerating treatment well, w/o complaints   -UF goal of 1L  -Renal diet, if not NPO   -Strict I/O's and daily weights  -Daily renal function panels  -Keep MAP >65 while on HD   -Hgb goal 10-11, epo with HD   -continue sevelamer   -Will continue to follow while inpatient     Kimberli Delacruz DNP-FNP, C  Nephrology  Pager: [phone]

## 2024-12-11 NOTE — CARE UPDATE
Unit MARK Care Support Interaction      I have reviewed the chart of Georgina Arias who is hospitalized for Post-operative infection. The patient is currently located in the following unit: npu        I have assisted the primary physician in management of the following:      MRSA Decolonization - Mupirocin ordered and CHG ordered          Barby Calabrese NP  Unit Based MARK

## 2024-12-11 NOTE — SUBJECTIVE & OBJECTIVE
Past Medical History:   Diagnosis Date    Hyperlipidemia     Hypertension     Peptic ulcer disease     Peripheral artery disease     PONV (postoperative nausea and vomiting)     Stage IV CKD     Type II diabetes mellitus        Past Surgical History:   Procedure Laterality Date    ANGIOGRAM, LOWER ARTERIAL, UNILATERAL Left 09/13/2023    Procedure: ANGIOGRAM, LOWER ARTERIAL, UNILATERAL;  Surgeon: Maxx Maya MD;  Location: [address];  Service: Vascular;  Laterality: Left;    ANGIOGRAPHY OF LOWER EXTREMITY Left 09/14/2023    Procedure: ANGIOGRAM, LOWER EXTREMITY with balloon angioplasty ultraverse 5mm x 60mm;  Surgeon: Maxx Maya MD;  Location: [address];  Service: Vascular;  Laterality: Left;  ultraverse 5mm x 60mm  fluoro: 12.41  contrast: 44ml  mGy: 65.57  Gycm2: 23.50    AORTOGRAPHY WITH EXTREMITY RUNOFF Left 09/13/2023    Procedure: AORTOGRAM, WITH EXTREMITY RUNOFF;  Surgeon: Maxx Maya MD;  Location: [address];  Service: Vascular;  Laterality: Left;  8.1 min  663.63 mGy  176.39 Gy.cm  178ml Dye     AV FISTULA PLACEMENT Right     CHOLECYSTECTOMY      COLONOSCOPY  11/06/2013    normal    COLONOSCOPY  05/2023    cancelled due to inadequate prep    COLONOSCOPY  06/2023    results unknown    CREATION, BYPASS, ARTERIAL, AXILLARY TO BILATERAL FEMORAL Left 09/14/2023    Procedure: CREATION, BYPASS, ARTERIAL, AXILLARY TO BILATERAL FEMORAL;  Surgeon: Maxx Maya MD;  Location: [address];  Service: Vascular;  Laterality: Left;  Left Axillary to Bilateral Femoral Bypass, Left Femoral to Above Knee Popliteal Bypass; SLIDER BED    CYSTOSCOPY W/ URETERAL STENT PLACEMENT Right 08/28/2018    Procedure: CYSTOSCOPY, WITH URETERAL STENT INSERTION (ADD ON );  Surgeon: Bubba Perez MD;  Location: [facility];  Service: Urology;  Laterality: Right;  (ADD ON )    DEBRIDEMENT OF FOOT Left 08/03/2023    Procedure: DEBRIDEMENT, FOOT;  Surgeon: Aleida Flannery DPM;  Location: [facility];  Service:  Podiatry;  Laterality: Left;    Excisional debridement of ulcer distal great toe left foot w/ partial resection distal phalanx Left 08/03/2023    FOOT AMPUTATION THROUGH METATARSAL Left 09/14/2023    Procedure: AMPUTATION, FOOT, TRANSMETATARSAL partial 1st ray amputation;  Surgeon: Jesus Valles DPM;  Location: [facility] OR 2ND FLR;  Service: Podiatry;  Laterality: Left;  partial ray    I&D L 1st ray w/ amputation L hallux IPJ Left 08/14/2023    INCISION AND DRAINAGE FOOT Left 09/14/2023    Procedure: INCISION AND DRAINAGE, FOOT;  Surgeon: Jesus Valles DPM;  Location: [facility] OR 2ND FLR;  Service: Podiatry;  Laterality: Left;    Injection L PT tendon sheath Left 08/14/2023    LENGTHENING OF ACHILLES TENDON Left 11/11/2024    Procedure: LENGTHENING, TENDON, ACHILLES;  Surgeon: Marco Allison DPM;  Location: [facility] OR;  Service: Podiatry;  Laterality: Left;    PERIPHERAL ARTERIAL STENT GRAFT Right     RECONSTRUCTION WITH FUSION OF CHARCOT FOOT Left 11/11/2024    Procedure: RECONSTRUCTION, CHARCOT FOOT, WITH FUSION;  Surgeon: Marco Allison DPM;  Location: [facility] OR;  Service: Podiatry;  Laterality: Left;  4 hours; foot tray; mini c arm; saw/drill    REMOVAL OF NAIL OF DIGIT Left 08/03/2023    Procedure: REMOVAL, NAIL, DIGIT great toe;  Surgeon: Aleida Flannery DPM;  Location: [facility] OR;  Service: Podiatry;  Laterality: Left;    REVISION, AMPUTATION, TRANSMETATARSAL Left 11/11/2024    Procedure: REVISION, AMPUTATION, TRANSMETATARSAL;  Surgeon: Marco Allison DPM;  [facility];  Service: Podiatry;  Laterality: Left;    STENT, SUPERFICIAL FEMORAL ARTERY Left 09/14/2023    Procedure: STENT, SUPERFICIAL FEMORAL ARTERY;  Surgeon: Maxx Maya MD;  Location: [facility] OR 2ND FLR;  Service: Vascular;  Laterality: Left;  5 x 100 mm    TOE AMPUTATION Left 08/14/2023    Procedure: AMPUTATION, TOE hallux IPJ;  Surgeon: Aleida Flannery DPM;  Location: [facility] OR;  Service: Podiatry;  Laterality: Left;    TOE AMPUTATION Left  "08/25/2023    Procedure: AMPUTATION, TOE hallux, possible 1st met.head;  Surgeon: Aleida Flannery DPM;  Location: [facility] OR;  Service: Podiatry;  Laterality: Left;    URETEROSCOPIC REMOVAL OF URETERIC CALCULUS Right 09/11/2018    Procedure: EXTRACTION-STONE-URETEROSCOPY;  Surgeon: Bubba Perez MD;  Location: [facility] OR;  Service: Urology;  Laterality: Right;       Review of patient's allergies indicates:   Allergen Reactions    Hydrocodone-acetaminophen Nausea And Vomiting, Rash and Other (See Comments)     Vicodin- severe rash  Lortab- head-spinning that leads to vomiting      Naproxen Anaphylaxis and Swelling     Hives (skin)^swelling  Hives (skin)^swelling  Hives (skin)^swelling    Sulfamethoxazole-trimethoprim Other (See Comments)     Caused JEROME; heart attack    Hydrocodone Nausea And Vomiting and Rash    Adhesive tape-silicones      blisters    Aspartame Hives    Penicillins Hives     Blisters (skin)^    Sulfamethoxazole Other (See Comments)    Trimethoprim Other (See Comments)    Acetaminophen Rash    Adhesive Rash       Medications:  Facility-Administered Medications Prior to Admission   Medication    sodium chloride 0.9% flush 10 mL     Medications Prior to Admission   Medication Sig    acetaminophen (TYLENOL) 500 MG tablet Take 2 tablets (1,000 mg total) by mouth every 8 (eight) hours as needed.    aspirin (ECOTRIN) 81 MG EC tablet Take 1 tablet (81 mg total) by mouth once daily.    atorvastatin (LIPITOR) 80 MG tablet Take 1 tablet by mouth every evening.    blood sugar diagnostic (TRUE METRIX GLUCOSE TEST STRIP) Strp 3 (three) times daily.    blood-glucose meter Misc 1 each by Other route.    carvediloL (COREG) 25 MG tablet Take 1 tablet (25 mg total) by mouth 2 (two) times daily with meals.    COMFORT EZ PEN NEEDLES 32 gauge x 5/32" Ndle SMARTSIG:injection Daily    doxycycline (VIBRAMYCIN) 100 MG Cap Take 1 capsule (100 mg total) by mouth 2 (two) times daily.    fluticasone propionate (FLONASE) 50 " "mcg/actuation nasal spray Fluticasone Propionate 50 MCG/ACT Nasal Suspension QTY: 1 bottle Days: 30 Refills: 5  Written: 09/30/20 Patient Instructions: inhale 2 sprays in each nostril once daily    gabapentin (NEURONTIN) 300 MG capsule Take 300 mg by mouth 3 (three) times daily.    HYDROcodone-acetaminophen (NORCO)  mg per tablet Take 1 tablet by mouth every 6 (six) hours as needed for Pain.    insulin (LANTUS SOLOSTAR U-100 INSULIN) glargine 100 units/mL (3mL) SubQ pen Inject 40 Units into the skin every evening.    levocetirizine (XYZAL) 5 MG tablet Take 5 mg by mouth.    levoFLOXacin (LEVAQUIN) 750 MG tablet Take 1 tablet (750 mg total) by mouth once daily.    linaGLIPtin (TRADJENTA) 5 mg Tab tablet Take 1 tablet by mouth once daily.    miconazole NITRATE 2 % (MICOTIN) 2 % top powder Apply topically as needed for Itching.    miscellaneous medical supply (C-TUB) Misc Hearing amplification (BICROS preferred)    MOUNJARO 7.5 mg/0.5 mL PnIj     ondansetron (ZOFRAN-ODT) 4 MG TbDL Take 4 mg by mouth every 6 (six) hours.    oxyCODONE (ROXICODONE) 5 MG immediate release tablet Take 5 mg by mouth every 6 (six) hours as needed.    promethazine (PHENERGAN) 12.5 MG Tab Take 1 tablet (12.5 mg total) by mouth every 6 (six) hours as needed (to use with Norco, known to tolerate).    sevelamer carbonate (RENVELA) 800 mg Tab Take by mouth.    tenapanor (XPHOZAH) 30 mg Tab Take 30 mg by mouth.    tirzepatide (MOUNJARO) 10 mg/0.5 mL PnIj Inject 10 mg into the skin.    TRUEPLUS LANCETS 30 gauge Misc      Antibiotics (From admission, onward)      Start     Stop Route Frequency Ordered    12/11/24 0900  mupirocin 2 % ointment  (MRSA Decolonization Orders [facility])         12/16/24 0859 Nasl 2 times daily 12/11/24 0734    12/10/24 1530  ceFEPIme injection 1 g         -- IV Every 24 hours (non-standard times) 12/09/24 3348    12/09/24 2104  vancomycin - pharmacy to dose  (vancomycin IVPB (PEDS and ADULTS))        Placed in "And" Linked " Group    -- IV pharmacy to manage frequency 24          Antifungals (From admission, onward)      None          Antivirals (From admission, onward)      None             Immunization History   Administered Date(s) Administered    Tdap 2014       Family History       Problem Relation (Age of Onset)    Breast cancer Mother (71), Maternal Grandmother (75)    Diabetes Mother, Father    Heart attack Maternal Grandfather (44)    Heart disease Father (37), Maternal Grandmother    Hypertension Mother, Father, Brother    Uterine cancer Mother          Social History     Socioeconomic History    Marital status: Single   Occupational History     Employer: [employer]   Tobacco Use    Smoking status: Former     Current packs/day: 0.00     Types: Cigarettes     Quit date: 2006     Years since quittin.3    Smokeless tobacco: Never    Tobacco comments:     smoked off and on for 20 years, was up to 2 packs/day toward the end of that, and quit in    Substance and Sexual Activity    Alcohol use: Not Currently     Comment: occ    Drug use: No    Sexual activity: Not Currently   Social History Narrative    Lives with mom.    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    Some photography.    Close with niece 2 and god-child.    occ exericse     Social Drivers of Health     Financial Resource Strain: Medium Risk (12/10/2024)    Overall Financial Resource Strain (CARDIA)     Difficulty of Paying Living Expenses: Somewhat hard   Food Insecurity: No Food Insecurity (2024)    Hunger Vital Sign     Worried About Running Out of Food in the Last Year: Never true     Ran Out of Food in the Last Year: Never true   Recent Concern: Food Insecurity - Food Insecurity Present (12/10/2024)    Hunger Vital Sign     Worried About Running Out of Food in the Last Year: Sometimes true     Ran Out of Food in the Last Year: Never true   Transportation Needs: No Transportation Needs  (12/11/2024)    TRANSPORTATION NEEDS     Transportation : No   Physical Activity: Inactive (5/23/2024)    Received from [facility]    Exercise Vital Sign     Days of Exercise per Week: 0 days     Minutes of Exercise per Session: 0 min   Stress: Stress Concern Present (12/10/2024)    [employer] [place] [employer] - Occupational Stress Questionnaire     Feeling of Stress : To some extent   Housing Stability: Low Risk  (12/11/2024)    Housing Stability Vital Sign     Unable to Pay for Housing in the Last Year: No     Homeless in the Last Year: No     Review of Systems   Constitutional:  Negative for appetite change, chills, diaphoresis, fatigue, fever and unexpected weight change.   HENT:  Negative for congestion, ear pain, hearing loss, sore throat and tinnitus.    Eyes:  Negative for pain, redness and visual disturbance.   Respiratory:  Negative for cough, chest tightness, shortness of breath and wheezing.    Cardiovascular:  Negative for chest pain.   Gastrointestinal:  Negative for abdominal pain, constipation, diarrhea, nausea and vomiting.   Endocrine: Negative for cold intolerance and heat intolerance.   Genitourinary:  Negative for decreased urine volume, difficulty urinating, dysuria, flank pain, frequency, hematuria and urgency.   Musculoskeletal:  Positive for arthralgias and myalgias. Negative for back pain and neck pain.   Skin:  Positive for wound. Negative for rash.   Allergic/Immunologic: Negative for environmental allergies, food allergies and immunocompromised state.   Neurological:  Negative for dizziness, facial asymmetry, weakness, light-headedness, numbness and headaches.   Hematological:  Negative for adenopathy. Does not bruise/bleed easily.   Psychiatric/Behavioral:  Negative for agitation, behavioral problems and confusion.      Objective:     Vital Signs (Most Recent):  Temp: 98.1 °F (36.7 °C) (12/11/24 1230)  Pulse: 67 (12/11/24 1230)  Resp: 16 (12/11/24 1230)  BP: 138/62  (12/11/24 1235)  SpO2: 97 % (12/11/24 0724) Vital Signs (24h Range):  Temp:  [97.9 °F (36.6 °C)-98.8 °F (37.1 °C)] 98.1 °F (36.7 °C)  Pulse:  [64-77] 67  Resp:  [16-20] 16  SpO2:  [94 %-99 %] 97 %  BP: (102-150)/(50-67) 138/62     Weight: 100.3 kg (221 lb 1.9 oz)  Body mass index is 34.63 kg/m².    Estimated Creatinine Clearance: 16 mL/min (A) (based on SCr of 4.9 mg/dL (H)).     Physical Exam  Constitutional:       General: She is not in acute distress.     Appearance: Normal appearance. She is well-developed. She is ill-appearing. She is not toxic-appearing or diaphoretic.       HENT:      Head: Normocephalic and atraumatic.   Cardiovascular:      Rate and Rhythm: Normal rate and regular rhythm.      Heart sounds: Normal heart sounds. No murmur heard.     No friction rub. No gallop.   Pulmonary:      Effort: Pulmonary effort is normal. No respiratory distress.      Breath sounds: Normal breath sounds. No wheezing or rales.   Abdominal:      General: Bowel sounds are normal. There is no distension.      Palpations: Abdomen is soft. There is no mass.      Tenderness: There is no abdominal tenderness. There is no guarding or rebound.   Skin:     General: Skin is warm and dry.   Neurological:      Mental Status: She is alert and oriented to person, place, and time.   Psychiatric:         Behavior: Behavior normal.          Significant Labs: Blood Culture:   Recent Labs   Lab 12/09/24  1522 12/09/24  1523   LABBLOO No Growth to date  No Growth to date No Growth to date  No Growth to date     CBC:   Recent Labs   Lab 12/10/24  0330 12/11/24 0217   WBC 7.68 7.75   HGB 8.2* 8.8*   HCT 26.2* 28.3*    214     CMP:   Recent Labs   Lab 12/10/24  0330 12/11/24 0217    140   K 4.2 4.2    105   CO2 25 23    99   BUN 55* 62*   CREATININE 4.4* 4.9*   CALCIUM 8.7 8.9   PROT 6.5 6.7   ALBUMIN 2.3* 2.4*   BILITOT 0.4 0.4   ALKPHOS 94 98   AST 16 14   ALT 11 8*   ANIONGAP 12 12     Wound Culture:   Recent  Labs   Lab 11/29/24  0828   LABAERO ENTEROBACTER CLOACAE  Many  *  KLEBSIELLA PNEUMONIAE  Many  Skin meño also present  *     All pertinent labs within the past 24 hours have been reviewed.    Significant Imaging: I have reviewed all pertinent imaging results/findings within the past 24 hours.  MRI Forefoot WO Contrast LT [[id number]] (Abnormal) Resulted: 12/10/24 0959   Order Status: Completed Updated: 12/10/24 1001   Narrative:     EXAMINATION:  MRI MIDFOOT WO CONTRAST LT; MRI FOREFOOT WO CONTRAST LT    CLINICAL HISTORY:  Osteomyelitis, foot;; Foot swelling, diabetic, osteomyelitis suspected, xray done;    TECHNIQUE:  Multiplanar, multisequence MR imaging of the  forefoot without the use of intravenous gadolinium IV contrast.    COMPARISON:  Multiple prior radiographs, most recent from same date.  MRI left foot 09/12/2023.    FINDINGS:  POSTOPERATIVE CHANGES: Postoperative change of 1st ray amputation and midfoot fusion.    SOFT TISSUES: There is ulceration along the plantar and dorsal lateral aspect of the foot with probable communication with the deeper midfoot compartment.  There is a focal collection of fluid along the superior aspect of the 4th and 5th metatarsals extending along the the proximal aspect of the 5th metatarsal (series 6, image 27).  This collection measures 2.0 x 1.5 cm.  Ill-defined scattered fluid signal seen throughout the midfoot.  There is skin thickening and subcutaneous edema, which could be inflammatory or infectious.  Several punctate foci of susceptibility throughout the midfoot and hindfoot, which may reflect infectious versus postoperative changes.    BONES: Severe bony destruction of the tarsal bones.  Associated regions of bone marrow edema and T1 signal replacement throughout the midfoot and hindfoot, including the talus and calcaneus.    JOINTS: Ill-defined fluid signal/synovitis seen throughout the midfoot.    TENDONS: Scattered tenosynovitis.  Tendons suboptimally  visualized.    LIGAMENTS: Chronic multifocal ligament disruption throughout the midfoot.    MUSCLES: Diffuse muscle edema and atrophy.   Impression:       1. 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  2. 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  3. Ulceration along the plantar and dorsal lateral aspect of the foot with probable communication with the deeper midfoot compartment.  Small fluid collection along the superior aspect of the 4th and 5th metatarsals, concerning for abscess.  This report was flagged in Epic as abnormal.    Electronically signed by resident: Justin Garcia  Date: 12/10/2024  Time: 07:52    Electronically signed by: Lavell Del Castillo MD  Date: 12/10/2024  Time: 09:59   MRI Midfoot WO Contrast LT [[id number]] (Abnormal) Resulted: 12/10/24 0959   Order Status: Completed Updated: 12/10/24 1001   Narrative:     EXAMINATION:  MRI MIDFOOT WO CONTRAST LT; MRI FOREFOOT WO CONTRAST LT    CLINICAL HISTORY:  Osteomyelitis, foot;; Foot swelling, diabetic, osteomyelitis suspected, xray done;    TECHNIQUE:  Multiplanar, multisequence MR imaging of the  forefoot without the use of intravenous gadolinium IV contrast.    COMPARISON:  Multiple prior radiographs, most recent from same date.  MRI left foot 09/12/2023.    FINDINGS:  POSTOPERATIVE CHANGES: Postoperative change of 1st ray amputation and midfoot fusion.    SOFT TISSUES: There is ulceration along the plantar and dorsal lateral aspect of the foot with probable communication with the deeper midfoot compartment.  There is a focal collection of fluid along the superior aspect of the 4th and 5th metatarsals extending along the the proximal aspect of the 5th metatarsal (series 6, image 27).  This collection measures 2.0 x 1.5 cm.  Ill-defined scattered fluid signal seen throughout the midfoot.  There is  skin thickening and subcutaneous edema, which could be inflammatory or infectious.  Several punctate foci of susceptibility throughout the midfoot and hindfoot, which may reflect infectious versus postoperative changes.    BONES: Severe bony destruction of the tarsal bones.  Associated regions of bone marrow edema and T1 signal replacement throughout the midfoot and hindfoot, including the talus and calcaneus.    JOINTS: Ill-defined fluid signal/synovitis seen throughout the midfoot.    TENDONS: Scattered tenosynovitis.  Tendons suboptimally visualized.    LIGAMENTS: Chronic multifocal ligament disruption throughout the midfoot.    MUSCLES: Diffuse muscle edema and atrophy.   Impression:       1. 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  2. 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  3. Ulceration along the plantar and dorsal lateral aspect of the foot with probable communication with the deeper midfoot compartment.  Small fluid collection along the superior aspect of the 4th and 5th metatarsals, concerning for abscess.  This report was flagged in Epic as abnormal.    Electronically signed by resident: Justin Garcia  Date: 12/10/2024  Time: 07:52    Electronically signed by: Lavell Del Castillo MD  Date: 12/10/2024  Time: 09:59   X-Ray Foot Complete Left [[id number]] (Abnormal) Resulted: 12/09/24 1703   Order Status: Completed Updated: 12/09/24 1706   Narrative:     EXAMINATION:  XR FOOT COMPLETE 3 VIEW LEFT    CLINICAL HISTORY:  .  Other injury of unspecified body region, initial encounter    TECHNIQUE:  AP, lateral and oblique views of the left foot were performed.    COMPARISON:  11/30/2024    FINDINGS:  Three views left foot.    There is diffuse edema about the foot, worsened since comparative exam of 11/30/2024.  Surgical changes are noted of the foot, overall  configuration appears stable given differences in positioning.  No convincing acute displaced fracture or dislocation of the foot although comparison with the previous exam is limited given artifact from casting material at that time.  There is chronic degenerative change/destructive change of the midfoot.    There is been interval development of lucency involving the proximal aspect of the 3rd metatarsal about the screw construct.   Impression:       This report was flagged in Epic as abnormal.    1. There is been interval development of lucency about the proximal aspect of the 3rd metatarsal about the head of the screw construct.  Findings are concerning for osteomyelitis.  2. No convincing acute displaced fracture or dislocation of the foot noting chronic changes throughout the midfoot and surgical changes, similar in configuration.  3. There is diffuse edema about the foot, new since the prior exam.  Correlation for cellulitis as warranted.      Electronically signed by: Michael Ervin MD  Date: 12/09/2024  Time: 17:03   X-Ray Ankle Complete Left [[id number]] Resulted: 12/09/24 1701   Order Status: Completed Updated: 12/09/24 1703   Narrative:     EXAMINATION:  XR ANKLE COMPLETE 3 VIEW LEFT    CLINICAL HISTORY:  Other injury of unspecified body region, initial encounter    TECHNIQUE:  AP, lateral and oblique views of the left ankle were performed.    COMPARISON:  Foot radiograph 11/30/2024    FINDINGS:  Three views left ankle.    The ankle mortise is intact.  No acute displaced fracture or dislocation of the ankle.  There is osteopenia.  There is edema about the ankle.  There are surgical changes of the foot, similar in appearance to the previous examination although on that exam, examination is limited given artifact from casting material at that time.   Impression:       1. No acute displaced fracture or dislocation of the ankle.  2. There are surgical changes of the foot, comparison to the previous exam is  limited given casting material and artifact at that time.      Electronically signed by: Michael Ervin MD  Date: 12/09/2024  Time: 17:01      Imaging History     2024    Date Procedure Name Study Review Link PACS Link Status Accession Number Location   12/10/24 08:20 PM Cardiac monitoring strips Study Review  Final     12/10/24 09:07 AM Cardiac monitoring strips Study Review  Final     12/09/24 10:21 PM MRI Forefoot WO Contrast LT Study Review  Images Final [id number] [facility]   12/09/24 10:21 PM MRI Midfoot WO Contrast LT Study Review  Images Final [id number] [facility]   12/09/24 04:40 PM X-Ray Foot Complete Left Study Review  Images Final [id number] [facility]   12/09/24 04:40 PM X-Ray Ankle Complete Left Study Review  Images Final [id number] [facility]   11/30/24 10:07 AM X-Ray Foot Complete Left Study Review  Images Final [id number] [facility]   11/11/24 04:08 PM SURG FL Surgery Fluoro Usage Study Review  Images Final [id number] [facility]

## 2024-12-11 NOTE — PLAN OF CARE
Recommendations    Continue renal diabetic diet as tolerated.   Jason BID (RD ordered).   RD to monitor and follow up.    Goals: Meet % EEN/EPN by RD follow up.  Nutrition Goal Status: new  Communication of RD Recs: other (comment) (POC)

## 2024-12-11 NOTE — PLAN OF CARE
Problem: Adult Inpatient Plan of Care  Goal: Plan of Care Review  Outcome: Progressing  Goal: Patient-Specific Goal (Individualized)  Outcome: Progressing  Goal: Absence of Hospital-Acquired Illness or Injury  Outcome: Progressing  Goal: Optimal Comfort and Wellbeing  Outcome: Progressing  Goal: Readiness for Transition of Care  Outcome: Progressing     Problem: Diabetes Comorbidity  Goal: Blood Glucose Level Within Targeted Range  Outcome: Progressing     Problem: Acute Kidney Injury/Impairment  Goal: Fluid and Electrolyte Balance  Outcome: Progressing  Goal: Improved Oral Intake  Outcome: Progressing  Goal: Effective Renal Function  Outcome: Progressing     Problem: Wound  Goal: Optimal Coping  Outcome: Progressing  Goal: Optimal Functional Ability  Outcome: Progressing  Goal: Absence of Infection Signs and Symptoms  Outcome: Progressing  Goal: Improved Oral Intake  Outcome: Progressing  Goal: Optimal Pain Control and Function  Outcome: Progressing  Goal: Skin Health and Integrity  Outcome: Progressing  Goal: Optimal Wound Healing  Outcome: Progressing     Problem: Skin Injury Risk Increased  Goal: Skin Health and Integrity  Outcome: Progressing     Problem: Hemodialysis  Goal: Safe, Effective Therapy Delivery  Outcome: Progressing  Goal: Effective Tissue Perfusion  Outcome: Progressing  Goal: Absence of Infection Signs and Symptoms  Outcome: Progressing

## 2024-12-11 NOTE — PROGRESS NOTES
Hospital Medicine  Progress note    Team: [facility] Jenae De La Cruz MD  Admit Date: 12/9/2024  Code status: Full Code    Principal Problem:  Post-operative infection    Interval hx:  Patient without complaints or questions today. FLeas noted in foot on podiatry exam yesterday     PEx  Temp:  [97.9 °F (36.6 °C)-98.8 °F (37.1 °C)]   Pulse:  [64-77]   Resp:  [16-20]   BP: (102-150)/(50-67)   SpO2:  [94 %-99 %]     Intake/Output Summary (Last 24 hours) at 12/11/2024 1451  Last data filed at 12/11/2024 1230  Gross per 24 hour   Intake 10 ml   Output 2100 ml   Net -2090 ml       General Appearance: no acute distress, WD, chronically ill-appearing  Heart: regular rate and rhythm, no heave  Respiratory: Normal respiratory effort, symmetric excursion, bilateral vesicular breath sounds   Abdomen: Soft, non-tender; bowel sounds active  Skin: intact, no rash, no ulcers  Neurologic:  No focal numbness or weakness  Mental status: Alert, oriented x 4, affect appropriate    Recent Labs   Lab 12/09/24  1332 12/10/24  0330 12/11/24 0217   WBC 10.59 7.68 7.75   HGB 9.0* 8.2* 8.8*   HCT 28.6* 26.2* 28.3*    223 214     Recent Labs   Lab 12/09/24  1332 12/10/24  0330 12/11/24  0217    141 140   K 4.6 4.2 4.2    104 105   CO2 25 25 23   BUN 44* 55* 62*   CREATININE 3.9* 4.4* 4.9*   * 105 99   CALCIUM 9.4 8.7 8.9   MG 2.1 2.0 2.1   PHOS 3.1 4.7* 4.9*     Recent Labs   Lab 12/09/24  1332 12/10/24  0330 12/11/24  0217   ALKPHOS 115 94 98   ALT 14 11 8*   AST 47* 16 14   ALBUMIN 2.6* 2.3* 2.4*   PROT 8.0 6.5 6.7   BILITOT 0.4 0.4 0.4        Recent Labs   Lab 12/10/24  0905 12/10/24  1118 12/10/24  1558 12/10/24  2103 12/11/24  0726 12/11/24  1214   POCTGLUCOSE 109 103 156* 153* 113* 111*       Scheduled Meds:   atorvastatin  80 mg Oral QHS    carvediloL  25 mg Oral BID WM    ceFEPime IV (PEDS and ADULTS)  1 g Intravenous Q24H    cetirizine  5 mg Oral Daily    fluticasone propionate  1 spray Each Nostril Daily     "gabapentin  200 mg Oral TID    And    gabapentin  100 mg Oral QHS    heparin (porcine)  5,000 Units Subcutaneous Q8H    insulin glargine U-100 (Lantus)  14 Units Subcutaneous QHS    mupirocin   Nasal BID    sevelamer carbonate  800 mg Oral TID WM     Continuous Infusions:  As Needed:    Current Facility-Administered Medications:     acetaminophen, 650 mg, Oral, Q6H PRN    dextrose 10%, 12.5 g, Intravenous, PRN    dextrose 10%, 25 g, Intravenous, PRN    glucagon (human recombinant), 1 mg, Intramuscular, PRN    glucose, 16 g, Oral, PRN    glucose, 24 g, Oral, PRN    HYDROcodone-acetaminophen, 1 tablet, Oral, Q6H PRN    influenza, 0.5 mL, Intramuscular, Prior to discharge    insulin aspart U-100, 0-5 Units, Subcutaneous, QID (AC + HS) PRN    melatonin, 6 mg, Oral, Nightly PRN    naloxone, 0.02 mg, Intravenous, PRN    ondansetron, 4 mg, Intravenous, Q8H PRN    promethazine, 12.5 mg, Oral, Q6H PRN    sodium chloride 0.9%, 10 mL, Intravenous, Q12H PRN    Pharmacy to dose Vancomycin consult, , , Once **AND** vancomycin - pharmacy to dose, , Intravenous, pharmacy to manage frequency    Assessment and Plan  / Problems managed today    * Post-operative infection  S/p charcot foot reconstruction to L foot on 11/11 now with increased drainage, left foot pain/swelling  -Afebrile, no leukocytosis.  -Blood cxs in process.  -POC lactate 0.99, procal 0.13  -CRP 53.6  -Xray L foot with There is been interval development of lucency about the proximal aspect of the 3rd metatarsal about the head of the screw construct.  Findings are concerning for osteomyelitis. There is diffuse edema about the foot, new since the prior exam.   -MRI L foot w/o pending showed "1. 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  2. 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  3. Ulceration along the " "plantar and dorsal lateral aspect of the foot with probable communication with the deeper midfoot compartment.  Small fluid collection along the superior aspect of the 4th and 5th metatarsals, concerning for abscess."  -currently on cefepime and vancomycin  -Podiatry consulted   - no surgery indicated. Treat with antibiotics per ID  -consult ID  -PRN pain control.   -Elevate LLE.  -Fall precautions.  -Reviewed previous cxs from podiatry visit on 11/29:      Component 10 d ago   Aerobic Bacterial Culture      Abnormal   ENTEROBACTER CLOACAE  Many  VC      Aerobic Bacterial Culture  Abnormal   KLEBSIELLA PNEUMONIAE  Many  Skin meño also present    Resulting Agency [facility]        Susceptibility     Enterobacter cloacae Klebsiella pneumoniae     CULTURE, AEROBIC  (SPECIFY SOURCE) CULTURE, AEROBIC  (SPECIFY SOURCE)     Amp/Sulbactam   <=8/4 mcg/mL Sensitive     Cefazolin   <=2 mcg/mL Sensitive     Cefepime <=2 mcg/mL Sensitive <=2 mcg/mL Sensitive     Ceftriaxone   <=1 mcg/mL Sensitive     Ciprofloxacin <=0.25 mcg/mL Sensitive <=0.25 mcg/mL Sensitive     Ertapenem <=0.5 mcg/mL Sensitive <=0.5 mcg/mL Sensitive     Gentamicin <=2 mcg/mL Sensitive <=2 mcg/mL Sensitive     Levofloxacin <=0.5 mcg/mL Sensitive <=0.5 mcg/mL Sensitive     Meropenem <=1 mcg/mL Sensitive <=1 mcg/mL Sensitive     Piperacillin/Tazo   <=8 mcg/mL Sensitive     Tobramycin <=2 mcg/mL Sensitive <=2 mcg/mL Sensitive     Trimeth/Sulfa <=2/38 mcg/mL Sensitive <=2/38 mcg/mL Sensitive           ESRD (end stage renal disease)  MWF schedule?    Residual renal function?- Yes  Last dialyzed 12/09    - Nephrology consulted  - Continue chronic hemodialysis  - Monitor daily electrolytes and defer dialysis orders to nephrology  - Renally dose medications  - Continue phosphorus binders  - Daily weights/strict I&Os  - 1.5L FR    Class 2 severe obesity with serious comorbidity and body mass index (BMI) of 38.0 to 38.9 in adult  Body mass index is 39.16 kg/m². Obesity " complicates all aspects of disease management from diagnostic modalities to treatment. Weight loss encouraged and health benefits explained to patient.     Chronic diastolic congestive heart failure  Patient has Diastolic (HFpEF) heart failure that is Chronic. On presentation their CHF was well compensated. Most recent BNP and echo results are listed below.  Latest ECHO  Results for orders placed during the hospital encounter of 09/11/23    Echo    Interpretation Summary    Left Ventricle: The left ventricle is normal in size. Normal wall thickness. Normal wall motion. There is low normal systolic function with a visually estimated ejection fraction of 50 - 55%. Ejection fraction by visual approximation is 55%. There is normal diastolic function.    Right Ventricle: Right ventricle was not well visualized due to poor acoustic window. Normal right ventricular cavity size. Wall thickness is normal. Right ventricle wall motion  is normal. Systolic function is normal.    Aortic Valve: There is moderate aortic valve sclerosis. There is annular calcification present.    Mitral Valve: There is moderate mitral annular calcification present.    IVC/SVC: Normal venous pressure at 3 mmHg.    Current Heart Failure Medications  carvediloL tablet 25 mg, 2 times daily with meals, Oral    Plan  - Monitor strict I&Os and daily weights.    - Place on telemetry  - Low sodium diet  - Place on fluid restriction of 1.5 L.   - Cardiology has not been consulted  - The patient's volume status is at their baseline  - Volume management per HD.    Essential hypertension  Patient's blood pressure range in the last 24 hours was: BP  Min: 125/60  Max: 142/64.The patient's inpatient anti-hypertensive regimen is listed below:  Current Antihypertensives  carvediloL tablet 25 mg, 2 times daily with meals, Oral    Plan  - BP is controlled, no changes needed to their regimen    Hyperlipidemia   Patient is chronically on statin.will continue for now.  "Monitor clinically. Last LDL was   Lab Results   Component Value Date    LDLCALC Invalid, Trig>400.0 07/30/2014        Peripheral vascular disease  PAD s/p right SFA stent (11/2017), s/p right common iliac stent s/p PTA occluded stents in 2014 (Dr. Nathaniel Coyle at [facility] [place] S/p ax fem bypass and stent to sfa with Dr. Maya on 9/14/23  -Continue statin, hold ASA pending official podiatry recs.    Type II diabetes mellitus  Patient's FSGs are controlled on current medication regimen.  Last A1c reviewed-   Lab Results   Component Value Date    HGBA1C 5.8 12/02/2024     Most recent fingerstick glucose reviewed- No results for input(s): "POCTGLUCOSE" in the last 24 hours.  Current correctional scale  Low  Maintain anti-hyperglycemic dose as follows-   Antihyperglycemics (From admission, onward)      Start     Stop Route Frequency Ordered    12/10/24 2100  insulin glargine U-100 (Lantus) pen 16 Units         -- SubQ Nightly 12/10/24 0543    12/09/24 1923  insulin aspart U-100 pen 0-5 Units         -- SubQ Before meals & nightly PRN 12/09/24 1824          Hold Oral hypoglycemics while patient is in the hospital.  -Accuchecks AC/HS      Diet:  regular diet  GI PPx: not needed  DVT PPx:  heparin  Airways: room air  Wounds: none    Goals of Care:  Return to prior functional status     Discharge Planning   INDIO: 12/13/2024   Is the patient medically ready for discharge?:     Reason for patient still in hospital (select all that apply): Patient trending condition and Treatment  Discharge Plan A: Home with family, Home Health        Jenae De La Cruz MD       "

## 2024-12-11 NOTE — CONSULTS
[facility])  Infectious Disease  Consult Note    Patient Name: Georgina Arias  MRN: [id number]  Admission Date: 12/9/2024  Hospital Length of Stay: 2 days  Attending Physician: Jenae De La Cruz MD  Primary Care Provider: Alonso Ling MD     Isolation Status: No active isolations    Patient information was obtained from patient, past medical records, and ER records.      Inpatient consult to Infectious Diseases  Consult performed by: Andrew Davis Jr., PA  Consult ordered by: Jenae De La Cruz MD        Assessment/Plan:     ID  * Post-operative infection  54-year-old female with ESRD on HD a history of left Charcot foot now status post reconstruction in November 20, 2024 and subsequently casted.  She recently developed wound drainage and malodor and was sent to the ED due to that.  MRI is concerning for abscesses and osteomyelitis.  Recent wound cultures show Enterobacter cloaca and Klebsiella pneumoniae with no anaerobic growth from cultures on 11/29.  She had been treated with doxycycline and Levaquin.  She is now admitted and on vanc and cefepime.  Podiatry following and new wound cultures have been sent.  MRIs obtained showing concern for osteomyelitis and small abscesses.  Patient is afebrile without leukocytosis.  CRP is 53.  Podiatry is pending bone biopsy.    Plan:  Continue vanc and cefepime for now  I have asked Podiatry to get bone cultures from the affected areas on MRI as well as to see if the abscesses could be aspirated and sent for cultures.  Follow blood cultures  Suspect only prolonged antibiotics but likely can be given post HD  Discussed with ID staff, we will follow        Thank you for your consult. I will follow-up with patient. Please contact us if you have any additional questions.    OMERO Fonseca  Infectious Disease  [facility])    Subjective:     Principal Problem: Post-operative infection    HPI: Georgina Arias is a  54 y.o. female with a PMHx of obesity s/p gastric sleeve, PAD, hypertensive heart disease with HFpEF, grade II diastolic dysfunction, DM2, HTN, HLD, ESRD on HD (last dialyzed 12/9), and charcot foot s/p reconstruction 11/11/24 who presents due to foul smelling drainage from LLE cast.  Followed up with podiatry weekly after surgery and last had her cast changed 11/29. Pt had been having increased pain to her left foot, swelling, and drainage so wound cxs were obtained during that visit, and she was started on levaquin and doxycycline x10 days.  Reports compliance with abx. The patient reports ongoing pain and swelling and states over the past few days prior to admission she had dark foul smelling drainage reporting it looked like old blood. HD center was concerned about drainage and sent her after dialysis.  Now admitted.  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 Ulceration along the plantar and dorsal lateral aspect of the foot with probable communication with the deeper midfoot compartment.  Small fluid collection along the superior aspect of the 4th and 5th metatarsals, concerning for abscess. Podiatry following and has removed cast and obtained cultures.  Id consult did for antibiotic recs.      Patient afebrile and without leukocytosis.  Patient on vanc and cefepime.  CRP 53.6.  Blood cultures no growth to date.  New left foot wound cultures have been sent and are in process.  Prior wound cultures show Enterobacter cloaca and Klebsiella pneumoniae which are both pansensitive.  She is seen at this time.         Past Medical History:   Diagnosis Date    Hyperlipidemia     Hypertension     Peptic ulcer disease     Peripheral artery disease     PONV (postoperative nausea and vomiting)     Stage IV CKD     Type II diabetes mellitus        Past  Surgical History:   Procedure Laterality Date    ANGIOGRAM, LOWER ARTERIAL, UNILATERAL Left 09/13/2023    Procedure: ANGIOGRAM, LOWER ARTERIAL, UNILATERAL;  Surgeon: Maxx Maya MD;  Location: [facility] OR [address] [place] [address];  Service: Vascular;  Laterality: Left;    ANGIOGRAPHY OF LOWER EXTREMITY Left 09/14/2023    Procedure: ANGIOGRAM, LOWER EXTREMITY with balloon angioplasty ultraverse 5mm x 60mm;  Surgeon: Maxx Maya MD;  Location: [facility] OR [address] [place] [address];  Service: Vascular;  Laterality: Left;  ultraverse 5mm x 60mm  fluoro: 12.41  contrast: 44ml  mGy: 65.57  Gycm2: 23.50    AORTOGRAPHY WITH EXTREMITY RUNOFF Left 09/13/2023    Procedure: AORTOGRAM, WITH EXTREMITY RUNOFF;  Surgeon: Maxx Maya MD;  Location: [facility] OR [address] [place] [address];  Service: Vascular;  Laterality: Left;  8.1 min  663.63 mGy  176.39 Gy.cm  178ml Dye     AV FISTULA PLACEMENT Right     CHOLECYSTECTOMY      COLONOSCOPY  11/06/2013    normal    COLONOSCOPY  05/2023    cancelled due to inadequate prep    COLONOSCOPY  06/2023    results unknown    CREATION, BYPASS, ARTERIAL, AXILLARY TO BILATERAL FEMORAL Left 09/14/2023    Procedure: CREATION, BYPASS, ARTERIAL, AXILLARY TO BILATERAL FEMORAL;  Surgeon: Maxx Maya MD;  Location: [facility] OR [address] [place] [address];  Service: Vascular;  Laterality: Left;  Left Axillary to Bilateral Femoral Bypass, Left Femoral to Above Knee Popliteal Bypass; SLIDER BED    CYSTOSCOPY W/ URETERAL STENT PLACEMENT Right 08/28/2018    Procedure: CYSTOSCOPY, WITH URETERAL STENT INSERTION (ADD ON );  Surgeon: Bubba Perez MD;  Location: [facility] OR;  Service: Urology;  Laterality: Right;  (ADD ON )    DEBRIDEMENT OF FOOT Left 08/03/2023    Procedure: DEBRIDEMENT, FOOT;  Surgeon: Aleida Flannery DPM;  Location: [facility] OR;  Service: Podiatry;  Laterality: Left;    Excisional debridement of ulcer distal great toe left foot w/ partial resection distal phalanx Left 08/03/2023    FOOT AMPUTATION THROUGH METATARSAL Left 09/14/2023    Procedure: AMPUTATION, FOOT,  TRANSMETATARSAL partial 1st ray amputation;  Surgeon: Jesus Valles DPM;  Location: [facility] OR 2ND FLR;  Service: Podiatry;  Laterality: Left;  partial ray    I&D L 1st ray w/ amputation L hallux IPJ Left 08/14/2023    INCISION AND DRAINAGE FOOT Left 09/14/2023    Procedure: INCISION AND DRAINAGE, FOOT;  Surgeon: Jesus Valles DPM;  Location: [facility] OR 2ND FLR;  Service: Podiatry;  Laterality: Left;    Injection L PT tendon sheath Left 08/14/2023    LENGTHENING OF ACHILLES TENDON Left 11/11/2024    Procedure: LENGTHENING, TENDON, ACHILLES;  Surgeon: Marco Allison DPM;  Location: [facility] OR;  Service: Podiatry;  Laterality: Left;    PERIPHERAL ARTERIAL STENT GRAFT Right     RECONSTRUCTION WITH FUSION OF CHARCOT FOOT Left 11/11/2024    Procedure: RECONSTRUCTION, CHARCOT FOOT, WITH FUSION;  Surgeon: Marco Allison DPM;  Location: [facility] OR;  Service: Podiatry;  Laterality: Left;  4 hours; foot tray; mini c arm; saw/drill    REMOVAL OF NAIL OF DIGIT Left 08/03/2023    Procedure: REMOVAL, NAIL, DIGIT great toe;  Surgeon: Aleida Flannery DPM;  Location: [facility] OR;  Service: Podiatry;  Laterality: Left;    REVISION, AMPUTATION, TRANSMETATARSAL Left 11/11/2024    Procedure: REVISION, AMPUTATION, TRANSMETATARSAL;  Surgeon: Marco Allison DPM;  Location: [facility] OR;  Service: Podiatry;  Laterality: Left;    STENT, SUPERFICIAL FEMORAL ARTERY Left 09/14/2023    Procedure: STENT, SUPERFICIAL FEMORAL ARTERY;  Surgeon: Maxx Maya MD;  Location: [facility] OR 2ND FLR;  Service: Vascular;  Laterality: Left;  5 x 100 mm    TOE AMPUTATION Left 08/14/2023    Procedure: AMPUTATION, TOE hallux IPJ;  Surgeon: Aleida Flannery DPM;  Location: [facility] OR;  Service: Podiatry;  Laterality: Left;    TOE AMPUTATION Left 08/25/2023    Procedure: AMPUTATION, TOE hallux, possible 1st met.head;  Surgeon: Aleida Flannery DPM;  Location: [facility] OR;  Service: Podiatry;  Laterality: Left;    URETEROSCOPIC REMOVAL OF URETERIC CALCULUS Right 09/11/2018     "Procedure: EXTRACTION-STONE-URETEROSCOPY;  Surgeon: Bubba Perez MD;  Location: [facility];  Service: Urology;  Laterality: Right;       Review of patient's allergies indicates:   Allergen Reactions    Hydrocodone-acetaminophen Nausea And Vomiting, Rash and Other (See Comments)     Vicodin- severe rash  Lortab- head-spinning that leads to vomiting      Naproxen Anaphylaxis and Swelling     Hives (skin)^swelling  Hives (skin)^swelling  Hives (skin)^swelling    Sulfamethoxazole-trimethoprim Other (See Comments)     Caused JEROME; heart attack    Hydrocodone Nausea And Vomiting and Rash    Adhesive tape-silicones      blisters    Aspartame Hives    Penicillins Hives     Blisters (skin)^    Sulfamethoxazole Other (See Comments)    Trimethoprim Other (See Comments)    Acetaminophen Rash    Adhesive Rash       Medications:  Facility-Administered Medications Prior to Admission   Medication    sodium chloride 0.9% flush 10 mL     Medications Prior to Admission   Medication Sig    acetaminophen (TYLENOL) 500 MG tablet Take 2 tablets (1,000 mg total) by mouth every 8 (eight) hours as needed.    aspirin (ECOTRIN) 81 MG EC tablet Take 1 tablet (81 mg total) by mouth once daily.    atorvastatin (LIPITOR) 80 MG tablet Take 1 tablet by mouth every evening.    blood sugar diagnostic (TRUE METRIX GLUCOSE TEST STRIP) Strp 3 (three) times daily.    blood-glucose meter Misc 1 each by Other route.    carvediloL (COREG) 25 MG tablet Take 1 tablet (25 mg total) by mouth 2 (two) times daily with meals.    COMFORT EZ PEN NEEDLES 32 gauge x 5/32" Ndle SMARTSIG:injection Daily    doxycycline (VIBRAMYCIN) 100 MG Cap Take 1 capsule (100 mg total) by mouth 2 (two) times daily.    fluticasone propionate (FLONASE) 50 mcg/actuation nasal spray Fluticasone Propionate 50 MCG/ACT Nasal Suspension QTY: 1 bottle Days: 30 Refills: 5  Written: 09/30/20 Patient Instructions: inhale 2 sprays in each nostril once daily    gabapentin (NEURONTIN) 300 MG " "capsule Take 300 mg by mouth 3 (three) times daily.    HYDROcodone-acetaminophen (NORCO)  mg per tablet Take 1 tablet by mouth every 6 (six) hours as needed for Pain.    insulin (LANTUS SOLOSTAR U-100 INSULIN) glargine 100 units/mL (3mL) SubQ pen Inject 40 Units into the skin every evening.    levocetirizine (XYZAL) 5 MG tablet Take 5 mg by mouth.    levoFLOXacin (LEVAQUIN) 750 MG tablet Take 1 tablet (750 mg total) by mouth once daily.    linaGLIPtin (TRADJENTA) 5 mg Tab tablet Take 1 tablet by mouth once daily.    miconazole NITRATE 2 % (MICOTIN) 2 % top powder Apply topically as needed for Itching.    miscellaneous medical supply (C-TUB) Misc Hearing amplification (BICROS preferred)    MOUNJARO 7.5 mg/0.5 mL PnIj     ondansetron (ZOFRAN-ODT) 4 MG TbDL Take 4 mg by mouth every 6 (six) hours.    oxyCODONE (ROXICODONE) 5 MG immediate release tablet Take 5 mg by mouth every 6 (six) hours as needed.    promethazine (PHENERGAN) 12.5 MG Tab Take 1 tablet (12.5 mg total) by mouth every 6 (six) hours as needed (to use with Norco, known to tolerate).    sevelamer carbonate (RENVELA) 800 mg Tab Take by mouth.    tenapanor (XPHOZAH) 30 mg Tab Take 30 mg by mouth.    tirzepatide (MOUNJARO) 10 mg/0.5 mL PnIj Inject 10 mg into the skin.    TRUEPLUS LANCETS 30 gauge Misc      Antibiotics (From admission, onward)      Start     Stop Route Frequency Ordered    12/11/24 0900  mupirocin 2 % ointment  (MRSA Decolonization Orders [facility])         12/16/24 0859 Nasl 2 times daily 12/11/24 0734    12/10/24 1530  ceFEPIme injection 1 g         -- IV Every 24 hours (non-standard times) 12/09/24 2353    12/09/24 2102  vancomycin - pharmacy to dose  (vancomycin IVPB (PEDS and ADULTS))        Placed in "And" Linked Group    -- IV pharmacy to manage frequency 12/09/24 2002          Antifungals (From admission, onward)      None          Antivirals (From admission, onward)      None             Immunization History   Administered Date(s) " Administered    Tdap 2014       Family History       Problem Relation (Age of Onset)    Breast cancer Mother (71), Maternal Grandmother (75)    Diabetes Mother, Father    Heart attack Maternal Grandfather (44)    Heart disease Father (37), Maternal Grandmother    Hypertension Mother, Father, Brother    Uterine cancer Mother          Social History     Socioeconomic History    Marital status: Single   Occupational History     Employer: [employer]   Tobacco Use    Smoking status: Former     Current packs/day: 0.00     Types: Cigarettes     Quit date: 2006     Years since quittin.3    Smokeless tobacco: Never    Tobacco comments:     smoked off and on for 20 years, was up to 2 packs/day toward the end of that, and quit in    Substance and Sexual Activity    Alcohol use: Not Currently     Comment: occ    Drug use: No    Sexual activity: Not Currently   Social History Narrative    Lives with mom.    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    Some photography.    Close with niece 2 and god-child.    occ exericse     Social Drivers of Health     Financial Resource Strain: Medium Risk (12/10/2024)    Overall Financial Resource Strain (CARDIA)     Difficulty of Paying Living Expenses: Somewhat hard   Food Insecurity: No Food Insecurity (2024)    Hunger Vital Sign     Worried About Running Out of Food in the Last Year: Never true     Ran Out of Food in the Last Year: Never true   Recent Concern: Food Insecurity - Food Insecurity Present (12/10/2024)    Hunger Vital Sign     Worried About Running Out of Food in the Last Year: Sometimes true     Ran Out of Food in the Last Year: Never true   Transportation Needs: No Transportation Needs (2024)    TRANSPORTATION NEEDS     Transportation : No   Physical Activity: Inactive (2024)    Received from [facility]    Exercise Vital Sign     Days of Exercise per Week: 0 days     Minutes of Exercise per Session: 0  min   Stress: Stress Concern Present (12/10/2024)    [employer] [place] [employer] - Occupational Stress Questionnaire     Feeling of Stress : To some extent   Housing Stability: Low Risk  (12/11/2024)    Housing Stability Vital Sign     Unable to Pay for Housing in the Last Year: No     Homeless in the Last Year: No     Review of Systems   Constitutional:  Negative for appetite change, chills, diaphoresis, fatigue, fever and unexpected weight change.   HENT:  Negative for congestion, ear pain, hearing loss, sore throat and tinnitus.    Eyes:  Negative for pain, redness and visual disturbance.   Respiratory:  Negative for cough, chest tightness, shortness of breath and wheezing.    Cardiovascular:  Negative for chest pain.   Gastrointestinal:  Negative for abdominal pain, constipation, diarrhea, nausea and vomiting.   Endocrine: Negative for cold intolerance and heat intolerance.   Genitourinary:  Negative for decreased urine volume, difficulty urinating, dysuria, flank pain, frequency, hematuria and urgency.   Musculoskeletal:  Positive for arthralgias and myalgias. Negative for back pain and neck pain.   Skin:  Positive for wound. Negative for rash.   Allergic/Immunologic: Negative for environmental allergies, food allergies and immunocompromised state.   Neurological:  Negative for dizziness, facial asymmetry, weakness, light-headedness, numbness and headaches.   Hematological:  Negative for adenopathy. Does not bruise/bleed easily.   Psychiatric/Behavioral:  Negative for agitation, behavioral problems and confusion.      Objective:     Vital Signs (Most Recent):  Temp: 98.1 °F (36.7 °C) (12/11/24 1230)  Pulse: 67 (12/11/24 1230)  Resp: 16 (12/11/24 1230)  BP: 138/62 (12/11/24 1235)  SpO2: 97 % (12/11/24 0724) Vital Signs (24h Range):  Temp:  [97.9 °F (36.6 °C)-98.8 °F (37.1 °C)] 98.1 °F (36.7 °C)  Pulse:  [64-77] 67  Resp:  [16-20] 16  SpO2:  [94 %-99 %] 97 %  BP: (102-150)/(50-67) 138/62     Weight:  100.3 kg (221 lb 1.9 oz)  Body mass index is 34.63 kg/m².    Estimated Creatinine Clearance: 16 mL/min (A) (based on SCr of 4.9 mg/dL (H)).     Physical Exam  Constitutional:       General: She is not in acute distress.     Appearance: Normal appearance. She is well-developed. She is ill-appearing. She is not toxic-appearing or diaphoretic.       HENT:      Head: Normocephalic and atraumatic.   Cardiovascular:      Rate and Rhythm: Normal rate and regular rhythm.      Heart sounds: Normal heart sounds. No murmur heard.     No friction rub. No gallop.   Pulmonary:      Effort: Pulmonary effort is normal. No respiratory distress.      Breath sounds: Normal breath sounds. No wheezing or rales.   Abdominal:      General: Bowel sounds are normal. There is no distension.      Palpations: Abdomen is soft. There is no mass.      Tenderness: There is no abdominal tenderness. There is no guarding or rebound.   Skin:     General: Skin is warm and dry.   Neurological:      Mental Status: She is alert and oriented to person, place, and time.   Psychiatric:         Behavior: Behavior normal.          Significant Labs: Blood Culture:   Recent Labs   Lab 12/09/24  1522 12/09/24  1523   LABBLOO No Growth to date  No Growth to date No Growth to date  No Growth to date     CBC:   Recent Labs   Lab 12/10/24  0330 12/11/24  0217   WBC 7.68 7.75   HGB 8.2* 8.8*   HCT 26.2* 28.3*    214     CMP:   Recent Labs   Lab 12/10/24  0330 12/11/24  0217    140   K 4.2 4.2    105   CO2 25 23    99   BUN 55* 62*   CREATININE 4.4* 4.9*   CALCIUM 8.7 8.9   PROT 6.5 6.7   ALBUMIN 2.3* 2.4*   BILITOT 0.4 0.4   ALKPHOS 94 98   AST 16 14   ALT 11 8*   ANIONGAP 12 12     Wound Culture:   Recent Labs   Lab 11/29/24  0828   LABAERO ENTEROBACTER CLOACAE  Many  *  KLEBSIELLA PNEUMONIAE  Many  Skin meño also present  *     All pertinent labs within the past 24 hours have been reviewed.    Significant Imaging: I have reviewed all  pertinent imaging results/findings within the past 24 hours.  MRI Forefoot WO Contrast LT [[id number]] (Abnormal) Resulted: 12/10/24 0959   Order Status: Completed Updated: 12/10/24 1001   Narrative:     EXAMINATION:  MRI MIDFOOT WO CONTRAST LT; MRI FOREFOOT WO CONTRAST LT    CLINICAL HISTORY:  Osteomyelitis, foot;; Foot swelling, diabetic, osteomyelitis suspected, xray done;    TECHNIQUE:  Multiplanar, multisequence MR imaging of the  forefoot without the use of intravenous gadolinium IV contrast.    COMPARISON:  Multiple prior radiographs, most recent from same date.  MRI left foot 09/12/2023.    FINDINGS:  POSTOPERATIVE CHANGES: Postoperative change of 1st ray amputation and midfoot fusion.    SOFT TISSUES: There is ulceration along the plantar and dorsal lateral aspect of the foot with probable communication with the deeper midfoot compartment.  There is a focal collection of fluid along the superior aspect of the 4th and 5th metatarsals extending along the the proximal aspect of the 5th metatarsal (series 6, image 27).  This collection measures 2.0 x 1.5 cm.  Ill-defined scattered fluid signal seen throughout the midfoot.  There is skin thickening and subcutaneous edema, which could be inflammatory or infectious.  Several punctate foci of susceptibility throughout the midfoot and hindfoot, which may reflect infectious versus postoperative changes.    BONES: Severe bony destruction of the tarsal bones.  Associated regions of bone marrow edema and T1 signal replacement throughout the midfoot and hindfoot, including the talus and calcaneus.    JOINTS: Ill-defined fluid signal/synovitis seen throughout the midfoot.    TENDONS: Scattered tenosynovitis.  Tendons suboptimally visualized.    LIGAMENTS: Chronic multifocal ligament disruption throughout the midfoot.    MUSCLES: Diffuse muscle edema and atrophy.   Impression:       1. 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  2. Severe bony  destruction of the tarsal bones with bone marrow edema and fluid about the midfoot and hindfoot, likely reflecting osteomyelitis superimposed on neuropathic arthropathy.  3. Ulceration along the plantar and dorsal lateral aspect of the foot with probable communication with the deeper midfoot compartment.  Small fluid collection along the superior aspect of the 4th and 5th metatarsals, concerning for abscess.  This report was flagged in Epic as abnormal.    Electronically signed by resident: Justin Garcia  Date: 12/10/2024  Time: 07:52    Electronically signed by: Lavell Del Castillo MD  Date: 12/10/2024  Time: 09:59   MRI Midfoot WO Contrast LT [[id number]] (Abnormal) Resulted: 12/10/24 0959   Order Status: Completed Updated: 12/10/24 1001   Narrative:     EXAMINATION:  MRI MIDFOOT WO CONTRAST LT; MRI FOREFOOT WO CONTRAST LT    CLINICAL HISTORY:  Osteomyelitis, foot;; Foot swelling, diabetic, osteomyelitis suspected, xray done;    TECHNIQUE:  Multiplanar, multisequence MR imaging of the  forefoot without the use of intravenous gadolinium IV contrast.    COMPARISON:  Multiple prior radiographs, most recent from same date.  MRI left foot 09/12/2023.    FINDINGS:  POSTOPERATIVE CHANGES: Postoperative change of 1st ray amputation and midfoot fusion.    SOFT TISSUES: There is ulceration along the plantar and dorsal lateral aspect of the foot with probable communication with the deeper midfoot compartment.  There is a focal collection of fluid along the superior aspect of the 4th and 5th metatarsals extending along the the proximal aspect of the 5th metatarsal (series 6, image 27).  This collection measures 2.0 x 1.5 cm.  Ill-defined scattered fluid signal seen throughout the midfoot.  There is skin thickening and subcutaneous edema, which could be inflammatory or infectious.  Several punctate foci of susceptibility throughout the midfoot and hindfoot, which may reflect infectious versus postoperative changes.    BONES: Severe  bony destruction of the tarsal bones.  Associated regions of bone marrow edema and T1 signal replacement throughout the midfoot and hindfoot, including the talus and calcaneus.    JOINTS: Ill-defined fluid signal/synovitis seen throughout the midfoot.    TENDONS: Scattered tenosynovitis.  Tendons suboptimally visualized.    LIGAMENTS: Chronic multifocal ligament disruption throughout the midfoot.    MUSCLES: Diffuse muscle edema and atrophy.   Impression:       1. 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  2. 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  3. Ulceration along the plantar and dorsal lateral aspect of the foot with probable communication with the deeper midfoot compartment.  Small fluid collection along the superior aspect of the 4th and 5th metatarsals, concerning for abscess.  This report was flagged in Epic as abnormal.    Electronically signed by resident: Justin Garcia  Date: 12/10/2024  Time: 07:52    Electronically signed by: Lavell Del Castillo MD  Date: 12/10/2024  Time: 09:59   X-Ray Foot Complete Left [[id number]] (Abnormal) Resulted: 12/09/24 1703   Order Status: Completed Updated: 12/09/24 1706   Narrative:     EXAMINATION:  XR FOOT COMPLETE 3 VIEW LEFT    CLINICAL HISTORY:  .  Other injury of unspecified body region, initial encounter    TECHNIQUE:  AP, lateral and oblique views of the left foot were performed.    COMPARISON:  11/30/2024    FINDINGS:  Three views left foot.    There is diffuse edema about the foot, worsened since comparative exam of 11/30/2024.  Surgical changes are noted of the foot, overall configuration appears stable given differences in positioning.  No convincing acute displaced fracture or dislocation of the foot although comparison with the previous exam is limited given artifact from casting material at that time.   There is chronic degenerative change/destructive change of the midfoot.    There is been interval development of lucency involving the proximal aspect of the 3rd metatarsal about the screw construct.   Impression:       This report was flagged in Epic as abnormal.    1. There is been interval development of lucency about the proximal aspect of the 3rd metatarsal about the head of the screw construct.  Findings are concerning for osteomyelitis.  2. No convincing acute displaced fracture or dislocation of the foot noting chronic changes throughout the midfoot and surgical changes, similar in configuration.  3. There is diffuse edema about the foot, new since the prior exam.  Correlation for cellulitis as warranted.      Electronically signed by: Michael Ervin MD  Date: 12/09/2024  Time: 17:03   X-Ray Ankle Complete Left [[id number]] Resulted: 12/09/24 1701   Order Status: Completed Updated: 12/09/24 1703   Narrative:     EXAMINATION:  XR ANKLE COMPLETE 3 VIEW LEFT    CLINICAL HISTORY:  Other injury of unspecified body region, initial encounter    TECHNIQUE:  AP, lateral and oblique views of the left ankle were performed.    COMPARISON:  Foot radiograph 11/30/2024    FINDINGS:  Three views left ankle.    The ankle mortise is intact.  No acute displaced fracture or dislocation of the ankle.  There is osteopenia.  There is edema about the ankle.  There are surgical changes of the foot, similar in appearance to the previous examination although on that exam, examination is limited given artifact from casting material at that time.   Impression:       1. No acute displaced fracture or dislocation of the ankle.  2. There are surgical changes of the foot, comparison to the previous exam is limited given casting material and artifact at that time.      Electronically signed by: Michael Ervin MD  Date: 12/09/2024  Time: 17:01      Imaging History     2024    Date Procedure Name Study Review Link PACS Link Status Accession  Number Location   12/10/24 08:20 PM Cardiac monitoring strips Study Review  Final     12/10/24 09:07 AM Cardiac monitoring strips Study Review  Final     12/09/24 10:21 PM MRI Forefoot WO Contrast LT Study Review  Images Final [id number] [facility]   12/09/24 10:21 PM MRI Midfoot WO Contrast LT Study Review  Images Final [id number] [facility]   12/09/24 04:40 PM X-Ray Foot Complete Left Study Review  Images Final [id number] [facility]   12/09/24 04:40 PM X-Ray Ankle Complete Left Study Review  Images Final [id number] [facility]   11/30/24 10:07 AM X-Ray Foot Complete Left Study Review  Images Final [id number] [facility]   11/11/24 04:08 PM SURG FL Surgery Fluoro Usage Study Review  Images Final [id number] [facility]

## 2024-12-11 NOTE — ASSESSMENT & PLAN NOTE
Georgina Arias is a 54 y.o. female who recently underwent Charcot neuropathy reconstruction of left lower extremity.  Was then placed into a cast, and now has subsequently developed a left foot infection.  White blood cell count within normal limits, patient resting comfortably in bed.  Vital signs stable.  X-ray and MRI showing osteomyelitis/small fluid collection, clinically correlates with left foot infection.  Imaging likely also yields infection superimposed with Charcot neuropathy.    - Left foot dressed, wound care recs to follow.   - elevate left lower extremity.  - antibiotics per ID. Cultured deep draining dorsal midfoot ulcer, f/u and utilize to tailor abx.   - After long discussion with patient and surgeon who performed charcot reconstruction, imaging and presentation consistent with left foot infection, but no surgical intervention warranted at this moment. Will give patient abx (to be managed by ID), and regular wound care.

## 2024-12-11 NOTE — PLAN OF CARE
Problem: Acute Kidney Injury/Impairment  Goal: Fluid and Electrolyte Balance  Outcome: Progressing  Goal: Effective Renal Function  Outcome: Progressing     Problem: Hemodialysis  Goal: Safe, Effective Therapy Delivery  Outcome: Progressing  Goal: Effective Tissue Perfusion  Outcome: Progressing

## 2024-12-11 NOTE — PROGRESS NOTES
[facility] ([facility])  Podiatry  Progress Note    Patient Name: Georgina Arias  MRN: [id number]  Admission Date: 12/9/2024  Hospital Length of Stay: 1 days  Attending Physician: Jenae De La Cruz MD  Primary Care Provider: Alonso Ling MD     Subjective:     Interval History: NAEON, NAD, VSS. Patient is afebrile, dressings are clean/dry/ and intact.   New pedal complaints: None  New results:   Denies post op fever.          Scheduled Meds:   atorvastatin  80 mg Oral QHS    carvediloL  25 mg Oral BID WM    ceFEPime IV (PEDS and ADULTS)  1 g Intravenous Q24H    cetirizine  5 mg Oral Daily    fluticasone propionate  1 spray Each Nostril Daily    gabapentin  200 mg Oral TID    And    gabapentin  100 mg Oral QHS    heparin (porcine)  5,000 Units Subcutaneous Q8H    insulin glargine U-100 (Lantus)  14 Units Subcutaneous QHS    sevelamer carbonate  800 mg Oral TID WM     Continuous Infusions:  PRN Meds:  Current Facility-Administered Medications:     acetaminophen, 650 mg, Oral, Q6H PRN    dextrose 10%, 12.5 g, Intravenous, PRN    dextrose 10%, 25 g, Intravenous, PRN    glucagon (human recombinant), 1 mg, Intramuscular, PRN    glucose, 16 g, Oral, PRN    glucose, 24 g, Oral, PRN    HYDROcodone-acetaminophen, 1 tablet, Oral, Q6H PRN    influenza, 0.5 mL, Intramuscular, Prior to discharge    insulin aspart U-100, 0-5 Units, Subcutaneous, QID (AC + HS) PRN    melatonin, 6 mg, Oral, Nightly PRN    naloxone, 0.02 mg, Intravenous, PRN    ondansetron, 4 mg, Intravenous, Q8H PRN    promethazine, 12.5 mg, Oral, Q6H PRN    sodium chloride 0.9%, 10 mL, Intravenous, Q12H PRN    Pharmacy to dose Vancomycin consult, , , Once **AND** vancomycin - pharmacy to dose, , Intravenous, pharmacy to manage frequency    Review of Systems   Constitutional:  Negative for chills and fever.   Respiratory:  Negative for shortness of breath.    Cardiovascular:  Negative for chest pain.   Gastrointestinal:  Negative for diarrhea,  "nausea and vomiting.   Skin:  Positive for wound.   Neurological:  Positive for headaches.     Objective:     Vital Signs (Most Recent):  Temp: 98.8 °F (37.1 °C) (12/10/24 1603)  Pulse: 75 (12/10/24 1603)  Resp: 18 (12/10/24 1603)  BP: (!) 133/59 (12/10/24 1603)  SpO2: 98 % (12/10/24 1603) Vital Signs (24h Range):  Temp:  [97.9 °F (36.6 °C)-98.8 °F (37.1 °C)] 98.8 °F (37.1 °C)  Pulse:  [69-81] 75  Resp:  [11-20] 18  SpO2:  [95 %-100 %] 98 %  BP: (109-161)/(56-69) 133/59     Weight: 101.2 kg (223 lb 1.7 oz)  Body mass index is 34.94 kg/m².    Foot Exam    General  Orientation: alert and oriented to person, place, and time       Left Foot/Ankle      Inspection and Palpation  Skin Exam: drainage, cellulitis, skin changes, abnormal color, ulcer and erythema;     Neurovascular  Dorsalis pedis: 1+  Posterior tibial: 1+    Comments  Left lower extremity:  Left foot with diffuse malodor, foul smelling.  1 or 2 fleas noted.  Cast removed.    Plantar midfoot ulcer:  Mixed granular fibrous base.  Hypergranulation tissue noted as well.  Medial forefoot ulcer.  Long and mixed fibrogranular base.  Dorsal foot:  Small draining ulcer that probes deep to bone.       Laboratory:  A1C:   Recent Labs   Lab 10/07/24  0000 12/02/24  0000 12/09/24  1332   HGBA1C 6.2* 5.8 5.3     CBC:   Recent Labs   Lab 12/10/24  0330   WBC 7.68   RBC 2.61*   HGB 8.2*   HCT 26.2*      *   MCH 31.4*   MCHC 31.3*     CMP:   Recent Labs   Lab 12/10/24  0330      CALCIUM 8.7   ALBUMIN 2.3*   PROT 6.5      K 4.2   CO2 25      BUN 55*   CREATININE 4.4*   ALKPHOS 94   ALT 11   AST 16   BILITOT 0.4     CRP:   Recent Labs   Lab 12/09/24  1837   CRP 53.6*     ESR: No results for input(s): "SEDRATE" in the last 168 hours.  Wound Cultures:   Recent Labs   Lab 11/29/24  0828   LABAERO ENTEROBACTER CLOACAE  Many  *  KLEBSIELLA PNEUMONIAE  Many  Skin meño also present  *     Microbiology Results (last 7 days)       Procedure Component " Value Units Date/Time    Blood culture x two cultures. Draw prior to antibiotics. [[id number]] Collected: 12/09/24 1523    Order Status: Completed Specimen: Blood from Peripheral, Forearm, Left Updated: 12/10/24 1813     Blood Culture, Routine No Growth to date      No Growth to date    Narrative:      Aerobic and anaerobic    Blood culture x two cultures. Draw prior to antibiotics. [[id number]] Collected: 12/09/24 1522    Order Status: Completed Specimen: Blood from Peripheral, Antecubital, Left Updated: 12/10/24 1813     Blood Culture, Routine No Growth to date      No Growth to date    Narrative:      Aerobic and anaerobic    Fungus culture [[id number]] Collected: 12/10/24 1730    Order Status: Sent Specimen: Wound from Foot, Left Updated: 12/10/24 1743    Gram stain [[id number]] Collected: 12/10/24 1730    Order Status: Sent Specimen: Wound from Foot, Left Updated: 12/10/24 1742    Culture, Anaerobe [[id number]] Collected: 12/10/24 1730    Order Status: Sent Specimen: Wound from Foot, Left Updated: 12/10/24 1742    Aerobic culture [[id number]] Collected: 12/10/24 1730    Order Status: Sent Specimen: Wound from Foot, Left Updated: 12/10/24 1741          Specimen (24h ago, onward)      None            Diagnostic Results:  I have reviewed all pertinent imaging results/findings within the past 24 hours.  Assessment/Plan:     ID  * Post-operative infection  Georgina Arias is a 54 y.o. female who recently underwent Charcot neuropathy reconstruction of left lower extremity.  Was then placed into a cast, and now has subsequently developed a left foot infection.  White blood cell count within normal limits, patient resting comfortably in bed.  Vital signs stable.  X-ray and MRI showing osteomyelitis/small fluid collection, clinically correlates with left foot infection.  Imaging likely also yields infection superimposed with Charcot neuropathy.    - Left foot dressed, wound care recs to follow.   - elevate left  lower extremity.  - antibiotics per ID. Cultured deep draining dorsal midfoot ulcer, f/u and utilize to tailor abx.   - PWB to LLE in post op shoe for short transfers. Post op shoe ordered.   - After long discussion with patient and surgeon who performed charcot reconstruction, imaging and presentation consistent with left foot infection, but no surgical intervention warranted at this moment. Will give patient abx (to be managed by ID), and regular wound care.           Oneil Acevedo DPM  Podiatry  [facility] - Neurosurgery ([facility])

## 2024-12-11 NOTE — SUBJECTIVE & OBJECTIVE
Subjective:     Interval History: NAEON, NAD, VSS. Patient is afebrile, dressings are clean/dry/ and intact.   New pedal complaints: None  New results:   Denies post op fever.          Scheduled Meds:   atorvastatin  80 mg Oral QHS    carvediloL  25 mg Oral BID WM    ceFEPime IV (PEDS and ADULTS)  1 g Intravenous Q24H    cetirizine  5 mg Oral Daily    fluticasone propionate  1 spray Each Nostril Daily    gabapentin  200 mg Oral TID    And    gabapentin  100 mg Oral QHS    heparin (porcine)  5,000 Units Subcutaneous Q8H    insulin glargine U-100 (Lantus)  14 Units Subcutaneous QHS    sevelamer carbonate  800 mg Oral TID WM     Continuous Infusions:  PRN Meds:  Current Facility-Administered Medications:     acetaminophen, 650 mg, Oral, Q6H PRN    dextrose 10%, 12.5 g, Intravenous, PRN    dextrose 10%, 25 g, Intravenous, PRN    glucagon (human recombinant), 1 mg, Intramuscular, PRN    glucose, 16 g, Oral, PRN    glucose, 24 g, Oral, PRN    HYDROcodone-acetaminophen, 1 tablet, Oral, Q6H PRN    influenza, 0.5 mL, Intramuscular, Prior to discharge    insulin aspart U-100, 0-5 Units, Subcutaneous, QID (AC + HS) PRN    melatonin, 6 mg, Oral, Nightly PRN    naloxone, 0.02 mg, Intravenous, PRN    ondansetron, 4 mg, Intravenous, Q8H PRN    promethazine, 12.5 mg, Oral, Q6H PRN    sodium chloride 0.9%, 10 mL, Intravenous, Q12H PRN    Pharmacy to dose Vancomycin consult, , , Once **AND** vancomycin - pharmacy to dose, , Intravenous, pharmacy to manage frequency    Review of Systems   Constitutional:  Negative for chills and fever.   Respiratory:  Negative for shortness of breath.    Cardiovascular:  Negative for chest pain.   Gastrointestinal:  Negative for diarrhea, nausea and vomiting.   Skin:  Positive for wound.   Neurological:  Positive for headaches.     Objective:     Vital Signs (Most Recent):  Temp: 98.8 °F (37.1 °C) (12/10/24 1603)  Pulse: 75 (12/10/24 1603)  Resp: 18 (12/10/24 1603)  BP: (!) 133/59 (12/10/24  "1603)  SpO2: 98 % (12/10/24 1603) Vital Signs (24h Range):  Temp:  [97.9 °F (36.6 °C)-98.8 °F (37.1 °C)] 98.8 °F (37.1 °C)  Pulse:  [69-81] 75  Resp:  [11-20] 18  SpO2:  [95 %-100 %] 98 %  BP: (109-161)/(56-69) 133/59     Weight: 101.2 kg (223 lb 1.7 oz)  Body mass index is 34.94 kg/m².    Foot Exam    General  Orientation: alert and oriented to person, place, and time       Left Foot/Ankle      Inspection and Palpation  Skin Exam: drainage, cellulitis, skin changes, abnormal color, ulcer and erythema;     Neurovascular  Dorsalis pedis: 1+  Posterior tibial: 1+    Comments  Left lower extremity:  Left foot with diffuse malodor, foul smelling.  1 or 2 fleas noted.  Cast removed.    Plantar midfoot ulcer:  Mixed granular fibrous base.  Hypergranulation tissue noted as well.  Medial forefoot ulcer.  Long and mixed fibrogranular base.  Dorsal foot:  Small draining ulcer that probes deep to bone.       Laboratory:  A1C:   Recent Labs   Lab 10/07/24  0000 12/02/24  0000 12/09/24  1332   HGBA1C 6.2* 5.8 5.3     CBC:   Recent Labs   Lab 12/10/24  0330   WBC 7.68   RBC 2.61*   HGB 8.2*   HCT 26.2*      *   MCH 31.4*   MCHC 31.3*     CMP:   Recent Labs   Lab 12/10/24  0330      CALCIUM 8.7   ALBUMIN 2.3*   PROT 6.5      K 4.2   CO2 25      BUN 55*   CREATININE 4.4*   ALKPHOS 94   ALT 11   AST 16   BILITOT 0.4     CRP:   Recent Labs   Lab 12/09/24  1837   CRP 53.6*     ESR: No results for input(s): "SEDRATE" in the last 168 hours.  Wound Cultures:   Recent Labs   Lab 11/29/24  0828   LABAERO ENTEROBACTER CLOACAE  Many  *  KLEBSIELLA PNEUMONIAE  Many  Skin meño also present  *     Microbiology Results (last 7 days)       Procedure Component Value Units Date/Time    Blood culture x two cultures. Draw prior to antibiotics. [[id number]] Collected: 12/09/24 1523    Order Status: Completed Specimen: Blood from Peripheral, Forearm, Left Updated: 12/10/24 1813     Blood Culture, Routine No Growth to " date      No Growth to date    Narrative:      Aerobic and anaerobic    Blood culture x two cultures. Draw prior to antibiotics. [[id number]] Collected: 12/09/24 1522    Order Status: Completed Specimen: Blood from Peripheral, Antecubital, Left Updated: 12/10/24 1813     Blood Culture, Routine No Growth to date      No Growth to date    Narrative:      Aerobic and anaerobic    Fungus culture [[id number]] Collected: 12/10/24 1730    Order Status: Sent Specimen: Wound from Foot, Left Updated: 12/10/24 1743    Gram stain [[id number]] Collected: 12/10/24 1730    Order Status: Sent Specimen: Wound from Foot, Left Updated: 12/10/24 1742    Culture, Anaerobe [[id number]] Collected: 12/10/24 1730    Order Status: Sent Specimen: Wound from Foot, Left Updated: 12/10/24 1742    Aerobic culture [[id number]] Collected: 12/10/24 1730    Order Status: Sent Specimen: Wound from Foot, Left Updated: 12/10/24 1741          Specimen (24h ago, onward)      None            Diagnostic Results:  I have reviewed all pertinent imaging results/findings within the past 24 hours.

## 2024-12-11 NOTE — PROGRESS NOTES
3 hrs HD tx completed; 1L of fluid removed; Blood sugar 111.    Patient tolerated well.    Blood returned; lines flushed with NS; needles pulled;manual pressure held x 10 mins until hemostasis was achieved.    Report given to JUDITH Harris.    [id number] Patient departed [facility] with tele box via bed by patient transport.

## 2024-12-11 NOTE — PLAN OF CARE
Problem: Adult Inpatient Plan of Care  Goal: Plan of Care Review  Outcome: Progressing  Goal: Patient-Specific Goal (Individualized)  Outcome: Progressing  Goal: Absence of Hospital-Acquired Illness or Injury  Outcome: Progressing  Goal: Optimal Comfort and Wellbeing  Outcome: Progressing  Goal: Readiness for Transition of Care  Outcome: Progressing     Problem: Diabetes Comorbidity  Goal: Blood Glucose Level Within Targeted Range  Outcome: Progressing     Problem: Acute Kidney Injury/Impairment  Goal: Fluid and Electrolyte Balance  Outcome: Progressing  Goal: Improved Oral Intake  Outcome: Progressing  Goal: Effective Renal Function  Outcome: Progressing     Problem: Wound  Goal: Optimal Coping  Outcome: Progressing  Goal: Optimal Functional Ability  Outcome: Progressing  Goal: Absence of Infection Signs and Symptoms  Outcome: Progressing  Goal: Improved Oral Intake  Outcome: Progressing  Goal: Optimal Pain Control and Function  Outcome: Progressing  Goal: Skin Health and Integrity  Outcome: Progressing  Goal: Optimal Wound Healing  Outcome: Progressing     Problem: Skin Injury Risk Increased  Goal: Skin Health and Integrity  Outcome: Progressing     Problem: Hemodialysis  Goal: Safe, Effective Therapy Delivery  Outcome: Progressing  Goal: Effective Tissue Perfusion  Outcome: Progressing  Goal: Absence of Infection Signs and Symptoms  Outcome: Progressing             POC updated and reviewed with the patient at bedside. Questions regarding POC were encouraged and addressed. VSS, see flowsheets. Tele maintained per provider's order. Patient is AO x 4 at this time. NAEON. Pain/Nausea management using PRN medications. See MAR for medication administration details. Safety precautions maintained. No signs of injury noted over night. Upon exiting room, patient's bed locked in low position, side rails up x 3, bed alarm set, with call light within reach. Instructed patient to call staff for mobility, verbalized  understanding.  No acute signs of distress noted at this time.

## 2024-12-11 NOTE — ASSESSMENT & PLAN NOTE
54-year-old female with ESRD on HD a history of left Charcot foot now status post reconstruction in November 20, 2024 and subsequently casted.  She recently developed wound drainage and malodor and was sent to the ED due to that.  MRI is concerning for abscesses and osteomyelitis.  Recent wound cultures show Enterobacter cloaca and Klebsiella pneumoniae with no anaerobic growth from cultures on 11/29.  She had been treated with doxycycline and Levaquin.  She is now admitted and on vanc and cefepime.  Podiatry following and new wound cultures have been sent.  MRIs obtained showing concern for osteomyelitis and small abscesses.  Patient is afebrile without leukocytosis.  CRP is 53.  Podiatry is pending bone biopsy.    Plan:  Continue vanc and cefepime for now  I have asked Podiatry to get bone cultures from the affected areas on MRI as well as to see if the abscesses could be aspirated and sent for cultures.  Follow blood cultures  Suspect only prolonged antibiotics but likely can be given post HD  Discussed with ID staff, we will follow

## 2024-12-11 NOTE — PROGRESS NOTES
Pharmacokinetic Assessment Follow Up: IV Vancomycin    Vancomycin serum concentration assessment(s)/plan:    - The random level was drawn correctly and can be used to guide therapy at this time.   - The measurement is above the desired definitive target range of 10 to 20 mcg/mL.  - Expect level to be within target range after dialysis. Will not re-dose 12/11  - Random level with am labs 12/12    Drug levels (last 3 results):  Recent Labs   Lab Result Units 12/11/24  0217   Vancomycin, Random ug/mL 25.7       Pharmacy will continue to follow and monitor vancomycin.    Please contact pharmacy at extension [phone] for questions regarding this assessment.    Thank you for the consult,   David Blackwell       Patient brief summary:  Georgina Arias is a 54 y.o. female initiated on antimicrobial therapy with IV Vancomycin for treatment of skin & soft tissue infection    Drug Allergies:   Review of patient's allergies indicates:   Allergen Reactions    Hydrocodone-acetaminophen Nausea And Vomiting, Rash and Other (See Comments)     Vicodin- severe rash  Lortab- head-spinning that leads to vomiting      Naproxen Anaphylaxis and Swelling     Hives (skin)^swelling  Hives (skin)^swelling  Hives (skin)^swelling    Sulfamethoxazole-trimethoprim Other (See Comments)     Caused JEROME; heart attack    Hydrocodone Nausea And Vomiting and Rash    Adhesive tape-silicones      blisters    Aspartame Hives    Penicillins Hives     Blisters (skin)^    Sulfamethoxazole Other (See Comments)    Trimethoprim Other (See Comments)    Acetaminophen Rash    Adhesive Rash       Actual Body Weight:   100.3 kg    Renal Function:   Estimated Creatinine Clearance: 16 mL/min (A) (based on SCr of 4.9 mg/dL (H)).,     Dialysis Method (if applicable):  intermittent HD

## 2024-12-11 NOTE — PLAN OF CARE
[facility] (Hospital)  Initial Discharge Assessment       Primary Care Provider: Alonso Ling MD    Admission Diagnosis: Wound infection [T14.8XXA, L08.9]  Wound drainage [T14.8XXA]  Chest pain [R07.9]    Admission Date: 12/9/2024  Expected Discharge Date: 12/13/2024    Transition of Care Barriers: (P) None    Payor: HU[employer] MANAGED MEDICARE / Plan: HU[employer] SNP HMO PPO SPECIAL NEEDS / Product Type: Medicare Advantage /     Extended Emergency Contact Information  Primary Emergency Contact: Georgina Collado  Address: [address] [place] [address] [facility]  Home Phone: [phone]  Relation: Mother    Discharge Plan A: (P) Home with family, Home Health  Discharge Plan B: (P) Home with family, Home Health      [facility] - [place], [place] - [address]  [address] [place] [address]  Phone: [phone] Fax: [phone]    [facility] - [place], [place] - [address]  [address]  [place] [address]  Phone: [phone] Fax: [phone]    [facility] [employer] [place] [employer]  [address]  [place] [address]  Phone: [phone] Fax: [phone]      Initial Assessment (most recent)       Adult Discharge Assessment - 12/11/24 1442          Discharge Assessment    Assessment Type Discharge Planning Assessment     Confirmed/corrected address, phone number and insurance Yes     Confirmed Demographics Correct on Facesheet     Source of Information health record     Does patient/caregiver understand observation status Yes     Communicated INDIO with patient/caregiver Yes     Reason For Admission wound infection     People in Home parent(s)     Facility Arrived From: home     Do you expect to return to your current living situation? Yes     Prior to hospitilization cognitive status: Alert/Oriented     Current cognitive status: Alert/Oriented     Walking or Climbing Stairs Difficulty no      Dressing/Bathing Difficulty no (P)      Home Accessibility wheelchair accessible (P)      Home Layout Able to live on 1st floor (P)      Equipment Currently Used at Home blood pressure machine;cane, straight;glucometer (P)    hearing aids    Readmission within 30 days? No (P)      Do you currently have service(s) that help you manage your care at home? No (P)      Do you take prescription medications? Yes (P)      Do you have prescription coverage? Yes (P)      Coverage [employer] Manage Medicare (P)      Do you have any problems affording any of your prescribed medications? No (P)      Is the patient taking medications as prescribed? yes (P)      Who is going to help you get home at discharge? pts mother, Georgina (P)      How do you get to doctors appointments? family or friend will provide (P)      Are you on dialysis? Yes (P)      Dialysis Name and Scheduled days unknown at this time (P)      Do you take coumadin? No (P)      Discharge Plan A Home with family;Home Health (P)      Discharge Plan B Home with family;Home Health (P)      DME Needed Upon Discharge  none (P)      Discharge Plan discussed with: Patient (P)      Transition of Care Barriers None (P)         Housing Stability    In the last 12 months, was there a time when you were not able to pay the mortgage or rent on time? No (P)      At any time in the past 12 months, were you homeless or living in a shelter (including now)? No (P)         Transportation Needs    Has the lack of transportation kept you from medical appointments, meetings, work or from getting things needed for daily living? No (P)         Food Insecurity    Within the past 12 months, you worried that your food would run out before you got the money to buy more. Never true (P)      Within the past 12 months, the food you bought just didn't last and you didn't have money to get more. Never true (P)         OTHER    Name(s) of People in Home pts motherGeorgina (P)                     Discharge Plan A and Plan B have been determined by review of patient's clinical status, future medical and therapeutic needs, and coverage/benefits for post-acute care in coordination with multidisciplinary team members.    Sw presented to pts bedside 4 times (pt was asleep 2 times, pt was at dialysis another time and pt not in the room the 4th time). Sw got info for the initial assessment from pts chart. Per chart pts lives at the address on file with pts mother, Georgina Collado. Pt is on dialysis and here for wound infection. At the time of dc pts mother or a family member will transport pt home. Pt may need IV antibiotics at the time of dc.     Nohemi Chakraborty [facility]  Case management  Ext. [phone]

## 2024-12-12 LAB
ALBUMIN SERPL BCP-MCNC: 2.1 G/DL (ref 3.5–5.2)
ALP SERPL-CCNC: 92 U/L (ref 40–150)
ALT SERPL W/O P-5'-P-CCNC: 9 U/L (ref 10–44)
ANION GAP SERPL CALC-SCNC: 9 MMOL/L (ref 8–16)
AST SERPL-CCNC: 15 U/L (ref 10–40)
BASOPHILS # BLD AUTO: 0.05 K/UL (ref 0–0.2)
BASOPHILS NFR BLD: 0.6 % (ref 0–1.9)
BILIRUB SERPL-MCNC: 0.3 MG/DL (ref 0.1–1)
BUN SERPL-MCNC: 36 MG/DL (ref 6–20)
CALCIUM SERPL-MCNC: 8.7 MG/DL (ref 8.7–10.5)
CHLORIDE SERPL-SCNC: 105 MMOL/L (ref 95–110)
CO2 SERPL-SCNC: 25 MMOL/L (ref 23–29)
CREAT SERPL-MCNC: 3.8 MG/DL (ref 0.5–1.4)
DIFFERENTIAL METHOD BLD: ABNORMAL
EOSINOPHIL # BLD AUTO: 0.2 K/UL (ref 0–0.5)
EOSINOPHIL NFR BLD: 2.7 % (ref 0–8)
ERYTHROCYTE [DISTWIDTH] IN BLOOD BY AUTOMATED COUNT: 14.1 % (ref 11.5–14.5)
EST. GFR  (NO RACE VARIABLE): 13.5 ML/MIN/1.73 M^2
GLUCOSE SERPL-MCNC: 108 MG/DL (ref 70–110)
GRAM STN SPEC: NORMAL
GRAM STN SPEC: NORMAL
HCT VFR BLD AUTO: 25.3 % (ref 37–48.5)
HGB BLD-MCNC: 7.7 G/DL (ref 12–16)
IMM GRANULOCYTES # BLD AUTO: 0.05 K/UL (ref 0–0.04)
IMM GRANULOCYTES NFR BLD AUTO: 0.6 % (ref 0–0.5)
LYMPHOCYTES # BLD AUTO: 1.2 K/UL (ref 1–4.8)
LYMPHOCYTES NFR BLD: 14.9 % (ref 18–48)
MAGNESIUM SERPL-MCNC: 2.1 MG/DL (ref 1.6–2.6)
MCH RBC QN AUTO: 31.4 PG (ref 27–31)
MCHC RBC AUTO-ENTMCNC: 30.4 G/DL (ref 32–36)
MCV RBC AUTO: 103 FL (ref 82–98)
MONOCYTES # BLD AUTO: 0.7 K/UL (ref 0.3–1)
MONOCYTES NFR BLD: 8.9 % (ref 4–15)
NEUTROPHILS # BLD AUTO: 5.8 K/UL (ref 1.8–7.7)
NEUTROPHILS NFR BLD: 72.3 % (ref 38–73)
NRBC BLD-RTO: 0 /100 WBC
PHOSPHATE SERPL-MCNC: 3.9 MG/DL (ref 2.7–4.5)
PLATELET # BLD AUTO: 195 K/UL (ref 150–450)
PMV BLD AUTO: 9 FL (ref 9.2–12.9)
POCT GLUCOSE: 108 MG/DL (ref 70–110)
POCT GLUCOSE: 123 MG/DL (ref 70–110)
POTASSIUM SERPL-SCNC: 4.6 MMOL/L (ref 3.5–5.1)
PROT SERPL-MCNC: 6 G/DL (ref 6–8.4)
RBC # BLD AUTO: 2.45 M/UL (ref 4–5.4)
SODIUM SERPL-SCNC: 139 MMOL/L (ref 136–145)
VANCOMYCIN SERPL-MCNC: 17.2 UG/ML
WBC # BLD AUTO: 8.01 K/UL (ref 3.9–12.7)

## 2024-12-12 PROCEDURE — 87116 MYCOBACTERIA CULTURE: CPT

## 2024-12-12 PROCEDURE — 83735 ASSAY OF MAGNESIUM: CPT | Performed by: NURSE PRACTITIONER

## 2024-12-12 PROCEDURE — 99024 POSTOP FOLLOW-UP VISIT: CPT | Mod: ,,, | Performed by: PODIATRIST

## 2024-12-12 PROCEDURE — 84100 ASSAY OF PHOSPHORUS: CPT | Performed by: NURSE PRACTITIONER

## 2024-12-12 PROCEDURE — 87070 CULTURE OTHR SPECIMN AEROBIC: CPT

## 2024-12-12 PROCEDURE — 87206 SMEAR FLUORESCENT/ACID STAI: CPT

## 2024-12-12 PROCEDURE — 80053 COMPREHEN METABOLIC PANEL: CPT | Performed by: NURSE PRACTITIONER

## 2024-12-12 PROCEDURE — 80202 ASSAY OF VANCOMYCIN: CPT | Performed by: INTERNAL MEDICINE

## 2024-12-12 PROCEDURE — 87075 CULTR BACTERIA EXCEPT BLOOD: CPT

## 2024-12-12 PROCEDURE — 87102 FUNGUS ISOLATION CULTURE: CPT

## 2024-12-12 PROCEDURE — 25000003 PHARM REV CODE 250: Performed by: NURSE PRACTITIONER

## 2024-12-12 PROCEDURE — 0QBM0ZX EXCISION OF LEFT TARSAL, OPEN APPROACH, DIAGNOSTIC: ICD-10-PCS | Performed by: PODIATRIST

## 2024-12-12 PROCEDURE — 11000001 HC ACUTE MED/SURG PRIVATE ROOM

## 2024-12-12 PROCEDURE — 36415 COLL VENOUS BLD VENIPUNCTURE: CPT | Performed by: NURSE PRACTITIONER

## 2024-12-12 PROCEDURE — 85025 COMPLETE CBC W/AUTO DIFF WBC: CPT | Performed by: NURSE PRACTITIONER

## 2024-12-12 PROCEDURE — 63600175 PHARM REV CODE 636 W HCPCS: Performed by: NURSE PRACTITIONER

## 2024-12-12 PROCEDURE — 25000242 PHARM REV CODE 250 ALT 637 W/ HCPCS: Performed by: NURSE PRACTITIONER

## 2024-12-12 PROCEDURE — 99232 SBSQ HOSP IP/OBS MODERATE 35: CPT | Mod: ,,, | Performed by: NURSE PRACTITIONER

## 2024-12-12 PROCEDURE — 87205 SMEAR GRAM STAIN: CPT

## 2024-12-12 RX ORDER — ALUMINUM HYDROXIDE, MAGNESIUM HYDROXIDE, AND SIMETHICONE 2400; 240; 2400 MG/30ML; MG/30ML; MG/30ML
30 SUSPENSION ORAL EVERY 6 HOURS PRN
Status: DISCONTINUED | OUTPATIENT
Start: 2024-12-12 | End: 2024-12-16 | Stop reason: HOSPADM

## 2024-12-12 RX ADMIN — MUPIROCIN: 20 OINTMENT TOPICAL at 09:12

## 2024-12-12 RX ADMIN — SEVELAMER CARBONATE 800 MG: 800 TABLET, FILM COATED ORAL at 12:12

## 2024-12-12 RX ADMIN — HEPARIN SODIUM 5000 UNITS: 5000 INJECTION INTRAVENOUS; SUBCUTANEOUS at 06:12

## 2024-12-12 RX ADMIN — CETIRIZINE HYDROCHLORIDE 5 MG: 5 TABLET, FILM COATED ORAL at 08:12

## 2024-12-12 RX ADMIN — GABAPENTIN 100 MG: 100 CAPSULE ORAL at 10:12

## 2024-12-12 RX ADMIN — SEVELAMER CARBONATE 800 MG: 800 TABLET, FILM COATED ORAL at 08:12

## 2024-12-12 RX ADMIN — HYDROCODONE BITARTRATE AND ACETAMINOPHEN 1 TABLET: 10; 325 TABLET ORAL at 10:12

## 2024-12-12 RX ADMIN — CARVEDILOL 25 MG: 25 TABLET, FILM COATED ORAL at 05:12

## 2024-12-12 RX ADMIN — HEPARIN SODIUM 5000 UNITS: 5000 INJECTION INTRAVENOUS; SUBCUTANEOUS at 03:12

## 2024-12-12 RX ADMIN — FLUTICASONE PROPIONATE 50 MCG: 50 SPRAY, METERED NASAL at 09:12

## 2024-12-12 RX ADMIN — MUPIROCIN: 20 OINTMENT TOPICAL at 10:12

## 2024-12-12 RX ADMIN — CARVEDILOL 25 MG: 25 TABLET, FILM COATED ORAL at 08:12

## 2024-12-12 RX ADMIN — GABAPENTIN 200 MG: 100 CAPSULE ORAL at 10:12

## 2024-12-12 RX ADMIN — GABAPENTIN 200 MG: 100 CAPSULE ORAL at 03:12

## 2024-12-12 RX ADMIN — ATORVASTATIN CALCIUM 80 MG: 40 TABLET, FILM COATED ORAL at 10:12

## 2024-12-12 RX ADMIN — HEPARIN SODIUM 5000 UNITS: 5000 INJECTION INTRAVENOUS; SUBCUTANEOUS at 10:12

## 2024-12-12 RX ADMIN — CEFEPIME 1 G: 1 INJECTION, POWDER, FOR SOLUTION INTRAMUSCULAR; INTRAVENOUS at 03:12

## 2024-12-12 RX ADMIN — INSULIN GLARGINE 14 UNITS: 100 INJECTION, SOLUTION SUBCUTANEOUS at 10:12

## 2024-12-12 RX ADMIN — GABAPENTIN 200 MG: 100 CAPSULE ORAL at 08:12

## 2024-12-12 NOTE — PROGRESS NOTES
Hospital Medicine  Progress note    Team: [facility] HOSP MED A Jenae De La Cruz MD  Admit Date: 12/9/2024  Code status: Full Code    Principal Problem:  Post-operative infection    Interval hx:  Patient without complaints. Ask when she can go home.     PEx  Temp:  [97.6 °F (36.4 °C)-98.9 °F (37.2 °C)]   Pulse:  [64-81]   Resp:  [14-20]   BP: (113-140)/(54-65)   SpO2:  [95 %-99 %]     Intake/Output Summary (Last 24 hours) at 12/12/2024 1158  Last data filed at 12/11/2024 1230  Gross per 24 hour   Intake 0 ml   Output 1600 ml   Net -1600 ml       General Appearance: no acute distress, WD, chronically ill-appearing  Heart: regular rate and rhythm, no heave  Respiratory: Normal respiratory effort, symmetric excursion, bilateral vesicular breath sounds   Abdomen: Soft, non-tender; bowel sounds active  Skin: intact, no rash, no ulcers  Neurologic:  No focal numbness or weakness  Mental status: Alert, oriented x 4, affect appropriate    Recent Labs   Lab 12/10/24  0330 12/11/24  0217 12/12/24  0434   WBC 7.68 7.75 8.01   HGB 8.2* 8.8* 7.7*   HCT 26.2* 28.3* 25.3*    214 195     Recent Labs   Lab 12/10/24  0330 12/11/24  0217 12/12/24  0434    140 139   K 4.2 4.2 4.6    105 105   CO2 25 23 25   BUN 55* 62* 36*   CREATININE 4.4* 4.9* 3.8*    99 108   CALCIUM 8.7 8.9 8.7   MG 2.0 2.1 2.1   PHOS 4.7* 4.9* 3.9     Recent Labs   Lab 12/10/24  0330 12/11/24  0217 12/12/24  0434   ALKPHOS 94 98 92   ALT 11 8* 9*   AST 16 14 15   ALBUMIN 2.3* 2.4* 2.1*   PROT 6.5 6.7 6.0   BILITOT 0.4 0.4 0.3        Recent Labs   Lab 12/10/24  2103 12/11/24  0726 12/11/24  1214 12/11/24  1620 12/11/24  2140 12/12/24  0729   POCTGLUCOSE 153* 113* 111* 166* 111* 108       Scheduled Meds:   atorvastatin  80 mg Oral QHS    carvediloL  25 mg Oral BID WM    ceFEPime IV (PEDS and ADULTS)  1 g Intravenous Q24H    cetirizine  5 mg Oral Daily    [START ON 12/13/2024] epoetin tariq-ebpx (RETACRIT) injection  50 Units/kg Intravenous Every Mon,  "Wed, Fri    fluticasone propionate  1 spray Each Nostril Daily    gabapentin  200 mg Oral TID    And    gabapentin  100 mg Oral QHS    heparin (porcine)  5,000 Units Subcutaneous Q8H    insulin glargine U-100 (Lantus)  14 Units Subcutaneous QHS    mupirocin   Nasal BID    sevelamer carbonate  800 mg Oral TID WM     Continuous Infusions:  As Needed:    Current Facility-Administered Medications:     acetaminophen, 650 mg, Oral, Q6H PRN    aluminum & magnesium hydroxide-simethicone, 30 mL, Oral, Q6H PRN    dextrose 10%, 12.5 g, Intravenous, PRN    dextrose 10%, 25 g, Intravenous, PRN    glucagon (human recombinant), 1 mg, Intramuscular, PRN    glucose, 16 g, Oral, PRN    glucose, 24 g, Oral, PRN    HYDROcodone-acetaminophen, 1 tablet, Oral, Q6H PRN    influenza, 0.5 mL, Intramuscular, Prior to discharge    insulin aspart U-100, 0-5 Units, Subcutaneous, QID (AC + HS) PRN    melatonin, 6 mg, Oral, Nightly PRN    naloxone, 0.02 mg, Intravenous, PRN    ondansetron, 4 mg, Intravenous, Q8H PRN    promethazine, 12.5 mg, Oral, Q6H PRN    sodium chloride 0.9%, 10 mL, Intravenous, Q12H PRN    Assessment and Plan  / Problems managed today    * Post-operative infection  S/p charcot foot reconstruction to L foot on 11/11 now with increased drainage, left foot pain/swelling  -Afebrile, no leukocytosis.  -Blood cxs in process.  -POC lactate 0.99, procal 0.13  -CRP 53.6  -Xray L foot with There is been interval development of lucency about the proximal aspect of the 3rd metatarsal about the head of the screw construct.  Findings are concerning for osteomyelitis. There is diffuse edema about the foot, new since the prior exam.   -MRI L foot w/o pending showed "1. 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  2. 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  3. Ulceration along the " "plantar and dorsal lateral aspect of the foot with probable communication with the deeper midfoot compartment.  Small fluid collection along the superior aspect of the 4th and 5th metatarsals, concerning for abscess."  -Podiatry consulted   - no surgery indicated. Treat with antibiotics per ID  -consult ID - continue cefepime. Vancomycin stopped  -PRN pain control.   -Elevate LLE.  -Fall precautions.  -Reviewed previous cxs from podiatry visit on 11/29:      Component 10 d ago   Aerobic Bacterial Culture      Abnormal   ENTEROBACTER CLOACAE  Many  VC      Aerobic Bacterial Culture  Abnormal   KLEBSIELLA PNEUMONIAE  Many  Skin meño also present    Resulting Agency [facility]        Susceptibility     Enterobacter cloacae Klebsiella pneumoniae     CULTURE, AEROBIC  (SPECIFY SOURCE) CULTURE, AEROBIC  (SPECIFY SOURCE)     Amp/Sulbactam   <=8/4 mcg/mL Sensitive     Cefazolin   <=2 mcg/mL Sensitive     Cefepime <=2 mcg/mL Sensitive <=2 mcg/mL Sensitive     Ceftriaxone   <=1 mcg/mL Sensitive     Ciprofloxacin <=0.25 mcg/mL Sensitive <=0.25 mcg/mL Sensitive     Ertapenem <=0.5 mcg/mL Sensitive <=0.5 mcg/mL Sensitive     Gentamicin <=2 mcg/mL Sensitive <=2 mcg/mL Sensitive     Levofloxacin <=0.5 mcg/mL Sensitive <=0.5 mcg/mL Sensitive     Meropenem <=1 mcg/mL Sensitive <=1 mcg/mL Sensitive     Piperacillin/Tazo   <=8 mcg/mL Sensitive     Tobramycin <=2 mcg/mL Sensitive <=2 mcg/mL Sensitive     Trimeth/Sulfa <=2/38 mcg/mL Sensitive <=2/38 mcg/mL Sensitive           ESRD (end stage renal disease)  MWF schedule?    Residual renal function?- Yes  Last dialyzed 12/09    - Nephrology consulted  - Continue chronic hemodialysis  - Monitor daily electrolytes and defer dialysis orders to nephrology  - Renally dose medications  - Continue phosphorus binders  - Daily weights/strict I&Os  - 1.5L FR    Class 2 severe obesity with serious comorbidity and body mass index (BMI) of 38.0 to 38.9 in adult  Body mass index is 39.16 kg/m². Obesity " complicates all aspects of disease management from diagnostic modalities to treatment. Weight loss encouraged and health benefits explained to patient.     Chronic diastolic congestive heart failure  Patient has Diastolic (HFpEF) heart failure that is Chronic. On presentation their CHF was well compensated. Most recent BNP and echo results are listed below.  Latest ECHO  Results for orders placed during the hospital encounter of 09/11/23    Echo    Interpretation Summary    Left Ventricle: The left ventricle is normal in size. Normal wall thickness. Normal wall motion. There is low normal systolic function with a visually estimated ejection fraction of 50 - 55%. Ejection fraction by visual approximation is 55%. There is normal diastolic function.    Right Ventricle: Right ventricle was not well visualized due to poor acoustic window. Normal right ventricular cavity size. Wall thickness is normal. Right ventricle wall motion  is normal. Systolic function is normal.    Aortic Valve: There is moderate aortic valve sclerosis. There is annular calcification present.    Mitral Valve: There is moderate mitral annular calcification present.    IVC/SVC: Normal venous pressure at 3 mmHg.    Current Heart Failure Medications  carvediloL tablet 25 mg, 2 times daily with meals, Oral    Plan  - Monitor strict I&Os and daily weights.    - Place on telemetry  - Low sodium diet  - Place on fluid restriction of 1.5 L.   - Cardiology has not been consulted  - The patient's volume status is at their baseline  - Volume management per HD.    Essential hypertension  Patient's blood pressure range in the last 24 hours was: BP  Min: 125/60  Max: 142/64.The patient's inpatient anti-hypertensive regimen is listed below:  Current Antihypertensives  carvediloL tablet 25 mg, 2 times daily with meals, Oral    Plan  - BP is controlled, no changes needed to their regimen    Hyperlipidemia   Patient is chronically on statin.will continue for now.  "Monitor clinically. Last LDL was   Lab Results   Component Value Date    LDLCALC Invalid, Trig>400.0 07/30/2014        Peripheral vascular disease  PAD s/p right SFA stent (11/2017), s/p right common iliac stent s/p PTA occluded stents in 2014 (Dr. Nathaniel Coyle at [facility]) S/p ax fem bypass and stent to sfa with Dr. Maya on 9/14/23  -Continue statin, hold ASA pending official podiatry recs.    Type II diabetes mellitus  Patient's FSGs are controlled on current medication regimen.  Last A1c reviewed-   Lab Results   Component Value Date    HGBA1C 5.8 12/02/2024     Most recent fingerstick glucose reviewed- No results for input(s): "POCTGLUCOSE" in the last 24 hours.  Current correctional scale  Low  Maintain anti-hyperglycemic dose as follows-   Antihyperglycemics (From admission, onward)      Start     Stop Route Frequency Ordered    12/10/24 2100  insulin glargine U-100 (Lantus) pen 16 Units         -- SubQ Nightly 12/10/24 0543    12/09/24 1923  insulin aspart U-100 pen 0-5 Units         -- SubQ Before meals & nightly PRN 12/09/24 1824          Hold Oral hypoglycemics while patient is in the hospital.  -Accuchecks AC/HS      Diet:  regular diet  GI PPx: not needed  DVT PPx:  heparin  Airways: room air  Wounds: none    Goals of Care:  Return to prior functional status     Discharge Planning   INDIO: 12/16/2024   Is the patient medically ready for discharge?:     Reason for patient still in hospital (select all that apply): Patient trending condition and Treatment  Discharge Plan A: Home with family, Home Health        Jenae De La Cruz MD       "

## 2024-12-12 NOTE — PROGRESS NOTES
Therapy with vancomycin complete and/or consult discontinued by provider.  Pharmacy will sign off, please re-consult as needed.    David Blackwell, PharmD  Ext: [phone]

## 2024-12-12 NOTE — ASSESSMENT & PLAN NOTE
54-year-old female with ESRD on HD a history of left Charcot foot now status post reconstruction in November 20, 2024 and subsequently casted.  She recently developed wound drainage and malodor and was sent to the ED due to that.  MRI is concerning for abscesses and OM.  Recent wound cultures show Enterobacter cloaca and Klebsiella pneumoniae with no anaerobic growth from cultures on 11/29.  She had been treated with doxycycline and Levaquin x 10 days.  She is now admitted and on vanc and cefepime.  Podiatry following and new wound cultures have been sent.  MRIs obtained showing concern for osteomyelitis and small abscesses.  Patient is afebrile without leukocytosis.  CRP is 53.  Podiatry is pending bone biopsy.    Plan:  Continue cefepime 1 g  D/c vancomycin  I have asked Podiatry to get bone cultures from the affected areas on MRI as well as to see if the abscesses could be aspirated and sent for cultures.  F/u blood cultures  Suspect pt will need prolonged antibiotics, but likely can be given post HD  Discussed with ID staff, Dr. Oropeza, we will continue to follow

## 2024-12-12 NOTE — ASSESSMENT & PLAN NOTE
Outpatient HD Information:  -Dialysis modality: Hemodialysis  -Outpatient HD unit: [facility]   -HD TX days: [facility]  3 hours   -Last HD TX prior to hospital admission: 12/9/24  -Dialysis access: RUE AVF   -Residual urine: + RRF  -EDW: 99 kg     Plan/Recommendations:     -Continue MWF iHD schedule   -continue sevelamer   -renal diet, if not NPO   -Hgb goal 10-11, epo with HD   -1L fluid restrictions

## 2024-12-12 NOTE — PROGRESS NOTES
[facility] Neuro[facility] ([facility])  Podiatry  Progress Note    Patient Name: Georgina Arias  MRN: [id number]  Admission Date: 12/9/2024  Hospital Length of Stay: 3 days  Attending Physician: Jenae De La Cruz MD  Primary Care Provider: Alonso Ling MD     Subjective:     Interval History: NAEON, NAD, VSS. Patient is afebrile, dressings are clean/dry/ and intact.   New pedal complaints: None  New results:   Denies post op fever.          Scheduled Meds:   atorvastatin  80 mg Oral QHS    carvediloL  25 mg Oral BID WM    ceFEPime IV (PEDS and ADULTS)  1 g Intravenous Q24H    cetirizine  5 mg Oral Daily    [START ON 12/13/2024] epoetin tariq-ebpx (RETACRIT) injection  50 Units/kg Intravenous Every Mon, Wed, Fri    fluticasone propionate  1 spray Each Nostril Daily    gabapentin  200 mg Oral TID    And    gabapentin  100 mg Oral QHS    heparin (porcine)  5,000 Units Subcutaneous Q8H    insulin glargine U-100 (Lantus)  14 Units Subcutaneous QHS    mupirocin   Nasal BID    sevelamer carbonate  800 mg Oral TID WM     Continuous Infusions:  PRN Meds:  Current Facility-Administered Medications:     acetaminophen, 650 mg, Oral, Q6H PRN    aluminum & magnesium hydroxide-simethicone, 30 mL, Oral, Q6H PRN    dextrose 10%, 12.5 g, Intravenous, PRN    dextrose 10%, 25 g, Intravenous, PRN    glucagon (human recombinant), 1 mg, Intramuscular, PRN    glucose, 16 g, Oral, PRN    glucose, 24 g, Oral, PRN    HYDROcodone-acetaminophen, 1 tablet, Oral, Q6H PRN    influenza, 0.5 mL, Intramuscular, Prior to discharge    insulin aspart U-100, 0-5 Units, Subcutaneous, QID (AC + HS) PRN    melatonin, 6 mg, Oral, Nightly PRN    naloxone, 0.02 mg, Intravenous, PRN    ondansetron, 4 mg, Intravenous, Q8H PRN    promethazine, 12.5 mg, Oral, Q6H PRN    sodium chloride 0.9%, 10 mL, Intravenous, Q12H PRN    Review of Systems   Constitutional:  Negative for appetite change, chills, fatigue and fever.   Respiratory:  Negative for chest tightness,  shortness of breath and wheezing.    Cardiovascular:  Negative for chest pain and palpitations.   Gastrointestinal:  Negative for abdominal distention, abdominal pain, constipation, diarrhea, nausea and vomiting.   Genitourinary:  Negative for decreased urine volume, difficulty urinating, dysuria, flank pain, frequency and urgency.   Musculoskeletal:  Negative for back pain.   Skin:  Negative for rash.     Objective:     Vital Signs (Most Recent):  Temp: 98.4 °F (36.9 °C) (12/12/24 1136)  Pulse: 67 (12/12/24 1136)  Resp: 18 (12/12/24 1136)  BP: (!) 119/58 (12/12/24 1136)  SpO2: 97 % (12/12/24 1136) Vital Signs (24h Range):  Temp:  [97.6 °F (36.4 °C)-98.9 °F (37.2 °C)] 98.4 °F (36.9 °C)  Pulse:  [64-81] 67  Resp:  [14-20] 18  SpO2:  [95 %-99 %] 97 %  BP: (113-140)/(54-65) 119/58     Weight: 100.3 kg (221 lb 1.9 oz)  Body mass index is 34.63 kg/m².    Foot Exam    General  Orientation: alert and oriented to person, place, and time       Left Foot/Ankle      Inspection and Palpation  Skin Exam: drainage, cellulitis, skin changes, abnormal color, ulcer and erythema;     Neurovascular  Dorsalis pedis: 1+  Posterior tibial: 1+    Comments  Left lower extremity:  Left foot with diffuse malodor, foul smelling.  1 or 2 fleas noted.  Cast removed.    Plantar midfoot ulcer:  Mixed granular fibrous base.  Hypergranulation tissue noted as well.  Medial forefoot ulcer.  Long and mixed fibrogranular base.  Dorsal foot:  Small draining ulcer that probes deep to bone.       Laboratory:  A1C:   Recent Labs   Lab 10/07/24  0000 12/02/24  0000 12/09/24  1332   HGBA1C 6.2* 5.8 5.3     CBC:   Recent Labs   Lab 12/12/24  0434   WBC 8.01   RBC 2.45*   HGB 7.7*   HCT 25.3*      *   MCH 31.4*   MCHC 30.4*     CMP:   Recent Labs   Lab 12/12/24  0434      CALCIUM 8.7   ALBUMIN 2.1*   PROT 6.0      K 4.6   CO2 25      BUN 36*   CREATININE 3.8*   ALKPHOS 92   ALT 9*   AST 15   BILITOT 0.3     CRP:   Recent Labs  "  Lab 12/09/24  1837   CRP 53.6*     ESR: No results for input(s): "SEDRATE" in the last 168 hours.  Wound Cultures:   Recent Labs   Lab 11/29/24  0828 12/10/24  1730   LABAERO ENTEROBACTER CLOACAE  Many  *  KLEBSIELLA PNEUMONIAE  Many  Skin meño also present  * No significant isolate to date     Microbiology Results (last 7 days)       Procedure Component Value Units Date/Time    Gram stain [[id number]] Collected: 12/12/24 1230    Order Status: Sent Specimen: Bone from Foot, Left Updated: 12/12/24 1317    Aerobic culture [[id number]] Collected: 12/12/24 1230    Order Status: Sent Specimen: Bone from Foot, Left Updated: 12/12/24 1316    Culture, Anaerobe [[id number]] Collected: 12/12/24 1230    Order Status: Sent Specimen: Bone from Foot, Left Updated: 12/12/24 1315    AFB Culture & Smear [[id number]] Collected: 12/12/24 1230    Order Status: Sent Specimen: Bone from Foot, Left Updated: 12/12/24 1315    Fungus culture [[id number]] Collected: 12/12/24 1230    Order Status: Sent Specimen: Bone from Foot, Left Updated: 12/12/24 1315    Aerobic culture [[id number]] Collected: 12/10/24 1730    Order Status: Completed Specimen: Wound from Foot, Left Updated: 12/12/24 1244     Aerobic Bacterial Culture No significant isolate to date    Narrative:      Left foot wound    Culture, Anaerobe [[id number]] Collected: 12/10/24 1730    Order Status: Completed Specimen: Wound from Foot, Left Updated: 12/12/24 0917     Anaerobic Culture Culture in progress    Narrative:      Left foot wound    Blood culture x two cultures. Draw prior to antibiotics. [[id number]] Collected: 12/09/24 1523    Order Status: Completed Specimen: Blood from Peripheral, Forearm, Left Updated: 12/11/24 1813     Blood Culture, Routine No Growth to date      No Growth to date      No Growth to date    Narrative:      Aerobic and anaerobic    Blood culture x two cultures. Draw prior to antibiotics. [[id number]] Collected: 12/09/24 1522    Order Status: " Completed Specimen: Blood from Peripheral, Antecubital, Left Updated: 12/11/24 1813     Blood Culture, Routine No Growth to date      No Growth to date      No Growth to date    Narrative:      Aerobic and anaerobic    Gram stain [[id number]] Collected: 12/10/24 1730    Order Status: Completed Specimen: Wound from Foot, Left Updated: 12/11/24 1103     Gram Stain Result Few WBC's      No epithelial cells    Narrative:      Left foot wound    Fungus culture [[id number]] Collected: 12/10/24 1730    Order Status: Sent Specimen: Wound from Foot, Left Updated: 12/10/24 1743          Specimen (24h ago, onward)       Start     Ordered    12/12/24 1214  Specimen to Pathology, Surgery Orthopedics  Once        Comments: Pre-op Diagnosis: Osteomyelitis Number of specimens: 1Name of specimens: Left foot bone biopsy     References:    Click here for ordering Quick Tip   Question Answer Comment   Procedure Type: Orthopedics    Release to patient Immediate        12/12/24 1214                    Diagnostic Results:  I have reviewed all pertinent imaging results/findings within the past 24 hours.  Assessment/Plan:     ID  * Post-operative infection  Georgina Arias is a 54 y.o. female who recently underwent Charcot neuropathy reconstruction of left lower extremity.  Was then placed into a cast, and now has subsequently developed a left foot infection.  White blood cell count within normal limits, patient resting comfortably in bed.  Vital signs stable.  X-ray and MRI showing osteomyelitis/small fluid collection, clinically correlates with left foot infection.  Imaging likely also yields infection superimposed with Charcot neuropathy.    - Left foot dressed, wound care recs to follow.   - elevate left lower extremity.  - antibiotics per ID. Cultured deep draining dorsal midfoot ulcer, f/u and utilize to tailor abx.  Bone biopsy collected today, infectious disease to utilized results to tailor antibiotics.  - After long discussion  with patient and surgeon who performed charcot reconstruction, imaging and presentation consistent with left foot infection, but no surgical intervention warranted at this moment. Will give patient abx (to be managed by ID), and regular wound care.           Oneil Acevedo DPM  Podiatry  [facility] - Neurosurgery ([facility])

## 2024-12-12 NOTE — PROGRESS NOTES
[facility] - Neurosurgery ([facility])  Nephrology  Progress Note    Patient Name: Georgina Arias  MRN: [id number]  Admission Date: 12/9/2024  Hospital Length of Stay: 3 days  Attending Provider: Jenae De La Cruz MD   Primary Care Physician: Alonso Ling MD  Principal Problem:Post-operative infection    Subjective:     Interval History: HD yesterday, tolerated well. Net UF 1L.     Review of patient's allergies indicates:   Allergen Reactions    Hydrocodone-acetaminophen Nausea And Vomiting, Rash and Other (See Comments)     Vicodin- severe rash  Lortab- head-spinning that leads to vomiting      Naproxen Anaphylaxis and Swelling     Hives (skin)^swelling  Hives (skin)^swelling  Hives (skin)^swelling    Sulfamethoxazole-trimethoprim Other (See Comments)     Caused JEROME; heart attack    Hydrocodone Nausea And Vomiting and Rash    Adhesive tape-silicones      blisters    Aspartame Hives    Penicillins Hives     Blisters (skin)^    Sulfamethoxazole Other (See Comments)    Trimethoprim Other (See Comments)    Acetaminophen Rash    Adhesive Rash     Current Facility-Administered Medications   Medication Frequency    acetaminophen tablet 650 mg Q6H PRN    aluminum & magnesium hydroxide-simethicone 400-400-40 mg/5 mL suspension 30 mL Q6H PRN    atorvastatin tablet 80 mg QHS    carvediloL tablet 25 mg BID WM    ceFEPIme injection 1 g Q24H    cetirizine tablet 5 mg Daily    dextrose 10% bolus 125 mL 125 mL PRN    dextrose 10% bolus 250 mL 250 mL PRN    fluticasone propionate 50 mcg/actuation nasal spray 50 mcg Daily    gabapentin capsule 200 mg TID    And    gabapentin capsule 100 mg QHS    glucagon (human recombinant) injection 1 mg PRN    glucose chewable tablet 16 g PRN    glucose chewable tablet 24 g PRN    heparin (porcine) injection 5,000 Units Q8H    HYDROcodone-acetaminophen  mg per tablet 1 tablet Q6H PRN    influenza (Flulaval, Fluzone, Fluarix) 45 mcg/0.5 mL IM vaccine (> or = 6 mo) 0.5 mL Prior to  discharge    insulin aspart U-100 pen 0-5 Units QID (AC + HS) PRN    insulin glargine U-100 (Lantus) pen 14 Units QHS    melatonin tablet 6 mg Nightly PRN    mupirocin 2 % ointment BID    naloxone 0.4 mg/mL injection 0.02 mg PRN    ondansetron injection 4 mg Q8H PRN    promethazine tablet 12.5 mg Q6H PRN    sevelamer carbonate tablet 800 mg TID WM    sodium chloride 0.9% flush 10 mL Q12H PRN       Objective:     Vital Signs (Most Recent):  Temp: 98.2 °F (36.8 °C) (12/12/24 0727)  Pulse: 64 (12/12/24 0727)  Resp: 14 (12/12/24 0727)  BP: 135/60 (12/12/24 0859)  SpO2: 99 % (12/12/24 0727) Vital Signs (24h Range):  Temp:  [97.6 °F (36.4 °C)-98.9 °F (37.2 °C)] 98.2 °F (36.8 °C)  Pulse:  [64-81] 64  Resp:  [14-20] 14  SpO2:  [95 %-99 %] 99 %  BP: (109-140)/(53-65) 135/60     Weight: 100.3 kg (221 lb 1.9 oz) (12/11/24 0400)  Body mass index is 34.63 kg/m².  Body surface area is 2.18 meters squared.    I/O last 3 completed shifts:  In: 10 [I.V.:10]  Out: 2100 [Urine:500; Other:1600]     Physical Exam  Vitals and nursing note reviewed.   Constitutional:       General: She is not in acute distress.     Appearance: She is obese. She is not toxic-appearing or diaphoretic.   HENT:      Head: Normocephalic.      Nose: Nose normal.   Eyes:      Conjunctiva/sclera: Conjunctivae normal.   Cardiovascular:      Rate and Rhythm: Normal rate.      Arteriovenous access: Right arteriovenous access is present.     Comments: +thrill  Pulmonary:      Effort: Pulmonary effort is normal.   Musculoskeletal:      Cervical back: Normal range of motion.      Right lower leg: No edema.      Comments: Soft cast over L foot/ankle; CDI   Neurological:      Mental Status: She is alert.          Significant Labs:  CBC:   Recent Labs   Lab 12/12/24 0434   WBC 8.01   RBC 2.45*   HGB 7.7*   HCT 25.3*      *   MCH 31.4*   MCHC 30.4*     CMP:   Recent Labs   Lab 12/12/24 0434      CALCIUM 8.7   ALBUMIN 2.1*   PROT 6.0      K 4.6    CO2 25      BUN 36*   CREATININE 3.8*   ALKPHOS 92   ALT 9*   AST 15   BILITOT 0.3     All labs within the past 24 hours have been reviewed.         Assessment/Plan:     Renal/  ESRD (end stage renal disease)  Outpatient HD Information:  -Dialysis modality: Hemodialysis  -Outpatient HD unit: [facility]   -HD TX days: [facility]  3 hours   -Last HD TX prior to hospital admission: 12/9/24  -Dialysis access: R[facility] AVF   -Residual urine: + RRF  -EDW: 99 kg     Plan/Recommendations:     -Continue MWF iHD schedule   -continue sevelamer   -renal diet, if not NPO   -Hgb goal 10-11, epo with HD   -1L fluid restrictions         ID  * Post-operative infection  -management per primary         Thank you for your consult. I will follow-up with patient. Please contact us if you have any additional questions.    Kimberli Delacruz DNP  Nephrology  [facility] - Neurosurgery ([facility])

## 2024-12-12 NOTE — PROGRESS NOTES
[facility])  Infectious Disease  Progress Note    Patient Name: Georgina Arias  MRN: [id number]  Admission Date: 12/9/2024  Length of Stay: 3 days  Attending Physician: Jenae De La Cruz MD  Primary Care Provider: Alonso Ling MD    Isolation Status: No active isolations  Assessment/Plan:      ID  * Post-operative infection  54-year-old female with ESRD on HD a history of left Charcot foot now status post reconstruction in November 20, 2024 and subsequently casted.  She recently developed wound drainage and malodor and was sent to the ED due to that.  MRI is concerning for abscesses and OM.  Recent wound cultures show Enterobacter cloaca and Klebsiella pneumoniae with no anaerobic growth from cultures on 11/29.  She had been treated with doxycycline and Levaquin x 10 days.  She is now admitted and on vanc and cefepime.  Podiatry following and new wound cultures have been sent.  MRIs obtained showing concern for osteomyelitis and small abscesses.  Patient is afebrile without leukocytosis.  CRP is 53.  Podiatry is pending bone biopsy.    Plan:  Continue cefepime 1 g  D/c vancomycin  F/u cultures  Suspect pt will need prolonged antibiotics, but likely can be given post HD  Discussed with ID staff, Dr. Oropeza, we will continue to follow        Anticipated Disposition: TBD    Thank you for your consult. I will follow-up with patient. Please contact us if you have any additional questions.    Carmita Payton, BIBIANA  Infectious Disease  [facility])    Subjective:     Principal Problem:Post-operative infection    HPI: Georgina Arias is a 54 y.o. female with a PMHx of obesity s/p gastric sleeve, PAD, HTN, HF, grade II diastolic dysfunction, DM2, HLD, ESRD on HD and charcot foot s/p reconstruction 11/11/24 who presents due to foul smelling drainage from LLE cast.  Followed up with podiatry weekly after surgery. Pt had been having increased pain to her left foot,  swelling, and drainage so wound cxs were obtained during that visit, and she was started on levaquin and doxycycline x 10 days. The patient reports ongoing pain and swelling and states over the past few days prior to admission she had dark foul smelling drainage reporting it looked like old blood. HD center was concerned about drainage and sent her after dialysis.  Now admitted.      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 Ulceration along the plantar and dorsal lateral aspect of the foot with probable communication with the deeper midfoot compartment.  Small fluid collection along the superior aspect of the 4th and 5th metatarsals, concerning for abscess. Podiatry following and has removed cast and obtained cultures.  Id consult for antibiotic recs.      Patient afebrile and without leukocytosis.  Patient on vanc and cefepime.  CRP 53.6.  Blood cultures NGTD.  New left foot wound cultures have been sent and are in process.  Prior wound cultures show Enterobacter cloaca and Klebsiella pneumoniae which are both pansensitive.       Interval History: NAEON  Afebrile  No leukocytosis    Review of Systems   Constitutional:  Negative for appetite change, chills, fatigue and fever.   Respiratory:  Negative for chest tightness, shortness of breath and wheezing.    Cardiovascular:  Negative for chest pain and palpitations.   Gastrointestinal:  Negative for abdominal distention, abdominal pain, constipation, diarrhea, nausea and vomiting.   Genitourinary:  Negative for decreased urine volume, difficulty urinating, dysuria, flank pain, frequency and urgency.   Musculoskeletal:  Negative for back pain.   Skin:  Negative for rash.     Objective:     Vital Signs (Most Recent):  Temp: 98.4 °F (36.9 °C) (12/12/24 1136)  Pulse: 67 (12/12/24 1136)  Resp: 18  (12/12/24 1136)  BP: (!) 119/58 (12/12/24 1136)  SpO2: 97 % (12/12/24 1136) Vital Signs (24h Range):  Temp:  [97.6 °F (36.4 °C)-98.9 °F (37.2 °C)] 98.4 °F (36.9 °C)  Pulse:  [64-81] 67  Resp:  [14-20] 18  SpO2:  [95 %-99 %] 97 %  BP: (113-140)/(54-65) 119/58     Weight: 100.3 kg (221 lb 1.9 oz)  Body mass index is 34.63 kg/m².    Estimated Creatinine Clearance: 20.6 mL/min (A) (based on SCr of 3.8 mg/dL (H)).     Physical Exam  Vitals and nursing note reviewed.   Constitutional:       General: She is not in acute distress.     Appearance: Normal appearance. She is not ill-appearing.   HENT:      Head: Normocephalic and atraumatic.      Nose: Nose normal.   Cardiovascular:      Rate and Rhythm: Normal rate.      Pulses: Normal pulses.   Pulmonary:      Effort: Pulmonary effort is normal. No respiratory distress.   Skin:     Findings: No lesion or rash.   Neurological:      Mental Status: She is alert and oriented to person, place, and time.   Psychiatric:         Mood and Affect: Mood normal.          Significant Labs: Blood Culture:   Recent Labs   Lab 12/09/24  1522 12/09/24  1523   LABBLOO No Growth to date  No Growth to date  No Growth to date No Growth to date  No Growth to date  No Growth to date     Wound Culture:   Recent Labs   Lab 11/29/24  0828 12/10/24  1730   LABAERO ENTEROBACTER CLOACAE  Many  *  KLEBSIELLA PNEUMONIAE  Many  Skin meño also present  * No significant isolate to date     All pertinent labs within the past 24 hours have been reviewed.    Significant Imaging: I have reviewed all pertinent imaging results/findings within the past 24 hours.

## 2024-12-12 NOTE — ASSESSMENT & PLAN NOTE
Georgina Arias is a 54 y.o. female who recently underwent Charcot neuropathy reconstruction of left lower extremity.  Was then placed into a cast, and now has subsequently developed a left foot infection.  White blood cell count within normal limits, patient resting comfortably in bed.  Vital signs stable.  X-ray and MRI showing osteomyelitis/small fluid collection, clinically correlates with left foot infection.  Imaging likely also yields infection superimposed with Charcot neuropathy.    - Left foot dressed, wound care recs to follow.   - elevate left lower extremity.  - antibiotics per ID. Cultured deep draining dorsal midfoot ulcer, f/u and utilize to tailor abx.  Bone biopsy collected today, infectious disease to utilized results to tailor antibiotics.  - After long discussion with patient and surgeon who performed charcot reconstruction, imaging and presentation consistent with left foot infection, but no surgical intervention warranted at this moment. Will give patient abx (to be managed by ID), and regular wound care.

## 2024-12-12 NOTE — SUBJECTIVE & OBJECTIVE
Interval History: NAEON  Afebrile  No leukocytosis    Review of Systems   Constitutional:  Negative for appetite change, chills, fatigue and fever.   Respiratory:  Negative for chest tightness, shortness of breath and wheezing.    Cardiovascular:  Negative for chest pain and palpitations.   Gastrointestinal:  Negative for abdominal distention, abdominal pain, constipation, diarrhea, nausea and vomiting.   Genitourinary:  Negative for decreased urine volume, difficulty urinating, dysuria, flank pain, frequency and urgency.   Musculoskeletal:  Negative for back pain.   Skin:  Negative for rash.     Objective:     Vital Signs (Most Recent):  Temp: 98.4 °F (36.9 °C) (12/12/24 1136)  Pulse: 67 (12/12/24 1136)  Resp: 18 (12/12/24 1136)  BP: (!) 119/58 (12/12/24 1136)  SpO2: 97 % (12/12/24 1136) Vital Signs (24h Range):  Temp:  [97.6 °F (36.4 °C)-98.9 °F (37.2 °C)] 98.4 °F (36.9 °C)  Pulse:  [64-81] 67  Resp:  [14-20] 18  SpO2:  [95 %-99 %] 97 %  BP: (113-140)/(54-65) 119/58     Weight: 100.3 kg (221 lb 1.9 oz)  Body mass index is 34.63 kg/m².    Estimated Creatinine Clearance: 20.6 mL/min (A) (based on SCr of 3.8 mg/dL (H)).     Physical Exam  Vitals and nursing note reviewed.   Constitutional:       General: She is not in acute distress.     Appearance: Normal appearance. She is not ill-appearing.   HENT:      Head: Normocephalic and atraumatic.      Nose: Nose normal.   Cardiovascular:      Rate and Rhythm: Normal rate.      Pulses: Normal pulses.   Pulmonary:      Effort: Pulmonary effort is normal. No respiratory distress.   Skin:     Findings: No lesion or rash.   Neurological:      Mental Status: She is alert and oriented to person, place, and time.   Psychiatric:         Mood and Affect: Mood normal.          Significant Labs: Blood Culture:   Recent Labs   Lab 12/09/24  1522 12/09/24  1523   LABBLOO No Growth to date  No Growth to date  No Growth to date No Growth to date  No Growth to date  No Growth to date      Wound Culture:   Recent Labs   Lab 11/29/24  0828 12/10/24  1730   LABAERO ENTEROBACTER CLOACAE  Many  *  KLEBSIELLA PNEUMONIAE  Many  Skin meño also present  * No significant isolate to date     All pertinent labs within the past 24 hours have been reviewed.    Significant Imaging: I have reviewed all pertinent imaging results/findings within the past 24 hours.

## 2024-12-12 NOTE — PROCEDURES
Bone marrow    Date/Time: 12/12/2024 2:50 PM    Performed by: Oneil Acevedo DPM  Authorized by: Oneil Acevedo DPM    Consent Done?: Yes (Written)   Immediately prior to procedure a time out was called to verify the correct patient, procedure, equipment, support staff and site/side marked as required. .    Assistants?: No      Position: supine  Anesthesia: local infiltration  Local anesthetic: lidocaine 1% without epinephrine  Aspiration?: No    Biopsy?: Yes    Number of Specimens: 1  Estimated blood loss (cc):10    Bone biopsy of left foot bone, results sent for pathology and microbiology.  Slight bleeding, controlled with silver nitrate.  Patient tolerated entire procedure well.    I have seen the patient, reviewed the Resident's procedure note. I have personally interviewed and examined the patient at bedside and agree with the findings.           Fiona L Prado, DPM  System Chair, Podiatry Department

## 2024-12-12 NOTE — SUBJECTIVE & OBJECTIVE
Interval History: HD yesterday, tolerated well. Net UF 1L.     Review of patient's allergies indicates:   Allergen Reactions    Hydrocodone-acetaminophen Nausea And Vomiting, Rash and Other (See Comments)     Vicodin- severe rash  Lortab- head-spinning that leads to vomiting      Naproxen Anaphylaxis and Swelling     Hives (skin)^swelling  Hives (skin)^swelling  Hives (skin)^swelling    Sulfamethoxazole-trimethoprim Other (See Comments)     Caused JEROME; heart attack    Hydrocodone Nausea And Vomiting and Rash    Adhesive tape-silicones      blisters    Aspartame Hives    Penicillins Hives     Blisters (skin)^    Sulfamethoxazole Other (See Comments)    Trimethoprim Other (See Comments)    Acetaminophen Rash    Adhesive Rash     Current Facility-Administered Medications   Medication Frequency    acetaminophen tablet 650 mg Q6H PRN    aluminum & magnesium hydroxide-simethicone 400-400-40 mg/5 mL suspension 30 mL Q6H PRN    atorvastatin tablet 80 mg QHS    carvediloL tablet 25 mg BID WM    ceFEPIme injection 1 g Q24H    cetirizine tablet 5 mg Daily    dextrose 10% bolus 125 mL 125 mL PRN    dextrose 10% bolus 250 mL 250 mL PRN    fluticasone propionate 50 mcg/actuation nasal spray 50 mcg Daily    gabapentin capsule 200 mg TID    And    gabapentin capsule 100 mg QHS    glucagon (human recombinant) injection 1 mg PRN    glucose chewable tablet 16 g PRN    glucose chewable tablet 24 g PRN    heparin (porcine) injection 5,000 Units Q8H    HYDROcodone-acetaminophen  mg per tablet 1 tablet Q6H PRN    influenza (Flulaval, Fluzone, Fluarix) 45 mcg/0.5 mL IM vaccine (> or = 6 mo) 0.5 mL Prior to discharge    insulin aspart U-100 pen 0-5 Units QID (AC + HS) PRN    insulin glargine U-100 (Lantus) pen 14 Units QHS    melatonin tablet 6 mg Nightly PRN    mupirocin 2 % ointment BID    naloxone 0.4 mg/mL injection 0.02 mg PRN    ondansetron injection 4 mg Q8H PRN    promethazine tablet 12.5 mg Q6H PRN    sevelamer carbonate  tablet 800 mg TID WM    sodium chloride 0.9% flush 10 mL Q12H PRN       Objective:     Vital Signs (Most Recent):  Temp: 98.2 °F (36.8 °C) (12/12/24 0727)  Pulse: 64 (12/12/24 0727)  Resp: 14 (12/12/24 0727)  BP: 135/60 (12/12/24 0859)  SpO2: 99 % (12/12/24 0727) Vital Signs (24h Range):  Temp:  [97.6 °F (36.4 °C)-98.9 °F (37.2 °C)] 98.2 °F (36.8 °C)  Pulse:  [64-81] 64  Resp:  [14-20] 14  SpO2:  [95 %-99 %] 99 %  BP: (109-140)/(53-65) 135/60     Weight: 100.3 kg (221 lb 1.9 oz) (12/11/24 0400)  Body mass index is 34.63 kg/m².  Body surface area is 2.18 meters squared.    I/O last 3 completed shifts:  In: 10 [I.V.:10]  Out: 2100 [Urine:500; Other:1600]     Physical Exam  Vitals and nursing note reviewed.   Constitutional:       General: She is not in acute distress.     Appearance: She is obese. She is not toxic-appearing or diaphoretic.   HENT:      Head: Normocephalic.      Nose: Nose normal.   Eyes:      Conjunctiva/sclera: Conjunctivae normal.   Cardiovascular:      Rate and Rhythm: Normal rate.      Arteriovenous access: Right arteriovenous access is present.     Comments: +thrill  Pulmonary:      Effort: Pulmonary effort is normal.   Musculoskeletal:      Cervical back: Normal range of motion.      Right lower leg: No edema.      Comments: Soft cast over L foot/ankle; CDI   Neurological:      Mental Status: She is alert.          Significant Labs:  CBC:   Recent Labs   Lab 12/12/24 0434   WBC 8.01   RBC 2.45*   HGB 7.7*   HCT 25.3*      *   MCH 31.4*   MCHC 30.4*     CMP:   Recent Labs   Lab 12/12/24 0434      CALCIUM 8.7   ALBUMIN 2.1*   PROT 6.0      K 4.6   CO2 25      BUN 36*   CREATININE 3.8*   ALKPHOS 92   ALT 9*   AST 15   BILITOT 0.3     All labs within the past 24 hours have been reviewed.

## 2024-12-12 NOTE — SUBJECTIVE & OBJECTIVE
Subjective:     Interval History: NAEON, NAD, VSS. Patient is afebrile, dressings are clean/dry/ and intact.   New pedal complaints: None  New results:   Denies post op fever.          Scheduled Meds:   atorvastatin  80 mg Oral QHS    carvediloL  25 mg Oral BID WM    ceFEPime IV (PEDS and ADULTS)  1 g Intravenous Q24H    cetirizine  5 mg Oral Daily    [START ON 12/13/2024] epoetin tariq-ebpx (RETACRIT) injection  50 Units/kg Intravenous Every Mon, Wed, Fri    fluticasone propionate  1 spray Each Nostril Daily    gabapentin  200 mg Oral TID    And    gabapentin  100 mg Oral QHS    heparin (porcine)  5,000 Units Subcutaneous Q8H    insulin glargine U-100 (Lantus)  14 Units Subcutaneous QHS    mupirocin   Nasal BID    sevelamer carbonate  800 mg Oral TID WM     Continuous Infusions:  PRN Meds:  Current Facility-Administered Medications:     acetaminophen, 650 mg, Oral, Q6H PRN    aluminum & magnesium hydroxide-simethicone, 30 mL, Oral, Q6H PRN    dextrose 10%, 12.5 g, Intravenous, PRN    dextrose 10%, 25 g, Intravenous, PRN    glucagon (human recombinant), 1 mg, Intramuscular, PRN    glucose, 16 g, Oral, PRN    glucose, 24 g, Oral, PRN    HYDROcodone-acetaminophen, 1 tablet, Oral, Q6H PRN    influenza, 0.5 mL, Intramuscular, Prior to discharge    insulin aspart U-100, 0-5 Units, Subcutaneous, QID (AC + HS) PRN    melatonin, 6 mg, Oral, Nightly PRN    naloxone, 0.02 mg, Intravenous, PRN    ondansetron, 4 mg, Intravenous, Q8H PRN    promethazine, 12.5 mg, Oral, Q6H PRN    sodium chloride 0.9%, 10 mL, Intravenous, Q12H PRN    Review of Systems   Constitutional:  Negative for appetite change, chills, fatigue and fever.   Respiratory:  Negative for chest tightness, shortness of breath and wheezing.    Cardiovascular:  Negative for chest pain and palpitations.   Gastrointestinal:  Negative for abdominal distention, abdominal pain, constipation, diarrhea, nausea and vomiting.   Genitourinary:  Negative for decreased urine  "volume, difficulty urinating, dysuria, flank pain, frequency and urgency.   Musculoskeletal:  Negative for back pain.   Skin:  Negative for rash.     Objective:     Vital Signs (Most Recent):  Temp: 98.4 °F (36.9 °C) (12/12/24 1136)  Pulse: 67 (12/12/24 1136)  Resp: 18 (12/12/24 1136)  BP: (!) 119/58 (12/12/24 1136)  SpO2: 97 % (12/12/24 1136) Vital Signs (24h Range):  Temp:  [97.6 °F (36.4 °C)-98.9 °F (37.2 °C)] 98.4 °F (36.9 °C)  Pulse:  [64-81] 67  Resp:  [14-20] 18  SpO2:  [95 %-99 %] 97 %  BP: (113-140)/(54-65) 119/58     Weight: 100.3 kg (221 lb 1.9 oz)  Body mass index is 34.63 kg/m².    Foot Exam    General  Orientation: alert and oriented to person, place, and time       Left Foot/Ankle      Inspection and Palpation  Skin Exam: drainage, cellulitis, skin changes, abnormal color, ulcer and erythema;     Neurovascular  Dorsalis pedis: 1+  Posterior tibial: 1+    Comments  Left lower extremity:  Left foot with diffuse malodor, foul smelling.  1 or 2 fleas noted.  Cast removed.    Plantar midfoot ulcer:  Mixed granular fibrous base.  Hypergranulation tissue noted as well.  Medial forefoot ulcer.  Long and mixed fibrogranular base.  Dorsal foot:  Small draining ulcer that probes deep to bone.       Laboratory:  A1C:   Recent Labs   Lab 10/07/24  0000 12/02/24  0000 12/09/24  1332   HGBA1C 6.2* 5.8 5.3     CBC:   Recent Labs   Lab 12/12/24  0434   WBC 8.01   RBC 2.45*   HGB 7.7*   HCT 25.3*      *   MCH 31.4*   MCHC 30.4*     CMP:   Recent Labs   Lab 12/12/24  0434      CALCIUM 8.7   ALBUMIN 2.1*   PROT 6.0      K 4.6   CO2 25      BUN 36*   CREATININE 3.8*   ALKPHOS 92   ALT 9*   AST 15   BILITOT 0.3     CRP:   Recent Labs   Lab 12/09/24  1837   CRP 53.6*     ESR: No results for input(s): "SEDRATE" in the last 168 hours.  Wound Cultures:   Recent Labs   Lab 11/29/24  0828 12/10/24  1730   LABAERO ENTEROBACTER CLOACAE  Many  *  KLEBSIELLA PNEUMONIAE  Many  Skin meño also " present  * No significant isolate to date     Microbiology Results (last 7 days)       Procedure Component Value Units Date/Time    Gram stain [[id number]] Collected: 12/12/24 1230    Order Status: Sent Specimen: Bone from Foot, Left Updated: 12/12/24 1317    Aerobic culture [[id number]] Collected: 12/12/24 1230    Order Status: Sent Specimen: Bone from Foot, Left Updated: 12/12/24 1316    Culture, Anaerobe [[id number]] Collected: 12/12/24 1230    Order Status: Sent Specimen: Bone from Foot, Left Updated: 12/12/24 1315    AFB Culture & Smear [[id number]] Collected: 12/12/24 1230    Order Status: Sent Specimen: Bone from Foot, Left Updated: 12/12/24 1315    Fungus culture [[id number]] Collected: 12/12/24 1230    Order Status: Sent Specimen: Bone from Foot, Left Updated: 12/12/24 1315    Aerobic culture [[id number]] Collected: 12/10/24 1730    Order Status: Completed Specimen: Wound from Foot, Left Updated: 12/12/24 1244     Aerobic Bacterial Culture No significant isolate to date    Narrative:      Left foot wound    Culture, Anaerobe [[id number]] Collected: 12/10/24 1730    Order Status: Completed Specimen: Wound from Foot, Left Updated: 12/12/24 0917     Anaerobic Culture Culture in progress    Narrative:      Left foot wound    Blood culture x two cultures. Draw prior to antibiotics. [[id number]] Collected: 12/09/24 1523    Order Status: Completed Specimen: Blood from Peripheral, Forearm, Left Updated: 12/11/24 1813     Blood Culture, Routine No Growth to date      No Growth to date      No Growth to date    Narrative:      Aerobic and anaerobic    Blood culture x two cultures. Draw prior to antibiotics. [[id number]] Collected: 12/09/24 1522    Order Status: Completed Specimen: Blood from Peripheral, Antecubital, Left Updated: 12/11/24 1813     Blood Culture, Routine No Growth to date      No Growth to date      No Growth to date    Narrative:      Aerobic and anaerobic    Gram stain [[id number]] Collected:  12/10/24 1730    Order Status: Completed Specimen: Wound from Foot, Left Updated: 12/11/24 1103     Gram Stain Result Few WBC's      No epithelial cells    Narrative:      Left foot wound    Fungus culture [[id number]] Collected: 12/10/24 1730    Order Status: Sent Specimen: Wound from Foot, Left Updated: 12/10/24 1743          Specimen (24h ago, onward)       Start     Ordered    12/12/24 1214  Specimen to Pathology, Surgery Orthopedics  Once        Comments: Pre-op Diagnosis: Osteomyelitis Number of specimens: 1Name of specimens: Left foot bone biopsy     References:    Click here for ordering Quick Tip   Question Answer Comment   Procedure Type: Orthopedics    Release to patient Immediate        12/12/24 1214                    Diagnostic Results:  I have reviewed all pertinent imaging results/findings within the past 24 hours.

## 2024-12-13 LAB
ACID FAST MOD KINY STN SPEC: NORMAL
ALBUMIN SERPL BCP-MCNC: 2.3 G/DL (ref 3.5–5.2)
ANION GAP SERPL CALC-SCNC: 9 MMOL/L (ref 8–16)
BACTERIA SPEC AEROBE CULT: NORMAL
BUN SERPL-MCNC: 43 MG/DL (ref 6–20)
CALCIUM SERPL-MCNC: 9 MG/DL (ref 8.7–10.5)
CHLORIDE SERPL-SCNC: 105 MMOL/L (ref 95–110)
CO2 SERPL-SCNC: 21 MMOL/L (ref 23–29)
CREAT SERPL-MCNC: 4.5 MG/DL (ref 0.5–1.4)
EST. GFR  (NO RACE VARIABLE): 11 ML/MIN/1.73 M^2
GLUCOSE SERPL-MCNC: 109 MG/DL (ref 70–110)
GLUCOSE SERPL-MCNC: 189 MG/DL (ref 70–110)
MYCOBACTERIUM SPEC QL CULT: NORMAL
PHOSPHATE SERPL-MCNC: 4.5 MG/DL (ref 2.7–4.5)
POCT GLUCOSE: 109 MG/DL (ref 70–110)
POCT GLUCOSE: 113 MG/DL (ref 70–110)
POCT GLUCOSE: 130 MG/DL (ref 70–110)
POCT GLUCOSE: 189 MG/DL (ref 70–110)
POCT GLUCOSE: 89 MG/DL (ref 70–110)
POTASSIUM SERPL-SCNC: 4.2 MMOL/L (ref 3.5–5.1)
SODIUM SERPL-SCNC: 135 MMOL/L (ref 136–145)

## 2024-12-13 PROCEDURE — 90935 HEMODIALYSIS ONE EVALUATION: CPT | Mod: ,,, | Performed by: NURSE PRACTITIONER

## 2024-12-13 PROCEDURE — 25000003 PHARM REV CODE 250: Performed by: NURSE PRACTITIONER

## 2024-12-13 PROCEDURE — 80069 RENAL FUNCTION PANEL: CPT

## 2024-12-13 PROCEDURE — 11000001 HC ACUTE MED/SURG PRIVATE ROOM

## 2024-12-13 PROCEDURE — 80100016 HC MAINTENANCE HEMODIALYSIS

## 2024-12-13 PROCEDURE — 63600175 PHARM REV CODE 636 W HCPCS: Mod: JG | Performed by: NURSE PRACTITIONER

## 2024-12-13 PROCEDURE — 36415 COLL VENOUS BLD VENIPUNCTURE: CPT

## 2024-12-13 PROCEDURE — 63600175 PHARM REV CODE 636 W HCPCS: Performed by: NURSE PRACTITIONER

## 2024-12-13 PROCEDURE — 86140 C-REACTIVE PROTEIN: CPT

## 2024-12-13 RX ORDER — SODIUM CHLORIDE 9 MG/ML
INJECTION, SOLUTION INTRAVENOUS ONCE
Status: COMPLETED | OUTPATIENT
Start: 2024-12-13 | End: 2024-12-13

## 2024-12-13 RX ADMIN — EPOETIN ALFA-EPBX 5000 UNITS: 10000 INJECTION, SOLUTION INTRAVENOUS; SUBCUTANEOUS at 12:12

## 2024-12-13 RX ADMIN — MUPIROCIN: 20 OINTMENT TOPICAL at 09:12

## 2024-12-13 RX ADMIN — SEVELAMER CARBONATE 800 MG: 800 TABLET, FILM COATED ORAL at 08:12

## 2024-12-13 RX ADMIN — HEPARIN SODIUM 5000 UNITS: 5000 INJECTION INTRAVENOUS; SUBCUTANEOUS at 09:12

## 2024-12-13 RX ADMIN — HEPARIN SODIUM 5000 UNITS: 5000 INJECTION INTRAVENOUS; SUBCUTANEOUS at 02:12

## 2024-12-13 RX ADMIN — GABAPENTIN 100 MG: 100 CAPSULE ORAL at 09:12

## 2024-12-13 RX ADMIN — CETIRIZINE HYDROCHLORIDE 5 MG: 5 TABLET, FILM COATED ORAL at 08:12

## 2024-12-13 RX ADMIN — SEVELAMER CARBONATE 800 MG: 800 TABLET, FILM COATED ORAL at 04:12

## 2024-12-13 RX ADMIN — CEFEPIME 1 G: 1 INJECTION, POWDER, FOR SOLUTION INTRAMUSCULAR; INTRAVENOUS at 02:12

## 2024-12-13 RX ADMIN — GABAPENTIN 200 MG: 100 CAPSULE ORAL at 08:12

## 2024-12-13 RX ADMIN — FLUTICASONE PROPIONATE 50 MCG: 50 SPRAY, METERED NASAL at 08:12

## 2024-12-13 RX ADMIN — ATORVASTATIN CALCIUM 80 MG: 40 TABLET, FILM COATED ORAL at 09:12

## 2024-12-13 RX ADMIN — INSULIN GLARGINE 14 UNITS: 100 INJECTION, SOLUTION SUBCUTANEOUS at 09:12

## 2024-12-13 RX ADMIN — SODIUM CHLORIDE: 9 INJECTION, SOLUTION INTRAVENOUS at 09:12

## 2024-12-13 RX ADMIN — HEPARIN SODIUM 5000 UNITS: 5000 INJECTION INTRAVENOUS; SUBCUTANEOUS at 05:12

## 2024-12-13 RX ADMIN — GABAPENTIN 200 MG: 100 CAPSULE ORAL at 09:12

## 2024-12-13 RX ADMIN — GABAPENTIN 200 MG: 100 CAPSULE ORAL at 02:12

## 2024-12-13 RX ADMIN — HYDROCODONE BITARTRATE AND ACETAMINOPHEN 1 TABLET: 10; 325 TABLET ORAL at 04:12

## 2024-12-13 RX ADMIN — MUPIROCIN: 20 OINTMENT TOPICAL at 08:12

## 2024-12-13 RX ADMIN — CARVEDILOL 25 MG: 25 TABLET, FILM COATED ORAL at 04:12

## 2024-12-13 NOTE — SUBJECTIVE & OBJECTIVE
Interval History:   Seen on HD, tolerating well.  1L UFG.    Review of patient's allergies indicates:   Allergen Reactions    Hydrocodone-acetaminophen Nausea And Vomiting, Rash and Other (See Comments)     Vicodin- severe rash  Lortab- head-spinning that leads to vomiting      Naproxen Anaphylaxis and Swelling     Hives (skin)^swelling  Hives (skin)^swelling  Hives (skin)^swelling    Sulfamethoxazole-trimethoprim Other (See Comments)     Caused JEROME; heart attack    Hydrocodone Nausea And Vomiting and Rash    Adhesive tape-silicones      blisters    Aspartame Hives    Penicillins Hives     Blisters (skin)^    Sulfamethoxazole Other (See Comments)    Trimethoprim Other (See Comments)    Acetaminophen Rash    Adhesive Rash     Current Facility-Administered Medications   Medication Frequency    0.9% NaCl infusion Once    acetaminophen tablet 650 mg Q6H PRN    aluminum & magnesium hydroxide-simethicone 400-400-40 mg/5 mL suspension 30 mL Q6H PRN    atorvastatin tablet 80 mg QHS    carvediloL tablet 25 mg BID WM    ceFEPIme injection 1 g Q24H    cetirizine tablet 5 mg Daily    dextrose 10% bolus 125 mL 125 mL PRN    dextrose 10% bolus 250 mL 250 mL PRN    epoetin tariq-epbx injection 5,000 Units Every Mon, Wed, Fri    fluticasone propionate 50 mcg/actuation nasal spray 50 mcg Daily    gabapentin capsule 200 mg TID    And    gabapentin capsule 100 mg QHS    glucagon (human recombinant) injection 1 mg PRN    glucose chewable tablet 16 g PRN    glucose chewable tablet 24 g PRN    heparin (porcine) injection 5,000 Units Q8H    HYDROcodone-acetaminophen  mg per tablet 1 tablet Q6H PRN    influenza (Flulaval, Fluzone, Fluarix) 45 mcg/0.5 mL IM vaccine (> or = 6 mo) 0.5 mL Prior to discharge    insulin aspart U-100 pen 0-5 Units QID (AC + HS) PRN    insulin glargine U-100 (Lantus) pen 14 Units QHS    melatonin tablet 6 mg Nightly PRN    mupirocin 2 % ointment BID    naloxone 0.4 mg/mL injection 0.02 mg PRN    ondansetron  injection 4 mg Q8H PRN    promethazine tablet 12.5 mg Q6H PRN    sevelamer carbonate tablet 800 mg TID WM    sodium chloride 0.9% flush 10 mL Q12H PRN       Objective:     Vital Signs (Most Recent):  Temp: 96.6 °F (35.9 °C) (12/13/24 0729)  Pulse: 66 (12/13/24 1045)  Resp: 16 (12/13/24 0729)  BP: (!) 114/56 (12/13/24 1045)  SpO2: 97 % (12/13/24 0729) Vital Signs (24h Range):  Temp:  [96.6 °F (35.9 °C)-98.4 °F (36.9 °C)] 96.6 °F (35.9 °C)  Pulse:  [66-80] 66  Resp:  [16-18] 16  SpO2:  [96 %-99 %] 97 %  BP: ()/(51-75) 114/56     Weight: 100.3 kg (221 lb 1.9 oz) (12/11/24 0400)  Body mass index is 34.63 kg/m².  Body surface area is 2.18 meters squared.    I/O last 3 completed shifts:  In: 120 [P.O.:120]  Out: 550 [Urine:550]     Physical Exam  Vitals and nursing note reviewed.   Constitutional:       General: She is not in acute distress.     Appearance: She is obese. She is not toxic-appearing or diaphoretic.   HENT:      Head: Normocephalic.      Nose: Nose normal.   Eyes:      Conjunctiva/sclera: Conjunctivae normal.   Cardiovascular:      Rate and Rhythm: Normal rate.      Arteriovenous access: Right arteriovenous access is present.     Comments: +thrill  Pulmonary:      Effort: Pulmonary effort is normal.   Musculoskeletal:      Cervical back: Normal range of motion.      Right lower leg: No edema.   Neurological:      Mental Status: She is alert.          Significant Labs:  CBC:   Recent Labs   Lab 12/12/24 0434   WBC 8.01   RBC 2.45*   HGB 7.7*   HCT 25.3*      *   MCH 31.4*   MCHC 30.4*     CMP:   Recent Labs   Lab 12/12/24 0434 12/13/24  0824    109   CALCIUM 8.7 9.0   ALBUMIN 2.1* 2.3*   PROT 6.0  --     135*   K 4.6 4.2   CO2 25 21*    105   BUN 36* 43*   CREATININE 3.8* 4.5*   ALKPHOS 92  --    ALT 9*  --    AST 15  --    BILITOT 0.3  --

## 2024-12-13 NOTE — SUBJECTIVE & OBJECTIVE
Interval History: NAEON  Afebrile  No leukocytosis    Review of Systems   Constitutional:  Negative for appetite change, chills, fatigue and fever.   Respiratory:  Negative for chest tightness, shortness of breath and wheezing.    Cardiovascular:  Negative for chest pain and palpitations.   Gastrointestinal:  Negative for abdominal distention, abdominal pain, constipation, diarrhea, nausea and vomiting.   Genitourinary:  Negative for decreased urine volume, difficulty urinating, dysuria, flank pain, frequency and urgency.   Musculoskeletal:  Negative for back pain.   Skin:  Negative for rash.     Objective:     Vital Signs (Most Recent):  Temp: 96.6 °F (35.9 °C) (12/13/24 0729)  Pulse: 71 (12/13/24 1245)  Resp: 16 (12/13/24 0729)  BP: (!) 116/56 (12/13/24 1245)  SpO2: 97 % (12/13/24 0729) Vital Signs (24h Range):  Temp:  [96.6 °F (35.9 °C)-98.2 °F (36.8 °C)] 96.6 °F (35.9 °C)  Pulse:  [66-80] 71  Resp:  [16-18] 16  SpO2:  [96 %-99 %] 97 %  BP: ()/(47-75) 116/56     Weight: 100.3 kg (221 lb 1.9 oz)  Body mass index is 34.63 kg/m².    Estimated Creatinine Clearance: 17.4 mL/min (A) (based on SCr of 4.5 mg/dL (H)).     Physical Exam  Vitals and nursing note reviewed.   Constitutional:       General: She is not in acute distress.     Appearance: Normal appearance. She is not ill-appearing.   HENT:      Head: Normocephalic and atraumatic.      Nose: Nose normal.   Cardiovascular:      Rate and Rhythm: Normal rate.   Pulmonary:      Effort: Pulmonary effort is normal. No respiratory distress.   Musculoskeletal:        Feet:    Skin:     Findings: No lesion or rash.   Neurological:      Mental Status: She is alert and oriented to person, place, and time.   Psychiatric:         Mood and Affect: Mood normal.          Significant Labs: Blood Culture:   Recent Labs   Lab 12/09/24  1522 12/09/24  1523   LABBLOO No Growth to date  No Growth to date  No Growth to date  No Growth to date No Growth to date  No Growth to  date  No Growth to date  No Growth to date     CBC:   Recent Labs   Lab 12/12/24  0434   WBC 8.01   HGB 7.7*   HCT 25.3*        Wound Culture:   Recent Labs   Lab 11/29/24  0828 12/10/24  1730 12/12/24  1230   LABAERO ENTEROBACTER CLOACAE  Many  *  KLEBSIELLA PNEUMONIAE  Many  Skin meño also present  * Skin meño,  no predominant organism No growth     All pertinent labs within the past 24 hours have been reviewed.    Significant Imaging: I have reviewed all pertinent imaging results/findings within the past 24 hours.

## 2024-12-13 NOTE — PROGRESS NOTES
[facility] ([facility])  Podiatry  Progress Note    Patient Name: Georgina Arias  MRN: [id number]  Admission Date: 12/9/2024  Hospital Length of Stay: 4 days  Attending Physician: Jenae De La Cruz MD  Primary Care Provider: Alonso Ling MD     Subjective:     Interval History:  Patient returned from dialysis and was resting in bed with family at bedside upon my arrival.  Patient states that she had some pain in the foot overnight.  Patient denies N/V/F/C.        Scheduled Meds:   atorvastatin  80 mg Oral QHS    carvediloL  25 mg Oral BID WM    ceFEPime IV (PEDS and ADULTS)  1 g Intravenous Q24H    cetirizine  5 mg Oral Daily    epoetin tariq-ebpx (RETACRIT) injection  50 Units/kg Intravenous Every Mon, Wed, Fri    fluticasone propionate  1 spray Each Nostril Daily    gabapentin  200 mg Oral TID    And    gabapentin  100 mg Oral QHS    heparin (porcine)  5,000 Units Subcutaneous Q8H    insulin glargine U-100 (Lantus)  14 Units Subcutaneous QHS    mupirocin   Nasal BID    sevelamer carbonate  800 mg Oral TID WM     Continuous Infusions:  PRN Meds:  Current Facility-Administered Medications:     acetaminophen, 650 mg, Oral, Q6H PRN    aluminum & magnesium hydroxide-simethicone, 30 mL, Oral, Q6H PRN    dextrose 10%, 12.5 g, Intravenous, PRN    dextrose 10%, 25 g, Intravenous, PRN    glucagon (human recombinant), 1 mg, Intramuscular, PRN    glucose, 16 g, Oral, PRN    glucose, 24 g, Oral, PRN    HYDROcodone-acetaminophen, 1 tablet, Oral, Q6H PRN    influenza, 0.5 mL, Intramuscular, Prior to discharge    insulin aspart U-100, 0-5 Units, Subcutaneous, QID (AC + HS) PRN    melatonin, 6 mg, Oral, Nightly PRN    naloxone, 0.02 mg, Intravenous, PRN    ondansetron, 4 mg, Intravenous, Q8H PRN    promethazine, 12.5 mg, Oral, Q6H PRN    sodium chloride 0.9%, 10 mL, Intravenous, Q12H PRN    Review of Systems   Constitutional:  Negative for chills, diaphoresis and fever.   Respiratory:  Negative for cough,  choking, shortness of breath and wheezing.    Cardiovascular:  Negative for chest pain.   Gastrointestinal:  Negative for abdominal pain, constipation, diarrhea, nausea and vomiting.   Musculoskeletal:  Positive for arthralgias.   Skin:  Positive for wound.     Objective:     Vital Signs (Most Recent):  Temp: 96.6 °F (35.9 °C) (12/13/24 0729)  Pulse: 71 (12/13/24 1245)  Resp: 16 (12/13/24 0729)  BP: (!) 116/56 (12/13/24 1245)  SpO2: 97 % (12/13/24 0729) Vital Signs (24h Range):  Temp:  [96.6 °F (35.9 °C)-98.2 °F (36.8 °C)] 96.6 °F (35.9 °C)  Pulse:  [66-80] 71  Resp:  [16-18] 16  SpO2:  [96 %-99 %] 97 %  BP: ()/(47-75) 116/56     Weight: 100.3 kg (221 lb 1.9 oz)  Body mass index is 34.63 kg/m².    Foot Exam    General  Orientation: alert and oriented to person, place, and time   Affect: appropriate       Left Foot/Ankle      Inspection and Palpation  Skin Exam: drainage, maceration, ulcer and erythema;     Neurovascular  Dorsalis pedis: 1+  Posterior tibial: 1+  Saphenous nerve sensation: diminished  Tibial nerve sensation: diminished  Superficial peroneal nerve sensation: diminished  Deep peroneal nerve sensation: diminished  Sural nerve sensation: diminished    Comments  Medial and plantar wounds are malodorous with significant serous drainage present.  Dorsal wounds with no drainage and darkened from previous use of silver nitrate. Plantar and medial ulcers have a mixed fibrous and granular wound base.  Small ulcer on lateral side of the foot with fibrous base. Significant maceration present in the periwound areas.  Erythema and edema present with mild warmth noted to the foot.                Laboratory:  A1C:   Recent Labs   Lab 10/07/24  0000 12/02/24  0000 12/09/24  1332   HGBA1C 6.2* 5.8 5.3     BMP:   Recent Labs   Lab 12/12/24  0434 12/13/24  0824    109    135*   K 4.6 4.2    105   CO2 25 21*   BUN 36* 43*   CREATININE 3.8* 4.5*   CALCIUM 8.7 9.0   MG 2.1  --      CBC:   Recent  Labs   Lab 12/12/24 0434   WBC 8.01   RBC 2.45*   HGB 7.7*   HCT 25.3*      *   MCH 31.4*   MCHC 30.4*     CMP:   Recent Labs   Lab 12/12/24 0434 12/13/24 0824    109   CALCIUM 8.7 9.0   ALBUMIN 2.1* 2.3*   PROT 6.0  --     135*   K 4.6 4.2   CO2 25 21*    105   BUN 36* 43*   CREATININE 3.8* 4.5*   ALKPHOS 92  --    ALT 9*  --    AST 15  --    BILITOT 0.3  --      CRP:   Recent Labs   Lab 12/09/24  1837   CRP 53.6*     LFTs:   Recent Labs   Lab 12/12/24 0434 12/13/24 0824   ALT 9*  --    AST 15  --    ALKPHOS 92  --    BILITOT 0.3  --    PROT 6.0  --    ALBUMIN 2.1* 2.3*     Wound Cultures:   Recent Labs   Lab 11/29/24  0828 12/10/24  1730 12/12/24  1230   LABAERO ENTEROBACTER CLOACAE  Many  *  KLEBSIELLA PNEUMONIAE  Many  Skin meño also present  * No significant isolate to date No growth     Microbiology Results (last 7 days)       Procedure Component Value Units Date/Time    AFB Culture & Smear [[id number]] Collected: 12/12/24 1230    Order Status: Completed Specimen: Bone from Foot, Left Updated: 12/13/24 1321     AFB CULTURE STAIN No acid fast bacilli seen.    Narrative:      Left foot bone biopsy    Aerobic culture [[id number]] Collected: 12/10/24 1730    Order Status: Completed Specimen: Wound from Foot, Left Updated: 12/13/24 1138     Aerobic Bacterial Culture No significant isolate to date    Narrative:      Left foot wound    Culture, Anaerobe [[id number]] Collected: 12/10/24 1730    Order Status: Completed Specimen: Wound from Foot, Left Updated: 12/13/24 0934     Anaerobic Culture Culture in progress    Narrative:      Left foot wound    Culture, Anaerobe [[id number]] Collected: 12/12/24 1230    Order Status: Completed Specimen: Bone from Foot, Left Updated: 12/13/24 0746     Anaerobic Culture Culture in progress    Narrative:      Left foot bone biopsy    Aerobic culture [[id number]] Collected: 12/12/24 1230    Order Status: Completed Specimen: Bone from Foot, Left  Updated: 12/13/24 0709     Aerobic Bacterial Culture No growth    Narrative:      Left foot bone biopsy    Blood culture x two cultures. Draw prior to antibiotics. [[id number]] Collected: 12/09/24 1523    Order Status: Completed Specimen: Blood from Peripheral, Forearm, Left Updated: 12/12/24 1812     Blood Culture, Routine No Growth to date      No Growth to date      No Growth to date      No Growth to date    Narrative:      Aerobic and anaerobic    Blood culture x two cultures. Draw prior to antibiotics. [[id number]] Collected: 12/09/24 1522    Order Status: Completed Specimen: Blood from Peripheral, Antecubital, Left Updated: 12/12/24 1812     Blood Culture, Routine No Growth to date      No Growth to date      No Growth to date      No Growth to date    Narrative:      Aerobic and anaerobic    Gram stain [[id number]] Collected: 12/12/24 1230    Order Status: Completed Specimen: Bone from Foot, Left Updated: 12/12/24 1750     Gram Stain Result No WBC's      No organisms seen    Narrative:      Left foot bone biopsy    Fungus culture [[id number]] Collected: 12/12/24 1230    Order Status: Sent Specimen: Bone from Foot, Left Updated: 12/12/24 1315    Gram stain [[id number]] Collected: 12/10/24 1730    Order Status: Completed Specimen: Wound from Foot, Left Updated: 12/11/24 1103     Gram Stain Result Few WBC's      No epithelial cells    Narrative:      Left foot wound    Fungus culture [[id number]] Collected: 12/10/24 1730    Order Status: Sent Specimen: Wound from Foot, Left Updated: 12/10/24 1743          All pertinent labs reviewed within the last 24 hours.    Diagnostic Results:  I have reviewed all pertinent imaging results/findings within the past 24 hours.  MRI Forefoot WO Contrast LT  Narrative: EXAMINATION:  MRI MIDFOOT WO CONTRAST LT; MRI FOREFOOT WO CONTRAST LT    CLINICAL HISTORY:  Osteomyelitis, foot;; Foot swelling, diabetic, osteomyelitis suspected, xray done;    TECHNIQUE:  Multiplanar,  multisequence MR imaging of the  forefoot without the use of intravenous gadolinium IV contrast.    COMPARISON:  Multiple prior radiographs, most recent from same date.  MRI left foot 09/12/2023.    FINDINGS:  POSTOPERATIVE CHANGES: Postoperative change of 1st ray amputation and midfoot fusion.    SOFT TISSUES: There is ulceration along the plantar and dorsal lateral aspect of the foot with probable communication with the deeper midfoot compartment.  There is a focal collection of fluid along the superior aspect of the 4th and 5th metatarsals extending along the the proximal aspect of the 5th metatarsal (series 6, image 27).  This collection measures 2.0 x 1.5 cm.  Ill-defined scattered fluid signal seen throughout the midfoot.  There is skin thickening and subcutaneous edema, which could be inflammatory or infectious.  Several punctate foci of susceptibility throughout the midfoot and hindfoot, which may reflect infectious versus postoperative changes.    BONES: Severe bony destruction of the tarsal bones.  Associated regions of bone marrow edema and T1 signal replacement throughout the midfoot and hindfoot, including the talus and calcaneus.    JOINTS: Ill-defined fluid signal/synovitis seen throughout the midfoot.    TENDONS: Scattered tenosynovitis.  Tendons suboptimally visualized.    LIGAMENTS: Chronic multifocal ligament disruption throughout the midfoot.    MUSCLES: Diffuse muscle edema and atrophy.  Impression: 1. 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  2. 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  3. Ulceration along the plantar and dorsal lateral aspect of the foot with probable communication with the deeper midfoot compartment.  Small fluid collection along the superior aspect of the 4th and 5th metatarsals, concerning for abscess.  This  report was flagged in Epic as abnormal.    Electronically signed by resident: Justin Garcia  Date:    12/10/2024  Time:    07:52    Electronically signed by: Lavell Del Castillo MD  Date:    12/10/2024  Time:    09:59  MRI Midfoot WO Contrast LT  Narrative: EXAMINATION:  MRI MIDFOOT WO CONTRAST LT; MRI FOREFOOT WO CONTRAST LT    CLINICAL HISTORY:  Osteomyelitis, foot;; Foot swelling, diabetic, osteomyelitis suspected, xray done;    TECHNIQUE:  Multiplanar, multisequence MR imaging of the  forefoot without the use of intravenous gadolinium IV contrast.    COMPARISON:  Multiple prior radiographs, most recent from same date.  MRI left foot 09/12/2023.    FINDINGS:  POSTOPERATIVE CHANGES: Postoperative change of 1st ray amputation and midfoot fusion.    SOFT TISSUES: There is ulceration along the plantar and dorsal lateral aspect of the foot with probable communication with the deeper midfoot compartment.  There is a focal collection of fluid along the superior aspect of the 4th and 5th metatarsals extending along the the proximal aspect of the 5th metatarsal (series 6, image 27).  This collection measures 2.0 x 1.5 cm.  Ill-defined scattered fluid signal seen throughout the midfoot.  There is skin thickening and subcutaneous edema, which could be inflammatory or infectious.  Several punctate foci of susceptibility throughout the midfoot and hindfoot, which may reflect infectious versus postoperative changes.    BONES: Severe bony destruction of the tarsal bones.  Associated regions of bone marrow edema and T1 signal replacement throughout the midfoot and hindfoot, including the talus and calcaneus.    JOINTS: Ill-defined fluid signal/synovitis seen throughout the midfoot.    TENDONS: Scattered tenosynovitis.  Tendons suboptimally visualized.    LIGAMENTS: Chronic multifocal ligament disruption throughout the midfoot.    MUSCLES: Diffuse muscle edema and atrophy.  Impression: 1. Postoperative change of midfoot fusion and 1st  ray amputation.  2. 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  3. Ulceration along the plantar and dorsal lateral aspect of the foot with probable communication with the deeper midfoot compartment.  Small fluid collection along the superior aspect of the 4th and 5th metatarsals, concerning for abscess.  This report was flagged in Epic as abnormal.    Electronically signed by resident: Justin Garcia  Date:    12/10/2024  Time:    07:52    Electronically signed by: Lavell Del Castillo MD  Date:    12/10/2024  Time:    09:59        Assessment/Plan:     ID  * Post-operative infection  Assessment:  54 y.o. female who recently underwent Charcot neuropathy reconstruction of left lower extremity.  She was then placed into a cast, and now has developed a left foot infection. White blood cell count within normal limits, patient resting comfortably in bed. Vital signs stable. X-ray and MRI showing osteomyelitis/small fluid collection, clinically correlates with left foot infection. Imaging likely also yields infection superimposed with Charcot neuropathy.  Patient is now 1 day status post bone biopsy, currently afebrile and hemodynamically stable.    Plan:  - Left foot dressed with Betadine-soaked gauze, clean gauze, Kerlix, and ACE bandage.  - Wound care orders to follow.  - Elevate left lower extremity.  - Antibiotics per ID. Cultured deep draining dorsal midfoot ulcer, pending. Bone biopsy collected and pending.  - After long discussion with patient and surgeon who performed charcot reconstruction, imaging and presentation consistent with left foot infection, but no surgical intervention warranted at this moment. Will give patient abx (to be managed by ID), and regular wound care.     Future Discharge Recommendations:  -Patient to follow up in outpatient Podiatry clinic with Dr. Allison. Podiatry  will schedule  -Antibiotics per ID  -HH/SNF to do dressings 2-3 times a week as follows: Cleanse foot with Vashe, floss gauze between toes, and then apply Betadine-soaked gauze, clean gauze, cast padding, and ACE to the foot.  -WBAT  -Keep dressings clean, dry, and intact until follow-up appointment            Wendy Pratt MD  Podiatry  [facility] ([facility])

## 2024-12-13 NOTE — NURSING
3.5 hours dialysis treatment completed . 1 L uf was removed . Both Hd  needles were deaccessed and pressure held in each site  for 5 minutes .   Report  given to primary Nurse . Patient was transported in stretcher .

## 2024-12-13 NOTE — PROGRESS NOTES
[facility])  Infectious Disease  Progress Note    Patient Name: Georgina Arias  MRN: [id number]  Admission Date: 12/9/2024  Length of Stay: 4 days  Attending Physician: Jenae De La Cruz MD  Primary Care Provider: Alonso Ling MD    Isolation Status: No active isolations  Assessment/Plan:      ID  * Post-operative infection  54-year-old female with ESRD on HD a history of left Charcot foot now status post reconstruction in November 20, 2024 and subsequently casted.  She recently developed wound drainage and malodor and was sent to the ED due to that.  MRI is concerning for abscesses and OM.  Recent wound cultures show Enterobacter cloaca and Klebsiella pneumoniae with no anaerobic growth from cultures on 11/29.  She had been treated with doxycycline and Levaquin x 10 days.     Podiatry following. Bone and wound cultures pending.  MRIs obtained showing concern for osteomyelitis and small abscesses.     Plan:  Continue cefepime 1 g  F/u cultures  Suspect pt will need prolonged antibiotics, but likely can be given post HD  Discussed with ID staff, Dr. Oropeza, we will continue to follow        Anticipated Disposition: TBD    Thank you for your consult. I will follow-up with patient. Please contact us if you have any additional questions.    BIBIANA Plaza  Infectious Disease  [facility])    Subjective:     Principal Problem:Post-operative infection    HPI: Georgina Arias is a 54 y.o. female with a PMHx of obesity s/p gastric sleeve, PAD, HTN, HF, grade II diastolic dysfunction, DM2, HLD, ESRD on HD and charcot foot s/p reconstruction 11/11/24 who presents due to foul smelling drainage from LLE cast.  Followed up with podiatry weekly after surgery. Pt had been having increased pain to her left foot, swelling, and drainage so wound cxs were obtained during that visit, and she was started on levaquin and doxycycline x 10 days. The patient reports ongoing pain  and swelling and states over the past few days prior to admission she had dark foul smelling drainage reporting it looked like old blood. HD center was concerned about drainage and sent her after dialysis.  Now admitted.      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 Ulceration along the plantar and dorsal lateral aspect of the foot with probable communication with the deeper midfoot compartment.  Small fluid collection along the superior aspect of the 4th and 5th metatarsals, concerning for abscess. Podiatry following and has removed cast and obtained cultures.  Id consult for antibiotic recs.      Patient afebrile and without leukocytosis.  Patient on cefepime. Blood cultures NGTD.  Left foot wound and bone cultures NGTD.  Prior wound cultures show Enterobacter cloaca and Klebsiella pneumoniae which are both pansensitive.       Interval History: NAEON  Afebrile  No leukocytosis    Review of Systems   Constitutional:  Negative for appetite change, chills, fatigue and fever.   Respiratory:  Negative for chest tightness, shortness of breath and wheezing.    Cardiovascular:  Negative for chest pain and palpitations.   Gastrointestinal:  Negative for abdominal distention, abdominal pain, constipation, diarrhea, nausea and vomiting.   Genitourinary:  Negative for decreased urine volume, difficulty urinating, dysuria, flank pain, frequency and urgency.   Musculoskeletal:  Negative for back pain.   Skin:  Negative for rash.     Objective:     Vital Signs (Most Recent):  Temp: 96.6 °F (35.9 °C) (12/13/24 0729)  Pulse: 71 (12/13/24 1245)  Resp: 16 (12/13/24 0729)  BP: (!) 116/56 (12/13/24 1245)  SpO2: 97 % (12/13/24 0729) Vital Signs (24h Range):  Temp:  [96.6 °F (35.9 °C)-98.2 °F (36.8 °C)] 96.6 °F (35.9 °C)  Pulse:  [66-80] 71  Resp:  [16-18]  16  SpO2:  [96 %-99 %] 97 %  BP: ()/(47-75) 116/56     Weight: 100.3 kg (221 lb 1.9 oz)  Body mass index is 34.63 kg/m².    Estimated Creatinine Clearance: 17.4 mL/min (A) (based on SCr of 4.5 mg/dL (H)).     Physical Exam  Vitals and nursing note reviewed.   Constitutional:       General: She is not in acute distress.     Appearance: Normal appearance. She is not ill-appearing.   HENT:      Head: Normocephalic and atraumatic.      Nose: Nose normal.   Cardiovascular:      Rate and Rhythm: Normal rate.   Pulmonary:      Effort: Pulmonary effort is normal. No respiratory distress.   Musculoskeletal:        Feet:    Skin:     Findings: No lesion or rash.   Neurological:      Mental Status: She is alert and oriented to person, place, and time.   Psychiatric:         Mood and Affect: Mood normal.          Significant Labs: Blood Culture:   Recent Labs   Lab 12/09/24  1522 12/09/24  1523   LABBLOO No Growth to date  No Growth to date  No Growth to date  No Growth to date No Growth to date  No Growth to date  No Growth to date  No Growth to date     CBC:   Recent Labs   Lab 12/12/24  0434   WBC 8.01   HGB 7.7*   HCT 25.3*        Wound Culture:   Recent Labs   Lab 11/29/24  0828 12/10/24  1730 12/12/24  1230   LABAERO ENTEROBACTER CLOACAE  Many  *  KLEBSIELLA PNEUMONIAE  Many  Skin meño also present  * Skin meño,  no predominant organism No growth     All pertinent labs within the past 24 hours have been reviewed.    Significant Imaging: I have reviewed all pertinent imaging results/findings within the past 24 hours.

## 2024-12-13 NOTE — ASSESSMENT & PLAN NOTE
Outpatient HD Information:  -Dialysis modality: Hemodialysis  -[place] [facility]   -HD TX days: [facility]  3 hours   -Last HD TX prior to hospital admission: 12/9/24  -Dialysis access: [facility]   -Residual urine: + RRF  -EDW: 99 kg     Plan/Recommendations:     -Continue MWF iHD schedule   -continue sevelamer   -renal diet, if not NPO   -Hgb goal 10-11, epo with HD   -1L fluid restrictions

## 2024-12-13 NOTE — PROGRESS NOTES
[facility] - Neurosurgery ([facility])  Nephrology  Progress Note    Patient Name: Georgina Arias  MRN: [id number]  Admission Date: 12/9/2024  Hospital Length of Stay: 4 days  Attending Provider: Jenae De La Cruz MD   Primary Care Physician: Alonso Ling MD  Principal Problem:Post-operative infection    Subjective:     HPI: Georgina Arias is a 54 y.o. female with a PMHx of obesity s/p gastric sleeve, PAD, hypertensive heart disease with HFpEF, grade II diastolic dysfunction, DM2, HTN, HLD, ESRD on HD (last dialyzed 12/9), and charcot foot s/p reconstruction 11/11 who presents due to foul smelling drainage from LLE cast. The patient reports he has been going to podiatry weekly after surgery and last had her cast changed 11/29. Pt had been having increased pain to her left foot, swelling, and drainage so wound cxs were obtained during that visit, and she was started on levaquin and doxycycline x10 days. Endorses compliance with abx. The patient reports ongoing pain and swelling and states over the past few days she had dark foul smelling drainage reporting it looked like old blood. HD center was concerned about drainage and sent her after dialysis. She denies fever/chills. She does note 2-3 episodes of diarrhea on both Saturday and Sunday but none today. She denies CP, SOB, cough, abdominal pain, headache, or dysuria. She reports she makes urine about 3-4x/daily. Nephrology consulted for ESRD.     Interval History:   Seen on HD, tolerating well.  1L UFG.    Review of patient's allergies indicates:   Allergen Reactions    Hydrocodone-acetaminophen Nausea And Vomiting, Rash and Other (See Comments)     Vicodin- severe rash  Lortab- head-spinning that leads to vomiting      Naproxen Anaphylaxis and Swelling     Hives (skin)^swelling  Hives (skin)^swelling  Hives (skin)^swelling    Sulfamethoxazole-trimethoprim Other (See Comments)     Caused JEROME; heart attack    Hydrocodone Nausea And Vomiting and Rash     Adhesive tape-silicones      blisters    Aspartame Hives    Penicillins Hives     Blisters (skin)^    Sulfamethoxazole Other (See Comments)    Trimethoprim Other (See Comments)    Acetaminophen Rash    Adhesive Rash     Current Facility-Administered Medications   Medication Frequency    0.9% NaCl infusion Once    acetaminophen tablet 650 mg Q6H PRN    aluminum & magnesium hydroxide-simethicone 400-400-40 mg/5 mL suspension 30 mL Q6H PRN    atorvastatin tablet 80 mg QHS    carvediloL tablet 25 mg BID WM    ceFEPIme injection 1 g Q24H    cetirizine tablet 5 mg Daily    dextrose 10% bolus 125 mL 125 mL PRN    dextrose 10% bolus 250 mL 250 mL PRN    epoetin tariq-epbx injection 5,000 Units Every Mon, Wed, Fri    fluticasone propionate 50 mcg/actuation nasal spray 50 mcg Daily    gabapentin capsule 200 mg TID    And    gabapentin capsule 100 mg QHS    glucagon (human recombinant) injection 1 mg PRN    glucose chewable tablet 16 g PRN    glucose chewable tablet 24 g PRN    heparin (porcine) injection 5,000 Units Q8H    HYDROcodone-acetaminophen  mg per tablet 1 tablet Q6H PRN    influenza (Flulaval, Fluzone, Fluarix) 45 mcg/0.5 mL IM vaccine (> or = 6 mo) 0.5 mL Prior to discharge    insulin aspart U-100 pen 0-5 Units QID (AC + HS) PRN    insulin glargine U-100 (Lantus) pen 14 Units QHS    melatonin tablet 6 mg Nightly PRN    mupirocin 2 % ointment BID    naloxone 0.4 mg/mL injection 0.02 mg PRN    ondansetron injection 4 mg Q8H PRN    promethazine tablet 12.5 mg Q6H PRN    sevelamer carbonate tablet 800 mg TID WM    sodium chloride 0.9% flush 10 mL Q12H PRN       Objective:     Vital Signs (Most Recent):  Temp: 96.6 °F (35.9 °C) (12/13/24 0729)  Pulse: 66 (12/13/24 1045)  Resp: 16 (12/13/24 0729)  BP: (!) 114/56 (12/13/24 1045)  SpO2: 97 % (12/13/24 0729) Vital Signs (24h Range):  Temp:  [96.6 °F (35.9 °C)-98.4 °F (36.9 °C)] 96.6 °F (35.9 °C)  Pulse:  [66-80] 66  Resp:  [16-18] 16  SpO2:  [96 %-99 %] 97 %  BP:  ()/(51-75) 114/56     Weight: 100.3 kg (221 lb 1.9 oz) (12/11/24 0400)  Body mass index is 34.63 kg/m².  Body surface area is 2.18 meters squared.    I/O last 3 completed shifts:  In: 120 [P.O.:120]  Out: 550 [Urine:550]     Physical Exam  Vitals and nursing note reviewed.   Constitutional:       General: She is not in acute distress.     Appearance: She is obese. She is not toxic-appearing or diaphoretic.   HENT:      Head: Normocephalic.      Nose: Nose normal.   Eyes:      Conjunctiva/sclera: Conjunctivae normal.   Cardiovascular:      Rate and Rhythm: Normal rate.      Arteriovenous access: Right arteriovenous access is present.     Comments: +thrill  Pulmonary:      Effort: Pulmonary effort is normal.   Musculoskeletal:      Cervical back: Normal range of motion.      Right lower leg: No edema.   Neurological:      Mental Status: She is alert.          Significant Labs:  CBC:   Recent Labs   Lab 12/12/24  0434   WBC 8.01   RBC 2.45*   HGB 7.7*   HCT 25.3*      *   MCH 31.4*   MCHC 30.4*     CMP:   Recent Labs   Lab 12/12/24  0434 12/13/24  0824    109   CALCIUM 8.7 9.0   ALBUMIN 2.1* 2.3*   PROT 6.0  --     135*   K 4.6 4.2   CO2 25 21*    105   BUN 36* 43*   CREATININE 3.8* 4.5*   ALKPHOS 92  --    ALT 9*  --    AST 15  --    BILITOT 0.3  --         Assessment/Plan:     Renal/  ESRD (end stage renal disease)  Outpatient HD Information:  -Dialysis modality: Hemodialysis  -Outpatient HD unit: [facility]   -HD TX days: [facility]  3 hours   -Last HD TX prior to hospital admission: 12/9/24  -Dialysis access: RUE AVF   -Residual urine: + RRF  -EDW: 99 kg     Plan/Recommendations:     -Continue MWF iHD schedule   -continue sevelamer   -renal diet, if not NPO   -Hgb goal 10-11, epo with HD   -1L fluid restrictions           Michael Correia NP  Nephrology  [facility] - Neurosurgery ([facility])

## 2024-12-13 NOTE — ASSESSMENT & PLAN NOTE
Assessment:  54 y.o. female who recently underwent Charcot neuropathy reconstruction of left lower extremity.  She was then placed into a cast, and now has developed a left foot infection. White blood cell count within normal limits, patient resting comfortably in bed. Vital signs stable. X-ray and MRI showing osteomyelitis/small fluid collection, clinically correlates with left foot infection. Imaging likely also yields infection superimposed with Charcot neuropathy.  Patient is now 1 day status post bone biopsy, currently afebrile and hemodynamically stable.    Plan:  - Left foot dressed with Betadine-soaked gauze, clean gauze, Kerlix, and ACE bandage.  - Wound care orders to follow.  - Elevate left lower extremity.  - Antibiotics per ID. Cultured deep draining dorsal midfoot ulcer, pending. Bone biopsy collected and pending.  - After long discussion with patient and surgeon who performed charcot reconstruction, imaging and presentation consistent with left foot infection, but no surgical intervention warranted at this moment. Will give patient abx (to be managed by ID), and regular wound care.     Future Discharge Recommendations:  -Patient to follow up in outpatient Podiatry clinic with Dr. Allison. Podiatry will schedule  -Antibiotics per ID  -HH/SNF to do dressings 2-3 times a week as follows: Cleanse foot with Vashe, floss gauze between toes, and then apply Betadine-soaked gauze, clean gauze, cast padding, and ACE to the foot.  -WBAT  -Keep dressings clean, dry, and intact until follow-up appointment

## 2024-12-13 NOTE — PLAN OF CARE
Problem: Adult Inpatient Plan of Care  Goal: Patient-Specific Goal (Individualized)  Description: Pt will maintain sbp below 180  Outcome: Progressing  Flowsheets (Taken 12/13/2024 0308)  Individualized Care Needs: empathetic listening  Anxieties, Fears or Concerns: none     Problem: Wound  Goal: Absence of Infection Signs and Symptoms  Outcome: Progressing  Intervention: Prevent or Manage Infection  Flowsheets (Taken 12/13/2024 0308)  Infection Management: aseptic technique maintained  Isolation Precautions:   precautions maintained   protective     Hydrocodone-acetaminophen given x 1. VSS. Bed in lowest position, side rails x 3, and call light in use.

## 2024-12-13 NOTE — ASSESSMENT & PLAN NOTE
54-year-old female with ESRD on HD a history of left Charcot foot now status post reconstruction in November 20, 2024 and subsequently casted.  She recently developed wound drainage and malodor and was sent to the ED due to that.  MRI is concerning for abscesses and OM.  Recent wound cultures show Enterobacter cloaca and Klebsiella pneumoniae with no anaerobic growth from cultures on 11/29.  She had been treated with doxycycline and Levaquin x 10 days.     Podiatry following Bone and wound cultures pending.  MRIs obtained showing concern for osteomyelitis and small abscesses.     Plan:  Continue cefepime 1 g  F/u cultures  Suspect pt will need prolonged antibiotics, but likely can be given post HD  Discussed with ID staff, Dr. Oropeza, we will continue to follow

## 2024-12-13 NOTE — PLAN OF CARE
Problem: Adult Inpatient Plan of Care  Goal: Plan of Care Review  Outcome: Progressing     Problem: Adult Inpatient Plan of Care  Goal: Patient-Specific Goal (Individualized)  Outcome: Progressing     Problem: Adult Inpatient Plan of Care  Goal: Absence of Hospital-Acquired Illness or Injury  Outcome: Progressing    Problem: Adult Inpatient Plan of Care  Goal: Readiness for Transition of Care  Outcome: Progressing     Problem: Diabetes Comorbidity  Goal: Blood Glucose Level Within Targeted Range  Outcome: Progressing     Problem: Acute Kidney Injury/Impairment  Goal: Fluid and Electrolyte Balance  Outcome: Progressing     Problem: Wound  Goal: Optimal Functional Ability  Outcome: Progressing     Problem: Wound  Goal: Absence of Infection Signs and Symptoms  Outcome: Progressing     Problem: Skin Injury Risk Increased  Goal: Skin Health and Integrity  Outcome: Progressing      POC updated and reviewed with the patient at the bedside. Questions regarding POC were encouraged and addressed. VSS, see flowsheets. Tele maintained per provider's order. Patient is AOX 4 at this time. No acute events noted during shift. Fall and safety precautions maintained, no signs of injury noted during shift. Patient repositioned independently in bed for comfort. Upon exiting room, patient's bed locked in low position, side rails up x 2, bed alarm on, with call light within reach. Instructed patient to call staff for mobility, verbalized understanding.  No acute signs of distress noted at this time.

## 2024-12-13 NOTE — PLAN OF CARE
A&O x4, able to make needs known and uses call light. VSS today; intermittently low BP at HD now resolved; 1L out at HD. Wound care completed by wound care RN. PRN pain medication x1. Wearing SCDs off and on. Up to wheelchair today, continue to encourage mobility. Family visited patient today. All questions answered, needs met and safety checks preformed.       Problem: Adult Inpatient Plan of Care  Goal: Optimal Comfort and Wellbeing  Outcome: Progressing     Problem: Wound  Goal: Absence of Infection Signs and Symptoms  Outcome: Progressing     Problem: Hemodialysis  Goal: Safe, Effective Therapy Delivery  Outcome: Progressing

## 2024-12-13 NOTE — SUBJECTIVE & OBJECTIVE
Subjective:     Interval History:  Patient returned from dialysis and was resting in bed with family at bedside upon my arrival.  Patient states that she had some pain in the foot overnight.  Patient denies N/V/F/C.        Scheduled Meds:   atorvastatin  80 mg Oral QHS    carvediloL  25 mg Oral BID WM    ceFEPime IV (PEDS and ADULTS)  1 g Intravenous Q24H    cetirizine  5 mg Oral Daily    epoetin tariq-ebpx (RETACRIT) injection  50 Units/kg Intravenous Every Mon, Wed, Fri    fluticasone propionate  1 spray Each Nostril Daily    gabapentin  200 mg Oral TID    And    gabapentin  100 mg Oral QHS    heparin (porcine)  5,000 Units Subcutaneous Q8H    insulin glargine U-100 (Lantus)  14 Units Subcutaneous QHS    mupirocin   Nasal BID    sevelamer carbonate  800 mg Oral TID WM     Continuous Infusions:  PRN Meds:  Current Facility-Administered Medications:     acetaminophen, 650 mg, Oral, Q6H PRN    aluminum & magnesium hydroxide-simethicone, 30 mL, Oral, Q6H PRN    dextrose 10%, 12.5 g, Intravenous, PRN    dextrose 10%, 25 g, Intravenous, PRN    glucagon (human recombinant), 1 mg, Intramuscular, PRN    glucose, 16 g, Oral, PRN    glucose, 24 g, Oral, PRN    HYDROcodone-acetaminophen, 1 tablet, Oral, Q6H PRN    influenza, 0.5 mL, Intramuscular, Prior to discharge    insulin aspart U-100, 0-5 Units, Subcutaneous, QID (AC + HS) PRN    melatonin, 6 mg, Oral, Nightly PRN    naloxone, 0.02 mg, Intravenous, PRN    ondansetron, 4 mg, Intravenous, Q8H PRN    promethazine, 12.5 mg, Oral, Q6H PRN    sodium chloride 0.9%, 10 mL, Intravenous, Q12H PRN    Review of Systems   Constitutional:  Negative for chills, diaphoresis and fever.   Respiratory:  Negative for cough, choking, shortness of breath and wheezing.    Cardiovascular:  Negative for chest pain.   Gastrointestinal:  Negative for abdominal pain, constipation, diarrhea, nausea and vomiting.   Musculoskeletal:  Positive for arthralgias.   Skin:  Positive for wound.      Objective:     Vital Signs (Most Recent):  Temp: 96.6 °F (35.9 °C) (12/13/24 0729)  Pulse: 71 (12/13/24 1245)  Resp: 16 (12/13/24 0729)  BP: (!) 116/56 (12/13/24 1245)  SpO2: 97 % (12/13/24 0729) Vital Signs (24h Range):  Temp:  [96.6 °F (35.9 °C)-98.2 °F (36.8 °C)] 96.6 °F (35.9 °C)  Pulse:  [66-80] 71  Resp:  [16-18] 16  SpO2:  [96 %-99 %] 97 %  BP: ()/(47-75) 116/56     Weight: 100.3 kg (221 lb 1.9 oz)  Body mass index is 34.63 kg/m².    Foot Exam    General  Orientation: alert and oriented to person, place, and time   Affect: appropriate       Left Foot/Ankle      Inspection and Palpation  Skin Exam: drainage, maceration, ulcer and erythema;     Neurovascular  Dorsalis pedis: 1+  Posterior tibial: 1+  Saphenous nerve sensation: diminished  Tibial nerve sensation: diminished  Superficial peroneal nerve sensation: diminished  Deep peroneal nerve sensation: diminished  Sural nerve sensation: diminished    Comments  Medial and plantar wounds are malodorous with significant serous drainage present.  Dorsal wounds with no drainage and darkened from previous use of silver nitrate. Plantar and medial ulcers have a mixed fibrous and granular wound base.  Small ulcer on lateral side of the foot with fibrous base. Significant maceration present in the periwound areas.  Erythema and edema present with mild warmth noted to the foot.                Laboratory:  A1C:   Recent Labs   Lab 10/07/24  0000 12/02/24  0000 12/09/24  1332   HGBA1C 6.2* 5.8 5.3     BMP:   Recent Labs   Lab 12/12/24  0434 12/13/24  0824    109    135*   K 4.6 4.2    105   CO2 25 21*   BUN 36* 43*   CREATININE 3.8* 4.5*   CALCIUM 8.7 9.0   MG 2.1  --      CBC:   Recent Labs   Lab 12/12/24  0434   WBC 8.01   RBC 2.45*   HGB 7.7*   HCT 25.3*      *   MCH 31.4*   MCHC 30.4*     CMP:   Recent Labs   Lab 12/12/24  0434 12/13/24  0824    109   CALCIUM 8.7 9.0   ALBUMIN 2.1* 2.3*   PROT 6.0  --     135*    K 4.6 4.2   CO2 25 21*    105   BUN 36* 43*   CREATININE 3.8* 4.5*   ALKPHOS 92  --    ALT 9*  --    AST 15  --    BILITOT 0.3  --      CRP:   Recent Labs   Lab 12/09/24  1837   CRP 53.6*     LFTs:   Recent Labs   Lab 12/12/24  0434 12/13/24  0824   ALT 9*  --    AST 15  --    ALKPHOS 92  --    BILITOT 0.3  --    PROT 6.0  --    ALBUMIN 2.1* 2.3*     Wound Cultures:   Recent Labs   Lab 11/29/24  0828 12/10/24  1730 12/12/24  1230   LABAERO ENTEROBACTER CLOACAE  Many  *  KLEBSIELLA PNEUMONIAE  Many  Skin meño also present  * No significant isolate to date No growth     Microbiology Results (last 7 days)       Procedure Component Value Units Date/Time    AFB Culture & Smear [[id number]] Collected: 12/12/24 1230    Order Status: Completed Specimen: Bone from Foot, Left Updated: 12/13/24 1321     AFB CULTURE STAIN No acid fast bacilli seen.    Narrative:      Left foot bone biopsy    Aerobic culture [[id number]] Collected: 12/10/24 1730    Order Status: Completed Specimen: Wound from Foot, Left Updated: 12/13/24 1138     Aerobic Bacterial Culture No significant isolate to date    Narrative:      Left foot wound    Culture, Anaerobe [[id number]] Collected: 12/10/24 1730    Order Status: Completed Specimen: Wound from Foot, Left Updated: 12/13/24 0934     Anaerobic Culture Culture in progress    Narrative:      Left foot wound    Culture, Anaerobe [[id number]] Collected: 12/12/24 1230    Order Status: Completed Specimen: Bone from Foot, Left Updated: 12/13/24 0746     Anaerobic Culture Culture in progress    Narrative:      Left foot bone biopsy    Aerobic culture [[id number]] Collected: 12/12/24 1230    Order Status: Completed Specimen: Bone from Foot, Left Updated: 12/13/24 0709     Aerobic Bacterial Culture No growth    Narrative:      Left foot bone biopsy    Blood culture x two cultures. Draw prior to antibiotics. [[id number]] Collected: 12/09/24 1523    Order Status: Completed Specimen: Blood from  Peripheral, Forearm, Left Updated: 12/12/24 1812     Blood Culture, Routine No Growth to date      No Growth to date      No Growth to date      No Growth to date    Narrative:      Aerobic and anaerobic    Blood culture x two cultures. Draw prior to antibiotics. [[id number]] Collected: 12/09/24 1522    Order Status: Completed Specimen: Blood from Peripheral, Antecubital, Left Updated: 12/12/24 1812     Blood Culture, Routine No Growth to date      No Growth to date      No Growth to date      No Growth to date    Narrative:      Aerobic and anaerobic    Gram stain [[id number]] Collected: 12/12/24 1230    Order Status: Completed Specimen: Bone from Foot, Left Updated: 12/12/24 1750     Gram Stain Result No WBC's      No organisms seen    Narrative:      Left foot bone biopsy    Fungus culture [[id number]] Collected: 12/12/24 1230    Order Status: Sent Specimen: Bone from Foot, Left Updated: 12/12/24 1315    Gram stain [[id number]] Collected: 12/10/24 1730    Order Status: Completed Specimen: Wound from Foot, Left Updated: 12/11/24 1103     Gram Stain Result Few WBC's      No epithelial cells    Narrative:      Left foot wound    Fungus culture [[id number]] Collected: 12/10/24 1730    Order Status: Sent Specimen: Wound from Foot, Left Updated: 12/10/24 1743          All pertinent labs reviewed within the last 24 hours.    Diagnostic Results:  I have reviewed all pertinent imaging results/findings within the past 24 hours.  MRI Forefoot WO Contrast LT  Narrative: EXAMINATION:  MRI MIDFOOT WO CONTRAST LT; MRI FOREFOOT WO CONTRAST LT    CLINICAL HISTORY:  Osteomyelitis, foot;; Foot swelling, diabetic, osteomyelitis suspected, xray done;    TECHNIQUE:  Multiplanar, multisequence MR imaging of the  forefoot without the use of intravenous gadolinium IV contrast.    COMPARISON:  Multiple prior radiographs, most recent from same date.  MRI left foot 09/12/2023.    FINDINGS:  POSTOPERATIVE CHANGES: Postoperative change of 1st  ray amputation and midfoot fusion.    SOFT TISSUES: There is ulceration along the plantar and dorsal lateral aspect of the foot with probable communication with the deeper midfoot compartment.  There is a focal collection of fluid along the superior aspect of the 4th and 5th metatarsals extending along the the proximal aspect of the 5th metatarsal (series 6, image 27).  This collection measures 2.0 x 1.5 cm.  Ill-defined scattered fluid signal seen throughout the midfoot.  There is skin thickening and subcutaneous edema, which could be inflammatory or infectious.  Several punctate foci of susceptibility throughout the midfoot and hindfoot, which may reflect infectious versus postoperative changes.    BONES: Severe bony destruction of the tarsal bones.  Associated regions of bone marrow edema and T1 signal replacement throughout the midfoot and hindfoot, including the talus and calcaneus.    JOINTS: Ill-defined fluid signal/synovitis seen throughout the midfoot.    TENDONS: Scattered tenosynovitis.  Tendons suboptimally visualized.    LIGAMENTS: Chronic multifocal ligament disruption throughout the midfoot.    MUSCLES: Diffuse muscle edema and atrophy.  Impression: 1. 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  2. 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  3. Ulceration along the plantar and dorsal lateral aspect of the foot with probable communication with the deeper midfoot compartment.  Small fluid collection along the superior aspect of the 4th and 5th metatarsals, concerning for abscess.  This report was flagged in Epic as abnormal.    Electronically signed by resident: Justin Garcia  Date:    12/10/2024  Time:    07:52    Electronically signed by: Lavell Del Castillo MD  Date:    12/10/2024  Time:    09:59  MRI Midfoot WO Contrast LT  Narrative:  EXAMINATION:  MRI MIDFOOT WO CONTRAST LT; MRI FOREFOOT WO CONTRAST LT    CLINICAL HISTORY:  Osteomyelitis, foot;; Foot swelling, diabetic, osteomyelitis suspected, xray done;    TECHNIQUE:  Multiplanar, multisequence MR imaging of the  forefoot without the use of intravenous gadolinium IV contrast.    COMPARISON:  Multiple prior radiographs, most recent from same date.  MRI left foot 09/12/2023.    FINDINGS:  POSTOPERATIVE CHANGES: Postoperative change of 1st ray amputation and midfoot fusion.    SOFT TISSUES: There is ulceration along the plantar and dorsal lateral aspect of the foot with probable communication with the deeper midfoot compartment.  There is a focal collection of fluid along the superior aspect of the 4th and 5th metatarsals extending along the the proximal aspect of the 5th metatarsal (series 6, image 27).  This collection measures 2.0 x 1.5 cm.  Ill-defined scattered fluid signal seen throughout the midfoot.  There is skin thickening and subcutaneous edema, which could be inflammatory or infectious.  Several punctate foci of susceptibility throughout the midfoot and hindfoot, which may reflect infectious versus postoperative changes.    BONES: Severe bony destruction of the tarsal bones.  Associated regions of bone marrow edema and T1 signal replacement throughout the midfoot and hindfoot, including the talus and calcaneus.    JOINTS: Ill-defined fluid signal/synovitis seen throughout the midfoot.    TENDONS: Scattered tenosynovitis.  Tendons suboptimally visualized.    LIGAMENTS: Chronic multifocal ligament disruption throughout the midfoot.    MUSCLES: Diffuse muscle edema and atrophy.  Impression: 1. 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  2. 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  3. Ulceration along the plantar and dorsal  lateral aspect of the foot with probable communication with the deeper midfoot compartment.  Small fluid collection along the superior aspect of the 4th and 5th metatarsals, concerning for abscess.  This report was flagged in Epic as abnormal.    Electronically signed by resident: Justin Garcia  Date:    12/10/2024  Time:    07:52    Electronically signed by: Lavell Del Castillo MD  Date:    12/10/2024  Time:    09:59

## 2024-12-13 NOTE — HOSPITAL COURSE
Admitted for post operative infection.  Podiatry consulted.  Removed cast.  Followed patient for wound care.  MRI showed osteomyelitis of tarsal bones.  Podiatry performed bone biopsy of affected area 12.12.24 at bedside.  No surgery planned.  ID consulted and now on cefepime.  Okay to discharge from ID standpoint; recommending 2g IV cefepime after HD on HD days; end date 1.20.24.  Will need re-imaging of left foot at that time.  Needs outpatient follow up with podiatry and ID.  CM set up abx with her HD center.  Pt deemed appropriate for discharge; seen and examined prior to departure. Plan discussed with pt, who was agreeable and amenable; medications were discussed and reviewed, outpatient follow-up scheduled, ER precautions were given, all questions were answered to the pt's satisfaction, and was subsequently discharged.

## 2024-12-13 NOTE — NURSING
Patient arrived in wheelchair. Left forearm  fistula accessed with buttonhole  needles by the patient  under Dialysis nurse supervision .    Dialysis treatment started .     Report received  from Primary nurse .

## 2024-12-14 LAB
BACTERIA BLD CULT: NORMAL
BACTERIA BLD CULT: NORMAL
CRP SERPL-MCNC: 15.9 MG/L (ref 0–8.2)
GLUCOSE SERPL-MCNC: 221 MG/DL (ref 70–110)
POCT GLUCOSE: 118 MG/DL (ref 70–110)
POCT GLUCOSE: 125 MG/DL (ref 70–110)
POCT GLUCOSE: 129 MG/DL (ref 70–110)
POCT GLUCOSE: 221 MG/DL (ref 70–110)

## 2024-12-14 PROCEDURE — 11000001 HC ACUTE MED/SURG PRIVATE ROOM

## 2024-12-14 PROCEDURE — 25000003 PHARM REV CODE 250: Performed by: NURSE PRACTITIONER

## 2024-12-14 PROCEDURE — 99233 SBSQ HOSP IP/OBS HIGH 50: CPT | Mod: ,,, | Performed by: PHYSICIAN ASSISTANT

## 2024-12-14 PROCEDURE — 63600175 PHARM REV CODE 636 W HCPCS: Performed by: NURSE PRACTITIONER

## 2024-12-14 RX ADMIN — FLUTICASONE PROPIONATE 50 MCG: 50 SPRAY, METERED NASAL at 08:12

## 2024-12-14 RX ADMIN — INSULIN ASPART 1 UNITS: 100 INJECTION, SOLUTION INTRAVENOUS; SUBCUTANEOUS at 09:12

## 2024-12-14 RX ADMIN — INSULIN GLARGINE 14 UNITS: 100 INJECTION, SOLUTION SUBCUTANEOUS at 09:12

## 2024-12-14 RX ADMIN — HEPARIN SODIUM 5000 UNITS: 5000 INJECTION INTRAVENOUS; SUBCUTANEOUS at 03:12

## 2024-12-14 RX ADMIN — HEPARIN SODIUM 5000 UNITS: 5000 INJECTION INTRAVENOUS; SUBCUTANEOUS at 05:12

## 2024-12-14 RX ADMIN — CEFEPIME 1 G: 1 INJECTION, POWDER, FOR SOLUTION INTRAMUSCULAR; INTRAVENOUS at 03:12

## 2024-12-14 RX ADMIN — MUPIROCIN: 20 OINTMENT TOPICAL at 09:12

## 2024-12-14 RX ADMIN — CETIRIZINE HYDROCHLORIDE 5 MG: 5 TABLET, FILM COATED ORAL at 08:12

## 2024-12-14 RX ADMIN — GABAPENTIN 200 MG: 100 CAPSULE ORAL at 03:12

## 2024-12-14 RX ADMIN — HEPARIN SODIUM 5000 UNITS: 5000 INJECTION INTRAVENOUS; SUBCUTANEOUS at 09:12

## 2024-12-14 RX ADMIN — GABAPENTIN 200 MG: 100 CAPSULE ORAL at 09:12

## 2024-12-14 RX ADMIN — GABAPENTIN 100 MG: 100 CAPSULE ORAL at 09:12

## 2024-12-14 RX ADMIN — SEVELAMER CARBONATE 800 MG: 800 TABLET, FILM COATED ORAL at 08:12

## 2024-12-14 RX ADMIN — CARVEDILOL 25 MG: 25 TABLET, FILM COATED ORAL at 08:12

## 2024-12-14 RX ADMIN — ATORVASTATIN CALCIUM 80 MG: 40 TABLET, FILM COATED ORAL at 09:12

## 2024-12-14 RX ADMIN — CARVEDILOL 25 MG: 25 TABLET, FILM COATED ORAL at 05:12

## 2024-12-14 RX ADMIN — SEVELAMER CARBONATE 800 MG: 800 TABLET, FILM COATED ORAL at 12:12

## 2024-12-14 RX ADMIN — GABAPENTIN 200 MG: 100 CAPSULE ORAL at 08:12

## 2024-12-14 RX ADMIN — SEVELAMER CARBONATE 800 MG: 800 TABLET, FILM COATED ORAL at 05:12

## 2024-12-14 RX ADMIN — MUPIROCIN: 20 OINTMENT TOPICAL at 08:12

## 2024-12-14 NOTE — PROGRESS NOTES
[facility] - Neurosurgery ([facility])  Infectious Disease  Progress Note    Patient Name: Georgina Arias  MRN: [id number]  Admission Date: 12/9/2024  Length of Stay: 5 days  Attending Physician: Jenae De La Cruz MD  Primary Care Provider: Alonso Ling MD    Isolation Status: No active isolations  Assessment/Plan:      ID  * Post-operative infection  54-year-old female with ESRD on HD a history of left Charcot foot now status post reconstruction in November 11, 2024 and subsequently casted.  She recently developed wound drainage and malodor and was sent to the ED due to that.  MRI is concerning for abscesses and OM.  Recent wound cultures show Enterobacter cloaca and Klebsiella pneumoniae with no anaerobic growth from cultures on 11/29.  She had been treated with doxycycline and Levaquin x 10 days.     Bone 12/11 no growth to date and wound cultures 12/10 with skin meño.  MRIs obtained showing concern for osteomyelitis and small abscesses.  Has remained on cefepime alone.  Clinically much improved and left foot pain has essentially resolved.  CRP has improved dramatically there is no signs of infection on exam.       Plan:  Continue cefepime 1 g as inpatient  Change to cefepime 2 g IV post HD on HD days for DC  Plan for 6 weeks of antibiotics and we will need reimaging of the foot in 3-4 weeks and until the abscesses have been demonstrated to resolve on imaging.  F/u cultures  Suspect pt will need prolonged antibiotics, but likely can be given post HD  Discussed with ID staff, we will sign off - See OPAT below  Follow up 7-10 days in ID clinic or went back to see Podiatry    Outpatient Antibiotic Therapy Plan:    Please send referral to [facility].    1) Infection:  Left foot osteomyelitis with abscess and retained hardware secondary to Charcot reconstruction    2) Discharge Antibiotics:    Intravenous antibiotics:  Cefepime 2 g post HD on HD days x6 weeks        3) Therapy  Duration:  6 weeks    Estimated end date of IV antibiotics:  01/20/2024    4) Outpatient Weekly Labs:    Order the following labs to be drawn on Mondays:   CBC  CMP   CRP      5) Fax Lab Results to Infectious Diseases Provider:  Carmita Payton NP    [facility] Fax Number: [phone]    6) Outpatient Infectious Diseases Follow-up    Follow-up appointment will be arranged by the ID clinic and will be found in the patient's appointments tab.    Prior to discharge, please ensure the patient's follow-up has been scheduled.    If there is still no follow-up scheduled prior to discharge, please send an [employer] message to [facility] Clinical Pool or Call Infectious Diseases Dept.                Anticipated Disposition: tbd    Thank you for your consult. I will sign off. Please contact us if you have any additional questions.    OMERO Fonseca  Infectious Disease  [facility] - Neurosurgery (Hospital)    Subjective:     Principal Problem:Post-operative infection    HPI: Georgina Arias is a 54 y.o. female with a PMHx of obesity s/p gastric sleeve, PAD, HTN, HF, grade II diastolic dysfunction, DM2, HLD, ESRD on HD and charcot foot s/p reconstruction 11/11/24 who presents due to foul smelling drainage from LLE cast.  Followed up with podiatry weekly after surgery. Pt had been having increased pain to her left foot, swelling, and drainage so wound cxs were obtained during that visit, and she was started on levaquin and doxycycline x 10 days. The patient reports ongoing pain and swelling and states over the past few days prior to admission she had dark foul smelling drainage reporting it looked like old blood. HD center was concerned about drainage and sent her after dialysis.  Now admitted.      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 Ulceration  along the plantar and dorsal lateral aspect of the foot with probable communication with the deeper midfoot compartment.  Small fluid collection along the superior aspect of the 4th and 5th metatarsals, concerning for abscess. Podiatry following and has removed cast and obtained cultures.  Id consult for antibiotic recs.      Patient afebrile and without leukocytosis.  Patient on cefepime. Blood cultures NGTD.  Left foot wound and bone cultures NGTD.  Prior wound cultures show Enterobacter cloaca and Klebsiella pneumoniae which are both pansensitive.       Interval History:   No acute events  Afebrile and white blood cell count normal  Foot pain much improved.  CRP 15 from 53  Denies fever, chills, sweating    Review of Systems   Constitutional:  Negative for appetite change, chills, fatigue and fever.   Respiratory:  Negative for chest tightness, shortness of breath and wheezing.    Cardiovascular:  Negative for chest pain and palpitations.   Gastrointestinal:  Negative for abdominal distention, abdominal pain, constipation, diarrhea, nausea and vomiting.   Genitourinary:  Negative for decreased urine volume, difficulty urinating, dysuria, flank pain, frequency and urgency.   Musculoskeletal:  Negative for back pain.   Skin:  Negative for rash.     Objective:     Vital Signs (Most Recent):  Temp: 97.6 °F (36.4 °C) (12/14/24 1119)  Pulse: 66 (12/14/24 1119)  Resp: 18 (12/14/24 1119)  BP: 134/64 (12/14/24 1119)  SpO2: 99 % (12/14/24 1119) Vital Signs (24h Range):  Temp:  [97.5 °F (36.4 °C)-98.9 °F (37.2 °C)] 97.6 °F (36.4 °C)  Pulse:  [66-85] 66  Resp:  [16-18] 18  SpO2:  [95 %-100 %] 99 %  BP: (103-134)/(51-64) 134/64     Weight: 100.3 kg (221 lb 1.9 oz)  Body mass index is 34.63 kg/m².    Estimated Creatinine Clearance: 17.4 mL/min (A) (based on SCr of 4.5 mg/dL (H)).     Physical Exam  Vitals and nursing note reviewed.   Constitutional:       General: She is not in acute distress.     Appearance: Normal  "appearance. She is not ill-appearing.   HENT:      Head: Normocephalic and atraumatic.      Nose: Nose normal.   Cardiovascular:      Rate and Rhythm: Normal rate.   Pulmonary:      Effort: Pulmonary effort is normal. No respiratory distress.   Musculoskeletal:        Feet:    Skin:     Findings: No lesion or rash.   Neurological:      Mental Status: She is alert and oriented to person, place, and time.   Psychiatric:         Mood and Affect: Mood normal.                    Significant Labs: Blood Culture:   Recent Labs   Lab 12/09/24  1522 12/09/24  1523   LABBLOO No Growth to date  No Growth to date  No Growth to date  No Growth to date  No Growth to date No Growth to date  No Growth to date  No Growth to date  No Growth to date  No Growth to date     CBC:   No results for input(s): "WBC", "HGB", "HCT", "PLT" in the last 48 hours.    Wound Culture:   Recent Labs   Lab 11/29/24  0828 12/10/24  1730 12/12/24  1230   LABAERO ENTEROBACTER CLOACAE  Many  *  KLEBSIELLA PNEUMONIAE  Many  Skin meño also present  * Skin meño,  no predominant organism No growth     All pertinent labs within the past 24 hours have been reviewed.    Significant Imaging: I have reviewed all pertinent imaging results/findings within the past 24 hours.  Procedure Component Value Units Date/Time   MRI Forefoot WO Contrast LT [[id number]] (Abnormal) Resulted: 12/10/24 0959   Order Status: Completed Updated: 12/10/24 1001   Narrative:     EXAMINATION:  MRI MIDFOOT WO CONTRAST LT; MRI FOREFOOT WO CONTRAST LT    CLINICAL HISTORY:  Osteomyelitis, foot;; Foot swelling, diabetic, osteomyelitis suspected, xray done;    TECHNIQUE:  Multiplanar, multisequence MR imaging of the  forefoot without the use of intravenous gadolinium IV contrast.    COMPARISON:  Multiple prior radiographs, most recent from same date.  MRI left foot 09/12/2023.    FINDINGS:  POSTOPERATIVE CHANGES: Postoperative change of 1st ray amputation and midfoot fusion.    SOFT " TISSUES: There is ulceration along the plantar and dorsal lateral aspect of the foot with probable communication with the deeper midfoot compartment.  There is a focal collection of fluid along the superior aspect of the 4th and 5th metatarsals extending along the the proximal aspect of the 5th metatarsal (series 6, image 27).  This collection measures 2.0 x 1.5 cm.  Ill-defined scattered fluid signal seen throughout the midfoot.  There is skin thickening and subcutaneous edema, which could be inflammatory or infectious.  Several punctate foci of susceptibility throughout the midfoot and hindfoot, which may reflect infectious versus postoperative changes.    BONES: Severe bony destruction of the tarsal bones.  Associated regions of bone marrow edema and T1 signal replacement throughout the midfoot and hindfoot, including the talus and calcaneus.    JOINTS: Ill-defined fluid signal/synovitis seen throughout the midfoot.    TENDONS: Scattered tenosynovitis.  Tendons suboptimally visualized.    LIGAMENTS: Chronic multifocal ligament disruption throughout the midfoot.    MUSCLES: Diffuse muscle edema and atrophy.   Impression:       1. 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  2. 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  3. Ulceration along the plantar and dorsal lateral aspect of the foot with probable communication with the deeper midfoot compartment.  Small fluid collection along the superior aspect of the 4th and 5th metatarsals, concerning for abscess.  This report was flagged in Epic as abnormal.    Electronically signed by resident: Justin Garcia  Date: 12/10/2024  Time: 07:52    Electronically signed by: Lavell Del Castillo MD  Date: 12/10/2024  Time: 09:59   MRI Midfoot WO Contrast LT [[id number]] (Abnormal) Resulted: 12/10/24 0959   Order Status: Completed  Updated: 12/10/24 1001   Narrative:     EXAMINATION:  MRI MIDFOOT WO CONTRAST LT; MRI FOREFOOT WO CONTRAST LT    CLINICAL HISTORY:  Osteomyelitis, foot;; Foot swelling, diabetic, osteomyelitis suspected, xray done;    TECHNIQUE:  Multiplanar, multisequence MR imaging of the  forefoot without the use of intravenous gadolinium IV contrast.    COMPARISON:  Multiple prior radiographs, most recent from same date.  MRI left foot 09/12/2023.    FINDINGS:  POSTOPERATIVE CHANGES: Postoperative change of 1st ray amputation and midfoot fusion.    SOFT TISSUES: There is ulceration along the plantar and dorsal lateral aspect of the foot with probable communication with the deeper midfoot compartment.  There is a focal collection of fluid along the superior aspect of the 4th and 5th metatarsals extending along the the proximal aspect of the 5th metatarsal (series 6, image 27).  This collection measures 2.0 x 1.5 cm.  Ill-defined scattered fluid signal seen throughout the midfoot.  There is skin thickening and subcutaneous edema, which could be inflammatory or infectious.  Several punctate foci of susceptibility throughout the midfoot and hindfoot, which may reflect infectious versus postoperative changes.    BONES: Severe bony destruction of the tarsal bones.  Associated regions of bone marrow edema and T1 signal replacement throughout the midfoot and hindfoot, including the talus and calcaneus.    JOINTS: Ill-defined fluid signal/synovitis seen throughout the midfoot.    TENDONS: Scattered tenosynovitis.  Tendons suboptimally visualized.    LIGAMENTS: Chronic multifocal ligament disruption throughout the midfoot.    MUSCLES: Diffuse muscle edema and atrophy.   Impression:       1. 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  2. 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  3.  Ulceration along the plantar and dorsal lateral aspect of the foot with probable communication with the deeper midfoot compartment.  Small fluid collection along the superior aspect of the 4th and 5th metatarsals, concerning for abscess.  This report was flagged in Epic as abnormal.    Electronically signed by resident: Justin Garcia  Date: 12/10/2024  Time: 07:52    Electronically signed by: Lavell Del Castillo MD  Date: 12/10/2024  Time: 09:59   X-Ray Foot Complete Left [[id number]] (Abnormal) Resulted: 12/09/24 1703   Order Status: Completed Updated: 12/09/24 1706   Narrative:     EXAMINATION:  XR FOOT COMPLETE 3 VIEW LEFT    CLINICAL HISTORY:  .  Other injury of unspecified body region, initial encounter    TECHNIQUE:  AP, lateral and oblique views of the left foot were performed.    COMPARISON:  11/30/2024    FINDINGS:  Three views left foot.    There is diffuse edema about the foot, worsened since comparative exam of 11/30/2024.  Surgical changes are noted of the foot, overall configuration appears stable given differences in positioning.  No convincing acute displaced fracture or dislocation of the foot although comparison with the previous exam is limited given artifact from casting material at that time.  There is chronic degenerative change/destructive change of the midfoot.    There is been interval development of lucency involving the proximal aspect of the 3rd metatarsal about the screw construct.   Impression:       This report was flagged in Epic as abnormal.    1. There is been interval development of lucency about the proximal aspect of the 3rd metatarsal about the head of the screw construct.  Findings are concerning for osteomyelitis.  2. No convincing acute displaced fracture or dislocation of the foot noting chronic changes throughout the midfoot and surgical changes, similar in configuration.  3. There is diffuse edema about the foot, new since the prior exam.  Correlation for cellulitis as  warranted.      Electronically signed by: Michael Ervin MD  Date: 12/09/2024  Time: 17:03   X-Ray Ankle Complete Left [[id number]] Resulted: 12/09/24 1701   Order Status: Completed Updated: 12/09/24 1703   Narrative:     EXAMINATION:  XR ANKLE COMPLETE 3 VIEW LEFT    CLINICAL HISTORY:  Other injury of unspecified body region, initial encounter    TECHNIQUE:  AP, lateral and oblique views of the left ankle were performed.    COMPARISON:  Foot radiograph 11/30/2024    FINDINGS:  Three views left ankle.    The ankle mortise is intact.  No acute displaced fracture or dislocation of the ankle.  There is osteopenia.  There is edema about the ankle.  There are surgical changes of the foot, similar in appearance to the previous examination although on that exam, examination is limited given artifact from casting material at that time.   Impression:       1. No acute displaced fracture or dislocation of the ankle.  2. There are surgical changes of the foot, comparison to the previous exam is limited given casting material and artifact at that time.      Electronically signed by: Michael Ervin MD  Date: 12/09/2024  Time: 17:01      Imaging History     2024    Date Procedure Name Study Review Link PACS Link Status Accession Number Location   12/12/24 11:23 AM Cardiac monitoring strips Study Review  Final     12/11/24 10:41 PM Cardiac monitoring strips Study Review  Final     12/11/24 07:57 PM Cardiac monitoring strips Study Review  Final     12/10/24 08:20 PM Cardiac monitoring strips Study Review  Final     12/10/24 09:07 AM Cardiac monitoring strips Study Review  Final     12/09/24 10:21 PM MRI Forefoot WO Contrast LT Study Review  Images Final [id number] [facility]   12/09/24 10:21 PM MRI Midfoot WO Contrast LT Study Review  Images Final [id number] [facility]   12/09/24 04:40 PM X-Ray Foot Complete Left Study Review  Images Final [id number] [facility]   12/09/24 04:40 PM X-Ray Ankle Complete Left Study Review  Images Final [id number] [facility]    11/30/24 10:07 AM X-Ray Foot Complete Left Study Review  Images Final [id number] [facility]

## 2024-12-14 NOTE — PROGRESS NOTES
Hospital Medicine  Progress note    Team: [facility] HOSP MED A Jenae De La Cruz MD  Admit Date: 12/9/2024  Code status: Full Code    Principal Problem:  Post-operative infection    Interval hx:  Patient without complaints    PEx  Temp:  [96.6 °F (35.9 °C)-98.8 °F (37.1 °C)]   Pulse:  [66-85]   Resp:  [16-18]   BP: ()/(47-75)   SpO2:  [96 %-99 %]     Intake/Output Summary (Last 24 hours) at 12/13/2024 1825  Last data filed at 12/13/2024 1502  Gross per 24 hour   Intake 761.23 ml   Output 2200 ml   Net -1438.77 ml       General Appearance: no acute distress, WD, chronically ill-appearing  Heart: regular rate and rhythm, no heave  Respiratory: Normal respiratory effort, symmetric excursion, bilateral vesicular breath sounds   Abdomen: Soft, non-tender; bowel sounds active  Skin: intact, no rash, no ulcers  Neurologic:  No focal numbness or weakness  Mental status: Alert, oriented x 4, affect appropriate    Recent Labs   Lab 12/10/24  0330 12/11/24  0217 12/12/24  0434   WBC 7.68 7.75 8.01   HGB 8.2* 8.8* 7.7*   HCT 26.2* 28.3* 25.3*    214 195     Recent Labs   Lab 12/10/24  0330 12/11/24  0217 12/12/24  0434 12/13/24  0824    140 139 135*   K 4.2 4.2 4.6 4.2    105 105 105   CO2 25 23 25 21*   BUN 55* 62* 36* 43*   CREATININE 4.4* 4.9* 3.8* 4.5*    99 108 109   CALCIUM 8.7 8.9 8.7 9.0   MG 2.0 2.1 2.1  --    PHOS 4.7* 4.9* 3.9 4.5     Recent Labs   Lab 12/10/24  0330 12/11/24  0217 12/12/24  0434 12/13/24  0824   ALKPHOS 94 98 92  --    ALT 11 8* 9*  --    AST 16 14 15  --    ALBUMIN 2.3* 2.4* 2.1* 2.3*   PROT 6.5 6.7 6.0  --    BILITOT 0.4 0.4 0.3  --         Recent Labs   Lab 12/12/24  0729 12/12/24  1138 12/12/24  1552 12/12/24  2201 12/13/24  0732 12/13/24  1230   POCTGLUCOSE 108 113* 109 123* 89 130*       Scheduled Meds:   atorvastatin  80 mg Oral QHS    carvediloL  25 mg Oral BID WM    ceFEPime IV (PEDS and ADULTS)  1 g Intravenous Q24H    cetirizine  5 mg Oral Daily    epoetin tariq-ebpx  "(RETACRIT) injection  50 Units/kg Intravenous Every Mon, Wed, Fri    fluticasone propionate  1 spray Each Nostril Daily    gabapentin  200 mg Oral TID    And    gabapentin  100 mg Oral QHS    heparin (porcine)  5,000 Units Subcutaneous Q8H    insulin glargine U-100 (Lantus)  14 Units Subcutaneous QHS    mupirocin   Nasal BID    sevelamer carbonate  800 mg Oral TID WM     Continuous Infusions:  As Needed:    Current Facility-Administered Medications:     acetaminophen, 650 mg, Oral, Q6H PRN    aluminum & magnesium hydroxide-simethicone, 30 mL, Oral, Q6H PRN    dextrose 10%, 12.5 g, Intravenous, PRN    dextrose 10%, 25 g, Intravenous, PRN    glucagon (human recombinant), 1 mg, Intramuscular, PRN    glucose, 16 g, Oral, PRN    glucose, 24 g, Oral, PRN    HYDROcodone-acetaminophen, 1 tablet, Oral, Q6H PRN    influenza, 0.5 mL, Intramuscular, Prior to discharge    insulin aspart U-100, 0-5 Units, Subcutaneous, QID (AC + HS) PRN    melatonin, 6 mg, Oral, Nightly PRN    naloxone, 0.02 mg, Intravenous, PRN    ondansetron, 4 mg, Intravenous, Q8H PRN    promethazine, 12.5 mg, Oral, Q6H PRN    sodium chloride 0.9%, 10 mL, Intravenous, Q12H PRN    Assessment and Plan  / Problems managed today    * Post-operative infection  S/p charcot foot reconstruction to L foot on 11/11 now with increased drainage, left foot pain/swelling  -Afebrile, no leukocytosis.  -Blood cxs in process.  -POC lactate 0.99, procal 0.13  -CRP 53.6  -Xray L foot with There is been interval development of lucency about the proximal aspect of the 3rd metatarsal about the head of the screw construct.  Findings are concerning for osteomyelitis. There is diffuse edema about the foot, new since the prior exam.   -MRI L foot w/o pending showed "1. 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  2. Severe bony destruction of the tarsal bones with bone marrow edema and fluid about the midfoot and hindfoot, likely reflecting osteomyelitis superimposed " "on neuropathic arthropathy.  3. Ulceration along the plantar and dorsal lateral aspect of the foot with probable communication with the deeper midfoot compartment.  Small fluid collection along the superior aspect of the 4th and 5th metatarsals, concerning for abscess."  -Podiatry consulted   - no surgery indicated. Treat with antibiotics per ID   - bedside bone biopsy 12/12  -consult ID - continue cefepime. Vancomycin stopped   Await culture results  -PRN pain control.   -Elevate LLE.  -Fall precautions.  -Reviewed previous cxs from podiatry visit on 11/29:      Component 10 d ago   Aerobic Bacterial Culture      Abnormal   ENTEROBACTER CLOACAE  Many  VC      Aerobic Bacterial Culture  Abnormal   KLEBSIELLA PNEUMONIAE  Many  Skin meño also present    Resulting Agency [facility]        Susceptibility     Enterobacter cloacae Klebsiella pneumoniae     CULTURE, AEROBIC  (SPECIFY SOURCE) CULTURE, AEROBIC  (SPECIFY SOURCE)     Amp/Sulbactam   <=8/4 mcg/mL Sensitive     Cefazolin   <=2 mcg/mL Sensitive     Cefepime <=2 mcg/mL Sensitive <=2 mcg/mL Sensitive     Ceftriaxone   <=1 mcg/mL Sensitive     Ciprofloxacin <=0.25 mcg/mL Sensitive <=0.25 mcg/mL Sensitive     Ertapenem <=0.5 mcg/mL Sensitive <=0.5 mcg/mL Sensitive     Gentamicin <=2 mcg/mL Sensitive <=2 mcg/mL Sensitive     Levofloxacin <=0.5 mcg/mL Sensitive <=0.5 mcg/mL Sensitive     Meropenem <=1 mcg/mL Sensitive <=1 mcg/mL Sensitive     Piperacillin/Tazo   <=8 mcg/mL Sensitive     Tobramycin <=2 mcg/mL Sensitive <=2 mcg/mL Sensitive     Trimeth/Sulfa <=2/38 mcg/mL Sensitive <=2/38 mcg/mL Sensitive           ESRD (end stage renal disease)  MWF schedule?    Residual renal function?- Yes  Last dialyzed 12/09    - Nephrology consulted  - Continue chronic hemodialysis  - Monitor daily electrolytes and defer dialysis orders to nephrology  - Renally dose medications  - Continue phosphorus binders  - Daily weights/strict I&Os  - 1.5L FR    Class 2 severe obesity with serious " comorbidity and body mass index (BMI) of 38.0 to 38.9 in adult  Body mass index is 39.16 kg/m². Obesity complicates all aspects of disease management from diagnostic modalities to treatment. Weight loss encouraged and health benefits explained to patient.     Chronic diastolic congestive heart failure  Patient has Diastolic (HFpEF) heart failure that is Chronic. On presentation their CHF was well compensated. Most recent BNP and echo results are listed below.  Latest ECHO  Results for orders placed during the hospital encounter of 09/11/23    Echo    Interpretation Summary    Left Ventricle: The left ventricle is normal in size. Normal wall thickness. Normal wall motion. There is low normal systolic function with a visually estimated ejection fraction of 50 - 55%. Ejection fraction by visual approximation is 55%. There is normal diastolic function.    Right Ventricle: Right ventricle was not well visualized due to poor acoustic window. Normal right ventricular cavity size. Wall thickness is normal. Right ventricle wall motion  is normal. Systolic function is normal.    Aortic Valve: There is moderate aortic valve sclerosis. There is annular calcification present.    Mitral Valve: There is moderate mitral annular calcification present.    IVC/SVC: Normal venous pressure at 3 mmHg.    Current Heart Failure Medications  carvediloL tablet 25 mg, 2 times daily with meals, Oral    Plan  - Monitor strict I&Os and daily weights.    - Place on telemetry  - Low sodium diet  - Place on fluid restriction of 1.5 L.   - Cardiology has not been consulted  - The patient's volume status is at their baseline  - Volume management per HD.    Essential hypertension  Patient's blood pressure range in the last 24 hours was: BP  Min: 125/60  Max: 142/64.The patient's inpatient anti-hypertensive regimen is listed below:  Current Antihypertensives  carvediloL tablet 25 mg, 2 times daily with meals, Oral    Plan  - BP is controlled, no  "changes needed to their regimen    Hyperlipidemia   Patient is chronically on statin.will continue for now. Monitor clinically. Last LDL was   Lab Results   Component Value Date    LDLCALC Invalid, Trig>400.0 07/30/2014        Peripheral vascular disease  PAD s/p right SFA stent (11/2017), s/p right common iliac stent s/p PTA occluded stents in 2014 (Dr. Nathaniel Coyle at [facility]) S/p ax fem bypass and stent to sfa with Dr. Maya on 9/14/23  -Continue statin, hold ASA pending official podiatry recs.    Type II diabetes mellitus  Patient's FSGs are controlled on current medication regimen.  Last A1c reviewed-   Lab Results   Component Value Date    HGBA1C 5.8 12/02/2024     Most recent fingerstick glucose reviewed- No results for input(s): "POCTGLUCOSE" in the last 24 hours.  Current correctional scale  Low  Maintain anti-hyperglycemic dose as follows-   Antihyperglycemics (From admission, onward)      Start     Stop Route Frequency Ordered    12/10/24 2100  insulin glargine U-100 (Lantus) pen 16 Units         -- SubQ Nightly 12/10/24 0543    12/09/24 1923  insulin aspart U-100 pen 0-5 Units         -- SubQ Before meals & nightly PRN 12/09/24 1824          Hold Oral hypoglycemics while patient is in the hospital.  -Accuchecks AC/HS      Diet:  regular diet  GI PPx: not needed  DVT PPx:  heparin  Airways: room air  Wounds: none    Goals of Care:  Return to prior functional status     Discharge Planning   INDIO: 12/16/2024   Is the patient medically ready for discharge?:     Reason for patient still in hospital (select all that apply): Patient trending condition and Treatment  Discharge Plan A: Home with family, Home Health        Jenae De La Cruz MD       "

## 2024-12-14 NOTE — SUBJECTIVE & OBJECTIVE
Interval History:   No acute events  Afebrile and white blood cell count normal  Foot pain much improved.  CRP 15 from 53  Denies fever, chills, sweating    Review of Systems   Constitutional:  Negative for appetite change, chills, fatigue and fever.   Respiratory:  Negative for chest tightness, shortness of breath and wheezing.    Cardiovascular:  Negative for chest pain and palpitations.   Gastrointestinal:  Negative for abdominal distention, abdominal pain, constipation, diarrhea, nausea and vomiting.   Genitourinary:  Negative for decreased urine volume, difficulty urinating, dysuria, flank pain, frequency and urgency.   Musculoskeletal:  Negative for back pain.   Skin:  Negative for rash.     Objective:     Vital Signs (Most Recent):  Temp: 97.6 °F (36.4 °C) (12/14/24 1119)  Pulse: 66 (12/14/24 1119)  Resp: 18 (12/14/24 1119)  BP: 134/64 (12/14/24 1119)  SpO2: 99 % (12/14/24 1119) Vital Signs (24h Range):  Temp:  [97.5 °F (36.4 °C)-98.9 °F (37.2 °C)] 97.6 °F (36.4 °C)  Pulse:  [66-85] 66  Resp:  [16-18] 18  SpO2:  [95 %-100 %] 99 %  BP: (103-134)/(51-64) 134/64     Weight: 100.3 kg (221 lb 1.9 oz)  Body mass index is 34.63 kg/m².    Estimated Creatinine Clearance: 17.4 mL/min (A) (based on SCr of 4.5 mg/dL (H)).     Physical Exam  Vitals and nursing note reviewed.   Constitutional:       General: She is not in acute distress.     Appearance: Normal appearance. She is not ill-appearing.   HENT:      Head: Normocephalic and atraumatic.      Nose: Nose normal.   Cardiovascular:      Rate and Rhythm: Normal rate.   Pulmonary:      Effort: Pulmonary effort is normal. No respiratory distress.   Musculoskeletal:        Feet:    Skin:     Findings: No lesion or rash.   Neurological:      Mental Status: She is alert and oriented to person, place, and time.   Psychiatric:         Mood and Affect: Mood normal.                    Significant Labs: Blood Culture:   Recent Labs   Lab 12/09/24  1522 12/09/24  1523   LABBLOO  "No Growth to date  No Growth to date  No Growth to date  No Growth to date  No Growth to date No Growth to date  No Growth to date  No Growth to date  No Growth to date  No Growth to date     CBC:   No results for input(s): "WBC", "HGB", "HCT", "PLT" in the last 48 hours.    Wound Culture:   Recent Labs   Lab 11/29/24  0828 12/10/24  1730 12/12/24  1230   LABAERO ENTEROBACTER CLOACAE  Many  *  KLEBSIELLA PNEUMONIAE  Many  Skin meño also present  * Skin meño,  no predominant organism No growth     All pertinent labs within the past 24 hours have been reviewed.    Significant Imaging: I have reviewed all pertinent imaging results/findings within the past 24 hours.  Procedure Component Value Units Date/Time   MRI Forefoot WO Contrast LT [[id number]] (Abnormal) Resulted: 12/10/24 0959   Order Status: Completed Updated: 12/10/24 1001   Narrative:     EXAMINATION:  MRI MIDFOOT WO CONTRAST LT; MRI FOREFOOT WO CONTRAST LT    CLINICAL HISTORY:  Osteomyelitis, foot;; Foot swelling, diabetic, osteomyelitis suspected, xray done;    TECHNIQUE:  Multiplanar, multisequence MR imaging of the  forefoot without the use of intravenous gadolinium IV contrast.    COMPARISON:  Multiple prior radiographs, most recent from same date.  MRI left foot 09/12/2023.    FINDINGS:  POSTOPERATIVE CHANGES: Postoperative change of 1st ray amputation and midfoot fusion.    SOFT TISSUES: There is ulceration along the plantar and dorsal lateral aspect of the foot with probable communication with the deeper midfoot compartment.  There is a focal collection of fluid along the superior aspect of the 4th and 5th metatarsals extending along the the proximal aspect of the 5th metatarsal (series 6, image 27).  This collection measures 2.0 x 1.5 cm.  Ill-defined scattered fluid signal seen throughout the midfoot.  There is skin thickening and subcutaneous edema, which could be inflammatory or infectious.  Several punctate foci of susceptibility " throughout the midfoot and hindfoot, which may reflect infectious versus postoperative changes.    BONES: Severe bony destruction of the tarsal bones.  Associated regions of bone marrow edema and T1 signal replacement throughout the midfoot and hindfoot, including the talus and calcaneus.    JOINTS: Ill-defined fluid signal/synovitis seen throughout the midfoot.    TENDONS: Scattered tenosynovitis.  Tendons suboptimally visualized.    LIGAMENTS: Chronic multifocal ligament disruption throughout the midfoot.    MUSCLES: Diffuse muscle edema and atrophy.   Impression:       1. 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  2. 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  3. Ulceration along the plantar and dorsal lateral aspect of the foot with probable communication with the deeper midfoot compartment.  Small fluid collection along the superior aspect of the 4th and 5th metatarsals, concerning for abscess.  This report was flagged in Epic as abnormal.    Electronically signed by resident: Justin Garcia  Date: 12/10/2024  Time: 07:52    Electronically signed by: Lavell Del Castillo MD  Date: 12/10/2024  Time: 09:59   MRI Midfoot WO Contrast LT [[id number]] (Abnormal) Resulted: 12/10/24 0959   Order Status: Completed Updated: 12/10/24 1001   Narrative:     EXAMINATION:  MRI MIDFOOT WO CONTRAST LT; MRI FOREFOOT WO CONTRAST LT    CLINICAL HISTORY:  Osteomyelitis, foot;; Foot swelling, diabetic, osteomyelitis suspected, xray done;    TECHNIQUE:  Multiplanar, multisequence MR imaging of the  forefoot without the use of intravenous gadolinium IV contrast.    COMPARISON:  Multiple prior radiographs, most recent from same date.  MRI left foot 09/12/2023.    FINDINGS:  POSTOPERATIVE CHANGES: Postoperative change of 1st ray amputation and midfoot fusion.    SOFT TISSUES: There is  ulceration along the plantar and dorsal lateral aspect of the foot with probable communication with the deeper midfoot compartment.  There is a focal collection of fluid along the superior aspect of the 4th and 5th metatarsals extending along the the proximal aspect of the 5th metatarsal (series 6, image 27).  This collection measures 2.0 x 1.5 cm.  Ill-defined scattered fluid signal seen throughout the midfoot.  There is skin thickening and subcutaneous edema, which could be inflammatory or infectious.  Several punctate foci of susceptibility throughout the midfoot and hindfoot, which may reflect infectious versus postoperative changes.    BONES: Severe bony destruction of the tarsal bones.  Associated regions of bone marrow edema and T1 signal replacement throughout the midfoot and hindfoot, including the talus and calcaneus.    JOINTS: Ill-defined fluid signal/synovitis seen throughout the midfoot.    TENDONS: Scattered tenosynovitis.  Tendons suboptimally visualized.    LIGAMENTS: Chronic multifocal ligament disruption throughout the midfoot.    MUSCLES: Diffuse muscle edema and atrophy.   Impression:       1. 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  2. 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  3. Ulceration along the plantar and dorsal lateral aspect of the foot with probable communication with the deeper midfoot compartment.  Small fluid collection along the superior aspect of the 4th and 5th metatarsals, concerning for abscess.  This report was flagged in Epic as abnormal.    Electronically signed by resident: Justin Garcia  Date: 12/10/2024  Time: 07:52    Electronically signed by: Lavell Del Castillo MD  Date: 12/10/2024  Time: 09:59   X-Ray Foot Complete Left [[id number]] (Abnormal) Resulted: 12/09/24 1703   Order Status: Completed Updated: 12/09/24  1706   Narrative:     EXAMINATION:  XR FOOT COMPLETE 3 VIEW LEFT    CLINICAL HISTORY:  .  Other injury of unspecified body region, initial encounter    TECHNIQUE:  AP, lateral and oblique views of the left foot were performed.    COMPARISON:  11/30/2024    FINDINGS:  Three views left foot.    There is diffuse edema about the foot, worsened since comparative exam of 11/30/2024.  Surgical changes are noted of the foot, overall configuration appears stable given differences in positioning.  No convincing acute displaced fracture or dislocation of the foot although comparison with the previous exam is limited given artifact from casting material at that time.  There is chronic degenerative change/destructive change of the midfoot.    There is been interval development of lucency involving the proximal aspect of the 3rd metatarsal about the screw construct.   Impression:       This report was flagged in Epic as abnormal.    1. There is been interval development of lucency about the proximal aspect of the 3rd metatarsal about the head of the screw construct.  Findings are concerning for osteomyelitis.  2. No convincing acute displaced fracture or dislocation of the foot noting chronic changes throughout the midfoot and surgical changes, similar in configuration.  3. There is diffuse edema about the foot, new since the prior exam.  Correlation for cellulitis as warranted.      Electronically signed by: Michael Ervin MD  Date: 12/09/2024  Time: 17:03   X-Ray Ankle Complete Left [[id number]] Resulted: 12/09/24 1701   Order Status: Completed Updated: 12/09/24 1703   Narrative:     EXAMINATION:  XR ANKLE COMPLETE 3 VIEW LEFT    CLINICAL HISTORY:  Other injury of unspecified body region, initial encounter    TECHNIQUE:  AP, lateral and oblique views of the left ankle were performed.    COMPARISON:  Foot radiograph 11/30/2024    FINDINGS:  Three views left ankle.    The ankle mortise is intact.  No acute displaced fracture or  dislocation of the ankle.  There is osteopenia.  There is edema about the ankle.  There are surgical changes of the foot, similar in appearance to the previous examination although on that exam, examination is limited given artifact from casting material at that time.   Impression:       1. No acute displaced fracture or dislocation of the ankle.  2. There are surgical changes of the foot, comparison to the previous exam is limited given casting material and artifact at that time.      Electronically signed by: Michael Ervin MD  Date: 12/09/2024  Time: 17:01      Imaging History     2024    Date Procedure Name Study Review Link PACS Link Status Accession Number Location   12/12/24 11:23 AM Cardiac monitoring strips Study Review  Final     12/11/24 10:41 PM Cardiac monitoring strips Study Review  Final     12/11/24 07:57 PM Cardiac monitoring strips Study Review  Final     12/10/24 08:20 PM Cardiac monitoring strips Study Review  Final     12/10/24 09:07 AM Cardiac monitoring strips Study Review  Final     12/09/24 10:21 PM MRI Forefoot WO Contrast LT Study Review  Images Final [id number] [facility]   12/09/24 10:21 PM MRI Midfoot WO Contrast LT Study Review  Images Final [id number] [facility]   12/09/24 04:40 PM X-Ray Foot Complete Left Study Review  Images Final [id number] [facility]   12/09/24 04:40 PM X-Ray Ankle Complete Left Study Review  Images Final [id number] [facility]   11/30/24 10:07 AM X-Ray Foot Complete Left Study Review  Images Final [id number] [facility]

## 2024-12-14 NOTE — PROGRESS NOTES
[facility]  Progress note    Team: [facility] Jenae De La Cruz MD  Admit Date: 12/9/2024  Code status: Full Code    Principal Problem:  Post-operative infection    Interval hx:  Patient without complaints    PEx  Temp:  [97.5 °F (36.4 °C)-98.9 °F (37.2 °C)]   Pulse:  [66-79]   Resp:  [16-18]   BP: (103-134)/(51-70)   SpO2:  [95 %-100 %]     Intake/Output Summary (Last 24 hours) at 12/14/2024 1652  Last data filed at 12/14/2024 0338  Gross per 24 hour   Intake 110 ml   Output 250 ml   Net -140 ml       General Appearance: no acute distress, WD, chronically ill-appearing  Heart: regular rate and rhythm, no heave  Respiratory: Normal respiratory effort, symmetric excursion, bilateral vesicular breath sounds   Abdomen: Soft, non-tender; bowel sounds active  Skin: intact, no rash, no ulcers  Neurologic:  No focal numbness or weakness  Mental status: Alert, oriented x 4, affect appropriate    Recent Labs   Lab 12/10/24  0330 12/11/24  0217 12/12/24  0434   WBC 7.68 7.75 8.01   HGB 8.2* 8.8* 7.7*   HCT 26.2* 28.3* 25.3*    214 195     Recent Labs   Lab 12/10/24  0330 12/11/24  0217 12/12/24  0434 12/13/24  0824    140 139 135*   K 4.2 4.2 4.6 4.2    105 105 105   CO2 25 23 25 21*   BUN 55* 62* 36* 43*   CREATININE 4.4* 4.9* 3.8* 4.5*    99 108 109   CALCIUM 8.7 8.9 8.7 9.0   MG 2.0 2.1 2.1  --    PHOS 4.7* 4.9* 3.9 4.5     Recent Labs   Lab 12/10/24  0330 12/11/24  0217 12/12/24  0434 12/13/24  0824   ALKPHOS 94 98 92  --    ALT 11 8* 9*  --    AST 16 14 15  --    ALBUMIN 2.3* 2.4* 2.1* 2.3*   PROT 6.5 6.7 6.0  --    BILITOT 0.4 0.4 0.3  --         Recent Labs   Lab 12/13/24  0732 12/13/24  1230 12/13/24  2105 12/14/24  0742 12/14/24  1119 12/14/24  1626   POCTGLUCOSE 89 130* 189* 118* 125* 129*       Scheduled Meds:   atorvastatin  80 mg Oral QHS    carvediloL  25 mg Oral BID WM    ceFEPime IV (PEDS and ADULTS)  1 g Intravenous Q24H    cetirizine  5 mg Oral Daily    epoetin tariq-ebpx  "(RETACRIT) injection  50 Units/kg Intravenous Every Mon, Wed, Fri    fluticasone propionate  1 spray Each Nostril Daily    gabapentin  200 mg Oral TID    And    gabapentin  100 mg Oral QHS    heparin (porcine)  5,000 Units Subcutaneous Q8H    insulin glargine U-100 (Lantus)  14 Units Subcutaneous QHS    mupirocin   Nasal BID    sevelamer carbonate  800 mg Oral TID WM     Continuous Infusions:  As Needed:    Current Facility-Administered Medications:     acetaminophen, 650 mg, Oral, Q6H PRN    aluminum & magnesium hydroxide-simethicone, 30 mL, Oral, Q6H PRN    dextrose 10%, 12.5 g, Intravenous, PRN    dextrose 10%, 25 g, Intravenous, PRN    glucagon (human recombinant), 1 mg, Intramuscular, PRN    glucose, 16 g, Oral, PRN    glucose, 24 g, Oral, PRN    HYDROcodone-acetaminophen, 1 tablet, Oral, Q6H PRN    influenza, 0.5 mL, Intramuscular, Prior to discharge    insulin aspart U-100, 0-5 Units, Subcutaneous, QID (AC + HS) PRN    melatonin, 6 mg, Oral, Nightly PRN    naloxone, 0.02 mg, Intravenous, PRN    ondansetron, 4 mg, Intravenous, Q8H PRN    promethazine, 12.5 mg, Oral, Q6H PRN    sodium chloride 0.9%, 10 mL, Intravenous, Q12H PRN    Assessment and Plan  / Problems managed today    * Post-operative infection  S/p charcot foot reconstruction to L foot on 11/11 now with increased drainage, left foot pain/swelling  -Afebrile, no leukocytosis.  -Blood cxs in process.  -POC lactate 0.99, procal 0.13  -CRP 53.6  -Xray L foot with There is been interval development of lucency about the proximal aspect of the 3rd metatarsal about the head of the screw construct.  Findings are concerning for osteomyelitis. There is diffuse edema about the foot, new since the prior exam.   -MRI L foot w/o pending showed "1. 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  2. Severe bony destruction of the tarsal bones with bone marrow edema and fluid about the midfoot and hindfoot, likely reflecting osteomyelitis superimposed " "on neuropathic arthropathy.  3. Ulceration along the plantar and dorsal lateral aspect of the foot with probable communication with the deeper midfoot compartment.  Small fluid collection along the superior aspect of the 4th and 5th metatarsals, concerning for abscess."  -Podiatry consulted   - no surgery indicated. Treat with antibiotics per ID   - bedside bone biopsy 12/12  -consult ID - continue cefepime. Vancomycin stopped   Await culture results   Change to cefepime 2 g IV post HD on HD days for DC. End date 1/20/204  -PRN pain control.   -Elevate LLE.  -Fall precautions.  -Reviewed previous cxs from podiatry visit on 11/29:      Component 10 d ago   Aerobic Bacterial Culture      Abnormal   ENTEROBACTER CLOACAE  Many  VC      Aerobic Bacterial Culture  Abnormal   KLEBSIELLA PNEUMONIAE  Many  Skin meño also present    Resulting Agency [facility]        Susceptibility     Enterobacter cloacae Klebsiella pneumoniae     CULTURE, AEROBIC  (SPECIFY SOURCE) CULTURE, AEROBIC  (SPECIFY SOURCE)     Amp/Sulbactam   <=8/4 mcg/mL Sensitive     Cefazolin   <=2 mcg/mL Sensitive     Cefepime <=2 mcg/mL Sensitive <=2 mcg/mL Sensitive     Ceftriaxone   <=1 mcg/mL Sensitive     Ciprofloxacin <=0.25 mcg/mL Sensitive <=0.25 mcg/mL Sensitive     Ertapenem <=0.5 mcg/mL Sensitive <=0.5 mcg/mL Sensitive     Gentamicin <=2 mcg/mL Sensitive <=2 mcg/mL Sensitive     Levofloxacin <=0.5 mcg/mL Sensitive <=0.5 mcg/mL Sensitive     Meropenem <=1 mcg/mL Sensitive <=1 mcg/mL Sensitive     Piperacillin/Tazo   <=8 mcg/mL Sensitive     Tobramycin <=2 mcg/mL Sensitive <=2 mcg/mL Sensitive     Trimeth/Sulfa <=2/38 mcg/mL Sensitive <=2/38 mcg/mL Sensitive           ESRD (end stage renal disease)  MWF schedule?    Residual renal function?- Yes  Last dialyzed 12/09    - Nephrology consulted  - Continue chronic hemodialysis  - Monitor daily electrolytes and defer dialysis orders to nephrology  - Renally dose medications  - Continue phosphorus binders  - " Daily weights/strict I&Os  - 1.5L FR    Class 2 severe obesity with serious comorbidity and body mass index (BMI) of 38.0 to 38.9 in adult  Body mass index is 39.16 kg/m². Obesity complicates all aspects of disease management from diagnostic modalities to treatment. Weight loss encouraged and health benefits explained to patient.     Chronic diastolic congestive heart failure  Patient has Diastolic (HFpEF) heart failure that is Chronic. On presentation their CHF was well compensated. Most recent BNP and echo results are listed below.  Latest ECHO  Results for orders placed during the hospital encounter of 09/11/23    Echo    Interpretation Summary    Left Ventricle: The left ventricle is normal in size. Normal wall thickness. Normal wall motion. There is low normal systolic function with a visually estimated ejection fraction of 50 - 55%. Ejection fraction by visual approximation is 55%. There is normal diastolic function.    Right Ventricle: Right ventricle was not well visualized due to poor acoustic window. Normal right ventricular cavity size. Wall thickness is normal. Right ventricle wall motion  is normal. Systolic function is normal.    Aortic Valve: There is moderate aortic valve sclerosis. There is annular calcification present.    Mitral Valve: There is moderate mitral annular calcification present.    IVC/SVC: Normal venous pressure at 3 mmHg.    Current Heart Failure Medications  carvediloL tablet 25 mg, 2 times daily with meals, Oral    Plan  - Monitor strict I&Os and daily weights.    - Place on telemetry  - Low sodium diet  - Place on fluid restriction of 1.5 L.   - Cardiology has not been consulted  - The patient's volume status is at their baseline  - Volume management per HD.    Essential hypertension  Patient's blood pressure range in the last 24 hours was: BP  Min: 125/60  Max: 142/64.The patient's inpatient anti-hypertensive regimen is listed below:  Current Antihypertensives  carvediloL tablet  "25 mg, 2 times daily with meals, Oral    Plan  - BP is controlled, no changes needed to their regimen    Hyperlipidemia   Patient is chronically on statin.will continue for now. Monitor clinically. Last LDL was   Lab Results   Component Value Date    LDLCALC Invalid, Trig>400.0 07/30/2014        Peripheral vascular disease  PAD s/p right SFA stent (11/2017), s/p right common iliac stent s/p P[place] [facility] [place]/p [place] fem bypass and stent to sfa with Dr. Maya on 9/14/23  -Continue statin, hold ASA pending official podiatry recs.    Type II diabetes mellitus  Patient's FSGs are controlled on current medication regimen.  Last A1c reviewed-   Lab Results   Component Value Date    HGBA1C 5.8 12/02/2024     Most recent fingerstick glucose reviewed- No results for input(s): "POCTGLUCOSE" in the last 24 hours.  Current correctional scale  Low  Maintain anti-hyperglycemic dose as follows-   Antihyperglycemics (From admission, onward)      Start     Stop Route Frequency Ordered    12/10/24 2100  insulin glargine U-100 (Lantus) pen 16 Units         -- SubQ Nightly 12/10/24 0543    12/09/24 1923  insulin aspart U-100 pen 0-5 Units         -- SubQ Before meals & nightly PRN 12/09/24 1824          Hold Oral hypoglycemics while patient is in the hospital.  -Accuchecks AC/HS      Diet:  regular diet  GI PPx: not needed  DVT PPx:  heparin  Airways: room air  Wounds: none    Goals of Care:  Return to prior functional status     Discharge Planning   INDIO: 12/16/2024   Is the patient medically ready for discharge?:     Reason for patient still in hospital (select all that apply): Patient trending condition and Treatment  Discharge Plan A: Home with family, Home Health        Jenae De La Cruz MD       "

## 2024-12-14 NOTE — ASSESSMENT & PLAN NOTE
54-year-old female with ESRD on HD a history of left Charcot foot now status post reconstruction in November 11, 2024 and subsequently casted.  She recently developed wound drainage and malodor and was sent to the ED due to that.  MRI is concerning for abscesses and OM.  Recent wound cultures show Enterobacter cloaca and Klebsiella pneumoniae with no anaerobic growth from cultures on 11/29.  She had been treated with doxycycline and Levaquin x 10 days.     Bone 12/11 no growth to date and wound cultures 12/10 with skin meño.  MRIs obtained showing concern for osteomyelitis and small abscesses.  Has remained on cefepime alone.  Clinically much improved and left foot pain has essentially resolved.  CRP has improved dramatically there is no signs of infection on exam.       Plan:  Continue cefepime 1 g as inpatient  Change to cefepime 2 g IV post HD on HD days for DC  Plan for 6 weeks of antibiotics and we will need reimaging of the foot in 3-4 weeks and until the abscesses have been demonstrated to resolve on imaging.  F/u cultures  Suspect pt will need prolonged antibiotics, but likely can be given post HD  Discussed with ID staff, we will sign off - See OPAT below  Follow up 7-10 days in ID clinic or went back to see Podiatry    Outpatient Antibiotic Therapy Plan:    Please send referral to [facility].    1) Infection:  Left foot osteomyelitis with abscess and retained hardware secondary to Charcot reconstruction    2) Discharge Antibiotics:    Intravenous antibiotics:  Cefepime 2 g post HD on HD days x6 weeks        3) Therapy Duration:  6 weeks    Estimated end date of IV antibiotics:  01/20/2024    4) Outpatient Weekly Labs:    Order the following labs to be drawn on Mondays:   CBC  CMP   CRP      5) Fax Lab Results to Infectious Diseases Provider:  Carmita Payton NP    [facility] Fax Number: [phone]    6) Outpatient Infectious Diseases Follow-up    Follow-up  appointment will be arranged by the ID clinic and will be found in the patient's appointments tab.    Prior to discharge, please ensure the patient's follow-up has been scheduled.    If there is still no follow-up scheduled prior to discharge, please send an [employer] message to [facility] Clinical Pool or Call Infectious Diseases Dept.

## 2024-12-15 LAB
POCT GLUCOSE: 132 MG/DL (ref 70–110)
POCT GLUCOSE: 154 MG/DL (ref 70–110)
POCT GLUCOSE: 158 MG/DL (ref 70–110)

## 2024-12-15 PROCEDURE — 97535 SELF CARE MNGMENT TRAINING: CPT

## 2024-12-15 PROCEDURE — 11000001 HC ACUTE MED/SURG PRIVATE ROOM

## 2024-12-15 PROCEDURE — 25000003 PHARM REV CODE 250: Performed by: NURSE PRACTITIONER

## 2024-12-15 PROCEDURE — 97116 GAIT TRAINING THERAPY: CPT

## 2024-12-15 PROCEDURE — 97166 OT EVAL MOD COMPLEX 45 MIN: CPT

## 2024-12-15 PROCEDURE — 97161 PT EVAL LOW COMPLEX 20 MIN: CPT

## 2024-12-15 PROCEDURE — 63600175 PHARM REV CODE 636 W HCPCS: Performed by: NURSE PRACTITIONER

## 2024-12-15 RX ADMIN — GABAPENTIN 200 MG: 100 CAPSULE ORAL at 02:12

## 2024-12-15 RX ADMIN — HEPARIN SODIUM 5000 UNITS: 5000 INJECTION INTRAVENOUS; SUBCUTANEOUS at 02:12

## 2024-12-15 RX ADMIN — FLUTICASONE PROPIONATE 50 MCG: 50 SPRAY, METERED NASAL at 08:12

## 2024-12-15 RX ADMIN — HEPARIN SODIUM 5000 UNITS: 5000 INJECTION INTRAVENOUS; SUBCUTANEOUS at 05:12

## 2024-12-15 RX ADMIN — SEVELAMER CARBONATE 800 MG: 800 TABLET, FILM COATED ORAL at 08:12

## 2024-12-15 RX ADMIN — CETIRIZINE HYDROCHLORIDE 5 MG: 5 TABLET, FILM COATED ORAL at 08:12

## 2024-12-15 RX ADMIN — HEPARIN SODIUM 5000 UNITS: 5000 INJECTION INTRAVENOUS; SUBCUTANEOUS at 10:12

## 2024-12-15 RX ADMIN — GABAPENTIN 200 MG: 100 CAPSULE ORAL at 08:12

## 2024-12-15 RX ADMIN — CEFEPIME 1 G: 1 INJECTION, POWDER, FOR SOLUTION INTRAMUSCULAR; INTRAVENOUS at 05:12

## 2024-12-15 RX ADMIN — GABAPENTIN 100 MG: 100 CAPSULE ORAL at 08:12

## 2024-12-15 RX ADMIN — INSULIN GLARGINE 14 UNITS: 100 INJECTION, SOLUTION SUBCUTANEOUS at 08:12

## 2024-12-15 RX ADMIN — ATORVASTATIN CALCIUM 80 MG: 40 TABLET, FILM COATED ORAL at 08:12

## 2024-12-15 RX ADMIN — SEVELAMER CARBONATE 800 MG: 800 TABLET, FILM COATED ORAL at 05:12

## 2024-12-15 RX ADMIN — CARVEDILOL 25 MG: 25 TABLET, FILM COATED ORAL at 05:12

## 2024-12-15 RX ADMIN — MUPIROCIN: 20 OINTMENT TOPICAL at 08:12

## 2024-12-15 RX ADMIN — SEVELAMER CARBONATE 800 MG: 800 TABLET, FILM COATED ORAL at 11:12

## 2024-12-15 RX ADMIN — HYDROCODONE BITARTRATE AND ACETAMINOPHEN 1 TABLET: 10; 325 TABLET ORAL at 08:12

## 2024-12-15 RX ADMIN — CARVEDILOL 25 MG: 25 TABLET, FILM COATED ORAL at 08:12

## 2024-12-15 NOTE — ASSESSMENT & PLAN NOTE
Patient's blood pressure range in the last 24 hours was: BP  Min: 95/45  Max: 137/61.The patient's inpatient anti-hypertensive regimen is listed below:  Current Antihypertensives  carvediloL tablet 25 mg, 2 times daily with meals, Oral    Plan  - BP is controlled, no changes needed to their regimen

## 2024-12-15 NOTE — ASSESSMENT & PLAN NOTE
PAD s/p right SFA stent (11/2017), s/p right common iliac stent s/p PTA oc[place] stents [place] [facility] [place] S/p ax fem bypass and stent to sfa with Dr. Maya on 9/14/23  -Continue statin, hold ASA pending official podiatry recs.

## 2024-12-15 NOTE — PROGRESS NOTES
[facility] - Neurosurgery ([facility])  [facility] Medicine  Progress Note    Patient Name: Georgina Arias  MRN: [id number]  Patient Class: IP- Inpatient   Admission Date: 12/9/2024  Length of Stay: 6 days  Attending Physician: Tejal Giordano MD  Primary Care Provider: Alonso Ling MD        Subjective     Principal Problem:Post-operative infection        HPI:  Georgina Arias is a 54 y.o. female with a PMHx of obesity s/p gastric sleeve, PAD, hypertensive heart disease with HFpEF, grade II diastolic dysfunction, DM2, HTN, HLD, ESRD on HD (last dialyzed 12/9), and charcot foot s/p reconstruction 11/11 who presents due to foul smelling drainage from LLE cast. The patient reports he has been going to podiatry weekly after surgery and last had her cast changed 11/29. Pt had been having increased pain to her left foot, swelling, and drainage so wound cxs were obtained during that visit, and she was started on levaquin and doxycycline x10 days. Endorses compliance with abx. The patient reports ongoing pain and swelling and states over the past few days she had dark foul smelling drainage reporting it looked like old blood. HD center was concerned about drainage and sent her after dialysis. She denies fever/chills. She does note 2-3 episodes of diarrhea on both Saturday and Sunday but none today. She denies CP, SOB, cough, abdominal pain, headache, or dysuria. She reports she makes urine about 3-4x/daily.    In the ED, patient mildly tachycardic otherwise VSS, afebrile. Hgb with stable anemia. Cr 3.9 (consistent with ESRD). Glucose 138. Albumin 2.6. AST 47. POC lactate 0.99. Blood cxs in process. UA pending. Xray L ankle with no acute displaced fracture or dislocation of the ankle. There are surgical changes of the foot, comparison to the previous exam is limited given casting material and artifact at that time. Xray L foot with  interval development of lucency about the proximal aspect of the 3rd metatarsal  about the head of the screw construct.  Findings are concerning for osteomyelitis. No convincing acute displaced fracture or dislocation of the foot noting chronic changes throughout the midfoot and surgical changes, similar in configuration. There is diffuse edema about the foot, new since the prior exam. Correlation for cellulitis as warranted. The patient received 1g IV cefepime. Podiatry consulted who removed cast and redressed her foot.    Overview/Hospital Course:  Podiatry consulted. Removed cast. Followed patient for wound care. MRI showed osteomyelitis of tarsal bones. They performed bone biopsy of affected area 12/12 at bedside. No surgery planned. ID consulted and now on cefepime. End date 1/20/2024. Will need re-imaging of left foot at that time. Okay to discharge from ID standpoint.     Interval History: NAEON. Pt denies any complaints .     Review of Systems   All other systems reviewed and are negative.    Objective:     Vital Signs (Most Recent):  Temp: 97.9 °F (36.6 °C) (12/15/24 1052)  Pulse: 65 (12/15/24 1052)  Resp: 18 (12/15/24 1052)  BP: (!) 100/55 (12/15/24 1115)  SpO2: 97 % (12/15/24 1052) Vital Signs (24h Range):  Temp:  [97.5 °F (36.4 °C)-98.5 °F (36.9 °C)] 97.9 °F (36.6 °C)  Pulse:  [65-74] 65  Resp:  [16-18] 18  SpO2:  [97 %-99 %] 97 %  BP: ()/(45-70) 100/55     Weight: 100.3 kg (221 lb 1.9 oz)  Body mass index is 34.63 kg/m².    Intake/Output Summary (Last 24 hours) at 12/15/2024 1210  Last data filed at 12/15/2024 0555  Gross per 24 hour   Intake --   Output 700 ml   Net -700 ml      Physical Exam  Constitutional:       Appearance: Normal appearance.   HENT:      Head: Normocephalic and atraumatic.      Mouth/Throat:      Mouth: Mucous membranes are moist.   Cardiovascular:      Rate and Rhythm: Normal rate and regular rhythm.      Pulses: Normal pulses.      Heart sounds: Normal heart sounds.   Pulmonary:      Effort: Pulmonary effort is normal.      Breath sounds: Normal breath  "sounds. No rales.   Abdominal:      General: Abdomen is flat. Bowel sounds are normal. There is no distension.      Palpations: Abdomen is soft.      Tenderness: There is no abdominal tenderness.   Musculoskeletal:         General: Normal range of motion.      Cervical back: Normal range of motion and neck supple.   Skin:     General: Skin is warm and dry.   Neurological:      General: No focal deficit present.      Mental Status: She is alert and oriented to person, place, and time.   Psychiatric:         Mood and Affect: Mood normal.         Computed MELD 3.0 unavailable. One or more values for this score either were not found within the given timeframe or did not fit some other criterion.  Computed MELD-Na unavailable. One or more values for this score either were not found within the given timeframe or did not fit some other criterion.      Significant Labs:  CBC:  No results for input(s): "WBC", "HGB", "HCT", "PLT" in the last 48 hours.  CMP:  No results for input(s): "NA", "K", "CL", "CO2", "GLU", "BUN", "CREATININE", "CALCIUM", "PROT", "ALBUMIN", "BILITOT", "ALKPHOS", "AST", "ALT", "ANIONGAP", "EGFRNONAA" in the last 48 hours.    Invalid input(s): "ESTGFAFRICA"  PTINR:  No results for input(s): "INR" in the last 48 hours.    Significant Procedures:   Dobutamine Stress Test with Color Flow: No results found for this or any previous visit.    None    Assessment and Plan     * Post-operative infection  S/p charcot foot reconstruction to L foot on 11/11 now with increased drainage, left foot pain/swelling  -Afebrile, no leukocytosis.  -Blood cxs in process.  -POC lactate 0.99, procal 0.13  -CRP 53.6  -Xray L foot with There is been interval development of lucency about the proximal aspect of the 3rd metatarsal about the head of the screw construct.  Findings are concerning for osteomyelitis. There is diffuse edema about the foot, new since the prior exam.   -MRI L foot w/o pending showed "1. Postoperative change " "of midfoot fusion and 1st ray amputation.  2. 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  3. Ulceration along the plantar and dorsal lateral aspect of the foot with probable communication with the deeper midfoot compartment.  Small fluid collection along the superior aspect of the 4th and 5th metatarsals, concerning for abscess."  -Podiatry consulted   - no surgery indicated. Treat with antibiotics per ID   - bedside bone biopsy 12/12  -consult ID - continue cefepime. Vancomycin stopped   Await culture results   Change to cefepime 2 g IV post HD on HD days for DC. End date 1/20/204  -PRN pain control.   -Elevate LLE.  -Fall precautions.  -Reviewed previous cxs from podiatry visit on 11/29:      Component 10 d ago   Aerobic Bacterial Culture      Abnormal   ENTEROBACTER CLOACAE  Many  VC      Aerobic Bacterial Culture  Abnormal   KLEBSIELLA PNEUMONIAE  Many  Skin mñeo also present    Resulting Agency [facility]        Susceptibility     Enterobacter cloacae Klebsiella pneumoniae     CULTURE, AEROBIC  (SPECIFY SOURCE) CULTURE, AEROBIC  (SPECIFY SOURCE)     Amp/Sulbactam   <=8/4 mcg/mL Sensitive     Cefazolin   <=2 mcg/mL Sensitive     Cefepime <=2 mcg/mL Sensitive <=2 mcg/mL Sensitive     Ceftriaxone   <=1 mcg/mL Sensitive     Ciprofloxacin <=0.25 mcg/mL Sensitive <=0.25 mcg/mL Sensitive     Ertapenem <=0.5 mcg/mL Sensitive <=0.5 mcg/mL Sensitive     Gentamicin <=2 mcg/mL Sensitive <=2 mcg/mL Sensitive     Levofloxacin <=0.5 mcg/mL Sensitive <=0.5 mcg/mL Sensitive     Meropenem <=1 mcg/mL Sensitive <=1 mcg/mL Sensitive     Piperacillin/Tazo   <=8 mcg/mL Sensitive     Tobramycin <=2 mcg/mL Sensitive <=2 mcg/mL Sensitive     Trimeth/Sulfa <=2/38 mcg/mL Sensitive <=2/38 mcg/mL Sensitive           ESRD (end stage renal disease)  MWF schedule?    Residual renal function?- Yes  Last dialyzed " 12/09    - Nephrology consulted  - Continue chronic hemodialysis  - Monitor daily electrolytes and defer dialysis orders to nephrology  - Renally dose medications  - Continue phosphorus binders  - Daily weights/strict I&Os  - 1.5L FR    Class 2 severe obesity with serious comorbidity and body mass index (BMI) of 38.0 to 38.9 in adult  Body mass index is 34.63 kg/m². Obesity complicates all aspects of disease management from diagnostic modalities to treatment. Weight loss encouraged and health benefits explained to patient.     Chronic diastolic congestive heart failure  Patient has Diastolic (HFpEF) heart failure that is Chronic. On presentation their CHF was well compensated. Most recent BNP and echo results are listed below.  Latest ECHO  Results for orders placed during the hospital encounter of 09/11/23    Echo    Interpretation Summary    Left Ventricle: The left ventricle is normal in size. Normal wall thickness. Normal wall motion. There is low normal systolic function with a visually estimated ejection fraction of 50 - 55%. Ejection fraction by visual approximation is 55%. There is normal diastolic function.    Right Ventricle: Right ventricle was not well visualized due to poor acoustic window. Normal right ventricular cavity size. Wall thickness is normal. Right ventricle wall motion  is normal. Systolic function is normal.    Aortic Valve: There is moderate aortic valve sclerosis. There is annular calcification present.    Mitral Valve: There is moderate mitral annular calcification present.    IVC/SVC: Normal venous pressure at 3 mmHg.    Current Heart Failure Medications  carvediloL tablet 25 mg, 2 times daily with meals, Oral    Plan  - Monitor strict I&Os and daily weights.    - Place on telemetry  - Low sodium diet  - Place on fluid restriction of 1.5 L.   - Cardiology has not been consulted  - The patient's volume status is at their baseline  - Volume management per HD.    Essential  "hypertension  Patient's blood pressure range in the last 24 hours was: BP  Min: 95/45  Max: 137/61.The patient's inpatient anti-hypertensive regimen is listed below:  Current Antihypertensives  carvediloL tablet 25 mg, 2 times daily with meals, Oral    Plan  - BP is controlled, no changes needed to their regimen    Hyperlipidemia   Patient is chronically on statin.will continue for now. Monitor clinically. Last LDL was   Lab Results   Component Value Date    LDLCALC Invalid, Trig>400.0 07/30/2014        Peripheral vascular disease  PAD s/p right SFA stent (11/2017), s/p right common iliac stent s/p PTA occluded stents in 2014 (Dr. Nathaniel Coyle at [facility] [place]) S/p ax fem bypass and stent to sfa with Dr. Maya on 9/14/23  -Continue statin, hold ASA pending official podiatry recs.    Type II diabetes mellitus  Patient's FSGs are controlled on current medication regimen.  Last A1c reviewed-   Lab Results   Component Value Date    HGBA1C 5.8 12/02/2024     Most recent fingerstick glucose reviewed- No results for input(s): "POCTGLUCOSE" in the last 24 hours.  Current correctional scale  Low  Maintain anti-hyperglycemic dose as follows-   Antihyperglycemics (From admission, onward)      Start     Stop Route Frequency Ordered    12/10/24 2100  insulin glargine U-100 (Lantus) pen 16 Units         -- SubQ Nightly 12/10/24 0543    12/09/24 1923  insulin aspart U-100 pen 0-5 Units         -- SubQ Before meals & nightly PRN 12/09/24 1824          Hold Oral hypoglycemics while patient is in the hospital.  -Accuchecks AC/HS      VTE Risk Mitigation (From admission, onward)           Ordered     heparin (porcine) injection 5,000 Units  Every 8 hours         12/09/24 1824     IP VTE HIGH RISK PATIENT  Once         12/09/24 1824     Place sequential compression device  Until discontinued         12/09/24 1824                    Discharge Planning   INDIO: 12/16/2024     Code Status: Full Code   Medical Readiness for Discharge Date: "   Discharge Plan A: Home with family, Home Health                Please place Justification for DME        Tejal Giordano MD  Department of Hospital Medicine   [facility] - Neurosurgery ([facility])

## 2024-12-15 NOTE — PLAN OF CARE
PT evaluation complete and appropriate goals established.     Problem: Physical Therapy  Goal: Physical Therapy Goal  Description: Goals to be met by: 1/15/2025     Patient will increase functional independence with mobility by performin. Supine to sit with [place]  2. Sit to supine with [place]  3. Sit to stand transfer with Supervision  4. Gait  x 200 feet with Supervision using LRAD.   5. Ascend/descend 1 stair with no Handrails Stand-by Assistance using LRAD.   6. Lower extremity exercise program x15 reps per handout, with independence    Outcome: Progressing     12/15/2024

## 2024-12-15 NOTE — PLAN OF CARE
Problem: Adult Inpatient Plan of Care  Goal: Plan of Care Review  Outcome: Progressing  Goal: Optimal Comfort and Wellbeing  Outcome: Progressing     Problem: Diabetes Comorbidity  Goal: Blood Glucose Level Within Targeted Range  Outcome: Progressing

## 2024-12-15 NOTE — PT/OT/SLP EVAL
Physical Therapy Co-Evaluation and Co-Treatment    Patient Name:  Georgina Arias   MRN:  [id number]    Co-evaluation and co-treatment performed for this visit due to suspected patient need for two skilled therapists to ensure patient and staff safety and to accommodate for patient activity tolerance/pain management     Recommendations:     Discharge Recommendations: Low Intensity Therapy  Discharge Equipment Recommendations: walker, rolling   Barriers to Discharge: None  Safest Mobility Level with Nursing: Ambulation with CGA using RW    Assessment:     Georgina Arias is a 54 y.o. female admitted with a medical diagnosis of Post-operative infection. She presents with the following impairments/functional limitations: weakness, impaired endurance, impaired functional mobility, gait instability, impaired balance, decreased safety awareness, pain, decreased lower extremity function, impaired sensation, orthopedic precautions. Pt presenting with HOB elevated and agreeable to completing therapy evaluation. Pt eager and motivated to complete OOB mobility this date. Pt requiring limited assistance to complete functional mobility with mild instability noted during gait trial likely 2/2 Darco shoe. Pt receptive to education provided to utilize RW upon discharge to aid in improved safety during ambulation and manage energy expenditure. Recommend low intensity therapy following discharge once medically stable in order to reduce fall risk, reduce caregiver burden, improve quality of life, and return to PLOF. Pt would continue to benefit from skilled acute PT in order to address current deficits and progress functional mobility.     Rehab Prognosis: Good; patient would benefit from acute skilled PT services 4 x/week to address these deficits and reach maximum level of function.  Recent Surgery: * No surgery found *      Plan:     During this hospitalization, patient to be seen 4 x/week to address the identified rehab  "impairments via gait training, therapeutic activities, therapeutic exercises, neuromuscular re-education and progress toward the following goals:    Plan of Care Expires:  01/15/25    Subjective     Chief Complaint: Wanting to get OOB  Patient/Family Comments/Goals: "Oh I do not like this shoe. It feels super weird."  Pain/Comfort:  Pain Rating 1: 1/10  Location - Side 1: Left  Location - Orientation 1: generalized  Location 1: foot  Pain Addressed 1: Reposition, Distraction  Pain Rating Post-Intervention 1: 0/10    Patients cultural, spiritual, Sikh conflicts given the current situation: no    Living Environment:  Living Environment: Patient lives with their mom and brother  in a single story house with number of outside stair(s): 1, walk-in shower, grab bars, and built-in shower chair.  Prior Level of Function: Prior to admission, patient independent with ADLs and mobility. Pt drivin g and not working. Pt reports using RW initially following surgery, but RW belongs to mother.  Equipment Used at Home: none.  DME owned (not currently used): none  Assistance Upon Discharge: family    Objective:     Communicated with nursing prior to session. Patient found HOB elevated with bed alarm, telemetry, PureWick upon PT entry to room.    General Precautions: Standard, fall  Orthopedic Precautions:LLE weight bearing as tolerated    Braces:  (Darco shoe); donned at EOB    Exams:  Cognitive Exam:  Patient is oriented to Person, Place, Time, Situation, follows commands 100% of the time  RLE ROM: WFL  RLE Strength: WFL  LLE ROM: WFL  LLE Strength: WFL  Sensation: Intact light touch to BLEs    Functional Mobility:  Bed Mobility:    Supine to Sit: stand by assistance  Transfers:    Sit to Stand: stand by assistance with rolling walker with cues for hand placement  Toilet transfer: contact guard assistance with rolling walker with cues for hand placement and foot placement using Step Transfer  Gait: Patient ambulated 170' with " rolling walker and contact guard assistance.   Patient demonstrates occasional unsteady gait, decreased step length, wide base of support, decreased weight shift, decreased foot clearance, ambulates outside LEANN of RW, flexed posture, decreased calvin, and antalgic gait.   Patient required cues for upright posture, gluteal activation, sequencing, rolling walker management, safe rolling walker usage, to ambulate within LEANN of RW, increased step size, and increased foot clearance  All lines remained intact throughout ambulation trial, gait belt utilized.  Balance:   Static Sitting: Good, able to maintain for 4 minute(s) with supervision  Dynamic Sitting: Good: Patient accepts moderate challenge, stand by assistance  Static Standing: Good, able to maintain for 2 minute(s) with stand by assistance  Dynamic Standing: Good: Patient accepts moderate challenge, contact guard assistance    Therapeutic Activities and Exercises:  Patient educated on role of acute care PT and PT POC, safety while in hospital including calling nurse for mobility, and call light usage.  Pt educated on the effects of bed rest and the importance of OOB activity. Pt encouraged to sit UIC majority of day as tolerated and continue daily ambulation with nursing assist. Pt verbalized understanding.  Pt educated on importance of maximal participation in therapy session in order to reduce negative effects of prolonged sedentary positioning.   Answered all questions within PT scope of practice and addressed functional mobility concerns.    AM-PAC 6 CLICK MOBILITY  Total Score:17     Patient left up in chair with all lines intact, call button in reach, RN notified, and chair alarm on.    GOALS:   Multidisciplinary Problems       Physical Therapy Goals          Problem: Physical Therapy    Goal Priority Disciplines Outcome Interventions   Physical Therapy Goal     PT, PT/OT Progressing    Description: Goals to be met by: 1/15/2025     Patient will increase  functional independence with mobility by performin. Supine to sit with [place]  2. Sit to supine with [place]  3. Sit to stand transfer with Supervision  4. Gait  x 200 feet with Supervision using LRAD.   5. Ascend/descend 1 stair with no Handrails Stand-by Assistance using LRAD.   6. Lower extremity exercise program x15 reps per handout, with independence    Rolling Walker - Patient demonstrates a mobility limitation that significantly impairs their ability to participate in one or more mobility related activities of daily living. Patient's mobility limitation cannot be sufficiently resolved with the use of a cane, but can be sufficiently resolved with the use of a rolling walker.The use of a rolling walker will considerably improve their ability to participate in MRADLs. Patient will use the walker on a regular basis at home.                          History:     Past Medical History:   Diagnosis Date    Hyperlipidemia     Hypertension     Peptic ulcer disease     Peripheral artery disease     PONV (postoperative nausea and vomiting)     Stage IV CKD     Type II diabetes mellitus        Past Surgical History:   Procedure Laterality Date    ANGIOGRAM, LOWER ARTERIAL, UNILATERAL Left 2023    Procedure: ANGIOGRAM, LOWER ARTERIAL, UNILATERAL;  Surgeon: Maxx Maya MD;  Location: [address];  Service: Vascular;  Laterality: Left;    ANGIOGRAPHY OF LOWER EXTREMITY Left 2023    Procedure: ANGIOGRAM, LOWER EXTREMITY with balloon angioplasty ultraverse 5mm x 60mm;  Surgeon: Maxx Maya MD;  Location: [address];  Service: Vascular;  Laterality: Left;  ultraverse 5mm x 60mm  fluoro: 12.41  contrast: 44ml  mGy: 65.57  Gycm2: 23.50    AORTOGRAPHY WITH EXTREMITY RUNOFF Left 2023    Procedure: AORTOGRAM, WITH EXTREMITY RUNOFF;  Surgeon: Maxx Maya MD;  Location: [address];  Service: Vascular;  Laterality: Left;  8.1 min  663.63 mGy  176.39 Gy.cm  178ml Dye      AV FISTULA PLACEMENT Right     CHOLECYSTECTOMY      COLONOSCOPY  11/06/2013    normal    COLONOSCOPY  05/2023    cancelled due to inadequate prep    COLONOSCOPY  06/2023    results unknown    CREATION, BYPASS, ARTERIAL, AXILLARY TO BILATERAL FEMORAL Left 09/14/2023    Procedure: CREATION, BYPASS, ARTERIAL, AXILLARY TO BILATERAL FEMORAL;  Surgeon: Maxx Maya MD;  Location: [facility] OR 2ND FLR;  Service: Vascular;  Laterality: Left;  Left Axillary to Bilateral Femoral Bypass, Left Femoral to Above Knee Popliteal Bypass; SLIDER BED    CYSTOSCOPY W/ URETERAL STENT PLACEMENT Right 08/28/2018    Procedure: CYSTOSCOPY, WITH URETERAL STENT INSERTION (ADD ON );  Surgeon: Bubba Perez MD;  Location: [facility] OR;  Service: Urology;  Laterality: Right;  (ADD ON )    DEBRIDEMENT OF FOOT Left 08/03/2023    Procedure: DEBRIDEMENT, FOOT;  Surgeon: Aleida Flannery DPM;  Location: [facility] OR;  Service: Podiatry;  Laterality: Left;    Excisional debridement of ulcer distal great toe left foot w/ partial resection distal phalanx Left 08/03/2023    FOOT AMPUTATION THROUGH METATARSAL Left 09/14/2023    Procedure: AMPUTATION, FOOT, TRANSMETATARSAL partial 1st ray amputation;  Surgeon: Jesus Valles DPM;  Location: [facility] OR 2ND FLR;  Service: Podiatry;  Laterality: Left;  partial ray    I&D L 1st ray w/ amputation L hallux IPJ Left 08/14/2023    INCISION AND DRAINAGE FOOT Left 09/14/2023    Procedure: INCISION AND DRAINAGE, FOOT;  Surgeon: Jesus Valles DPM;  [facility];  Service: Podiatry;  Laterality: Left;    Injection L PT tendon sheath Left 08/14/2023    LENGTHENING OF ACHILLES TENDON Left 11/11/2024    Procedure: LENGTHENING, TENDON, ACHILLES;  Surgeon: Marco Allison DPM;  Location: [facility] OR;  Service: Podiatry;  Laterality: Left;    PERIPHERAL ARTERIAL STENT GRAFT Right     RECONSTRUCTION WITH FUSION OF CHARCOT FOOT Left 11/11/2024    Procedure: RECONSTRUCTION, CHARCOT FOOT, WITH FUSION;  Surgeon: Estefany  Marco NIEVES DPM;  Location: [facility] OR;  Service: Podiatry;  Laterality: Left;  4 hours; foot tray; mini c arm; saw/drill    REMOVAL OF NAIL OF DIGIT Left 08/03/2023    Procedure: REMOVAL, NAIL, DIGIT great toe;  Surgeon: Aleida Flannery DPM;  Location: [facility] OR;  Service: Podiatry;  Laterality: Left;    REVISION, AMPUTATION, TRANSMETATARSAL Left 11/11/2024    Procedure: REVISION, AMPUTATION, TRANSMETATARSAL;  Surgeon: Marco Allison DPM;  Location: [facility] OR;  Service: Podiatry;  Laterality: Left;    STENT, SUPERFICIAL FEMORAL ARTERY Left 09/14/2023    Procedure: STENT, SUPERFICIAL FEMORAL ARTERY;  Surgeon: Maxx Maya MD;  [address];  Service: Vascular;  Laterality: Left;  5 x 100 mm    TOE AMPUTATION Left 08/14/2023    Procedure: AMPUTATION, TOE hallux IPJ;  Surgeon: Aleida Flannery DPM;  Location: [facility] OR;  Service: Podiatry;  Laterality: Left;    TOE AMPUTATION Left 08/25/2023    Procedure: AMPUTATION, TOE hallux, possible 1st met.head;  Surgeon: Aleida Flannery DPM;  Location: [facility] OR;  Service: Podiatry;  Laterality: Left;    URETEROSCOPIC REMOVAL OF URETERIC CALCULUS Right 09/11/2018    Procedure: EXTRACTION-STONE-URETEROSCOPY;  Surgeon: Bubba Perez MD;  Location: [facility];  Service: Urology;  Laterality: Right;       Time Tracking:     PT Received On: 12/15/24  PT Start Time: 0915     PT Stop Time: 0935  PT Total Time (min): 20 min     Billable Minutes: Evaluation 10 Gait Training 10      12/15/2024

## 2024-12-15 NOTE — PT/OT/SLP EVAL
"Occupational Therapy   Co-Evaluation    Name: Georgina Arias  MRN: [id number]  Admitting Diagnosis: Post-operative infection  Recent Surgery: * No surgery found *      Recommendations:     Discharge Recommendations: Low Intensity Therapy  Discharge Equipment Recommendations:  walker, rolling  Barriers to discharge:  None    Assessment:     Georgina Arias is a 54 y.o. female with a medical diagnosis of Post-operative infection.  She presents with good motivation/participation for presented therapeutic assessments and interventions. Pt with exhibited impaired activity tolerance and overall strength, requiring increased time and AD to participate in preferred occupations. Pt is currently not at her PLOF and would greatly benefit from continued skilled OT interventions in efforts to participate in meaningful occupations of choice at the highest level of independence.     Pt would greatly benefit from low intensity OT services upon discharge from this facility in efforts to progress to PLOF regarding meaningful occupations and home/community reintegration.       Performance deficits affecting function: weakness, impaired endurance, impaired self care skills, impaired functional mobility, gait instability, impaired balance, decreased coordination, decreased upper extremity function, decreased lower extremity function, pain, impaired cardiopulmonary response to activity, orthopedic precautions.      Rehab Prognosis: Good; patient would benefit from acute skilled OT services to address these deficits and reach maximum level of function.       Plan:     Patient to be seen 3 x/week to address the above listed problems via self-care/home management, therapeutic activities, therapeutic exercises, neuromuscular re-education  Plan of Care Expires: 01/15/25  Plan of Care Reviewed with: patient    Subjective     Chief Complaint: pain in L foot  Patient/Family Comments/goals: "I feel weird walking in this " "shoe"    Occupational Profile:  Living Environment: pt lives with her mother in a SSH, 1 KRISTINE, and WIS with built in bench, grab bars, and handheld shower head  Previous level of function: ind ADL/IADLs/functional mobility   Roles and Routines: pt drives herself to [employer] [facility]   Equipment Used at Home: grab bar (built in bench and handheld shower head)  Assistance upon Discharge: family     Pain/Comfort:  Pain Rating 1: 1/10  Location - Side 1: Left  Location 1: foot  Pain Addressed 1: Reposition, Distraction  Pain Rating Post-Intervention 1: 0/10    Patients cultural, spiritual, Shinto conflicts given the current situation: no    Objective:   Co-evaluation/treatment performed due to patient's multiple deficits requiring two skilled therapists to appropriately and safely assess patient's strength, endurance, functional mobility, and ADL performance while facilitating functional tasks in addition to accommodating for patient's activity tolerance and medical acuity.   Communicated with: RN prior to session.  Patient found supine with bed alarm, telemetry, PureWick upon OT entry to room.    General Precautions: Standard, fall  Orthopedic Precautions: LLE weight bearing as tolerated  Braces:  (darco shoes)  Respiratory Status: Room air    Occupational Performance:    Bed Mobility:  HOB elevated and bed rails  Patient completed Scooting/Bridging with stand by assistance  Patient completed Supine to Sit with stand by assistance    Functional Mobility/Transfers:  Patient completed Sit <> Stand Transfer with contact guard assistance  with  rolling walker   Patient completed Toilet Transfer Step Transfer technique with contact guard assistance with  rolling walker and sink ledge  Functional Mobility: Pt engaging in functional mobility to simulate household/community distances within hospital room and hallway with CGA and utilizing RW in order to maximize functional activity tolerance and standing balance required for " engagement in occupations of choice.     Activities of Daily Living:  Upper Body Dressing: minimum assistance to don gown posteriorly seated EOB  Lower Body Dressing: total assistance to don darco shoe    Cognitive/Visual Perceptual:  Cognitive/Psychosocial Skills:     -       Oriented to: Person, Place, Time, and Situation   -       Follows Commands/attention:Follows multistep  commands  -       Communication: clear/fluent  -       Memory: No Deficits noted  -       Safety awareness/insight to disability: intact   -       Mood/Affect/Coping skills/emotional control: Pleasant    Physical Exam:  Skin integrity: Wound L foot  Edema:  L foot  Sensation:    -       Intact  light/touch BUE  Upper Extremity Range of Motion:     -       Right Upper Extremity: WFL  -       Left Upper Extremity: WFL  Upper Extremity Strength:    -       Right Upper Extremity: Deficits: 3+/5 throughout  -       Left Upper Extremity: Deficits: 3+/5 throughout   Strength:    -       Right Upper Extremity: Deficits: 4/5  -       Left Upper Extremity: Deficits: 4/5  Fine Motor Coordination:    -       Intact  Left hand, finger to nose, Right hand, finger to nose, Left hand thumb/finger opposition skills, Right hand thumb/finger opposition skills, Left hand, manipulation of objects, and Right hand, manipulation of objects  Gross motor coordination:   WFL    AMPAC 6 Click ADL:  AMPAC Total Score: 17    Treatment & Education:  Provided education regarding role of OT, POC, & discharge recommendations with pt verbalizing understanding.  Pt had no further questions & when asked whether there were any concerns pt reported none.   Pt educated on the benefits of completing OOB ADL/functional mobility tasks with hospital staff present, pt with good understanding.   Whiteboard updated with OT name, safe mobility protocol, and level of assistance required.     Patient left up in chair with all lines intact, call button in reach, chair alarm on, and RN  notified    GOALS:   Multidisciplinary Problems       Occupational Therapy Goals          Problem: Occupational Therapy    Goal Priority Disciplines Outcome Interventions   Occupational Therapy Goal     OT, PT/OT Progressing    Description: DME Justifications     Rolling Walker- Patient demonstrates a mobility limitation that significantly impairs their ability to participate in one or more mobility related activities of daily living. Patient's mobility limitation cannot be sufficiently resolved with the use of a cane, but can be sufficiently resolved with the use of a rolling walker.The use of a rolling walker will considerably improve their ability to participate in MRADLs. Patient will use the walker on a regular basis at home.      Goals to be met by: 1/15/2025     Patient will increase functional independence with ADLs by performing:    UE Dressing with [place].  LE Dressing with [place].  Grooming while standing at sink with [place].  Toileting from toilet with [place] for hygiene and clothing management.   Toilet transfer to toilet with [place].  Increased functional strength to 5/5 for BUE.  Upper extremity exercise program x10 reps per handout, with independence.                         History:     Past Medical History:   Diagnosis Date    Hyperlipidemia     Hypertension     Peptic ulcer disease     Peripheral artery disease     PONV (postoperative nausea and vomiting)     Stage IV CKD     Type II diabetes mellitus          Past Surgical History:   Procedure Laterality Date    ANGIOGRAM, LOWER ARTERIAL, UNILATERAL Left 09/13/2023    Procedure: ANGIOGRAM, LOWER ARTERIAL, UNILATERAL;  Surgeon: Maxx Maya MD;  Location: [facility] OR [address] [place] [address];  Service: Vascular;  Laterality: Left;    ANGIOGRAPHY OF LOWER EXTREMITY Left 09/14/2023    Procedure: ANGIOGRAM, LOWER EXTREMITY with balloon angioplasty ultraverse 5mm x 60mm;  Surgeon: Maxx Maya MD;  Location: [facility] OR [address] [place] [address];  Service:  Vascular;  Laterality: Left;  ultraverse 5mm x 60mm  fluoro: 12.41  contrast: 44ml  mGy: 65.57  Gycm2: 23.50    AORTOGRAPHY WITH EXTREMITY RUNOFF Left 09/13/2023    Procedure: AORTOGRAM, WITH EXTREMITY RUNOFF;  Surgeon: Maxx Maya MD;  Location: [facility] OR [address] [place] [address];  Service: Vascular;  Laterality: Left;  8.1 min  663.63 mGy  176.39 Gy.cm  178ml Dye     AV FISTULA PLACEMENT Right     CHOLECYSTECTOMY      COLONOSCOPY  11/06/2013    normal    COLONOSCOPY  05/2023    cancelled due to inadequate prep    COLONOSCOPY  06/2023    results unknown    CREATION, BYPASS, ARTERIAL, AXILLARY TO BILATERAL FEMORAL Left 09/14/2023    Procedure: CREATION, BYPASS, ARTERIAL, AXILLARY TO BILATERAL FEMORAL;  Surgeon: Maxx Maya MD;  Location: [facility] OR [address] [place] [address];  Service: Vascular;  Laterality: Left;  Left Axillary to Bilateral Femoral Bypass, Left Femoral to Above Knee Popliteal Bypass; SLIDER BED    CYSTOSCOPY W/ URETERAL STENT PLACEMENT Right 08/28/2018    Procedure: CYSTOSCOPY, WITH URETERAL STENT INSERTION (ADD ON );  Surgeon: Bubba Perez MD;  Location: [facility];  Service: Urology;  Laterality: Right;  (ADD ON )    DEBRIDEMENT OF FOOT Left 08/03/2023    Procedure: DEBRIDEMENT, FOOT;  Surgeon: Aleida Flannery DPM;  Location: [facility] OR;  Service: Podiatry;  Laterality: Left;    Excisional debridement of ulcer distal great toe left foot w/ partial resection distal phalanx Left 08/03/2023    FOOT AMPUTATION THROUGH METATARSAL Left 09/14/2023    Procedure: AMPUTATION, FOOT, TRANSMETATARSAL partial 1st ray amputation;  Surgeon: Jesus Valles DPM;  [facility];  Service: Podiatry;  Laterality: Left;  partial ray    I&D L 1st ray w/ amputation L hallux IPJ Left 08/14/2023    INCISION AND DRAINAGE FOOT Left 09/14/2023    Procedure: INCISION AND DRAINAGE, FOOT;  Surgeon: Jesus Valles DPM;  [address];  Service: Podiatry;  Laterality: Left;    Injection L PT tendon sheath Left 08/14/2023     LENGTHENING OF ACHILLES TENDON Left 11/11/2024    Procedure: LENGTHENING, TENDON, ACHILLES;  Surgeon: Marco Allison DPM;  [facility];  Service: Podiatry;  Laterality: Left;    PERIPHERAL ARTERIAL STENT GRAFT Right     RECONSTRUCTION WITH FUSION OF CHARCOT FOOT Left 11/11/2024    Procedure: RECONSTRUCTION, CHARCOT FOOT, WITH FUSION;  Surgeon: Marco Allison DPM;  [facility];  Service: Podiatry;  Laterality: Left;  4 hours; foot tray; mini c arm; saw/drill    REMOVAL OF NAIL OF DIGIT Left 08/03/2023    Procedure: REMOVAL, NAIL, DIGIT great toe;  Surgeon: Aleida Flannery DPM;  Location: [facility] OR;  Service: Podiatry;  Laterality: Left;    REVISION, AMPUTATION, TRANSMETATARSAL Left 11/11/2024    Procedure: REVISION, AMPUTATION, TRANSMETATARSAL;  Surgeon: Marco Allison DPM;  [facility];  Service: Podiatry;  Laterality: Left;    STENT, SUPERFICIAL FEMORAL ARTERY Left 09/14/2023    Procedure: STENT, SUPERFICIAL FEMORAL ARTERY;  Surgeon: Maxx Maya MD;  Location: [facility] OR 2ND FLR;  Service: Vascular;  Laterality: Left;  5 x 100 mm    TOE AMPUTATION Left 08/14/2023    Procedure: AMPUTATION, TOE hallux IPJ;  Surgeon: Aleida Flannery DPM;  Location: [facility] OR;  Service: Podiatry;  Laterality: Left;    TOE AMPUTATION Left 08/25/2023    Procedure: AMPUTATION, TOE hallux, possible 1st met.head;  Surgeon: Aleida Flannery DPM;  Location: [facility] OR;  Service: Podiatry;  Laterality: Left;    URETEROSCOPIC REMOVAL OF URETERIC CALCULUS Right 09/11/2018    Procedure: EXTRACTION-STONE-URETEROSCOPY;  Surgeon: Bubba Perez MD;  Location: [facility] OR;  Service: Urology;  Laterality: Right;       Time Tracking:     OT Date of Treatment: 12/15/24  OT Start Time: 0915  OT Stop Time: 0938  OT Total Time (min): 23 min    Billable Minutes:Evaluation 10  Self Care/Home Management 13    12/15/2024

## 2024-12-15 NOTE — PLAN OF CARE
Problem: Occupational Therapy  Goal: Occupational Therapy Goal  Description: DME Justifications     Rolling Walker- Patient demonstrates a mobility limitation that significantly impairs their ability to participate in one or more mobility related activities of daily living. Patient's mobility limitation cannot be sufficiently resolved with the use of a cane, but can be sufficiently resolved with the use of a rolling walker.The use of a rolling walker will considerably improve their ability to participate in MRADLs. Patient will use the walker on a regular basis at home.      Goals to be met by: 1/15/2025     Patient will increase functional independence with ADLs by performing:    UE Dressing with [place].  LE Dressing with [place].  Grooming while standing at sink with [place].  Toileting from toilet with [place] for hygiene and clothing management.   Toilet transfer to toilet with [place].  Increased functional strength to 5/5 for BUE.  Upper extremity exercise program x10 reps per handout, with independence.    Outcome: Progressing

## 2024-12-15 NOTE — ASSESSMENT & PLAN NOTE
Body mass index is 34.63 kg/m². Obesity complicates all aspects of disease management from diagnostic modalities to treatment. Weight loss encouraged and health benefits explained to patient.

## 2024-12-15 NOTE — PLAN OF CARE
Problem: Diabetes Comorbidity  Goal: Blood Glucose Level Within Targeted Range  Outcome: Progressing  Intervention: Monitor and Manage Glycemia  Flowsheets (Taken 12/14/2024 1804)  Glycemic Management: blood glucose monitored

## 2024-12-15 NOTE — SUBJECTIVE & OBJECTIVE
Interval History: NAEON. Pt denies any complaints .     Review of Systems   All other systems reviewed and are negative.    Objective:     Vital Signs (Most Recent):  Temp: 97.9 °F (36.6 °C) (12/15/24 1052)  Pulse: 65 (12/15/24 1052)  Resp: 18 (12/15/24 1052)  BP: (!) 100/55 (12/15/24 1115)  SpO2: 97 % (12/15/24 1052) Vital Signs (24h Range):  Temp:  [97.5 °F (36.4 °C)-98.5 °F (36.9 °C)] 97.9 °F (36.6 °C)  Pulse:  [65-74] 65  Resp:  [16-18] 18  SpO2:  [97 %-99 %] 97 %  BP: ()/(45-70) 100/55     Weight: 100.3 kg (221 lb 1.9 oz)  Body mass index is 34.63 kg/m².    Intake/Output Summary (Last 24 hours) at 12/15/2024 1210  Last data filed at 12/15/2024 0555  Gross per 24 hour   Intake --   Output 700 ml   Net -700 ml      Physical Exam  Constitutional:       Appearance: Normal appearance.   HENT:      Head: Normocephalic and atraumatic.      Mouth/Throat:      Mouth: Mucous membranes are moist.   Cardiovascular:      Rate and Rhythm: Normal rate and regular rhythm.      Pulses: Normal pulses.      Heart sounds: Normal heart sounds.   Pulmonary:      Effort: Pulmonary effort is normal.      Breath sounds: Normal breath sounds. No rales.   Abdominal:      General: Abdomen is flat. Bowel sounds are normal. There is no distension.      Palpations: Abdomen is soft.      Tenderness: There is no abdominal tenderness.   Musculoskeletal:         General: Normal range of motion.      Cervical back: Normal range of motion and neck supple.   Skin:     General: Skin is warm and dry.   Neurological:      General: No focal deficit present.      Mental Status: She is alert and oriented to person, place, and time.   Psychiatric:         Mood and Affect: Mood normal.         Computed MELD 3.0 unavailable. One or more values for this score either were not found within the given timeframe or did not fit some other criterion.  Computed MELD-Na unavailable. One or more values for this score either were not found within the given  "timeframe or did not fit some other criterion.      Significant Labs:  CBC:  No results for input(s): "WBC", "HGB", "HCT", "PLT" in the last 48 hours.  CMP:  No results for input(s): "NA", "K", "CL", "CO2", "GLU", "BUN", "CREATININE", "CALCIUM", "PROT", "ALBUMIN", "BILITOT", "ALKPHOS", "AST", "ALT", "ANIONGAP", "EGFRNONAA" in the last 48 hours.    Invalid input(s): "ESTGFAFRICA"  PTINR:  No results for input(s): "INR" in the last 48 hours.    Significant Procedures:   Dobutamine Stress Test with Color Flow: No results found for this or any previous visit.    None  "

## 2024-12-15 NOTE — PLAN OF CARE
Problem: Diabetes Comorbidity  Goal: Blood Glucose Level Within Targeted Range  Outcome: Progressing  Intervention: Monitor and Manage Glycemia  Flowsheets (Taken 12/15/2024 1625)  Glycemic Management: blood glucose monitored

## 2024-12-16 VITALS
HEIGHT: 67 IN | BODY MASS INDEX: 34.71 KG/M2 | DIASTOLIC BLOOD PRESSURE: 58 MMHG | TEMPERATURE: 97 F | SYSTOLIC BLOOD PRESSURE: 123 MMHG | OXYGEN SATURATION: 95 % | WEIGHT: 221.13 LBS | RESPIRATION RATE: 18 BRPM | HEART RATE: 77 BPM

## 2024-12-16 LAB
ALBUMIN SERPL BCP-MCNC: 2.1 G/DL (ref 3.5–5.2)
ANION GAP SERPL CALC-SCNC: 9 MMOL/L (ref 8–16)
BACTERIA SPEC AEROBE CULT: NO GROWTH
BACTERIA SPEC ANAEROBE CULT: NORMAL
BASOPHILS # BLD AUTO: 0.05 K/UL (ref 0–0.2)
BASOPHILS NFR BLD: 0.5 % (ref 0–1.9)
BUN SERPL-MCNC: 43 MG/DL (ref 6–20)
CALCIUM SERPL-MCNC: 8.8 MG/DL (ref 8.7–10.5)
CHLORIDE SERPL-SCNC: 109 MMOL/L (ref 95–110)
CO2 SERPL-SCNC: 21 MMOL/L (ref 23–29)
CREAT SERPL-MCNC: 5 MG/DL (ref 0.5–1.4)
DIFFERENTIAL METHOD BLD: ABNORMAL
EOSINOPHIL # BLD AUTO: 0.2 K/UL (ref 0–0.5)
EOSINOPHIL NFR BLD: 2.2 % (ref 0–8)
ERYTHROCYTE [DISTWIDTH] IN BLOOD BY AUTOMATED COUNT: 14.6 % (ref 11.5–14.5)
EST. GFR  (NO RACE VARIABLE): 9.7 ML/MIN/1.73 M^2
GLUCOSE SERPL-MCNC: 107 MG/DL (ref 70–110)
HCT VFR BLD AUTO: 24.9 % (ref 37–48.5)
HGB BLD-MCNC: 7.6 G/DL (ref 12–16)
IMM GRANULOCYTES # BLD AUTO: 0.08 K/UL (ref 0–0.04)
IMM GRANULOCYTES NFR BLD AUTO: 0.9 % (ref 0–0.5)
LYMPHOCYTES # BLD AUTO: 1.8 K/UL (ref 1–4.8)
LYMPHOCYTES NFR BLD: 19 % (ref 18–48)
MCH RBC QN AUTO: 31.1 PG (ref 27–31)
MCHC RBC AUTO-ENTMCNC: 30.5 G/DL (ref 32–36)
MCV RBC AUTO: 102 FL (ref 82–98)
MONOCYTES # BLD AUTO: 0.8 K/UL (ref 0.3–1)
MONOCYTES NFR BLD: 9.1 % (ref 4–15)
NEUTROPHILS # BLD AUTO: 6.3 K/UL (ref 1.8–7.7)
NEUTROPHILS NFR BLD: 68.3 % (ref 38–73)
NRBC BLD-RTO: 0 /100 WBC
PHOSPHATE SERPL-MCNC: 5.1 MG/DL (ref 2.7–4.5)
PLATELET # BLD AUTO: 168 K/UL (ref 150–450)
PMV BLD AUTO: 9.2 FL (ref 9.2–12.9)
POCT GLUCOSE: 111 MG/DL (ref 70–110)
POCT GLUCOSE: 111 MG/DL (ref 70–110)
POCT GLUCOSE: 114 MG/DL (ref 70–110)
POTASSIUM SERPL-SCNC: 4.5 MMOL/L (ref 3.5–5.1)
RBC # BLD AUTO: 2.44 M/UL (ref 4–5.4)
SODIUM SERPL-SCNC: 139 MMOL/L (ref 136–145)
WBC # BLD AUTO: 9.22 K/UL (ref 3.9–12.7)

## 2024-12-16 PROCEDURE — 85025 COMPLETE CBC W/AUTO DIFF WBC: CPT | Performed by: INTERNAL MEDICINE

## 2024-12-16 PROCEDURE — 63600175 PHARM REV CODE 636 W HCPCS: Performed by: NURSE PRACTITIONER

## 2024-12-16 PROCEDURE — 80100016 HC MAINTENANCE HEMODIALYSIS

## 2024-12-16 PROCEDURE — 36415 COLL VENOUS BLD VENIPUNCTURE: CPT | Performed by: INTERNAL MEDICINE

## 2024-12-16 PROCEDURE — 99024 POSTOP FOLLOW-UP VISIT: CPT | Mod: GC,,, | Performed by: PODIATRIST

## 2024-12-16 PROCEDURE — 90935 HEMODIALYSIS ONE EVALUATION: CPT | Mod: ,,,

## 2024-12-16 PROCEDURE — 63600175 PHARM REV CODE 636 W HCPCS: Mod: JG | Performed by: NURSE PRACTITIONER

## 2024-12-16 PROCEDURE — 80069 RENAL FUNCTION PANEL: CPT | Performed by: INTERNAL MEDICINE

## 2024-12-16 PROCEDURE — 97535 SELF CARE MNGMENT TRAINING: CPT

## 2024-12-16 PROCEDURE — 25000003 PHARM REV CODE 250: Performed by: NURSE PRACTITIONER

## 2024-12-16 RX ORDER — GABAPENTIN 100 MG/1
100 CAPSULE ORAL DAILY
Qty: 30 CAPSULE | Refills: 0 | Status: SHIPPED | OUTPATIENT
Start: 2024-12-16

## 2024-12-16 RX ORDER — CEFEPIME HYDROCHLORIDE 1 G/1
INJECTION, POWDER, FOR SOLUTION INTRAMUSCULAR; INTRAVENOUS
Qty: 1 EACH | Refills: 0 | Status: SHIPPED | OUTPATIENT
Start: 2024-12-17 | End: 2025-01-20

## 2024-12-16 RX ORDER — CEFEPIME HYDROCHLORIDE 1 G/1
INJECTION, POWDER, FOR SOLUTION INTRAMUSCULAR; INTRAVENOUS
Qty: 1 EACH | Refills: 0 | Status: SHIPPED | OUTPATIENT
Start: 2024-12-17 | End: 2024-12-16

## 2024-12-16 RX ADMIN — CARVEDILOL 25 MG: 25 TABLET, FILM COATED ORAL at 11:12

## 2024-12-16 RX ADMIN — HEPARIN SODIUM 5000 UNITS: 5000 INJECTION INTRAVENOUS; SUBCUTANEOUS at 03:12

## 2024-12-16 RX ADMIN — FLUTICASONE PROPIONATE 50 MCG: 50 SPRAY, METERED NASAL at 12:12

## 2024-12-16 RX ADMIN — EPOETIN ALFA-EPBX 5000 UNITS: 10000 INJECTION, SOLUTION INTRAVENOUS; SUBCUTANEOUS at 11:12

## 2024-12-16 RX ADMIN — GABAPENTIN 200 MG: 100 CAPSULE ORAL at 03:12

## 2024-12-16 RX ADMIN — SEVELAMER CARBONATE 800 MG: 800 TABLET, FILM COATED ORAL at 12:12

## 2024-12-16 RX ADMIN — HEPARIN SODIUM 5000 UNITS: 5000 INJECTION INTRAVENOUS; SUBCUTANEOUS at 06:12

## 2024-12-16 RX ADMIN — GABAPENTIN 200 MG: 100 CAPSULE ORAL at 12:12

## 2024-12-16 RX ADMIN — CEFEPIME 1 G: 1 INJECTION, POWDER, FOR SOLUTION INTRAMUSCULAR; INTRAVENOUS at 03:12

## 2024-12-16 RX ADMIN — SEVELAMER CARBONATE 800 MG: 800 TABLET, FILM COATED ORAL at 07:12

## 2024-12-16 RX ADMIN — CETIRIZINE HYDROCHLORIDE 5 MG: 5 TABLET, FILM COATED ORAL at 12:12

## 2024-12-16 NOTE — PT/OT/SLP PROGRESS
Physical Therapy      Patient Name:  Georgina Arias   MRN:  [id number]    Patient not seen today secondary to 2 attempts made at 9:12 am and 11:54 am with pt FENG for dialysis both attempts. PTA unable to make third attempt .     Will follow-up 12/17/24.

## 2024-12-16 NOTE — PLAN OF CARE
Spoke to  Suze at [facility] to schedule patient follow-up appointment.  The patient appointment was scheduled for 12/23/24 at 1:30 pm.      Jakob RAMIREZ, RSW  Case Management  [phone]

## 2024-12-16 NOTE — PROGRESS NOTES
[facility] HEMODIALYSIS NOTE     Patient currently on hemodialysis for removal of uremic toxins and volume.     Patient seen and evaluated on hemodialysis, tolerating treatment, see HD flowsheet for vitals and assessments.     Labs have been reviewed and the dialysate bath has been adjusted.        Assessment/Plan:     -Patient seen on HD, tolerating treatment well, w/o complaints   -UF goal of 1 L as tolerated.   -Patient reports good RRF, reports max 1 L UFG outpatient per session. Training for home HD [facility]   -Renal diet, if not NPO   -Strict I/O's and daily weights  -Daily renal function panels  -Keep MAP >65 while on HD   -Hgb goal 10-11, hgb 7.6, Epo with HD, iron c/i in the setting of active infx  -Phos 5.1, continue binders  -Will continue to follow while inpatient      Iris Alatorre PA-C  Nephrology

## 2024-12-16 NOTE — ASSESSMENT & PLAN NOTE
Assessment:  54 y.o. female who recently underwent Charcot neuropathy reconstruction of left lower extremity.  She was then placed into a cast, and now has developed a left foot infection. White blood cell count within normal limits, patient resting comfortably in bed. Vital signs stable. X-ray and MRI showing osteomyelitis/small fluid collection, clinically correlates with left foot infection. Imaging likely also yields infection superimposed with Charcot neuropathy.  Patient is now 1 day status post bone biopsy, currently afebrile and hemodynamically stable.    Plan:  - Left foot dressed with Betadine-soaked gauze, clean gauze, Kerlix, and ACE bandage. Wound care orders to follow.  - Elevate left lower extremity.  - Antibiotics per ID. Cultured deep draining dorsal midfoot ulcer, pending. Bone biopsy collected and pending.  - After long discussion with patient and surgeon who performed charcot reconstruction, imaging and presentation consistent with left foot infection, but no surgical intervention warranted at this moment. Will give patient abx (to be managed by ID), and regular wound care.     Future Discharge Recommendations:  -Patient to follow up in outpatient Podiatry clinic with Dr. Allison. Podiatry will schedule  -Antibiotics per ID  -HH/SNF to do dressings 2-3 times a week as follows: Cleanse foot with Vashe, floss gauze between toes, and then.... apply betadine soaked packing strip to left foot medial dorsal ulcer that probes deep, then apply betadine wet-to-dry gauze, then cast padding, and ace wrap.   -PWB to LLE in CAM boot with assistance of walker.   -Keep dressings clean, dry, and intact until follow-up appointment

## 2024-12-16 NOTE — NURSING
Patient arrived in stretcher . Left  Forearm fistula accessed with  buttonhole  needles . Dialysis treatment  started .   Report  received   from Primary Nurse .

## 2024-12-16 NOTE — PLAN OF CARE
[facility] ([facility])      HOME HEALTH ORDERS  FACE TO FACE ENCOUNTER    Patient Name: Georgina Arias  YOB: 1970    PCP: Alonso Ling MD   [address] [address] [address]  PCP Phone Number: [phone]  PCP Fax: [phone]    Encounter Date: 12/9/24    Admit to Home Health    Diagnoses:  Active Hospital Problems    Diagnosis  POA    *Post-operative infection [T81.40XA]  Yes    Osteomyelitis [M86.9]  Yes    Cellulitis [L03.90]  Yes    Wound infection after surgery [T81.49XA]  Yes    S/P femoral-femoral bypass surgery [Z95.828]  Not Applicable    ESRD (end stage renal disease) [N18.6]  Yes    Class 2 severe obesity with serious comorbidity and body mass index (BMI) of 38.0 to 38.9 in adult [E66.812, E66.01, Z68.38]  Not Applicable    Chronic diastolic congestive heart failure [I50.32]  Yes    Essential hypertension [I10]  Yes    Hyperlipidemia [E78.5]  Yes    Type II diabetes mellitus [E11.9]  Yes     Overview:   dx update      Peripheral vascular disease [I73.9]  Yes     Overview:   dx update        Resolved Hospital Problems   No resolved problems to display.       Follow Up Appointments:  Future Appointments   Date Time Provider Department Center   12/23/2024  1:30 PM Marilyn Rodriguez MD [facility]   1/14/2025  2:00 PM Andrew Davis Jr., PA [facility]   1/30/2025  1:30 PM Andrew Davis Jr., PA [facility]   2/27/2025  9:00 AM Nirmal Cruz NP [facility]       Allergies:  Review of patient's allergies indicates:   Allergen Reactions    Hydrocodone-acetaminophen Nausea And Vomiting, Rash and Other (See Comments)     Vicodin- severe rash  Lortab- head-spinning that leads to vomiting      Naproxen Anaphylaxis and Swelling     Hives (skin)^swelling  Hives (skin)^swelling  Hives (skin)^swelling    Sulfamethoxazole-trimethoprim Other (See Comments)     Caused JEROME; heart attack    Hydrocodone Nausea  And Vomiting and Rash    Adhesive tape-silicones      blisters    Aspartame Hives    Penicillins Hives     Blisters (skin)^    Sulfamethoxazole Other (See Comments)    Trimethoprim Other (See Comments)    Acetaminophen Rash    Adhesive Rash       Medications: Review discharge medications with patient and family and provide education.    Current Facility-Administered Medications   Medication Dose Route Frequency Provider Last Rate Last Admin    acetaminophen tablet 650 mg  650 mg Oral Q6H PRN Cherise Shultz, NP        aluminum & magnesium hydroxide-simethicone 400-400-40 mg/5 mL suspension 30 mL  30 mL Oral Q6H PRN Jenae De La Cruz MD        atorvastatin tablet 80 mg  80 mg Oral QHS Cherise Shultz, NP   80 mg at 12/15/24 2050    carvediloL tablet 25 mg  25 mg Oral BID WM Cherise Shultz, NP   25 mg at 12/16/24 1118    ceFEPIme injection 1 g  1 g Intravenous Q24H Cherise Shultz, NP   1 g at 12/15/24 1711    cetirizine tablet 5 mg  5 mg Oral Daily Cherise Shultz NP   5 mg at 12/16/24 1247    dextrose 10% bolus 125 mL 125 mL  12.5 g Intravenous PRN Cherise Shultz, NP        dextrose 10% bolus 250 mL 250 mL  25 g Intravenous PRN Cherise Shultz, NP        epoetin tariq-epbx injection 5,000 Units  50 Units/kg Intravenous Every Mon, Wed, Fri Kimberli Delacruz, ALANA   5,000 Units at 12/16/24 1120    fluticasone propionate 50 mcg/actuation nasal spray 50 mcg  1 spray Each Nostril Daily Cherise Shultz NP   50 mcg at 12/16/24 1242    gabapentin capsule 200 mg  200 mg Oral TID Cherise Shultz NP   200 mg at 12/16/24 1241    And    gabapentin capsule 100 mg  100 mg Oral QHS Cherise Shultz, NP   100 mg at 12/15/24 2050    glucagon (human recombinant) injection 1 mg  1 mg Intramuscular PRN Cherise Shultz, NP        glucose chewable tablet 16 g  16 g Oral PRN Cherise Shultz, NP        glucose chewable tablet 24 g  24 g Oral PRN Cherise Shultz NP        heparin (porcine) injection  5,000 Units  5,000 Units Subcutaneous Q8H Cherise Shultz, NP   5,000 Units at 12/16/24 0610    HYDROcodone-acetaminophen  mg per tablet 1 tablet  1 tablet Oral Q6H PRN Cherise Shultz, NP   1 tablet at 12/15/24 2049    influenza (Flulaval, Fluzone, Fluarix) 45 mcg/0.5 mL IM vaccine (> or = 6 mo) 0.5 mL  0.5 mL Intramuscular Prior to discharge Mamadou Rubalcava MD        insulin aspart U-100 pen 0-5 Units  0-5 Units Subcutaneous QID (AC + HS) PRN Cherise Shultz NP   1 Units at 12/14/24 2110    insulin glargine U-100 (Lantus) pen 14 Units  14 Units Subcutaneous QHS Cherise Shultz, NP   14 Units at 12/15/24 2053    melatonin tablet 6 mg  6 mg Oral Nightly PRN Cherise Shultz NP        naloxone 0.4 mg/mL injection 0.02 mg  0.02 mg Intravenous PRN Cherise Shultz NP        ondansetron injection 4 mg  4 mg Intravenous Q8H PRN Cherise Shultz, TICO        promethazine tablet 12.5 mg  12.5 mg Oral Q6H PRN Cherise Shultz NP        sevelamer carbonate tablet 800 mg  800 mg Oral TID WM Cherise Shultz NP   800 mg at 12/16/24 1241    sodium chloride 0.9% flush 10 mL  10 mL Intravenous Q12H PRN Cherise Shultz NP   10 mL at 12/11/24 2142        Medication List        START taking these medications      ceFEPIme  Infuse 2g IV post HD on HD days  Start taking on: December 17, 2024-End date January 20, 2025            CHANGE how you take these medications      gabapentin 100 MG capsule  Commonly known as: NEURONTIN  Take 1 capsule (100 mg total) by mouth once daily.  What changed:   medication strength  how much to take  when to take this     MOUNJARO 10 mg/0.5 mL Pnij  Generic drug: tirzepatide  Inject 10 mg into the skin.  What changed: Another medication with the same name was removed. Continue taking this medication, and follow the directions you see here.            CONTINUE taking these medications      acetaminophen 500 MG tablet  Commonly known as: TYLENOL  Take 2 tablets (1,000 mg  "total) by mouth every 8 (eight) hours as needed.     aspirin 81 MG EC tablet  Commonly known as: ECOTRIN  Take 1 tablet (81 mg total) by mouth once daily.     atorvastatin 80 MG tablet  Commonly known as: LIPITOR  Take 1 tablet by mouth every evening.     blood-glucose meter Misc  1 each by Other route.     C-TUB Misc  Generic drug: miscellaneous medical supply  Hearing amplification ([employer]CR[employer] preferred)     carvediloL 25 MG tablet  Commonly known as: COREG  Take 1 tablet (25 mg total) by mouth 2 (two) times daily with meals.     COMFORT EZ PEN NEEDLES 32 gauge x 5/32" Ndle  Generic drug: pen needle, diabetic  SMARTSIG:injection Daily     fluticasone propionate 50 mcg/actuation nasal spray  Commonly known as: FLONASE  Fluticasone Propionate 50 MCG/ACT Nasal Suspension QTY: 1 bottle Days: 30 Refills: 5  Written: 09/30/20 Patient Instructions: inhale 2 sprays in each nostril once daily     HYDROcodone-acetaminophen  mg per tablet  Commonly known as: NORCO  Take 1 tablet by mouth every 6 (six) hours as needed for Pain.     LANTUS SOLOSTAR U-100 INSULIN 100 unit/mL (3 mL) Inpn pen  Generic drug: insulin glargine U-100 (Lantus)  Inject 40 Units into the skin every evening.     levocetirizine 5 MG tablet  Commonly known as: XYZAL  Take 5 mg by mouth.     linaGLIPtin 5 mg Tab tablet  Commonly known as: TRADJENTA  Take 1 tablet by mouth once daily.     ondansetron 4 MG Tbdl  Commonly known as: ZOFRAN-ODT  Take 4 mg by mouth every 6 (six) hours.     promethazine 12.5 MG Tab  Commonly known as: PHENERGAN  Take 1 tablet (12.5 mg total) by mouth every 6 (six) hours as needed (to use with Norco, known to tolerate).     Remedy Phytoplex AntifungaL 2 % top powder  Generic drug: miconazole NITRATE 2 %  Apply topically as needed for Itching.     sevelamer carbonate 800 mg Tab  Commonly known as: RENVELA  Take by mouth.     TRUE METRIX GLUCOSE TEST STRIP Strp  Generic drug: blood sugar diagnostic  3 (three) times daily.   "   TRUEPLUS LANCETS 30 gauge Misc  Generic drug: lancets     XPHOZAH 30 mg Tab  Generic drug: tenapanor  Take 30 mg by mouth.            STOP taking these medications      doxycycline 100 MG Cap  Commonly known as: VIBRAMYCIN     levoFLOXacin 750 MG tablet  Commonly known as: LEVAQUIN     oxyCODONE 5 MG immediate release tablet  Commonly known as: ROXICODONE                I have seen and examined this patient within the last 30 days. My clinical findings that support the need for the home health skilled services and home bound status are the following:no   Weakness/numbness causing balance and gait disturbance due to osteomyelitis making it taxing to leave home.     Diet:   diabetic diet 2000 calorie    Labs:  SN to perform labs:  CBC: Weekly; 6 week(s), CMP: Weekly; 6 week(s), ESR: Weekly; 6 week(s), and CRP: Weekly; 6 week(s)    Referrals/ Consults  Physical Therapy to evaluate and treat. Evaluate for home safety and equipment needs; Establish/upgrade home exercise program. Perform / instruct on therapeutic exercises, gait training, transfer training, and Range of Motion.  Occupational Therapy to evaluate and treat. Evaluate home environment for safety and equipment needs. Perform/Instruct on transfers, ADL training, ROM, and therapeutic exercises.  Aide to provide assistance with personal care, ADLs, and vital signs.    Activities:   activity as tolerated    Nursing:   Agency to admit patient within 24 hours of hospital discharge unless specified on physician order or at patient request    SN to complete comprehensive assessment including routine vital signs. Instruct on disease process and s/s of complications to report to MD. Review/verify medication list sent home with the patient at time of discharge  and instruct patient/caregiver as needed. Frequency may be adjusted depending on start of care date.     Skilled nurse to perform up to 3 visits PRN for symptoms related to diagnosis    Notify MD if SBP > 160 or  < 90; DBP > 90 or < 50; HR > 120 or < 50; Temp > 101; O2 < 88%    Ok to schedule additional visits based on staff availability and patient request on consecutive days within the home health episode.    When multiple disciplines ordered:    Start of Care occurs on Sunday - Wednesday schedule remaining discipline evaluations as ordered on separate consecutive days following the start of care.    Thursday SOC -schedule subsequent evaluations Friday and Monday the following week.     Friday - Saturday SOC - schedule subsequent discipline evaluations on consecutive days starting Monday of the following week.    For all post-discharge communication and subsequent orders please contact patient's primary care physician. If unable to reach primary care physician or do not receive response within 30 minutes, please contact [facility] for clinical staff order clarification    Miscellaneous   Diabetic Care:   SN to perform and educate Diabetic management with blood glucose monitoring:, Fingerstick blood sugar AC and HS, and Report CBG < 60 or > 350 to physician.    Home Health Aide:  Nursing Three times weekly, Physical Therapy Three times weekly, Occupational Therapy Three times weekly, and Home Health Aide Three times weekly    Wound Care Orders  yes:  Foot wound care 2-3 times weekly  Cleanse foot with Vashe, floss gauze between toes, then apply betadine-soaked packing strip to left foot medial dorsal ulcer that probes deep, then apply betadine wet-to-dry gauze, then cast padding, and ace wrap.  Keep dressing clean, dry, and intact until follow up woth podiatry.    Weight bearing: PWB to LLE in CAM boot with assistance of walker.    I certify that this patient is confined to her home and needs intermittent skilled nursing care, physical therapy, and occupational therapy.

## 2024-12-16 NOTE — PROGRESS NOTES
[facility] ([facility])  Podiatry  Progress Note    Patient Name: Georgina Arias  MRN: [id number]  Admission Date: 12/9/2024  Hospital Length of Stay: 7 days  Attending Physician: Zenon Angeles DO  Primary Care Provider: Alonso Ling MD     Subjective:     Interval History: NAEON, NAD, VSS. Patient is afebrile, dressings are clean/dry/ and intact.   New pedal complaints: None  New results: bone biopsy and wound culture not growing anything.   Denies post op fever.          Scheduled Meds:   atorvastatin  80 mg Oral QHS    carvediloL  25 mg Oral BID WM    ceFEPime IV (PEDS and ADULTS)  1 g Intravenous Q24H    cetirizine  5 mg Oral Daily    epoetin tariq-ebpx (RETACRIT) injection  50 Units/kg Intravenous Every Mon, Wed, Fri    fluticasone propionate  1 spray Each Nostril Daily    gabapentin  200 mg Oral TID    And    gabapentin  100 mg Oral QHS    heparin (porcine)  5,000 Units Subcutaneous Q8H    insulin glargine U-100 (Lantus)  14 Units Subcutaneous QHS    sevelamer carbonate  800 mg Oral TID WM     Continuous Infusions:  PRN Meds:  Current Facility-Administered Medications:     acetaminophen, 650 mg, Oral, Q6H PRN    aluminum & magnesium hydroxide-simethicone, 30 mL, Oral, Q6H PRN    dextrose 10%, 12.5 g, Intravenous, PRN    dextrose 10%, 25 g, Intravenous, PRN    glucagon (human recombinant), 1 mg, Intramuscular, PRN    glucose, 16 g, Oral, PRN    glucose, 24 g, Oral, PRN    HYDROcodone-acetaminophen, 1 tablet, Oral, Q6H PRN    influenza, 0.5 mL, Intramuscular, Prior to discharge    insulin aspart U-100, 0-5 Units, Subcutaneous, QID (AC + HS) PRN    melatonin, 6 mg, Oral, Nightly PRN    naloxone, 0.02 mg, Intravenous, PRN    ondansetron, 4 mg, Intravenous, Q8H PRN    promethazine, 12.5 mg, Oral, Q6H PRN    sodium chloride 0.9%, 10 mL, Intravenous, Q12H PRN    Review of Systems   All other systems reviewed and are negative.    Objective:     Vital Signs (Most Recent):  Temp: 97.2 °F (36.2 °C)  "(12/16/24 1205)  Pulse: 77 (12/16/24 1205)  Resp: 18 (12/16/24 1205)  BP: (!) 123/58 (12/16/24 1205)  SpO2: 95 % (12/16/24 1205) Vital Signs (24h Range):  Temp:  [97.2 °F (36.2 °C)-98.6 °F (37 °C)] 97.2 °F (36.2 °C)  Pulse:  [63-80] 77  Resp:  [18] 18  SpO2:  [94 %-100 %] 95 %  BP: (103-184)/(52-76) 123/58     Weight: 100.3 kg (221 lb 1.9 oz)  Body mass index is 34.63 kg/m².    Foot Exam    General  Orientation: alert and oriented to person, place, and time   Affect: appropriate       Left Foot/Ankle      Inspection and Palpation  Skin Exam: drainage, maceration, cellulitis, skin changes, abnormal color, ulcer and erythema;     Neurovascular  Dorsalis pedis: 1+  Posterior tibial: 1+  Saphenous nerve sensation: diminished  Tibial nerve sensation: diminished  Superficial peroneal nerve sensation: diminished  Deep peroneal nerve sensation: diminished  Sural nerve sensation: diminished    Comments  Left lower extremity:    Plantar midfoot ulcer:  Mixed granular fibrous base.  Hypergranulation tissue noted as well.  Medial forefoot ulcer.  Long and mixed fibrogranular base.  Dorsal foot:  Small draining ulcer that probes deep to bone.                   Laboratory:  A1C:   Recent Labs   Lab 10/07/24  0000 12/02/24  0000 12/09/24  1332   HGBA1C 6.2* 5.8 5.3     CBC:   Recent Labs   Lab 12/16/24  0242   WBC 9.22   RBC 2.44*   HGB 7.6*   HCT 24.9*      *   MCH 31.1*   MCHC 30.5*     CMP:   Recent Labs   Lab 12/12/24  0434 12/13/24  0824 12/16/24  0242      < > 107   CALCIUM 8.7   < > 8.8   ALBUMIN 2.1*   < > 2.1*   PROT 6.0  --   --       < > 139   K 4.6   < > 4.5   CO2 25   < > 21*      < > 109   BUN 36*   < > 43*   CREATININE 3.8*   < > 5.0*   ALKPHOS 92  --   --    ALT 9*  --   --    AST 15  --   --    BILITOT 0.3  --   --     < > = values in this interval not displayed.     CRP:   Recent Labs   Lab 12/13/24  0824   CRP 15.9*     ESR: No results for input(s): "SEDRATE" in the last 168 " hours.    Diagnostic Results:  I have reviewed all pertinent imaging results/findings within the past 24 hours.      Assessment/Plan:     ID  * Post-operative infection  Assessment:  54 y.o. female who recently underwent Charcot neuropathy reconstruction of left lower extremity.  She was then placed into a cast, and now has developed a left foot infection. White blood cell count within normal limits, patient resting comfortably in bed. Vital signs stable. X-ray and MRI showing osteomyelitis/small fluid collection, clinically correlates with left foot infection. Imaging likely also yields infection superimposed with Charcot neuropathy.  Patient is now 1 day status post bone biopsy, currently afebrile and hemodynamically stable.    Plan:  - Left foot dressed with Betadine-soaked gauze, clean gauze, Kerlix, and ACE bandage. Wound care orders to follow.  - Elevate left lower extremity.  - Antibiotics per ID. Cultured deep draining dorsal midfoot ulcer, pending. Bone biopsy collected and pending.  - After long discussion with patient and surgeon who performed charcot reconstruction, imaging and presentation consistent with left foot infection, but no surgical intervention warranted at this moment. Will give patient abx (to be managed by ID), and regular wound care.     Future Discharge Recommendations:  -Patient to follow up in outpatient Podiatry clinic with Dr. Allison. Podiatry will schedule  -Antibiotics per ID  -HH/SNF to do dressings 2-3 times a week as follows: Cleanse foot with Vashe, floss gauze between toes, and then.... apply betadine soaked packing strip to left foot medial dorsal ulcer that probes deep, then apply betadine wet-to-dry gauze, then cast padding, and ace wrap.   -PWB to LLE in CAM boot with assistance of walker.   -Keep dressings clean, dry, and intact until follow-up appointment            Oneil Acevedo DPM  Podiatry  [facility] ([facility])

## 2024-12-16 NOTE — NURSING
3  hours  dialysis treatment completed . 1 L Uf removed . Both HD needles were deaccessed and pressure held in each site for 5 minutes and wrapped with tape and guaze .   Report  given to primary Nurse . Patient was transported in stretcher.

## 2024-12-16 NOTE — SUBJECTIVE & OBJECTIVE
Subjective:     Interval History: NAEON, NAD, VSS. Patient is afebrile, dressings are clean/dry/ and intact.   New pedal complaints: None  New results: bone biopsy and wound culture not growing anything.   Denies post op fever.          Scheduled Meds:   atorvastatin  80 mg Oral QHS    carvediloL  25 mg Oral BID WM    ceFEPime IV (PEDS and ADULTS)  1 g Intravenous Q24H    cetirizine  5 mg Oral Daily    epoetin tariq-ebpx (RETACRIT) injection  50 Units/kg Intravenous Every Mon, Wed, Fri    fluticasone propionate  1 spray Each Nostril Daily    gabapentin  200 mg Oral TID    And    gabapentin  100 mg Oral QHS    heparin (porcine)  5,000 Units Subcutaneous Q8H    insulin glargine U-100 (Lantus)  14 Units Subcutaneous QHS    sevelamer carbonate  800 mg Oral TID WM     Continuous Infusions:  PRN Meds:  Current Facility-Administered Medications:     acetaminophen, 650 mg, Oral, Q6H PRN    aluminum & magnesium hydroxide-simethicone, 30 mL, Oral, Q6H PRN    dextrose 10%, 12.5 g, Intravenous, PRN    dextrose 10%, 25 g, Intravenous, PRN    glucagon (human recombinant), 1 mg, Intramuscular, PRN    glucose, 16 g, Oral, PRN    glucose, 24 g, Oral, PRN    HYDROcodone-acetaminophen, 1 tablet, Oral, Q6H PRN    influenza, 0.5 mL, Intramuscular, Prior to discharge    insulin aspart U-100, 0-5 Units, Subcutaneous, QID (AC + HS) PRN    melatonin, 6 mg, Oral, Nightly PRN    naloxone, 0.02 mg, Intravenous, PRN    ondansetron, 4 mg, Intravenous, Q8H PRN    promethazine, 12.5 mg, Oral, Q6H PRN    sodium chloride 0.9%, 10 mL, Intravenous, Q12H PRN    Review of Systems   All other systems reviewed and are negative.    Objective:     Vital Signs (Most Recent):  Temp: 97.2 °F (36.2 °C) (12/16/24 1205)  Pulse: 77 (12/16/24 1205)  Resp: 18 (12/16/24 1205)  BP: (!) 123/58 (12/16/24 1205)  SpO2: 95 % (12/16/24 1205) Vital Signs (24h Range):  Temp:  [97.2 °F (36.2 °C)-98.6 °F (37 °C)] 97.2 °F (36.2 °C)  Pulse:  [63-80] 77  Resp:  [18] 18  SpO2:   "[94 %-100 %] 95 %  BP: (103-184)/(52-76) 123/58     Weight: 100.3 kg (221 lb 1.9 oz)  Body mass index is 34.63 kg/m².    Foot Exam    General  Orientation: alert and oriented to person, place, and time   Affect: appropriate       Left Foot/Ankle      Inspection and Palpation  Skin Exam: drainage, maceration, cellulitis, skin changes, abnormal color, ulcer and erythema;     Neurovascular  Dorsalis pedis: 1+  Posterior tibial: 1+  Saphenous nerve sensation: diminished  Tibial nerve sensation: diminished  Superficial peroneal nerve sensation: diminished  Deep peroneal nerve sensation: diminished  Sural nerve sensation: diminished    Comments  Left lower extremity:    Plantar midfoot ulcer:  Mixed granular fibrous base.  Hypergranulation tissue noted as well.  Medial forefoot ulcer.  Long and mixed fibrogranular base.  Dorsal foot:  Small draining ulcer that probes deep to bone.                   Laboratory:  A1C:   Recent Labs   Lab 10/07/24  0000 12/02/24  0000 12/09/24  1332   HGBA1C 6.2* 5.8 5.3     CBC:   Recent Labs   Lab 12/16/24  0242   WBC 9.22   RBC 2.44*   HGB 7.6*   HCT 24.9*      *   MCH 31.1*   MCHC 30.5*     CMP:   Recent Labs   Lab 12/12/24  0434 12/13/24  0824 12/16/24  0242      < > 107   CALCIUM 8.7   < > 8.8   ALBUMIN 2.1*   < > 2.1*   PROT 6.0  --   --       < > 139   K 4.6   < > 4.5   CO2 25   < > 21*      < > 109   BUN 36*   < > 43*   CREATININE 3.8*   < > 5.0*   ALKPHOS 92  --   --    ALT 9*  --   --    AST 15  --   --    BILITOT 0.3  --   --     < > = values in this interval not displayed.     CRP:   Recent Labs   Lab 12/13/24  0824   CRP 15.9*     ESR: No results for input(s): "SEDRATE" in the last 168 hours.    Diagnostic Results:  I have reviewed all pertinent imaging results/findings within the past 24 hours.      "

## 2024-12-16 NOTE — PT/OT/SLP PROGRESS
"Occupational Therapy   Treatment    Name: Georgina Arias  MRN: [id number]  Admitting Diagnosis:  Post-operative infection       Recommendations:     Discharge Recommendations: Low Intensity Therapy  Discharge Equipment Recommendations:  walker, rolling  Barriers to discharge:  None    Assessment:     Georgina Arias is a 54 y.o. female with a medical diagnosis of Post-operative infection.  She presents with performance deficits affecting function are impaired functional mobility, impaired self care skills.     Rehab Prognosis:  Good; patient would benefit from acute skilled OT services to address these deficits and reach maximum level of function.       Plan:     Patient to be seen 3 x/week to address the above listed problems via neuromuscular re-education, therapeutic exercises, therapeutic activities, self-care/home management  Plan of Care Expires: 01/12/25  Plan of Care Reviewed with: patient    Subjective     Patient:  "I am ready to go home.  I don't like the boot; it has a weird bottom and makes me feel unsteady."  "I feel as if I am ready to go home.  My mom will be helping with whatever I need."   Pain/Comfort:  Pain Rating 1: 0/10  Pain Rating Post-Intervention 1: 0/10    Objective:     Communicated with: Nurse prior to session.  Patient found supine with bed alarm, PureWick upon OT entry to room.    General Precautions: Standard, fall, hearing impaired    Orthopedic Precautions:LLE weight bearing as tolerated  Braces:  (Darco shoe)  Respiratory Status: Room air     Occupational Performance:     Bed Mobility:    Patient completed Rolling/Turning to Left with  modified independence  Patient completed Rolling/Turning to Right with modified independence  Patient completed Supine to Sit with modified independence  Patient completed Sit to Supine with modified independence     Functional Mobility/Transfers:  Patient completed Sit <> Stand Transfer with stand by assistance  with  no assistive device "   Patient completed stand pivot transfers close SBA    Activities of Daily Living:  Feeding:  modified independence    Grooming: modified independence while seated  Upper Body Dressing: set up assistance  Lower Body Dressing: stand by assistance      [facility] 6 Click ADL: 19    Treatment & Education:  Patient alert and oriented x 3; able to follow 4/4 one step commands.  Patient attentive and interactive throughout the session.  At end of session, transporter arrived to take patient to dialysis.    Patient left supine with all lines intact, call button in reach, and bed alarm on    GOALS:   Multidisciplinary Problems       Occupational Therapy Goals          Problem: Occupational Therapy    Goal Priority Disciplines Outcome Interventions   Occupational Therapy Goal     OT, PT/OT Progressing    Description: DME Justifications     Rolling Walker- Patient demonstrates a mobility limitation that significantly impairs their ability to participate in one or more mobility related activities of daily living. Patient's mobility limitation cannot be sufficiently resolved with the use of a cane, but can be sufficiently resolved with the use of a rolling walker.The use of a rolling walker will considerably improve their ability to participate in MRADLs. Patient will use the walker on a regular basis at home.      Goals to be met by: 1/12/2025     Patient will increase functional independence with ADLs by performing:    UE Dressing with [place].  LE Dressing with [place].  Grooming while standing at sink with [place].  Toileting from toilet with [place] for hygiene and clothing management.   Toilet transfer to toilet with [place].  Increased functional strength to 5/5 for BUE.  Upper extremity exercise program x10 reps per handout, with independence.                         Time Tracking:     OT Date of Treatment: 12/16/24  OT Start Time: 0730  OT Stop Time: 0759  OT Total Time (min): 29 min    Billable  Minutes:Self Care/Home Management 29    OT/[place]: OT          12/16/2024

## 2024-12-16 NOTE — PLAN OF CARE
Goals remain appropriate.  Problem: Occupational Therapy  Goal: Occupational Therapy Goal  Description: DME Justifications     Rolling Walker- Patient demonstrates a mobility limitation that significantly impairs their ability to participate in one or more mobility related activities of daily living. Patient's mobility limitation cannot be sufficiently resolved with the use of a cane, but can be sufficiently resolved with the use of a rolling walker.The use of a rolling walker will considerably improve their ability to participate in MRADLs. Patient will use the walker on a regular basis at home.      Goals to be met by: 1/15/2025     Patient will increase functional independence with ADLs by performing:    UE Dressing with [place].  LE Dressing with [place].  Grooming while standing at sink with [place].  Toileting from toilet with [place] for hygiene and clothing management.   Toilet transfer to toilet with [place].  Increased functional strength to 5/5 for BUE.  Upper extremity exercise program x10 reps per handout, with independence.    Outcome: Progressing

## 2024-12-16 NOTE — PLAN OF CARE
[facility] (Hospital)  Discharge Final Note    Primary Care Provider: Alonso Ling MD    Expected Discharge Date: 12/16/2024    Final Discharge Note (most recent)       Final Note - 12/16/24 1559          Final Note    Assessment Type Final Discharge Note (P)      Anticipated Discharge Disposition Home-Health Care Svc (P)      Hospital Resources/Appts/Education Provided Provided patient/caregiver with written discharge plan information (P)         Post-Acute Status    Post-Acute Authorization Home Health (P)      Home Health Status Set-up Complete/Auth obtained (P)      Patient choice form signed by patient/caregiver List with quality metrics by geographic area provided (P)      Discharge Delays None known at this time (P)                    Important Message from Medicare    Contact Info       Alonso Ling MD   Specialty: Internal Medicine   Relationship: PCP - General    [address] [facility] [address]   Phone: [phone]       Next Steps: Follow up    Marilyn Rodriguez MD   Specialty: Family Medicine    [address]  [place] [address]   Phone: [phone]       Next Steps: Follow up on 12/23/2024    Instructions: Follow-up appointment at 1:30 pm.          Future Appointments   Date Time Provider Department Center   12/23/2024  1:30 PM Marilyn Rodriguez MD [facility] IM [facility]   1/14/2025  2:00 PM Andrew Davis Jr., PA [facility]   1/30/2025  1:30 PM Andrew Davis Jr., PA [facility]   2/27/2025  9:00 AM Nirmal Cruz NP [facility] [id number]     Discharge Plan A and Plan B have been determined by review of patient's clinical status, future medical and therapeutic needs, and coverage/benefits for post-acute care in coordination with multidisciplinary team members.    Sw met with pt at bedside to discuss dc plan. Pt is in need of IV antibiotics and sw called her HD clinic [facility] in [place] off of [facility] and spoke with  Kimberli (ph: [phone]) the unit manager on getting the pt set up with IV antibiotics and HD. Mercedes faxed over the cefepime injection prescription with all the instructions. Mercedes also set pt up with [facility] (pts choice) for wound care. Pt to dc home with HH and IV antibiotics through her HD clinic. Mercedes updated medical team on dc set up complete.     Nohemi Chakraborty [facility]  Case management   Ext. [phone]

## 2024-12-16 NOTE — PLAN OF CARE
Problem: Adult Inpatient Plan of Care  Goal: Plan of Care Review  Outcome: Progressing  Goal: Optimal Comfort and Wellbeing  Outcome: Progressing     Problem: Diabetes Comorbidity  Goal: Blood Glucose Level Within Targeted Range  Outcome: Progressing     Problem: Skin Injury Risk Increased  Goal: Skin Health and Integrity  Outcome: Progressing

## 2024-12-17 NOTE — DISCHARGE SUMMARY
[facility] - Neurosurgery ([facility])  [facility] Medicine  Discharge Summary      Patient Name: Georgina Arias  MRN: [id number]  RAOUL: [id number]  Patient Class: IP- Inpatient  Admission Date: 12/9/2024  Hospital Length of Stay: 7 days  Discharge Date and Time: 12/16/2024  5:39 PM  Attending Physician: Court att. providers found   Discharging Provider: Zenon Angeles DO  Primary Care Provider: Alonso Ling MD  [facility] Medicine Team: [facility] HOSP MED  Zenon Angeles DO  Primary Care Team: [facility]    HPI:   Georgina Arias is a 54 y.o. female with a PMHx of obesity s/p gastric sleeve, PAD, hypertensive heart disease with HFpEF, grade II diastolic dysfunction, DM2, HTN, HLD, ESRD on HD (last dialyzed 12/9), and charcot foot s/p reconstruction 11/11 who presents due to foul smelling drainage from LLE cast. The patient reports he has been going to podiatry weekly after surgery and last had her cast changed 11/29. Pt had been having increased pain to her left foot, swelling, and drainage so wound cxs were obtained during that visit, and she was started on levaquin and doxycycline x10 days. Endorses compliance with abx. The patient reports ongoing pain and swelling and states over the past few days she had dark foul smelling drainage reporting it looked like old blood. HD center was concerned about drainage and sent her after dialysis. She denies fever/chills. She does note 2-3 episodes of diarrhea on both Saturday and Sunday but none today. She denies CP, SOB, cough, abdominal pain, headache, or dysuria. She reports she makes urine about 3-4x/daily.    In the ED, patient mildly tachycardic otherwise VSS, afebrile. Hgb with stable anemia. Cr 3.9 (consistent with ESRD). Glucose 138. Albumin 2.6. AST 47. POC lactate 0.99. Blood cxs in process. UA pending. Xray L ankle with no acute displaced fracture or dislocation of the ankle. There are surgical changes of the foot, comparison to the previous exam is limited  given casting material and artifact at that time. Xray L foot with  interval development of lucency about the proximal aspect of the 3rd metatarsal about the head of the screw construct.  Findings are concerning for osteomyelitis. No convincing acute displaced fracture or dislocation of the foot noting chronic changes throughout the midfoot and surgical changes, similar in configuration. There is diffuse edema about the foot, new since the prior exam. Correlation for cellulitis as warranted. The patient received 1g IV cefepime. Podiatry consulted who removed cast and redressed her foot.    * No surgery found *      Hospital Course:   Admitted for post operative infection.  Podiatry consulted.  Removed cast.  Followed patient for wound care.  MRI showed osteomyelitis of tarsal bones.  Podiatry performed bone biopsy of affected area 12.12.24 at bedside.  No surgery planned.  ID consulted and now on cefepime.  Okay to discharge from ID standpoint; recommending 2g IV cefepime after HD on HD days; end date 1.20.24.  Will need re-imaging of left foot at that time.  Needs outpatient follow up with podiatry and ID.  CM set up abx with her HD center.  Pt deemed appropriate for discharge; seen and examined prior to departure. Plan discussed with pt, who was agreeable and amenable; medications were discussed and reviewed, outpatient follow-up scheduled, ER precautions were given, all questions were answered to the pt's satisfaction, and was subsequently discharged.     Goals of Care Treatment Preferences:  Code Status: Full Code      SDOH Screening:  The patient was screened for utility difficulties, food insecurity, transport difficulties, housing insecurity, and interpersonal safety and there were no concerns identified this admission.     Consults:   Consults (From admission, onward)          Status Ordering Provider     Inpatient consult to Midline team  Once        Provider:  (Not yet assigned)    Completed ALEC TOBAR  "    Inpatient consult to Infectious Diseases  Once        Provider:  (Not yet assigned)    Completed ALEC TOBAR     Inpatient consult to Registered Dietitian/Nutritionist  Once        Provider:  (Not yet assigned)    Completed ONDINA MCKEON     Inpatient consult to Nephrology  Once        Provider:  (Not yet assigned)    Completed BRANDEE LERNER     Inpatient consult to Podiatry  Once        Provider:  (Not yet assigned)    Completed ANTHONY LEÓN            * Post-operative infection  S/p charcot foot reconstruction to L foot on 11/11 now with increased drainage, left foot pain/swelling  -Afebrile, no leukocytosis.  -Blood cxs in process.  -POC lactate 0.99, procal 0.13  -CRP 53.6  -Xray L foot with There is been interval development of lucency about the proximal aspect of the 3rd metatarsal about the head of the screw construct.  Findings are concerning for osteomyelitis. There is diffuse edema about the foot, new since the prior exam.   -MRI L foot w/o pending showed "1. 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  2. 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  3. Ulceration along the plantar and dorsal lateral aspect of the foot with probable communication with the deeper midfoot compartment.  Small fluid collection along the superior aspect of the 4th and 5th metatarsals, concerning for abscess."  -Podiatry consulted   - no surgery indicated. Treat with antibiotics per ID   - bedside bone biopsy 12/12  -consult ID - continue cefepime. Vancomycin stopped   Await culture results   Change to cefepime 2 g IV post HD on HD days for DC. End date 1/20/204  -PRN pain control.   -Elevate LLE.  -Fall precautions.  -Reviewed previous cxs from podiatry visit on 11/29:      Component 10 d ago   Aerobic Bacterial Culture      Abnormal   ENTEROBACTER " CLOACAE  Many  VC      Aerobic Bacterial Culture  Abnormal   KLEBSIELLA PNEUMONIAE  Many  Skin meño also present    Resulting Agency [facility]        Susceptibility     Enterobacter cloacae Klebsiella pneumoniae     CULTURE, AEROBIC  (SPECIFY SOURCE) CULTURE, AEROBIC  (SPECIFY SOURCE)     Amp/Sulbactam   <=8/4 mcg/mL Sensitive     Cefazolin   <=2 mcg/mL Sensitive     Cefepime <=2 mcg/mL Sensitive <=2 mcg/mL Sensitive     Ceftriaxone   <=1 mcg/mL Sensitive     Ciprofloxacin <=0.25 mcg/mL Sensitive <=0.25 mcg/mL Sensitive     Ertapenem <=0.5 mcg/mL Sensitive <=0.5 mcg/mL Sensitive     Gentamicin <=2 mcg/mL Sensitive <=2 mcg/mL Sensitive     Levofloxacin <=0.5 mcg/mL Sensitive <=0.5 mcg/mL Sensitive     Meropenem <=1 mcg/mL Sensitive <=1 mcg/mL Sensitive     Piperacillin/Tazo   <=8 mcg/mL Sensitive     Tobramycin <=2 mcg/mL Sensitive <=2 mcg/mL Sensitive     Trimeth/Sulfa <=2/38 mcg/mL Sensitive <=2/38 mcg/mL Sensitive           ESRD (end stage renal disease)  MWF schedule?    Residual renal function?- Yes  Last dialyzed 12/09    - Nephrology consulted  - Continue chronic hemodialysis  - Monitor daily electrolytes and defer dialysis orders to nephrology  - Renally dose medications  - Continue phosphorus binders  - Daily weights/strict I&Os  - 1.5L FR    Class 2 severe obesity with serious comorbidity and body mass index (BMI) of 38.0 to 38.9 in adult  Body mass index is 34.63 kg/m². Obesity complicates all aspects of disease management from diagnostic modalities to treatment. Weight loss encouraged and health benefits explained to patient.     Chronic diastolic congestive heart failure  Patient has Diastolic (HFpEF) heart failure that is Chronic. On presentation their CHF was well compensated. Most recent BNP and echo results are listed below.  Latest ECHO  Results for orders placed during the hospital encounter of 09/11/23    Echo    Interpretation Summary    Left Ventricle: The left ventricle is normal in size. Normal  wall thickness. Normal wall motion. There is low normal systolic function with a visually estimated ejection fraction of 50 - 55%. Ejection fraction by visual approximation is 55%. There is normal diastolic function.    Right Ventricle: Right ventricle was not well visualized due to poor acoustic window. Normal right ventricular cavity size. Wall thickness is normal. Right ventricle wall motion  is normal. Systolic function is normal.    Aortic Valve: There is moderate aortic valve sclerosis. There is annular calcification present.    Mitral Valve: There is moderate mitral annular calcification present.    IVC/SVC: Normal venous pressure at 3 mmHg.    Current Heart Failure Medications  carvediloL tablet 25 mg, 2 times daily with meals, Oral    Plan  - Monitor strict I&Os and daily weights.    - Place on telemetry  - Low sodium diet  - Place on fluid restriction of 1.5 L.   - Cardiology has not been consulted  - The patient's volume status is at their baseline  - Volume management per HD.    Essential hypertension  Patient's blood pressure range in the last 24 hours was: BP  Min: 95/45  Max: 137/61.The patient's inpatient anti-hypertensive regimen is listed below:  Current Antihypertensives  carvediloL tablet 25 mg, 2 times daily with meals, Oral    Plan  - BP is controlled, no changes needed to their regimen    Hyperlipidemia   Patient is chronically on statin.will continue for now. Monitor clinically. Last LDL was   Lab Results   Component Value Date    LDLCALC Invalid, Trig>400.0 07/30/2014        Peripheral vascular disease  PAD s/p right SFA stent (11/2017), s/p right common iliac stent s/p PTA occluded stents [place] [facility] [place] S/p ax fem bypass and stent to sfa with Dr. Maya on 9/14/23  -Continue statin, hold ASA pending official podiatry recs.    Type II diabetes mellitus  Patient's FSGs are controlled on current medication regimen.  Last A1c reviewed-   Lab Results   Component Value Date  "   HGBA1C 5.8 12/02/2024     Most recent fingerstick glucose reviewed- No results for input(s): "POCTGLUCOSE" in the last 24 hours.  Current correctional scale  Low  Maintain anti-hyperglycemic dose as follows-   Antihyperglycemics (From admission, onward)      Start     Stop Route Frequency Ordered    12/10/24 2100  insulin glargine U-100 (Lantus) pen 16 Units         -- SubQ Nightly 12/10/24 0543    12/09/24 1923  insulin aspart U-100 pen 0-5 Units         -- SubQ Before meals & nightly PRN 12/09/24 1824          Hold Oral hypoglycemics while patient is in the hospital.  -Accuchecks AC/HS      Final Active Diagnoses:    Diagnosis Date Noted POA    PRINCIPAL PROBLEM:  Post-operative infection [T81.40XA] 12/09/2024 Yes    Osteomyelitis [M86.9] 12/10/2024 Yes    Cellulitis [L03.90] 12/10/2024 Yes    Wound infection after surgery [T81.49XA] 10/26/2023 Yes    S/P femoral-femoral bypass surgery [Z95.828] 09/15/2023 Not Applicable    ESRD (end stage renal disease) [N18.6] 09/12/2023 Yes    Class 2 severe obesity with serious comorbidity and body mass index (BMI) of 38.0 to 38.9 in adult [E66.812, E66.01, Z68.38] 09/08/2022 Not Applicable    Chronic diastolic congestive heart failure [I50.32] 05/18/2022 Yes    Essential hypertension [I10]  Yes    Hyperlipidemia [E78.5]  Yes    Type II diabetes mellitus [E11.9]  Yes    Peripheral vascular disease [I73.9]  Yes      Problems Resolved During this Admission:       Discharged Condition: good    Disposition: Home or Self Care    Follow Up:   Follow-up Information       Alonso Ling MD .    Specialty: Internal Medicine  Contact information:  [address] [facility] [address] [phone]               Marilyn Rodriguez MD Follow up on 12/23/2024.    Specialty: Family Medicine  Why: Follow-up appointment at 1:30 pm.  Contact information:  [address]  [place] [address] [phone]                           Patient Instructions:      Diet diabetic " "    Notify your health care provider if you experience any of the following:  temperature >100.4     Activity as tolerated       Significant Diagnostic Studies: N/A    Pending Diagnostic Studies:       None           Medications:  Reconciled Home Medications:      Medication List        START taking these medications      ceFEPIme 1 gram injection  Infuse 2g IV post HD on HD days            CHANGE how you take these medications      gabapentin 100 MG capsule  Commonly known as: NEURONTIN  Take 1 capsule (100 mg total) by mouth once daily.  What changed:   medication strength  how much to take  when to take this     MOUNJARO 10 mg/0.5 mL Pnij  Generic drug: tirzepatide  Inject 10 mg into the skin.  What changed: Another medication with the same name was removed. Continue taking this medication, and follow the directions you see here.            CONTINUE taking these medications      acetaminophen 500 MG tablet  Commonly known as: TYLENOL  Take 2 tablets (1,000 mg total) by mouth every 8 (eight) hours as needed.     aspirin 81 MG EC tablet  Commonly known as: ECOTRIN  Take 1 tablet (81 mg total) by mouth once daily.     atorvastatin 80 MG tablet  Commonly known as: LIPITOR  Take 1 tablet by mouth every evening.     blood-glucose meter Misc  1 each by Other route.     C-TUB Misc  Generic drug: miscellaneous medical supply  Hearing amplification ([employer]CR[employer] preferred)     carvediloL 25 MG tablet  Commonly known as: COREG  Take 1 tablet (25 mg total) by mouth 2 (two) times daily with meals.     COMFORT EZ PEN NEEDLES 32 gauge x 5/32" Ndle  Generic drug: pen needle, diabetic  SMARTSIG:injection Daily     fluticasone propionate 50 mcg/actuation nasal spray  Commonly known as: FLONASE  Fluticasone Propionate 50 MCG/ACT Nasal Suspension QTY: 1 bottle Days: 30 Refills: 5  Written: 09/30/20 Patient Instructions: inhale 2 sprays in each nostril once daily     HYDROcodone-acetaminophen  mg per tablet  Commonly known as: " NORCO  Take 1 tablet by mouth every 6 (six) hours as needed for Pain.     LANTUS SOLOSTAR U-100 INSULIN 100 unit/mL (3 mL) Inpn pen  Generic drug: insulin glargine U-100 (Lantus)  Inject 40 Units into the skin every evening.     levocetirizine 5 MG tablet  Commonly known as: XYZAL  Take 5 mg by mouth.     linaGLIPtin 5 mg Tab tablet  Commonly known as: TRADJENTA  Take 1 tablet by mouth once daily.     ondansetron 4 MG Tbdl  Commonly known as: ZOFRAN-ODT  Take 4 mg by mouth every 6 (six) hours.     promethazine 12.5 MG Tab  Commonly known as: PHENERGAN  Take 1 tablet (12.5 mg total) by mouth every 6 (six) hours as needed (to use with Norco, known to tolerate).     Remedy Phytoplex AntifungaL 2 % top powder  Generic drug: miconazole NITRATE 2 %  Apply topically as needed for Itching.     sevelamer carbonate 800 mg Tab  Commonly known as: RENVELA  Take by mouth.     TRUE METRIX GLUCOSE TEST STRIP Strp  Generic drug: blood sugar diagnostic  3 (three) times daily.     TRUEPLUS LANCETS 30 gauge Misc  Generic drug: lancets     XPHOZAH 30 mg Tab  Generic drug: tenapanor  Take 30 mg by mouth.            STOP taking these medications      doxycycline 100 MG Cap  Commonly known as: VIBRAMYCIN     levoFLOXacin 750 MG tablet  Commonly known as: LEVAQUIN     oxyCODONE 5 MG immediate release tablet  Commonly known as: ROXICODONE              Indwelling Lines/Drains at time of discharge:   Lines/Drains/Airways       Drain  Duration                  Hemodialysis AV Fistula  Right forearm -- days         Hemodialysis AV Fistula Right forearm -- days    Female External Urinary Catheter w/ Suction 12/12/24 2300 4 days                    Time spent on the discharge of patient: >35 minutes         Zenon Angeles DO  Department of Hospital Medicine  [facility] Neurosurgery ([facility])

## 2024-12-19 ENCOUNTER — OFFICE VISIT (OUTPATIENT)
Dept: PODIATRY | Facility: CLINIC | Age: 54
End: 2024-12-19
Payer: MEDICARE

## 2024-12-19 VITALS
BODY MASS INDEX: 34.63 KG/M2 | HEIGHT: 67 IN | DIASTOLIC BLOOD PRESSURE: 65 MMHG | SYSTOLIC BLOOD PRESSURE: 113 MMHG | HEART RATE: 65 BPM

## 2024-12-19 DIAGNOSIS — L97.524 ULCER OF LEFT FOOT WITH NECROSIS OF BONE: ICD-10-CM

## 2024-12-19 DIAGNOSIS — M14.672 CHARCOT'S JOINT OF LEFT FOOT: ICD-10-CM

## 2024-12-19 DIAGNOSIS — Z98.890 POST-OPERATIVE STATE: Primary | ICD-10-CM

## 2024-12-19 DIAGNOSIS — R26.2 INABILITY TO WALK: ICD-10-CM

## 2024-12-19 DIAGNOSIS — R26.9 GAIT ABNORMALITY: ICD-10-CM

## 2024-12-19 LAB — BACTERIA SPEC ANAEROBE CULT: NORMAL

## 2024-12-19 PROCEDURE — 99999 PR PBB SHADOW E&M-EST. PATIENT-LVL IV: CPT | Mod: PBBFAC,,, | Performed by: STUDENT IN AN ORGANIZED HEALTH CARE EDUCATION/TRAINING PROGRAM

## 2024-12-19 NOTE — PROGRESS NOTES
Subjective:     Patient    Georgina Arias is a 54 y.o. female.    Problem    08/01/24: Previously followed by Dr Prado for wound care, left foot Charcot. Charcot has progressed over the past couple of months despite total contact casting. Has also previously tried change in shoes, inserts, surgical boots, rest without improvement. Minimal pain in left foot due to neuropathy, had prior partial 1st ray amputation on left. Cannot be off her feet because she does not have help at home or has minimal help. Admits to numbness, tingling.     08/23/24: Returns for cast change, no issues with last cast, did prefer walking bottom rather than cast shoe.     09/04/24: Returns for cast change. No new issues. Is OK with surgery but would like to wait until after birthday in November.     09/25/24: Returns for foot check, has been in tall surgical boot. Occasional lateral foot pain, occasional plantar foot pain/throbbing. No new concerns. OK with proceeding with surgery in Nov.     10/15/24: Returns for foot check, in tall boot on left. Has been in football dressing on right. Seeing PCP tomorrow.     11/15/24: Returns for urgent postop check; severe left foot and leg pain in calf, not improving and possibly worsening. The pain is migrating and may be nerve pain per patient.     11/20/24: Returns for cast change. Hgb in the 7s postop, slowly rebounding now. Pain slightly improved. No new concerns.     11/29/24: Returns for cast change. Hgb now bouncing around 7 since surgery, may need transfusion today. Left foot has continued to drain significantly, also pain was improving but now recently flared again.     12/19/24: Returns for LLE reevaluation s/p recent hospitalization; on IV antibiotics for possible OM per MRI though this is likely just Charcot changes, agree there is significant SSTI though that would benefit from ongoing antibiotics. Drainage slowly improving, wounds improving. Cannot tolerate surgical boot and  cannot use cast due to drainage. NWB LLE at this time and needs wheelchair per HH.     Primary Care Provider    Primary Care Provider: Alonso Ling MD   Last Seen: Patient does not have a PCP or has not yet seen their PCP     History    History obtained from patient and review of medical records.     Past Medical History:   Diagnosis Date    Hyperlipidemia     Hypertension     Peptic ulcer disease     Peripheral artery disease     PONV (postoperative nausea and vomiting)     Stage IV CKD     Type II diabetes mellitus        Past Surgical History:   Procedure Laterality Date    ANGIOGRAM, LOWER ARTERIAL, UNILATERAL Left 09/13/2023    Procedure: ANGIOGRAM, LOWER ARTERIAL, UNILATERAL;  Surgeon: Maxx Maya MD;  Location: [address];  Service: Vascular;  Laterality: Left;    ANGIOGRAPHY OF LOWER EXTREMITY Left 09/14/2023    Procedure: ANGIOGRAM, LOWER EXTREMITY with balloon angioplasty ultraverse 5mm x 60mm;  Surgeon: Maxx Maya MD;  Location: [facility] OR [address] [place] [address];  Service: Vascular;  Laterality: Left;  ultraverse 5mm x 60mm  fluoro: 12.41  contrast: 44ml  mGy: 65.57  Gycm2: 23.50    AORTOGRAPHY WITH EXTREMITY RUNOFF Left 09/13/2023    Procedure: AORTOGRAM, WITH EXTREMITY RUNOFF;  Surgeon: Maxx Maya MD;  Location: [facility] OR [address] [place] [address];  Service: Vascular;  Laterality: Left;  8.1 min  663.63 mGy  176.39 Gy.cm  178ml Dye     AV FISTULA PLACEMENT Right     CHOLECYSTECTOMY      COLONOSCOPY  11/06/2013    normal    COLONOSCOPY  05/2023    cancelled due to inadequate prep    COLONOSCOPY  06/2023    results unknown    CREATION, BYPASS, ARTERIAL, AXILLARY TO BILATERAL FEMORAL Left 09/14/2023    Procedure: CREATION, BYPASS, ARTERIAL, AXILLARY TO BILATERAL FEMORAL;  Surgeon: Maxx Maya MD;  Location: [address];  Service: Vascular;  Laterality: Left;  Left Axillary to Bilateral Femoral Bypass, Left Femoral to Above Knee Popliteal Bypass; SLIDER BED    CYSTOSCOPY W/ URETERAL STENT PLACEMENT Right  08/28/2018    Procedure: CYSTOSCOPY, WITH URETERAL STENT INSERTION (ADD ON );  Surgeon: Bubba Perez MD;  Location: [facility] OR;  Service: Urology;  Laterality: Right;  (ADD ON )    DEBRIDEMENT OF FOOT Left 08/03/2023    Procedure: DEBRIDEMENT, FOOT;  Surgeon: Aleida Flannery DPM;  Location: [facility] OR;  Service: Podiatry;  Laterality: Left;    Excisional debridement of ulcer distal great toe left foot w/ partial resection distal phalanx Left 08/03/2023    FOOT AMPUTATION THROUGH METATARSAL Left 09/14/2023    Procedure: AMPUTATION, FOOT, TRANSMETATARSAL partial 1st ray amputation;  Surgeon: Jesus Valles DPM;  Location: [facility] OR 2ND FLR;  Service: Podiatry;  Laterality: Left;  partial ray    I&D L 1st ray w/ amputation L hallux IPJ Left 08/14/2023    INCISION AND DRAINAGE FOOT Left 09/14/2023    Procedure: INCISION AND DRAINAGE, FOOT;  Surgeon: Jesus Valles DPM;  Location: [facility] OR 2ND FLR;  Service: Podiatry;  Laterality: Left;    Injection L PT tendon sheath Left 08/14/2023    LENGTHENING OF ACHILLES TENDON Left 11/11/2024    Procedure: LENGTHENING, TENDON, ACHILLES;  Surgeon: Marco Allison DPM;  Location: [facility] OR;  Service: Podiatry;  Laterality: Left;    PERIPHERAL ARTERIAL STENT GRAFT Right     RECONSTRUCTION WITH FUSION OF CHARCOT FOOT Left 11/11/2024    Procedure: RECONSTRUCTION, CHARCOT FOOT, WITH FUSION;  Surgeon: Marco Allison DPM;  Location: [facility] OR;  Service: Podiatry;  Laterality: Left;  4 hours; foot tray; mini c arm; saw/drill    REMOVAL OF NAIL OF DIGIT Left 08/03/2023    Procedure: REMOVAL, NAIL, DIGIT great toe;  Surgeon: Aleida Flannery DPM;  Location: [facility] OR;  Service: Podiatry;  Laterality: Left;    REVISION, AMPUTATION, TRANSMETATARSAL Left 11/11/2024    Procedure: REVISION, AMPUTATION, TRANSMETATARSAL;  Surgeon: Marco Allison DPM;  Location: [facility] OR;  Service: Podiatry;  Laterality: Left;    STENT, SUPERFICIAL FEMORAL ARTERY Left 09/14/2023    Procedure: STENT, SUPERFICIAL  FEMORAL ARTERY;  Surgeon: Maxx Maya MD;  Location: [facility] OR 2ND FLR;  Service: Vascular;  Laterality: Left;  5 x 100 mm    TOE AMPUTATION Left 2023    Procedure: AMPUTATION, TOE hallux IPJ;  Surgeon: Aleida Flannery DPM;  Location: [facility] OR;  Service: Podiatry;  Laterality: Left;    TOE AMPUTATION Left 2023    Procedure: AMPUTATION, TOE hallux, possible 1st met.head;  Surgeon: Aleida Flannery DPM;  Location: [facility] OR;  Service: Podiatry;  Laterality: Left;    URETEROSCOPIC REMOVAL OF URETERIC CALCULUS Right 2018    Procedure: EXTRACTION-STONE-URETEROSCOPY;  Surgeon: Bubba Perez MD;  Location: [facility];  Service: Urology;  Laterality: Right;        Objective:     Vitals  Wt Readings from Last 1 Encounters:   24 100.3 kg (221 lb 1.9 oz)     Temp Readings from Last 1 Encounters:   24 97.2 °F (36.2 °C) (Oral)     BP Readings from Last 1 Encounters:   24 113/65     Pulse Readings from Last 1 Encounters:   24 65       Dermatological Exam    Skin:  Pedal hair growth diminished and Pedal skin thin and shiny on right; postulcerative callus  Pedal hair growth diminished and Pedal skin thin and shiny on left; some incisions healed, some dehiscing to fat but improving; serous drainage    Nails:  9 nail(s) thickened and 9 nail(s) discolored; left 1st nail/toe absent    Vascular Exam    Arteries:  Posterior tibial artery palpable on right  Dorsalis pedis artery palpable on right  Posterior tibial artery palpable on left  Dorsalis pedis artery palpable on left    Veins:  Superficial veins unremarkable on right  Superficial veins unremarkable on left    Swellin+ pitting on right  2+ pitting on left    Neurological Exam    Howells touch test:  2/6 sites sensed, loss of protective sensation     Musculoskeletal Exam    Footwear:  Diabetic on right  Slipper on left    Gait Exam:   Ambulatory Status: Ambulatory  Gait: Apropulsive, cannot bear weight on left foot due to painful  wounds  Assistive Devices: None    Foot Progression Angle:  Normal on right  Normal on left     Right Lower Extremity Additional Findings:  Right foot and ankle function, strength, and range of motion unremarkable except as noted above.     Left Lower Extremity Additional Findings:  Foot rectus s/p Charcot reconstruction  Left foot and ankle function, strength, and range of motion unremarkable except as noted above.    Imaging and Other Tests    Imagin/10/24 left foot X rays: midfoot Charcot deformity with plantar subluxation of navicular and cuboid (progressed from prior films); also with prior 1st ray amputation and widening of 1st-2nd ray interval    10/12/23 VAS CAMILA: left CAMILA 0.85 (likely above healing threshold)    24 left foot X rays: appears to be some consolidation of the Charcot deformity.     24 left foot X rays: further consolidation of Charcot deformity    Independently reviewed and interpreted imaging, findings are as follows: N/A    Other Tests: The following A1c results were reviewed.   Hemoglobin A1C   Date Value Ref Range Status   2024 5.3 4.0 - 5.6 % Final     Comment:     ADA Screening Guidelines:  5.7-6.4%  Consistent with prediabetes  >or=6.5%  Consistent with diabetes    High levels of fetal hemoglobin interfere with the HbA1C  assay. Heterozygous hemoglobin variants (HbS, HgC, etc)do  not significantly interfere with this assay.   However, presence of multiple variants may affect accuracy.     2024 5.8 4.8 - 5.9 % Final   10/07/2024 6.2 (H) 4.8 - 5.9 % Final   2024 5.3 4.8 - 5.9 % Final   2023 7.2 (H) 4.0 - 5.6 % Final     Comment:     ADA Screening Guidelines:  5.7-6.4%  Consistent with prediabetes  >or=6.5%  Consistent with diabetes    High levels of fetal hemoglobin interfere with the HbA1C  assay. Heterozygous hemoglobin variants (HbS, HgC, etc)do  not significantly interfere with this assay.   However, presence of multiple variants may affect  accuracy.     08/28/2018 7.9 (H) 4.0 - 5.6 % Final     Comment:     ADA Screening Guidelines:  5.7-6.4%  Consistent with prediabetes  >or=6.5%  Consistent with diabetes  High levels of fetal hemoglobin interfere with the HbA1C  assay. Heterozygous hemoglobin variants (HbS, HgC, etc)do  not significantly interfere with this assay.   However, presence of multiple variants may affect accuracy.           Assessment:     Encounter Diagnoses   Name Primary?    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    Diabetic foot with peripheral neuropathy and peripheral arterial disease   -Performed shoe inspection and diabetic foot education. Reviewed importance of blood glucose control, proper nutrition, and foot hygiene to minimize risk of complications of diabetes. Recommended daily foot inspections, daily moisturizer to feet, avoiding sharp instruments to feet, appropriate footwear at all times when ambulating, and following up regularly for routine foot care.   -Diabetic foot risk: 2023 IWF high ulcer risk.   -Next foot exam due August 2025.    Right foot wound: healed  -Will monitor.     S/p left foot Charcot reconstruction 11/11   -Dressing changed. HH dressing changes.   -LLE NWB with wheelchair, once drainage resolved can return to LLE WB in total contact cast.    -Pain medication as prescribed.   -Antibiotic plan per ID.     Gait impairment s/p surgery, pain LLE  -Ordered wheelchair, to be provided through HH.   -Georgina Arias has a mobility limitation that significantly impairs her ability to participate in one or more mobility related activities of daily living (MRADL's) such as toileting, feeding, dressing, grooming, and bathing in customary locations in the home. The mobility limitation cannot be  sufficiently resolved by the use of a cane or walker. The use of a manual wheelchair will significantly improve the patient's ability to participate in MRADLS and the patient will use it on regular basis in the home. Georgina Arias has expressed her willingness to use a manual wheelchair in the home. Patient's upper body strength is sufficient for propulsion. He/She also has a caregiver who is available, willing, and able to provide assistance with the wheelchair when needed       Return to clinic next week, call sooner PRN.

## 2024-12-26 ENCOUNTER — LAB VISIT (OUTPATIENT)
Dept: LAB | Facility: HOSPITAL | Age: 54
End: 2024-12-26
Payer: MEDICARE

## 2024-12-26 DIAGNOSIS — L03.90 CELLULITIS OF SKIN WITH LYMPHANGITIS: ICD-10-CM

## 2024-12-26 DIAGNOSIS — M85.80 SAPHO SYNDROME: ICD-10-CM

## 2024-12-26 DIAGNOSIS — T81.40XA POSTOPERATIVE INFECTION: Primary | ICD-10-CM

## 2024-12-26 DIAGNOSIS — M86.9 SAPHO SYNDROME: ICD-10-CM

## 2024-12-26 DIAGNOSIS — L70.9 SAPHO SYNDROME: ICD-10-CM

## 2024-12-26 DIAGNOSIS — L40.3 SAPHO SYNDROME: ICD-10-CM

## 2024-12-26 DIAGNOSIS — M65.90 SAPHO SYNDROME: ICD-10-CM

## 2024-12-26 LAB
ALBUMIN SERPL BCP-MCNC: 2.9 G/DL (ref 3.5–5.2)
ALP SERPL-CCNC: 97 U/L (ref 40–150)
ALT SERPL W/O P-5'-P-CCNC: 13 U/L (ref 10–44)
ANION GAP SERPL CALC-SCNC: 16 MMOL/L (ref 8–16)
AST SERPL-CCNC: 14 U/L (ref 10–40)
BILIRUB SERPL-MCNC: 0.5 MG/DL (ref 0.1–1)
BUN SERPL-MCNC: 47 MG/DL (ref 6–20)
CALCIUM SERPL-MCNC: 9.3 MG/DL (ref 8.7–10.5)
CHLORIDE SERPL-SCNC: 97 MMOL/L (ref 95–110)
CO2 SERPL-SCNC: 25 MMOL/L (ref 23–29)
CREAT SERPL-MCNC: 5.5 MG/DL (ref 0.5–1.4)
CRP SERPL-MCNC: 23.6 MG/L (ref 0–8.2)
ERYTHROCYTE [DISTWIDTH] IN BLOOD BY AUTOMATED COUNT: 14.2 % (ref 11.5–14.5)
ERYTHROCYTE [SEDIMENTATION RATE] IN BLOOD BY WESTERGREN METHOD: 89 MM/HR (ref 0–20)
EST. GFR  (NO RACE VARIABLE): 9 ML/MIN/1.73 M^2
GLUCOSE SERPL-MCNC: 180 MG/DL (ref 70–110)
HCT VFR BLD AUTO: 28.2 % (ref 37–48.5)
HGB BLD-MCNC: 8.9 G/DL (ref 12–16)
MCH RBC QN AUTO: 31.2 PG (ref 27–31)
MCHC RBC AUTO-ENTMCNC: 31.6 G/DL (ref 32–36)
MCV RBC AUTO: 99 FL (ref 82–98)
PLATELET # BLD AUTO: 204 K/UL (ref 150–450)
PMV BLD AUTO: 9.9 FL (ref 9.2–12.9)
POTASSIUM SERPL-SCNC: 2.9 MMOL/L (ref 3.5–5.1)
PROT SERPL-MCNC: 7.7 G/DL (ref 6–8.4)
RBC # BLD AUTO: 2.85 M/UL (ref 4–5.4)
SODIUM SERPL-SCNC: 138 MMOL/L (ref 136–145)
WBC # BLD AUTO: 8.81 K/UL (ref 3.9–12.7)

## 2024-12-26 PROCEDURE — 36415 COLL VENOUS BLD VENIPUNCTURE: CPT

## 2024-12-26 PROCEDURE — 85651 RBC SED RATE NONAUTOMATED: CPT

## 2024-12-26 PROCEDURE — 85027 COMPLETE CBC AUTOMATED: CPT

## 2024-12-26 PROCEDURE — 80053 COMPREHEN METABOLIC PANEL: CPT

## 2024-12-26 PROCEDURE — 86140 C-REACTIVE PROTEIN: CPT

## 2024-12-27 ENCOUNTER — OFFICE VISIT (OUTPATIENT)
Dept: INFECTIOUS DISEASES | Facility: CLINIC | Age: 54
End: 2024-12-27
Payer: MEDICARE

## 2024-12-27 ENCOUNTER — OFFICE VISIT (OUTPATIENT)
Dept: PODIATRY | Facility: CLINIC | Age: 54
End: 2024-12-27
Payer: MEDICARE

## 2024-12-27 VITALS
HEART RATE: 76 BPM | BODY MASS INDEX: 34.63 KG/M2 | HEIGHT: 67 IN | BODY MASS INDEX: 34.63 KG/M2 | SYSTOLIC BLOOD PRESSURE: 109 MMHG | DIASTOLIC BLOOD PRESSURE: 61 MMHG | HEIGHT: 67 IN

## 2024-12-27 DIAGNOSIS — Z98.890 POST-OPERATIVE STATE: Primary | ICD-10-CM

## 2024-12-27 DIAGNOSIS — M86.179 OTHER ACUTE OSTEOMYELITIS, UNSPECIFIED ANKLE AND FOOT: ICD-10-CM

## 2024-12-27 DIAGNOSIS — M86.172 OTHER ACUTE OSTEOMYELITIS OF LEFT FOOT: ICD-10-CM

## 2024-12-27 DIAGNOSIS — L03.116 CELLULITIS OF LEFT LOWER EXTREMITY: ICD-10-CM

## 2024-12-27 DIAGNOSIS — Z51.81 THERAPEUTIC DRUG MONITORING: Primary | ICD-10-CM

## 2024-12-27 DIAGNOSIS — T81.49XA WOUND INFECTION AFTER SURGERY: ICD-10-CM

## 2024-12-27 DIAGNOSIS — T81.40XD POSTOPERATIVE INFECTION, UNSPECIFIED TYPE, SUBSEQUENT ENCOUNTER: ICD-10-CM

## 2024-12-27 PROCEDURE — 99999 PR PBB SHADOW E&M-EST. PATIENT-LVL IV: CPT | Mod: PBBFAC,,, | Performed by: STUDENT IN AN ORGANIZED HEALTH CARE EDUCATION/TRAINING PROGRAM

## 2024-12-27 PROCEDURE — 99999 PR PBB SHADOW E&M-EST. PATIENT-LVL II: CPT | Mod: PBBFAC,,, | Performed by: PHYSICIAN ASSISTANT

## 2024-12-27 RX ORDER — CARVEDILOL 12.5 MG/1
1 TABLET ORAL
COMMUNITY
Start: 2024-02-27

## 2024-12-27 RX ORDER — POTASSIUM CHLORIDE 20 MEQ/1
20 TABLET, EXTENDED RELEASE ORAL
COMMUNITY
Start: 2024-07-15

## 2024-12-27 RX ORDER — B,C/FOLIC/ZINC/SELENOMETH/D3/E 0.8-12.5MG
TABLET ORAL
COMMUNITY

## 2024-12-27 RX ORDER — CARVEDILOL 6.25 MG/1
6.25 TABLET ORAL 2 TIMES DAILY WITH MEALS
COMMUNITY

## 2024-12-27 RX ORDER — CALCIUM CARB/VITAMIN D3/VIT K1 500MG-1000
1 TABLET,CHEWABLE ORAL
COMMUNITY
Start: 2024-06-18

## 2024-12-27 RX ORDER — GABAPENTIN 300 MG/1
1 CAPSULE ORAL 3 TIMES DAILY
COMMUNITY
Start: 2024-12-17

## 2024-12-27 NOTE — PROGRESS NOTES
Subjective:     Patient    Georgina Arias is a 54 y.o. female.    Problem    08/01/24: Previously followed by Dr Prado for wound care, left foot Charcot. Charcot has progressed over the past couple of months despite total contact casting. Has also previously tried change in shoes, inserts, surgical boots, rest without improvement. Minimal pain in left foot due to neuropathy, had prior partial 1st ray amputation on left. Cannot be off her feet because she does not have help at home or has minimal help. Admits to numbness, tingling.     08/23/24: Returns for cast change, no issues with last cast, did prefer walking bottom rather than cast shoe.     09/04/24: Returns for cast change. No new issues. Is OK with surgery but would like to wait until after birthday in November.     09/25/24: Returns for foot check, has been in tall surgical boot. Occasional lateral foot pain, occasional plantar foot pain/throbbing. No new concerns. OK with proceeding with surgery in Nov.     10/15/24: Returns for foot check, in tall boot on left. Has been in football dressing on right. Seeing PCP tomorrow.     11/15/24: Returns for urgent postop check; severe left foot and leg pain in calf, not improving and possibly worsening. The pain is migrating and may be nerve pain per patient.     11/20/24: Returns for cast change. Hgb in the 7s postop, slowly rebounding now. Pain slightly improved. No new concerns.     11/29/24: Returns for cast change. Hgb now bouncing around 7 since surgery, may need transfusion today. Left foot has continued to drain significantly, also pain was improving but now recently flared again.     12/19/24: Returns for LLE reevaluation s/p recent hospitalization; on IV antibiotics for possible OM per MRI though this is likely just Charcot changes, agree there is significant SSTI though that would benefit from ongoing antibiotics. Drainage slowly improving, wounds improving. Cannot tolerate surgical boot and  cannot use cast due to drainage. NWB LLE at this time and needs wheelchair per HH.     12/27/24: Returns for LLE 6.5 weeks s/p Charcot reconstruction; postop course complicated by infection and excessive drainage. Wounds and drainage now improving drastically. Got wheelchair. No new concerns. Seen jointly with ID today.       Primary Care Provider    Primary Care Provider: Alonso Ling MD   Last Seen: Patient does not have a PCP or has not yet seen their PCP     History    History obtained from patient and review of medical records.     Past Medical History:   Diagnosis Date    Hyperlipidemia     Hypertension     Peptic ulcer disease     Peripheral artery disease     PONV (postoperative nausea and vomiting)     Stage IV CKD     Type II diabetes mellitus        Past Surgical History:   Procedure Laterality Date    ANGIOGRAM, LOWER ARTERIAL, UNILATERAL Left 09/13/2023    Procedure: ANGIOGRAM, LOWER ARTERIAL, UNILATERAL;  Surgeon: Maxx Maya MD;  Location: [facility] OR [address] [place] [address];  Service: Vascular;  Laterality: Left;    ANGIOGRAPHY OF LOWER EXTREMITY Left 09/14/2023    Procedure: ANGIOGRAM, LOWER EXTREMITY with balloon angioplasty ultraverse 5mm x 60mm;  Surgeon: Maxx Maya MD;  Location: [facility] OR [address] [place] [address];  Service: Vascular;  Laterality: Left;  ultraverse 5mm x 60mm  fluoro: 12.41  contrast: 44ml  mGy: 65.57  Gycm2: 23.50    AORTOGRAPHY WITH EXTREMITY RUNOFF Left 09/13/2023    Procedure: AORTOGRAM, WITH EXTREMITY RUNOFF;  Surgeon: Maxx Maya MD;  Location: [address];  Service: Vascular;  Laterality: Left;  8.1 min  663.63 mGy  176.39 Gy.cm  178ml Dye     AV FISTULA PLACEMENT Right     CHOLECYSTECTOMY      COLONOSCOPY  11/06/2013    normal    COLONOSCOPY  05/2023    cancelled due to inadequate prep    COLONOSCOPY  06/2023    results unknown    CREATION, BYPASS, ARTERIAL, AXILLARY TO BILATERAL FEMORAL Left 09/14/2023    Procedure: CREATION, BYPASS, ARTERIAL, AXILLARY TO BILATERAL FEMORAL;   Surgeon: Maxx Maya MD;  Location: [facility] OR [facility] FLR;  Service: Vascular;  Laterality: Left;  Left Axillary to Bilateral Femoral Bypass, Left Femoral to Above Knee Popliteal Bypass; SLIDER BED    CYSTOSCOPY W/ URETERAL STENT PLACEMENT Right 08/28/2018    Procedure: CYSTOSCOPY, WITH URETERAL STENT INSERTION (ADD ON );  Surgeon: Bubba Perez MD;  Location: [facility];  Service: Urology;  Laterality: Right;  (ADD ON )    DEBRIDEMENT OF FOOT Left 08/03/2023    Procedure: DEBRIDEMENT, FOOT;  Surgeon: Aleida Flannery DPM;  Location: [facility] OR;  Service: Podiatry;  Laterality: Left;    Excisional debridement of ulcer distal great toe left foot w/ partial resection distal phalanx Left 08/03/2023    FOOT AMPUTATION THROUGH METATARSAL Left 09/14/2023    Procedure: AMPUTATION, FOOT, TRANSMETATARSAL partial 1st ray amputation;  Surgeon: Jesus Valles DPM;  Location: [facility] OR 2ND FLR;  Service: Podiatry;  Laterality: Left;  partial ray    I&D L 1st ray w/ amputation L hallux IPJ Left 08/14/2023    INCISION AND DRAINAGE FOOT Left 09/14/2023    Procedure: INCISION AND DRAINAGE, FOOT;  Surgeon: Jesus Valles DPM;  [facility];  Service: Podiatry;  Laterality: Left;    Injection L PT tendon sheath Left 08/14/2023    LENGTHENING OF ACHILLES TENDON Left 11/11/2024    Procedure: LENGTHENING, TENDON, ACHILLES;  Surgeon: Marco Allison DPM;  Location: [facility] OR;  Service: Podiatry;  Laterality: Left;    PERIPHERAL ARTERIAL STENT GRAFT Right     RECONSTRUCTION WITH FUSION OF CHARCOT FOOT Left 11/11/2024    Procedure: RECONSTRUCTION, CHARCOT FOOT, WITH FUSION;  Surgeon: Marco Allison DPM;  Location: [facility] OR;  Service: Podiatry;  Laterality: Left;  4 hours; foot tray; mini c arm; saw/drill    REMOVAL OF NAIL OF DIGIT Left 08/03/2023    Procedure: REMOVAL, NAIL, DIGIT great toe;  Surgeon: Aleida Flannery DPM;  Location: [facility] OR;  Service: Podiatry;  Laterality: Left;    REVISION, AMPUTATION, TRANSMETATARSAL Left  2024    Procedure: REVISION, AMPUTATION, TRANSMETATARSAL;  Surgeon: Marco Allison DPM;  Location: [facility] OR;  Service: Podiatry;  Laterality: Left;    STENT, SUPERFICIAL FEMORAL ARTERY Left 2023    Procedure: STENT, SUPERFICIAL FEMORAL ARTERY;  Surgeon: Maxx Maya MD;  Location: [facility] OR 2ND FLR;  Service: Vascular;  Laterality: Left;  5 x 100 mm    TOE AMPUTATION Left 2023    Procedure: AMPUTATION, TOE hallux IPJ;  Surgeon: Aleida Flannery DPM;  Location: [facility] OR;  Service: Podiatry;  Laterality: Left;    TOE AMPUTATION Left 2023    Procedure: AMPUTATION, TOE hallux, possible 1st met.head;  Surgeon: Aleida Flannery DPM;  Location: [facility] OR;  Service: Podiatry;  Laterality: Left;    URETEROSCOPIC REMOVAL OF URETERIC CALCULUS Right 2018    Procedure: EXTRACTION-STONE-URETEROSCOPY;  Surgeon: Bubba Perez MD;  Location: [facility];  Service: Urology;  Laterality: Right;        Objective:     Vitals  Wt Readings from Last 1 Encounters:   24 100.3 kg (221 lb 1.9 oz)     Temp Readings from Last 1 Encounters:     BP Readings from Last 1 Encounters:   24 109/61     Pulse Readings from Last 1 Encounters:   24 76       Dermatological Exam    Skin:  Pedal hair growth diminished and Pedal skin thin and shiny on right; postulcerative callus  Pedal hair growth diminished and Pedal skin thin and shiny on left; some incisions healed, some dehiscing to fat but improving; minimal serous drainage    Nails:  9 nail(s) thickened and 9 nail(s) discolored; left 1st nail/toe absent    Vascular Exam    Arteries:  Posterior tibial artery palpable on right  Dorsalis pedis artery palpable on right  Posterior tibial artery palpable on left  Dorsalis pedis artery palpable on left    Veins:  Superficial veins unremarkable on right  Superficial veins unremarkable on left    Swellin+ pitting on right  2+ pitting on left    Neurological Exam    [place] touch test:  2/6 sites sensed, loss of  protective sensation     Musculoskeletal Exam    Footwear:  Diabetic on right  Slipper on left    Gait Exam:   Ambulatory Status: Ambulatory  Gait: NWB LLE temporarily  Assistive Devices: wheelchair    Foot Progression Angle:  Normal on right  Normal on left     Right Lower Extremity Additional Findings:  Right foot and ankle function, strength, and range of motion unremarkable except as noted above.     Left Lower Extremity Additional Findings:  Foot rectus s/p Charcot reconstruction  Left foot and ankle function, strength, and range of motion unremarkable except as noted above.    Imaging and Other Tests    Imagin/10/24 left foot X rays: midfoot Charcot deformity with plantar subluxation of navicular and cuboid (progressed from prior films); also with prior 1st ray amputation and widening of 1st-2nd ray interval    10/12/23 VAS CAMILA: left CAMILA 0.85 (likely above healing threshold)    24 left foot X rays: appears to be some consolidation of the Charcot deformity.     24 left foot X rays: further consolidation of Charcot deformity    Independently reviewed and interpreted imaging, findings are as follows: N/A    Other Tests: The following A1c results were reviewed.   Hemoglobin A1C   Date Value Ref Range Status   2024 5.3 4.0 - 5.6 % Final     Comment:     ADA Screening Guidelines:  5.7-6.4%  Consistent with prediabetes  >or=6.5%  Consistent with diabetes    High levels of fetal hemoglobin interfere with the HbA1C  assay. Heterozygous hemoglobin variants (HbS, HgC, etc)do  not significantly interfere with this assay.   However, presence of multiple variants may affect accuracy.     2024 5.8 4.8 - 5.9 % Final   10/07/2024 6.2 (H) 4.8 - 5.9 % Final   2024 5.3 4.8 - 5.9 % Final   2023 7.2 (H) 4.0 - 5.6 % Final     Comment:     ADA Screening Guidelines:  5.7-6.4%  Consistent with prediabetes  >or=6.5%  Consistent with diabetes    High levels of fetal hemoglobin interfere with the  HbA1C  assay. Heterozygous hemoglobin variants (HbS, HgC, etc)do  not significantly interfere with this assay.   However, presence of multiple variants may affect accuracy.     08/28/2018 7.9 (H) 4.0 - 5.6 % Final     Comment:     ADA Screening Guidelines:  5.7-6.4%  Consistent with prediabetes  >or=6.5%  Consistent with diabetes  High levels of fetal hemoglobin interfere with the HbA1C  assay. Heterozygous hemoglobin variants (HbS, HgC, etc)do  not significantly interfere with this assay.   However, presence of multiple variants may affect accuracy.           Assessment:     Encounter Diagnosis   Name Primary?    Post-operative state Yes              Plan:     I counseled the patient on her conditions, their implications and medical management.    Diabetic foot with peripheral neuropathy and peripheral arterial disease   -Performed shoe inspection and diabetic foot education. Reviewed importance of blood glucose control, proper nutrition, and foot hygiene to minimize risk of complications of diabetes. Recommended daily foot inspections, daily moisturizer to feet, avoiding sharp instruments to feet, appropriate footwear at all times when ambulating, and following up regularly for routine foot care.   -Diabetic foot risk: 2023 IWGDF high ulcer risk.   -Next foot exam due August 2025.    Right foot wound: healed  -Will monitor.     S/p left foot Charcot reconstruction 11/11   -Dressing changed. HH dressing changes.   -LLE NWB with wheelchair, once drainage resolved can return to LLE WB in total contact cast.    -Pain medication as prescribed.   -Antibiotic plan per ID.     Gait impairment s/p surgery, pain LLE  -Ordered wheelchair, to be provided through HH.   -Georgina Airas has a mobility limitation that significantly impairs her ability to participate in one or more mobility related activities of daily living (MRADL's) such as toileting, feeding, dressing, grooming, and bathing in customary locations in the  home. The mobility limitation cannot be sufficiently resolved by the use of a cane or walker. The use of a manual wheelchair will significantly improve the patient's ability to participate in MRADLS and the patient will use it on regular basis in the home. Georgina Arias has expressed her willingness to use a manual wheelchair in the home. Patient's upper body strength is sufficient for propulsion. He/She also has a caregiver who is available, willing, and able to provide assistance with the wheelchair when needed       Return to clinic next week, call sooner PRN.

## 2024-12-27 NOTE — PROGRESS NOTES
Subjective     Patient ID: Georgina Arias is a 54 y.o. female.    Chief Complaint:Follow-up      History of Present Illness    54-year-old female with ESRD on HD a history of left Charcot foot now status post reconstruction in November 11, 2024 and subsequently casted.  She recently developed wound drainage and malodor and was sent to the ED due to that.  MRI is concerning for abscesses and OM.  Recent wound cultures show Enterobacter cloaca and Klebsiella pneumoniae with no anaerobic growth from cultures on 11/29.  She had been treated with doxycycline and Levaquin x 10 days.      Bone 12/11 no growth to date and wound cultures 12/10 with skin meño.  MRIs obtained showing concern for osteomyelitis and small abscesses.  Remained on cefepime alone as inpt.  Clinically much improved and left foot pain essentially resolved prior to DC.  CRP has improved dramatically there is no signs of infection on exam.        Plan:  Cefepime 2 g IV post HD on HD days for DC  Plan for 6 weeks of antibiotics and we will need reimaging of the foot in 3-4 weeks and until the abscesses have been demonstrated to resolve on imaging.    12/27/2024:  Patient follows up today in his seen with Dr. Allison in Podiatry Clinic.  She reports doing better.  Her left foot drainage has improved.  She is not having any significant pain.  She has tolerated the cefepime without event post HD.  Denies fever, chills, sweating.    Review of Systems   Constitutional: Negative for chills, fever, malaise/fatigue and night sweats.   Cardiovascular:  Negative for chest pain.   Respiratory:  Negative for cough, hemoptysis, shortness of breath, sputum production and wheezing.    Skin:  Negative for rash and suspicious lesions.   Gastrointestinal:  Negative for abdominal pain, constipation, diarrhea, heartburn, nausea and vomiting.   Genitourinary:  Negative for dysuria and hematuria.        Objective   Physical Exam  Constitutional:       General: She is not  in acute distress.     Appearance: Normal appearance. She is well-developed. She is obese. She is not ill-appearing, toxic-appearing or diaphoretic.   HENT:      Head: Normocephalic and atraumatic.   Cardiovascular:      Rate and Rhythm: Normal rate and regular rhythm.      Heart sounds: Normal heart sounds. No murmur heard.     No friction rub. No gallop.   Pulmonary:      Effort: Pulmonary effort is normal. No respiratory distress.      Breath sounds: Normal breath sounds. No wheezing or rales.   Abdominal:      General: Bowel sounds are normal. There is no distension.      Palpations: Abdomen is soft. There is no mass.      Tenderness: There is no abdominal tenderness. There is no guarding or rebound.   Musculoskeletal:        Feet:    Skin:     General: Skin is warm and dry.   Neurological:      Mental Status: She is alert and oriented to person, place, and time.   Psychiatric:         Behavior: Behavior normal.        Latest Reference Range & Units 12/13/24 08:24 12/16/24 02:42 12/26/24 13:30   Sodium 136 - 145 mmol/L 135 (L) 139 138   Potassium 3.5 - 5.1 mmol/L 4.2 4.5 2.9 (LL)   Chloride 95 - 110 mmol/L 105 109 97   CO2 23 - 29 mmol/L 21 (L) 21 (L) 25   Anion Gap 8 - 16 mmol/L 9 9 16   BUN 6 - 20 mg/dL 43 (H) 43 (H) 47 (H)   Creatinine 0.5 - 1.4 mg/dL 4.5 (H) 5.0 (H) 5.5 (H)   eGFR >60 mL/min/1.73 m^2 11.0 ! 9.7 ! 9 !   Glucose 70 - 110 mg/dL 109 107 180 (H)   Calcium 8.7 - 10.5 mg/dL 9.0 8.8 9.3   Phosphorus Level 2.7 - 4.5 mg/dL 4.5 5.1 (H)    ALP 40 - 150 U/L   97   PROTEIN TOTAL 6.0 - 8.4 g/dL   7.7   Albumin 3.5 - 5.2 g/dL 2.3 (L) 2.1 (L) 2.9 (L)   BILIRUBIN TOTAL 0.1 - 1.0 mg/dL   0.5   AST 10 - 40 U/L   14   ALT 10 - 44 U/L   13   CRP 0.0 - 8.2 mg/L 15.9 (H)  23.6 (H)   (LL): Data is critically low  (L): Data is abnormally low  (H): Data is abnormally high  !: Data is abnormal       Assessment and Plan     1. Therapeutic drug monitoring    2. Cellulitis of left lower extremity    3. Other acute  osteomyelitis of left foot    4. Wound infection after surgery    5. Postoperative infection, unspecified type, subsequent encounter      54-year-old female with diabetes on dialysis with Charcot foot on the left that was reconstructed on 11/11/2024 with Dr. Mark Allison.  She developed signs and symptoms of infection with drainage from the left foot while in cast.  Wound cultures from 11/29/2024 showed Enterobacter cloaca and Klebsiella pneumoniae, both pansensitive.  She had been treated with doxy and Levaquin and failed to improve.  She was then seen as inpatient an MRI concerning for osteomyelitis and small abscesses.  Wound cultures on 12/10/2024 showed skin meño and bone biopsy on 12/12/2024 had no growth.  There are no pathological records available.  She is currently on an completing 6 weeks of cefepime post dialysis.  Labs show hypokalemia yesterday that was addressed by per hospital medicine physician.  She was instructed to take oral potassium and it will be repeated today in dialysis.  On exam her foot appears to be healing without significant signs of infection and she does not have any complaints today.  CRP remains mildly elevated at 23.6 of unclear cause.    Plan:  Continue cefepime post HD to complete 6 weeks  Weekly CBC, CMP, CRP with home health  Repeat MRI of the left foot given concern for possible abscesses closer to ending her antibiotics - ordered - will schedule for 1/17/25:  Hypokalemia noted on yesterday labs which has been addressed by Dr. Prakash Angeles D.O. her hospitalist.  She will be further managed by Nephrology and have repeat labs today in HD.  Follow up in 2 weeks-we will arrange  Patient was encouraged to call the office for further concerns or complaints.  Business card provided

## 2024-12-30 ENCOUNTER — TELEPHONE (OUTPATIENT)
Dept: PODIATRY | Facility: CLINIC | Age: 54
End: 2024-12-30
Payer: MEDICARE

## 2024-12-30 LAB
FUNGUS SPEC CULT: NORMAL
FUNGUS SPEC CULT: NORMAL

## 2024-12-30 NOTE — TELEPHONE ENCOUNTER
----- Message from Isauro sent at 12/30/2024 10:49 AM CST -----  Regarding: Earlier time  Contact: [phone]  Calling to get an earlier time for upcoming appointment on 01/02/25 due to dialysis for 10 am. Please contact patient as soon as possible.

## 2024-12-30 NOTE — TELEPHONE ENCOUNTER
Called patient and no answer left a voice mail to contact us at her earliest to help her schedule an appointment.

## 2024-12-30 NOTE — TELEPHONE ENCOUNTER
Called patient to let her know that an appointment has been made for her on 01/02/24 at 10:45 am with Dr Allison and that's the only appointment available, no answer will try to call her later

## 2024-12-30 NOTE — TELEPHONE ENCOUNTER
----- Message from Shanna sent at 12/30/2024  8:50 AM CST -----  Contact: pt @ [phone]  Georgina Arias calling regarding Appointment Access  (message) for #pt is calling about appt she supposed to have on Jan 2 in the morning and its not on portal, asking for call back

## 2024-12-31 ENCOUNTER — LAB VISIT (OUTPATIENT)
Dept: LAB | Facility: HOSPITAL | Age: 54
End: 2024-12-31
Payer: MEDICARE

## 2024-12-31 DIAGNOSIS — E11.69 DIABETES MELLITUS ASSOCIATED WITH HORMONAL ETIOLOGY: Primary | ICD-10-CM

## 2024-12-31 LAB
ALBUMIN SERPL BCP-MCNC: 2.9 G/DL (ref 3.5–5.2)
ALP SERPL-CCNC: 110 U/L (ref 40–150)
ALT SERPL W/O P-5'-P-CCNC: 15 U/L (ref 10–44)
ANION GAP SERPL CALC-SCNC: 13 MMOL/L (ref 8–16)
AST SERPL-CCNC: 16 U/L (ref 10–40)
BILIRUB SERPL-MCNC: 0.4 MG/DL (ref 0.1–1)
BUN SERPL-MCNC: 46 MG/DL (ref 6–20)
CALCIUM SERPL-MCNC: 9.3 MG/DL (ref 8.7–10.5)
CHLORIDE SERPL-SCNC: 97 MMOL/L (ref 95–110)
CO2 SERPL-SCNC: 26 MMOL/L (ref 23–29)
CREAT SERPL-MCNC: 5.5 MG/DL (ref 0.5–1.4)
CRP SERPL-MCNC: 19.8 MG/L (ref 0–8.2)
ERYTHROCYTE [DISTWIDTH] IN BLOOD BY AUTOMATED COUNT: 13.7 % (ref 11.5–14.5)
ERYTHROCYTE [SEDIMENTATION RATE] IN BLOOD BY PHOTOMETRIC METHOD: 68 MM/HR (ref 0–36)
EST. GFR  (NO RACE VARIABLE): 9 ML/MIN/1.73 M^2
GLUCOSE SERPL-MCNC: 180 MG/DL (ref 70–110)
HCT VFR BLD AUTO: 27.3 % (ref 37–48.5)
HGB BLD-MCNC: 8.4 G/DL (ref 12–16)
MCH RBC QN AUTO: 30.3 PG (ref 27–31)
MCHC RBC AUTO-ENTMCNC: 30.8 G/DL (ref 32–36)
MCV RBC AUTO: 99 FL (ref 82–98)
PLATELET # BLD AUTO: 232 K/UL (ref 150–450)
PMV BLD AUTO: 10.1 FL (ref 9.2–12.9)
POTASSIUM SERPL-SCNC: 3.5 MMOL/L (ref 3.5–5.1)
PROT SERPL-MCNC: 7.7 G/DL (ref 6–8.4)
RBC # BLD AUTO: 2.77 M/UL (ref 4–5.4)
SODIUM SERPL-SCNC: 136 MMOL/L (ref 136–145)
WBC # BLD AUTO: 6.46 K/UL (ref 3.9–12.7)

## 2024-12-31 PROCEDURE — 80053 COMPREHEN METABOLIC PANEL: CPT

## 2024-12-31 PROCEDURE — 86140 C-REACTIVE PROTEIN: CPT

## 2024-12-31 PROCEDURE — 85652 RBC SED RATE AUTOMATED: CPT

## 2024-12-31 PROCEDURE — 85027 COMPLETE CBC AUTOMATED: CPT

## 2025-01-02 ENCOUNTER — TELEPHONE (OUTPATIENT)
Dept: PODIATRY | Facility: CLINIC | Age: 55
End: 2025-01-02

## 2025-01-02 ENCOUNTER — OFFICE VISIT (OUTPATIENT)
Dept: PODIATRY | Facility: CLINIC | Age: 55
End: 2025-01-02
Payer: MEDICAID

## 2025-01-02 DIAGNOSIS — M14.672 CHARCOT'S JOINT OF LEFT FOOT: ICD-10-CM

## 2025-01-02 DIAGNOSIS — L97.524 ULCER OF LEFT FOOT WITH NECROSIS OF BONE: ICD-10-CM

## 2025-01-02 DIAGNOSIS — Z98.890 POST-OPERATIVE STATE: Primary | ICD-10-CM

## 2025-01-02 PROCEDURE — 29445 APPL RIGID TOT CNTC LEG CAST: CPT | Mod: 79,PBBFAC | Performed by: STUDENT IN AN ORGANIZED HEALTH CARE EDUCATION/TRAINING PROGRAM

## 2025-01-02 PROCEDURE — 99214 OFFICE O/P EST MOD 30 MIN: CPT | Mod: PBBFAC,25 | Performed by: STUDENT IN AN ORGANIZED HEALTH CARE EDUCATION/TRAINING PROGRAM

## 2025-01-02 PROCEDURE — 99999 PR PBB SHADOW E&M-EST. PATIENT-LVL IV: CPT | Mod: PBBFAC,,, | Performed by: STUDENT IN AN ORGANIZED HEALTH CARE EDUCATION/TRAINING PROGRAM

## 2025-01-02 PROCEDURE — 99999PBSHW PR PBB SHADOW TECHNICAL ONLY FILED TO HB: Mod: 79,PBBFAC,,

## 2025-01-02 NOTE — TELEPHONE ENCOUNTER
----- Message from Marco Allison DPM sent at 1/2/2025  2:37 PM CST -----  Regarding: Stopping HH  Please send subsequent HH orders to discontinue dressing changes to [facility]

## 2025-01-02 NOTE — PROGRESS NOTES
Subjective:     Patient    Georgina Arias is a 54 y.o. female.    Problem    2024: Previously followed by Dr Prado. Underwent left foot Charcot reconstruction 11/11/24 complicated by postoperative infection requiring hospitalization. Excessive drainage precluded use of TCC for a period but patient cannot tolerate surgical boots and so required a NWB period in wheelchair. Infection improving with IV antibiotics.      01/02/25: Returns for left foot check s/p surgery almost 2 months ago. Drainage has improved further. No new concerns.       Primary Care Provider    Primary Care Provider: Alonso Ling MD   Last Seen: Patient does not have a PCP or has not yet seen their PCP     History    History obtained from patient and review of medical records.     Past Medical History:   Diagnosis Date    Hyperlipidemia     Hypertension     Peptic ulcer disease     Peripheral artery disease     PONV (postoperative nausea and vomiting)     Stage IV CKD     Type II diabetes mellitus        Past Surgical History:   Procedure Laterality Date    ANGIOGRAM, LOWER ARTERIAL, UNILATERAL Left 09/13/2023    Procedure: ANGIOGRAM, LOWER ARTERIAL, UNILATERAL;  Surgeon: Maxx Maya MD;  Location: [facility] OR [address] [place] [address];  Service: Vascular;  Laterality: Left;    ANGIOGRAPHY OF LOWER EXTREMITY Left 09/14/2023    Procedure: ANGIOGRAM, LOWER EXTREMITY with balloon angioplasty ultraverse 5mm x 60mm;  Surgeon: Maxx Maya MD;  Location: [facility] OR [address] [place] [address];  Service: Vascular;  Laterality: Left;  ultraverse 5mm x 60mm  fluoro: 12.41  contrast: 44ml  mGy: 65.57  Gycm2: 23.50    AORTOGRAPHY WITH EXTREMITY RUNOFF Left 09/13/2023    Procedure: AORTOGRAM, WITH EXTREMITY RUNOFF;  Surgeon: Maxx Maya MD;  Location: [facility] OR [address] [place] [address];  Service: Vascular;  Laterality: Left;  8.1 min  663.63 mGy  176.39 Gy.cm  178ml Dye     AV FISTULA PLACEMENT Right     CHOLECYSTECTOMY      COLONOSCOPY  11/06/2013    normal    COLONOSCOPY  05/2023     cancelled due to inadequate prep    COLONOSCOPY  06/2023    results unknown    CREATION, BYPASS, ARTERIAL, AXILLARY TO BILATERAL FEMORAL Left 09/14/2023    Procedure: CREATION, BYPASS, ARTERIAL, AXILLARY TO BILATERAL FEMORAL;  Surgeon: Maxx Maya MD;  [facility];  Service: Vascular;  Laterality: Left;  Left Axillary to Bilateral Femoral Bypass, Left Femoral to Above Knee Popliteal Bypass; SLIDER BED    CYSTOSCOPY W/ URETERAL STENT PLACEMENT Right 08/28/2018    Procedure: CYSTOSCOPY, WITH URETERAL STENT INSERTION (ADD ON );  Surgeon: Bubba Perez MD;  Location: [facility] OR;  Service: Urology;  Laterality: Right;  (ADD ON )    DEBRIDEMENT OF FOOT Left 08/03/2023    Procedure: DEBRIDEMENT, FOOT;  Surgeon: Aleida Flannery DPM;  Location: [facility] OR;  Service: Podiatry;  Laterality: Left;    Excisional debridement of ulcer distal great toe left foot w/ partial resection distal phalanx Left 08/03/2023    FOOT AMPUTATION THROUGH METATARSAL Left 09/14/2023    Procedure: AMPUTATION, FOOT, TRANSMETATARSAL partial 1st ray amputation;  Surgeon: Jesus Valles DPM;  [facility];  Service: Podiatry;  Laterality: Left;  partial ray    I&D L 1st ray w/ amputation L hallux IPJ Left 08/14/2023    INCISION AND DRAINAGE FOOT Left 09/14/2023    Procedure: INCISION AND DRAINAGE, FOOT;  Surgeon: Jesus Valles DPM;  [facility];  Service: Podiatry;  Laterality: Left;    Injection L PT tendon sheath Left 08/14/2023    LENGTHENING OF ACHILLES TENDON Left 11/11/2024    Procedure: LENGTHENING, TENDON, ACHILLES;  Surgeon: Marco Allison DPM;  Location: [facility] OR;  Service: Podiatry;  Laterality: Left;    PERIPHERAL ARTERIAL STENT GRAFT Right     RECONSTRUCTION WITH FUSION OF CHARCOT FOOT Left 11/11/2024    Procedure: RECONSTRUCTION, CHARCOT FOOT, WITH FUSION;  Surgeon: Marco Allison DPM;  Location: [facility];  Service: Podiatry;  Laterality: Left;  4 hours; foot tray; mini c arm;  saw/drill    REMOVAL OF NAIL OF DIGIT Left 08/03/2023    Procedure: REMOVAL, NAIL, DIGIT great toe;  Surgeon: Aleida Flannery DPM;  Location: [facility] OR;  Service: Podiatry;  Laterality: Left;    REVISION, AMPUTATION, TRANSMETATARSAL Left 11/11/2024    Procedure: REVISION, AMPUTATION, TRANSMETATARSAL;  Surgeon: Marco Allison DPM;  Location: [facility] OR;  Service: Podiatry;  Laterality: Left;    STENT, SUPERFICIAL FEMORAL ARTERY Left 09/14/2023    Procedure: STENT, SUPERFICIAL FEMORAL ARTERY;  Surgeon: Maxx Maya MD;  Location: [facility] OR 2ND FLR;  Service: Vascular;  Laterality: Left;  5 x 100 mm    TOE AMPUTATION Left 08/14/2023    Procedure: AMPUTATION, TOE hallux IPJ;  Surgeon: Aleida Flannery DPM;  Location: [facility] OR;  Service: Podiatry;  Laterality: Left;    TOE AMPUTATION Left 08/25/2023    Procedure: AMPUTATION, TOE hallux, possible 1st met.head;  Surgeon: Aleida Flannery DPM;  Location: [facility] OR;  Service: Podiatry;  Laterality: Left;    URETEROSCOPIC REMOVAL OF URETERIC CALCULUS Right 09/11/2018    Procedure: EXTRACTION-STONE-URETEROSCOPY;  Surgeon: Bubba Perez MD;  Location: [facility];  Service: Urology;  Laterality: Right;        Objective:     Vitals  Wt Readings from Last 1 Encounters:   12/11/24 100.3 kg (221 lb 1.9 oz)     Temp Readings from Last 1 Encounters:     BP Readings from Last 1 Encounters:   12/27/24 109/61     Pulse Readings from Last 1 Encounters:   12/27/24 76       Dermatological Exam    Skin:  Pedal hair growth diminished and Pedal skin thin and shiny on right; postulcerative callus  Pedal hair growth diminished and Pedal skin thin and shiny on left; some incisions healed, some dehiscing to fat but improving; minimal serous drainage    Nails:  9 nail(s) thickened and 9 nail(s) discolored; left 1st nail/toe absent    Vascular Exam    Arteries:  Posterior tibial artery palpable on right  Dorsalis pedis artery palpable on right  Posterior tibial artery palpable on left  Dorsalis pedis artery  palpable on left    Veins:  Superficial veins unremarkable on right  Superficial veins unremarkable on left    Swellin+ pitting on right  2+ pitting on left    Neurological Exam    [place] touch test:  2/6 sites sensed, loss of protective sensation     Musculoskeletal Exam    Footwear:  Diabetic on right  Slipper on left    Gait Exam:   Ambulatory Status: Ambulatory  Gait: NWB LLE temporarily  Assistive Devices: wheelchair    Foot Progression Angle:  Normal on right  Normal on left     Right Lower Extremity Additional Findings:  Right foot and ankle function, strength, and range of motion unremarkable except as noted above.     Left Lower Extremity Additional Findings:  Foot rectus s/p Charcot reconstruction  Left foot and ankle function, strength, and range of motion unremarkable except as noted above.    Imaging and Other Tests    Imagin/10/24 left foot X rays: midfoot Charcot deformity with plantar subluxation of navicular and cuboid (progressed from prior films); also with prior 1st ray amputation and widening of 1st-2nd ray interval    10/12/23 VAS CAMILA: left CAMILA 0.85 (likely above healing threshold)    24 left foot X rays: appears to be some consolidation of the Charcot deformity.     24 left foot X rays: further consolidation of Charcot deformity    Independently reviewed and interpreted imaging, findings are as follows: N/A    Other Tests: The following A1c results were reviewed.   Hemoglobin A1C   Date Value Ref Range Status   2024 5.3 4.0 - 5.6 % Final     Comment:     ADA Screening Guidelines:  5.7-6.4%  Consistent with prediabetes  >or=6.5%  Consistent with diabetes    High levels of fetal hemoglobin interfere with the HbA1C  assay. Heterozygous hemoglobin variants (HbS, HgC, etc)do  not significantly interfere with this assay.   However, presence of multiple variants may affect accuracy.     2024 5.8 4.8 - 5.9 % Final   10/07/2024 6.2 (H) 4.8 - 5.9 % Final   2024  5.3 4.8 - 5.9 % Final   09/11/2023 7.2 (H) 4.0 - 5.6 % Final     Comment:     ADA Screening Guidelines:  5.7-6.4%  Consistent with prediabetes  >or=6.5%  Consistent with diabetes    High levels of fetal hemoglobin interfere with the HbA1C  assay. Heterozygous hemoglobin variants (HbS, HgC, etc)do  not significantly interfere with this assay.   However, presence of multiple variants may affect accuracy.     08/28/2018 7.9 (H) 4.0 - 5.6 % Final     Comment:     ADA Screening Guidelines:  5.7-6.4%  Consistent with prediabetes  >or=6.5%  Consistent with diabetes  High levels of fetal hemoglobin interfere with the HbA1C  assay. Heterozygous hemoglobin variants (HbS, HgC, etc)do  not significantly interfere with this assay.   However, presence of multiple variants may affect accuracy.           Assessment:     Encounter Diagnoses   Name Primary?    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    Diabetic foot with peripheral neuropathy and peripheral arterial disease   -Performed shoe inspection and diabetic foot education. Reviewed importance of blood glucose control, proper nutrition, and foot hygiene to minimize risk of complications of diabetes. Recommended daily foot inspections, daily moisturizer to feet, avoiding sharp instruments to feet, appropriate footwear at all times when ambulating, and following up regularly for routine foot care.   -Diabetic foot risk: 2023 IWGDF high ulcer risk.   -Next foot exam due August 2025.    Right foot wound: healed  -Will monitor.     S/p left foot Charcot reconstruction 11/11   - dressing changes discontinued.   -Applied total contact cast to left foot-leg; tolerated well.    -WBAT total contact cast LLE for short distances, NWB LLE with wheelchair for longer distances.   -Pain medication as prescribed.   -Antibiotic  plan per ID.     Gait impairment s/p surgery, pain LLE  -WBAT total contact cast LLE for short distances, NWB LLE with wheelchair for longer distances.        Return to clinic next week, call sooner PRN.

## 2025-01-03 ENCOUNTER — PATIENT MESSAGE (OUTPATIENT)
Dept: PODIATRY | Facility: CLINIC | Age: 55
End: 2025-01-03
Payer: MEDICARE

## 2025-01-08 LAB — FUNGUS SPEC CULT: NORMAL

## 2025-01-10 ENCOUNTER — TELEPHONE (OUTPATIENT)
Dept: PODIATRY | Facility: CLINIC | Age: 55
End: 2025-01-10
Payer: MEDICARE

## 2025-01-10 ENCOUNTER — OFFICE VISIT (OUTPATIENT)
Dept: PODIATRY | Facility: CLINIC | Age: 55
End: 2025-01-10
Payer: MEDICARE

## 2025-01-10 VITALS — BODY MASS INDEX: 34.63 KG/M2 | HEIGHT: 67 IN

## 2025-01-10 DIAGNOSIS — Z98.890 POST-OPERATIVE STATE: Primary | ICD-10-CM

## 2025-01-10 DIAGNOSIS — M14.672 CHARCOT'S JOINT OF LEFT FOOT: ICD-10-CM

## 2025-01-10 PROCEDURE — 99999 PR PBB SHADOW E&M-EST. PATIENT-LVL IV: CPT | Mod: PBBFAC,,, | Performed by: STUDENT IN AN ORGANIZED HEALTH CARE EDUCATION/TRAINING PROGRAM

## 2025-01-10 NOTE — PROGRESS NOTES
Subjective:     Patient    Georgina Arias is a 54 y.o. female.    Problem    2024: Previously followed by Dr Prado. Underwent left foot Charcot reconstruction 11/11/24 complicated by postoperative infection requiring hospitalization. Excessive drainage precluded use of TCC for a period but patient cannot tolerate surgical boots and so required a NWB period in wheelchair. Infection improving with IV antibiotics.      01/02/25: Returns for left foot check s/p surgery almost 2 months ago. Drainage has improved further. No new concerns.     01/10/25: Returns for left foot cast change. Wounds have healed. No new concerns.       Primary Care Provider    Primary Care Provider: Alonso Ling MD   Last Seen: Patient does not have a PCP or has not yet seen their PCP     History    History obtained from patient and review of medical records.     Past Medical History:   Diagnosis Date    Hyperlipidemia     Hypertension     Peptic ulcer disease     Peripheral artery disease     PONV (postoperative nausea and vomiting)     Stage IV CKD     Type II diabetes mellitus        Past Surgical History:   Procedure Laterality Date    ANGIOGRAM, LOWER ARTERIAL, UNILATERAL Left 09/13/2023    Procedure: ANGIOGRAM, LOWER ARTERIAL, UNILATERAL;  Surgeon: Maxx Maya MD;  Location: [address];  Service: Vascular;  Laterality: Left;    ANGIOGRAPHY OF LOWER EXTREMITY Left 09/14/2023    Procedure: ANGIOGRAM, LOWER EXTREMITY with balloon angioplasty ultraverse 5mm x 60mm;  Surgeon: Maxx Maya MD;  Location: [facility] OR [address] [place] [address];  Service: Vascular;  Laterality: Left;  ultraverse 5mm x 60mm  fluoro: 12.41  contrast: 44ml  mGy: 65.57  Gycm2: 23.50    AORTOGRAPHY WITH EXTREMITY RUNOFF Left 09/13/2023    Procedure: AORTOGRAM, WITH EXTREMITY RUNOFF;  Surgeon: Maxx Maya MD;  Location: [address];  Service: Vascular;  Laterality: Left;  8.1 min  663.63 mGy  176.39 Gy.cm  178ml Dye     AV FISTULA PLACEMENT Right      CHOLECYSTECTOMY      COLONOSCOPY  11/06/2013    normal    COLONOSCOPY  05/2023    cancelled due to inadequate prep    COLONOSCOPY  06/2023    results unknown    CREATION, BYPASS, ARTERIAL, AXILLARY TO BILATERAL FEMORAL Left 09/14/2023    Procedure: CREATION, BYPASS, ARTERIAL, AXILLARY TO BILATERAL FEMORAL;  Surgeon: Maxx Maya MD;  [facility];  Service: Vascular;  Laterality: Left;  Left Axillary to Bilateral Femoral Bypass, Left Femoral to Above Knee Popliteal Bypass; SLIDER BED    CYSTOSCOPY W/ URETERAL STENT PLACEMENT Right 08/28/2018    Procedure: CYSTOSCOPY, WITH URETERAL STENT INSERTION (ADD ON );  Surgeon: Bubba Perez MD;  Location: [facility] OR;  Service: Urology;  Laterality: Right;  (ADD ON )    DEBRIDEMENT OF FOOT Left 08/03/2023    Procedure: DEBRIDEMENT, FOOT;  Surgeon: Aleida Flannery DPM;  Location: [facility] OR;  Service: Podiatry;  Laterality: Left;    Excisional debridement of ulcer distal great toe left foot w/ partial resection distal phalanx Left 08/03/2023    FOOT AMPUTATION THROUGH METATARSAL Left 09/14/2023    Procedure: AMPUTATION, FOOT, TRANSMETATARSAL partial 1st ray amputation;  Surgeon: Jesus Valles DPM;  Location: [facility] OR 2ND FLR;  Service: Podiatry;  Laterality: Left;  partial ray    I&D L 1st ray w/ amputation L hallux IPJ Left 08/14/2023    INCISION AND DRAINAGE FOOT Left 09/14/2023    Procedure: INCISION AND DRAINAGE, FOOT;  Surgeon: Jesus Valles DPM;  [facility];  Service: Podiatry;  Laterality: Left;    Injection L PT tendon sheath Left 08/14/2023    LENGTHENING OF ACHILLES TENDON Left 11/11/2024    Procedure: LENGTHENING, TENDON, ACHILLES;  Surgeon: Marco Allison DPM;  Location: [facility] OR;  Service: Podiatry;  Laterality: Left;    PERIPHERAL ARTERIAL STENT GRAFT Right     RECONSTRUCTION WITH FUSION OF CHARCOT FOOT Left 11/11/2024    Procedure: RECONSTRUCTION, CHARCOT FOOT, WITH FUSION;  Surgeon: Marco Allison DPM;  Location: [facility]  OR;  Service: Podiatry;  Laterality: Left;  4 hours; foot tray; mini c arm; saw/drill    REMOVAL OF NAIL OF DIGIT Left 08/03/2023    Procedure: REMOVAL, NAIL, DIGIT great toe;  Surgeon: Aleida Flannery DPM;  Location: [facility] OR;  Service: Podiatry;  Laterality: Left;    REVISION, AMPUTATION, TRANSMETATARSAL Left 11/11/2024    Procedure: REVISION, AMPUTATION, TRANSMETATARSAL;  Surgeon: Marco Allison DPM;  Location: [facility] OR;  Service: Podiatry;  Laterality: Left;    STENT, SUPERFICIAL FEMORAL ARTERY Left 09/14/2023    Procedure: STENT, SUPERFICIAL FEMORAL ARTERY;  Surgeon: Maxx Maya MD;  Location: [facility] OR [address] [place] [address];  Service: Vascular;  Laterality: Left;  5 x 100 mm    TOE AMPUTATION Left 08/14/2023    Procedure: AMPUTATION, TOE hallux IPJ;  Surgeon: Aleida Flannery DPM;  Location: [facility] OR;  Service: Podiatry;  Laterality: Left;    TOE AMPUTATION Left 08/25/2023    Procedure: AMPUTATION, TOE hallux, possible 1st met.head;  Surgeon: Aleida Flannery DPM;  Location: [facility] OR;  Service: Podiatry;  Laterality: Left;    URETEROSCOPIC REMOVAL OF URETERIC CALCULUS Right 09/11/2018    Procedure: EXTRACTION-STONE-URETEROSCOPY;  Surgeon: Bubba Perez MD;  Location: [facility];  Service: Urology;  Laterality: Right;        Objective:     Vitals  Wt Readings from Last 1 Encounters:   12/11/24 100.3 kg (221 lb 1.9 oz)     Temp Readings from Last 1 Encounters:     BP Readings from Last 1 Encounters:   12/27/24 109/61     Pulse Readings from Last 1 Encounters:   12/27/24 76       Dermatological Exam    Skin:  Pedal hair growth diminished and Pedal skin thin and shiny on right; postulcerative callus  Pedal hair growth diminished and Pedal skin thin and shiny on left; All incisions healed    Nails:  9 nail(s) thickened and 9 nail(s) discolored; left 1st nail/toe absent    Vascular Exam    Arteries:  Posterior tibial artery palpable on right  Dorsalis pedis artery palpable on right  Posterior tibial artery palpable on left  Dorsalis  pedis artery palpable on left    Veins:  Superficial veins unremarkable on right  Superficial veins unremarkable on left    [place]+ pitting on right  2+ pitting on left    Neurological Exam    [place] touch test:  2/6 sites sensed, loss of protective sensation     Musculoskeletal Exam    Footwear:  Diabetic on right  Slipper on left    Gait Exam:   Ambulatory Status: Ambulatory  Gait: NWB LLE temporarily  Assistive Devices: wheelchair    Foot Progression Angle:  Normal on right  Normal on left     Right Lower Extremity Additional Findings:  Right foot and ankle function, strength, and range of motion unremarkable except as noted above.     Left Lower Extremity Additional Findings:  Foot rectus s/p Charcot reconstruction  Left foot and ankle function, strength, and range of motion unremarkable except as noted above.    Imaging and Other Tests    Imagin/10/24 left foot X rays: midfoot Charcot deformity with plantar subluxation of navicular and cuboid (progressed from prior films); also with prior 1st ray amputation and widening of 1st-2nd ray interval    10/12/23 VAS CAMILA: left CAMILA 0.85 (likely above healing threshold)    24 left foot X rays: appears to be some consolidation of the Charcot deformity.     24 left foot X rays: further consolidation of Charcot deformity    Independently reviewed and interpreted imaging, findings are as follows: N/A    Other Tests: The following A1c results were reviewed.   Hemoglobin A1C   Date Value Ref Range Status   2024 5.3 4.0 - 5.6 % Final     Comment:     ADA Screening Guidelines:  5.7-6.4%  Consistent with prediabetes  >or=6.5%  Consistent with diabetes    High levels of fetal hemoglobin interfere with the HbA1C  assay. Heterozygous hemoglobin variants (HbS, HgC, etc)do  not significantly interfere with this assay.   However, presence of multiple variants may affect accuracy.     2024 5.8 4.8 - 5.9 % Final   10/07/2024 6.2 (H) 4.8 - 5.9 % Final    08/20/2024 5.3 4.8 - 5.9 % Final   09/11/2023 7.2 (H) 4.0 - 5.6 % Final     Comment:     ADA Screening Guidelines:  5.7-6.4%  Consistent with prediabetes  >or=6.5%  Consistent with diabetes    High levels of fetal hemoglobin interfere with the HbA1C  assay. Heterozygous hemoglobin variants (HbS, HgC, etc)do  not significantly interfere with this assay.   However, presence of multiple variants may affect accuracy.     08/28/2018 7.9 (H) 4.0 - 5.6 % Final     Comment:     ADA Screening Guidelines:  5.7-6.4%  Consistent with prediabetes  >or=6.5%  Consistent with diabetes  High levels of fetal hemoglobin interfere with the HbA1C  assay. Heterozygous hemoglobin variants (HbS, HgC, etc)do  not significantly interfere with this assay.   However, presence of multiple variants may affect accuracy.           Assessment:     Encounter Diagnoses   Name Primary?    Post-operative state Yes    Charcot's joint of left foot         Plan:     I counseled the patient on her conditions, their implications and medical management.    Diabetic foot with peripheral neuropathy and peripheral arterial disease   -Performed shoe inspection and diabetic foot education. Reviewed importance of blood glucose control, proper nutrition, and foot hygiene to minimize risk of complications of diabetes. Recommended daily foot inspections, daily moisturizer to feet, avoiding sharp instruments to feet, appropriate footwear at all times when ambulating, and following up regularly for routine foot care.   -Diabetic foot risk: 2023 IWGDF high ulcer risk.   -Next foot exam due August 2025.    Right foot wound: healed  -Will monitor.     S/p left foot Charcot reconstruction 11/11    -Removed cast left foot-leg.   -Applied total contact cast to left foot-leg; tolerated well.    -WBAT total contact cast LLE for short distances, NWB LLE with wheelchair for longer distances.   -Pain medication as prescribed.   -Antibiotic plan per ID.     Gait impairment s/p  surgery, pain LLE  -WBAT total contact cast LLE for short distances, NWB LLE with wheelchair for longer distances.        Return to clinic next week, call sooner PRN.

## 2025-01-14 ENCOUNTER — TELEPHONE (OUTPATIENT)
Dept: PODIATRY | Facility: CLINIC | Age: 55
End: 2025-01-14
Payer: MEDICARE

## 2025-01-14 ENCOUNTER — OFFICE VISIT (OUTPATIENT)
Dept: INFECTIOUS DISEASES | Facility: CLINIC | Age: 55
End: 2025-01-14
Payer: MEDICARE

## 2025-01-14 VITALS
BODY MASS INDEX: 34.64 KG/M2 | HEART RATE: 84 BPM | TEMPERATURE: 99 F | SYSTOLIC BLOOD PRESSURE: 154 MMHG | HEIGHT: 67 IN | WEIGHT: 220.69 LBS | DIASTOLIC BLOOD PRESSURE: 60 MMHG

## 2025-01-14 DIAGNOSIS — T81.30XA WOUND DEHISCENCE: ICD-10-CM

## 2025-01-14 DIAGNOSIS — M14.672 CHARCOT JOINT OF LEFT FOOT: ICD-10-CM

## 2025-01-14 DIAGNOSIS — S91.302D OPEN WOUND OF LEFT FOOT, SUBSEQUENT ENCOUNTER: Primary | ICD-10-CM

## 2025-01-14 PROCEDURE — 3066F NEPHROPATHY DOC TX: CPT | Mod: CPTII,S$GLB,, | Performed by: PHYSICIAN ASSISTANT

## 2025-01-14 PROCEDURE — 3078F DIAST BP <80 MM HG: CPT | Mod: CPTII,S$GLB,, | Performed by: PHYSICIAN ASSISTANT

## 2025-01-14 PROCEDURE — 99999 PR PBB SHADOW E&M-EST. PATIENT-LVL V: CPT | Mod: PBBFAC,,, | Performed by: PHYSICIAN ASSISTANT

## 2025-01-14 PROCEDURE — 99214 OFFICE O/P EST MOD 30 MIN: CPT | Mod: S$GLB,,, | Performed by: PHYSICIAN ASSISTANT

## 2025-01-14 PROCEDURE — 1111F DSCHRG MED/CURRENT MED MERGE: CPT | Mod: CPTII,S$GLB,, | Performed by: PHYSICIAN ASSISTANT

## 2025-01-14 PROCEDURE — 3008F BODY MASS INDEX DOCD: CPT | Mod: CPTII,S$GLB,, | Performed by: PHYSICIAN ASSISTANT

## 2025-01-14 PROCEDURE — 3077F SYST BP >= 140 MM HG: CPT | Mod: CPTII,S$GLB,, | Performed by: PHYSICIAN ASSISTANT

## 2025-01-14 NOTE — TELEPHONE ENCOUNTER
Spoke with patient in regards to being seen on today with Dr. Allison(Podiatry), informed her that unfortunately his scheduled is completely booked out for today and his next available time slot is on 01/17/2025 @10:15am, patient stated she has dialysis on that date and will not be able to come in, she said that doesn't help her so what is she supposed to do, I informed her her next option would be to go to the ED, patient has a scheduled appt. on 01/22/2025 @7:30am w/Dr. Allison

## 2025-01-14 NOTE — TELEPHONE ENCOUNTER
Spoke with patient in regards to scheduling an appointment to be seen today on 01/14/2025 with Dr. Allison(Podiatry), patient will be put on schedule this afternoon          ----- Message from [place] sent at 1/14/2025  8:17 AM CST -----  Contact: [phone]  Type:  Same Day Appointment Request    Caller is requesting a same day appointment.  Caller declined first available appointment listed below.      Name of Caller:Pt    When is the first available appointment?01/14 at 10:15    Symptoms:Bottom of pt foot she can't walk    Best Call Back Number:[phone]    Additional Information: Pt asking for a call back she states she needs to see the doctor today after she gets out Dialysis for about 11:20

## 2025-01-20 ENCOUNTER — HOSPITAL ENCOUNTER (INPATIENT)
Facility: HOSPITAL | Age: 55
LOS: 2 days | Discharge: HOME | DRG: 091 | End: 2025-01-23
Attending: EMERGENCY MEDICINE | Admitting: HOSPITALIST
Payer: MEDICARE

## 2025-01-20 DIAGNOSIS — D63.1 ANEMIA IN CHRONIC KIDNEY DISEASE, ON CHRONIC DIALYSIS: ICD-10-CM

## 2025-01-20 DIAGNOSIS — Z99.2 ANEMIA IN CHRONIC KIDNEY DISEASE, ON CHRONIC DIALYSIS: ICD-10-CM

## 2025-01-20 DIAGNOSIS — M25.552 LEFT HIP PAIN: ICD-10-CM

## 2025-01-20 DIAGNOSIS — R93.0 ABNORMAL CT SCAN OF HEAD: ICD-10-CM

## 2025-01-20 DIAGNOSIS — N18.6 ESRD (END STAGE RENAL DISEASE) ON DIALYSIS: ICD-10-CM

## 2025-01-20 DIAGNOSIS — M54.2 NECK PAIN: ICD-10-CM

## 2025-01-20 DIAGNOSIS — R90.89 ABNORMAL BRAIN MRI: ICD-10-CM
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
## 2025-01-20 DIAGNOSIS — Z99.2 ESRD (END STAGE RENAL DISEASE) ON DIALYSIS: ICD-10-CM

## 2025-01-20 DIAGNOSIS — N18.6 ANEMIA IN CHRONIC KIDNEY DISEASE, ON CHRONIC DIALYSIS: ICD-10-CM

## 2025-01-20 DIAGNOSIS — M14.672 CHARCOT JOINT OF LEFT FOOT: ICD-10-CM

## 2025-01-20 DIAGNOSIS — R07.9 CHEST PAIN: ICD-10-CM

## 2025-01-20 DIAGNOSIS — M79.604 RIGHT LEG PAIN: ICD-10-CM

## 2025-01-20 PROBLEM — N18.9 ANEMIA IN CHRONIC KIDNEY DISEASE: Status: ACTIVE | Noted: 2024-08-19

## 2025-01-20 LAB
ABO + RH BLD: NORMAL
ALBUMIN SERPL BCP-MCNC: 3 G/DL (ref 3.5–5.2)
ALP SERPL-CCNC: 87 U/L (ref 40–150)
ALT SERPL W/O P-5'-P-CCNC: 22 U/L (ref 10–44)
ANION GAP SERPL CALC-SCNC: 18 MMOL/L (ref 8–16)
APTT PPP: 25.5 SEC (ref 21–32)
AST SERPL-CCNC: 20 U/L (ref 10–40)
BASOPHILS # BLD AUTO: 0.03 K/UL (ref 0–0.2)
BASOPHILS NFR BLD: 0.3 % (ref 0–1.9)
BILIRUB SERPL-MCNC: 0.5 MG/DL (ref 0.1–1)
BLD GP AB SCN CELLS X3 SERPL QL: NORMAL
BUN SERPL-MCNC: 98 MG/DL (ref 6–20)
CALCIUM SERPL-MCNC: 8.9 MG/DL (ref 8.7–10.5)
CHLORIDE SERPL-SCNC: 106 MMOL/L (ref 95–110)
CO2 SERPL-SCNC: 20 MMOL/L (ref 23–29)
CREAT SERPL-MCNC: 8.9 MG/DL (ref 0.5–1.4)
DIFFERENTIAL METHOD BLD: ABNORMAL
EOSINOPHIL # BLD AUTO: 0.3 K/UL (ref 0–0.5)
EOSINOPHIL NFR BLD: 3.3 % (ref 0–8)
ERYTHROCYTE [DISTWIDTH] IN BLOOD BY AUTOMATED COUNT: 15.4 % (ref 11.5–14.5)
EST. GFR  (NO RACE VARIABLE): 4.9 ML/MIN/1.73 M^2
GLUCOSE SERPL-MCNC: 145 MG/DL (ref 70–110)
HCT VFR BLD AUTO: 23.6 % (ref 37–48.5)
HGB BLD-MCNC: 7.3 G/DL (ref 12–16)
IMM GRANULOCYTES # BLD AUTO: 0.04 K/UL (ref 0–0.04)
IMM GRANULOCYTES NFR BLD AUTO: 0.4 % (ref 0–0.5)
INR PPP: 1 (ref 0.8–1.2)
LYMPHOCYTES # BLD AUTO: 1.1 K/UL (ref 1–4.8)
LYMPHOCYTES NFR BLD: 11.2 % (ref 18–48)
MAGNESIUM SERPL-MCNC: 3.4 MG/DL (ref 1.6–2.6)
MCH RBC QN AUTO: 31.5 PG (ref 27–31)
MCHC RBC AUTO-ENTMCNC: 30.9 G/DL (ref 32–36)
MCV RBC AUTO: 102 FL (ref 82–98)
MONOCYTES # BLD AUTO: 0.5 K/UL (ref 0.3–1)
MONOCYTES NFR BLD: 4.9 % (ref 4–15)
NEUTROPHILS # BLD AUTO: 8 K/UL (ref 1.8–7.7)
NEUTROPHILS NFR BLD: 79.9 % (ref 38–73)
NRBC BLD-RTO: 0 /100 WBC
PHOSPHATE SERPL-MCNC: 6.1 MG/DL (ref 2.7–4.5)
PLATELET # BLD AUTO: 150 K/UL (ref 150–450)
PMV BLD AUTO: 10.7 FL (ref 9.2–12.9)
POCT GLUCOSE: 198 MG/DL (ref 70–110)
POCT GLUCOSE: 213 MG/DL (ref 70–110)
POTASSIUM SERPL-SCNC: 4.2 MMOL/L (ref 3.5–5.1)
PROT SERPL-MCNC: 7.4 G/DL (ref 6–8.4)
PROTHROMBIN TIME: 11.1 SEC (ref 9–12.5)
RBC # BLD AUTO: 2.32 M/UL (ref 4–5.4)
SODIUM SERPL-SCNC: 144 MMOL/L (ref 136–145)
SPECIMEN OUTDATE: NORMAL
WBC # BLD AUTO: 10.01 K/UL (ref 3.9–12.7)

## 2025-01-20 PROCEDURE — 82962 GLUCOSE BLOOD TEST: CPT

## 2025-01-20 PROCEDURE — 80053 COMPREHEN METABOLIC PANEL: CPT | Performed by: EMERGENCY MEDICINE

## 2025-01-20 PROCEDURE — 5A1D70Z PERFORMANCE OF URINARY FILTRATION, INTERMITTENT, LESS THAN 6 HOURS PER DAY: ICD-10-PCS | Performed by: HOSPITALIST

## 2025-01-20 PROCEDURE — 83735 ASSAY OF MAGNESIUM: CPT | Performed by: EMERGENCY MEDICINE

## 2025-01-20 PROCEDURE — 96372 THER/PROPH/DIAG INJ SC/IM: CPT | Performed by: NURSE PRACTITIONER

## 2025-01-20 PROCEDURE — 25000003 PHARM REV CODE 250: Performed by: NURSE PRACTITIONER

## 2025-01-20 PROCEDURE — 85730 THROMBOPLASTIN TIME PARTIAL: CPT | Performed by: NURSE PRACTITIONER

## 2025-01-20 PROCEDURE — 99285 EMERGENCY DEPT VISIT HI MDM: CPT | Mod: 25

## 2025-01-20 PROCEDURE — 93005 ELECTROCARDIOGRAM TRACING: CPT

## 2025-01-20 PROCEDURE — 63600175 PHARM REV CODE 636 W HCPCS: Performed by: NURSE PRACTITIONER

## 2025-01-20 PROCEDURE — 99214 OFFICE O/P EST MOD 30 MIN: CPT | Mod: ,,, | Performed by: NURSE PRACTITIONER

## 2025-01-20 PROCEDURE — 86901 BLOOD TYPING SEROLOGIC RH(D): CPT | Performed by: HOSPITALIST

## 2025-01-20 PROCEDURE — G0378 HOSPITAL OBSERVATION PER HR: HCPCS

## 2025-01-20 PROCEDURE — 25000003 PHARM REV CODE 250: Performed by: EMERGENCY MEDICINE

## 2025-01-20 PROCEDURE — 85610 PROTHROMBIN TIME: CPT | Performed by: NURSE PRACTITIONER

## 2025-01-20 PROCEDURE — 93010 ELECTROCARDIOGRAM REPORT: CPT | Mod: ,,, | Performed by: INTERNAL MEDICINE

## 2025-01-20 PROCEDURE — 84100 ASSAY OF PHOSPHORUS: CPT | Performed by: EMERGENCY MEDICINE

## 2025-01-20 PROCEDURE — 85025 COMPLETE CBC W/AUTO DIFF WBC: CPT | Performed by: EMERGENCY MEDICINE

## 2025-01-20 RX ORDER — TALC
6 POWDER (GRAM) TOPICAL NIGHTLY PRN
Status: DISCONTINUED | OUTPATIENT
Start: 2025-01-20 | End: 2025-01-23 | Stop reason: HOSPADM

## 2025-01-20 RX ORDER — ACETAMINOPHEN 325 MG/1
650 TABLET ORAL EVERY 6 HOURS PRN
Status: DISCONTINUED | OUTPATIENT
Start: 2025-01-20 | End: 2025-01-23

## 2025-01-20 RX ORDER — INSULIN ASPART 100 [IU]/ML
0-5 INJECTION, SOLUTION INTRAVENOUS; SUBCUTANEOUS
Status: DISCONTINUED | OUTPATIENT
Start: 2025-01-20 | End: 2025-01-23 | Stop reason: HOSPADM

## 2025-01-20 RX ORDER — SODIUM CHLORIDE 0.9 % (FLUSH) 0.9 %
10 SYRINGE (ML) INJECTION
Status: DISCONTINUED | OUTPATIENT
Start: 2025-01-20 | End: 2025-01-23 | Stop reason: HOSPADM

## 2025-01-20 RX ORDER — FLUTICASONE FUROATE AND VILANTEROL 200; 25 UG/1; UG/1
1 POWDER RESPIRATORY (INHALATION) DAILY
Status: DISCONTINUED | OUTPATIENT
Start: 2025-01-21 | End: 2025-01-23 | Stop reason: HOSPADM

## 2025-01-20 RX ORDER — SEVELAMER CARBONATE 800 MG/1
800 TABLET, FILM COATED ORAL
Status: DISCONTINUED | OUTPATIENT
Start: 2025-01-21 | End: 2025-01-23 | Stop reason: HOSPADM

## 2025-01-20 RX ORDER — PROCHLORPERAZINE EDISYLATE 5 MG/ML
5 INJECTION INTRAMUSCULAR; INTRAVENOUS EVERY 6 HOURS PRN
Status: DISCONTINUED | OUTPATIENT
Start: 2025-01-20 | End: 2025-01-23 | Stop reason: HOSPADM

## 2025-01-20 RX ORDER — IBUPROFEN 200 MG
16 TABLET ORAL
Status: DISCONTINUED | OUTPATIENT
Start: 2025-01-20 | End: 2025-01-23 | Stop reason: HOSPADM

## 2025-01-20 RX ORDER — FLUTICASONE PROPIONATE 50 MCG
2 SPRAY, SUSPENSION (ML) NASAL DAILY
Status: DISCONTINUED | OUTPATIENT
Start: 2025-01-21 | End: 2025-01-23 | Stop reason: HOSPADM

## 2025-01-20 RX ORDER — CARVEDILOL 6.25 MG/1
6.25 TABLET ORAL 2 TIMES DAILY WITH MEALS
Status: DISCONTINUED | OUTPATIENT
Start: 2025-01-20 | End: 2025-01-23 | Stop reason: HOSPADM

## 2025-01-20 RX ORDER — GABAPENTIN 100 MG/1
100 CAPSULE ORAL 3 TIMES DAILY
Status: DISCONTINUED | OUTPATIENT
Start: 2025-01-20 | End: 2025-01-21

## 2025-01-20 RX ORDER — ATORVASTATIN CALCIUM 40 MG/1
80 TABLET, FILM COATED ORAL NIGHTLY
Status: DISCONTINUED | OUTPATIENT
Start: 2025-01-21 | End: 2025-01-23 | Stop reason: HOSPADM

## 2025-01-20 RX ORDER — GLUCAGON 1 MG
1 KIT INJECTION
Status: DISCONTINUED | OUTPATIENT
Start: 2025-01-20 | End: 2025-01-23 | Stop reason: HOSPADM

## 2025-01-20 RX ORDER — HYDRALAZINE HYDROCHLORIDE 20 MG/ML
10 INJECTION INTRAMUSCULAR; INTRAVENOUS EVERY 8 HOURS PRN
Status: DISCONTINUED | OUTPATIENT
Start: 2025-01-20 | End: 2025-01-23 | Stop reason: HOSPADM

## 2025-01-20 RX ORDER — INSULIN GLARGINE 100 [IU]/ML
28 INJECTION, SOLUTION SUBCUTANEOUS NIGHTLY
Status: DISCONTINUED | OUTPATIENT
Start: 2025-01-20 | End: 2025-01-23 | Stop reason: HOSPADM

## 2025-01-20 RX ORDER — LABETALOL HCL 20 MG/4 ML
10 SYRINGE (ML) INTRAVENOUS EVERY 6 HOURS PRN
Status: DISCONTINUED | OUTPATIENT
Start: 2025-01-21 | End: 2025-01-23 | Stop reason: HOSPADM

## 2025-01-20 RX ORDER — CETIRIZINE HYDROCHLORIDE 5 MG/1
5 TABLET ORAL DAILY
Status: DISCONTINUED | OUTPATIENT
Start: 2025-01-21 | End: 2025-01-23 | Stop reason: HOSPADM

## 2025-01-20 RX ORDER — ONDANSETRON HYDROCHLORIDE 2 MG/ML
4 INJECTION, SOLUTION INTRAVENOUS EVERY 8 HOURS PRN
Status: DISCONTINUED | OUTPATIENT
Start: 2025-01-20 | End: 2025-01-23 | Stop reason: HOSPADM

## 2025-01-20 RX ORDER — SODIUM CHLORIDE 0.9 % (FLUSH) 0.9 %
10 SYRINGE (ML) INJECTION EVERY 12 HOURS PRN
Status: DISCONTINUED | OUTPATIENT
Start: 2025-01-20 | End: 2025-01-20

## 2025-01-20 RX ORDER — HYDROCODONE BITARTRATE AND ACETAMINOPHEN 10; 325 MG/1; MG/1
1 TABLET ORAL EVERY 8 HOURS PRN
Status: DISCONTINUED | OUTPATIENT
Start: 2025-01-20 | End: 2025-01-21

## 2025-01-20 RX ORDER — HYDRALAZINE HYDROCHLORIDE 20 MG/ML
10 INJECTION INTRAMUSCULAR; INTRAVENOUS EVERY 8 HOURS PRN
Status: DISCONTINUED | OUTPATIENT
Start: 2025-01-20 | End: 2025-01-20

## 2025-01-20 RX ORDER — METHOCARBAMOL 500 MG/1
500 TABLET, FILM COATED ORAL 4 TIMES DAILY PRN
Status: DISCONTINUED | OUTPATIENT
Start: 2025-01-20 | End: 2025-01-21

## 2025-01-20 RX ORDER — LIDOCAINE 50 MG/G
2 PATCH TOPICAL
Status: DISCONTINUED | OUTPATIENT
Start: 2025-01-20 | End: 2025-01-23 | Stop reason: HOSPADM

## 2025-01-20 RX ORDER — IBUPROFEN 200 MG
24 TABLET ORAL
Status: DISCONTINUED | OUTPATIENT
Start: 2025-01-20 | End: 2025-01-23 | Stop reason: HOSPADM

## 2025-01-20 RX ORDER — ACETAMINOPHEN 500 MG
1000 TABLET ORAL
Status: COMPLETED | OUTPATIENT
Start: 2025-01-20 | End: 2025-01-20

## 2025-01-20 RX ORDER — NALOXONE HCL 0.4 MG/ML
0.02 VIAL (ML) INJECTION
Status: DISCONTINUED | OUTPATIENT
Start: 2025-01-20 | End: 2025-01-23 | Stop reason: HOSPADM

## 2025-01-20 RX ADMIN — LIDOCAINE 2 PATCH: 50 PATCH CUTANEOUS at 09:01

## 2025-01-20 RX ADMIN — INSULIN GLARGINE 28 UNITS: 100 INJECTION, SOLUTION SUBCUTANEOUS at 10:01

## 2025-01-20 RX ADMIN — HYDROCODONE BITARTRATE AND ACETAMINOPHEN 1 TABLET: 10; 325 TABLET ORAL at 07:01

## 2025-01-20 RX ADMIN — GABAPENTIN 100 MG: 100 CAPSULE ORAL at 09:01

## 2025-01-20 RX ADMIN — ACETAMINOPHEN 1000 MG: 500 TABLET ORAL at 03:01

## 2025-01-20 RX ADMIN — Medication 6 MG: at 10:01

## 2025-01-20 RX ADMIN — CARVEDILOL 6.25 MG: 6.25 TABLET, FILM COATED ORAL at 06:01

## 2025-01-20 RX ADMIN — METHOCARBAMOL 500 MG: 500 TABLET ORAL at 10:01

## 2025-01-20 RX ADMIN — LIDOCAINE 2 PATCH: 50 PATCH CUTANEOUS at 06:01

## 2025-01-20 RX ADMIN — HYDRALAZINE HYDROCHLORIDE 10 MG: 20 INJECTION, SOLUTION INTRAMUSCULAR; INTRAVENOUS at 10:01

## 2025-01-20 NOTE — SUBJECTIVE & OBJECTIVE
Past Medical History:   Diagnosis Date    Hyperlipidemia     Hypertension     Peptic ulcer disease     Peripheral artery disease     PONV (postoperative nausea and vomiting)     Stage IV CKD     Type II diabetes mellitus        Past Surgical History:   Procedure Laterality Date    ANGIOGRAM, LOWER ARTERIAL, UNILATERAL Left 09/13/2023    Procedure: ANGIOGRAM, LOWER ARTERIAL, UNILATERAL;  Surgeon: Maxx Maya MD;  Location: [address];  Service: Vascular;  Laterality: Left;    ANGIOGRAPHY OF LOWER EXTREMITY Left 09/14/2023    Procedure: ANGIOGRAM, LOWER EXTREMITY with balloon angioplasty ultraverse 5mm x 60mm;  Surgeon: Maxx Maya MD;  Location: [address];  Service: Vascular;  Laterality: Left;  ultraverse 5mm x 60mm  fluoro: 12.41  contrast: 44ml  mGy: 65.57  Gycm2: 23.50    AORTOGRAPHY WITH EXTREMITY RUNOFF Left 09/13/2023    Procedure: AORTOGRAM, WITH EXTREMITY RUNOFF;  Surgeon: Maxx Maya MD;  Location: [address];  Service: Vascular;  Laterality: Left;  8.1 min  663.63 mGy  176.39 Gy.cm  178ml Dye     AV FISTULA PLACEMENT Right     CHOLECYSTECTOMY      COLONOSCOPY  11/06/2013    normal    COLONOSCOPY  05/2023    cancelled due to inadequate prep    COLONOSCOPY  06/2023    results unknown    CREATION, BYPASS, ARTERIAL, AXILLARY TO BILATERAL FEMORAL Left 09/14/2023    Procedure: CREATION, BYPASS, ARTERIAL, AXILLARY TO BILATERAL FEMORAL;  Surgeon: Maxx Maya MD;  Location: [address];  Service: Vascular;  Laterality: Left;  Left Axillary to Bilateral Femoral Bypass, Left Femoral to Above Knee Popliteal Bypass; SLIDER BED    CYSTOSCOPY W/ URETERAL STENT PLACEMENT Right 08/28/2018    Procedure: CYSTOSCOPY, WITH URETERAL STENT INSERTION (ADD ON );  Surgeon: Bubba Perez MD;  Location: [facility];  Service: Urology;  Laterality: Right;  (ADD ON )    DEBRIDEMENT OF FOOT Left 08/03/2023    Procedure: DEBRIDEMENT, FOOT;  Surgeon: Aleida Flannery DPM;  Location: [facility];  Service:  Podiatry;  Laterality: Left;    Excisional debridement of ulcer distal great toe left foot w/ partial resection distal phalanx Left 08/03/2023    FOOT AMPUTATION THROUGH METATARSAL Left 09/14/2023    Procedure: AMPUTATION, FOOT, TRANSMETATARSAL partial 1st ray amputation;  Surgeon: Jesus Valles DPM;  Location: [facility] OR 2ND FLR;  Service: Podiatry;  Laterality: Left;  partial ray    I&D L 1st ray w/ amputation L hallux IPJ Left 08/14/2023    INCISION AND DRAINAGE FOOT Left 09/14/2023    Procedure: INCISION AND DRAINAGE, FOOT;  Surgeon: Jesus Valles DPM;  Location: [facility] OR 2ND FLR;  Service: Podiatry;  Laterality: Left;    Injection L PT tendon sheath Left 08/14/2023    LENGTHENING OF ACHILLES TENDON Left 11/11/2024    Procedure: LENGTHENING, TENDON, ACHILLES;  Surgeon: Marco Allison DPM;  Location: [facility] OR;  Service: Podiatry;  Laterality: Left;    PERIPHERAL ARTERIAL STENT GRAFT Right     RECONSTRUCTION WITH FUSION OF CHARCOT FOOT Left 11/11/2024    Procedure: RECONSTRUCTION, CHARCOT FOOT, WITH FUSION;  Surgeon: Marco Allison DPM;  Location: [facility] OR;  Service: Podiatry;  Laterality: Left;  4 hours; foot tray; mini c arm; saw/drill    REMOVAL OF NAIL OF DIGIT Left 08/03/2023    Procedure: REMOVAL, NAIL, DIGIT great toe;  Surgeon: Aleida Flannery DPM;  Location: [facility] OR;  Service: Podiatry;  Laterality: Left;    REVISION, AMPUTATION, TRANSMETATARSAL Left 11/11/2024    Procedure: REVISION, AMPUTATION, TRANSMETATARSAL;  Surgeon: Marco Allison DPM;  [facility];  Service: Podiatry;  Laterality: Left;    STENT, SUPERFICIAL FEMORAL ARTERY Left 09/14/2023    Procedure: STENT, SUPERFICIAL FEMORAL ARTERY;  Surgeon: Maxx Maya MD;  Location: [facility] OR 2ND FLR;  Service: Vascular;  Laterality: Left;  5 x 100 mm    TOE AMPUTATION Left 08/14/2023    Procedure: AMPUTATION, TOE hallux IPJ;  Surgeon: Aleida Flannery DPM;  Location: [facility] OR;  Service: Podiatry;  Laterality: Left;    TOE AMPUTATION Left  08/25/2023    Procedure: AMPUTATION, TOE hallux, possible 1st met.head;  Surgeon: Aleida Flannery DPM;  Location: [facility] OR;  Service: Podiatry;  Laterality: Left;    URETEROSCOPIC REMOVAL OF URETERIC CALCULUS Right 09/11/2018    Procedure: EXTRACTION-STONE-URETEROSCOPY;  Surgeon: Bubba Perez MD;  Location: [facility] OR;  Service: Urology;  Laterality: Right;       Review of patient's allergies indicates:   Allergen Reactions    Hydrocodone-acetaminophen Nausea And Vomiting, Rash and Other (See Comments)     Vicodin- severe rash  Lortab- head-spinning that leads to vomiting      Naproxen Anaphylaxis and Swelling     Hives (skin)^swelling  Hives (skin)^swelling  Hives (skin)^swelling    Sulfamethoxazole-trimethoprim Other (See Comments)     Caused JEROME; heart attack    Hydrocodone Nausea And Vomiting and Rash    Adhesive tape-silicones      blisters    Aspartame Hives    Penicillins Hives     Blisters (skin)^    Sulfamethoxazole Other (See Comments)    Trimethoprim Other (See Comments)    Acetaminophen Rash    Adhesive Rash     Current Facility-Administered Medications   Medication Frequency    sodium chloride 0.9% flush 10 mL PRN     Current Outpatient Medications   Medication    acetaminophen (TYLENOL) 500 MG tablet    aspirin (ECOTRIN) 81 MG EC tablet    atorvastatin (LIPITOR) 80 MG tablet    carvediloL (COREG) 6.25 MG tablet    ceFEPIme 1 gram injection    gabapentin (NEURONTIN) 100 MG capsule    gabapentin (NEURONTIN) 300 MG capsule    HYDROcodone-acetaminophen (NORCO)  mg per tablet    insulin (LANTUS SOLOSTAR U-100 INSULIN) glargine 100 units/mL (3mL) SubQ pen    levocetirizine (XYZAL) 5 MG tablet    linaGLIPtin (TRADJENTA) 5 mg Tab tablet    promethazine (PHENERGAN) 12.5 MG Tab    vit B,C-FA-zinc-selen-vit D3-E (RENAPLEX-D) 800 mcg-12.5 mg -2,000 unit Tab    blood sugar diagnostic (TRUE METRIX GLUCOSE TEST STRIP) Strp    blood-glucose meter Misc    carvediloL (COREG) 12.5 MG tablet    COMFORT EZ PEN NEEDLES  "32 gauge x " Ndle    fluticasone propion-salmeteroL 55-14 mcg/actuation aebs    fluticasone propionate (FLONASE) 50 mcg/actuation nasal spray    methoxy peg-epoetin beta (MIRCERA INJ)    miconazole NITRATE 2 % (MICOTIN) 2 % top powder    miscellaneous medical supply (C-TUB) Misc    ondansetron (ZOFRAN-ODT) 4 MG TbDL    pediatric multivitamin chewable tablet    pediatric multivitamin no.76 (FLINTSTONES COMPLETE) Chew    potassium chloride SA (K-DUR,KLOR-CON) 20 MEQ tablet    sevelamer carbonate (RENVELA) 800 mg Tab    tenapanor (XPHOZAH) 30 mg Tab    tirzepatide (MOUNJARO) 10 mg/0.5 mL PnIj    TRUEPLUS LANCETS 30 gauge Misc     Family History       Problem Relation (Age of Onset)    Breast cancer Mother (71), Maternal Grandmother (75)    Diabetes Mother, Father    Heart attack Maternal Grandfather (44)    Heart disease Father (37), Maternal Grandmother    Hypertension Mother, Father, Brother    Uterine cancer Mother          Tobacco Use    Smoking status: Former     Current packs/day: 0.00     Types: Cigarettes     Quit date: 2006     Years since quittin.4    Smokeless tobacco: Never    Tobacco comments:     smoked off and on for 20 years, was up to 2 packs/day toward the end of that, and quit in    Substance and Sexual Activity    Alcohol use: Not Currently     Comment: occ    Drug use: No    Sexual activity: Not Currently     Review of Systems   Eyes:  Negative for visual disturbance.   Respiratory:  Negative for shortness of breath.    Cardiovascular:  Negative for chest pain.   Gastrointestinal:  Negative for abdominal distention, blood in stool, constipation, diarrhea, nausea and vomiting.   Genitourinary:  Positive for decreased urine volume.   Musculoskeletal:  Positive for gait problem and myalgias.   Skin:  Positive for wound.   Psychiatric/Behavioral:  Negative for agitation.      Objective:     Vital Signs (Most Recent):  Temp: 97.9 °F (36.6 °C) (25 1427)  Pulse: 84 (25 " 1427)  Resp: 20 (01/20/25 1427)  BP: (!) 165/75 (01/20/25 1427)  SpO2: (!) 94 % (01/20/25 1427) Vital Signs (24h Range):  Temp:  [97.9 °F (36.6 °C)-98.2 °F (36.8 °C)] 97.9 °F (36.6 °C)  Pulse:  [74-94] 84  Resp:  [13-20] 20  SpO2:  [94 %-97 %] 94 %  BP: (165-200)/(74-81) 165/75     Weight: 100.8 kg (222 lb 3.6 oz) (01/20/25 1211)  Body mass index is 34.81 kg/m².  Body surface area is 2.18 meters squared.    No intake/output data recorded.     Physical Exam  Vitals and nursing note reviewed.   HENT:      Head: Normocephalic and atraumatic.   Cardiovascular:      Rate and Rhythm: Normal rate.   Pulmonary:      Effort: Pulmonary effort is normal. No respiratory distress.   Abdominal:      General: There is distension.      Tenderness: There is no abdominal tenderness.   Musculoskeletal:         General: Deformity present.      Right lower leg: Edema present.      Left lower leg: Edema present.   Skin:     General: Skin is warm.   Neurological:      Mental Status: She is alert and oriented to person, place, and time.   Psychiatric:         Mood and Affect: Mood normal.         Behavior: Behavior normal.          Significant Labs:  CBC:   Recent Labs   Lab 01/20/25  1320   WBC 10.01   RBC 2.32*   HGB 7.3*   HCT 23.6*      *   MCH 31.5*   MCHC 30.9*     CMP:   Recent Labs   Lab 01/20/25  1320   *   CALCIUM 8.9   ALBUMIN 3.0*   PROT 7.4      K 4.2   CO2 20*      BUN 98*   CREATININE 8.9*   ALKPHOS 87   ALT 22   AST 20   BILITOT 0.5     All labs within the past 24 hours have been reviewed.

## 2025-01-20 NOTE — CONSULTS
[facility] - Emergency Dept  Nephrology  Consult Note    Patient Name: Georgina Arias  MRN: [id number]  Admission Date: 1/20/2025  Hospital Length of Stay: 0 days  Attending Provider: Mireille Blake MD   Primary Care Physician: Alonso Ling MD  Principal Problem:<principal problem not specified>    Inpatient consult to Nephrology  Consult performed by: Sharonda Armstrong, ALANA, FNP-C  Consult ordered by: Mireille Blake MD  Reason for consult: ESRD        Subjective:     HPI: The patient is a 54 y.o. White Female with multiple co morbidities including obesity s/p gastric sleeve, PAD, hypertensive heart disease with HFpEF, grade II diastolic dysfunction, DM2, HTN, HLD, ESRD on HD (last dialyzed 12/9), and charcot foot s/p reconstruction 11/11 who presents to ED on 1/20/2025 sp fall at home. She presents with complaints of neck pain, right leg and left hip pain. She was scheduled for HD today but was deferred to the ED for evaluation given fall and possible head trauma per patient. No distress in the ED. Remain with complaints of generalize pain and discomfort. Electrolytes non emergent. Imaging ordered. VSS. SBP elevated. Nephrology consulted for management of ESRD and HD treatment.    Past Medical History:   Diagnosis Date    Hyperlipidemia     Hypertension     Peptic ulcer disease     Peripheral artery disease     PONV (postoperative nausea and vomiting)     Stage IV CKD     Type II diabetes mellitus        Past Surgical History:   Procedure Laterality Date    ANGIOGRAM, LOWER ARTERIAL, UNILATERAL Left 09/13/2023    Procedure: ANGIOGRAM, LOWER ARTERIAL, UNILATERAL;  Surgeon: Maxx Maya MD;  Location: [address];  Service: Vascular;  Laterality: Left;    ANGIOGRAPHY OF LOWER EXTREMITY Left 09/14/2023    Procedure: ANGIOGRAM, LOWER EXTREMITY with balloon angioplasty ultraverse 5mm x 60mm;  Surgeon: Maxx Maya MD;  Location: [facility] OR [address] [place] [address];  Service: Vascular;  Laterality: Left;   ultraverse 5mm x 60mm  fluoro: 12.41  contrast: 44ml  mGy: 65.57  Gycm2: 23.50    AORTOGRAPHY WITH EXTREMITY RUNOFF Left 09/13/2023    Procedure: AORTOGRAM, WITH EXTREMITY RUNOFF;  Surgeon: Maxx Maya MD;  Location: [address];  Service: Vascular;  Laterality: Left;  8.1 min  663.63 mGy  176.39 Gy.cm  178ml Dye     AV FISTULA PLACEMENT Right     CHOLECYSTECTOMY      COLONOSCOPY  11/06/2013    normal    COLONOSCOPY  05/2023    cancelled due to inadequate prep    COLONOSCOPY  06/2023    results unknown    CREATION, BYPASS, ARTERIAL, AXILLARY TO BILATERAL FEMORAL Left 09/14/2023    Procedure: CREATION, BYPASS, ARTERIAL, AXILLARY TO BILATERAL FEMORAL;  Surgeon: Maxx Maya MD;  Location: [facility] OR [address] [place] [address];  Service: Vascular;  Laterality: Left;  Left Axillary to Bilateral Femoral Bypass, Left Femoral to Above Knee Popliteal Bypass; SLIDER BED    CYSTOSCOPY W/ URETERAL STENT PLACEMENT Right 08/28/2018    Procedure: CYSTOSCOPY, WITH URETERAL STENT INSERTION (ADD ON );  Surgeon: Bubba Perez MD;  Location: [facility];  Service: Urology;  Laterality: Right;  (ADD ON )    DEBRIDEMENT OF FOOT Left 08/03/2023    Procedure: DEBRIDEMENT, FOOT;  Surgeon: Aleida Flannery DPM;  Location: [facility];  Service: Podiatry;  Laterality: Left;    Excisional debridement of ulcer distal great toe left foot w/ partial resection distal phalanx Left 08/03/2023    FOOT AMPUTATION THROUGH METATARSAL Left 09/14/2023    Procedure: AMPUTATION, FOOT, TRANSMETATARSAL partial 1st ray amputation;  Surgeon: Jesus Valles DPM;  Location: [address];  Service: Podiatry;  Laterality: Left;  partial ray    I&D L 1st ray w/ amputation L hallux IPJ Left 08/14/2023    INCISION AND DRAINAGE FOOT Left 09/14/2023    Procedure: INCISION AND DRAINAGE, FOOT;  Surgeon: Jesus Valles DPM;  Location: [address];  Service: Podiatry;  Laterality: Left;    Injection L PT tendon sheath Left 08/14/2023    LENGTHENING OF ACHILLES TENDON Left  11/11/2024    Procedure: LENGTHENING, TENDON, ACHILLES;  Surgeon: Marco Allison DPM;  Location: [facility] OR;  Service: Podiatry;  Laterality: Left;    PERIPHERAL ARTERIAL STENT GRAFT Right     RECONSTRUCTION WITH FUSION OF CHARCOT FOOT Left 11/11/2024    Procedure: RECONSTRUCTION, CHARCOT FOOT, WITH FUSION;  Surgeon: Marco Allison DPM;  Location: [facility] OR;  Service: Podiatry;  Laterality: Left;  4 hours; foot tray; mini c arm; saw/drill    REMOVAL OF NAIL OF DIGIT Left 08/03/2023    Procedure: REMOVAL, NAIL, DIGIT great toe;  Surgeon: Aleida Flannery DPM;  Location: [facility] OR;  Service: Podiatry;  Laterality: Left;    REVISION, AMPUTATION, TRANSMETATARSAL Left 11/11/2024    Procedure: REVISION, AMPUTATION, TRANSMETATARSAL;  Surgeon: Marco Allison DPM;  Location: [facility] OR;  Service: Podiatry;  Laterality: Left;    STENT, SUPERFICIAL FEMORAL ARTERY Left 09/14/2023    Procedure: STENT, SUPERFICIAL FEMORAL ARTERY;  Surgeon: Maxx Maya MD;  Location: [facility] OR 2ND FLR;  Service: Vascular;  Laterality: Left;  5 x 100 mm    TOE AMPUTATION Left 08/14/2023    Procedure: AMPUTATION, TOE hallux IPJ;  Surgeon: Aleida Flannery DPM;  Location: [facility] OR;  Service: Podiatry;  Laterality: Left;    TOE AMPUTATION Left 08/25/2023    Procedure: AMPUTATION, TOE hallux, possible 1st met.head;  Surgeon: Aleida Flannery DPM;  Location: [facility] OR;  Service: Podiatry;  Laterality: Left;    URETEROSCOPIC REMOVAL OF URETERIC CALCULUS Right 09/11/2018    Procedure: EXTRACTION-STONE-URETEROSCOPY;  Surgeon: Bubba Perez MD;  Location: [facility] OR;  Service: Urology;  Laterality: Right;       Review of patient's allergies indicates:   Allergen Reactions    Hydrocodone-acetaminophen Nausea And Vomiting, Rash and Other (See Comments)     Vicodin- severe rash  Lortab- head-spinning that leads to vomiting      Naproxen Anaphylaxis and Swelling     Hives (skin)^swelling  Hives (skin)^swelling  Hives (skin)^swelling    Sulfamethoxazole-trimethoprim  "Other (See Comments)     Caused JEROME; heart attack    Hydrocodone Nausea And Vomiting and Rash    Adhesive tape-silicones      blisters    Aspartame Hives    Penicillins Hives     Blisters (skin)^    Sulfamethoxazole Other (See Comments)    Trimethoprim Other (See Comments)    Acetaminophen Rash    Adhesive Rash     Current Facility-Administered Medications   Medication Frequency    sodium chloride 0.9% flush 10 mL PRN     Current Outpatient Medications   Medication    acetaminophen (TYLENOL) 500 MG tablet    aspirin (ECOTRIN) 81 MG EC tablet    atorvastatin (LIPITOR) 80 MG tablet    carvediloL (COREG) 6.25 MG tablet    ceFEPIme 1 gram injection    gabapentin (NEURONTIN) 100 MG capsule    gabapentin (NEURONTIN) 300 MG capsule    HYDROcodone-acetaminophen (NORCO)  mg per tablet    insulin (LANTUS SOLOSTAR U-100 INSULIN) glargine 100 units/mL (3mL) SubQ pen    levocetirizine (XYZAL) 5 MG tablet    linaGLIPtin (TRADJENTA) 5 mg Tab tablet    promethazine (PHENERGAN) 12.5 MG Tab    vit B,C-FA-zinc-selen-vit D3-E (RENAPLEX-D) 800 mcg-12.5 mg -2,000 unit Tab    blood sugar diagnostic (TRUE METRIX GLUCOSE TEST STRIP) Strp    blood-glucose meter Misc    carvediloL (COREG) 12.5 MG tablet    COMFORT EZ PEN NEEDLES 32 gauge x 5/32" Ndle    fluticasone propion-salmeteroL 55-14 mcg/actuation aebs    fluticasone propionate (FLONASE) 50 mcg/actuation nasal spray    methoxy peg-epoetin beta (MIRCERA INJ)    miconazole NITRATE 2 % (MICOTIN) 2 % top powder    miscellaneous medical supply (C-TUB) Misc    ondansetron (ZOFRAN-ODT) 4 MG TbDL    pediatric multivitamin chewable tablet    pediatric multivitamin no.76 (FLINTSTONES COMPLETE) Chew    potassium chloride SA (K-DUR,KLOR-CON) 20 MEQ tablet    sevelamer carbonate (RENVELA) 800 mg Tab    tenapanor (XPHOZAH) 30 mg Tab    tirzepatide (MOUNJARO) 10 mg/0.5 mL PnIj    TRUEPLUS LANCETS 30 gauge Misc     Family History       Problem Relation (Age of Onset)    Breast cancer Mother (71), " Maternal Grandmother (75)    Diabetes Mother, Father    Heart attack Maternal Grandfather (44)    Heart disease Father (37), Maternal Grandmother    Hypertension Mother, Father, Brother    Uterine cancer Mother          Tobacco Use    Smoking status: Former     Current packs/day: 0.00     Types: Cigarettes     Quit date: 2006     Years since quittin.4    Smokeless tobacco: Never    Tobacco comments:     smoked off and on for 20 years, was up to 2 packs/day toward the end of that, and quit in    Substance and Sexual Activity    Alcohol use: Not Currently     Comment: occ    Drug use: No    Sexual activity: Not Currently     Review of Systems   Eyes:  Negative for visual disturbance.   Respiratory:  Negative for shortness of breath.    Cardiovascular:  Negative for chest pain.   Gastrointestinal:  Negative for abdominal distention, blood in stool, constipation, diarrhea, nausea and vomiting.   Genitourinary:  Positive for decreased urine volume.   Musculoskeletal:  Positive for gait problem and myalgias.   Skin:  Positive for wound.   Psychiatric/Behavioral:  Negative for agitation.      Objective:     Vital Signs (Most Recent):  Temp: 97.9 °F (36.6 °C) (25 1427)  Pulse: 84 (25 1427)  Resp: 20 (25 1427)  BP: (!) 165/75 (25 1427)  SpO2: (!) 94 % (25 1427) Vital Signs (24h Range):  Temp:  [97.9 °F (36.6 °C)-98.2 °F (36.8 °C)] 97.9 °F (36.6 °C)  Pulse:  [74-94] 84  Resp:  [13-20] 20  SpO2:  [94 %-97 %] 94 %  BP: (165-200)/(74-81) 165/75     Weight: 100.8 kg (222 lb 3.6 oz) (25 1211)  Body mass index is 34.81 kg/m².  Body surface area is 2.18 meters squared.    No intake/output data recorded.     Physical Exam  Vitals and nursing note reviewed.   HENT:      Head: Normocephalic and atraumatic.   Cardiovascular:      Rate and Rhythm: Normal rate.   Pulmonary:      Effort: Pulmonary effort is normal. No respiratory distress.   Abdominal:      General: There is distension.       Tenderness: There is no abdominal tenderness.   Musculoskeletal:         General: Deformity present.      Right lower leg: Edema present.      Left lower leg: Edema present.   Skin:     General: Skin is warm.   Neurological:      Mental Status: She is alert and oriented to person, place, and time.   Psychiatric:         Mood and Affect: Mood normal.         Behavior: Behavior normal.          Significant Labs:  CBC:   Recent Labs   Lab 01/20/25  1320   WBC 10.01   RBC 2.32*   HGB 7.3*   HCT 23.6*      *   MCH 31.5*   MCHC 30.9*     CMP:   Recent Labs   Lab 01/20/25  1320   *   CALCIUM 8.9   ALBUMIN 3.0*   PROT 7.4      K 4.2   CO2 20*      BUN 98*   CREATININE 8.9*   ALKPHOS 87   ALT 22   AST 20   BILITOT 0.5     All labs within the past 24 hours have been reviewed.    Assessment/Plan:     Renal/  ESRD (end stage renal disease)  54 y.o. White Female ESRD-HD presents to ED on 1/20/2025 with diagnosis of: Fall  Nephrology consulted for inpatient ESRD-HD management    Outpatient HD Information:  -Dialysis modality: Hemodialysis  -Outpatient HD unit: [facility]   -Nephrologist: Khanh ROBISON  -HD TX days:  MTRF , duration of treatment: 3 hrs / Training for HHD  -Dialysis access: RUE AV fistula   -Residual urine: Minium  -EDW: 100 kg     Assessment:   - No emergent need for clearance and/or volume removal today; due to high patient census and the need triage patients, will plan for HD tomorrow.   - Dialysis for metabolic clearance and volume management will be provided tomorrow AM.  - Labs reviewed and dialysate to be adjusted to current labs.   - Continue to monitor intake and output  - Please avoid gadolinium, fleets, phos-based laxatives, NSAIDs  - Dialysis thrice weekly unless more urgent indications arise. Will evaluate RRT requirements Daily.    Anemia of ESRD   Recent Labs   Lab 01/20/25  1320   WBC 10.01   HGB 7.3*   HCT 23.6*        Lab Results   Component Value Date     FESATURATED 24 03/11/2008    FERRITIN 12.6 03/11/2008       - Goal in ESRD is Hgb of 10-11. Hgb 7.3. EPO with HD tomorrow.   - Will review chronic care plan from outpatient dialysis center for KAILASH dosing.   - if patient is on iron infusions please D/C when active infection, cautiously use EPO when hx of malignancy, high BP (SBP usually > 170mmhg).    Mineral Bone Disease in ESRD   Lab Results   Component Value Date    PTH 27 01/06/2025    CALCIUM 8.9 01/20/2025    ALBUMIN 3.0 (L) 01/20/2025    CAION 1.02 (L) 09/15/2023    PHOS 5.1 (H) 12/16/2024       - F/U PO4, Mg, Calcium. And albumin levels daily.   - Renal diet with protein intake goal 1.5 g/kg/d with 1 L fluid restriction   - If patient has poor oral intake, recommend nephro nutritional shakes (ex Novasource)  - Continue or restart home phos binder   - PTH to be managed at her outpatient HD unit    Orthopedic  Fall  - defer to primary team         Thank you for your consult. I will follow-up with patient. Please contact us if you have any additional questions.    Sharonda Armstrong DNP, FNP-C  Nephrology  [facility] - Emergency Dept

## 2025-01-20 NOTE — HPI
The patient is a 54 y.o. White Female with multiple co morbidities including obesity s/p gastric sleeve, PAD, hypertensive heart disease with HFpEF, grade II diastolic dysfunction, DM2, HTN, HLD, ESRD on HD (last dialyzed 12/9), and charcot foot s/p reconstruction 11/11 who presents to ED on 1/20/2025 sp fall at home. She presents with complaints of neck pain, right leg and left hip pain. She was scheduled for HD today but was deferred to the ED for evaluation given fall and possible head trauma per patient. No distress in the ED. Remain with complaints of generalize pain and discomfort. Electrolytes non emergent. Imaging ordered. VSS. SBP elevated. Nephrology consulted for management of ESRD and HD treatment.

## 2025-01-20 NOTE — ASSESSMENT & PLAN NOTE
54 y.o. White Female ESRD-HD presents to ED on 1/20/2025 with diagnosis of: Fall  Nephrology consulted for inpatient ESRD-HD management    Outpatient HD Information:  -Dialysis modality: Hemodialysis  -Outpatient HD unit: [facility]   -Nephrologist: Khanh ROBISON  -HD TX days:  MTRF , duration of treatment: 3 hrs / Training for HHD  -Dialysis access: RUE AV fistula   -Residual urine: Minium  -EDW: 100 kg     Assessment:   - No emergent need for clearance and/or volume removal today; due to high patient census and the need triage patients, will plan for HD tomorrow.   - Dialysis for metabolic clearance and volume management will be provided tomorrow AM.  - Labs reviewed and dialysate to be adjusted to current labs.   - Continue to monitor intake and output  - Please avoid gadolinium, fleets, phos-based laxatives, NSAIDs  - Dialysis thrice weekly unless more urgent indications arise. Will evaluate RRT requirements Daily.    Anemia of ESRD   Recent Labs   Lab 01/20/25  1320   WBC 10.01   HGB 7.3*   HCT 23.6*        Lab Results   Component Value Date    FESATURATED 24 03/11/2008    FERRITIN 12.6 03/11/2008       - Goal in ESRD is Hgb of 10-11. Hgb 7.3. EPO with HD tomorrow.   - Will review chronic care plan from outpatient dialysis center for KAILASH dosing.   - if patient is on iron infusions please D/C when active infection, cautiously use EPO when hx of malignancy, high BP (SBP usually > 170mmhg).    Mineral Bone Disease in ESRD   Lab Results   Component Value Date    PTH 27 01/06/2025    CALCIUM 8.9 01/20/2025    ALBUMIN 3.0 (L) 01/20/2025    CAION 1.02 (L) 09/15/2023    PHOS 5.1 (H) 12/16/2024       - F/U PO4, Mg, Calcium. And albumin levels daily.   - Renal diet with protein intake goal 1.5 g/kg/d with 1 L fluid restriction   - If patient has poor oral intake, recommend nephro nutritional shakes (ex Novasource)  - Continue or restart home phos binder   - PTH to be managed at her outpatient HD unit
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
## 2025-01-20 NOTE — ED TRIAGE NOTES
Triage note:  Chief Complaint   Patient presents with    Fall     Ran over glass table, L hip pain, r leg, neck pain, no c collar per dr Reynolds     Review of patient's allergies indicates:   Allergen Reactions    Hydrocodone-acetaminophen Nausea And Vomiting, Rash and Other (See Comments)     Vicodin- severe rash  Lortab- head-spinning that leads to vomiting      Naproxen Anaphylaxis and Swelling     Hives (skin)^swelling  Hives (skin)^swelling  Hives (skin)^swelling    Sulfamethoxazole-trimethoprim Other (See Comments)     Caused JEROME; heart attack    Hydrocodone Nausea And Vomiting and Rash    Adhesive tape-silicones      blisters    Aspartame Hives    Penicillins Hives     Blisters (skin)^    Sulfamethoxazole Other (See Comments)    Trimethoprim Other (See Comments)    Acetaminophen Rash    Adhesive Rash     Past Medical History:   Diagnosis Date    Hyperlipidemia     Hypertension     Peptic ulcer disease     Peripheral artery disease     PONV (postoperative nausea and vomiting)     Stage IV CKD     Type II diabetes mellitus

## 2025-01-20 NOTE — FIRST PROVIDER EVALUATION
Medical screening examination initiated.  I have conducted a focused provider triage encounter, findings are as follows:    Brief history of present illness:  Pt fell in the middle of the night and hit a glass coffee table.  Now c/o neck pain, right leg and left hip pain.  Was scheduled for HD today but they wanted her seen here first.  Pt on IV abx for left foot infection.   No LOC. No numbness/tingling  Pain is 8/10.      There were no vitals filed for this visit.    Pertinent physical exam:  Pain is non localizing on lower neck. Diffuse pain down left leg, localized to right shin, and lower neck across the midline. Pt can only ambulate with walker due to pain.    Brief workup plan:  Will order C-spine XR, but does not need c-collar.  Will also order right tib/fib and left hip XR.    Preliminary workup initiated; this workup will be continued and followed by the physician or advanced practice provider that is assigned to the patient when roomed.

## 2025-01-21 ENCOUNTER — TELEPHONE (OUTPATIENT)
Dept: PODIATRY | Facility: CLINIC | Age: 55
End: 2025-01-21
Payer: MEDICARE

## 2025-01-21 PROBLEM — N18.6 ESRD (END STAGE RENAL DISEASE) ON DIALYSIS: Status: ACTIVE | Noted: 2025-01-21

## 2025-01-21 PROBLEM — Z99.2 ESRD (END STAGE RENAL DISEASE) ON DIALYSIS: Status: ACTIVE | Noted: 2025-01-21

## 2025-01-21 LAB
ALBUMIN SERPL BCP-MCNC: 3 G/DL (ref 3.5–5.2)
ALP SERPL-CCNC: 91 U/L (ref 40–150)
ALT SERPL W/O P-5'-P-CCNC: 23 U/L (ref 10–44)
ANION GAP SERPL CALC-SCNC: 18 MMOL/L (ref 8–16)
AST SERPL-CCNC: 18 U/L (ref 10–40)
BASOPHILS # BLD AUTO: 0.06 K/UL (ref 0–0.2)
BASOPHILS # BLD AUTO: 0.07 K/UL (ref 0–0.2)
BASOPHILS NFR BLD: 0.5 % (ref 0–1.9)
BASOPHILS NFR BLD: 0.6 % (ref 0–1.9)
BILIRUB SERPL-MCNC: 0.6 MG/DL (ref 0.1–1)
BUN SERPL-MCNC: 101 MG/DL (ref 6–20)
CALCIUM SERPL-MCNC: 8.9 MG/DL (ref 8.7–10.5)
CHLORIDE SERPL-SCNC: 105 MMOL/L (ref 95–110)
CO2 SERPL-SCNC: 18 MMOL/L (ref 23–29)
CREAT SERPL-MCNC: 9.3 MG/DL (ref 0.5–1.4)
DIFFERENTIAL METHOD BLD: ABNORMAL
DIFFERENTIAL METHOD BLD: ABNORMAL
EOSINOPHIL # BLD AUTO: 0.1 K/UL (ref 0–0.5)
EOSINOPHIL # BLD AUTO: 0.1 K/UL (ref 0–0.5)
EOSINOPHIL NFR BLD: 0.7 % (ref 0–8)
EOSINOPHIL NFR BLD: 0.7 % (ref 0–8)
ERYTHROCYTE [DISTWIDTH] IN BLOOD BY AUTOMATED COUNT: 15.3 % (ref 11.5–14.5)
ERYTHROCYTE [DISTWIDTH] IN BLOOD BY AUTOMATED COUNT: 15.5 % (ref 11.5–14.5)
EST. GFR  (NO RACE VARIABLE): 4.6 ML/MIN/1.73 M^2
GLUCOSE SERPL-MCNC: 201 MG/DL (ref 70–110)
HCT VFR BLD AUTO: 22.5 % (ref 37–48.5)
HCT VFR BLD AUTO: 22.7 % (ref 37–48.5)
HGB BLD-MCNC: 7.1 G/DL (ref 12–16)
HGB BLD-MCNC: 7.2 G/DL (ref 12–16)
IMM GRANULOCYTES # BLD AUTO: 0.04 K/UL (ref 0–0.04)
IMM GRANULOCYTES # BLD AUTO: 0.09 K/UL (ref 0–0.04)
IMM GRANULOCYTES NFR BLD AUTO: 0.4 % (ref 0–0.5)
IMM GRANULOCYTES NFR BLD AUTO: 0.7 % (ref 0–0.5)
LYMPHOCYTES # BLD AUTO: 0.8 K/UL (ref 1–4.8)
LYMPHOCYTES # BLD AUTO: 0.9 K/UL (ref 1–4.8)
LYMPHOCYTES NFR BLD: 6.7 % (ref 18–48)
LYMPHOCYTES NFR BLD: 7.3 % (ref 18–48)
MAGNESIUM SERPL-MCNC: 3.4 MG/DL (ref 1.6–2.6)
MCH RBC QN AUTO: 31.4 PG (ref 27–31)
MCH RBC QN AUTO: 31.4 PG (ref 27–31)
MCHC RBC AUTO-ENTMCNC: 31.3 G/DL (ref 32–36)
MCHC RBC AUTO-ENTMCNC: 32 G/DL (ref 32–36)
MCV RBC AUTO: 100 FL (ref 82–98)
MCV RBC AUTO: 98 FL (ref 82–98)
MONOCYTES # BLD AUTO: 0.4 K/UL (ref 0.3–1)
MONOCYTES # BLD AUTO: 0.7 K/UL (ref 0.3–1)
MONOCYTES NFR BLD: 3.5 % (ref 4–15)
MONOCYTES NFR BLD: 5.7 % (ref 4–15)
NEUTROPHILS # BLD AUTO: 10.3 K/UL (ref 1.8–7.7)
NEUTROPHILS # BLD AUTO: 9.9 K/UL (ref 1.8–7.7)
NEUTROPHILS NFR BLD: 85.1 % (ref 38–73)
NEUTROPHILS NFR BLD: 88.1 % (ref 38–73)
NRBC BLD-RTO: 0 /100 WBC
NRBC BLD-RTO: 0 /100 WBC
OHS QRS DURATION: 88 MS
OHS QTC CALCULATION: 465 MS
PHOSPHATE SERPL-MCNC: 5.7 MG/DL (ref 2.7–4.5)
PLATELET # BLD AUTO: 142 K/UL (ref 150–450)
PLATELET # BLD AUTO: 157 K/UL (ref 150–450)
PMV BLD AUTO: 10.6 FL (ref 9.2–12.9)
PMV BLD AUTO: 10.9 FL (ref 9.2–12.9)
POCT GLUCOSE: 151 MG/DL (ref 70–110)
POCT GLUCOSE: 178 MG/DL (ref 70–110)
POTASSIUM SERPL-SCNC: 4.5 MMOL/L (ref 3.5–5.1)
PROT SERPL-MCNC: 7.4 G/DL (ref 6–8.4)
RBC # BLD AUTO: 2.26 M/UL (ref 4–5.4)
RBC # BLD AUTO: 2.29 M/UL (ref 4–5.4)
SODIUM SERPL-SCNC: 141 MMOL/L (ref 136–145)
WBC # BLD AUTO: 11.27 K/UL (ref 3.9–12.7)
WBC # BLD AUTO: 12.05 K/UL (ref 3.9–12.7)

## 2025-01-21 PROCEDURE — 63600175 PHARM REV CODE 636 W HCPCS: Performed by: NURSE PRACTITIONER

## 2025-01-21 PROCEDURE — 90935 HEMODIALYSIS ONE EVALUATION: CPT | Mod: ,,, | Performed by: NURSE PRACTITIONER

## 2025-01-21 PROCEDURE — 36415 COLL VENOUS BLD VENIPUNCTURE: CPT | Performed by: PHYSICIAN ASSISTANT

## 2025-01-21 PROCEDURE — 84100 ASSAY OF PHOSPHORUS: CPT | Performed by: NURSE PRACTITIONER

## 2025-01-21 PROCEDURE — 85025 COMPLETE CBC W/AUTO DIFF WBC: CPT | Mod: 91 | Performed by: PHYSICIAN ASSISTANT

## 2025-01-21 PROCEDURE — 25000242 PHARM REV CODE 250 ALT 637 W/ HCPCS: Performed by: NURSE PRACTITIONER

## 2025-01-21 PROCEDURE — 96372 THER/PROPH/DIAG INJ SC/IM: CPT | Performed by: NURSE PRACTITIONER

## 2025-01-21 PROCEDURE — G0257 UNSCHED DIALYSIS ESRD PT HOS: HCPCS

## 2025-01-21 PROCEDURE — 85025 COMPLETE CBC W/AUTO DIFF WBC: CPT | Performed by: NURSE PRACTITIONER

## 2025-01-21 PROCEDURE — 80053 COMPREHEN METABOLIC PANEL: CPT | Performed by: NURSE PRACTITIONER

## 2025-01-21 PROCEDURE — 25000003 PHARM REV CODE 250: Performed by: NURSE PRACTITIONER

## 2025-01-21 PROCEDURE — 21400001 HC TELEMETRY ROOM

## 2025-01-21 PROCEDURE — 36415 COLL VENOUS BLD VENIPUNCTURE: CPT | Performed by: NURSE PRACTITIONER

## 2025-01-21 PROCEDURE — 80100016 HC MAINTENANCE HEMODIALYSIS

## 2025-01-21 PROCEDURE — 63600175 PHARM REV CODE 636 W HCPCS: Mod: JZ,TB | Performed by: NURSE PRACTITIONER

## 2025-01-21 PROCEDURE — 25000003 PHARM REV CODE 250: Performed by: EMERGENCY MEDICINE

## 2025-01-21 PROCEDURE — 83735 ASSAY OF MAGNESIUM: CPT | Performed by: NURSE PRACTITIONER

## 2025-01-21 PROCEDURE — 99900035 HC TECH TIME PER 15 MIN (STAT)

## 2025-01-21 RX ORDER — CEFEPIME HYDROCHLORIDE 2 G/1
2 INJECTION, POWDER, FOR SOLUTION INTRAVENOUS ONCE
Status: COMPLETED | OUTPATIENT
Start: 2025-01-21 | End: 2025-01-21

## 2025-01-21 RX ORDER — SODIUM CHLORIDE 9 MG/ML
INJECTION, SOLUTION INTRAVENOUS ONCE
Status: COMPLETED | OUTPATIENT
Start: 2025-01-21 | End: 2025-01-21

## 2025-01-21 RX ADMIN — LABETALOL HYDROCHLORIDE 10 MG: 5 INJECTION, SOLUTION INTRAVENOUS at 10:01

## 2025-01-21 RX ADMIN — CEFEPIME 2 G: 2 INJECTION, POWDER, FOR SOLUTION INTRAVENOUS at 01:01

## 2025-01-21 RX ADMIN — INSULIN GLARGINE 28 UNITS: 100 INJECTION, SOLUTION SUBCUTANEOUS at 09:01

## 2025-01-21 RX ADMIN — Medication 1 CAPSULE: at 01:01

## 2025-01-21 RX ADMIN — SODIUM CHLORIDE: 9 INJECTION, SOLUTION INTRAVENOUS at 08:01

## 2025-01-21 RX ADMIN — CARVEDILOL 6.25 MG: 6.25 TABLET, FILM COATED ORAL at 10:01

## 2025-01-21 RX ADMIN — SEVELAMER CARBONATE 800 MG: 800 TABLET, FILM COATED ORAL at 09:01

## 2025-01-21 RX ADMIN — EPOETIN ALFA-EPBX 10000 UNITS: 10000 INJECTION, SOLUTION INTRAVENOUS; SUBCUTANEOUS at 10:01

## 2025-01-21 RX ADMIN — FLUTICASONE PROPIONATE 100 MCG: 50 SPRAY, METERED NASAL at 01:01

## 2025-01-21 RX ADMIN — FLUTICASONE FUROATE AND VILANTEROL TRIFENATATE 1 PUFF: 200; 25 POWDER RESPIRATORY (INHALATION) at 03:01

## 2025-01-21 RX ADMIN — CARVEDILOL 6.25 MG: 6.25 TABLET, FILM COATED ORAL at 05:01

## 2025-01-21 RX ADMIN — SEVELAMER CARBONATE 800 MG: 800 TABLET, FILM COATED ORAL at 05:01

## 2025-01-21 RX ADMIN — HYDRALAZINE HYDROCHLORIDE 10 MG: 20 INJECTION, SOLUTION INTRAMUSCULAR; INTRAVENOUS at 09:01

## 2025-01-21 RX ADMIN — ATORVASTATIN CALCIUM 80 MG: 40 TABLET, FILM COATED ORAL at 09:01

## 2025-01-21 RX ADMIN — Medication 6 MG: at 09:01

## 2025-01-21 RX ADMIN — CETIRIZINE HYDROCHLORIDE 5 MG: 5 TABLET, FILM COATED ORAL at 01:01

## 2025-01-21 NOTE — HOSPITAL COURSE
Patient admitted to hospital medicine s/p trip & fall at home. 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 Repeat CTH obtained with unchanged subcentimeter hyperdense mass, no acute change. Pending MRI c-spine for tremors/upper ext weakness. MM pain regimen. Hgb drop 6.6 on 1/22, giving 1u pRBCs with improvement. Nephrology assisted with HD management while inpatient.  Patient medically ready for discharge. Plan to follow up with PCP, nsgy, podiatry. Return precautions provided. Patient was seen and assessed on day of discharge. Plan of care discussed with patient, patient agreeable with plan, and all questions answered.    Physical Exam  Gen: in NAD, appears stated age  Neuro: mental status at baseline  HEENT: EOMI, PERRLA; no JVD appreciated  CVS: RRR, no m/r/g  Resp: lungs CTAB, no w/r/r; no belabored breathing or accessory muscle use appreciated   Abd: NTND, soft to palpation  Extrem: no UE or LE edema BL, LLE in cast

## 2025-01-21 NOTE — HPI
"Georgina Arias is a 54 y.o. female with past medical history of HTN, HLD, PAD, ESRD on HD, T2DM, and HFpEF who presents after fall at home. Pt reports that she tripped over glass table, she reports being "wobbly" for the past couple of months and is unsure if she had a mechanical fall or if she was having trouble with balance. Pt reports multiple recent episodes where she feels unsteady on her feet and has difficulty distinguishing right from left. She also endorses having difficulty holding things/dropping things more often. Denies numbness, weakness, or bowel/bladder changes. She was scheduled for hemodialysis today however presented to the ED for further evaluation and deferred her HD appointment.  while in ED. Pt reports ASA 81 use but denies other AP/AC. Neurosurgery consulted for possible ICH on CTH.        "

## 2025-01-21 NOTE — PLAN OF CARE
Problem: Hemodialysis  Goal: Safe, Effective Therapy Delivery  Reactivated  Goal: Effective Tissue Perfusion  Reactivated  Goal: Absence of Infection Signs and Symptoms  Reactivated     Problem: Adult Inpatient Plan of Care  Goal: Plan of Care Review  Reactivated  Goal: Patient-Specific Goal (Individualized)  Reactivated  Goal: Absence of Hospital-Acquired Illness or Injury  Reactivated  Goal: Optimal Comfort and Wellbeing  Reactivated  Goal: Readiness for Transition of Care  Reactivated     Problem: Wound  Goal: Optimal Coping  Reactivated  Goal: Optimal Functional Ability  Reactivated  Goal: Absence of Infection Signs and Symptoms  Reactivated  Goal: Improved Oral Intake  Reactivated  Goal: Optimal Pain Control and Function  Reactivated  Goal: Skin Health and Integrity  Reactivated  Goal: Optimal Wound Healing  Reactivated     Problem: Acute Kidney Injury/Impairment  Goal: Fluid and Electrolyte Balance  Reactivated  Goal: Improved Oral Intake  Reactivated  Goal: Effective Renal Function  Reactivated     Problem: Diabetes Comorbidity  Goal: Blood Glucose Level Within Targeted Range  Reactivated     Problem: Fall Injury Risk  Goal: Absence of Fall and Fall-Related Injury  Reactivated     Problem: Hypertension Acute  Goal: Blood Pressure Within Desired Range  Reactivated     Problem: Pain Chronic (Persistent)  Goal: Optimal Pain Control and Function  Reactivated     Problem: Chronic Kidney Disease  Goal: Optimal Coping with Chronic Illness  Reactivated  Goal: Electrolyte Balance  Reactivated  Goal: Fluid Balance  Reactivated  Goal: Optimal Functional Ability  Reactivated  Goal: Absence of Anemia Signs and Symptoms  Reactivated  Goal: Optimal Oral Intake  Reactivated  Goal: Acceptable Pain Control  Reactivated  Goal: Minimize Renal Failure Effects  Reactivated     Problem: Heart Failure  Goal: Optimal Coping  Reactivated  Goal: Optimal Cardiac Output  Reactivated  Goal: Stable Heart Rate and  Rhythm  Reactivated  Goal: Optimal Functional Ability  Reactivated  Goal: Fluid and Electrolyte Balance  Reactivated  Goal: Improved Oral Intake  Reactivated  Goal: Effective Oxygenation and Ventilation  Reactivated  Goal: Effective Breathing Pattern During Sleep  Reactivated     Problem: Mobility Impairment  Goal: Optimal Mobility  Reactivated

## 2025-01-21 NOTE — ASSESSMENT & PLAN NOTE
Anemia is likely due to chronic disease due to ESRD. Most recent hemoglobin and hematocrit are listed below.  Recent Labs     01/20/25  1320   HGB 7.3*   HCT 23.6*     Plan  - Monitor serial CBC: Daily  - Transfuse PRBC if patient becomes hemodynamically unstable, symptomatic or H/H drops below 7/21.  - Patient has not received any PRBC transfusions to date  - Patient's anemia is currently stable

## 2025-01-21 NOTE — PROGRESS NOTES
[facility] [address] [place] [address] Medicine  Progress Note    Patient Name: Georgina Arias  MRN: [id number]  Patient Class: OP- Observation   Admission Date: 1/20/2025  Length of Stay: 0 days  Attending Physician: Angelica Gu MD  Primary Care Provider: Alonso iLng MD        Subjective     Principal Problem:Fall        HPI:  Georgina Arias is a 54 y.o. female with a PMHx of ESRD on MTThF, HTN, PVD, DM II, L foot osteo on IV antibiotics, charcot foot s/p reconstruction 11/1, HFpEF, s/p gastric sleeve [facility] s/p fall at home. Patient reports tripping over a coffee table earlier today while getting up to get some water. Endorses pain in her neck, left hip, right shin, and left foot. Denies head trauma, but she is not certain. Does note mild headache. Denies LOC. Reports being able to get up and ambulate with her walker, which is her baseline. Also states that she missed her HD session today 2/2 fall. Last HD session was 1/17. Denies fever/chills, diaphoresis, lightheadedness, SOB, CP, cough, congestion, abdominal pain, n/v/d, urinary symptoms, changes in BMs, new numbness/tingling, or weakness.     In the ED, patient hypertensive to max 200/74 with SpO2 >94% on 2L NC. Hgb 7.3. Phos 6.1. Mag 3.4. K 4.2. XR L hip with no acute fracture. XR R tib/fib with no acute fracture. XR L foot with a combination of dense casting material and habitus significantly limits visualization of the osseous structures. There are surgical changes of the foot. No convincing definite overt acute displaced fracture or dislocation of the foot however given positioning and limitations by the above, fracture cannot be definitively excluded. CTH with Subcentimeter rounded hyperdense lesion within the right posterior body of the corpus callosum in light of history of trauma concerning for possible acute parenchymal hemorrhage. Underlying vascular malformation cyst as a cavernoma cannot be excluded. CT C  spine with degenerative change cervical spine without evidence for acute fracture. CXR with interstitial findings suggest edema or other pneumonitis. No large focal consolidation. NSGY consulted, recommend MRI/MRA Brain and repeat CTH which is pending. Given tylenol 1g and lidocaine patch.     Overview/Hospital Course:  Patient admitted to hospital medicine s/p trip & fall at home. 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 Repeat CTH pending as patient refused imaging last night. MM pain regimen. PT/OT consulted.    Interval History: NAEON. VSS on RA. Patient seen and examined at bedside this morning. Patient appears very drowsy on exam following dialysis. She is responsive to verbal stimuli, but noted to fall asleep/loose focus throughout encounter. Easily redirectable. Likely 2/2 medications vs lack of sleep. Continue to monitor. PT/OT consulted. NSGY following.     Review of Systems   Constitutional:  Positive for fatigue. Negative for chills and fever.   Respiratory:  Negative for chest tightness and shortness of breath.    Cardiovascular:  Negative for chest pain and leg swelling.   Gastrointestinal:  Negative for abdominal pain and nausea.   Genitourinary:  Positive for difficulty urinating.   Musculoskeletal:  Positive for arthralgias, gait problem and neck pain.   Neurological:  Negative for dizziness and weakness.     Objective:     Vital Signs (Most Recent):  Temp: 98.7 °F (37.1 °C) (01/21/25 1135)  Pulse: 87 (01/21/25 1135)  Resp: 16 (01/21/25 1135)  BP: 133/63 (01/21/25 1135)  SpO2: (!) 94 % (01/21/25 0500) Vital Signs (24h Range):  Temp:  [97.8 °F (36.6 °C)-98.7 °F (37.1 °C)] 98.7 °F (37.1 °C)  Pulse:  [5-108] 87  Resp:  [13-22] 16  SpO2:  [93 %-100 %] 94 %  BP: (127-195)/(61-98) 133/63     Weight: 101 kg (222 lb 10.6 oz)  Body mass index is 34.87 kg/m².    Intake/Output Summary (Last 24 hours) at 1/21/2025 1303  Last data filed at 1/21/2025 1123  Gross per  24 hour   Intake 354 ml   Output 2728 ml   Net -2374 ml         Physical Exam  Vitals and nursing note reviewed.   Constitutional:       General: She is not in acute distress.     Appearance: She is not toxic-appearing or diaphoretic.   HENT:      Head: Normocephalic and atraumatic.      Nose: Nose normal.      Mouth/Throat:      Mouth: Mucous membranes are moist.   Eyes:      Pupils: Pupils are equal, round, and reactive to light.   Cardiovascular:      Rate and Rhythm: Normal rate and regular rhythm.      Arteriovenous access: Right arteriovenous access is present.  Pulmonary:      Effort: Pulmonary effort is normal. No respiratory distress.      Breath sounds: No wheezing or rhonchi.   Abdominal:      General: Bowel sounds are normal. There is no distension.      Palpations: Abdomen is soft.      Tenderness: There is no abdominal tenderness. There is no guarding.   Musculoskeletal:      Cervical back: Normal range of motion.      Comments: left lower leg in a cast  Right lower leg with a hematoma to the proximal anterior tibia   Skin:     General: Skin is warm and dry.   Neurological:      General: No focal deficit present.      Mental Status: She is oriented to person, place, and time. She is lethargic.      Motor: No weakness.      Comments: Able to follow commands appropriately, continuously falling asleep during exam  No new neurological deficits   Psychiatric:         Mood and Affect: Mood normal.             Significant Labs: All pertinent labs within the past 24 hours have been reviewed.  BMP:   Recent Labs   Lab 01/21/25  0235   *      K 4.5      CO2 18*   *   CREATININE 9.3*   CALCIUM 8.9   MG 3.4*     CBC:   Recent Labs   Lab 01/20/25  1320 01/21/25  0235   WBC 10.01 11.27   HGB 7.3* 7.1*   HCT 23.6* 22.7*    157     CMP:   Recent Labs   Lab 01/20/25  1320 01/21/25  0235    141   K 4.2 4.5    105   CO2 20* 18*   * 201*   BUN 98* 101*   CREATININE 8.9*  "9.3*   CALCIUM 8.9 8.9   PROT 7.4 7.4   ALBUMIN 3.0* 3.0*   BILITOT 0.5 0.6   ALKPHOS 87 91   AST 20 18   ALT 22 23   ANIONGAP 18* 18*     Cardiac Markers: No results for input(s): "CKMB", "MYOGLOBIN", "BNP", "TROPISTAT" in the last 48 hours.  Troponin: No results for input(s): "TROPONINI", "TROPONINIHS" in the last 48 hours.    Significant Imaging: I have reviewed all pertinent imaging results/findings within the past 24 hours.    Assessment and Plan     * Fall  Abnormal CT scan of head   - mechanical fall with trip & fall over coffee table  - no neuro deficits on exam  - hypertensive, but remaining vitals stable; goal SBP <160  - XR L pelvis, R tib fib, L foot without acute fracture  - CT C spine with degenerative change of cervical spine without evidence of acute fracture  - CTH with Subcentimeter rounded hyperdense lesion within the right posterior body of the corpus callosum in light of history of trauma concerning for possible acute parenchymal hemorrhage.  Underlying vascular malformation cyst as a cavernoma cannot be excluded. No evidence for acute extra-axial hemorrhage or hydrocephalus.   - NSGY consulted, recommending MRI/MRA brain and repeat CTH 6 hrs after initial CT.  - MRI/MRA Brain with No acute intracranial abnormality elsewhere. MRA of the intracranial vasculature appears within normal limits.  - Repeat CTH pending as patient refused testing last night  - NSGY recs:   - SBP <160 (Cardene gtt; Hydralazine & Labetalol PRN; Transition to home meds when appropriate)  - Na >135  - HOB >30    - MRI showing likely small cav mal within the splenium of the corpus callosum however limited due to lack   of contrast. Would prefer contrasted MRI but given patients ESRD pt can follow up In clinic in 6-12 months ' with a repeat MRI  - No acute neurosurgical intervention at this time  - Neuro checks q4h  - fall precautions    Anemia due to chronic kidney disease, on chronic dialysis  Anemia is likely due to " "chronic disease due to ESRD. Most recent hemoglobin and hematocrit are listed below.  Recent Labs     01/20/25  1320 01/21/25  0235   HGB 7.3* 7.1*   HCT 23.6* 22.7*       Plan  - Monitor serial CBC: Daily; repeat this afternoon s/p HD  - Transfuse PRBC if patient becomes hemodynamically unstable, symptomatic or H/H drops below 7/21.  - Patient has not received any PRBC transfusions to date  - Patient's anemia is currently stable    Osteomyelitis  - supposed to get last dose of Cefepime 2g today at HD, but she did missed HD today  - will give Cefepime 2g with next HD session to complete therapy  - Follows with podiatry and ID outpatient  - some concerns of cefepime toxicity given upper extremity tremors  - last dose of cefepime received today     ESRD (end stage renal disease)  Creatine stable for now. BMP reviewed- noted Estimated Creatinine Clearance: 8.4 mL/min (A) (based on SCr of 9.3 mg/dL (H)). according to latest data. Based on current GFR, CKD stage is end stage.  Monitor UOP and serial BMP and adjust therapy as needed. Renally dose meds. Avoid nephrotoxic medications and procedures.  - nephrology consulted, appreciate recs    Class 2 severe obesity with serious comorbidity and body mass index (BMI) of 38.0 to 38.9 in adult  Body mass index is 34.87 kg/m². Obesity complicates all aspects of disease management from diagnostic modalities to treatment. Weight loss encouraged and health benefits explained to patient.     Chronic diastolic congestive heart failure  Patient has Diastolic (HFpEF) heart failure that is Acute on chronic. On presentation their CHF was decompensated. Evidence of decompensated CHF on presentation includes: edema and crackles on lung auscultation. The etiology of their decompensation is likely missed HD session . Most recent BNP and echo results are listed below.  No results for input(s): "BNP" in the last 72 hours.  Latest ECHO  Results for orders placed during the hospital encounter of " 09/11/23    Echo    Interpretation Summary    Left Ventricle: The left ventricle is normal in size. Normal wall thickness. Normal wall motion. There is low normal systolic function with a visually estimated ejection fraction of 50 - 55%. Ejection fraction by visual approximation is 55%. There is normal diastolic function.    Right Ventricle: Right ventricle was not well visualized due to poor acoustic window. Normal right ventricular cavity size. Wall thickness is normal. Right ventricle wall motion  is normal. Systolic function is normal.    Aortic Valve: There is moderate aortic valve sclerosis. There is annular calcification present.    Mitral Valve: There is moderate mitral annular calcification present.    IVC/SVC: Normal venous pressure at 3 mmHg.    Current Heart Failure Medications  carvediloL tablet 6.25 mg, 2 times daily with meals, Oral  hydrALAZINE injection 10 mg, Every 8 hours PRN, Intravenous  labetalol 20 mg/4 mL (5 mg/mL) IV syring, Every 6 hours PRN, Intravenous    Plan  - Monitor strict I&Os and daily weights.    - Place on telemetry  - Low sodium diet  - Place on fluid restriction of 1.5 L.   - Cardiology has not been consulted  - The patient's volume status is stable but not at their baseline as indicated by edema and crackles on lung auscultation  - volume management per HD.    Acute respiratory failure with hypoxia and hypercapnia  Patient with Hypercapnic and Hypoxic Respiratory failure which is Acute.  she is not on home oxygen. Supplemental oxygen was provided and noted-      .   Signs/symptoms of respiratory failure include- increased work of breathing and use of accessory muscles. Contributing diagnoses includes - CHF and ESRD  Labs and images were reviewed. Patient Has not had a recent ABG. Will treat underlying causes and adjust management of respiratory failure as follows-   - likely 2/2 volume overload  - Nephrology consulted for fluid removal  - wean O2 as tolerated    Essential  hypertension  Patient's blood pressure range in the last 24 hours was: BP  Min: 127/61  Max: 195/81.The patient's inpatient anti-hypertensive regimen is listed below:  Current Antihypertensives  carvediloL tablet 6.25 mg, 2 times daily with meals, Oral  hydrALAZINE injection 10 mg, Every 8 hours PRN, Intravenous  labetalol 20 mg/4 mL (5 mg/mL) IV syring, Every 6 hours PRN, Intravenous    Plan  - BP is uncontrolled, will adjust as follows: needs HD  - PRN hydralazine and labetalol for SBP <160 per NSGY recs  - tele    Hyperlipidemia   Patient is chronically on statin.will continue for now. Monitor clinically. Last LDL was   Lab Results   Component Value Date    LDLCALC 14.0 (L) 12/10/2024       PAD (peripheral artery disease)  PAD s/p right SFA stent (11/2017), s/p right common iliac stent s/p [place] [facility] [place] S/p ax fem bypass and stent to sfa with Dr. Maya on 9/14/23  -Continue statin, hold ASA pending repeat head imaging.    Type II diabetes mellitus  Patient's FSGs are controlled on current medication regimen.  Last A1c reviewed-   Lab Results   Component Value Date    HGBA1C 5.3 12/09/2024     Most recent fingerstick glucose reviewed-   Recent Labs   Lab 01/20/25 2053 01/20/25  2231   POCTGLUCOSE 198* 213*     Current correctional scale  Low  Maintain anti-hyperglycemic dose as follows-   Antihyperglycemics (From admission, onward)      Start     Stop Route Frequency Ordered    01/20/25 2100  insulin glargine U-100 (Lantus) pen 28 Units         -- SubQ Nightly 01/20/25 1837    01/20/25 1930  insulin aspart U-100 pen 0-5 Units         -- SubQ Before meals & nightly PRN 01/20/25 1831          Hold Oral hypoglycemics while patient is in the hospital.  -Accuchecks AC/HS  -Diabetic/renal diet.      VTE Risk Mitigation (From admission, onward)           Ordered     Reason for No Pharmacological VTE Prophylaxis  Once        Question:  Reasons:  Answer:  Risk of Bleeding     01/20/25 1831     IP VTE HIGH RISK PATIENT  Once         01/20/25 1831     Place sequential compression device  Until discontinued         01/20/25 1831                    Discharge Planning   INDIO: 1/22/2025     Code Status: Full Code   Medical Readiness for Discharge Date:                    Please place Justification for DME        Christine Wagner PA-C  Department of Hospital Medicine   [facility] - Observation 11H

## 2025-01-21 NOTE — PLAN OF CARE
Problem: Hemodialysis  Goal: Safe, Effective Therapy Delivery  Outcome: Progressing  Goal: Effective Tissue Perfusion  Outcome: Progressing  Goal: Absence of Infection Signs and Symptoms  Outcome: Progressing       Dialysis tx ended to the right forearm AVF, hemostasis achieved.    Net fluid removed: 2L    Prn hydralazine and labetalol given during tx.    OMERO Wagner made aware of the an occasional tremor to the left arm, pt falls asleep mid convo and wakes back up, and that pt ended tx more drowsy then at the start of tx. Neuro assessment done, pt aaox4.    Report given to nurse Perez, pt transported by wheelchair for NE[facility]ALI to room 727.

## 2025-01-21 NOTE — ASSESSMENT & PLAN NOTE
Abnormal CT scan of head   - mechanical fall with trip & fall over coffee table  - no neuro deficits on exam  - hypertensive, but remaining vitals stable; goal SBP <160  - XR L pelvis, R tib fib, L foot without acute fracture  - CT C spine with degenerative change of cervical spine without evidence of acute fracture  - CTH with Subcentimeter rounded hyperdense lesion within the right posterior body of the corpus callosum in light of history of trauma concerning for possible acute parenchymal hemorrhage.  Underlying vascular malformation cyst as a cavernoma cannot be excluded. No evidence for acute extra-axial hemorrhage or hydrocephalus.   - NSGY consulted, recommending MRI/MRA brain and repeat CTH 6 hrs after initial CT.  - Neuro checks q4h  - fall precautions

## 2025-01-21 NOTE — ASSESSMENT & PLAN NOTE
Creatine stable for now. BMP reviewed- noted Estimated Creatinine Clearance: 8.8 mL/min (A) (based on SCr of 8.9 mg/dL (H)). according to latest data. Based on current GFR, CKD stage is end stage.  Monitor UOP and serial BMP and adjust therapy as needed. Renally dose meds. Avoid nephrotoxic medications and procedures.  - nephrology consulted, appreciate recs

## 2025-01-21 NOTE — ASSESSMENT & PLAN NOTE
PAD s/p right SFA stent (11/2017), s/p right common iliac stent s/p PTA occluded stents in 2014 (Dr. Nathaniel Coyle at [facility] [place]) S/p ax fem bypass and stent to sfa with Dr. Maya on 9/14/23  -Continue statin, hold ASA pending repeat head imaging.

## 2025-01-21 NOTE — NURSING
Dialysis tx started to the right forearm AVF per orders placed by ALANA [employer]og:    Order Questions    Question Answer   Antibiotics on HD? No   Duration of Treatment 3 hours   Dialyzer F160   Dialysate Temperature (C) 36.5   Target  mL/min   If unable to maintain flow due to inadequate vascular access patency, patient intolerance (i.e. chest pain, access discomfort) or elevated venous pressure, adjust blood flow rate to a minimum of _____mL/min 100    mL/min   K+ Potassium per Protocol   Ca++ Calcium per Protocol   Na+ Sodium per Protocol   Bicarb Bicarbonate per Protocol   Access to be used AVF   Needle gauge 15 gauge   Location Arm   Laterality Right   Target UF 2L   If unable to maintain this UFR due to patient intolerance (i.e. hypotension, chest pain, muscle cramping, nausea or vomiting), adjust UFR to achieve a minimum of _______ liters of UF 0   Fluid Removal Instructions maintain SBP > 90 mmHG

## 2025-01-21 NOTE — PROCEDURES
"[facility] H[facility]ODIAL[facility] NOTE     Patient currently on hemodialysis for removal of uremic toxins and volume.     Patient seen and evaluated on hemodialysis, tolerating treatment, see HD flowsheet for vitals and assessments.      Ultrafiltration goal is 2L     Labs have been reviewed and the dialysate bath has been adjusted.     Assessment/Plan:  Seen on HD this morning, nursing reporting myoclonus and patient reports feeling "off" and "Wobbly". Noted to be on Cefepime so there is come concern for cefepime induced neurotoxicity.  Will discuss with primary team.  Start EPO with HD today  Continue phos binders     ANDREZ Wong, FNP-BC  Nephrology  Pager:  [phone]         "

## 2025-01-21 NOTE — ASSESSMENT & PLAN NOTE
Patient's blood pressure range in the last 24 hours was: BP  Min: 127/61  Max: 195/81.The patient's inpatient anti-hypertensive regimen is listed below:  Current Antihypertensives  carvediloL tablet 6.25 mg, 2 times daily with meals, Oral  hydrALAZINE injection 10 mg, Every 8 hours PRN, Intravenous  labetalol 20 mg/4 mL (5 mg/mL) IV syring, Every 6 hours PRN, Intravenous    Plan  - BP is uncontrolled, will adjust as follows: needs HD  - PRN hydralazine and labetalol for SBP <160 per NSGY recs  - tele

## 2025-01-21 NOTE — SUBJECTIVE & OBJECTIVE
Past Medical History:   Diagnosis Date    Hyperlipidemia     Hypertension     Peptic ulcer disease     Peripheral artery disease     PONV (postoperative nausea and vomiting)     Stage IV CKD     Type II diabetes mellitus        Past Surgical History:   Procedure Laterality Date    ANGIOGRAM, LOWER ARTERIAL, UNILATERAL Left 09/13/2023    Procedure: ANGIOGRAM, LOWER ARTERIAL, UNILATERAL;  Surgeon: Maxx Maya MD;  Location: [address];  Service: Vascular;  Laterality: Left;    ANGIOGRAPHY OF LOWER EXTREMITY Left 09/14/2023    Procedure: ANGIOGRAM, LOWER EXTREMITY with balloon angioplasty ultraverse 5mm x 60mm;  Surgeon: Maxx Maya MD;  Location: [address];  Service: Vascular;  Laterality: Left;  ultraverse 5mm x 60mm  fluoro: 12.41  contrast: 44ml  mGy: 65.57  Gycm2: 23.50    AORTOGRAPHY WITH EXTREMITY RUNOFF Left 09/13/2023    Procedure: AORTOGRAM, WITH EXTREMITY RUNOFF;  Surgeon: Maxx Maya MD;  Location: [address];  Service: Vascular;  Laterality: Left;  8.1 min  663.63 mGy  176.39 Gy.cm  178ml Dye     AV FISTULA PLACEMENT Right     CHOLECYSTECTOMY      COLONOSCOPY  11/06/2013    normal    COLONOSCOPY  05/2023    cancelled due to inadequate prep    COLONOSCOPY  06/2023    results unknown    CREATION, BYPASS, ARTERIAL, AXILLARY TO BILATERAL FEMORAL Left 09/14/2023    Procedure: CREATION, BYPASS, ARTERIAL, AXILLARY TO BILATERAL FEMORAL;  Surgeon: Maxx Maya MD;  Location: [address];  Service: Vascular;  Laterality: Left;  Left Axillary to Bilateral Femoral Bypass, Left Femoral to Above Knee Popliteal Bypass; SLIDER BED    CYSTOSCOPY W/ URETERAL STENT PLACEMENT Right 08/28/2018    Procedure: CYSTOSCOPY, WITH URETERAL STENT INSERTION (ADD ON );  Surgeon: Bubba Perez MD;  Location: [facility];  Service: Urology;  Laterality: Right;  (ADD ON )    DEBRIDEMENT OF FOOT Left 08/03/2023    Procedure: DEBRIDEMENT, FOOT;  Surgeon: Aleida Flannery DPM;  Location: [facility];  Service:  Podiatry;  Laterality: Left;    Excisional debridement of ulcer distal great toe left foot w/ partial resection distal phalanx Left 08/03/2023    FOOT AMPUTATION THROUGH METATARSAL Left 09/14/2023    Procedure: AMPUTATION, FOOT, TRANSMETATARSAL partial 1st ray amputation;  Surgeon: Jesus Valles DPM;  Location: [facility] OR 2ND FLR;  Service: Podiatry;  Laterality: Left;  partial ray    I&D L 1st ray w/ amputation L hallux IPJ Left 08/14/2023    INCISION AND DRAINAGE FOOT Left 09/14/2023    Procedure: INCISION AND DRAINAGE, FOOT;  Surgeon: Jesus Valles DPM;  Location: [facility] OR 2ND FLR;  Service: Podiatry;  Laterality: Left;    Injection L PT tendon sheath Left 08/14/2023    LENGTHENING OF ACHILLES TENDON Left 11/11/2024    Procedure: LENGTHENING, TENDON, ACHILLES;  Surgeon: Marco Allison DPM;  Location: [facility] OR;  Service: Podiatry;  Laterality: Left;    PERIPHERAL ARTERIAL STENT GRAFT Right     RECONSTRUCTION WITH FUSION OF CHARCOT FOOT Left 11/11/2024    Procedure: RECONSTRUCTION, CHARCOT FOOT, WITH FUSION;  Surgeon: Marco Allison DPM;  Location: [facility] OR;  Service: Podiatry;  Laterality: Left;  4 hours; foot tray; mini c arm; saw/drill    REMOVAL OF NAIL OF DIGIT Left 08/03/2023    Procedure: REMOVAL, NAIL, DIGIT great toe;  Surgeon: Aleida Flannery DPM;  Location: [facility] OR;  Service: Podiatry;  Laterality: Left;    REVISION, AMPUTATION, TRANSMETATARSAL Left 11/11/2024    Procedure: REVISION, AMPUTATION, TRANSMETATARSAL;  Surgeon: Marco Allison DPM;  [facility];  Service: Podiatry;  Laterality: Left;    STENT, SUPERFICIAL FEMORAL ARTERY Left 09/14/2023    Procedure: STENT, SUPERFICIAL FEMORAL ARTERY;  Surgeon: Maxx Maya MD;  Location: [facility] OR 2ND FLR;  Service: Vascular;  Laterality: Left;  5 x 100 mm    TOE AMPUTATION Left 08/14/2023    Procedure: AMPUTATION, TOE hallux IPJ;  Surgeon: Aleida Flannery DPM;  Location: [facility] OR;  Service: Podiatry;  Laterality: Left;    TOE AMPUTATION Left  08/25/2023    Procedure: AMPUTATION, TOE hallux, possible 1st met.head;  Surgeon: Aleida Flannery DPM;  Location: [facility] OR;  Service: Podiatry;  Laterality: Left;    URETEROSCOPIC REMOVAL OF URETERIC CALCULUS Right 09/11/2018    Procedure: EXTRACTION-STONE-URETEROSCOPY;  Surgeon: Bubba Perez MD;  Location: [facility] OR;  Service: Urology;  Laterality: Right;       Review of patient's allergies indicates:   Allergen Reactions    Hydrocodone-acetaminophen Nausea And Vomiting, Rash and Other (See Comments)     Vicodin- severe rash  Lortab- head-spinning that leads to vomiting      Naproxen Anaphylaxis and Swelling     Hives (skin)^swelling  Hives (skin)^swelling  Hives (skin)^swelling    Sulfamethoxazole-trimethoprim Other (See Comments)     Caused JEROME; heart attack    Hydrocodone Nausea And Vomiting and Rash    Adhesive tape-silicones      blisters    Aspartame Hives    Penicillins Hives     Blisters (skin)^    Sulfamethoxazole Other (See Comments)    Trimethoprim Other (See Comments)    Acetaminophen Rash    Adhesive Rash       Current Facility-Administered Medications on File Prior to Encounter   Medication    sodium chloride 0.9% flush 10 mL     Current Outpatient Medications on File Prior to Encounter   Medication Sig    acetaminophen (TYLENOL) 500 MG tablet Take 2 tablets (1,000 mg total) by mouth every 8 (eight) hours as needed.    aspirin (ECOTRIN) 81 MG EC tablet Take 1 tablet (81 mg total) by mouth once daily.    atorvastatin (LIPITOR) 80 MG tablet Take 1 tablet by mouth every evening.    carvediloL (COREG) 6.25 MG tablet Take 6.25 mg by mouth 2 (two) times daily with meals.    ceFEPIme 1 gram injection Infuse 2g IV post HD on HD days    gabapentin (NEURONTIN) 100 MG capsule Take 1 capsule (100 mg total) by mouth once daily.    gabapentin (NEURONTIN) 300 MG capsule Take 1 capsule by mouth 3 (three) times daily.    HYDROcodone-acetaminophen (NORCO)  mg per tablet Take 1 tablet by mouth every 6 (six) hours  "as needed for Pain.    insulin (LANTUS SOLOSTAR U-100 INSULIN) glargine 100 units/mL (3mL) SubQ pen Inject 40 Units into the skin every evening.    levocetirizine (XYZAL) 5 MG tablet Take 5 mg by mouth.    linaGLIPtin (TRADJENTA) 5 mg Tab tablet Take 1 tablet by mouth once daily.    promethazine (PHENERGAN) 12.5 MG Tab Take 1 tablet (12.5 mg total) by mouth every 6 (six) hours as needed (to use with Norco, known to tolerate).    vit B,C-FA-zinc-selen-vit D3-E (RENAPLEX-D) 800 mcg-12.5 mg -2,000 unit Tab Take by mouth.    blood sugar diagnostic (TRUE METRIX GLUCOSE TEST STRIP) Strp 3 (three) times daily.    blood-glucose meter Misc 1 each by Other route.    carvediloL (COREG) 12.5 MG tablet Take 1 tablet by mouth.    COMFORT EZ PEN NEEDLES 32 gauge x 5/32" Ndle SMARTSIG:injection Daily    fluticasone propion-salmeteroL 55-14 mcg/actuation aebs Inhale 1 puff into the lungs.    fluticasone propionate (FLONASE) 50 mcg/actuation nasal spray Fluticasone Propionate 50 MCG/ACT Nasal Suspension QTY: 1 bottle Days: 30 Refills: 5  Written: 09/30/20 Patient Instructions: inhale 2 sprays in each nostril once daily    methoxy peg-epoetin beta (MIRCERA INJ) Inject 100 mcg into the skin.    miconazole NITRATE 2 % (MICOTIN) 2 % top powder Apply topically as needed for Itching.    miscellaneous medical supply (C-TUB) Misc Hearing amplification (BICROS preferred)    ondansetron (ZOFRAN-ODT) 4 MG TbDL Take 4 mg by mouth every 6 (six) hours.    pediatric multivitamin chewable tablet Take 1 tablet by mouth once daily.    pediatric multivitamin no.76 (FLINTSTONES COMPLETE) Chew Take 1 tablet by mouth.    potassium chloride SA (K-DUR,KLOR-CON) 20 MEQ tablet Take 20 mEq by mouth.    sevelamer carbonate (RENVELA) 800 mg Tab Take by mouth.    tenapanor (XPHOZAH) 30 mg Tab Take 30 mg by mouth.    tirzepatide (MOUNJARO) 10 mg/0.5 mL PnIj Inject 10 mg into the skin.    TRUEPLUS LANCETS 30 gauge Misc      Family History       Problem Relation " (Age of Onset)    Breast cancer Mother (71), Maternal Grandmother (75)    Diabetes Mother, Father    Heart attack Maternal Grandfather (44)    Heart disease Father (37), Maternal Grandmother    Hypertension Mother, Father, Brother    Uterine cancer Mother          Tobacco Use    Smoking status: Former     Current packs/day: 0.00     Types: Cigarettes     Quit date: 2006     Years since quittin.4    Smokeless tobacco: Never    Tobacco comments:     smoked off and on for 20 years, was up to 2 packs/day toward the end of that, and quit in    Substance and Sexual Activity    Alcohol use: Not Currently     Comment: occ    Drug use: No    Sexual activity: Not Currently     Review of Systems   Constitutional:  Negative for appetite change, chills, diaphoresis, fatigue and fever.   HENT:  Negative for congestion, rhinorrhea and sore throat.    Eyes:  Negative for photophobia and visual disturbance.   Respiratory:  Negative for cough, shortness of breath and wheezing.    Cardiovascular:  Negative for chest pain, palpitations and leg swelling.   Gastrointestinal:  Negative for abdominal distention, abdominal pain, diarrhea, nausea and vomiting.   Genitourinary:  Positive for decreased urine volume (ESRD on HD). Negative for dysuria, frequency and hematuria.   Musculoskeletal:  Positive for arthralgias, gait problem (chronic), myalgias and neck pain.   Skin:  Negative for color change, pallor, rash and wound.   Neurological:  Positive for headaches. Negative for syncope, weakness and light-headedness.   Psychiatric/Behavioral:  Negative for confusion and hallucinations. The patient is not nervous/anxious.      Objective:     Vital Signs (Most Recent):  Temp: 97.9 °F (36.6 °C) (25 170)  Pulse: 82 (25)  Resp: (!) 22 (25)  BP: (!) 190/87 (25)  SpO2: 100 % (25) Vital Signs (24h Range):  Temp:  [97.9 °F (36.6 °C)-98.2 °F (36.8 °C)] 97.9 °F (36.6 °C)  Pulse:  [74-94]  82  Resp:  [13-22] 22  SpO2:  [94 %-100 %] 100 %  BP: (165-200)/(74-87) 190/87     Weight: 100.8 kg (222 lb 3.6 oz)  Body mass index is 34.81 kg/m².     Physical Exam  Vitals and nursing note reviewed.   Constitutional:       General: She is not in acute distress.     Appearance: She is obese. She is not toxic-appearing or diaphoretic.   HENT:      Head: Normocephalic and atraumatic.      Nose: Nose normal.      Mouth/Throat:      Mouth: Mucous membranes are moist.   Eyes:      Pupils: Pupils are equal, round, and reactive to light.   Cardiovascular:      Rate and Rhythm: Normal rate and regular rhythm.      Arteriovenous access: Right arteriovenous access is present.  Pulmonary:      Effort: Pulmonary effort is normal. No respiratory distress.      Breath sounds: Rales present. No wheezing or rhonchi.      Comments: Currently on 2L NC  Abdominal:      General: Bowel sounds are normal. There is no distension.      Palpations: Abdomen is soft.      Tenderness: There is no abdominal tenderness. There is no guarding.   Musculoskeletal:      Cervical back: Normal range of motion.      Right lower leg: Edema (trace) present.      Left lower leg: Edema (trace) present.      Comments: left lower leg in a cast  Right lower leg with a hematoma to the proximal anterior tibia   Skin:     General: Skin is warm and dry.      Capillary Refill: Capillary refill takes 2 to 3 seconds.   Neurological:      General: No focal deficit present.      Mental Status: She is alert and oriented to person, place, and time.      Motor: No weakness.   Psychiatric:         Mood and Affect: Mood normal.              CRANIAL NERVES     CN III, IV, VI   Pupils are equal, round, and reactive to light.       Significant Labs: All pertinent labs within the past 24 hours have been reviewed.  CBC:   Recent Labs   Lab 01/20/25  1320   WBC 10.01   HGB 7.3*   HCT 23.6*        CMP:   Recent Labs   Lab 01/20/25  1320      K 4.2      CO2 20*    *   BUN 98*   CREATININE 8.9*   CALCIUM 8.9   PROT 7.4   ALBUMIN 3.0*   BILITOT 0.5   ALKPHOS 87   AST 20   ALT 22   ANIONGAP 18*       Significant Imaging: I have reviewed all pertinent imaging results/findings within the past 24 hours.  Imaging Results               CT Cervical Spine Without Contrast (Final result)  Result time 01/20/25 16:39:36      Final result by Hrebert Mendosa DO (01/20/25 16:39:36)                   Impression:      Degenerative change cervical spine without evidence for acute fracture    Probable DISH with partial ossification posterior longitudinal ligament.  Probable moderate central canal stenosis C3/C4 level.    Questioned artifact or disc protrusion at C5/C6 level    Further evaluation with MRI as warranted if patient compatible    Please see CT head report for further details.      Electronically signed by: Herbert Mendosa DO  Date:    01/20/2025  Time:    16:39               Narrative:    EXAMINATION:  CT CERVICAL SPINE WITHOUT CONTRAST    CLINICAL HISTORY:  Neck trauma, midline tenderness (Age 16-64y);    TECHNIQUE:  Low dose axial images, sagittal and coronal reformations were performed though the cervical spine.  Contrast was not administered.    COMPARISON:  Concomitant CT head    FINDINGS:  Straightening of the expected normal cervical lordosis.  There is degenerative disc disease with slight height loss and endplate degeneration at multiple levels most pronounced at C3/C4 with mild moderate height loss.  Allowing for degenerative change cervical vertebral body heights and contours are within normal is without evidence for acute fracture or traumatic subluxation.  There are prominent ventral osteophytes C3 through C5 concerning for possible diffuse idiopathic skeletal hyperostosis.  There is probable partial ossification posterior longitudinal ligament at the C3 and C6 levels    Central canal neural foramen somewhat distorted by motion and beam hardening artifacts  allowing for this degenerative changes most pronounced at C3/C4 level with posterior disc osteophyte with superimposed partial ossification posterior longitudinal ligament with probable moderate central canal stenosis.  Uncovertebral joint hypertrophy and facet arthropathy with mild bony neural foraminal stenosis    There is questionable disc protrusion at C5/C6 level although this may be artifactual from motion.    This report was flagged in Epic as abnormal.                                       CT Head Without Contrast (Final result)  Result time 01/20/25 16:34:51      Final result by Herbert Mendosa DO (01/20/25 16:34:51)                   Impression:      Subcentimeter rounded hyperdense lesion within the right posterior body of the corpus callosum in light of history of trauma concerning for possible acute parenchymal hemorrhage.  Underlying vascular malformation cyst as a cavernoma cannot be excluded.    No evidence for acute extra-axial hemorrhage or hydrocephalus.  Clinical correlation and further evaluation with MRI if patient compatible if not short-term follow-up CT recommended.      Electronically signed by: Herbert Mendosa DO  Date:    01/20/2025  Time:    16:34               Narrative:    EXAMINATION:  CT HEAD WITHOUT CONTRAST    CLINICAL HISTORY:  Head trauma, moderate-severe;    TECHNIQUE:  Multiple sequential 5 mm axial images of the head without contrast.  Coronal and sagittal reformatted imaging from the axial acquisition.    COMPARISON:  None    FINDINGS:  There is a small rounded hyperdensity within the right aspect of the splenium the corpus callosum this measures 0.7 cm which may represent focal vascular malformation such as a cavernous malformation however concerning for acute parenchymal hemorrhage.  No evidence for extra-axial extension.  Clinical correlation and further evaluation with MRI advised if patient compatible    The ventricles are normal in size without hydrocephalus.  No  "significant opacification of the paranasal sinuses or mastoid air cells..    COMMUNICATION  This critical result was discovered/received at 16:30.  The critical information above was relayed directly by me epic secure chat to Dr. Blake on 01/20/2025 at 1635.                                       X-Ray Foot Complete Left (Final result)  Result time 01/20/25 14:33:23      Final result by Michael Ervin MD (01/20/25 14:33:23)                   Impression:      As above      Electronically signed by: Michael Ervin MD  Date:    01/20/2025  Time:    14:33               Narrative:    EXAMINATION:  XR FOOT COMPLETE 3 VIEW LEFT    CLINICAL HISTORY:  .  Unspecified fall, initial encounter    TECHNIQUE:  AP, lateral and oblique views of the left foot were performed.    COMPARISON:  01/04/2025    FINDINGS:  Please note, a combination of dense casting material and habitus significantly limits visualization of the osseous structures.  There are surgical changes of the foot.  No convincing definite overt acute displaced fracture or dislocation of the foot however given positioning and limitations by the above, fracture cannot be definitively excluded.  Correlation is needed.                                       X-Ray Tibia Fibula 2 View Right (Final result)  Result time 01/20/25 14:35:24      Final result by Michael Ervin MD (01/20/25 14:35:24)                   Impression:      1. No convincing acute displaced fracture or dislocation of the tibia or fibula, please see above.      Electronically signed by: Michael Ervin MD  Date:    01/20/2025  Time:    14:35               Narrative:    EXAMINATION:  XR TIBIA FIBULA 2 VIEW RIGHT    CLINICAL HISTORY:  Pain in right leg    TECHNIQUE:  AP and lateral views of the left tibia and fibula were performed.  Please note, although order states "Right" tibia fibula, the images are of the left tibia and fibula.    COMPARISON:  None.    FINDINGS:  Five views left tibia " fibula.    The left knee is intact.  There is some degree of osteopenia.  No convincing acute displaced fracture or dislocation of the tibia or fibula.  The ankle mortise is intact.  Dense casting material limits evaluation of the osseous structures of the foot noting surgical change.  There is loosening about the screws traversing the talus.  There is edema about the foot.  There is vascular calcification.                                       X-Ray Hip 2 or 3 views Left with Pelvis when performed (Final result)  Result time 01/20/25 14:36:06      Final result by Michael Ervin MD (01/20/25 14:36:06)                   Impression:      1. No acute displaced fracture or dislocation of the left hip.      Electronically signed by: Michael Ervin MD  Date:    01/20/2025  Time:    14:36               Narrative:    EXAMINATION:  XR HIP WITH PELVIS WHEN PERFORMED 2 OR 3 VIEWS LEFT    CLINICAL HISTORY:  Pain in left hip    TECHNIQUE:  AP view of the pelvis and frog leg lateral view of the left hip were performed.    COMPARISON:  None    FINDINGS:  Three views left hip.    The bilateral sacroiliac joints are intact.  The pubic symphysis is intact.  The bilateral femoroacetabular joints are intact.  There is osteopenia.  Right vascular stent noted.  Surgical change noted of the bilateral inguinal regions.  There is vascular calcification.

## 2025-01-21 NOTE — ASSESSMENT & PLAN NOTE
54 y.o. female w/ PMH of HTN, DM2, ESRD on HD, PAD, HFpEF who presents with abnormal CTH c/f vascular malformation vs hemorrhage in R splenium of corpus callosum:    Imaging:  CTH 1/20: 7mm round hyperdensity in R splenium of corpus callosum c/f vascular malformation vs hemorrhage    Plan:  Patient admitted to obs under medicine for hemodialysis  q4h neurochecks on floor  All labs and diagnostics reviewed  Follow-up CTH and 6h scan for stability  MRI/MRA brain for suspicious lesions  SBP <160 (Cardene gtt; Hydralazine & Labetalol PRN; Transition to home meds when appropriate)  Na >135  HOB >30  Follow-up pre-op labs (CBC/CMP/PT-INR/PTT/T&S)  Continue to monitor clinically, notify NSGY immediately with any changes in neuro status    Plan discussed with Dr. Gallagher    Dispo: admitted to medicine

## 2025-01-21 NOTE — ASSESSMENT & PLAN NOTE
"Patient's FSGs are controlled on current medication regimen.  Last A1c reviewed-   Lab Results   Component Value Date    HGBA1C 5.3 12/09/2024     Most recent fingerstick glucose reviewed- No results for input(s): "POCTGLUCOSE" in the last 24 hours.  Current correctional scale  Low  Maintain anti-hyperglycemic dose as follows-   Antihyperglycemics (From admission, onward)      Start     Stop Route Frequency Ordered    01/20/25 2100  insulin glargine U-100 (Lantus) pen 28 Units         -- SubQ Nightly 01/20/25 1837    01/20/25 1930  insulin aspart U-100 pen 0-5 Units         -- SubQ Before meals & nightly PRN 01/20/25 1831          Hold Oral hypoglycemics while patient is in the hospital.  -Accuchecks AC/HS  -Diabetic/renal diet.  "

## 2025-01-21 NOTE — PROGRESS NOTES
"[facility]  Neurosurgery  Progress Note    Subjective:     History of Present Illness: Georgina Arias is a 54 y.o. female with past medical history of HTN, HLD, PAD, ESRD on HD, T2DM, and HFpEF who presents after fall at home. Pt reports that she tripped over glass table, she reports being "wobbly" for the past couple of months and is unsure if she had a mechanical fall or if she was having trouble with balance. Pt reports multiple recent episodes where she feels unsteady on her feet and has difficulty distinguishing right from left. She also endorses having difficulty holding things/dropping things more often. Denies numbness, weakness, or bowel/bladder changes. She was scheduled for hemodialysis today however presented to the ED for further evaluation and deferred her HD appointment.  while in ED. Pt reports ASA 81 use but denies other AP/AC. Neurosurgery consulted for possible ICH on CTH.          Post-Op Info:  * No surgery found *       Interval History: NAEON. Pt exam stable this morning. MRI completed showing likely small cav mal in the splenium of the corpus callosum.     Medications:  Continuous Infusions:  Scheduled Meds:   atorvastatin  80 mg Oral QHS    carvediloL  6.25 mg Oral BID WM    cetirizine  5 mg Oral Daily    epoetin tariq-ebpx (RETACRIT) injection  10,000 Units Subcutaneous Every Tues, Thurs, Sat    fluticasone furoate-vilanteroL  1 puff Inhalation Daily    fluticasone propionate  2 spray Each Nostril Daily    insulin glargine U-100 (Lantus)  28 Units Subcutaneous QHS    LIDOcaine  2 patch Transdermal Q24H    sevelamer carbonate  800 mg Oral TID WM    vitamin renal formula (B-complex-vitamin c-folic acid)  1 capsule Oral Daily     PRN Meds:  Current Facility-Administered Medications:     acetaminophen, 650 mg, Oral, Q6H PRN    dextrose 50%, 12.5 g, Intravenous, PRN    dextrose 50%, 25 g, Intravenous, PRN    glucagon (human recombinant), 1 mg, Intramuscular, PRN    " glucose, 16 g, Oral, PRN    glucose, 24 g, Oral, PRN    hydrALAZINE, 10 mg, Intravenous, Q8H PRN    insulin aspart U-100, 0-5 Units, Subcutaneous, QID (AC + HS) PRN    labetalol, 10 mg, Intravenous, Q6H PRN    melatonin, 6 mg, Oral, Nightly PRN    naloxone, 0.02 mg, Intravenous, PRN    ondansetron, 4 mg, Intravenous, Q8H PRN    prochlorperazine, 5 mg, Intravenous, Q6H PRN    sodium chloride 0.9%, 10 mL, Intravenous, PRN     Review of Systems  Objective:     Weight: 101 kg (222 lb 10.6 oz)  Body mass index is 34.87 kg/m².  Vital Signs (Most Recent):  Temp: 98.7 °F (37.1 °C) (01/21/25 1135)  Pulse: 87 (01/21/25 1135)  Resp: 16 (01/21/25 1135)  BP: 133/63 (01/21/25 1135)  SpO2: (!) 94 % (01/21/25 0500) Vital Signs (24h Range):  Temp:  [97.8 °F (36.6 °C)-98.7 °F (37.1 °C)] 98.7 °F (37.1 °C)  Pulse:  [5-108] 87  Resp:  [16-22] 16  SpO2:  [93 %-100 %] 94 %  BP: (127-195)/(61-98) 133/63     Date 01/21/25 0700 - 01/22/25 0659   Shift 4548-1675 4174-2596 5254-6165 24 Hour Total   INTAKE   P.O. 118   118   I.V.(mL/kg) 350(3.5)   350(3.5)   Shift Total(mL/kg) 468(4.6)   468(4.6)   OUTPUT   Other 2728   2728   Shift Total(mL/kg) 2728(27)   2728(27)   Weight (kg) 101 101 101 101                            Hemodialysis AV Fistula  Right forearm (Active)   Site Assessment No redness;No swelling 01/20/25 2228   Patency Present 01/20/25 2228   Status Accessed 01/20/25 1253   Flows Good 01/20/25 1253          Physical Exam         Neurosurgery Physical Exam  GENERAL: resting comfortably  HEENT: NCAT, PERRL, mucous membranes moist  NECK: supple, trachea midline  CV: normal capillary refill  PULM: aerating well, symmetric expansion, no distress  ABD: soft, NT, ND  EXT: LLE in cast     NEURO:     GCS 15 [id number]  AAO x 3  CN II-XII grossly intact  Fc x 4 antigravity  SILT  LUE drift    Significant Labs:  Recent Labs   Lab 01/20/25  1320 01/21/25  0235   * 201*    141   K 4.2 4.5    105   CO2 20* 18*   BUN 98* 101*    CREATININE 8.9* 9.3*   CALCIUM 8.9 8.9   MG 3.4* 3.4*     Recent Labs   Lab 01/20/25  1320 01/21/25  0235   WBC 10.01 11.27   HGB 7.3* 7.1*   HCT 23.6* 22.7*    157     Recent Labs   Lab 01/20/25  1928   INR 1.0   APTT 25.5     Microbiology Results (last 7 days)       ** No results found for the last 168 hours. **          All pertinent labs from the last 24 hours have been reviewed.    Significant Diagnostics:  I have reviewed all pertinent imaging results/findings within the past 24 hours.  Assessment/Plan:     Abnormal CT scan of head  54 y.o. female w/ PMH of HTN, DM2, ESRD on HD, PAD, HFpEF who presents with abnormal CTH c/f vascular malformation vs hemorrhage in R splenium of corpus callosum:    Imaging:  CTH 1/20: 7mm round hyperdensity in R splenium of corpus callosum c/f vascular malformation vs hemorrhage    Plan:  Patient admitted to obs under medicine for hemodialysis  q4h neurochecks on floor  All labs and diagnostics reviewed  Follow-up CTH and 6h scan for stability  MRI/MRA brain for suspicious lesions  SBP <160 (Cardene gtt; Hydralazine & Labetalol PRN; Transition to home meds when appropriate)  Na >135  HOB >30  MRI showing likely small cav mal within the splenium of the corpus callosum however limited due to lack of contrast. Would prefer contrasted MRI but given patients ESRD pt can follow up In clinic in 6-12 months with a repeat MRI  No acute neurosurgical intervention at this time  Rest of care per primary    Plan discussed with Dr. Gallagher    Dispo: admitted to medicine         Nitesh Varela MD  Neurosurgery  [facility]

## 2025-01-21 NOTE — ASSESSMENT & PLAN NOTE
Patient is chronically on statin.will continue for now. Monitor clinically. Last LDL was   Lab Results   Component Value Date    LDLCALC 14.0 (L) 12/10/2024

## 2025-01-21 NOTE — ASSESSMENT & PLAN NOTE
Anemia is likely due to chronic disease due to ESRD. Most recent hemoglobin and hematocrit are listed below.  Recent Labs     01/20/25  1320 01/21/25  0235   HGB 7.3* 7.1*   HCT 23.6* 22.7*       Plan  - Monitor serial CBC: Daily; repeat this afternoon s/p HD  - Transfuse PRBC if patient becomes hemodynamically unstable, symptomatic or H/H drops below 7/21.  - Patient has not received any PRBC transfusions to date  - Patient's anemia is currently stable

## 2025-01-21 NOTE — H&P
[facility] - Emergency Dept  [facility] Medicine  History & Physical    Patient Name: Georgina Arias  MRN: [id number]  Patient Class: OP- Observation  Admission Date: 1/20/2025  Attending Physician: Angelica Gu MD   Primary Care Provider: Alonso Ling MD         Patient information was obtained from patient, past medical records, and ER records.     Subjective:     Principal Problem:Fall    Chief Complaint:   Chief Complaint   Patient presents with    Fall     Ran over glass table, L hip pain, r leg, neck pain, no c collar per dr Reynolds        HPI: Georgina Arias is a 54 y.o. female with a PMHx of ESRD on MTThF, HTN, PVD, DM II, L foot osteo on IV antibiotics, charcot foot s/p reconstruction 11/1, HFpEF, s/p gastric sleeve admitted to hospital medicine s/p fall at home. Patient reports tripping over a coffee table earlier today while getting up to get some water. Endorses pain in her neck, left hip, right shin, and left foot. Denies head trauma, but she is not certain. Does note mild headache. Denies LOC. Reports being able to get up and ambulate with her walker, which is her baseline. Also states that she missed her HD session today 2/2 fall. Last HD session was 1/17. Denies fever/chills, diaphoresis, lightheadedness, SOB, CP, cough, congestion, abdominal pain, n/v/d, urinary symptoms, changes in BMs, new numbness/tingling, or weakness.     In the ED, patient hypertensive to max 200/74 with SpO2 >94% on 2L NC. Hgb 7.3. Phos 6.1. Mag 3.4. K 4.2. XR L hip with no acute fracture. XR R tib/fib with no acute fracture. XR L foot with a combination of dense casting material and habitus significantly limits visualization of the osseous structures. There are surgical changes of the foot. No convincing definite overt acute displaced fracture or dislocation of the foot however given positioning and limitations by the above, fracture cannot be definitively excluded. CTH with Subcentimeter rounded hyperdense  lesion within the right posterior body of the corpus callosum in light of history of trauma concerning for possible acute parenchymal hemorrhage. Underlying vascular malformation cyst as a cavernoma cannot be excluded. CT C spine with degenerative change cervical spine without evidence for acute fracture. CXR with interstitial findings suggest edema or other pneumonitis. No large focal consolidation. NSGY consulted, recommend MRI/MRA Brain and repeat CTH which is pending. Given tylenol 1g and lidocaine patch.     Past Medical History:   Diagnosis Date    Hyperlipidemia     Hypertension     Peptic ulcer disease     Peripheral artery disease     PONV (postoperative nausea and vomiting)     Stage IV CKD     Type II diabetes mellitus        Past Surgical History:   Procedure Laterality Date    ANGIOGRAM, LOWER ARTERIAL, UNILATERAL Left 09/13/2023    Procedure: ANGIOGRAM, LOWER ARTERIAL, UNILATERAL;  Surgeon: Maxx Maya MD;  Location: [facility] OR [address] [place] [address];  Service: Vascular;  Laterality: Left;    ANGIOGRAPHY OF LOWER EXTREMITY Left 09/14/2023    Procedure: ANGIOGRAM, LOWER EXTREMITY with balloon angioplasty ultraverse 5mm x 60mm;  Surgeon: Maxx Maya MD;  Location: [facility] OR [address] [place] [address];  Service: Vascular;  Laterality: Left;  ultraverse 5mm x 60mm  fluoro: 12.41  contrast: 44ml  mGy: 65.57  Gycm2: 23.50    AORTOGRAPHY WITH EXTREMITY RUNOFF Left 09/13/2023    Procedure: AORTOGRAM, WITH EXTREMITY RUNOFF;  Surgeon: Maxx Maya MD;  Location: [facility] OR [address] [place] [address];  Service: Vascular;  Laterality: Left;  8.1 min  663.63 mGy  176.39 Gy.cm  178ml Dye     AV FISTULA PLACEMENT Right     CHOLECYSTECTOMY      COLONOSCOPY  11/06/2013    normal    COLONOSCOPY  05/2023    cancelled due to inadequate prep    COLONOSCOPY  06/2023    results unknown    CREATION, BYPASS, ARTERIAL, AXILLARY TO BILATERAL FEMORAL Left 09/14/2023    Procedure: CREATION, BYPASS, ARTERIAL, AXILLARY TO BILATERAL FEMORAL;  Surgeon: Maxx Maya MD;   Location: [facility] OR 2ND FLR;  Service: Vascular;  Laterality: Left;  Left Axillary to Bilateral Femoral Bypass, Left Femoral to Above Knee Popliteal Bypass; SLIDER BED    CYSTOSCOPY W/ URETERAL STENT PLACEMENT Right 08/28/2018    Procedure: CYSTOSCOPY, WITH URETERAL STENT INSERTION (ADD ON );  Surgeon: Bubba Perez MD;  Location: [facility];  Service: Urology;  Laterality: Right;  (ADD ON )    DEBRIDEMENT OF FOOT Left 08/03/2023    Procedure: DEBRIDEMENT, FOOT;  Surgeon: Aleida Flannery DPM;  Location: [facility] OR;  Service: Podiatry;  Laterality: Left;    Excisional debridement of ulcer distal great toe left foot w/ partial resection distal phalanx Left 08/03/2023    FOOT AMPUTATION THROUGH METATARSAL Left 09/14/2023    Procedure: AMPUTATION, FOOT, TRANSMETATARSAL partial 1st ray amputation;  Surgeon: Jesus Valles DPM;  [facility];  Service: Podiatry;  Laterality: Left;  partial ray    I&D L 1st ray w/ amputation L hallux IPJ Left 08/14/2023    INCISION AND DRAINAGE FOOT Left 09/14/2023    Procedure: INCISION AND DRAINAGE, FOOT;  Surgeon: Jesus Valles DPM;  [facility];  Service: Podiatry;  Laterality: Left;    Injection L PT tendon sheath Left 08/14/2023    LENGTHENING OF ACHILLES TENDON Left 11/11/2024    Procedure: LENGTHENING, TENDON, ACHILLES;  Surgeon: Marco Allison DPM;  Location: [facility] OR;  Service: Podiatry;  Laterality: Left;    PERIPHERAL ARTERIAL STENT GRAFT Right     RECONSTRUCTION WITH FUSION OF CHARCOT FOOT Left 11/11/2024    Procedure: RECONSTRUCTION, CHARCOT FOOT, WITH FUSION;  Surgeon: Marco Allison DPM;  Location: [facility] OR;  Service: Podiatry;  Laterality: Left;  4 hours; foot tray; mini c arm; saw/drill    REMOVAL OF NAIL OF DIGIT Left 08/03/2023    Procedure: REMOVAL, NAIL, DIGIT great toe;  Surgeon: Aleida Flannery DPM;  Location: [facility];  Service: Podiatry;  Laterality: Left;    REVISION, AMPUTATION, TRANSMETATARSAL Left 11/11/2024    Procedure:  REVISION, AMPUTATION, TRANSMETATARSAL;  Surgeon: Marco Allison DPM;  [facility]: [facility] OR;  Service: Podiatry;  Laterality: Left;    STENT, SUPERFICIAL FEMORAL ARTERY Left 09/14/2023    Procedure: STENT, SUPERFICIAL FEMORAL ARTERY;  Surgeon: Maxx Maya MD;  Location: [facility] OR 2ND FLR;  Service: Vascular;  Laterality: Left;  5 x 100 mm    TOE AMPUTATION Left 08/14/2023    Procedure: AMPUTATION, TOE hallux IPJ;  Surgeon: Aleida Flannery DPM;  Location: [facility] OR;  Service: Podiatry;  Laterality: Left;    TOE AMPUTATION Left 08/25/2023    Procedure: AMPUTATION, TOE hallux, possible 1st met.head;  Surgeon: Aleida Flannery DPM;  Location: [facility] OR;  Service: Podiatry;  Laterality: Left;    URETEROSCOPIC REMOVAL OF URETERIC CALCULUS Right 09/11/2018    Procedure: EXTRACTION-STONE-URETEROSCOPY;  Surgeon: Bubba Perez MD;  Location: [facility] OR;  Service: Urology;  Laterality: Right;       Review of patient's allergies indicates:   Allergen Reactions    Hydrocodone-acetaminophen Nausea And Vomiting, Rash and Other (See Comments)     Vicodin- severe rash  Lortab- head-spinning that leads to vomiting      Naproxen Anaphylaxis and Swelling     Hives (skin)^swelling  Hives (skin)^swelling  Hives (skin)^swelling    Sulfamethoxazole-trimethoprim Other (See Comments)     Caused JEROME; heart attack    Hydrocodone Nausea And Vomiting and Rash    Adhesive tape-silicones      blisters    Aspartame Hives    Penicillins Hives     Blisters (skin)^    Sulfamethoxazole Other (See Comments)    Trimethoprim Other (See Comments)    Acetaminophen Rash    Adhesive Rash       Current Facility-Administered Medications on File Prior to Encounter   Medication    sodium chloride 0.9% flush 10 mL     Current Outpatient Medications on File Prior to Encounter   Medication Sig    acetaminophen (TYLENOL) 500 MG tablet Take 2 tablets (1,000 mg total) by mouth every 8 (eight) hours as needed.    aspirin (ECOTRIN) 81 MG EC tablet Take 1 tablet (81 mg total)  "by mouth once daily.    atorvastatin (LIPITOR) 80 MG tablet Take 1 tablet by mouth every evening.    carvediloL (COREG) 6.25 MG tablet Take 6.25 mg by mouth 2 (two) times daily with meals.    ceFEPIme 1 gram injection Infuse 2g IV post HD on HD days    gabapentin (NEURONTIN) 100 MG capsule Take 1 capsule (100 mg total) by mouth once daily.    gabapentin (NEURONTIN) 300 MG capsule Take 1 capsule by mouth 3 (three) times daily.    HYDROcodone-acetaminophen (NORCO)  mg per tablet Take 1 tablet by mouth every 6 (six) hours as needed for Pain.    insulin (LANTUS SOLOSTAR U-100 INSULIN) glargine 100 units/mL (3mL) SubQ pen Inject 40 Units into the skin every evening.    levocetirizine (XYZAL) 5 MG tablet Take 5 mg by mouth.    linaGLIPtin (TRADJENTA) 5 mg Tab tablet Take 1 tablet by mouth once daily.    promethazine (PHENERGAN) 12.5 MG Tab Take 1 tablet (12.5 mg total) by mouth every 6 (six) hours as needed (to use with Norco, known to tolerate).    vit B,C-FA-zinc-selen-vit D3-E (RENAPLEX-D) 800 mcg-12.5 mg -2,000 unit Tab Take by mouth.    blood sugar diagnostic (TRUE METRIX GLUCOSE TEST STRIP) Strp 3 (three) times daily.    blood-glucose meter Misc 1 each by Other route.    carvediloL (COREG) 12.5 MG tablet Take 1 tablet by mouth.    COMFORT EZ PEN NEEDLES 32 gauge x 5/32" Ndle SMARTSIG:injection Daily    fluticasone propion-salmeteroL 55-14 mcg/actuation aebs Inhale 1 puff into the lungs.    fluticasone propionate (FLONASE) 50 mcg/actuation nasal spray Fluticasone Propionate 50 MCG/ACT Nasal Suspension QTY: 1 bottle Days: 30 Refills: 5  Written: 09/30/20 Patient Instructions: inhale 2 sprays in each nostril once daily    methoxy peg-epoetin beta (MIRCERA INJ) Inject 100 mcg into the skin.    miconazole NITRATE 2 % (MICOTIN) 2 % top powder Apply topically as needed for Itching.    miscellCoolio medical supply (C-TUB) Misc Hearing amplification (BICROS preferred)    ondansetron (ZOFRAN-ODT) 4 MG TbDL Take 4 mg by " mouth every 6 (six) hours.    pediatric multivitamin chewable tablet Take 1 tablet by mouth once daily.    pediatric multivitamin no.76 (FLINTSTONES COMPLETE) Chew Take 1 tablet by mouth.    potassium chloride SA (K-DUR,KLOR-CON) 20 MEQ tablet Take 20 mEq by mouth.    sevelamer carbonate (RENVELA) 800 mg Tab Take by mouth.    tenapanor (XPHOZAH) 30 mg Tab Take 30 mg by mouth.    tirzepatide (MOUNJARO) 10 mg/0.5 mL PnIj Inject 10 mg into the skin.    TRUEPLUS LANCETS 30 gauge Misc      Family History       Problem Relation (Age of Onset)    Breast cancer Mother (71), Maternal Grandmother (75)    Diabetes Mother, Father    Heart attack Maternal Grandfather (44)    Heart disease Father (37), Maternal Grandmother    Hypertension Mother, Father, Brother    Uterine cancer Mother          Tobacco Use    Smoking status: Former     Current packs/day: 0.00     Types: Cigarettes     Quit date: 2006     Years since quittin.4    Smokeless tobacco: Never    Tobacco comments:     smoked off and on for 20 years, was up to 2 packs/day toward the end of that, and quit in    Substance and Sexual Activity    Alcohol use: Not Currently     Comment: occ    Drug use: No    Sexual activity: Not Currently     Review of Systems   Constitutional:  Negative for appetite change, chills, diaphoresis, fatigue and fever.   HENT:  Negative for congestion, rhinorrhea and sore throat.    Eyes:  Negative for photophobia and visual disturbance.   Respiratory:  Negative for cough, shortness of breath and wheezing.    Cardiovascular:  Negative for chest pain, palpitations and leg swelling.   Gastrointestinal:  Negative for abdominal distention, abdominal pain, diarrhea, nausea and vomiting.   Genitourinary:  Positive for decreased urine volume (ESRD on HD). Negative for dysuria, frequency and hematuria.   Musculoskeletal:  Positive for arthralgias, gait problem (chronic), myalgias and neck pain.   Skin:  Negative for color change, pallor,  rash and wound.   Neurological:  Positive for headaches. Negative for syncope, weakness and light-headedness.   Psychiatric/Behavioral:  Negative for confusion and hallucinations. The patient is not nervous/anxious.      Objective:     Vital Signs (Most Recent):  Temp: 97.9 °F (36.6 °C) (01/20/25 1702)  Pulse: 82 (01/20/25 1824)  Resp: (!) 22 (01/20/25 1824)  BP: (!) 190/87 (01/20/25 1824)  SpO2: 100 % (01/20/25 1824) Vital Signs (24h Range):  Temp:  [97.9 °F (36.6 °C)-98.2 °F (36.8 °C)] 97.9 °F (36.6 °C)  Pulse:  [74-94] 82  Resp:  [13-22] 22  SpO2:  [94 %-100 %] 100 %  BP: (165-200)/(74-87) 190/87     Weight: 100.8 kg (222 lb 3.6 oz)  Body mass index is 34.81 kg/m².     Physical Exam  Vitals and nursing note reviewed.   Constitutional:       General: She is not in acute distress.     Appearance: She is obese. She is not toxic-appearing or diaphoretic.   HENT:      Head: Normocephalic and atraumatic.      Nose: Nose normal.      Mouth/Throat:      Mouth: Mucous membranes are moist.   Eyes:      Pupils: Pupils are equal, round, and reactive to light.   Cardiovascular:      Rate and Rhythm: Normal rate and regular rhythm.      Arteriovenous access: Right arteriovenous access is present.  Pulmonary:      Effort: Pulmonary effort is normal. No respiratory distress.      Breath sounds: Rales present. No wheezing or rhonchi.      Comments: Currently on 2L NC  Abdominal:      General: Bowel sounds are normal. There is no distension.      Palpations: Abdomen is soft.      Tenderness: There is no abdominal tenderness. There is no guarding.   Musculoskeletal:      Cervical back: Normal range of motion.      Right lower leg: Edema (trace) present.      Left lower leg: Edema (trace) present.      Comments: left lower leg in a cast  Right lower leg with a hematoma to the proximal anterior tibia   Skin:     General: Skin is warm and dry.      Capillary Refill: Capillary refill takes 2 to 3 seconds.   Neurological:      General:  No focal deficit present.      Mental Status: She is alert and oriented to person, place, and time.      Motor: No weakness.   Psychiatric:         Mood and Affect: Mood normal.              CRANIAL NERVES     CN III, IV, VI   Pupils are equal, round, and reactive to light.       Significant Labs: All pertinent labs within the past 24 hours have been reviewed.  CBC:   Recent Labs   Lab 01/20/25  1320   WBC 10.01   HGB 7.3*   HCT 23.6*        CMP:   Recent Labs   Lab 01/20/25  1320      K 4.2      CO2 20*   *   BUN 98*   CREATININE 8.9*   CALCIUM 8.9   PROT 7.4   ALBUMIN 3.0*   BILITOT 0.5   ALKPHOS 87   AST 20   ALT 22   ANIONGAP 18*       Significant Imaging: I have reviewed all pertinent imaging results/findings within the past 24 hours.  Imaging Results               CT Cervical Spine Without Contrast (Final result)  Result time 01/20/25 16:39:36      Final result by Herbert Mendosa DO (01/20/25 16:39:36)                   Impression:      Degenerative change cervical spine without evidence for acute fracture    Probable DISH with partial ossification posterior longitudinal ligament.  Probable moderate central canal stenosis C3/C4 level.    Questioned artifact or disc protrusion at C5/C6 level    Further evaluation with MRI as warranted if patient compatible    Please see CT head report for further details.      Electronically signed by: Herbert Mendosa DO  Date:    01/20/2025  Time:    16:39               Narrative:    EXAMINATION:  CT CERVICAL SPINE WITHOUT CONTRAST    CLINICAL HISTORY:  Neck trauma, midline tenderness (Age 16-64y);    TECHNIQUE:  Low dose axial images, sagittal and coronal reformations were performed though the cervical spine.  Contrast was not administered.    COMPARISON:  Concomitant CT head    FINDINGS:  Straightening of the expected normal cervical lordosis.  There is degenerative disc disease with slight height loss and endplate degeneration at multiple levels  most pronounced at C3/C4 with mild moderate height loss.  Allowing for degenerative change cervical vertebral body heights and contours are within normal is without evidence for acute fracture or traumatic subluxation.  There are prominent ventral osteophytes C3 through C5 concerning for possible diffuse idiopathic skeletal hyperostosis.  There is probable partial ossification posterior longitudinal ligament at the C3 and C6 levels    Central canal neural foramen somewhat distorted by motion and beam hardening artifacts allowing for this degenerative changes most pronounced at C3/C4 level with posterior disc osteophyte with superimposed partial ossification posterior longitudinal ligament with probable moderate central canal stenosis.  Uncovertebral joint hypertrophy and facet arthropathy with mild bony neural foraminal stenosis    There is questionable disc protrusion at C5/C6 level although this may be artifactual from motion.    This report was flagged in Epic as abnormal.                                       CT Head Without Contrast (Final result)  Result time 01/20/25 16:34:51      Final result by Herbert Mendosa DO (01/20/25 16:34:51)                   Impression:      Subcentimeter rounded hyperdense lesion within the right posterior body of the corpus callosum in light of history of trauma concerning for possible acute parenchymal hemorrhage.  Underlying vascular malformation cyst as a cavernoma cannot be excluded.    No evidence for acute extra-axial hemorrhage or hydrocephalus.  Clinical correlation and further evaluation with MRI if patient compatible if not short-term follow-up CT recommended.      Electronically signed by: Herbert Mendosa DO  Date:    01/20/2025  Time:    16:34               Narrative:    EXAMINATION:  CT HEAD WITHOUT CONTRAST    CLINICAL HISTORY:  Head trauma, moderate-severe;    TECHNIQUE:  Multiple sequential 5 mm axial images of the head without contrast.  Coronal and sagittal  reformatted imaging from the axial acquisition.    COMPARISON:  None    FINDINGS:  There is a small rounded hyperdensity within the right aspect of the splenium the corpus callosum this measures 0.7 cm which may represent focal vascular malformation such as a cavernous malformation however concerning for acute parenchymal hemorrhage.  No evidence for extra-axial extension.  Clinical correlation and further evaluation with MRI advised if patient compatible    The ventricles are normal in size without hydrocephalus.  No significant opacification of the paranasal sinuses or mastoid air cells..    COMMUNICATION  This critical result was discovered/received at 16:30.  The critical information above was relayed directly by me epic secure chat to Dr. Blake on 01/20/2025 at 1635.                                       X-Ray Foot Complete Left (Final result)  Result time 01/20/25 14:33:23      Final result by Michael Ervin MD (01/20/25 14:33:23)                   Impression:      As above      Electronically signed by: Michael Ervin MD  Date:    01/20/2025  Time:    14:33               Narrative:    EXAMINATION:  XR FOOT COMPLETE 3 VIEW LEFT    CLINICAL HISTORY:  .  Unspecified fall, initial encounter    TECHNIQUE:  AP, lateral and oblique views of the left foot were performed.    COMPARISON:  01/04/2025    FINDINGS:  Please note, a combination of dense casting material and habitus significantly limits visualization of the osseous structures.  There are surgical changes of the foot.  No convincing definite overt acute displaced fracture or dislocation of the foot however given positioning and limitations by the above, fracture cannot be definitively excluded.  Correlation is needed.                                       X-Ray Tibia Fibula 2 View Right (Final result)  Result time 01/20/25 14:35:24      Final result by Michael Ervin MD (01/20/25 14:35:24)                   Impression:      1. No convincing  "acute displaced fracture or dislocation of the tibia or fibula, please see above.      Electronically signed by: Michael Ervin MD  Date:    01/20/2025  Time:    14:35               Narrative:    EXAMINATION:  XR TIBIA FIBULA 2 VIEW RIGHT    CLINICAL HISTORY:  Pain in right leg    TECHNIQUE:  AP and lateral views of the left tibia and fibula were performed.  Please note, although order states "Right" tibia fibula, the images are of the left tibia and fibula.    COMPARISON:  None.    FINDINGS:  Five views left tibia fibula.    The left knee is intact.  There is some degree of osteopenia.  No convincing acute displaced fracture or dislocation of the tibia or fibula.  The ankle mortise is intact.  Dense casting material limits evaluation of the osseous structures of the foot noting surgical change.  There is loosening about the screws traversing the talus.  There is edema about the foot.  There is vascular calcification.                                       X-Ray Hip 2 or 3 views Left with Pelvis when performed (Final result)  Result time 01/20/25 14:36:06      Final result by Michael Ervin MD (01/20/25 14:36:06)                   Impression:      1. No acute displaced fracture or dislocation of the left hip.      Electronically signed by: Michael Ervin MD  Date:    01/20/2025  Time:    14:36               Narrative:    EXAMINATION:  XR HIP WITH PELVIS WHEN PERFORMED 2 OR 3 VIEWS LEFT    CLINICAL HISTORY:  Pain in left hip    TECHNIQUE:  AP view of the pelvis and frog leg lateral view of the left hip were performed.    COMPARISON:  None    FINDINGS:  Three views left hip.    The bilateral sacroiliac joints are intact.  The pubic symphysis is intact.  The bilateral femoroacetabular joints are intact.  There is osteopenia.  Right vascular stent noted.  Surgical change noted of the bilateral inguinal regions.  There is vascular calcification.                                      Assessment/Plan:     * " Fall  Abnormal CT scan of head   - mechanical fall with trip & fall over coffee table  - no neuro deficits on exam  - hypertensive, but remaining vitals stable; goal SBP <160  - XR L pelvis, R tib fib, L foot without acute fracture  - CT C spine with degenerative change of cervical spine without evidence of acute fracture  - CTH with Subcentimeter rounded hyperdense lesion within the right posterior body of the corpus callosum in light of history of trauma concerning for possible acute parenchymal hemorrhage.  Underlying vascular malformation cyst as a cavernoma cannot be excluded. No evidence for acute extra-axial hemorrhage or hydrocephalus.   - NSGY consulted, recommending MRI/MRA brain and repeat CTH 6 hrs after initial CT.  - Neuro checks q4h  - fall precautions    Acute respiratory failure with hypoxia and hypercapnia  Patient with Hypercapnic and Hypoxic Respiratory failure which is Acute.  she is not on home oxygen. Supplemental oxygen was provided and noted-      .   Signs/symptoms of respiratory failure include- increased work of breathing and use of accessory muscles. Contributing diagnoses includes - CHF and ESRD  Labs and images were reviewed. Patient Has not had a recent ABG. Will treat underlying causes and adjust management of respiratory failure as follows-   - likely 2/2 volume overload  - Nephrology consulted for fluid removal  - wean O2 as tolerated    Anemia due to chronic kidney disease, on chronic dialysis  Anemia is likely due to chronic disease due to ESRD. Most recent hemoglobin and hematocrit are listed below.  Recent Labs     01/20/25  1320   HGB 7.3*   HCT 23.6*     Plan  - Monitor serial CBC: Daily  - Transfuse PRBC if patient becomes hemodynamically unstable, symptomatic or H/H drops below 7/21.  - Patient has not received any PRBC transfusions to date  - Patient's anemia is currently stable    Osteomyelitis  - supposed to get last dose of Cefepime 2g today at HD, but she did missed HD  "today  - will give Cefepime 2g with next HD session to complete therapy  - Follows with podiatry and ID outpatient    ESRD (end stage renal disease)  Creatine stable for now. BMP reviewed- noted Estimated Creatinine Clearance: 8.8 mL/min (A) (based on SCr of 8.9 mg/dL (H)). according to latest data. Based on current GFR, CKD stage is end stage.  Monitor UOP and serial BMP and adjust therapy as needed. Renally dose meds. Avoid nephrotoxic medications and procedures.  - nephrology consulted, appreciate recs    Class 2 severe obesity with serious comorbidity and body mass index (BMI) of 38.0 to 38.9 in adult  Body mass index is 34.81 kg/m². Obesity complicates all aspects of disease management from diagnostic modalities to treatment. Weight loss encouraged and health benefits explained to patient.     Chronic diastolic congestive heart failure  Patient has Diastolic (HFpEF) heart failure that is Acute on chronic. On presentation their CHF was decompensated. Evidence of decompensated CHF on presentation includes: edema and crackles on lung auscultation. The etiology of their decompensation is likely missed HD session . Most recent BNP and echo results are listed below.  No results for input(s): "BNP" in the last 72 hours.  Latest ECHO  Results for orders placed during the hospital encounter of 09/11/23    Echo    Interpretation Summary    Left Ventricle: The left ventricle is normal in size. Normal wall thickness. Normal wall motion. There is low normal systolic function with a visually estimated ejection fraction of 50 - 55%. Ejection fraction by visual approximation is 55%. There is normal diastolic function.    Right Ventricle: Right ventricle was not well visualized due to poor acoustic window. Normal right ventricular cavity size. Wall thickness is normal. Right ventricle wall motion  is normal. Systolic function is normal.    Aortic Valve: There is moderate aortic valve sclerosis. There is annular calcification " "present.    Mitral Valve: There is moderate mitral annular calcification present.    IVC/SVC: Normal venous pressure at 3 mmHg.    Current Heart Failure Medications  carvediloL tablet 6.25 mg, 2 times daily with meals, Oral    Plan  - Monitor strict I&Os and daily weights.    - Place on telemetry  - Low sodium diet  - Place on fluid restriction of 1.5 L.   - Cardiology has not been consulted  - The patient's volume status is stable but not at their baseline as indicated by edema and crackles on lung auscultation  - volume management per HD.    Essential hypertension  Patient's blood pressure range in the last 24 hours was: BP  Min: 165/75  Max: 200/74.The patient's inpatient anti-hypertensive regimen is listed below:  Current Antihypertensives  carvediloL tablet 6.25 mg, 2 times daily with meals, Oral  hydrALAZINE injection 10 mg, Every 8 hours PRN, Intravenous  labetalol 20 mg/4 mL (5 mg/mL) IV syring, Every 6 hours PRN, Intravenous    Plan  - BP is uncontrolled, will adjust as follows: needs HD  - PRN hydralazine and labetalol for SBP <160 per NSGY recs  - tele    Hyperlipidemia   Patient is chronically on statin.will continue for now. Monitor clinically. Last LDL was   Lab Results   Component Value Date    LDLCALC 14.0 (L) 12/10/2024       PAD (peripheral artery disease)  PAD s/p right SFA stent (11/2017), s/p right common iliac stent s/p [place] [facility] [place] S/p [place] fem bypass and stent to sfa with Dr. Maya on 9/14/23  -Continue statin, hold ASA pending repeat head imaging.    Type II diabetes mellitus  Patient's FSGs are controlled on current medication regimen.  Last A1c reviewed-   Lab Results   Component Value Date    HGBA1C 5.3 12/09/2024     Most recent fingerstick glucose reviewed- No results for input(s): "POCTGLUCOSE" in the last 24 hours.  Current correctional scale  Low  Maintain anti-hyperglycemic dose as follows-   Antihyperglycemics (From admission, onward)      " Start     Stop Route Frequency Ordered    01/20/25 2100  insulin glargine U-100 (Lantus) pen 28 Units         -- SubQ Nightly 01/20/25 1837 01/20/25 1930  insulin aspart U-100 pen 0-5 Units         -- SubQ Before meals & nightly PRN 01/20/25 1831          Hold Oral hypoglycemics while patient is in the hospital.  -Accuchecks AC/HS  -Diabetic/renal diet.      VTE Risk Mitigation (From admission, onward)           Ordered     Reason for No Pharmacological VTE Prophylaxis  Once        Question:  Reasons:  Answer:  Risk of Bleeding    01/20/25 1831     IP VTE HIGH RISK PATIENT  Once         01/20/25 1831     Place sequential compression device  Until discontinued         01/20/25 1831                         On 01/20/2025, patient should be placed in hospital observation services under my care in collaboration with Mamadou Rubalcava MD.           Cherise Shultz, NP  Department of Hospital Medicine  [facility] - Emergency Dept

## 2025-01-21 NOTE — TELEPHONE ENCOUNTER
Left vm message for patient in regards to cancelling her appointment on 01/22/2025 with Dr. Allison(Podiatry) due to inclement weather, informed patient of available times on 01/31/2025 and to contact office at [phone] or send a message through [facility] to let us kniow if times work best for her

## 2025-01-21 NOTE — ASSESSMENT & PLAN NOTE
Patient with Hypercapnic and Hypoxic Respiratory failure which is Acute.  she is not on home oxygen. Supplemental oxygen was provided and noted-      .   Signs/symptoms of respiratory failure include- increased work of breathing and use of accessory muscles. Contributing diagnoses includes - CHF and ESRD  Labs and images were reviewed. Patient Has not had a recent ABG. Will treat underlying causes and adjust management of respiratory failure as follows-   - likely 2/2 volume overload  - Nephrology consulted for fluid removal  - wean O2 as tolerated

## 2025-01-21 NOTE — ASSESSMENT & PLAN NOTE
Body mass index is 34.81 kg/m². Obesity complicates all aspects of disease management from diagnostic modalities to treatment. Weight loss encouraged and health benefits explained to patient.

## 2025-01-21 NOTE — ASSESSMENT & PLAN NOTE
- supposed to get last dose of Cefepime 2g today at HD, but she did missed HD today  - will give Cefepime 2g with next HD session to complete therapy  - Follows with podiatry and ID outpatient  - some concerns of cefepime toxicity given upper extremity tremors  - last dose of cefepime received today

## 2025-01-21 NOTE — SUBJECTIVE & OBJECTIVE
(Not in a hospital admission)      Review of patient's allergies indicates:   Allergen Reactions    Hydrocodone-acetaminophen Nausea And Vomiting, Rash and Other (See Comments)     Vicodin- severe rash  Lortab- head-spinning that leads to vomiting      Naproxen Anaphylaxis and Swelling     Hives (skin)^swelling  Hives (skin)^swelling  Hives (skin)^swelling    Sulfamethoxazole-trimethoprim Other (See Comments)     Caused JEROME; heart attack    Hydrocodone Nausea And Vomiting and Rash    Adhesive tape-silicones      blisters    Aspartame Hives    Penicillins Hives     Blisters (skin)^    Sulfamethoxazole Other (See Comments)    Trimethoprim Other (See Comments)    Acetaminophen Rash    Adhesive Rash       Past Medical History:   Diagnosis Date    Hyperlipidemia     Hypertension     Peptic ulcer disease     Peripheral artery disease     PONV (postoperative nausea and vomiting)     Stage IV CKD     Type II diabetes mellitus      Past Surgical History:   Procedure Laterality Date    ANGIOGRAM, LOWER ARTERIAL, UNILATERAL Left 09/13/2023    Procedure: ANGIOGRAM, LOWER ARTERIAL, UNILATERAL;  Surgeon: Maxx Maya MD;  Location: [address];  Service: Vascular;  Laterality: Left;    ANGIOGRAPHY OF LOWER EXTREMITY Left 09/14/2023    Procedure: ANGIOGRAM, LOWER EXTREMITY with balloon angioplasty ultraverse 5mm x 60mm;  Surgeon: Maxx Maya MD;  Location: [facility] OR [address] [place] [address];  Service: Vascular;  Laterality: Left;  ultraverse 5mm x 60mm  fluoro: 12.41  contrast: 44ml  mGy: 65.57  Gycm2: 23.50    AORTOGRAPHY WITH EXTREMITY RUNOFF Left 09/13/2023    Procedure: AORTOGRAM, WITH EXTREMITY RUNOFF;  Surgeon: Maxx Maya MD;  Location: [address];  Service: Vascular;  Laterality: Left;  8.1 min  663.63 mGy  176.39 Gy.cm  178ml Dye     AV FISTULA PLACEMENT Right     CHOLECYSTECTOMY      COLONOSCOPY  11/06/2013    normal    COLONOSCOPY  05/2023    cancelled due to inadequate prep    COLONOSCOPY  06/2023    results  unknown    CREATION, BYPASS, ARTERIAL, AXILLARY TO BILATERAL FEMORAL Left 09/14/2023    Procedure: CREATION, BYPASS, ARTERIAL, AXILLARY TO BILATERAL FEMORAL;  Surgeon: Maxx Maya MD;  Location: [facility] OR [address] [place] [address];  Service: Vascular;  Laterality: Left;  Left Axillary to Bilateral Femoral Bypass, Left Femoral to Above Knee Popliteal Bypass; SLIDER BED    CYSTOSCOPY W/ URETERAL STENT PLACEMENT Right 08/28/2018    Procedure: CYSTOSCOPY, WITH URETERAL STENT INSERTION (ADD ON );  Surgeon: Bubba Perez MD;  Location: [facility] OR;  Service: Urology;  Laterality: Right;  (ADD ON )    DEBRIDEMENT OF FOOT Left 08/03/2023    Procedure: DEBRIDEMENT, FOOT;  Surgeon: Aleida Flannery DPM;  Location: [facility] OR;  Service: Podiatry;  Laterality: Left;    Excisional debridement of ulcer distal great toe left foot w/ partial resection distal phalanx Left 08/03/2023    FOOT AMPUTATION THROUGH METATARSAL Left 09/14/2023    Procedure: AMPUTATION, FOOT, TRANSMETATARSAL partial 1st ray amputation;  Surgeon: Jesus Valles DPM;  Location: [facility] OR [address] [place] [address];  Service: Podiatry;  Laterality: Left;  partial ray    I&D L 1st ray w/ amputation L hallux IPJ Left 08/14/2023    INCISION AND DRAINAGE FOOT Left 09/14/2023    Procedure: INCISION AND DRAINAGE, FOOT;  Surgeon: Jesus Valles DPM;  [facility];  Service: Podiatry;  Laterality: Left;    Injection L PT tendon sheath Left 08/14/2023    LENGTHENING OF ACHILLES TENDON Left 11/11/2024    Procedure: LENGTHENING, TENDON, ACHILLES;  Surgeon: Marco Allison DPM;  Location: [facility] OR;  Service: Podiatry;  Laterality: Left;    PERIPHERAL ARTERIAL STENT GRAFT Right     RECONSTRUCTION WITH FUSION OF CHARCOT FOOT Left 11/11/2024    Procedure: RECONSTRUCTION, CHARCOT FOOT, WITH FUSION;  Surgeon: Marco Allison DPM;  Location: [facility];  Service: Podiatry;  Laterality: Left;  4 hours; foot tray; mini c arm; saw/drill    REMOVAL OF NAIL OF DIGIT Left 08/03/2023    Procedure:  REMOVAL, NAIL, DIGIT great toe;  Surgeon: Aleida Flannery DPM;  Location: [facility] OR;  Service: Podiatry;  Laterality: Left;    REVISION, AMPUTATION, TRANSMETATARSAL Left 2024    Procedure: REVISION, AMPUTATION, TRANSMETATARSAL;  Surgeon: Marco Allison DPM;  Location: [facility] OR;  Service: Podiatry;  Laterality: Left;    STENT, SUPERFICIAL FEMORAL ARTERY Left 2023    Procedure: STENT, SUPERFICIAL FEMORAL ARTERY;  Surgeon: Maxx Maya MD;  Location: [facility] OR 2ND FLR;  Service: Vascular;  Laterality: Left;  5 x 100 mm    TOE AMPUTATION Left 2023    Procedure: AMPUTATION, TOE hallux IPJ;  Surgeon: Aleida Flannery DPM;  Location: [facility] OR;  Service: Podiatry;  Laterality: Left;    TOE AMPUTATION Left 2023    Procedure: AMPUTATION, TOE hallux, possible 1st met.head;  Surgeon: Aleida Flannery DPM;  Location: [facility] OR;  Service: Podiatry;  Laterality: Left;    URETEROSCOPIC REMOVAL OF URETERIC CALCULUS Right 2018    Procedure: EXTRACTION-STONE-URETEROSCOPY;  Surgeon: Bubba Perez MD;  Location: [facility] OR;  Service: Urology;  Laterality: Right;     Family History       Problem Relation (Age of Onset)    Breast cancer Mother (71), Maternal Grandmother (75)    Diabetes Mother, Father    Heart attack Maternal Grandfather (44)    Heart disease Father (37), Maternal Grandmother    Hypertension Mother, Father, Brother    Uterine cancer Mother          Tobacco Use    Smoking status: Former     Current packs/day: 0.00     Types: Cigarettes     Quit date: 2006     Years since quittin.4    Smokeless tobacco: Never    Tobacco comments:     smoked off and on for 20 years, was up to 2 packs/day toward the end of that, and quit in    Substance and Sexual Activity    Alcohol use: Not Currently     Comment: occ    Drug use: No    Sexual activity: Not Currently     Review of Systems  Objective:     Weight: 100.8 kg (222 lb 3.6 oz)  Body mass index is 34.81 kg/m².  Vital Signs (Most Recent):  Temp:  98.3 °F (36.8 °C) (01/20/25 1930)  Pulse: 79 (01/20/25 1930)  Resp: 20 (01/20/25 1940)  BP: (!) 175/74 (01/20/25 1930)  SpO2: 100 % (01/20/25 1930) Vital Signs (24h Range):  Temp:  [97.8 °F (36.6 °C)-98.3 °F (36.8 °C)] 98.3 °F (36.8 °C)  Pulse:  [74-94] 79  Resp:  [13-22] 20  SpO2:  [93 %-100 %] 100 %  BP: (165-200)/(74-98) 175/74                              Hemodialysis AV Fistula  Right forearm (Active)   Site Assessment Clean;Dry;Intact;No redness;No swelling 01/20/25 1253   Status Accessed 01/20/25 1253   Flows Good 01/20/25 1253            Hemodialysis AV Fistula Right forearm (Active)          Physical Exam         Neurosurgery Physical Exam  GENERAL: resting comfortably  HEENT: NCAT, PERRL, mucous membranes moist  NECK: supple, trachea midline  CV: normal capillary refill  PULM: aerating well, symmetric expansion, no distress  ABD: soft, NT, ND  EXT: LLE in cast     NEURO:     GCS 15 [id number]  AAO x 3  CN II-XII grossly intact  Fc x 4 antigravity  SILT  LUE drift    Significant Labs:  Recent Labs   Lab 01/20/25  1320   *      K 4.2      CO2 20*   BUN 98*   CREATININE 8.9*   CALCIUM 8.9   MG 3.4*     Recent Labs   Lab 01/20/25  1320   WBC 10.01   HGB 7.3*   HCT 23.6*        Recent Labs   Lab 01/20/25  1928   INR 1.0   APTT 25.5     Microbiology Results (last 7 days)       ** No results found for the last 168 hours. **          All pertinent labs from the last 24 hours have been reviewed.    Significant Diagnostics:  CT: CT Head Without Contrast    Result Date: 1/20/2025  Subcentimeter rounded hyperdense lesion within the right posterior body of the corpus callosum in light of history of trauma concerning for possible acute parenchymal hemorrhage.  Underlying vascular malformation cyst as a cavernoma cannot be excluded. No evidence for acute extra-axial hemorrhage or hydrocephalus.  Clinical correlation and further evaluation with MRI if patient compatible if not short-term follow-up CT  recommended. Electronically signed by: Herbert Mendosa DO Date:    01/20/2025 Time:    16:34   MRI: No results found in the last 24 hours.  I have reviewed all pertinent imaging results/findings within the past 24 hours.  I have reviewed and interpreted all pertinent imaging results/findings within the past 24 hours.

## 2025-01-21 NOTE — HPI
Georgina Arias is a 54 y.o. female with a PMHx of ESRD on MTThF, HTN, PVD, DM II, L foot osteo on IV antibiotics, charcot foot s/p reconstruction 11/1, HFpEF, s/p gastric sleeve admitted to hospital medicine s/p fall at home. Patient reports tripping over a coffee table earlier today while getting up to get some water. Endorses pain in her neck, left hip, right shin, and left foot. Denies head trauma, but she is not certain. Does note mild headache. Denies LOC. Reports being able to get up and ambulate with her walker, which is her baseline. Also states that she missed her HD session today 2/2 fall. Last HD session was 1/17. Denies fever/chills, diaphoresis, lightheadedness, SOB, CP, cough, congestion, abdominal pain, n/v/d, urinary symptoms, changes in BMs, new numbness/tingling, or weakness.     In the ED, patient hypertensive to max 200/74 with SpO2 >94% on 2L NC. Hgb 7.3. Phos 6.1. Mag 3.4. K 4.2. XR L hip with no acute fracture. XR R tib/fib with no acute fracture. XR L foot with a combination of dense casting material and habitus significantly limits visualization of the osseous structures. There are surgical changes of the foot. No convincing definite overt acute displaced fracture or dislocation of the foot however given positioning and limitations by the above, fracture cannot be definitively excluded. CTH with Subcentimeter rounded hyperdense lesion within the right posterior body of the corpus callosum in light of history of trauma concerning for possible acute parenchymal hemorrhage. Underlying vascular malformation cyst as a cavernoma cannot be excluded. CT C spine with degenerative change cervical spine without evidence for acute fracture. CXR with interstitial findings suggest edema or other pneumonitis. No large focal consolidation. NSGY consulted, recommend MRI/MRA Brain and repeat CTH which is pending. Given tylenol 1g and lidocaine patch.

## 2025-01-21 NOTE — ASSESSMENT & PLAN NOTE
Patient's blood pressure range in the last 24 hours was: BP  Min: 165/75  Max: 200/74.The patient's inpatient anti-hypertensive regimen is listed below:  Current Antihypertensives  carvediloL tablet 6.25 mg, 2 times daily with meals, Oral  hydrALAZINE injection 10 mg, Every 8 hours PRN, Intravenous  labetalol 20 mg/4 mL (5 mg/mL) IV syring, Every 6 hours PRN, Intravenous    Plan  - BP is uncontrolled, will adjust as follows: needs HD  - PRN hydralazine and labetalol for SBP <160 per NSGY recs  - tele

## 2025-01-21 NOTE — ASSESSMENT & PLAN NOTE
Abnormal CT scan of head   - mechanical fall with trip & fall over coffee table  - no neuro deficits on exam  - hypertensive, but remaining vitals stable; goal SBP <160  - XR L pelvis, R tib fib, L foot without acute fracture  - CT C spine with degenerative change of cervical spine without evidence of acute fracture  - CTH with Subcentimeter rounded hyperdense lesion within the right posterior body of the corpus callosum in light of history of trauma concerning for possible acute parenchymal hemorrhage.  Underlying vascular malformation cyst as a cavernoma cannot be excluded. No evidence for acute extra-axial hemorrhage or hydrocephalus.   - NSGY consulted, recommending MRI/MRA brain and repeat CTH 6 hrs after initial CT.  - MRI/MRA Brain with No acute intracranial abnormality elsewhere. MRA of the intracranial vasculature appears within normal limits.  - Repeat CTH pending as patient refused testing last night  - NSGY recs:   - SBP <160 (Cardene gtt; Hydralazine & Labetalol PRN; Transition to home meds when appropriate)  - Na >135  - HOB >30    - MRI showing likely small cav mal within the splenium of the corpus callosum however limited due to lack   of contrast. Would prefer contrasted MRI but given patients ESRD pt can follow up In clinic in 6-12 months ' with a repeat MRI  - No acute neurosurgical intervention at this time  - Neuro checks q4h  - fall precautions

## 2025-01-21 NOTE — SUBJECTIVE & OBJECTIVE
Interval History: NAEON. VSS on RA. Patient seen and examined at bedside this morning. Patient appears very drowsy on exam following dialysis. She is responsive to verbal stimuli, but noted to fall asleep/loose focus throughout encounter. Easily redirectable. Likely 2/2 medications vs lack of sleep. Continue to monitor. PT/OT consulted. NSGY following.     Review of Systems   Constitutional:  Positive for fatigue. Negative for chills and fever.   Respiratory:  Negative for chest tightness and shortness of breath.    Cardiovascular:  Negative for chest pain and leg swelling.   Gastrointestinal:  Negative for abdominal pain and nausea.   Genitourinary:  Positive for difficulty urinating.   Musculoskeletal:  Positive for arthralgias, gait problem and neck pain.   Neurological:  Negative for dizziness and weakness.     Objective:     Vital Signs (Most Recent):  Temp: 98.7 °F (37.1 °C) (01/21/25 1135)  Pulse: 87 (01/21/25 1135)  Resp: 16 (01/21/25 1135)  BP: 133/63 (01/21/25 1135)  SpO2: (!) 94 % (01/21/25 0500) Vital Signs (24h Range):  Temp:  [97.8 °F (36.6 °C)-98.7 °F (37.1 °C)] 98.7 °F (37.1 °C)  Pulse:  [5-108] 87  Resp:  [13-22] 16  SpO2:  [93 %-100 %] 94 %  BP: (127-195)/(61-98) 133/63     Weight: 101 kg (222 lb 10.6 oz)  Body mass index is 34.87 kg/m².    Intake/Output Summary (Last 24 hours) at 1/21/2025 1303  Last data filed at 1/21/2025 1123  Gross per 24 hour   Intake 354 ml   Output 2728 ml   Net -2374 ml         Physical Exam  Vitals and nursing note reviewed.   Constitutional:       General: She is not in acute distress.     Appearance: She is not toxic-appearing or diaphoretic.   HENT:      Head: Normocephalic and atraumatic.      Nose: Nose normal.      Mouth/Throat:      Mouth: Mucous membranes are moist.   Eyes:      Pupils: Pupils are equal, round, and reactive to light.   Cardiovascular:      Rate and Rhythm: Normal rate and regular rhythm.      Arteriovenous access: Right arteriovenous access is  "present.  Pulmonary:      Effort: Pulmonary effort is normal. No respiratory distress.      Breath sounds: No wheezing or rhonchi.   Abdominal:      General: Bowel sounds are normal. There is no distension.      Palpations: Abdomen is soft.      Tenderness: There is no abdominal tenderness. There is no guarding.   Musculoskeletal:      Cervical back: Normal range of motion.      Comments: left lower leg in a cast  Right lower leg with a hematoma to the proximal anterior tibia   Skin:     General: Skin is warm and dry.   Neurological:      General: No focal deficit present.      Mental Status: She is oriented to person, place, and time. She is lethargic.      Motor: No weakness.      Comments: Able to follow commands appropriately, continuously falling asleep during exam  No new neurological deficits   Psychiatric:         Mood and Affect: Mood normal.             Significant Labs: All pertinent labs within the past 24 hours have been reviewed.  BMP:   Recent Labs   Lab 01/21/25  0235   *      K 4.5      CO2 18*   *   CREATININE 9.3*   CALCIUM 8.9   MG 3.4*     CBC:   Recent Labs   Lab 01/20/25  1320 01/21/25  0235   WBC 10.01 11.27   HGB 7.3* 7.1*   HCT 23.6* 22.7*    157     CMP:   Recent Labs   Lab 01/20/25  1320 01/21/25  0235    141   K 4.2 4.5    105   CO2 20* 18*   * 201*   BUN 98* 101*   CREATININE 8.9* 9.3*   CALCIUM 8.9 8.9   PROT 7.4 7.4   ALBUMIN 3.0* 3.0*   BILITOT 0.5 0.6   ALKPHOS 87 91   AST 20 18   ALT 22 23   ANIONGAP 18* 18*     Cardiac Markers: No results for input(s): "CKMB", "MYOGLOBIN", "BNP", "TROPISTAT" in the last 48 hours.  Troponin: No results for input(s): "TROPONINI", "TROPONINIHS" in the last 48 hours.    Significant Imaging: I have reviewed all pertinent imaging results/findings within the past 24 hours.  "

## 2025-01-21 NOTE — CONSULTS
"[facility] - Emergency Dept  Neurosurgery  Consult Note    Inpatient consult to Neurosurgery  Consult performed by: Steve Ta MD  Consult ordered by: Mireille Blake MD  Reason for consult: ICH        Subjective:     Chief Complaint/Reason for Admission: abnormal CTH, possible ICH vs vascular malformation    History of Present Illness: Georgina Arias is a 54 y.o. female with past medical history of HTN, HLD, PAD, ESRD on HD, T2DM, and HFpEF who presents after fall at home. Pt reports that she tripped over glass table, she reports being "wobbly" for the past couple of months and is unsure if she had a mechanical fall or if she was having trouble with balance. Pt reports multiple recent episodes where she feels unsteady on her feet and has difficulty distinguishing right from left. She also endorses having difficulty holding things/dropping things more often. Denies numbness, weakness, or bowel/bladder changes. She was scheduled for hemodialysis today however presented to the ED for further evaluation and deferred her HD appointment.  while in ED. Pt reports ASA 81 use but denies other AP/AC. Neurosurgery consulted for possible ICH on CTH.          (Not in a hospital admission)      Review of patient's allergies indicates:   Allergen Reactions    Hydrocodone-acetaminophen Nausea And Vomiting, Rash and Other (See Comments)     Vicodin- severe rash  Lortab- head-spinning that leads to vomiting      Naproxen Anaphylaxis and Swelling     Hives (skin)^swelling  Hives (skin)^swelling  Hives (skin)^swelling    Sulfamethoxazole-trimethoprim Other (See Comments)     Caused JEROME; heart attack    Hydrocodone Nausea And Vomiting and Rash    Adhesive tape-silicones      blisters    Aspartame Hives    Penicillins Hives     Blisters (skin)^    Sulfamethoxazole Other (See Comments)    Trimethoprim Other (See Comments)    Acetaminophen Rash    Adhesive Rash       Past Medical History:   Diagnosis Date    " Hyperlipidemia     Hypertension     Peptic ulcer disease     Peripheral artery disease     PONV (postoperative nausea and vomiting)     Stage IV CKD     Type II diabetes mellitus      Past Surgical History:   Procedure Laterality Date    ANGIOGRAM, LOWER ARTERIAL, UNILATERAL Left 09/13/2023    Procedure: ANGIOGRAM, LOWER ARTERIAL, UNILATERAL;  Surgeon: Maxx Maya MD;  Location: [address];  Service: Vascular;  Laterality: Left;    ANGIOGRAPHY OF LOWER EXTREMITY Left 09/14/2023    Procedure: ANGIOGRAM, LOWER EXTREMITY with balloon angioplasty ultraverse 5mm x 60mm;  Surgeon: Maxx Maya MD;  Location: [address];  Service: Vascular;  Laterality: Left;  ultraverse 5mm x 60mm  fluoro: 12.41  contrast: 44ml  mGy: 65.57  Gycm2: 23.50    AORTOGRAPHY WITH EXTREMITY RUNOFF Left 09/13/2023    Procedure: AORTOGRAM, WITH EXTREMITY RUNOFF;  Surgeon: Maxx Maya MD;  Location: [address];  Service: Vascular;  Laterality: Left;  8.1 min  663.63 mGy  176.39 Gy.cm  178ml Dye     AV FISTULA PLACEMENT Right     CHOLECYSTECTOMY      COLONOSCOPY  11/06/2013    normal    COLONOSCOPY  05/2023    cancelled due to inadequate prep    COLONOSCOPY  06/2023    results unknown    CREATION, BYPASS, ARTERIAL, AXILLARY TO BILATERAL FEMORAL Left 09/14/2023    Procedure: CREATION, BYPASS, ARTERIAL, AXILLARY TO BILATERAL FEMORAL;  Surgeon: Maxx Maya MD;  Location: [address];  Service: Vascular;  Laterality: Left;  Left Axillary to Bilateral Femoral Bypass, Left Femoral to Above Knee Popliteal Bypass; SLIDER BED    CYSTOSCOPY W/ URETERAL STENT PLACEMENT Right 08/28/2018    Procedure: CYSTOSCOPY, WITH URETERAL STENT INSERTION (ADD ON );  Surgeon: Bubba Perez MD;  Location: [facility] OR;  Service: Urology;  Laterality: Right;  (ADD ON )    DEBRIDEMENT OF FOOT Left 08/03/2023    Procedure: DEBRIDEMENT, FOOT;  Surgeon: Aleida Flannery DPM;  Location: [facility] OR;  Service: Podiatry;  Laterality: Left;    Excisional  debridement of ulcer distal great toe left foot w/ partial resection distal phalanx Left 08/03/2023    FOOT AMPUTATION THROUGH METATARSAL Left 09/14/2023    Procedure: AMPUTATION, FOOT, TRANSMETATARSAL partial 1st ray amputation;  Surgeon: Jesus Valles DPM;  Location: [facility] OR 2ND FLR;  Service: Podiatry;  Laterality: Left;  partial ray    I&D L 1st ray w/ amputation L hallux IPJ Left 08/14/2023    INCISION AND DRAINAGE FOOT Left 09/14/2023    Procedure: INCISION AND DRAINAGE, FOOT;  Surgeon: Jesus Valles DPM;  Location: [facility] OR 2ND FLR;  Service: Podiatry;  Laterality: Left;    Injection L PT tendon sheath Left 08/14/2023    LENGTHENING OF ACHILLES TENDON Left 11/11/2024    Procedure: LENGTHENING, TENDON, ACHILLES;  Surgeon: Marco Allison DPM;  Location: [facility] OR;  Service: Podiatry;  Laterality: Left;    PERIPHERAL ARTERIAL STENT GRAFT Right     RECONSTRUCTION WITH FUSION OF CHARCOT FOOT Left 11/11/2024    Procedure: RECONSTRUCTION, CHARCOT FOOT, WITH FUSION;  Surgeon: Marco Allison DPM;  Location: [facility] OR;  Service: Podiatry;  Laterality: Left;  4 hours; foot tray; mini c arm; saw/drill    REMOVAL OF NAIL OF DIGIT Left 08/03/2023    Procedure: REMOVAL, NAIL, DIGIT great toe;  Surgeon: Aleida Flannery DPM;  Location: [facility] OR;  Service: Podiatry;  Laterality: Left;    REVISION, AMPUTATION, TRANSMETATARSAL Left 11/11/2024    Procedure: REVISION, AMPUTATION, TRANSMETATARSAL;  Surgeon: Marco Allison DPM;  Location: [facility] OR;  Service: Podiatry;  Laterality: Left;    STENT, SUPERFICIAL FEMORAL ARTERY Left 09/14/2023    Procedure: STENT, SUPERFICIAL FEMORAL ARTERY;  Surgeon: Maxx Maya MD;  Location: [facility] OR 2ND FLR;  Service: Vascular;  Laterality: Left;  5 x 100 mm    TOE AMPUTATION Left 08/14/2023    Procedure: AMPUTATION, TOE hallux IPJ;  Surgeon: Aleida Flannery DPM;  Location: [facility] OR;  Service: Podiatry;  Laterality: Left;    TOE AMPUTATION Left 08/25/2023    Procedure: AMPUTATION, TOE  leigh, possible 1st met.head;  Surgeon: Aleida Flannery DPM;  Location: [facility] OR;  Service: Podiatry;  Laterality: Left;    URETEROSCOPIC REMOVAL OF URETERIC CALCULUS Right 2018    Procedure: EXTRACTION-STONE-URETEROSCOPY;  Surgeon: Bubba Perez MD;  Location: [facility] OR;  Service: Urology;  Laterality: Right;     Family History       Problem Relation (Age of Onset)    Breast cancer Mother (71), Maternal Grandmother (75)    Diabetes Mother, Father    Heart attack Maternal Grandfather (44)    Heart disease Father (37), Maternal Grandmother    Hypertension Mother, Father, Brother    Uterine cancer Mother          Tobacco Use    Smoking status: Former     Current packs/day: 0.00     Types: Cigarettes     Quit date: 2006     Years since quittin.4    Smokeless tobacco: Never    Tobacco comments:     smoked off and on for 20 years, was up to 2 packs/day toward the end of that, and quit in    Substance and Sexual Activity    Alcohol use: Not Currently     Comment: occ    Drug use: No    Sexual activity: Not Currently     Review of Systems  Objective:     Weight: 100.8 kg (222 lb 3.6 oz)  Body mass index is 34.81 kg/m².  Vital Signs (Most Recent):  Temp: 98.3 °F (36.8 °C) (25)  Pulse: 79 (25)  Resp: 20 (25)  BP: (!) 175/74 (25)  SpO2: 100 % (25) Vital Signs (24h Range):  Temp:  [97.8 °F (36.6 °C)-98.3 °F (36.8 °C)] 98.3 °F (36.8 °C)  Pulse:  [74-94] 79  Resp:  [13-22] 20  SpO2:  [93 %-100 %] 100 %  BP: (165-200)/(74-98) 175/74                              Hemodialysis AV Fistula  Right forearm (Active)   Site Assessment Clean;Dry;Intact;No redness;No swelling 25 1253   Status Accessed 25 1253   Flows Good 25 1253            Hemodialysis AV Fistula Right forearm (Active)          Physical Exam         Neurosurgery Physical Exam  GENERAL: resting comfortably  HEENT: NCAT, PERRL, mucous membranes moist  NECK: supple, trachea  midline  CV: normal capillary refill  PULM: aerating well, symmetric expansion, no distress  ABD: soft, NT, ND  EXT: LLE in cast     NEURO:     GCS 15 E4V5M6  AAO x 3  CN II-XII grossly intact  Fc x 4 antigravity  SILT  LUE drift    Significant Labs:  Recent Labs   Lab 01/20/25  1320   *      K 4.2      CO2 20*   BUN 98*   CREATININE 8.9*   CALCIUM 8.9   MG 3.4*     Recent Labs   Lab 01/20/25  1320   WBC 10.01   HGB 7.3*   HCT 23.6*        Recent Labs   Lab 01/20/25  1928   INR 1.0   APTT 25.5     Microbiology Results (last 7 days)       ** No results found for the last 168 hours. **          All pertinent labs from the last 24 hours have been reviewed.    Significant Diagnostics:  CT: CT Head Without Contrast    Result Date: 1/20/2025  Subcentimeter rounded hyperdense lesion within the right posterior body of the corpus callosum in light of history of trauma concerning for possible acute parenchymal hemorrhage.  Underlying vascular malformation cyst as a cavernoma cannot be excluded. No evidence for acute extra-axial hemorrhage or hydrocephalus.  Clinical correlation and further evaluation with MRI if patient compatible if not short-term follow-up CT recommended. Electronically signed by: Herbert Mendosa DO Date:    01/20/2025 Time:    16:34   MRI: No results found in the last 24 hours.  I have reviewed all pertinent imaging results/findings within the past 24 hours.  I have reviewed and interpreted all pertinent imaging results/findings within the past 24 hours.  Assessment/Plan:     Abnormal CT scan of head  54 y.o. female w/ PMH of HTN, DM2, ESRD on HD, PAD, HFpEF who presents with abnormal CTH c/f vascular malformation vs hemorrhage in R splenium of corpus callosum:    Imaging:  CTH 1/20: 7mm round hyperdensity in R splenium of corpus callosum c/f vascular malformation vs hemorrhage    Plan:  Patient admitted to obs under medicine for hemodialysis  q4h neurochecks on floor  All labs and  diagnostics reviewed  Follow-up CTH and 6h scan for stability  MRI/MRA brain for suspicious lesions  SBP <160 (Cardene gtt; Hydralazine & Labetalol PRN; Transition to home meds when appropriate)  Na >135  HOB >30  Follow-up pre-op labs (CBC/CMP/PT-INR/PTT/T&S)  Continue to monitor clinically, notify NSGY immediately with any changes in neuro status    Plan discussed with Dr. Gallagher    Dispo: admitted to medicine         Thank you for your consult. I will follow-up with patient. Please contact us if you have any additional questions.    Steve Ta MD  Neurosurgery  [facility] - Emergency Dept

## 2025-01-21 NOTE — ASSESSMENT & PLAN NOTE
Body mass index is 34.87 kg/m². Obesity complicates all aspects of disease management from diagnostic modalities to treatment. Weight loss encouraged and health benefits explained to patient.

## 2025-01-21 NOTE — SUBJECTIVE & OBJECTIVE
Interval History: NAEON. Pt exam stable this morning. MRI completed showing likely small cav mal in the splenium of the corpus callosum.     Medications:  Continuous Infusions:  Scheduled Meds:   atorvastatin  80 mg Oral QHS    carvediloL  6.25 mg Oral BID WM    cetirizine  5 mg Oral Daily    epoetin tariq-ebpx (RETACRIT) injection  10,000 Units Subcutaneous Every Tues, Thurs, Sat    fluticasone furoate-vilanteroL  1 puff Inhalation Daily    fluticasone propionate  2 spray Each Nostril Daily    insulin glargine U-100 (Lantus)  28 Units Subcutaneous QHS    LIDOcaine  2 patch Transdermal Q24H    sevelamer carbonate  800 mg Oral TID WM    vitamin renal formula (B-complex-vitamin c-folic acid)  1 capsule Oral Daily     PRN Meds:  Current Facility-Administered Medications:     acetaminophen, 650 mg, Oral, Q6H PRN    dextrose 50%, 12.5 g, Intravenous, PRN    dextrose 50%, 25 g, Intravenous, PRN    glucagon (human recombinant), 1 mg, Intramuscular, PRN    glucose, 16 g, Oral, PRN    glucose, 24 g, Oral, PRN    hydrALAZINE, 10 mg, Intravenous, Q8H PRN    insulin aspart U-100, 0-5 Units, Subcutaneous, QID (AC + HS) PRN    labetalol, 10 mg, Intravenous, Q6H PRN    melatonin, 6 mg, Oral, Nightly PRN    naloxone, 0.02 mg, Intravenous, PRN    ondansetron, 4 mg, Intravenous, Q8H PRN    prochlorperazine, 5 mg, Intravenous, Q6H PRN    sodium chloride 0.9%, 10 mL, Intravenous, PRN     Review of Systems  Objective:     Weight: 101 kg (222 lb 10.6 oz)  Body mass index is 34.87 kg/m².  Vital Signs (Most Recent):  Temp: 98.7 °F (37.1 °C) (01/21/25 1135)  Pulse: 87 (01/21/25 1135)  Resp: 16 (01/21/25 1135)  BP: 133/63 (01/21/25 1135)  SpO2: (!) 94 % (01/21/25 0500) Vital Signs (24h Range):  Temp:  [97.8 °F (36.6 °C)-98.7 °F (37.1 °C)] 98.7 °F (37.1 °C)  Pulse:  [5-108] 87  Resp:  [16-22] 16  SpO2:  [93 %-100 %] 94 %  BP: (127-195)/(61-98) 133/63     Date 01/21/25 0700 - 01/22/25 0659   Shift 0746-3922 9910-2103 6408-0489 24 Hour Total    INTAKE   P.O. 118   118   I.V.(mL/kg) 350(3.5)   350(3.5)   Shift Total(mL/kg) 468(4.6)   468(4.6)   OUTPUT   Other 2728   2728   Shift Total(mL/kg) 2728(27)   2728(27)   Weight (kg) 101 101 101 101                            Hemodialysis AV Fistula  Right forearm (Active)   Site Assessment No redness;No swelling 01/20/25 2228   Patency Present 01/20/25 2228   Status Accessed 01/20/25 1253   Flows Good 01/20/25 1253          Physical Exam         Neurosurgery Physical Exam  GENERAL: resting comfortably  HEENT: NCAT, PERRL, mucous membranes moist  NECK: supple, trachea midline  CV: normal capillary refill  PULM: aerating well, symmetric expansion, no distress  ABD: soft, NT, ND  EXT: LLE in cast     NEURO:     GCS 15 E4V5M6  AAO x 3  CN II-XII grossly intact  Fc x 4 antigravity  SILT  LUE drift    Significant Labs:  Recent Labs   Lab 01/20/25  1320 01/21/25  0235   * 201*    141   K 4.2 4.5    105   CO2 20* 18*   BUN 98* 101*   CREATININE 8.9* 9.3*   CALCIUM 8.9 8.9   MG 3.4* 3.4*     Recent Labs   Lab 01/20/25  1320 01/21/25  0235   WBC 10.01 11.27   HGB 7.3* 7.1*   HCT 23.6* 22.7*    157     Recent Labs   Lab 01/20/25  1928   INR 1.0   APTT 25.5     Microbiology Results (last 7 days)       ** No results found for the last 168 hours. **          All pertinent labs from the last 24 hours have been reviewed.    Significant Diagnostics:  I have reviewed all pertinent imaging results/findings within the past 24 hours.

## 2025-01-21 NOTE — ASSESSMENT & PLAN NOTE
"Patient has Diastolic (HFpEF) heart failure that is Acute on chronic. On presentation their CHF was decompensated. Evidence of decompensated CHF on presentation includes: edema and crackles on lung auscultation. The etiology of their decompensation is likely missed HD session . Most recent BNP and echo results are listed below.  No results for input(s): "BNP" in the last 72 hours.  Latest ECHO  Results for orders placed during the hospital encounter of 09/11/23    Echo    Interpretation Summary    Left Ventricle: The left ventricle is normal in size. Normal wall thickness. Normal wall motion. There is low normal systolic function with a visually estimated ejection fraction of 50 - 55%. Ejection fraction by visual approximation is 55%. There is normal diastolic function.    Right Ventricle: Right ventricle was not well visualized due to poor acoustic window. Normal right ventricular cavity size. Wall thickness is normal. Right ventricle wall motion  is normal. Systolic function is normal.    Aortic Valve: There is moderate aortic valve sclerosis. There is annular calcification present.    Mitral Valve: There is moderate mitral annular calcification present.    IVC/SVC: Normal venous pressure at 3 mmHg.    Current Heart Failure Medications  carvediloL tablet 6.25 mg, 2 times daily with meals, Oral  hydrALAZINE injection 10 mg, Every 8 hours PRN, Intravenous  labetalol 20 mg/4 mL (5 mg/mL) IV syring, Every 6 hours PRN, Intravenous    Plan  - Monitor strict I&Os and daily weights.    - Place on telemetry  - Low sodium diet  - Place on fluid restriction of 1.5 L.   - Cardiology has not been consulted  - The patient's volume status is stable but not at their baseline as indicated by edema and crackles on lung auscultation  - volume management per HD.  "

## 2025-01-21 NOTE — ASSESSMENT & PLAN NOTE
Creatine stable for now. BMP reviewed- noted Estimated Creatinine Clearance: 8.4 mL/min (A) (based on SCr of 9.3 mg/dL (H)). according to latest data. Based on current GFR, CKD stage is end stage.  Monitor UOP and serial BMP and adjust therapy as needed. Renally dose meds. Avoid nephrotoxic medications and procedures.  - nephrology consulted, appreciate recs

## 2025-01-21 NOTE — ASSESSMENT & PLAN NOTE
54 y.o. female w/ PMH of HTN, DM2, ESRD on HD, PAD, HFpEF who presents with abnormal CTH c/f vascular malformation vs hemorrhage in R splenium of corpus callosum:    Imaging:  CTH 1/20: 7mm round hyperdensity in R splenium of corpus callosum c/f vascular malformation vs hemorrhage    Plan:  Patient admitted to obs under medicine for hemodialysis  q4h neurochecks on floor  All labs and diagnostics reviewed  Follow-up CTH and 6h scan for stability  MRI/MRA brain for suspicious lesions  SBP <160 (Cardene gtt; Hydralazine & Labetalol PRN; Transition to home meds when appropriate)  Na >135  HOB >30  MRI showing likely small cav mal within the splenium of the corpus callosum however limited due to lack of contrast. Would prefer contrasted MRI but given patients ESRD pt can follow up In clinic in 6-12 months with a repeat MRI  No acute neurosurgical intervention at this time  Rest of care per primary    Plan discussed with Dr. Gallagher    Dispo: admitted to medicine

## 2025-01-21 NOTE — ASSESSMENT & PLAN NOTE
Patient's FSGs are controlled on current medication regimen.  Last A1c reviewed-   Lab Results   Component Value Date    HGBA1C 5.3 12/09/2024     Most recent fingerstick glucose reviewed-   Recent Labs   Lab 01/20/25 2053 01/20/25  2231   POCTGLUCOSE 198* 213*     Current correctional scale  Low  Maintain anti-hyperglycemic dose as follows-   Antihyperglycemics (From admission, onward)    Start     Stop Route Frequency Ordered    01/20/25 2100  insulin glargine U-100 (Lantus) pen 28 Units         -- SubQ Nightly 01/20/25 1837    01/20/25 1930  insulin aspart U-100 pen 0-5 Units         -- SubQ Before meals & nightly PRN 01/20/25 1831        Hold Oral hypoglycemics while patient is in the hospital.  -Accuchecks AC/HS  -Diabetic/renal diet.

## 2025-01-21 NOTE — ASSESSMENT & PLAN NOTE
- supposed to get last dose of Cefepime 2g today at HD, but she did missed HD today  - will give Cefepime 2g with next HD session to complete therapy  - Follows with podiatry and ID outpatient

## 2025-01-21 NOTE — ASSESSMENT & PLAN NOTE
"Patient has Diastolic (HFpEF) heart failure that is Acute on chronic. On presentation their CHF was decompensated. Evidence of decompensated CHF on presentation includes: edema and crackles on lung auscultation. The etiology of their decompensation is likely missed HD session . Most recent BNP and echo results are listed below.  No results for input(s): "BNP" in the last 72 hours.  Latest ECHO  Results for orders placed during the hospital encounter of 09/11/23    Echo    Interpretation Summary    Left Ventricle: The left ventricle is normal in size. Normal wall thickness. Normal wall motion. There is low normal systolic function with a visually estimated ejection fraction of 50 - 55%. Ejection fraction by visual approximation is 55%. There is normal diastolic function.    Right Ventricle: Right ventricle was not well visualized due to poor acoustic window. Normal right ventricular cavity size. Wall thickness is normal. Right ventricle wall motion  is normal. Systolic function is normal.    Aortic Valve: There is moderate aortic valve sclerosis. There is annular calcification present.    Mitral Valve: There is moderate mitral annular calcification present.    IVC/SVC: Normal venous pressure at 3 mmHg.    Current Heart Failure Medications  carvediloL tablet 6.25 mg, 2 times daily with meals, Oral    Plan  - Monitor strict I&Os and daily weights.    - Place on telemetry  - Low sodium diet  - Place on fluid restriction of 1.5 L.   - Cardiology has not been consulted  - The patient's volume status is stable but not at their baseline as indicated by edema and crackles on lung auscultation  - volume management per HD.  "

## 2025-01-21 NOTE — ASSESSMENT & PLAN NOTE
PAD s/p right SFA stent (11/2017), s/p right common iliac stent s/p PTA occluded stents in 2014 (Dr. Nathaniel Coyle at [facility] [place]) S/p ax fem bypass and stent to sfa with Dr. Maya on 9/14/23  -Continue statin, hold ASA pending repeat head imaging.

## 2025-01-21 NOTE — ED NOTES
Received report and assumed care of patient at this time. Pt presents to ED w/ c/o  Patient is AAOx4 with a calm affect and aware of environment. Airway is open and patent, respirations are spontaneous, normal effort and rate noted, placed on 2L NC for comfort but pt sats 100% on room air. Pt denies chest pain at this time. Skin warm and dry. Movement to all extremities noted. Bed placed in low position, side rails up x 2, call light is within reach of patient. Explanation of care provided to patient, pt placed on BP, pulse-ox and cardiac monitoring. Patient endorses 7/10 hip pain. Awaiting further MD orders and bed assignment, POC continues.

## 2025-01-21 NOTE — PLAN OF CARE
Inpatient Upgrade Note    Georgina Arias has warranted treatment spanning two or more midnights of hospital level care for the management of  FALL , abnormal CT of Head . She continues to require daily labs, further testing/imaging, monitoring of vital signs, medication adjustments, and further evaluation by consultants. Her condition is also complicated by the following comorbidities:  HTN, HLD, PAD, ESRD on HD, T2DM, and HFpEF .

## 2025-01-22 PROBLEM — R90.89 ABNORMAL BRAIN MRI: Status: ACTIVE | Noted: 2025-01-22

## 2025-01-22 LAB
ALBUMIN SERPL BCP-MCNC: 2.6 G/DL (ref 3.5–5.2)
ALP SERPL-CCNC: 77 U/L (ref 40–150)
ALT SERPL W/O P-5'-P-CCNC: 15 U/L (ref 10–44)
AMPHET+METHAMPHET UR QL: NEGATIVE
ANION GAP SERPL CALC-SCNC: 13 MMOL/L (ref 8–16)
AST SERPL-CCNC: 16 U/L (ref 10–40)
BACTERIA #/AREA URNS AUTO: ABNORMAL /HPF
BARBITURATES UR QL SCN>200 NG/ML: NEGATIVE
BASOPHILS # BLD AUTO: 0.03 K/UL (ref 0–0.2)
BASOPHILS NFR BLD: 0.4 % (ref 0–1.9)
BENZODIAZ UR QL SCN>200 NG/ML: NEGATIVE
BILIRUB SERPL-MCNC: 0.6 MG/DL (ref 0.1–1)
BILIRUB UR QL STRIP: NEGATIVE
BLD PROD TYP BPU: NORMAL
BLOOD UNIT EXPIRATION DATE: NORMAL
BLOOD UNIT TYPE CODE: 7300
BLOOD UNIT TYPE: NORMAL
BUN SERPL-MCNC: 54 MG/DL (ref 6–20)
BZE UR QL SCN: NEGATIVE
CALCIUM SERPL-MCNC: 8.8 MG/DL (ref 8.7–10.5)
CANNABINOIDS UR QL SCN: NEGATIVE
CHLORIDE SERPL-SCNC: 101 MMOL/L (ref 95–110)
CLARITY UR REFRACT.AUTO: CLEAR
CO2 SERPL-SCNC: 23 MMOL/L (ref 23–29)
CODING SYSTEM: NORMAL
COLOR UR AUTO: YELLOW
CREAT SERPL-MCNC: 6.4 MG/DL (ref 0.5–1.4)
CREAT UR-MCNC: 45 MG/DL (ref 15–325)
CROSSMATCH INTERPRETATION: NORMAL
DIFFERENTIAL METHOD BLD: ABNORMAL
DISPENSE STATUS: NORMAL
EOSINOPHIL # BLD AUTO: 0.2 K/UL (ref 0–0.5)
EOSINOPHIL NFR BLD: 2.3 % (ref 0–8)
ERYTHROCYTE [DISTWIDTH] IN BLOOD BY AUTOMATED COUNT: 15.6 % (ref 11.5–14.5)
EST. GFR  (NO RACE VARIABLE): 7.2 ML/MIN/1.73 M^2
ETHANOL UR-MCNC: <10 MG/DL
FERRITIN SERPL-MCNC: 4993 NG/ML (ref 20–300)
GLUCOSE SERPL-MCNC: 122 MG/DL (ref 70–110)
GLUCOSE UR QL STRIP: NEGATIVE
HCT VFR BLD AUTO: 21 % (ref 37–48.5)
HGB BLD-MCNC: 6.6 G/DL (ref 12–16)
HGB BLD-MCNC: 7 G/DL (ref 12–16)
HGB UR QL STRIP: ABNORMAL
HYALINE CASTS UR QL AUTO: 0 /LPF
IMM GRANULOCYTES # BLD AUTO: 0.05 K/UL (ref 0–0.04)
IMM GRANULOCYTES NFR BLD AUTO: 0.6 % (ref 0–0.5)
IRON SERPL-MCNC: 62 UG/DL (ref 30–160)
KETONES UR QL STRIP: NEGATIVE
LEUKOCYTE ESTERASE UR QL STRIP: ABNORMAL
LYMPHOCYTES # BLD AUTO: 0.7 K/UL (ref 1–4.8)
LYMPHOCYTES NFR BLD: 9.6 % (ref 18–48)
MAGNESIUM SERPL-MCNC: 2.5 MG/DL (ref 1.6–2.6)
MCH RBC QN AUTO: 30.8 PG (ref 27–31)
MCHC RBC AUTO-ENTMCNC: 31.4 G/DL (ref 32–36)
MCV RBC AUTO: 98 FL (ref 82–98)
METHADONE UR QL SCN>300 NG/ML: NEGATIVE
MICROSCOPIC COMMENT: ABNORMAL
MONOCYTES # BLD AUTO: 0.5 K/UL (ref 0.3–1)
MONOCYTES NFR BLD: 6.2 % (ref 4–15)
NEUTROPHILS # BLD AUTO: 6.3 K/UL (ref 1.8–7.7)
NEUTROPHILS NFR BLD: 80.9 % (ref 38–73)
NITRITE UR QL STRIP: NEGATIVE
NRBC BLD-RTO: 0 /100 WBC
NUM UNITS TRANS PACKED RBC: NORMAL
OPIATES UR QL SCN: NEGATIVE
PCP UR QL SCN>25 NG/ML: NEGATIVE
PH UR STRIP: 6 [PH] (ref 5–8)
PHOSPHATE SERPL-MCNC: 4.6 MG/DL (ref 2.7–4.5)
PLATELET # BLD AUTO: 145 K/UL (ref 150–450)
PMV BLD AUTO: 10.7 FL (ref 9.2–12.9)
POCT GLUCOSE: 112 MG/DL (ref 70–110)
POCT GLUCOSE: 116 MG/DL (ref 70–110)
POCT GLUCOSE: 209 MG/DL (ref 70–110)
POTASSIUM SERPL-SCNC: 3.9 MMOL/L (ref 3.5–5.1)
PROT SERPL-MCNC: 6.7 G/DL (ref 6–8.4)
PROT UR QL STRIP: ABNORMAL
RBC # BLD AUTO: 2.14 M/UL (ref 4–5.4)
RBC #/AREA URNS AUTO: 3 /HPF (ref 0–4)
SATURATED IRON: 25 % (ref 20–50)
SODIUM SERPL-SCNC: 137 MMOL/L (ref 136–145)
SP GR UR STRIP: 1.01 (ref 1–1.03)
SQUAMOUS #/AREA URNS AUTO: 7 /HPF
TOTAL IRON BINDING CAPACITY: 246 UG/DL (ref 250–450)
TOXICOLOGY INFORMATION: NORMAL
TRANSFERRIN SERPL-MCNC: 166 MG/DL (ref 200–375)
URN SPEC COLLECT METH UR: ABNORMAL
WBC # BLD AUTO: 7.73 K/UL (ref 3.9–12.7)
WBC #/AREA URNS AUTO: 8 /HPF (ref 0–5)

## 2025-01-22 PROCEDURE — 85018 HEMOGLOBIN: CPT

## 2025-01-22 PROCEDURE — 21400001 HC TELEMETRY ROOM

## 2025-01-22 PROCEDURE — P9038 RBC IRRADIATED: HCPCS

## 2025-01-22 PROCEDURE — 81001 URINALYSIS AUTO W/SCOPE: CPT | Mod: XB

## 2025-01-22 PROCEDURE — 83735 ASSAY OF MAGNESIUM: CPT | Performed by: NURSE PRACTITIONER

## 2025-01-22 PROCEDURE — 82728 ASSAY OF FERRITIN: CPT | Performed by: NURSE PRACTITIONER

## 2025-01-22 PROCEDURE — 83540 ASSAY OF IRON: CPT | Performed by: NURSE PRACTITIONER

## 2025-01-22 PROCEDURE — 36415 COLL VENOUS BLD VENIPUNCTURE: CPT

## 2025-01-22 PROCEDURE — 84100 ASSAY OF PHOSPHORUS: CPT | Performed by: NURSE PRACTITIONER

## 2025-01-22 PROCEDURE — 80307 DRUG TEST PRSMV CHEM ANLYZR: CPT

## 2025-01-22 PROCEDURE — 36415 COLL VENOUS BLD VENIPUNCTURE: CPT | Performed by: NURSE PRACTITIONER

## 2025-01-22 PROCEDURE — 94640 AIRWAY INHALATION TREATMENT: CPT

## 2025-01-22 PROCEDURE — 86920 COMPATIBILITY TEST SPIN: CPT

## 2025-01-22 PROCEDURE — 25000003 PHARM REV CODE 250: Performed by: NURSE PRACTITIONER

## 2025-01-22 PROCEDURE — 36430 TRANSFUSION BLD/BLD COMPNT: CPT

## 2025-01-22 PROCEDURE — 99223 1ST HOSP IP/OBS HIGH 75: CPT | Mod: GC,,, | Performed by: STUDENT IN AN ORGANIZED HEALTH CARE EDUCATION/TRAINING PROGRAM

## 2025-01-22 PROCEDURE — 30233N1 TRANSFUSION OF NONAUTOLOGOUS RED BLOOD CELLS INTO PERIPHERAL VEIN, PERCUTANEOUS APPROACH: ICD-10-PCS | Performed by: HOSPITALIST

## 2025-01-22 PROCEDURE — 80053 COMPREHEN METABOLIC PANEL: CPT | Performed by: NURSE PRACTITIONER

## 2025-01-22 PROCEDURE — 99900035 HC TECH TIME PER 15 MIN (STAT)

## 2025-01-22 PROCEDURE — 25000003 PHARM REV CODE 250

## 2025-01-22 PROCEDURE — 85025 COMPLETE CBC W/AUTO DIFF WBC: CPT | Performed by: NURSE PRACTITIONER

## 2025-01-22 PROCEDURE — 25000003 PHARM REV CODE 250: Performed by: EMERGENCY MEDICINE

## 2025-01-22 RX ORDER — NAPROXEN SODIUM 220 MG/1
81 TABLET, FILM COATED ORAL DAILY
Status: DISCONTINUED | OUTPATIENT
Start: 2025-01-22 | End: 2025-01-23 | Stop reason: HOSPADM

## 2025-01-22 RX ORDER — HYDROCODONE BITARTRATE AND ACETAMINOPHEN 500; 5 MG/1; MG/1
TABLET ORAL
Status: DISCONTINUED | OUTPATIENT
Start: 2025-01-22 | End: 2025-01-23 | Stop reason: HOSPADM

## 2025-01-22 RX ADMIN — ATORVASTATIN CALCIUM 80 MG: 40 TABLET, FILM COATED ORAL at 08:01

## 2025-01-22 RX ADMIN — Medication 6 MG: at 08:01

## 2025-01-22 RX ADMIN — ACETAMINOPHEN 650 MG: 325 TABLET ORAL at 05:01

## 2025-01-22 RX ADMIN — Medication 1 CAPSULE: at 09:01

## 2025-01-22 RX ADMIN — FLUTICASONE FUROATE AND VILANTEROL TRIFENATATE 1 PUFF: 200; 25 POWDER RESPIRATORY (INHALATION) at 07:01

## 2025-01-22 RX ADMIN — CETIRIZINE HYDROCHLORIDE 5 MG: 5 TABLET, FILM COATED ORAL at 09:01

## 2025-01-22 RX ADMIN — FLUTICASONE PROPIONATE 100 MCG: 50 SPRAY, METERED NASAL at 09:01

## 2025-01-22 RX ADMIN — INSULIN GLARGINE 28 UNITS: 100 INJECTION, SOLUTION SUBCUTANEOUS at 08:01

## 2025-01-22 RX ADMIN — CARVEDILOL 6.25 MG: 6.25 TABLET, FILM COATED ORAL at 05:01

## 2025-01-22 RX ADMIN — CARVEDILOL 6.25 MG: 6.25 TABLET, FILM COATED ORAL at 08:01

## 2025-01-22 RX ADMIN — ASPIRIN 81 MG CHEWABLE TABLET 81 MG: 81 TABLET CHEWABLE at 12:01

## 2025-01-22 RX ADMIN — LIDOCAINE 2 PATCH: 50 PATCH CUTANEOUS at 08:01

## 2025-01-22 NOTE — ASSESSMENT & PLAN NOTE
Creatine stable for now. BMP reviewed- noted Estimated Creatinine Clearance: 12.3 mL/min (A) (based on SCr of 6.4 mg/dL (H)). according to latest data. Based on current GFR, CKD stage is end stage.  Monitor UOP and serial BMP and adjust therapy as needed. Renally dose meds. Avoid nephrotoxic medications and procedures.  - nephrology consulted, appreciate recs

## 2025-01-22 NOTE — ASSESSMENT & PLAN NOTE
Abnormal CT scan of head   - mechanical fall with trip & fall over coffee table  - no neuro deficits on exam  - hypertensive, but remaining vitals stable; goal SBP <160  - XR L pelvis, R tib fib, L foot without acute fracture  - CT C spine with degenerative change of cervical spine without evidence of acute fracture  - CTH with Subcentimeter rounded hyperdense lesion within the right posterior body of the corpus callosum in light of history of trauma concerning for possible acute parenchymal hemorrhage.  Underlying vascular malformation cyst as a cavernoma cannot be excluded. No evidence for acute extra-axial hemorrhage or hydrocephalus.   - NSGY consulted, recommending MRI/MRA brain and repeat CTH 6 hrs after initial CT.  - MRI/MRA Brain with No acute intracranial abnormality elsewhere. MRA of the intracranial vasculature appears within normal limits.  - Repeat CTH pending as patient refused testing last night  - NSGY recs:   - SBP <160 (Cardene gtt; Hydralazine & Labetalol PRN; Transition to home meds when appropriate)  - Na >135  - HOB >30    - MRI showing likely small cav mal within the splenium of the corpus callosum however limited due to lack   of contrast. Would prefer contrasted MRI but given patients ESRD pt can follow up In clinic in 6-12 months ' with a repeat MRI  - No acute neurosurgical intervention at this time  - Neuro checks q4h  - fall precautions  - PT/OT consulted

## 2025-01-22 NOTE — PLAN OF CARE
Problem: Hemodialysis  Goal: Safe, Effective Therapy Delivery  Outcome: Progressing  Goal: Effective Tissue Perfusion  Outcome: Progressing  Goal: Absence of Infection Signs and Symptoms  Outcome: Progressing     Problem: Adult Inpatient Plan of Care  Goal: Plan of Care Review  Outcome: Progressing  Goal: Patient-Specific Goal (Individualized)  Outcome: Progressing  Goal: Absence of Hospital-Acquired Illness or Injury  Outcome: Progressing  Goal: Optimal Comfort and Wellbeing  Outcome: Progressing  Goal: Readiness for Transition of Care  Outcome: Progressing     Problem: Wound  Goal: Optimal Coping  Outcome: Progressing  Goal: Optimal Functional Ability  Outcome: Progressing  Goal: Absence of Infection Signs and Symptoms  Outcome: Progressing  Goal: Improved Oral Intake  Outcome: Progressing  Goal: Optimal Pain Control and Function  Outcome: Progressing  Goal: Skin Health and Integrity  Outcome: Progressing  Goal: Optimal Wound Healing  Outcome: Progressing     Problem: Acute Kidney Injury/Impairment  Goal: Fluid and Electrolyte Balance  Outcome: Progressing  Goal: Improved Oral Intake  Outcome: Progressing  Goal: Effective Renal Function  Outcome: Progressing     Problem: Diabetes Comorbidity  Goal: Blood Glucose Level Within Targeted Range  Outcome: Progressing     Problem: Fall Injury Risk  Goal: Absence of Fall and Fall-Related Injury  Outcome: Progressing     Problem: Hypertension Acute  Goal: Blood Pressure Within Desired Range  Outcome: Progressing     Problem: Pain Chronic (Persistent)  Goal: Optimal Pain Control and Function  Outcome: Progressing     Problem: Chronic Kidney Disease  Goal: Optimal Coping with Chronic Illness  Outcome: Progressing  Goal: Electrolyte Balance  Outcome: Progressing  Goal: Fluid Balance  Outcome: Progressing  Goal: Optimal Functional Ability  Outcome: Progressing  Goal: Absence of Anemia Signs and Symptoms  Outcome: Progressing  Goal: Optimal Oral Intake  Outcome:  Progressing  Goal: Acceptable Pain Control  Outcome: Progressing  Goal: Minimize Renal Failure Effects  Outcome: Progressing     Problem: Heart Failure  Goal: Optimal Coping  Outcome: Progressing  Goal: Optimal Cardiac Output  Outcome: Progressing  Goal: Stable Heart Rate and Rhythm  Outcome: Progressing  Goal: Optimal Functional Ability  Outcome: Progressing  Goal: Fluid and Electrolyte Balance  Outcome: Progressing  Goal: Improved Oral Intake  Outcome: Progressing  Goal: Effective Oxygenation and Ventilation  Outcome: Progressing  Goal: Effective Breathing Pattern During Sleep  Outcome: Progressing     Problem: Mobility Impairment  Goal: Optimal Mobility  Outcome: Progressing

## 2025-01-22 NOTE — ASSESSMENT & PLAN NOTE
Patient with Hypercapnic and Hypoxic Respiratory failure which is Acute.  she is not on home oxygen. Supplemental oxygen was provided and noted-      .   Signs/symptoms of respiratory failure include- increased work of breathing and use of accessory muscles. Contributing diagnoses includes - CHF and ESRD  Labs and images were reviewed. Patient Has not had a recent ABG. Will treat underlying causes and adjust management of respiratory failure as follows-   - likely 2/2 volume overload  - Nephrology consulted for fluid removal  - wean O2 as tolerated  - 6MWT prior to discharge
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
## 2025-01-22 NOTE — ASSESSMENT & PLAN NOTE
Patient's FSGs are controlled on current medication regimen.  Last A1c reviewed-   Lab Results   Component Value Date    HGBA1C 5.3 12/09/2024     Most recent fingerstick glucose reviewed-   Recent Labs   Lab 01/21/25  1552 01/21/25  2053 01/22/25  0810   POCTGLUCOSE 151* 178* 112*       Current correctional scale  Low  Maintain anti-hyperglycemic dose as follows-   Antihyperglycemics (From admission, onward)    Start     Stop Route Frequency Ordered    01/20/25 2100  insulin glargine U-100 (Lantus) pen 28 Units         -- SubQ Nightly 01/20/25 1837    01/20/25 1930  insulin aspart U-100 pen 0-5 Units         -- SubQ Before meals & nightly PRN 01/20/25 1831        Hold Oral hypoglycemics while patient is in the hospital.  -Accuchecks AC/HS  -Diabetic/renal diet.
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
## 2025-01-22 NOTE — ASSESSMENT & PLAN NOTE
Body mass index is 34.94 kg/m². Obesity complicates all aspects of disease management from diagnostic modalities to treatment. Weight loss encouraged and health benefits explained to patient.
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
## 2025-01-22 NOTE — ASSESSMENT & PLAN NOTE
Anemia is likely due to chronic disease due to ESRD. Most recent hemoglobin and hematocrit are listed below.  Recent Labs     01/21/25  0235 01/21/25  1410 01/22/25  0411   HGB 7.1* 7.2* 6.6*   HCT 22.7* 22.5* 21.0*       Plan  - Monitor serial CBC: Daily; repeat this afternoon s/p HD  - Transfuse PRBC if patient becomes hemodynamically unstable, symptomatic or H/H drops below 7/21.  - Patient has received 1 units of PRBCs on 1/22/25  - Patient's anemia is currently stable  - consent obtained and placed in chart  - obtain repeat hgb following transfusion

## 2025-01-22 NOTE — PLAN OF CARE
[facility]  Discharge Assessment    Primary Care Provider: Alonso Ling MD     Discharge Assessment (most recent)       BRIEF DISCHARGE ASSESSMENT - 01/22/25 1018          Discharge Planning    Assessment Type Discharge Planning Brief Assessment     Resource/Environmental Concerns none     Support Systems Children     Equipment Currently Used at Home walker, rolling;rollator;grab bar;shower chair     Current Living Arrangements home     Patient/Family Anticipates Transition to home;home with help/services     Patient/Family Anticipated Services at Transition home health care     DME Needed Upon Discharge  none     Discharge Plan A Home Health     Discharge Plan B Home Health                   Pt is a 54 y.o. female admitted with Fall and has a PMH of ESRD ([facility])  and osteomyelitis on IVABX, finished 1/21/25. Pt lives in a single story house with no steps to enter. She is able to perform her self care. She is active with [facility]. Pt will have transportation home. [facility] Discharge Packet given to patient and/or family with understanding verbalized.   name and number and estimated discharge date written on white board in patient's room with request to call for any questions or concerns.  Will continue to follow for needs.  Discharge Plan A and Plan B have been determined by review of patient's clinical status, future medical and therapeutic needs, and coverage/benefits for post-acute care in coordination with multidisciplinary team members.  Syed Tran RN,BSN

## 2025-01-22 NOTE — ASSESSMENT & PLAN NOTE
Abnormal CT scan of head   - mechanical fall with trip & fall over coffee table  - no neuro deficits on exam  - hypertensive, but remaining vitals stable; goal SBP <160  - XR L pelvis, R tib fib, L foot without acute fracture  - CT C spine with degenerative change of cervical spine without evidence of acute fracture  - CTH with Subcentimeter rounded hyperdense lesion within the right posterior body of the corpus callosum in light of history of trauma concerning for possible acute parenchymal hemorrhage.  Underlying vascular malformation cyst as a cavernoma cannot be excluded. No evidence for acute extra-axial hemorrhage or hydrocephalus.   - NSGY consulted, recommending MRI/MRA brain and repeat CTH 6 hrs after initial CT.  - MRI/MRA Brain with No acute intracranial abnormality elsewhere. MRA of the intracranial vasculature appears within normal limits.  - Repeat CTH pending as patient refused testing last night  - NSGY recs:   - SBP <160 (Cardene gtt; Hydralazine & Labetalol PRN; Transition to home meds when appropriate)  - Na >135  - HOB >30    - MRI showing likely small cav mal within the splenium of the corpus callosum however limited due to lack   of contrast. Would prefer contrasted MRI but given patients ESRD pt can follow up In clinic in 6-12 months ' with a repeat MRI  - No acute neurosurgical intervention at this time  - pending MRI c-spine given tremors and upper extremity weakness  - Neuro checks q4h  - fall precautions  - PT/OT consulted

## 2025-01-22 NOTE — ASSESSMENT & PLAN NOTE
PAD s/p right SFA stent (11/2017), s/p right common iliac stent s/p PTA occluded stents in 2014 (Dr. Nathaniel Coyle at [facility]) S/p ax fem bypass and stent to sfa with Dr. Maya on 9/14/23  -Continue statin, hold ASA pending repeat head imaging.  - asa restarted

## 2025-01-22 NOTE — ASSESSMENT & PLAN NOTE
Patient's blood pressure range in the last 24 hours was: BP  Min: 124/56  Max: 184/82.The patient's inpatient anti-hypertensive regimen is listed below:  Current Antihypertensives  carvediloL tablet 6.25 mg, 2 times daily with meals, Oral  hydrALAZINE injection 10 mg, Every 8 hours PRN, Intravenous  labetalol 20 mg/4 mL (5 mg/mL) IV syring, Every 6 hours PRN, Intravenous    Plan  - BP is controlled, no changes needed to their regimen  - PRN hydralazine and labetalol for SBP <160 per NSGY recs  - tele

## 2025-01-23 ENCOUNTER — PATIENT MESSAGE (OUTPATIENT)
Dept: PODIATRY | Facility: CLINIC | Age: 55
End: 2025-01-23
Payer: MEDICARE

## 2025-01-23 ENCOUNTER — TELEPHONE (OUTPATIENT)
Dept: NEUROSURGERY | Facility: CLINIC | Age: 55
End: 2025-01-23
Payer: MEDICARE

## 2025-01-23 VITALS
HEIGHT: 67 IN | OXYGEN SATURATION: 96 % | RESPIRATION RATE: 18 BRPM | DIASTOLIC BLOOD PRESSURE: 68 MMHG | HEART RATE: 70 BPM | BODY MASS INDEX: 35.05 KG/M2 | SYSTOLIC BLOOD PRESSURE: 147 MMHG | TEMPERATURE: 98 F | WEIGHT: 223.31 LBS

## 2025-01-23 LAB
ALBUMIN SERPL BCP-MCNC: 2.6 G/DL (ref 3.5–5.2)
ALP SERPL-CCNC: 77 U/L (ref 40–150)
ALT SERPL W/O P-5'-P-CCNC: 16 U/L (ref 10–44)
ANION GAP SERPL CALC-SCNC: 16 MMOL/L (ref 8–16)
AST SERPL-CCNC: 12 U/L (ref 10–40)
BASOPHILS # BLD AUTO: 0.05 K/UL (ref 0–0.2)
BASOPHILS NFR BLD: 0.7 % (ref 0–1.9)
BILIRUB SERPL-MCNC: 0.8 MG/DL (ref 0.1–1)
BUN SERPL-MCNC: 67 MG/DL (ref 6–20)
CALCIUM SERPL-MCNC: 8.8 MG/DL (ref 8.7–10.5)
CHLORIDE SERPL-SCNC: 100 MMOL/L (ref 95–110)
CO2 SERPL-SCNC: 24 MMOL/L (ref 23–29)
CREAT SERPL-MCNC: 7.4 MG/DL (ref 0.5–1.4)
CRP SERPL-MCNC: 68.7 MG/L (ref 0–8.2)
DIFFERENTIAL METHOD BLD: ABNORMAL
EOSINOPHIL # BLD AUTO: 0.4 K/UL (ref 0–0.5)
EOSINOPHIL NFR BLD: 5.8 % (ref 0–8)
ERYTHROCYTE [DISTWIDTH] IN BLOOD BY AUTOMATED COUNT: 19.7 % (ref 11.5–14.5)
ERYTHROCYTE [SEDIMENTATION RATE] IN BLOOD BY PHOTOMETRIC METHOD: 71 MM/HR (ref 0–36)
EST. GFR  (NO RACE VARIABLE): 6.1 ML/MIN/1.73 M^2
FOLATE SERPL-MCNC: 17.8 NG/ML (ref 4–24)
GLUCOSE SERPL-MCNC: 90 MG/DL (ref 70–110)
HCT VFR BLD AUTO: 24.2 % (ref 37–48.5)
HGB BLD-MCNC: 7.8 G/DL (ref 12–16)
IMM GRANULOCYTES # BLD AUTO: 0.03 K/UL (ref 0–0.04)
IMM GRANULOCYTES NFR BLD AUTO: 0.4 % (ref 0–0.5)
LYMPHOCYTES # BLD AUTO: 0.9 K/UL (ref 1–4.8)
LYMPHOCYTES NFR BLD: 12.6 % (ref 18–48)
MAGNESIUM SERPL-MCNC: 2.7 MG/DL (ref 1.6–2.6)
MCH RBC QN AUTO: 30.5 PG (ref 27–31)
MCHC RBC AUTO-ENTMCNC: 32.2 G/DL (ref 32–36)
MCV RBC AUTO: 95 FL (ref 82–98)
MONOCYTES # BLD AUTO: 0.5 K/UL (ref 0.3–1)
MONOCYTES NFR BLD: 7.7 % (ref 4–15)
NEUTROPHILS # BLD AUTO: 4.9 K/UL (ref 1.8–7.7)
NEUTROPHILS NFR BLD: 72.8 % (ref 38–73)
NRBC BLD-RTO: 0 /100 WBC
PHOSPHATE SERPL-MCNC: 5.9 MG/DL (ref 2.7–4.5)
PLATELET # BLD AUTO: 141 K/UL (ref 150–450)
PMV BLD AUTO: 11.3 FL (ref 9.2–12.9)
POCT GLUCOSE: 106 MG/DL (ref 70–110)
POTASSIUM SERPL-SCNC: 3.6 MMOL/L (ref 3.5–5.1)
PROCALCITONIN SERPL IA-MCNC: 1.02 NG/ML
PROT SERPL-MCNC: 6.6 G/DL (ref 6–8.4)
RBC # BLD AUTO: 2.56 M/UL (ref 4–5.4)
SODIUM SERPL-SCNC: 140 MMOL/L (ref 136–145)
VIT B12 SERPL-MCNC: 544 PG/ML (ref 210–950)
WBC # BLD AUTO: 6.77 K/UL (ref 3.9–12.7)

## 2025-01-23 PROCEDURE — 97165 OT EVAL LOW COMPLEX 30 MIN: CPT

## 2025-01-23 PROCEDURE — 63600175 PHARM REV CODE 636 W HCPCS: Performed by: NURSE PRACTITIONER

## 2025-01-23 PROCEDURE — 83735 ASSAY OF MAGNESIUM: CPT | Performed by: NURSE PRACTITIONER

## 2025-01-23 PROCEDURE — 80100016 HC MAINTENANCE HEMODIALYSIS

## 2025-01-23 PROCEDURE — 36415 COLL VENOUS BLD VENIPUNCTURE: CPT | Performed by: STUDENT IN AN ORGANIZED HEALTH CARE EDUCATION/TRAINING PROGRAM

## 2025-01-23 PROCEDURE — 84100 ASSAY OF PHOSPHORUS: CPT | Performed by: NURSE PRACTITIONER

## 2025-01-23 PROCEDURE — 82607 VITAMIN B-12: CPT | Performed by: HOSPITALIST

## 2025-01-23 PROCEDURE — 82746 ASSAY OF FOLIC ACID SERUM: CPT | Performed by: HOSPITALIST

## 2025-01-23 PROCEDURE — 84145 PROCALCITONIN (PCT): CPT | Performed by: STUDENT IN AN ORGANIZED HEALTH CARE EDUCATION/TRAINING PROGRAM

## 2025-01-23 PROCEDURE — 85652 RBC SED RATE AUTOMATED: CPT | Performed by: STUDENT IN AN ORGANIZED HEALTH CARE EDUCATION/TRAINING PROGRAM

## 2025-01-23 PROCEDURE — 25000003 PHARM REV CODE 250: Performed by: NURSE PRACTITIONER

## 2025-01-23 PROCEDURE — 97535 SELF CARE MNGMENT TRAINING: CPT

## 2025-01-23 PROCEDURE — 25000003 PHARM REV CODE 250

## 2025-01-23 PROCEDURE — 80053 COMPREHEN METABOLIC PANEL: CPT | Performed by: NURSE PRACTITIONER

## 2025-01-23 PROCEDURE — 90935 HEMODIALYSIS ONE EVALUATION: CPT | Mod: ,,, | Performed by: NURSE PRACTITIONER

## 2025-01-23 PROCEDURE — 86140 C-REACTIVE PROTEIN: CPT | Performed by: STUDENT IN AN ORGANIZED HEALTH CARE EDUCATION/TRAINING PROGRAM

## 2025-01-23 PROCEDURE — 36415 COLL VENOUS BLD VENIPUNCTURE: CPT | Performed by: HOSPITALIST

## 2025-01-23 PROCEDURE — 63600175 PHARM REV CODE 636 W HCPCS: Mod: JZ,TB | Performed by: NURSE PRACTITIONER

## 2025-01-23 PROCEDURE — 85025 COMPLETE CBC W/AUTO DIFF WBC: CPT | Performed by: NURSE PRACTITIONER

## 2025-01-23 RX ORDER — ACETAMINOPHEN 500 MG
1000 TABLET ORAL EVERY 8 HOURS PRN
Status: DISCONTINUED | OUTPATIENT
Start: 2025-01-23 | End: 2025-01-23 | Stop reason: HOSPADM

## 2025-01-23 RX ADMIN — Medication 1 CAPSULE: at 12:01

## 2025-01-23 RX ADMIN — EPOETIN ALFA-EPBX 10000 UNITS: 10000 INJECTION, SOLUTION INTRAVENOUS; SUBCUTANEOUS at 10:01

## 2025-01-23 RX ADMIN — CARVEDILOL 6.25 MG: 6.25 TABLET, FILM COATED ORAL at 08:01

## 2025-01-23 RX ADMIN — FLUTICASONE PROPIONATE 100 MCG: 50 SPRAY, METERED NASAL at 12:01

## 2025-01-23 RX ADMIN — SEVELAMER CARBONATE 800 MG: 800 TABLET, FILM COATED ORAL at 12:01

## 2025-01-23 RX ADMIN — LABETALOL HYDROCHLORIDE 10 MG: 5 INJECTION, SOLUTION INTRAVENOUS at 10:01

## 2025-01-23 RX ADMIN — ASPIRIN 81 MG CHEWABLE TABLET 81 MG: 81 TABLET CHEWABLE at 12:01

## 2025-01-23 RX ADMIN — ACETAMINOPHEN 1000 MG: 500 TABLET ORAL at 12:01

## 2025-01-23 RX ADMIN — HYDRALAZINE HYDROCHLORIDE 10 MG: 20 INJECTION, SOLUTION INTRAMUSCULAR; INTRAVENOUS at 07:01

## 2025-01-23 RX ADMIN — CETIRIZINE HYDROCHLORIDE 5 MG: 5 TABLET, FILM COATED ORAL at 12:01

## 2025-01-23 NOTE — PLAN OF CARE
[facility]  Discharge Final Note    Primary Care Provider: Alonso Ling MD    Expected Discharge Date: 1/23/2025    Future Appointments   Date Time Provider Department Center   1/30/2025  9:45 AM Marco Allison DPM [facility] POD [facility]   1/30/2025  1:30 PM Andrew Davis Jr., PA [facility] ID [facility]   2/27/2025  9:00 AM Nirmal Cruz, NP [facility] [id number] [place]     Pt discharged home with no services.  Case management attempted to schedule  Neurosurgery referral. There was no appointments available in the time frame suggested and a clinic rep will be contacting the patient.  A note was placed in the AVS.  CM arranged wheelchair Van transport via [facility]. Requested  time is 1400 .  Requested  time does not guarantee arrival time.  If transport does not arrive by  1500 please contact assigned SW or on-call for assistance.  Syed Tran RN,BSN            Final Discharge Note (most recent)       Final Note - 01/23/25 1232          Final Note    Assessment Type Final Discharge Note     Anticipated Discharge Disposition Home or Self Care     Hospital Resources/Appts/Education Provided Provided patient/caregiver with written discharge plan information;Provided education on problems/symptoms using teachback;Appointments scheduled and added to AVS;Appointment suggestion unavailable        Post-Acute Status    Discharge Delays None known at this time                     Important Message from Medicare             Contact Info       Alonso Ling MD   Specialty: Internal Medicine   Relationship: PCP - General    [address] [facility] [facility] [address]   Phone: [phone]       Next Steps: Schedule an appointment as soon as possible for a visit in 1 week(s)    [facility] - Neurosurgery 8th Fl   Specialty: Neurosurgery    [address]  [place] [address]   Phone: [phone]       Next Steps: Follow up    Instructions: The Clinic  Nurse will contact you with an appointment. If you do not hear from them in 48 hrs, please call [phone].

## 2025-01-23 NOTE — NURSING
Patient left with transport via wheelchair. Patient took her phone / glasses with her/ left 1 cell phone on bedside table.

## 2025-01-23 NOTE — PROCEDURES
[facility] H[facility]ODIALYSIS NOTE     Patient currently on hemodialysis for removal of uremic toxins and volume.     Patient seen and evaluated on hemodialysis, tolerating treatment, see HD flowsheet for vitals and assessments.      Ultrafiltration goal is 2L     Labs have been reviewed and the dialysate bath has been adjusted.     Assessment/Plan:  Seen on HD this morning, reports improvement in myoclonus.  Mental status improved from previous exam.  EPO with HD today  Continue phos binders      ANDREZ Wong, FNP-BC  Nephrology  Pager:  [phone]

## 2025-01-23 NOTE — PT/OT/SLP EVAL
"Occupational Therapy   Evaluation and Discharge Note    Name: Georgina Arias  MRN: [id number]  Admitting Diagnosis: Fall  Recent Surgery: * No surgery found *      Recommendations:     Discharge Recommendations: No Therapy Indicated  Discharge Equipment Recommendations: none  Barriers to discharge:  None    Assessment:     Georgina Arias is a 54 y.o. female with a medical diagnosis of Fall. At this time, patient is functioning at their prior level of function and does not require further acute OT services.     Plan:     During this hospitalization, patient does not require further acute OT services.  Please re-consult if situation changes.    Plan of Care Reviewed with: patient    Subjective     Chief Complaint: Pt is s/p fall at home  Patient/Family Comments/goals: "I'm ready to go home."    Occupational Profile:  Living Environment: Pt lives in a 1 story house c 1 [address].  Has a walk-in shower c a shower chair.  Previous level of function: I PTA  Roles and Routines: On disability  Equipment Used at home: walker, rolling, grab bar, shower chair  Assistance upon Discharge: Pt lives c her mother and brother    Pain/Comfort:  Pain Rating 1: 5/10    Patients cultural, spiritual, Yarsani conflicts given the current situation: no    Objective:     Communicated with: RN prior to session.  Patient found supine with telemetry upon OT entry to room.    General Precautions: Standard, fall  Orthopedic Precautions: LLE weight bearing as tolerated  Braces:  (Cast L LE)  Respiratory Status: Room air     Occupational Performance:    Bed Mobility:    Patient completed Supine to Sit with independence    Functional Mobility/Transfers:  Patient completed Sit <> Stand Transfer with modified independence  with  rolling walker   Patient completed Bed <> Chair Transfer using Stand Pivot technique with modified independence with rolling walker  Functional Mobility: Pt was able to walk to bathroom and then sit down in W/C " I.    Activities of Daily Living:  Upper Body Dressing: independence to don shirt.  Lower Body Dressing: independence to don pants    Cognitive/Visual Perceptual:  Cognitive/Psychosocial Skills:     -       Oriented to: Person, Place, Time, and Situation   -       Follows Commands/attention:Follows multistep  commands    Physical Exam:  Upper Extremity Range of Motion:     -       Right Upper Extremity: WFL  -       Left Upper Extremity: WFL  Upper Extremity Strength:    -       Right Upper Extremity: WFL  -       Left Upper Extremity: WFL    AMPAC 6 Click ADL:  AMPAC Total Score: 24    Patient left up in chair with all lines intact, call button in reach, RN notified, and EMT present    GOALS:   Multidisciplinary Problems       Occupational Therapy Goals       Not on file              Multidisciplinary Problems (Resolved)          Problem: Occupational Therapy    Goal Priority Disciplines Outcome Interventions   Occupational Therapy Goal   (Resolved)     OT, PT/OT Met                        DME Justifications:  No DME recommended requiring DME justifications    History:     Past Medical History:   Diagnosis Date    Hyperlipidemia     Hypertension     Peptic ulcer disease     Peripheral artery disease     PONV (postoperative nausea and vomiting)     Stage IV CKD     Type II diabetes mellitus          Past Surgical History:   Procedure Laterality Date    ANGIOGRAM, LOWER ARTERIAL, UNILATERAL Left 09/13/2023    Procedure: ANGIOGRAM, LOWER ARTERIAL, UNILATERAL;  Surgeon: Maxx Maya MD;  Location: [address];  Service: Vascular;  Laterality: Left;    ANGIOGRAPHY OF LOWER EXTREMITY Left 09/14/2023    Procedure: ANGIOGRAM, LOWER EXTREMITY with balloon angioplasty ultraverse 5mm x 60mm;  Surgeon: Maxx Maya MD;  Location: [address];  Service: Vascular;  Laterality: Left;  ultraverse 5mm x 60mm  fluoro: 12.41  contrast: 44ml  mGy: 65.57  Gycm2: 23.50    AORTOGRAPHY WITH EXTREMITY RUNOFF Left 09/13/2023     Procedure: AORTOGRAM, WITH EXTREMITY RUNOFF;  Surgeon: Maxx Maya MD;  [facility];  Service: Vascular;  Laterality: Left;  8.1 min  663.63 mGy  176.39 Gy.cm  178ml Dye     AV FISTULA PLACEMENT Right     CHOLECYSTECTOMY      COLONOSCOPY  11/06/2013    normal    COLONOSCOPY  05/2023    cancelled due to inadequate prep    COLONOSCOPY  06/2023    results unknown    CREATION, BYPASS, ARTERIAL, AXILLARY TO BILATERAL FEMORAL Left 09/14/2023    Procedure: CREATION, BYPASS, ARTERIAL, AXILLARY TO BILATERAL FEMORAL;  Surgeon: Maxx Maya MD;  [facility];  Service: Vascular;  Laterality: Left;  Left Axillary to Bilateral Femoral Bypass, Left Femoral to Above Knee Popliteal Bypass; SLIDER BED    CYSTOSCOPY W/ URETERAL STENT PLACEMENT Right 08/28/2018    Procedure: CYSTOSCOPY, WITH URETERAL STENT INSERTION (ADD ON );  Surgeon: Bubba Perez MD;  Location: [facility];  Service: Urology;  Laterality: Right;  (ADD ON )    DEBRIDEMENT OF FOOT Left 08/03/2023    Procedure: DEBRIDEMENT, FOOT;  Surgeon: Aleida Flannery DPM;  Location: [facility];  Service: Podiatry;  Laterality: Left;    Excisional debridement of ulcer distal great toe left foot w/ partial resection distal phalanx Left 08/03/2023    FOOT AMPUTATION THROUGH METATARSAL Left 09/14/2023    Procedure: AMPUTATION, FOOT, TRANSMETATARSAL partial 1st ray amputation;  Surgeon: Jesus Valles DPM;  [facility];  Service: Podiatry;  Laterality: Left;  partial ray    I&D L 1st ray w/ amputation L hallux IPJ Left 08/14/2023    INCISION AND DRAINAGE FOOT Left 09/14/2023    Procedure: INCISION AND DRAINAGE, FOOT;  Surgeon: Jesus Valles DPM;  [facility];  Service: Podiatry;  Laterality: Left;    Injection L PT tendon sheath Left 08/14/2023    LENGTHENING OF ACHILLES TENDON Left 11/11/2024    Procedure: LENGTHENING, TENDON, ACHILLES;  Surgeon: Marco Allison DPM;  [facility];  Service: Podiatry;   Laterality: Left;    PERIPHERAL ARTERIAL STENT GRAFT Right     RECONSTRUCTION WITH FUSION OF CHARCOT FOOT Left 11/11/2024    Procedure: RECONSTRUCTION, CHARCOT FOOT, WITH FUSION;  Surgeon: Marco Allison DPM;  [facility];  Service: Podiatry;  Laterality: Left;  4 hours; foot tray; mini c arm; saw/drill    REMOVAL OF NAIL OF DIGIT Left 08/03/2023    Procedure: REMOVAL, NAIL, DIGIT great toe;  Surgeon: Aleida Flannery DPM;  Location: [facility] OR;  Service: Podiatry;  Laterality: Left;    REVISION, AMPUTATION, TRANSMETATARSAL Left 11/11/2024    Procedure: REVISION, AMPUTATION, TRANSMETATARSAL;  Surgeon: Marco Allison DPM;  [facility];  Service: Podiatry;  Laterality: Left;    STENT, SUPERFICIAL FEMORAL ARTERY Left 09/14/2023    Procedure: STENT, SUPERFICIAL FEMORAL ARTERY;  Surgeon: Maxx Maya MD;  [address]  Service: Vascular;  Laterality: Left;  5 x 100 mm    TOE AMPUTATION Left 08/14/2023    Procedure: AMPUTATION, TOE hallux IPJ;  Surgeon: Aleida Flannery DPM;  Location: [facility] OR;  Service: Podiatry;  Laterality: Left;    TOE AMPUTATION Left 08/25/2023    Procedure: AMPUTATION, TOE hallux, possible 1st met.head;  Surgeon: Aleida Flannery DPM;  Location: [facility] OR;  Service: Podiatry;  Laterality: Left;    URETEROSCOPIC REMOVAL OF URETERIC CALCULUS Right 09/11/2018    Procedure: EXTRACTION-STONE-URETEROSCOPY;  Surgeon: Bubba Perez MD;  Location: [facility];  Service: Urology;  Laterality: Right;       Time Tracking:     OT Date of Treatment: 01/23/25  OT Start Time: 1315  OT Stop Time: 1336  OT Total Time (min): 21 min    Billable Minutes:Evaluation 11  Self Care/Home Management 10    1/23/2025

## 2025-01-23 NOTE — PLAN OF CARE
[facility]      HOME HEALTH ORDERS  FACE TO FACE ENCOUNTER    Patient Name: Georgina Arias  YOB: 1970    PCP: Alonso Ling MD   [place] [address] [address]  PCP Phone Number: [phone]  PCP Fax: [phone]    Encounter Date: 1/20/25    Admit to Home Health    Diagnoses:  Active Hospital Problems    Diagnosis  POA    *Fall [W19.XXXA]  Yes    Anemia due to chronic kidney disease, on chronic dialysis [N18.6, D63.1, Z99.2]  Not Applicable     Priority: 2     Abnormal brain MRI [R90.89]  Yes    Abnormal CT scan of head [R93.0]  Yes    Osteomyelitis [M86.9]  Yes    ESRD (end stage renal disease) [N18.6]  Yes    Class 2 severe obesity with serious comorbidity and body mass index (BMI) of 38.0 to 38.9 in adult [E66.812, E66.01, Z68.38]  Not Applicable    Chronic diastolic congestive heart failure [I50.32]  Yes    Acute respiratory failure with hypoxia and hypercapnia [J96.01, J96.02]  Yes    Essential hypertension [I10]  Yes    Hyperlipidemia [E78.5]  Yes    Type II diabetes mellitus [E11.9]  Yes     Overview:   dx update      PAD (peripheral artery disease) [I73.9]  Yes     Overview:   dx update        Resolved Hospital Problems   No resolved problems to display.       Follow Up Appointments:  Future Appointments   Date Time Provider Department Center   1/30/2025  1:30 PM Andrew Davis Jr., PA [facility] ID [address]   2/27/2025  9:00 AM Nirmal Cruz NP [id number] [address]       Allergies:  Review of patient's allergies indicates:   Allergen Reactions    Hydrocodone-acetaminophen Nausea And Vomiting, Rash and Other (See Comments)     Vicodin- severe rash  Lortab- head-spinning that leads to vomiting      Naproxen Anaphylaxis and Swelling     Hives (skin)^swelling  Hives (skin)^swelling  Hives (skin)^swelling    Sulfamethoxazole-trimethoprim Other (See Comments)     Caused JEROME; heart attack    Hydrocodone Nausea And Vomiting and Rash     Adhesive tape-silicones      blisters    Aspartame Hives    Penicillins Hives     Blisters (skin)^    Sulfamethoxazole Other (See Comments)    Trimethoprim Other (See Comments)    Acetaminophen Rash    Adhesive Rash       Medications: Review discharge medications with patient and family and provide education.    Current Facility-Administered Medications   Medication Dose Route Frequency Provider Last Rate Last Admin    0.9%  NaCl infusion (for blood administration)   Intravenous Q24H PRN Christine Wagner PA-C        acetaminophen tablet 650 mg  650 mg Oral Q6H PRN Cherise Shultz, NP   650 mg at 01/22/25 1727    aspirin chewable tablet 81 mg  81 mg Oral Daily Christine Wagner PA-C   81 mg at 01/22/25 1215    atorvastatin tablet 80 mg  80 mg Oral QHS Cherise Shultz, NP   80 mg at 01/22/25 2043    carvediloL tablet 6.25 mg  6.25 mg Oral BID WM Cherise Shultz NP   6.25 mg at 01/22/25 1715    cetirizine tablet 5 mg  5 mg Oral Daily Cherise Shultz, NP   5 mg at 01/22/25 0910    dextrose 50% injection 12.5 g  12.5 g Intravenous PRN Cherise Shultz, NP        dextrose 50% injection 25 g  25 g Intravenous PRN Cherise Shultz, NP        epoetin tariq-epbx injection 10,000 Units  10,000 Units Subcutaneous Every Tues, Thurs, Sat Michael Flowers NP   10,000 Units at 01/21/25 1050    fluticasone furoate-vilanteroL 200-25 mcg/dose diskus inhaler 1 puff  1 puff Inhalation Daily Cherise Shultz NP   1 puff at 01/22/25 0725    fluticasone propionate 50 mcg/actuation nasal spray 100 mcg  2 spray Each Nostril Daily Cherise Shultz NP   100 mcg at 01/22/25 0910    glucagon (human recombinant) injection 1 mg  1 mg Intramuscular PRN Cherise Shultz, NP        glucose chewable tablet 16 g  16 g Oral PRN Cherise Shultz, NP        glucose chewable tablet 24 g  24 g Oral PRN Cherise Shultz, NP        hydrALAZINE injection 10 mg  10 mg Intravenous Q8H PRN Cherise Shultz, NP   10 mg at 01/23/25  0711    insulin aspart U-100 pen 0-5 Units  0-5 Units Subcutaneous QID (AC + HS) PRN Cherise Shultz NP        insulin glargine U-100 (Lantus) pen 28 Units  28 Units Subcutaneous QHS Cherise Shultz NP   28 Units at 01/22/25 2043    labetalol 20 mg/4 mL (5 mg/mL) IV syring  10 mg Intravenous Q6H PRN Cherise Shultz NP   10 mg at 01/23/25 1009    LIDOcaine 5 % patch 2 patch  2 patch Transdermal Q24H Cherise Shultz NP   2 patch at 01/22/25 2043    melatonin tablet 6 mg  6 mg Oral Nightly PRN Mireille Blake MD   6 mg at 01/22/25 2043    naloxone 0.4 mg/mL injection 0.02 mg  0.02 mg Intravenous PRN Cherise Shultz NP        ondansetron injection 4 mg  4 mg Intravenous Q8H PRN Cherise Shultz NP        prochlorperazine injection Soln 5 mg  5 mg Intravenous Q6H PRN Cherise Shultz NP        sevelamer carbonate tablet 800 mg  800 mg Oral TID WM Cherise Shultz NP   800 mg at 01/21/25 1714    sodium chloride 0.9% flush 10 mL  10 mL Intravenous PRN Mireille Blake MD        vitamin renal formula (B-complex-vitamin c-folic acid) 1 mg per capsule 1 capsule  1 capsule Oral Daily Cherise Shultz NP   1 capsule at 01/22/25 0910        Medication List        CHANGE how you take these medications      carvediloL 6.25 MG tablet  Commonly known as: COREG  Take 6.25 mg by mouth 2 (two) times daily with meals.  What changed: Another medication with the same name was removed. Continue taking this medication, and follow the directions you see here.            CONTINUE taking these medications      acetaminophen 500 MG tablet  Commonly known as: TYLENOL  Take 2 tablets (1,000 mg total) by mouth every 8 (eight) hours as needed.     aspirin 81 MG EC tablet  Commonly known as: ECOTRIN  Take 1 tablet (81 mg total) by mouth once daily.     atorvastatin 80 MG tablet  Commonly known as: LIPITOR  Take 1 tablet by mouth every evening.     blood-glucose meter Misc  1 each by Other route.     C-TUB  "Misc  Generic drug: miscellaneous medical supply  Hearing amplification ([employer] preferred)     COMFORT EZ PEN NEEDLES 32 gauge x 5/32" Ndle  Generic drug: pen needle, diabetic  SMARTSIG:injection Daily     FLINTSTONES COMPLETE Chew  Generic drug: pediatric multivitamin no.76  Take 1 tablet by mouth.     fluticasone propion-salmeteroL 55-14 mcg/actuation Aebs  Inhale 1 puff into the lungs.     fluticasone propionate 50 mcg/actuation nasal spray  Commonly known as: FLONASE  Fluticasone Propionate 50 MCG/ACT Nasal Suspension QTY: 1 bottle Days: 30 Refills: 5  Written: 09/30/20 Patient Instructions: inhale 2 sprays in each nostril once daily     * gabapentin 100 MG capsule  Commonly known as: NEURONTIN  Take 1 capsule (100 mg total) by mouth once daily.     * gabapentin 300 MG capsule  Commonly known as: NEURONTIN  Take 1 capsule by mouth 3 (three) times daily.     HYDROcodone-acetaminophen  mg per tablet  Commonly known as: NORCO  Take 1 tablet by mouth every 6 (six) hours as needed for Pain.     LANTUS SOLOSTAR U-100 INSULIN 100 unit/mL (3 mL) Inpn pen  Generic drug: insulin glargine U-100 (Lantus)  Inject 40 Units into the skin every evening.     levocetirizine 5 MG tablet  Commonly known as: XYZAL  Take 5 mg by mouth.     linaGLIPtin 5 mg Tab tablet  Commonly known as: TRADJENTA  Take 1 tablet by mouth once daily.     MIRCERA INJ  Inject 100 mcg into the skin.     MOUNJARO 10 mg/0.5 mL Pnij  Generic drug: tirzepatide  Inject 10 mg into the skin.     ondansetron 4 MG Tbdl  Commonly known as: ZOFRAN-ODT  Take 4 mg by mouth every 6 (six) hours.     pediatric multivitamin oral chew tab  Take 1 tablet by mouth once daily.     potassium chloride SA 20 MEQ tablet  Commonly known as: K-DUR,KLOR-CON  Take 20 mEq by mouth.     promethazine 12.5 MG Tab  Commonly known as: PHENERGAN  Take 1 tablet (12.5 mg total) by mouth every 6 (six) hours as needed (to use with Norco, known to tolerate).     Remedy Phytoplex AntifungaL " 2 % top powder  Generic drug: miconazole NITRATE 2 %  Apply topically as needed for Itching.     RENAPLEX-D 800 mcg-12.5 mg -2,000 unit Tab  Generic drug: vit B,C-FA-zinc-selen-vit D3-E  Take by mouth.     sevelamer carbonate 800 mg Tab  Commonly known as: RENVELA  Take by mouth.     TRUE METRIX GLUCOSE TEST STRIP Strp  Generic drug: blood sugar diagnostic  3 (three) times daily.     TRUEPLUS LANCETS 30 gauge Misc  Generic drug: lancets     XPHOZAH 30 mg Tab  Generic drug: tenapanor  Take 30 mg by mouth.           * This list has 2 medication(s) that are the same as other medications prescribed for you. Read the directions carefully, and ask your doctor or other care provider to review them with you.                STOP taking these medications      ceFEPIme 1 gram injection                I have seen and examined this patient within the last 30 days. My clinical findings that support the need for the home health skilled services and home bound status are the following:no   Weakness/numbness causing balance and gait disturbance due to Infection and Weakness/Debility making it taxing to leave home.     Diet:   diabetic diet 2000 calorie and renal diet    Labs:  N/a    Referrals/ Consults  Physical Therapy to evaluate and treat. Evaluate for home safety and equipment needs; Establish/upgrade home exercise program. Perform / instruct on therapeutic exercises, gait training, transfer training, and Range of Motion.  Occupational Therapy to evaluate and treat. Evaluate home environment for safety and equipment needs. Perform/Instruct on transfers, ADL training, ROM, and therapeutic exercises.  Aide to provide assistance with personal care, ADLs, and vital signs.    Activities:   activity as tolerated    Nursing:   Agency to admit patient within 24 hours of hospital discharge unless specified on physician order or at patient request    SN to complete comprehensive assessment including routine vital signs. Instruct on  disease process and s/s of complications to report to MD. Review/verify medication list sent home with the patient at time of discharge  and instruct patient/caregiver as needed. Frequency may be adjusted depending on start of care date.     Skilled nurse to perform up to 3 visits PRN for symptoms related to diagnosis    Notify MD if SBP > 160 or < 90; DBP > 90 or < 50; HR > 120 or < 50; Temp > 101; O2 < 88%    Ok to schedule additional visits based on staff availability and patient request on consecutive days within the home health episode.    When multiple disciplines ordered:    Start of Care occurs on Sunday - Wednesday schedule remaining discipline evaluations as ordered on separate consecutive days following the start of care.    Thursday SOC -schedule subsequent evaluations Friday and Monday the following week.     Friday - Saturday SOC - schedule subsequent discipline evaluations on consecutive days starting Monday of the following week.    For all post-discharge communication and subsequent orders please contact patient's primary care physician. If unable to reach primary care physician or do not receive response within 30 minutes, please contact [facility] for clinical staff order clarification    Miscellaneous   Diabetic Care:   SN to perform and educate Diabetic management with blood glucose monitoring:, Fingerstick blood sugar AC and HS, and Report CBG < 60 or > 350 to physician.    Home Health Aide:  Physical Therapy Three times weekly, Occupational Therapy Three times weekly, and Home Health Aide Weekly    Wound Care Orders  no    I certify that this patient is confined to her home and needs intermittent skilled nursing care, physical therapy, and occupational therapy.        Christine Wagner PA-C  [facility]   [facility]

## 2025-01-23 NOTE — NURSING
IV x 1 removed.   Pt given discharge instruction/teaching.  Opportunity given to ask questions.   All questions that were asked have been answered. Understanding has been verbalized.   No discharge prescriptions this encounter.  Pt informed to keep all follow up appts.   No complaints upon discharge.   Wheelchair escort at bedside to provide transport home.
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
## 2025-01-23 NOTE — NURSING
Dialysis tx started to the right forearm AVF per orders placed by TICO Flowers:    Order Questions    Question Answer   Antibiotics on HD? No   Duration of Treatment 3.5 hours   Dialyzer F160NR   Dialysate Temperature (C) 36.5   Target  mL/min   If unable to maintain flow due to inadequate vascular access patency, patient intolerance (i.e. chest pain, access discomfort) or elevated venous pressure, adjust blood flow rate to a minimum of _____mL/min 100    mL/min   K+ Potassium per Protocol   Ca++ Calcium per Protocol   Na+ Sodium per Protocol   Bicarb Bicarbonate per Protocol   Access to be used Other (please specify)   Target UF 2L   If unable to maintain this UFR due to patient intolerance (i.e. hypotension, chest pain, muscle cramping, nausea or vomiting), adjust UFR to achieve a minimum of _______ liters of UF 0   Fluid Removal Instructions maintain SBP > 90 mmHG
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
## 2025-01-24 ENCOUNTER — TELEPHONE (OUTPATIENT)
Dept: NEUROSURGERY | Facility: CLINIC | Age: 55
End: 2025-01-24
Payer: MEDICARE

## 2025-01-24 ENCOUNTER — TELEPHONE (OUTPATIENT)
Dept: PODIATRY | Facility: CLINIC | Age: 55
End: 2025-01-24
Payer: MEDICARE

## 2025-01-24 ENCOUNTER — PATIENT MESSAGE (OUTPATIENT)
Dept: PODIATRY | Facility: CLINIC | Age: 55
End: 2025-01-24
Payer: MEDICARE

## 2025-01-24 NOTE — TELEPHONE ENCOUNTER
----- Message from JUDITH Mueller sent at 1/24/2025  9:56 AM CST -----  Pt also needs to be rescheduled to a PA. She does not have a MRI with contrast to see Nan.  ----- Message -----  From: Ita Lennon  Sent: 1/24/2025   9:44 AM CST  To: Nan MAYO Staff    Pt calling to reschedule nan time  on 2-4 , pt is having dialysis , needs nan time to be     before 2 pm and after 11 am or Wednesday     Confirmed patient's contact info below:  Contact Name: Georgina Arias  Phone Number: [phone]

## 2025-01-24 NOTE — TELEPHONE ENCOUNTER
----- Message from  Syed sent at 1/23/2025 12:29 PM CST -----  Regarding: Referral  Pt was referred to your clinic today and was discharged. Please contact her directly for an appointment.   Syed Tran RN CM

## 2025-01-24 NOTE — TELEPHONE ENCOUNTER
Marco Allison, Angélica Amato, MA  Caller: Patient @ [phone] (Yesterday,  4:01 PM)  Yes she needs to be on specific days due to dialysis and should be accommodated. Can overbook to get her on whatever day she wants    Thanks  Mark

## 2025-01-29 ENCOUNTER — OFFICE VISIT (OUTPATIENT)
Dept: PODIATRY | Facility: CLINIC | Age: 55
End: 2025-01-29
Payer: MEDICARE

## 2025-01-29 VITALS — HEIGHT: 67 IN | BODY MASS INDEX: 34.98 KG/M2

## 2025-01-29 DIAGNOSIS — M14.672 CHARCOT JOINT OF LEFT FOOT: ICD-10-CM

## 2025-01-29 DIAGNOSIS — L97.522 ULCER OF LEFT FOOT WITH FAT LAYER EXPOSED: Primary | ICD-10-CM

## 2025-01-29 PROCEDURE — 99999 PR PBB SHADOW E&M-EST. PATIENT-LVL III: CPT | Mod: PBBFAC,,, | Performed by: STUDENT IN AN ORGANIZED HEALTH CARE EDUCATION/TRAINING PROGRAM

## 2025-01-29 NOTE — PROGRESS NOTES
Subjective:     Patient    Georgina Arias is a 54 y.o. female.    Problem    2024: Previously followed by Dr Prado. Underwent left foot Charcot reconstruction 11/11/24 complicated by postoperative infection requiring hospitalization. Excessive drainage precluded use of TCC for a period but patient cannot tolerate surgical boots and so required a NWB period in wheelchair. Infection improving with IV antibiotics.      01/02/25: Returns for left foot check s/p surgery almost 2 months ago. Drainage has improved further. No new concerns.     01/10/25: Returns for left foot cast change. Wounds have healed. No new concerns.     01/29/25: Returns for left foot cast change, has been in cast for 2.5 weeks. Interval admission s/p trip and fall. Has two new wounds at left anterior ankle and plantar foot, unsure if these are related to the fall.       Primary Care Provider    Primary Care Provider: Alonso Ling MD   Last Seen: Patient does not have a PCP or has not yet seen their PCP     History    History obtained from patient and review of medical records.     Past Medical History:   Diagnosis Date    Hyperlipidemia     Hypertension     Peptic ulcer disease     Peripheral artery disease     PONV (postoperative nausea and vomiting)     Stage IV CKD     Type II diabetes mellitus        Past Surgical History:   Procedure Laterality Date    ANGIOGRAM, LOWER ARTERIAL, UNILATERAL Left 09/13/2023    Procedure: ANGIOGRAM, LOWER ARTERIAL, UNILATERAL;  Surgeon: Maxx Maya MD;  Location: [address];  Service: Vascular;  Laterality: Left;    ANGIOGRAPHY OF LOWER EXTREMITY Left 09/14/2023    Procedure: ANGIOGRAM, LOWER EXTREMITY with balloon angioplasty ultraverse 5mm x 60mm;  Surgeon: Maxx aMya MD;  Location: [address];  Service: Vascular;  Laterality: Left;  ultraverse 5mm x 60mm  fluoro: 12.41  contrast: 44ml  mGy: 65.57  Gycm2: 23.50    AORTOGRAPHY WITH EXTREMITY RUNOFF Left 09/13/2023     Procedure: AORTOGRAM, WITH EXTREMITY RUNOFF;  Surgeon: Maxx Maya MD;  [facility];  Service: Vascular;  Laterality: Left;  8.1 min  663.63 mGy  176.39 Gy.cm  178ml Dye     AV FISTULA PLACEMENT Right     CHOLECYSTECTOMY      COLONOSCOPY  11/06/2013    normal    COLONOSCOPY  05/2023    cancelled due to inadequate prep    COLONOSCOPY  06/2023    results unknown    CREATION, BYPASS, ARTERIAL, AXILLARY TO BILATERAL FEMORAL Left 09/14/2023    Procedure: CREATION, BYPASS, ARTERIAL, AXILLARY TO BILATERAL FEMORAL;  Surgeon: Maxx Maya MD;  [facility];  Service: Vascular;  Laterality: Left;  Left Axillary to Bilateral Femoral Bypass, Left Femoral to Above Knee Popliteal Bypass; SLIDER BED    CYSTOSCOPY W/ URETERAL STENT PLACEMENT Right 08/28/2018    Procedure: CYSTOSCOPY, WITH URETERAL STENT INSERTION (ADD ON );  Surgeon: Bubba Perez MD;  Location: [facility];  Service: Urology;  Laterality: Right;  (ADD ON )    DEBRIDEMENT OF FOOT Left 08/03/2023    Procedure: DEBRIDEMENT, FOOT;  Surgeon: Aleida Flannery DPM;  Location: [facility] OR;  Service: Podiatry;  Laterality: Left;    Excisional debridement of ulcer distal great toe left foot w/ partial resection distal phalanx Left 08/03/2023    FOOT AMPUTATION THROUGH METATARSAL Left 09/14/2023    Procedure: AMPUTATION, FOOT, TRANSMETATARSAL partial 1st ray amputation;  Surgeon: Jesus Valles DPM;  [facility];  Service: Podiatry;  Laterality: Left;  partial ray    I&D L 1st ray w/ amputation L hallux IPJ Left 08/14/2023    INCISION AND DRAINAGE FOOT Left 09/14/2023    Procedure: INCISION AND DRAINAGE, FOOT;  Surgeon: Jesus Valles DPM;  [facility];  Service: Podiatry;  Laterality: Left;    Injection L PT tendon sheath Left 08/14/2023    LENGTHENING OF ACHILLES TENDON Left 11/11/2024    Procedure: LENGTHENING, TENDON, ACHILLES;  Surgeon: Marco Allison DPM;  Location: [facility] OR;  Service: Podiatry;   Laterality: Left;    PERIPHERAL ARTERIAL STENT GRAFT Right     RECONSTRUCTION WITH FUSION OF CHARCOT FOOT Left 11/11/2024    Procedure: RECONSTRUCTION, CHARCOT FOOT, WITH FUSION;  Surgeon: Marco Allison DPM;  Location: [facility] OR;  Service: Podiatry;  Laterality: Left;  4 hours; foot tray; mini c arm; saw/drill    REMOVAL OF NAIL OF DIGIT Left 08/03/2023    Procedure: REMOVAL, NAIL, DIGIT great toe;  Surgeon: Aleida Flannery DPM;  Location: [facility] OR;  Service: Podiatry;  Laterality: Left;    REVISION, AMPUTATION, TRANSMETATARSAL Left 11/11/2024    Procedure: REVISION, AMPUTATION, TRANSMETATARSAL;  Surgeon: Marco Allison DPM;  Location: [facility] OR;  Service: Podiatry;  Laterality: Left;    STENT, SUPERFICIAL FEMORAL ARTERY Left 09/14/2023    Procedure: STENT, SUPERFICIAL FEMORAL ARTERY;  Surgeon: Maxx Maya MD;  [address];  Service: Vascular;  Laterality: Left;  5 x 100 mm    TOE AMPUTATION Left 08/14/2023    Procedure: AMPUTATION, TOE hallux IPJ;  Surgeon: Aleida Flannery DPM;  Location: [facility] OR;  Service: Podiatry;  Laterality: Left;    TOE AMPUTATION Left 08/25/2023    Procedure: AMPUTATION, TOE hallux, possible 1st met.head;  Surgeon: Aleida Flannery DPM;  Location: [facility] OR;  Service: Podiatry;  Laterality: Left;    URETEROSCOPIC REMOVAL OF URETERIC CALCULUS Right 09/11/2018    Procedure: EXTRACTION-STONE-URETEROSCOPY;  Surgeon: Bubba Perez MD;  Location: [facility];  Service: Urology;  Laterality: Right;        Objective:     Vitals  Wt Readings from Last 1 Encounters:   01/23/25 101.3 kg (223 lb 5.2 oz)     Temp Readings from Last 1 Encounters:   01/23/25 97.8 °F (36.6 °C) (Oral)     BP Readings from Last 1 Encounters:   01/23/25 (!) 147/68     Pulse Readings from Last 1 Encounters:   01/23/25 70       Dermatological Exam    Skin:  Pedal hair growth diminished and Pedal skin thin and shiny on right; postulcerative callus  Pedal hair growth diminished and Pedal skin thin and shiny on left;  new wounds at anterior ankle and plantar midfoot to fat, small, not infected    Nails:  9 nail(s) thickened and 9 nail(s) discolored; left 1st nail/toe absent    Vascular Exam    Arteries:  Posterior tibial artery palpable on right  Dorsalis pedis artery palpable on right  Posterior tibial artery palpable on left  Dorsalis pedis artery palpable on left    Veins:  Superficial veins unremarkable on right  Superficial veins unremarkable on left    [place]wellin+ pitting on right  2+ pitting on left    Neurological Exam    [place] touch test:  2/6 sites sensed, loss of protective sensation     Musculoskeletal Exam    Footwear:  Diabetic on right  Slipper on left    Gait Exam:   Ambulatory Status: Ambulatory  Gait: NWB LLE temporarily  Assistive Devices: wheelchair    Foot Progression Angle:  Normal on right  Normal on left     Right Lower Extremity Additional Findings:  Right foot and ankle function, strength, and range of motion unremarkable except as noted above.     Left Lower Extremity Additional Findings:  Foot rectus s/p Charcot reconstruction  Left foot and ankle function, strength, and range of motion unremarkable except as noted above.    Imaging and Other Tests    Imagin/10/24 left foot X rays: midfoot Charcot deformity with plantar subluxation of navicular and cuboid (progressed from prior films); also with prior 1st ray amputation and widening of 1st-2nd ray interval    10/12/23 VAS CAMILA: left CAMILA 0.85 (likely above healing threshold)    24 left foot X rays: appears to be some consolidation of the Charcot deformity.     24 left foot X rays: further consolidation of Charcot deformity    Independently reviewed and interpreted imaging, findings are as follows:     25 left foot X rays: hardware stable but with some possible shift of midfoot bones around hardware, overall stable    Other Tests: The following A1c results were reviewed.   Hemoglobin A1C   Date Value Ref Range Status    12/09/2024 5.3 4.0 - 5.6 % Final     Comment:     ADA Screening Guidelines:  5.7-6.4%  Consistent with prediabetes  >or=6.5%  Consistent with diabetes    High levels of fetal hemoglobin interfere with the HbA1C  assay. Heterozygous hemoglobin variants (HbS, HgC, etc)do  not significantly interfere with this assay.   However, presence of multiple variants may affect accuracy.     12/02/2024 5.8 4.8 - 5.9 % Final   10/07/2024 6.2 (H) 4.8 - 5.9 % Final   08/20/2024 5.3 4.8 - 5.9 % Final   09/11/2023 7.2 (H) 4.0 - 5.6 % Final     Comment:     ADA Screening Guidelines:  5.7-6.4%  Consistent with prediabetes  >or=6.5%  Consistent with diabetes    High levels of fetal hemoglobin interfere with the HbA1C  assay. Heterozygous hemoglobin variants (HbS, HgC, etc)do  not significantly interfere with this assay.   However, presence of multiple variants may affect accuracy.     08/28/2018 7.9 (H) 4.0 - 5.6 % Final     Comment:     ADA Screening Guidelines:  5.7-6.4%  Consistent with prediabetes  >or=6.5%  Consistent with diabetes  High levels of fetal hemoglobin interfere with the HbA1C  assay. Heterozygous hemoglobin variants (HbS, HgC, etc)do  not significantly interfere with this assay.   However, presence of multiple variants may affect accuracy.           Assessment:     Encounter Diagnoses   Name Primary?    Charcot joint of left foot     Ulcer of left foot with fat layer exposed Yes          Plan:     I counseled the patient on her conditions, their implications and medical management.    Diabetic foot with peripheral neuropathy and peripheral arterial disease   -Performed shoe inspection and diabetic foot education. Reviewed importance of blood glucose control, proper nutrition, and foot hygiene to minimize risk of complications of diabetes. Recommended daily foot inspections, daily moisturizer to feet, avoiding sharp instruments to feet, appropriate footwear at all times when ambulating, and following up regularly for  routine foot care.   -Diabetic foot risk: 2023 IWGDF high ulcer risk.   -Next foot exam due August 2025.    Left foot wounds: acute  -May be related to recent fall vs being in cast for prolonged period, will monitor.   -Managing along with cast as below.     S/p left foot Charcot reconstruction 11/11    -Removed cast left foot-leg.   -Applied total contact cast to left foot-leg; tolerated well.    -WBAT total contact cast LLE for short distances, NWB LLE with wheelchair for longer distances.   -Pain medication as prescribed.   -Antibiotic plan per ID.     Gait impairment s/p surgery, pain LLE  -WBAT total contact cast LLE for short distances, NWB LLE with wheelchair for longer distances.        Return to clinic next week, call sooner PRN.

## 2025-01-30 NOTE — PROGRESS NOTES
Subjective     Patient ID: Georgina Arias is a 54 y.o. female.    Chief Complaint:Follow-up      History of Present Illness    54-year-old female with ESRD on HD a history of left Charcot foot now status post reconstruction in November 11, 2024 and subsequently casted.  She recently developed wound drainage and malodor and was sent to the ED due to that.  MRI is concerning for abscesses and OM.  Recent wound cultures show Enterobacter cloaca and Klebsiella pneumoniae with no anaerobic growth from cultures on 11/29.  She had been treated with doxycycline and Levaquin x 10 days.      Bone 12/11 no growth to date and wound cultures 12/10 with skin meño.  MRIs obtained showing concern for osteomyelitis and small abscesses.  Remained on cefepime alone as inpt.  Clinically much improved and left foot pain essentially resolved prior to DC.  CRP has improved dramatically there is no signs of infection on exam.        Plan:  Cefepime 2 g IV post HD on HD days for DC  Plan for 6 weeks of antibiotics and we will need reimaging of the foot in 3-4 weeks and until the abscesses have been demonstrated to resolve on imaging.    12/27/2024:  Patient follows up today in his seen with Dr. Allison in Podiatry Clinic.  She reports doing better.  Her left foot drainage has improved.  She is not having any significant pain.  She has tolerated the cefepime without event post HD.  Denies fever, chills, sweating.    1/14/25:  Follows up today in ID clinic and reports doing well with the exception of new plantar foot pain on the affected foot that is started yesterday at 4:00 p.m..  It is worse with walking and is 7 half to 8/10.  She is tolerating the cefepime without issue.  She has a follow up MRI on 01/17/25.    Review of Systems   Constitutional: Negative for chills, decreased appetite, fever, malaise/fatigue, night sweats, weight gain and weight loss.   HENT:  Positive for hearing loss. Negative for congestion, ear pain, hoarse  voice, sore throat and tinnitus.    Eyes:  Negative for blurred vision, redness and visual disturbance.   Cardiovascular:  Negative for chest pain, leg swelling and palpitations.   Respiratory:  Negative for cough, hemoptysis, shortness of breath, sputum production and wheezing.    Endocrine: Negative for cold intolerance and heat intolerance.   Hematologic/Lymphatic: Negative for adenopathy. Does not bruise/bleed easily.   Skin:  Negative for dry skin, itching, rash and suspicious lesions.   Musculoskeletal:  Negative for back pain, joint pain, myalgias and neck pain.   Gastrointestinal:  Positive for heartburn. Negative for abdominal pain, constipation, diarrhea, nausea and vomiting.   Genitourinary:  Negative for dysuria, flank pain, frequency, hematuria, hesitancy and urgency.   Neurological:  Negative for dizziness, headaches, numbness, paresthesias and weakness.   Psychiatric/Behavioral:  Negative for depression and memory loss. The patient has insomnia. The patient is not nervous/anxious.    Allergic/Immunologic: Negative for environmental allergies, HIV exposure, hives and persistent infections.        Objective    Latest Reference Range & Units 12/26/24 13:30 12/27/24 09:45 12/30/24 00:00 12/31/24 15:48 01/08/25 12:45   WBC 3.90 - 12.70 K/uL 8.81   6.46 8.66   RBC 4.00 - 5.40 M/uL 2.85 (L)   2.77 (L) 2.41 (L)   Hemoglobin 12.0 - 16.0 g/dL 8.9 (L)  7.8 (L) (E) 8.4 (L) 7.5 (L)   Hematocrit 37.0 - 48.5 % 28.2 (L)   27.3 (L) 24.3 (L)   MCV 82 - 98 fL 99 (H)   99 (H) 101 (H)   MCH 27.0 - 31.0 pg 31.2 (H)   30.3 31.1 (H)   MCHC 32.0 - 36.0 g/dL 31.6 (L)   30.8 (L) 30.9 (L)   RDW 11.5 - 14.5 % 14.2   13.7 14.2   Platelet Count 150 - 450 K/uL 204   232 120 (L)   MPV 9.2 - 12.9 fL 9.9   10.1 10.9   Sed Rate 0 - 20 mm/Hr 89 (H)   68 (H) 60 (H)   Sodium 136 - 145 mmol/L 138   136 138   Potassium 3.5 - 5.1 mmol/L 2.9 (LL)   3.5 4.2   Potassium 3.6 - 5.2 mmol/L  4.2 (E)      Chloride 95 - 110 mmol/L 97   97 101   CO2 23  - 29 mmol/L 25   26 23   Anion Gap 8 - 16 mmol/L 16   13 14   BUN 6 - 20 mg/dL 47 (H)   46 (H) 75 (H)   Creatinine 0.5 - 1.4 mg/dL 5.5 (H)   5.5 (H) 7.6 (H)   eGFR >60 mL/min/1.73 m^2 9 !   9 ! 5.9 !   Glucose 70 - 110 mg/dL 180 (H)   180 (H) 147 (H)   Calcium 8.7 - 10.5 mg/dL 9.3   9.3 9.5   ALP 40 - 150 U/L 97   110 88   PROTEIN TOTAL 6.0 - 8.4 g/dL 7.7   7.7 6.7   Albumin 3.5 - 5.2 g/dL 2.9 (L)   2.9 (L) 2.8 (L)   BILIRUBIN TOTAL 0.1 - 1.0 mg/dL 0.5   0.4 0.4   AST 10 - 40 U/L 14   16 15   ALT 10 - 44 U/L 13   15 18   CRP 0.0 - 8.2 mg/L 23.6 (H)   19.8 (H) 5.9   (LL): Data is critically low  (L): Data is abnormally low  (H): Data is abnormally high  !: Data is abnormal  (E): External lab result       Assessment and Plan     1. Open wound of left foot, subsequent encounter    2. Charcot joint of left foot    3. Wound dehiscence        54-year-old female with diabetes on dialysis with Charcot foot on the left that was reconstructed on 11/11/2024 with Dr. Mark Allison.  She developed signs and symptoms of infection with drainage from the left foot while in cast.  Wound cultures from 11/29/2024 showed Enterobacter cloaca and Klebsiella pneumoniae, both pansensitive.  She had been treated with doxy and Levaquin and failed to improve.  She was then seen as inpatient an MRI concerning for osteomyelitis and small abscesses.  Wound cultures on 12/10/2024 showed skin meño and bone biopsy on 12/12/2024 had no growth.  There are no pathology records available.  She has been treated with planned 6 weeks of cefepime post dialysis.    CRP had normalized to previously.  She is now with new plantar foot pain of unclear etiology.  She is without systemic signs of infection.  Has planned MRI coming up on 01/17/25.    Plan:  Continue cefepime post HD to complete 6 weeks - 01/20/2025 eoc  Weekly CBC, CMP, CRP with home health  Repeat MRI of the left foot given concern for possible abscesses closer to ending her antibiotics - ordered -  will schedule for 1/17/25:  Follow up 2 weeks  Patient was encouraged to call the office for further concerns or complaints.  Business card provided

## 2025-01-31 ENCOUNTER — EXTERNAL HOME HEALTH (OUTPATIENT)
Dept: HOME HEALTH SERVICES | Facility: HOSPITAL | Age: 55
End: 2025-01-31
Payer: MEDICARE

## 2025-02-03 ENCOUNTER — TELEPHONE (OUTPATIENT)
Dept: PODIATRY | Facility: CLINIC | Age: 55
End: 2025-02-03
Payer: MEDICARE

## 2025-02-03 NOTE — TELEPHONE ENCOUNTER
----- Message from Margaret sent at 2/3/2025  8:21 AM CST -----  Name of Who is Calling:HAZEL SERRATO [[id number]]        What is the request in detail:Pt would like a callback from the office in regards to possibly getting an earlier appt time for 2/5 wound appt, pt is requesting to be scheduled between 10am-1230.Please advise thank you       Can the clinic reply by [facility]:NO        What Number to Call Back if not in [facility]:Telephone Information:  Mobile          [phone]

## 2025-02-03 NOTE — TELEPHONE ENCOUNTER
Pt wanted to come in earlier due to her transporation, Dr. Allison does not have any earlier availability for that day.pt stated she can get there for !:00 pm if she can be seen. Pt stated she come at 1 a little earlier for appointment.

## 2025-02-04 ENCOUNTER — TELEPHONE (OUTPATIENT)
Dept: PODIATRY | Facility: CLINIC | Age: 55
End: 2025-02-04
Payer: MEDICARE

## 2025-02-04 ENCOUNTER — DOCUMENT SCAN (OUTPATIENT)
Dept: HOME HEALTH SERVICES | Facility: HOSPITAL | Age: 55
End: 2025-02-04
Payer: MEDICARE

## 2025-02-04 NOTE — TELEPHONE ENCOUNTER
I called patient she can't make 1:30 appointment woundcare Dr. Allison message forward to Deejay stahl

## 2025-02-05 ENCOUNTER — OFFICE VISIT (OUTPATIENT)
Dept: PODIATRY | Facility: CLINIC | Age: 55
End: 2025-02-05
Payer: MEDICARE

## 2025-02-05 VITALS
HEIGHT: 67 IN | HEART RATE: 65 BPM | DIASTOLIC BLOOD PRESSURE: 70 MMHG | SYSTOLIC BLOOD PRESSURE: 171 MMHG | BODY MASS INDEX: 34.98 KG/M2

## 2025-02-05 DIAGNOSIS — M14.672 CHARCOT JOINT OF LEFT FOOT: ICD-10-CM

## 2025-02-05 DIAGNOSIS — L97.522 ULCER OF LEFT FOOT WITH FAT LAYER EXPOSED: ICD-10-CM

## 2025-02-05 DIAGNOSIS — M79.2 NEUROGENIC PAIN OF LEFT LOWER EXTREMITY: Primary | ICD-10-CM

## 2025-02-05 PROCEDURE — 99999 PR PBB SHADOW E&M-EST. PATIENT-LVL V: CPT | Mod: PBBFAC,,, | Performed by: STUDENT IN AN ORGANIZED HEALTH CARE EDUCATION/TRAINING PROGRAM

## 2025-02-05 PROCEDURE — 99213 OFFICE O/P EST LOW 20 MIN: CPT | Mod: 24,25,S$GLB, | Performed by: STUDENT IN AN ORGANIZED HEALTH CARE EDUCATION/TRAINING PROGRAM

## 2025-02-05 PROCEDURE — 3066F NEPHROPATHY DOC TX: CPT | Mod: CPTII,S$GLB,, | Performed by: STUDENT IN AN ORGANIZED HEALTH CARE EDUCATION/TRAINING PROGRAM

## 2025-02-05 PROCEDURE — 29445 APPL RIGID TOT CNTC LEG CAST: CPT | Mod: 58,LT,S$GLB, | Performed by: STUDENT IN AN ORGANIZED HEALTH CARE EDUCATION/TRAINING PROGRAM

## 2025-02-05 PROCEDURE — 1159F MED LIST DOCD IN RCRD: CPT | Mod: CPTII,S$GLB,, | Performed by: STUDENT IN AN ORGANIZED HEALTH CARE EDUCATION/TRAINING PROGRAM

## 2025-02-05 PROCEDURE — 3078F DIAST BP <80 MM HG: CPT | Mod: CPTII,S$GLB,, | Performed by: STUDENT IN AN ORGANIZED HEALTH CARE EDUCATION/TRAINING PROGRAM

## 2025-02-05 PROCEDURE — 3077F SYST BP >= 140 MM HG: CPT | Mod: CPTII,S$GLB,, | Performed by: STUDENT IN AN ORGANIZED HEALTH CARE EDUCATION/TRAINING PROGRAM

## 2025-02-05 PROCEDURE — 3008F BODY MASS INDEX DOCD: CPT | Mod: CPTII,S$GLB,, | Performed by: STUDENT IN AN ORGANIZED HEALTH CARE EDUCATION/TRAINING PROGRAM

## 2025-02-05 RX ORDER — PROMETHAZINE HYDROCHLORIDE 12.5 MG/1
TABLET ORAL
COMMUNITY
Start: 2025-01-20

## 2025-02-05 RX ORDER — INSULIN GLARGINE 100 [IU]/ML
INJECTION, SOLUTION SUBCUTANEOUS
COMMUNITY
Start: 2025-01-24

## 2025-02-05 RX ORDER — AMITRIPTYLINE HYDROCHLORIDE 25 MG/1
25 TABLET, FILM COATED ORAL NIGHTLY
Qty: 30 TABLET | Refills: 11 | Status: SHIPPED | OUTPATIENT
Start: 2025-02-05 | End: 2026-02-05

## 2025-02-05 RX ORDER — SEVELAMER CARBONATE 800 MG/1
1 TABLET, FILM COATED ORAL
COMMUNITY
Start: 2025-01-30 | End: 2026-01-28

## 2025-02-05 RX ORDER — CARVEDILOL 6.25 MG/1
1 TABLET ORAL
COMMUNITY
Start: 2025-01-24

## 2025-02-05 RX ORDER — CLOPIDOGREL BISULFATE 75 MG/1
1 TABLET ORAL DAILY
COMMUNITY
Start: 2025-02-05 | End: 2026-02-05

## 2025-02-05 NOTE — PROGRESS NOTES
Subjective:     Patient    Georgina Arias is a 54 y.o. female.    Problem    2024: Previously followed by Dr Prado. Underwent left foot Charcot reconstruction 11/11/24 complicated by postoperative infection requiring hospitalization. Excessive drainage precluded use of TCC for a period but patient cannot tolerate surgical boots and so required a NWB period in wheelchair. Infection improving with IV antibiotics.      01/02/25: Returns for left foot check s/p surgery almost 2 months ago. Drainage has improved further. No new concerns.     01/10/25: Returns for left foot cast change. Wounds have healed. No new concerns.     01/29/25: Returns for left foot cast change, has been in cast for 2.5 weeks. Interval admission s/p trip and fall. Has two new wounds at left anterior ankle and plantar foot, unsure if these are related to the fall.     02/05/25: Returns for left foot cast change. Having oral surgery next week so can't make it in. Ongoing nerve pain LLE.       Primary Care Provider    Primary Care Provider: Alonso Ling MD   Last Seen: Patient does not have a PCP or has not yet seen their PCP     History    History obtained from patient and review of medical records.     Past Medical History:   Diagnosis Date    Hyperlipidemia     Hypertension     Peptic ulcer disease     Peripheral artery disease     PONV (postoperative nausea and vomiting)     Stage IV CKD     Type II diabetes mellitus        Past Surgical History:   Procedure Laterality Date    ANGIOGRAM, LOWER ARTERIAL, UNILATERAL Left 09/13/2023    Procedure: ANGIOGRAM, LOWER ARTERIAL, UNILATERAL;  Surgeon: Maxx Maya MD;  Location: [address];  Service: Vascular;  Laterality: Left;    ANGIOGRAPHY OF LOWER EXTREMITY Left 09/14/2023    Procedure: ANGIOGRAM, LOWER EXTREMITY with balloon angioplasty ultraverse 5mm x 60mm;  Surgeon: Maxx Maya MD;  Location: [facility] OR [address] [place] [address];  Service: Vascular;  Laterality: Left;  ultraverse 5mm  x 60mm  fluoro: 12.41  contrast: 44ml  mGy: 65.57  Gycm2: 23.50    AORTOGRAPHY WITH EXTREMITY RUNOFF Left 09/13/2023    Procedure: AORTOGRAM, WITH EXTREMITY RUNOFF;  Surgeon: Maxx Maya MD;  Location: [address];  Service: Vascular;  Laterality: Left;  8.1 min  663.63 mGy  176.39 Gy.cm  178ml Dye     AV FISTULA PLACEMENT Right     CHOLECYSTECTOMY      COLONOSCOPY  11/06/2013    normal    COLONOSCOPY  05/2023    cancelled due to inadequate prep    COLONOSCOPY  06/2023    results unknown    CREATION, BYPASS, ARTERIAL, AXILLARY TO BILATERAL FEMORAL Left 09/14/2023    Procedure: CREATION, BYPASS, ARTERIAL, AXILLARY TO BILATERAL FEMORAL;  Surgeon: Maxx Maya MD;  Location: [facility] OR [address] [place] [address];  Service: Vascular;  Laterality: Left;  Left Axillary to Bilateral Femoral Bypass, Left Femoral to Above Knee Popliteal Bypass; SLIDER BED    CYSTOSCOPY W/ URETERAL STENT PLACEMENT Right 08/28/2018    Procedure: CYSTOSCOPY, WITH URETERAL STENT INSERTION (ADD ON );  Surgeon: Bubba Perez MD;  Location: [facility];  Service: Urology;  Laterality: Right;  (ADD ON )    DEBRIDEMENT OF FOOT Left 08/03/2023    Procedure: DEBRIDEMENT, FOOT;  Surgeon: Aleida Flannery DPM;  Location: [facility] OR;  Service: Podiatry;  Laterality: Left;    Excisional debridement of ulcer distal great toe left foot w/ partial resection distal phalanx Left 08/03/2023    FOOT AMPUTATION THROUGH METATARSAL Left 09/14/2023    Procedure: AMPUTATION, FOOT, TRANSMETATARSAL partial 1st ray amputation;  Surgeon: Jesus Valles DPM;  Location: [address];  Service: Podiatry;  Laterality: Left;  partial ray    I&D L 1st ray w/ amputation L hallux IPJ Left 08/14/2023    INCISION AND DRAINAGE FOOT Left 09/14/2023    Procedure: INCISION AND DRAINAGE, FOOT;  Surgeon: Jesus Valles DPM;  Location: [address];  Service: Podiatry;  Laterality: Left;    Injection L PT tendon sheath Left 08/14/2023    LENGTHENING OF ACHILLES TENDON Left 11/11/2024     Procedure: LENGTHENING, TENDON, ACHILLES;  Surgeon: Marco Allison DPM;  Location: [facility] OR;  Service: Podiatry;  Laterality: Left;    PERIPHERAL ARTERIAL STENT GRAFT Right     RECONSTRUCTION WITH FUSION OF CHARCOT FOOT Left 11/11/2024    Procedure: RECONSTRUCTION, CHARCOT FOOT, WITH FUSION;  Surgeon: Marco Allison DPM;  Location: [facility] OR;  Service: Podiatry;  Laterality: Left;  4 hours; foot tray; mini c arm; saw/drill    REMOVAL OF NAIL OF DIGIT Left 08/03/2023    Procedure: REMOVAL, NAIL, DIGIT great toe;  Surgeon: Alieda Flannery DPM;  Location: [facility] OR;  Service: Podiatry;  Laterality: Left;    REVISION, AMPUTATION, TRANSMETATARSAL Left 11/11/2024    Procedure: REVISION, AMPUTATION, TRANSMETATARSAL;  Surgeon: Marco Allison DPM;  Location: [facility] OR;  Service: Podiatry;  Laterality: Left;    STENT, SUPERFICIAL FEMORAL ARTERY Left 09/14/2023    Procedure: STENT, SUPERFICIAL FEMORAL ARTERY;  Surgeon: Maxx Maya MD;  [address];  Service: Vascular;  Laterality: Left;  5 x 100 mm    TOE AMPUTATION Left 08/14/2023    Procedure: AMPUTATION, TOE hallux IPJ;  Surgeon: Aleida Flannery DPM;  Location: [facility] OR;  Service: Podiatry;  Laterality: Left;    TOE AMPUTATION Left 08/25/2023    Procedure: AMPUTATION, TOE hallux, possible 1st met.head;  Surgeon: Aleida Flannery DPM;  Location: [facility] OR;  Service: Podiatry;  Laterality: Left;    URETEROSCOPIC REMOVAL OF URETERIC CALCULUS Right 09/11/2018    Procedure: EXTRACTION-STONE-URETEROSCOPY;  Surgeon: Bubba Perez MD;  Location: [facility] OR;  Service: Urology;  Laterality: Right;        Objective:     Vitals  Wt Readings from Last 1 Encounters:   01/23/25 101.3 kg (223 lb 5.2 oz)     Temp Readings from Last 1 Encounters:   01/23/25 97.8 °F (36.6 °C) (Oral)     BP Readings from Last 1 Encounters:   02/05/25 (!) 171/70     Pulse Readings from Last 1 Encounters:   02/05/25 65       Dermatological Exam    Skin:  Pedal hair growth diminished and Pedal skin  thin and shiny on right; postulcerative callus  Pedal hair growth diminished and Pedal skin thin and shiny on left; anterior ankle wound healed, plantar midfoot wound improved     Nails:  9 nail(s) thickened and 9 nail(s) discolored; left 1st nail/toe absent    Vascular Exam    Arteries:  Posterior tibial artery palpable on right  Dorsalis pedis artery palpable on right  Posterior tibial artery palpable on left  Dorsalis pedis artery palpable on left    Veins:  Superficial veins unremarkable on right  Superficial veins unremarkable on left    Swellin+ pitting on right  2+ pitting on left    Neurological Exam    [place] touch test:  2/6 sites sensed, loss of protective sensation     Musculoskeletal Exam    Footwear:  Diabetic on right  Slipper on left    Gait Exam:   Ambulatory Status: Ambulatory  Gait: NWB LLE temporarily  Assistive Devices: wheelchair    Foot Progression Angle:  Normal on right  Normal on left     Right Lower Extremity Additional Findings:  Right foot and ankle function, strength, and range of motion unremarkable except as noted above.     Left Lower Extremity Additional Findings:  Foot rectus s/p Charcot reconstruction  Left foot and ankle function, strength, and range of motion unremarkable except as noted above.    Imaging and Other Tests    Imagin/10/24 left foot X rays: midfoot Charcot deformity with plantar subluxation of navicular and cuboid (progressed from prior films); also with prior 1st ray amputation and widening of 1st-2nd ray interval    10/12/23 VAS CAMILA: left CAMILA 0.85 (likely above healing threshold)    24 left foot X rays: appears to be some consolidation of the Charcot deformity.     24 left foot X rays: further consolidation of Charcot deformity    25 left foot X rays: hardware stable but with some possible shift of midfoot bones around hardware, overall stable    Independently reviewed and interpreted imaging, findings are as follows: N/A    Other  Tests: The following A1c results were reviewed.   Hemoglobin A1C   Date Value Ref Range Status   12/09/2024 5.3 4.0 - 5.6 % Final     Comment:     ADA Screening Guidelines:  5.7-6.4%  Consistent with prediabetes  >or=6.5%  Consistent with diabetes    High levels of fetal hemoglobin interfere with the HbA1C  assay. Heterozygous hemoglobin variants (HbS, HgC, etc)do  not significantly interfere with this assay.   However, presence of multiple variants may affect accuracy.     12/02/2024 5.8 4.8 - 5.9 % Final   10/07/2024 6.2 (H) 4.8 - 5.9 % Final   08/20/2024 5.3 4.8 - 5.9 % Final   09/11/2023 7.2 (H) 4.0 - 5.6 % Final     Comment:     ADA Screening Guidelines:  5.7-6.4%  Consistent with prediabetes  >or=6.5%  Consistent with diabetes    High levels of fetal hemoglobin interfere with the HbA1C  assay. Heterozygous hemoglobin variants (HbS, HgC, etc)do  not significantly interfere with this assay.   However, presence of multiple variants may affect accuracy.     08/28/2018 7.9 (H) 4.0 - 5.6 % Final     Comment:     ADA Screening Guidelines:  5.7-6.4%  Consistent with prediabetes  >or=6.5%  Consistent with diabetes  High levels of fetal hemoglobin interfere with the HbA1C  assay. Heterozygous hemoglobin variants (HbS, HgC, etc)do  not significantly interfere with this assay.   However, presence of multiple variants may affect accuracy.           Assessment:     Encounter Diagnoses   Name Primary?    Neurogenic pain of left lower extremity Yes    Charcot joint of left foot     Ulcer of left foot with fat layer exposed           Plan:     I counseled the patient on her conditions, their implications and medical management.    Diabetic foot with peripheral neuropathy and peripheral arterial disease   -Performed shoe inspection and diabetic foot education. Reviewed importance of blood glucose control, proper nutrition, and foot hygiene to minimize risk of complications of diabetes. Recommended daily foot inspections, daily  moisturizer to feet, avoiding sharp instruments to feet, appropriate footwear at all times when ambulating, and following up regularly for routine foot care.   -Diabetic foot risk: 2023 IWGDF high ulcer risk.   -Next foot exam due August 2025.  -Start amitriptyline.     Left foot wounds: acute  -May be related to recent fall vs being in cast for prolonged period, will monitor.   -Managing along with cast as below.     S/p left foot Charcot reconstruction 11/11    -Removed cast left foot-leg.   -Applied total contact cast to left foot-leg; tolerated well.    -WBAT total contact cast LLE for short distances, NWB LLE with wheelchair for longer distances.   -Pain medication as prescribed.   -Antibiotic plan per ID.        Return to clinic in 2 weeks, will get X rays at that time, call sooner PRN.

## 2025-02-10 ENCOUNTER — DOCUMENT SCAN (OUTPATIENT)
Dept: HOME HEALTH SERVICES | Facility: HOSPITAL | Age: 55
End: 2025-02-10
Payer: MEDICARE

## 2025-02-10 NOTE — PLAN OF CARE
Problem: Patient Care Overview  Goal: Plan of Care Review  Outcome: Ongoing (interventions implemented as appropriate)  AAOx4.  NAD noted.  Pt remained free from injury or falls.  Pt remains free from skin breakdowns. Vitals signs stable throughout shift on RA.  Positions self independently.  Voiding adequately throughout shift via Fraser catheter.  Pt had no complaints just minimal discomfort and declined pain medication.  Pt remained afebrile this shift. Purposeful rounding done.  All needs met.  Will continue to monitor.          English

## 2025-02-18 ENCOUNTER — PATIENT MESSAGE (OUTPATIENT)
Dept: PODIATRY | Facility: CLINIC | Age: 55
End: 2025-02-18
Payer: MEDICARE

## 2025-02-19 ENCOUNTER — HOSPITAL ENCOUNTER (OUTPATIENT)
Dept: RADIOLOGY | Facility: HOSPITAL | Age: 55
Discharge: HOME | End: 2025-02-19
Attending: STUDENT IN AN ORGANIZED HEALTH CARE EDUCATION/TRAINING PROGRAM
Payer: MEDICARE

## 2025-02-19 ENCOUNTER — DOCUMENT SCAN (OUTPATIENT)
Dept: HOME HEALTH SERVICES | Facility: HOSPITAL | Age: 55
End: 2025-02-19
Payer: MEDICARE

## 2025-02-19 ENCOUNTER — OFFICE VISIT (OUTPATIENT)
Dept: PODIATRY | Facility: CLINIC | Age: 55
End: 2025-02-19
Payer: MEDICARE

## 2025-02-19 VITALS
DIASTOLIC BLOOD PRESSURE: 68 MMHG | HEART RATE: 66 BPM | BODY MASS INDEX: 34.98 KG/M2 | HEIGHT: 67 IN | SYSTOLIC BLOOD PRESSURE: 151 MMHG

## 2025-02-19 DIAGNOSIS — M14.672 CHARCOT JOINT OF LEFT FOOT: Primary | ICD-10-CM

## 2025-02-19 DIAGNOSIS — M14.672 CHARCOT JOINT OF LEFT FOOT: ICD-10-CM

## 2025-02-19 PROCEDURE — 73630 X-RAY EXAM OF FOOT: CPT | Mod: TC,LT

## 2025-02-19 PROCEDURE — 3066F NEPHROPATHY DOC TX: CPT | Mod: CPTII,S$GLB,, | Performed by: STUDENT IN AN ORGANIZED HEALTH CARE EDUCATION/TRAINING PROGRAM

## 2025-02-19 PROCEDURE — 3078F DIAST BP <80 MM HG: CPT | Mod: CPTII,S$GLB,, | Performed by: STUDENT IN AN ORGANIZED HEALTH CARE EDUCATION/TRAINING PROGRAM

## 2025-02-19 PROCEDURE — 3008F BODY MASS INDEX DOCD: CPT | Mod: CPTII,S$GLB,, | Performed by: STUDENT IN AN ORGANIZED HEALTH CARE EDUCATION/TRAINING PROGRAM

## 2025-02-19 PROCEDURE — 3077F SYST BP >= 140 MM HG: CPT | Mod: CPTII,S$GLB,, | Performed by: STUDENT IN AN ORGANIZED HEALTH CARE EDUCATION/TRAINING PROGRAM

## 2025-02-19 RX ORDER — CLOPIDOGREL BISULFATE 75 MG/1
1 TABLET ORAL
COMMUNITY
Start: 2025-02-06

## 2025-02-19 RX ORDER — AZITHROMYCIN 250 MG/1
TABLET, FILM COATED ORAL
COMMUNITY
Start: 2025-02-12

## 2025-02-19 RX ORDER — CHLORHEXIDINE GLUCONATE ORAL RINSE 1.2 MG/ML
15 SOLUTION DENTAL 2 TIMES DAILY
COMMUNITY
Start: 2025-02-12

## 2025-02-19 RX ORDER — HYDROCODONE BITARTRATE AND ACETAMINOPHEN 5; 325 MG/1; MG/1
1 TABLET ORAL EVERY 6 HOURS PRN
COMMUNITY
Start: 2025-02-12

## 2025-02-19 RX ORDER — TRIAMCINOLONE ACETONIDE 1 MG/G
OINTMENT TOPICAL 2 TIMES DAILY
COMMUNITY
Start: 2025-02-10

## 2025-02-19 NOTE — PROGRESS NOTES
Subjective:     Patient    Georgina Arias is a 54 y.o. female.    Problem    2024: Previously followed by Dr Prado. Underwent left foot Charcot reconstruction 11/11/24 complicated by postoperative infection requiring hospitalization. Excessive drainage precluded use of TCC for a period but patient cannot tolerate surgical boots and so required a NWB period in wheelchair. Infection improving with IV antibiotics.      01/02/25: Returns for left foot check s/p surgery almost 2 months ago. Drainage has improved further. No new concerns.     01/10/25: Returns for left foot cast change. Wounds have healed. No new concerns.     01/29/25: Returns for left foot cast change, has been in cast for 2.5 weeks. Interval admission s/p trip and fall. Has two new wounds at left anterior ankle and plantar foot, unsure if these are related to the fall.     02/05/25: Returns for left foot cast change. Having oral surgery next week so can't make it in. Ongoing nerve pain LLE.     02/19/25: Returns 3 months s/p left foot Charcot reconstruction complicated by postop infection, fall. Minimal pain.       Primary Care Provider    Primary Care Provider: Alonso Ling MD   Last Seen: Patient does not have a PCP or has not yet seen their PCP     History    History obtained from patient and review of medical records.     Past Medical History:   Diagnosis Date    Hyperlipidemia     Hypertension     Peptic ulcer disease     Peripheral artery disease     PONV (postoperative nausea and vomiting)     Stage IV CKD     Type II diabetes mellitus        Past Surgical History:   Procedure Laterality Date    ANGIOGRAM, LOWER ARTERIAL, UNILATERAL Left 09/13/2023    Procedure: ANGIOGRAM, LOWER ARTERIAL, UNILATERAL;  Surgeon: Maxx Maya MD;  Location: [facility] OR [address] [place] [address];  Service: Vascular;  Laterality: Left;    ANGIOGRAPHY OF LOWER EXTREMITY Left 09/14/2023    Procedure: ANGIOGRAM, LOWER EXTREMITY with balloon angioplasty ultraverse 5mm  x 60mm;  Surgeon: Maxx Maya MD;  [facility];  Service: Vascular;  Laterality: Left;  ultraverse 5mm x 60mm  fluoro: 12.41  contrast: 44ml  mGy: 65.57  Gycm2: 23.50    AORTOGRAPHY WITH EXTREMITY RUNOFF Left 09/13/2023    Procedure: AORTOGRAM, WITH EXTREMITY RUNOFF;  Surgeon: Maxx Maya MD;  [facility];  Service: Vascular;  Laterality: Left;  8.1 min  663.63 mGy  176.39 Gy.cm  178ml Dye     AV FISTULA PLACEMENT Right     CHOLECYSTECTOMY      COLONOSCOPY  11/06/2013    normal    COLONOSCOPY  05/2023    cancelled due to inadequate prep    COLONOSCOPY  06/2023    results unknown    CREATION, BYPASS, ARTERIAL, AXILLARY TO BILATERAL FEMORAL Left 09/14/2023    Procedure: CREATION, BYPASS, ARTERIAL, AXILLARY TO BILATERAL FEMORAL;  Surgeon: Maxx Myaa MD;  Location: [facility] OR [address] [place] [address];  Service: Vascular;  Laterality: Left;  Left Axillary to Bilateral Femoral Bypass, Left Femoral to Above Knee Popliteal Bypass; SLIDER BED    CYSTOSCOPY W/ URETERAL STENT PLACEMENT Right 08/28/2018    Procedure: CYSTOSCOPY, WITH URETERAL STENT INSERTION (ADD ON );  Surgeon: Bubba Perez MD;  Location: [facility] OR;  Service: Urology;  Laterality: Right;  (ADD ON )    DEBRIDEMENT OF FOOT Left 08/03/2023    Procedure: DEBRIDEMENT, FOOT;  Surgeon: Aleida Flannery DPM;  Location: [facility] OR;  Service: Podiatry;  Laterality: Left;    Excisional debridement of ulcer distal great toe left foot w/ partial resection distal phalanx Left 08/03/2023    FOOT AMPUTATION THROUGH METATARSAL Left 09/14/2023    Procedure: AMPUTATION, FOOT, TRANSMETATARSAL partial 1st ray amputation;  Surgeon: Jesus Valles DPM;  Location: [facility] OR [address] [place] [address];  Service: Podiatry;  Laterality: Left;  partial ray    I&D L 1st ray w/ amputation L hallux IPJ Left 08/14/2023    INCISION AND DRAINAGE FOOT Left 09/14/2023    Procedure: INCISION AND DRAINAGE, FOOT;  Surgeon: Jesus Valles DPM;  Location: [facility] OR [address] [place] [address];  Service: Podiatry;   Laterality: Left;    Injection L PT tendon sheath Left 08/14/2023    LENGTHENING OF ACHILLES TENDON Left 11/11/2024    Procedure: LENGTHENING, TENDON, ACHILLES;  Surgeon: Marco Allison DPM;  Location: [facility] OR;  Service: Podiatry;  Laterality: Left;    PERIPHERAL ARTERIAL STENT GRAFT Right     RECONSTRUCTION WITH FUSION OF CHARCOT FOOT Left 11/11/2024    Procedure: RECONSTRUCTION, CHARCOT FOOT, WITH FUSION;  Surgeon: Marco Allison DPM;  Location: [facility] OR;  Service: Podiatry;  Laterality: Left;  4 hours; foot tray; mini c arm; saw/drill    REMOVAL OF NAIL OF DIGIT Left 08/03/2023    Procedure: REMOVAL, NAIL, DIGIT great toe;  Surgeon: Aleida Flannery DPM;  Location: [facility] OR;  Service: Podiatry;  Laterality: Left;    REVISION, AMPUTATION, TRANSMETATARSAL Left 11/11/2024    Procedure: REVISION, AMPUTATION, TRANSMETATARSAL;  Surgeon: Marco Allison DPM;  Location: [facility] OR;  Service: Podiatry;  Laterality: Left;    STENT, SUPERFICIAL FEMORAL ARTERY Left 09/14/2023    Procedure: STENT, SUPERFICIAL FEMORAL ARTERY;  Surgeon: Maxx Maya MD;  Location: [facility] OR [facility];  Service: Vascular;  Laterality: Left;  5 x 100 mm    TOE AMPUTATION Left 08/14/2023    Procedure: AMPUTATION, TOE hallux IPJ;  Surgeon: Aleida Flannery DPM;  Location: [facility] OR;  Service: Podiatry;  Laterality: Left;    TOE AMPUTATION Left 08/25/2023    Procedure: AMPUTATION, TOE hallux, possible 1st met.head;  Surgeon: Aleida Flannery DPM;  Location: [facility] OR;  Service: Podiatry;  Laterality: Left;    URETEROSCOPIC REMOVAL OF URETERIC CALCULUS Right 09/11/2018    Procedure: EXTRACTION-STONE-URETEROSCOPY;  Surgeon: Bubba Perez MD;  Location: [facility] OR;  Service: Urology;  Laterality: Right;        Objective:     Vitals  Wt Readings from Last 1 Encounters:   01/23/25 101.3 kg (223 lb 5.2 oz)     Temp Readings from Last 1 Encounters:   01/23/25 97.8 °F (36.6 °C) (Oral)     BP Readings from Last 1 Encounters:   02/19/25 (!) 151/68     Pulse Readings  from Last 1 Encounters:   25 66       Dermatological Exam    Skin:  Pedal hair growth diminished and Pedal skin thin and shiny on right; postulcerative callus  Pedal hair growth diminished and Pedal skin thin and shiny on left; anterior ankle wound healed, plantar midfoot wound healed    Nails:  9 nail(s) thickened and 9 nail(s) discolored; left 1st nail/toe absent    Vascular Exam    Arteries:  Posterior tibial artery palpable on right  Dorsalis pedis artery palpable on right  Posterior tibial artery palpable on left  Dorsalis pedis artery palpable on left    Veins:  Superficial veins unremarkable on right  Superficial veins unremarkable on left    Swellin+ pitting on right  2+ pitting on left    Neurological Exam    [place] touch test:  2/6 sites sensed, loss of protective sensation     Musculoskeletal Exam    Footwear:  Diabetic on right  Slipper on left    Gait Exam:   Ambulatory Status: Ambulatory  Gait: NWB LLE temporarily  Assistive Devices: wheelchair    Foot Progression Angle:  Normal on right  Normal on left     Right Lower Extremity Additional Findings:  Right foot and ankle function, strength, and range of motion unremarkable except as noted above.     Left Lower Extremity Additional Findings:  Foot rectus s/p Charcot reconstruction  Left foot and ankle function, strength, and range of motion unremarkable except as noted above.    Imaging and Other Tests    Imagin/10/24 left foot X rays: midfoot Charcot deformity with plantar subluxation of navicular and cuboid (progressed from prior films); also with prior 1st ray amputation and widening of 1st-2nd ray interval    10/12/23 VAS CAMILA: left CAMILA 0.85 (likely above healing threshold)    24 left foot X rays: appears to be some consolidation of the Charcot deformity.     24 left foot X rays: further consolidation of Charcot deformity    25 left foot X rays: hardware stable but with some possible shift of midfoot bones around  hardware, overall stable    Independently reviewed and interpreted imaging, findings are as follows:     02/19/25 left foot X rays: overall superconstruct appears stable in positioning but with some perihardware lucencies, midtarsal shift plantarly around hardware    Other Tests: The following A1c results were reviewed.   Hemoglobin A1C   Date Value Ref Range Status   12/09/2024 5.3 4.0 - 5.6 % Final     Comment:     ADA Screening Guidelines:  5.7-6.4%  Consistent with prediabetes  >or=6.5%  Consistent with diabetes    High levels of fetal hemoglobin interfere with the HbA1C  assay. Heterozygous hemoglobin variants (HbS, HgC, etc)do  not significantly interfere with this assay.   However, presence of multiple variants may affect accuracy.     12/02/2024 5.8 4.8 - 5.9 % Final   10/07/2024 6.2 (H) 4.8 - 5.9 % Final   08/20/2024 5.3 4.8 - 5.9 % Final   09/11/2023 7.2 (H) 4.0 - 5.6 % Final     Comment:     ADA Screening Guidelines:  5.7-6.4%  Consistent with prediabetes  >or=6.5%  Consistent with diabetes    High levels of fetal hemoglobin interfere with the HbA1C  assay. Heterozygous hemoglobin variants (HbS, HgC, etc)do  not significantly interfere with this assay.   However, presence of multiple variants may affect accuracy.     08/28/2018 7.9 (H) 4.0 - 5.6 % Final     Comment:     ADA Screening Guidelines:  5.7-6.4%  Consistent with prediabetes  >or=6.5%  Consistent with diabetes  High levels of fetal hemoglobin interfere with the HbA1C  assay. Heterozygous hemoglobin variants (HbS, HgC, etc)do  not significantly interfere with this assay.   However, presence of multiple variants may affect accuracy.           Assessment:     Encounter Diagnosis   Name Primary?    Charcot joint of left foot Yes            Plan:     I counseled the patient on her conditions, their implications and medical management.    Diabetic foot with peripheral neuropathy and peripheral arterial disease   -Performed shoe inspection and diabetic  foot education. Reviewed importance of blood glucose control, proper nutrition, and foot hygiene to minimize risk of complications of diabetes. Recommended daily foot inspections, daily moisturizer to feet, avoiding sharp instruments to feet, appropriate footwear at all times when ambulating, and following up regularly for routine foot care.   -Diabetic foot risk: 2023 IWF high ulcer risk.   -Next foot exam due August 2025.  -Continue amitriptyline.     Left foot wounds: acute  -May be related to recent fall vs being in cast for prolonged period, will monitor.   -Managing along with cast as below.     S/p left foot Charcot reconstruction 11/11    -Removed cast left foot-leg.   -Switch to LLE tall surgical boot for WB short distances, NWB LLE with wheelchair for longer distances.   -Pain medication as prescribed.      I spent a total of 30 minutes on the day of the visit.  This includes face to face time and non-face to face time preparing to see the patient (eg, review of tests), obtaining and/or reviewing separately obtained history, documenting clinical information in the electronic or other health record, independently interpreting results and communicating results to the patient/family/caregiver, or care coordinator.       Return to clinic in 2 weeks, will get X rays at that time, call sooner PRN.

## 2025-02-24 ENCOUNTER — OFFICE VISIT (OUTPATIENT)
Dept: HEMATOLOGY/ONCOLOGY | Facility: CLINIC | Age: 55
End: 2025-02-24
Payer: MEDICARE

## 2025-02-24 VITALS — HEIGHT: 67 IN | BODY MASS INDEX: 34.98 KG/M2

## 2025-02-24 DIAGNOSIS — Z12.31 ENCOUNTER FOR SCREENING MAMMOGRAM FOR BREAST CANCER: ICD-10-CM

## 2025-02-24 DIAGNOSIS — Z99.2 ESRD (END STAGE RENAL DISEASE) ON DIALYSIS: ICD-10-CM

## 2025-02-24 DIAGNOSIS — Z80.3 FAMILY HISTORY OF BREAST CANCER: ICD-10-CM

## 2025-02-24 DIAGNOSIS — Z91.89 INCREASED RISK OF BREAST CANCER: Primary | ICD-10-CM

## 2025-02-24 DIAGNOSIS — N18.6 ESRD (END STAGE RENAL DISEASE) ON DIALYSIS: ICD-10-CM

## 2025-02-24 DIAGNOSIS — M14.672 CHARCOT JOINT OF LEFT FOOT: ICD-10-CM

## 2025-02-24 PROCEDURE — G2211 COMPLEX E/M VISIT ADD ON: HCPCS | Mod: 95,,, | Performed by: NURSE PRACTITIONER

## 2025-02-24 PROCEDURE — 98007 SYNCH AUDIO-VIDEO EST HI 40: CPT | Mod: 95,,, | Performed by: NURSE PRACTITIONER

## 2025-02-24 PROCEDURE — 3008F BODY MASS INDEX DOCD: CPT | Mod: CPTII,95,, | Performed by: NURSE PRACTITIONER

## 2025-02-24 PROCEDURE — 3066F NEPHROPATHY DOC TX: CPT | Mod: CPTII,95,, | Performed by: NURSE PRACTITIONER

## 2025-02-24 PROCEDURE — 1159F MED LIST DOCD IN RCRD: CPT | Mod: CPTII,95,, | Performed by: NURSE PRACTITIONER

## 2025-02-24 NOTE — PROGRESS NOTES
"  The patient location is: [place]  The chief complaint leading to consultation is: high risk     Visit type: audiovisual      60 minutes of total time spent on the encounter, which includes face to face time and non-face to face time preparing to see the patient (eg, review of tests), Obtaining and/or reviewing separately obtained history, Documenting clinical information in the electronic or other health record, Independently interpreting results (not separately reported) and communicating results to the patient/family/caregiver, or Care coordination (not separately reported).         Each patient to whom he or she provides medical services by telemedicine is:  (1) informed of the relationship between the physician and patient and the respective role of any other health care provider with respect to management of the patient; and (2) notified that he or she may decline to receive medical services by telemedicine and may withdraw from such care at any time.    Notes:     [facility] note      Reason For Consultation:   high-risk for breast cancer      Referring Provider:   Nikita Porras, [id number] [place] [address]    Records Obtained: Records of the patients history including those obtained from the referring provider were reviewed and summarized in detail.    HPI:   Georgina Arias is a 54 y.o. who presents for consultation of increased risk of breast cancer.  She is on dialysis.    Today, Feels good and no complaints.   No breast concerns.    7/3/2024 MMG - negative    High Risk Breast cancer specific history:  - Age: 54 y.o.   - Height/Weight:  Estimated body surface area is 2.19 meters squared as calculated from the following:    Height as of this encounter: 5' 7" (1.702 m).    Weight as of 1/23/25: 101.3 kg (223 lb 5.2 oz).  - Body mass index is 34.98 kg/m².  - Breast density per BI-RADS:    b - Scattered fibroglandular density   - Age at menarche:   10  - " Number of pregnancies: ; age of first live birth:    - History of breast feeding:      -Uterus and ovaries intact: Yes  - Menopausal status: postmenopausal. Age at menopause, if applicable:  49   - HRT: No  - Genetic testing:  No but will confirm if mom was tested - mom Georgina Collado had Myrisk  - Personal history of cancer: No  - Previous chest radiation exposure between ages 10-30 years old: No  - Personal history of breast biopsy:  No  - Ashkenazi Religious Inheritance:  No Other   - Family history of cancer:    Cancer-related family history includes Breast cancer (age of onset: 71) in her mother; Breast cancer (age of onset: 75) in her maternal grandmother; Uterine cancer (age of onset: 73) in her mother.    Social History   Social History[1]    Patient's occupation: Data Unavailable.   Disability and on dialysis    SEE CALCULATED RISK BELOW.     Past Medical   Past Medical History:   Diagnosis Date    Hyperlipidemia     Hypertension     Peptic ulcer disease     Peripheral artery disease     PONV (postoperative nausea and vomiting)     Stage IV CKD     Type II diabetes mellitus    Problem List[2]  Family History  Family History   Problem Relation Name Age of Onset    Breast cancer Mother Georgina 71        unilat, triple-negative; no genetic testing    Uterine cancer Mother Georgina 73    Hypertension Mother Georgina     Diabetes Mother Georgina     Diabetes Father      Hypertension Father      Heart disease Father  37        cabg    Hypertension Brother Cornelio     Breast cancer Maternal Grandmother Hayde 75        tx: unilat mastect    Heart disease Maternal Grandmother Hayde     Heart attack Maternal Grandfather  44     Medications  Current Medications[3]  Allergies  Review of patient's allergies indicates:   Allergen Reactions    Hydrocodone-acetaminophen Nausea And Vomiting, Rash and Other (See Comments)     Vicodin- severe rash  Lortab- head-spinning that leads to vomiting      Naproxen  "Anaphylaxis and Swelling     Hives (skin)^swelling  Hives (skin)^swelling  Hives (skin)^swelling    Sulfamethoxazole-trimethoprim Other (See Comments)     Caused JEROME; heart attack    Hydrocodone Nausea And Vomiting and Rash    Adhesive tape-silicones      blisters    Aspartame Hives    Penicillins Hives     Blisters (skin)^    Sulfamethoxazole Other (See Comments)    Trimethoprim Other (See Comments)    Acetaminophen Rash    Adhesive Rash       Review of Systems       See above   All other systems reviewed and are negative.    Objective:      Vitals:   Vitals:    02/24/25 1430   Height: 5' 7" (1.702 m)     BMI: Body mass index is 34.98 kg/m².   Body surface area is 2.19 meters squared.    Physical Exam  Limited as virtual   Appears comfortable      Laboratory Data: reviewed most recent   Imaging: reviewed most recent      General Education discussed:      1 in 11 women diagnosed with breast cancer in the [place] were under 44yo.       Individuals should undergo breast cancer risk assessment by age 25 years and be counseled regarding potential benefits, risks, and limitations of breast screening in the context of their risk stratification.       1. General education: (not patient specific)    Risk factors associated with breast cancer are categorized into 2 groups: Modifiable and Non-modifiable. Modifiable risk factors include use of hormones, alcohol, smoking, diet and exercise. Non-modifiable risk factors include breast density, genetics, chest radiation, previous pregnancies, age of first period, and age of menopause.     Factors associated with greater breast cancer risk: (this list is not patient specific)  -Increasing age The risk of breast cancer increases with older age.  -Female sex  -White race (In the [place], the highest breast cancer risk occurs among White women, although breast cancer remains the most common cancer among women of every major ethnic/racial group )  -Weight and body fat in " postmenopausal women -Obesity (defined as body mass index [BMI] >=30 kg/m2) is associated with an overall increase  in morbidity and mortality. However, the risk of breast cancer associated with BMI differs by menopausal status.   ?Postmenopausal women - A higher BMI and/or perimenopausal weight gain have been consistently associated with a higher risk of breast cancer among postmenopausal women. The association between a higher BMI and postmenopausal breast cancer risk may be mediated by higher estrogen levels resulting from the peripheral conversion of estrogen precursors (from adipose tissue) to estrogen   -Tall stature -women who were >175 cm (69 inches) tall were 20 percent more likely to develop breast cancer than those <160 cm (63 inches) tall.  -Benign breast disease  Benign breast disease represents a spectrum of disorders that come to clinical attention because of patient symptoms (such as breast pain), palpable lesions or other findings on physical examination, or as imaging abnormalities. Following establishment of a benign diagnosis, treatment in general is aimed at symptomatic relief and patient education.  Some benign breast diseases, such as atypical hyperplasia or lobular carcinoma in situ, confer an increase in the patient's future risk of developing breast cancer and should lead to counseling about screening recommendations and risk reduction strategies. These lesions are considered as risk markers, rather than as premalignant lesions, because those cancers that subsequently develop are not necessarily in the area of the atypia and may occur in the contralateral breast.  Benign epithelial breast lesions can be classified histologically into three categories: nonproliferative, proliferative without atypia, and atypical hyperplasia. The categorization is based upon the degree of cellular proliferation and atypia.  -Dense breast tissue -- The density of breast tissue reflects the relative amount of  glandular and connective tissue (parenchyma) to adipose tissue. Women with mammographically dense breast tissue, generally defined as dense tissue comprising >=75 percent of the breast, have a four to five times higher breast cancer risk compared with women of similar age with less or no dense tissue. Although breast density is a largely inherited trait, other factors can influence density. For example, lower density has been associated with higher levels of physical activity  and with a low-fat, high-carbohydrate diet. In postmenopausal women, estrogen and progesterone increase breast density  while the ER antagonist tamoxifen decreases breast density.  Despite the association of exogenous hormones with breast density, breast density is not strongly correlated with endogenous hormone levels. Breast tend to become more fatty with age.   Dense breasts -  occurring in an estimated 50% of women in their 40s, 40% in their 50s, and 25% of women older than 60. Density can make it more difficult to detect breast cancer and increases breast cancer risk, both of which suggest that additional imaging can improve early diagnosis of the disease.   -Bone mineral density -In multiple studies, women with higher bone density have a higher breast cancer risk  -Hormonal factors:   With regard to Breast cancer -- combined oral contraceptives (LAUREEN)  appear to be associated with little to no increased risk of breast cancer based on observational data. Any effect appears to be temporary and limited to current or recent (within five to seven years) LAUREEN use. I will put a link here for  review:  Combined estrogen-progestin contraception: Side effects and health concerns - UpToDate     HRT.:  Of note, IVF does not appear to increase the long-term risk of breast cancer, even in women with BRCA 1 and 2 mutations.     In the Women's Health Initiative (WHI), the risk of invasive breast cancer was significantly increased with combined hormone  therapy (HT) at an average follow-up of 5.6 years.     A 2019 meta-analysis of all available epidemiologic evidence on the association between menopausal hormone therapy (MHT) use and breast cancer risk has been published. The analysis included nearly 145,000 women with breast cancer (51 percent of whom had used MHT) and nearly 425,000 without breast cancer. Their findings included:  ?Similar to the WHI, estrogen-progestin regimens were associated with excess breast cancer risk. An excess risk was also seen with estrogen-only regimens (a reduction in risk was seen in the WHI). There was no excess risk with vaginal estrogens.   ?Breast cancer risk increased with the duration of systemic MHT use. Unlike previous studies, obesity was not associated with excess risk; instead, it attenuated risk.  ?The authors of the study calculated that for women of average weight, five years of MHT use starting at age 50 years would increase their 20-year risk of breast cancer (between the ages of 50 and 69 years) by approximately:  One in every 50 users of estrogen plus daily progestin  One in every 70 users of estrogen plus intermittent progestin   One in every 200 users of estrogen-only regimens   Of note: There were important limitations in this study.   While this meta-analysis has renewed concerns for some about the association between MHT and breast cancer, we continue to suggest an individualized approach when counseling symptomatic postmenopausal women about treatment. This includes putting the potential risk of breast cancer (and cardiovascular disease) in the context of the benefits of MHT (eg, relief of vasomotor symptoms, improved sleep and quality of life, and prevention of bone loss)  -Reproductive factors -Earlier menarche (before age 12) or later menopause (after age 52), Nulliparity, Increasing age at first full-term pregnancy.   (Of note, It is estimated that for every 12 months of breastfeeding, there was a 4.3  percent reduction in the relative risk (RR) of breast cancer)  -Personal and family history of breast cancer  -Alcohol use and smoking  -Exposure to therapeutic ionizing radiation           2. Risk stratifying models:  There are several models available for stratifying breast cancer risk, and Pancho is presently the model utilized by [facility] and is a model recommended per current NCCN guidelines.      Educational videos:   What Is a TC Score?    https://youtu.be/Tmqf4ZNUfnY     What If I Have a High-Risk TC Score?     https://youtu.be/mSx2pAn4z3V      The Lona Model for Breast Cancer risk estimates the absolute 5 year risk and lifetime risk of developing breast cancer. Family history includes only first degree relatives with breast cancer, which is not enough information to estimate the risk of a patient having BRCA mutation. It also underestimates the cancer risk for patients with extensive family history. The Lona Model is a good predictor of risk for populations but not for individuals. It adjusts risk for race/ethnicity. It may underestimate breast cancer risk in patients with atypical hyperplasia and strong family history. The Lona Model was NOT designed to estimate risk for: Women with a prior diagnosis of breast cancer, lobular carcinoma in situ (LCIS), or ductal carcinoma in situ (DCIS);  Women who have received previous radiation therapy to the chest for treatment of Hodgkin lymphoma;  Women with gene mutations in BRCA1 or BRCA2, or those who are known to have certain genetic syndromes that increase risk for breast cancer; Women of age <35 or >85.    There are limitations to every model for risk assessment, particularly that TC can overestimate risk in women with atypical hyperplasia and dense breasts and that Lona underestimates risk for those with a strong family history of breast or ovarian cancers as well as non-white women with atypical hyperplasia which can make them appear to not  be candidates for risk reducing therapies.         3. High risk patients:       INCREASED RISK SCREENING: per NCCN                      MRI breast:   The use of MRI for breast cancer detection is based on the concept of  tumor angiogenesis or neovascularity. Tumor-associated blood vessels have increased permeability, which leads to prompt uptake and release of gadolinium within the first one to two minutes after administration, leading to a pattern of rapid enhancement and washout on MRI.   Bilateral breast examination - Both breasts should be evaluated in an MRI study, for comparison purposes, even when concern about possible pathology involves only one breast.  Contrast - Intravenous gadolinium contrast must be used to maximize cancer detection and is administered before breast MRI to highlight the neovascularity associated with cancers. Contrast is not necessary when the study is performed to evaluate silicone implant integrity.  Allergic and anaphylactoid reactions to gadolinium are rare, but can occur. In addition, in patients with renal failure, gadolinium can cause contrast nephropathy and/or nephrogenic systemic fibrosis.   A few studies have also reported gadolinium deposition in the brain from repeated intravenous administration, with the degree of deposition varying based on the specific contrast agent. The clinical significance of this deposition remains unknown, and no data for humans exist to show any adverse effects or harm at this time.     FDA Drug Safety Communication: FDA identifies no harmful effects to date with brain retention of gadolinium-based contrast agents for MRIs;   review to continue: https:// www.fda.gov/Drugs/DrugSafety/ern175607.htm    -Contact insurance company with regards to coverage of MRI breasts.   -Cannot undergo an MRI if pregnant.   -MRI's may have false positives                 LUCIANA (contrast enhanced mammogram):  LUCIANA is the main alternative for anyone who benefits by but  cannot have a breast MRI with IV contrast.    Main indications:   High risk screening   Suspicious clinical symptoms with inconclusive mammogram and ultrasound   New breast cancer, evaluate extent of disease   Pre and post ruby-adjuvant systemic therapy evaluation     For high-risk screening, LUCIANA replaces the regular non-contrast mammogram and any other supplemental test         For age over 75 years, screening recommendations are considered on an individual basis.       ACR guidelines:    Note: New American College of Radiology® (ACR®) breast cancer screening guidelines  now call for all women -- particularly Black and Ashkenazi Zoroastrianism women -- to have risk assessment by age 25 to determine if screening earlier than age 40 is needed. The ACR continues to recommend annual screening starting at age 40 for women of average risk, but earlier and more intensive screening for high-risk patients. The new ACR guidelines  for high-risk women were published online May 3 in the Journal of the American College of Radiology (JACR ).      Early detection decreases breast cancer death. The ACR recommends annual screening beginning at age 40 for women of average risk and earlier and/or more intensive screening for women at higher-than-average risk. For most women at higher-than-average risk, the supplemental screening method of choice is breast MRI. Women with genetics-based increased risk, those with a calculated lifetime risk of 20% or more, and those exposed to chest radiation at young ages are recommended to undergo MRI surveillance starting at ages 25 to 30 and annual mammography (with a variable starting age between 25 and 40, depending on the type of risk). Mutation carriers can delay mammographic screening until age 40 if annual screening breast MRI is performed as recommended. Women diagnosed with breast cancer before age 50 or with personal histories of breast cancer and dense breasts should undergo annual supplemental  breast MRI. Others with personal histories, and those with atypia at biopsy, should strongly consider MRI screening, especially if other risk factors are present. For women with dense breasts who desire supplemental screening, breast MRI is recommended. For those who qualify for but cannot undergo breast MRI, contrast-enhanced mammography or ultrasound could be considered. All women should undergo risk assessment by age 25, especially Black women and women of Ashkenazi Scientologist heritage, so that those at higher-than-average risk can be identified and appropriate screening initiated.      -RECOMMENDED LIFESTYLE MODIFICATIONS FOR HIGH RISK PATIENTS:    * Reviewed Lifestyle modifications which have shown benefit:  Limit alcohol consumption to less than 1 drink per day (1 ounce liquor, 6 oz wine, 8 oz beer) and nor more than 3 drinks per week.   Avoid smoking.  Exercise at least 150 minutes per week of moderate intensity aerobic activity or at least 75 minutes of vigorous activity. Exercise can lower the relative risk of breast cancer by ~18-20%.  Maintain healthy weight and avoid post-menopausal weight gain. Avoid processed foods and eat more lean proteins, fruits and vegetables.                               * Available resources include genetic counseling, nutrition, weight management.    -requirements for Bariatric surgery. BMI must be >40 with no comorbidities or 35> with at least two comorbidities pertaining obesity (Htn, type 2 diabetes, Sav, Osteoarthritis, and `  hyperlipidemia)          -CHEMOPREVENTION:                * For women at high risk for breast cancer, endocrine therapy can reduce the risk of invasive and/or in situ breast cancers. (tamoxifen for premenopausal or postmenopausal women and raloxifene or exemestane for postmenopausal women).   -Above have been shown to lower the risk of breast cancer incidence, however there is no survival benefit in patients who don't have breast  cancer.        Tamoxifen:    Data regarding tamoxifen risk reduction are limited to pre- and postmenopausal individuals >=35 years of age with a Lona Model 5-year breast cancer risk of >=1.7% or a 10-year risk by SHANTA/Tyrer-Cuzicke of >=5% or a history of LCIS.  Tamoxifen: 20 mg per day for 5 years was shown to reduce risk of breast cancer by 49%. Among individuals with a history of AH, this dose and duration of tamoxifen were associated with an 86% reduction in breast cancer risk. Low-dose tamoxifen (5 mg per day for 3-5 years)d is an option if patient is symptomatic on the 20-mg dose or if patient is unwilling or unable to take standard-dose tamoxifen.1 This low dosage needs further investigation in premenopausal individuals.  The efficacy of tamoxifen risk reduction in individuals who are carriers of BRCA1/2 and other pathogenic mutations is less well studied than in other risk groups. Limited data suggest there may be a benefit, likely a larger benefit, for BRCA2 carriers.  For healthy, premenopausal individuals at elevated risk for breast cancer, data regarding the risk/benefit ratio for tamoxifen appear relatively favorable (category 1).  For postmenopausal individuals at elevated risk for breast cancer, data regarding the risk/benefit ratio for tamoxifen are influenced by age, presence of uterus, or comorbid conditions (category 1). There are insufficient data on ethnicity and rac  -Risks of Tamoxifen side effects include hot flashes, invasive endometrial cancer in women > 49 years of age (2.3/1000 compared to 0.9/1000), cataracts, increased risk of pulmonary embolism among others.    Raloxifene:    Data regarding raloxifene risk reduction are limited to postmenopausal individuals >=35 years of age with a Lona Model 5-year breast cancer risk >=1.7% or a 10-year risk by SHANTA/Tyrer-Cuzicke of >=5% or a history of LCIS.  Raloxifene: 60 mg per day was found to be equivalent to tamoxifen for breast cancer risk  reduction in the initial comparison. While raloxifene in long-term follow-up appears to be less efficacious in risk reduction than tamoxifen, consideration of toxicity may still lead to the choice of raloxifene over tamoxifen in individuals with an intact uterus.  There are no data regarding the use of raloxifene in individuals who are carriers of BRCA1/2 and other pathogenic mutations or who have had prior thoracic radiation.  For postmenopausal individuals at elevated risk for breast cancer, data regarding the risk/benefit ratio for raloxifene are influenced by age or comorbid conditions (category 1). There are insufficient data on ethnicity and race.  Use of raloxifene for breast cancer risk reduction in premenopausal individuals is inappropriate unless part of a clinical trial.     Aromatase Inhibitors (exemestane and anastrozole)   Data regarding exemestane are from a single large randomized study limited to postmenopausal individuals >=35 years of age with a Lona Model 5-year breast cancer risk >=1.7% or a 10-year risk by SHANTA/Tyrer-Cuzicke of >=5% or a history of LCIS.  Data regarding anastrozole are from a single large randomized study limited to postmenopausal individuals 40 to 70 years of age with the following risk compared with the general population: Aged 40 to 44 years - 4 times higher Aged 45 to 60 years - >=2 times higher Aged 60 to 70 years - >=1.5 times higher Individuals who did not meet these criteria but had a Tyrer-Cuzicke model 10-year breast cancer risk >5% were also included.  Exemestane: 25 mg per day was found to reduce the relative incidence of invasive breast cancer by 65% from 0.55% to 0.19% with a median follow-up of 3 years.  Anastrozole: 1 mg per day was found to reduce the relative incidence of breast cancer by 53% with a median follow-up of 5 years.  There are retrospective data that aromatase inhibitors can reduce the risk of contralateral breast cancer in BRCA1/2 patients with  ER-positive breast cancer who take aromatase inhibitors as adjuvant agents.  For postmenopausal individuals at elevated risk for breast cancer, data regarding the risk/benefit ratio for aromatase inhibitor agents are influenced by age and comorbid conditions such as osteoporosis (category 1). There are insufficient data on ethnicity and race.  Use of aromatase inhibitors for breast cancer risk reduction in premenopausal individuals is inappropriate         Assessment:     1. Increased risk of breast cancer    2. Family history of breast cancer    3. Charcot joint of left foot    4. ESRD (end stage renal disease) on dialysis    5. Encounter for screening mammogram for breast cancer          Breast Cancer Risk Stratification   Current, Estimated Breast Cancer Risk Model Used Patient's Score Risk for general population Patient's Risk Category   5-year Lona Model 2.5% 1.4%  [] N/A given age <35   [] Average risk (<1.7%)   [] Increased risk (>=1.7%)   10-year Tyrer-Cuzick v8.0b 8.8%   [] <5%   [x] >=5%    Lifetime (to age 85) Tyrer-Cuzick v8.0b 24.26% 9.07%  [] Average risk (<15%)   [] Intermediate risk (>=15% - <20%)   [x] High risk (>=20%)   According to the American Cancer Society, patients with a lifetime breast cancer risk of 20% or higher might benefit from supplemental screening exams. Women with a 5-year risk greater than or equal to 1.7% may benefit from chemoprevention agents (tamoxifen, raloxifene, aromatase inhibitors) to reduce risk.        Patient's risk factors include but are not limited to:  Family history  nulliparity    Plan:       Patient has opted out of chemoprevention but will call in the future if she wishes to pursue.   Patient elects to proceed with annual MMG alternating with annual Breast Us. She will alternate CBE here and with GYN/PCP.  Encouraged breast awareness, including monthly breast self-exams.   Recommend lifestyle modifications as above.   Mom gave me permission to access her chart  and she did have genetic testing [facility].       RTC in 2025 with MMG same day    Questions were encouraged and answered to patient's satisfaction, and patient verbalized understanding of information and agreement with the plan. Advised patient to RTC with any interval changes or concerns.      Route Chart for Scheduling    Med Onc Chart Routing  Urgent    Follow up with physician    Follow up with [facility] . See me 2025 with a MMG same day   Infusion scheduling note    Injection scheduling note    Labs    Imaging   Breast US due now   Pharmacy appointment    Other referrals                Patient is in agreement with the proposed treatment plan. All questions were answered to the patient's satisfaction. Pt knows to call clinic for any new or worsening symptoms and if anything is needed before the next clinic visit.    Nirmal Cruz, MSN, APRN, FNP-C  Nurse Practitioner to Dr. Mary Crews  [facility]  [facility]  Hematology & Medical Oncology  [address] [place] [id number]  ph. [phone] ext [phone]  Fax. [phone]                 code applied: patient requires or will require a continuous, longitudinal, and active collaborative plan of care related to this patient's health condition, breast cancer --the management of which requires the direction of a practitioner with specialized clinical knowledge, skill, and expertise.                  [1]   Social History  Tobacco Use    Smoking status: Former     Current packs/day: 0.00     Types: Cigarettes     Quit date: 2006     Years since quittin.5    Smokeless tobacco: Never    Tobacco comments:     smoked off and on for 20 years, was up to 2 packs/day toward the end of that, and quit in    Substance Use Topics    Alcohol use: Not Currently     Comment: occ    Drug use: No   [2]   Patient Active Problem List  Diagnosis    Type II diabetes mellitus    PAD (peripheral artery disease)     Hyperlipidemia    Essential hypertension    Allergy    PUD (peptic ulcer disease)    Hearing loss    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, with other ophthalmic complication    Ureteral stone with hydronephrosis    Acute kidney injury superimposed on CKD    Nephrolithiasis    Pre-operative examination    Acute respiratory failure with hypoxia and hypercapnia    Elevated troponin    Pneumonia due to COVID-19 virus    Hypertensive emergency    Lactic acidosis    Hyperkalemia    Metabolic acidosis    Chronic diastolic congestive heart failure    Class 2 severe obesity with serious comorbidity and body mass index (BMI) of 38.0 to 38.9 in adult    Open wound of left foot    ESRD (end stage renal disease)    Severe obesity (BMI >= 40)    Pre-op exam    S/P femoral-femoral bypass surgery    Wound dehiscence    Wound infection after surgery    Charcot joint of left foot    Post-operative infection    Osteomyelitis    Cellulitis    Fall    Anemia in chronic kidney disease    Anemia due to chronic kidney disease, on chronic dialysis    Abnormal CT scan of head    ESRD (end stage renal disease) on dialysis    Abnormal brain MRI   [3]   Current Outpatient Medications:     acetaminophen (TYLENOL) 500 MG tablet, Take 2 tablets (1,000 mg total) by mouth every 8 (eight) hours as needed., Disp: 60 tablet, Rfl: 0    amitriptyline (ELAVIL) 25 MG tablet, Take 1 tablet (25 mg total) by mouth every evening., Disp: 30 tablet, Rfl: 11    atorvastatin (LIPITOR) 80 MG tablet, Take 1 tablet by mouth every evening., Disp: , Rfl:     azithromycin (Z-DAMIAN) 250 MG tablet, Take by mouth., Disp: , Rfl:     blood sugar diagnostic (TRUE METRIX GLUCOSE TEST STRIP) Strp, 3 (three) times daily., Disp: , Rfl:     blood-glucose meter Misc, 1 each by Other route., Disp: , Rfl:     carvediloL (COREG) 6.25 MG tablet, Take 6.25 mg by mouth 2 (two) times daily with meals., Disp: , Rfl:     carvediloL (COREG) 6.25 MG tablet, Take 1 tablet by mouth., Disp:  ", Rfl:     chlorhexidine (PERIDEX) 0.12 % solution, 15 mLs 2 (two) times daily., Disp: , Rfl:     chlorpheniramine-pseudoephedrine-acetaminophen (SINUTAB) 2- mg per tablet, 2 tablet EVERY 8 HOURS (route: oral), Disp: , Rfl:     clopidogreL (PLAVIX) 75 mg tablet, Take 1 tablet by mouth once daily., Disp: , Rfl:     clopidogreL (PLAVIX) 75 mg tablet, Take 1 tablet by mouth., Disp: , Rfl:     COMFORT EZ PEN NEEDLES 32 gauge x 5/32" Ndle, SMARTSIG:injection Daily, Disp: , Rfl:     fluticasone propion-salmeteroL 55-14 mcg/actuation aebs, Inhale 1 puff into the lungs., Disp: , Rfl:     fluticasone propionate (FLONASE) 50 mcg/actuation nasal spray, Fluticasone Propionate 50 MCG/ACT Nasal Suspension QTY: 1 bottle Days: 30 Refills: 5  Written: 09/30/20 Patient Instructions: inhale 2 sprays in each nostril once daily, Disp: , Rfl:     gabapentin (NEURONTIN) 100 MG capsule, Take 1 capsule (100 mg total) by mouth once daily., Disp: 30 capsule, Rfl: 0    gabapentin (NEURONTIN) 300 MG capsule, Take 1 capsule by mouth 3 (three) times daily., Disp: , Rfl:     heparin sodium,porcine/D5W (HEPARIN, PORCINE, IN SODIUM CHLORIDE 45%) 25,000 unit/250 mL infusion, Heparin Sodium (Porcine) 1,000 Units/mL Systemic, Disp: , Rfl:     HYDROcodone-acetaminophen (NORCO)  mg per tablet, Take 1 tablet by mouth every 6 (six) hours as needed for Pain., Disp: 28 tablet, Rfl: 0    HYDROcodone-acetaminophen (NORCO) 5-325 mg per tablet, Take 1 tablet by mouth every 6 (six) hours as needed., Disp: , Rfl:     insulin (LANTUS SOLOSTAR U-100 INSULIN) glargine 100 units/mL (3mL) SubQ pen, Inject 40 Units into the skin every evening., Disp: , Rfl: 0    insulin glargine U-100, Lantus, (LANTUS SOLOSTAR U-100 INSULIN) 100 unit/mL (3 mL) InPn pen, Inject into the skin., Disp: , Rfl:     levocetirizine (XYZAL) 5 MG tablet, Take 5 mg by mouth., Disp: , Rfl:     linaGLIPtin (TRADJENTA) 5 mg Tab tablet, Take 1 tablet by mouth once daily., Disp: , Rfl:     " methoxy peg-epoetin beta (MIRCERA INJ), Inject 100 mcg into the skin., Disp: , Rfl:     miconazole NITRATE 2 % (MICOTIN) 2 % top powder, Apply topically as needed for Itching., Disp: 85 g, Rfl: 2    miscellaneous medical supply (C-TUB) Misc, Hearing amplification (BICROS preferred), Disp: , Rfl:     ondansetron (ZOFRAN-ODT) 4 MG TbDL, Take 4 mg by mouth every 6 (six) hours., Disp: , Rfl:     pediatric multivitamin chewable tablet, Take 1 tablet by mouth once daily., Disp: , Rfl:     pediatric multivitamin no.76 (FLINTSTONES COMPLETE) Chew, Take 1 tablet by mouth., Disp: , Rfl:     potassium chloride SA (K-DUR,KLOR-CON) 20 MEQ tablet, Take 20 mEq by mouth., Disp: , Rfl:     promethazine (PHENERGAN) 12.5 MG Tab, Take 1 tablet (12.5 mg total) by mouth every 6 (six) hours as needed (to use with Norco, known to tolerate)., Disp: 28 tablet, Rfl: 0    promethazine (PHENERGAN) 12.5 MG Tab, 1 tablet EVERY 6 HOURS (route: oral), Disp: , Rfl:     sevelamer carbonate (RENVELA) 800 mg Tab, Take by mouth., Disp: , Rfl:     sevelamer carbonate (RENVELA) 800 mg Tab, 1 tablet., Disp: , Rfl:     tirzepatide (MOUNJARO) 10 mg/0.5 mL PnIj, Inject 10 mg into the skin., Disp: , Rfl:     triamcinolone acetonide 0.1% (KENALOG) 0.1 % ointment, Apply topically 2 (two) times daily., Disp: , Rfl:     TRUEPLUS LANCETS 30 gauge Misc, , Disp: , Rfl:     vit B,C-FA-zinc-selen-vit D3-E (RENAPLEX-D) 800 mcg-12.5 mg -2,000 unit Tab, Take by mouth., Disp: , Rfl:     aspirin (ECOTRIN) 81 MG EC tablet, Take 1 tablet (81 mg total) by mouth once daily. (Patient not taking: Reported on 2/24/2025), Disp: , Rfl:     Current Facility-Administered Medications:     sodium chloride 0.9% flush 10 mL, 10 mL, Intravenous, PRN, Aleida Flannery DPM

## 2025-02-25 ENCOUNTER — TELEPHONE (OUTPATIENT)
Dept: RADIOLOGY | Facility: HOSPITAL | Age: 55
End: 2025-02-25
Payer: MEDICARE

## 2025-02-25 NOTE — TELEPHONE ENCOUNTER
----- Message from Carlos sent at 2/25/2025 12:06 PM CST -----  Good afternoon,Can we get Ms. Georgina's US scheduled please?Thank you!!!Carlos

## 2025-03-05 ENCOUNTER — TELEPHONE (OUTPATIENT)
Dept: NEUROSURGERY | Facility: CLINIC | Age: 55
End: 2025-03-05
Payer: MEDICARE

## 2025-03-05 NOTE — TELEPHONE ENCOUNTER
S/w pt to reschedule appointment. Pt stated that she has dialysis on that date and needed to reschedule. Offered appt on 3/18 but pt stated that she is starting peritoneal dialysis that week and can't make it. I then offered her 3/25 at 9am. Patient is unsure with dialysis treatment schedule as she is starting a new schedule. I advised pt to just reschedule to that date and if she needs to reschedule again, to call us back. Patient v/u and agreement with plan.

## 2025-03-05 NOTE — TELEPHONE ENCOUNTER
"----- Message from Amena sent at 3/5/2025  9:36 AM CST -----  Regarding: pt advice  Contact: [phone]  .Name Of Caller: Self Contact Preference?: [phone] What is the nature of the call?:in regards to needing to reschedule appt 3/12/25  due to dialysis, pls call  Additional Notes:"Thank you for all that you do for our patients"  "

## 2025-03-06 ENCOUNTER — OFFICE VISIT (OUTPATIENT)
Dept: PODIATRY | Facility: CLINIC | Age: 55
End: 2025-03-06
Payer: MEDICARE

## 2025-03-06 VITALS
DIASTOLIC BLOOD PRESSURE: 73 MMHG | BODY MASS INDEX: 34.98 KG/M2 | HEART RATE: 67 BPM | SYSTOLIC BLOOD PRESSURE: 186 MMHG | HEIGHT: 67 IN

## 2025-03-06 DIAGNOSIS — E11.628 DIABETIC FOOT INFECTION: Primary | ICD-10-CM

## 2025-03-06 DIAGNOSIS — L97.512 ULCER OF RIGHT FOOT WITH FAT LAYER EXPOSED: ICD-10-CM

## 2025-03-06 DIAGNOSIS — L08.9 DIABETIC FOOT INFECTION: Primary | ICD-10-CM

## 2025-03-06 DIAGNOSIS — M14.672 CHARCOT JOINT OF LEFT FOOT: ICD-10-CM

## 2025-03-06 LAB
GRAM STN SPEC: NORMAL

## 2025-03-06 PROCEDURE — 87186 SC STD MICRODIL/AGAR DIL: CPT | Performed by: STUDENT IN AN ORGANIZED HEALTH CARE EDUCATION/TRAINING PROGRAM

## 2025-03-06 PROCEDURE — 3066F NEPHROPATHY DOC TX: CPT | Mod: CPTII,S$GLB,, | Performed by: STUDENT IN AN ORGANIZED HEALTH CARE EDUCATION/TRAINING PROGRAM

## 2025-03-06 PROCEDURE — 87075 CULTR BACTERIA EXCEPT BLOOD: CPT | Performed by: STUDENT IN AN ORGANIZED HEALTH CARE EDUCATION/TRAINING PROGRAM

## 2025-03-06 PROCEDURE — 3044F HG A1C LEVEL LT 7.0%: CPT | Mod: CPTII,S$GLB,, | Performed by: STUDENT IN AN ORGANIZED HEALTH CARE EDUCATION/TRAINING PROGRAM

## 2025-03-06 PROCEDURE — 99214 OFFICE O/P EST MOD 30 MIN: CPT | Mod: 25,S$GLB,, | Performed by: STUDENT IN AN ORGANIZED HEALTH CARE EDUCATION/TRAINING PROGRAM

## 2025-03-06 PROCEDURE — 3078F DIAST BP <80 MM HG: CPT | Mod: CPTII,S$GLB,, | Performed by: STUDENT IN AN ORGANIZED HEALTH CARE EDUCATION/TRAINING PROGRAM

## 2025-03-06 PROCEDURE — 87077 CULTURE AEROBIC IDENTIFY: CPT | Performed by: STUDENT IN AN ORGANIZED HEALTH CARE EDUCATION/TRAINING PROGRAM

## 2025-03-06 PROCEDURE — 1159F MED LIST DOCD IN RCRD: CPT | Mod: CPTII,S$GLB,, | Performed by: STUDENT IN AN ORGANIZED HEALTH CARE EDUCATION/TRAINING PROGRAM

## 2025-03-06 PROCEDURE — 11042 DBRDMT SUBQ TIS 1ST 20SQCM/<: CPT | Mod: S$GLB,,, | Performed by: STUDENT IN AN ORGANIZED HEALTH CARE EDUCATION/TRAINING PROGRAM

## 2025-03-06 PROCEDURE — 3077F SYST BP >= 140 MM HG: CPT | Mod: CPTII,S$GLB,, | Performed by: STUDENT IN AN ORGANIZED HEALTH CARE EDUCATION/TRAINING PROGRAM

## 2025-03-06 PROCEDURE — 3008F BODY MASS INDEX DOCD: CPT | Mod: CPTII,S$GLB,, | Performed by: STUDENT IN AN ORGANIZED HEALTH CARE EDUCATION/TRAINING PROGRAM

## 2025-03-06 PROCEDURE — 87205 SMEAR GRAM STAIN: CPT | Performed by: STUDENT IN AN ORGANIZED HEALTH CARE EDUCATION/TRAINING PROGRAM

## 2025-03-06 PROCEDURE — 99999 PR PBB SHADOW E&M-EST. PATIENT-LVL V: CPT | Mod: PBBFAC,,, | Performed by: STUDENT IN AN ORGANIZED HEALTH CARE EDUCATION/TRAINING PROGRAM

## 2025-03-06 PROCEDURE — 87070 CULTURE OTHR SPECIMN AEROBIC: CPT | Performed by: STUDENT IN AN ORGANIZED HEALTH CARE EDUCATION/TRAINING PROGRAM

## 2025-03-06 RX ORDER — ACETAMINOPHEN 500 MG
1000 TABLET ORAL EVERY 6 HOURS PRN
COMMUNITY

## 2025-03-06 RX ORDER — ONDANSETRON 4 MG/1
4 TABLET, FILM COATED ORAL EVERY 6 HOURS PRN
COMMUNITY

## 2025-03-06 RX ORDER — AMITRIPTYLINE HYDROCHLORIDE 25 MG/1
1 TABLET, FILM COATED ORAL NIGHTLY
COMMUNITY

## 2025-03-06 RX ORDER — CYCLOBENZAPRINE HCL 10 MG
10 TABLET ORAL
COMMUNITY

## 2025-03-06 RX ORDER — OXYCODONE AND ACETAMINOPHEN 5; 325 MG/1; MG/1
1 TABLET ORAL EVERY 4 HOURS PRN
COMMUNITY
Start: 2025-03-01

## 2025-03-06 NOTE — PROCEDURES
"Wound Debridement    Date/Time: 3/6/2025 9:30 AM    Performed by: Marco Allison DPM  Authorized by: Marco Allison DPM    Time out: Immediately prior to procedure a "time out" was called to verify the correct patient, procedure, equipment, support staff and site/side marked as required.    Consent Done?:  Yes (Verbal)    Wound Details:    Location:  Right foot    Location:  Right 1st Metatarsal Head    Type of Debridement:  Excisional       Length (cm):  1.3       Width (cm):  0.9       Depth (cm):  0.5       Area (sq cm):  0.92       Percent Debrided (%):  100       Total Area Debrided (sq cm):  0.92    Depth of debridement:  Subcutaneous tissue    Tissue debrided:  Adipose, Dermis, Epidermis and Subcutaneous    Devitalized tissue debrided:  Biofilm, Callus, Exudate, Necrotic/Eschar and Slough    Instruments:  Curette and Blade  Bleeding:  Minimal  Hemostasis Achieved: Yes  Method Used:  Pressure  Patient tolerance:  Patient tolerated the procedure well with no immediate complications  Specimen Collected: Specimen sent to microbiology        "

## 2025-03-06 NOTE — PROGRESS NOTES
Subjective:     Patient    Georgina Arias is a 54 y.o. female.    Problem    2024: Previously followed by Dr Prado. Underwent left foot Charcot reconstruction 11/11/24 complicated by postoperative infection requiring hospitalization. Excessive drainage precluded use of TCC for a period but patient cannot tolerate surgical boots and so required a NWB period in wheelchair. Infection improving with IV antibiotics.      01/02/25: Returns for left foot check s/p surgery almost 2 months ago. Drainage has improved further. No new concerns.     01/10/25: Returns for left foot cast change. Wounds have healed. No new concerns.     01/29/25: Returns for left foot cast change, has been in cast for 2.5 weeks. Interval admission s/p trip and fall. Has two new wounds at left anterior ankle and plantar foot, unsure if these are related to the fall.     02/05/25: Returns for left foot cast change. Having oral surgery next week so can't make it in. Ongoing nerve pain LLE.     02/19/25: Returns 3 months s/p left foot Charcot reconstruction complicated by postop infection, fall. Minimal pain.     03/16/25:Returns for followup left foot Charcot, doing well in boot. Had new wound develop right 1st toe, draining.       Primary Care Provider    Primary Care Provider: Alonso Ling MD   Last Seen: Patient does not have a PCP or has not yet seen their PCP     History    History obtained from patient and review of medical records.     Past Medical History:   Diagnosis Date    Hyperlipidemia     Hypertension     Peptic ulcer disease     Peripheral artery disease     PONV (postoperative nausea and vomiting)     Stage IV CKD     Type II diabetes mellitus        Past Surgical History:   Procedure Laterality Date    ANGIOGRAM, LOWER ARTERIAL, UNILATERAL Left 09/13/2023    Procedure: ANGIOGRAM, LOWER ARTERIAL, UNILATERAL;  Surgeon: Maxx Maya MD;  Location: [facility] OR [address] [place] [address];  Service: Vascular;  Laterality: Left;     ANGIOGRAPHY OF LOWER EXTREMITY Left 09/14/2023    Procedure: ANGIOGRAM, LOWER EXTREMITY with balloon angioplasty ultraverse 5mm x 60mm;  Surgeon: Maxx Maya MD;  Location: [facility] OR [address] [place] [address];  Service: Vascular;  Laterality: Left;  ultraverse 5mm x 60mm  fluoro: 12.41  contrast: 44ml  mGy: 65.57  Gycm2: 23.50    AORTOGRAPHY WITH EXTREMITY RUNOFF Left 09/13/2023    Procedure: AORTOGRAM, WITH EXTREMITY RUNOFF;  Surgeon: Maxx Maya MD;  Location: [address];  Service: Vascular;  Laterality: Left;  8.1 min  663.63 mGy  176.39 Gy.cm  178ml Dye     AV FISTULA PLACEMENT Right     CHOLECYSTECTOMY      COLONOSCOPY  11/06/2013    normal    COLONOSCOPY  05/2023    cancelled due to inadequate prep    COLONOSCOPY  06/2023    results unknown    CREATION, BYPASS, ARTERIAL, AXILLARY TO BILATERAL FEMORAL Left 09/14/2023    Procedure: CREATION, BYPASS, ARTERIAL, AXILLARY TO BILATERAL FEMORAL;  Surgeon: Maxx Maya MD;  Location: [address];  Service: Vascular;  Laterality: Left;  Left Axillary to Bilateral Femoral Bypass, Left Femoral to Above Knee Popliteal Bypass; SLIDER BED    CYSTOSCOPY W/ URETERAL STENT PLACEMENT Right 08/28/2018    Procedure: CYSTOSCOPY, WITH URETERAL STENT INSERTION (ADD ON );  Surgeon: Bubba Perez MD;  Location: [facility];  Service: Urology;  Laterality: Right;  (ADD ON )    DEBRIDEMENT OF FOOT Left 08/03/2023    Procedure: DEBRIDEMENT, FOOT;  Surgeon: Aleida Flannery DPM;  Location: [facility] OR;  Service: Podiatry;  Laterality: Left;    Excisional debridement of ulcer distal great toe left foot w/ partial resection distal phalanx Left 08/03/2023    FOOT AMPUTATION THROUGH METATARSAL Left 09/14/2023    Procedure: AMPUTATION, FOOT, TRANSMETATARSAL partial 1st ray amputation;  Surgeon: Jesus Valles DPM;  Location: [address];  Service: Podiatry;  Laterality: Left;  partial ray    I&D L 1st ray w/ amputation L hallux IPJ Left 08/14/2023    INCISION AND DRAINAGE FOOT Left  09/14/2023    Procedure: INCISION AND DRAINAGE, FOOT;  Surgeon: Jesus Valles DPM;  Location: [facility] OR 2ND FLR;  Service: Podiatry;  Laterality: Left;    Injection L PT tendon sheath Left 08/14/2023    LENGTHENING OF ACHILLES TENDON Left 11/11/2024    Procedure: LENGTHENING, TENDON, ACHILLES;  Surgeon: Marco Allison DPM;  Location: [facility] OR;  Service: Podiatry;  Laterality: Left;    PERIPHERAL ARTERIAL STENT GRAFT Right     RECONSTRUCTION WITH FUSION OF CHARCOT FOOT Left 11/11/2024    Procedure: RECONSTRUCTION, CHARCOT FOOT, WITH FUSION;  Surgeon: Marco Allison DPM;  Location: [facility] OR;  Service: Podiatry;  Laterality: Left;  4 hours; foot tray; mini c arm; saw/drill    REMOVAL OF NAIL OF DIGIT Left 08/03/2023    Procedure: REMOVAL, NAIL, DIGIT great toe;  Surgeon: Aleida Flannery DPM;  Location: [facility] OR;  Service: Podiatry;  Laterality: Left;    REVISION, AMPUTATION, TRANSMETATARSAL Left 11/11/2024    Procedure: REVISION, AMPUTATION, TRANSMETATARSAL;  Surgeon: Marco Allison DPM;  Location: [facility] OR;  Service: Podiatry;  Laterality: Left;    STENT, SUPERFICIAL FEMORAL ARTERY Left 09/14/2023    Procedure: STENT, SUPERFICIAL FEMORAL ARTERY;  Surgeon: Maxx Maya MD;  Location: [facility] OR 2ND FLR;  Service: Vascular;  Laterality: Left;  5 x 100 mm    TOE AMPUTATION Left 08/14/2023    Procedure: AMPUTATION, TOE hallux IPJ;  Surgeon: Aleida Flannery DPM;  Location: [facility] OR;  Service: Podiatry;  Laterality: Left;    TOE AMPUTATION Left 08/25/2023    Procedure: AMPUTATION, TOE hallux, possible 1st met.head;  Surgeon: Aleida Flannery DPM;  Location: [facility] OR;  Service: Podiatry;  Laterality: Left;    URETEROSCOPIC REMOVAL OF URETERIC CALCULUS Right 09/11/2018    Procedure: EXTRACTION-STONE-URETEROSCOPY;  Surgeon: Bubba Perez MD;  Location: [facility] OR;  Service: Urology;  Laterality: Right;        Objective:     Vitals  Wt Readings from Last 1 Encounters:   01/23/25 101.3 kg (223 lb 5.2 oz)     Temp Readings from  Last 1 Encounters:   25 97.8 °F (36.6 °C) (Oral)     BP Readings from Last 1 Encounters:   25 (!) 186/73     Pulse Readings from Last 1 Encounters:   25 67       Dermatological Exam    Skin:  Pedal hair growth diminished and Pedal skin thin and shiny on right; ulcer 1st MTPJ to fat, malodor  Pedal hair growth diminished and Pedal skin thin and shiny on left; no open wounds        Nails:  9 nail(s) thickened and 9 nail(s) discolored; left 1st nail/toe absent    Vascular Exam    Arteries:  Posterior tibial artery palpable on right  Dorsalis pedis artery palpable on right  Posterior tibial artery palpable on left  Dorsalis pedis artery palpable on left    Veins:  Superficial veins unremarkable on right  Superficial veins unremarkable on left    Swellin+ pitting on right  2+ pitting on left    Neurological Exam    [place]on touch test:  2/6 sites sensed, loss of protective sensation     Musculoskeletal Exam    Footwear:  Diabetic on right  Slipper on left    Gait Exam:   Ambulatory Status: Ambulatory  Gait: NWB LLE temporarily  Assistive Devices: wheelchair    Foot Progression Angle:  Normal on right  Normal on left     Right Lower Extremity Additional Findings:  Right foot and ankle function, strength, and range of motion unremarkable except as noted above.     Left Lower Extremity Additional Findings:  Foot rectus s/p Charcot reconstruction  Left foot and ankle function, strength, and range of motion unremarkable except as noted above.    Imaging and Other Tests    Imagin/10/24 left foot X rays: midfoot Charcot deformity with plantar subluxation of navicular and cuboid (progressed from prior films); also with prior 1st ray amputation and widening of 1st-2nd ray interval    10/12/23 VAS CAMILA: left CAMILA 0.85 (likely above healing threshold)    24 left foot X rays: appears to be some consolidation of the Charcot deformity.     24 left foot X rays: further consolidation of Charcot  deformity    01/04/25 left foot X rays: hardware stable but with some possible shift of midfoot bones around hardware, overall stable    02/19/25 left foot X rays: overall superconstruct appears stable in positioning but with some perihardware lucencies, midtarsal shift plantarly around hardware    Independently reviewed and interpreted imaging, findings are as follows: N/A    Other Tests: The following A1c results were reviewed.   Hemoglobin A1C   Date Value Ref Range Status   03/02/2025 4.9 4.8 - 5.9 % Final   12/09/2024 5.3 4.0 - 5.6 % Final     Comment:     ADA Screening Guidelines:  5.7-6.4%  Consistent with prediabetes  >or=6.5%  Consistent with diabetes    High levels of fetal hemoglobin interfere with the HbA1C  assay. Heterozygous hemoglobin variants (HbS, HgC, etc)do  not significantly interfere with this assay.   However, presence of multiple variants may affect accuracy.     12/02/2024 5.8 4.8 - 5.9 % Final   10/07/2024 6.2 (H) 4.8 - 5.9 % Final   09/11/2023 7.2 (H) 4.0 - 5.6 % Final     Comment:     ADA Screening Guidelines:  5.7-6.4%  Consistent with prediabetes  >or=6.5%  Consistent with diabetes    High levels of fetal hemoglobin interfere with the HbA1C  assay. Heterozygous hemoglobin variants (HbS, HgC, etc)do  not significantly interfere with this assay.   However, presence of multiple variants may affect accuracy.     08/28/2018 7.9 (H) 4.0 - 5.6 % Final     Comment:     ADA Screening Guidelines:  5.7-6.4%  Consistent with prediabetes  >or=6.5%  Consistent with diabetes  High levels of fetal hemoglobin interfere with the HbA1C  assay. Heterozygous hemoglobin variants (HbS, HgC, etc)do  not significantly interfere with this assay.   However, presence of multiple variants may affect accuracy.           Assessment:     Encounter Diagnoses   Name Primary?    Diabetic foot infection Yes    Charcot joint of left foot     Ulcer of right foot with fat layer exposed             Plan:     I counseled the  patient on her conditions, their implications and medical management.    Diabetic foot with peripheral neuropathy and peripheral arterial disease   -Performed shoe inspection and diabetic foot education. Reviewed importance of blood glucose control, proper nutrition, and foot hygiene to minimize risk of complications of diabetes. Recommended daily foot inspections, daily moisturizer to feet, avoiding sharp instruments to feet, appropriate footwear at all times when ambulating, and following up regularly for routine foot care.   -Diabetic foot risk: 2023 IWF high ulcer risk.   -Next foot exam due August 2025.  -Continue amitriptyline.     Right 1st MTPJ wound: acute  -Culture collected, will start antibiotics if positive.   -Debrided, see procedure note.   -RLE football dressing, surgical shoe.     S/p left foot Charcot reconstruction 11/11    -LLE tall surgical boot at all times.  -Will monitor X rays regularly.         Return to clinic in 2 weeks, will get X rays at that time, call sooner PRN.

## 2025-03-07 LAB — BACTERIA SPEC ANAEROBE CULT: NORMAL

## 2025-03-08 LAB — BACTERIA SPEC AEROBE CULT: ABNORMAL

## 2025-03-10 ENCOUNTER — RESULTS FOLLOW-UP (OUTPATIENT)
Dept: PODIATRY | Facility: CLINIC | Age: 55
End: 2025-03-10

## 2025-03-10 ENCOUNTER — TELEPHONE (OUTPATIENT)
Dept: PODIATRY | Facility: CLINIC | Age: 55
End: 2025-03-10
Payer: MEDICARE

## 2025-03-10 ENCOUNTER — PATIENT MESSAGE (OUTPATIENT)
Dept: PODIATRY | Facility: CLINIC | Age: 55
End: 2025-03-10
Payer: MEDICARE

## 2025-03-10 DIAGNOSIS — L08.9 DIABETIC FOOT INFECTION: Primary | ICD-10-CM

## 2025-03-10 DIAGNOSIS — E11.628 DIABETIC FOOT INFECTION: Primary | ICD-10-CM

## 2025-03-10 RX ORDER — DOXYCYCLINE 100 MG/1
100 CAPSULE ORAL 2 TIMES DAILY
Qty: 20 CAPSULE | Refills: 0 | Status: SHIPPED | OUTPATIENT
Start: 2025-03-10

## 2025-03-10 NOTE — TELEPHONE ENCOUNTER
Patient has verbally confirmed her appointment on 03/19/2025 w/Dr. Allison(Podiatry)          ----- Message from Rosarioa sent at 3/10/2025  9:45 AM CDT -----  Regarding: Appt  Contact: [phone]  Who call ? Georgina Arias  What is the request Details : Pt calling to speak with someone in provider office regards confirming appt on 3/12. States appt is not showing up on her My [facility]. Please call pt back.    Can clinic  use patient portal  : No What number to call back : [phone]

## 2025-03-10 NOTE — TELEPHONE ENCOUNTER
Spoke with patient in regards to her lab results and informed her an antibiotics have been sent over to her pharmacy per: Dr. Allison, patient stated she is there now picking medication up              Message Details  Received: Today  Marco Allison DPM [place], [place], [place]  03/06/2025 - Results: [facility], [employer] and others  (Newest Message First)  Marco Allison DPM to Me  (Selected Message)      3/10/25  1:26 PM  Result Note  Please alert pt, sent antibiotics to [facility] pharmacy -Mark

## 2025-03-11 ENCOUNTER — HOSPITAL ENCOUNTER (OUTPATIENT)
Dept: RADIOLOGY | Facility: HOSPITAL | Age: 55
Discharge: HOME | End: 2025-03-11
Attending: NURSE PRACTITIONER
Payer: MEDICARE

## 2025-03-11 DIAGNOSIS — Z80.3 FAMILY HISTORY OF BREAST CANCER: ICD-10-CM

## 2025-03-11 DIAGNOSIS — Z91.89 INCREASED RISK OF BREAST CANCER: ICD-10-CM

## 2025-03-11 PROCEDURE — 76641 ULTRASOUND BREAST COMPLETE: CPT | Mod: 26,50,, | Performed by: RADIOLOGY

## 2025-03-11 PROCEDURE — 76641 ULTRASOUND BREAST COMPLETE: CPT | Mod: TC,50

## 2025-03-18 ENCOUNTER — PATIENT MESSAGE (OUTPATIENT)
Dept: PODIATRY | Facility: CLINIC | Age: 55
End: 2025-03-18
Payer: MEDICARE

## 2025-03-19 ENCOUNTER — OFFICE VISIT (OUTPATIENT)
Dept: PODIATRY | Facility: CLINIC | Age: 55
End: 2025-03-19
Payer: MEDICARE

## 2025-03-19 VITALS
HEART RATE: 65 BPM | SYSTOLIC BLOOD PRESSURE: 174 MMHG | HEIGHT: 67 IN | DIASTOLIC BLOOD PRESSURE: 79 MMHG | BODY MASS INDEX: 34.98 KG/M2

## 2025-03-19 DIAGNOSIS — M14.672 CHARCOT'S JOINT OF LEFT FOOT: ICD-10-CM

## 2025-03-19 DIAGNOSIS — L97.512 ULCER OF RIGHT FOOT WITH FAT LAYER EXPOSED: Primary | ICD-10-CM

## 2025-03-19 DIAGNOSIS — Z98.890 POST-OPERATIVE STATE: ICD-10-CM

## 2025-03-19 PROCEDURE — 99999 PR PBB SHADOW E&M-EST. PATIENT-LVL V: CPT | Mod: PBBFAC,,, | Performed by: STUDENT IN AN ORGANIZED HEALTH CARE EDUCATION/TRAINING PROGRAM

## 2025-03-19 RX ORDER — OXYCODONE AND ACETAMINOPHEN 5; 325 MG/1; MG/1
1 TABLET ORAL EVERY 4 HOURS PRN
Qty: 28 TABLET | Refills: 0 | Status: SHIPPED | OUTPATIENT
Start: 2025-03-19

## 2025-03-19 NOTE — PROGRESS NOTES
Subjective:     Patient    Georgina Arias is a 54 y.o. female.    Problem    2024: Previously followed by Dr Prado. Underwent left foot Charcot reconstruction 11/11/24 complicated by postoperative infection requiring hospitalization. Excessive drainage precluded use of TCC for a period but patient cannot tolerate surgical boots and so required a NWB period in wheelchair. Infection improving with IV antibiotics.      01/02/25: Returns for left foot check s/p surgery almost 2 months ago. Drainage has improved further. No new concerns.     01/10/25: Returns for left foot cast change. Wounds have healed. No new concerns.     01/29/25: Returns for left foot cast change, has been in cast for 2.5 weeks. Interval admission s/p trip and fall. Has two new wounds at left anterior ankle and plantar foot, unsure if these are related to the fall.     02/05/25: Returns for left foot cast change. Having oral surgery next week so can't make it in. Ongoing nerve pain LLE.     02/19/25: Returns 3 months s/p left foot Charcot reconstruction complicated by postop infection, fall. Minimal pain.     03/06/25:Returns for followup left foot Charcot, doing well in boot. Had new wound develop right 1st toe, draining.     03/19/25: Returns for followup of left foot Charcot, also right 1st toe wound. Finishing course of doxycycline for right foot infection. Last X rays 03/11 with some consolidation. No new concerns.       Primary Care Provider    Primary Care Provider: Alonso Ling MD   Last Seen: Patient does not have a PCP or has not yet seen their PCP     History    History obtained from patient and review of medical records.     Past Medical History:   Diagnosis Date    Hyperlipidemia     Hypertension     Peptic ulcer disease     Peripheral artery disease     PONV (postoperative nausea and vomiting)     Stage IV CKD     Type II diabetes mellitus        Past Surgical History:   Procedure Laterality Date    ANGIOGRAM,  LOWER ARTERIAL, UNILATERAL Left 09/13/2023    Procedure: ANGIOGRAM, LOWER ARTERIAL, UNILATERAL;  Surgeon: Maxx Maya MD;  Location: [facility] OR [address] [place] [address];  Service: Vascular;  Laterality: Left;    ANGIOGRAPHY OF LOWER EXTREMITY Left 09/14/2023    Procedure: ANGIOGRAM, LOWER EXTREMITY with balloon angioplasty ultraverse 5mm x 60mm;  Surgeon: Maxx Maya MD;  [facility];  Service: Vascular;  Laterality: Left;  ultraverse 5mm x 60mm  fluoro: 12.41  contrast: 44ml  mGy: 65.57  Gycm2: 23.50    AORTOGRAPHY WITH EXTREMITY RUNOFF Left 09/13/2023    Procedure: AORTOGRAM, WITH EXTREMITY RUNOFF;  Surgeon: Maxx Maya MD;  Location: [facility] OR [address] [place] [address];  Service: Vascular;  Laterality: Left;  8.1 min  663.63 mGy  176.39 Gy.cm  178ml Dye     AV FISTULA PLACEMENT Right     CHOLECYSTECTOMY      COLONOSCOPY  11/06/2013    normal    COLONOSCOPY  05/2023    cancelled due to inadequate prep    COLONOSCOPY  06/2023    results unknown    CREATION, BYPASS, ARTERIAL, AXILLARY TO BILATERAL FEMORAL Left 09/14/2023    Procedure: CREATION, BYPASS, ARTERIAL, AXILLARY TO BILATERAL FEMORAL;  Surgeon: Maxx Maya MD;  Location: [facility] OR [address] [place] [address];  Service: Vascular;  Laterality: Left;  Left Axillary to Bilateral Femoral Bypass, Left Femoral to Above Knee Popliteal Bypass; SLIDER BED    CYSTOSCOPY W/ URETERAL STENT PLACEMENT Right 08/28/2018    Procedure: CYSTOSCOPY, WITH URETERAL STENT INSERTION (ADD ON );  Surgeon: Bubba Perez MD;  Location: [facility] OR;  Service: Urology;  Laterality: Right;  (ADD ON )    DEBRIDEMENT OF FOOT Left 08/03/2023    Procedure: DEBRIDEMENT, FOOT;  Surgeon: Aleida Flannery DPM;  Location: [facility] OR;  Service: Podiatry;  Laterality: Left;    Excisional debridement of ulcer distal great toe left foot w/ partial resection distal phalanx Left 08/03/2023    FOOT AMPUTATION THROUGH METATARSAL Left 09/14/2023    Procedure: AMPUTATION, FOOT, TRANSMETATARSAL partial 1st ray amputation;  Surgeon: Short,  Jesus GRAJEDA DPM;  Location: [facility] OR 2ND FLR;  Service: Podiatry;  Laterality: Left;  partial ray    I&D L 1st ray w/ amputation L hallux IPJ Left 08/14/2023    INCISION AND DRAINAGE FOOT Left 09/14/2023    Procedure: INCISION AND DRAINAGE, FOOT;  Surgeon: Jesus Valles DPM;  Location: [facility] OR 2ND FLR;  Service: Podiatry;  Laterality: Left;    Injection L PT tendon sheath Left 08/14/2023    LENGTHENING OF ACHILLES TENDON Left 11/11/2024    Procedure: LENGTHENING, TENDON, ACHILLES;  Surgeon: Marco Allison DPM;  Location: [facility] OR;  Service: Podiatry;  Laterality: Left;    PERIPHERAL ARTERIAL STENT GRAFT Right     RECONSTRUCTION WITH FUSION OF CHARCOT FOOT Left 11/11/2024    Procedure: RECONSTRUCTION, CHARCOT FOOT, WITH FUSION;  Surgeon: Marco Allison DPM;  Location: [facility] OR;  Service: Podiatry;  Laterality: Left;  4 hours; foot tray; mini c arm; saw/drill    REMOVAL OF NAIL OF DIGIT Left 08/03/2023    Procedure: REMOVAL, NAIL, DIGIT great toe;  Surgeon: Aleida Flannery DPM;  Location: [facility] OR;  Service: Podiatry;  Laterality: Left;    REVISION, AMPUTATION, TRANSMETATARSAL Left 11/11/2024    Procedure: REVISION, AMPUTATION, TRANSMETATARSAL;  Surgeon: Marco Allison DPM;  Location: [facility] OR;  Service: Podiatry;  Laterality: Left;    STENT, SUPERFICIAL FEMORAL ARTERY Left 09/14/2023    Procedure: STENT, SUPERFICIAL FEMORAL ARTERY;  Surgeon: Maxx Maya MD;  Location: [facility] OR 2ND FLR;  Service: Vascular;  Laterality: Left;  5 x 100 mm    TOE AMPUTATION Left 08/14/2023    Procedure: AMPUTATION, TOE hallux IPJ;  Surgeon: Aleida Flannery DPM;  Location: [facility] OR;  Service: Podiatry;  Laterality: Left;    TOE AMPUTATION Left 08/25/2023    Procedure: AMPUTATION, TOE hallux, possible 1st met.head;  Surgeon: Aleida Flannery DPM;  Location: [facility] OR;  Service: Podiatry;  Laterality: Left;    URETEROSCOPIC REMOVAL OF URETERIC CALCULUS Right 09/11/2018    Procedure: EXTRACTION-STONE-URETEROSCOPY;  Surgeon: Bubba MC  MD Ana;  Location: [facility];  Service: Urology;  Laterality: Right;        Objective:     Vitals  Wt Readings from Last 1 Encounters:   25 101.3 kg (223 lb 5.2 oz)     Temp Readings from Last 1 Encounters:   25 97.8 °F (36.6 °C) (Oral)     BP Readings from Last 1 Encounters:   25 (!) 174/79     Pulse Readings from Last 1 Encounters:   25 65       Dermatological Exam    Skin:  Pedal hair growth diminished and Pedal skin thin and shiny on right; ulcer 1st MTPJ to fat, smaller, malodor resolved  Pedal hair growth diminished and Pedal skin thin and shiny on left; no open wounds     Nails:  9 nail(s) thickened and 9 nail(s) discolored; left 1st nail/toe absent    Vascular Exam    Arteries:  Posterior tibial artery palpable on right  Dorsalis pedis artery palpable on right  Posterior tibial artery palpable on left  Dorsalis pedis artery palpable on left    Veins:  Superficial veins unremarkable on right  Superficial veins unremarkable on left    [place]+ pitting on right  2+ pitting on left    Neurological Exam    [place] touch test:  2/6 sites sensed, loss of protective sensation     Musculoskeletal Exam    Footwear:  Diabetic on right  Slipper on left    Gait Exam:   Ambulatory Status: Ambulatory  Gait: NWB LLE temporarily  Assistive Devices: wheelchair    Foot Progression Angle:  Normal on right  Normal on left     Right Lower Extremity Additional Findings:  Right foot and ankle function, strength, and range of motion unremarkable except as noted above.     Left Lower Extremity Additional Findings:  Foot rectus s/p Charcot reconstruction  Left foot and ankle function, strength, and range of motion unremarkable except as noted above.    Imaging and Other Tests    Imagin/10/24 left foot X rays: midfoot Charcot deformity with plantar subluxation of navicular and cuboid (progressed from prior films); also with prior 1st ray amputation and widening of 1st-2nd ray interval    10/12/23  VAS CAMILA: left CAMILA 0.85 (likely above healing threshold)    09/04/24 left foot X rays: appears to be some consolidation of the Charcot deformity.     09/25/24 left foot X rays: further consolidation of Charcot deformity    01/04/25 left foot X rays: hardware stable but with some possible shift of midfoot bones around hardware, overall stable    02/19/25 left foot X rays: overall superconstruct appears stable in positioning but with some perihardware lucencies, midtarsal shift plantarly around hardware    Independently reviewed and interpreted imaging, findings are as follows:     03/11/25 left foot X rays: interval consolidation beginning at midfoot    Other Tests: The following A1c results were reviewed.   Hemoglobin A1C   Date Value Ref Range Status   03/02/2025 4.9 4.8 - 5.9 % Final   12/09/2024 5.3 4.0 - 5.6 % Final     Comment:     ADA Screening Guidelines:  5.7-6.4%  Consistent with prediabetes  >or=6.5%  Consistent with diabetes    High levels of fetal hemoglobin interfere with the HbA1C  assay. Heterozygous hemoglobin variants (HbS, HgC, etc)do  not significantly interfere with this assay.   However, presence of multiple variants may affect accuracy.     12/02/2024 5.8 4.8 - 5.9 % Final   10/07/2024 6.2 (H) 4.8 - 5.9 % Final   09/11/2023 7.2 (H) 4.0 - 5.6 % Final     Comment:     ADA Screening Guidelines:  5.7-6.4%  Consistent with prediabetes  >or=6.5%  Consistent with diabetes    High levels of fetal hemoglobin interfere with the HbA1C  assay. Heterozygous hemoglobin variants (HbS, HgC, etc)do  not significantly interfere with this assay.   However, presence of multiple variants may affect accuracy.     08/28/2018 7.9 (H) 4.0 - 5.6 % Final     Comment:     ADA Screening Guidelines:  5.7-6.4%  Consistent with prediabetes  >or=6.5%  Consistent with diabetes  High levels of fetal hemoglobin interfere with the HbA1C  assay. Heterozygous hemoglobin variants (HbS, HgC, etc)do  not significantly interfere with  this assay.   However, presence of multiple variants may affect accuracy.           Assessment:     Encounter Diagnoses   Name Primary?    Post-operative state     Charcot's joint of left foot     Ulcer of right foot with fat layer exposed Yes              Plan:     I counseled the patient on her conditions, their implications and medical management.    Diabetic foot with peripheral neuropathy and peripheral arterial disease   -Performed shoe inspection and diabetic foot education. Reviewed importance of blood glucose control, proper nutrition, and foot hygiene to minimize risk of complications of diabetes. Recommended daily foot inspections, daily moisturizer to feet, avoiding sharp instruments to feet, appropriate footwear at all times when ambulating, and following up regularly for routine foot care.   -Diabetic foot risk: 2023 IWGDF high ulcer risk.   -Next foot exam due August 2025.  -Continue amitriptyline.     Right 1st MTPJ wound: acute  -Complete antibiotics.    -Debrided, see procedure note.   -RLE football dressing, surgical shoe.     S/p left foot Charcot reconstruction 11/11    -LLE tall surgical boot at all times.  -Will monitor X rays regularly.         Return to clinic in 2 weeks, will get X rays at that time, call sooner PRN.

## 2025-03-19 NOTE — PROCEDURES
"Wound Debridement    Date/Time: 3/19/2025 7:00 AM    Performed by: Marco Allison DPM  Authorized by: Marco Allison DPM    Time out: Immediately prior to procedure a "time out" was called to verify the correct patient, procedure, equipment, support staff and site/side marked as required.    Consent Done?:  Yes (Verbal)    Wound Details:    Location:  Right foot    Location:  Right 1st Metatarsal Head    Type of Debridement:  Excisional       Length (cm):  0.3       Width (cm):  0.3       Depth (cm):  0.1       Area (sq cm):  0.07       Percent Debrided (%):  100       Total Area Debrided (sq cm):  0.07    Depth of debridement:  Subcutaneous tissue    Tissue debrided:  Adipose, Dermis, Epidermis and Subcutaneous    Devitalized tissue debrided:  Callus and Biofilm    Instruments:  Blade and Curette  Bleeding:  Minimal  Hemostasis Achieved: Yes  Method Used:  Pressure  Patient tolerance:  Patient tolerated the procedure well with no immediate complications    "

## 2025-03-24 ENCOUNTER — TELEPHONE (OUTPATIENT)
Dept: PODIATRY | Facility: CLINIC | Age: 55
End: 2025-03-24
Payer: MEDICARE

## 2025-03-24 ENCOUNTER — DOCUMENT SCAN (OUTPATIENT)
Dept: HOME HEALTH SERVICES | Facility: HOSPITAL | Age: 55
End: 2025-03-24
Payer: MEDICARE

## 2025-03-24 NOTE — ASSESSMENT & PLAN NOTE
Managed per primary team  Optimize BG control       Photo Preface (Leave Blank If You Do Not Want): Photographs were obtained today Detail Level: Zone

## 2025-03-24 NOTE — TELEPHONE ENCOUNTER
Spoke with patient to confirm her appointment date and time w/Dr. Allison(Podiatry), appt will be placed in patient portal            ----- Message from Leeann sent at 3/24/2025 11:39 AM CDT -----  Regarding: Scheduling Error (Wound care)  Contact: Georgina  CONSULT/ADVISORYName of Caller: Georgina Contact Preference: [phone] (home) Nature of Call: Pt was supposed to be scheduled for April 3/4 with  upon a call she had with [facility]. Would like a call back to confirm scheduling and for it to be added to her chart

## 2025-03-25 ENCOUNTER — OFFICE VISIT (OUTPATIENT)
Dept: NEUROSURGERY | Facility: CLINIC | Age: 55
End: 2025-03-25
Payer: MEDICARE

## 2025-03-25 VITALS — DIASTOLIC BLOOD PRESSURE: 61 MMHG | SYSTOLIC BLOOD PRESSURE: 103 MMHG

## 2025-03-25 DIAGNOSIS — R90.89 ABNORMAL BRAIN MRI: ICD-10-CM

## 2025-03-25 DIAGNOSIS — R93.0 ABNORMAL CT SCAN OF HEAD: ICD-10-CM

## 2025-03-25 DIAGNOSIS — I63.89 OTHER CEREBRAL INFARCTION: Primary | ICD-10-CM

## 2025-03-25 PROCEDURE — 99999 PR PBB SHADOW E&M-EST. PATIENT-LVL II: CPT | Mod: PBBFAC,,, | Performed by: PHYSICIAN ASSISTANT

## 2025-03-25 NOTE — PROGRESS NOTES
Neurosurgery  History & Physical    SUBJECTIVE:     Chief Complaint: abnormal brain MRI    History of Present Illness:  Georgina Arias is a 54 y.o. female with h/o of ESRD on parenteral dialylsis, DM2, HTN, on plavix (for AV fistula) who was referred to NSGY by Christine Wagner PA-C for abnormal brain MRI. Patient had mechanical fall after dialysis in January. Because she had just had heparin for with dialysis, they recommended brain imaging due to this. The MRI and MRA brain wo contrast demonstrated small varying aged blood products in the R splenium of the corpus callosum indicated possible cavernous malformation vs underlying neoplasm. Patient denies personal history of cancer. She is currently not on the transplant list for kidney due to broken foot. She does still make urine. Denies any headaches, focal weakness, vision changes, confusion.       Review of patient's allergies indicates:   Allergen Reactions    Hydrocodone-acetaminophen Nausea And Vomiting, Rash and Other (See Comments)     Vicodin- severe rash  Lortab- head-spinning that leads to vomiting      Naproxen Anaphylaxis and Swelling     Hives (skin)^swelling  Hives (skin)^swelling  Hives (skin)^swelling    Sulfamethoxazole-trimethoprim Other (See Comments)     Caused JEROME; heart attack    Hydrocodone Nausea And Vomiting and Rash    Adhesive tape-silicones      blisters    Aspartame Hives    Penicillins Hives     Blisters (skin)^    Sulfamethoxazole Other (See Comments)    Trimethoprim Other (See Comments)    Acetaminophen Rash    Adhesive Rash       Current Medications[1]    Past Medical History:   Diagnosis Date    Hyperlipidemia     Hypertension     Peptic ulcer disease     Peripheral artery disease     PONV (postoperative nausea and vomiting)     Stage IV CKD     Type II diabetes mellitus      Past Surgical History:   Procedure Laterality Date    ANGIOGRAM, LOWER ARTERIAL, UNILATERAL Left 09/13/2023    Procedure: ANGIOGRAM, LOWER ARTERIAL,  UNILATERAL;  Surgeon: Maxx Maya MD;  Location: [address];  Service: Vascular;  Laterality: Left;    ANGIOGRAPHY OF LOWER EXTREMITY Left 09/14/2023    Procedure: ANGIOGRAM, LOWER EXTREMITY with balloon angioplasty ultraverse 5mm x 60mm;  Surgeon: Maxx Maya MD;  Location: [address];  Service: Vascular;  Laterality: Left;  ultraverse 5mm x 60mm  fluoro: 12.41  contrast: 44ml  mGy: 65.57  Gycm2: 23.50    AORTOGRAPHY WITH EXTREMITY RUNOFF Left 09/13/2023    Procedure: AORTOGRAM, WITH EXTREMITY RUNOFF;  Surgeon: Maxx Maya MD;  Location: [address];  Service: Vascular;  Laterality: Left;  8.1 min  663.63 mGy  176.39 Gy.cm  178ml Dye     AV FISTULA PLACEMENT Right     CHOLECYSTECTOMY      COLONOSCOPY  11/06/2013    normal    COLONOSCOPY  05/2023    cancelled due to inadequate prep    COLONOSCOPY  06/2023    results unknown    CREATION, BYPASS, ARTERIAL, AXILLARY TO BILATERAL FEMORAL Left 09/14/2023    Procedure: CREATION, BYPASS, ARTERIAL, AXILLARY TO BILATERAL FEMORAL;  Surgeon: Maxx Maya MD;  Location: [address];  Service: Vascular;  Laterality: Left;  Left Axillary to Bilateral Femoral Bypass, Left Femoral to Above Knee Popliteal Bypass; SLIDER BED    CYSTOSCOPY W/ URETERAL STENT PLACEMENT Right 08/28/2018    Procedure: CYSTOSCOPY, WITH URETERAL STENT INSERTION (ADD ON );  Surgeon: Bubba Perez MD;  Location: [facility];  Service: Urology;  Laterality: Right;  (ADD ON )    DEBRIDEMENT OF FOOT Left 08/03/2023    Procedure: DEBRIDEMENT, FOOT;  Surgeon: Aleida Flannery DPM;  Location: [facility];  Service: Podiatry;  Laterality: Left;    Excisional debridement of ulcer distal great toe left foot w/ partial resection distal phalanx Left 08/03/2023    FOOT AMPUTATION THROUGH METATARSAL Left 09/14/2023    Procedure: AMPUTATION, FOOT, TRANSMETATARSAL partial 1st ray amputation;  Surgeon: Jesus Valles DPM;  Location: [address];  Service: Podiatry;  Laterality: Left;   partial ray    I&D L 1st ray w/ amputation L hallux IPJ Left 08/14/2023    INCISION AND DRAINAGE FOOT Left 09/14/2023    Procedure: INCISION AND DRAINAGE, FOOT;  Surgeon: Jesus Valles DPM;  Location: [facility] OR 2ND FLR;  Service: Podiatry;  Laterality: Left;    Injection L PT tendon sheath Left 08/14/2023    LENGTHENING OF ACHILLES TENDON Left 11/11/2024    Procedure: LENGTHENING, TENDON, ACHILLES;  Surgeon: Marco Allison DPM;  Location: [facility] OR;  Service: Podiatry;  Laterality: Left;    PERIPHERAL ARTERIAL STENT GRAFT Right     RECONSTRUCTION WITH FUSION OF CHARCOT FOOT Left 11/11/2024    Procedure: RECONSTRUCTION, CHARCOT FOOT, WITH FUSION;  Surgeon: Marco Allison DPM;  Location: [facility] OR;  Service: Podiatry;  Laterality: Left;  4 hours; foot tray; mini c arm; saw/drill    REMOVAL OF NAIL OF DIGIT Left 08/03/2023    Procedure: REMOVAL, NAIL, DIGIT great toe;  Surgeon: Aleida Flannery DPM;  Location: [facility] OR;  Service: Podiatry;  Laterality: Left;    REVISION, AMPUTATION, TRANSMETATARSAL Left 11/11/2024    Procedure: REVISION, AMPUTATION, TRANSMETATARSAL;  Surgeon: Marco Allison DPM;  Location: [facility] OR;  Service: Podiatry;  Laterality: Left;    STENT, SUPERFICIAL FEMORAL ARTERY Left 09/14/2023    Procedure: STENT, SUPERFICIAL FEMORAL ARTERY;  Surgeon: Maxx Maya MD;  Location: [facility] OR 2ND FLR;  Service: Vascular;  Laterality: Left;  5 x 100 mm    TOE AMPUTATION Left 08/14/2023    Procedure: AMPUTATION, TOE hallux IPJ;  Surgeon: Aleida Flannery DPM;  Location: [facility] OR;  Service: Podiatry;  Laterality: Left;    TOE AMPUTATION Left 08/25/2023    Procedure: AMPUTATION, TOE hallux, possible 1st met.head;  Surgeon: Aleida Flannery DPM;  Location: [facility] OR;  Service: Podiatry;  Laterality: Left;    URETEROSCOPIC REMOVAL OF URETERIC CALCULUS Right 09/11/2018    Procedure: EXTRACTION-STONE-URETEROSCOPY;  Surgeon: Bubba Perez MD;  Location: [facility] OR;  Service: Urology;  Laterality: Right;     Family  History       Problem Relation (Age of Onset)    Breast cancer Mother (71), Maternal Grandmother (75)    Diabetes Mother, Father    Heart attack Maternal Grandfather (44)    Heart disease Father (37), Maternal Grandmother    Hypertension Mother, Father, Brother    Uterine cancer Mother (73)          Social History     Socioeconomic History    Marital status: Single   Occupational History     Employer: [employer]   Tobacco Use    Smoking status: Former     Current packs/day: 0.00     Types: Cigarettes     Quit date: 2006     Years since quittin.6    Smokeless tobacco: Never    Tobacco comments:     smoked off and on for 20 years, was up to 2 packs/day toward the end of that, and quit in    Substance and Sexual Activity    Alcohol use: Not Currently     Comment: occ    Drug use: No    Sexual activity: Not Currently   Social History Narrative    Lives with mom.    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    Some photography.    Close with niece 2 and god-child.    occ exericse     Social Drivers of Health     Financial Resource Strain: Low Risk  (2025)    Overall Financial Resource Strain (CARDIA)     Difficulty of Paying Living Expenses: Not hard at all   Recent Concern: Financial Resource Strain - Medium Risk (12/10/2024)    Overall Financial Resource Strain (CARDIA)     Difficulty of Paying Living Expenses: Somewhat hard   Food Insecurity: No Food Insecurity (2025)    Hunger Vital Sign     Worried About Running Out of Food in the Last Year: Never true     Ran Out of Food in the Last Year: Never true   Recent Concern: Food Insecurity - Food Insecurity Present (2025)    Hunger Vital Sign     Worried About Running Out of Food in the Last Year: Sometimes true     Ran Out of Food in the Last Year: Sometimes true   Transportation Needs: No Transportation Needs (2025)    TRANSPORTATION NEEDS     Transportation : No   Physical Activity: Inactive  (12/17/2024)    Exercise Vital Sign     Days of Exercise per Week: 0 days     Minutes of Exercise per Session: 0 min   Stress: No Stress Concern Present (1/23/2025)    [employer] - Occupational Stress Questionnaire     Feeling of Stress : Not at all   Recent Concern: Stress - Stress Concern Present (12/10/2024)    [employer] - Occupational Stress Questionnaire     Feeling of Stress : To some extent   Housing Stability: Unknown (1/23/2025)    Housing Stability Vital Sign     Unable to Pay for Housing in the Last Year: No     Homeless in the Last Year: No       Review of Systems    OBJECTIVE:     Vital Signs  Pulse: (P) 66  BP: 103/61  Pain Score: 0-No pain  There is no height or weight on file to calculate BMI.      Neurosurgery Physical Exam  General: well developed, well nourished, no distress.   Head: normocephalic, atraumatic  Neurologic: Alert and oriented. Thought content appropriate.  GCS: Motor: 6/Verbal: 5/Eyes: 4 GCS Total: 15  Mental Status: Awake, Alert, Oriented x 4  Language: No aphasia  Speech: No dysarthria  Cranial nerves: face symmetric, tongue midline, CN II-XII grossly intact.   Eyes: EOMI.   Pulmonary: normal respirations, no signs of respiratory distress  Skin: Skin is warm, dry and intact.  Sensory: intact to light touch throughout  Motor Strength:Moves all extremities spontaneously with good tone.  Left ankle boot in place. R foot is wrapped with ace bandage  Ambulates with rolling walker 2/2 L foot fracture        Diagnostic Results:  I have personally reviewed imaging and agree with the findings     MRI/MRA brain without contrast (1/20/25): Small subcentimeter focus of varying aged blood products in the right splenium of the corpus callosum.  Findings favored to represent a cavernous malformation, with an underlying neoplasm not excluded in the absence of intravenous contrast. No acute intracranial abnormality elsewhere. MRA of the  intracranial vasculature appears within normal limits.         ASSESSMENT/PLAN:     54 y.o. female with h/o of ESRD on parenteral dialylsis, DM2, HTN, on plavix (for AV fistula) referred for possible cavernous malformation vs underlying neoplasm found incidentally on MRI/MRA brain wo contrast    -MRI brain w/wo contrast in 3 months, patient has appt with nephrology this week and will check for dialysis protocol for eduardo  -Will have her follow up with Dr. Collins after contrasted MRI to discuss results.   -I have encouraged her to contact the clinic with any questions, concerns, or adverse clinical changes. She verbalized understanding.     Feli Oliveira PA-C  Neurosurgery   [facility]    Time spent on this encounter: 39 minutes. This includes face-to-face time and non-face to face time preparing to see the patient (eg, review of tests), obtaining and/or reviewing separately obtained history, documenting clinical information in the electronic or other health record, independently interpreting results and communicating results to the patient/family/caregiver, or care coordinator         Note dictated with voice recognition software, please excuse any grammatical errors.             [1]   Current Outpatient Medications   Medication Sig Dispense Refill    acetaminophen (TYLENOL) 500 MG tablet Take 2 tablets (1,000 mg total) by mouth every 8 (eight) hours as needed. 60 tablet 0    amitriptyline (ELAVIL) 25 MG tablet Take 1 tablet (25 mg total) by mouth every evening. 30 tablet 11    aspirin (ECOTRIN) 81 MG EC tablet Take 1 tablet (81 mg total) by mouth once daily.      atorvastatin (LIPITOR) 80 MG tablet Take 1 tablet by mouth every evening.      azithromycin (Z-DAMIAN) 250 MG tablet Take by mouth.      blood sugar diagnostic (TRUE METRIX GLUCOSE TEST STRIP) Strp 3 (three) times daily.      blood-glucose meter Misc 1 each by Other route.      carvediloL (COREG) 6.25 MG tablet Take 1 tablet by mouth.    "   chlorhexidine (PERIDEX) 0.12 % solution 15 mLs 2 (two) times daily.      chlorpheniramine-pseudoephedrine-acetaminophen (SINUTAB) 2- mg per tablet 2 tablet EVERY 8 HOURS (route: oral)      clopidogreL (PLAVIX) 75 mg tablet Take 1 tablet by mouth.      COMFORT EZ PEN NEEDLES 32 gauge x 5/32" Ndle SMARTSIG:injection Daily      cyclobenzaprine (FLEXERIL) 10 MG tablet Take 10 mg by mouth.      doxycycline (VIBRAMYCIN) 100 MG Cap Take 1 capsule (100 mg total) by mouth 2 (two) times daily. 20 capsule 0    fluticasone propion-salmeteroL 55-14 mcg/actuation aebs Inhale 1 puff into the lungs.      fluticasone propionate (FLONASE) 50 mcg/actuation nasal spray Fluticasone Propionate 50 MCG/ACT Nasal Suspension QTY: 1 bottle Days: 30 Refills: 5  Written: 09/30/20 Patient Instructions: inhale 2 sprays in each nostril once daily      gabapentin (NEURONTIN) 100 MG capsule Take 1 capsule (100 mg total) by mouth once daily. 30 capsule 0    gabapentin (NEURONTIN) 300 MG capsule Take 1 capsule by mouth 3 (three) times daily.      heparin sodium,porcine/D5W (HEPARIN, PORCINE, IN SODIUM CHLORIDE 45%) 25,000 unit/250 mL infusion Heparin Sodium (Porcine) 1,000 Units/mL Systemic      insulin (LANTUS SOLOSTAR U-100 INSULIN) glargine 100 units/mL (3mL) SubQ pen Inject 40 Units into the skin every evening.  0    insulin glargine U-100, Lantus, (LANTUS SOLOSTAR U-100 INSULIN) 100 unit/mL (3 mL) InPn pen Inject into the skin.      levocetirizine (XYZAL) 5 MG tablet Take 5 mg by mouth.      linaGLIPtin (TRADJENTA) 5 mg Tab tablet Take 1 tablet by mouth once daily.      methoxy peg-epoetin beta (MIRCERA INJ) Inject 100 mcg into the skin.      miconazole NITRATE 2 % (MICOTIN) 2 % top powder Apply topically as needed for Itching. 85 g 2    miscellaneous medical supply (C-TUB) Misc Hearing amplification (BICROS preferred)      ondansetron (ZOFRAN) 4 MG tablet Take 4 mg by mouth every 6 (six) hours as needed.      oxyCODONE-acetaminophen " (PERCOCET) 5-325 mg per tablet Take 1 tablet by mouth every 4 (four) hours as needed for Pain. 28 tablet 0    pediatric multivitamin chewable tablet Take 1 tablet by mouth once daily.      pediatric multivitamin no.76 (FLINTSTONES COMPLETE) Chew Take 1 tablet by mouth.      potassium chloride SA (K-DUR,KLOR-CON) 20 MEQ tablet Take 20 mEq by mouth.      promethazine (PHENERGAN) 12.5 MG Tab Take 1 tablet (12.5 mg total) by mouth every 6 (six) hours as needed (to use with Norco, known to tolerate). 28 tablet 0    sevelamer carbonate (RENVELA) 800 mg Tab Take by mouth.      taurolidine in heparin sodium 13.5 mg- 1,000 unit/mL Soln Heparin Sodium (Porcine) 1,000 Units/mL Systemic      tirzepatide (MOUNJARO) 10 mg/0.5 mL PnIj Inject 10 mg into the skin.      triamcinolone acetonide 0.1% (KENALOG) 0.1 % ointment Apply topically 2 (two) times daily.      TRUEPLUS LANCETS 30 gauge Misc       vit B,C-FA-zinc-selen-vit D3-E (RENAPLEX-D) 800 mcg-12.5 mg -2,000 unit Tab Take by mouth.      acetaminophen (TYLENOL) 500 MG tablet Take 1,000 mg by mouth every 6 (six) hours as needed.      amitriptyline (ELAVIL) 25 MG tablet Take 1 tablet by mouth every evening.      carvediloL (COREG) 6.25 MG tablet Take 6.25 mg by mouth 2 (two) times daily with meals.      clopidogreL (PLAVIX) 75 mg tablet Take 1 tablet by mouth once daily.      ondansetron (ZOFRAN-ODT) 4 MG TbDL Take 4 mg by mouth every 6 (six) hours.      promethazine (PHENERGAN) 12.5 MG Tab 1 tablet EVERY 6 HOURS (route: oral)      sevelamer carbonate (RENVELA) 800 mg Tab 1 tablet.       Current Facility-Administered Medications   Medication Dose Route Frequency Provider Last Rate Last Admin    sodium chloride 0.9% flush 10 mL  10 mL Intravenous PRN Aleida Flannery DPM

## 2025-04-02 ENCOUNTER — OFFICE VISIT (OUTPATIENT)
Dept: PODIATRY | Facility: CLINIC | Age: 55
End: 2025-04-02
Payer: MEDICARE

## 2025-04-02 VITALS
DIASTOLIC BLOOD PRESSURE: 77 MMHG | HEART RATE: 80 BPM | SYSTOLIC BLOOD PRESSURE: 131 MMHG | HEIGHT: 67 IN | BODY MASS INDEX: 34.98 KG/M2

## 2025-04-02 DIAGNOSIS — Z87.2 HISTORY OF CHRONIC SKIN ULCER: ICD-10-CM

## 2025-04-02 DIAGNOSIS — E11.42 TYPE 2 DIABETES MELLITUS WITH PERIPHERAL NEUROPATHY: ICD-10-CM

## 2025-04-02 DIAGNOSIS — M14.672 CHARCOT'S JOINT OF LEFT FOOT: Primary | ICD-10-CM

## 2025-04-02 PROCEDURE — 1159F MED LIST DOCD IN RCRD: CPT | Mod: CPTII,S$GLB,, | Performed by: STUDENT IN AN ORGANIZED HEALTH CARE EDUCATION/TRAINING PROGRAM

## 2025-04-02 PROCEDURE — 3078F DIAST BP <80 MM HG: CPT | Mod: CPTII,S$GLB,, | Performed by: STUDENT IN AN ORGANIZED HEALTH CARE EDUCATION/TRAINING PROGRAM

## 2025-04-02 PROCEDURE — 3008F BODY MASS INDEX DOCD: CPT | Mod: CPTII,S$GLB,, | Performed by: STUDENT IN AN ORGANIZED HEALTH CARE EDUCATION/TRAINING PROGRAM

## 2025-04-02 PROCEDURE — 3066F NEPHROPATHY DOC TX: CPT | Mod: CPTII,S$GLB,, | Performed by: STUDENT IN AN ORGANIZED HEALTH CARE EDUCATION/TRAINING PROGRAM

## 2025-04-02 PROCEDURE — 3044F HG A1C LEVEL LT 7.0%: CPT | Mod: CPTII,S$GLB,, | Performed by: STUDENT IN AN ORGANIZED HEALTH CARE EDUCATION/TRAINING PROGRAM

## 2025-04-02 PROCEDURE — 99214 OFFICE O/P EST MOD 30 MIN: CPT | Mod: S$GLB,,, | Performed by: STUDENT IN AN ORGANIZED HEALTH CARE EDUCATION/TRAINING PROGRAM

## 2025-04-02 PROCEDURE — 3075F SYST BP GE 130 - 139MM HG: CPT | Mod: CPTII,S$GLB,, | Performed by: STUDENT IN AN ORGANIZED HEALTH CARE EDUCATION/TRAINING PROGRAM

## 2025-04-02 PROCEDURE — 99999 PR PBB SHADOW E&M-EST. PATIENT-LVL IV: CPT | Mod: PBBFAC,,, | Performed by: STUDENT IN AN ORGANIZED HEALTH CARE EDUCATION/TRAINING PROGRAM

## 2025-04-02 RX ORDER — SEVELAMER CARBONATE 800 MG/1
1 TABLET, FILM COATED ORAL
COMMUNITY
Start: 2025-01-28 | End: 2026-01-28

## 2025-04-02 RX ORDER — GENTAMICIN SULFATE 1 MG/G
CREAM TOPICAL
COMMUNITY
Start: 2025-03-20

## 2025-04-02 NOTE — PROGRESS NOTES
Subjective:     Patient    Georgina Arias is a 54 y.o. female.    Problem    2024: Previously followed by Dr Prado. Underwent left foot Charcot reconstruction 11/11/24 complicated by postoperative infection requiring hospitalization. Excessive drainage precluded use of TCC for a period but patient cannot tolerate surgical boots and so required a NWB period in wheelchair. Infection improving with IV antibiotics.      01/02/25: Returns for left foot check s/p surgery almost 2 months ago. Drainage has improved further. No new concerns.     01/10/25: Returns for left foot cast change. Wounds have healed. No new concerns.     01/29/25: Returns for left foot cast change, has been in cast for 2.5 weeks. Interval admission s/p trip and fall. Has two new wounds at left anterior ankle and plantar foot, unsure if these are related to the fall.     02/05/25: Returns for left foot cast change. Having oral surgery next week so can't make it in. Ongoing nerve pain LLE.     02/19/25: Returns 3 months s/p left foot Charcot reconstruction complicated by postop infection, fall. Minimal pain.     03/06/25:Returns for followup left foot Charcot, doing well in boot. Had new wound develop right 1st toe, draining.     03/19/25: Returns for followup of left foot Charcot, also right 1st toe wound. Finishing course of doxycycline for right foot infection. Last X rays 03/11 with some consolidation. No new concerns.     04/02/25: Returns for followup of left foot Charcot, also right 1st toe wound. Right toe wound now healed, left foot remains healed. No new concerns. Has not had diabetic shoes in a few years.       Primary Care Provider    Primary Care Provider: Alonso Ling MD   Last Seen: Patient does not have a PCP or has not yet seen their PCP     History    History obtained from patient and review of medical records.     Past Medical History:   Diagnosis Date    Hyperlipidemia     Hypertension     Peptic ulcer disease      Peripheral artery disease     PONV (postoperative nausea and vomiting)     Stage IV CKD     Type II diabetes mellitus        Past Surgical History:   Procedure Laterality Date    ANGIOGRAM, LOWER ARTERIAL, UNILATERAL Left 09/13/2023    Procedure: ANGIOGRAM, LOWER ARTERIAL, UNILATERAL;  Surgeon: Maxx Maya MD;  Location: [address];  Service: Vascular;  Laterality: Left;    ANGIOGRAPHY OF LOWER EXTREMITY Left 09/14/2023    Procedure: ANGIOGRAM, LOWER EXTREMITY with balloon angioplasty ultraverse 5mm x 60mm;  Surgeon: Maxx Maya MD;  Location: [address];  Service: Vascular;  Laterality: Left;  ultraverse 5mm x 60mm  fluoro: 12.41  contrast: 44ml  mGy: 65.57  Gycm2: 23.50    AORTOGRAPHY WITH EXTREMITY RUNOFF Left 09/13/2023    Procedure: AORTOGRAM, WITH EXTREMITY RUNOFF;  Surgeon: Maxx Maya MD;  Location: [address];  Service: Vascular;  Laterality: Left;  8.1 min  663.63 mGy  176.39 Gy.cm  178ml Dye     AV FISTULA PLACEMENT Right     CHOLECYSTECTOMY      COLONOSCOPY  11/06/2013    normal    COLONOSCOPY  05/2023    cancelled due to inadequate prep    COLONOSCOPY  06/2023    results unknown    CREATION, BYPASS, ARTERIAL, AXILLARY TO BILATERAL FEMORAL Left 09/14/2023    Procedure: CREATION, BYPASS, ARTERIAL, AXILLARY TO BILATERAL FEMORAL;  Surgeon: Maxx Maya MD;  Location: [address];  Service: Vascular;  Laterality: Left;  Left Axillary to Bilateral Femoral Bypass, Left Femoral to Above Knee Popliteal Bypass; SLIDER BED    CYSTOSCOPY W/ URETERAL STENT PLACEMENT Right 08/28/2018    Procedure: CYSTOSCOPY, WITH URETERAL STENT INSERTION (ADD ON );  Surgeon: Bubba Perez MD;  Location: [facility] OR;  Service: Urology;  Laterality: Right;  (ADD ON )    DEBRIDEMENT OF FOOT Left 08/03/2023    Procedure: DEBRIDEMENT, FOOT;  Surgeon: Aleida Flannery DPM;  [place] [facility] [place] [place];  Service: Podiatry;  Laterality: Left;    Excisional debridement of ulcer distal great toe left foot w/  partial resection distal phalanx Left 08/03/2023    FOOT AMPUTATION THROUGH METATARSAL Left 09/14/2023    Procedure: AMPUTATION, FOOT, TRANSMETATARSAL partial 1st ray amputation;  Surgeon: Jesus Valles DPM;  Location: [facility] OR 2ND FLR;  Service: Podiatry;  Laterality: Left;  partial ray    I&D L 1st ray w/ amputation L hallux IPJ Left 08/14/2023    INCISION AND DRAINAGE FOOT Left 09/14/2023    Procedure: INCISION AND DRAINAGE, FOOT;  Surgeon: Jesus Valles DPM;  Location: [facility] OR 2ND FLR;  Service: Podiatry;  Laterality: Left;    Injection L PT tendon sheath Left 08/14/2023    LENGTHENING OF ACHILLES TENDON Left 11/11/2024    Procedure: LENGTHENING, TENDON, ACHILLES;  Surgeon: Marco Allison DPM;  Location: [facility] OR;  Service: Podiatry;  Laterality: Left;    PERIPHERAL ARTERIAL STENT GRAFT Right     RECONSTRUCTION WITH FUSION OF CHARCOT FOOT Left 11/11/2024    Procedure: RECONSTRUCTION, CHARCOT FOOT, WITH FUSION;  Surgeon: Marco Allison DPM;  Location: [facility] OR;  Service: Podiatry;  Laterality: Left;  4 hours; foot tray; mini c arm; saw/drill    REMOVAL OF NAIL OF DIGIT Left 08/03/2023    Procedure: REMOVAL, NAIL, DIGIT great toe;  Surgeon: Aleida Flannery DPM;  Location: [facility] OR;  Service: Podiatry;  Laterality: Left;    REVISION, AMPUTATION, TRANSMETATARSAL Left 11/11/2024    Procedure: REVISION, AMPUTATION, TRANSMETATARSAL;  Surgeon: Marco Allison DPM;  Location: [facility] OR;  Service: Podiatry;  Laterality: Left;    STENT, SUPERFICIAL FEMORAL ARTERY Left 09/14/2023    Procedure: STENT, SUPERFICIAL FEMORAL ARTERY;  Surgeon: Maxx Maya MD;  Location: [facility] OR 2ND FLR;  Service: Vascular;  Laterality: Left;  5 x 100 mm    TOE AMPUTATION Left 08/14/2023    Procedure: AMPUTATION, TOE hallux IPJ;  Surgeon: Aleida Flannery DPM;  Location: [facility] OR;  Service: Podiatry;  Laterality: Left;    TOE AMPUTATION Left 08/25/2023    Procedure: AMPUTATION, TOE hallux, possible 1st met.head;  Surgeon: Aleida Flannery  DPM;  Location: [facility];  Service: Podiatry;  Laterality: Left;    URETEROSCOPIC REMOVAL OF URETERIC CALCULUS Right 2018    Procedure: EXTRACTION-STONE-URETEROSCOPY;  Surgeon: Bubba Perez MD;  Location: [facility];  Service: Urology;  Laterality: Right;        Objective:     Vitals  Wt Readings from Last 1 Encounters:   25 101.3 kg (223 lb 5.2 oz)     Temp Readings from Last 1 Encounters:   25 97.8 °F (36.6 °C) (Oral)     BP Readings from Last 1 Encounters:   25 131/77     Pulse Readings from Last 1 Encounters:   25 80       Dermatological Exam    Skin:  Pedal hair growth diminished and Pedal skin thin and shiny on right; postulcerative callus 1st MTPJ  Pedal hair growth diminished and Pedal skin thin and shiny on left; no open wounds     Nails:  9 nail(s) thickened and 9 nail(s) discolored; left 1st nail/toe absent    Vascular Exam    Arteries:  Posterior tibial artery palpable on right  Dorsalis pedis artery palpable on right  Posterior tibial artery palpable on left  Dorsalis pedis artery palpable on left    Veins:  Superficial veins unremarkable on right  Superficial veins unremarkable on left    Swellin+ pitting on right  2+ pitting on left    Neurological Exam    [place] touch test:  2/6 sites sensed, loss of protective sensation     Musculoskeletal Exam    Footwear:  Slipper on right  Boot on left    Gait Exam:   Ambulatory Status: Ambulatory  Gait: Asymmetrical, apropulsive  Assistive Devices: wheelchair    Foot Progression Angle:  Normal on right  Normal on left     Right Lower Extremity Additional Findings:  Right foot and ankle function, strength, and range of motion unremarkable except as noted above.     Left Lower Extremity Additional Findings:  Foot rectus s/p Charcot reconstruction  Left foot and ankle function, strength, and range of motion unremarkable except as noted above.    Imaging and Other Tests    Imagin/10/24 left foot X rays: midfoot Charcot  deformity with plantar subluxation of navicular and cuboid (progressed from prior films); also with prior 1st ray amputation and widening of 1st-2nd ray interval    10/12/23 VAS CAMILA: left CAMILA 0.85 (likely above healing threshold)    09/04/24 left foot X rays: appears to be some consolidation of the Charcot deformity.     09/25/24 left foot X rays: further consolidation of Charcot deformity    01/04/25 left foot X rays: hardware stable but with some possible shift of midfoot bones around hardware, overall stable    02/19/25 left foot X rays: overall superconstruct appears stable in positioning but with some perihardware lucencies, midtarsal shift plantarly around hardware    03/11/25 left foot X rays: interval consolidation beginning at midfoot    Independently reviewed and interpreted imaging, findings are as follows:     03/29/25 left foot X rays: stable since last images    Other Tests: The following A1c results were reviewed.   Hemoglobin A1C   Date Value Ref Range Status   03/02/2025 4.9 4.8 - 5.9 % Final   12/09/2024 5.3 4.0 - 5.6 % Final     Comment:     ADA Screening Guidelines:  5.7-6.4%  Consistent with prediabetes  >or=6.5%  Consistent with diabetes    High levels of fetal hemoglobin interfere with the HbA1C  assay. Heterozygous hemoglobin variants (HbS, HgC, etc)do  not significantly interfere with this assay.   However, presence of multiple variants may affect accuracy.     12/02/2024 5.8 4.8 - 5.9 % Final   10/07/2024 6.2 (H) 4.8 - 5.9 % Final   09/11/2023 7.2 (H) 4.0 - 5.6 % Final     Comment:     ADA Screening Guidelines:  5.7-6.4%  Consistent with prediabetes  >or=6.5%  Consistent with diabetes    High levels of fetal hemoglobin interfere with the HbA1C  assay. Heterozygous hemoglobin variants (HbS, HgC, etc)do  not significantly interfere with this assay.   However, presence of multiple variants may affect accuracy.     08/28/2018 7.9 (H) 4.0 - 5.6 % Final     Comment:     ADA Screening  Guidelines:  5.7-6.4%  Consistent with prediabetes  >or=6.5%  Consistent with diabetes  High levels of fetal hemoglobin interfere with the HbA1C  assay. Heterozygous hemoglobin variants (HbS, HgC, etc)do  not significantly interfere with this assay.   However, presence of multiple variants may affect accuracy.           Assessment:     Encounter Diagnoses   Name Primary?    Charcot's joint of left foot Yes    History of chronic skin ulcer     Type 2 diabetes mellitus with peripheral neuropathy           Plan:     I counseled the patient on her conditions, their implications and medical management.    Diabetic foot with peripheral neuropathy and peripheral arterial disease   -Performed shoe inspection and diabetic foot education. Reviewed importance of blood glucose control, proper nutrition, and foot hygiene to minimize risk of complications of diabetes. Recommended daily foot inspections, daily moisturizer to feet, avoiding sharp instruments to feet, appropriate footwear at all times when ambulating, and following up regularly for routine foot care.   -Diabetic foot risk: 2023 IWF high ulcer risk.   -Next foot exam due August 2025.  -Continue amitriptyline.     Right 1st MTPJ wound: healed  -Patient is under a comprehensive diabetes management plan and has history of ulceration of the foot. Ordered diabetic shoes and inserts.     S/p left foot Charcot reconstruction 11/11    -LLE tall surgical boot at all times until diabetic shoes.  -Patient is under a comprehensive diabetes management plan and has foot deformity. Ordered diabetic shoes and inserts.   -Will monitor X rays regularly.         Return to clinic in 4 weeks, call sooner PRN.

## 2025-04-30 ENCOUNTER — OFFICE VISIT (OUTPATIENT)
Dept: PODIATRY | Facility: CLINIC | Age: 55
End: 2025-04-30
Payer: MEDICARE

## 2025-04-30 VITALS
DIASTOLIC BLOOD PRESSURE: 67 MMHG | HEART RATE: 72 BPM | BODY MASS INDEX: 34.98 KG/M2 | SYSTOLIC BLOOD PRESSURE: 123 MMHG | HEIGHT: 67 IN

## 2025-04-30 DIAGNOSIS — L84 CORN OR CALLUS: ICD-10-CM

## 2025-04-30 DIAGNOSIS — G89.29 CHRONIC PAIN IN LEFT FOOT: ICD-10-CM

## 2025-04-30 DIAGNOSIS — B35.1 TINEA UNGUIUM: ICD-10-CM

## 2025-04-30 DIAGNOSIS — M14.672 CHARCOT'S JOINT OF LEFT FOOT: Primary | ICD-10-CM

## 2025-04-30 DIAGNOSIS — M79.672 CHRONIC PAIN IN LEFT FOOT: ICD-10-CM

## 2025-04-30 DIAGNOSIS — Z98.890 POST-OPERATIVE STATE: ICD-10-CM

## 2025-04-30 DIAGNOSIS — E11.42 TYPE 2 DIABETES MELLITUS WITH PERIPHERAL NEUROPATHY: ICD-10-CM

## 2025-04-30 PROCEDURE — 99999 PR PBB SHADOW E&M-EST. PATIENT-LVL IV: CPT | Mod: PBBFAC,,, | Performed by: STUDENT IN AN ORGANIZED HEALTH CARE EDUCATION/TRAINING PROGRAM

## 2025-04-30 RX ORDER — GENTAMICIN 10 MG/ML
80 INJECTION, SOLUTION INTRAMUSCULAR; INTRAVENOUS
COMMUNITY
Start: 2025-04-07

## 2025-04-30 RX ORDER — INSULIN GLARGINE 100 [IU]/ML
30 INJECTION, SOLUTION SUBCUTANEOUS
COMMUNITY
Start: 2025-04-10

## 2025-04-30 RX ORDER — OXYCODONE AND ACETAMINOPHEN 5; 325 MG/1; MG/1
1 TABLET ORAL EVERY 4 HOURS PRN
Qty: 60 TABLET | Refills: 0 | Status: SHIPPED | OUTPATIENT
Start: 2025-04-30

## 2025-04-30 RX ORDER — GABAPENTIN 100 MG/1
100 CAPSULE ORAL 3 TIMES DAILY
COMMUNITY
Start: 2025-04-10

## 2025-04-30 RX ORDER — LANCETS 33 GAUGE
EACH MISCELLANEOUS 3 TIMES DAILY
COMMUNITY
Start: 2025-04-11

## 2025-04-30 NOTE — PROGRESS NOTES
Subjective:     Patient    Georgina Arias is a 54 y.o. female.    Problem    2024: Previously followed by Dr Prado. Underwent left foot Charcot reconstruction 11/11/24 complicated by postoperative infection requiring hospitalization. Excessive drainage precluded use of TCC for a period but patient cannot tolerate surgical boots and so required a NWB period in wheelchair. Infection improving with IV antibiotics.      01/02/25: Returns for left foot check s/p surgery almost 2 months ago. Drainage has improved further. No new concerns.     01/10/25: Returns for left foot cast change. Wounds have healed. No new concerns.     01/29/25: Returns for left foot cast change, has been in cast for 2.5 weeks. Interval admission s/p trip and fall. Has two new wounds at left anterior ankle and plantar foot, unsure if these are related to the fall.     02/05/25: Returns for left foot cast change. Having oral surgery next week so can't make it in. Ongoing nerve pain LLE.     02/19/25: Returns 3 months s/p left foot Charcot reconstruction complicated by postop infection, fall. Minimal pain.     03/06/25:Returns for followup left foot Charcot, doing well in boot. Had new wound develop right 1st toe, draining.     03/19/25: Returns for followup of left foot Charcot, also right 1st toe wound. Finishing course of doxycycline for right foot infection. Last X rays 03/11 with some consolidation. No new concerns.     04/02/25: Returns for followup of left foot Charcot, also right 1st toe wound. Right toe wound now healed, left foot remains healed. No new concerns. Has not had diabetic shoes in a few years.     04/30/25: Returns for followup of left foot Charcot; at last visit left plantar midfoot wound and right 1st toe wound remained healed; ordered diabetic shoes and inserts. Shoes and inserts should be ready soon. Wounds remain healed. Requesting routine foot care. Needs more pain meds.       Primary Care Provider    Primary  Care Provider: Alonso Ling MD   Last Seen: Patient does not have a PCP or has not yet seen their PCP     History    History obtained from patient and review of medical records.     Past Medical History:   Diagnosis Date    Hyperlipidemia     Hypertension     Peptic ulcer disease     Peripheral artery disease     PONV (postoperative nausea and vomiting)     Stage IV CKD     Type II diabetes mellitus        Past Surgical History:   Procedure Laterality Date    ANGIOGRAM, LOWER ARTERIAL, UNILATERAL Left 09/13/2023    Procedure: ANGIOGRAM, LOWER ARTERIAL, UNILATERAL;  Surgeon: Maxx Maya MD;  Location: [address];  Service: Vascular;  Laterality: Left;    ANGIOGRAPHY OF LOWER EXTREMITY Left 09/14/2023    Procedure: ANGIOGRAM, LOWER EXTREMITY with balloon angioplasty ultraverse 5mm x 60mm;  Surgeon: Maxx Maya MD;  Location: [facility] OR [address] [place] [address];  Service: Vascular;  Laterality: Left;  ultraverse 5mm x 60mm  fluoro: 12.41  contrast: 44ml  mGy: 65.57  Gycm2: 23.50    AORTOGRAPHY WITH EXTREMITY RUNOFF Left 09/13/2023    Procedure: AORTOGRAM, WITH EXTREMITY RUNOFF;  Surgeon: Maxx Maya MD;  Location: [address];  Service: Vascular;  Laterality: Left;  8.1 min  663.63 mGy  176.39 Gy.cm  178ml Dye     AV FISTULA PLACEMENT Right     CHOLECYSTECTOMY      COLONOSCOPY  11/06/2013    normal    COLONOSCOPY  05/2023    cancelled due to inadequate prep    COLONOSCOPY  06/2023    results unknown    CREATION, BYPASS, ARTERIAL, AXILLARY TO BILATERAL FEMORAL Left 09/14/2023    Procedure: CREATION, BYPASS, ARTERIAL, AXILLARY TO BILATERAL FEMORAL;  Surgeon: Maxx Maya MD;  Location: [address];  Service: Vascular;  Laterality: Left;  Left Axillary to Bilateral Femoral Bypass, Left Femoral to Above Knee Popliteal Bypass; SLIDER BED    CYSTOSCOPY W/ URETERAL STENT PLACEMENT Right 08/28/2018    Procedure: CYSTOSCOPY, WITH URETERAL STENT INSERTION (ADD ON );  Surgeon: Bubba Perez MD;  Location:  [facility] OR;  Service: Urology;  Laterality: Right;  (ADD ON )    DEBRIDEMENT OF FOOT Left 08/03/2023    Procedure: DEBRIDEMENT, FOOT;  Surgeon: Aleida Flannery DPM;  Location: [facility] OR;  Service: Podiatry;  Laterality: Left;    Excisional debridement of ulcer distal great toe left foot w/ partial resection distal phalanx Left 08/03/2023    FOOT AMPUTATION THROUGH METATARSAL Left 09/14/2023    Procedure: AMPUTATION, FOOT, TRANSMETATARSAL partial 1st ray amputation;  Surgeon: Jesus Valles DPM;  Location: [facility] OR 2ND FLR;  Service: Podiatry;  Laterality: Left;  partial ray    I&D L 1st ray w/ amputation L hallux IPJ Left 08/14/2023    INCISION AND DRAINAGE FOOT Left 09/14/2023    Procedure: INCISION AND DRAINAGE, FOOT;  Surgeon: Jesus Valles DPM;  [facility];  Service: Podiatry;  Laterality: Left;    Injection L PT tendon sheath Left 08/14/2023    LENGTHENING OF ACHILLES TENDON Left 11/11/2024    Procedure: LENGTHENING, TENDON, ACHILLES;  Surgeon: Marco Allison DPM;  Location: [facility] OR;  Service: Podiatry;  Laterality: Left;    PERIPHERAL ARTERIAL STENT GRAFT Right     RECONSTRUCTION WITH FUSION OF CHARCOT FOOT Left 11/11/2024    Procedure: RECONSTRUCTION, CHARCOT FOOT, WITH FUSION;  Surgeon: Marco Allison DPM;  Location: [facility] OR;  Service: Podiatry;  Laterality: Left;  4 hours; foot tray; mini c arm; saw/drill    REMOVAL OF NAIL OF DIGIT Left 08/03/2023    Procedure: REMOVAL, NAIL, DIGIT great toe;  Surgeon: Aleida Flannery DPM;  Location: [facility] OR;  Service: Podiatry;  Laterality: Left;    REVISION, AMPUTATION, TRANSMETATARSAL Left 11/11/2024    Procedure: REVISION, AMPUTATION, TRANSMETATARSAL;  Surgeon: Marco Allison DPM;  Location: [facility] OR;  Service: Podiatry;  Laterality: Left;    STENT, SUPERFICIAL FEMORAL ARTERY Left 09/14/2023    Procedure: STENT, SUPERFICIAL FEMORAL ARTERY;  Surgeon: Maxx Maya MD;  Location: [facility] OR 2ND FLR;  Service: Vascular;  Laterality: Left;  5 x 100  mm    TOE AMPUTATION Left 2023    Procedure: AMPUTATION, TOE hallux IPJ;  Surgeon: Aleida Flannery DPM;  Location: [facility] OR;  Service: Podiatry;  Laterality: Left;    TOE AMPUTATION Left 2023    Procedure: AMPUTATION, TOE hallux, possible 1st met.head;  Surgeon: Aleida Flannery DPM;  Location: [facility] OR;  Service: Podiatry;  Laterality: Left;    URETEROSCOPIC REMOVAL OF URETERIC CALCULUS Right 2018    Procedure: EXTRACTION-STONE-URETEROSCOPY;  Surgeon: Bubba Perez MD;  Location: [facility] OR;  Service: Urology;  Laterality: Right;        Objective:     Vitals  Wt Readings from Last 1 Encounters:   25 101.3 kg (223 lb 5.2 oz)     Temp Readings from Last 1 Encounters:   25 97.8 °F (36.6 °C) (Oral)     BP Readings from Last 1 Encounters:   25 123/67     Pulse Readings from Last 1 Encounters:   25 72       Dermatological Exam    Skin:  Pedal hair growth diminished and Pedal skin thin and shiny on right; postulcerative callus 1st MTPJ, calluses 3rd MTPJ and 5th MTPJ  Pedal hair growth diminished and Pedal skin thin and shiny on left; no open wounds     Nails:  9 nail(s) thickened and 9 nail(s) discolored; left 1st nail/toe absent    Vascular Exam    Arteries:  Posterior tibial artery palpable on right  Dorsalis pedis artery palpable on right  Posterior tibial artery palpable on left  Dorsalis pedis artery palpable on left    Veins:  Superficial veins unremarkable on right  Superficial veins unremarkable on left    Swellin+ pitting on right  2+ pitting on left    Neurological Exam    [place] touch test:  2/6 sites sensed, loss of protective sensation     Musculoskeletal Exam    Footwear:  Slipper on right  Boot on left    Gait Exam:   Ambulatory Status: Ambulatory  Gait: Asymmetrical, apropulsive  Assistive Devices: wheelchair    Foot Progression Angle:  Normal on right  Normal on left     Right Lower Extremity Additional Findings:  Right foot and ankle function, strength, and range  of motion unremarkable except as noted above.     Left Lower Extremity Additional Findings:  Foot rectus s/p Charcot reconstruction  Left foot and ankle function, strength, and range of motion unremarkable except as noted above.    Imaging and Other Tests    Imagin/10/24 left foot X rays: midfoot Charcot deformity with plantar subluxation of navicular and cuboid (progressed from prior films); also with prior 1st ray amputation and widening of 1st-2nd ray interval    10/12/23 VAS CAMILA: left CAMILA 0.85 (likely above healing threshold)    24 left foot X rays: appears to be some consolidation of the Charcot deformity.     24 left foot X rays: further consolidation of Charcot deformity    25 left foot X rays: hardware stable but with some possible shift of midfoot bones around hardware, overall stable    25 left foot X rays: overall superconstruct appears stable in positioning but with some perihardware lucencies, midtarsal shift plantarly around hardware    25 left foot X rays: interval consolidation beginning at midfoot    25 left foot X rays: stable since last images    Independently reviewed and interpreted imaging, findings are as follows: N/A     Other Tests: The following A1c results were reviewed.   Hemoglobin A1C   Date Value Ref Range Status   2025 4.9 4.8 - 5.9 % Final   2024 5.3 4.0 - 5.6 % Final     Comment:     ADA Screening Guidelines:  5.7-6.4%  Consistent with prediabetes  >or=6.5%  Consistent with diabetes    High levels of fetal hemoglobin interfere with the HbA1C  assay. Heterozygous hemoglobin variants (HbS, HgC, etc)do  not significantly interfere with this assay.   However, presence of multiple variants may affect accuracy.     2024 5.8 4.8 - 5.9 % Final   10/07/2024 6.2 (H) 4.8 - 5.9 % Final   2023 7.2 (H) 4.0 - 5.6 % Final     Comment:     ADA Screening Guidelines:  5.7-6.4%  Consistent with prediabetes  >or=6.5%  Consistent with  diabetes    High levels of fetal hemoglobin interfere with the HbA1C  assay. Heterozygous hemoglobin variants (HbS, HgC, etc)do  not significantly interfere with this assay.   However, presence of multiple variants may affect accuracy.     08/28/2018 7.9 (H) 4.0 - 5.6 % Final     Comment:     ADA Screening Guidelines:  5.7-6.4%  Consistent with prediabetes  >or=6.5%  Consistent with diabetes  High levels of fetal hemoglobin interfere with the HbA1C  assay. Heterozygous hemoglobin variants (HbS, HgC, etc)do  not significantly interfere with this assay.   However, presence of multiple variants may affect accuracy.           Assessment:     Encounter Diagnoses   Name Primary?    Charcot's joint of left foot Yes    Type 2 diabetes mellitus with peripheral neuropathy     Chronic pain in left foot     Post-operative state     Tinea unguium     Corn or callus             Plan:     I counseled the patient on her conditions, their implications and medical management.    Diabetic foot with peripheral neuropathy and peripheral arterial disease   -Performed shoe inspection and diabetic foot education. Reviewed importance of blood glucose control, proper nutrition, and foot hygiene to minimize risk of complications of diabetes. Recommended daily foot inspections, daily moisturizer to feet, avoiding sharp instruments to feet, appropriate footwear at all times when ambulating, and following up regularly for routine foot care.   -Diabetic foot risk: 2023 IWF high ulcer risk.   -Next foot exam due August 2025.  -Continue amitriptyline. Reordered Percocet, known to tolerate.     Right 1st MTPJ wound: healed  -Awaiting diabetic shoes and inserts.     S/p left foot Charcot reconstruction 11/11    -LLE tall surgical boot at all times until diabetic shoes.  -Awaiting diabetic shoes and inserts.   -Will monitor X rays regularly.       Tinea unguium, calluses: chronic stable  -Debrided nails x9, see procedure note.   -Debrided calluses  x3, see procedure note.       Return to clinic in 4 weeks, call sooner PRN.

## 2025-04-30 NOTE — PROCEDURES
"Routine Foot Care    Date/Time: 4/30/2025 8:30 AM    Performed by: Marco Allison DPM  Authorized by: Marco Allison DPM    Time out: Immediately prior to procedure a "time out" was called to verify the correct patient, procedure, equipment, support staff and site/side marked as required.    Consent Done?:  Yes (Verbal)  Hyperkeratotic Skin Lesions?: Yes    Number of trimmed lesions:  3  Location(s):  Right 1st Metatarsal Head, Right 3rd Metatarsal Head and Right 5th Metatarsal Head    Nail Care Type:  Debride(Left 2nd Toe, Left 3rd Toe, Left 4th Toe, Left 5th Toe, Right 1st Toe, Right 2nd Toe, Right 3rd Toe, Right 4th Toe and Right 5th Toe)  Patient tolerance:  Patient tolerated the procedure well with no immediate complications    "

## 2025-05-12 ENCOUNTER — TELEPHONE (OUTPATIENT)
Dept: PODIATRY | Facility: CLINIC | Age: 55
End: 2025-05-12
Payer: MEDICARE

## 2025-05-12 DIAGNOSIS — R26.89 POOR BALANCE: ICD-10-CM

## 2025-05-12 DIAGNOSIS — R26.81 GAIT INSTABILITY: ICD-10-CM

## 2025-05-12 DIAGNOSIS — M14.672 CHARCOT'S JOINT OF LEFT FOOT: Primary | ICD-10-CM

## 2025-05-12 DIAGNOSIS — E11.42 TYPE 2 DIABETES MELLITUS WITH PERIPHERAL NEUROPATHY: ICD-10-CM

## 2025-05-12 NOTE — TELEPHONE ENCOUNTER
Spoke with patient and she will like a referral to physical therapy so she can steady on her feet her balance is not stable and need PT so she can get back on her feet again.

## 2025-05-12 NOTE — TELEPHONE ENCOUNTER
----- Message from Rosariomark sent at 5/12/2025 12:51 PM CDT -----  Regarding: Advice  Contact: [phone]  Who call ?  Georgina Arias What is the request Details : Pt calling to speak with someone in provider office regards requesting orders for psychical therapy. States shoes Dr. Allison ordered  is not stable.   Please call pt back.    Can clinic  use patient portal  : No What number to call back : [phone]

## 2025-05-21 ENCOUNTER — TELEPHONE (OUTPATIENT)
Dept: HEMATOLOGY/ONCOLOGY | Facility: CLINIC | Age: 55
End: 2025-05-21
Payer: MEDICARE

## 2025-05-30 ENCOUNTER — OFFICE VISIT (OUTPATIENT)
Dept: PODIATRY | Facility: CLINIC | Age: 55
End: 2025-05-30
Payer: MEDICARE

## 2025-05-30 VITALS
BODY MASS INDEX: 34.98 KG/M2 | DIASTOLIC BLOOD PRESSURE: 61 MMHG | HEART RATE: 74 BPM | SYSTOLIC BLOOD PRESSURE: 110 MMHG | HEIGHT: 67 IN

## 2025-05-30 DIAGNOSIS — M14.672 CHARCOT'S JOINT OF LEFT FOOT: Primary | ICD-10-CM

## 2025-05-30 DIAGNOSIS — R26.81 GAIT INSTABILITY: ICD-10-CM

## 2025-05-30 PROCEDURE — 99999 PR PBB SHADOW E&M-EST. PATIENT-LVL IV: CPT | Mod: PBBFAC,,, | Performed by: STUDENT IN AN ORGANIZED HEALTH CARE EDUCATION/TRAINING PROGRAM

## 2025-05-30 NOTE — PROGRESS NOTES
Subjective:     Patient    Georgina Arias is a 54 y.o. female.    Problem    2024: Previously followed by Dr Prado. Underwent left foot Charcot reconstruction 11/11/24 complicated by postoperative infection requiring hospitalization. Excessive drainage precluded use of TCC for a period but patient cannot tolerate surgical boots and so required a NWB period in wheelchair. Infection improving with IV antibiotics.      01/02/25: Returns for left foot check s/p surgery almost 2 months ago. Drainage has improved further. No new concerns.     01/10/25: Returns for left foot cast change. Wounds have healed. No new concerns.     01/29/25: Returns for left foot cast change, has been in cast for 2.5 weeks. Interval admission s/p trip and fall. Has two new wounds at left anterior ankle and plantar foot, unsure if these are related to the fall.     02/05/25: Returns for left foot cast change. Having oral surgery next week so can't make it in. Ongoing nerve pain LLE.     02/19/25: Returns 3 months s/p left foot Charcot reconstruction complicated by postop infection, fall. Minimal pain.     03/06/25:Returns for followup left foot Charcot, doing well in boot. Had new wound develop right 1st toe, draining.     03/19/25: Returns for followup of left foot Charcot, also right 1st toe wound. Finishing course of doxycycline for right foot infection. Last X rays 03/11 with some consolidation. No new concerns.     04/02/25: Returns for followup of left foot Charcot, also right 1st toe wound. Right toe wound now healed, left foot remains healed. No new concerns. Has not had diabetic shoes in a few years.     04/30/25: Returns for followup of left foot Charcot; at last visit left plantar midfoot wound and right 1st toe wound remained healed; ordered diabetic shoes and inserts. Shoes and inserts should be ready soon. Wounds remain healed. Requesting routine foot care. Needs more pain meds.     05/30/25: Last seen 1 month ago for  routine foot care, all wounds healed at that time. Wounds remain healed. Recently got diabetic shoes. Unsteady/unstable on LLE so sometimes still wears the surgical boot for stability but she is starting physical therapy soon.        Primary Care Provider    Primary Care Provider: Alonso Ling MD   Last Seen: Patient does not have a PCP or has not yet seen their PCP     History    History obtained from patient and review of medical records.     Past Medical History:   Diagnosis Date    Hyperlipidemia     Hypertension     Peptic ulcer disease     Peripheral artery disease     PONV (postoperative nausea and vomiting)     Stage IV CKD     Type II diabetes mellitus        Past Surgical History:   Procedure Laterality Date    ANGIOGRAM, LOWER ARTERIAL, UNILATERAL Left 09/13/2023    Procedure: ANGIOGRAM, LOWER ARTERIAL, UNILATERAL;  Surgeon: Maxx Maya MD;  Location: [facility] OR [address] [place] [address];  Service: Vascular;  Laterality: Left;    ANGIOGRAPHY OF LOWER EXTREMITY Left 09/14/2023    Procedure: ANGIOGRAM, LOWER EXTREMITY with balloon angioplasty ultraverse 5mm x 60mm;  Surgeon: Maxx Maya MD;  Location: [facility] OR [address] [place] [address];  Service: Vascular;  Laterality: Left;  ultraverse 5mm x 60mm  fluoro: 12.41  contrast: 44ml  mGy: 65.57  Gycm2: 23.50    AORTOGRAPHY WITH EXTREMITY RUNOFF Left 09/13/2023    Procedure: AORTOGRAM, WITH EXTREMITY RUNOFF;  Surgeon: Maxx Maya MD;  Location: [facility] OR [address] [place] [address];  Service: Vascular;  Laterality: Left;  8.1 min  663.63 mGy  176.39 Gy.cm  178ml Dye     AV FISTULA PLACEMENT Right     CHOLECYSTECTOMY      COLONOSCOPY  11/06/2013    normal    COLONOSCOPY  05/2023    cancelled due to inadequate prep    COLONOSCOPY  06/2023    results unknown    CREATION, BYPASS, ARTERIAL, AXILLARY TO BILATERAL FEMORAL Left 09/14/2023    Procedure: CREATION, BYPASS, ARTERIAL, AXILLARY TO BILATERAL FEMORAL;  Surgeon: Maxx Maya MD;  Location: [facility] OR [address] [place] [address];  Service: Vascular;  Laterality: Left;   Left Axillary to Bilateral Femoral Bypass, Left Femoral to Above Knee Popliteal Bypass; SLIDER BED    CYSTOSCOPY W/ URETERAL STENT PLACEMENT Right 08/28/2018    Procedure: CYSTOSCOPY, WITH URETERAL STENT INSERTION (ADD ON );  Surgeon: Bubba Perez MD;  Location: [facility];  Service: Urology;  Laterality: Right;  (ADD ON )    DEBRIDEMENT OF FOOT Left 08/03/2023    Procedure: DEBRIDEMENT, FOOT;  Surgeon: Aleida Flannery DPM;  Location: [facility] OR;  Service: Podiatry;  Laterality: Left;    Excisional debridement of ulcer distal great toe left foot w/ partial resection distal phalanx Left 08/03/2023    FOOT AMPUTATION THROUGH METATARSAL Left 09/14/2023    Procedure: AMPUTATION, FOOT, TRANSMETATARSAL partial 1st ray amputation;  Surgeon: Jesus Valles DPM;  [facility];  Service: Podiatry;  Laterality: Left;  partial ray    I&D L 1st ray w/ amputation L hallux IPJ Left 08/14/2023    INCISION AND DRAINAGE FOOT Left 09/14/2023    Procedure: INCISION AND DRAINAGE, FOOT;  Surgeon: Jesus Valles DPM;  [facility];  Service: Podiatry;  Laterality: Left;    Injection L PT tendon sheath Left 08/14/2023    LENGTHENING OF ACHILLES TENDON Left 11/11/2024    Procedure: LENGTHENING, TENDON, ACHILLES;  Surgeon: Marco Allison DPM;  Location: [facility];  Service: Podiatry;  Laterality: Left;    PERIPHERAL ARTERIAL STENT GRAFT Right     RECONSTRUCTION WITH FUSION OF CHARCOT FOOT Left 11/11/2024    Procedure: RECONSTRUCTION, CHARCOT FOOT, WITH FUSION;  Surgeon: Marco Allison DPM;  Location: [facility];  Service: Podiatry;  Laterality: Left;  4 hours; foot tray; mini c arm; saw/drill    REMOVAL OF NAIL OF DIGIT Left 08/03/2023    Procedure: REMOVAL, NAIL, DIGIT great toe;  Surgeon: Aleida Flannery DPM;  Location: [facility];  Service: Podiatry;  Laterality: Left;    REVISION, AMPUTATION, TRANSMETATARSAL Left 11/11/2024    Procedure: REVISION, AMPUTATION, TRANSMETATARSAL;  Surgeon: Marco Allison DPM;   Location: [facility] OR;  Service: Podiatry;  Laterality: Left;    STENT, SUPERFICIAL FEMORAL ARTERY Left 2023    Procedure: STENT, SUPERFICIAL FEMORAL ARTERY;  Surgeon: Maxx Maya MD;  [facility];  Service: Vascular;  Laterality: Left;  5 x 100 mm    TOE AMPUTATION Left 2023    Procedure: AMPUTATION, TOE hallux IPJ;  Surgeon: Aleida Flannery DPM;  Location: [facility] [place];  Service: Podiatry;  Laterality: Left;    TOE AMPUTATION Left 2023    Procedure: AMPUTATION, TOE hallux, possible 1st met.head;  Surgeon: Aleida Flannery DPM;  Location: [facility] OR;  Service: Podiatry;  Laterality: Left;    URETEROSCOPIC REMOVAL OF URETERIC CALCULUS Right 2018    Procedure: EXTRACTION-STONE-URETEROSCOPY;  Surgeon: Bubba Perez MD;  Location: [facility] OR;  Service: Urology;  Laterality: Right;        Objective:     Vitals  Wt Readings from Last 1 Encounters:   25 101.3 kg (223 lb 5.2 oz)     Temp Readings from Last 1 Encounters:   25 97.8 °F (36.6 °C) (Oral)     BP Readings from Last 1 Encounters:   25 110/61     Pulse Readings from Last 1 Encounters:   25 74       Dermatological Exam    Skin:  Pedal hair growth diminished and Pedal skin thin and shiny on right; postulcerative callus 1st MTPJ, calluses 3rd MTPJ and 5th MTPJ  Pedal hair growth diminished and Pedal skin thin and shiny on left; postulcerative callus plantar midfoot    Nails:  9 nail(s) thickened and 9 nail(s) discolored; left 1st nail/toe absent    Vascular Exam    Arteries:  Posterior tibial artery palpable on right  Dorsalis pedis artery palpable on right  Posterior tibial artery palpable on left  Dorsalis pedis artery palpable on left    Veins:  Superficial veins unremarkable on right  Superficial veins unremarkable on left    Swellin+ pitting on right  2+ pitting on left    Neurological Exam    Richardson touch test:  2/6 sites sensed, loss of protective sensation     Musculoskeletal Exam    Footwear:  Slipper on  right  Boot on left    Gait Exam:   Ambulatory Status: Ambulatory  Gait: Asymmetrical, apropulsive  Assistive Devices: wheelchair    Foot Progression Angle:  Normal on right  Normal on left     Right Lower Extremity Additional Findings:  Right foot and ankle function, strength, and range of motion unremarkable except as noted above.     Left Lower Extremity Additional Findings:  Foot rectus s/p Charcot reconstruction  Left foot and ankle function, strength, and range of motion unremarkable except as noted above.    Imaging and Other Tests    Imagin/10/24 left foot X rays: midfoot Charcot deformity with plantar subluxation of navicular and cuboid (progressed from prior films); also with prior 1st ray amputation and widening of 1st-2nd ray interval    10/12/23 VAS CAMILA: left CAMILA 0.85 (likely above healing threshold)    24 left foot X rays: appears to be some consolidation of the Charcot deformity.     24 left foot X rays: further consolidation of Charcot deformity    25 left foot X rays: hardware stable but with some possible shift of midfoot bones around hardware, overall stable    25 left foot X rays: overall superconstruct appears stable in positioning but with some perihardware lucencies, midtarsal shift plantarly around hardware    25 left foot X rays: interval consolidation beginning at midfoot    25 left foot X rays: stable since last images    Independently reviewed and interpreted imaging, findings are as follows: N/A     Other Tests: The following A1c results were reviewed.   Hemoglobin A1C   Date Value Ref Range Status   2025 4.9 4.8 - 5.9 % Final   2024 5.3 4.0 - 5.6 % Final     Comment:     ADA Screening Guidelines:  5.7-6.4%  Consistent with prediabetes  >or=6.5%  Consistent with diabetes    High levels of fetal hemoglobin interfere with the HbA1C  assay. Heterozygous hemoglobin variants (HbS, HgC, etc)do  not significantly interfere with this assay.    However, presence of multiple variants may affect accuracy.     12/02/2024 5.8 4.8 - 5.9 % Final   10/07/2024 6.2 (H) 4.8 - 5.9 % Final   09/11/2023 7.2 (H) 4.0 - 5.6 % Final     Comment:     ADA Screening Guidelines:  5.7-6.4%  Consistent with prediabetes  >or=6.5%  Consistent with diabetes    High levels of fetal hemoglobin interfere with the HbA1C  assay. Heterozygous hemoglobin variants (HbS, HgC, etc)do  not significantly interfere with this assay.   However, presence of multiple variants may affect accuracy.     08/28/2018 7.9 (H) 4.0 - 5.6 % Final     Comment:     ADA Screening Guidelines:  5.7-6.4%  Consistent with prediabetes  >or=6.5%  Consistent with diabetes  High levels of fetal hemoglobin interfere with the HbA1C  assay. Heterozygous hemoglobin variants (HbS, HgC, etc)do  not significantly interfere with this assay.   However, presence of multiple variants may affect accuracy.           Assessment:     Encounter Diagnoses   Name Primary?    Charcot's joint of left foot Yes    Gait instability               Plan:     I counseled the patient on her conditions, their implications and medical management.    Diabetic foot with peripheral neuropathy and peripheral arterial disease   -Performed shoe inspection and diabetic foot education. Reviewed importance of blood glucose control, proper nutrition, and foot hygiene to minimize risk of complications of diabetes. Recommended daily foot inspections, daily moisturizer to feet, avoiding sharp instruments to feet, appropriate footwear at all times when ambulating, and following up regularly for routine foot care.   -Diabetic foot risk: 2023 IWF high ulcer risk.   -Next foot exam due August 2025.  -Continue amitriptyline, Percocet.      Right 1st MTPJ wound: healed  -Diabetic shoes and inserts.     S/p left foot Charcot reconstruction 11/11     -Diabetic shoes and inserts.   -Will monitor X rays regularly.       Tinea unguium, calluses: chronic stable  -Will  return for routine foot care.     Instability: chronic stable  -Physical therapy.     Return to clinic in 2-3 months for routine foot care, call sooner PRN.

## 2025-06-16 ENCOUNTER — CLINICAL SUPPORT (OUTPATIENT)
Dept: REHABILITATION | Facility: HOSPITAL | Age: 55
End: 2025-06-16
Attending: STUDENT IN AN ORGANIZED HEALTH CARE EDUCATION/TRAINING PROGRAM
Payer: MEDICARE

## 2025-06-16 DIAGNOSIS — R26.89 BALANCE PROBLEM: ICD-10-CM

## 2025-06-16 DIAGNOSIS — M25.672 DECREASED RANGE OF MOTION OF LEFT ANKLE: Primary | ICD-10-CM

## 2025-06-16 DIAGNOSIS — R29.898 WEAKNESS OF LEFT LOWER EXTREMITY: ICD-10-CM

## 2025-06-16 PROCEDURE — 97112 NEUROMUSCULAR REEDUCATION: CPT

## 2025-06-16 PROCEDURE — 97161 PT EVAL LOW COMPLEX 20 MIN: CPT

## 2025-06-16 PROCEDURE — 97110 THERAPEUTIC EXERCISES: CPT

## 2025-06-16 NOTE — PATIENT INSTRUCTIONS
Access Code: [email or web address]  URL: [email or web address]  Date: 06/16/2025  Prepared by: Joaquim Choe    Exercises  - Seated Ankle Alphabet  - 2 x daily - 7 x weekly - 2 sets  - Supine Ankle Inversion Eversion AROM  - 2 x daily - 7 x weekly - 30 reps  - Seated Ankle Circles  - 2 x daily - 7 x weekly - 30 reps  - Seated Toe Raise  - 2 x daily - 7 x weekly - 30 reps  - Seated Heel Raise  - 2 x daily - 7 x weekly - 30 reps  - Supine Active Straight Leg Raise  - 2 x daily - 7 x weekly - 3 sets - 10 reps  - Sidelying Hip Abduction  - 2 x daily - 7 x weekly - 3 sets - 10 reps

## 2025-06-16 NOTE — PROGRESS NOTES
Outpatient Rehab    Physical Therapy Evaluation    Patient Name: Georgina Arias  MRN: [id number]  YOB: 1970  Encounter Date: 6/16/2025    Therapy Diagnosis:   Encounter Diagnoses   Name Primary?    Decreased range of motion of left ankle Yes    Weakness of left lower extremity     Balance problem      Physician: Marco Allison DPM    Physician Orders: Eval and Treat  Medical Diagnosis: Charcot's joint of left foot  Gait instability  Poor balance  Surgical Diagnosis: RECONSTRUCTION, CHARCOT FOOT, WITH FUSION (Left)  LENGTHENING, TENDON, ACHILLES (Left)  REVISION, AMPUTATION, TRANSMETATARSAL (Left)   Percutaneous flexor tenotomy of toe single tendon (left) x2   Surgical Date: 11/11/2024  Days Since Last Surgery: 217    Visit # / Visits Authorized:  1 / 1  Insurance Authorization Period: 5/13/2025 to 5/13/2026  Date of Evaluation: 6/16/2025  Plan of Care Certification: 6/16/2025 to 8/16/2025     Time In: 0910   Time Out: 1000  Total Time (in minutes): 50   Total Billable Time (in minutes): 50    Intake Outcome Measure for FOTO Survey    Therapist reviewed FOTO scores for Georgina Arias on 6/16/2025.   FOTO report - see Media section or FOTO account episode details.     Intake Score: 38%    Precautions:       Subjective   History of Present Illness  Georgina is a 54 y.o. female who reports to physical therapy with a chief concern of Balance and L foot pain.     The patient reports a medical diagnosis of Charcot's joint of left foot, Gait instability, Poor balance.  Patient reports a surgery of RECONSTRUCTION, CHARCOT FOOT, WITH FUSION (Left)  LENGTHENING, TENDON, ACHILLES (Left)  REVISION, AMPUTATION, TRANSMETATARSAL (Left)   Percutaneous flexor tenotomy of toe single tendon (left) x2. Surgery occurred on 11/11/24. Diagnostic tests related to this condition: X-ray.   X-Ray Details: FINDINGS:  Diffuse osseous destructive changes centered about the midfoot compatible with patient's history of  Charcot joint.  Alignment appears unchanged.  There is also extensive postoperative change with reconstruction and fusion.  Similar degree of lucency around the 2nd and 3rd metatarsal screws suggesting loosening.  No raghav hardware fracture.  No new osseous fractures.  There is diffuse soft tissue swelling about the foot.  No abnormal radiopaque retained foreign body.  There are postoperative changes of amputation of the 1st digit.     Impression:     Similar postoperative radiographic appearance of the foot as detailed above.    History of Present Condition/Illness: Patient reports she had surgery on her L foot which involved a fusion using pins. Patient reports she was in a cast and then a boot. Patient reports she no longer needs to where the boot, but uses it occasionally. Patient reports she's uses a rollator due to her balance being off. Patient reports she has fallen a few times and the last time was in Feb this year. Patient reports she has custom shoes to wear, but she feels that she is still off balance with them. Patient reports there is pain every now and then with the L foot. Patient also has neuropathy in the feet. Patient reports no stairs where she lives. Patients chief complaint is being off balance and strength in legs. Patient also has kidney disease. Patient reports she needs a kidney and she needs to improve her conditioning. Patient also had an amputation in 2022 of her L big toe     Activities of Daily Living  Social history was obtained from Patient.       General Current Level of Function Comments: Cannot  anything heavy due to weakness and balance, using rollator       Previously independent with activities of daily living? Yes     Currently independent with activities of daily living? Yes          Previously independent with instrumental activities of daily living? Yes     Currently independent with instrumental activities of daily living? Yes              Pain     Patient reports a  current pain level of 0/10. Pain at best is reported as 0/10. Pain at worst is reported as 7/10.   Location: L foot  Clinical Progression (since onset): Stable  Pain Qualities: Squeezing, Sharp, Aching  Pain-Relieving Factors: Rest, Elevation  Pain-Aggravating Factors: Walking, Stair climbing, Standing         Employment  Employment Status: Not applicable          Past Medical History/Physical Systems Review:   Georgina Arias  has a past medical history of Hyperlipidemia, Hypertension, Peptic ulcer disease, Peripheral artery disease, PONV (postoperative nausea and vomiting), Stage IV CKD, and Type II diabetes mellitus.    Georgina Arias  has a past surgical history that includes Peripheral arterial stent graft (Right); Cholecystectomy; Cystoscopy w/ ureteral stent placement (Right, 08/28/2018); Ureteroscopic removal of ureteric calculus (Right, 09/11/2018); AV fistula placement (Right); Excisional debridement of ulcer distal great toe left foot w/ partial resection distal phalanx (Left, 08/03/2023); Debridement of foot (Left, 08/03/2023); Removal of nail of digit (Left, 08/03/2023); Injection L PT tendon sheath (Left, 08/14/2023); I&D L 1st ray w/ amputation L hallux IPJ (Left, 08/14/2023); Toe amputation (Left, 08/14/2023); Toe amputation (Left, 08/25/2023); Aortography with extremity runoff (Left, 09/13/2023); angiogram, lower arterial, unilateral (Left, 09/13/2023); creation, bypass, arterial, axillary to bilateral femoral (Left, 09/14/2023); Angiography of lower extremity (Left, 09/14/2023); stent, superficial femoral artery (Left, 09/14/2023); Foot amputation through metatarsal (Left, 09/14/2023); Incision and drainage foot (Left, 09/14/2023); Reconstruction with fusion of Charcot foot (Left, 11/11/2024); Lengthening of Achilles tendon (Left, 11/11/2024); revision, amputation, transmetatarsal (Left, 11/11/2024); Colonoscopy (11/06/2013); Colonoscopy (05/2023); and Colonoscopy (06/2023).    Georgina  has a current medication list which includes the following prescription(s): acetaminophen, acetaminophen, amitriptyline, amitriptyline, aspirin, atorvastatin, azithromycin, blood sugar diagnostic, blood-glucose meter, carvedilol, carvedilol, chlorhexidine, chlorpheniramine-pseudoephedrine-acetaminophen, clopidogrel, clopidogrel, comfort ez pen needles, cyclobenzaprine, doxycycline, fluticasone propion-salmeterol, fluticasone propionate, gabapentin, gabapentin, gabapentin, gentamicin, gentamicin, gentamicin sulfate/pf, heparin (porcine), heparin (porcine) in sodium chloride 45%, lantus solostar u-100 insulin, lantus solostar u-100 insulin, lantus solostar u-100 insulin, levocetirizine, linagliptin, methoxy peg-epoetin beta, miconazole nitrate 2 %, c-tub, ondansetron, ondansetron, onetouch delica plus lancet, oxycodone-acetaminophen, pediatric multivitamin, flintstones complete, potassium chloride sa, promethazine, promethazine, sevelamer carbonate, sevelamer carbonate, sevelamer carbonate, taurolidine in heparin sodium, mounjaro, triamcinolone acetonide 0.1%, trueplus lancets, vancomycin hcl in water, vancomycin hcl in water, vancomycin hcl in water, and renaplex-d, and the following Facility-Administered Medications: sodium chloride 0.9%.    Review of patient's allergies indicates:   Allergen Reactions    Hydrocodone-acetaminophen Nausea And Vomiting, Rash and Other (See Comments)     Vicodin- severe rash  Lortab- head-spinning that leads to vomiting      Naproxen Anaphylaxis and Swelling     Hives (skin)^swelling  Hives (skin)^swelling  Hives (skin)^swelling    Sulfamethoxazole-trimethoprim Other (See Comments)     Caused JEROME; heart attack    Hydrocodone Nausea And Vomiting and Rash    Adhesive tape-silicones      blisters    Aspartame Hives    Penicillins Hives     Blisters (skin)^    Sulfamethoxazole Other (See Comments)    Trimethoprim Other (See Comments)    Acetaminophen Rash    Adhesive Rash        Objective    Ankle/Foot Observations     1st metatarsal and ray amputation from 2022       Ankle/Foot Range of Motion   Right Ankle/Foot   Active (deg) Passive (deg) Pain   Dorsiflexion (KE) 0 5     Dorsiflexion (KF)         Plantar Flexion 50 60     Ankle Inversion 30 35     Ankle Eversion 30 35     Subtalar Inversion         Subtalar Eversion         Great Toe MTP Flexion         Great Toe MTP Extension         Great Toe IP Flexion             Left Ankle/Foot   Active (deg) Passive (deg) Pain   Dorsiflexion (KE) -5 0     Dorsiflexion (KF)         Plantar Flexion 40 50     Ankle Inversion 20 25     Ankle Eversion 20 25     Subtalar Inversion         Subtalar Eversion         Great Toe MTP Flexion         Great Toe MTP Extension         Great Toe IP Flexion                            Hip Strength - Planes of Motion   Right Strength Right Pain Left Strength Left  Pain   Flexion (L2) 4+   4     Extension           ABduction           ADduction           Internal Rotation 4+   4     External Rotation 4+   4         Hip Strength - Isolated Muscles   Right Strength Right Pain Left Strength Left  Pain   Gluteus Tyrone 4+   4-     Gluteus Medius 4+   4-     Gluteus Minimus           Tensor Fasciae Latae             Knee Strength   Right Strength Right Pain Left Strength Left  Pain   Flexion (S2) 4+   4-     Prone Flexion           Extension (L3) 4+   4-            Ankle/Foot Strength - Planes of Motion   Right Strength Right Pain Left Strength Left  Pain   Dorsiflexion (L4) 4+   4-     Plantar Flexion (S1) 4+   4-     Inversion 4+   4-     Eversion 4+   4-     Great Toe Flexion           Great Toe Extension (L5)           Lesser Toes Flexion           Lesser Toes Extension                  Ankle/Foot Joint Mobility  Left Ankle/Foot Joint Mobility  Hypomobile: Distal Tibia and Fibula Joint, Talocrural Joint, Subtalar Joint, Midfoot, and Forefoot              Four Stage Balance Test  Narrow Base of Support: 7 sec sec  Tandem Stand -  Right Foot in Front: 4 sec  Tandem Stand - Left Foot in Front: 3 sec  Semi-Tandem Stand - Right Foot in Front: 8 sec  Semi-Tandem Stand - Left Foot in Front: 9 sec  Single Leg Stand - Right Foot: 10 sec  Single Leg Stand - Left Foot: 2 sec       Gait Analysis  Gait Analysis Details  Patient utilizes rollator for ambulation         Treatment:       THERAPEUTIC EXERCISES to develop strength, endurance, ROM, and flexibility for 15 minutes including    Education on HEP and Diagnosis     Supine Active Straight Leg Raise  - 2 x daily - 7 x weekly - 3 sets - 10 reps  Sidelying Hip Abduction  - 2 x daily - 7 x weekly - 3 sets - 10 reps    MANUAL THERAPY TECHNIQUES were applied to L ankle/foot for 0 minutes including:        NEUROMUSCULAR RE-EDUCATION ACTIVITIES to improve Balance, Proprioception, Posture, and Motor Control for 10 minutes.  The following were included:    Seated Ankle Alphabet  - 2 x daily - 7 x weekly - 2 sets  Supine Ankle Inversion Eversion AROM  - 2 x daily - 7 x weekly - 30 reps  Seated Ankle Circles  - 2 x daily - 7 x weekly - 30 reps  Seated Toe Raise  - 2 x daily - 7 x weekly - 30 reps  Seated Heel Raise  - 2 x daily - 7 x weekly - 30 reps      THERAPEUTIC ACTIVITIES to improve dynamic and functional performance for 0 minutes including      Assessment & Plan   Assessment  Georgina presents with a condition of Low complexity.   Presentation of Symptoms: Stable  Will Comorbidities Impact Care: No       Functional Limitations: Activity tolerance, Completing self-care activities, Proprioception, Range of motion, Participating in leisure activities, Gross motor coordination, Standing tolerance, Gait limitations, Increased risk of fall, Maintaining balance, Decreased ambulation distance/endurance  Impairments: Pain with functional activity, Impaired physical strength  Personal Factors Affecting Prognosis: Pain    Patient Goal for Therapy (PT): Improve strength and balance of lower body  Prognosis:  Fair  Assessment Details: Patient demonstrates deficits with range of motion, strength, and function that limit ability to participate in ADL, work, and recreational activities. Patient has a history of 1st toe amputation and fusion of L foot. Patient was progressed today with exercises to address ankle range of motion, lower ex strength, and motor control of L foot. Patient needed cueing to reduce hip and knee movement when performing ankle exercises. They would benefit from skilled PT services to normalize kinetic chain mobility, strength, and function to safely return to their prior level of activity.    Plan  From a physical therapy perspective, the patient would benefit from: Skilled Rehab Services    Planned therapy interventions include: Therapeutic exercise, Therapeutic activities, Neuromuscular re-education, Manual therapy, and ADLs/IADLs.    Planned modalities to include: Diathermy, Electrical stimulation - attended, and Electrical stimulation - passive/unattended.        Visit Frequency: 1 times Per Week for 6 Weeks.       This plan was discussed with Patient.   Discussion participants: Agreed Upon Plan of Care             The patient's spiritual, cultural, and educational needs were considered, and the patient is agreeable to the plan of care and goals.     Education  Education was done with Patient. The patient's learning style includes Demonstration, Listening, and Pictures/video. The patient Demonstrates understanding and Verbalizes understanding.         Educated patient on patient diagnosis and prognosis. Educated patient on home program and expectations with physical therapy.       Goals:   Active       Functional outcome       Patient will show a significant change in FOTO patient-reported outcome tool to demonstrate subjective improvement       Start:  06/16/25    Expected End:  08/16/25            Patient stated goal: Improve strength and balance of lower body        Start:  06/16/25    Expected  End:  08/16/25            Patient will demonstrate independence in home program for support of progression       Start:  06/16/25    Expected End:  08/16/25               Pain       Patient will report pain of 0/10 demonstrating a reduction of overall pain       Start:  06/16/25    Expected End:  08/16/25               Range of Motion       Patient will achieve left ankle dorsiflexion ROM to = R range of motion       Start:  06/16/25    Expected End:  08/16/25            Patient will achieve left ankle plantar flexion ROM to = R range of motion       Start:  06/16/25    Expected End:  08/16/25            Patient will achieve left ankle inversion ROM to = R ankle range of motion       Start:  06/16/25    Expected End:  08/16/25            Patient will achieve left ankle eversion ROM to = R range of motion       Start:  06/16/25    Expected End:  08/16/25               Strength       Patient will achieve left LE strength of >/=4/5       Start:  06/16/25    Expected End:  08/16/25                Joaquim Choe, PT, DPT

## 2025-06-25 ENCOUNTER — CLINICAL SUPPORT (OUTPATIENT)
Dept: REHABILITATION | Facility: HOSPITAL | Age: 55
End: 2025-06-25
Attending: STUDENT IN AN ORGANIZED HEALTH CARE EDUCATION/TRAINING PROGRAM
Payer: MEDICARE

## 2025-06-25 DIAGNOSIS — R29.898 WEAKNESS OF LEFT LOWER EXTREMITY: ICD-10-CM

## 2025-06-25 DIAGNOSIS — R26.89 BALANCE PROBLEM: ICD-10-CM

## 2025-06-25 DIAGNOSIS — M25.672 DECREASED RANGE OF MOTION OF LEFT ANKLE: Primary | ICD-10-CM

## 2025-06-25 PROCEDURE — 97112 NEUROMUSCULAR REEDUCATION: CPT | Mod: CQ

## 2025-06-25 NOTE — PROGRESS NOTES
"  Outpatient Rehab    Physical Therapy Visit    Patient Name: Georgina Arias  MRN: [id number]  YOB: 1970  Encounter Date: 6/25/2025    Therapy Diagnosis:   Encounter Diagnoses   Name Primary?    Decreased range of motion of left ankle Yes    Weakness of left lower extremity     Balance problem      Physician: Marco Allison DPM    Physician Orders: Eval and Treat  Medical Diagnosis: Charcot's joint of left foot  Gait instability  Poor balance  Surgical Diagnosis: RECONSTRUCTION, CHARCOT FOOT, WITH FUSION (Left)  LENGTHENING, TENDON, ACHILLES (Left)  REVISION, AMPUTATION, TRANSMETATARSAL (Left)   Percutaneous flexor tenotomy of toe single tendon (left) x2   Surgical Date: 11/11/2024  Days Since Last Surgery: 226    Visit # / Visits Authorized:  1 / 10  Insurance Authorization Period: 1/1/2025 to 12/31/2025  Date of Evaluation: 6/16/2025  Plan of Care Certification: 6/16/2025 to 8/16/2025      PT/PTA: PTA   Number of PTA visits since last PT visit:1  Time In: 1230   Time Out: 1320  Total Time (in minutes): 50   Total Billable Time (in minutes): 30    FOTO:  Intake Score: 38%  Survey Score 2:  %  Survey Score 3:  %    Precautions:       Subjective             Objective            Treatment:     THERAPEUTIC EXERCISES to develop strength, endurance, ROM, and flexibility for 20 minutes including  Seated Soleus stretch w strap 3x30"  Seated gastroc stretch w strap 3x30"  Seated heel slides 2x10  Fitter board PF/DF 2 min   Fitter board IV/EV 2 min  L HSS 3x30"    MANUAL THERAPY TECHNIQUES were applied to  for 0 minutes including:        NEUROMUSCULAR RE-EDUCATION ACTIVITIES to improve Balance, Coordination, Kinesthetic, Sense, Proprioception, and Posture for 30 minutes.  The following were included:    Ankle circles 30x  cw/ccw  Ankle abc LC/UC x 1  Seated heel raises 30x  Seated toe raises 30x  Seated IV/EV 30x  Pilates ring abd/add 3x10      THERAPEUTIC ACTIVITIES to improve dynamic and functional " performance for 0 minutes including      Time Entry(in minutes):       Assessment & Plan   Assessment: Pt tolerated first full treatment of PT without any adverse reactions. Reviewed HEP and implemented program with emphasis on ankle stability and ROM. Plan to incorporate intrinsic and arch strengthening in up coming visits.  Evaluation/Treatment Tolerance: Patient tolerated treatment well    The patient will continue to benefit from skilled outpatient physical therapy in order to address the deficits listed in the problem list on the initial evaluation, provide patient and family education, and maximize the patients level of independence in the home and community environments.     The patient's spiritual, cultural, and educational needs were considered, and the patient is agreeable to the plan of care and goals.           Plan: PT/PTA met face to face to discuss pt's treatment plan and progress towards established goals. Pt will be seen by a physical therapist minimally every 6th visit or every 30 days. Continue POC.    Goals:   Active       Functional outcome       Patient will show a significant change in FOTO patient-reported outcome tool to demonstrate subjective improvement (Progressing)       Start:  06/16/25    Expected End:  08/16/25            Patient stated goal: Improve strength and balance of lower body  (Progressing)       Start:  06/16/25    Expected End:  08/16/25            Patient will demonstrate independence in home program for support of progression (Progressing)       Start:  06/16/25    Expected End:  08/16/25               Pain       Patient will report pain of 0/10 demonstrating a reduction of overall pain (Progressing)       Start:  06/16/25    Expected End:  08/16/25               Range of Motion       Patient will achieve left ankle dorsiflexion ROM to = R range of motion (Progressing)       Start:  06/16/25    Expected End:  08/16/25            Patient will achieve left ankle plantar  flexion ROM to = R range of motion (Progressing)       Start:  06/16/25    Expected End:  08/16/25            Patient will achieve left ankle inversion ROM to = R ankle range of motion (Progressing)       Start:  06/16/25    Expected End:  08/16/25            Patient will achieve left ankle eversion ROM to = R range of motion (Progressing)       Start:  06/16/25    Expected End:  08/16/25               Strength       Patient will achieve left LE strength of >/=4/5 (Progressing)       Start:  06/16/25    Expected End:  08/16/25                Mirza Rees PTA

## 2025-07-03 ENCOUNTER — TELEPHONE (OUTPATIENT)
Dept: NEUROSURGERY | Facility: CLINIC | Age: 55
End: 2025-07-03
Payer: MEDICARE

## 2025-07-03 NOTE — TELEPHONE ENCOUNTER
Appt scheduled.     ----- Message from Feli Oliveira PA-C sent at 7/3/2025  2:50 PM CDT -----  Can you make sure the patient has appt with [facility] to review imaging

## 2025-07-09 ENCOUNTER — CLINICAL SUPPORT (OUTPATIENT)
Dept: REHABILITATION | Facility: HOSPITAL | Age: 55
End: 2025-07-09
Attending: STUDENT IN AN ORGANIZED HEALTH CARE EDUCATION/TRAINING PROGRAM
Payer: MEDICARE

## 2025-07-09 DIAGNOSIS — R29.898 WEAKNESS OF LEFT LOWER EXTREMITY: ICD-10-CM

## 2025-07-09 DIAGNOSIS — R26.89 BALANCE PROBLEM: ICD-10-CM

## 2025-07-09 DIAGNOSIS — M25.672 DECREASED RANGE OF MOTION OF LEFT ANKLE: Primary | ICD-10-CM

## 2025-07-09 PROCEDURE — 97110 THERAPEUTIC EXERCISES: CPT

## 2025-07-09 PROCEDURE — 97112 NEUROMUSCULAR REEDUCATION: CPT

## 2025-07-09 NOTE — PROGRESS NOTES
"  Outpatient Rehab    Physical Therapy Visit    Patient Name: Georgina Arias  MRN: [id number]  YOB: 1970  Encounter Date: 7/9/2025    Therapy Diagnosis:   Encounter Diagnoses   Name Primary?    Decreased range of motion of left ankle Yes    Weakness of left lower extremity     Balance problem      Physician: Mraco Allison DPM    Physician Orders: Eval and Treat  Medical Diagnosis: Charcot's joint of left foot  Gait instability  Poor balance  Surgical Diagnosis: RECONSTRUCTION, CHARCOT FOOT, WITH FUSION (Left)  LENGTHENING, TENDON, ACHILLES (Left)  REVISION, AMPUTATION, TRANSMETATARSAL (Left)   Percutaneous flexor tenotomy of toe single tendon (left) x2   Surgical Date: 11/11/2024  Days Since Last Surgery: 240    Visit # / Visits Authorized:  2 / 10  Insurance Authorization Period: 1/1/2025 to 12/31/2025  Date of Evaluation: 6/16/2025  Plan of Care Certification: 6/16/2025 to 8/16/2025      PT/PTA: PT   Number of PTA visits since last PT visit:0  Time In: 1205   Time Out: 1305  Total Time (in minutes): 60   Total Billable Time (in minutes): 60    FOTO:  Intake Score:  %  Survey Score 2:  %  Survey Score 3:  %    Precautions:       Subjective   Patient reports no change in symptoms. Patient does feel like she can walk a little more than usual..  Pain reported as 0/10.      Objective            Treatment:     THERAPEUTIC EXERCISES to develop strength, endurance, ROM, and flexibility for 30 minutes including    Nustep 8 min   Seated Soleus stretch w strap 3x30"  Seated gastroc stretch w strap 3x30"  Seated heel slides 2x10  Fitter board PF/DF 2 min   Fitter board IV/EV 2 min  L HSS 3x30"  Clams 2x10   Bridging 2x10     MANUAL THERAPY TECHNIQUES were applied to  for 0 minutes including:        NEUROMUSCULAR RE-EDUCATION ACTIVITIES to improve Balance, Coordination, Kinesthetic, Sense, Proprioception, and Posture for 30 minutes.  The following were included:    Ankle circles 30x  cw/ccw  Ankle abc " LC/UC x 1  Seated heel raises 30x  Seated toe raises 30x  Seated IV/EV 30x  Pilates ring abd/add 3x10  SLR 2x10   - TKE emphasis         THERAPEUTIC ACTIVITIES to improve dynamic and functional performance for 0 minutes including          Assessment & Plan   Assessment: Pt tolerated treatment of PT without any adverse reactions. Continued HEP and implemented program with emphasis on ankle stability and ROM. Plan to incorporate standing tasks, intrinsic and arch strengthening in up coming visits.  Evaluation/Treatment Tolerance: Patient tolerated treatment well    The patient will continue to benefit from skilled outpatient physical therapy in order to address the deficits listed in the problem list on the initial evaluation, provide patient and family education, and maximize the patients level of independence in the home and community environments.     The patient's spiritual, cultural, and educational needs were considered, and the patient is agreeable to the plan of care and goals.           Plan: PT/PTA met face to face to discuss pt's treatment plan and progress towards established goals. Pt will be seen by a physical therapist minimally every 6th visit or every 30 days. Continue POC.    Goals:   Active       Functional outcome       Patient will show a significant change in FOTO patient-reported outcome tool to demonstrate subjective improvement (Ongoing)       Start:  06/16/25    Expected End:  08/16/25            Patient stated goal: Improve strength and balance of lower body  (Ongoing)       Start:  06/16/25    Expected End:  08/16/25            Patient will demonstrate independence in home program for support of progression (Ongoing)       Start:  06/16/25    Expected End:  08/16/25               Pain       Patient will report pain of 0/10 demonstrating a reduction of overall pain (Ongoing)       Start:  06/16/25    Expected End:  08/16/25               Range of Motion       Patient will achieve left ankle  dorsiflexion ROM to = R range of motion (Ongoing)       Start:  06/16/25    Expected End:  08/16/25            Patient will achieve left ankle plantar flexion ROM to = R range of motion (Ongoing)       Start:  06/16/25    Expected End:  08/16/25            Patient will achieve left ankle inversion ROM to = R ankle range of motion (Ongoing)       Start:  06/16/25    Expected End:  08/16/25            Patient will achieve left ankle eversion ROM to = R range of motion (Ongoing)       Start:  06/16/25    Expected End:  08/16/25               Strength       Patient will achieve left LE strength of >/=4/5 (Ongoing)       Start:  06/16/25    Expected End:  08/16/25                Joaquim Choe, PT, DPT

## 2025-07-16 ENCOUNTER — HOSPITAL ENCOUNTER (OUTPATIENT)
Dept: RADIOLOGY | Facility: HOSPITAL | Age: 55
Discharge: HOME | End: 2025-07-16
Attending: NURSE PRACTITIONER
Payer: MEDICARE

## 2025-07-16 DIAGNOSIS — Z12.31 ENCOUNTER FOR SCREENING MAMMOGRAM FOR BREAST CANCER: ICD-10-CM

## 2025-07-16 PROCEDURE — 77067 SCR MAMMO BI INCL CAD: CPT | Mod: TC

## 2025-07-17 ENCOUNTER — CLINICAL SUPPORT (OUTPATIENT)
Dept: REHABILITATION | Facility: HOSPITAL | Age: 55
End: 2025-07-17
Attending: STUDENT IN AN ORGANIZED HEALTH CARE EDUCATION/TRAINING PROGRAM
Payer: MEDICARE

## 2025-07-17 DIAGNOSIS — M25.672 DECREASED RANGE OF MOTION OF LEFT ANKLE: Primary | ICD-10-CM

## 2025-07-17 DIAGNOSIS — R26.89 BALANCE PROBLEM: ICD-10-CM

## 2025-07-17 DIAGNOSIS — R29.898 WEAKNESS OF LEFT LOWER EXTREMITY: ICD-10-CM

## 2025-07-17 PROCEDURE — 97110 THERAPEUTIC EXERCISES: CPT

## 2025-07-17 PROCEDURE — 97530 THERAPEUTIC ACTIVITIES: CPT

## 2025-07-17 NOTE — PROGRESS NOTES
"  Outpatient Rehab    Physical Therapy Visit    Patient Name: Georgina Arias  MRN: [id number]  YOB: 1970  Encounter Date: 7/17/2025    Therapy Diagnosis:   Encounter Diagnoses   Name Primary?    Decreased range of motion of left ankle Yes    Weakness of left lower extremity     Balance problem      Physician: Marco Allison DPM    Physician Orders: Eval and Treat  Medical Diagnosis: Charcot's joint of left foot  Gait instability  Poor balance  Surgical Diagnosis: RECONSTRUCTION, CHARCOT FOOT, WITH FUSION (Left)  LENGTHENING, TENDON, ACHILLES (Left)  REVISION, AMPUTATION, TRANSMETATARSAL (Left)   Percutaneous flexor tenotomy of toe single tendon (left) x2   Surgical Date: 11/11/2024  Days Since Last Surgery: 248    Visit # / Visits Authorized:  3 / 10  Insurance Authorization Period: 1/1/2025 to 12/31/2025  Date of Evaluation: 6/16/2025  Plan of Care Certification: 6/16/2025 to 8/16/2025      PT/PTA: PT   Number of PTA visits since last PT visit:0  Time In: 1000   Time Out: 1055  Total Time (in minutes): 55   Total Billable Time (in minutes): 55    FOTO:  Intake Score:  %  Survey Score 2:  %  Survey Score 3:  %    Precautions:       Subjective   Patient reports her symptoms are feeling better. Patient feels improvement each session.  Pain reported as 0/10.      Objective            Treatment:     THERAPEUTIC EXERCISES to develop strength, endurance, ROM, and flexibility for 35 minutes including    Nustep 8 min   Seated Soleus stretch w strap 3x30"  Seated gastroc stretch w strap 3x30"  SLR 3x10  Clams 3x10   Bridging 3x10     MANUAL THERAPY TECHNIQUES were applied to  for 0 minutes including:        NEUROMUSCULAR RE-EDUCATION ACTIVITIES to improve Balance, Coordination, Kinesthetic, Sense, Proprioception, and Posture for 0 minutes.  The following were included:          Ankle circles 30x  cw/ccw  Ankle abc LC/UC x 1  Seated heel raises 30x  Seated toe raises 30x  Seated IV/EV 30x  Pilates ring " abd/add 3x10          THERAPEUTIC ACTIVITIES to improve dynamic and functional performance for 20 minutes including    Cone taps   - light pressure on table  - 3x10 ea leg  Step ups level 3   - 20x both legs      Assessment & Plan   Assessment: Pt tolerated treatment of PT without any adverse reactions. Continued HEP and implemented program with emphasis on ankle stability and ROM. We progressed standing / functional activity today. Patient did well   Evaluation/Treatment Tolerance: Patient tolerated treatment well    The patient will continue to benefit from skilled outpatient physical therapy in order to address the deficits listed in the problem list on the initial evaluation, provide patient and family education, and maximize the patients level of independence in the home and community environments.     The patient's spiritual, cultural, and educational needs were considered, and the patient is agreeable to the plan of care and goals.           Plan: PT/PTA met face to face to discuss pt's treatment plan and progress towards established goals. Pt will be seen by a physical therapist minimally every 6th visit or every 30 days. Continue POC.    Goals:   Active       Functional outcome       Patient will show a significant change in FOTO patient-reported outcome tool to demonstrate subjective improvement (Progressing)       Start:  06/16/25    Expected End:  08/16/25            Patient stated goal: Improve strength and balance of lower body  (Progressing)       Start:  06/16/25    Expected End:  08/16/25            Patient will demonstrate independence in home program for support of progression (Progressing)       Start:  06/16/25    Expected End:  08/16/25               Pain       Patient will report pain of 0/10 demonstrating a reduction of overall pain (Progressing)       Start:  06/16/25    Expected End:  08/16/25               Range of Motion       Patient will achieve left ankle dorsiflexion ROM to = R range  of motion (Progressing)       Start:  06/16/25    Expected End:  08/16/25            Patient will achieve left ankle plantar flexion ROM to = R range of motion (Progressing)       Start:  06/16/25    Expected End:  08/16/25            Patient will achieve left ankle inversion ROM to = R ankle range of motion (Progressing)       Start:  06/16/25    Expected End:  08/16/25            Patient will achieve left ankle eversion ROM to = R range of motion (Progressing)       Start:  06/16/25    Expected End:  08/16/25               Strength       Patient will achieve left LE strength of >/=4/5 (Progressing)       Start:  06/16/25    Expected End:  08/16/25                Joaquim Choe, PT, DPT

## 2025-07-23 ENCOUNTER — CLINICAL SUPPORT (OUTPATIENT)
Dept: REHABILITATION | Facility: HOSPITAL | Age: 55
End: 2025-07-23
Attending: STUDENT IN AN ORGANIZED HEALTH CARE EDUCATION/TRAINING PROGRAM
Payer: MEDICARE

## 2025-07-23 DIAGNOSIS — R26.89 BALANCE PROBLEM: ICD-10-CM

## 2025-07-23 DIAGNOSIS — R29.898 WEAKNESS OF LEFT LOWER EXTREMITY: ICD-10-CM

## 2025-07-23 DIAGNOSIS — M25.672 DECREASED RANGE OF MOTION OF LEFT ANKLE: Primary | ICD-10-CM

## 2025-07-23 PROCEDURE — 97112 NEUROMUSCULAR REEDUCATION: CPT

## 2025-07-23 PROCEDURE — 97530 THERAPEUTIC ACTIVITIES: CPT

## 2025-07-23 PROCEDURE — 97110 THERAPEUTIC EXERCISES: CPT

## 2025-07-23 NOTE — PROGRESS NOTES
"    Outpatient Rehab    Physical Therapy Visit    Patient Name: Georgina Arias  MRN: [id number]  YOB: 1970  Encounter Date: 7/23/2025    Therapy Diagnosis:   Encounter Diagnoses   Name Primary?    Decreased range of motion of left ankle Yes    Weakness of left lower extremity     Balance problem      Physician: Marco Allison DPM    Physician Orders: Eval and Treat  Medical Diagnosis: Charcot's joint of left foot  Gait instability  Poor balance  Surgical Diagnosis: RECONSTRUCTION, CHARCOT FOOT, WITH FUSION (Left)  LENGTHENING, TENDON, ACHILLES (Left)  REVISION, AMPUTATION, TRANSMETATARSAL (Left)   Percutaneous flexor tenotomy of toe single tendon (left) x2   Surgical Date: 11/11/2024  Days Since Last Surgery: 254    Visit # / Visits Authorized:  4 / 10  Insurance Authorization Period: 1/1/2025 to 12/31/2025  Date of Evaluation: 6/16/2025  Plan of Care Certification: 6/16/2025 to 8/16/2025      PT/PTA: PT   Number of PTA visits since last PT visit:0  Time In: 1200   Time Out: 1300  Total Time (in minutes): 60   Total Billable Time (in minutes): 60    FOTO:  Intake Score:  %  Survey Score 2:  %  Survey Score 3:  %    Precautions:       Subjective   Patient reports her symptoms are feeling better. Patient feels improvement each session. Patient has nothing new to report.  Pain reported as 0/10.      Objective            Treatment:     THERAPEUTIC EXERCISES to develop strength, endurance, ROM, and flexibility for 25 minutes including    Nustep 8 min   Seated Soleus stretch w strap 3x30"  Seated gastroc stretch w strap 3x30"  SLR 3x10  Clams 3x10 level 1 loop  Bridging 3x10     MANUAL THERAPY TECHNIQUES were applied to  for 0 minutes including:        NEUROMUSCULAR RE-EDUCATION ACTIVITIES to improve Balance, Coordination, Kinesthetic, Sense, Proprioception, and Posture for 10 minutes.  The following were included:          Supine Ankle circles 30x  cw/ccw  Supine Ankle abc LC/UC x 1  Supine " IV/EV/DF/PF 30x            THERAPEUTIC ACTIVITIES to improve dynamic and functional performance for 25 minutes including    Cone taps tall cone  - light pressure on table  - 3x10 ea leg  Step ups on foam (L leading)  - 30x   Step ups level 3   - 30x both legs  Standing hip abd and ext 3# 2x10       Assessment & Plan   Assessment: Pt tolerated treatment of PT without any adverse reactions. Continued HEP and implemented program with emphasis on ankle stability and ROM. We progressed standing / functional activity today. Patient did well        The patient will continue to benefit from skilled outpatient physical therapy in order to address the deficits listed in the problem list on the initial evaluation, provide patient and family education, and maximize the patients level of independence in the home and community environments.     The patient's spiritual, cultural, and educational needs were considered, and the patient is agreeable to the plan of care and goals.           Plan: PT/PTA met face to face to discuss pt's treatment plan and progress towards established goals. Pt will be seen by a physical therapist minimally every 6th visit or every 30 days. Continue POC.    Goals:   Active       Functional outcome       Patient will show a significant change in FOTO patient-reported outcome tool to demonstrate subjective improvement (Ongoing)       Start:  06/16/25    Expected End:  08/16/25            Patient stated goal: Improve strength and balance of lower body  (Ongoing)       Start:  06/16/25    Expected End:  08/16/25            Patient will demonstrate independence in home program for support of progression (Ongoing)       Start:  06/16/25    Expected End:  08/16/25               Pain       Patient will report pain of 0/10 demonstrating a reduction of overall pain (Ongoing)       Start:  06/16/25    Expected End:  08/16/25               Range of Motion       Patient will achieve left ankle dorsiflexion ROM to =  R range of motion (Ongoing)       Start:  06/16/25    Expected End:  08/16/25            Patient will achieve left ankle plantar flexion ROM to = R range of motion (Ongoing)       Start:  06/16/25    Expected End:  08/16/25            Patient will achieve left ankle inversion ROM to = R ankle range of motion (Ongoing)       Start:  06/16/25    Expected End:  08/16/25            Patient will achieve left ankle eversion ROM to = R range of motion (Ongoing)       Start:  06/16/25    Expected End:  08/16/25               Strength       Patient will achieve left LE strength of >/=4/5 (Ongoing)       Start:  06/16/25    Expected End:  08/16/25                Joaquim Choe PT, SUPRIYAT

## 2025-07-25 ENCOUNTER — TELEPHONE (OUTPATIENT)
Dept: RADIOLOGY | Facility: HOSPITAL | Age: 55
End: 2025-07-25
Payer: MEDICARE

## 2025-07-25 NOTE — TELEPHONE ENCOUNTER
Spoke with patient and explained mammogram findings.Patient expressed understanding of results. Patient scheduled abnormal mammogram follow up appointment at [facility] on 7/29/2025.

## 2025-07-29 ENCOUNTER — HOSPITAL ENCOUNTER (OUTPATIENT)
Dept: RADIOLOGY | Facility: HOSPITAL | Age: 55
Discharge: HOME | End: 2025-07-29
Attending: NURSE PRACTITIONER
Payer: MEDICARE

## 2025-07-29 DIAGNOSIS — R92.8 ABNORMAL FINDING ON BREAST IMAGING: ICD-10-CM

## 2025-07-29 PROCEDURE — 77065 DX MAMMO INCL CAD UNI: CPT | Mod: TC,RT

## 2025-07-29 PROCEDURE — 77065 DX MAMMO INCL CAD UNI: CPT | Mod: 26,RT,, | Performed by: RADIOLOGY

## 2025-07-29 PROCEDURE — 77061 BREAST TOMOSYNTHESIS UNI: CPT | Mod: 26,,, | Performed by: RADIOLOGY

## 2025-07-30 ENCOUNTER — CLINICAL SUPPORT (OUTPATIENT)
Dept: REHABILITATION | Facility: HOSPITAL | Age: 55
End: 2025-07-30
Attending: STUDENT IN AN ORGANIZED HEALTH CARE EDUCATION/TRAINING PROGRAM
Payer: MEDICARE

## 2025-07-30 DIAGNOSIS — R26.89 BALANCE PROBLEM: ICD-10-CM

## 2025-07-30 DIAGNOSIS — M25.672 DECREASED RANGE OF MOTION OF LEFT ANKLE: Primary | ICD-10-CM

## 2025-07-30 DIAGNOSIS — R29.898 WEAKNESS OF LEFT LOWER EXTREMITY: ICD-10-CM

## 2025-07-30 PROCEDURE — 97530 THERAPEUTIC ACTIVITIES: CPT

## 2025-07-30 PROCEDURE — 97112 NEUROMUSCULAR REEDUCATION: CPT

## 2025-07-30 NOTE — PROGRESS NOTES
"  Outpatient Rehab    Physical Therapy Discharge    Patient Name: Georgina Arias  MRN: [id number]  YOB: 1970  Encounter Date: 7/30/2025    Therapy Diagnosis:   Encounter Diagnoses   Name Primary?    Decreased range of motion of left ankle Yes    Weakness of left lower extremity     Balance problem      Physician: Marco Allison DPM    Physician Orders: Eval and Treat  Medical Diagnosis: Charcot's joint of left foot  Gait instability  Poor balance  Surgical Diagnosis: RECONSTRUCTION, CHARCOT FOOT, WITH FUSION (Left)  LENGTHENING, TENDON, ACHILLES (Left)  REVISION, AMPUTATION, TRANSMETATARSAL (Left)   Percutaneous flexor tenotomy of toe single tendon (left) x2   Surgical Date: 11/11/2024  Days Since Last Surgery: 261    Visit # / Visits Authorized:  5 / 10  Insurance Authorization Period: 1/1/2025 to 12/31/2025  Date of Evaluation: 6/16/2025  Plan of Care Certification: 6/16/2025 to 8/16/2025      PT/PTA: PT   Number of PTA visits since last PT visit:0  Time In: 1200   Time Out: 1300  Total Time (in minutes): 60   Total Billable Time (in minutes): 60    FOTO:  Intake Score (%): 38  Survey Score 2 (%): Not applicable for this Episode  Survey Score 3 (%): Not applicable for this Episode    Precautions:       Subjective   Patient reports she has been doing more and more each day and feeling improvements. Patient reports she is confident in making today her last day..         Objective            Treatment:       THERAPEUTIC EXERCISES to develop strength, endurance, ROM, and flexibility for 25 minutes including    Nustep 8 min   Seated Soleus stretch w strap 3x30"  Seated gastroc stretch w strap 3x30"  SLR 3x10  Clams 3x10 level 1 loop  Bridging 3x10 level 1 loop    MANUAL THERAPY TECHNIQUES were applied to  for 0 minutes including:        NEUROMUSCULAR RE-EDUCATION ACTIVITIES to improve Balance, Coordination, Kinesthetic, Sense, Proprioception, and Posture for 10 minutes.  The following were " included:        Supine Ankle circles 30x  cw/ccw  Supine Ankle abc LC/UC x 1  Supine IV/EV/DF/PF 30x        THERAPEUTIC ACTIVITIES to improve dynamic and functional performance for 25 minutes including    Cone taps tall cone  - light pressure on table  - 3x10 ea leg  Step ups on foam (L leading)  - 30x   Step ups level 3   - 30x both legs  Standing hip abd and ext 3# 2x10     Assessment & Plan   Assessment: Patient presents to PT with continued improved symptoms. Patient will be discharged today due to progress. Patient will continue home program  Evaluation/Treatment Tolerance: Patient tolerated treatment well    The patient's spiritual, cultural, and educational needs were considered, and the patient is agreeable to the plan of care and goals.           Plan: discharge from PT    Goals:   Active       Range of Motion       Patient will achieve left ankle dorsiflexion ROM to = R range of motion (Unable to Meet)       Start:  06/16/25    Expected End:  08/16/25            Patient will achieve left ankle plantar flexion ROM to = R range of motion (Unable to Meet)       Start:  06/16/25    Expected End:  08/16/25            Patient will achieve left ankle inversion ROM to = R ankle range of motion (Unable to Meet)       Start:  06/16/25    Expected End:  08/16/25            Patient will achieve left ankle eversion ROM to = R range of motion (Unable to Meet)       Start:  06/16/25    Expected End:  08/16/25               Strength       Patient will achieve left LE strength of >/=4/5 (Met)       Start:  06/16/25    Expected End:  08/16/25    Resolved:  07/30/25           Resolved       Functional outcome       Patient will show a significant change in FOTO patient-reported outcome tool to demonstrate subjective improvement (Met)       Start:  06/16/25    Expected End:  08/16/25    Resolved:  07/30/25         Patient stated goal: Improve strength and balance of lower body  (Met)       Start:  06/16/25    Expected End:   08/16/25    Resolved:  07/30/25         Patient will demonstrate independence in home program for support of progression (Met)       Start:  06/16/25    Expected End:  08/16/25    Resolved:  07/30/25            Pain       Patient will report pain of 0/10 demonstrating a reduction of overall pain (Met)       Start:  06/16/25    Expected End:  08/16/25    Resolved:  07/30/25             Joaquim Choe PT, DPT

## 2025-08-13 ENCOUNTER — OFFICE VISIT (OUTPATIENT)
Dept: NEUROSURGERY | Facility: CLINIC | Age: 55
End: 2025-08-13
Payer: MEDICARE

## 2025-08-13 VITALS — SYSTOLIC BLOOD PRESSURE: 125 MMHG | DIASTOLIC BLOOD PRESSURE: 71 MMHG | HEART RATE: 76 BPM

## 2025-08-13 DIAGNOSIS — R90.89 ABNORMAL BRAIN MRI: Primary | ICD-10-CM

## 2025-08-13 PROCEDURE — 3074F SYST BP LT 130 MM HG: CPT | Mod: CPTII,S$GLB,, | Performed by: NEUROLOGICAL SURGERY

## 2025-08-13 PROCEDURE — 3066F NEPHROPATHY DOC TX: CPT | Mod: CPTII,S$GLB,, | Performed by: NEUROLOGICAL SURGERY

## 2025-08-13 PROCEDURE — 99214 OFFICE O/P EST MOD 30 MIN: CPT | Mod: S$GLB,,, | Performed by: NEUROLOGICAL SURGERY

## 2025-08-13 PROCEDURE — 99999 PR PBB SHADOW E&M-EST. PATIENT-LVL IV: CPT | Mod: PBBFAC,,, | Performed by: NEUROLOGICAL SURGERY

## 2025-08-13 PROCEDURE — 1160F RVW MEDS BY RX/DR IN RCRD: CPT | Mod: CPTII,S$GLB,, | Performed by: NEUROLOGICAL SURGERY

## 2025-08-13 PROCEDURE — 3044F HG A1C LEVEL LT 7.0%: CPT | Mod: CPTII,S$GLB,, | Performed by: NEUROLOGICAL SURGERY

## 2025-08-13 PROCEDURE — 3078F DIAST BP <80 MM HG: CPT | Mod: CPTII,S$GLB,, | Performed by: NEUROLOGICAL SURGERY

## 2025-08-13 PROCEDURE — 1159F MED LIST DOCD IN RCRD: CPT | Mod: CPTII,S$GLB,, | Performed by: NEUROLOGICAL SURGERY

## 2025-08-21 ENCOUNTER — TELEPHONE (OUTPATIENT)
Dept: PODIATRY | Facility: CLINIC | Age: 55
End: 2025-08-21
Payer: MEDICARE

## 2025-09-03 ENCOUNTER — OFFICE VISIT (OUTPATIENT)
Dept: PODIATRY | Facility: CLINIC | Age: 55
End: 2025-09-03
Payer: MEDICARE

## 2025-09-03 VITALS
HEIGHT: 67 IN | HEART RATE: 68 BPM | DIASTOLIC BLOOD PRESSURE: 81 MMHG | SYSTOLIC BLOOD PRESSURE: 158 MMHG | BODY MASS INDEX: 34.98 KG/M2

## 2025-09-03 DIAGNOSIS — R26.81 GAIT INSTABILITY: ICD-10-CM

## 2025-09-03 DIAGNOSIS — M79.672 CHRONIC PAIN IN LEFT FOOT: ICD-10-CM

## 2025-09-03 DIAGNOSIS — M14.672 CHARCOT'S JOINT OF LEFT FOOT: Primary | ICD-10-CM

## 2025-09-03 DIAGNOSIS — S98.132A AMPUTATION OF TOE OF LEFT FOOT: ICD-10-CM

## 2025-09-03 DIAGNOSIS — L84 CORN OR CALLUS: ICD-10-CM

## 2025-09-03 DIAGNOSIS — G89.29 CHRONIC PAIN IN LEFT FOOT: ICD-10-CM

## 2025-09-03 DIAGNOSIS — B35.1 TINEA UNGUIUM: ICD-10-CM

## 2025-09-03 DIAGNOSIS — R26.89 POOR BALANCE: ICD-10-CM

## 2025-09-03 PROCEDURE — 99999 PR PBB SHADOW E&M-EST. PATIENT-LVL IV: CPT | Mod: PBBFAC,,, | Performed by: STUDENT IN AN ORGANIZED HEALTH CARE EDUCATION/TRAINING PROGRAM

## (undated) DEVICE — SPONGE GAUZE 16PLY 4X4

## (undated) DEVICE — DRAPE THREE-QTR REINF 53X77IN

## (undated) DEVICE — DRESSING TEGADERM 4.4X5IN

## (undated) DEVICE — GLIDESHEATH SLENDER SS 5FR10CM

## (undated) DEVICE — BANDAGE ESMARK ELASTIC ST 4X9

## (undated) DEVICE — WIRE GUIDE 0.038OLD

## (undated) DEVICE — COVER PROXIMA MAYO STAND

## (undated) DEVICE — PENCIL ROCKER SWITCH 10FT CORD

## (undated) DEVICE — COVER LIGHT HANDLE 80/CA

## (undated) DEVICE — BANDAGE ESMARK 6X12

## (undated) DEVICE — INTRODUCER VASC RADPQ 5FRX10CM

## (undated) DEVICE — BANDAGE ELAS SOFTWRAP ST 6X5YD

## (undated) DEVICE — PAD CAST SPECIALIST STRL 4

## (undated) DEVICE — CATH IV INTROCAN 20G X 1.1

## (undated) DEVICE — SUT GORETEX CV-5 TTC-13 36IN

## (undated) DEVICE — BRUSH SCRUB SURGICALW/BETADINE

## (undated) DEVICE — SET IRR URLGY 2LINE UNIV SPIKE

## (undated) DEVICE — KIT IRR SUCTION HND PIECE

## (undated) DEVICE — SOL NS 1000CC

## (undated) DEVICE — KIT PREVENA PLUS

## (undated) DEVICE — COVER INSTR ELASTIC BAND 40X20

## (undated) DEVICE — NDL HYPO REG 25G X 1 1/2

## (undated) DEVICE — ELECTRODE REM PLYHSV RETURN 9

## (undated) DEVICE — COVER SNAP KAP 26IN

## (undated) DEVICE — HEMOSTAT SURGICEL PWD 3G

## (undated) DEVICE — DRAPE U SPLIT SHEET 54X76IN

## (undated) DEVICE — DRAPE BAG ISOLATION 20 X 20

## (undated) DEVICE — GOWN SURGICAL X-LARGE

## (undated) DEVICE — DRESSING AQUACEL SACRAL 9 X 9

## (undated) DEVICE — SUT 4-0 VICRYL / SH

## (undated) DEVICE — SUT MONOCRYL 3-0 SH U/D

## (undated) DEVICE — ALCOHOL 70% ISOP W/GREEN 16OZ

## (undated) DEVICE — SYR ONLY LUER LOCK 20CC

## (undated) DEVICE — DRESSING TRANS 2X2 TEGADERM

## (undated) DEVICE — BLADE ELECTRODE 2.75 PTFE COAT

## (undated) DEVICE — DRAPE T EXTRM SURG 121X128X90

## (undated) DEVICE — Device

## (undated) DEVICE — LOOP VESSEL RED MINI STERILE

## (undated) DEVICE — GUIDEWIRE NITINOL HYBRID 150CM

## (undated) DEVICE — BANDAGE MATRIX HK LOOP 4IN 5YD

## (undated) DEVICE — GLOVE SURGICAL LATEX SZ 6.5

## (undated) DEVICE — GUIDEWIRE ORTHOPEDIC 220X1.6MM
Type: IMPLANTABLE DEVICE | Site: FOOT | Status: NON-FUNCTIONAL
Removed: 2024-11-11

## (undated) DEVICE — GLOVE SENSICARE PI GRN 8.5

## (undated) DEVICE — SUT 3-0 MONOCRYL PLUS PS-2

## (undated) DEVICE — SYR MARK 7 ARTERION 150ML

## (undated) DEVICE — BANDAGE ELASTIC 3IN ACE NS

## (undated) DEVICE — SUT MONOCRYL 3-0 PS-2 UND

## (undated) DEVICE — SET BLD COLL SAFETY 21G X 3/4

## (undated) DEVICE — TRAY MINOR ORTHO OMC

## (undated) DEVICE — DECANTER FLUID TRNSF WHITE 9IN

## (undated) DEVICE — CATH BLLN SEEKER .035IN 135CM

## (undated) DEVICE — SUT MONOCRYL 2-0 CT-1 VIL

## (undated) DEVICE — GLOVE SENSICARE PI SURG 8.5

## (undated) DEVICE — DRESSING XEROFORM NONADH 1X8IN

## (undated) DEVICE — SUT ETHILON 3-0 PS2 18 BLK

## (undated) DEVICE — DRAPE TOP 53X102IN

## (undated) DEVICE — TOWEL OR DISP STRL BLUE 4/PK

## (undated) DEVICE — SPONGE COTTON TRAY 4X4IN

## (undated) DEVICE — GUIDEWIRE STF .035X260CM ANG

## (undated) DEVICE — CATH GLIDE ANGLED 5FR 65CM

## (undated) DEVICE — GLOVE BIOGEL PI MICRO INDIC 7

## (undated) DEVICE — PACK EXTREMITY KENNER

## (undated) DEVICE — BAND TR WITH INFLATOR

## (undated) DEVICE — SUT VICRYL 2-0 36 CT-1

## (undated) DEVICE — PACK UNIVERSAL SPLIT II

## (undated) DEVICE — SUT ETHILON 4-0 PS2 18 BLK

## (undated) DEVICE — SOL 9P NACL IRR PIC IL

## (undated) DEVICE — SUT 4-0 12-18IN SILK BLACK

## (undated) DEVICE — SOL IRR 0.9% NACL 500ML PB

## (undated) DEVICE — GAUZE CNFRM STRL 2INX4.1YD

## (undated) DEVICE — GLOVE BIOGEL SKINSENSE PI 7.5

## (undated) DEVICE — BANDAGE ELASTIC ACE 2IN 10/CA

## (undated) DEVICE — SUT 3-0 12-18IN SILK

## (undated) DEVICE — CATH ANGLED GLIDE CATH 4FR

## (undated) DEVICE — BANDAGE ELAS SOFTWRAP ST 4X5YD

## (undated) DEVICE — BANDAGE ROLL COTTN 4.5INX4.1YD

## (undated) DEVICE — BNDG COFLEX FOAM LF2 ST 4X5YD

## (undated) DEVICE — APPLICATOR CHLORAPREP ORN 26ML

## (undated) DEVICE — STAPLER SKIN PROXIMATE WIDE

## (undated) DEVICE — STOPCOCK 3 WAY MED PRESSURE

## (undated) DEVICE — BIT DRILL CANNULATED 3MM

## (undated) DEVICE — LOOP VESSEL BLUE MAXI

## (undated) DEVICE — SUT VICRYL 3-0 27 SH

## (undated) DEVICE — TUBING HIGH PRESSURE

## (undated) DEVICE — PAD PREP CUFFED NS 24X48IN

## (undated) DEVICE — BANDAGE ESMARK ELASTIC ST 6X9

## (undated) DEVICE — INFLATOR ADVANTAGE ENCORE 26

## (undated) DEVICE — PAD CAST SPEC STRL COT 4X4YD

## (undated) DEVICE — TUBE SUCTION FRAZIER VENT 10FR

## (undated) DEVICE — SOL IRR NACL .9% 3000ML

## (undated) DEVICE — HEMOSTAT SURGICEL 4X8IN

## (undated) DEVICE — VISE RADIFOCUS MULTI TORQUE

## (undated) DEVICE — DRAPE HALF SURGICAL 40X58IN

## (undated) DEVICE — CLIP MED TICALL

## (undated) DEVICE — BLADE SAG MIC FINE 9.5X25.5MM

## (undated) DEVICE — KIT INTRO MICRO NIT VSI 4FR

## (undated) DEVICE — SOL NACL STRL BOTTLE 1000ML

## (undated) DEVICE — SUT 2-0 SILK 30IN BLK BRAID

## (undated) DEVICE — GUIDEWIRE STF .035X350CM ANG

## (undated) DEVICE — SHEATH 7F 6CM R/O

## (undated) DEVICE — BLADE SURGICAL S/S STR #15

## (undated) DEVICE — SUT ETHICON 3-0 BLK MONO PS

## (undated) DEVICE — DRAPE TIBURON ORTHOPEDIC SPLIT

## (undated) DEVICE — STOCKINET TUBULAR 1 PLY 6X60IN

## (undated) DEVICE — BLADE SURG #15 CARBON STEEL

## (undated) DEVICE — GAUZE CNFRM STRL 4INX4.1YD

## (undated) DEVICE — BLADE SCALP OPHTL BEVEL STR

## (undated) DEVICE — CATH ULTRAVERSE 035 5X60X75

## (undated) DEVICE — CUFF TOURNIQUET DL PRT

## (undated) DEVICE — STOCKINET 4INX48

## (undated) DEVICE — SYR MED RAD 150ML

## (undated) DEVICE — TRAY CATH FOL SIL URIMTR 16FR

## (undated) DEVICE — SUT SILK 2-0 SH 18IN BLACK

## (undated) DEVICE — DRESSING ABSRBNT ISLAND 3.6X8

## (undated) DEVICE — SUT 2-0 12-18IN SILK

## (undated) DEVICE — GOWN NONREINF SET-IN SLV 2XL

## (undated) DEVICE — BNDG COFLEX FOAM LF2 ST 6X5YD

## (undated) DEVICE — COVER OVERHEAD SURG LT BLUE

## (undated) DEVICE — PAD ABD TNDRSRB 7.5X8 STRL

## (undated) DEVICE — GAUZE AVANT SPNG 4PLY STRL 4X4

## (undated) DEVICE — SYS LABEL CORRECT MED

## (undated) DEVICE — SUT MCRYL PLUS 4-0 PS2 27IN

## (undated) DEVICE — TRAY PERIPHERAL VASCULAR OMC

## (undated) DEVICE — DRAPE C-ARM/MOBILE XRAY 44X80

## (undated) DEVICE — BLADE SAGITTAL SAW

## (undated) DEVICE — DRESSING TELFA STRL 4X3 LF